# Patient Record
Sex: FEMALE | Race: BLACK OR AFRICAN AMERICAN | NOT HISPANIC OR LATINO | URBAN - METROPOLITAN AREA
[De-identification: names, ages, dates, MRNs, and addresses within clinical notes are randomized per-mention and may not be internally consistent; named-entity substitution may affect disease eponyms.]

---

## 2017-04-19 ENCOUNTER — INPATIENT (INPATIENT)
Facility: HOSPITAL | Age: 72
LOS: 6 days | Discharge: NURSING, REHAB OR LONG-TERM CARE | End: 2017-04-26
Attending: INTERNAL MEDICINE | Admitting: INTERNAL MEDICINE
Payer: MEDICARE

## 2017-04-19 VITALS
DIASTOLIC BLOOD PRESSURE: 90 MMHG | RESPIRATION RATE: 18 BRPM | HEART RATE: 85 BPM | TEMPERATURE: 98 F | SYSTOLIC BLOOD PRESSURE: 170 MMHG | OXYGEN SATURATION: 98 %

## 2017-04-19 DIAGNOSIS — R53.1 WEAKNESS: ICD-10-CM

## 2017-04-19 PROBLEM — Z00.00 ENCOUNTER FOR PREVENTIVE HEALTH EXAMINATION: Status: ACTIVE | Noted: 2017-04-19

## 2017-04-19 LAB
ALBUMIN SERPL ELPH-MCNC: 4.2 G/DL — SIGNIFICANT CHANGE UP (ref 3.3–5)
ALP SERPL-CCNC: 108 U/L — SIGNIFICANT CHANGE UP (ref 40–120)
ALT FLD-CCNC: 28 U/L — SIGNIFICANT CHANGE UP (ref 4–33)
APPEARANCE UR: SIGNIFICANT CHANGE UP
AST SERPL-CCNC: 43 U/L — HIGH (ref 4–32)
BACTERIA # UR AUTO: SIGNIFICANT CHANGE UP
BASOPHILS # BLD AUTO: 0.03 K/UL — SIGNIFICANT CHANGE UP (ref 0–0.2)
BASOPHILS NFR BLD AUTO: 0.3 % — SIGNIFICANT CHANGE UP (ref 0–2)
BILIRUB SERPL-MCNC: 0.6 MG/DL — SIGNIFICANT CHANGE UP (ref 0.2–1.2)
BILIRUB UR-MCNC: NEGATIVE — SIGNIFICANT CHANGE UP
BLOOD UR QL VISUAL: NEGATIVE — SIGNIFICANT CHANGE UP
BUN SERPL-MCNC: 18 MG/DL — SIGNIFICANT CHANGE UP (ref 7–23)
CALCIUM SERPL-MCNC: 9.3 MG/DL — SIGNIFICANT CHANGE UP (ref 8.4–10.5)
CHLORIDE SERPL-SCNC: 102 MMOL/L — SIGNIFICANT CHANGE UP (ref 98–107)
CO2 SERPL-SCNC: 21 MMOL/L — LOW (ref 22–31)
COLOR SPEC: YELLOW — SIGNIFICANT CHANGE UP
CREAT SERPL-MCNC: 0.94 MG/DL — SIGNIFICANT CHANGE UP (ref 0.5–1.3)
EOSINOPHIL # BLD AUTO: 0.03 K/UL — SIGNIFICANT CHANGE UP (ref 0–0.5)
EOSINOPHIL NFR BLD AUTO: 0.3 % — SIGNIFICANT CHANGE UP (ref 0–6)
GLUCOSE SERPL-MCNC: 128 MG/DL — HIGH (ref 70–99)
GLUCOSE UR-MCNC: NEGATIVE — SIGNIFICANT CHANGE UP
HCT VFR BLD CALC: 38.7 % — SIGNIFICANT CHANGE UP (ref 34.5–45)
HGB BLD-MCNC: 12.3 G/DL — SIGNIFICANT CHANGE UP (ref 11.5–15.5)
IMM GRANULOCYTES NFR BLD AUTO: 0.3 % — SIGNIFICANT CHANGE UP (ref 0–1.5)
KETONES UR-MCNC: NEGATIVE — SIGNIFICANT CHANGE UP
LEUKOCYTE ESTERASE UR-ACNC: HIGH
LYMPHOCYTES # BLD AUTO: 1.44 K/UL — SIGNIFICANT CHANGE UP (ref 1–3.3)
LYMPHOCYTES # BLD AUTO: 13.3 % — SIGNIFICANT CHANGE UP (ref 13–44)
MCHC RBC-ENTMCNC: 27.2 PG — SIGNIFICANT CHANGE UP (ref 27–34)
MCHC RBC-ENTMCNC: 31.8 % — LOW (ref 32–36)
MCV RBC AUTO: 85.6 FL — SIGNIFICANT CHANGE UP (ref 80–100)
MONOCYTES # BLD AUTO: 0.66 K/UL — SIGNIFICANT CHANGE UP (ref 0–0.9)
MONOCYTES NFR BLD AUTO: 6.1 % — SIGNIFICANT CHANGE UP (ref 2–14)
MUCOUS THREADS # UR AUTO: SIGNIFICANT CHANGE UP
NEUTROPHILS # BLD AUTO: 8.65 K/UL — HIGH (ref 1.8–7.4)
NEUTROPHILS NFR BLD AUTO: 79.7 % — HIGH (ref 43–77)
NITRITE UR-MCNC: NEGATIVE — SIGNIFICANT CHANGE UP
NON-SQ EPI CELLS # UR AUTO: 1 — SIGNIFICANT CHANGE UP
PH UR: 7 — SIGNIFICANT CHANGE UP (ref 4.6–8)
PLATELET # BLD AUTO: 267 K/UL — SIGNIFICANT CHANGE UP (ref 150–400)
PMV BLD: 10.1 FL — SIGNIFICANT CHANGE UP (ref 7–13)
POTASSIUM SERPL-MCNC: 4.5 MMOL/L — SIGNIFICANT CHANGE UP (ref 3.5–5.3)
POTASSIUM SERPL-SCNC: 4.5 MMOL/L — SIGNIFICANT CHANGE UP (ref 3.5–5.3)
PROT SERPL-MCNC: 8.5 G/DL — HIGH (ref 6–8.3)
PROT UR-MCNC: 100 — HIGH
RBC # BLD: 4.52 M/UL — SIGNIFICANT CHANGE UP (ref 3.8–5.2)
RBC # FLD: 15.2 % — HIGH (ref 10.3–14.5)
RBC CASTS # UR COMP ASSIST: SIGNIFICANT CHANGE UP (ref 0–?)
SODIUM SERPL-SCNC: 141 MMOL/L — SIGNIFICANT CHANGE UP (ref 135–145)
SP GR SPEC: 1.02 — SIGNIFICANT CHANGE UP (ref 1–1.03)
SQUAMOUS # UR AUTO: SIGNIFICANT CHANGE UP
UROBILINOGEN FLD QL: NORMAL E.U. — SIGNIFICANT CHANGE UP (ref 0.1–0.2)
WBC # BLD: 10.84 K/UL — HIGH (ref 3.8–10.5)
WBC # FLD AUTO: 10.84 K/UL — HIGH (ref 3.8–10.5)
WBC UR QL: SIGNIFICANT CHANGE UP (ref 0–?)

## 2017-04-19 PROCEDURE — 70450 CT HEAD/BRAIN W/O DYE: CPT | Mod: 26

## 2017-04-19 PROCEDURE — 73522 X-RAY EXAM HIPS BI 3-4 VIEWS: CPT | Mod: 26

## 2017-04-19 PROCEDURE — 71020: CPT | Mod: 26

## 2017-04-19 RX ORDER — SODIUM CHLORIDE 9 MG/ML
500 INJECTION INTRAMUSCULAR; INTRAVENOUS; SUBCUTANEOUS ONCE
Qty: 0 | Refills: 0 | Status: COMPLETED | OUTPATIENT
Start: 2017-04-19 | End: 2017-04-19

## 2017-04-19 RX ADMIN — SODIUM CHLORIDE 500 MILLILITER(S): 9 INJECTION INTRAMUSCULAR; INTRAVENOUS; SUBCUTANEOUS at 20:15

## 2017-04-19 NOTE — ED PROVIDER NOTE - ATTENDING CONTRIBUTION TO CARE
[place]   pt with hs with decreased ability to ambulate and noted inability to move rt leg  Pt here with clear lungs  Card RRR S1S2 abd nontender  moves RLE but strength diminished  RUE defect at baseline    labs  CXR CT head nondiagnostic   admitted for new inability to ambulate

## 2017-04-19 NOTE — ED ADULT NURSE NOTE - OBJECTIVE STATEMENT
Alert and oriented x 4. Pt received to spot 23 complaining of right leg numbness earlier today. Pt states she walks to the door and suddenly couldn't move her right leg. Pt had hx of CVA in 2009 with right sided residual. Pt states she usual walks with a walker. Denies chest pain, shortness of breath, nausea, vomiting or dizziness. Labs drawn. VSS. Will continue to monitor. RN facilitator.

## 2017-04-19 NOTE — ED PROVIDER NOTE - OBJECTIVE STATEMENT
72 y/o F PMHx HTN, HLD, CVA w/residual R sided weakness brought in with family and aid at bedside for difficulty ambulating, for the past several days, worse today. Pt is usually able to ambulate with a cane, at baseline RUE contracted. Today, the aid observed the pt unable to move her right lower extremity. Pt denies extremity numbness/weakness/tingling, fevers, chills, N/V, headache, vision changes, dysuria or any other complaints.

## 2017-04-19 NOTE — ED ADULT TRIAGE NOTE - CHIEF COMPLAINT QUOTE
p/t with hx cva rt sided paralysis c/o of weakness and pain to rt le for 2 weeks on and off nad noted

## 2017-04-19 NOTE — ED PROVIDER NOTE - MEDICAL DECISION MAKING DETAILS
70 y/o F w/CVA p/w worsening RLE weakness over the past several days, worse today. Unable to ambulate. Infectious vs. neurological. Plan- CT head to eval for new CVA, labs/cxr/ua to eval for infectious etiology. Admission since pt is unable to ambulate.

## 2017-04-20 DIAGNOSIS — E78.5 HYPERLIPIDEMIA, UNSPECIFIED: ICD-10-CM

## 2017-04-20 DIAGNOSIS — Z79.899 OTHER LONG TERM (CURRENT) DRUG THERAPY: ICD-10-CM

## 2017-04-20 DIAGNOSIS — I63.8 OTHER CEREBRAL INFARCTION: ICD-10-CM

## 2017-04-20 DIAGNOSIS — Z41.8 ENCOUNTER FOR OTHER PROCEDURES FOR PURPOSES OTHER THAN REMEDYING HEALTH STATE: ICD-10-CM

## 2017-04-20 DIAGNOSIS — I10 ESSENTIAL (PRIMARY) HYPERTENSION: ICD-10-CM

## 2017-04-20 DIAGNOSIS — R53.1 WEAKNESS: ICD-10-CM

## 2017-04-20 LAB
BUN SERPL-MCNC: 12 MG/DL — SIGNIFICANT CHANGE UP (ref 7–23)
CALCIUM SERPL-MCNC: 9.2 MG/DL — SIGNIFICANT CHANGE UP (ref 8.4–10.5)
CHLORIDE SERPL-SCNC: 106 MMOL/L — SIGNIFICANT CHANGE UP (ref 98–107)
CHOLEST SERPL-MCNC: 107 MG/DL — LOW (ref 120–199)
CK SERPL-CCNC: 279 U/L — HIGH (ref 25–170)
CO2 SERPL-SCNC: 24 MMOL/L — SIGNIFICANT CHANGE UP (ref 22–31)
CREAT SERPL-MCNC: 0.88 MG/DL — SIGNIFICANT CHANGE UP (ref 0.5–1.3)
GLUCOSE SERPL-MCNC: 95 MG/DL — SIGNIFICANT CHANGE UP (ref 70–99)
HBA1C BLD-MCNC: 6 % — HIGH (ref 4–5.6)
HCT VFR BLD CALC: 34.6 % — SIGNIFICANT CHANGE UP (ref 34.5–45)
HDLC SERPL-MCNC: 54 MG/DL — SIGNIFICANT CHANGE UP (ref 45–65)
HGB BLD-MCNC: 11 G/DL — LOW (ref 11.5–15.5)
LIPID PNL WITH DIRECT LDL SERPL: 43 MG/DL — SIGNIFICANT CHANGE UP
MCHC RBC-ENTMCNC: 27.2 PG — SIGNIFICANT CHANGE UP (ref 27–34)
MCHC RBC-ENTMCNC: 31.8 % — LOW (ref 32–36)
MCV RBC AUTO: 85.6 FL — SIGNIFICANT CHANGE UP (ref 80–100)
PLATELET # BLD AUTO: 241 K/UL — SIGNIFICANT CHANGE UP (ref 150–400)
PMV BLD: 9.4 FL — SIGNIFICANT CHANGE UP (ref 7–13)
POTASSIUM SERPL-MCNC: 3.9 MMOL/L — SIGNIFICANT CHANGE UP (ref 3.5–5.3)
POTASSIUM SERPL-SCNC: 3.9 MMOL/L — SIGNIFICANT CHANGE UP (ref 3.5–5.3)
RBC # BLD: 4.04 M/UL — SIGNIFICANT CHANGE UP (ref 3.8–5.2)
RBC # FLD: 15.2 % — HIGH (ref 10.3–14.5)
SODIUM SERPL-SCNC: 141 MMOL/L — SIGNIFICANT CHANGE UP (ref 135–145)
TRIGL SERPL-MCNC: 53 MG/DL — SIGNIFICANT CHANGE UP (ref 10–149)
TROPONIN T SERPL-MCNC: < 0.06 NG/ML — SIGNIFICANT CHANGE UP (ref 0–0.06)
VIT B12 SERPL-MCNC: 1327 PG/ML — HIGH (ref 200–900)
WBC # BLD: 8.7 K/UL — SIGNIFICANT CHANGE UP (ref 3.8–10.5)
WBC # FLD AUTO: 8.7 K/UL — SIGNIFICANT CHANGE UP (ref 3.8–10.5)

## 2017-04-20 PROCEDURE — 99223 1ST HOSP IP/OBS HIGH 75: CPT

## 2017-04-20 PROCEDURE — 93010 ELECTROCARDIOGRAM REPORT: CPT

## 2017-04-20 RX ORDER — SIMVASTATIN 20 MG/1
10 TABLET, FILM COATED ORAL AT BEDTIME
Qty: 0 | Refills: 0 | Status: DISCONTINUED | OUTPATIENT
Start: 2017-04-20 | End: 2017-04-26

## 2017-04-20 RX ORDER — NICARDIPINE HYDROCHLORIDE 30 MG/1
40 CAPSULE, EXTENDED RELEASE ORAL
Qty: 0 | Refills: 0 | Status: DISCONTINUED | OUTPATIENT
Start: 2017-04-20 | End: 2017-04-26

## 2017-04-20 RX ORDER — LOSARTAN POTASSIUM 100 MG/1
1 TABLET, FILM COATED ORAL
Qty: 0 | Refills: 0 | COMMUNITY

## 2017-04-20 RX ORDER — LOSARTAN POTASSIUM 100 MG/1
100 TABLET, FILM COATED ORAL DAILY
Qty: 0 | Refills: 0 | Status: DISCONTINUED | OUTPATIENT
Start: 2017-04-20 | End: 2017-04-26

## 2017-04-20 RX ORDER — FUROSEMIDE 40 MG
10 TABLET ORAL DAILY
Qty: 0 | Refills: 0 | Status: DISCONTINUED | OUTPATIENT
Start: 2017-04-20 | End: 2017-04-26

## 2017-04-20 RX ORDER — NICARDIPINE HYDROCHLORIDE 30 MG/1
20 CAPSULE, EXTENDED RELEASE ORAL
Qty: 0 | Refills: 0 | Status: DISCONTINUED | OUTPATIENT
Start: 2017-04-20 | End: 2017-04-26

## 2017-04-20 RX ORDER — SIMVASTATIN 20 MG/1
1 TABLET, FILM COATED ORAL
Qty: 0 | Refills: 0 | COMMUNITY

## 2017-04-20 RX ADMIN — NICARDIPINE HYDROCHLORIDE 20 MILLIGRAM(S): 30 CAPSULE, EXTENDED RELEASE ORAL at 18:46

## 2017-04-20 RX ADMIN — SIMVASTATIN 10 MILLIGRAM(S): 20 TABLET, FILM COATED ORAL at 22:20

## 2017-04-20 RX ADMIN — Medication 1 TABLET(S): at 18:46

## 2017-04-20 NOTE — H&P ADULT. - ATTENDING COMMENTS
Called patient's emergency contact in EMR Marcela Davis who provided me with Axel San's (patient's sister) number who is patient's actual emergency contact [phone] or cell [phone].  I spoke with Axel who states that yesterday patient couldn't move her RLE and she was sweating, the paramedics were called and thought she should be brought to the hospital.  Per Axel, RLE weakness comes and goes, no other recent fevers/chills.  She reports that sometimes the patient cries because of pain, however her sister believes she tries to hide her pain from her aide so that she won't be brought to the hospital. She states patient is dysarthric with word finding difficulties at baseline.  Neuro consult was placed by me for further evaluation.

## 2017-04-20 NOTE — H&P ADULT. - PROBLEM SELECTOR PLAN 3
patient appears to be on nicardipine 20mg, losartan 100mg and lasix 20mg per outpatient EMR records however will need to clarify with PMD in AM

## 2017-04-20 NOTE — H&P ADULT. - LAB RESULTS AND INTERPRETATION
Labs personally reviewed  UA with large LE however (-)Nitrite, 2-5WBC, 100 protein  hyperglycemia  mild transaminitis  stable CKD stage 2  mild leukocytosis with neutrophilia

## 2017-04-20 NOTE — H&P ADULT. - PROBLEM SELECTOR PLAN 1
complaint of weakness, headache, pain in RLE  unclear etiology  CT head as above  will obtain neurology consult  EKG pending given complaint of diaphoresis associated with her weakness yesterday

## 2017-04-20 NOTE — H&P ADULT. - RADIOLOGY RESULTS AND INTERPRETATION
CT head reviewed - No CT evidence of acute intracranial hemorrhage, brain edema, mass effect or acute territorial infarct.  Encephalomalacia/gliosis involving the left frontal lobe and left basal ganglia which is sequela of an intraparenchymal hemorrhage that was seen on 12/25/2009.  Follow-up MRI can be obtained as clinically warranted.  CXR personally reviewed - no acute path, lungs clear  xray hips - The pelvic rings are intact. There is no acute fracture or dislocation of the right or left hip. Bilateral hip joint arthrosis with mild joint  space narrowing.

## 2017-04-20 NOTE — H&P ADULT. - EKG AND INTERPRETATION
EKG ordered and pending EKG personally reviewed - 91bpm NSR, isolated Q in III, TWI in V4, flat T in V5-V6, QTc 445ms. Unable to view prior EKG for comparison (error message "service unavailable" received). No complaints of chest pain at present.

## 2017-04-20 NOTE — H&P ADULT. - CRANIAL NERVE
EOMI, mild arthralgia, decreased sensation R side of face, facial symmetry preserved, unable to raise R shoulder

## 2017-04-20 NOTE — H&P ADULT. - ASSESSMENT
71 -year-old female with HTN, hemorrhagic stroke (2009) with residual R-sided hemiplegia presenting from home with worsening right-sided lower extremity weakness.

## 2017-04-20 NOTE — H&P ADULT. - RS GEN PE MLT RESP DETAILS PC
no rhonchi/breath sounds equal/no rales/normal/respirations non-labored/good air movement/no wheezes/no intercostal retractions/airway patent/clear to auscultation bilaterally

## 2017-04-20 NOTE — H&P ADULT. - HISTORY OF PRESENT ILLNESS
71 -year-old female with HTN, hemorrhagic stroke (2009) with residual R-sided hemiplegia presenting from home with worsening right-sided weakness    In the ED VS:  98  85  170/90  18  98%RA, received NS 500cc x1 71 -year-old female with HTN, hemorrhagic stroke (2009) with residual R-sided hemiplegia presenting from home with worsening right-sided lower extremity weakness.  Patient reports that she walked outside today and then was unable to walk back inside, so she called for her aide who had to help her walk back inside. The only associated symptom with this event, per patient report, was diaphoresis.  Denied chest pain, shortness of breath, palpitations or nausea. Patient with word finding difficulties and no family present at bedside making patient interview difficult.  Patient reports taking 4 meds however unable to name.  She states last night before going to sleep she had a headache and pain extending from bifrontal down the right side of her body to her toes.  She denies any changes in vision or hearing, any photophobia or phonophobia.  She states she didn't eat until approximately 1PM yesterday.  She denies falls or syncope.  She denies chest pain, nausea, vomiting, diarrhea, abdominal pain, dysuria, hematuria, sick contacts, URI symptoms.     In the ED VS:  98  85  170/90  18  98%RA, received NS 500cc x1

## 2017-04-21 LAB
BACTERIA UR CULT: SIGNIFICANT CHANGE UP
BASOPHILS # BLD AUTO: 0.05 K/UL — SIGNIFICANT CHANGE UP (ref 0–0.2)
BASOPHILS NFR BLD AUTO: 0.7 % — SIGNIFICANT CHANGE UP (ref 0–2)
BUN SERPL-MCNC: 11 MG/DL — SIGNIFICANT CHANGE UP (ref 7–23)
CALCIUM SERPL-MCNC: 9 MG/DL — SIGNIFICANT CHANGE UP (ref 8.4–10.5)
CHLORIDE SERPL-SCNC: 104 MMOL/L — SIGNIFICANT CHANGE UP (ref 98–107)
CK SERPL-CCNC: 218 U/L — HIGH (ref 25–170)
CO2 SERPL-SCNC: 23 MMOL/L — SIGNIFICANT CHANGE UP (ref 22–31)
CREAT SERPL-MCNC: 0.81 MG/DL — SIGNIFICANT CHANGE UP (ref 0.5–1.3)
EOSINOPHIL # BLD AUTO: 0.08 K/UL — SIGNIFICANT CHANGE UP (ref 0–0.5)
EOSINOPHIL NFR BLD AUTO: 1.2 % — SIGNIFICANT CHANGE UP (ref 0–6)
GLUCOSE SERPL-MCNC: 87 MG/DL — SIGNIFICANT CHANGE UP (ref 70–99)
HCT VFR BLD CALC: 33.8 % — LOW (ref 34.5–45)
HGB BLD-MCNC: 10.7 G/DL — LOW (ref 11.5–15.5)
IMM GRANULOCYTES NFR BLD AUTO: 0.3 % — SIGNIFICANT CHANGE UP (ref 0–1.5)
LYMPHOCYTES # BLD AUTO: 2.31 K/UL — SIGNIFICANT CHANGE UP (ref 1–3.3)
LYMPHOCYTES # BLD AUTO: 33.9 % — SIGNIFICANT CHANGE UP (ref 13–44)
MAGNESIUM SERPL-MCNC: 2.1 MG/DL — SIGNIFICANT CHANGE UP (ref 1.6–2.6)
MCHC RBC-ENTMCNC: 27 PG — SIGNIFICANT CHANGE UP (ref 27–34)
MCHC RBC-ENTMCNC: 31.7 % — LOW (ref 32–36)
MCV RBC AUTO: 85.4 FL — SIGNIFICANT CHANGE UP (ref 80–100)
MONOCYTES # BLD AUTO: 0.63 K/UL — SIGNIFICANT CHANGE UP (ref 0–0.9)
MONOCYTES NFR BLD AUTO: 9.3 % — SIGNIFICANT CHANGE UP (ref 2–14)
NEUTROPHILS # BLD AUTO: 3.72 K/UL — SIGNIFICANT CHANGE UP (ref 1.8–7.4)
NEUTROPHILS NFR BLD AUTO: 54.6 % — SIGNIFICANT CHANGE UP (ref 43–77)
PHOSPHATE SERPL-MCNC: 3.3 MG/DL — SIGNIFICANT CHANGE UP (ref 2.5–4.5)
PLATELET # BLD AUTO: 239 K/UL — SIGNIFICANT CHANGE UP (ref 150–400)
PMV BLD: 10 FL — SIGNIFICANT CHANGE UP (ref 7–13)
POTASSIUM SERPL-MCNC: 4.2 MMOL/L — SIGNIFICANT CHANGE UP (ref 3.5–5.3)
POTASSIUM SERPL-SCNC: 4.2 MMOL/L — SIGNIFICANT CHANGE UP (ref 3.5–5.3)
RBC # BLD: 3.96 M/UL — SIGNIFICANT CHANGE UP (ref 3.8–5.2)
RBC # FLD: 15.2 % — HIGH (ref 10.3–14.5)
SODIUM SERPL-SCNC: 141 MMOL/L — SIGNIFICANT CHANGE UP (ref 135–145)
SPECIMEN SOURCE: SIGNIFICANT CHANGE UP
WBC # BLD: 6.81 K/UL — SIGNIFICANT CHANGE UP (ref 3.8–10.5)
WBC # FLD AUTO: 6.81 K/UL — SIGNIFICANT CHANGE UP (ref 3.8–10.5)

## 2017-04-21 PROCEDURE — 99233 SBSQ HOSP IP/OBS HIGH 50: CPT

## 2017-04-21 PROCEDURE — 70551 MRI BRAIN STEM W/O DYE: CPT | Mod: 26

## 2017-04-21 PROCEDURE — 70548 MR ANGIOGRAPHY NECK W/DYE: CPT | Mod: 26

## 2017-04-21 RX ADMIN — SIMVASTATIN 10 MILLIGRAM(S): 20 TABLET, FILM COATED ORAL at 21:29

## 2017-04-21 RX ADMIN — NICARDIPINE HYDROCHLORIDE 20 MILLIGRAM(S): 30 CAPSULE, EXTENDED RELEASE ORAL at 19:11

## 2017-04-21 RX ADMIN — Medication 10 MILLIGRAM(S): at 05:43

## 2017-04-21 RX ADMIN — NICARDIPINE HYDROCHLORIDE 40 MILLIGRAM(S): 30 CAPSULE, EXTENDED RELEASE ORAL at 09:25

## 2017-04-21 RX ADMIN — Medication 1 TABLET(S): at 13:58

## 2017-04-21 RX ADMIN — LOSARTAN POTASSIUM 100 MILLIGRAM(S): 100 TABLET, FILM COATED ORAL at 05:44

## 2017-04-21 RX ADMIN — NICARDIPINE HYDROCHLORIDE 20 MILLIGRAM(S): 30 CAPSULE, EXTENDED RELEASE ORAL at 13:58

## 2017-04-22 RX ADMIN — SIMVASTATIN 10 MILLIGRAM(S): 20 TABLET, FILM COATED ORAL at 22:15

## 2017-04-22 RX ADMIN — LOSARTAN POTASSIUM 100 MILLIGRAM(S): 100 TABLET, FILM COATED ORAL at 06:07

## 2017-04-22 RX ADMIN — Medication 1 TABLET(S): at 11:51

## 2017-04-22 RX ADMIN — NICARDIPINE HYDROCHLORIDE 20 MILLIGRAM(S): 30 CAPSULE, EXTENDED RELEASE ORAL at 11:51

## 2017-04-22 RX ADMIN — NICARDIPINE HYDROCHLORIDE 20 MILLIGRAM(S): 30 CAPSULE, EXTENDED RELEASE ORAL at 17:25

## 2017-04-22 RX ADMIN — NICARDIPINE HYDROCHLORIDE 40 MILLIGRAM(S): 30 CAPSULE, EXTENDED RELEASE ORAL at 08:36

## 2017-04-22 RX ADMIN — Medication 10 MILLIGRAM(S): at 06:07

## 2017-04-23 LAB
BASOPHILS # BLD AUTO: 0.05 K/UL — SIGNIFICANT CHANGE UP (ref 0–0.2)
BASOPHILS NFR BLD AUTO: 0.7 % — SIGNIFICANT CHANGE UP (ref 0–2)
EOSINOPHIL # BLD AUTO: 0.11 K/UL — SIGNIFICANT CHANGE UP (ref 0–0.5)
EOSINOPHIL NFR BLD AUTO: 1.5 % — SIGNIFICANT CHANGE UP (ref 0–6)
HCT VFR BLD CALC: 37.9 % — SIGNIFICANT CHANGE UP (ref 34.5–45)
HGB BLD-MCNC: 12 G/DL — SIGNIFICANT CHANGE UP (ref 11.5–15.5)
IMM GRANULOCYTES NFR BLD AUTO: 0.3 % — SIGNIFICANT CHANGE UP (ref 0–1.5)
LYMPHOCYTES # BLD AUTO: 2.06 K/UL — SIGNIFICANT CHANGE UP (ref 1–3.3)
LYMPHOCYTES # BLD AUTO: 28 % — SIGNIFICANT CHANGE UP (ref 13–44)
MCHC RBC-ENTMCNC: 27.5 PG — SIGNIFICANT CHANGE UP (ref 27–34)
MCHC RBC-ENTMCNC: 31.7 % — LOW (ref 32–36)
MCV RBC AUTO: 86.7 FL — SIGNIFICANT CHANGE UP (ref 80–100)
MONOCYTES # BLD AUTO: 0.62 K/UL — SIGNIFICANT CHANGE UP (ref 0–0.9)
MONOCYTES NFR BLD AUTO: 8.4 % — SIGNIFICANT CHANGE UP (ref 2–14)
NEUTROPHILS # BLD AUTO: 4.51 K/UL — SIGNIFICANT CHANGE UP (ref 1.8–7.4)
NEUTROPHILS NFR BLD AUTO: 61.1 % — SIGNIFICANT CHANGE UP (ref 43–77)
PLATELET # BLD AUTO: 255 K/UL — SIGNIFICANT CHANGE UP (ref 150–400)
PMV BLD: 9.9 FL — SIGNIFICANT CHANGE UP (ref 7–13)
RBC # BLD: 4.37 M/UL — SIGNIFICANT CHANGE UP (ref 3.8–5.2)
RBC # FLD: 15.4 % — HIGH (ref 10.3–14.5)
WBC # BLD: 7.44 K/UL — SIGNIFICANT CHANGE UP (ref 3.8–10.5)
WBC # FLD AUTO: 7.44 K/UL — SIGNIFICANT CHANGE UP (ref 3.8–10.5)

## 2017-04-23 RX ADMIN — NICARDIPINE HYDROCHLORIDE 20 MILLIGRAM(S): 30 CAPSULE, EXTENDED RELEASE ORAL at 20:40

## 2017-04-23 RX ADMIN — Medication 10 MILLIGRAM(S): at 06:32

## 2017-04-23 RX ADMIN — LOSARTAN POTASSIUM 100 MILLIGRAM(S): 100 TABLET, FILM COATED ORAL at 06:32

## 2017-04-23 RX ADMIN — SIMVASTATIN 10 MILLIGRAM(S): 20 TABLET, FILM COATED ORAL at 22:04

## 2017-04-23 RX ADMIN — Medication 1 TABLET(S): at 15:14

## 2017-04-23 RX ADMIN — NICARDIPINE HYDROCHLORIDE 20 MILLIGRAM(S): 30 CAPSULE, EXTENDED RELEASE ORAL at 15:14

## 2017-04-23 RX ADMIN — NICARDIPINE HYDROCHLORIDE 40 MILLIGRAM(S): 30 CAPSULE, EXTENDED RELEASE ORAL at 08:46

## 2017-04-24 LAB
BASOPHILS # BLD AUTO: 0.05 K/UL — SIGNIFICANT CHANGE UP (ref 0–0.2)
BASOPHILS NFR BLD AUTO: 0.6 % — SIGNIFICANT CHANGE UP (ref 0–2)
BUN SERPL-MCNC: 18 MG/DL — SIGNIFICANT CHANGE UP (ref 7–23)
CALCIUM SERPL-MCNC: 9.3 MG/DL — SIGNIFICANT CHANGE UP (ref 8.4–10.5)
CHLORIDE SERPL-SCNC: 105 MMOL/L — SIGNIFICANT CHANGE UP (ref 98–107)
CO2 SERPL-SCNC: 22 MMOL/L — SIGNIFICANT CHANGE UP (ref 22–31)
CREAT SERPL-MCNC: 0.97 MG/DL — SIGNIFICANT CHANGE UP (ref 0.5–1.3)
EOSINOPHIL # BLD AUTO: 0.16 K/UL — SIGNIFICANT CHANGE UP (ref 0–0.5)
EOSINOPHIL NFR BLD AUTO: 2 % — SIGNIFICANT CHANGE UP (ref 0–6)
GLUCOSE SERPL-MCNC: 93 MG/DL — SIGNIFICANT CHANGE UP (ref 70–99)
HCT VFR BLD CALC: 38.4 % — SIGNIFICANT CHANGE UP (ref 34.5–45)
HGB BLD-MCNC: 12.1 G/DL — SIGNIFICANT CHANGE UP (ref 11.5–15.5)
IMM GRANULOCYTES NFR BLD AUTO: 0.5 % — SIGNIFICANT CHANGE UP (ref 0–1.5)
LYMPHOCYTES # BLD AUTO: 2.88 K/UL — SIGNIFICANT CHANGE UP (ref 1–3.3)
LYMPHOCYTES # BLD AUTO: 35.4 % — SIGNIFICANT CHANGE UP (ref 13–44)
MCHC RBC-ENTMCNC: 27.1 PG — SIGNIFICANT CHANGE UP (ref 27–34)
MCHC RBC-ENTMCNC: 31.5 % — LOW (ref 32–36)
MCV RBC AUTO: 85.9 FL — SIGNIFICANT CHANGE UP (ref 80–100)
MONOCYTES # BLD AUTO: 0.59 K/UL — SIGNIFICANT CHANGE UP (ref 0–0.9)
MONOCYTES NFR BLD AUTO: 7.3 % — SIGNIFICANT CHANGE UP (ref 2–14)
NEUTROPHILS # BLD AUTO: 4.41 K/UL — SIGNIFICANT CHANGE UP (ref 1.8–7.4)
NEUTROPHILS NFR BLD AUTO: 54.2 % — SIGNIFICANT CHANGE UP (ref 43–77)
PLATELET # BLD AUTO: 265 K/UL — SIGNIFICANT CHANGE UP (ref 150–400)
PMV BLD: 9.6 FL — SIGNIFICANT CHANGE UP (ref 7–13)
POTASSIUM SERPL-MCNC: 4.2 MMOL/L — SIGNIFICANT CHANGE UP (ref 3.5–5.3)
POTASSIUM SERPL-SCNC: 4.2 MMOL/L — SIGNIFICANT CHANGE UP (ref 3.5–5.3)
RBC # BLD: 4.47 M/UL — SIGNIFICANT CHANGE UP (ref 3.8–5.2)
RBC # FLD: 15.2 % — HIGH (ref 10.3–14.5)
SODIUM SERPL-SCNC: 141 MMOL/L — SIGNIFICANT CHANGE UP (ref 135–145)
WBC # BLD: 8.13 K/UL — SIGNIFICANT CHANGE UP (ref 3.8–10.5)
WBC # FLD AUTO: 8.13 K/UL — SIGNIFICANT CHANGE UP (ref 3.8–10.5)

## 2017-04-24 PROCEDURE — 99233 SBSQ HOSP IP/OBS HIGH 50: CPT

## 2017-04-24 PROCEDURE — 95819 EEG AWAKE AND ASLEEP: CPT | Mod: 26

## 2017-04-24 PROCEDURE — 95957 EEG DIGITAL ANALYSIS: CPT | Mod: 26

## 2017-04-24 RX ORDER — DIVALPROEX SODIUM 500 MG/1
500 TABLET, DELAYED RELEASE ORAL
Qty: 0 | Refills: 0 | Status: DISCONTINUED | OUTPATIENT
Start: 2017-04-24 | End: 2017-04-26

## 2017-04-24 RX ADMIN — NICARDIPINE HYDROCHLORIDE 20 MILLIGRAM(S): 30 CAPSULE, EXTENDED RELEASE ORAL at 18:24

## 2017-04-24 RX ADMIN — SIMVASTATIN 10 MILLIGRAM(S): 20 TABLET, FILM COATED ORAL at 22:01

## 2017-04-24 RX ADMIN — NICARDIPINE HYDROCHLORIDE 20 MILLIGRAM(S): 30 CAPSULE, EXTENDED RELEASE ORAL at 13:45

## 2017-04-24 RX ADMIN — Medication 10 MILLIGRAM(S): at 05:49

## 2017-04-24 RX ADMIN — Medication 1 TABLET(S): at 13:45

## 2017-04-24 RX ADMIN — LOSARTAN POTASSIUM 100 MILLIGRAM(S): 100 TABLET, FILM COATED ORAL at 05:49

## 2017-04-24 RX ADMIN — NICARDIPINE HYDROCHLORIDE 40 MILLIGRAM(S): 30 CAPSULE, EXTENDED RELEASE ORAL at 08:10

## 2017-04-24 RX ADMIN — DIVALPROEX SODIUM 500 MILLIGRAM(S): 500 TABLET, DELAYED RELEASE ORAL at 18:23

## 2017-04-24 NOTE — EEG REPORT - NS EEG TEXT BOX
[facility]  Report of Routine EEG with Video  And Report of DigitalCompressed Spectral Array Analysis    [facility]: [address], [address] [place] [address], Phone: [phone]  [facility]: [address], [place] [address], Phone: [phone]  Office: [address] [facility], [place] [address], [place] [address], Phone: [phone]    Patient Name: CARYN LEIVA    Age: 71 y  : 1945  Patient ID: -, MRN #: -, Location: 56  Referring Physician: -    EEG #: [id number]  Study Date: 2017		    Technical Information:					  On Instrument: -  Placement and Labeling of Electrodes:  The EEG was performed utilizing 20 channels referential EEG connections (coronal over temporal over parasagittal montage) using all standard 10-20 electrode placements with EKG.  Recording was at a sampling rate of 256 samples per second per channel.  Time synchronized digital video recording was done simultaneously with EEG recording.  A low light infrared camera was used for low light recording.  Antoine and seizure detection algorithms were utilized.  CSA Technical Component:  Quantitative EEG analysis using a separate Compressed Spectral Array (CSA) software package was conducted in real-time and run at bedside after set up by the technician, digitally displaying the power of electrographic frequencies included in the 1-30Hz band using a graded color map.  This data was reviewed and interpreted independently, and is reported in a separate section below.    History:  70YO PRESENTS WITH R FACIAL DROOP AND R SIDED WEAKNESS    Medication	  No Data.	    Study Interpretation:    FINDINGS:  The background was continuous, spontaneously variable and reactive.  During wakefulness, the posteriorly dominant rhythm consisted of symmetric, well modulated 10 Hz activity, with an amplitude to 30 uV, that attenuated to eye opening.  Low amplitude central beta was noted in wakefulness.    Background Slowing:  Generalized slowing: none was present.  Focal slowing: Continuous polymorphic delta>theta activity present over right hemisphere .    Sleep Background:  Drowsiness was characterized by fragmentation, attenuation, and slowing of the background activity.    Sleep was characterized by the presence of vertex waves, symmetric spindles, and K-complexes.    Epileptiform Activity:   Frequent left temporal (T7 maximal) sharp wave discharges noted, sometimes the discharges are seen as well over the anterior temporal (F3, T3) leads..     Events:  No clinical events were recorded.  No seizures were recorded.    Activation Procedures:   -Hyperventilation was not performed.    -Photic stimulation was not performed.    Artifacts:  Intermittent myogenic and movement artifacts were noted.    ECG:  The heart rate on single channel ECG was predominantly between 60-70 BPM.    Compressed Spectral Array Digital Analysis    FINDINGS:  Compressed Spectral Array (CSA) data was reviewed separately and correlated with the electroencephalographic findings detailed above.  CSA showed a variable spectral pattern.  Areas of increased power in particular were reviewed in detail, and compared with the raw EEG data.  Areas of abrupt increases in spectral power were reviewed to exclude seizures, and were determined to be artifactual in nature.    The relative ratio of the power of delta range frequencies and faster frequencies remained stable over the course of the study.  There was increase in the broad band power in the delta frequency spectrum apparent in the left hemisphere versus the right hemisphere.      Compressed Spectral Array (Digital Analysis) Summary/ Impression:  More delta power in left hemisphere.  Intermittent areas of increased power reviewed, without definite epileptiform activity associated on CSA.      EEG Classification / Summary:  Abnormal awake, drowsy and asleep Routine EEG Study   -	Frequenct left temporal epileptiform discharges  -	Continuous polymorphic focal slowing, left hemisphere    Clinical Impression:  Risk of focal seizures from left temporal region. There was evidence of structural injury to the left hemisphere region.          ________________________________________  BETITO Washington  Fellow in Neurophysiology/Epilepsy, [facility] [place]      King Gonzales M.D.  Director, [facility] [place]

## 2017-04-25 RX ADMIN — SIMVASTATIN 10 MILLIGRAM(S): 20 TABLET, FILM COATED ORAL at 21:59

## 2017-04-25 RX ADMIN — NICARDIPINE HYDROCHLORIDE 40 MILLIGRAM(S): 30 CAPSULE, EXTENDED RELEASE ORAL at 08:57

## 2017-04-25 RX ADMIN — Medication 1 TABLET(S): at 13:23

## 2017-04-25 RX ADMIN — LOSARTAN POTASSIUM 100 MILLIGRAM(S): 100 TABLET, FILM COATED ORAL at 05:18

## 2017-04-25 RX ADMIN — DIVALPROEX SODIUM 500 MILLIGRAM(S): 500 TABLET, DELAYED RELEASE ORAL at 05:18

## 2017-04-25 RX ADMIN — DIVALPROEX SODIUM 500 MILLIGRAM(S): 500 TABLET, DELAYED RELEASE ORAL at 18:01

## 2017-04-25 RX ADMIN — NICARDIPINE HYDROCHLORIDE 20 MILLIGRAM(S): 30 CAPSULE, EXTENDED RELEASE ORAL at 18:01

## 2017-04-25 RX ADMIN — Medication 10 MILLIGRAM(S): at 05:18

## 2017-04-25 RX ADMIN — NICARDIPINE HYDROCHLORIDE 20 MILLIGRAM(S): 30 CAPSULE, EXTENDED RELEASE ORAL at 13:22

## 2017-04-26 ENCOUNTER — TRANSCRIPTION ENCOUNTER (OUTPATIENT)
Age: 72
End: 2017-04-26

## 2017-04-26 VITALS — HEART RATE: 76 BPM | DIASTOLIC BLOOD PRESSURE: 58 MMHG | SYSTOLIC BLOOD PRESSURE: 124 MMHG

## 2017-04-26 RX ORDER — NICARDIPINE HYDROCHLORIDE 30 MG/1
1 CAPSULE, EXTENDED RELEASE ORAL
Qty: 0 | Refills: 0 | COMMUNITY

## 2017-04-26 RX ORDER — FUROSEMIDE 40 MG
0 TABLET ORAL
Qty: 0 | Refills: 0 | COMMUNITY

## 2017-04-26 RX ORDER — NICARDIPINE HYDROCHLORIDE 30 MG/1
2 CAPSULE, EXTENDED RELEASE ORAL
Qty: 0 | Refills: 0 | COMMUNITY
Start: 2017-04-26

## 2017-04-26 RX ORDER — NICARDIPINE HYDROCHLORIDE 30 MG/1
1 CAPSULE, EXTENDED RELEASE ORAL
Qty: 0 | Refills: 0 | COMMUNITY
Start: 2017-04-26

## 2017-04-26 RX ORDER — DIVALPROEX SODIUM 500 MG/1
1 TABLET, DELAYED RELEASE ORAL
Qty: 0 | Refills: 0 | COMMUNITY
Start: 2017-04-26

## 2017-04-26 RX ORDER — FUROSEMIDE 40 MG
10 TABLET ORAL
Qty: 0 | Refills: 0 | COMMUNITY
Start: 2017-04-26

## 2017-04-26 RX ADMIN — LOSARTAN POTASSIUM 100 MILLIGRAM(S): 100 TABLET, FILM COATED ORAL at 05:46

## 2017-04-26 RX ADMIN — DIVALPROEX SODIUM 500 MILLIGRAM(S): 500 TABLET, DELAYED RELEASE ORAL at 05:46

## 2017-04-26 RX ADMIN — Medication 10 MILLIGRAM(S): at 05:46

## 2017-04-26 RX ADMIN — NICARDIPINE HYDROCHLORIDE 40 MILLIGRAM(S): 30 CAPSULE, EXTENDED RELEASE ORAL at 08:46

## 2017-04-26 RX ADMIN — NICARDIPINE HYDROCHLORIDE 20 MILLIGRAM(S): 30 CAPSULE, EXTENDED RELEASE ORAL at 12:01

## 2017-04-26 RX ADMIN — DIVALPROEX SODIUM 500 MILLIGRAM(S): 500 TABLET, DELAYED RELEASE ORAL at 17:09

## 2017-04-26 RX ADMIN — Medication 1 TABLET(S): at 12:01

## 2017-04-26 RX ADMIN — NICARDIPINE HYDROCHLORIDE 20 MILLIGRAM(S): 30 CAPSULE, EXTENDED RELEASE ORAL at 17:09

## 2017-04-26 NOTE — DISCHARGE NOTE ADULT - PLAN OF CARE
Stable You were admitted for weakness. An MRI/MRA Head and Neck performed which shows no evidence of significant stenosis vascular occlusion involving the intracranial or extracranial circulation.  No new ischemic changes or bleeding. An EEG was performed while inpatient and resulted as the left temporal region of brain at risk for seizure activity.  You have been prescribed an anti seizure medication Depakote 500 mg 2x daily.  Recommended for rehab for physical therapy and occupational therapy.    Follow up with neurology as outpatient when discharged from rehab with Dr. Linder at [address] [place] [address] [phone] further medical management. Controlled. Target blood pressure less then 140/90 Continue antihypertensives as prescribed.  Losartan and Nicardipine. Continue Aspirin as prescribed.  Monitor for falls and aspiration. Controlled Continue medication as prescribed.  Zocor.

## 2017-04-26 NOTE — DISCHARGE NOTE ADULT - SECONDARY DIAGNOSIS.
Benign Essential Hypertension Cerebrovascular accident (CVA) due to other mechanism Hyperlipidemia, unspecified hyperlipidemia type

## 2017-04-26 NOTE — DISCHARGE NOTE ADULT - CARE PROVIDER_API CALL
Cindy Linder), Neurology  [address] [place] [address]  Phone: [phone]  Fax: [phone]    La Zepeda), Internal Medicine  [address] [place] [address]  Phone: [phone]  Fax: [phone]

## 2017-04-26 NOTE — DISCHARGE NOTE ADULT - PATIENT PORTAL LINK FT
“You can access the FollowHealth Patient Portal, offered by [facility], by registering with the following website: [email or web address]”

## 2017-04-26 NOTE — DISCHARGE NOTE ADULT - CARE PLAN
Principal Discharge DX:	Weakness  Goal:	Stable  Instructions for follow-up, activity and diet:	You were admitted for weakness. An MRI/MRA Head and Neck performed which shows no evidence of significant stenosis vascular occlusion involving the intracranial or extracranial circulation.  No new ischemic changes or bleeding. An EEG was performed while inpatient and resulted as the left temporal region of brain at risk for seizure activity.  You have been prescribed an anti seizure medication Depakote 500 mg 2x daily.  Recommended for rehab for physical therapy and occupational therapy.    Follow up with neurology as outpatient when discharged from rehab with Dr. Linder at [address] [place] [address] [phone] further medical management.  Secondary Diagnosis:	Benign Essential Hypertension  Goal:	Controlled. Target blood pressure less then 140/90  Instructions for follow-up, activity and diet:	Continue antihypertensives as prescribed.  Losartan and Nicardipine.  Secondary Diagnosis:	Cerebrovascular accident (CVA) due to other mechanism  Goal:	Stable  Instructions for follow-up, activity and diet:	Continue Aspirin as prescribed.  Monitor for falls and aspiration.  Secondary Diagnosis:	Hyperlipidemia, unspecified hyperlipidemia type  Goal:	Controlled  Instructions for follow-up, activity and diet:	Continue medication as prescribed.  Zocor. Principal Discharge DX:	Weakness  Goal:	Stable  Instructions for follow-up, activity and diet:	You were admitted for weakness. An MRI/MRA Head and Neck performed which shows no evidence of significant stenosis vascular occlusion involving the intracranial or extracranial circulation.  No new ischemic changes or bleeding. An EEG was performed while inpatient and resulted as the left temporal region of brain at risk for seizure activity.  You have been prescribed an anti seizure medication Depakote 500 mg 2x daily.  Recommended for rehab for physical therapy and occupational therapy.    Follow up with neurology as outpatient when discharged from rehab with Dr. Linder at [address] [place] [address] [phone] further medical management.  Secondary Diagnosis:	Benign Essential Hypertension  Goal:	Controlled. Target blood pressure less then 140/90  Instructions for follow-up, activity and diet:	Continue antihypertensives as prescribed.  Losartan and Nicardipine.  Secondary Diagnosis:	Cerebrovascular accident (CVA) due to other mechanism  Goal:	Stable  Instructions for follow-up, activity and diet:	Continue Aspirin as prescribed.  Monitor for falls and aspiration.  Secondary Diagnosis:	Hyperlipidemia, unspecified hyperlipidemia type  Goal:	Controlled  Instructions for follow-up, activity and diet:	Continue medication as prescribed.  Zocor. Principal Discharge DX:	Weakness  Goal:	Stable  Instructions for follow-up, activity and diet:	You were admitted for weakness. An MRI/MRA Head and Neck performed which shows no evidence of significant stenosis vascular occlusion involving the intracranial or extracranial circulation.  No new ischemic changes or bleeding. An EEG was performed while inpatient and resulted as the left temporal region of brain at risk for seizure activity.  You have been prescribed an anti seizure medication Depakote 500 mg 2x daily.  Recommended for rehab for physical therapy and occupational therapy.    Follow up with neurology as outpatient when discharged from rehab with Dr. Linder at [address] [place] [address] [phone] further medical management.  Secondary Diagnosis:	Benign Essential Hypertension  Goal:	Controlled. Target blood pressure less then 140/90  Instructions for follow-up, activity and diet:	Continue antihypertensives as prescribed.  Losartan and Nicardipine.  Secondary Diagnosis:	Cerebrovascular accident (CVA) due to other mechanism  Goal:	Stable  Instructions for follow-up, activity and diet:	Continue Aspirin as prescribed.  Monitor for falls and aspiration.  Secondary Diagnosis:	Hyperlipidemia, unspecified hyperlipidemia type  Goal:	Controlled  Instructions for follow-up, activity and diet:	Continue medication as prescribed.  Zocor.

## 2017-04-26 NOTE — DISCHARGE NOTE ADULT - MEDICATION SUMMARY - MEDICATIONS TO TAKE
I will START or STAY ON the medications listed below when I get home from the hospital:    losartan 100 mg oral tablet  -- tab(s) by mouth once a day  -- Indication: For Benign Essential Hypertension    divalproex sodium 500 mg oral delayed release tablet  -- 1 tab(s) by mouth 2 times a day  -- Indication: For antiseizure    simvastatin 10 mg oral tablet  -- 1 tab(s) by mouth once a day (at bedtime)  -- Indication: For Hyperlipidemia, unspecified hyperlipidemia type    niCARdipine 20 mg oral capsule  -- 2 cap(s) by mouth once a day (before a meal)  -- Indication: For Benign Essential Hypertension    niCARdipine 20 mg oral capsule  -- 1 cap(s) by mouth 2 times a day (after meals)  -- Indication: For Benign Essential Hypertension    furosemide  -- 10 milligram(s) by mouth once a day  -- Indication: For Benign Essential Hypertension    Multiple Vitamins oral tablet  -- 1 tab(s) by mouth once a day  -- Indication: For Need for prophylactic measure

## 2017-04-26 NOTE — DISCHARGE NOTE ADULT - MEDICATION SUMMARY - MEDICATIONS TO CHANGE
I will SWITCH the dose or number of times a day I take the medications listed below when I get home from the hospital:    simvastatin 20 mg oral tablet  -- 1 tab(s) by mouth once a day (at bedtime)    Lasix 20 mg oral tablet  --  by mouth

## 2017-04-26 NOTE — DISCHARGE NOTE ADULT - HOSPITAL COURSE
71 -year-old female with HTN, hemorrhagic stroke (2009) with residual R-sided hemiplegia presenting from home with worsening right-sided lower extremity weakness.    Hospital course:    RLE Weakness.  complaint of weakness, headache, pain in RLE  unclear etiology  CT head No CT evidence of acute intracranial hemorrhage, brain edema, mass effect or acute territorial infarct.   will obtain neurology consult  EKG pending given complaint of diaphoresis associated with her weakness yesterday.   MRI negative  EEG-left parietal lobe at risk for seizure activity.  Started Depakote 500 mg bid.       Hyperlipidemia,  c/w statin,   HTN  Continue home meds     History of hemorrhagic CVA in 2009  patient was unable to name her neurologist  PT recs rehab  c/w dysphagia 1 diet with thin liquids  aspiration and fall precautions.     Dispo-Stable for rehab.  Follow up with Neurologist as outpatient for further medical management. 71 -year-old female with HTN, hemorrhagic stroke (2009) with residual R-sided hemiplegia presenting from home with worsening right-sided lower extremity weakness.    Hospital course:    RLE Weakness.  complaint of weakness, headache, pain in RLE  unclear etiology  CT head No CT evidence of acute intracranial hemorrhage, brain edema, mass effect or acute territorial infarct.   House Neuro consuled.   MRI negative  ** EEG - left parietal lobe at risk for seizure activity.  Started on Depakote 500 mg bid.       Hyperlipidemia,  c/w statin,     HTN  Continue home meds     History of hemorrhagic CVA in 2009  patient was unable to name her neurologist  PT recs rehab  c/w dysphagia 1 diet with thin liquids  aspiration and fall precautions.     Dispo  -Stable for rehab.    Follow up with Neurologist as outpatient for further medical management.     More than 30 mins were spent evaluating patient and coordinating care for discharge.

## 2017-04-26 NOTE — DISCHARGE NOTE ADULT - CARE PROVIDERS DIRECT ADDRESSES
[email or web address]

## 2017-10-03 NOTE — PHYSICAL THERAPY INITIAL EVALUATION ADULT - STANDING BALANCE: STATIC
Visit Information Date & Time Provider Department Dept. Phone Encounter #  
 10/3/2017 11:00 AM Antonia Quintero MD [address] [place] [address] [id number] Follow-up Instructions Return in about 3 weeks (around 10/24/2017). Follow-up and Disposition History Upcoming Health Maintenance Date Due  
 MEDICARE YEARLY EXAM 6/30/2018 GLAUCOMA SCREENING Q2Y 6/2/2019 DTaP/Tdap/Td series (2 - Td) 10/7/2025 Allergies as of 10/3/2017  Review Complete On: 10/3/2017 By: Antonia Quintero MD  
 No Known Allergies Current Immunizations  Reviewed on 9/7/2017 Name Date Influenza High Dose Vaccine PF 9/1/2017  1:37 PM  
 Influenza Vaccine 11/4/2016, 10/7/2015 12:17 PM  
 Pneumococcal Conjugate (PCV-13) 6/29/2017  4:05 PM  
 Pneumococcal Polysaccharide (PPSV-23) 11/23/2010, 10/28/2005 Tdap 10/7/2015 12:19 PM  
 Zoster Vaccine, Live 4/4/2011 Not reviewed this visit You Were Diagnosed With   
  
 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   
 BEJARANO (dyspnea on exertion)     ICD-10-CM: R06.09 
ICD-9-CM: 786.09 History of pulmonary embolus (PE)     ICD-10-CM: J52.261 ICD-9-CM: V12.55 Pleuritic chest pain     ICD-10-CM: R07.81 ICD-9-CM: 786.52 Vitals BP Pulse Temp Resp Weight(growth percentile) SpO2  
 130/80 (BP 1 Location: Right arm, BP Patient Position: Sitting) 88 96.4 °F (35.8 °C) (Oral) 16 122 lb (55.3 kg) 97% BMI Smoking Status 20.94 kg/m2 Former Smoker Vitals History BMI and BSA Data Body Mass Index Body Surface Area  
 20.94 kg/m 2 1.58 m 2 Preferred Pharmacy Pharmacy Name Phone [address] [facility], [address] Angely Quintero [phone] Your Updated Medication List  
  
   
This list is accurate as of: 10/3/17 11:54 AM.  Always use your most recent med list.  
  
  
  
  
 apixaban 5 mg tablet Commonly known as:  James Saucer Take 1 Tab by mouth two (2) times a day. Indications: possible lung blood clot I-CAPS PO Take  by mouth. PAZOPanib 200 mg tablet Commonly known as:  David Dimmer Take 4 Tabs by mouth daily. Indications: renal cell carcinoma Follow-up Instructions Return in about 3 weeks (around 10/24/2017). Introducing [address]! Dear Arianna Hartmann: Thank you for requesting a [employer] account. Our records indicate that you already have an active [employer] account. You can access your account anytime at [email or web address] Did you know that you can access your hospital and ER discharge instructions at any time in [employer]? You can also review all of your test results from your hospital stay or ER visit. Additional Information If you have questions, please visit the Frequently Asked Questions section of the [employer] website at [email or web address] Remember, [employer] is NOT to be used for urgent needs. For medical emergencies, dial 911. Now available from your iPhone and Android! Please provide this summary of care documentation to your next provider. Your primary care clinician is listed as Norman Santana. If you have any questions after today's visit, please call [phone]. poor minus

## 2018-08-01 ENCOUNTER — EMERGENCY (EMERGENCY)
Facility: HOSPITAL | Age: 73
LOS: 1 days | Discharge: HOME | End: 2018-08-01
Attending: EMERGENCY MEDICINE | Admitting: EMERGENCY MEDICINE
Payer: MEDICARE

## 2018-08-01 VITALS
SYSTOLIC BLOOD PRESSURE: 140 MMHG | RESPIRATION RATE: 16 BRPM | TEMPERATURE: 98 F | OXYGEN SATURATION: 99 % | HEART RATE: 86 BPM | DIASTOLIC BLOOD PRESSURE: 74 MMHG

## 2018-08-01 VITALS
HEART RATE: 67 BPM | RESPIRATION RATE: 18 BRPM | TEMPERATURE: 98 F | OXYGEN SATURATION: 100 % | DIASTOLIC BLOOD PRESSURE: 69 MMHG | SYSTOLIC BLOOD PRESSURE: 137 MMHG

## 2018-08-01 PROBLEM — E78.5 HYPERLIPIDEMIA, UNSPECIFIED: Chronic | Status: ACTIVE | Noted: 2017-04-19

## 2018-08-01 PROBLEM — I63.9 CEREBRAL INFARCTION, UNSPECIFIED: Chronic | Status: ACTIVE | Noted: 2017-04-19

## 2018-08-01 PROCEDURE — 99283 EMERGENCY DEPT VISIT LOW MDM: CPT

## 2018-08-01 NOTE — ED PROVIDER NOTE - PLAN OF CARE
Drink plenty of fluids  Stay on soft diet, chew on other side  Take Tylenol OTC as directed as needed for pain  Take Clindamycin 300mg four times a day for seven days  Follow up with your private dentist or our clinic (call for an appointment: [phone])  Return to the ER for worsening

## 2018-08-01 NOTE — PROGRESS NOTE ADULT - SUBJECTIVE AND OBJECTIVE BOX
Patient is a 72y old  Female who presents with a chief complaint of pain in lower left     HPI: Patient has been in pain for approximately a month. Saw general dentist recently who had diagnosed an issue with the lower left but was unwilling to see the patient due to compromised medical state. Pain increased recently and general dentist told her to visit the ED for emergency treatment.      PAST MEDICAL & SURGICAL HISTORY:  HLD (hyperlipidemia)  CVA (cerebral vascular accident)  Benign Essential Hypertension  No significant past surgical history    MEDICATIONS  (STANDING):    MEDICATIONS  (PRN):      Allergies    apple (Hives)  carrots, beets (Unknown)  latex (Unknown)  penicillin (Urticaria)    Intolerances        FAMILY HISTORY:  No pertinent family history in first degree relatives      *SOCIAL HISTORY: (guardian or who pt came with), (smoking hx)    *Last Dental Visit:    Vital Signs Last 24 Hrs  T(C): 36.8 (01 Aug 2018 15:09), Max: 36.8 (01 Aug 2018 15:09)  T(F): 98.2 (01 Aug 2018 15:09), Max: 98.2 (01 Aug 2018 15:09)  HR: 67 (01 Aug 2018 15:09) (67 - 86)  BP: 137/69 (01 Aug 2018 15:09) (137/69 - 140/74)  BP(mean): --  RR: 18 (01 Aug 2018 15:09) (16 - 18)  SpO2: 100% (01 Aug 2018 15:09) (99% - 100%)    EOE:  TMJ (  - ) clicks                    (  -  ) pops                    (  -  ) crepitus             Mandible             Facial bones and MOM <<grossly intact>>             ( -  ) trismus             ( -  ) LAD             ( -  ) swelling             ( -  ) asymmetry             ( +  ) palpation             ( -  ) SOB             ( -  ) dysphagia    Extraoral examination reveals no swelling. Borders of mandible completely traceable. Palpation of soft tissue in lower left was sensitive       IOE:  <<permanent/primary/mixed>> dentition: <<grossly intact>> OR <<multiple carious teeth>> OR <<multiple missing teeth>>           hard/soft palate:  (   ) palatal torus           tongue/FOM <<WNL>>           labial/buccal mucosa <<WNL>>           (   ) percussion           (   ) palpation           (   ) swelling     Dentition present: <<   >>  Mobility: <<  >>  Caries: <<   >>     Radiographs:    LABS:                        RADIOLOGY & ADDITIONAL STUDIES:    ASSESSMENT:    PROCEDURE:  Verbal and written consent given.     RECOMMENDATIONS:   1) <<   >>  2) Dental F/U with outpatient dentist for comprehensive dental care.   3) If any difficulty swallowing/breathing, fever occur, page dental.     Resident Name, pager #  Oral surgeon consulted: Patient is a 72y old  Female who presents with a chief complaint of pain in lower left     HPI: Patient has been in pain for approximately a month. Saw general dentist recently who had diagnosed an issue with the lower left but was unwilling to see the patient due to compromised medical state. Pain increased recently and general dentist told her to visit the ED for emergency treatment.      PAST MEDICAL & SURGICAL HISTORY:  HLD (hyperlipidemia)  CVA (cerebral vascular accident)  Benign Essential Hypertension  No significant past surgical history    MEDICATIONS  (STANDING):    MEDICATIONS  (PRN):      Allergies    apple (Hives)  carrots, beets (Unknown)  latex (Unknown)  penicillin (Urticaria)    Intolerances        FAMILY HISTORY:  No pertinent family history in first degree relatives      *SOCIAL HISTORY: (guardian or who pt came with), (smoking hx)    *Last Dental Visit:    Vital Signs Last 24 Hrs  T(C): 36.8 (01 Aug 2018 15:09), Max: 36.8 (01 Aug 2018 15:09)  T(F): 98.2 (01 Aug 2018 15:09), Max: 98.2 (01 Aug 2018 15:09)  HR: 67 (01 Aug 2018 15:09) (67 - 86)  BP: 137/69 (01 Aug 2018 15:09) (137/69 - 140/74)  BP(mean): --  RR: 18 (01 Aug 2018 15:09) (16 - 18)  SpO2: 100% (01 Aug 2018 15:09) (99% - 100%)    EOE:  TMJ (  - ) clicks                    (  -  ) pops                    (  -  ) crepitus             Mandible             Facial bones and MOM <<grossly intact>>             ( -  ) trismus             ( -  ) LAD             ( -  ) swelling             ( -  ) asymmetry             ( +  ) palpation             ( -  ) SOB             ( -  ) dysphagia    Extraoral examination reveals no swelling. Borders of mandible completely traceable.    IOE:  Permanent, heavily restored dentition                labial/buccal mucosa            ( +  ) percussion           ( +  ) palpation           ( -  ) swelling     No swelling observed buccally or lingually surrounding lower left quadrant.  Percussion and palpation of #18 sensitive. No mobility observed.    Radiographs: Panoramic radiograph taken. Periapical radiolucency observed surrounding #18. Patient moved during x-ray causing distortion of the image.    ASSESSMENT: Radiographic examination reveals a periapical radiolucency involving tooth #18.    Procedure: Recommended pain control and antibiotics as per medical team (patient has an allergy to amoxicillin). Advised patient to see general dentist for referral to specialist depending on restorative plan associated with the tooth.     RECOMMENDATIONS:   1) Follow up with outpatient dentist for comprehensive care.  2) If oral swelling or bleeding occurs, return to the ED.     Bill Gonzalez, pager #[phone]

## 2018-08-01 NOTE — ED PROVIDER NOTE - MEDICAL DECISION MAKING DETAILS
Pt presents with R upper dental pain. Will consult dentistry. 73 yo female with dental pain sent in for OMFS evaluation.

## 2018-08-01 NOTE — ED PROVIDER NOTE - OBJECTIVE STATEMENT
Pt is a 73 y/o F w/ PMHX of HLD, HTN, siezures and CVA presents to the ED via EMS w/ worsening R upper tooth pain. Pt saw dentist 2 weeks ago where she was given abx for infection of R upper wisdom tooth and was instructed to go to the hospital for further evaluation.  That tooth is an implant, and her dentist did not want to perform the extraction due to HX of CVA and seizure. Denies any fevers. Pt is a 71 y/o F w/ PMHX of HLD, HTN, seizures and CVA presents to the ED via EMS w/ worsening left sided dental pain. Pt saw dentist 2 weeks ago and was instructed to f/u with OMFS. Pt has been unable to follow up and had worsening pain and was then instructed to go to the hospital for further evaluation.  Pt has dental implants and was told that the extraction could not be performed in the office due to HX of CVA and seizure. Denies any fevers. Pt not currently on any abx.

## 2018-08-01 NOTE — ED ADULT TRIAGE NOTE - CHIEF COMPLAINT QUOTE
Arrives via EMS with c/o toothache.  Saw dentist 2 weeks for infection of left upper tooth.  No ABX prescribed. Pt was instructed to schedule appt with oral surgeon for tooth extraction

## 2018-08-01 NOTE — ED PROVIDER NOTE - CARE PLAN
Principal Discharge DX:	Tooth infection  Assessment and plan of treatment:	Drink plenty of fluids  Stay on soft diet, chew on other side  Take Tylenol OTC as directed as needed for pain  Take Clindamycin 300mg four times a day for seven days  Follow up with your private dentist or our clinic (call for an appointment: [phone])  Return to the ER for worsening

## 2018-08-31 ENCOUNTER — EMERGENCY (EMERGENCY)
Facility: HOSPITAL | Age: 73
LOS: 1 days | Discharge: HOME | End: 2018-08-31
Attending: EMERGENCY MEDICINE | Admitting: EMERGENCY MEDICINE
Payer: MEDICARE

## 2018-08-31 VITALS
HEART RATE: 80 BPM | TEMPERATURE: 98 F | DIASTOLIC BLOOD PRESSURE: 94 MMHG | OXYGEN SATURATION: 100 % | SYSTOLIC BLOOD PRESSURE: 176 MMHG | RESPIRATION RATE: 20 BRPM

## 2018-08-31 VITALS
OXYGEN SATURATION: 99 % | SYSTOLIC BLOOD PRESSURE: 147 MMHG | HEART RATE: 77 BPM | RESPIRATION RATE: 16 BRPM | DIASTOLIC BLOOD PRESSURE: 85 MMHG | TEMPERATURE: 98 F

## 2018-08-31 LAB
ALBUMIN SERPL ELPH-MCNC: 4.2 G/DL — SIGNIFICANT CHANGE UP (ref 3.3–5)
ALP SERPL-CCNC: 79 U/L — SIGNIFICANT CHANGE UP (ref 40–120)
ALT FLD-CCNC: 19 U/L — SIGNIFICANT CHANGE UP (ref 4–33)
APPEARANCE UR: CLEAR — SIGNIFICANT CHANGE UP
APTT BLD: 29.7 SEC — SIGNIFICANT CHANGE UP (ref 27.5–37.4)
AST SERPL-CCNC: 31 U/L — SIGNIFICANT CHANGE UP (ref 4–32)
BACTERIA # UR AUTO: NEGATIVE — SIGNIFICANT CHANGE UP
BASOPHILS # BLD AUTO: 0.05 K/UL — SIGNIFICANT CHANGE UP (ref 0–0.2)
BASOPHILS NFR BLD AUTO: 0.8 % — SIGNIFICANT CHANGE UP (ref 0–2)
BILIRUB SERPL-MCNC: 1 MG/DL — SIGNIFICANT CHANGE UP (ref 0.2–1.2)
BILIRUB UR-MCNC: NEGATIVE — SIGNIFICANT CHANGE UP
BLOOD UR QL VISUAL: NEGATIVE — SIGNIFICANT CHANGE UP
BUN SERPL-MCNC: 16 MG/DL — SIGNIFICANT CHANGE UP (ref 7–23)
CALCIUM SERPL-MCNC: 9.2 MG/DL — SIGNIFICANT CHANGE UP (ref 8.4–10.5)
CHLORIDE SERPL-SCNC: 105 MMOL/L — SIGNIFICANT CHANGE UP (ref 98–107)
CO2 SERPL-SCNC: 23 MMOL/L — SIGNIFICANT CHANGE UP (ref 22–31)
COLOR SPEC: YELLOW — SIGNIFICANT CHANGE UP
CREAT SERPL-MCNC: 0.9 MG/DL — SIGNIFICANT CHANGE UP (ref 0.5–1.3)
EOSINOPHIL # BLD AUTO: 0.06 K/UL — SIGNIFICANT CHANGE UP (ref 0–0.5)
EOSINOPHIL NFR BLD AUTO: 0.9 % — SIGNIFICANT CHANGE UP (ref 0–6)
GLUCOSE SERPL-MCNC: 97 MG/DL — SIGNIFICANT CHANGE UP (ref 70–99)
GLUCOSE UR-MCNC: NEGATIVE — SIGNIFICANT CHANGE UP
HCT VFR BLD CALC: 38.2 % — SIGNIFICANT CHANGE UP (ref 34.5–45)
HGB BLD-MCNC: 12.6 G/DL — SIGNIFICANT CHANGE UP (ref 11.5–15.5)
HYALINE CASTS # UR AUTO: NEGATIVE — SIGNIFICANT CHANGE UP
IMM GRANULOCYTES # BLD AUTO: 0.02 # — SIGNIFICANT CHANGE UP
IMM GRANULOCYTES NFR BLD AUTO: 0.3 % — SIGNIFICANT CHANGE UP (ref 0–1.5)
INR BLD: 1.06 — SIGNIFICANT CHANGE UP (ref 0.88–1.17)
KETONES UR-MCNC: NEGATIVE — SIGNIFICANT CHANGE UP
LEUKOCYTE ESTERASE UR-ACNC: SIGNIFICANT CHANGE UP
LYMPHOCYTES # BLD AUTO: 1.95 K/UL — SIGNIFICANT CHANGE UP (ref 1–3.3)
LYMPHOCYTES # BLD AUTO: 29.8 % — SIGNIFICANT CHANGE UP (ref 13–44)
MCHC RBC-ENTMCNC: 29 PG — SIGNIFICANT CHANGE UP (ref 27–34)
MCHC RBC-ENTMCNC: 33 % — SIGNIFICANT CHANGE UP (ref 32–36)
MCV RBC AUTO: 88 FL — SIGNIFICANT CHANGE UP (ref 80–100)
MONOCYTES # BLD AUTO: 0.65 K/UL — SIGNIFICANT CHANGE UP (ref 0–0.9)
MONOCYTES NFR BLD AUTO: 9.9 % — SIGNIFICANT CHANGE UP (ref 2–14)
NEUTROPHILS # BLD AUTO: 3.81 K/UL — SIGNIFICANT CHANGE UP (ref 1.8–7.4)
NEUTROPHILS NFR BLD AUTO: 58.3 % — SIGNIFICANT CHANGE UP (ref 43–77)
NITRITE UR-MCNC: NEGATIVE — SIGNIFICANT CHANGE UP
NRBC # FLD: 0 — SIGNIFICANT CHANGE UP
PH UR: 8 — SIGNIFICANT CHANGE UP (ref 5–8)
PLATELET # BLD AUTO: 202 K/UL — SIGNIFICANT CHANGE UP (ref 150–400)
PMV BLD: 10.6 FL — SIGNIFICANT CHANGE UP (ref 7–13)
POTASSIUM SERPL-MCNC: 4.3 MMOL/L — SIGNIFICANT CHANGE UP (ref 3.5–5.3)
POTASSIUM SERPL-SCNC: 4.3 MMOL/L — SIGNIFICANT CHANGE UP (ref 3.5–5.3)
PROT SERPL-MCNC: 8.3 G/DL — SIGNIFICANT CHANGE UP (ref 6–8.3)
PROT UR-MCNC: NEGATIVE — SIGNIFICANT CHANGE UP
PROTHROM AB SERPL-ACNC: 12.2 SEC — SIGNIFICANT CHANGE UP (ref 9.8–13.1)
RBC # BLD: 4.34 M/UL — SIGNIFICANT CHANGE UP (ref 3.8–5.2)
RBC # FLD: 14.7 % — HIGH (ref 10.3–14.5)
RBC CASTS # UR COMP ASSIST: SIGNIFICANT CHANGE UP (ref 0–?)
SODIUM SERPL-SCNC: 142 MMOL/L — SIGNIFICANT CHANGE UP (ref 135–145)
SP GR SPEC: 1.01 — SIGNIFICANT CHANGE UP (ref 1–1.04)
SQUAMOUS # UR AUTO: SIGNIFICANT CHANGE UP
UROBILINOGEN FLD QL: NORMAL — SIGNIFICANT CHANGE UP
VALPROATE SERPL-MCNC: 32.3 UG/ML — LOW (ref 50–100)
WBC # BLD: 6.54 K/UL — SIGNIFICANT CHANGE UP (ref 3.8–10.5)
WBC # FLD AUTO: 6.54 K/UL — SIGNIFICANT CHANGE UP (ref 3.8–10.5)
WBC UR QL: SIGNIFICANT CHANGE UP (ref 0–?)

## 2018-08-31 PROCEDURE — 70450 CT HEAD/BRAIN W/O DYE: CPT | Mod: 26

## 2018-08-31 PROCEDURE — 99284 EMERGENCY DEPT VISIT MOD MDM: CPT | Mod: GC

## 2018-08-31 PROCEDURE — 71045 X-RAY EXAM CHEST 1 VIEW: CPT | Mod: 26

## 2018-08-31 PROCEDURE — 93971 EXTREMITY STUDY: CPT | Mod: 26,LT

## 2018-08-31 NOTE — ED PROVIDER NOTE - CARE PLAN
Principal Discharge DX:	Leg pain  Assessment and plan of treatment:	My diagnosis was explained to me by Dr. Arzola. Rest, drink plenty of fluids.  Advance activity as tolerated.  Continue all previously prescribed medications as directed.  Follow up with your primary care physician in 24-48 hours- bring copies of your results if you were given. If you do not have a primary care physician, call our clinic at [phone], or call [phone].  Return to the Emergency Department for worsening or persistent symptoms OR ANY NEW OR CONCERNING SYMPTOMS including but not limited to fever, inability to eat or drink, worsening of condition, worsening pain, cp, sob or any other concern to you.

## 2018-08-31 NOTE — ED PROVIDER NOTE - OBJECTIVE STATEMENT
71 yo F pmx CVA 2009 w/ residual R sided arm hemiplegia and leg weakness 71 yo F pmx bedbound CVA 2009 w/ residual minimally verbal, R sided arm hemiplegia and leg weakness, here with lethargy, weaker R leg than usual, R leg pain associated with R sided new headache and R arm pain all since wednesday. Family says pt a little more lethargic than usual. Otherwise no cp, sob, abdominal pain, n/v/d, worsening leg swelling. 73 yo F pmx CVA 2009 w/ residual minimally verbal, R sided arm hemiplegia and leg weakness, here with lethargy, weaker R leg than usual, R leg pain associated with R sided new headache and R arm pain all since wednesday. Family says pt a little more lethargic than usual. Otherwise no cp, sob, abdominal pain, n/v/d, worsening leg swelling. Usually able to ambulate with walker, cannot any more.

## 2018-08-31 NOTE — ED PROVIDER NOTE - PHYSICAL EXAMINATION
Gen: Well appearing, NAD  Head: NCAT  HEENT: PERRL, MMM, normal conjunctiva, anicteric, neck supple  Lung: CTAB, no adventitious sounds  CV: RRR, no murmurs  Abd: soft, NTND, no rebound or guarding  MSK: 1+ edema, calf nontender to palpation  Neuro: CN II-XII intact. No sensation to R arm (new). Occasional no sensation vs pain upon palpation of R leg, R able to move but not lift against gravity. R leg occasional clonus upon stroking. R arm and leg stiff.  Skin: Warm and dry, no evidence of rash  Psych: normal mood and affect

## 2018-08-31 NOTE — ED PROVIDER NOTE - PLAN OF CARE
My diagnosis was explained to me by Dr. Arzola. Rest, drink plenty of fluids.  Advance activity as tolerated.  Continue all previously prescribed medications as directed.  Follow up with your primary care physician in 24-48 hours- bring copies of your results if you were given. If you do not have a primary care physician, call our clinic at [phone], or call [phone].  Return to the Emergency Department for worsening or persistent symptoms OR ANY NEW OR CONCERNING SYMPTOMS including but not limited to fever, inability to eat or drink, worsening of condition, worsening pain, cp, sob or any other concern to you.

## 2018-08-31 NOTE — ED PROVIDER NOTE - ATTENDING CONTRIBUTION TO CARE
73 yo F pmx bedbound CVA 2009 w/ residual minimally verbal, R sided arm hemiplegia and leg weakness, here with lethargy, weaker R leg than usual, R leg pain associated with R sided new headache and R arm pain and worse speech all since wednesday. Family says pt a little more lethargic than usual. Otherwise no cp, sob, abdominal pain, n/v/d, worsening leg swelling. On exam: minimally verbal, single word answers. VS noted R arm spastic paresis, R leg spastic paresis with clonus on touching leg, lungs clear, heart sounds normal, abdo soft non tender. IMP: worsening weakness and speech, and pain in 72F with old stroke with baseline R weakness. Plan: labs UA CT head CXR analgesia

## 2018-08-31 NOTE — ED ADULT TRIAGE NOTE - CHIEF COMPLAINT QUOTE
pt bibems from home, here for right sided head pain and right leg pain x 2 days. denies any falls or trauma, has not tried otc pain medication, uses mobile assistive device to walk but now unable to ambulate due to pain. pmhx: hld, htn, cva with moderate aphasia and right sided weakness

## 2018-08-31 NOTE — ED PROVIDER NOTE - MEDICAL DECISION MAKING DETAILS
Pt h/o CVA bedbound with R hemiplegia. New lethargy, worsening R side weakness and pain including R leg. CVA vs infx vs DVT. Will send appropriate w/u and dispo accordingly Pt h/o CVA with R hemiplegia. New lethargy, worsening R side weakness and pain including R leg. CVA vs infx vs DVT. Will send appropriate w/u and dispo accordingly

## 2018-08-31 NOTE — ED PROVIDER NOTE - NS ED ROS FT
AS PER HPI, otherwise:  Constitutional: no fever, no lightheadedness, no dizziness  Eyes: no conjunctivitis, no vision changes  Ears: no ear pain   Nose: no nasal congestion, Mouth/Throat: no throat pain, Neck: no stiffness  Cardiovascular: no chest pain, no palpitations  Chest: no cough, no shortness of breath  Gastrointestinal: no abdominal pain, no vomiting and diarrhea  MSK: no swelling of extremities  : no dysuria  Skin: no rash  Neuro: no LOC

## 2018-08-31 NOTE — ED ADULT NURSE NOTE - OBJECTIVE STATEMENT
Pt with PMH CVA with right hemiparesis c/o increasing right sided pain from arms to legs since Wednesday.  Pt refusing OTC pain meds at home.  PMH seizures, HTN, HLD.  Essentially non verbal but follows commands.  Was ambulating with assistive devices and assistance but currently unable to ambulate due to pain.  Utilizing RLE brace.  Accompanied by 2 HHA's, a friend, and sister.  Labs obtained and sent as ordered.  #20g SL right hand placed.  CM placed.  Elevated BP, MD aware and at bedside.

## 2018-08-31 NOTE — ED ADULT NURSE NOTE - NSIMPLEMENTINTERV_GEN_ALL_ED
Implemented All Fall with Harm Risk Interventions:  Lane to call system. Call bell, personal items and telephone within reach. Instruct patient to call for assistance. Room bathroom lighting operational. Non-slip footwear when patient is off stretcher. Physically safe environment: no spills, clutter or unnecessary equipment. Stretcher in lowest position, wheels locked, appropriate side rails in place. Provide visual cue, wrist band, yellow gown, etc. Monitor gait and stability. Monitor for mental status changes and reorient to person, place, and time. Review medications for side effects contributing to fall risk. Reinforce activity limits and safety measures with patient and family. Provide visual clues: red socks.

## 2018-08-31 NOTE — ED PROVIDER NOTE - PROGRESS NOTE DETAILS
Amando Arzola DO PGY1 - W/u in ED negative. Discussed with family if comfortable taking home with strict return precautions and close f/u. Family agrees is best course of action.

## 2018-09-02 LAB
BACTERIA UR CULT: SIGNIFICANT CHANGE UP
SPECIMEN SOURCE: SIGNIFICANT CHANGE UP

## 2018-12-04 NOTE — ED PROVIDER NOTE - NS ED MD DISPO DIVISION
Telephone Encounter by Naya Rodney at 06/05/18 10:32 AM     Author:  Naya Rodney Service:  (none) Author Type:  Patient      Filed:  06/05/18 10:34 AM Encounter Date:  6/5/2018 Status:  Signed     :  Naya Rodney (Patient )            Pt was seen @ [facility] on[MR1.1T] 6/4/18[MR1.1M].    Pt to F/U in[MR1.1T] 4-6WKS[MR1.1M] with NP.[MR1.1T]       Revision History        User Key Date/Time User Provider Type Action    > MR1.1 06/05/18 10:34 AM Naya Rodney Patient  Sign    M - Manual, T - Template             BUBBA

## 2018-12-26 ENCOUNTER — INPATIENT (INPATIENT)
Facility: HOSPITAL | Age: 73
LOS: 4 days | Discharge: NURSING, REHAB OR LONG-TERM CARE | End: 2018-12-31
Attending: INTERNAL MEDICINE | Admitting: INTERNAL MEDICINE
Payer: MEDICARE

## 2018-12-26 VITALS
TEMPERATURE: 100 F | OXYGEN SATURATION: 98 % | DIASTOLIC BLOOD PRESSURE: 73 MMHG | HEART RATE: 92 BPM | SYSTOLIC BLOOD PRESSURE: 156 MMHG | RESPIRATION RATE: 20 BRPM

## 2018-12-26 DIAGNOSIS — J11.1 INFLUENZA DUE TO UNIDENTIFIED INFLUENZA VIRUS WITH OTHER RESPIRATORY MANIFESTATIONS: ICD-10-CM

## 2018-12-26 LAB
ALBUMIN SERPL ELPH-MCNC: 4.1 G/DL — SIGNIFICANT CHANGE UP (ref 3.3–5)
ALP SERPL-CCNC: 88 U/L — SIGNIFICANT CHANGE UP (ref 40–120)
ALT FLD-CCNC: 29 U/L — SIGNIFICANT CHANGE UP (ref 4–33)
APPEARANCE UR: CLEAR — SIGNIFICANT CHANGE UP
AST SERPL-CCNC: 46 U/L — HIGH (ref 4–32)
B PERT DNA SPEC QL NAA+PROBE: NOT DETECTED — SIGNIFICANT CHANGE UP
BACTERIA # UR AUTO: SIGNIFICANT CHANGE UP
BASE EXCESS BLDV CALC-SCNC: 1.1 MMOL/L — SIGNIFICANT CHANGE UP
BASOPHILS # BLD AUTO: 0.05 K/UL — SIGNIFICANT CHANGE UP (ref 0–0.2)
BASOPHILS NFR BLD AUTO: 0.6 % — SIGNIFICANT CHANGE UP (ref 0–2)
BILIRUB SERPL-MCNC: 0.5 MG/DL — SIGNIFICANT CHANGE UP (ref 0.2–1.2)
BILIRUB UR-MCNC: NEGATIVE — SIGNIFICANT CHANGE UP
BLOOD GAS VENOUS - CREATININE: 0.62 MG/DL — SIGNIFICANT CHANGE UP (ref 0.5–1.3)
BLOOD UR QL VISUAL: NEGATIVE — SIGNIFICANT CHANGE UP
BUN SERPL-MCNC: 10 MG/DL — SIGNIFICANT CHANGE UP (ref 7–23)
C PNEUM DNA SPEC QL NAA+PROBE: NOT DETECTED — SIGNIFICANT CHANGE UP
CALCIUM SERPL-MCNC: 9.2 MG/DL — SIGNIFICANT CHANGE UP (ref 8.4–10.5)
CHLORIDE BLDV-SCNC: 106 MMOL/L — SIGNIFICANT CHANGE UP (ref 96–108)
CHLORIDE SERPL-SCNC: 102 MMOL/L — SIGNIFICANT CHANGE UP (ref 98–107)
CO2 SERPL-SCNC: 22 MMOL/L — SIGNIFICANT CHANGE UP (ref 22–31)
COLOR SPEC: SIGNIFICANT CHANGE UP
CREAT SERPL-MCNC: 0.79 MG/DL — SIGNIFICANT CHANGE UP (ref 0.5–1.3)
EOSINOPHIL # BLD AUTO: 0 K/UL — SIGNIFICANT CHANGE UP (ref 0–0.5)
EOSINOPHIL NFR BLD AUTO: 0 % — SIGNIFICANT CHANGE UP (ref 0–6)
FLUAV H1 2009 PAND RNA SPEC QL NAA+PROBE: DETECTED — HIGH
FLUAV H1 RNA SPEC QL NAA+PROBE: NOT DETECTED — SIGNIFICANT CHANGE UP
FLUAV H3 RNA SPEC QL NAA+PROBE: NOT DETECTED — SIGNIFICANT CHANGE UP
FLUAV SUBTYP SPEC NAA+PROBE: DETECTED — HIGH
FLUBV RNA SPEC QL NAA+PROBE: NOT DETECTED — SIGNIFICANT CHANGE UP
GAS PNL BLDV: 136 MMOL/L — SIGNIFICANT CHANGE UP (ref 136–146)
GLUCOSE BLDV-MCNC: 117 — HIGH (ref 70–99)
GLUCOSE SERPL-MCNC: 108 MG/DL — HIGH (ref 70–99)
GLUCOSE UR-MCNC: NEGATIVE — SIGNIFICANT CHANGE UP
HADV DNA SPEC QL NAA+PROBE: NOT DETECTED — SIGNIFICANT CHANGE UP
HCO3 BLDV-SCNC: 25 MMOL/L — SIGNIFICANT CHANGE UP (ref 20–27)
HCOV PNL SPEC NAA+PROBE: SIGNIFICANT CHANGE UP
HCT VFR BLD CALC: 34.6 % — SIGNIFICANT CHANGE UP (ref 34.5–45)
HCT VFR BLDV CALC: 35.7 % — SIGNIFICANT CHANGE UP (ref 34.5–45)
HGB BLD-MCNC: 11.3 G/DL — LOW (ref 11.5–15.5)
HGB BLDV-MCNC: 11.6 G/DL — SIGNIFICANT CHANGE UP (ref 11.5–15.5)
HMPV RNA SPEC QL NAA+PROBE: NOT DETECTED — SIGNIFICANT CHANGE UP
HPIV1 RNA SPEC QL NAA+PROBE: NOT DETECTED — SIGNIFICANT CHANGE UP
HPIV2 RNA SPEC QL NAA+PROBE: NOT DETECTED — SIGNIFICANT CHANGE UP
HPIV3 RNA SPEC QL NAA+PROBE: NOT DETECTED — SIGNIFICANT CHANGE UP
HPIV4 RNA SPEC QL NAA+PROBE: NOT DETECTED — SIGNIFICANT CHANGE UP
HYALINE CASTS # UR AUTO: NEGATIVE — SIGNIFICANT CHANGE UP
IMM GRANULOCYTES # BLD AUTO: 0.04 # — SIGNIFICANT CHANGE UP
IMM GRANULOCYTES NFR BLD AUTO: 0.5 % — SIGNIFICANT CHANGE UP (ref 0–1.5)
KETONES UR-MCNC: NEGATIVE — SIGNIFICANT CHANGE UP
LACTATE BLDV-MCNC: 1.3 MMOL/L — SIGNIFICANT CHANGE UP (ref 0.5–2)
LEUKOCYTE ESTERASE UR-ACNC: SIGNIFICANT CHANGE UP
LYMPHOCYTES # BLD AUTO: 0.86 K/UL — LOW (ref 1–3.3)
LYMPHOCYTES # BLD AUTO: 10.2 % — LOW (ref 13–44)
MCHC RBC-ENTMCNC: 27.6 PG — SIGNIFICANT CHANGE UP (ref 27–34)
MCHC RBC-ENTMCNC: 32.7 % — SIGNIFICANT CHANGE UP (ref 32–36)
MCV RBC AUTO: 84.4 FL — SIGNIFICANT CHANGE UP (ref 80–100)
MONOCYTES # BLD AUTO: 1.21 K/UL — HIGH (ref 0–0.9)
MONOCYTES NFR BLD AUTO: 14.3 % — HIGH (ref 2–14)
NEUTROPHILS # BLD AUTO: 6.29 K/UL — SIGNIFICANT CHANGE UP (ref 1.8–7.4)
NEUTROPHILS NFR BLD AUTO: 74.4 % — SIGNIFICANT CHANGE UP (ref 43–77)
NITRITE UR-MCNC: NEGATIVE — SIGNIFICANT CHANGE UP
NRBC # FLD: 0 — SIGNIFICANT CHANGE UP
PCO2 BLDV: 37 MMHG — LOW (ref 41–51)
PH BLDV: 7.45 PH — HIGH (ref 7.32–7.43)
PH UR: 7 — SIGNIFICANT CHANGE UP (ref 5–8)
PLATELET # BLD AUTO: 190 K/UL — SIGNIFICANT CHANGE UP (ref 150–400)
PMV BLD: 10.7 FL — SIGNIFICANT CHANGE UP (ref 7–13)
PO2 BLDV: 39 MMHG — SIGNIFICANT CHANGE UP (ref 35–40)
POTASSIUM BLDV-SCNC: 3.6 MMOL/L — SIGNIFICANT CHANGE UP (ref 3.4–4.5)
POTASSIUM SERPL-MCNC: 3.9 MMOL/L — SIGNIFICANT CHANGE UP (ref 3.5–5.3)
POTASSIUM SERPL-SCNC: 3.9 MMOL/L — SIGNIFICANT CHANGE UP (ref 3.5–5.3)
PROT SERPL-MCNC: 7.8 G/DL — SIGNIFICANT CHANGE UP (ref 6–8.3)
PROT UR-MCNC: NEGATIVE — SIGNIFICANT CHANGE UP
RBC # BLD: 4.1 M/UL — SIGNIFICANT CHANGE UP (ref 3.8–5.2)
RBC # FLD: 15.4 % — HIGH (ref 10.3–14.5)
RBC CASTS # UR COMP ASSIST: SIGNIFICANT CHANGE UP (ref 0–?)
RSV RNA SPEC QL NAA+PROBE: NOT DETECTED — SIGNIFICANT CHANGE UP
RV+EV RNA SPEC QL NAA+PROBE: NOT DETECTED — SIGNIFICANT CHANGE UP
SAO2 % BLDV: 73.4 % — SIGNIFICANT CHANGE UP (ref 60–85)
SODIUM SERPL-SCNC: 139 MMOL/L — SIGNIFICANT CHANGE UP (ref 135–145)
SP GR SPEC: 1.01 — SIGNIFICANT CHANGE UP (ref 1–1.04)
SQUAMOUS # UR AUTO: SIGNIFICANT CHANGE UP
UROBILINOGEN FLD QL: NORMAL — SIGNIFICANT CHANGE UP
WBC # BLD: 8.45 K/UL — SIGNIFICANT CHANGE UP (ref 3.8–10.5)
WBC # FLD AUTO: 8.45 K/UL — SIGNIFICANT CHANGE UP (ref 3.8–10.5)
WBC UR QL: SIGNIFICANT CHANGE UP (ref 0–?)

## 2018-12-26 PROCEDURE — 71045 X-RAY EXAM CHEST 1 VIEW: CPT | Mod: 26

## 2018-12-26 RX ORDER — AZITHROMYCIN 500 MG/1
500 TABLET, FILM COATED ORAL ONCE
Qty: 0 | Refills: 0 | Status: COMPLETED | OUTPATIENT
Start: 2018-12-26 | End: 2018-12-26

## 2018-12-26 RX ORDER — ACETAMINOPHEN 500 MG
650 TABLET ORAL EVERY 6 HOURS
Qty: 0 | Refills: 0 | Status: DISCONTINUED | OUTPATIENT
Start: 2018-12-26 | End: 2018-12-27

## 2018-12-26 RX ORDER — VANCOMYCIN HCL 1 G
1000 VIAL (EA) INTRAVENOUS ONCE
Qty: 0 | Refills: 0 | Status: COMPLETED | OUTPATIENT
Start: 2018-12-26 | End: 2018-12-26

## 2018-12-26 RX ORDER — SODIUM CHLORIDE 9 MG/ML
2000 INJECTION INTRAMUSCULAR; INTRAVENOUS; SUBCUTANEOUS ONCE
Qty: 0 | Refills: 0 | Status: COMPLETED | OUTPATIENT
Start: 2018-12-26 | End: 2018-12-26

## 2018-12-26 RX ORDER — ACETAMINOPHEN 500 MG
650 TABLET ORAL ONCE
Qty: 0 | Refills: 0 | Status: COMPLETED | OUTPATIENT
Start: 2018-12-26 | End: 2018-12-26

## 2018-12-26 RX ORDER — AZTREONAM 2 G
1000 VIAL (EA) INJECTION ONCE
Qty: 0 | Refills: 0 | Status: COMPLETED | OUTPATIENT
Start: 2018-12-26 | End: 2018-12-26

## 2018-12-26 RX ORDER — AZTREONAM 2 G
250 VIAL (EA) INJECTION ONCE
Qty: 0 | Refills: 0 | Status: DISCONTINUED | OUTPATIENT
Start: 2018-12-26 | End: 2018-12-26

## 2018-12-26 RX ADMIN — Medication 650 MILLIGRAM(S): at 23:40

## 2018-12-26 RX ADMIN — AZITHROMYCIN 250 MILLIGRAM(S): 500 TABLET, FILM COATED ORAL at 18:47

## 2018-12-26 RX ADMIN — Medication 650 MILLIGRAM(S): at 17:50

## 2018-12-26 RX ADMIN — Medication 75 MILLIGRAM(S): at 22:20

## 2018-12-26 RX ADMIN — SODIUM CHLORIDE 2000 MILLILITER(S): 9 INJECTION INTRAMUSCULAR; INTRAVENOUS; SUBCUTANEOUS at 17:49

## 2018-12-26 RX ADMIN — Medication 250 MILLIGRAM(S): at 17:50

## 2018-12-26 RX ADMIN — Medication 650 MILLIGRAM(S): at 22:16

## 2018-12-26 RX ADMIN — Medication 50 MILLIGRAM(S): at 19:55

## 2018-12-26 NOTE — ED PROVIDER NOTE - ATTENDING CONTRIBUTION TO CARE
Patient presents from home for cough, weakness, subjective fevers. Symptoms started this morning, patient had previously been at baseline soh. (r. hemiparesis and aphasia s/p cva years ago, with hha). Per family, patient, and aid, woke up with persistent nonproductive cough, generalized weakness, and subjective fevers. No associated cp, abd pain, vomiting, diarrhea, dysuria. Tolerated po this am. No recent travel or sick contacts.  exam  GEN - NAD;  A+O x3   HEAD - NC/AT   EYES- PERRL, EOMI  ENT: Airway patent, dry mm, Oral cavity and pharynx normal. No inflammation, swelling, exudate, or lesions.    NECK: Neck supple, non-tender without lymphadenopathy, no masses.  PULMONARY - CTA b/l, symmetric breath sounds.   CARDIAC -s1s2, RRR, no M,G,R  ABDOMEN - +BS, ND, NT, soft, no guarding, no rebound, no masses   BACK - no CVA tenderness, Normal  spine   EXTREMITIES - FROM, symmetric pulses, capillary refill < 2 seconds, no edema   SKIN - no rash or bruising   NEUROLOGIC - alert, speech clear, rue and rle weak as per baseline since cva  PSYCH -nl mood/affect, nl insight.  a/p-patient with cough, weakness, found to be febrile here, with cough, will check labs, ua, cultures, cxr, rvp, fluids, tylenol, abx, monitor, reass. Patient presents from home for cough, weakness, subjective fevers. Symptoms started this morning, patient had previously been at baseline soh. (r. hemiparesis and aphasia s/p cva years ago, with hha). Per family, patient, and aid, woke up with persistent nonproductive cough, generalized weakness, and subjective fevers. No associated cp, abd pain, vomiting, diarrhea, dysuria. Tolerated po this am. No recent travel or sick contacts.  exam  GEN - NAD;  A+O x3   HEAD - NC/AT   EYES- PERRL, EOMI  ENT: Airway patent, dry mm, Oral cavity and pharynx normal. No inflammation, swelling, exudate, or lesions.    NECK: Neck supple, non-tender without lymphadenopathy, no masses.  PULMONARY - CTA b/l, symmetric breath sounds.   CARDIAC -s1s2, RRR, no M,G,R  ABDOMEN - +BS, ND, NT, soft, no guarding, no rebound, no masses   BACK - no CVA tenderness, Normal  spine   EXTREMITIES -np deformity, symmetric pulses, capillary refill < 2 seconds, no edema   SKIN - no rash or bruising   NEUROLOGIC - alert, speech clear, rue and rle weak as per baseline since cva  PSYCH -nl mood/affect, nl insight.  a/p-patient with cough, weakness, found to be febrile here, with cough, will check labs, ua, cultures, cxr, rvp, fluids, tylenol, abx, monitor, reass.

## 2018-12-26 NOTE — ED PROVIDER NOTE - MEDICAL DECISION MAKING DETAILS
72yF w/pmhx CVA w/right sided weakness and expressive aphasia p/w weakness and fever in setting of cough. Pt febrile 102.7, will check cbc/cmp/vbg, blood cultures, ua and culture, CXR. Likely respiratory source for fever.

## 2018-12-26 NOTE — ED PROVIDER NOTE - CARE PLAN
Assessment and plan of treatment:	72yF w/pmhx CVA w/right sided weakness and expressive aphasia p/w weakness and fever in setting of cough. Pt febrile 102.7, will check cbc/cmp/vbg, blood cultures, ua and culture, CXR. Likely respiratory source for fever. Principal Discharge DX:	Influenza

## 2018-12-26 NOTE — ED ADULT NURSE REASSESSMENT NOTE - NS ED NURSE REASSESS COMMENT FT1
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 Pt unable to specify location, stating today is November. Per family this is her normal and has had difficulty expressing herself since stroke. Pt hypertensive, has been hypertensive during hospital stay. Pt states did not take her BP meds today. Denies headache or vision changes. MD Price made aware. No further orders. Urine was collected, sample sent to lab. Repositioned for comfort. Patient pending admission.

## 2018-12-26 NOTE — ED ADULT NURSE NOTE - OBJECTIVE STATEMENT
Pt received in spot 17. alert and oriented x3, non-ambulatory. hx of cva with residual weakness. Pt received in spot 17. alert and oriented x3, non-ambulatory. hx of cva with residual right sided weakness. Co fevers and generalized body weakness since this morning. pt states yesterday she "felt fine". Denies cp, sob, dizziness, n/v/d. Rectal temp 102.7. labs sent. IV placed. VS as stated. Skin intact. Meds given as per MD orders. Comfort measures provided. will continue to monitor.

## 2018-12-26 NOTE — ED PROVIDER NOTE - OBJECTIVE STATEMENT
72yF w/pmhx CVA (2009) w/ right sided residual weakness, expressive aphasia presenting with weakness and fever. Pt family at bedside is also providing history. Pt states she woke this morning and was unable to move in bed, she called her mother at this time. Pt developed subjective fever today associated with dry cough and shortness of breath. Pt has not been able to ambulate today, normally she ambulates with a cane at home. Pt denies chest pain, palpitations, abdominal pain, nausea, vomiting, diarrhea, dysuria, urinary frequency, recent travel or illness, headache, rhinorrhea or any other concerns

## 2018-12-27 ENCOUNTER — TRANSCRIPTION ENCOUNTER (OUTPATIENT)
Age: 73
End: 2018-12-27

## 2018-12-27 DIAGNOSIS — J10.1 INFLUENZA DUE TO OTHER IDENTIFIED INFLUENZA VIRUS WITH OTHER RESPIRATORY MANIFESTATIONS: ICD-10-CM

## 2018-12-27 DIAGNOSIS — I69.320 APHASIA FOLLOWING CEREBRAL INFARCTION: ICD-10-CM

## 2018-12-27 DIAGNOSIS — Z29.9 ENCOUNTER FOR PROPHYLACTIC MEASURES, UNSPECIFIED: ICD-10-CM

## 2018-12-27 DIAGNOSIS — I10 ESSENTIAL (PRIMARY) HYPERTENSION: ICD-10-CM

## 2018-12-27 DIAGNOSIS — R56.9 UNSPECIFIED CONVULSIONS: ICD-10-CM

## 2018-12-27 DIAGNOSIS — Z79.899 OTHER LONG TERM (CURRENT) DRUG THERAPY: ICD-10-CM

## 2018-12-27 DIAGNOSIS — A41.89 OTHER SPECIFIED SEPSIS: ICD-10-CM

## 2018-12-27 LAB
BASOPHILS # BLD AUTO: 0.04 K/UL — SIGNIFICANT CHANGE UP (ref 0–0.2)
BASOPHILS NFR BLD AUTO: 0.7 % — SIGNIFICANT CHANGE UP (ref 0–2)
BASOPHILS NFR SPEC: 0.9 % — SIGNIFICANT CHANGE UP (ref 0–2)
BLASTS # FLD: 0 % — SIGNIFICANT CHANGE UP (ref 0–0)
BUN SERPL-MCNC: 10 MG/DL — SIGNIFICANT CHANGE UP (ref 7–23)
CALCIUM SERPL-MCNC: 8.9 MG/DL — SIGNIFICANT CHANGE UP (ref 8.4–10.5)
CHLORIDE SERPL-SCNC: 105 MMOL/L — SIGNIFICANT CHANGE UP (ref 98–107)
CO2 SERPL-SCNC: 23 MMOL/L — SIGNIFICANT CHANGE UP (ref 22–31)
CREAT SERPL-MCNC: 0.92 MG/DL — SIGNIFICANT CHANGE UP (ref 0.5–1.3)
EOSINOPHIL # BLD AUTO: 0 K/UL — SIGNIFICANT CHANGE UP (ref 0–0.5)
EOSINOPHIL NFR BLD AUTO: 0 % — SIGNIFICANT CHANGE UP (ref 0–6)
EOSINOPHIL NFR FLD: 0 % — SIGNIFICANT CHANGE UP (ref 0–6)
GIANT PLATELETS BLD QL SMEAR: PRESENT — SIGNIFICANT CHANGE UP
GLUCOSE SERPL-MCNC: 98 MG/DL — SIGNIFICANT CHANGE UP (ref 70–99)
HCT VFR BLD CALC: 32.6 % — LOW (ref 34.5–45)
HGB BLD-MCNC: 10.4 G/DL — LOW (ref 11.5–15.5)
IMM GRANULOCYTES # BLD AUTO: 0.02 # — SIGNIFICANT CHANGE UP
IMM GRANULOCYTES NFR BLD AUTO: 0.3 % — SIGNIFICANT CHANGE UP (ref 0–1.5)
LYMPHOCYTES # BLD AUTO: 1.13 K/UL — SIGNIFICANT CHANGE UP (ref 1–3.3)
LYMPHOCYTES # BLD AUTO: 18.4 % — SIGNIFICANT CHANGE UP (ref 13–44)
LYMPHOCYTES NFR SPEC AUTO: 10.1 % — LOW (ref 13–44)
MACROCYTES BLD QL: SLIGHT — SIGNIFICANT CHANGE UP
MAGNESIUM SERPL-MCNC: 2.3 MG/DL — SIGNIFICANT CHANGE UP (ref 1.6–2.6)
MCHC RBC-ENTMCNC: 28.3 PG — SIGNIFICANT CHANGE UP (ref 27–34)
MCHC RBC-ENTMCNC: 31.9 % — LOW (ref 32–36)
MCV RBC AUTO: 88.8 FL — SIGNIFICANT CHANGE UP (ref 80–100)
METAMYELOCYTES # FLD: 0.9 % — SIGNIFICANT CHANGE UP (ref 0–1)
MICROCYTES BLD QL: SLIGHT — SIGNIFICANT CHANGE UP
MONOCYTES # BLD AUTO: 1.24 K/UL — HIGH (ref 0–0.9)
MONOCYTES NFR BLD AUTO: 20.2 % — HIGH (ref 2–14)
MONOCYTES NFR BLD: 14.7 % — HIGH (ref 2–9)
MYELOCYTES NFR BLD: 0 % — SIGNIFICANT CHANGE UP (ref 0–0)
NEUTROPHIL AB SER-ACNC: 67.9 % — SIGNIFICANT CHANGE UP (ref 43–77)
NEUTROPHILS # BLD AUTO: 3.71 K/UL — SIGNIFICANT CHANGE UP (ref 1.8–7.4)
NEUTROPHILS NFR BLD AUTO: 60.4 % — SIGNIFICANT CHANGE UP (ref 43–77)
NEUTS BAND # BLD: 2.7 % — SIGNIFICANT CHANGE UP (ref 0–6)
NRBC # FLD: 0 — SIGNIFICANT CHANGE UP
OTHER - HEMATOLOGY %: 0 — SIGNIFICANT CHANGE UP
PHOSPHATE SERPL-MCNC: 4.1 MG/DL — SIGNIFICANT CHANGE UP (ref 2.5–4.5)
PLATELET # BLD AUTO: 163 K/UL — SIGNIFICANT CHANGE UP (ref 150–400)
PLATELET COUNT - ESTIMATE: NORMAL — SIGNIFICANT CHANGE UP
PMV BLD: 10.9 FL — SIGNIFICANT CHANGE UP (ref 7–13)
POTASSIUM SERPL-MCNC: 3.8 MMOL/L — SIGNIFICANT CHANGE UP (ref 3.5–5.3)
POTASSIUM SERPL-SCNC: 3.8 MMOL/L — SIGNIFICANT CHANGE UP (ref 3.5–5.3)
PROMYELOCYTES # FLD: 0 % — SIGNIFICANT CHANGE UP (ref 0–0)
RBC # BLD: 3.67 M/UL — LOW (ref 3.8–5.2)
RBC # FLD: 15.7 % — HIGH (ref 10.3–14.5)
SMUDGE CELLS # BLD: PRESENT — SIGNIFICANT CHANGE UP
SODIUM SERPL-SCNC: 140 MMOL/L — SIGNIFICANT CHANGE UP (ref 135–145)
SPECIMEN SOURCE: SIGNIFICANT CHANGE UP
SPECIMEN SOURCE: SIGNIFICANT CHANGE UP
VALPROATE SERPL-MCNC: 11.6 UG/ML — LOW (ref 50–100)
VARIANT LYMPHS # BLD: 2.8 % — SIGNIFICANT CHANGE UP
WBC # BLD: 6.14 K/UL — SIGNIFICANT CHANGE UP (ref 3.8–10.5)
WBC # FLD AUTO: 6.14 K/UL — SIGNIFICANT CHANGE UP (ref 3.8–10.5)

## 2018-12-27 PROCEDURE — 12345: CPT | Mod: NC

## 2018-12-27 PROCEDURE — 99223 1ST HOSP IP/OBS HIGH 75: CPT | Mod: GC

## 2018-12-27 RX ORDER — IBUPROFEN 200 MG
400 TABLET ORAL ONCE
Qty: 0 | Refills: 0 | Status: COMPLETED | OUTPATIENT
Start: 2018-12-27 | End: 2018-12-27

## 2018-12-27 RX ORDER — SIMVASTATIN 20 MG/1
10 TABLET, FILM COATED ORAL AT BEDTIME
Qty: 0 | Refills: 0 | Status: DISCONTINUED | OUTPATIENT
Start: 2018-12-27 | End: 2018-12-31

## 2018-12-27 RX ORDER — ACETAMINOPHEN 500 MG
650 TABLET ORAL EVERY 6 HOURS
Qty: 0 | Refills: 0 | Status: DISCONTINUED | OUTPATIENT
Start: 2018-12-27 | End: 2018-12-31

## 2018-12-27 RX ORDER — DIVALPROEX SODIUM 500 MG/1
500 TABLET, DELAYED RELEASE ORAL
Qty: 0 | Refills: 0 | Status: DISCONTINUED | OUTPATIENT
Start: 2018-12-27 | End: 2018-12-31

## 2018-12-27 RX ORDER — IPRATROPIUM/ALBUTEROL SULFATE 18-103MCG
3 AEROSOL WITH ADAPTER (GRAM) INHALATION EVERY 12 HOURS
Qty: 0 | Refills: 0 | Status: DISCONTINUED | OUTPATIENT
Start: 2018-12-27 | End: 2018-12-31

## 2018-12-27 RX ORDER — ENOXAPARIN SODIUM 100 MG/ML
40 INJECTION SUBCUTANEOUS EVERY 24 HOURS
Qty: 0 | Refills: 0 | Status: DISCONTINUED | OUTPATIENT
Start: 2018-12-27 | End: 2018-12-31

## 2018-12-27 RX ORDER — NICARDIPINE HYDROCHLORIDE 30 MG/1
20 CAPSULE, EXTENDED RELEASE ORAL EVERY 12 HOURS
Qty: 0 | Refills: 0 | Status: DISCONTINUED | OUTPATIENT
Start: 2018-12-27 | End: 2018-12-31

## 2018-12-27 RX ORDER — LOSARTAN POTASSIUM 100 MG/1
100 TABLET, FILM COATED ORAL DAILY
Qty: 0 | Refills: 0 | Status: DISCONTINUED | OUTPATIENT
Start: 2018-12-27 | End: 2018-12-31

## 2018-12-27 RX ORDER — LOSARTAN POTASSIUM 100 MG/1
100 TABLET, FILM COATED ORAL DAILY
Qty: 0 | Refills: 0 | Status: DISCONTINUED | OUTPATIENT
Start: 2018-12-27 | End: 2018-12-27

## 2018-12-27 RX ORDER — IPRATROPIUM/ALBUTEROL SULFATE 18-103MCG
3 AEROSOL WITH ADAPTER (GRAM) INHALATION ONCE
Qty: 0 | Refills: 0 | Status: COMPLETED | OUTPATIENT
Start: 2018-12-27 | End: 2018-12-27

## 2018-12-27 RX ADMIN — Medication 650 MILLIGRAM(S): at 23:03

## 2018-12-27 RX ADMIN — ENOXAPARIN SODIUM 40 MILLIGRAM(S): 100 INJECTION SUBCUTANEOUS at 06:39

## 2018-12-27 RX ADMIN — LOSARTAN POTASSIUM 100 MILLIGRAM(S): 100 TABLET, FILM COATED ORAL at 06:38

## 2018-12-27 RX ADMIN — NICARDIPINE HYDROCHLORIDE 20 MILLIGRAM(S): 30 CAPSULE, EXTENDED RELEASE ORAL at 17:44

## 2018-12-27 RX ADMIN — Medication 3 MILLILITER(S): at 05:18

## 2018-12-27 RX ADMIN — Medication 100 MILLIGRAM(S): at 06:38

## 2018-12-27 RX ADMIN — SIMVASTATIN 10 MILLIGRAM(S): 20 TABLET, FILM COATED ORAL at 23:03

## 2018-12-27 RX ADMIN — Medication 75 MILLIGRAM(S): at 06:38

## 2018-12-27 RX ADMIN — DIVALPROEX SODIUM 500 MILLIGRAM(S): 500 TABLET, DELAYED RELEASE ORAL at 17:44

## 2018-12-27 RX ADMIN — Medication 75 MILLIGRAM(S): at 17:44

## 2018-12-27 RX ADMIN — NICARDIPINE HYDROCHLORIDE 20 MILLIGRAM(S): 30 CAPSULE, EXTENDED RELEASE ORAL at 06:38

## 2018-12-27 RX ADMIN — Medication 400 MILLIGRAM(S): at 04:45

## 2018-12-27 RX ADMIN — Medication 3 MILLILITER(S): at 21:32

## 2018-12-27 RX ADMIN — DIVALPROEX SODIUM 500 MILLIGRAM(S): 500 TABLET, DELAYED RELEASE ORAL at 06:38

## 2018-12-27 NOTE — PHYSICAL THERAPY INITIAL EVALUATION ADULT - PERTINENT HX OF CURRENT PROBLEM, REHAB EVAL
72 year old female with hx of HTN, hemorrhagic stroke with residual Right sided hemiplegia, expressive aphasia presents with viral sepsis due to Influenza AH3 infection, bronchitis with mild broncho-constriction, and generalized weakness with mild septic encephalopathy;

## 2018-12-27 NOTE — PROGRESS NOTE ADULT - PROBLEM SELECTOR PLAN 3
h/o CVA with expressive aphasia and right sided weakness  Fall, aspiration precautions  pure diet h/o CVA with expressive aphasia and right sided weakness  Fall, aspiration precautions  pure diet; pt ambulates with walker at home  PT sarah

## 2018-12-27 NOTE — H&P ADULT - LYMPHATIC
supraclavicular R/posterior cervical R/anterior cervical L/supraclavicular L/anterior cervical R/posterior cervical L

## 2018-12-27 NOTE — H&P ADULT - HISTORY OF PRESENT ILLNESS
72 year-old female with h/o HTN, hemorrhagic stroke (2009) with residual R-sided hemiplegia, expressive aphasia presenting with weakness and fever. Pt family at bedside supplementing history. Pt states she woke this morning and was unable to move in bed, she called her mother at this time. Pt developed subjective fever today associated with dry cough and shortness of breath. Pt has not been able to ambulate today, normally she ambulates with a cane at home. Pt reports no chest pain, palpitations, abdominal pain, nausea, vomiting, diarrhea, dysuria, urinary frequency, recent travel, headache, rhinorrhea. 72 year-old female with h/o HTN, hemorrhagic stroke (2009) with residual R-sided hemiplegia, expressive aphasia presenting with weakness and fever. Pt family at bedside supplementing history. Pt states she woke this morning and was unable to move in bed, she called her mother at this time. Pt developed subjective fever today associated with dry cough and shortness of breath. Pt has not been able to ambulate today, normally she ambulates with a cane at home. Pt reports no chest pain, palpitations, abdominal pain, nausea, vomiting, diarrhea, dysuria, urinary frequency, recent travel, headache, rhinorrhea.  No MFHx pertinent to this admission;

## 2018-12-27 NOTE — H&P ADULT - ASSESSMENT
72 year-old female with h/o HTN, hemorrhagic stroke (2009) with residual R-sided hemiplegia, expressive a/w viral sepsis due to Influenza AH3 infection c/b bronchitis with mild broncho-constriction, and generalized weakness with mild septic encephalopathy;

## 2018-12-27 NOTE — PHYSICAL THERAPY INITIAL EVALUATION ADULT - ADDITIONAL COMMENTS
Patient lives alone in a home with 24/7 home health aide;  reports patient ambulated short distances with assistance and rolling walker prior to friday

## 2018-12-27 NOTE — H&P ADULT - PROBLEM SELECTOR PLAN 1
Due to Influenza AH3  -CXR: no consolidations, f/u official reading   -f/u BCX, UCX  -Hold Abx   -s/p 2L NS bolus in ED, will hold off on further IV hydration for now

## 2018-12-27 NOTE — H&P ADULT - PROBLEM SELECTOR PLAN 7
Pt unable to recall all home medications due to gen. weakness, aphasia and mild AMS;  -will request assistance from [facility] pharmacist to obtain home medications

## 2018-12-27 NOTE — H&P ADULT - PROBLEM SELECTOR PLAN 2
c/b bronchitis with mild broncho-constriction, and generalized weakness with mild septic encephalopathy;   -Tamiflu bid  -Duoneb ATC q12h  -Robitussin  -VS q6h

## 2018-12-28 LAB
BASOPHILS # BLD AUTO: 0.05 K/UL — SIGNIFICANT CHANGE UP (ref 0–0.2)
BASOPHILS NFR BLD AUTO: 1 % — SIGNIFICANT CHANGE UP (ref 0–2)
BUN SERPL-MCNC: 13 MG/DL — SIGNIFICANT CHANGE UP (ref 7–23)
CALCIUM SERPL-MCNC: 8.4 MG/DL — SIGNIFICANT CHANGE UP (ref 8.4–10.5)
CHLORIDE SERPL-SCNC: 104 MMOL/L — SIGNIFICANT CHANGE UP (ref 98–107)
CO2 SERPL-SCNC: 23 MMOL/L — SIGNIFICANT CHANGE UP (ref 22–31)
CREAT SERPL-MCNC: 0.93 MG/DL — SIGNIFICANT CHANGE UP (ref 0.5–1.3)
EOSINOPHIL # BLD AUTO: 0.01 K/UL — SIGNIFICANT CHANGE UP (ref 0–0.5)
EOSINOPHIL NFR BLD AUTO: 0.2 % — SIGNIFICANT CHANGE UP (ref 0–6)
GLUCOSE SERPL-MCNC: 83 MG/DL — SIGNIFICANT CHANGE UP (ref 70–99)
HCT VFR BLD CALC: 31.6 % — LOW (ref 34.5–45)
HGB BLD-MCNC: 10.1 G/DL — LOW (ref 11.5–15.5)
IMM GRANULOCYTES # BLD AUTO: 0.01 # — SIGNIFICANT CHANGE UP
IMM GRANULOCYTES NFR BLD AUTO: 0.2 % — SIGNIFICANT CHANGE UP (ref 0–1.5)
LYMPHOCYTES # BLD AUTO: 2.08 K/UL — SIGNIFICANT CHANGE UP (ref 1–3.3)
LYMPHOCYTES # BLD AUTO: 40.8 % — SIGNIFICANT CHANGE UP (ref 13–44)
MAGNESIUM SERPL-MCNC: 2.2 MG/DL — SIGNIFICANT CHANGE UP (ref 1.6–2.6)
MCHC RBC-ENTMCNC: 27.4 PG — SIGNIFICANT CHANGE UP (ref 27–34)
MCHC RBC-ENTMCNC: 32 % — SIGNIFICANT CHANGE UP (ref 32–36)
MCV RBC AUTO: 85.9 FL — SIGNIFICANT CHANGE UP (ref 80–100)
MONOCYTES # BLD AUTO: 0.8 K/UL — SIGNIFICANT CHANGE UP (ref 0–0.9)
MONOCYTES NFR BLD AUTO: 15.7 % — HIGH (ref 2–14)
NEUTROPHILS # BLD AUTO: 2.15 K/UL — SIGNIFICANT CHANGE UP (ref 1.8–7.4)
NEUTROPHILS NFR BLD AUTO: 42.1 % — LOW (ref 43–77)
NRBC # FLD: 0 — SIGNIFICANT CHANGE UP
PHOSPHATE SERPL-MCNC: 4 MG/DL — SIGNIFICANT CHANGE UP (ref 2.5–4.5)
PLATELET # BLD AUTO: 166 K/UL — SIGNIFICANT CHANGE UP (ref 150–400)
PMV BLD: 11 FL — SIGNIFICANT CHANGE UP (ref 7–13)
POTASSIUM SERPL-MCNC: 4.5 MMOL/L — SIGNIFICANT CHANGE UP (ref 3.5–5.3)
POTASSIUM SERPL-SCNC: 4.5 MMOL/L — SIGNIFICANT CHANGE UP (ref 3.5–5.3)
RBC # BLD: 3.68 M/UL — LOW (ref 3.8–5.2)
RBC # FLD: 15.7 % — HIGH (ref 10.3–14.5)
SODIUM SERPL-SCNC: 140 MMOL/L — SIGNIFICANT CHANGE UP (ref 135–145)
SPECIMEN SOURCE: SIGNIFICANT CHANGE UP
WBC # BLD: 5.1 K/UL — SIGNIFICANT CHANGE UP (ref 3.8–10.5)
WBC # FLD AUTO: 5.1 K/UL — SIGNIFICANT CHANGE UP (ref 3.8–10.5)

## 2018-12-28 PROCEDURE — 99233 SBSQ HOSP IP/OBS HIGH 50: CPT

## 2018-12-28 RX ORDER — SODIUM CHLORIDE 9 MG/ML
1000 INJECTION, SOLUTION INTRAVENOUS
Qty: 0 | Refills: 0 | Status: DISCONTINUED | OUTPATIENT
Start: 2018-12-28 | End: 2018-12-31

## 2018-12-28 RX ORDER — IBUPROFEN 200 MG
400 TABLET ORAL ONCE
Qty: 0 | Refills: 0 | Status: COMPLETED | OUTPATIENT
Start: 2018-12-28 | End: 2018-12-28

## 2018-12-28 RX ADMIN — SIMVASTATIN 10 MILLIGRAM(S): 20 TABLET, FILM COATED ORAL at 22:23

## 2018-12-28 RX ADMIN — Medication 75 MILLIGRAM(S): at 17:33

## 2018-12-28 RX ADMIN — Medication 75 MILLIGRAM(S): at 05:08

## 2018-12-28 RX ADMIN — DIVALPROEX SODIUM 500 MILLIGRAM(S): 500 TABLET, DELAYED RELEASE ORAL at 05:08

## 2018-12-28 RX ADMIN — DIVALPROEX SODIUM 500 MILLIGRAM(S): 500 TABLET, DELAYED RELEASE ORAL at 17:33

## 2018-12-28 RX ADMIN — SODIUM CHLORIDE 100 MILLILITER(S): 9 INJECTION, SOLUTION INTRAVENOUS at 12:11

## 2018-12-28 RX ADMIN — NICARDIPINE HYDROCHLORIDE 20 MILLIGRAM(S): 30 CAPSULE, EXTENDED RELEASE ORAL at 05:07

## 2018-12-28 RX ADMIN — ENOXAPARIN SODIUM 40 MILLIGRAM(S): 100 INJECTION SUBCUTANEOUS at 06:18

## 2018-12-28 RX ADMIN — Medication 3 MILLILITER(S): at 09:52

## 2018-12-28 RX ADMIN — LOSARTAN POTASSIUM 100 MILLIGRAM(S): 100 TABLET, FILM COATED ORAL at 05:08

## 2018-12-28 RX ADMIN — Medication 400 MILLIGRAM(S): at 03:11

## 2018-12-28 RX ADMIN — NICARDIPINE HYDROCHLORIDE 20 MILLIGRAM(S): 30 CAPSULE, EXTENDED RELEASE ORAL at 17:33

## 2018-12-28 RX ADMIN — Medication 3 MILLILITER(S): at 21:07

## 2018-12-28 NOTE — DISCHARGE NOTE ADULT - CARE PLAN
Principal Discharge DX:	Influenza A  Goal:	Resolution of virus  Assessment and plan of treatment:	Continue course of tamiflu x 5 days. Follow up with PCP upon discharge. Principal Discharge DX:	Influenza A  Goal:	Resolution of virus  Assessment and plan of treatment:	Influenza AH3, Continue course of Tamiflu x 5 days. Follow up with PCP upon discharge.  Secondary Diagnosis:	CVA, old, aphasia  Goal:	fall precautions, aspiration precautions  Secondary Diagnosis:	HLD (hyperlipidemia)  Goal:	lipid control  Secondary Diagnosis:	Seizure  Goal:	Seizure precautions  Assessment and plan of treatment:	Continue Depakote, Valproic acid level 11.6.  Secondary Diagnosis:	Benign essential hypertension  Goal:	BP control  Assessment and plan of treatment:	c/w Losartan, Nicardipine Principal Discharge DX:	Influenza A  Goal:	Resolution of virus  Assessment and plan of treatment:	Influenza AH3, Continue course of Tamiflu x 5 days. Follow up with PCP upon discharge.  Secondary Diagnosis:	CVA, old, aphasia  Goal:	fall precautions, aspiration precautions  Assessment and plan of treatment:	Follow up with your PCP outpatient for further follow up   Follow up with neurology outpatient  Secondary Diagnosis:	HLD (hyperlipidemia)  Goal:	lipid control  Assessment and plan of treatment:	Continue cholesterol control medications. Continue DASH diet. Follow up with your PCP within 1 week of discharge for further management and monitoring of lipid and cholesterol panels.  Secondary Diagnosis:	Seizure  Goal:	Seizure precautions  Assessment and plan of treatment:	Continue Depakote as ordered  Follow up with neurology outpatient  Secondary Diagnosis:	Benign essential hypertension  Goal:	BP control  Assessment and plan of treatment:	c/w Losartan, Nicardipine  Continue blood pressure medication regimen as directed. Monitor for any visual changes, headaches or dizziness.  Monitor blood pressure regularly.  Follow up with your PCP for further management for high blood pressure.

## 2018-12-28 NOTE — SWALLOW BEDSIDE ASSESSMENT ADULT - SWALLOW EVAL: RECOMMENDED FEEDING/EATING TECHNIQUES
alternate food with liquid/maintain upright posture during/after eating for 30 mins/oral hygiene/allow for swallow between intakes/no straws/position upright (90 degrees)/small sips/bites

## 2018-12-28 NOTE — DISCHARGE NOTE ADULT - MEDICATION SUMMARY - MEDICATIONS TO CHANGE
I will SWITCH the dose or number of times a day I take the medications listed below when I get home from the hospital:    niCARdipine 20 mg oral capsule  -- 2 cap(s) by mouth once a day (before a meal)    niCARdipine 20 mg oral capsule  -- 1 cap(s) by mouth 2 times a day (after meals)

## 2018-12-28 NOTE — SWALLOW BEDSIDE ASSESSMENT ADULT - SWALLOW EVAL: DIAGNOSIS
1- functional oral management given puree, honey thick liquid, nectar thick liquid and thin liquid textures marked by adequate bolus collection, transfer and transport. 2- mild oral dysphagia given solids marked by prolonged mastication resulting in delayed oral preparation/AP transit. 3- mild pharyngeal dysphagia given solid, puree, honey thick liquid, nectar thick liquid and thin liquid textures marked by mildly delayed swallow trigger and mildly reduced hyolaryngeal excursion without any overt s/s of penetration/aspiration noted.

## 2018-12-28 NOTE — DISCHARGE NOTE ADULT - PLAN OF CARE
Resolution of virus Continue course of tamiflu x 5 days. Follow up with PCP upon discharge. Influenza AH3, Continue course of Tamiflu x 5 days. Follow up with PCP upon discharge. fall precautions, aspiration precautions lipid control Seizure precautions Continue Depakote, Valproic acid level 11.6. BP control c/w Losartan, Nicardipine Continue Depakote as ordered  Follow up with neurology outpatient c/w Losartan, Nicardipine  Continue blood pressure medication regimen as directed. Monitor for any visual changes, headaches or dizziness.  Monitor blood pressure regularly.  Follow up with your PCP for further management for high blood pressure. Follow up with your PCP outpatient for further follow up   Follow up with neurology outpatient Continue cholesterol control medications. Continue DASH diet. Follow up with your PCP within 1 week of discharge for further management and monitoring of lipid and cholesterol panels.

## 2018-12-28 NOTE — SWALLOW BEDSIDE ASSESSMENT ADULT - ASR SWALLOW ASPIRATION MONITOR
change of breathing pattern/oral hygiene/fever/pneumonia/throat clearing/position upright (90Y)/cough/gurgly voice/upper respiratory infection

## 2018-12-28 NOTE — DISCHARGE NOTE ADULT - HOSPITAL COURSE
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  Plan: Due to Influenza AH3, RVP +influenza  -no lobar consolidation on CXR  -culture negative   -s/p 2L NS bolus in ED, will hold off on further IV hydration for now  -c/w tamiflu 75 mg bid x5 day course.      Problem/Plan - 2:  ·  Problem: Influenza A.  Plan: c/b bronchitis with mild broncho-constriction, and generalized weakness with mild septic encephalopathy;   -c/w tamiflu as above  -Duoneb ATC q12h  -Robitussin  -VS q6h.      Problem/Plan - 3:  ·  Problem: CVA, old, aphasia.  Plan: h/o CVA with expressive aphasia and right sided weakness  Fall, aspiration precautions  pure diet; pt ambulates with walker at home  PT eval.     Problem/Plan - 4:  ·  Problem: Seizure.  Plan: c/w Depakote   Valproic acid level 11.6.      Problem/Plan - 5:  ·  Problem: Essential hypertension.  Plan: controlled, c/w Losartan, Nicardipine  monitor bp.      Problem/Plan - 6:  Problem: Need for prophylactic measure. Plan: Lovenox sc dvt ppx.      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   h/o CVA with expressive aphasia and right sided weakness  Seizure c/w Depakote, Valproic acid level 11.6.   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   h/o CVA with expressive aphasia and right sided weakness  Seizure c/w Depakote, Valproic acid level 11.6.   Essential hypertension, c/w Losartan, Nicardipine    Dispo: rehab

## 2018-12-28 NOTE — DISCHARGE NOTE ADULT - CARE PROVIDER_API CALL
[facility] Internal Medicine  [address] [place] [address]  Phone: [phone]  Fax: [phone]

## 2018-12-28 NOTE — SWALLOW BEDSIDE ASSESSMENT ADULT - COMMENTS
Pt was awake and cooperative for a clinical assessment of swallow function his am with supplemental oxygen in place. 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 CXR revealed "no focal consolidations".

## 2018-12-28 NOTE — PROGRESS NOTE ADULT - PROBLEM SELECTOR PLAN 3
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

## 2018-12-28 NOTE — DISCHARGE NOTE ADULT - PATIENT PORTAL LINK FT
You can access the [facility] Patient Portal, offered by [facility], by registering with the following website: [email or web address]

## 2018-12-28 NOTE — DISCHARGE NOTE ADULT - MEDICATION SUMMARY - MEDICATIONS TO STOP TAKING
I will STOP taking the medications listed below when I get home from the hospital:    furosemide  -- 10 milligram(s) by mouth once a day    clindamycin 300 mg oral capsule  -- 1 cap(s) by mouth every 6 hours  -- Finish all this medication unless otherwise directed by prescriber.  Medication should be taken with plenty of water.

## 2018-12-28 NOTE — DISCHARGE NOTE ADULT - COMMUNITY RESOURCES
[facility] for rehab address: [address] [place], [place] [id number] room #  [address] tel # [phone], 5:30pm  via [facility] ambulance tel # [phone]

## 2018-12-28 NOTE — DISCHARGE NOTE ADULT - MEDICATION SUMMARY - MEDICATIONS TO TAKE
I will START or STAY ON the medications listed below when I get home from the hospital:    losartan 100 mg oral tablet  -- tab(s) by mouth once a day  -- Indication: For Essential hypertension    divalproex sodium 500 mg oral delayed release tablet  -- 1 tab(s) by mouth 2 times a day  -- Indication: For Seizure    Zoloft 50 mg oral tablet  -- 1 tab(s) by mouth once a day  -- Indication: For depression    simvastatin 10 mg oral tablet  -- 1 tab(s) by mouth once a day (at bedtime)  -- Indication: For Hyperlipidemia    niCARdipine 20 mg oral capsule  -- 1 cap(s) by mouth every 12 hours  -- Indication: For Essential hypertension

## 2018-12-28 NOTE — DISCHARGE NOTE ADULT - INSTRUCTIONS
mechanical soft diet with thin liquids  allow for swallow between intakes; alternate food with liquid; maintain upright posture during/after eating for 30 mins; no straws; oral hygiene; position upright (90 degrees); small sips/bites minimal assistance required

## 2018-12-29 DIAGNOSIS — N39.0 URINARY TRACT INFECTION, SITE NOT SPECIFIED: ICD-10-CM

## 2018-12-29 LAB
-  AMPICILLIN: SIGNIFICANT CHANGE UP
-  CIPROFLOXACIN: SIGNIFICANT CHANGE UP
-  NITROFURANTOIN: SIGNIFICANT CHANGE UP
-  TETRACYCLINE: SIGNIFICANT CHANGE UP
-  VANCOMYCIN: SIGNIFICANT CHANGE UP
BACTERIA UR CULT: SIGNIFICANT CHANGE UP
METHOD TYPE: SIGNIFICANT CHANGE UP
ORGANISM # SPEC MICROSCOPIC CNT: SIGNIFICANT CHANGE UP
ORGANISM # SPEC MICROSCOPIC CNT: SIGNIFICANT CHANGE UP

## 2018-12-29 PROCEDURE — 99233 SBSQ HOSP IP/OBS HIGH 50: CPT

## 2018-12-29 RX ADMIN — DIVALPROEX SODIUM 500 MILLIGRAM(S): 500 TABLET, DELAYED RELEASE ORAL at 06:26

## 2018-12-29 RX ADMIN — Medication 3 MILLILITER(S): at 20:01

## 2018-12-29 RX ADMIN — DIVALPROEX SODIUM 500 MILLIGRAM(S): 500 TABLET, DELAYED RELEASE ORAL at 17:58

## 2018-12-29 RX ADMIN — NICARDIPINE HYDROCHLORIDE 20 MILLIGRAM(S): 30 CAPSULE, EXTENDED RELEASE ORAL at 17:58

## 2018-12-29 RX ADMIN — Medication 75 MILLIGRAM(S): at 17:58

## 2018-12-29 RX ADMIN — SIMVASTATIN 10 MILLIGRAM(S): 20 TABLET, FILM COATED ORAL at 22:00

## 2018-12-29 RX ADMIN — ENOXAPARIN SODIUM 40 MILLIGRAM(S): 100 INJECTION SUBCUTANEOUS at 06:26

## 2018-12-29 RX ADMIN — NICARDIPINE HYDROCHLORIDE 20 MILLIGRAM(S): 30 CAPSULE, EXTENDED RELEASE ORAL at 06:26

## 2018-12-29 RX ADMIN — Medication 3 MILLILITER(S): at 09:58

## 2018-12-29 RX ADMIN — LOSARTAN POTASSIUM 100 MILLIGRAM(S): 100 TABLET, FILM COATED ORAL at 06:26

## 2018-12-29 RX ADMIN — Medication 75 MILLIGRAM(S): at 06:26

## 2018-12-30 DIAGNOSIS — M25.561 PAIN IN RIGHT KNEE: ICD-10-CM

## 2018-12-30 LAB
HCT VFR BLD CALC: 33.6 % — LOW (ref 34.5–45)
HGB BLD-MCNC: 11 G/DL — LOW (ref 11.5–15.5)
MCHC RBC-ENTMCNC: 27.5 PG — SIGNIFICANT CHANGE UP (ref 27–34)
MCHC RBC-ENTMCNC: 32.7 % — SIGNIFICANT CHANGE UP (ref 32–36)
MCV RBC AUTO: 84 FL — SIGNIFICANT CHANGE UP (ref 80–100)
NRBC # FLD: 0 — SIGNIFICANT CHANGE UP
PLATELET # BLD AUTO: 185 K/UL — SIGNIFICANT CHANGE UP (ref 150–400)
PMV BLD: 10.6 FL — SIGNIFICANT CHANGE UP (ref 7–13)
RBC # BLD: 4 M/UL — SIGNIFICANT CHANGE UP (ref 3.8–5.2)
RBC # FLD: 15.3 % — HIGH (ref 10.3–14.5)
WBC # BLD: 6.99 K/UL — SIGNIFICANT CHANGE UP (ref 3.8–10.5)
WBC # FLD AUTO: 6.99 K/UL — SIGNIFICANT CHANGE UP (ref 3.8–10.5)

## 2018-12-30 PROCEDURE — 99233 SBSQ HOSP IP/OBS HIGH 50: CPT

## 2018-12-30 RX ORDER — IBUPROFEN 200 MG
400 TABLET ORAL ONCE
Qty: 0 | Refills: 0 | Status: COMPLETED | OUTPATIENT
Start: 2018-12-30 | End: 2018-12-30

## 2018-12-30 RX ADMIN — ENOXAPARIN SODIUM 40 MILLIGRAM(S): 100 INJECTION SUBCUTANEOUS at 05:53

## 2018-12-30 RX ADMIN — Medication 75 MILLIGRAM(S): at 05:53

## 2018-12-30 RX ADMIN — Medication 400 MILLIGRAM(S): at 09:31

## 2018-12-30 RX ADMIN — DIVALPROEX SODIUM 500 MILLIGRAM(S): 500 TABLET, DELAYED RELEASE ORAL at 05:53

## 2018-12-30 RX ADMIN — NICARDIPINE HYDROCHLORIDE 20 MILLIGRAM(S): 30 CAPSULE, EXTENDED RELEASE ORAL at 05:53

## 2018-12-30 RX ADMIN — Medication 400 MILLIGRAM(S): at 10:18

## 2018-12-30 RX ADMIN — Medication 75 MILLIGRAM(S): at 17:29

## 2018-12-30 RX ADMIN — Medication 3 MILLILITER(S): at 10:29

## 2018-12-30 RX ADMIN — Medication 3 MILLILITER(S): at 19:43

## 2018-12-30 RX ADMIN — SIMVASTATIN 10 MILLIGRAM(S): 20 TABLET, FILM COATED ORAL at 21:19

## 2018-12-30 RX ADMIN — LOSARTAN POTASSIUM 100 MILLIGRAM(S): 100 TABLET, FILM COATED ORAL at 05:53

## 2018-12-30 RX ADMIN — NICARDIPINE HYDROCHLORIDE 20 MILLIGRAM(S): 30 CAPSULE, EXTENDED RELEASE ORAL at 17:29

## 2018-12-30 RX ADMIN — DIVALPROEX SODIUM 500 MILLIGRAM(S): 500 TABLET, DELAYED RELEASE ORAL at 17:30

## 2018-12-31 VITALS
OXYGEN SATURATION: 100 % | TEMPERATURE: 98 F | SYSTOLIC BLOOD PRESSURE: 149 MMHG | RESPIRATION RATE: 17 BRPM | HEART RATE: 64 BPM | DIASTOLIC BLOOD PRESSURE: 72 MMHG

## 2018-12-31 LAB
BACTERIA BLD CULT: SIGNIFICANT CHANGE UP
BACTERIA BLD CULT: SIGNIFICANT CHANGE UP

## 2018-12-31 PROCEDURE — 73562 X-RAY EXAM OF KNEE 3: CPT | Mod: 26,RT

## 2018-12-31 PROCEDURE — 99239 HOSP IP/OBS DSCHRG MGMT >30: CPT

## 2018-12-31 RX ORDER — NICARDIPINE HYDROCHLORIDE 30 MG/1
1 CAPSULE, EXTENDED RELEASE ORAL
Qty: 0 | Refills: 0 | COMMUNITY

## 2018-12-31 RX ORDER — NICARDIPINE HYDROCHLORIDE 30 MG/1
1 CAPSULE, EXTENDED RELEASE ORAL
Qty: 0 | Refills: 0 | COMMUNITY
Start: 2018-12-31

## 2018-12-31 RX ADMIN — NICARDIPINE HYDROCHLORIDE 20 MILLIGRAM(S): 30 CAPSULE, EXTENDED RELEASE ORAL at 05:37

## 2018-12-31 RX ADMIN — DIVALPROEX SODIUM 500 MILLIGRAM(S): 500 TABLET, DELAYED RELEASE ORAL at 05:37

## 2018-12-31 RX ADMIN — LOSARTAN POTASSIUM 100 MILLIGRAM(S): 100 TABLET, FILM COATED ORAL at 05:37

## 2018-12-31 RX ADMIN — NICARDIPINE HYDROCHLORIDE 20 MILLIGRAM(S): 30 CAPSULE, EXTENDED RELEASE ORAL at 17:27

## 2018-12-31 RX ADMIN — DIVALPROEX SODIUM 500 MILLIGRAM(S): 500 TABLET, DELAYED RELEASE ORAL at 17:24

## 2018-12-31 RX ADMIN — ENOXAPARIN SODIUM 40 MILLIGRAM(S): 100 INJECTION SUBCUTANEOUS at 05:38

## 2018-12-31 RX ADMIN — Medication 75 MILLIGRAM(S): at 17:24

## 2018-12-31 RX ADMIN — Medication 75 MILLIGRAM(S): at 05:37

## 2018-12-31 NOTE — PROGRESS NOTE ADULT - PROBLEM SELECTOR PROBLEM 6
Essential hypertension
Essential hypertension
Need for prophylactic measure
Need for prophylactic measure
UTI (urinary tract infection)

## 2018-12-31 NOTE — PROGRESS NOTE ADULT - PROBLEM SELECTOR PLAN 7
-asymptomatic, normal WBC, denies urinary symptoms  -urine cx 10k-49k enterococcus faecalis  -monitor off abx
-asymptomatic, normal WBC, denies urinary symptoms  -urine cx 10k-49k enterococcus faecalis  -monitor off abx
Lovenox sc dvt ppx

## 2018-12-31 NOTE — PROGRESS NOTE ADULT - SUBJECTIVE AND OBJECTIVE BOX
Carla Brambila MD  Pager [phone]    CHIEF COMPLAINT: Patient is a 72y old  female who presents with a chief complaint of Weakness, fever; (27 Dec 2018 00:19)      SUBJECTIVE / OVERNIGHT EVENTS:  pt feels better, admitted for sepsis due to influenza, got 2 L NS in ED, hemodynamically stable    MEDICATIONS  (STANDING):  ALBUTerol/ipratropium for Nebulization 3 milliLiter(s) Nebulizer every 12 hours  diVALproex  milliGRAM(s) Oral two times a day  enoxaparin Injectable 40 milliGRAM(s) SubCutaneous every 24 hours  losartan 100 milliGRAM(s) Oral daily  niCARdipine 20 milliGRAM(s) Oral every 12 hours  oseltamivir 75 milliGRAM(s) Oral two times a day  simvastatin 10 milliGRAM(s) Oral at bedtime    MEDICATIONS  (PRN):  acetaminophen   Tablet .. 650 milliGRAM(s) Oral every 6 hours PRN Temp greater or equal to 38C (100.4F), Mild Pain (1 - 3)  guaiFENesin    Syrup 100 milliGRAM(s) Oral every 6 hours PRN Cough      VITALS:  T(F): 98.4 (18 @ 12:12), Max: 102.7 (18 @ 17:24)  HR: 60 (18 @ 12:12) (60 - 92)  BP: 140/72 (18 @ 12:12) (124/58 - 188/86)  RR: 18 (18 @ 12:12) (18 - 26)  SpO2: 98% (18 @ 12:12)  Height (cm): 167.64 (23:00)  Weight (kg): 93.7 (23:00)  BMI (kg/m2): 33.3 (23:00)    CAPILLARY BLOOD GLUCOSE    Output   Height (cm): 167.64 (23:00)  Weight (kg): 93.7 (23:00)  BMI (kg/m2): 33.3 (23:00)  I&O's Summary  T(F): 98.4 (18 @ 12:12), Max: 102.7 (18 @ 17:24)  HR: 60 (18 @ 12:12) (60 - 92)  BP: 140/72 (18 @ 12:12) (124/58 - 188/86)  RR: 18 (18 @ 12:12) (18 - )  SpO2: 98% (18 @ 12:12)    PHYSICAL EXAM:  GENERAL: NAD, well-developed  HEAD:  Atraumatic, Normocephalic  EYES: EOMI, PERRLA, conjunctiva and sclera clear  NECK: Supple, No JVD  CHEST/LUNG: Clear to auscultation bilaterally; No wheeze  HEART: Regular rate and rhythm; No murmurs, rubs, or gallops  ABDOMEN: Soft, Nontender, Nondistended; Bowel sounds present  EXTREMITIES:  2+ Peripheral Pulses, No clubbing, cyanosis, or edema  PSYCH: AAOx3, expressive aphasia, right sided weakness   NEUROLOGY: non-focal  SKIN: No rashes or lesions    LABS:              10.4                 140  | 23   | 10           6.14  >-----------< 163     ------------------------< 98                    32.6                 3.8  | 105  | 0.92                                         Ca 8.9   Mg 2.3   Ph 4.1         TPro  7.8  /  Alb  4.1      TBili  0.5  /  DBili  x         AST  46  /  ALT  29            AlkPhos  88            Urinalysis Basic - ( 26 Dec 2018 20:49 )    Color: LIGHT YELLOW / Appearance: CLEAR / S.013 / pH: 7.0  Gluc: NEGATIVE / Ketone: NEGATIVE  / Bili: NEGATIVE / Urobili: NORMAL   Blood: NEGATIVE / Protein: NEGATIVE / Nitrite: NEGATIVE   Leuk Esterase: SMALL / RBC: 3-5 / WBC 6-11   Sq Epi: OCC / Non Sq Epi: x / Bacteria: FEW        VB-26 @ 17:15  7.45/37/39/25/73.4/1.1      MICROBIOLOGY:    RADIOLOGY & ADDITIONAL TESTS:    Imaging Personally Reviewed:  < from: Xray Chest 1 View- PORTABLE-Urgent (18 @ 19:19) >  FINDINGS:  Low lung volumes. No focal consolidations. No pleural effusions or   pneumothorax. The heart is enlarged. Degenerative changes of the spine   and bilateral shoulders.    IMPRESSION:   No focal consolidations.      [ ] Consultant(s) Notes Reviewed:  [x ] Care Discussed with Consultants/Other Providers: ADS NP Romero, c/w tamiflu for influenza, d/c plan tomorrow if continues to improve
Carla Brambila MD  Pager [phone]    CHIEF COMPLAINT: Patient is a 73y old  female who presents with a chief complaint of Weakness, fever; influenza (28 Dec 2018 11:42)      SUBJECTIVE / OVERNIGHT EVENTS:  pt feels better , denies dysuria/urinary urgency    MEDICATIONS  (STANDING):  ALBUTerol/ipratropium for Nebulization 3 milliLiter(s) Nebulizer every 12 hours  diVALproex  milliGRAM(s) Oral two times a day  enoxaparin Injectable 40 milliGRAM(s) SubCutaneous every 24 hours  lactated ringers. 1000 milliLiter(s) (100 mL/Hr) IV Continuous <Continuous>  losartan 100 milliGRAM(s) Oral daily  niCARdipine 20 milliGRAM(s) Oral every 12 hours  oseltamivir 75 milliGRAM(s) Oral two times a day  simvastatin 10 milliGRAM(s) Oral at bedtime    MEDICATIONS  (PRN):  acetaminophen   Tablet .. 650 milliGRAM(s) Oral every 6 hours PRN Temp greater or equal to 38C (100.4F), Mild Pain (1 - 3)  guaiFENesin    Syrup 100 milliGRAM(s) Oral every 6 hours PRN Cough      VITALS:  T(F): 98 (12-29-18 @ 06:18), Max: 98.7 (12-28-18 @ 11:00)  HR: 76 (12-29-18 @ 06:18) (50 - 79)  BP: 144/72 (12-29-18 @ 06:18) (133/60 - 152/72)  RR: 18 (12-29-18 @ 06:18) (18 - 18)  SpO2: 100% (12-29-18 @ 06:18)      CAPILLARY BLOOD GLUCOSE    Output     I&O's Summary  T(F): 98 (12-29-18 @ 06:18), Max: 98.7 (12-28-18 @ 11:00)  HR: 76 (12-29-18 @ 06:18) (50 - 79)  BP: 144/72 (12-29-18 @ 06:18) (133/60 - 152/72)  RR: 18 (12-29-18 @ 06:18) (18 - 18)  SpO2: 100% (12-29-18 @ 06:18)    PHYSICAL EXAM:  GENERAL: NAD, well-developed  HEAD:  Atraumatic, Normocephalic  EYES: EOMI, PERRLA, conjunctiva and sclera clear  NECK: Supple, No JVD  CHEST/LUNG: Clear to auscultation bilaterally; No wheeze  HEART: Regular rate and rhythm; No murmurs, rubs, or gallops  ABDOMEN: Soft, Nontender, Nondistended; Bowel sounds present  EXTREMITIES:  2+ Peripheral Pulses, No clubbing, cyanosis, or edema  PSYCH: AAOx3, expressive aphasia, right sided weakness   NEUROLOGY: non-focal  SKIN: No rashes or lesions    LABS:              10.1                 140  | 23   | 13           5.10  >-----------< 166     ------------------------< 83                    31.6                 4.5  | 104  | 0.93                                         Ca 8.4   Mg 2.2   Ph 4.0                        MICROBIOLOGY:    Culture - Urine (collected 26 Dec 2018 21:18)  Source: URINE MIDSTREAM  Preliminary Report (28 Dec 2018 12:34):    ENTF^Enterococcus faecalis    COLONY COUNT: 10,000-49,000 CFU/mL    Culture - Blood (collected 26 Dec 2018 18:00)  Source: BLOOD VENOUS  Preliminary Report (28 Dec 2018 17:58):    NO ORGANISMS ISOLATED    NO ORGANISMS ISOLATED AT 48 HRS.    Culture - Blood (collected 26 Dec 2018 18:00)  Source: BLOOD PERIPHERAL  Preliminary Report (28 Dec 2018 17:58):    NO ORGANISMS ISOLATED    NO ORGANISMS ISOLATED AT 48 HRS.          RADIOLOGY & ADDITIONAL TESTS:    Imaging Personally Reviewed:    [ ] Consultant(s) Notes Reviewed:  [ ] Care Discussed with Consultants/Other Providers:
Carla Brambila MD  Pager [phone]    CHIEF COMPLAINT: Patient is a 73y old  female who presents with a chief complaint of influenza (28 Dec 2018 04:01)      SUBJECTIVE / OVERNIGHT EVENTS:  pt had fever last night 100.8F, feels weak, seen by PT and needs 2 person assistant, recommend rehab, denies chest pain/sob    MEDICATIONS  (STANDING):  ALBUTerol/ipratropium for Nebulization 3 milliLiter(s) Nebulizer every 12 hours  diVALproex  milliGRAM(s) Oral two times a day  enoxaparin Injectable 40 milliGRAM(s) SubCutaneous every 24 hours  lactated ringers. 1000 milliLiter(s) (100 mL/Hr) IV Continuous <Continuous>  losartan 100 milliGRAM(s) Oral daily  niCARdipine 20 milliGRAM(s) Oral every 12 hours  oseltamivir 75 milliGRAM(s) Oral two times a day  simvastatin 10 milliGRAM(s) Oral at bedtime    MEDICATIONS  (PRN):  acetaminophen   Tablet .. 650 milliGRAM(s) Oral every 6 hours PRN Temp greater or equal to 38C (100.4F), Mild Pain (1 - 3)  guaiFENesin    Syrup 100 milliGRAM(s) Oral every 6 hours PRN Cough      VITALS:  T(F): 97.8 (18 @ 05:05), Max: 100.8 (18 @ 22:23)  HR: 50 (18 @ 09:57) (50 - 72)  BP: 125/63 (18 @ 05:05) (125/63 - 140/72)  RR: 18 (18 @ 05:05) (16 - 19)  SpO2: 96% (18 @ 05:05)      CAPILLARY BLOOD GLUCOSE    Output     I&O's Summary  T(F): 97.8 (18 @ 05:05), Max: 100.8 (18 @ 22:23)  HR: 50 (18 @ 09:57) (50 - 72)  BP: 125/63 (18 @ 05:05) (125/63 - 140/72)  RR: 18 (18 @ 05:05) (16 - 19)  SpO2: 96% (18 @ 05:05)    PHYSICAL EXAM:  GENERAL: NAD, well-developed  HEAD:  Atraumatic, Normocephalic  EYES: EOMI, PERRLA, conjunctiva and sclera clear  NECK: Supple, No JVD  CHEST/LUNG: Clear to auscultation bilaterally; No wheeze  HEART: Regular rate and rhythm; No murmurs, rubs, or gallops  ABDOMEN: Soft, Nontender, Nondistended; Bowel sounds present  EXTREMITIES:  2+ Peripheral Pulses, No clubbing, cyanosis, or edema  PSYCH: AAOx3, expressive aphasia, right sided weakness   NEUROLOGY: non-focal  SKIN: No rashes or lesions      LABS:              10.1                 140  | 23   | 13           5.10  >-----------< 166     ------------------------< 83                    31.6                 4.5  | 104  | 0.93                                         Ca 8.4   Mg 2.2   Ph 4.0         TPro  7.8  /  Alb  4.1      TBili  0.5  /  DBili  x         AST  46  /  ALT  29            AlkPhos  88            Urinalysis Basic - ( 26 Dec 2018 20:49 )    Color: LIGHT YELLOW / Appearance: CLEAR / S.013 / pH: 7.0  Gluc: NEGATIVE / Ketone: NEGATIVE  / Bili: NEGATIVE / Urobili: NORMAL   Blood: NEGATIVE / Protein: NEGATIVE / Nitrite: NEGATIVE   Leuk Esterase: SMALL / RBC: 3-5 / WBC 6-11   Sq Epi: OCC / Non Sq Epi: x / Bacteria: FEW        VB-26 @ 17:15  7.45/37/39/25/73.4/1.1        MICROBIOLOGY:    Culture - Blood (collected 26 Dec 2018 18:00)  Source: BLOOD VENOUS  Preliminary Report (27 Dec 2018 17:59):    NO ORGANISMS ISOLATED    NO ORGANISMS ISOLATED AT 24 HOURS    Culture - Blood (collected 26 Dec 2018 18:00)  Source: BLOOD PERIPHERAL  Preliminary Report (27 Dec 2018 17:59):    NO ORGANISMS ISOLATED    NO ORGANISMS ISOLATED AT 24 HOURS    RADIOLOGY & ADDITIONAL TESTS:    Imaging Personally Reviewed:  < from: Xray Chest 1 View- PORTABLE-Urgent (18 @ 19:19) >  IMPRESSION:   No focal consolidations.    [ ] Consultant(s) Notes Reviewed:  [x ] Care Discussed with Consultants/Other Providers: ADS BALDEMAR Harley and MEGAN, d/c plan rehab after completion of tamiflu treatment on Monday
Patient is a 73y old  Female who presents with a chief complaint of Weakness, fever; influenza (30 Dec 2018 11:21)      SUBJECTIVE / OVERNIGHT EVENTS: No complaints today.    MEDICATIONS  (STANDING):  ALBUTerol/ipratropium for Nebulization 3 milliLiter(s) Nebulizer every 12 hours  diVALproex  milliGRAM(s) Oral two times a day  enoxaparin Injectable 40 milliGRAM(s) SubCutaneous every 24 hours  lactated ringers. 1000 milliLiter(s) (100 mL/Hr) IV Continuous <Continuous>  losartan 100 milliGRAM(s) Oral daily  niCARdipine 20 milliGRAM(s) Oral every 12 hours  oseltamivir 75 milliGRAM(s) Oral two times a day  simvastatin 10 milliGRAM(s) Oral at bedtime    MEDICATIONS  (PRN):  acetaminophen   Tablet .. 650 milliGRAM(s) Oral every 6 hours PRN Temp greater or equal to 38C (100.4F), Mild Pain (1 - 3)  guaiFENesin    Syrup 100 milliGRAM(s) Oral every 6 hours PRN Cough      Vital Signs Last 24 Hrs  T(C): 36.6 (31 Dec 2018 12:22), Max: 36.9 (30 Dec 2018 16:00)  T(F): 97.8 (31 Dec 2018 12:22), Max: 98.5 (30 Dec 2018 16:00)  HR: 66 (31 Dec 2018 12:22) (60 - 88)  BP: 141/81 (31 Dec 2018 12:22) (141/81 - 149/70)  BP(mean): --  RR: 17 (31 Dec 2018 12:22) (17 - 17)  SpO2: 100% (31 Dec 2018 12:22) (97% - 100%)  CAPILLARY BLOOD GLUCOSE        I&O's Summary      PHYSICAL EXAM:  GENERAL: NAD, well-developed  HEAD:  Atraumatic, Normocephalic  EYES: EOMI, PERRLA, conjunctiva and sclera clear  NECK: Supple, No JVD  CHEST/LUNG: Clear to auscultation bilaterally; No wheeze  HEART: Regular rate and rhythm; No murmurs, rubs, or gallops  ABDOMEN: Soft, Nontender, Nondistended; Bowel sounds present  EXTREMITIES:  2+ Peripheral Pulses, No clubbing, cyanosis, or edema  PSYCH: AAOx3  NEUROLOGY: (+) right hemiplegia  SKIN: No rashes or lesions    LABS:                        11.0   6.99  )-----------( 185      ( 30 Dec 2018 06:35 )             33.6                     RADIOLOGY & ADDITIONAL TESTS:    Imaging Personally Reviewed:    Consultant(s) Notes Reviewed:      Care Discussed with Consultants/Other Providers:
Carla Brambila MD  Pager [phone]    CHIEF COMPLAINT: Patient is a 73y old  female who presents with a chief complaint of Weakness, fever; (29 Dec 2018 08:57)      SUBJECTIVE / OVERNIGHT EVENTS:  pt c/o right knee pain, no calf pain or sob/chest pain    MEDICATIONS  (STANDING):  ALBUTerol/ipratropium for Nebulization 3 milliLiter(s) Nebulizer every 12 hours  diVALproex  milliGRAM(s) Oral two times a day  enoxaparin Injectable 40 milliGRAM(s) SubCutaneous every 24 hours  lactated ringers. 1000 milliLiter(s) (100 mL/Hr) IV Continuous <Continuous>  losartan 100 milliGRAM(s) Oral daily  niCARdipine 20 milliGRAM(s) Oral every 12 hours  oseltamivir 75 milliGRAM(s) Oral two times a day  simvastatin 10 milliGRAM(s) Oral at bedtime    MEDICATIONS  (PRN):  acetaminophen   Tablet .. 650 milliGRAM(s) Oral every 6 hours PRN Temp greater or equal to 38C (100.4F), Mild Pain (1 - 3)  guaiFENesin    Syrup 100 milliGRAM(s) Oral every 6 hours PRN Cough      VITALS:  T(F): 97.9 (12-30-18 @ 04:13), Max: 98.3 (12-29-18 @ 12:40)  HR: 67 (12-30-18 @ 04:13) (60 - 78)  BP: 150/73 (12-30-18 @ 04:13) (137/61 - 150/73)  RR: 17 (12-30-18 @ 04:13) (17 - 17)  SpO2: 99% (12-30-18 @ 04:13)      CAPILLARY BLOOD GLUCOSE    Output     I&O's Summary  T(F): 97.9 (12-30-18 @ 04:13), Max: 98.3 (12-29-18 @ 12:40)  HR: 67 (12-30-18 @ 04:13) (60 - 78)  BP: 150/73 (12-30-18 @ 04:13) (137/61 - 150/73)  RR: 17 (12-30-18 @ 04:13) (17 - 17)  SpO2: 99% (12-30-18 @ 04:13)    PHYSICAL EXAM:  GENERAL: NAD, well-developed  HEAD:  Atraumatic, Normocephalic  EYES: EOMI, PERRLA, conjunctiva and sclera clear  NECK: Supple, No JVD  CHEST/LUNG: Clear to auscultation bilaterally; No wheeze  HEART: Regular rate and rhythm; No murmurs, rubs, or gallops  ABDOMEN: Soft, Nontender, Nondistended; Bowel sounds present  EXTREMITIES:  2+ Peripheral Pulses, No clubbing, cyanosis, or edema, right knee full rom but slightly swollen  PSYCH: AAOx3  NEUROLOGY: right sided weakness, expressive aphasia  SKIN: No rashes or lesions    LABS:              11.0                 x    | x    | x            6.99  >-----------< 185     ------------------------< x                     33.6                 x    | x    | x                                            Ca x     Mg x     Ph x          MICROBIOLOGY:    RADIOLOGY & ADDITIONAL TESTS:    Imaging Personally Reviewed:    [ ] Consultant(s) Notes Reviewed:  [ x] Care Discussed with Consultants/Other Providers: ADS BALDEMAR Chapa, xray right knee, PT f/u

## 2018-12-31 NOTE — PROGRESS NOTE ADULT - PROBLEM SELECTOR PLAN 4
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
c/w Depakote   Valproic acid level 11.6
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

## 2018-12-31 NOTE — PROGRESS NOTE ADULT - PROBLEM SELECTOR PLAN 2
c/b bronchitis with mild broncho-constriction, and generalized weakness with mild acute encephalopathy  -mental status back to baseline  -c/w tamiflu as above  -Duoneb ATC q12h
c/b bronchitis with mild broncho-constriction, and generalized weakness with mild acute encephalopathy  -mental status back to baseline  -c/w tamiflu as above  -Duoneb ATC q12h
c/b bronchitis with mild broncho-constriction, and generalized weakness with mild septic encephalopathy;   -c/w tamiflu as above  -Duoneb ATC q12h  -Robitussin  -VS q6h
c/b bronchitis with mild broncho-constriction, and generalized weakness with mild septic encephalopathy;   -mental status back to baseline  -c/w tamiflu as above  -Duoneb ATC q12h
c/b bronchitis with mild broncho-constriction, and generalized weakness with mild acute encephalopathy  -mental status back to baseline  -c/w tamiflu as above  -Duoneb ATC q12h

## 2018-12-31 NOTE — PROGRESS NOTE ADULT - PROBLEM SELECTOR PLAN 1
Due to Influenza AH3, RVP +influenza  -clinically improved on tamiflu  -no lobar consolidation on CXR  -blood cx negative to date  -s/p IVF   -c/w tamiflu 75 mg bid x5 day course (complete 12/31)  -pt is weak and debilitated, PT recommended rehab, d/w SW, d/c plan to rehab on Monday after completion of tamiflu course for influenza;   -PT reeval, d/c plan to rehab on Monday
Due to Influenza AH3, RVP +influenza  -no lobar consolidation on CXR  -blood cx negative to date  -1 L LR x1, s/p 2 L NS in ED   -c/w tamiflu 75 mg bid x5 day course (complete 12/31)  -pt is weak and debilitated, PT recommended rehab, d/w MEGAN, d/c plan to rehab on Monday after completion of tamiflu course for influenza
Due to Influenza AH3, RVP +influenza  -no lobar consolidation on CXR  -blood cx negative to date  -1 L LR x1, s/p 2 L NS in ED   -c/w tamiflu 75 mg bid x5 day course (complete 12/31)  -pt is weak and debilitated, PT recommended rehab, d/w MEGAN, d/c plan to rehab on Monday after completion of tamiflu course for influenza;   -PT reeval, pt reluctant to go to rehab, she can go home if her ambulation improves, otherwise rehab
Due to Influenza AH3, RVP +influenza  -no lobar consolidation on CXR  -culture negative   -s/p 2L NS bolus in ED, will hold off on further IV hydration for now  -c/w tamiflu 75 mg bid x5 day course
Due to Influenza AH3, RVP +influenza  -clinically improved on tamiflu  -no lobar consolidation on CXR  -blood cx negative to date  -s/p IVF   -c/w tamiflu 75 mg bid x5 day course (complete today)  -pt is weak and debilitated, PT recommended rehab, d/w SW, d/c plan to rehab on Monday after completion of tamiflu course for influenza;   -PT reeval, d/c plan to rehab on Monday

## 2018-12-31 NOTE — PROGRESS NOTE ADULT - PROBLEM SELECTOR PLAN 6
controlled, c/w Losartan, Nicardipine  monitor bp
controlled, c/w Losartan, Nicardipine  monitor bp
-asymptomatic, normal WBC, denies urinary symptoms  -urine cx 10k-49k enterococcus faecalis  -monitor off abx
Lovenox sc dvt ppx
Lovenox sc dvt ppx

## 2018-12-31 NOTE — PROGRESS NOTE ADULT - PROBLEM SELECTOR PLAN 5
c/w Depakote   Valproic acid level 11.6
c/w Depakote   Valproic acid level 11.6
controlled, c/w Losartan, Nicardipine  monitor bp

## 2019-01-01 NOTE — ED ADULT TRIAGE NOTE - NS ED NURSE AMBULANCES
[facility] 41.1 wk female born to a 33 y/o  mother via . No significant maternal or prenatal hx. Maternal blood type A+. Prenatal labs negative, non-reactive and immune. GBS negative on . AROM at 1500 (<18 hours) with clear fluids. APGARS 9/9. EOS 0.19.    Mom is planning on breast/formula feeding, yes hep B vaccination    Since admission to [facility], baby has been feeding well, stooling, and making adequate wet diapers. Vitals have remained stable. Baby received routine NBN care and passed CCHD, auditory screening, and received HBV. Bilirubin was ____ at ____ hours of life, which is ______ zone. Discharge weight was down _______ from birth weight.  Stable for discharge to home after receiving routine  care education and instructions to schedule follow up pediatrician appointment. 41.1 wk female born to a 31 y/o  mother via . No significant maternal or prenatal hx. Maternal blood type A+. Prenatal labs negative, non-reactive and immune. GBS negative on . AROM at 1500 (<18 hours) with clear fluids. APGARS 9/9. EOS 0.19.    Mom is planning on breast/formula feeding, yes hep B vaccination    Since admission to [facility], baby has been feeding well, stooling, and making adequate wet diapers. Vitals have remained stable. Baby received routine NBN care and passed CCHD, auditory screening, and received HBV. Bilirubin was 6.4 at 31 hours of life, which is low intermediate risk zone. Discharge weight was down -2.7% from birth weight.  Stable for discharge to home after receiving routine  care education and instructions to schedule follow up pediatrician appointment.    Parents request early discharge. Discussed with parents importance of follow up with PMD in 24-48 hours for repeat weight check and bilirubin surveillance.    Attending Discharge Exam:  General: no apparent distress, pink   HEENT: AFOF, Eyes: RR+ b/l, Ears: normal set bilaterally, no pits or tags, Nose: patent, Mouth: clear, no cleft lip or palate, tongue normal, Neck: clavicles intact bilaterally  Lungs: Clear to auscultation bilaterally, no wheezes, no crackles  CVS: S1,S2 normal, no murmur, femoral pulses palpable bilaterally, cap refill <2 seconds  Abdomen: soft, no masses, no organomegaly, not distended, umbilical stump intact, dry, without erythema  : etienne 1 female, anus patent  Extremities: FROM x 4, no hip clicks bilaterally, Back: spine straight, no dimples or pits  Skin: intact, no rashes  Neuro: awake, alert, reactive, symmetric tami, good tone, + suck reflex, + grasp reflex      I saw and examined this baby for discharge. Tolerating feeds well.  Please see above for discharge weight and bilirubin.  I reviewed baby's vitals prior to discharge.  Baby's Hearing test results, Hepatitis B vaccine status, Congenital Heart Screen Results, and Hospital course reviewed.  Anticipatory guidance discussed with mother: cord care, car safety, crib safety (Back to sleep), Tummy time, Rectal temp  >100.4 = fever = if baby is less than 2 months of age: Call Pediatrician immediately or bring baby to closest ER     Baby is stable for discharge and will follow up with PMD in 1-2 days after discharge  I spent 30 or more minutes with the patient and the patient's family on direct patient care and discharge planning; more than 50% of the time was spent on counseling and/or discharge planning.    Kristina Armstrong MD 41.1 wk female born to a 31 y/o  mother via . No significant maternal or prenatal hx. Maternal blood type A+. Prenatal labs negative, non-reactive and immune. GBS negative on . AROM at 1500 (<18 hours) with clear fluids. APGARS 9/9. EOS 0.19.    Mom is planning on breast/formula feeding, yes hep B vaccination    Since admission to [facility], baby has been feeding well, stooling, and making adequate wet diapers. Vitals have remained stable. Baby received routine NBN care and passed CCHD, auditory screening, and received HBV. Bilirubin was 6.4 at 31 hours of life, which is low intermediate risk zone. Discharge weight was down -2.7% from birth weight.  Stable for discharge to home after receiving routine  care education and instructions to schedule follow up pediatrician appointment.    Parents request early discharge. Discussed with parents importance of follow up with PMD in 24 hours for repeat weight check and bilirubin surveillance. Parents expressed understanding and agreed with plan.    Attending Discharge Exam:  General: no apparent distress, pink   HEENT: AFOF, Eyes: RR+ b/l, Ears: normal set bilaterally, no pits or tags, Nose: patent, Mouth: clear, no cleft lip or palate, tongue normal, Neck: clavicles intact bilaterally  Lungs: Clear to auscultation bilaterally, no wheezes, no crackles  CVS: S1,S2 normal, no murmur, femoral pulses palpable bilaterally, cap refill <2 seconds  Abdomen: soft, no masses, no organomegaly, not distended, umbilical stump intact, dry, without erythema  : etienne 1 female, anus patent  Extremities: FROM x 4, no hip clicks bilaterally, Back: spine straight, no dimples or pits  Skin: intact, nevus simplex occipital scalp  Neuro: awake, alert, reactive, symmetric tami, good tone, + suck reflex, + grasp reflex      I saw and examined this baby for discharge. Tolerating feeds well.  Please see above for discharge weight and bilirubin.  I reviewed baby's vitals prior to discharge.  Baby's Hearing test results, Hepatitis B vaccine status, Congenital Heart Screen Results, and Hospital course reviewed.  Anticipatory guidance discussed with mother: cord care, car safety, crib safety (Back to sleep), Tummy time, Rectal temp  >100.4 = fever = if baby is less than 2 months of age: Call Pediatrician immediately or bring baby to closest ER     Baby is stable for discharge and will follow up with PMD in 1-2 days after discharge  I spent 30 or more minutes with the patient and the patient's family on direct patient care and discharge planning; more than 50% of the time was spent on counseling and/or discharge planning.    Kristina Armstrong MD

## 2019-01-29 ENCOUNTER — INPATIENT (INPATIENT)
Facility: HOSPITAL | Age: 74
LOS: 5 days | Discharge: NURSING, REHAB OR LONG-TERM CARE | End: 2019-02-04
Attending: HOSPITALIST | Admitting: HOSPITALIST
Payer: MEDICARE

## 2019-01-29 VITALS
SYSTOLIC BLOOD PRESSURE: 140 MMHG | DIASTOLIC BLOOD PRESSURE: 60 MMHG | OXYGEN SATURATION: 94 % | HEART RATE: 120 BPM | RESPIRATION RATE: 20 BRPM | TEMPERATURE: 99 F

## 2019-01-29 LAB
ALBUMIN SERPL ELPH-MCNC: 3.1 G/DL — LOW (ref 3.3–5)
ALP SERPL-CCNC: 74 U/L — SIGNIFICANT CHANGE UP (ref 40–120)
ALT FLD-CCNC: 13 U/L — SIGNIFICANT CHANGE UP (ref 4–33)
ANION GAP SERPL CALC-SCNC: 11 MMO/L — SIGNIFICANT CHANGE UP (ref 7–14)
APPEARANCE UR: SIGNIFICANT CHANGE UP
APTT BLD: 23.2 SEC — LOW (ref 27.5–36.3)
AST SERPL-CCNC: 18 U/L — SIGNIFICANT CHANGE UP (ref 4–32)
B PERT DNA SPEC QL NAA+PROBE: NOT DETECTED — SIGNIFICANT CHANGE UP
BACTERIA # UR AUTO: NEGATIVE — SIGNIFICANT CHANGE UP
BASE EXCESS BLDV CALC-SCNC: 0 MMOL/L — SIGNIFICANT CHANGE UP
BILIRUB SERPL-MCNC: 0.4 MG/DL — SIGNIFICANT CHANGE UP (ref 0.2–1.2)
BILIRUB UR-MCNC: NEGATIVE — SIGNIFICANT CHANGE UP
BLOOD GAS VENOUS - CREATININE: 0.68 MG/DL — SIGNIFICANT CHANGE UP (ref 0.5–1.3)
BLOOD UR QL VISUAL: SIGNIFICANT CHANGE UP
BUN SERPL-MCNC: 11 MG/DL — SIGNIFICANT CHANGE UP (ref 7–23)
C PNEUM DNA SPEC QL NAA+PROBE: NOT DETECTED — SIGNIFICANT CHANGE UP
CALCIUM SERPL-MCNC: 8.4 MG/DL — SIGNIFICANT CHANGE UP (ref 8.4–10.5)
CHLORIDE BLDV-SCNC: 111 MMOL/L — HIGH (ref 96–108)
CHLORIDE SERPL-SCNC: 102 MMOL/L — SIGNIFICANT CHANGE UP (ref 98–107)
CO2 SERPL-SCNC: 23 MMOL/L — SIGNIFICANT CHANGE UP (ref 22–31)
COLOR SPEC: YELLOW — SIGNIFICANT CHANGE UP
CREAT SERPL-MCNC: 0.86 MG/DL — SIGNIFICANT CHANGE UP (ref 0.5–1.3)
FLUAV H1 2009 PAND RNA SPEC QL NAA+PROBE: NOT DETECTED — SIGNIFICANT CHANGE UP
FLUAV H1 RNA SPEC QL NAA+PROBE: NOT DETECTED — SIGNIFICANT CHANGE UP
FLUAV H3 RNA SPEC QL NAA+PROBE: NOT DETECTED — SIGNIFICANT CHANGE UP
FLUAV SUBTYP SPEC NAA+PROBE: NOT DETECTED — SIGNIFICANT CHANGE UP
FLUBV RNA SPEC QL NAA+PROBE: NOT DETECTED — SIGNIFICANT CHANGE UP
GAS PNL BLDV: 132 MMOL/L — LOW (ref 136–146)
GLUCOSE BLDV-MCNC: 115 — HIGH (ref 70–99)
GLUCOSE SERPL-MCNC: 106 MG/DL — HIGH (ref 70–99)
GLUCOSE UR-MCNC: NEGATIVE — SIGNIFICANT CHANGE UP
HADV DNA SPEC QL NAA+PROBE: NOT DETECTED — SIGNIFICANT CHANGE UP
HCO3 BLDV-SCNC: 25 MMOL/L — SIGNIFICANT CHANGE UP (ref 20–27)
HCOV PNL SPEC NAA+PROBE: SIGNIFICANT CHANGE UP
HCT VFR BLD CALC: 34.1 % — LOW (ref 34.5–45)
HCT VFR BLDV CALC: 36.1 % — SIGNIFICANT CHANGE UP (ref 34.5–45)
HGB BLD-MCNC: 11 G/DL — LOW (ref 11.5–15.5)
HGB BLDV-MCNC: 11.7 G/DL — SIGNIFICANT CHANGE UP (ref 11.5–15.5)
HMPV RNA SPEC QL NAA+PROBE: NOT DETECTED — SIGNIFICANT CHANGE UP
HPIV1 RNA SPEC QL NAA+PROBE: NOT DETECTED — SIGNIFICANT CHANGE UP
HPIV2 RNA SPEC QL NAA+PROBE: NOT DETECTED — SIGNIFICANT CHANGE UP
HPIV3 RNA SPEC QL NAA+PROBE: NOT DETECTED — SIGNIFICANT CHANGE UP
HPIV4 RNA SPEC QL NAA+PROBE: NOT DETECTED — SIGNIFICANT CHANGE UP
HYALINE CASTS # UR AUTO: SIGNIFICANT CHANGE UP
INR BLD: 1.27 — HIGH (ref 0.88–1.17)
KETONES UR-MCNC: NEGATIVE — SIGNIFICANT CHANGE UP
LACTATE BLDV-MCNC: 1.6 MMOL/L — SIGNIFICANT CHANGE UP (ref 0.5–2)
LEUKOCYTE ESTERASE UR-ACNC: SIGNIFICANT CHANGE UP
LIDOCAIN IGE QN: 42 U/L — SIGNIFICANT CHANGE UP (ref 7–60)
MCHC RBC-ENTMCNC: 27.4 PG — SIGNIFICANT CHANGE UP (ref 27–34)
MCHC RBC-ENTMCNC: 32.3 % — SIGNIFICANT CHANGE UP (ref 32–36)
MCV RBC AUTO: 85 FL — SIGNIFICANT CHANGE UP (ref 80–100)
NITRITE UR-MCNC: NEGATIVE — SIGNIFICANT CHANGE UP
NRBC # FLD: 0 K/UL — LOW (ref 25–125)
NT-PROBNP SERPL-SCNC: 99.97 PG/ML — SIGNIFICANT CHANGE UP
PCO2 BLDV: 36 MMHG — LOW (ref 41–51)
PH BLDV: 7.44 PH — HIGH (ref 7.32–7.43)
PH UR: 6.5 — SIGNIFICANT CHANGE UP (ref 5–8)
PLATELET # BLD AUTO: 180 K/UL — SIGNIFICANT CHANGE UP (ref 150–400)
PMV BLD: 11 FL — SIGNIFICANT CHANGE UP (ref 7–13)
PO2 BLDV: 124 MMHG — HIGH (ref 35–40)
POTASSIUM BLDV-SCNC: 3.4 MMOL/L — SIGNIFICANT CHANGE UP (ref 3.4–4.5)
POTASSIUM SERPL-MCNC: 3.7 MMOL/L — SIGNIFICANT CHANGE UP (ref 3.5–5.3)
POTASSIUM SERPL-SCNC: 3.7 MMOL/L — SIGNIFICANT CHANGE UP (ref 3.5–5.3)
PROT SERPL-MCNC: 6.7 G/DL — SIGNIFICANT CHANGE UP (ref 6–8.3)
PROT UR-MCNC: 50 — SIGNIFICANT CHANGE UP
PROTHROM AB SERPL-ACNC: 14.2 SEC — HIGH (ref 9.8–13.1)
RBC # BLD: 4.01 M/UL — SIGNIFICANT CHANGE UP (ref 3.8–5.2)
RBC # FLD: 16 % — HIGH (ref 10.3–14.5)
RBC CASTS # UR COMP ASSIST: HIGH (ref 0–?)
RSV RNA SPEC QL NAA+PROBE: NOT DETECTED — SIGNIFICANT CHANGE UP
RV+EV RNA SPEC QL NAA+PROBE: NOT DETECTED — SIGNIFICANT CHANGE UP
SAO2 % BLDV: 98.8 % — HIGH (ref 60–85)
SODIUM SERPL-SCNC: 136 MMOL/L — SIGNIFICANT CHANGE UP (ref 135–145)
SP GR SPEC: 1.02 — SIGNIFICANT CHANGE UP (ref 1–1.04)
SQUAMOUS # UR AUTO: SIGNIFICANT CHANGE UP
TROPONIN T, HIGH SENSITIVITY: 14 NG/L — SIGNIFICANT CHANGE UP (ref ?–14)
UROBILINOGEN FLD QL: NORMAL — SIGNIFICANT CHANGE UP
WBC # BLD: 19.17 K/UL — HIGH (ref 3.8–10.5)
WBC # FLD AUTO: 19.17 K/UL — HIGH (ref 3.8–10.5)
WBC UR QL: >50 — HIGH (ref 0–?)

## 2019-01-29 PROCEDURE — 74177 CT ABD & PELVIS W/CONTRAST: CPT | Mod: 26

## 2019-01-29 PROCEDURE — 71045 X-RAY EXAM CHEST 1 VIEW: CPT | Mod: 26

## 2019-01-29 RX ORDER — CEFTRIAXONE 500 MG/1
1 INJECTION, POWDER, FOR SOLUTION INTRAMUSCULAR; INTRAVENOUS ONCE
Qty: 0 | Refills: 0 | Status: COMPLETED | OUTPATIENT
Start: 2019-01-29 | End: 2019-01-29

## 2019-01-29 RX ORDER — METRONIDAZOLE 500 MG
500 TABLET ORAL ONCE
Qty: 0 | Refills: 0 | Status: COMPLETED | OUTPATIENT
Start: 2019-01-29 | End: 2019-01-29

## 2019-01-29 RX ORDER — VANCOMYCIN HCL 1 G
125 VIAL (EA) INTRAVENOUS ONCE
Qty: 0 | Refills: 0 | Status: COMPLETED | OUTPATIENT
Start: 2019-01-29 | End: 2019-01-29

## 2019-01-29 RX ORDER — CEFTRIAXONE 500 MG/1
1 INJECTION, POWDER, FOR SOLUTION INTRAMUSCULAR; INTRAVENOUS ONCE
Qty: 0 | Refills: 0 | Status: DISCONTINUED | OUTPATIENT
Start: 2019-01-30 | End: 2019-01-30

## 2019-01-29 RX ORDER — ACETAMINOPHEN 500 MG
975 TABLET ORAL ONCE
Qty: 0 | Refills: 0 | Status: COMPLETED | OUTPATIENT
Start: 2019-01-29 | End: 2019-01-29

## 2019-01-29 RX ORDER — SODIUM CHLORIDE 9 MG/ML
2000 INJECTION, SOLUTION INTRAVENOUS ONCE
Qty: 0 | Refills: 0 | Status: COMPLETED | OUTPATIENT
Start: 2019-01-29 | End: 2019-01-29

## 2019-01-29 RX ADMIN — CEFTRIAXONE 100 GRAM(S): 500 INJECTION, POWDER, FOR SOLUTION INTRAMUSCULAR; INTRAVENOUS at 19:27

## 2019-01-29 RX ADMIN — Medication 125 MILLIGRAM(S): at 17:46

## 2019-01-29 RX ADMIN — CEFTRIAXONE 1 GRAM(S): 500 INJECTION, POWDER, FOR SOLUTION INTRAMUSCULAR; INTRAVENOUS at 20:05

## 2019-01-29 RX ADMIN — SODIUM CHLORIDE 1000 MILLILITER(S): 9 INJECTION, SOLUTION INTRAVENOUS at 17:46

## 2019-01-29 RX ADMIN — Medication 975 MILLIGRAM(S): at 17:49

## 2019-01-29 RX ADMIN — Medication 975 MILLIGRAM(S): at 19:21

## 2019-01-29 NOTE — ED ADULT NURSE NOTE - OBJECTIVE STATEMENT
Pt AxOx4 presenting to room 20 with c/o pelvic pain, aches, chills, and increased diarrhea since being diagnosed with CDIFF last week. PMH of stroke in 2009, with residual of right sided weakness and difficulty speaking. Pt is unable to form complete sentences, answers with one word answers. Pt rates pain as 9/10 and also complains of burning with urination. IV present on arrival, established in rehab with 20g, flushing well with good blood return. Labs drawn and sent, MD at bedside, vitals stable, will continue to monitor.

## 2019-01-29 NOTE — ED ADULT TRIAGE NOTE - CHIEF COMPLAINT QUOTE
pt brought from [place] for r/o sepsis, as per EMS 99.8 @ [place], given 1L NS for hydration, pt remained tachycardic, activated EMS for ED further eval, as per EMS on contact precautions for c-diff x 2 weeks however diarrhea restarted 2 days ago pt brought from [place] for r/o sepsis, as per EMS 99.8 @ [place], given 1L NS for hydration, pt remained tachycardic, activated EMS for ED further eval, as per EMS on contact precautions for c-diff x 2 weeks however diarrhea restarted 2 days ago, presents w/ #20g R hand by [facility] staff

## 2019-01-29 NOTE — ED CLERICAL - NS ED CLERK NOTE PRE-ARRIVAL INFORMATION; ADDITIONAL PRE-ARRIVAL INFORMATION
T101, diarrhea. Recently treated for cdiff- Vanco finished 1/29.  Recently @ [facility] for fluA.  Hx CVA, HTN, seizures.

## 2019-01-29 NOTE — ED ADULT NURSE NOTE - CHIEF COMPLAINT QUOTE
pt brought from [place] for r/o sepsis, as per EMS 99.8 @ [place], given 1L NS for hydration, pt remained tachycardic, activated EMS for ED further eval, as per EMS on contact precautions for c-diff x 2 weeks however diarrhea restarted 2 days ago, presents w/ #20g R hand by [facility] staff

## 2019-01-29 NOTE — ED ADULT NURSE REASSESSMENT NOTE - NS ED NURSE REASSESS COMMENT FT1
Pt AxOx4, verbalizes improvement in symptoms and a decrease in pain. Pt denies SOB, CP, dizziness, nausea. Vitals noted and stable, will continue to monitor.

## 2019-01-29 NOTE — ED PROVIDER NOTE - OBJECTIVE STATEMENT
73 year-old female with h/o HTN, hemorrhagic stroke(2009) with residual R-sided hemiplegia, expressive aphasia presenting from [place] with fever, diarrhea, abdominal pain. Was recently in hospital for Influenza. Had course of C-Diff at [place], just finished [facility] course. Had no diarrhea until 2 days ago. Last 2 days felt more poorly, worsening diffuse abdominal pain, diarrhea, mild SOB. 73 year-old female with h/o HTN, hemorrhagic stroke() with residual R-sided hemiplegia, expressive aphasia presenting from [place] with fever, diarrhea, abdominal pain. Was recently in hospital for Influenza. Had course of C-Diff at [place], just finished [facility] course. Had no diarrhea until 2 days ago. Last 2 days felt more poorly, worsening diffuse abdominal pain, diarrhea, mild SOB.    Attendinyo female presents with fever and lower abdominal pain since sun.  also with diarrhea.  no vomiting.  decreased appetite.

## 2019-01-29 NOTE — ED PROVIDER NOTE - PHYSICAL EXAMINATION
Gen: Uncomfortable in room,   HENT: Normocephalic, atraumatic. no rhinorrhea, dry mucous membranes, normal conjunctiva  Eyes: PERRLA, EOMI    Resp: CTAB, non-labored, speaking without difficulty on room air, no wheeze  CV: rrr, no murmur  Abd: soft, diffuse mild abdominal tenderness, ND, no rebound or guarding  MSK: No CVAT bilaterally, no midline ttp, moving left sided extremities  Skin: hot and dry  Neuro: AOx3, answering questions, has trouble getting words out, seems to understand most questions. Decreased strength RUE and RLE, baseline per patient and family.

## 2019-01-30 DIAGNOSIS — K62.89 OTHER SPECIFIED DISEASES OF ANUS AND RECTUM: ICD-10-CM

## 2019-01-30 DIAGNOSIS — A41.9 SEPSIS, UNSPECIFIED ORGANISM: ICD-10-CM

## 2019-01-30 DIAGNOSIS — I10 ESSENTIAL (PRIMARY) HYPERTENSION: ICD-10-CM

## 2019-01-30 DIAGNOSIS — F32.9 MAJOR DEPRESSIVE DISORDER, SINGLE EPISODE, UNSPECIFIED: ICD-10-CM

## 2019-01-30 DIAGNOSIS — N39.0 URINARY TRACT INFECTION, SITE NOT SPECIFIED: ICD-10-CM

## 2019-01-30 DIAGNOSIS — D64.9 ANEMIA, UNSPECIFIED: ICD-10-CM

## 2019-01-30 DIAGNOSIS — Z29.9 ENCOUNTER FOR PROPHYLACTIC MEASURES, UNSPECIFIED: ICD-10-CM

## 2019-01-30 DIAGNOSIS — G40.909 EPILEPSY, UNSPECIFIED, NOT INTRACTABLE, WITHOUT STATUS EPILEPTICUS: ICD-10-CM

## 2019-01-30 DIAGNOSIS — E78.5 HYPERLIPIDEMIA, UNSPECIFIED: ICD-10-CM

## 2019-01-30 LAB
ANION GAP SERPL CALC-SCNC: 11 MMO/L — SIGNIFICANT CHANGE UP (ref 7–14)
BASOPHILS # BLD AUTO: 0.05 K/UL — SIGNIFICANT CHANGE UP (ref 0–0.2)
BASOPHILS NFR BLD AUTO: 0.3 % — SIGNIFICANT CHANGE UP (ref 0–2)
BUN SERPL-MCNC: 8 MG/DL — SIGNIFICANT CHANGE UP (ref 7–23)
C DIFF TOX GENS STL QL NAA+PROBE: SIGNIFICANT CHANGE UP
CALCIUM SERPL-MCNC: 8.9 MG/DL — SIGNIFICANT CHANGE UP (ref 8.4–10.5)
CHLORIDE SERPL-SCNC: 106 MMOL/L — SIGNIFICANT CHANGE UP (ref 98–107)
CO2 SERPL-SCNC: 25 MMOL/L — SIGNIFICANT CHANGE UP (ref 22–31)
CREAT SERPL-MCNC: 0.68 MG/DL — SIGNIFICANT CHANGE UP (ref 0.5–1.3)
EOSINOPHIL # BLD AUTO: 0.25 K/UL — SIGNIFICANT CHANGE UP (ref 0–0.5)
EOSINOPHIL NFR BLD AUTO: 1.6 % — SIGNIFICANT CHANGE UP (ref 0–6)
FERRITIN SERPL-MCNC: 128.3 NG/ML — SIGNIFICANT CHANGE UP (ref 15–150)
FOLATE SERPL-MCNC: 12.7 NG/ML — SIGNIFICANT CHANGE UP (ref 4.7–20)
GLUCOSE SERPL-MCNC: 78 MG/DL — SIGNIFICANT CHANGE UP (ref 70–99)
HCT VFR BLD CALC: 36.4 % — SIGNIFICANT CHANGE UP (ref 34.5–45)
HCV AB S/CO SERPL IA: 0.16 S/CO — SIGNIFICANT CHANGE UP
HCV AB SERPL-IMP: SIGNIFICANT CHANGE UP
HGB BLD-MCNC: 11.8 G/DL — SIGNIFICANT CHANGE UP (ref 11.5–15.5)
IMM GRANULOCYTES NFR BLD AUTO: 0.5 % — SIGNIFICANT CHANGE UP (ref 0–1.5)
IRON SATN MFR SERPL: 212 UG/DL — SIGNIFICANT CHANGE UP (ref 140–530)
IRON SATN MFR SERPL: 33 UG/DL — SIGNIFICANT CHANGE UP (ref 30–160)
LYMPHOCYTES # BLD AUTO: 14.4 % — SIGNIFICANT CHANGE UP (ref 13–44)
LYMPHOCYTES # BLD AUTO: 2.32 K/UL — SIGNIFICANT CHANGE UP (ref 1–3.3)
MAGNESIUM SERPL-MCNC: 2 MG/DL — SIGNIFICANT CHANGE UP (ref 1.6–2.6)
MCHC RBC-ENTMCNC: 27.4 PG — SIGNIFICANT CHANGE UP (ref 27–34)
MCHC RBC-ENTMCNC: 32.4 % — SIGNIFICANT CHANGE UP (ref 32–36)
MCV RBC AUTO: 84.7 FL — SIGNIFICANT CHANGE UP (ref 80–100)
MONOCYTES # BLD AUTO: 0.88 K/UL — SIGNIFICANT CHANGE UP (ref 0–0.9)
MONOCYTES NFR BLD AUTO: 5.5 % — SIGNIFICANT CHANGE UP (ref 2–14)
NEUTROPHILS # BLD AUTO: 12.51 K/UL — HIGH (ref 1.8–7.4)
NEUTROPHILS NFR BLD AUTO: 77.7 % — HIGH (ref 43–77)
NRBC # FLD: 0 K/UL — LOW (ref 25–125)
PHOSPHATE SERPL-MCNC: 3.4 MG/DL — SIGNIFICANT CHANGE UP (ref 2.5–4.5)
PLATELET # BLD AUTO: 189 K/UL — SIGNIFICANT CHANGE UP (ref 150–400)
PMV BLD: 10.2 FL — SIGNIFICANT CHANGE UP (ref 7–13)
POTASSIUM SERPL-MCNC: 4.1 MMOL/L — SIGNIFICANT CHANGE UP (ref 3.5–5.3)
POTASSIUM SERPL-SCNC: 4.1 MMOL/L — SIGNIFICANT CHANGE UP (ref 3.5–5.3)
RBC # BLD: 4.3 M/UL — SIGNIFICANT CHANGE UP (ref 3.8–5.2)
RBC # FLD: 16.2 % — HIGH (ref 10.3–14.5)
SODIUM SERPL-SCNC: 142 MMOL/L — SIGNIFICANT CHANGE UP (ref 135–145)
SPECIMEN SOURCE: SIGNIFICANT CHANGE UP
SPECIMEN SOURCE: SIGNIFICANT CHANGE UP
UIBC SERPL-MCNC: 178.7 UG/DL — SIGNIFICANT CHANGE UP (ref 110–370)
VALPROATE SERPL-MCNC: 60.6 UG/ML — SIGNIFICANT CHANGE UP (ref 50–100)
VIT B12 SERPL-MCNC: 1761 PG/ML — HIGH (ref 200–900)
WBC # BLD: 16.09 K/UL — HIGH (ref 3.8–10.5)
WBC # FLD AUTO: 16.09 K/UL — HIGH (ref 3.8–10.5)

## 2019-01-30 PROCEDURE — 99223 1ST HOSP IP/OBS HIGH 75: CPT

## 2019-01-30 PROCEDURE — 99222 1ST HOSP IP/OBS MODERATE 55: CPT | Mod: GC

## 2019-01-30 RX ORDER — NICARDIPINE HYDROCHLORIDE 30 MG/1
20 CAPSULE, EXTENDED RELEASE ORAL EVERY 12 HOURS
Qty: 0 | Refills: 0 | Status: DISCONTINUED | OUTPATIENT
Start: 2019-01-30 | End: 2019-02-04

## 2019-01-30 RX ORDER — ATORVASTATIN CALCIUM 80 MG/1
10 TABLET, FILM COATED ORAL AT BEDTIME
Qty: 0 | Refills: 0 | Status: DISCONTINUED | OUTPATIENT
Start: 2019-01-30 | End: 2019-02-04

## 2019-01-30 RX ORDER — SODIUM CHLORIDE 9 MG/ML
1000 INJECTION, SOLUTION INTRAVENOUS
Qty: 0 | Refills: 0 | Status: DISCONTINUED | OUTPATIENT
Start: 2019-01-30 | End: 2019-01-31

## 2019-01-30 RX ORDER — CIPROFLOXACIN LACTATE 400MG/40ML
400 VIAL (ML) INTRAVENOUS EVERY 12 HOURS
Qty: 0 | Refills: 0 | Status: DISCONTINUED | OUTPATIENT
Start: 2019-01-30 | End: 2019-02-04

## 2019-01-30 RX ORDER — SIMVASTATIN 20 MG/1
10 TABLET, FILM COATED ORAL AT BEDTIME
Qty: 0 | Refills: 0 | Status: DISCONTINUED | OUTPATIENT
Start: 2019-01-30 | End: 2019-01-30

## 2019-01-30 RX ORDER — SERTRALINE 25 MG/1
50 TABLET, FILM COATED ORAL DAILY
Qty: 0 | Refills: 0 | Status: DISCONTINUED | OUTPATIENT
Start: 2019-01-30 | End: 2019-02-04

## 2019-01-30 RX ORDER — SIMVASTATIN 20 MG/1
1 TABLET, FILM COATED ORAL
Qty: 0 | Refills: 0 | COMMUNITY

## 2019-01-30 RX ORDER — METRONIDAZOLE 500 MG
500 TABLET ORAL EVERY 8 HOURS
Qty: 0 | Refills: 0 | Status: DISCONTINUED | OUTPATIENT
Start: 2019-01-30 | End: 2019-02-02

## 2019-01-30 RX ORDER — LOSARTAN POTASSIUM 100 MG/1
100 TABLET, FILM COATED ORAL DAILY
Qty: 0 | Refills: 0 | Status: DISCONTINUED | OUTPATIENT
Start: 2019-01-30 | End: 2019-02-04

## 2019-01-30 RX ORDER — DIVALPROEX SODIUM 500 MG/1
500 TABLET, DELAYED RELEASE ORAL
Qty: 0 | Refills: 0 | Status: DISCONTINUED | OUTPATIENT
Start: 2019-01-30 | End: 2019-02-04

## 2019-01-30 RX ADMIN — LOSARTAN POTASSIUM 100 MILLIGRAM(S): 100 TABLET, FILM COATED ORAL at 05:54

## 2019-01-30 RX ADMIN — DIVALPROEX SODIUM 500 MILLIGRAM(S): 500 TABLET, DELAYED RELEASE ORAL at 05:55

## 2019-01-30 RX ADMIN — Medication 100 MILLIGRAM(S): at 00:14

## 2019-01-30 RX ADMIN — DIVALPROEX SODIUM 500 MILLIGRAM(S): 500 TABLET, DELAYED RELEASE ORAL at 17:04

## 2019-01-30 RX ADMIN — NICARDIPINE HYDROCHLORIDE 20 MILLIGRAM(S): 30 CAPSULE, EXTENDED RELEASE ORAL at 07:58

## 2019-01-30 RX ADMIN — SODIUM CHLORIDE 75 MILLILITER(S): 9 INJECTION, SOLUTION INTRAVENOUS at 17:26

## 2019-01-30 RX ADMIN — Medication 100 MILLIGRAM(S): at 18:15

## 2019-01-30 RX ADMIN — NICARDIPINE HYDROCHLORIDE 20 MILLIGRAM(S): 30 CAPSULE, EXTENDED RELEASE ORAL at 17:25

## 2019-01-30 RX ADMIN — SODIUM CHLORIDE 75 MILLILITER(S): 9 INJECTION, SOLUTION INTRAVENOUS at 03:54

## 2019-01-30 RX ADMIN — Medication 200 MILLIGRAM(S): at 06:49

## 2019-01-30 RX ADMIN — Medication 500 MILLIGRAM(S): at 01:06

## 2019-01-30 RX ADMIN — Medication 200 MILLIGRAM(S): at 17:03

## 2019-01-30 RX ADMIN — ATORVASTATIN CALCIUM 10 MILLIGRAM(S): 80 TABLET, FILM COATED ORAL at 22:04

## 2019-01-30 RX ADMIN — Medication 100 MILLIGRAM(S): at 10:29

## 2019-01-30 RX ADMIN — SERTRALINE 50 MILLIGRAM(S): 25 TABLET, FILM COATED ORAL at 16:55

## 2019-01-30 RX ADMIN — SODIUM CHLORIDE 75 MILLILITER(S): 9 INJECTION, SOLUTION INTRAVENOUS at 22:06

## 2019-01-30 NOTE — H&P ADULT - NSHPSOCIALHISTORY_GEN_ALL_CORE
now sent from [facility]  otherwise lives alone, A x7days a week  no EtOH, illicit drugs, recreational drug use

## 2019-01-30 NOTE — H&P ADULT - ATTENDING COMMENTS
72F with hx of hemorrhagic stroke (2009) with residual right sided hemiplegia and expressive aphasia, seizure disorder, HTN, HLD, depression who presents from [place] with fever, diarrhea and abdominal pain. For the past three days pt. has had 5-6 daily episodes of watery diarrhea. Pt with sepsis in setting of + UA, and CT evidence of proctitis. Pt recently completed course of PO vanco fro Cdiff. Currently Cdiff negative. C/w cipro/flagyl, stool cultures ordered. Unclear if current presentation related to recent cdiff infection. pt with no apparent h/o IBD, or radiation. Would obtain GI eval for input

## 2019-01-30 NOTE — H&P ADULT - ASSESSMENT
72F with hx of hemorrhagic stroke (2009) with residual right sided hemiplegia and expressive aphasia, seizure disorder, HTN, HLD, depression who presents from [place] with fever, diarrhea and abdominal pain a/w sepsis likely 2/2 proctitis vs. UTI. 72F with hx of recent c.diff colitis s/p PO vancomycin (01/2019), hemorrhagic stroke (2009) with residual right sided hemiplegia and expressive aphasia, seizure disorder, HTN, HLD, depression who presents from [place] with fever, diarrhea and abdominal pain a/w sepsis likely 2/2 proctitis vs. UTI. 72F with hx of recent c.diff colitis s/p PO vancomycin (01/2019, [facility]), hemorrhagic stroke (2009) with residual right sided hemiplegia and expressive aphasia, seizure disorder, HTN, HLD, depression who presents from [place] with fever, diarrhea and abdominal pain a/w sepsis likely 2/2 proctitis vs. UTI.

## 2019-01-30 NOTE — H&P ADULT - PROBLEM SELECTOR PLAN 5
- will hold home losartan 100mg daily and nicardipine 20mg BID in setting of sepsis, resume as needed  - monitor vital signs [id number] - BPs elevated   - c/w home losartan 100mg daily and nicardipine 20mg BID  - monitor vital signs [id number]

## 2019-01-30 NOTE — CONSULT NOTE ADULT - SUBJECTIVE AND OBJECTIVE BOX
Chief Complaint:  Patient is a 73y old  Female who presents with a chief complaint of diarrhea, CTAP with proctitis (2019 03:04)      HPI:  The patient is a 72F with hx of hemorrhagic stroke () with residual right sided hemiplegia and expressive aphasia, seizure disorder, HTN who presented from [place] with fever, diarrhea and abdominal pain. Per documentation for the past three days the patient has had 5-6 daily episodes of watery diarrhea associated with abdominal pain.    Two days ago the patient completed a course of vancomycin for c.diff.     While in the ED the patients work up was significant for normal VS, WBC 19, negative C.Diff and CTAP with mild circumferential rectal and distal sigmoid wall thickening with adjacent free fluid and presacral edema suspicious for a proctitis which is why were consulted.    Allergies:  apple (Hives)  carrots, beets (Unknown)  latex (Unknown)  penicillin (Urticaria)      Home Medications:    Hospital Medications:  atorvastatin 10 milliGRAM(s) Oral at bedtime  ciprofloxacin   IVPB 400 milliGRAM(s) IV Intermittent every 12 hours  diVALproex  milliGRAM(s) Oral two times a day  lactated ringers. 1000 milliLiter(s) IV Continuous <Continuous>  losartan 100 milliGRAM(s) Oral daily  metroNIDAZOLE  IVPB 500 milliGRAM(s) IV Intermittent every 8 hours  niCARdipine 20 milliGRAM(s) Oral every 12 hours  sertraline 50 milliGRAM(s) Oral daily      PMHX/PSHX:  HLD (hyperlipidemia)  CVA (cerebral vascular accident)  Benign Essential Hypertension  No significant past surgical history      Family history:  No pertinent family history in first degree relatives      Social History:     ROS:     General:  No wt loss, fevers, chills, night sweats, fatigue,   Eyes:  Good vision, no reported pain  ENT:  No sore throat, pain, runny nose, dysphagia  CV:  No pain, palpitations, hypo/hypertension  Resp:  No dyspnea, cough, tachypnea, wheezing  GI:  See HPI  :  No pain, bleeding, incontinence, nocturia  Muscle:  No pain, weakness  Neuro:  No weakness, tingling, memory problems  Psych:  No fatigue, insomnia, mood problems, depression  Endocrine:  No polyuria, polydipsia, cold/heat intolerance  Heme:  No petechiae, ecchymosis, easy bruisability  Skin:  No rash, edema      PHYSICAL EXAM:     GENERAL:  Appears stated age, well-groomed, well-nourished, NAD  CHEST:  Full & symmetric excursion  HEART:  Regular rhythm, no abdominal bruit, no edema  ABDOMEN:  Soft, non-tender, non-distended, normoactive bowel sounds,  no masses , no hepatosplenomegaly  EXTREMITIES:  no cyanosis,clubbing or edema  SKIN:  No rash/erythema/ecchymoses/petechiae/wounds/abscess/warm/dry  NEURO:  Alert, oriented    Vital Signs:  Vital Signs Last 24 Hrs  T(C): 37.1 (2019 07:59), Max: 37.4 (2019 16:07)  T(F): 98.8 (2019 07:59), Max: 99.4 (2019 16:07)  HR: 67 (2019 07:59) (60 - 120)  BP: 182/66 (2019 07:59) (131/55 - 182/66)  BP(mean): --  RR: 18 (2019 07:59) (17 - 20)  SpO2: 100% (2019 07:59) (94% - 100%)  Daily     Daily     LABS:                        11.8   16.09 )-----------( 189      ( 2019 06:43 )             36.4     01-30    142  |  106  |  8   ----------------------------<  78  4.1   |  25  |  0.68    Ca    8.9      2019 06:43  Phos  3.4     30  Mg     2.0     -30    TPro  6.7  /  Alb  3.1<L>  /  TBili  0.4  /  DBili  x   /  AST  18  /  ALT  13  /  AlkPhos  74  01-29    LIVER FUNCTIONS - ( 2019 17:00 )  Alb: 3.1 g/dL / Pro: 6.7 g/dL / ALK PHOS: 74 u/L / ALT: 13 u/L / AST: 18 u/L / GGT: x           PT/INR - ( 2019 17:00 )   PT: 14.2 SEC;   INR: 1.27          PTT - ( 2019 17:00 )  PTT:23.2 SEC  Urinalysis Basic - ( 2019 18:00 )    Color: YELLOW / Appearance: Lt TURBID / S.020 / pH: 6.5  Gluc: NEGATIVE / Ketone: NEGATIVE  / Bili: NEGATIVE / Urobili: NORMAL   Blood: SMALL / Protein: 50 / Nitrite: NEGATIVE   Leuk Esterase: LARGE / RBC: 11-25 / WBC >50   Sq Epi: OCC / Non Sq Epi: x / Bacteria: NEGATIVE      Amylase Serum--      Lipase serum42.0       Ammonia--      Imaging: Chief Complaint:  Patient is a 73y old  Female who presents with a chief complaint of diarrhea, CTAP with proctitis (2019 03:04)      HPI:  The patient is a 72F with hx of hemorrhagic stroke () with residual right sided hemiplegia and expressive aphasia, seizure disorder, HTN who presented from [place] with fever of 101, diarrhea and abdominal pain and was sent here at families request. Of note all my history is from the chart as patient does not remember why she is ehre and only describes suprapubic tenderness.  Per documentation for the past three days the patient has had 5-6 daily episodes of watery diarrhea associated with abdominal pain.    Of note the patient completed a course of vancomycin for c.diff on .    While in the ED the patients work up was significant for normal VS, WBC 19, negative C.Diff and CTAP with mild circumferential rectal and distal sigmoid wall thickening with adjacent free fluid and presacral edema suspicious for a proctitis which is why were consulted.    Allergies:  apple (Hives)  carrots, beets (Unknown)  latex (Unknown)  penicillin (Urticaria)      Home Medications:    Hospital Medications:  atorvastatin 10 milliGRAM(s) Oral at bedtime  ciprofloxacin   IVPB 400 milliGRAM(s) IV Intermittent every 12 hours  diVALproex  milliGRAM(s) Oral two times a day  lactated ringers. 1000 milliLiter(s) IV Continuous <Continuous>  losartan 100 milliGRAM(s) Oral daily  metroNIDAZOLE  IVPB 500 milliGRAM(s) IV Intermittent every 8 hours  niCARdipine 20 milliGRAM(s) Oral every 12 hours  sertraline 50 milliGRAM(s) Oral daily      PMHX/PSHX:  HLD (hyperlipidemia)  CVA (cerebral vascular accident)  Benign Essential Hypertension  No significant past surgical history      Family history:  No pertinent family history in first degree relatives      Social History:     ROS:     General:  No wt loss, fevers, chills, night sweats, fatigue,   Eyes:  Good vision, no reported pain  ENT:  No sore throat, pain, runny nose, dysphagia  CV:  No pain, palpitations, hypo/hypertension  Resp:  No dyspnea, cough, tachypnea, wheezing  GI:  See HPI  :  No pain, bleeding, incontinence, nocturia  Muscle:  No pain, weakness  Neuro:  No weakness, tingling, memory problems  Psych:  No fatigue, insomnia, mood problems, depression  Endocrine:  No polyuria, polydipsia, cold/heat intolerance  Heme:  No petechiae, ecchymosis, easy bruisability  Skin:  No rash, edema      PHYSICAL EXAM:     GENERAL:  Alert but unable to express why she is here  CHEST:  Full & symmetric excursion  HEART:  Regular rhythm, no abdominal bruit, no edema  ABDOMEN:  Soft, non-tender, non-distended, normoactive bowel sounds,  no masses , no hepatosplenomegaly  EXTREMITIES:  no cyanosis,clubbing or edema  SKIN:  No rash/erythema/ecchymoses/petechiae/wounds/abscess/warm/dry    Vital Signs:  Vital Signs Last 24 Hrs  T(C): 37.1 (2019 07:59), Max: 37.4 (2019 16:07)  T(F): 98.8 (2019 07:59), Max: 99.4 (2019 16:07)  HR: 67 (2019 07:59) (60 - 120)  BP: 182/66 (2019 07:59) (131/55 - 182/66)  BP(mean): --  RR: 18 (2019 07:59) (17 - 20)  SpO2: 100% (2019 07:59) (94% - 100%)  Daily     Daily     LABS:                        11.8   16.09 )-----------( 189      ( 2019 06:43 )             36.4         142  |  106  |  8   ----------------------------<  78  4.1   |  25  |  0.68    Ca    8.9      2019 06:43  Phos  3.4     30  Mg     2.0         TPro  6.7  /  Alb  3.1<L>  /  TBili  0.4  /  DBili  x   /  AST  18  /  ALT  13  /  AlkPhos  74  29    LIVER FUNCTIONS - ( 2019 17:00 )  Alb: 3.1 g/dL / Pro: 6.7 g/dL / ALK PHOS: 74 u/L / ALT: 13 u/L / AST: 18 u/L / GGT: x           PT/INR - ( 2019 17:00 )   PT: 14.2 SEC;   INR: 1.27          PTT - ( 2019 17:00 )  PTT:23.2 SEC  Urinalysis Basic - ( 2019 18:00 )    Color: YELLOW / Appearance: Lt TURBID / S.020 / pH: 6.5  Gluc: NEGATIVE / Ketone: NEGATIVE  / Bili: NEGATIVE / Urobili: NORMAL   Blood: SMALL / Protein: 50 / Nitrite: NEGATIVE   Leuk Esterase: LARGE / RBC: 11-25 / WBC >50   Sq Epi: OCC / Non Sq Epi: x / Bacteria: NEGATIVE      Amylase Serum--      Lipase serum42.0       Ammonia--      Imaging: Chief Complaint:  Patient is a 73y old  Female who presents with a chief complaint of diarrhea, CTAP with proctitis (2019 03:04)      HPI:  The patient is a 72F with hx of hemorrhagic stroke () with residual right sided hemiplegia and expressive aphasia, seizure disorder, HTN who presented from [place] with fever of 101, diarrhea and abdominal pain and was sent here at families request. Of note all my history is from the chart as patient does not remember why she is ehre and only describes suprapubic tenderness.  Per documentation for the past three days the patient has had 5-6 daily episodes of watery diarrhea associated with abdominal pain.    Of note the patient completed a course of vancomycin for c.diff on . Patient was also admitted earlier this month and was treated for influenza.    While in the ED the patients work up was significant for normal VS, WBC 19, negative C.Diff and CTAP with mild circumferential rectal and distal sigmoid wall thickening with adjacent free fluid and presacral edema suspicious for a proctitis which is why were consulted.    Allergies:  apple (Hives)  carrots, beets (Unknown)  latex (Unknown)  penicillin (Urticaria)      Home Medications:    Hospital Medications:  atorvastatin 10 milliGRAM(s) Oral at bedtime  ciprofloxacin   IVPB 400 milliGRAM(s) IV Intermittent every 12 hours  diVALproex  milliGRAM(s) Oral two times a day  lactated ringers. 1000 milliLiter(s) IV Continuous <Continuous>  losartan 100 milliGRAM(s) Oral daily  metroNIDAZOLE  IVPB 500 milliGRAM(s) IV Intermittent every 8 hours  niCARdipine 20 milliGRAM(s) Oral every 12 hours  sertraline 50 milliGRAM(s) Oral daily      PMHX/PSHX:  HLD (hyperlipidemia)  CVA (cerebral vascular accident)  Benign Essential Hypertension  No significant past surgical history      Family history:  No pertinent family history in first degree relatives      Social History:     ROS:     General:  No wt loss, fevers, chills, night sweats, fatigue,   Eyes:  Good vision, no reported pain  ENT:  No sore throat, pain, runny nose, dysphagia  CV:  No pain, palpitations, hypo/hypertension  Resp:  No dyspnea, cough, tachypnea, wheezing  GI:  See HPI  :  No pain, bleeding, incontinence, nocturia  Muscle:  No pain, weakness  Neuro:  No weakness, tingling, memory problems  Psych:  No fatigue, insomnia, mood problems, depression  Endocrine:  No polyuria, polydipsia, cold/heat intolerance  Heme:  No petechiae, ecchymosis, easy bruisability  Skin:  No rash, edema      PHYSICAL EXAM:     GENERAL:  Alert but unable to express why she is here  CHEST:  Full & symmetric excursion  HEART:  Regular rhythm, no abdominal bruit, no edema  ABDOMEN:  Soft, non-tender, non-distended, normoactive bowel sounds,  no masses , no hepatosplenomegaly  EXTREMITIES:  no cyanosis,clubbing or edema  SKIN:  No rash/erythema/ecchymoses/petechiae/wounds/abscess/warm/dry    Vital Signs:  Vital Signs Last 24 Hrs  T(C): 37.1 (2019 07:59), Max: 37.4 (2019 16:07)  T(F): 98.8 (2019 07:59), Max: 99.4 (2019 16:07)  HR: 67 (2019 07:59) (60 - 120)  BP: 182/66 (2019 07:59) (131/55 - 182/66)  BP(mean): --  RR: 18 (2019 07:59) (17 - 20)  SpO2: 100% (2019 07:59) (94% - 100%)  Daily     Daily     LABS:                        11.8   16.09 )-----------( 189      ( 2019 06:43 )             36.4     -30    142  |  106  |  8   ----------------------------<  78  4.1   |  25  |  0.68    Ca    8.9      2019 06:43  Phos  3.4     -30  Mg     2.0     -30    TPro  6.7  /  Alb  3.1<L>  /  TBili  0.4  /  DBili  x   /  AST  18  /  ALT  13  /  AlkPhos  74  01-29    LIVER FUNCTIONS - ( 2019 17:00 )  Alb: 3.1 g/dL / Pro: 6.7 g/dL / ALK PHOS: 74 u/L / ALT: 13 u/L / AST: 18 u/L / GGT: x           PT/INR - ( 2019 17:00 )   PT: 14.2 SEC;   INR: 1.27          PTT - ( 2019 17:00 )  PTT:23.2 SEC  Urinalysis Basic - ( 2019 18:00 )    Color: YELLOW / Appearance: Lt TURBID / S.020 / pH: 6.5  Gluc: NEGATIVE / Ketone: NEGATIVE  / Bili: NEGATIVE / Urobili: NORMAL   Blood: SMALL / Protein: 50 / Nitrite: NEGATIVE   Leuk Esterase: LARGE / RBC: 11-25 / WBC >50   Sq Epi: OCC / Non Sq Epi: x / Bacteria: NEGATIVE      Amylase Serum--      Lipase serum42.0       Ammonia--      Imaging:

## 2019-01-30 NOTE — PATIENT PROFILE ADULT - NSPROCHRONICPAIN_GEN_A_NUR
Pt has expressive aphasia at baseline; patient unable to answer at this time Pt has expressive aphasia at baseline; patient unable to answer at this time/no

## 2019-01-30 NOTE — H&P ADULT - HISTORY OF PRESENT ILLNESS
72F with hx of hemorrhagic stroke (2009) with residual right sided hemiplegia and expressive aphasia, seizure disorder, HTN, HLD, depression who presents from [place] with fever, diarrhea and abdominal pain. For the past three days pt. has had 5-6 daily episodes of watery diarrhea associated with diffuse abdominal pain and subjective fevers. Of note, she recently completed a course of vancomycin two days ago for c.diff.  She also c/o dysuria and suprapubic tenderness. Of note, pt. was recently hospitalized 12/26 -12/31 for influenza treated with tamiflu. She denies cp, sob, n/v, hematochezia, melena, rashes, sick contacts, recent travel.     In the ED - T 99.4, , /60, RR 18, 100% on 3L  Labs notable for, WBC 19, Hb 11, UA with >50 WBC, large leukocyte esterase , negative nitrites  CTAP with mild circumferential rectal and distal sigmoid wall thickening with adjacent free fluid and presacral edema suspicious for a proctitis  s/p ceftriaxone, metronidazole, 2L LR bolus, tylenol

## 2019-01-30 NOTE — CONSULT NOTE ADULT - ASSESSMENT
Impression:  1) Proctitis     Impression: Impression:  1) Proctitis - likely due to recent C.Diff infection can frequent BM's causing some localized inflammation  DDx also includes but is less likely to be colitis from ischemia, IBD. Not likely to be stercocolitis which would be common in this area given noted frequent BM's prior to admission.    Impression:   - seen GI-PCR   - monitor BM's while admitted   - supportive care with IVF   - infectious work up per primary team    Carmen Hughes, PGY-4  Gastroenterology Fellow  Pager x [phone] or [phone]  (After 5 pm or on weekends please page GI on call) Impression:  1) Diarrhea with possible proctitis - recent C. difficile infection; however, PCR negative fo C. difficile toxin, other etiologies of acute diarrhea should be ruled out such as infection (viral, bacterial protozoal), post infection irritable bowel syndrome, collagenous colitis  2) Rectal thickening reported on CAT scan could be due to underdistention, recent C. difficile infection, new infection, or ideopathic proctitis    Impression:   - send GI-PCR   - monitor BM's while admitted   - supportive care with IVF   - infectious work up per primary team  - hold antibiotics until infection demonstrated    Carmen Hughes, PGY-4  Gastroenterology Fellow  Pager x [phone] or [phone]  (After 5 pm or on weekends please page GI on call)

## 2019-01-30 NOTE — H&P ADULT - PROBLEM SELECTOR PLAN 2
- p/w diarrhea and abdominal pain x 3 days  - CTAP with mild circumferential rectal and distal sigmoid wall thickening with adjacent free fluid and presacral edema suspicious for a proctitis  - recently completed 10 day course of PO vancomycin 2 days ago at [place], stool c.diff negative, GI stool PCR pending  - pt. on CTX for UTI, will cover gram negatives, c/w flagyl 500mg q8h - p/w diarrhea and abdominal pain x 3 days, no hx of radiation  - CTAP with mild circumferential rectal and distal sigmoid wall thickening with adjacent free fluid and presacral edema suspicious for a proctitis  - recently completed 10 day course of PO vancomycin 2 days ago at [place], stool c.diff negative, GI stool PCR pending  - pt. on CTX for UTI, will cover gram negatives, c/w flagyl 500mg q8h  - GI consult in AM - p/w diarrhea and abdominal pain x 3 days, no hx of radiation  - CTAP with mild circumferential rectal and distal sigmoid wall thickening with adjacent free fluid and presacral edema suspicious for a proctitis  - recently completed 10 day course of PO vancomycin 2 days ago at [place], stool c.diff negative, GI stool PCR pending  - s/p CTX in ED, will c/w cipro and flagyl for UTI/proctitis  - GI consult in AM

## 2019-01-30 NOTE — CONSULT NOTE ADULT - ATTENDING COMMENTS
I have seen and evaluated the patient with the GI Fellow and GI Team.  I agree with the findings, formulation and plan of care as documented in the GI fellow's note, except as noted.

## 2019-01-30 NOTE — H&P ADULT - NSHPREVIEWOFSYSTEMS_GEN_ALL_CORE
REVIEW OF SYSTEMS:  CONSTITUTIONAL: +fevers, or chills  EYES/ENT: No visual changes;  No vertigo or throat pain   NECK: No pain or stiffness  RESPIRATORY: No cough, wheezing, hemoptysis; No shortness of breath  CARDIOVASCULAR: No chest pain or palpitations  GASTROINTESTINAL: + abdominal pain + diarrhea, no nausea or vomiting   GENITOURINARY: +dysuria  NEUROLOGICAL: chronic right sided hemiplegia   SKIN: No itching, rashes

## 2019-01-30 NOTE — H&P ADULT - PROBLEM SELECTOR PLAN 1
- leukocytosis + tachycardia   - possibly 2/2 proctitis vs. UTI  - s/p 2L LR bolus, c/w LR @75/hr, RVP negative, UA with >50 WBC, +leukocyte esterase, negative nitrites, c.diff negative, f/u UCx, BCx, and stool cultures  - monitor fever curve, c/w CTX and flagyl for UTI and proctitis - leukocytosis + tachycardia   - possibly 2/2 proctitis vs. UTI  - s/p 2L LR bolus, c/w LR @75/hr, RVP negative, UA with >50 WBC, +leukocyte esterase, negative nitrites, c.diff negative, f/u UCx, BCx, and stool cultures  - monitor fever curve, s/p CTX in ED, will c/w cipro and flagyl for UTI/proctitis

## 2019-01-30 NOTE — H&P ADULT - NSHPLABSRESULTS_GEN_ALL_CORE
11.0   19.17 )-----------( 180      ( 29 Jan 2019 17:00 )             34.1     01-29    136  |  102  |  11  ----------------------------<  106<H>  3.7   |  23  |  0.86    Ca    8.4      29 Jan 2019 17:00    TPro  6.7  /  Alb  3.1<L>  /  TBili  0.4  /  DBili  x   /  AST  18  /  ALT  13  /  AlkPhos  74  01-29    < from: CT Abdomen and Pelvis w/ IV Cont (01.29.19 @ 20:47) >    IMPRESSION:    1. Mild circumferential rectal and distal sigmoid wall thickening with   adjacent free fluid and presacral edema suspicious for a proctitis.   2. Infrarenal IVC filter with discontinuity of the most anterior filter   strut which is located inferior to the remainder of the filter.    < end of copied text >

## 2019-01-30 NOTE — CHART NOTE - NSCHARTNOTEFT_GEN_A_CORE
Pt seen and examined at bedside. Please refer to the H&P done today for details.  No overnight event.  Feeling better. comfortable.  have right sided UE and LE hemiparesis from prior CVAs.   no cp, no sob, no n/v/d. mild tremors baseline.   Labs vitals and GI note reviewed.  c/w abx for proctitis vs. UTI.  GI f/u.     - [facility]  - [phone]

## 2019-01-30 NOTE — H&P ADULT - PROBLEM SELECTOR PLAN 3
- dysuria + suprapubic tenderness  - UA with >50 WBC, +leukocyte esterase, negative nitrites, UCx pending  - c/w ceftriaxone 1g daily pending urine cultures and sensitivities - dysuria + suprapubic tenderness  - UA with >50 WBC, +leukocyte esterase, negative nitrites, UCx pending  - s/p ceftriaxone, transition to ciprofloxacin for UTI/GI coverage

## 2019-01-31 LAB
ANION GAP SERPL CALC-SCNC: 11 MMO/L — SIGNIFICANT CHANGE UP (ref 7–14)
BACTERIA UR CULT: SIGNIFICANT CHANGE UP
BUN SERPL-MCNC: 11 MG/DL — SIGNIFICANT CHANGE UP (ref 7–23)
CALCIUM SERPL-MCNC: 8.4 MG/DL — SIGNIFICANT CHANGE UP (ref 8.4–10.5)
CHLORIDE SERPL-SCNC: 106 MMOL/L — SIGNIFICANT CHANGE UP (ref 98–107)
CO2 SERPL-SCNC: 24 MMOL/L — SIGNIFICANT CHANGE UP (ref 22–31)
CREAT SERPL-MCNC: 0.74 MG/DL — SIGNIFICANT CHANGE UP (ref 0.5–1.3)
GLUCOSE SERPL-MCNC: 91 MG/DL — SIGNIFICANT CHANGE UP (ref 70–99)
HCT VFR BLD CALC: 31.5 % — LOW (ref 34.5–45)
HGB BLD-MCNC: 10.3 G/DL — LOW (ref 11.5–15.5)
MAGNESIUM SERPL-MCNC: 1.8 MG/DL — SIGNIFICANT CHANGE UP (ref 1.6–2.6)
MCHC RBC-ENTMCNC: 27.8 PG — SIGNIFICANT CHANGE UP (ref 27–34)
MCHC RBC-ENTMCNC: 32.7 % — SIGNIFICANT CHANGE UP (ref 32–36)
MCV RBC AUTO: 84.9 FL — SIGNIFICANT CHANGE UP (ref 80–100)
METHOD TYPE: SIGNIFICANT CHANGE UP
NRBC # FLD: 0 K/UL — LOW (ref 25–125)
ORGANISM # SPEC MICROSCOPIC CNT: SIGNIFICANT CHANGE UP
ORGANISM # SPEC MICROSCOPIC CNT: SIGNIFICANT CHANGE UP
PHOSPHATE SERPL-MCNC: 3.5 MG/DL — SIGNIFICANT CHANGE UP (ref 2.5–4.5)
PLATELET # BLD AUTO: 185 K/UL — SIGNIFICANT CHANGE UP (ref 150–400)
PMV BLD: 10.6 FL — SIGNIFICANT CHANGE UP (ref 7–13)
POTASSIUM SERPL-MCNC: 3.6 MMOL/L — SIGNIFICANT CHANGE UP (ref 3.5–5.3)
POTASSIUM SERPL-SCNC: 3.6 MMOL/L — SIGNIFICANT CHANGE UP (ref 3.5–5.3)
RBC # BLD: 3.71 M/UL — LOW (ref 3.8–5.2)
RBC # FLD: 15.9 % — HIGH (ref 10.3–14.5)
SODIUM SERPL-SCNC: 141 MMOL/L — SIGNIFICANT CHANGE UP (ref 135–145)
SPECIMEN SOURCE: SIGNIFICANT CHANGE UP
WBC # BLD: 10.57 K/UL — HIGH (ref 3.8–10.5)
WBC # FLD AUTO: 10.57 K/UL — HIGH (ref 3.8–10.5)

## 2019-01-31 PROCEDURE — 99232 SBSQ HOSP IP/OBS MODERATE 35: CPT | Mod: GC

## 2019-01-31 RX ADMIN — Medication 200 MILLIGRAM(S): at 05:30

## 2019-01-31 RX ADMIN — DIVALPROEX SODIUM 500 MILLIGRAM(S): 500 TABLET, DELAYED RELEASE ORAL at 18:21

## 2019-01-31 RX ADMIN — Medication 200 MILLIGRAM(S): at 18:56

## 2019-01-31 RX ADMIN — DIVALPROEX SODIUM 500 MILLIGRAM(S): 500 TABLET, DELAYED RELEASE ORAL at 05:32

## 2019-01-31 RX ADMIN — Medication 100 MILLIGRAM(S): at 09:53

## 2019-01-31 RX ADMIN — NICARDIPINE HYDROCHLORIDE 20 MILLIGRAM(S): 30 CAPSULE, EXTENDED RELEASE ORAL at 18:21

## 2019-01-31 RX ADMIN — Medication 100 MILLIGRAM(S): at 17:20

## 2019-01-31 RX ADMIN — Medication 100 MILLIGRAM(S): at 02:27

## 2019-01-31 RX ADMIN — ATORVASTATIN CALCIUM 10 MILLIGRAM(S): 80 TABLET, FILM COATED ORAL at 21:28

## 2019-01-31 RX ADMIN — SERTRALINE 50 MILLIGRAM(S): 25 TABLET, FILM COATED ORAL at 12:05

## 2019-01-31 NOTE — PHYSICAL THERAPY INITIAL EVALUATION ADULT - DIAGNOSIS, PT EVAL
Deconditioning, decreased strength, decreased balance, decreased endurance, decreased ROM, decreased postural control all limiting pts. ability to perform functional mobility

## 2019-01-31 NOTE — PHYSICAL THERAPY INITIAL EVALUATION ADULT - IMPAIRMENTS FOUND, PT EVAL
gait, locomotion, and balance/muscle strength/aerobic capacity/endurance/arousal, attention, and cognition/ROM

## 2019-01-31 NOTE — PROGRESS NOTE ADULT - PROBLEM SELECTOR PLAN 2
- p/w diarrhea and abdominal pain x 3 days, no hx of radiation  - CTAP with mild circumferential rectal and distal sigmoid wall thickening with adjacent free fluid and presacral edema suspicious for a proctitis  - recently completed 10 day course of PO vancomycin 2 days ago at [place]  - stool c.diff negative, GI stool PCR pending  - will c/w cipro and flagyl for UTI/proctitis  - GI f/u appreciated

## 2019-01-31 NOTE — PHYSICAL THERAPY INITIAL EVALUATION ADULT - IMPAIRMENTS CONTRIBUTING IMPAIRED BED MOBILITY, REHAB EVAL
abnormal muscle tone/impaired postural control/decreased strength/decreased ROM/narrow base of support/impaired balance

## 2019-01-31 NOTE — PHYSICAL THERAPY INITIAL EVALUATION ADULT - ADDITIONAL COMMENTS
Pt. reports she currently at [facility] for rehabilitation cervices. Pt. lives in a private home with her family. Pt. states she was previously ambulating independently however, she is unable to ambulate at this time. Pt. returned supine in bed with all tubes/lines intact, call bell in reach and in NAD.

## 2019-01-31 NOTE — PROGRESS NOTE ADULT - PROBLEM SELECTOR PLAN 1
- leukocytosis + tachycardia   - possibly 2/2 proctitis vs. UTI  - s/p 2L LR bolus and LR @75/hr. tolerating diet. will hold further IVF.  - RVP negative, UA with >50 WBC, +leukocyte esterase, negative nitrites, c.diff negative  - UCx contaminant, neg BCx. stool cultures, stool PCR pending.  - blood cultures neg. (coags neg staph is contaminant)  - monitor fever curve, s/p CTX in ED, will c/w cipro and flagyl for UTI/proctitis

## 2019-01-31 NOTE — PROGRESS NOTE ADULT - PROBLEM SELECTOR PLAN 9
- DVT ppx: IVC filter, chem ppx contraindicated in setting of prior hemorrhagic CVA  - DIET: mechanical soft, thin liquids

## 2019-01-31 NOTE — PROGRESS NOTE ADULT - PROBLEM SELECTOR PLAN 4
- Hb 11 on admission, normocytic MCV 85  - hgb relatively stable  - normal iron studies, folate, b12   - trend CBC

## 2019-01-31 NOTE — PROGRESS NOTE ADULT - PROBLEM SELECTOR PLAN 3
- dysuria + suprapubic tenderness  - UA with >50 WBC, +leukocyte esterase, negative nitrites, UCx contaminant  - s/p ceftriaxone, transition to ciprofloxacin for UTI/GI coverage

## 2019-01-31 NOTE — PHYSICAL THERAPY INITIAL EVALUATION ADULT - PERTINENT HX OF CURRENT PROBLEM, REHAB EVAL
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

## 2019-01-31 NOTE — PHYSICAL THERAPY INITIAL EVALUATION ADULT - RANGE OF MOTION EXAMINATION, REHAB EVAL
right UE shoulder ROM 0-75 degrees, right elbow extension/flexion  degrees, pt. unable to achieve full elbow extension secondary to increased muscle tone./Left LE ROM was WFL (within functional limits)/bilateral lower extremity ROM was WFL (within functional limits)/Left UE ROM was WFL (within functional limits)

## 2019-02-01 LAB
-  COAGULASE NEGATIVE STAPHYLOCOCCUS: SIGNIFICANT CHANGE UP
ANION GAP SERPL CALC-SCNC: 12 MMO/L — SIGNIFICANT CHANGE UP (ref 7–14)
BACTERIA BLD CULT: SIGNIFICANT CHANGE UP
BASOPHILS # BLD AUTO: 0.07 K/UL — SIGNIFICANT CHANGE UP (ref 0–0.2)
BASOPHILS NFR BLD AUTO: 0.9 % — SIGNIFICANT CHANGE UP (ref 0–2)
BUN SERPL-MCNC: 10 MG/DL — SIGNIFICANT CHANGE UP (ref 7–23)
CALCIUM SERPL-MCNC: 8.8 MG/DL — SIGNIFICANT CHANGE UP (ref 8.4–10.5)
CHLORIDE SERPL-SCNC: 104 MMOL/L — SIGNIFICANT CHANGE UP (ref 98–107)
CO2 SERPL-SCNC: 24 MMOL/L — SIGNIFICANT CHANGE UP (ref 22–31)
CREAT SERPL-MCNC: 0.7 MG/DL — SIGNIFICANT CHANGE UP (ref 0.5–1.3)
EOSINOPHIL # BLD AUTO: 0.22 K/UL — SIGNIFICANT CHANGE UP (ref 0–0.5)
EOSINOPHIL NFR BLD AUTO: 2.8 % — SIGNIFICANT CHANGE UP (ref 0–6)
GLUCOSE SERPL-MCNC: 76 MG/DL — SIGNIFICANT CHANGE UP (ref 70–99)
HCT VFR BLD CALC: 32.1 % — LOW (ref 34.5–45)
HGB BLD-MCNC: 10.6 G/DL — LOW (ref 11.5–15.5)
IMM GRANULOCYTES NFR BLD AUTO: 1.4 % — SIGNIFICANT CHANGE UP (ref 0–1.5)
LYMPHOCYTES # BLD AUTO: 1.96 K/UL — SIGNIFICANT CHANGE UP (ref 1–3.3)
LYMPHOCYTES # BLD AUTO: 24.6 % — SIGNIFICANT CHANGE UP (ref 13–44)
MCHC RBC-ENTMCNC: 27.7 PG — SIGNIFICANT CHANGE UP (ref 27–34)
MCHC RBC-ENTMCNC: 33 % — SIGNIFICANT CHANGE UP (ref 32–36)
MCV RBC AUTO: 84 FL — SIGNIFICANT CHANGE UP (ref 80–100)
MONOCYTES # BLD AUTO: 0.72 K/UL — SIGNIFICANT CHANGE UP (ref 0–0.9)
MONOCYTES NFR BLD AUTO: 9 % — SIGNIFICANT CHANGE UP (ref 2–14)
NEUTROPHILS # BLD AUTO: 4.89 K/UL — SIGNIFICANT CHANGE UP (ref 1.8–7.4)
NEUTROPHILS NFR BLD AUTO: 61.3 % — SIGNIFICANT CHANGE UP (ref 43–77)
NRBC # FLD: 0 K/UL — LOW (ref 25–125)
ORGANISM # SPEC MICROSCOPIC CNT: SIGNIFICANT CHANGE UP
PLATELET # BLD AUTO: 212 K/UL — SIGNIFICANT CHANGE UP (ref 150–400)
PMV BLD: 10.5 FL — SIGNIFICANT CHANGE UP (ref 7–13)
POTASSIUM SERPL-MCNC: 4 MMOL/L — SIGNIFICANT CHANGE UP (ref 3.5–5.3)
POTASSIUM SERPL-SCNC: 4 MMOL/L — SIGNIFICANT CHANGE UP (ref 3.5–5.3)
RBC # BLD: 3.82 M/UL — SIGNIFICANT CHANGE UP (ref 3.8–5.2)
RBC # FLD: 16.1 % — HIGH (ref 10.3–14.5)
SODIUM SERPL-SCNC: 140 MMOL/L — SIGNIFICANT CHANGE UP (ref 135–145)
WBC # BLD: 7.97 K/UL — SIGNIFICANT CHANGE UP (ref 3.8–10.5)
WBC # FLD AUTO: 7.97 K/UL — SIGNIFICANT CHANGE UP (ref 3.8–10.5)

## 2019-02-01 RX ADMIN — Medication 100 MILLIGRAM(S): at 15:19

## 2019-02-01 RX ADMIN — Medication 200 MILLIGRAM(S): at 17:28

## 2019-02-01 RX ADMIN — SERTRALINE 50 MILLIGRAM(S): 25 TABLET, FILM COATED ORAL at 14:39

## 2019-02-01 RX ADMIN — Medication 100 MILLIGRAM(S): at 01:53

## 2019-02-01 RX ADMIN — Medication 100 MILLIGRAM(S): at 08:12

## 2019-02-01 RX ADMIN — NICARDIPINE HYDROCHLORIDE 20 MILLIGRAM(S): 30 CAPSULE, EXTENDED RELEASE ORAL at 18:59

## 2019-02-01 RX ADMIN — DIVALPROEX SODIUM 500 MILLIGRAM(S): 500 TABLET, DELAYED RELEASE ORAL at 06:34

## 2019-02-01 RX ADMIN — DIVALPROEX SODIUM 500 MILLIGRAM(S): 500 TABLET, DELAYED RELEASE ORAL at 17:28

## 2019-02-01 RX ADMIN — ATORVASTATIN CALCIUM 10 MILLIGRAM(S): 80 TABLET, FILM COATED ORAL at 21:38

## 2019-02-01 RX ADMIN — Medication 200 MILLIGRAM(S): at 06:34

## 2019-02-01 NOTE — PROGRESS NOTE ADULT - PROBLEM SELECTOR PLAN 2
- p/w diarrhea and abdominal pain x 3 days, no hx of radiation  - CTAP with mild circumferential rectal and distal sigmoid wall thickening with adjacent free fluid and presacral edema suspicious for a proctitis  - recently completed 10 day course of PO vancomycin 2 days ago at [place]  - stool c.diff negative, GI stool PCR pending  - will c/w cipro and flagyl for UTI/proctitis  - GI f/u appreciated

## 2019-02-02 LAB
ANION GAP SERPL CALC-SCNC: 12 MMO/L — SIGNIFICANT CHANGE UP (ref 7–14)
BUN SERPL-MCNC: 9 MG/DL — SIGNIFICANT CHANGE UP (ref 7–23)
CALCIUM SERPL-MCNC: 8.9 MG/DL — SIGNIFICANT CHANGE UP (ref 8.4–10.5)
CHLORIDE SERPL-SCNC: 103 MMOL/L — SIGNIFICANT CHANGE UP (ref 98–107)
CO2 SERPL-SCNC: 23 MMOL/L — SIGNIFICANT CHANGE UP (ref 22–31)
CREAT SERPL-MCNC: 0.71 MG/DL — SIGNIFICANT CHANGE UP (ref 0.5–1.3)
GI PCR PANEL, STOOL: SIGNIFICANT CHANGE UP
GLUCOSE SERPL-MCNC: 81 MG/DL — SIGNIFICANT CHANGE UP (ref 70–99)
HCT VFR BLD CALC: 36.5 % — SIGNIFICANT CHANGE UP (ref 34.5–45)
HGB BLD-MCNC: 12 G/DL — SIGNIFICANT CHANGE UP (ref 11.5–15.5)
MCHC RBC-ENTMCNC: 27.6 PG — SIGNIFICANT CHANGE UP (ref 27–34)
MCHC RBC-ENTMCNC: 32.9 % — SIGNIFICANT CHANGE UP (ref 32–36)
MCV RBC AUTO: 83.9 FL — SIGNIFICANT CHANGE UP (ref 80–100)
NRBC # FLD: 0 K/UL — LOW (ref 25–125)
PLATELET # BLD AUTO: 231 K/UL — SIGNIFICANT CHANGE UP (ref 150–400)
PMV BLD: 10 FL — SIGNIFICANT CHANGE UP (ref 7–13)
POTASSIUM SERPL-MCNC: 4.1 MMOL/L — SIGNIFICANT CHANGE UP (ref 3.5–5.3)
POTASSIUM SERPL-SCNC: 4.1 MMOL/L — SIGNIFICANT CHANGE UP (ref 3.5–5.3)
RBC # BLD: 4.35 M/UL — SIGNIFICANT CHANGE UP (ref 3.8–5.2)
RBC # FLD: 16 % — HIGH (ref 10.3–14.5)
SODIUM SERPL-SCNC: 138 MMOL/L — SIGNIFICANT CHANGE UP (ref 135–145)
SPECIMEN SOURCE: SIGNIFICANT CHANGE UP
WBC # BLD: 7.85 K/UL — SIGNIFICANT CHANGE UP (ref 3.8–10.5)
WBC # FLD AUTO: 7.85 K/UL — SIGNIFICANT CHANGE UP (ref 3.8–10.5)

## 2019-02-02 RX ORDER — METRONIDAZOLE 500 MG
500 TABLET ORAL EVERY 8 HOURS
Qty: 0 | Refills: 0 | Status: DISCONTINUED | OUTPATIENT
Start: 2019-02-02 | End: 2019-02-04

## 2019-02-02 RX ORDER — METRONIDAZOLE 500 MG
500 TABLET ORAL ONCE
Qty: 0 | Refills: 0 | Status: COMPLETED | OUTPATIENT
Start: 2019-02-02 | End: 2019-02-02

## 2019-02-02 RX ADMIN — NICARDIPINE HYDROCHLORIDE 20 MILLIGRAM(S): 30 CAPSULE, EXTENDED RELEASE ORAL at 18:24

## 2019-02-02 RX ADMIN — SERTRALINE 50 MILLIGRAM(S): 25 TABLET, FILM COATED ORAL at 12:01

## 2019-02-02 RX ADMIN — DIVALPROEX SODIUM 500 MILLIGRAM(S): 500 TABLET, DELAYED RELEASE ORAL at 06:54

## 2019-02-02 RX ADMIN — DIVALPROEX SODIUM 500 MILLIGRAM(S): 500 TABLET, DELAYED RELEASE ORAL at 18:24

## 2019-02-02 RX ADMIN — NICARDIPINE HYDROCHLORIDE 20 MILLIGRAM(S): 30 CAPSULE, EXTENDED RELEASE ORAL at 06:54

## 2019-02-02 RX ADMIN — Medication 100 MILLIGRAM(S): at 22:04

## 2019-02-02 RX ADMIN — Medication 200 MILLIGRAM(S): at 18:24

## 2019-02-02 RX ADMIN — Medication 500 MILLIGRAM(S): at 10:20

## 2019-02-02 RX ADMIN — Medication 100 MILLIGRAM(S): at 00:07

## 2019-02-02 RX ADMIN — Medication 200 MILLIGRAM(S): at 06:54

## 2019-02-02 RX ADMIN — ATORVASTATIN CALCIUM 10 MILLIGRAM(S): 80 TABLET, FILM COATED ORAL at 21:48

## 2019-02-02 NOTE — PROGRESS NOTE ADULT - PROBLEM SELECTOR PLAN 2
- p/w diarrhea and abdominal pain x 3 days, no hx of radiation  - CTAP with mild circumferential rectal and distal sigmoid wall thickening with adjacent free fluid and presacral edema suspicious for a proctitis  - recently completed 10 day course of PO vancomycin 2 days ago at [place]  - stool c.diff negative, GI stool PCR sent, pending results  - will c/w cipro and flagyl for UTI/proctitis  - GI f/u appreciated

## 2019-02-03 ENCOUNTER — TRANSCRIPTION ENCOUNTER (OUTPATIENT)
Age: 74
End: 2019-02-03

## 2019-02-03 LAB
ANION GAP SERPL CALC-SCNC: 13 MMO/L — SIGNIFICANT CHANGE UP (ref 7–14)
BACTERIA BLD CULT: SIGNIFICANT CHANGE UP
BUN SERPL-MCNC: 10 MG/DL — SIGNIFICANT CHANGE UP (ref 7–23)
CALCIUM SERPL-MCNC: 8.8 MG/DL — SIGNIFICANT CHANGE UP (ref 8.4–10.5)
CHLORIDE SERPL-SCNC: 102 MMOL/L — SIGNIFICANT CHANGE UP (ref 98–107)
CO2 SERPL-SCNC: 23 MMOL/L — SIGNIFICANT CHANGE UP (ref 22–31)
CREAT SERPL-MCNC: 0.72 MG/DL — SIGNIFICANT CHANGE UP (ref 0.5–1.3)
GLUCOSE SERPL-MCNC: 80 MG/DL — SIGNIFICANT CHANGE UP (ref 70–99)
HCT VFR BLD CALC: 35.5 % — SIGNIFICANT CHANGE UP (ref 34.5–45)
HCV AB S/CO SERPL IA: 0.21 S/CO — SIGNIFICANT CHANGE UP
HCV AB SERPL-IMP: SIGNIFICANT CHANGE UP
HGB BLD-MCNC: 11.8 G/DL — SIGNIFICANT CHANGE UP (ref 11.5–15.5)
MCHC RBC-ENTMCNC: 28.3 PG — SIGNIFICANT CHANGE UP (ref 27–34)
MCHC RBC-ENTMCNC: 33.2 % — SIGNIFICANT CHANGE UP (ref 32–36)
MCV RBC AUTO: 85.1 FL — SIGNIFICANT CHANGE UP (ref 80–100)
NRBC # FLD: 0 K/UL — LOW (ref 25–125)
PLATELET # BLD AUTO: 243 K/UL — SIGNIFICANT CHANGE UP (ref 150–400)
PMV BLD: 10 FL — SIGNIFICANT CHANGE UP (ref 7–13)
POTASSIUM SERPL-MCNC: 4 MMOL/L — SIGNIFICANT CHANGE UP (ref 3.5–5.3)
POTASSIUM SERPL-SCNC: 4 MMOL/L — SIGNIFICANT CHANGE UP (ref 3.5–5.3)
RBC # BLD: 4.17 M/UL — SIGNIFICANT CHANGE UP (ref 3.8–5.2)
RBC # FLD: 16.1 % — HIGH (ref 10.3–14.5)
SODIUM SERPL-SCNC: 138 MMOL/L — SIGNIFICANT CHANGE UP (ref 135–145)
WBC # BLD: 8.12 K/UL — SIGNIFICANT CHANGE UP (ref 3.8–10.5)
WBC # FLD AUTO: 8.12 K/UL — SIGNIFICANT CHANGE UP (ref 3.8–10.5)

## 2019-02-03 RX ORDER — METRONIDAZOLE 500 MG
500 TABLET ORAL ONCE
Qty: 0 | Refills: 0 | Status: COMPLETED | OUTPATIENT
Start: 2019-02-03 | End: 2019-02-03

## 2019-02-03 RX ADMIN — ATORVASTATIN CALCIUM 10 MILLIGRAM(S): 80 TABLET, FILM COATED ORAL at 21:39

## 2019-02-03 RX ADMIN — SERTRALINE 50 MILLIGRAM(S): 25 TABLET, FILM COATED ORAL at 12:47

## 2019-02-03 RX ADMIN — Medication 100 MILLIGRAM(S): at 06:54

## 2019-02-03 RX ADMIN — NICARDIPINE HYDROCHLORIDE 20 MILLIGRAM(S): 30 CAPSULE, EXTENDED RELEASE ORAL at 17:36

## 2019-02-03 RX ADMIN — Medication 500 MILLIGRAM(S): at 22:52

## 2019-02-03 RX ADMIN — Medication 200 MILLIGRAM(S): at 17:34

## 2019-02-03 RX ADMIN — Medication 100 MILLIGRAM(S): at 14:40

## 2019-02-03 RX ADMIN — Medication 200 MILLIGRAM(S): at 05:24

## 2019-02-03 RX ADMIN — DIVALPROEX SODIUM 500 MILLIGRAM(S): 500 TABLET, DELAYED RELEASE ORAL at 05:24

## 2019-02-03 RX ADMIN — DIVALPROEX SODIUM 500 MILLIGRAM(S): 500 TABLET, DELAYED RELEASE ORAL at 17:35

## 2019-02-03 NOTE — DISCHARGE NOTE ADULT - PLAN OF CARE
Resolution and return to baseline Complete antibiotic therapy as directed. Monitor for any further signs and symptoms of further infection, including but not limited to, fevers/chills, shortness of breath, increased heart rate, dizziness, or abrupt changes in mental status. Complete antibiotic therapy as directed. Continue your medications as directed and please follow-up as an outpatient with your primary care provider for further care and recommendations. Continue antibiotics as directed and monitor for signs/symptoms of infection, such as, fever/chills, burning/pain with urination, urinary frequency/hesitancy, cloudy urine, or blood in urine. Follow-up with your outpatient provider for further care/recommendations. Monitor for signs/symptoms indicating worsening of disease, such as, easy bleeding/bruising, pale skin, fatigue, dizziness, increased heart rate, or chest pain. Continue blood pressure medication regimen as directed. Monitor for any visual changes, headaches or dizziness.  Monitor blood pressure regularly.  Follow up with your PCP for further management for high blood pressure. Continue cholesterol control medications. Continue DASH diet. Follow up with your PCP within 1 week of discharge for further management and monitoring of lipid and cholesterol panels. Continue your medications as directed and please follow-up as an outpatient with your neurologist and primary care provider for further care and recommendations.

## 2019-02-03 NOTE — PROGRESS NOTE ADULT - SUBJECTIVE AND OBJECTIVE BOX
Patient is a 73y old  Female who presents with a chief complaint of diarrhea, CTAP with proctitis (01 Feb 2019 16:45)      SUBJECTIVE / OVERNIGHT EVENTS:  no complaints.   feeling better.  no cp, no sob, no n/v/d.  no abd pain. no further diarrhea.   wants to go to rehab. await placement.       Vital Signs Last 24 Hrs  T(C): 36.6 (02 Feb 2019 11:24), Max: 37.1 (01 Feb 2019 14:08)  T(F): 97.9 (02 Feb 2019 11:24), Max: 98.8 (01 Feb 2019 14:08)  HR: 58 (02 Feb 2019 11:24) (53 - 58)  BP: 142/75 (02 Feb 2019 11:24) (142/75 - 152/84)  BP(mean): --  RR: 18 (02 Feb 2019 11:24) (16 - 18)  SpO2: 98% (02 Feb 2019 11:24) (97% - 100%)  I&O's Summary        PHYSICAL EXAM:  GENERAL: NAD, Comfortable  HEAD:  Atraumatic, Normocephalic  EYES: EOMI, PERRLA, conjunctiva and sclera clear  NECK: Supple, No JVD  CHEST/LUNG: Clear to auscultation bilaterally; No wheeze  HEART: Regular rate and rhythm; No murmurs, rubs, or gallops  ABDOMEN: Soft, Nontender, Nondistended; Bowel sounds present  Neuro: AAO x 3, right sided chronic hemiparesis, left UE and LE are 4/5, mild tremors baseline  EXTREMITIES:  2+ Peripheral Pulses, No clubbing, cyanosis, or edema  SKIN: No rashes or lesions      LABS:                        12.0   7.85  )-----------( 231      ( 02 Feb 2019 06:30 )             36.5     02-02    138  |  103  |  9   ----------------------------<  81  4.1   |  23  |  0.71    Ca    8.9      02 Feb 2019 06:30        CAPILLARY BLOOD GLUCOSE                RADIOLOGY & ADDITIONAL TESTS:    Imaging Personally Reviewed:  [x] YES  [ ] NO    Consultant(s) Notes Reviewed:  [x] YES  [ ] NO      MEDICATIONS  (STANDING):  atorvastatin 10 milliGRAM(s) Oral at bedtime  ciprofloxacin   IVPB 400 milliGRAM(s) IV Intermittent every 12 hours  diVALproex  milliGRAM(s) Oral two times a day  losartan 100 milliGRAM(s) Oral daily  metroNIDAZOLE  IVPB 500 milliGRAM(s) IV Intermittent every 8 hours  niCARdipine 20 milliGRAM(s) Oral every 12 hours  sertraline 50 milliGRAM(s) Oral daily    MEDICATIONS  (PRN):      Care Discussed with Consultants/Other Providers [x] YES  [ ] NO    HEALTH ISSUES - PROBLEM Dx:  Need for prophylactic measure: Need for prophylactic measure  Depression: Depression  Seizure disorder: Seizure disorder  HLD (hyperlipidemia): HLD (hyperlipidemia)  Benign Essential Hypertension: Benign Essential Hypertension  Anemia: Anemia  UTI (urinary tract infection): UTI (urinary tract infection)  Proctitis: Proctitis  Sepsis: Sepsis
Patient is a 73y old  Female who presents with a chief complaint of diarrhea, CTAP with proctitis (02 Feb 2019 11:45)      SUBJECTIVE / OVERNIGHT EVENTS:  feel well.  pt's cousin at bedside.  no abd pain.  no n/v/d.  tolerating diet.       Vital Signs Last 24 Hrs  T(C): 36.5 (03 Feb 2019 09:32), Max: 36.7 (03 Feb 2019 01:25)  T(F): 97.7 (03 Feb 2019 09:32), Max: 98 (03 Feb 2019 01:25)  HR: 58 (03 Feb 2019 09:32) (58 - 60)  BP: 133/70 (03 Feb 2019 09:32) (133/67 - 149/80)  BP(mean): --  RR: 18 (03 Feb 2019 09:32) (16 - 18)  SpO2: 100% (03 Feb 2019 09:32) (98% - 100%)  I&O's Summary    02 Feb 2019 07:01  -  03 Feb 2019 07:00  --------------------------------------------------------  IN: 0 mL / OUT: 400 mL / NET: -400 mL        PHYSICAL EXAM:  GENERAL: NAD, Comfortable  HEAD:  Atraumatic, Normocephalic  EYES: EOMI, PERRLA, conjunctiva and sclera clear  NECK: Supple, No JVD  CHEST/LUNG: Clear to auscultation bilaterally; No wheeze  HEART: Regular rate and rhythm; No murmurs, rubs, or gallops  ABDOMEN: Soft, Nontender, Nondistended; Bowel sounds present  Neuro: AAO x 3, right sided chronic hemiparesis, left UE and LE are 4/5, mild tremors baseline  EXTREMITIES:  2+ Peripheral Pulses, No clubbing, cyanosis, or edema  SKIN: No rashes or lesions      LABS:                        11.8   8.12  )-----------( 243      ( 03 Feb 2019 05:35 )             35.5     02-03    138  |  102  |  10  ----------------------------<  80  4.0   |  23  |  0.72    Ca    8.8      03 Feb 2019 05:35        CAPILLARY BLOOD GLUCOSE                RADIOLOGY & ADDITIONAL TESTS:    Imaging Personally Reviewed:  [x] YES  [ ] NO    Consultant(s) Notes Reviewed:  [x] YES  [ ] NO      MEDICATIONS  (STANDING):  atorvastatin 10 milliGRAM(s) Oral at bedtime  ciprofloxacin   IVPB 400 milliGRAM(s) IV Intermittent every 12 hours  diVALproex  milliGRAM(s) Oral two times a day  losartan 100 milliGRAM(s) Oral daily  metroNIDAZOLE  IVPB 500 milliGRAM(s) IV Intermittent every 8 hours  niCARdipine 20 milliGRAM(s) Oral every 12 hours  sertraline 50 milliGRAM(s) Oral daily    MEDICATIONS  (PRN):      Care Discussed with Consultants/Other Providers [x] YES  [ ] NO    HEALTH ISSUES - PROBLEM Dx:  Need for prophylactic measure: Need for prophylactic measure  Depression: Depression  Seizure disorder: Seizure disorder  HLD (hyperlipidemia): HLD (hyperlipidemia)  Benign Essential Hypertension: Benign Essential Hypertension  Anemia: Anemia  UTI (urinary tract infection): UTI (urinary tract infection)  Proctitis: Proctitis  Sepsis: Sepsis
Patient is a 73y old  Female who presents with a chief complaint of diarrhea, CTAP with proctitis (31 Jan 2019 08:08)      SUBJECTIVE / OVERNIGHT EVENTS:  feeling better today.  tolerating diet.  abd pain is much improved.  no diarrhea.  +BM  the RN at bedside.       Vital Signs Last 24 Hrs  T(C): 36.9 (31 Jan 2019 18:20), Max: 37 (30 Jan 2019 20:04)  T(F): 98.5 (31 Jan 2019 18:20), Max: 98.6 (30 Jan 2019 20:04)  HR: 61 (31 Jan 2019 18:20) (56 - 72)  BP: 146/71 (31 Jan 2019 18:20) (142/69 - 152/68)  BP(mean): --  RR: 18 (31 Jan 2019 18:20) (16 - 18)  SpO2: 97% (31 Jan 2019 18:20) (97% - 100%)  I&O's Summary    31 Jan 2019 07:01  -  31 Jan 2019 18:59  --------------------------------------------------------  IN: 400 mL / OUT: 0 mL / NET: 400 mL        PHYSICAL EXAM:  GENERAL: NAD, Comfortable  HEAD:  Atraumatic, Normocephalic  EYES: EOMI, PERRLA, conjunctiva and sclera clear  NECK: Supple, No JVD  CHEST/LUNG: Clear to auscultation bilaterally; No wheeze  HEART: Regular rate and rhythm; No murmurs, rubs, or gallops  ABDOMEN: Soft, Nontender, Nondistended; Bowel sounds present  Neuro: AAO x 3, right sided chronic hemiparesis, left UE and LE are 4/5, mild tremors baseline  EXTREMITIES:  2+ Peripheral Pulses, No clubbing, cyanosis, or edema  SKIN: No rashes or lesions    LABS:                        10.3   10.57 )-----------( 185      ( 31 Jan 2019 05:14 )             31.5     01-31    141  |  106  |  11  ----------------------------<  91  3.6   |  24  |  0.74    Ca    8.4      31 Jan 2019 05:17  Phos  3.5     01-31  Mg     1.8     01-31        CAPILLARY BLOOD GLUCOSE                RADIOLOGY & ADDITIONAL TESTS:    Imaging Personally Reviewed:  [x] YES  [ ] NO    Consultant(s) Notes Reviewed:  [x] YES  [ ] NO      MEDICATIONS  (STANDING):  atorvastatin 10 milliGRAM(s) Oral at bedtime  ciprofloxacin   IVPB 400 milliGRAM(s) IV Intermittent every 12 hours  diVALproex  milliGRAM(s) Oral two times a day  lactated ringers. 1000 milliLiter(s) (75 mL/Hr) IV Continuous <Continuous>  losartan 100 milliGRAM(s) Oral daily  metroNIDAZOLE  IVPB 500 milliGRAM(s) IV Intermittent every 8 hours  niCARdipine 20 milliGRAM(s) Oral every 12 hours  sertraline 50 milliGRAM(s) Oral daily    MEDICATIONS  (PRN):      Care Discussed with Consultants/Other Providers [x] YES  [ ] NO    HEALTH ISSUES - PROBLEM Dx:  Need for prophylactic measure: Need for prophylactic measure  Depression: Depression  Seizure disorder: Seizure disorder  HLD (hyperlipidemia): HLD (hyperlipidemia)  Benign Essential Hypertension: Benign Essential Hypertension  Anemia: Anemia  UTI (urinary tract infection): UTI (urinary tract infection)  Proctitis: Proctitis  Sepsis: Sepsis
Patient is a 73y old  Female who presents with a chief complaint of diarrhea, CTAP with proctitis (31 Jan 2019 18:58)      SUBJECTIVE / OVERNIGHT EVENTS:  feels well.  no events.  being seen by PT this am.  recommends rehab.  no cp, no sob, no n/v/d. no abdominal pain.  no headache, no dizziness.       Vital Signs Last 24 Hrs  T(C): 37.1 (01 Feb 2019 14:08), Max: 37.1 (01 Feb 2019 14:08)  T(F): 98.8 (01 Feb 2019 14:08), Max: 98.8 (01 Feb 2019 14:08)  HR: 56 (01 Feb 2019 14:08) (55 - 61)  BP: 147/74 (01 Feb 2019 14:08) (145/78 - 151/78)  BP(mean): --  RR: 16 (01 Feb 2019 14:08) (16 - 18)  SpO2: 97% (01 Feb 2019 14:08) (95% - 97%)  I&O's Summary    31 Jan 2019 07:01  -  01 Feb 2019 07:00  --------------------------------------------------------  IN: 400 mL / OUT: 0 mL / NET: 400 mL        PHYSICAL EXAM:  GENERAL: NAD, Comfortable  HEAD:  Atraumatic, Normocephalic  EYES: EOMI, PERRLA, conjunctiva and sclera clear  NECK: Supple, No JVD  CHEST/LUNG: Clear to auscultation bilaterally; No wheeze  HEART: Regular rate and rhythm; No murmurs, rubs, or gallops  ABDOMEN: Soft, Nontender, Nondistended; Bowel sounds present  Neuro: AAO x 3, right sided chronic hemiparesis, left UE and LE are 4/5, mild tremors baseline  EXTREMITIES:  2+ Peripheral Pulses, No clubbing, cyanosis, or edema  SKIN: No rashes or lesions      LABS:                        10.6   7.97  )-----------( 212      ( 01 Feb 2019 06:00 )             32.1     02-01    140  |  104  |  10  ----------------------------<  76  4.0   |  24  |  0.70    Ca    8.8      01 Feb 2019 06:00  Phos  3.5     01-31  Mg     1.8     01-31        CAPILLARY BLOOD GLUCOSE                RADIOLOGY & ADDITIONAL TESTS:    Imaging Personally Reviewed:  [x] YES  [ ] NO    Consultant(s) Notes Reviewed:  [x] YES  [ ] NO      MEDICATIONS  (STANDING):  atorvastatin 10 milliGRAM(s) Oral at bedtime  ciprofloxacin   IVPB 400 milliGRAM(s) IV Intermittent every 12 hours  diVALproex  milliGRAM(s) Oral two times a day  losartan 100 milliGRAM(s) Oral daily  metroNIDAZOLE  IVPB 500 milliGRAM(s) IV Intermittent every 8 hours  niCARdipine 20 milliGRAM(s) Oral every 12 hours  sertraline 50 milliGRAM(s) Oral daily    MEDICATIONS  (PRN):      Care Discussed with Consultants/Other Providers [x] YES  [ ] NO    HEALTH ISSUES - PROBLEM Dx:  Need for prophylactic measure: Need for prophylactic measure  Depression: Depression  Seizure disorder: Seizure disorder  HLD (hyperlipidemia): HLD (hyperlipidemia)  Benign Essential Hypertension: Benign Essential Hypertension  Anemia: Anemia  UTI (urinary tract infection): UTI (urinary tract infection)  Proctitis: Proctitis  Sepsis: Sepsis
Chief Complaint:  Patient is a 73y old  Female who presents with a chief complaint of diarrhea, CTAP with proctitis (2019 09:39)      Interval Events:   Patient noted to have positive blood culture yesterday.  Consulted when in ED, requested stool GI-PCR still no results.      Allergies:  apple (Hives)  carrots, beets (Unknown)  latex (Unknown)  penicillin (Urticaria)      Hospital Medications:  atorvastatin 10 milliGRAM(s) Oral at bedtime  ciprofloxacin   IVPB 400 milliGRAM(s) IV Intermittent every 12 hours  diVALproex  milliGRAM(s) Oral two times a day  lactated ringers. 1000 milliLiter(s) IV Continuous <Continuous>  losartan 100 milliGRAM(s) Oral daily  metroNIDAZOLE  IVPB 500 milliGRAM(s) IV Intermittent every 8 hours  niCARdipine 20 milliGRAM(s) Oral every 12 hours  sertraline 50 milliGRAM(s) Oral daily      PMHX/PSHX:  HLD (hyperlipidemia)  CVA (cerebral vascular accident)  Benign Essential Hypertension  No significant past surgical history      Family history:  No pertinent family history in first degree relatives          PHYSICAL EXAM:   GENERAL:  Alert but unable to express why she is here  CHEST:  Full & symmetric excursion  HEART:  Regular rhythm, no abdominal bruit, no edema  ABDOMEN:  Soft, non-tender, non-distended, normoactive bowel sounds,  no masses , no hepatosplenomegaly  EXTREMITIES:  no cyanosis,clubbing or edema  SKIN:  No rash/erythema/ecchymoses/petechiae/wounds/abscess/warm/dry    Vital Signs:  Vital Signs Last 24 Hrs  T(C): 36.9 (2019 05:41), Max: 37 (2019 20:04)  T(F): 98.5 (2019 05:41), Max: 98.6 (2019 20:04)  HR: 56 (2019 05:41) (56 - 72)  BP: 152/68 (2019 05:41) (112/71 - 152/68)  BP(mean): --  RR: 17 (2019 05:41) (17 - 20)  SpO2: 97% (2019 05:41) (97% - 100%)  Daily     Daily     LABS:                        10.3   10.57 )-----------( 185      ( 2019 05:14 )             31.5         141  |  106  |  11  ----------------------------<  91  3.6   |  24  |  0.74    Ca    8.4      2019 05:17  Phos  3.5       Mg     1.8         TPro  6.7  /  Alb  3.1<L>  /  TBili  0.4  /  DBili  x   /  AST  18  /  ALT  13  /  AlkPhos  74      LIVER FUNCTIONS - ( 2019 17:00 )  Alb: 3.1 g/dL / Pro: 6.7 g/dL / ALK PHOS: 74 u/L / ALT: 13 u/L / AST: 18 u/L / GGT: x           PT/INR - ( 2019 17:00 )   PT: 14.2 SEC;   INR: 1.27          PTT - ( 2019 17:00 )  PTT:23.2 SEC  Urinalysis Basic - ( 2019 18:00 )    Color: YELLOW / Appearance: Lt TURBID / S.020 / pH: 6.5  Gluc: NEGATIVE / Ketone: NEGATIVE  / Bili: NEGATIVE / Urobili: NORMAL   Blood: SMALL / Protein: 50 / Nitrite: NEGATIVE   Leuk Esterase: LARGE / RBC: 11-25 / WBC >50   Sq Epi: OCC / Non Sq Epi: x / Bacteria: NEGATIVE          Imaging:

## 2019-02-03 NOTE — PROGRESS NOTE ADULT - ATTENDING COMMENTS
I have seen and evaluated the patient with the GI Fellow and GI Team.  I agree with the findings, formulation and plan of care as documented in the GI fellow's note, except as noted.
- Dr. BAO Hunter ([facility])  - [phone]
PT: rehab.  d/c planning to rehab.     - Dr. BAO Hunter ([facility])  - [phone]
PT: rehab.  d/c planning to rehab.     - Dr. BAO Hunter ([facility])  - [phone]
PT: rehab.  d/c planning.    - Dr. BAO Hunter ([facility])  - [phone]

## 2019-02-03 NOTE — DISCHARGE NOTE ADULT - CARE PLAN
Principal Discharge DX:	Sepsis  Goal:	Resolution and return to baseline  Assessment and plan of treatment:	Complete antibiotic therapy as directed. Monitor for any further signs and symptoms of further infection, including but not limited to, fevers/chills, shortness of breath, increased heart rate, dizziness, or abrupt changes in mental status.  Secondary Diagnosis:	Proctitis  Assessment and plan of treatment:	Complete antibiotic therapy as directed. Continue your medications as directed and please follow-up as an outpatient with your primary care provider for further care and recommendations.  Secondary Diagnosis:	UTI (urinary tract infection)  Assessment and plan of treatment:	Continue antibiotics as directed and monitor for signs/symptoms of infection, such as, fever/chills, burning/pain with urination, urinary frequency/hesitancy, cloudy urine, or blood in urine.  Secondary Diagnosis:	Anemia  Assessment and plan of treatment:	Follow-up with your outpatient provider for further care/recommendations. Monitor for signs/symptoms indicating worsening of disease, such as, easy bleeding/bruising, pale skin, fatigue, dizziness, increased heart rate, or chest pain.  Secondary Diagnosis:	Benign Essential Hypertension  Assessment and plan of treatment:	Continue blood pressure medication regimen as directed. Monitor for any visual changes, headaches or dizziness.  Monitor blood pressure regularly.  Follow up with your PCP for further management for high blood pressure.  Secondary Diagnosis:	HLD (hyperlipidemia)  Assessment and plan of treatment:	Continue cholesterol control medications. Continue DASH diet. Follow up with your PCP within 1 week of discharge for further management and monitoring of lipid and cholesterol panels.  Secondary Diagnosis:	Seizure disorder  Assessment and plan of treatment:	Continue your medications as directed and please follow-up as an outpatient with your neurologist and primary care provider for further care and recommendations.

## 2019-02-03 NOTE — DISCHARGE NOTE ADULT - CARE PROVIDER_API CALL
Sd Aguirre)  Gastroenterology; Internal Medicine  [address] [place] [address] [address]  [place] [address]  Phone: [phone]  Fax: [phone]

## 2019-02-03 NOTE — DISCHARGE NOTE ADULT - PATIENT PORTAL LINK FT
You can access the [facility] Patient Portal, offered by Health system, by registering with the following website: [email or web address]

## 2019-02-03 NOTE — PROGRESS NOTE ADULT - NSHPATTENDINGPLANDISCUSS_GEN_ALL_CORE
GI Fellow Dr. Hughes
pt and ADS resident
pt and ADS resident
pt and NP Courtney
pt and RN at bedside.

## 2019-02-03 NOTE — PROGRESS NOTE ADULT - ASSESSMENT
Impression:  1) Diarrhea with possible proctitis - recent C. difficile infection; however, PCR negative fo C. difficile toxin, other etiologies of acute diarrhea should be ruled out such as infection (viral, bacterial protozoal), post infection irritable bowel syndrome, collagenous colitis  2) Rectal thickening reported on CAT scan could be due to underdistention, recent C. difficile infection, new infection, or ideopathic proctitis    Impression:   - send GI-PCR   - send stool O and P   - monitor BM's while admitted   - supportive care with IVF   - infectious work up per primary team   - please call GI back with any further questions    Carmen Hughes, PGY-4  Gastroenterology Fellow  Pager x [phone] or [phone]  (After 5 pm or on weekends please page GI on call)
72 F with hx of recent c.diff colitis s/p PO vancomycin ([facility]/2019, [facility]), hemorrhagic stroke (2009) with residual right sided hemiplegia and expressive aphasia, seizure disorder, HTN, HLD, depression who presents from [place] with fever, diarrhea and abdominal pain a/w sepsis likely 2/2 proctitis vs. UTI.
72 F with hx of recent c.diff colitis s/p PO [facility]comycin ([facility], [facility] hemorrhagic stroke (2009) with residual right sided hemiplegia and expressive aphasia, seizure disorder, HTN, HLD, depression who presents from [place] with fever, diarrhea and abdominal pain a/w sepsis likely 2/2 proctitis vs. UTI.
72 F with hx of recent c.diff colitis s/p PO vancomycin (01/2019, [facility] hemorrhagic stroke (2009) with residual right sided hemiplegia and expressive aphasia, seizure disorder, HTN, HLD, depression who presents from [place] with fever, diarrhea and abdominal pain a/w sepsis likely 2/2 proctitis vs. UTI.
72 F with hx of recent c.diff colitis s/p PO vancomycin (01/2019, [facility] hemorrhagic stroke (2009) with residual right sided hemiplegia and expressive aphasia, seizure disorder, HTN, HLD, depression who presents from [place] with fever, diarrhea and abdominal pain a/w sepsis likely 2/2 proctitis vs. UTI.

## 2019-02-03 NOTE — PROGRESS NOTE ADULT - REASON FOR ADMISSION
diarrhea, CTAP with proctitis

## 2019-02-03 NOTE — PROGRESS NOTE ADULT - PROBLEM SELECTOR PLAN 2
- p/w diarrhea and abdominal pain x 3 days, no hx of radiation  - CTAP with mild circumferential rectal and distal sigmoid wall thickening with adjacent free fluid and presacral edema suspicious for a proctitis  - recently completed 10 day course of PO vancomycin 2 days ago at [place]  - stool c.diff negative, GI stool PCR sent, pending results  - will c/w cipro and flagyl for UTI/proctitis. (total 10 days course should be adequate)  - GI f/u appreciated

## 2019-02-03 NOTE — DISCHARGE NOTE ADULT - MEDICATION SUMMARY - MEDICATIONS TO TAKE
I will START or STAY ON the medications listed below when I get home from the hospital:    metroNIDAZOLE 500 mg oral tablet  -- 1 tab(s) by mouth every 8 hours STOP AFTER 4 DAYS  -- Do not drink alcoholic beverages when taking this medication.  Finish all this medication unless otherwise directed by prescriber.  May discolor urine or feces.    -- Indication: For UTI (urinary tract infection)/Proctitis     losartan 100 mg oral tablet  -- 1 tab(s) by mouth once a day  -- Indication: For Hypertension    divalproex sodium 500 mg oral delayed release tablet  -- 1 tab(s) by mouth 2 times a day  -- Indication: For Seizure disorder    sertraline 50 mg oral tablet  -- 1 tab(s) by mouth once a day  -- Indication: For Depression    atorvastatin 10 mg oral tablet  -- 1 tab(s) by mouth once a day (at bedtime)  -- Indication: For HLD (hyperlipidemia)    niCARdipine 20 mg oral capsule  -- 1 cap(s) by mouth every 12 hours  -- Indication: For Hypertension    ciprofloxacin 500 mg oral tablet  -- 1 tab(s) by mouth 2 times a day STOP AFTER 4 DAYS  -- Avoid prolonged or excessive exposure to direct and/or artificial sunlight while taking this medication.  Check with your doctor before becoming pregnant.  Do not take dairy products, antacids, or iron preparations within one hour of this medication.  Finish all this medication unless otherwise directed by prescriber.  Medication should be taken with plenty of water.    -- Indication: For UTI (urinary tract infection)/Proctitis

## 2019-02-03 NOTE — DISCHARGE NOTE ADULT - SECONDARY DIAGNOSIS.
Proctitis UTI (urinary tract infection) Anemia Benign Essential Hypertension HLD (hyperlipidemia) Seizure disorder

## 2019-02-03 NOTE — DISCHARGE NOTE ADULT - MEDICATION SUMMARY - MEDICATIONS TO STOP TAKING
I will STOP taking the medications listed below when I get home from the hospital:    simvastatin 10 mg oral tablet  -- 1 tab(s) by mouth once a day (at bedtime)    lovastatin 10 mg oral tablet  -- 1 tab(s) by mouth once a day

## 2019-02-03 NOTE — DISCHARGE NOTE ADULT - HOSPITAL COURSE
72 F with hx of recent c.diff colitis s/p PO vancomycin (01/2019, treated at [facility]), hemorrhagic stroke (2009) with residual right sided hemiplegia and expressive aphasia, seizure disorder, HTN, HLD, depression who presents from [place] with fever, diarrhea and abdominal pain a/w sepsis likely 2/2 proctitis vs. UTI.      Problem/Plan - 1:  ·  Problem: Sepsis.  Plan: - leukocytosis + tachycardia   - possibly 2/2 proctitis vs. UTI  - s/p 2L LR bolus and LR @75/hr. tolerating diet. will hold further IVF.  - RVP negative, UA with >50 WBC, +leukocyte esterase, negative nitrites, c.diff negative  - UCx contaminant, neg BCx. stool cultures, stool PCR pending.  - blood cultures neg. (coags neg staph is contaminant)  - monitor fever curve, s/p CTX in ED, will c/w cipro and flagyl for UTI/proctitis.      Problem/Plan - 2:  ·  Problem: Proctitis.  Plan: - p/w diarrhea and abdominal pain x 3 days, no hx of radiation  - CTAP with mild circumferential rectal and distal sigmoid wall thickening with adjacent free fluid and presacral edema suspicious for a proctitis  - recently completed 10 day course of PO vancomycin 2 days ago at [place]  - stool c.diff negative, GI stool PCR sent, pending results  - will c/w cipro and flagyl for UTI/proctitis. (total 10 days course should be adequate)  - GI f/u appreciated.      Problem/Plan - 3:  ·  Problem: UTI (urinary tract infection).  Plan: - dysuria + suprapubic tenderness  - UA with >50 WBC, +leukocyte esterase, negative nitrites, UCx contaminant  - s/p ceftriaxone, transition to ciprofloxacin for UTI/GI coverage.      Problem/Plan - 4:  ·  Problem: Anemia.  Plan: - Hb 11 on admission, normocytic MCV 85  - hgb relatively stable  - normal iron studies, folate, b12   - trend CBC.      Problem/Plan - 5:  ·  Problem: Benign Essential Hypertension.  Plan: - BPs elevated   - c/w home losartan 100mg daily and nicardipine 20mg BID  - monitor vital signs.      Problem/Plan - 6:  Problem: HLD (hyperlipidemia). Plan: - therapeutic interchange - lovastatin 10mg daily to atorvastatin 10mg daily.     Problem/Plan - 7:  ·  Problem: Seizure disorder.  Plan: - c/w with divalproex sodium 500mg BID  - valproic acid level therapeutic.      Problem/Plan - 8:  ·  Problem: Depression.  Plan: - c/w zoloft 50mg daily.      Problem/Plan - 9:  ·  Problem: Need for prophylactic measure.  Plan: - DVT ppx: IVC filter, chem ppx contraindicated in setting of prior hemorrhagic CVA  - DIET: mechanical soft, thin liquids.     Attending Attestation:   PT: rehab.  d/c planning to rehab.     - Dr. BAO Hunter ([facility]) 72 F with hx of recent c.diff colitis s/p PO vancomycin (01/2019, treated at [facility]), hemorrhagic stroke (2009) with residual right sided hemiplegia and expressive aphasia, seizure disorder, HTN, HLD, depression who presents from [place] with fever, diarrhea and abdominal pain a/w sepsis likely 2/2 proctitis vs. UTI.      Problem/Plan - 1:  ·  Problem: Sepsis.  Plan: - leukocytosis + tachycardia   - possibly 2/2 proctitis vs. UTI  - s/p 2L LR bolus and LR @75/hr. tolerating diet. will hold further IVF.  - RVP negative, UA with >50 WBC, +leukocyte esterase, negative nitrites, c.diff negative  - UCx contaminant, neg BCx. stool cultures, stool PCR pending.  - blood cultures neg. (coags neg staph is contaminant)  - monitor fever curve, s/p CTX in ED, will c/w cipro and flagyl for UTI/proctitis.      Problem/Plan - 2:  ·  Problem: Proctitis.  Plan: - p/w diarrhea and abdominal pain x 3 days, no hx of radiation  - CTAP with mild circumferential rectal and distal sigmoid wall thickening with adjacent free fluid and presacral edema suspicious for a proctitis  - recently completed 10 day course of PO vancomycin 2 days ago at [place]  - stool c.diff negative, GI stool PCR sent, pending results  - will c/w cipro and flagyl for UTI/proctitis. (total 10 days course should be adequate)  - GI f/u appreciated.      Problem/Plan - 3:  ·  Problem: UTI (urinary tract infection).  Plan: - dysuria + suprapubic tenderness  - UA with >50 WBC, +leukocyte esterase, negative nitrites, UCx contaminant  - s/p ceftriaxone, transition to ciprofloxacin for UTI/GI coverage.      Problem/Plan - 4:  ·  Problem: Anemia.  Plan: - Hb 11 on admission, normocytic MCV 85  - hgb relatively stable  - normal iron studies, folate, b12   - trend CBC.      Problem/Plan - 5:  ·  Problem: Benign Essential Hypertension.  Plan: - BPs elevated   - c/w home losartan 100mg daily and nicardipine 20mg BID  - monitor vital signs.      Problem/Plan - 6:  Problem: HLD (hyperlipidemia). Plan: - therapeutic interchange - lovastatin 10mg daily to atorvastatin 10mg daily.     Problem/Plan - 7:  ·  Problem: Seizure disorder.  Plan: - c/w with divalproex sodium 500mg BID  - valproic acid level therapeutic.      Problem/Plan - 8:  ·  Problem: Depression.  Plan: - c/w zoloft 50mg daily.      Problem/Plan - 9:  ·  Problem: Need for prophylactic measure.  Plan: - DVT ppx: IVC filter, chem ppx contraindicated in setting of prior hemorrhagic CVA  - DIET: mechanical soft, thin liquids.     Attending Attestation:   PT: rehab.  d/c planning to rehab.     DISPO: 72 F with hx of recent c.diff colitis s/p PO vancomycin (01/2019, treated at [facility]), hemorrhagic stroke (2009) with residual right sided hemiplegia and expressive aphasia, seizure disorder, HTN, HLD, depression who presents from [place] with fever, diarrhea and abdominal pain a/w sepsis likely 2/2 proctitis vs. UTI.      Problem/Plan - 1:  ·  Problem: Sepsis.  Plan: - leukocytosis + tachycardia   - possibly 2/2 proctitis vs. UTI  - s/p 2L LR bolus and LR @75/hr. tolerating diet. will hold further IVF.  - RVP negative, UA with >50 WBC, +leukocyte esterase, negative nitrites, c.diff negative  - UCx contaminant, neg BCx. stool cultures, stool PCR pending.  - blood cultures neg. (coags neg staph is contaminant)  - monitor fever curve, s/p CTX in ED, will c/w cipro and flagyl for UTI/proctitis.      Problem/Plan - 2:  ·  Problem: Proctitis.  Plan: - p/w diarrhea and abdominal pain x 3 days, no hx of radiation  - CTAP with mild circumferential rectal and distal sigmoid wall thickening with adjacent free fluid and presacral edema suspicious for a proctitis  - recently completed 10 day course of PO vancomycin 2 days ago at [place]  - stool c.diff negative, GI stool PCR sent, pending results  - will c/w cipro and flagyl for UTI/proctitis. (total 10 days course should be adequate)  - GI f/u appreciated.      Problem/Plan - 3:  ·  Problem: UTI (urinary tract infection).  Plan: - dysuria + suprapubic tenderness  - UA with >50 WBC, +leukocyte esterase, negative nitrites, UCx contaminant  - s/p ceftriaxone, transition to ciprofloxacin for UTI/GI coverage.      Problem/Plan - 4:  ·  Problem: Anemia.  Plan: - Hb 11 on admission, normocytic MCV 85  - hgb relatively stable  - normal iron studies, folate, b12   - trend CBC.      Problem/Plan - 5:  ·  Problem: Benign Essential Hypertension.  Plan: - BPs elevated   - c/w home losartan 100mg daily and nicardipine 20mg BID  - monitor vital signs.      Problem/Plan - 6:  Problem: HLD (hyperlipidemia). Plan: - therapeutic interchange - lovastatin 10mg daily to atorvastatin 10mg daily.     Problem/Plan - 7:  ·  Problem: Seizure disorder.  Plan: - c/w with divalproex sodium 500mg BID  - valproic acid level therapeutic.      Problem/Plan - 8:  ·  Problem: Depression.  Plan: - c/w zoloft 50mg daily.      Problem/Plan - 9:  ·  Problem: Need for prophylactic measure.  Plan: - DVT ppx: IVC filter, chem ppx contraindicated in setting of prior hemorrhagic CVA  - DIET: mechanical soft, thin liquids.       As per medical attending patient is medically stable for discharge to rehab   DISPO: rehab 72 F with hx of recent c.diff colitis s/p PO vancomycin (01/2019, treated at [facility]), hemorrhagic stroke (2009) with residual right sided hemiplegia and expressive aphasia, seizure disorder, HTN, HLD, depression who presented from [place] with fever, diarrhea and abdominal pain a/w sepsis likely 2/2 proctitis vs. UTI.     Sepsis  - leukocytosis + tachycardia   - possibly 2/2 proctitis vs. UTI  - s/p 2L LR bolus and LR @75/hr. tolerating diet. will hold further IVF.  - RVP negative, UA with >50 WBC, +leukocyte esterase, negative nitrites, c.diff negative  - UCx contaminant, neg BCx. stool cultures, stool PCR pending.  - blood cultures neg. (coags neg staph is contaminant)  - monitor fever curve, s/p CTX in ED, will c/w cipro and flagyl for UTI/proctitis.  - Change to oral antibiotics Cipro + flagyl to complete a total of 10 days    Proctitis  - p/w diarrhea and abdominal pain x 3 days, no hx of radiation  - CTAP with mild circumferential rectal and distal sigmoid wall thickening with adjacent free fluid and presacral edema suspicious for a proctitis  - recently completed 10 day course of PO vancomycin 2 days ago at [place]  - stool c.diff negative, GI stool PCR neg  - will c/w cipro and flagyl for UTI/proctitis. (total 10 days course should be adequate)  - GI f/u appreciated.   - Change to oral antibiotics Cipro + flagyl to complete a total of 10 days       UTI (urinary tract infection)  - dysuria + suprapubic tenderness during initial hospitalization, resolved  - UA with >50 WBC, +leukocyte esterase, negative nitrites, UCx contaminant  - s/p ceftriaxone, transition to ciprofloxacin for UTI/GI coverage.   - Change to oral antibiotics Cipro + flagyl to complete a total of 10 days        Anemia  - Hb 11 on admission, normocytic MCV 85  - hgb relatively stable  - normal iron studies, folate, b12     Benign Essential Hypertension  - BPs elevated   - c/w home losartan 100mg daily and nicardipine 20mg BID  - monitor vital signs.     HLD (hyperlipidemia)  - therapeutic interchange - lovastatin 10mg daily to atorvastatin 10mg daily.    Seizure disorder  - c/w with divalproex sodium 500mg BID  - valproic acid level therapeutic.     Depression  - c/w zoloft 50mg daily.       DVT ppx: IVC filter, chem ppx contraindicated in setting of prior hemorrhagic CVA  - DIET: mechanical soft, thin liquids.       As per medical attending patient is medically stable for discharge to rehab   DISPO: rehab 72 F with hx of recent c.diff colitis s/p PO vancomycin (01/2019, treated at [facility]), hemorrhagic stroke (2009) with residual right sided hemiplegia and expressive aphasia, seizure disorder, HTN, HLD, depression who presented from [place] with fever, diarrhea and abdominal pain a/w sepsis likely 2/2 proctitis vs. UTI.     Sepsis  - leukocytosis + tachycardia   - possibly 2/2 proctitis vs. UTI  - s/p 2L LR bolus and LR @75/hr. tolerating diet. will hold further IVF.  - RVP negative, UA with >50 WBC, +leukocyte esterase, negative nitrites, c.diff negative  - UCx contaminant, neg BCx. stool cultures, stool PCR pending.  - blood cultures neg. (coags neg staph is contaminant)  - monitor fever curve, s/p CTX in ED, will c/w cipro and flagyl for UTI/proctitis.  - Change to oral antibiotics Cipro + flagyl to complete a total of 10 days    Proctitis  - p/w diarrhea and abdominal pain x 3 days, no hx of radiation  - CTAP with mild circumferential rectal and distal sigmoid wall thickening with adjacent free fluid and presacral edema suspicious for a proctitis  - recently completed 10 day course of PO vancomycin 2 days ago at [place]  - stool c.diff negative, GI stool PCR neg  - will c/w cipro and flagyl for UTI/proctitis. (total 10 days course should be adequate)  - GI f/u appreciated.   - Change to oral antibiotics Cipro + flagyl to complete a total of 10 days       UTI (urinary tract infection)  - dysuria + suprapubic tenderness during initial hospitalization, resolved  - UA with >50 WBC, +leukocyte esterase, negative nitrites, UCx contaminant  - s/p ceftriaxone, transition to ciprofloxacin for UTI/GI coverage.   - Change to oral antibiotics Cipro + flagyl to complete a total of 10 days        Anemia  - Hb 11 on admission, normocytic MCV 85  - hgb relatively stable  - normal iron studies, folate, b12     Benign Essential Hypertension  - BPs elevated   - c/w home losartan 100mg daily and nicardipine 20mg BID  - monitor vital signs.     HLD (hyperlipidemia)  - therapeutic interchange - lovastatin 10mg daily to atorvastatin 10mg daily.    Seizure disorder  - c/w with divalproex sodium 500mg BID  - valproic acid level therapeutic.     Depression  - c/w zoloft 50mg daily.       DVT ppx: IVC filter, chem ppx contraindicated in setting of prior hemorrhagic CVA  - DIET: mechanical soft, thin liquids.       As per medical attending patient is medically stable for discharge to rehab   DISPO: rehab    Attending Addendum:   Patient seen and examined by me on the discharge day.  More than 30 mins were spent evaluating patient and coordinating care for discharge.   All questions answered in details. Follow up plan explained. discharged to rehab. to complete PO Cipro/flagyl.     PHYSICAL EXAM:  GENERAL: NAD, Comfortable  HEAD:  Atraumatic, Normocephalic  EYES: EOMI, PERRLA, conjunctiva and sclera clear  NECK: Supple, No JVD  CHEST/LUNG: Clear to auscultation bilaterally; No wheeze  HEART: Regular rate and rhythm; No murmurs, rubs, or gallops  ABDOMEN: Soft, Nontender, Nondistended; Bowel sounds present  Neuro: AAO x 3, right sided chronic hemiparesis, left UE and LE are 4/5, mild tremors baseline  EXTREMITIES:  2+ Peripheral Pulses, No clubbing, cyanosis, or edema  SKIN: No rashes or lesions

## 2019-02-04 VITALS
OXYGEN SATURATION: 99 % | RESPIRATION RATE: 18 BRPM | HEART RATE: 59 BPM | DIASTOLIC BLOOD PRESSURE: 66 MMHG | TEMPERATURE: 98 F | SYSTOLIC BLOOD PRESSURE: 131 MMHG

## 2019-02-04 RX ORDER — SERTRALINE 25 MG/1
1 TABLET, FILM COATED ORAL
Qty: 0 | Refills: 0 | COMMUNITY

## 2019-02-04 RX ORDER — DIVALPROEX SODIUM 500 MG/1
1 TABLET, DELAYED RELEASE ORAL
Qty: 0 | Refills: 0 | COMMUNITY
Start: 2019-02-04

## 2019-02-04 RX ORDER — LOSARTAN POTASSIUM 100 MG/1
1 TABLET, FILM COATED ORAL
Qty: 0 | Refills: 0 | COMMUNITY
Start: 2019-02-04

## 2019-02-04 RX ORDER — LOSARTAN POTASSIUM 100 MG/1
0 TABLET, FILM COATED ORAL
Qty: 0 | Refills: 0 | COMMUNITY

## 2019-02-04 RX ORDER — METRONIDAZOLE 500 MG
1 TABLET ORAL
Qty: 12 | Refills: 0
Start: 2019-02-04 | End: 2019-02-07

## 2019-02-04 RX ORDER — ATORVASTATIN CALCIUM 80 MG/1
1 TABLET, FILM COATED ORAL
Qty: 0 | Refills: 0 | DISCHARGE
Start: 2019-02-04

## 2019-02-04 RX ORDER — NICARDIPINE HYDROCHLORIDE 30 MG/1
1 CAPSULE, EXTENDED RELEASE ORAL
Qty: 0 | Refills: 0 | DISCHARGE
Start: 2019-02-04

## 2019-02-04 RX ORDER — LOVASTATIN 20 MG
1 TABLET ORAL
Qty: 0 | Refills: 0 | COMMUNITY

## 2019-02-04 RX ORDER — NICARDIPINE HYDROCHLORIDE 30 MG/1
2 CAPSULE, EXTENDED RELEASE ORAL
Qty: 0 | Refills: 0 | DISCHARGE
Start: 2019-02-04

## 2019-02-04 RX ORDER — SERTRALINE 25 MG/1
1 TABLET, FILM COATED ORAL
Qty: 0 | Refills: 0 | COMMUNITY
Start: 2019-02-04

## 2019-02-04 RX ORDER — CIPROFLOXACIN LACTATE 400MG/40ML
1 VIAL (ML) INTRAVENOUS
Qty: 8 | Refills: 0
Start: 2019-02-04 | End: 2019-02-07

## 2019-02-04 RX ADMIN — Medication 200 MILLIGRAM(S): at 06:00

## 2019-02-04 RX ADMIN — SERTRALINE 50 MILLIGRAM(S): 25 TABLET, FILM COATED ORAL at 13:14

## 2019-02-04 RX ADMIN — LOSARTAN POTASSIUM 100 MILLIGRAM(S): 100 TABLET, FILM COATED ORAL at 05:08

## 2019-02-04 RX ADMIN — Medication 100 MILLIGRAM(S): at 08:09

## 2019-02-04 RX ADMIN — DIVALPROEX SODIUM 500 MILLIGRAM(S): 500 TABLET, DELAYED RELEASE ORAL at 05:09

## 2019-02-06 LAB
O+P SPEC CONC: SIGNIFICANT CHANGE UP
SPECIMEN SOURCE: SIGNIFICANT CHANGE UP
TRI STN SPEC: SIGNIFICANT CHANGE UP

## 2019-12-12 NOTE — H&P ADULT - PROBLEM SELECTOR PLAN 9
Pt notified and aware.    - DVT ppx: IVC filter, chem ppx contraindicated in setting of prior hemorrhagic CVA  - DIET: mechanical soft, thin liquids

## 2020-10-04 ENCOUNTER — EMERGENCY (EMERGENCY)
Facility: HOSPITAL | Age: 75
LOS: 1 days | Discharge: HOME | End: 2020-10-04
Attending: EMERGENCY MEDICINE | Admitting: EMERGENCY MEDICINE
Payer: COMMERCIAL

## 2020-10-04 VITALS
RESPIRATION RATE: 18 BRPM | SYSTOLIC BLOOD PRESSURE: 172 MMHG | DIASTOLIC BLOOD PRESSURE: 94 MMHG | OXYGEN SATURATION: 100 % | HEART RATE: 79 BPM

## 2020-10-04 VITALS
OXYGEN SATURATION: 98 % | HEART RATE: 91 BPM | RESPIRATION RATE: 20 BRPM | SYSTOLIC BLOOD PRESSURE: 149 MMHG | TEMPERATURE: 99 F | HEIGHT: 65 IN | DIASTOLIC BLOOD PRESSURE: 87 MMHG

## 2020-10-04 LAB
ALBUMIN SERPL ELPH-MCNC: 4.3 G/DL — SIGNIFICANT CHANGE UP (ref 3.3–5)
ALP SERPL-CCNC: 111 U/L — SIGNIFICANT CHANGE UP (ref 40–120)
ALT FLD-CCNC: 15 U/L — SIGNIFICANT CHANGE UP (ref 4–33)
ANION GAP SERPL CALC-SCNC: 14 MMO/L — SIGNIFICANT CHANGE UP (ref 7–14)
APPEARANCE UR: CLEAR — SIGNIFICANT CHANGE UP
APTT BLD: 34.2 SEC — SIGNIFICANT CHANGE UP (ref 27–36.3)
AST SERPL-CCNC: 31 U/L — SIGNIFICANT CHANGE UP (ref 4–32)
BACTERIA # UR AUTO: NEGATIVE — SIGNIFICANT CHANGE UP
BASOPHILS # BLD AUTO: 0.04 K/UL — SIGNIFICANT CHANGE UP (ref 0–0.2)
BASOPHILS NFR BLD AUTO: 0.5 % — SIGNIFICANT CHANGE UP (ref 0–2)
BILIRUB SERPL-MCNC: 0.3 MG/DL — SIGNIFICANT CHANGE UP (ref 0.2–1.2)
BILIRUB UR-MCNC: NEGATIVE — SIGNIFICANT CHANGE UP
BLOOD UR QL VISUAL: NEGATIVE — SIGNIFICANT CHANGE UP
BUN SERPL-MCNC: 14 MG/DL — SIGNIFICANT CHANGE UP (ref 7–23)
CALCIUM SERPL-MCNC: 9.7 MG/DL — SIGNIFICANT CHANGE UP (ref 8.4–10.5)
CHLORIDE SERPL-SCNC: 100 MMOL/L — SIGNIFICANT CHANGE UP (ref 98–107)
CO2 SERPL-SCNC: 21 MMOL/L — LOW (ref 22–31)
COLOR SPEC: YELLOW — SIGNIFICANT CHANGE UP
CREAT SERPL-MCNC: 0.75 MG/DL — SIGNIFICANT CHANGE UP (ref 0.5–1.3)
EOSINOPHIL # BLD AUTO: 0.05 K/UL — SIGNIFICANT CHANGE UP (ref 0–0.5)
EOSINOPHIL NFR BLD AUTO: 0.6 % — SIGNIFICANT CHANGE UP (ref 0–6)
GLUCOSE SERPL-MCNC: 124 MG/DL — HIGH (ref 70–99)
GLUCOSE UR-MCNC: NEGATIVE — SIGNIFICANT CHANGE UP
HCT VFR BLD CALC: 37.4 % — SIGNIFICANT CHANGE UP (ref 34.5–45)
HGB BLD-MCNC: 11.7 G/DL — SIGNIFICANT CHANGE UP (ref 11.5–15.5)
HYALINE CASTS # UR AUTO: NEGATIVE — SIGNIFICANT CHANGE UP
IMM GRANULOCYTES NFR BLD AUTO: 0.6 % — SIGNIFICANT CHANGE UP (ref 0–1.5)
INR BLD: 1 — SIGNIFICANT CHANGE UP (ref 0.88–1.16)
KETONES UR-MCNC: SIGNIFICANT CHANGE UP
LEUKOCYTE ESTERASE UR-ACNC: NEGATIVE — SIGNIFICANT CHANGE UP
LYMPHOCYTES # BLD AUTO: 1.42 K/UL — SIGNIFICANT CHANGE UP (ref 1–3.3)
LYMPHOCYTES # BLD AUTO: 16.9 % — SIGNIFICANT CHANGE UP (ref 13–44)
MCHC RBC-ENTMCNC: 27 PG — SIGNIFICANT CHANGE UP (ref 27–34)
MCHC RBC-ENTMCNC: 31.3 % — LOW (ref 32–36)
MCV RBC AUTO: 86.4 FL — SIGNIFICANT CHANGE UP (ref 80–100)
MONOCYTES # BLD AUTO: 0.55 K/UL — SIGNIFICANT CHANGE UP (ref 0–0.9)
MONOCYTES NFR BLD AUTO: 6.5 % — SIGNIFICANT CHANGE UP (ref 2–14)
NEUTROPHILS # BLD AUTO: 6.3 K/UL — SIGNIFICANT CHANGE UP (ref 1.8–7.4)
NEUTROPHILS NFR BLD AUTO: 74.9 % — SIGNIFICANT CHANGE UP (ref 43–77)
NITRITE UR-MCNC: NEGATIVE — SIGNIFICANT CHANGE UP
NRBC # FLD: 0 K/UL — SIGNIFICANT CHANGE UP (ref 0–0)
PH UR: 7 — SIGNIFICANT CHANGE UP (ref 5–8)
PLATELET # BLD AUTO: 276 K/UL — SIGNIFICANT CHANGE UP (ref 150–400)
PMV BLD: 10.4 FL — SIGNIFICANT CHANGE UP (ref 7–13)
POTASSIUM SERPL-MCNC: 5 MMOL/L — SIGNIFICANT CHANGE UP (ref 3.5–5.3)
POTASSIUM SERPL-SCNC: 5 MMOL/L — SIGNIFICANT CHANGE UP (ref 3.5–5.3)
PROT SERPL-MCNC: 9 G/DL — HIGH (ref 6–8.3)
PROT UR-MCNC: 20 — SIGNIFICANT CHANGE UP
PROTHROM AB SERPL-ACNC: 11.5 SEC — SIGNIFICANT CHANGE UP (ref 10.6–13.6)
RBC # BLD: 4.33 M/UL — SIGNIFICANT CHANGE UP (ref 3.8–5.2)
RBC # FLD: 16.1 % — HIGH (ref 10.3–14.5)
RBC CASTS # UR COMP ASSIST: SIGNIFICANT CHANGE UP (ref 0–?)
SARS-COV-2 RNA SPEC QL NAA+PROBE: SIGNIFICANT CHANGE UP
SODIUM SERPL-SCNC: 135 MMOL/L — SIGNIFICANT CHANGE UP (ref 135–145)
SP GR SPEC: 1.04 — SIGNIFICANT CHANGE UP (ref 1–1.04)
SQUAMOUS # UR AUTO: SIGNIFICANT CHANGE UP
UROBILINOGEN FLD QL: NORMAL — SIGNIFICANT CHANGE UP
WBC # BLD: 8.41 K/UL — SIGNIFICANT CHANGE UP (ref 3.8–10.5)
WBC # FLD AUTO: 8.41 K/UL — SIGNIFICANT CHANGE UP (ref 3.8–10.5)
WBC UR QL: SIGNIFICANT CHANGE UP (ref 0–?)

## 2020-10-04 PROCEDURE — 93971 EXTREMITY STUDY: CPT | Mod: 26,LT

## 2020-10-04 PROCEDURE — 73562 X-RAY EXAM OF KNEE 3: CPT | Mod: 26,RT

## 2020-10-04 PROCEDURE — 73502 X-RAY EXAM HIP UNI 2-3 VIEWS: CPT | Mod: 26,RT

## 2020-10-04 PROCEDURE — 73060 X-RAY EXAM OF HUMERUS: CPT | Mod: 26,RT

## 2020-10-04 PROCEDURE — 70498 CT ANGIOGRAPHY NECK: CPT | Mod: 26

## 2020-10-04 PROCEDURE — 70496 CT ANGIOGRAPHY HEAD: CPT | Mod: 26

## 2020-10-04 PROCEDURE — 73090 X-RAY EXAM OF FOREARM: CPT | Mod: 26,RT

## 2020-10-04 PROCEDURE — 73590 X-RAY EXAM OF LOWER LEG: CPT | Mod: 26,RT

## 2020-10-04 PROCEDURE — 73070 X-RAY EXAM OF ELBOW: CPT | Mod: 26,RT

## 2020-10-04 PROCEDURE — 99285 EMERGENCY DEPT VISIT HI MDM: CPT

## 2020-10-04 PROCEDURE — 73030 X-RAY EXAM OF SHOULDER: CPT | Mod: 26,RT

## 2020-10-04 PROCEDURE — 73552 X-RAY EXAM OF FEMUR 2/>: CPT | Mod: 26,RT

## 2020-10-04 RX ORDER — MORPHINE SULFATE 50 MG/1
2 CAPSULE, EXTENDED RELEASE ORAL ONCE
Refills: 0 | Status: DISCONTINUED | OUTPATIENT
Start: 2020-10-04 | End: 2020-10-04

## 2020-10-04 RX ORDER — CYCLOBENZAPRINE HYDROCHLORIDE 10 MG/1
1 TABLET, FILM COATED ORAL
Qty: 20 | Refills: 0
Start: 2020-10-04

## 2020-10-04 RX ORDER — ACETAMINOPHEN 500 MG
1000 TABLET ORAL ONCE
Refills: 0 | Status: COMPLETED | OUTPATIENT
Start: 2020-10-04 | End: 2020-10-04

## 2020-10-04 RX ORDER — CYCLOBENZAPRINE HYDROCHLORIDE 10 MG/1
10 TABLET, FILM COATED ORAL ONCE
Refills: 0 | Status: COMPLETED | OUTPATIENT
Start: 2020-10-04 | End: 2020-10-04

## 2020-10-04 RX ADMIN — MORPHINE SULFATE 2 MILLIGRAM(S): 50 CAPSULE, EXTENDED RELEASE ORAL at 10:00

## 2020-10-04 RX ADMIN — MORPHINE SULFATE 2 MILLIGRAM(S): 50 CAPSULE, EXTENDED RELEASE ORAL at 09:42

## 2020-10-04 RX ADMIN — CYCLOBENZAPRINE HYDROCHLORIDE 10 MILLIGRAM(S): 10 TABLET, FILM COATED ORAL at 16:30

## 2020-10-04 RX ADMIN — Medication 1000 MILLIGRAM(S): at 08:15

## 2020-10-04 RX ADMIN — Medication 400 MILLIGRAM(S): at 07:44

## 2020-10-04 NOTE — ED ADULT TRIAGE NOTE - CHIEF COMPLAINT QUOTE
patient complaining of pain to right lower leg since 11PM tonight. patient has + b/l pedal pulses. denies falls or injuries. patient has hx of CVA with right sided deficits. patient complaining of pain to right lower leg since 11PM tonight. patient has + b/l pedal pulses. denies falls or injuries. patient has hx of CVA with right sided deficits.    sister- Jaswinder- [phone] Sister- on wed, she was spacing out a lot. and she was not acting like herself, was drooling. patient complaining of pain to entire right side of body, especially right lower leg since yesturday 7:30AM. patient has + b/l pedal pulses. denies falls or injuries. patient has hx of CVA with right sided deficits.    sister- Jaswinder- [phone] Sister- on wed, she was spacing out a lot. and she was not acting like herself, was drooling. patient complaining of pain to entire right side of body, especially right lower leg since yesturday 7:30AM. patient has + b/l pedal pulses. denies falls or injuries. patient has hx of CVA with right sided deficits with aphasia    sister- Jaswinder- [phone]

## 2020-10-04 NOTE — ED PROVIDER NOTE - PATIENT PORTAL LINK FT
You can access the FollowMyHealth Patient Portal offered by [facility] by registering at the following website: [email or web address]. By joining [facility]’s FollowMyHealth portal, you will also be able to view your health information using other applications (apps) compatible with our system.

## 2020-10-04 NOTE — ED PROVIDER NOTE - PHYSICAL EXAMINATION
Gen: obese, NAD, appears in pain when RT upper and lower extremities are touched, pt is able to say yes or no when pointed to different areas of her body  CV: RRR, +S1/S2  Resp: CTAB, symmetric breath sounds  GI:  abdomen soft non-distended, NTTP  Back: no spinal ttp   Extremities - +ttp of RUE and RLE, +swelling in LE b/l   Neuro: A&Ox1 (first name only) Gen: obese, NAD, appears in pain when RT upper and lower extremities are touched, pt is able to say yes or no when pointed to different areas of her body  CV: RRR, +S1/S2  Resp: CTAB, symmetric breath sounds  GI:  abdomen soft non-distended, NTTP  Back: no spinal ttp   Extremities - +ttp of RUE and RLE, +swelling in LE b/l   Neuro: A&Ox1 (first name only)    Attending/Eliud: obese, NAD, PERRL/EOMI, supple, RRR, CTAB, Abd-soft, NT/ND, +PT RUE/RLE, no joint eff, no overlying skin changes; +2 pulses equal bilateral; Awake, alert, moving all 4 ext

## 2020-10-04 NOTE — ED ADULT NURSE NOTE - CHIEF COMPLAINT QUOTE
Sister- on wed, she was spacing out a lot. and she was not acting like herself, was drooling. patient complaining of pain to entire right side of body, especially right lower leg since yesturday 7:30AM. patient has + b/l pedal pulses. denies falls or injuries. patient has hx of CVA with right sided deficits with aphasia    sister- Jaswinder- [phone]

## 2020-10-04 NOTE — CONSULT NOTE ADULT - ASSESSMENT
73yo F presents with right lower extremity pain   -pt seen and evaluated in ED   -Afebrile, no leukocytosis   -No open wound or lesions b/l feet, hypertrophic incurvated right hallux toenail cut to hygenic length, no pain to b/l hallucal borders w/ squeeze test, +Fabian test RLE, b/l posterior calves warm to touch   -Toenails do not appear to be the source of pain   -No podiatric intervention at this time   -If podiatric management is needed upon d/c please f/u with Dr. Aguilar, call [phone]  -Discussed with attending

## 2020-10-04 NOTE — PROVIDER CONTACT NOTE (OTHER) - ASSESSMENT
MEGAN contacted by clinical provider Red [id number], Bedbound Pt requires Ambulance transportation to return home [address] [place] [address], [place] [address]. MEGAN contacted Pt’s Sister [phone] who confirmed address, with ramp family is home to accept Pt.  MEGAN contacted [facility] staff Will who confirmed AMBULANCE by [id number] for 6:30pm.  Clinical Provider is in agreement with Ambulance home.  Non-Emergent Ambulance Form completed and in chart. No further SW intervention required at this time.

## 2020-10-04 NOTE — ED PROVIDER NOTE - NSFOLLOWUPINSTRUCTIONS_ED_ALL_ED_FT
We did not find a cause for your leg pain today. It is possible you are having muscle spasms on your right leg. Flexeril (cyclobenzaprine) was sent to your pharmacy, this is a muscle relaxant  If you're still having leg pain after one week, we recommend you have another ultrasound of your leg to rule out a blood clot. You had one today that was negative for blood clot.  Xrays of your right arm and right leg were negative for fractures    Advance activity as tolerated.  Continue all previously prescribed medications as directed unless otherwise instructed.  Follow up with your primary care physician in 48-72 hours- bring copies of your results.  Return to the ER for worsening or persistent symptoms, and/or ANY NEW OR CONCERNING SYMPTOMS. If you have issues obtaining follow up, please call: [phone] to obtain a doctor or specialist who takes your insurance in your area.  You may call [phone] to make an appointment with the internal medicine clinic.

## 2020-10-04 NOTE — ED ADULT NURSE REASSESSMENT NOTE - NS ED NURSE REASSESS COMMENT FT1
Pt appeared to be in a lot of pain/distress. Pt was pointing to her right lower leg and right foot while crying in pain. RR elevated to 37. This was escalated to MD and PA who examined her further and prescribed cyclobenzaprine 10 mg which was given to PT. Pillows and a blanket were rolled and placed under her leg to elevate her heels off the bed.  Will continue to monitor. Awaiting podiatry consult.

## 2020-10-04 NOTE — CONSULT NOTE ADULT - SUBJECTIVE AND OBJECTIVE BOX
Podiatry pager #: [phone] ([place])/ [phone] ([facility])    Patient is a 74y old  Female who presents with a chief complaint of right leg pain     HPI:    History obtained from the sister Jose San over the phone.  	 73 yo F with pmhx of CVA (RT sided deficit, bed bound, expressive aphasia), HTN, HLD presents with 1d of worsening RT leg pain. States aide was massaging her leg and pt started crying in pain. Gave Tylenol without relief, continued to cry. Sister noted that patient wasn't appearing herself 2ds ago, but pt said "she is ok" No fever, vomtiing.   	Baseline: unable to understand what she is saying, unable to articulate sentences secondary to stroke for past 10 yrs.   	anotehr stroke 5 yrs ago.    	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 No recent trauma or reports of CP or SOB.    PAST MEDICAL & SURGICAL HISTORY:  HLD (hyperlipidemia)    CVA (cerebral vascular accident)    Benign Essential Hypertension    No significant past surgical history        MEDICATIONS  (STANDING):    MEDICATIONS  (PRN):      Allergies    apple (Hives)  carrots, beets (Unknown)  latex (Unknown)  penicillin (Urticaria)    Intolerances        VITALS:    Vital Signs Last 24 Hrs  T(C): 36.7 (04 Oct 2020 15:26), Max: 37.4 (04 Oct 2020 03:04)  T(F): 98 (04 Oct 2020 15:26), Max: 99.3 (04 Oct 2020 03:04)  HR: 79 (04 Oct 2020 17:15) (78 - 91)  BP: 172/94 (04 Oct 2020 17:15) (149/87 - 176/88)  BP(mean): --  RR: 18 (04 Oct 2020 17:15) (18 - 28)  SpO2: 100% (04 Oct 2020 17:15) (98% - 100%)    LABS:                          11.7   8.41  )-----------( 276      ( 04 Oct 2020 05:30 )             37.4       10-04    135  |  100  |  14  ----------------------------<  124<H>  5.0   |  21<L>  |  0.75    Ca    9.7      04 Oct 2020 05:30    TPro  9.0<H>  /  Alb  4.3  /  TBili  0.3  /  DBili  x   /  AST  31  /  ALT  15  /  AlkPhos  111  10-04      CAPILLARY BLOOD GLUCOSE      POCT Blood Glucose.: 126 mg/dL (04 Oct 2020 03:17)      PT/INR - ( 04 Oct 2020 05:30 )   PT: 11.5 SEC;   INR: 1.00          PTT - ( 04 Oct 2020 05:30 )  PTT:34.2 SEC    LOWER EXTREMITY PHYSICAL EXAM:    Vascular: DP/PT 2/4 B/L, CFT <3 seconds B/L, Temperature gradient warm to cool B/L  Neuro: Epicritic sensation intact to the level of digits B/L  Musculoskeletal/Ortho: +Fabian test RLE, right posterior calf warm to touch   Skin: no open wounds or lesions, hypertrophic incurvated right hallucal toenail, no pain on squeezing borders of b/l hallux toenails    RADIOLOGY & ADDITIONAL STUDIES:

## 2020-10-04 NOTE — ED ADULT TRIAGE NOTE - CCCP TRG CHIEF CMPLNT
right lower leg pain altered mental status/right lower leg pain altered mental status/right body pain

## 2020-10-04 NOTE — ED PROVIDER NOTE - PROGRESS NOTE DETAILS
Resident Hersimran: Sister (Jose San) - complaining of pain and swelling  in her leg since yesterday. Started crying in the monring, after aide started the machine for massage.  Wednesday - change in facial features. However was able to verbalize that she is okay. Did not complain about anything.    Unable to do anything   Baseline: unable to understand what she is saying, unable to articulate secondary to stroke for past 10 yrs.   anotehr stroke 5 yrs ago. +sweating, no fever. Tylenol at 730p for pain continued to cry. Hx of swelling in legs - water pills, never has pain.

## 2020-10-04 NOTE — ED PROVIDER NOTE - CLINICAL SUMMARY MEDICAL DECISION MAKING FREE TEXT BOX
pain in RUE and RLE - will assess for stroke. Sxs have been going on for 1d - will not activate stroke code. Labs, EKG. likely admit for further workup.

## 2020-10-04 NOTE — ED PROVIDER NOTE - OBJECTIVE STATEMENT
History obtained from the sister Jose San over the phone.   75 yo F with pmhx of CVA (RT sided deficit, bed bound, expressive aphasia), HTN, HLD presents with 1d of worsening RT leg pain. States aide was massaging her leg and pt started crying in pain. Gave Tylenol without relief, continued to cry. Sister noted that patient wasn't appearing herself 2ds ago, but pt said "she is ok" No fever, vomtiing.   Baseline: unable to understand what she is saying, unable to articulate sentences secondary to stroke for past 10 yrs.   anotehr stroke 5 yrs ago. History obtained from the sister Jose San over the phone.   73 yo F with pmhx of CVA (RT sided deficit, bed bound, expressive aphasia), HTN, HLD presents with 1d of worsening RT leg pain. States aide was massaging her leg and pt started crying in pain. Gave Tylenol without relief, continued to cry. Sister noted that patient wasn't appearing herself 2ds ago, but pt said "she is ok" No fever, vomtiing.   Baseline: unable to understand what she is saying, unable to articulate sentences secondary to stroke for past 10 yrs.   anotehr stroke 5 yrs ago.    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 No recent trauma or reports of CP or SOB.

## 2020-10-04 NOTE — ED ADULT NURSE NOTE - INTERVENTIONS DEFINITIONS
Instruct patient to call for assistance/Reinforce activity limits and safety measures with patient and family/Provide visual cue, wrist band, yellow gown, etc./Monitor for mental status changes and reorient to person, place, and time/Stretcher in lowest position, wheels locked, appropriate side rails in place

## 2020-10-04 NOTE — ED ADULT NURSE NOTE - OBJECTIVE STATEMENT
Pt arrives to room 28.  Pt is able to answer yes and no to questions but cannot explain situation.  Family called by Medical team to discuss reason for visit.  Pt has right sided residual weakness related to a past CVA.  Pt right arm is held close to body by pt.  Pt points with left hand to right side and moans in pain.  When asked if her right side hurts pt says yes.  Patients indicates from foot to upper body on right side.  When asked about left side pt denies pain.  Rectal temp performed.  Pt skin is dry and intact.  Pt appears well taken care of by family with no signs of neglect observed during assessment with MD and RN.  iv placement attempted by float RN, labs obtained but iv unable to be placed.  MD to place U/S guided iv.  Pt awaiting CT radiology

## 2020-10-05 LAB
CULTURE RESULTS: NO GROWTH — SIGNIFICANT CHANGE UP
SPECIMEN SOURCE: SIGNIFICANT CHANGE UP

## 2020-10-22 ENCOUNTER — INPATIENT (INPATIENT)
Facility: HOSPITAL | Age: 75
LOS: 7 days | Discharge: NURSING, REHAB OR LONG-TERM CARE | End: 2020-10-30
Attending: HOSPITALIST | Admitting: HOSPITALIST
Payer: MEDICARE

## 2020-10-22 VITALS
RESPIRATION RATE: 16 BRPM | HEIGHT: 65 IN | TEMPERATURE: 98 F | SYSTOLIC BLOOD PRESSURE: 134 MMHG | HEART RATE: 85 BPM | DIASTOLIC BLOOD PRESSURE: 98 MMHG

## 2020-10-22 LAB
APPEARANCE UR: SIGNIFICANT CHANGE UP
B PERT DNA SPEC QL NAA+PROBE: SIGNIFICANT CHANGE UP
BACTERIA # UR AUTO: HIGH
BASE EXCESS BLDV CALC-SCNC: 4.2 MMOL/L — SIGNIFICANT CHANGE UP
BASOPHILS # BLD AUTO: 0.05 K/UL — SIGNIFICANT CHANGE UP (ref 0–0.2)
BASOPHILS NFR BLD AUTO: 0.5 % — SIGNIFICANT CHANGE UP (ref 0–2)
BILIRUB UR-MCNC: NEGATIVE — SIGNIFICANT CHANGE UP
BLOOD GAS VENOUS - CREATININE: 1.16 MG/DL — SIGNIFICANT CHANGE UP (ref 0.5–1.3)
BLOOD GAS VENOUS - FIO2: 21 — SIGNIFICANT CHANGE UP
BLOOD UR QL VISUAL: NEGATIVE — SIGNIFICANT CHANGE UP
C PNEUM DNA SPEC QL NAA+PROBE: SIGNIFICANT CHANGE UP
CHLORIDE BLDV-SCNC: 104 MMOL/L — SIGNIFICANT CHANGE UP (ref 96–108)
COLOR SPEC: SIGNIFICANT CHANGE UP
D DIMER BLD IA.RAPID-MCNC: 799 NG/ML — SIGNIFICANT CHANGE UP
EOSINOPHIL # BLD AUTO: 0.09 K/UL — SIGNIFICANT CHANGE UP (ref 0–0.5)
EOSINOPHIL NFR BLD AUTO: 1 % — SIGNIFICANT CHANGE UP (ref 0–6)
FLUAV H1 2009 PAND RNA SPEC QL NAA+PROBE: SIGNIFICANT CHANGE UP
FLUAV H1 RNA SPEC QL NAA+PROBE: SIGNIFICANT CHANGE UP
FLUAV H3 RNA SPEC QL NAA+PROBE: SIGNIFICANT CHANGE UP
FLUAV SUBTYP SPEC NAA+PROBE: SIGNIFICANT CHANGE UP
FLUBV RNA SPEC QL NAA+PROBE: SIGNIFICANT CHANGE UP
GAS PNL BLDV: 143 MMOL/L — SIGNIFICANT CHANGE UP (ref 136–146)
GLUCOSE BLDV-MCNC: 97 MG/DL — SIGNIFICANT CHANGE UP (ref 70–99)
GLUCOSE UR-MCNC: NEGATIVE — SIGNIFICANT CHANGE UP
HADV DNA SPEC QL NAA+PROBE: SIGNIFICANT CHANGE UP
HCO3 BLDV-SCNC: 26 MMOL/L — SIGNIFICANT CHANGE UP (ref 20–27)
HCOV PNL SPEC NAA+PROBE: SIGNIFICANT CHANGE UP
HCT VFR BLD CALC: 33.7 % — LOW (ref 34.5–45)
HCT VFR BLDV CALC: 35.4 % — SIGNIFICANT CHANGE UP (ref 34.5–45)
HGB BLD-MCNC: 10.8 G/DL — LOW (ref 11.5–15.5)
HGB BLDV-MCNC: 11.5 G/DL — SIGNIFICANT CHANGE UP (ref 11.5–15.5)
HMPV RNA SPEC QL NAA+PROBE: SIGNIFICANT CHANGE UP
HPIV1 RNA SPEC QL NAA+PROBE: SIGNIFICANT CHANGE UP
HPIV2 RNA SPEC QL NAA+PROBE: SIGNIFICANT CHANGE UP
HPIV3 RNA SPEC QL NAA+PROBE: SIGNIFICANT CHANGE UP
HPIV4 RNA SPEC QL NAA+PROBE: SIGNIFICANT CHANGE UP
HYALINE CASTS # UR AUTO: NEGATIVE — SIGNIFICANT CHANGE UP
IMM GRANULOCYTES NFR BLD AUTO: 0.5 % — SIGNIFICANT CHANGE UP (ref 0–1.5)
KETONES UR-MCNC: NEGATIVE — SIGNIFICANT CHANGE UP
LACTATE BLDV-MCNC: 2.4 MMOL/L — HIGH (ref 0.5–2)
LEUKOCYTE ESTERASE UR-ACNC: SIGNIFICANT CHANGE UP
LYMPHOCYTES # BLD AUTO: 2.13 K/UL — SIGNIFICANT CHANGE UP (ref 1–3.3)
LYMPHOCYTES # BLD AUTO: 22.9 % — SIGNIFICANT CHANGE UP (ref 13–44)
MCHC RBC-ENTMCNC: 27.4 PG — SIGNIFICANT CHANGE UP (ref 27–34)
MCHC RBC-ENTMCNC: 32 % — SIGNIFICANT CHANGE UP (ref 32–36)
MCV RBC AUTO: 85.5 FL — SIGNIFICANT CHANGE UP (ref 80–100)
MONOCYTES # BLD AUTO: 0.72 K/UL — SIGNIFICANT CHANGE UP (ref 0–0.9)
MONOCYTES NFR BLD AUTO: 7.7 % — SIGNIFICANT CHANGE UP (ref 2–14)
NEUTROPHILS # BLD AUTO: 6.26 K/UL — SIGNIFICANT CHANGE UP (ref 1.8–7.4)
NEUTROPHILS NFR BLD AUTO: 67.4 % — SIGNIFICANT CHANGE UP (ref 43–77)
NITRITE UR-MCNC: NEGATIVE — SIGNIFICANT CHANGE UP
NRBC # FLD: 0 K/UL — SIGNIFICANT CHANGE UP (ref 0–0)
PCO2 BLDV: 62 MMHG — HIGH (ref 41–51)
PH BLDV: 7.31 PH — LOW (ref 7.32–7.43)
PH UR: 7 — SIGNIFICANT CHANGE UP (ref 5–8)
PLATELET # BLD AUTO: 242 K/UL — SIGNIFICANT CHANGE UP (ref 150–400)
PMV BLD: 10.2 FL — SIGNIFICANT CHANGE UP (ref 7–13)
PO2 BLDV: 29 MMHG — LOW (ref 35–40)
POTASSIUM BLDV-SCNC: 4.6 MMOL/L — HIGH (ref 3.4–4.5)
PROT UR-MCNC: NEGATIVE — SIGNIFICANT CHANGE UP
RAPID RVP RESULT: SIGNIFICANT CHANGE UP
RBC # BLD: 3.94 M/UL — SIGNIFICANT CHANGE UP (ref 3.8–5.2)
RBC # FLD: 15.9 % — HIGH (ref 10.3–14.5)
RBC CASTS # UR COMP ASSIST: SIGNIFICANT CHANGE UP (ref 0–?)
RSV RNA SPEC QL NAA+PROBE: SIGNIFICANT CHANGE UP
RV+EV RNA SPEC QL NAA+PROBE: SIGNIFICANT CHANGE UP
SAO2 % BLDV: 41.7 % — LOW (ref 60–85)
SARS-COV-2 RNA SPEC QL NAA+PROBE: SIGNIFICANT CHANGE UP
SP GR SPEC: 1.01 — SIGNIFICANT CHANGE UP (ref 1–1.04)
SQUAMOUS # UR AUTO: SIGNIFICANT CHANGE UP
TROPONIN T, HIGH SENSITIVITY: 18 NG/L — SIGNIFICANT CHANGE UP (ref ?–14)
TROPONIN T, HIGH SENSITIVITY: 19 NG/L — SIGNIFICANT CHANGE UP (ref ?–14)
UROBILINOGEN FLD QL: NORMAL — SIGNIFICANT CHANGE UP
WBC # BLD: 9.3 K/UL — SIGNIFICANT CHANGE UP (ref 3.8–10.5)
WBC # FLD AUTO: 9.3 K/UL — SIGNIFICANT CHANGE UP (ref 3.8–10.5)
WBC UR QL: >50 — HIGH (ref 0–?)

## 2020-10-22 PROCEDURE — 74177 CT ABD & PELVIS W/CONTRAST: CPT | Mod: 26

## 2020-10-22 PROCEDURE — 71275 CT ANGIOGRAPHY CHEST: CPT | Mod: 26

## 2020-10-22 PROCEDURE — 99284 EMERGENCY DEPT VISIT MOD MDM: CPT

## 2020-10-22 PROCEDURE — 71045 X-RAY EXAM CHEST 1 VIEW: CPT | Mod: 26

## 2020-10-22 RX ORDER — SODIUM CHLORIDE 9 MG/ML
1000 INJECTION INTRAMUSCULAR; INTRAVENOUS; SUBCUTANEOUS ONCE
Refills: 0 | Status: COMPLETED | OUTPATIENT
Start: 2020-10-22 | End: 2020-10-22

## 2020-10-22 RX ORDER — ACETAMINOPHEN 500 MG
650 TABLET ORAL ONCE
Refills: 0 | Status: COMPLETED | OUTPATIENT
Start: 2020-10-22 | End: 2020-10-22

## 2020-10-22 RX ADMIN — SODIUM CHLORIDE 1000 MILLILITER(S): 9 INJECTION INTRAMUSCULAR; INTRAVENOUS; SUBCUTANEOUS at 19:21

## 2020-10-22 RX ADMIN — Medication 1 TABLET(S): at 19:21

## 2020-10-22 NOTE — ED ADULT TRIAGE NOTE - CHIEF COMPLAINT QUOTE
Pt BIBA for eval of foul urine, sent in by PMD  [please call Tish  she is in the tent]  pt Hx of Parkinsons and Left sided stroke with Right sided deficits; pt AOX1, pt has difficulty speaking to answer questions but denies pain

## 2020-10-22 NOTE — ED PROVIDER NOTE - CLINICAL SUMMARY MEDICAL DECISION MAKING FREE TEXT BOX
75 YO F with hx of stroke (residual weakness b/l and aphasia) presents to the ED secondary to urinary discomfort according to pts sister. Sister was not able to give me a good history of what the pt is experiencing at the moment, however when I ask the pt if she has CP, and SOB she says yes. VS show elevated pt and rectal temp was found to be 97.8. Will order labs, trop, ddimer, cxr, ekg, ua, ucx. Will wait for results and reassess pt. DDX: UTI vs pna vs ACS

## 2020-10-22 NOTE — ED PROVIDER NOTE - PHYSICAL EXAMINATION
Const:  Obese pt, understands commands, but not able express herself   HEENT: Normocephalic, atraumatic; no conjunctival pallor; slightly dry oral mucosa; Oropharynx clear; Neck supple  CV: Heart regular, normal S1/2, no murmurs; Extremities WWPx4; 2+ radial pulses b/l  Pulm: Lungs clear to auscultation bilaterally; No wheezing, rhonchi, or crackles   GI: protuberant abdomen and feels hardened,   Neuro: Able to say her first name, not able to tell me her location or the date. R arm unable to assess weakness due to immobility. L arm strength 5/5 sensation intact. Lower extremity tenderness b/l, equal in diameter.

## 2020-10-22 NOTE — ED ADULT NURSE NOTE - OBJECTIVE STATEMENT
Break covering RN: Pt received to room 10 c/o UTI. Pt a&ox1 oriented to self and bed bound. Pt appears anxious, only providing yes and no answers at this time. Pt shakes her head yes when asked if she is having pain while peeing. Pt denies abdominal pain, fever, chills, n/v/d, ha, cp, or palpations, Pt observed to be tachypneic sating 100% on RA. Pt abdomen distended and nontender to touch. Pt skin intact. pt noted to have R sided weakness due to prior CVA.  Pt placed on cardiac monitor reading NSR. Vital signs as noted, call bell in reach, comfort measures provided, pt awaiting US guided IV at this time. will give report to primary RN.

## 2020-10-22 NOTE — ED ADULT NURSE REASSESSMENT NOTE - NS ED NURSE REASSESS COMMENT FT1
received report from micky DARDEN. Pt is a/o x 3. no complaints of chest pain, headache, nausea, dizzniness, vomiting, SOB, fever, chills   verbalized. Pt has 20g iv placed to left AC with no redness or swelling noted. Pt on cardiac monitor in NSR. Awaiting further orders. Will continue to monitor.

## 2020-10-22 NOTE — ED PROVIDER NOTE - PROGRESS NOTE DETAILS
Reevaluated pt who's cousin is at bedside. Discussed finding up to this point and told her I would call her sister Abdulaziz. Pt still not feeling very well. Will order fluids and tylenol.

## 2020-10-22 NOTE — ED ADULT NURSE NOTE - NSIMPLEMENTINTERV_GEN_ALL_ED
Implemented All Fall with Harm Risk Interventions:  [place] to call system. Call bell, personal items and telephone within reach. Instruct patient to call for assistance. Room bathroom lighting operational. Non-slip footwear when patient is off stretcher. Physically safe environment: no spills, clutter or unnecessary equipment. Stretcher in lowest position, wheels locked, appropriate side rails in place. Provide visual cue, wrist band, yellow gown, etc. Monitor gait and stability. Monitor for mental status changes and reorient to person, place, and time. Review medications for side effects contributing to fall risk. Reinforce activity limits and safety measures with patient and family. Provide visual clues: red socks.

## 2020-10-22 NOTE — ED PROVIDER NOTE - OBJECTIVE STATEMENT
73 YO F with hx CVA (RT sided deficit, bed bound, expressive aphasia), HTN, HLD presents to the ED accompanied by sister Tish. Per sister pt has been experiencing pain with urination. When I ask the pt if she has SOB, and CP, the pt responds yes. Per the sister she does not know if there is anything else that could be bothering the pt.

## 2020-10-22 NOTE — ED PROVIDER NOTE - ATTENDING CONTRIBUTION TO CARE
I performed a history and physical exam of the patient and discussed their management with the resident. I reviewed the resident's note and agree with the documented findings and plan of care. I have edited as appropriate. My medical decision making and observations are found above.  YVONNE, milagros s/nt

## 2020-10-23 DIAGNOSIS — I10 ESSENTIAL (PRIMARY) HYPERTENSION: ICD-10-CM

## 2020-10-23 DIAGNOSIS — R06.82 TACHYPNEA, NOT ELSEWHERE CLASSIFIED: ICD-10-CM

## 2020-10-23 DIAGNOSIS — Z90.710 ACQUIRED ABSENCE OF BOTH CERVIX AND UTERUS: Chronic | ICD-10-CM

## 2020-10-23 DIAGNOSIS — I63.9 CEREBRAL INFARCTION, UNSPECIFIED: ICD-10-CM

## 2020-10-23 DIAGNOSIS — D64.9 ANEMIA, UNSPECIFIED: ICD-10-CM

## 2020-10-23 DIAGNOSIS — Z29.9 ENCOUNTER FOR PROPHYLACTIC MEASURES, UNSPECIFIED: ICD-10-CM

## 2020-10-23 DIAGNOSIS — E78.5 HYPERLIPIDEMIA, UNSPECIFIED: ICD-10-CM

## 2020-10-23 DIAGNOSIS — N39.0 URINARY TRACT INFECTION, SITE NOT SPECIFIED: ICD-10-CM

## 2020-10-23 LAB
ANION GAP SERPL CALC-SCNC: 12 MMO/L — SIGNIFICANT CHANGE UP (ref 7–14)
BASE EXCESS BLDV CALC-SCNC: 3.2 MMOL/L — SIGNIFICANT CHANGE UP
BLOOD GAS VENOUS - CREATININE: 1.13 MG/DL — SIGNIFICANT CHANGE UP (ref 0.5–1.3)
BLOOD GAS VENOUS - FIO2: 21 — SIGNIFICANT CHANGE UP
BUN SERPL-MCNC: 18 MG/DL — SIGNIFICANT CHANGE UP (ref 7–23)
CALCIUM SERPL-MCNC: 8.8 MG/DL — SIGNIFICANT CHANGE UP (ref 8.4–10.5)
CHLORIDE BLDV-SCNC: 111 MMOL/L — HIGH (ref 96–108)
CHLORIDE SERPL-SCNC: 103 MMOL/L — SIGNIFICANT CHANGE UP (ref 98–107)
CO2 SERPL-SCNC: 25 MMOL/L — SIGNIFICANT CHANGE UP (ref 22–31)
CREAT SERPL-MCNC: 1.03 MG/DL — SIGNIFICANT CHANGE UP (ref 0.5–1.3)
FERRITIN SERPL-MCNC: 67.67 NG/ML — SIGNIFICANT CHANGE UP (ref 15–150)
GAS PNL BLDV: 135 MMOL/L — LOW (ref 136–146)
GLUCOSE BLDV-MCNC: 83 MG/DL — SIGNIFICANT CHANGE UP (ref 70–99)
GLUCOSE SERPL-MCNC: 83 MG/DL — SIGNIFICANT CHANGE UP (ref 70–99)
HCO3 BLDV-SCNC: 27 MMOL/L — SIGNIFICANT CHANGE UP (ref 20–27)
HCT VFR BLD CALC: 32.6 % — LOW (ref 34.5–45)
HCT VFR BLDV CALC: 34.3 % — LOW (ref 34.5–45)
HGB BLD-MCNC: 10.2 G/DL — LOW (ref 11.5–15.5)
HGB BLDV-MCNC: 11.1 G/DL — LOW (ref 11.5–15.5)
INR BLD: 1.11 — SIGNIFICANT CHANGE UP (ref 0.88–1.16)
IRON SATN MFR SERPL: 282 UG/DL — SIGNIFICANT CHANGE UP (ref 140–530)
IRON SATN MFR SERPL: 55 UG/DL — SIGNIFICANT CHANGE UP (ref 30–160)
LACTATE BLDV-MCNC: 0.9 MMOL/L — SIGNIFICANT CHANGE UP (ref 0.5–2)
MAGNESIUM SERPL-MCNC: 2.5 MG/DL — SIGNIFICANT CHANGE UP (ref 1.6–2.6)
MCHC RBC-ENTMCNC: 27.3 PG — SIGNIFICANT CHANGE UP (ref 27–34)
MCHC RBC-ENTMCNC: 31.3 % — LOW (ref 32–36)
MCV RBC AUTO: 87.2 FL — SIGNIFICANT CHANGE UP (ref 80–100)
NRBC # FLD: 0 K/UL — SIGNIFICANT CHANGE UP (ref 0–0)
PCO2 BLDV: 47 MMHG — SIGNIFICANT CHANGE UP (ref 41–51)
PH BLDV: 7.39 PH — SIGNIFICANT CHANGE UP (ref 7.32–7.43)
PHOSPHATE SERPL-MCNC: 4.3 MG/DL — SIGNIFICANT CHANGE UP (ref 2.5–4.5)
PLATELET # BLD AUTO: 225 K/UL — SIGNIFICANT CHANGE UP (ref 150–400)
PMV BLD: 10.3 FL — SIGNIFICANT CHANGE UP (ref 7–13)
PO2 BLDV: 57 MMHG — HIGH (ref 35–40)
POTASSIUM BLDV-SCNC: 4.2 MMOL/L — SIGNIFICANT CHANGE UP (ref 3.4–4.5)
POTASSIUM SERPL-MCNC: 4.3 MMOL/L — SIGNIFICANT CHANGE UP (ref 3.5–5.3)
POTASSIUM SERPL-SCNC: 4.3 MMOL/L — SIGNIFICANT CHANGE UP (ref 3.5–5.3)
PROTHROM AB SERPL-ACNC: 12.7 SEC — SIGNIFICANT CHANGE UP (ref 10.6–13.6)
RBC # BLD: 3.74 M/UL — LOW (ref 3.8–5.2)
RBC # FLD: 16.1 % — HIGH (ref 10.3–14.5)
SAO2 % BLDV: 87.4 % — HIGH (ref 60–85)
SODIUM SERPL-SCNC: 140 MMOL/L — SIGNIFICANT CHANGE UP (ref 135–145)
T3 SERPL-MCNC: 121 NG/DL — SIGNIFICANT CHANGE UP (ref 80–200)
T4 AB SER-ACNC: 11.27 UG/DL — SIGNIFICANT CHANGE UP (ref 5.1–13)
T4 FREE SERPL-MCNC: 1.34 NG/DL — SIGNIFICANT CHANGE UP (ref 0.9–1.8)
TSH SERPL-MCNC: 10.25 UIU/ML — HIGH (ref 0.27–4.2)
UIBC SERPL-MCNC: 226.5 UG/DL — SIGNIFICANT CHANGE UP (ref 110–370)
WBC # BLD: 7.66 K/UL — SIGNIFICANT CHANGE UP (ref 3.8–10.5)
WBC # FLD AUTO: 7.66 K/UL — SIGNIFICANT CHANGE UP (ref 3.8–10.5)

## 2020-10-23 PROCEDURE — 99223 1ST HOSP IP/OBS HIGH 75: CPT

## 2020-10-23 RX ORDER — PANTOPRAZOLE SODIUM 20 MG/1
1 TABLET, DELAYED RELEASE ORAL
Qty: 0 | Refills: 0 | DISCHARGE

## 2020-10-23 RX ORDER — NICARDIPINE HYDROCHLORIDE 30 MG/1
10 CAPSULE, EXTENDED RELEASE ORAL
Refills: 0 | Status: DISCONTINUED | OUTPATIENT
Start: 2020-10-23 | End: 2020-10-23

## 2020-10-23 RX ORDER — FUROSEMIDE 40 MG
1 TABLET ORAL
Qty: 0 | Refills: 0 | DISCHARGE

## 2020-10-23 RX ORDER — SIMVASTATIN 20 MG/1
1 TABLET, FILM COATED ORAL
Qty: 0 | Refills: 0 | DISCHARGE

## 2020-10-23 RX ORDER — CIPROFLOXACIN LACTATE 400MG/40ML
400 VIAL (ML) INTRAVENOUS ONCE
Refills: 0 | Status: COMPLETED | OUTPATIENT
Start: 2020-10-23 | End: 2020-10-23

## 2020-10-23 RX ORDER — LOSARTAN POTASSIUM 100 MG/1
100 TABLET, FILM COATED ORAL DAILY
Refills: 0 | Status: DISCONTINUED | OUTPATIENT
Start: 2020-10-23 | End: 2020-10-30

## 2020-10-23 RX ORDER — NICARDIPINE HYDROCHLORIDE 30 MG/1
40 CAPSULE, EXTENDED RELEASE ORAL
Refills: 0 | Status: DISCONTINUED | OUTPATIENT
Start: 2020-10-23 | End: 2020-10-30

## 2020-10-23 RX ORDER — NICARDIPINE HYDROCHLORIDE 30 MG/1
20 CAPSULE, EXTENDED RELEASE ORAL
Refills: 0 | Status: DISCONTINUED | OUTPATIENT
Start: 2020-10-23 | End: 2020-10-30

## 2020-10-23 RX ORDER — ASPIRIN/CALCIUM CARB/MAGNESIUM 324 MG
1 TABLET ORAL
Qty: 0 | Refills: 0 | DISCHARGE

## 2020-10-23 RX ORDER — PANTOPRAZOLE SODIUM 20 MG/1
40 TABLET, DELAYED RELEASE ORAL
Refills: 0 | Status: DISCONTINUED | OUTPATIENT
Start: 2020-10-23 | End: 2020-10-30

## 2020-10-23 RX ORDER — FUROSEMIDE 40 MG
40 TABLET ORAL DAILY
Refills: 0 | Status: DISCONTINUED | OUTPATIENT
Start: 2020-10-23 | End: 2020-10-30

## 2020-10-23 RX ORDER — SIMVASTATIN 20 MG/1
10 TABLET, FILM COATED ORAL AT BEDTIME
Refills: 0 | Status: DISCONTINUED | OUTPATIENT
Start: 2020-10-23 | End: 2020-10-30

## 2020-10-23 RX ORDER — CIPROFLOXACIN LACTATE 400MG/40ML
400 VIAL (ML) INTRAVENOUS EVERY 12 HOURS
Refills: 0 | Status: DISCONTINUED | OUTPATIENT
Start: 2020-10-23 | End: 2020-10-26

## 2020-10-23 RX ORDER — SERTRALINE 25 MG/1
50 TABLET, FILM COATED ORAL DAILY
Refills: 0 | Status: DISCONTINUED | OUTPATIENT
Start: 2020-10-23 | End: 2020-10-30

## 2020-10-23 RX ORDER — DIVALPROEX SODIUM 500 MG/1
500 TABLET, DELAYED RELEASE ORAL
Refills: 0 | Status: DISCONTINUED | OUTPATIENT
Start: 2020-10-23 | End: 2020-10-30

## 2020-10-23 RX ORDER — NICARDIPINE HYDROCHLORIDE 30 MG/1
20 CAPSULE, EXTENDED RELEASE ORAL
Refills: 0 | Status: DISCONTINUED | OUTPATIENT
Start: 2020-10-23 | End: 2020-10-23

## 2020-10-23 RX ORDER — ASPIRIN/CALCIUM CARB/MAGNESIUM 324 MG
81 TABLET ORAL DAILY
Refills: 0 | Status: DISCONTINUED | OUTPATIENT
Start: 2020-10-23 | End: 2020-10-30

## 2020-10-23 RX ORDER — ENOXAPARIN SODIUM 100 MG/ML
40 INJECTION SUBCUTANEOUS EVERY 24 HOURS
Refills: 0 | Status: DISCONTINUED | OUTPATIENT
Start: 2020-10-23 | End: 2020-10-30

## 2020-10-23 RX ADMIN — Medication 200 MILLIGRAM(S): at 18:14

## 2020-10-23 RX ADMIN — SIMVASTATIN 10 MILLIGRAM(S): 20 TABLET, FILM COATED ORAL at 22:07

## 2020-10-23 RX ADMIN — DIVALPROEX SODIUM 500 MILLIGRAM(S): 500 TABLET, DELAYED RELEASE ORAL at 18:14

## 2020-10-23 RX ADMIN — SERTRALINE 50 MILLIGRAM(S): 25 TABLET, FILM COATED ORAL at 15:48

## 2020-10-23 RX ADMIN — NICARDIPINE HYDROCHLORIDE 20 MILLIGRAM(S): 30 CAPSULE, EXTENDED RELEASE ORAL at 15:48

## 2020-10-23 RX ADMIN — ENOXAPARIN SODIUM 40 MILLIGRAM(S): 100 INJECTION SUBCUTANEOUS at 18:14

## 2020-10-23 RX ADMIN — Medication 200 MILLIGRAM(S): at 02:23

## 2020-10-23 RX ADMIN — Medication 81 MILLIGRAM(S): at 15:48

## 2020-10-23 RX ADMIN — NICARDIPINE HYDROCHLORIDE 20 MILLIGRAM(S): 30 CAPSULE, EXTENDED RELEASE ORAL at 22:08

## 2020-10-23 NOTE — H&P ADULT - HISTORY OF PRESENT ILLNESS
74-year-old female with history of CVA w/residual right-sided deficit, bed bound, expressive aphasia, HTN, HLD presenting with dysuria.     In the ED VS: 98.4  74-85  134-151/62-98  16-30  %RA, received trimethoprim/sulfamethoxazole 160/800mg PO x1 and ciprofloxacin 400mg IVPB x1, 1L NS IVF, acetaminophen 650mg PO x1 74-year-old female with history of CVA w/residual right-sided deficit, bed bound, expressive aphasia, HTN, HLD presenting with dysuria. Patient able to answer yes/no questions, aside from lower abdominal discomfort is without complaint. History obtained from sister via phone, 2-3weeks ago patient complained of pain from head to foot, was seen in the ED, had a RLE duplex that was negative for DVT, has had a decline in health since that time and has been unable to stand with assistance since that time. Sister also notes recent "wheezing at night" per sister, no coughing.     In the ED VS: 98.4  74-85  134-151/62-98  16-30  %RA, received trimethoprim/sulfamethoxazole 160/800mg PO x1 and ciprofloxacin 400mg IVPB x1, 1L NS IVF, acetaminophen 650mg PO x1

## 2020-10-23 NOTE — H&P ADULT - ASSESSMENT
74-year-old female with history of CVA w/residual right-sided deficit, bed bound, expressive aphasia, HTN, HLD presenting with UTI

## 2020-10-23 NOTE — PHYSICAL THERAPY INITIAL EVALUATION ADULT - PATIENT PROFILE REVIEW, REHAB EVAL
ACTIVITY: Increase as Tolerated; spoke with RN Erika Abreu prior to PT evaluation--> Pt OK for PT consult./yes

## 2020-10-23 NOTE — H&P ADULT - NSHPLABSRESULTS_GEN_ALL_CORE
10.8   9.30  )-----------( 242      ( 22 Oct 2020 17:11 )             33.7     10-22    140  |  104  |  20  ----------------------------<  97  4.5   |  25  |  1.02    Ca    8.6      22 Oct 2020 19:00  Phos  3.8     10-22  Mg     2.5     10-22    TPro  7.2  /  Alb  3.7  /  TBili  0.3  /  DBili  x   /  AST  11  /  ALT  7   /  AlkPhos  90  10-22    17:01 - VBG - pH: 7.31  | pCO2: 62    | pO2: 29    | Lactate: 2.4      D-Dimer Assay, Quantitative: 799 ng/mL (10.22.20 @ 17:01)    Troponin T, High Sensitivity: 19 ng/L (10.22.20 @ 19:00)  Troponin T, High Sensitivity: 18 ng/L (10.22.20 @ 17:01)    Urinalysis Basic - ( 22 Oct 2020 17:58 )  Color: LIGHT YELLOW / Appearance: Lt TURBID / S.011 / pH: 7.0  Gluc: NEGATIVE / Ketone: NEGATIVE  / Bili: NEGATIVE / Urobili: NORMAL   Blood: NEGATIVE / Protein: NEGATIVE / Nitrite: NEGATIVE   Leuk Esterase: LARGE / RBC: 3-5 / WBC >50   Sq Epi: OCC / Non Sq Epi: x / Bacteria: MANY    RVP/COVID negative    < from: CT Angio Chest w/ IV Cont (10.22.20 @ 23:12) >/< from: CT Abdomen and Pelvis w/ IV Cont (10.22.20 @ 23:13) >  CHEST:  LUNGS AND LARGE AIRWAYS: Patent central airways. No pulmonary consolidation.  PLEURA: No pleural effusion.  VESSELS: Nondiagnostic for detection of pulmonary emboli due to suboptimal opacification and artifact from respiratory motion.  HEART: Heart size is normal. No pericardial effusion.  MEDIASTINUM AND MADELINE: No lymphadenopathy.  CHEST WALL AND LOWER NECK: Within normal limits.  ABDOMEN AND PELVIS:  LIVER: Within normal limits.  BILE DUCTS: Normal caliber.  GALLBLADDER: Within normal limits.  SPLEEN: Within normal limits.  PANCREAS: Within normal limits.  ADRENALS: Within normal limits.  KIDNEYS/URETERS: Within normal limits.  BLADDER: Within normal limits.  REPRODUCTIVE ORGANS: Hysterectomy.  BOWEL: No bowel obstruction. Appendix is normal.  PERITONEUM: No ascites.  VESSELS: IVC filter.  RETROPERITONEUM/LYMPH NODES: No lymphadenopathy.  ABDOMINAL WALL: Within normal limits.  BONES: Degenerative changes.  IMPRESSION: Nondiagnostic study for detection of pulmonary emboli due to respiratory motion artifact and suboptimal opacification. No pulmonary consolidation. No acute abdominal pathology.  < end of copied text >

## 2020-10-23 NOTE — ED ADULT NURSE REASSESSMENT NOTE - NS ED NURSE REASSESS COMMENT FT1
Report given to [facility] 2 RN. Pt a&ox4 and bed bound. Pt respirations even and unlabored. pt aware of plan of care and being transported at this time.

## 2020-10-23 NOTE — PHYSICAL THERAPY INITIAL EVALUATION ADULT - PASSIVE RANGE OF MOTION EXAMINATION, REHAB EVAL
Right shoulder flexion ~75 degrees, right elbow/wrist WFL, right hand contracted/Left LE Passive ROM was WFL (within functional limits)/Left UE Passive ROM was WFL (within functional limits)

## 2020-10-23 NOTE — H&P ADULT - PROBLEM SELECTOR PLAN 1
c/w ciprofloxacin  f/u UCx c/w ciprofloxacin  f/u UCx  #lactate blood increased - s/p IVF in ED, repeat with AM labs

## 2020-10-23 NOTE — PHYSICAL THERAPY INITIAL EVALUATION ADULT - ADDITIONAL COMMENTS
Pt is a poor historian; information regarding pt's prior level of function needs to be obtained from pt's family member. As per last Care Coordination Assessment note in February 2019, pt lives in a private house and has 2 friends that are private pay to stay with her around the clock. As per pt, she is bed bound.    Pt left comfortable in bed, NAD, all lines intact, all precautions maintained, with call bell in reach, bed alarm on, and RN aware of PT evaluation.

## 2020-10-23 NOTE — H&P ADULT - PROBLEM SELECTOR PLAN 2
CT chest as above  given elevated d-dimer, history of LE DVT (s/p a/c per sister, IVC filter noted on imaging), will repeat LE dopplers; would eval patient for sleep apnea given report of "wheezing at night" that sister reports CT chest as above, non-diagnostic for PE, not tachycardic, will hold off on pursuing further imaging at present  given elevated d-dimer, history of LE DVT (s/p a/c per sister, IVC filter noted on imaging), will repeat LE dopplers; would eval patient for sleep apnea given report of "wheezing at night" that sister reports

## 2020-10-23 NOTE — H&P ADULT - NSHPPHYSICALEXAM_GEN_ALL_CORE
PHYSICAL EXAM:  GENERAL: NAD, well-developed, well-nourished  HEAD:  Atraumatic, Normocephalic  EYES: conjunctiva and sclera clear  NECK: Supple, No LAD  CHEST/LUNG: Clear to auscultation bilaterally; No wheezes, rales or rhonchi  HEART: Regular rate and rhythm; No murmurs, rubs, or gallops, (+)S1, S2  ABDOMEN: Soft, Nontender, Nondistended; Normal Bowel sounds; bladder slightly distended - patient reports need to urinate  EXTREMITIES:  2+ Peripheral Pulses, No clubbing, cyanosis, or edema  PSYCH: normal mood and affect  NEUROLOGY: AAOxperson, place; unable to verbalize date (baseline per sister), 1/5 strength RLE; 0/5 strength RUE/contracted  SKIN: No rashes or lesions on limited exam

## 2020-10-23 NOTE — PHYSICAL THERAPY INITIAL EVALUATION ADULT - DISCHARGE DISPOSITION, PT EVAL
no skilled PT needs/home/Pt appears to be at baseline level; will keep pt on PT program while @ [facility] to prevent further weakness.

## 2020-10-23 NOTE — H&P ADULT - NSHPREVIEWOFSYSTEMS_GEN_ALL_CORE
REVIEW OF SYSTEMS:    CONSTITUTIONAL: (+) weakness since last ED visit, no longer able to stand on her own, No fevers or chills  EYES/ENT: No visual changes;  No dysphagia  NECK: No pain or stiffness  RESPIRATORY: No cough; No shortness of breath  CARDIOVASCULAR: No chest pain or palpitations; No lower extremity edema  GASTROINTESTINAL: lower abdominal pain. No nausea, vomiting, or hematemesis; No diarrhea or constipation. No melena or hematochezia.  GENITOURINARY: (+) dysuria; No frequency or hematuria  NEUROLOGICAL: No numbness; No HA  SKIN: No itching, burning, rashes, or lesions; no history of sacral decub  All other review of systems is negative unless indicated above.

## 2020-10-23 NOTE — H&P ADULT - NSICDXPASTSURGICALHX_GEN_ALL_CORE_FT
PAST SURGICAL HISTORY:  No significant past surgical history      PAST SURGICAL HISTORY:  H/O: hysterectomy

## 2020-10-23 NOTE — H&P ADULT - PROBLEM SELECTOR PLAN 5
turn & position  PT consult given recent decrease in functional status  aspiration, fall precautions turn & position  PT consult given recent decrease in functional status  aspiration, fall precautions  c/w divalproex for sz ppx hx of CVA   turn & position  PT consult given recent decrease in functional status  aspiration, fall precautions  c/w divalproex for sz ppx

## 2020-10-23 NOTE — H&P ADULT - NSICDXPASTMEDICALHX_GEN_ALL_CORE_FT
PAST MEDICAL HISTORY:  Benign Essential Hypertension     CVA (cerebral vascular accident)     HLD (hyperlipidemia)      PAST MEDICAL HISTORY:  Benign Essential Hypertension     CVA (cerebral vascular accident)     DVT, lower extremity     HLD (hyperlipidemia)

## 2020-10-24 LAB
ANION GAP SERPL CALC-SCNC: 16 MMO/L — HIGH (ref 7–14)
BUN SERPL-MCNC: 15 MG/DL — SIGNIFICANT CHANGE UP (ref 7–23)
CALCIUM SERPL-MCNC: 8.7 MG/DL — SIGNIFICANT CHANGE UP (ref 8.4–10.5)
CHLORIDE SERPL-SCNC: 105 MMOL/L — SIGNIFICANT CHANGE UP (ref 98–107)
CO2 SERPL-SCNC: 17 MMOL/L — LOW (ref 22–31)
CREAT SERPL-MCNC: 1.03 MG/DL — SIGNIFICANT CHANGE UP (ref 0.5–1.3)
GLUCOSE SERPL-MCNC: 80 MG/DL — SIGNIFICANT CHANGE UP (ref 70–99)
HCT VFR BLD CALC: 34.5 % — SIGNIFICANT CHANGE UP (ref 34.5–45)
HGB BLD-MCNC: 10.7 G/DL — LOW (ref 11.5–15.5)
MAGNESIUM SERPL-MCNC: 2.5 MG/DL — SIGNIFICANT CHANGE UP (ref 1.6–2.6)
MCHC RBC-ENTMCNC: 27.3 PG — SIGNIFICANT CHANGE UP (ref 27–34)
MCHC RBC-ENTMCNC: 31 % — LOW (ref 32–36)
MCV RBC AUTO: 88 FL — SIGNIFICANT CHANGE UP (ref 80–100)
NRBC # FLD: 0 K/UL — SIGNIFICANT CHANGE UP (ref 0–0)
PHOSPHATE SERPL-MCNC: 4.3 MG/DL — SIGNIFICANT CHANGE UP (ref 2.5–4.5)
PLATELET # BLD AUTO: 218 K/UL — SIGNIFICANT CHANGE UP (ref 150–400)
PMV BLD: 10 FL — SIGNIFICANT CHANGE UP (ref 7–13)
POTASSIUM SERPL-MCNC: 4.8 MMOL/L — SIGNIFICANT CHANGE UP (ref 3.5–5.3)
POTASSIUM SERPL-SCNC: 4.8 MMOL/L — SIGNIFICANT CHANGE UP (ref 3.5–5.3)
RBC # BLD: 3.92 M/UL — SIGNIFICANT CHANGE UP (ref 3.8–5.2)
RBC # FLD: 15.9 % — HIGH (ref 10.3–14.5)
SODIUM SERPL-SCNC: 138 MMOL/L — SIGNIFICANT CHANGE UP (ref 135–145)
WBC # BLD: 7.41 K/UL — SIGNIFICANT CHANGE UP (ref 3.8–10.5)
WBC # FLD AUTO: 7.41 K/UL — SIGNIFICANT CHANGE UP (ref 3.8–10.5)

## 2020-10-24 PROCEDURE — 93970 EXTREMITY STUDY: CPT | Mod: 26

## 2020-10-24 RX ADMIN — ENOXAPARIN SODIUM 40 MILLIGRAM(S): 100 INJECTION SUBCUTANEOUS at 17:47

## 2020-10-24 RX ADMIN — NICARDIPINE HYDROCHLORIDE 20 MILLIGRAM(S): 30 CAPSULE, EXTENDED RELEASE ORAL at 13:29

## 2020-10-24 RX ADMIN — LOSARTAN POTASSIUM 100 MILLIGRAM(S): 100 TABLET, FILM COATED ORAL at 05:53

## 2020-10-24 RX ADMIN — Medication 200 MILLIGRAM(S): at 17:09

## 2020-10-24 RX ADMIN — SIMVASTATIN 10 MILLIGRAM(S): 20 TABLET, FILM COATED ORAL at 21:51

## 2020-10-24 RX ADMIN — Medication 81 MILLIGRAM(S): at 13:01

## 2020-10-24 RX ADMIN — DIVALPROEX SODIUM 500 MILLIGRAM(S): 500 TABLET, DELAYED RELEASE ORAL at 05:53

## 2020-10-24 RX ADMIN — PANTOPRAZOLE SODIUM 40 MILLIGRAM(S): 20 TABLET, DELAYED RELEASE ORAL at 05:53

## 2020-10-24 RX ADMIN — NICARDIPINE HYDROCHLORIDE 40 MILLIGRAM(S): 30 CAPSULE, EXTENDED RELEASE ORAL at 05:53

## 2020-10-24 RX ADMIN — NICARDIPINE HYDROCHLORIDE 20 MILLIGRAM(S): 30 CAPSULE, EXTENDED RELEASE ORAL at 21:51

## 2020-10-24 RX ADMIN — SERTRALINE 50 MILLIGRAM(S): 25 TABLET, FILM COATED ORAL at 13:28

## 2020-10-24 RX ADMIN — DIVALPROEX SODIUM 500 MILLIGRAM(S): 500 TABLET, DELAYED RELEASE ORAL at 17:09

## 2020-10-24 RX ADMIN — Medication 200 MILLIGRAM(S): at 05:52

## 2020-10-24 RX ADMIN — Medication 40 MILLIGRAM(S): at 05:53

## 2020-10-24 NOTE — PROGRESS NOTE ADULT - PROBLEM SELECTOR PLAN 2
CT chest as above, non-diagnostic for PE, not tachycardic, will hold off on pursuing further imaging at present  given elevated d-dimer, history of LE DVT (s/p a/c per sister, IVC filter noted on imaging), will repeat LE dopplers; would eval patient for sleep apnea given report of "wheezing at night" that sister reports

## 2020-10-24 NOTE — PROGRESS NOTE ADULT - PROBLEM SELECTOR PLAN 5
hx of CVA   turn & position  PT consult given recent decrease in functional status  aspiration, fall precautions  c/w divalproex for sz ppx

## 2020-10-25 ENCOUNTER — TRANSCRIPTION ENCOUNTER (OUTPATIENT)
Age: 75
End: 2020-10-25

## 2020-10-25 LAB
-  AMIKACIN: SIGNIFICANT CHANGE UP
-  AMOXICILLIN/CLAVULANIC ACID: SIGNIFICANT CHANGE UP
-  AMPICILLIN/SULBACTAM: SIGNIFICANT CHANGE UP
-  AMPICILLIN: SIGNIFICANT CHANGE UP
-  AZTREONAM: SIGNIFICANT CHANGE UP
-  CEFAZOLIN: SIGNIFICANT CHANGE UP
-  CEFEPIME: SIGNIFICANT CHANGE UP
-  CEFOXITIN: SIGNIFICANT CHANGE UP
-  CEFTRIAXONE: SIGNIFICANT CHANGE UP
-  CIPROFLOXACIN: SIGNIFICANT CHANGE UP
-  ERTAPENEM: SIGNIFICANT CHANGE UP
-  GENTAMICIN: SIGNIFICANT CHANGE UP
-  IMIPENEM: SIGNIFICANT CHANGE UP
-  LEVOFLOXACIN: SIGNIFICANT CHANGE UP
-  MEROPENEM: SIGNIFICANT CHANGE UP
-  NITROFURANTOIN: SIGNIFICANT CHANGE UP
-  PIPERACILLIN/TAZOBACTAM: SIGNIFICANT CHANGE UP
-  TIGECYCLINE: SIGNIFICANT CHANGE UP
-  TOBRAMYCIN: SIGNIFICANT CHANGE UP
-  TRIMETHOPRIM/SULFAMETHOXAZOLE: SIGNIFICANT CHANGE UP
ANION GAP SERPL CALC-SCNC: 11 MMO/L — SIGNIFICANT CHANGE UP (ref 7–14)
BUN SERPL-MCNC: 13 MG/DL — SIGNIFICANT CHANGE UP (ref 7–23)
CALCIUM SERPL-MCNC: 9.1 MG/DL — SIGNIFICANT CHANGE UP (ref 8.4–10.5)
CHLORIDE SERPL-SCNC: 105 MMOL/L — SIGNIFICANT CHANGE UP (ref 98–107)
CO2 SERPL-SCNC: 25 MMOL/L — SIGNIFICANT CHANGE UP (ref 22–31)
CREAT SERPL-MCNC: 1.09 MG/DL — SIGNIFICANT CHANGE UP (ref 0.5–1.3)
CULTURE RESULTS: SIGNIFICANT CHANGE UP
GLUCOSE SERPL-MCNC: 94 MG/DL — SIGNIFICANT CHANGE UP (ref 70–99)
HCT VFR BLD CALC: 37.6 % — SIGNIFICANT CHANGE UP (ref 34.5–45)
HGB BLD-MCNC: 11.7 G/DL — SIGNIFICANT CHANGE UP (ref 11.5–15.5)
MAGNESIUM SERPL-MCNC: 2.3 MG/DL — SIGNIFICANT CHANGE UP (ref 1.6–2.6)
MCHC RBC-ENTMCNC: 27.3 PG — SIGNIFICANT CHANGE UP (ref 27–34)
MCHC RBC-ENTMCNC: 31.1 % — LOW (ref 32–36)
MCV RBC AUTO: 87.6 FL — SIGNIFICANT CHANGE UP (ref 80–100)
METHOD TYPE: SIGNIFICANT CHANGE UP
NRBC # FLD: 0 K/UL — SIGNIFICANT CHANGE UP (ref 0–0)
ORGANISM # SPEC MICROSCOPIC CNT: SIGNIFICANT CHANGE UP
ORGANISM # SPEC MICROSCOPIC CNT: SIGNIFICANT CHANGE UP
PHOSPHATE SERPL-MCNC: 4.3 MG/DL — SIGNIFICANT CHANGE UP (ref 2.5–4.5)
PLATELET # BLD AUTO: 241 K/UL — SIGNIFICANT CHANGE UP (ref 150–400)
PMV BLD: 10.6 FL — SIGNIFICANT CHANGE UP (ref 7–13)
POTASSIUM SERPL-MCNC: 4.2 MMOL/L — SIGNIFICANT CHANGE UP (ref 3.5–5.3)
POTASSIUM SERPL-SCNC: 4.2 MMOL/L — SIGNIFICANT CHANGE UP (ref 3.5–5.3)
RBC # BLD: 4.29 M/UL — SIGNIFICANT CHANGE UP (ref 3.8–5.2)
RBC # FLD: 15.7 % — HIGH (ref 10.3–14.5)
SODIUM SERPL-SCNC: 141 MMOL/L — SIGNIFICANT CHANGE UP (ref 135–145)
SPECIMEN SOURCE: SIGNIFICANT CHANGE UP
WBC # BLD: 7.18 K/UL — SIGNIFICANT CHANGE UP (ref 3.8–10.5)
WBC # FLD AUTO: 7.18 K/UL — SIGNIFICANT CHANGE UP (ref 3.8–10.5)

## 2020-10-25 RX ADMIN — Medication 40 MILLIGRAM(S): at 05:28

## 2020-10-25 RX ADMIN — NICARDIPINE HYDROCHLORIDE 20 MILLIGRAM(S): 30 CAPSULE, EXTENDED RELEASE ORAL at 20:39

## 2020-10-25 RX ADMIN — Medication 81 MILLIGRAM(S): at 12:04

## 2020-10-25 RX ADMIN — Medication 200 MILLIGRAM(S): at 16:00

## 2020-10-25 RX ADMIN — PANTOPRAZOLE SODIUM 40 MILLIGRAM(S): 20 TABLET, DELAYED RELEASE ORAL at 05:28

## 2020-10-25 RX ADMIN — LOSARTAN POTASSIUM 100 MILLIGRAM(S): 100 TABLET, FILM COATED ORAL at 05:29

## 2020-10-25 RX ADMIN — DIVALPROEX SODIUM 500 MILLIGRAM(S): 500 TABLET, DELAYED RELEASE ORAL at 20:19

## 2020-10-25 RX ADMIN — NICARDIPINE HYDROCHLORIDE 20 MILLIGRAM(S): 30 CAPSULE, EXTENDED RELEASE ORAL at 14:04

## 2020-10-25 RX ADMIN — DIVALPROEX SODIUM 500 MILLIGRAM(S): 500 TABLET, DELAYED RELEASE ORAL at 05:29

## 2020-10-25 RX ADMIN — NICARDIPINE HYDROCHLORIDE 40 MILLIGRAM(S): 30 CAPSULE, EXTENDED RELEASE ORAL at 05:28

## 2020-10-25 RX ADMIN — ENOXAPARIN SODIUM 40 MILLIGRAM(S): 100 INJECTION SUBCUTANEOUS at 20:20

## 2020-10-25 RX ADMIN — SERTRALINE 50 MILLIGRAM(S): 25 TABLET, FILM COATED ORAL at 12:04

## 2020-10-25 RX ADMIN — SIMVASTATIN 10 MILLIGRAM(S): 20 TABLET, FILM COATED ORAL at 20:39

## 2020-10-25 RX ADMIN — Medication 200 MILLIGRAM(S): at 05:29

## 2020-10-25 NOTE — DISCHARGE NOTE PROVIDER - NSDCCPCAREPLAN_GEN_ALL_CORE_FT
PRINCIPAL DISCHARGE DIAGNOSIS  Diagnosis: Urinary tract infection without hematuria, site unspecified  Assessment and Plan of Treatment: You were given IV antibiotics.. Monitor for any further signs and symptoms of further infection, including but not limited to, fevers/chills, shortness of breath, increased heart rate, dizziness, or abrupt changes in mental status.      SECONDARY DISCHARGE DIAGNOSES  Diagnosis: Normocytic anemia  Assessment and Plan of Treatment: Iron studies normal. Follow-up with your outpatient provider for further care/recommendations. Monitor for signs/symptoms indicating worsening of disease, such as, easy bleeding/bruising, pale skin, fatigue, dizziness, increased heart rate, or chest pain.    Diagnosis: CVA (cerebral vascular accident)  Assessment and Plan of Treatment: Please follow up with your primary care provider within 1 week of discharge from the hospital. If you do not have a primary care provider please follow up with the [facility], call [phone]

## 2020-10-25 NOTE — CONSULT NOTE ADULT - ASSESSMENT
74-year-old female with history of CVA w/residual right-sided deficit, bed bound, expressive aphasia, HTN, HLD presenting with dysuria. Patient able to answer yes/no questions, aside from lower abdominal discomfort is without complaint. History obtained from sister via phone, 2-3weeks ago patient complained of pain from head to foot, was seen in the ED, had a RLE duplex that was negative for DVT, has had a decline in health since that time and has been unable to stand with assistance since that time. Sister also notes recent "wheezing at night" per sister, no coughing.     In the ED VS: 98.4  74-85  134-151/62-98  16-30  %RA, received trimethoprim/sulfamethoxazole 160/800mg PO x1 and ciprofloxacin 400mg IVPB x1, 1L NS IVF, acetaminophen 650mg PO x1 (23 Oct 2020 03:54)    ID consulted for further abx managment.     Uncomplicated UTI:    - Pt c/o dysuria.  Ucx > 100K E.coli.  Sensis reviewed. (Resist to bactrim, Sens to cipro).     - Complete 3 day course (6 doses) of cipro through 10/26 AM.  Can change to 500mg po bid.    - pt clinically improving, with resolution of dysuria.    - No acute intra-abd pathology seen on CTap    No objection from ID standpoint to d/c pt home.      (Covering [facility] ID)    Judie Riaz  [phone]

## 2020-10-25 NOTE — DISCHARGE NOTE PROVIDER - NSDCFUADDAPPT_GEN_ALL_CORE_FT
Please follow up with your primary care provider within 1 week of discharge from the hospital. If you do not have a primary care provider please follow up with the [facility], call [phone]

## 2020-10-25 NOTE — DISCHARGE NOTE PROVIDER - NSDCMRMEDTOKEN_GEN_ALL_CORE_FT
Aspirin Enteric Coated 81 mg oral delayed release tablet: 1 tab(s) orally once a day  divalproex sodium 500 mg oral delayed release tablet: 1 tab(s) orally 2 times a day  furosemide 40 mg oral tablet: 1 tab(s) orally once a day  losartan 100 mg oral tablet: 1 tab(s) orally once a day  niCARdipine 20 mg oral capsule: 2 cap(s) orally every AM  1 cap orally every afternoon  1 cap orally with dinner  pantoprazole 40 mg oral delayed release tablet: 1 tab(s) orally once a day  sertraline 50 mg oral tablet: 1 tab(s) orally once a day  simvastatin 10 mg oral tablet: 1 tab(s) orally once a day (at bedtime)   Aspirin Enteric Coated 81 mg oral delayed release tablet: 1 tab(s) orally once a day  divalproex sodium 500 mg oral delayed release tablet: 1 tab(s) orally 2 times a day  furosemide 40 mg oral tablet: 1 tab(s) orally once a day  losartan 100 mg oral tablet: 1 tab(s) orally once a day  niCARdipine 20 mg oral capsule: 2 cap(s) orally once a day in the morning  niCARdipine 20 mg oral capsule: 1 cap(s) orally 2 times a day (at 2pm and 10pm)  pantoprazole 40 mg oral delayed release tablet: 1 tab(s) orally once a day  polyethylene glycol 3350 oral powder for reconstitution: 17 gram(s) orally once a day  senna oral tablet: 2 tab(s) orally once a day (at bedtime)  sertraline 50 mg oral tablet: 1 tab(s) orally once a day  simvastatin 10 mg oral tablet: 1 tab(s) orally once a day (at bedtime)

## 2020-10-25 NOTE — CONSULT NOTE ADULT - SUBJECTIVE AND OBJECTIVE BOX
Patient is a 74y old  Female who presents with a chief complaint of abdominal pain (24 Oct 2020 11:34)      HPI:  74-year-old female with history of CVA w/residual right-sided deficit, bed bound, expressive aphasia, HTN, HLD presenting with dysuria. Patient able to answer yes/no questions, aside from lower abdominal discomfort is without complaint. History obtained from sister via phone, 2-3weeks ago patient complained of pain from head to foot, was seen in the ED, had a RLE duplex that was negative for DVT, has had a decline in health since that time and has been unable to stand with assistance since that time. Sister also notes recent "wheezing at night" per sister, no coughing.     In the ED VS: 98.4  74-85  134-151/62-98  16-30  %RA, received trimethoprim/sulfamethoxazole 160/800mg PO x1 and ciprofloxacin 400mg IVPB x1, 1L NS IVF, acetaminophen 650mg PO x1 (23 Oct 2020 03:54)      REVIEW OF SYSTEMS:    CONSTITUTIONAL: No fever, weight loss, or fatigue  EYES: No eye pain, visual disturbances, or discharge  ENMT:  No sore throat  NECK: No pain or stiffness  RESPIRATORY: No cough, wheezing, chills or hemoptysis; No shortness of breath  CARDIOVASCULAR: No chest pain, palpitations, dizziness, or leg swelling  GASTROINTESTINAL: No abdominal or epigastric pain. No nausea, vomiting, or hematemesis; No diarrhea or constipation. No melena or hematochezia.  GENITOURINARY: No dysuria, frequency, hematuria, or incontinence  NEUROLOGICAL: No headaches, memory loss, loss of strength, numbness, or tremors  SKIN: No itching, burning, rashes, or lesions   LYMPH NODES: No enlarged glands  MUSCULOSKELETAL: No joint pain or swelling; No muscle, back, or extremity pain      PAST MEDICAL & SURGICAL HISTORY:  DVT, lower extremity    HLD (hyperlipidemia)    CVA (cerebral vascular accident)    Benign Essential Hypertension    H/O: hysterectomy        Allergies    apple (Hives)  carrots, beets (Unknown)  latex (Unknown)  penicillin (Urticaria)    Intolerances        FAMILY HISTORY:  No pertinent family history in first degree relatives        SOCIAL HISTORY:        MEDICATIONS  (STANDING):  aspirin enteric coated 81 milliGRAM(s) Oral daily  ciprofloxacin   IVPB 400 milliGRAM(s) IV Intermittent every 12 hours  diVALproex  milliGRAM(s) Oral two times a day  enoxaparin Injectable 40 milliGRAM(s) SubCutaneous every 24 hours  furosemide    Tablet 40 milliGRAM(s) Oral daily  losartan 100 milliGRAM(s) Oral daily  niCARdipine 40 milliGRAM(s) Oral <User Schedule>  niCARdipine 20 milliGRAM(s) Oral <User Schedule>  pantoprazole    Tablet 40 milliGRAM(s) Oral before breakfast  sertraline 50 milliGRAM(s) Oral daily  simvastatin 10 milliGRAM(s) Oral at bedtime    MEDICATIONS  (PRN):      Vital Signs Last 24 Hrs  T(C): 36.6 (25 Oct 2020 05:27), Max: 36.7 (24 Oct 2020 13:17)  T(F): 97.8 (25 Oct 2020 05:27), Max: 98.1 (24 Oct 2020 20:53)  HR: 71 (25 Oct 2020 05:27) (65 - 71)  BP: 152/78 (25 Oct 2020 05:27) (148/76 - 166/80)  BP(mean): --  RR: 18 (25 Oct 2020 05:27) (17 - 18)  SpO2: 99% (25 Oct 2020 05:27) (99% - 100%)    PHYSICAL EXAM:    GENERAL: NAD, well-groomed  HEAD:  Atraumatic, Normocephalic  EYES: EOMI, PERRLA, conjunctiva and sclera clear  ENMT: No tonsillar erythema, exudates, or enlargement; Moist mucous membranes  NECK: Supple, No JVD  CHEST/LUNG: Clear to percussion bilaterally; No rales, rhonchi, wheezing, or rubs  HEART: Regular rate and rhythm; No murmurs, rubs, or gallops  ABDOMEN: Soft, Nontender, Nondistended; Bowel sounds present  EXTREMITIES:  2+ Peripheral Pulses, No clubbing, cyanosis, or edema  LYMPH: No lymphadenopathy noted  SKIN: No rashes or lesions    LABS:  CBC Full  -  ( 25 Oct 2020 05:47 )  WBC Count : 7.18 K/uL  RBC Count : 4.29 M/uL  Hemoglobin : 11.7 g/dL  Hematocrit : 37.6 %  Platelet Count - Automated : 241 K/uL  Mean Cell Volume : 87.6 fL  Mean Cell Hemoglobin : 27.3 pg  Mean Cell Hemoglobin Concentration : 31.1 %  Auto Neutrophil # : x  Auto Lymphocyte # : x  Auto Monocyte # : x  Auto Eosinophil # : x  Auto Basophil # : x  Auto Neutrophil % : x  Auto Lymphocyte % : x  Auto Monocyte % : x  Auto Eosinophil % : x  Auto Basophil % : x      10-25    141  |  105  |  13  ----------------------------<  94  4.2   |  25  |  1.09    Ca    9.1      25 Oct 2020 05:47  Phos  4.3     10-25  Mg     2.3     10-25                              MICROBIOLOGY:                    RADIOLOGY:                 Patient is a 74y old  Female who presents with a chief complaint of abdominal pain (24 Oct 2020 11:34)      HPI:  74-year-old female with history of CVA w/residual right-sided deficit, bed bound, expressive aphasia, HTN, HLD presenting with dysuria. Patient able to answer yes/no questions, aside from lower abdominal discomfort is without complaint. History obtained from sister via phone, 2-3weeks ago patient complained of pain from head to foot, was seen in the ED, had a RLE duplex that was negative for DVT, has had a decline in health since that time and has been unable to stand with assistance since that time. Sister also notes recent "wheezing at night" per sister, no coughing.     In the ED VS: 98.4  74-85  134-151/62-98  16-30  %RA, received trimethoprim/sulfamethoxazole 160/800mg PO x1 and ciprofloxacin 400mg IVPB x1, 1L NS IVF, acetaminophen 650mg PO x1 (23 Oct 2020 03:54)    ID consulted for further abx managment.       REVIEW OF SYSTEMS:    CONSTITUTIONAL: No fever, weight loss, or fatigue  EYES: No eye pain, visual disturbances, or discharge  ENMT:  No sore throat  NECK: No pain or stiffness  RESPIRATORY: No cough, wheezing, chills or hemoptysis; No shortness of breath  CARDIOVASCULAR: No chest pain, palpitations, dizziness, or leg swelling  GASTROINTESTINAL: No abdominal or epigastric pain. No nausea, vomiting, or hematemesis; No diarrhea or constipation. No melena or hematochezia.  GENITOURINARY: No dysuria, frequency, hematuria, or incontinence  NEUROLOGICAL: No headaches, memory loss, loss of strength, numbness, or tremors  SKIN: No itching, burning, rashes, or lesions   LYMPH NODES: No enlarged glands  MUSCULOSKELETAL: No joint pain or swelling; No muscle, back, or extremity pain      PAST MEDICAL & SURGICAL HISTORY:  DVT, lower extremity    HLD (hyperlipidemia)    CVA (cerebral vascular accident)    Benign Essential Hypertension    H/O: hysterectomy        Allergies    apple (Hives)  carrots, beets (Unknown)  latex (Unknown)  penicillin (Urticaria)    Intolerances        FAMILY HISTORY:  No pertinent family history in first degree relatives        SOCIAL HISTORY:        MEDICATIONS  (STANDING):  aspirin enteric coated 81 milliGRAM(s) Oral daily  ciprofloxacin   IVPB 400 milliGRAM(s) IV Intermittent every 12 hours  diVALproex  milliGRAM(s) Oral two times a day  enoxaparin Injectable 40 milliGRAM(s) SubCutaneous every 24 hours  furosemide    Tablet 40 milliGRAM(s) Oral daily  losartan 100 milliGRAM(s) Oral daily  niCARdipine 40 milliGRAM(s) Oral <User Schedule>  niCARdipine 20 milliGRAM(s) Oral <User Schedule>  pantoprazole    Tablet 40 milliGRAM(s) Oral before breakfast  sertraline 50 milliGRAM(s) Oral daily  simvastatin 10 milliGRAM(s) Oral at bedtime    MEDICATIONS  (PRN):      Vital Signs Last 24 Hrs  T(C): 36.6 (25 Oct 2020 05:27), Max: 36.7 (24 Oct 2020 13:17)  T(F): 97.8 (25 Oct 2020 05:27), Max: 98.1 (24 Oct 2020 20:53)  HR: 71 (25 Oct 2020 05:27) (65 - 71)  BP: 152/78 (25 Oct 2020 05:27) (148/76 - 166/80)  BP(mean): --  RR: 18 (25 Oct 2020 05:27) (17 - 18)  SpO2: 99% (25 Oct 2020 05:27) (99% - 100%)    PHYSICAL EXAM:    GENERAL: NAD, well-groomed  HEAD:  Atraumatic, Normocephalic  EYES: EOMI, PERRLA, conjunctiva and sclera clear  ENMT: No tonsillar erythema, exudates, or enlargement; Moist mucous membranes  NECK: Supple, No JVD  CHEST/LUNG: Clear to percussion bilaterally; No rales, rhonchi, wheezing, or rubs  HEART: Regular rate and rhythm; No murmurs, rubs, or gallops  ABDOMEN: Soft, Nontender, Nondistended; Bowel sounds present  EXTREMITIES:  2+ Peripheral Pulses, No clubbing, cyanosis, or edema  LYMPH: No lymphadenopathy noted  SKIN: No rashes or lesions    LABS:  CBC Full  -  ( 25 Oct 2020 05:47 )  WBC Count : 7.18 K/uL  RBC Count : 4.29 M/uL  Hemoglobin : 11.7 g/dL  Hematocrit : 37.6 %  Platelet Count - Automated : 241 K/uL  Mean Cell Volume : 87.6 fL  Mean Cell Hemoglobin : 27.3 pg  Mean Cell Hemoglobin Concentration : 31.1 %  Auto Neutrophil # : x  Auto Lymphocyte # : x  Auto Monocyte # : x  Auto Eosinophil # : x  Auto Basophil # : x  Auto Neutrophil % : x  Auto Lymphocyte % : x  Auto Monocyte % : x  Auto Eosinophil % : x  Auto Basophil % : x      10-25    141  |  105  |  13  ----------------------------<  94  4.2   |  25  |  1.09    Ca    9.1      25 Oct 2020 05:47  Phos  4.3     10-25  Mg     2.3     10-25          MICROBIOLOGY:      Culture - Urine (10.22.20 @ 17:58)   - Ampicillin/Sulbactam: I 16/8 Enterobacter, Citrobacter, and Serratia may develop resistance during prolonged therapy (3-4 days)   - Aztreonam: S <=4   - Cefazolin: S <=2 (MIC_CL_COM_ENTERIC_CEFAZU) For uncomplicated UTI with K. pneumoniae, E. coli, or P. mirablis: VALENTINO <=16 is sensitive and VALENTINO >=32 is resistant. This also predicts results for oral agents cefaclor, cefdinir, cefpodoxime, cefprozil, cefuroxime axetil, cephalexin and locarbef for uncomplicated UTI. Note that some isolates may be susceptible to these agents while testing resistant to cefazolin.   - Cefepime: S <=2   - Cefoxitin: S <=8   - Ceftriaxone: S <=1 Enterobacter, Citrobacter, and Serratia may develop resistance during prolonged therapy   - Ciprofloxacin: S <=0.25   - Amikacin: S <=16   - Amoxicillin/Clavulanic Acid: I 16/8   - Ampicillin: R >16 These ampicillin results predict results for amoxicillin   - Ertapenem: S <=0.5   - Gentamicin: S <=2   - Imipenem: S <=1   - Levofloxacin: S <=0.5   - Meropenem: S <=1   - Nitrofurantoin: S <=32 Should not be used to treat pyelonephritis   - Piperacillin/Tazobactam: S <=8   - Tigecycline: S <=2   - Tobramycin: S <=2   - Trimethoprim/Sulfamethoxazole: R >2/38   Specimen Source: .Urine Catheterized   Culture Results:   >100,000 CFU/ml Escherichia coli   Organism Identification: Escherichia coli   Organism: Escherichia coli   Method Type: [facility]               RADIOLOGY:    < from: CT Abdomen and Pelvis w/ IV Cont (10.22.20 @ 23:13) >    EXAM:  CT ABDOMEN AND PELVIS IC      EXAM:  CT ANGIO CHEST (W)AW IC        PROCEDURE DATE:  Oct 22 2020         INTERPRETATION:  CLINICAL INFORMATION: Elevated d-dimer. Abdominal tenderness.    COMPARISON: CT abdomen pelvis 1/29/2019    PROCEDURE:  CT Angiography of the Chest was performed followed by portal venous phase imaging of the Abdomen and Pelvis.  IV Contrast: 90 ml of Omnipaque 350 was injected intravenously. 10 ml were discarded.  Oral contrast: None.  Sagittal and coronal reformats were performed as well as 3D (MIP) reconstructions.    FINDINGS:  CHEST:  LUNGS AND LARGE AIRWAYS: Patent central airways. No pulmonary consolidation.  PLEURA: No pleural effusion.  VESSELS: Nondiagnostic for detection of pulmonary emboli due to suboptimal opacification and artifact from respiratory motion.  HEART: Heart size is normal. No pericardial effusion.  MEDIASTINUM AND MADELINE: No lymphadenopathy.  CHEST WALL AND LOWER NECK: Within normal limits.    ABDOMEN AND PELVIS:  LIVER: Within normal limits.  BILE DUCTS: Normal caliber.  GALLBLADDER: Within normal limits.  SPLEEN: Within normal limits.  PANCREAS: Within normal limits.  ADRENALS: Within normal limits.  KIDNEYS/URETERS: Within normal limits.    BLADDER: Within normal limits.  REPRODUCTIVE ORGANS: Hysterectomy.    BOWEL: No bowel obstruction. Appendix is normal.  PERITONEUM: No ascites.  VESSELS: IVC filter.  RETROPERITONEUM/LYMPH NODES: No lymphadenopathy.  ABDOMINAL WALL: Within normal limits.  BONES: Degenerative changes.    IMPRESSION:  Nondiagnostic study for detection of pulmonary emboli due to respiratory motion artifact and suboptimal opacification. No pulmonary consolidation.    No acute abdominal pathology.    < end of copied text >                   Patient is a 74y old  Female who presents with a chief complaint of abdominal pain (24 Oct 2020 11:34)      HPI:    74-year-old female with history of CVA w/residual right-sided deficit, bed bound, expressive aphasia, HTN, HLD presenting with dysuria. Patient able to answer yes/no questions, aside from lower abdominal discomfort is without complaint. History obtained from sister via phone, 2-3weeks ago patient complained of pain from head to foot, was seen in the ED, had a RLE duplex that was negative for DVT, has had a decline in health since that time and has been unable to stand with assistance since that time. Sister also notes recent "wheezing at night" per sister, no coughing.     In the ED VS: 98.4  74-85  134-151/62-98  16-30  %RA, received trimethoprim/sulfamethoxazole 160/800mg PO x1 and ciprofloxacin 400mg IVPB x1, 1L NS IVF, acetaminophen 650mg PO x1 (23 Oct 2020 03:54)    ID consulted for further abx managment.       REVIEW OF SYSTEMS:    CONSTITUTIONAL: No fever, weight loss, or fatigue  EYES: No eye pain, visual disturbances, or discharge  ENMT:  No sore throat  NECK: No pain or stiffness  RESPIRATORY: No cough, wheezing, chills or hemoptysis; No shortness of breath  CARDIOVASCULAR: No chest pain, palpitations, dizziness, or leg swelling  GASTROINTESTINAL: No abdominal or epigastric pain. No nausea, vomiting, or hematemesis; No diarrhea or constipation. No melena or hematochezia.  GENITOURINARY: No dysuria, frequency, hematuria, or incontinence  NEUROLOGICAL: No headaches, memory loss, loss of strength, numbness, or tremors  SKIN: No itching, burning, rashes, or lesions   LYMPH NODES: No enlarged glands  MUSCULOSKELETAL: No joint pain or swelling; No muscle, back, or extremity pain      PAST MEDICAL & SURGICAL HISTORY:  DVT, lower extremity    HLD (hyperlipidemia)    CVA (cerebral vascular accident)    Benign Essential Hypertension    H/O: hysterectomy        Allergies    apple (Hives)  carrots, beets (Unknown)  latex (Unknown)  penicillin (Urticaria)    Intolerances        FAMILY HISTORY:  No pertinent family history in first degree relatives        SOCIAL HISTORY:  No smoking, ivdu, etoh      MEDICATIONS  (STANDING):  aspirin enteric coated 81 milliGRAM(s) Oral daily  ciprofloxacin   IVPB 400 milliGRAM(s) IV Intermittent every 12 hours  diVALproex  milliGRAM(s) Oral two times a day  enoxaparin Injectable 40 milliGRAM(s) SubCutaneous every 24 hours  furosemide    Tablet 40 milliGRAM(s) Oral daily  losartan 100 milliGRAM(s) Oral daily  niCARdipine 40 milliGRAM(s) Oral <User Schedule>  niCARdipine 20 milliGRAM(s) Oral <User Schedule>  pantoprazole    Tablet 40 milliGRAM(s) Oral before breakfast  sertraline 50 milliGRAM(s) Oral daily  simvastatin 10 milliGRAM(s) Oral at bedtime    MEDICATIONS  (PRN):      Vital Signs Last 24 Hrs  T(C): 36.6 (25 Oct 2020 05:27), Max: 36.7 (24 Oct 2020 13:17)  T(F): 97.8 (25 Oct 2020 05:27), Max: 98.1 (24 Oct 2020 20:53)  HR: 71 (25 Oct 2020 05:27) (65 - 71)  BP: 152/78 (25 Oct 2020 05:27) (148/76 - 166/80)  BP(mean): --  RR: 18 (25 Oct 2020 05:27) (17 - 18)  SpO2: 99% (25 Oct 2020 05:27) (99% - 100%)    PHYSICAL EXAM:    GENERAL: NAD, well-groomed  HEAD:  Atraumatic, Normocephalic  EYES: EOMI, PERRLA, conjunctiva and sclera clear  ENMT: No tonsillar erythema, exudates, or enlargement; Moist mucous membranes  NECK: Supple, No JVD  CHEST/LUNG: Clear to percussion bilaterally; No rales, rhonchi, wheezing, or rubs  HEART: Regular rate and rhythm; No murmurs, rubs, or gallops  ABDOMEN: Soft, Nontender, Nondistended; Bowel sounds present  EXTREMITIES:  rt sided weakness  LYMPH: No lymphadenopathy noted  SKIN: No rashes or lesions    LABS:  CBC Full  -  ( 25 Oct 2020 05:47 )  WBC Count : 7.18 K/uL  RBC Count : 4.29 M/uL  Hemoglobin : 11.7 g/dL  Hematocrit : 37.6 %  Platelet Count - Automated : 241 K/uL  Mean Cell Volume : 87.6 fL  Mean Cell Hemoglobin : 27.3 pg  Mean Cell Hemoglobin Concentration : 31.1 %  Auto Neutrophil # : x  Auto Lymphocyte # : x  Auto Monocyte # : x  Auto Eosinophil # : x  Auto Basophil # : x  Auto Neutrophil % : x  Auto Lymphocyte % : x  Auto Monocyte % : x  Auto Eosinophil % : x  Auto Basophil % : x      10-25    141  |  105  |  13  ----------------------------<  94  4.2   |  25  |  1.09    Ca    9.1      25 Oct 2020 05:47  Phos  4.3     10-25  Mg     2.3     10-25          MICROBIOLOGY:      Culture - Urine (10.22.20 @ 17:58)   - Ampicillin/Sulbactam: I 16/8 Enterobacter, Citrobacter, and Serratia may develop resistance during prolonged therapy (3-4 days)   - Aztreonam: S <=4   - Cefazolin: S <=2 (MIC_CL_COM_ENTERIC_CEFAZU) For uncomplicated UTI with K. pneumoniae, E. coli, or P. mirablis: VALENTINO <=16 is sensitive and VALENTINO >=32 is resistant. This also predicts results for oral agents cefaclor, cefdinir, cefpodoxime, cefprozil, cefuroxime axetil, cephalexin and locarbef for uncomplicated UTI. Note that some isolates may be susceptible to these agents while testing resistant to cefazolin.   - Cefepime: S <=2   - Cefoxitin: S <=8   - Ceftriaxone: S <=1 Enterobacter, Citrobacter, and Serratia may develop resistance during prolonged therapy   - Ciprofloxacin: S <=0.25   - Amikacin: S <=16   - Amoxicillin/Clavulanic Acid: I 16/8   - Ampicillin: R >16 These ampicillin results predict results for amoxicillin   - Ertapenem: S <=0.5   - Gentamicin: S <=2   - Imipenem: S <=1   - Levofloxacin: S <=0.5   - Meropenem: S <=1   - Nitrofurantoin: S <=32 Should not be used to treat pyelonephritis   - Piperacillin/Tazobactam: S <=8   - Tigecycline: S <=2   - Tobramycin: S <=2   - Trimethoprim/Sulfamethoxazole: R >2/38   Specimen Source: .Urine Catheterized   Culture Results:   >100,000 CFU/ml Escherichia coli   Organism Identification: Escherichia coli   Organism: Escherichia coli   Method Type: VALENTINO               RADIOLOGY:    < from: CT Abdomen and Pelvis w/ IV Cont (10.22.20 @ 23:13) >    EXAM:  CT ABDOMEN AND PELVIS IC      EXAM:  CT ANGIO CHEST (W)AW IC        PROCEDURE DATE:  Oct 22 2020         INTERPRETATION:  CLINICAL INFORMATION: Elevated d-dimer. Abdominal tenderness.    COMPARISON: CT abdomen pelvis 1/29/2019    PROCEDURE:  CT Angiography of the Chest was performed followed by portal venous phase imaging of the Abdomen and Pelvis.  IV Contrast: 90 ml of Omnipaque 350 was injected intravenously. 10 ml were discarded.  Oral contrast: None.  Sagittal and coronal reformats were performed as well as 3D (MIP) reconstructions.    FINDINGS:  CHEST:  LUNGS AND LARGE AIRWAYS: Patent central airways. No pulmonary consolidation.  PLEURA: No pleural effusion.  VESSELS: Nondiagnostic for detection of pulmonary emboli due to suboptimal opacification and artifact from respiratory motion.  HEART: Heart size is normal. No pericardial effusion.  MEDIASTINUM AND MADELINE: No lymphadenopathy.  CHEST WALL AND LOWER NECK: Within normal limits.    ABDOMEN AND PELVIS:  LIVER: Within normal limits.  BILE DUCTS: Normal caliber.  GALLBLADDER: Within normal limits.  SPLEEN: Within normal limits.  PANCREAS: Within normal limits.  ADRENALS: Within normal limits.  KIDNEYS/URETERS: Within normal limits.    BLADDER: Within normal limits.  REPRODUCTIVE ORGANS: Hysterectomy.    BOWEL: No bowel obstruction. Appendix is normal.  PERITONEUM: No ascites.  VESSELS: IVC filter.  RETROPERITONEUM/LYMPH NODES: No lymphadenopathy.  ABDOMINAL WALL: Within normal limits.  BONES: Degenerative changes.    IMPRESSION:  Nondiagnostic study for detection of pulmonary emboli due to respiratory motion artifact and suboptimal opacification. No pulmonary consolidation.    No acute abdominal pathology.    < end of copied text >

## 2020-10-25 NOTE — DISCHARGE NOTE PROVIDER - HOSPITAL COURSE
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     Urinary tract infection without hematuria  - UA positive  - ciprofloxacin  - Ucx > 100k GNR, e.coli sensitive to Cipro   - Per ID: can dc: Complete 3 day course (6 doses) of cipro through 10/26 AM.  Can change to 500mg po bid. No objection from ID standpoint to d/c pt home  - Per Dr. Harris, patient to stay admitted till 10/26/2020, can be discharged tomorrow after abx    Tachypnea.    - CT chest: Nondiagnostic study for detection of pulmonary emboli due to respiratory motion artifact and suboptimal opacification. No pulmonary consolidation.  - given elevated d-dimer, history of LE DVT (s/p a/c per sister, IVC filter noted on imaging), will repeat LE dopplers; would eval patient for sleep apnea given report of "wheezing at night" that sister reports.   - b/l [facility] duplex LE: No evidence of deep vein thrombosis in the right and  left lower extremities. No evidence of deep and superficial venous  insufficiency noted in the right and left lower extremities.     CVA  -turn & position  - aspiration, fall precautions.   - PT: home no needs, at baseline     Normocytic anemia.   - Iron studies wnl    Discussed case with  _____________________ on ________________________, patient medically stable for discharge to ___________________.   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     Urinary tract infection without hematuria  - UA positive  - ciprofloxacin  - Ucx > 100k GNR, e.coli sensitive to Cipro   - Per ID: can dc: Complete 3 day course (6 doses) of cipro through 10/26 AM.  Can change to 500mg po bid. No objection from ID standpoint to d/c pt home  - Per Dr. Harris, patient to stay admitted till 10/26/2020, can be discharged tomorrow after abx    Tachypnea.    - CT chest: Nondiagnostic study for detection of pulmonary emboli due to respiratory motion artifact and suboptimal opacification. No pulmonary consolidation.  - given elevated d-dimer, history of LE DVT (s/p a/c per sister, IVC filter noted on imaging), will repeat LE dopplers; would eval patient for sleep apnea given report of "wheezing at night" that sister reports.   - b/l [facility] duplex LE: No evidence of deep vein thrombosis in the right and  left lower extremities. No evidence of deep and superficial venous  insufficiency noted in the right and left lower extremities.     CVA  -turn & position  - aspiration, fall precautions.   - PT: home no needs, at baseline     Normocytic anemia.   - Iron studies wnl    Discussed case with Dr. Hunter on 10/30/2020. Patient medically stable for discharge to long-term care nursing home.

## 2020-10-26 LAB
ANION GAP SERPL CALC-SCNC: 15 MMO/L — HIGH (ref 7–14)
BUN SERPL-MCNC: 16 MG/DL — SIGNIFICANT CHANGE UP (ref 7–23)
CALCIUM SERPL-MCNC: 8.9 MG/DL — SIGNIFICANT CHANGE UP (ref 8.4–10.5)
CHLORIDE SERPL-SCNC: 104 MMOL/L — SIGNIFICANT CHANGE UP (ref 98–107)
CO2 SERPL-SCNC: 19 MMOL/L — LOW (ref 22–31)
CREAT SERPL-MCNC: 0.95 MG/DL — SIGNIFICANT CHANGE UP (ref 0.5–1.3)
GLUCOSE SERPL-MCNC: 91 MG/DL — SIGNIFICANT CHANGE UP (ref 70–99)
HCT VFR BLD CALC: 35.3 % — SIGNIFICANT CHANGE UP (ref 34.5–45)
HGB BLD-MCNC: 11 G/DL — LOW (ref 11.5–15.5)
MAGNESIUM SERPL-MCNC: 2.2 MG/DL — SIGNIFICANT CHANGE UP (ref 1.6–2.6)
MCHC RBC-ENTMCNC: 27 PG — SIGNIFICANT CHANGE UP (ref 27–34)
MCHC RBC-ENTMCNC: 31.2 % — LOW (ref 32–36)
MCV RBC AUTO: 86.7 FL — SIGNIFICANT CHANGE UP (ref 80–100)
NRBC # FLD: 0 K/UL — SIGNIFICANT CHANGE UP (ref 0–0)
PHOSPHATE SERPL-MCNC: 3.9 MG/DL — SIGNIFICANT CHANGE UP (ref 2.5–4.5)
PLATELET # BLD AUTO: 186 K/UL — SIGNIFICANT CHANGE UP (ref 150–400)
PMV BLD: 11.2 FL — SIGNIFICANT CHANGE UP (ref 7–13)
POTASSIUM SERPL-MCNC: 4.2 MMOL/L — SIGNIFICANT CHANGE UP (ref 3.5–5.3)
POTASSIUM SERPL-SCNC: 4.2 MMOL/L — SIGNIFICANT CHANGE UP (ref 3.5–5.3)
RBC # BLD: 4.07 M/UL — SIGNIFICANT CHANGE UP (ref 3.8–5.2)
RBC # FLD: 15.9 % — HIGH (ref 10.3–14.5)
SODIUM SERPL-SCNC: 138 MMOL/L — SIGNIFICANT CHANGE UP (ref 135–145)
WBC # BLD: 7.44 K/UL — SIGNIFICANT CHANGE UP (ref 3.8–10.5)
WBC # FLD AUTO: 7.44 K/UL — SIGNIFICANT CHANGE UP (ref 3.8–10.5)

## 2020-10-26 RX ADMIN — NICARDIPINE HYDROCHLORIDE 20 MILLIGRAM(S): 30 CAPSULE, EXTENDED RELEASE ORAL at 21:03

## 2020-10-26 RX ADMIN — SIMVASTATIN 10 MILLIGRAM(S): 20 TABLET, FILM COATED ORAL at 21:03

## 2020-10-26 RX ADMIN — SERTRALINE 50 MILLIGRAM(S): 25 TABLET, FILM COATED ORAL at 12:16

## 2020-10-26 RX ADMIN — ENOXAPARIN SODIUM 40 MILLIGRAM(S): 100 INJECTION SUBCUTANEOUS at 17:29

## 2020-10-26 RX ADMIN — NICARDIPINE HYDROCHLORIDE 20 MILLIGRAM(S): 30 CAPSULE, EXTENDED RELEASE ORAL at 13:31

## 2020-10-26 RX ADMIN — Medication 200 MILLIGRAM(S): at 06:14

## 2020-10-26 RX ADMIN — NICARDIPINE HYDROCHLORIDE 40 MILLIGRAM(S): 30 CAPSULE, EXTENDED RELEASE ORAL at 06:14

## 2020-10-26 RX ADMIN — Medication 40 MILLIGRAM(S): at 06:13

## 2020-10-26 RX ADMIN — DIVALPROEX SODIUM 500 MILLIGRAM(S): 500 TABLET, DELAYED RELEASE ORAL at 06:14

## 2020-10-26 RX ADMIN — PANTOPRAZOLE SODIUM 40 MILLIGRAM(S): 20 TABLET, DELAYED RELEASE ORAL at 06:13

## 2020-10-26 RX ADMIN — LOSARTAN POTASSIUM 100 MILLIGRAM(S): 100 TABLET, FILM COATED ORAL at 06:14

## 2020-10-26 RX ADMIN — Medication 81 MILLIGRAM(S): at 12:16

## 2020-10-26 RX ADMIN — DIVALPROEX SODIUM 500 MILLIGRAM(S): 500 TABLET, DELAYED RELEASE ORAL at 17:29

## 2020-10-26 NOTE — PROGRESS NOTE ADULT - PROBLEM SELECTOR PLAN 5
hx of CVA   turn & position  PT consult given recent decrease in functional status, ?potential for STR?  aspiration, fall precautions  c/w divalproex for sz ppx

## 2020-10-26 NOTE — PROGRESS NOTE ADULT - ASSESSMENT
74-year-old female with history of CVA w/residual right-sided deficit, s/p IVC filter, bed bound, expressive aphasia, HTN, HLD presenting with UTI

## 2020-10-26 NOTE — PROGRESS NOTE ADULT - ASSESSMENT
Patient is a 74-year-old female with history of CVA w/residual right-sided deficit, bed bound, expressive aphasia, HTN, HLD presenting with dysuria. Patient being treated for UTI.     Uncomplicated UTI:  - Urine culture grew >100K E.coli - sensitive to cipro  - Completed 3 day course (6 doses) of cipro today  - Clinically improved, no longer having dysuria, no fevers  - No acute intra-abd pathology seen on CTAP  - No objection from ID standpoint to d/c pt home

## 2020-10-26 NOTE — PROGRESS NOTE ADULT - PROBLEM SELECTOR PLAN 2
CT chest non-diagnostic for PE, not tachycardic, will hold off on pursuing further imaging at present

## 2020-10-27 LAB — SARS-COV-2 RNA SPEC QL NAA+PROBE: SIGNIFICANT CHANGE UP

## 2020-10-27 RX ADMIN — Medication 40 MILLIGRAM(S): at 05:55

## 2020-10-27 RX ADMIN — DIVALPROEX SODIUM 500 MILLIGRAM(S): 500 TABLET, DELAYED RELEASE ORAL at 17:34

## 2020-10-27 RX ADMIN — SIMVASTATIN 10 MILLIGRAM(S): 20 TABLET, FILM COATED ORAL at 22:03

## 2020-10-27 RX ADMIN — DIVALPROEX SODIUM 500 MILLIGRAM(S): 500 TABLET, DELAYED RELEASE ORAL at 05:55

## 2020-10-27 RX ADMIN — ENOXAPARIN SODIUM 40 MILLIGRAM(S): 100 INJECTION SUBCUTANEOUS at 17:33

## 2020-10-27 RX ADMIN — NICARDIPINE HYDROCHLORIDE 40 MILLIGRAM(S): 30 CAPSULE, EXTENDED RELEASE ORAL at 05:55

## 2020-10-27 RX ADMIN — LOSARTAN POTASSIUM 100 MILLIGRAM(S): 100 TABLET, FILM COATED ORAL at 05:55

## 2020-10-27 RX ADMIN — Medication 81 MILLIGRAM(S): at 12:13

## 2020-10-27 RX ADMIN — SERTRALINE 50 MILLIGRAM(S): 25 TABLET, FILM COATED ORAL at 12:13

## 2020-10-27 RX ADMIN — NICARDIPINE HYDROCHLORIDE 20 MILLIGRAM(S): 30 CAPSULE, EXTENDED RELEASE ORAL at 13:17

## 2020-10-27 RX ADMIN — PANTOPRAZOLE SODIUM 40 MILLIGRAM(S): 20 TABLET, DELAYED RELEASE ORAL at 05:55

## 2020-10-27 RX ADMIN — NICARDIPINE HYDROCHLORIDE 20 MILLIGRAM(S): 30 CAPSULE, EXTENDED RELEASE ORAL at 22:03

## 2020-10-27 NOTE — PROGRESS NOTE ADULT - PROBLEM SELECTOR PLAN 5
After Visit Summary   12/3/2018    Jose Manuel Gallo    MRN: [id number]           Patient Information     Date Of Birth          1935        Visit Information        Provider Department      12/3/2018 8:30 AM Aaron Gayle MD; [facility] CYSTO PROC ROOM [facility]        Today's Diagnoses     History of bladder cancer    -  1       Follow-ups after your visit        Your next 10 appointments already scheduled     Dec 02, 2019  8:30 AM CST   [facility]   [facility] ([address] [id number] [phone]              Who to contact     If you have questions or need follow up information about today's clinic visit or your schedule please contact [facility] directly at [phone].  Normal or non-critical lab and imaging results will be communicated to you by MyChart, letter or phone within 4 business days after the clinic has received the results. If you do not hear from us within 7 days, please contact the clinic through MyChart or phone. If you have a critical or abnormal lab result, we will notify you by phone as soon as possible.  Submit refill requests through [employer] or call your pharmacy and they will forward the refill request to us. Please allow 3 business days for your refill to be completed.          Additional Information About Your Visit        MyChart Information     [employer] gives you secure access to your electronic health record. If you see a primary care provider, you can also send messages to your care team and make appointments. If you have questions, please call your primary care clinic.  If you do not have a primary care provider, please call [phone] and they will assist you.        Care EveryWhere ID     This is your Care EveryWhere ID. This could be used by other organizations to access your [facility]  records  [id number]         Blood Pressure from Last 3 Encounters:   05/11/18 140/70   12/04/17 113/65   11/27/17 124/58    Weight from Last 3 Encounters:   05/11/18 75.8 kg (167 lb)   11/27/17 73 kg (161 lb)   07/13/17 73.5 kg (162 lb)              We Performed the Following     CYSTOURETHROSCOPY ([id number])        Primary Care Provider Office Phone # Fax #    Orlando Braxton [phone] [facility] [address]        Equal Access to Services     [facility]: Hadii aad ku hadasho Soomaali, waaxda luqadaha, qaybta kaalmada adeegyada, angelica guerra haychristine adetiny krause . So [facility] [phone].    ATENCIÓN: Si habla español, tiene a phelps disposición servicios gratuitos de asistencia lingüística. Llame al [phone].    We comply with applicable federal civil rights laws and [place] laws. We do not discriminate on the basis of race, color, national origin, age, disability, sex, sexual orientation, or gender identity.            Thank you!     Thank you for choosing [facility]  for your care. Our goal is always to provide you with excellent care. Hearing back from our patients is one way we can continue to improve our services. Please take a few minutes to complete the written survey that you may receive in the mail after your visit with us. Thank you!             Your Updated Medication List - Protect others around you: Learn how to safely use, store and throw away your medicines at www.disposemymeds.org.          This list is accurate as of 12/3/18  8:43 AM.  Always use your most recent med list.                   Brand Name Dispense Instructions for use Diagnosis    B-12 1000 MCG Tbcr      Take 1,000 mcg by mouth daily    Vitamin B12 deficiency       lisinopril 2.5 MG tablet    PRINIVIL/Zestril    90 tablet    Take 1 tablet (2.5 mg) by mouth daily    Hypertension goal BP (blood pressure) < 140/90, CKD (chronic kidney disease) stage 3, GFR 30-59 ml/min (H)        simvastatin 40 MG tablet    ZOCOR    90 tablet    TAKE 1 TABLET EVERY DAY    Hyperlipidemia LDL goal <130          hx of CVA   turn & position  PT consult given recent decrease in functional status, ?potential for STR?  aspiration, fall precautions  c/w divalproex for sz ppx

## 2020-10-28 RX ORDER — SENNA PLUS 8.6 MG/1
2 TABLET ORAL AT BEDTIME
Refills: 0 | Status: DISCONTINUED | OUTPATIENT
Start: 2020-10-28 | End: 2020-10-29

## 2020-10-28 RX ADMIN — SERTRALINE 50 MILLIGRAM(S): 25 TABLET, FILM COATED ORAL at 11:48

## 2020-10-28 RX ADMIN — SENNA PLUS 2 TABLET(S): 8.6 TABLET ORAL at 21:25

## 2020-10-28 RX ADMIN — NICARDIPINE HYDROCHLORIDE 20 MILLIGRAM(S): 30 CAPSULE, EXTENDED RELEASE ORAL at 13:44

## 2020-10-28 RX ADMIN — Medication 81 MILLIGRAM(S): at 11:48

## 2020-10-28 RX ADMIN — ENOXAPARIN SODIUM 40 MILLIGRAM(S): 100 INJECTION SUBCUTANEOUS at 17:33

## 2020-10-28 RX ADMIN — NICARDIPINE HYDROCHLORIDE 20 MILLIGRAM(S): 30 CAPSULE, EXTENDED RELEASE ORAL at 21:25

## 2020-10-28 RX ADMIN — Medication 40 MILLIGRAM(S): at 06:37

## 2020-10-28 RX ADMIN — SIMVASTATIN 10 MILLIGRAM(S): 20 TABLET, FILM COATED ORAL at 21:25

## 2020-10-28 RX ADMIN — NICARDIPINE HYDROCHLORIDE 40 MILLIGRAM(S): 30 CAPSULE, EXTENDED RELEASE ORAL at 06:36

## 2020-10-28 RX ADMIN — PANTOPRAZOLE SODIUM 40 MILLIGRAM(S): 20 TABLET, DELAYED RELEASE ORAL at 06:36

## 2020-10-28 RX ADMIN — DIVALPROEX SODIUM 500 MILLIGRAM(S): 500 TABLET, DELAYED RELEASE ORAL at 17:34

## 2020-10-28 RX ADMIN — LOSARTAN POTASSIUM 100 MILLIGRAM(S): 100 TABLET, FILM COATED ORAL at 06:36

## 2020-10-28 RX ADMIN — DIVALPROEX SODIUM 500 MILLIGRAM(S): 500 TABLET, DELAYED RELEASE ORAL at 06:37

## 2020-10-28 NOTE — PROGRESS NOTE ADULT - ASSESSMENT
74-year-old female with history of CVA w/residual right-sided deficit, s/p IVC filter, bed bound, expressive aphasia, HTN, HLD presenting with UTI.

## 2020-10-29 RX ORDER — POLYETHYLENE GLYCOL 3350 17 G/17G
17 POWDER, FOR SOLUTION ORAL DAILY
Refills: 0 | Status: DISCONTINUED | OUTPATIENT
Start: 2020-10-29 | End: 2020-10-30

## 2020-10-29 RX ORDER — SENNA PLUS 8.6 MG/1
2 TABLET ORAL AT BEDTIME
Refills: 0 | Status: DISCONTINUED | OUTPATIENT
Start: 2020-10-29 | End: 2020-10-30

## 2020-10-29 RX ADMIN — DIVALPROEX SODIUM 500 MILLIGRAM(S): 500 TABLET, DELAYED RELEASE ORAL at 06:56

## 2020-10-29 RX ADMIN — SERTRALINE 50 MILLIGRAM(S): 25 TABLET, FILM COATED ORAL at 13:15

## 2020-10-29 RX ADMIN — SIMVASTATIN 10 MILLIGRAM(S): 20 TABLET, FILM COATED ORAL at 21:42

## 2020-10-29 RX ADMIN — PANTOPRAZOLE SODIUM 40 MILLIGRAM(S): 20 TABLET, DELAYED RELEASE ORAL at 06:56

## 2020-10-29 RX ADMIN — POLYETHYLENE GLYCOL 3350 17 GRAM(S): 17 POWDER, FOR SOLUTION ORAL at 13:15

## 2020-10-29 RX ADMIN — SENNA PLUS 2 TABLET(S): 8.6 TABLET ORAL at 21:42

## 2020-10-29 RX ADMIN — NICARDIPINE HYDROCHLORIDE 20 MILLIGRAM(S): 30 CAPSULE, EXTENDED RELEASE ORAL at 13:56

## 2020-10-29 RX ADMIN — Medication 40 MILLIGRAM(S): at 06:56

## 2020-10-29 RX ADMIN — NICARDIPINE HYDROCHLORIDE 40 MILLIGRAM(S): 30 CAPSULE, EXTENDED RELEASE ORAL at 06:56

## 2020-10-29 RX ADMIN — NICARDIPINE HYDROCHLORIDE 20 MILLIGRAM(S): 30 CAPSULE, EXTENDED RELEASE ORAL at 21:42

## 2020-10-29 RX ADMIN — DIVALPROEX SODIUM 500 MILLIGRAM(S): 500 TABLET, DELAYED RELEASE ORAL at 18:24

## 2020-10-29 RX ADMIN — LOSARTAN POTASSIUM 100 MILLIGRAM(S): 100 TABLET, FILM COATED ORAL at 06:56

## 2020-10-29 RX ADMIN — ENOXAPARIN SODIUM 40 MILLIGRAM(S): 100 INJECTION SUBCUTANEOUS at 18:24

## 2020-10-29 RX ADMIN — Medication 81 MILLIGRAM(S): at 13:15

## 2020-10-29 NOTE — CHART NOTE - NSCHARTNOTEFT_GEN_A_CORE
Peer to Peer was done by attending Dr. Hunter. Pt continues to be denied for [facility] placement. Discussed with   . Pt will either have to go home with home care.

## 2020-10-30 ENCOUNTER — TRANSCRIPTION ENCOUNTER (OUTPATIENT)
Age: 75
End: 2020-10-30

## 2020-10-30 VITALS
HEART RATE: 70 BPM | DIASTOLIC BLOOD PRESSURE: 73 MMHG | TEMPERATURE: 98 F | OXYGEN SATURATION: 98 % | RESPIRATION RATE: 18 BRPM | SYSTOLIC BLOOD PRESSURE: 132 MMHG

## 2020-10-30 RX ORDER — SENNA PLUS 8.6 MG/1
2 TABLET ORAL
Qty: 0 | Refills: 0 | DISCHARGE
Start: 2020-10-30

## 2020-10-30 RX ORDER — POLYETHYLENE GLYCOL 3350 17 G/17G
17 POWDER, FOR SOLUTION ORAL
Qty: 0 | Refills: 0 | DISCHARGE
Start: 2020-10-30

## 2020-10-30 RX ORDER — NICARDIPINE HYDROCHLORIDE 30 MG/1
1 CAPSULE, EXTENDED RELEASE ORAL
Qty: 0 | Refills: 0 | DISCHARGE
Start: 2020-10-30

## 2020-10-30 RX ORDER — NICARDIPINE HYDROCHLORIDE 30 MG/1
2 CAPSULE, EXTENDED RELEASE ORAL
Qty: 0 | Refills: 0 | DISCHARGE
Start: 2020-10-30

## 2020-10-30 RX ADMIN — Medication 40 MILLIGRAM(S): at 06:03

## 2020-10-30 RX ADMIN — LOSARTAN POTASSIUM 100 MILLIGRAM(S): 100 TABLET, FILM COATED ORAL at 06:03

## 2020-10-30 RX ADMIN — PANTOPRAZOLE SODIUM 40 MILLIGRAM(S): 20 TABLET, DELAYED RELEASE ORAL at 06:03

## 2020-10-30 RX ADMIN — DIVALPROEX SODIUM 500 MILLIGRAM(S): 500 TABLET, DELAYED RELEASE ORAL at 06:03

## 2020-10-30 RX ADMIN — NICARDIPINE HYDROCHLORIDE 40 MILLIGRAM(S): 30 CAPSULE, EXTENDED RELEASE ORAL at 06:03

## 2020-10-30 RX ADMIN — NICARDIPINE HYDROCHLORIDE 20 MILLIGRAM(S): 30 CAPSULE, EXTENDED RELEASE ORAL at 13:01

## 2020-10-30 RX ADMIN — Medication 81 MILLIGRAM(S): at 13:01

## 2020-10-30 RX ADMIN — SERTRALINE 50 MILLIGRAM(S): 25 TABLET, FILM COATED ORAL at 13:01

## 2020-10-30 RX ADMIN — POLYETHYLENE GLYCOL 3350 17 GRAM(S): 17 POWDER, FOR SOLUTION ORAL at 13:01

## 2020-10-30 NOTE — DIETITIAN INITIAL EVALUATION ADULT. - PERTINENT MEDS FT
diVALproex DR  furosemide    Tablet  losartan  niCARdipine  niCARdipine  pantoprazole    Tablet  polyethylene glycol 3350  senna  sertraline  simvastatin

## 2020-10-30 NOTE — DISCHARGE NOTE NURSING/CASE MANAGEMENT/SOCIAL WORK - PATIENT PORTAL LINK FT
You can access the FollowMyHealth Patient Portal offered by [facility] by registering at the following website: [email or web address]. By joining [facility]’s FollowMyHealth portal, you will also be able to view your health information using other applications (apps) compatible with our system.

## 2020-10-30 NOTE — PROGRESS NOTE ADULT - ATTENDING COMMENTS
Jen Ramirez M.D.  [facility], Division of Infectious Diseases  [phone]  After 5pm on weekdays and all day on weekends - please call [phone]
dc planning.
dc planning.  discussed with peer to peer physcian for insurance auth.  Not approved since pt's baselne required 24/7 care and unlikely to benefit from rehab given cognitive status and physical limitations.  recommends either home with 24/7 aide or long term care such as NH.    - Dr. BAO Hunter ([facility])  - [phone]
dc planning.  discussed with peer to peer physcian for insurance auth.  Not approved since pt's baselne required 24/7 care and unlikely to benefit from rehab given cognitive status and physical limitations.  recommends either home with 24/7 aide or long term care such as NH.    - Dr. BAO Hunter ([facility])  - [phone]

## 2020-10-30 NOTE — DIETITIAN INITIAL EVALUATION ADULT. - OTHER INFO
Collateral information obtained from RN and chart review. Patient endorses excellent appetite and PO intake at this time. No nausea/vomiting/diarrhea/constipation or difficulty chewing and swallowing reported. Allergies to carrot and apple implemented. Patient unable to provide weight related history. Weight history obtained via HIE, 12/2018-93.7kg, 1/2019-93.1kg and now this admission 112.9kg (10/23) with +2 b/l foot edema.

## 2020-10-30 NOTE — DISCHARGE NOTE NURSING/CASE MANAGEMENT/SOCIAL WORK - NSDCPEPTSTRK_GEN_ALL_CORE
Risk factors for stroke/Signs and symptoms of stroke/Need for follow up after discharge/Prescribed medications/Stroke education booklet/Call 911 for stroke/Stroke support groups for patients, families, and friends/Stroke warning signs and symptoms

## 2020-10-30 NOTE — DIETITIAN INITIAL EVALUATION ADULT. - CHIEF COMPLAINT
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

## 2020-10-30 NOTE — PROGRESS NOTE ADULT - PROBLEM SELECTOR PLAN 1
c/w ciprofloxacin IV  UCx growing > 100K GNR  appreciate ID
c/w ciprofloxacin IV, last day is 10/26  UCx grew > 100K E coli  appreciate ID
c/w ciprofloxacin IV, last day is 10/26  UCx grew > 100K E coli  appreciate ID
c/w ciprofloxacin IV, last day was 10/26  UCx grew > 100K E coli  appreciate ID
completed ciprofloxacin IV, last day was 10/26  UCx grew > 100K E coli  appreciate ID
36.1

## 2020-10-30 NOTE — DISCHARGE NOTE NURSING/CASE MANAGEMENT/SOCIAL WORK - NSDCFUADDAPPT_GEN_ALL_CORE_FT
Please follow up with your primary care provider within 1 week of discharge from the hospital. If you do not have a primary care provider please follow up with the [facility], call [phone]

## 2020-11-21 NOTE — PROGRESS NOTE ADULT - PROBLEM SELECTOR PLAN 6
here for suture removal - therapeutic interchange - lovastatin 10mg daily to atorvastatin 10mg daily

## 2021-05-06 NOTE — PROGRESS NOTE ADULT - I WAS PHYSICALLY PRESENT FOR THE KEY PORTIONS OF THE EVALUATION AND MANAGEMENT (E/M) SERVICE PROVIDED.  I AGREE WITH THE ABOVE HISTORY, PHYSICAL, AND PLAN WHICH I HAVE REVIEWED AND EDITED WHERE APPROPRIATE
Monitor: The patient's diabetes is improving with treatment.  Evaluation: Reviewed recent labs/diagnostic tests with the patient.  Assessment/Treatment:  Continue current treatment/monitoring regimen.  Continue current treatment regimen.   Start CoQ10 for joint pain  Condition will be reassessed in 3 months  
Statement Selected

## 2021-06-15 NOTE — PROGRESS NOTE ADULT - SUBJECTIVE AND OBJECTIVE BOX
EXAMINATION TYPE: NM hepatobiliary w EF

 

DATE OF EXAM: 6/15/2021

 

COMPARISON: Ultrasound gallbladder 6/15/2021

 

HISTORY: K 81.1

 

TECHNIQUE: After the intravenous administration of 4.9 mCi Tc 99m Mebrofenin hepatobiliary scintigrap
hy is performed.  Immediate images post injection.

 

FINDINGS: 

There is satisfactory initial accumulation of tracer by the liver.  The gallbladder is visualized wit
hin 12 minutes.  The small bowel activity is noted within 20 minutes.  At one hour 8 ounces of oral e
nsure plus is given to mimic CCK and gallbladder ejection fraction is calculated at 77 %, in the norm
al range.  Therefore there is no scintigraphic evidence of cystic or common bile duct obstruction to 
suggest acute cholecystitis or gallbladder dyskinesia. 

 

IMPRESSION: Exam is within normal limits.
Feels better  Urine cx grew > 100K E coli resistant to amp and bactrim    Vital Signs Last 24 Hrs  T(C): 36.6 (25 Oct 2020 05:27), Max: 36.7 (24 Oct 2020 20:53)  T(F): 97.8 (25 Oct 2020 05:27), Max: 98.1 (24 Oct 2020 20:53)  HR: 71 (25 Oct 2020 05:27) (65 - 71)  BP: 152/78 (25 Oct 2020 05:27) (152/78 - 166/80)  BP(mean): --  RR: 18 (25 Oct 2020 05:27) (17 - 18)  SpO2: 99% (25 Oct 2020 05:27) (99% - 99%)    I&O's Summary      GENERAL: NAD, well-developed, well-nourished  HEAD:  Atraumatic, Normocephalic  EYES: conjunctiva and sclera clear  NECK: Supple, No LAD  CHEST/LUNG: Clear to auscultation bilaterally; No wheezes, rales or rhonchi  HEART: Regular rate and rhythm; No murmurs, rubs, or gallops, (+)S1, S2  ABDOMEN: Soft, Nontender, Nondistended; Normal Bowel sounds; bladder slightly distended - patient reports need to urinate  EXTREMITIES:  2+ Peripheral Pulses, No clubbing, cyanosis, or edema  PSYCH: normal mood and affect  NEUROLOGY: AAOxperson, place; unable to verbalize date (baseline per sister), 1/5 strength RLE; 0/5 strength RUE/contracted  SKIN: No rashes or lesions on limited exam    LABS:                        11.7   7.18  )-----------( 241      ( 25 Oct 2020 05:47 )             37.6     10-25    141  |  105  |  13  ----------------------------<  94  4.2   |  25  |  1.09    Ca    9.1      25 Oct 2020 05:47  Phos  4.3     10-25  Mg     2.3     10-25        CAPILLARY BLOOD GLUCOSE                RADIOLOGY & ADDITIONAL TESTS:    Imaging Personally Reviewed:  [x] YES  [ ] NO    Consultant(s) Notes Reviewed:  [x] YES  [ ] NO
[facility], Division of Infectious Diseases  MERLENE Sarah Y. Patel, S. Shah  [phone]  ([phone] - weekdays after 5pm and weekends)    Name: CARYN LEIVA  Age/Gender: 74y Female  MRN: [id number]    Interval History:  Patient feeling well, no new complaints.  Denies fever, chills, chest pain, dyspnea, cough, abdominal pain, n/v/d, dysuria.   ROS reviewed, pertinent positives and negatives as above.   Notes reviewed, afebrile.     Objective:  Vitals:   T(F): 97.7 (10-26-20 @ 13:31), Max: 98.6 (10-26-20 @ 06:00)  HR: 76 (10-26-20 @ 13:31) (66 - 76)  BP: 137/74 (10-26-20 @ 13:31) (137/74 - 155/89)  RR: 18 (10-26-20 @ 13:31) (17 - 19)  SpO2: 99% (10-26-20 @ 13:31) (98% - 99%)    Physical Examination:  General: no acute distress, nontoxic appearing  HEENT: NC/AT, EOMI, anicteric, no oral lesions, neck supple  Cardio: S1, S2 heard, RRR, no murmurs  Resp: clear to auscultation bilaterally, no rales/wheezes/rhonchi  Abd: soft, NT, ND, + bowel sounds  Neuro: AAOx3,speaks slow, right sided weakness  Ext: no edema, right hand contracted  Skin: warm, dry, no visible rash  Psych: appropriate affect and mood for situation, cooperative  Lines: PIV    Laboratory Studies:  CBC:                       11.0   7.44  )-----------( 186      ( 26 Oct 2020 06:00 )             35.3     CMP: 10-26    138  |  104  |  16  ----------------------------<  91  4.2   |  19<L>  |  0.95    Ca    8.9      26 Oct 2020 06:00  Phos  3.9     10-26  Mg     2.2     10-26    Microbiology:  Culture - Urine (collected 10-22-20 @ 17:58)  Source: .Urine Catheterized  Final Report (10-25-20 @ 10:33):    >100,000 CFU/ml Escherichia coli  Organism: Escherichia coli (10-25-20 @ 10:33)  Organism: Escherichia coli (10-25-20 @ 10:33)      -  Amikacin: S <=16      -  Amoxicillin/Clavulanic Acid: I 16/8      -  Ampicillin: R >16 These ampicillin results predict results for amoxicillin      -  Ampicillin/Sulbactam: I 16/8 Enterobacter, Citrobacter, and Serratia may develop resistance during prolonged therapy (3-4 days)      -  Aztreonam: S <=4      -  Cefazolin: S <=2 (MIC_CL_COM_ENTERIC_CEFAZU) For uncomplicated UTI with K. pneumoniae, E. coli, or P. mirablis: VALENTINO <=16 is sensitive and VALENTINO >=32 is resistant. This also predicts results for oral agents cefaclor, cefdinir, cefpodoxime, cefprozil, cefuroxime axetil, cephalexin and locarbef for uncomplicated UTI. Note that some isolates may be susceptible to these agents while testing resistant to cefazolin.      -  Cefepime: S <=2      -  Cefoxitin: S <=8      -  Ceftriaxone: S <=1 Enterobacter, Citrobacter, and Serratia may develop resistance during prolonged therapy      -  Ciprofloxacin: S <=0.25      -  Ertapenem: S <=0.5      -  Gentamicin: S <=2      -  Imipenem: S <=1      -  Levofloxacin: S <=0.5      -  Meropenem: S <=1      -  Nitrofurantoin: S <=32 Should not be used to treat pyelonephritis      -  Piperacillin/Tazobactam: S <=8      -  Tigecycline: S <=2      -  Tobramycin: S <=2      -  Trimethoprim/Sulfamethoxazole: R >2/38      Method Type: VALENTINO    Radiology:  CT Chest. Abdomen and Pelvis w/ IV Cont (10.22.20 @ 23:13) >IMPRESSION: Nondiagnostic study for detection of pulmonary emboli due to respiratory motion artifact and suboptimal opacification. No pulmonary consolidation. No acute abdominal pathology.    Medications:  aspirin enteric coated 81 milliGRAM(s) Oral daily  diVALproex  milliGRAM(s) Oral two times a day  enoxaparin Injectable 40 milliGRAM(s) SubCutaneous every 24 hours  furosemide    Tablet 40 milliGRAM(s) Oral daily  losartan 100 milliGRAM(s) Oral daily  niCARdipine 40 milliGRAM(s) Oral <User Schedule>  niCARdipine 20 milliGRAM(s) Oral <User Schedule>  pantoprazole    Tablet 40 milliGRAM(s) Oral before breakfast  sertraline 50 milliGRAM(s) Oral daily  simvastatin 10 milliGRAM(s) Oral at bedtime
No acute SOB or CP  No N/V/abd pain  No flank pain or dysuria    Vital Signs Last 24 Hrs  T(C): 36.9 (27 Oct 2020 06:00), Max: 37 (26 Oct 2020 21:25)  T(F): 98.5 (27 Oct 2020 06:00), Max: 98.6 (26 Oct 2020 21:25)  HR: 70 (27 Oct 2020 06:00) (70 - 76)  BP: 138/84 (27 Oct 2020 06:00) (137/74 - 148/85)  BP(mean): --  RR: 19 (27 Oct 2020 06:00) (18 - 19)  SpO2: 99% (27 Oct 2020 06:00) (99% - 99%)    I&O's Summary      GENERAL: NAD, well-developed, well-nourished  HEAD:  Atraumatic, Normocephalic  EYES: conjunctiva and sclera clear  NECK: Supple, No LAD  CHEST/LUNG: Clear to auscultation bilaterally; No wheezes, rales or rhonchi  HEART: Regular rate and rhythm; No murmurs, rubs, or gallops, (+)S1, S2  ABDOMEN: Soft, Nontender, Nondistended; Normal Bowel sounds; bladder slightly distended - patient reports need to urinate  EXTREMITIES:  2+ Peripheral Pulses, No clubbing, cyanosis, or edema  PSYCH: normal mood and affect  NEUROLOGY: AAOxperson, place; unable to verbalize date (baseline per sister), 1/5 strength RLE; 0/5 strength RUE/contracted  SKIN: No rashes or lesions on limited exam    LABS:                        11.0   7.44  )-----------( 186      ( 26 Oct 2020 06:00 )             35.3     10-26    138  |  104  |  16  ----------------------------<  91  4.2   |  19<L>  |  0.95    Ca    8.9      26 Oct 2020 06:00  Phos  3.9     10-26  Mg     2.2     10-26        CAPILLARY BLOOD GLUCOSE                RADIOLOGY & ADDITIONAL TESTS:    Imaging Personally Reviewed:  [x] YES  [ ] NO    Consultant(s) Notes Reviewed:  [x] YES  [ ] NO                            
No fevers overnight  Tolerating IV cipro    Vital Signs Last 24 Hrs  T(C): 36.5 (26 Oct 2020 13:31), Max: 37 (26 Oct 2020 06:00)  T(F): 97.7 (26 Oct 2020 13:31), Max: 98.6 (26 Oct 2020 06:00)  HR: 76 (26 Oct 2020 13:31) (66 - 76)  BP: 137/74 (26 Oct 2020 13:31) (137/74 - 155/89)  BP(mean): --  RR: 18 (26 Oct 2020 13:31) (17 - 19)  SpO2: 99% (26 Oct 2020 13:31) (98% - 99%)    I&O's Summary      GENERAL: NAD, well-developed, well-nourished  HEAD:  Atraumatic, Normocephalic  EYES: conjunctiva and sclera clear  NECK: Supple, No LAD  CHEST/LUNG: Clear to auscultation bilaterally; No wheezes, rales or rhonchi  HEART: Regular rate and rhythm; No murmurs, rubs, or gallops, (+)S1, S2  ABDOMEN: Soft, Nontender, Nondistended; Normal Bowel sounds; bladder slightly distended - patient reports need to urinate  EXTREMITIES:  2+ Peripheral Pulses, No clubbing, cyanosis, or edema  PSYCH: normal mood and affect  NEUROLOGY: AAOxperson, place; unable to verbalize date (baseline per sister), 1/5 strength RLE; 0/5 strength RUE/contracted  SKIN: No rashes or lesions on limited exam      LABS:                        11.0   7.44  )-----------( 186      ( 26 Oct 2020 06:00 )             35.3     10-26    138  |  104  |  16  ----------------------------<  91  4.2   |  19<L>  |  0.95    Ca    8.9      26 Oct 2020 06:00  Phos  3.9     10-26  Mg     2.2     10-26        CAPILLARY BLOOD GLUCOSE                RADIOLOGY & ADDITIONAL TESTS:    Imaging Personally Reviewed:  [x] YES  [ ] NO    Consultant(s) Notes Reviewed:  [x] YES  [ ] NO
No fevers recorded last night  > 100K GNR growing in urine culture    Vital Signs Last 24 Hrs  T(C): 36.8 (24 Oct 2020 05:50), Max: 36.8 (23 Oct 2020 21:07)  T(F): 98.2 (24 Oct 2020 05:50), Max: 98.2 (23 Oct 2020 21:07)  HR: 71 (24 Oct 2020 05:50) (68 - 71)  BP: 147/78 (24 Oct 2020 05:50) (143/63 - 147/78)  BP(mean): --  RR: 18 (24 Oct 2020 05:50) (17 - 18)  SpO2: 100% (24 Oct 2020 05:50) (97% - 100%)    I&O's Summary      GENERAL: NAD, well-developed, well-nourished  HEAD:  Atraumatic, Normocephalic  EYES: conjunctiva and sclera clear  NECK: Supple, No LAD  CHEST/LUNG: Clear to auscultation bilaterally; No wheezes, rales or rhonchi  HEART: Regular rate and rhythm; No murmurs, rubs, or gallops, (+)S1, S2  ABDOMEN: Soft, Nontender, Nondistended; Normal Bowel sounds; bladder slightly distended - patient reports need to urinate  EXTREMITIES:  2+ Peripheral Pulses, No clubbing, cyanosis, or edema  PSYCH: normal mood and affect  NEUROLOGY: AAOxperson, place; unable to verbalize date (baseline per sister), 1/5 strength RLE; 0/5 strength RUE/contracted  SKIN: No rashes or lesions on limited exam    LABS:                        10.7   7.41  )-----------( 218      ( 24 Oct 2020 07:00 )             34.5     10-24    138  |  105  |  15  ----------------------------<  80  4.8   |  17<L>  |  1.03    Ca    8.7      24 Oct 2020 06:00  Phos  4.3     10-24  Mg     2.5     10-24    TPro  7.2  /  Alb  3.7  /  TBili  0.3  /  DBili  x   /  AST  11  /  ALT  7   /  AlkPhos  90  10-    PT/INR - ( 23 Oct 2020 07:33 )   PT: 12.7 SEC;   INR: 1.11            CAPILLARY BLOOD GLUCOSE            Urinalysis Basic - ( 22 Oct 2020 17:58 )    Color: LIGHT YELLOW / Appearance: Lt TURBID / S.011 / pH: 7.0  Gluc: NEGATIVE / Ketone: NEGATIVE  / Bili: NEGATIVE / Urobili: NORMAL   Blood: NEGATIVE / Protein: NEGATIVE / Nitrite: NEGATIVE   Leuk Esterase: LARGE / RBC: 3-5 / WBC >50   Sq Epi: OCC / Non Sq Epi: x / Bacteria: MANY        RADIOLOGY & ADDITIONAL TESTS:    Imaging Personally Reviewed:  [x] YES  [ ] NO    Consultant(s) Notes Reviewed:  [x] YES  [ ] NO
Patient is a 74y old  Female who presents with a chief complaint of abdominal pain (27 Oct 2020 12:25)      SUBJECTIVE / OVERNIGHT EVENTS:  Pt seen and examined at bedside.   No overnight event.  Feeling better.  no cp, no sob, no n/v/d.   pleasantly demented. very forgetful      Vital Signs Last 24 Hrs  T(C): 36.3 (28 Oct 2020 11:54), Max: 36.7 (27 Oct 2020 20:30)  T(F): 97.4 (28 Oct 2020 11:54), Max: 98 (27 Oct 2020 20:30)  HR: 67 (28 Oct 2020 11:54) (60 - 67)  BP: 129/60 (28 Oct 2020 11:54) (129/60 - 145/70)  BP(mean): --  RR: 18 (28 Oct 2020 11:54) (18 - 19)  SpO2: 99% (28 Oct 2020 11:54) (97% - 99%)  I&O's Summary      PHYSICAL EXAM:  GENERAL: NAD, well-developed   HEAD:  Atraumatic, Normocephalic  EYES: conjunctiva and sclera clear  NECK: Supple, No LAD  CHEST/LUNG: Clear to auscultation bilaterally; No wheezes, rales or rhonchi  HEART: Regular rate and rhythm; No murmurs, rubs, or gallops, (+)S1, S2  ABDOMEN: Soft, Nontender, Nondistended; Normal Bowel sounds; bladder slightly distended - patient reports need to urinate  EXTREMITIES:  2+ Peripheral Pulses, No clubbing, cyanosis, or edema  PSYCH: normal mood and affect  NEUROLOGY: AAO x person, place; unable to verbalize date (baseline per sister)   SKIN: No rashes or lesions on limited exam        LABS:            CAPILLARY BLOOD GLUCOSE                RADIOLOGY & ADDITIONAL TESTS:    Imaging Personally Reviewed:  [x] YES  [ ] NO    Consultant(s) Notes Reviewed:  [x] YES  [ ] NO      MEDICATIONS  (STANDING):  aspirin enteric coated 81 milliGRAM(s) Oral daily  diVALproex  milliGRAM(s) Oral two times a day  enoxaparin Injectable 40 milliGRAM(s) SubCutaneous every 24 hours  furosemide    Tablet 40 milliGRAM(s) Oral daily  losartan 100 milliGRAM(s) Oral daily  niCARdipine 40 milliGRAM(s) Oral <User Schedule>  niCARdipine 20 milliGRAM(s) Oral <User Schedule>  pantoprazole    Tablet 40 milliGRAM(s) Oral before breakfast  sertraline 50 milliGRAM(s) Oral daily  simvastatin 10 milliGRAM(s) Oral at bedtime    MEDICATIONS  (PRN):      Care Discussed with Consultants/Other Providers [x] YES  [ ] NO    
Patient is a 74y old  Female who presents with a chief complaint of abdominal pain (28 Oct 2020 18:15)      SUBJECTIVE / OVERNIGHT EVENTS:  No overnight event.  No complaints.  Chest pain free. no SOB, no N/V/D.  Denied HA/dizziness, abdominal pain.   pleasantly demented. forgetful       Vital Signs Last 24 Hrs  T(C): 36.5 (29 Oct 2020 13:42), Max: 36.9 (28 Oct 2020 21:15)  T(F): 97.7 (29 Oct 2020 13:42), Max: 98.4 (28 Oct 2020 21:15)  HR: 69 (29 Oct 2020 13:42) (66 - 69)  BP: 140/81 (29 Oct 2020 13:42) (124/66 - 140/81)  BP(mean): --  RR: 18 (29 Oct 2020 06:54) (18 - 20)  SpO2: 98% (29 Oct 2020 13:42) (97% - 98%)  I&O's Summary      PHYSICAL EXAM:  GENERAL: NAD, well-developed   HEAD:  Atraumatic, Normocephalic  EYES: conjunctiva and sclera clear  NECK: Supple, No LAD  CHEST/LUNG: Clear to auscultation bilaterally; No wheezes, rales or rhonchi  HEART: Regular rate and rhythm; No murmurs, rubs, or gallops, (+)S1, S2  ABDOMEN: Soft, Nontender, Nondistended; Normal Bowel sounds   EXTREMITIES:  2+ Peripheral Pulses, No clubbing, cyanosis, or edema  PSYCH: normal mood and affect  NEUROLOGY: AAO x person, place; unable to verbalize date (baseline per sister)   SKIN: No rashes or lesions on limited exam      LABS:            CAPILLARY BLOOD GLUCOSE                RADIOLOGY & ADDITIONAL TESTS:    Imaging Personally Reviewed:  [x] YES  [ ] NO    Consultant(s) Notes Reviewed:  [x] YES  [ ] NO      MEDICATIONS  (STANDING):  aspirin enteric coated 81 milliGRAM(s) Oral daily  diVALproex  milliGRAM(s) Oral two times a day  enoxaparin Injectable 40 milliGRAM(s) SubCutaneous every 24 hours  furosemide    Tablet 40 milliGRAM(s) Oral daily  losartan 100 milliGRAM(s) Oral daily  niCARdipine 40 milliGRAM(s) Oral <User Schedule>  niCARdipine 20 milliGRAM(s) Oral <User Schedule>  pantoprazole    Tablet 40 milliGRAM(s) Oral before breakfast  polyethylene glycol 3350 17 Gram(s) Oral daily  senna 2 Tablet(s) Oral at bedtime  sertraline 50 milliGRAM(s) Oral daily  simvastatin 10 milliGRAM(s) Oral at bedtime    MEDICATIONS  (PRN):      Care Discussed with Consultants/Other Providers [x] YES  [ ] NO    
Patient is a 74y old  Female who presents with a chief complaint of abdominal pain (29 Oct 2020 16:57)      SUBJECTIVE / OVERNIGHT EVENTS:  No overnight events.  Denied cp, sob, n/v/d.  no abdominal pain. No HA/dizziness.       Vital Signs Last 24 Hrs  T(C): 36.6 (30 Oct 2020 11:00), Max: 36.9 (29 Oct 2020 21:22)  T(F): 97.8 (30 Oct 2020 11:00), Max: 98.4 (29 Oct 2020 21:22)  HR: 73 (30 Oct 2020 11:00) (68 - 73)  BP: 121/64 (30 Oct 2020 11:00) (121/64 - 146/79)  BP(mean): --  RR: 18 (30 Oct 2020 11:00) (17 - 18)  SpO2: 96% (30 Oct 2020 11:00) (96% - 98%)  I&O's Summary        PHYSICAL EXAM:  GENERAL: NAD, well-developed   HEAD:  Atraumatic, Normocephalic  EYES: conjunctiva and sclera clear  NECK: Supple, No LAD  CHEST/LUNG: Clear to auscultation bilaterally; No wheezes, rales or rhonchi  HEART: Regular rate and rhythm; No murmurs, rubs, or gallops, (+)S1, S2  ABDOMEN: Soft, Nontender, Nondistended; Normal Bowel sounds   EXTREMITIES:  2+ Peripheral Pulses, No clubbing, cyanosis, or edema  PSYCH: normal mood and affect  NEUROLOGY: AAO x person, place; unable to verbalize date (baseline per sister)   SKIN: No rashes or lesions on limited exam      LABS:            CAPILLARY BLOOD GLUCOSE                RADIOLOGY & ADDITIONAL TESTS:    Imaging Personally Reviewed:  [x] YES  [ ] NO    Consultant(s) Notes Reviewed:  [x] YES  [ ] NO      MEDICATIONS  (STANDING):  aspirin enteric coated 81 milliGRAM(s) Oral daily  diVALproex  milliGRAM(s) Oral two times a day  enoxaparin Injectable 40 milliGRAM(s) SubCutaneous every 24 hours  furosemide    Tablet 40 milliGRAM(s) Oral daily  losartan 100 milliGRAM(s) Oral daily  niCARdipine 40 milliGRAM(s) Oral <User Schedule>  niCARdipine 20 milliGRAM(s) Oral <User Schedule>  pantoprazole    Tablet 40 milliGRAM(s) Oral before breakfast  polyethylene glycol 3350 17 Gram(s) Oral daily  senna 2 Tablet(s) Oral at bedtime  sertraline 50 milliGRAM(s) Oral daily  simvastatin 10 milliGRAM(s) Oral at bedtime    MEDICATIONS  (PRN):  bisacodyl Suppository 10 milliGRAM(s) Rectal daily PRN Constipation      Care Discussed with Consultants/Other Providers [x] YES  [ ] NO

## 2021-06-22 NOTE — DISCHARGE NOTE ADULT - IF YOU ARE A SMOKER, IT IS IMPORTANT FOR YOUR HEALTH TO STOP SMOKING. PLEASE BE AWARE THAT SECOND HAND SMOKE IS ALSO HARMFUL.
Statement Selected Clindamycin Pregnancy And Lactation Text: This medication can be used in pregnancy if certain situations. Clindamycin is also present in breast milk.

## 2021-12-28 NOTE — DIETITIAN INITIAL EVALUATION ADULT. - IDEAL BODY WEIGHT (KG)
56.6 No pressure injury and edema as per documentation. Nutritional focused  Physical Exam was not done due to pt was tired. No pressure injury as per documentation. Nutritional focused Physical Exam was not done due to pt was tired.

## 2021-12-30 NOTE — ED PROVIDER NOTE - NSTIMEPROVIDERCAREINITIATE_GEN_ER
Dr. Wood notified for SBP >160, patient medicated for pain per MAR, too early to administer PRN Labetalol, orders received for additional PRN blood pressure medications, and to restart home medication.   31-Aug-2018 14:48

## 2022-09-30 NOTE — PHYSICAL THERAPY INITIAL EVALUATION ADULT - ORIENTATION, REHAB EVAL
person [place]ac Pregnancy And Lactation Text: This medication is Pregnancy Category X and contraindicated in pregnancy and in women who may become pregnant. It is unknown if this medication is excreted in breast milk.

## 2022-10-09 ENCOUNTER — INPATIENT (INPATIENT)
Facility: HOSPITAL | Age: 77
LOS: 7 days | Discharge: NURSING, REHAB OR LONG-TERM CARE | DRG: 178 | End: 2022-10-17
Attending: INTERNAL MEDICINE | Admitting: INTERNAL MEDICINE
Payer: MEDICARE

## 2022-10-09 VITALS
TEMPERATURE: 100 F | OXYGEN SATURATION: 96 % | WEIGHT: 220.46 LBS | RESPIRATION RATE: 18 BRPM | HEART RATE: 89 BPM | SYSTOLIC BLOOD PRESSURE: 156 MMHG | DIASTOLIC BLOOD PRESSURE: 78 MMHG

## 2022-10-09 DIAGNOSIS — Z90.710 ACQUIRED ABSENCE OF BOTH CERVIX AND UTERUS: Chronic | ICD-10-CM

## 2022-10-09 DIAGNOSIS — R50.9 FEVER, UNSPECIFIED: ICD-10-CM

## 2022-10-09 LAB
ALBUMIN SERPL ELPH-MCNC: 2.9 G/DL — LOW (ref 3.5–5)
ALP SERPL-CCNC: 90 U/L — SIGNIFICANT CHANGE UP (ref 40–120)
ALT FLD-CCNC: 17 U/L DA — SIGNIFICANT CHANGE UP (ref 10–60)
ANION GAP SERPL CALC-SCNC: 11 MMOL/L — SIGNIFICANT CHANGE UP (ref 5–17)
APPEARANCE UR: SIGNIFICANT CHANGE UP
APTT BLD: 28.9 SEC — SIGNIFICANT CHANGE UP (ref 27.5–35.5)
AST SERPL-CCNC: 14 U/L — SIGNIFICANT CHANGE UP (ref 10–40)
BACTERIA # UR AUTO: ABNORMAL /HPF
BASOPHILS # BLD AUTO: 0.04 K/UL — SIGNIFICANT CHANGE UP (ref 0–0.2)
BASOPHILS NFR BLD AUTO: 0.3 % — SIGNIFICANT CHANGE UP (ref 0–2)
BILIRUB SERPL-MCNC: 0.4 MG/DL — SIGNIFICANT CHANGE UP (ref 0.2–1.2)
BILIRUB UR-MCNC: NEGATIVE — SIGNIFICANT CHANGE UP
BUN SERPL-MCNC: 18 MG/DL — SIGNIFICANT CHANGE UP (ref 7–18)
CALCIUM SERPL-MCNC: 8.8 MG/DL — SIGNIFICANT CHANGE UP (ref 8.4–10.5)
CHLORIDE SERPL-SCNC: 107 MMOL/L — SIGNIFICANT CHANGE UP (ref 96–108)
CO2 SERPL-SCNC: 24 MMOL/L — SIGNIFICANT CHANGE UP (ref 22–31)
COLOR SPEC: YELLOW — SIGNIFICANT CHANGE UP
CREAT SERPL-MCNC: 1.28 MG/DL — SIGNIFICANT CHANGE UP (ref 0.5–1.3)
DIFF PNL FLD: ABNORMAL
EGFR: 43 ML/MIN/1.73M2 — LOW
EOSINOPHIL # BLD AUTO: 0.01 K/UL — SIGNIFICANT CHANGE UP (ref 0–0.5)
EOSINOPHIL NFR BLD AUTO: 0.1 % — SIGNIFICANT CHANGE UP (ref 0–6)
EPI CELLS # UR: ABNORMAL /HPF
GLUCOSE SERPL-MCNC: 134 MG/DL — HIGH (ref 70–99)
GLUCOSE UR QL: NEGATIVE — SIGNIFICANT CHANGE UP
HCT VFR BLD CALC: 34.2 % — LOW (ref 34.5–45)
HGB BLD-MCNC: 10.9 G/DL — LOW (ref 11.5–15.5)
IMM GRANULOCYTES NFR BLD AUTO: 0.6 % — SIGNIFICANT CHANGE UP (ref 0–0.9)
INR BLD: 1.05 RATIO — SIGNIFICANT CHANGE UP (ref 0.88–1.16)
KETONES UR-MCNC: NEGATIVE — SIGNIFICANT CHANGE UP
LACTATE SERPL-SCNC: 1.9 MMOL/L — SIGNIFICANT CHANGE UP (ref 0.7–2)
LEUKOCYTE ESTERASE UR-ACNC: ABNORMAL
LYMPHOCYTES # BLD AUTO: 1.43 K/UL — SIGNIFICANT CHANGE UP (ref 1–3.3)
LYMPHOCYTES # BLD AUTO: 11.5 % — LOW (ref 13–44)
MCHC RBC-ENTMCNC: 28.1 PG — SIGNIFICANT CHANGE UP (ref 27–34)
MCHC RBC-ENTMCNC: 31.9 GM/DL — LOW (ref 32–36)
MCV RBC AUTO: 88.1 FL — SIGNIFICANT CHANGE UP (ref 80–100)
MONOCYTES # BLD AUTO: 1.16 K/UL — HIGH (ref 0–0.9)
MONOCYTES NFR BLD AUTO: 9.4 % — SIGNIFICANT CHANGE UP (ref 2–14)
NEUTROPHILS # BLD AUTO: 9.67 K/UL — HIGH (ref 1.8–7.4)
NEUTROPHILS NFR BLD AUTO: 78.1 % — HIGH (ref 43–77)
NITRITE UR-MCNC: NEGATIVE — SIGNIFICANT CHANGE UP
NRBC # BLD: 0 /100 WBCS — SIGNIFICANT CHANGE UP (ref 0–0)
PH UR: 6 — SIGNIFICANT CHANGE UP (ref 5–8)
PLATELET # BLD AUTO: 231 K/UL — SIGNIFICANT CHANGE UP (ref 150–400)
POTASSIUM SERPL-MCNC: 3.9 MMOL/L — SIGNIFICANT CHANGE UP (ref 3.5–5.3)
POTASSIUM SERPL-SCNC: 3.9 MMOL/L — SIGNIFICANT CHANGE UP (ref 3.5–5.3)
PROT SERPL-MCNC: 8 G/DL — SIGNIFICANT CHANGE UP (ref 6–8.3)
PROT UR-MCNC: 100
PROTHROM AB SERPL-ACNC: 12.5 SEC — SIGNIFICANT CHANGE UP (ref 10.5–13.4)
RBC # BLD: 3.88 M/UL — SIGNIFICANT CHANGE UP (ref 3.8–5.2)
RBC # FLD: 15.6 % — HIGH (ref 10.3–14.5)
RBC CASTS # UR COMP ASSIST: >50 /HPF (ref 0–2)
SODIUM SERPL-SCNC: 142 MMOL/L — SIGNIFICANT CHANGE UP (ref 135–145)
SP GR SPEC: 1.01 — SIGNIFICANT CHANGE UP (ref 1.01–1.02)
UROBILINOGEN FLD QL: 1
WBC # BLD: 12.39 K/UL — HIGH (ref 3.8–10.5)
WBC # FLD AUTO: 12.39 K/UL — HIGH (ref 3.8–10.5)
WBC UR QL: SIGNIFICANT CHANGE UP /HPF (ref 0–5)

## 2022-10-09 PROCEDURE — 99285 EMERGENCY DEPT VISIT HI MDM: CPT | Mod: CS

## 2022-10-09 PROCEDURE — 71045 X-RAY EXAM CHEST 1 VIEW: CPT | Mod: 26

## 2022-10-09 RX ORDER — VANCOMYCIN HCL 1 G
1000 VIAL (EA) INTRAVENOUS ONCE
Refills: 0 | Status: COMPLETED | OUTPATIENT
Start: 2022-10-09 | End: 2022-10-09

## 2022-10-09 RX ORDER — AZTREONAM 2 G
1000 VIAL (EA) INJECTION ONCE
Refills: 0 | Status: COMPLETED | OUTPATIENT
Start: 2022-10-09 | End: 2022-10-09

## 2022-10-09 RX ORDER — KETOROLAC TROMETHAMINE 30 MG/ML
15 SYRINGE (ML) INJECTION ONCE
Refills: 0 | Status: DISCONTINUED | OUTPATIENT
Start: 2022-10-09 | End: 2022-10-09

## 2022-10-09 RX ADMIN — Medication 250 MILLIGRAM(S): at 23:08

## 2022-10-09 RX ADMIN — Medication 50 MILLIGRAM(S): at 23:08

## 2022-10-09 RX ADMIN — Medication 15 MILLIGRAM(S): at 21:18

## 2022-10-09 RX ADMIN — Medication 15 MILLIGRAM(S): at 20:02

## 2022-10-09 NOTE — ED ADULT NURSE NOTE - NSIMPLEMENTINTERV_GEN_ALL_ED
Implemented All Fall with Harm Risk Interventions:  [place] to call system. Call bell, personal items and telephone within reach. Instruct patient to call for assistance. Room bathroom lighting operational. Non-slip footwear when patient is off stretcher. Physically safe environment: no spills, clutter or unnecessary equipment. Stretcher in lowest position, wheels locked, appropriate side rails in place. Provide visual cue, wrist band, yellow gown, etc. Monitor gait and stability. Monitor for mental status changes and reorient to person, place, and time. Review medications for side effects contributing to fall risk. Reinforce activity limits and safety measures with patient and family. Provide visual clues: red socks. Implemented All Fall with Harm Risk Interventions:  [place] to call system. Call bell, personal items and telephone within reach. Instruct patient to call for assistance. Room bathroom lighting operational. Non-slip footwear when patient is off stretcher. Physically safe environment: no spills, clutter or unnecessary equipment. Stretcher in lowest position, wheels locked, appropriate side rails in place. Provide visual cue, wrist band, yellow gown, etc. Monitor gait and stability. Monitor for mental status changes and reorient to person, place, and time. Review medications for side effects contributing to fall risk. Reinforce activity limits and safety measures with patient and family. Provide visual clues: red socks. Implemented All Fall with Harm Risk Interventions:  [place] to call system. Call bell, personal items and telephone within reach. Instruct patient to call for assistance. Room bathroom lighting operational. Non-slip footwear when patient is off stretcher. Physically safe environment: no spills, clutter or unnecessary equipment. Stretcher in lowest position, wheels locked, appropriate side rails in place. Provide visual cue, wrist band, yellow gown, etc. Monitor gait and stability. Monitor for mental status changes and reorient to person, place, and time. Review medications for side effects contributing to fall risk. Reinforce activity limits and safety measures with patient and family. Provide visual clues: red socks.

## 2022-10-09 NOTE — ED PROVIDER NOTE - CARE PLAN
Oxygen Qualifier       Room air: SpO2 with O2 and liter flow   Resting SpO2  87%  91% on 2LPM NC   Ambulating SpO2  82% {91 on 4LPM NC         Completed by:    Min Dean Principal Discharge DX:	Fever   1

## 2022-10-09 NOTE — ED PROVIDER NOTE - NS ED MD DISPO DIVISION
[facility], [place] [facility]

## 2022-10-09 NOTE — ED PROVIDER NOTE - CLINICAL SUMMARY MEDICAL DECISION MAKING FREE TEXT BOX
Patient presenting for fever, neuro status appears baseline, will obtain lab, xray, ua, assess for infection. ed obs and reassess

## 2022-10-09 NOTE — ED PROVIDER NOTE - PROGRESS NOTE DETAILS
discussed with dr callahan, will admit under dr st for septic w.u. patient clinically stable on admission

## 2022-10-09 NOTE — ED PROVIDER NOTE - OBJECTIVE STATEMENT
76 y.o presenting with fever, per NH  paper, patient baseline s/p stroke with aphasia and right sided weakness. noted fever 100.2 today. patient awake, alert, but unable to contribute to ROS due to aphasia. patient sent in from [facility] for r.o sepsis

## 2022-10-10 DIAGNOSIS — R31.9 HEMATURIA, UNSPECIFIED: ICD-10-CM

## 2022-10-10 DIAGNOSIS — R56.9 UNSPECIFIED CONVULSIONS: ICD-10-CM

## 2022-10-10 DIAGNOSIS — E78.5 HYPERLIPIDEMIA, UNSPECIFIED: ICD-10-CM

## 2022-10-10 DIAGNOSIS — I10 ESSENTIAL (PRIMARY) HYPERTENSION: ICD-10-CM

## 2022-10-10 DIAGNOSIS — K59.09 OTHER CONSTIPATION: ICD-10-CM

## 2022-10-10 DIAGNOSIS — U07.1 COVID-19: ICD-10-CM

## 2022-10-10 DIAGNOSIS — F32.9 MAJOR DEPRESSIVE DISORDER, SINGLE EPISODE, UNSPECIFIED: ICD-10-CM

## 2022-10-10 DIAGNOSIS — R82.90 UNSPECIFIED ABNORMAL FINDINGS IN URINE: ICD-10-CM

## 2022-10-10 DIAGNOSIS — Z29.9 ENCOUNTER FOR PROPHYLACTIC MEASURES, UNSPECIFIED: ICD-10-CM

## 2022-10-10 LAB
ANION GAP SERPL CALC-SCNC: 7 MMOL/L — SIGNIFICANT CHANGE UP (ref 5–17)
BASOPHILS # BLD AUTO: 0.05 K/UL — SIGNIFICANT CHANGE UP (ref 0–0.2)
BASOPHILS NFR BLD AUTO: 0.5 % — SIGNIFICANT CHANGE UP (ref 0–2)
BUN SERPL-MCNC: 24 MG/DL — HIGH (ref 7–18)
CALCIUM SERPL-MCNC: 8.9 MG/DL — SIGNIFICANT CHANGE UP (ref 8.4–10.5)
CHLORIDE SERPL-SCNC: 109 MMOL/L — HIGH (ref 96–108)
CK SERPL-CCNC: 64 U/L — SIGNIFICANT CHANGE UP (ref 21–215)
CO2 SERPL-SCNC: 26 MMOL/L — SIGNIFICANT CHANGE UP (ref 22–31)
CREAT SERPL-MCNC: 1.2 MG/DL — SIGNIFICANT CHANGE UP (ref 0.5–1.3)
CULTURE RESULTS: SIGNIFICANT CHANGE UP
EGFR: 47 ML/MIN/1.73M2 — LOW
EOSINOPHIL # BLD AUTO: 0.02 K/UL — SIGNIFICANT CHANGE UP (ref 0–0.5)
EOSINOPHIL NFR BLD AUTO: 0.2 % — SIGNIFICANT CHANGE UP (ref 0–6)
GLUCOSE BLDC GLUCOMTR-MCNC: 148 MG/DL — HIGH (ref 70–99)
GLUCOSE SERPL-MCNC: 97 MG/DL — SIGNIFICANT CHANGE UP (ref 70–99)
HCT VFR BLD CALC: 30.6 % — LOW (ref 34.5–45)
HGB BLD-MCNC: 9.7 G/DL — LOW (ref 11.5–15.5)
IMM GRANULOCYTES NFR BLD AUTO: 0.6 % — SIGNIFICANT CHANGE UP (ref 0–0.9)
LYMPHOCYTES # BLD AUTO: 1.26 K/UL — SIGNIFICANT CHANGE UP (ref 1–3.3)
LYMPHOCYTES # BLD AUTO: 12.3 % — LOW (ref 13–44)
MAGNESIUM SERPL-MCNC: 2.4 MG/DL — SIGNIFICANT CHANGE UP (ref 1.6–2.6)
MCHC RBC-ENTMCNC: 28 PG — SIGNIFICANT CHANGE UP (ref 27–34)
MCHC RBC-ENTMCNC: 31.7 GM/DL — LOW (ref 32–36)
MCV RBC AUTO: 88.4 FL — SIGNIFICANT CHANGE UP (ref 80–100)
MONOCYTES # BLD AUTO: 1.21 K/UL — HIGH (ref 0–0.9)
MONOCYTES NFR BLD AUTO: 11.8 % — SIGNIFICANT CHANGE UP (ref 2–14)
NEUTROPHILS # BLD AUTO: 7.67 K/UL — HIGH (ref 1.8–7.4)
NEUTROPHILS NFR BLD AUTO: 74.6 % — SIGNIFICANT CHANGE UP (ref 43–77)
NRBC # BLD: 0 /100 WBCS — SIGNIFICANT CHANGE UP (ref 0–0)
PHOSPHATE SERPL-MCNC: 3.9 MG/DL — SIGNIFICANT CHANGE UP (ref 2.5–4.5)
PLATELET # BLD AUTO: 205 K/UL — SIGNIFICANT CHANGE UP (ref 150–400)
POTASSIUM SERPL-MCNC: 3.9 MMOL/L — SIGNIFICANT CHANGE UP (ref 3.5–5.3)
POTASSIUM SERPL-SCNC: 3.9 MMOL/L — SIGNIFICANT CHANGE UP (ref 3.5–5.3)
RAPID RVP RESULT: DETECTED
RBC # BLD: 3.46 M/UL — LOW (ref 3.8–5.2)
RBC # FLD: 15.9 % — HIGH (ref 10.3–14.5)
SARS-COV-2 RNA SPEC QL NAA+PROBE: DETECTED
SODIUM SERPL-SCNC: 142 MMOL/L — SIGNIFICANT CHANGE UP (ref 135–145)
SPECIMEN SOURCE: SIGNIFICANT CHANGE UP
WBC # BLD: 10.27 K/UL — SIGNIFICANT CHANGE UP (ref 3.8–10.5)
WBC # FLD AUTO: 10.27 K/UL — SIGNIFICANT CHANGE UP (ref 3.8–10.5)

## 2022-10-10 RX ORDER — VALPROIC ACID (AS SODIUM SALT) 250 MG/5ML
500 SOLUTION, ORAL ORAL EVERY 12 HOURS
Refills: 0 | Status: DISCONTINUED | OUTPATIENT
Start: 2022-10-10 | End: 2022-10-17

## 2022-10-10 RX ORDER — SENNA PLUS 8.6 MG/1
2 TABLET ORAL AT BEDTIME
Refills: 0 | Status: DISCONTINUED | OUTPATIENT
Start: 2022-10-10 | End: 2022-10-17

## 2022-10-10 RX ORDER — LOSARTAN POTASSIUM 100 MG/1
100 TABLET, FILM COATED ORAL DAILY
Refills: 0 | Status: DISCONTINUED | OUTPATIENT
Start: 2022-10-10 | End: 2022-10-17

## 2022-10-10 RX ORDER — SIMVASTATIN 20 MG/1
10 TABLET, FILM COATED ORAL AT BEDTIME
Refills: 0 | Status: DISCONTINUED | OUTPATIENT
Start: 2022-10-10 | End: 2022-10-17

## 2022-10-10 RX ORDER — NICARDIPINE HYDROCHLORIDE 30 MG/1
20 CAPSULE, EXTENDED RELEASE ORAL
Refills: 0 | Status: DISCONTINUED | OUTPATIENT
Start: 2022-10-10 | End: 2022-10-17

## 2022-10-10 RX ORDER — ENOXAPARIN SODIUM 100 MG/ML
40 INJECTION SUBCUTANEOUS EVERY 24 HOURS
Refills: 0 | Status: DISCONTINUED | OUTPATIENT
Start: 2022-10-10 | End: 2022-10-17

## 2022-10-10 RX ORDER — FAMOTIDINE 10 MG/ML
40 INJECTION INTRAVENOUS AT BEDTIME
Refills: 0 | Status: DISCONTINUED | OUTPATIENT
Start: 2022-10-10 | End: 2022-10-17

## 2022-10-10 RX ORDER — DIVALPROEX SODIUM 500 MG/1
500 TABLET, DELAYED RELEASE ORAL
Refills: 0 | Status: DISCONTINUED | OUTPATIENT
Start: 2022-10-10 | End: 2022-10-10

## 2022-10-10 RX ORDER — NICARDIPINE HYDROCHLORIDE 30 MG/1
40 CAPSULE, EXTENDED RELEASE ORAL
Refills: 0 | Status: DISCONTINUED | OUTPATIENT
Start: 2022-10-10 | End: 2022-10-17

## 2022-10-10 RX ORDER — FUROSEMIDE 40 MG
40 TABLET ORAL DAILY
Refills: 0 | Status: DISCONTINUED | OUTPATIENT
Start: 2022-10-10 | End: 2022-10-17

## 2022-10-10 RX ORDER — ACETAMINOPHEN 500 MG
650 TABLET ORAL EVERY 6 HOURS
Refills: 0 | Status: DISCONTINUED | OUTPATIENT
Start: 2022-10-10 | End: 2022-10-17

## 2022-10-10 RX ORDER — POLYETHYLENE GLYCOL 3350 17 G/17G
17 POWDER, FOR SOLUTION ORAL DAILY
Refills: 0 | Status: DISCONTINUED | OUTPATIENT
Start: 2022-10-10 | End: 2022-10-17

## 2022-10-10 RX ORDER — GUAIFENESIN/DEXTROMETHORPHAN 600MG-30MG
10 TABLET, EXTENDED RELEASE 12 HR ORAL EVERY 4 HOURS
Refills: 0 | Status: DISCONTINUED | OUTPATIENT
Start: 2022-10-10 | End: 2022-10-17

## 2022-10-10 RX ORDER — ASPIRIN/CALCIUM CARB/MAGNESIUM 324 MG
81 TABLET ORAL DAILY
Refills: 0 | Status: DISCONTINUED | OUTPATIENT
Start: 2022-10-10 | End: 2022-10-17

## 2022-10-10 RX ORDER — SERTRALINE 25 MG/1
75 TABLET, FILM COATED ORAL DAILY
Refills: 0 | Status: DISCONTINUED | OUTPATIENT
Start: 2022-10-10 | End: 2022-10-12

## 2022-10-10 RX ORDER — VALPROIC ACID (AS SODIUM SALT) 250 MG/5ML
500 SOLUTION, ORAL ORAL EVERY 12 HOURS
Refills: 0 | Status: DISCONTINUED | OUTPATIENT
Start: 2022-10-10 | End: 2022-10-10

## 2022-10-10 RX ORDER — DIVALPROEX SODIUM 500 MG/1
500 TABLET, DELAYED RELEASE ORAL EVERY 12 HOURS
Refills: 0 | Status: DISCONTINUED | OUTPATIENT
Start: 2022-10-10 | End: 2022-10-10

## 2022-10-10 RX ORDER — ASCORBIC ACID 60 MG
500 TABLET,CHEWABLE ORAL DAILY
Refills: 0 | Status: DISCONTINUED | OUTPATIENT
Start: 2022-10-10 | End: 2022-10-17

## 2022-10-10 RX ADMIN — Medication 81 MILLIGRAM(S): at 12:24

## 2022-10-10 RX ADMIN — SENNA PLUS 2 TABLET(S): 8.6 TABLET ORAL at 21:51

## 2022-10-10 RX ADMIN — POLYETHYLENE GLYCOL 3350 17 GRAM(S): 17 POWDER, FOR SOLUTION ORAL at 12:27

## 2022-10-10 RX ADMIN — ENOXAPARIN SODIUM 40 MILLIGRAM(S): 100 INJECTION SUBCUTANEOUS at 05:36

## 2022-10-10 RX ADMIN — Medication 500 MILLIGRAM(S): at 12:23

## 2022-10-10 RX ADMIN — Medication 1 TABLET(S): at 12:26

## 2022-10-10 RX ADMIN — NICARDIPINE HYDROCHLORIDE 20 MILLIGRAM(S): 30 CAPSULE, EXTENDED RELEASE ORAL at 15:54

## 2022-10-10 RX ADMIN — SERTRALINE 75 MILLIGRAM(S): 25 TABLET, FILM COATED ORAL at 12:25

## 2022-10-10 RX ADMIN — FAMOTIDINE 40 MILLIGRAM(S): 10 INJECTION INTRAVENOUS at 21:51

## 2022-10-10 RX ADMIN — Medication 500 MILLIGRAM(S): at 21:50

## 2022-10-10 RX ADMIN — SIMVASTATIN 10 MILLIGRAM(S): 20 TABLET, FILM COATED ORAL at 21:51

## 2022-10-10 NOTE — H&P ADULT - HISTORY OF PRESENT ILLNESS
76 year old female, from NH, with PMHx HTN, HLD, CVA (w/ residual aphasia and R sided hemoparesis), seizures was sent from nursing home for sepsis r/o after having a fever of 100.2 per NH documentation. Patient awake and alert, is able to answer some yes no questions but unable to contribute to ROS due to aphasia.   GOC: DNR per NH documentation     In ED:   Vitals 99.5F, 89 HR, 156/78 BP, 18 RR, 96% on RA   WBC 12k, lactate 1.9   UA + for RBC, neg for UTI  COVID+      76 year old female, from NH, with PMHx HTN, HLD, CVA (w/ residual aphasia and R sided hemoparesis), seizures was sent from nursing home for sepsis r/o after having a fever of 100.2 per NH documentation. Patient awake and alert, is able to answer some yes no questions but unable to contribute to ROS due to aphasia.   GOC: DNR per NH documentation     In ED:   Vitals 99.5F, 89 HR, 156/78 BP, 18 RR, 96% on RA   WBC 12k, lactate 1.9   UA + for RBC, neg for UTI  CXR wnl (wet-read)   COVID+      76 year old female, from NH, with PMHx HTN, HLD, CVA (w/ residual aphasia and R sided hemiparesis/plegia), seizures was sent from nursing home for sepsis r/o after having a fever of 100.2 per NH documentation. Patient awake and alert, is able to answer some yes no questions but unable to contribute to ROS due to aphasia.   GOC: DNR per NH documentation     In ED:   Vitals 99.5F, 89 HR, 156/78 BP, 18 RR, 96% on RA   WBC 12k, lactate 1.9   UA + for RBC, neg for UTI  CXR wnl (wet-read)   COVID+

## 2022-10-10 NOTE — H&P ADULT - PROBLEM SELECTOR PLAN 2
c/w home BP regimen (losartan, nicardipine) UA RBC >50 tmtc  no gross hematuria observed  f/u CK  consider Urology consult if shows clinical signs   monitor HGB

## 2022-10-10 NOTE — H&P ADULT - PROBLEM SELECTOR PLAN 1
Pt sent from NH for fever of 100.2   WBC 12k  CXR wnl, UA neg for infection   COVID+   No indication for medical COVID tx at this time as patient is not hypoxic   c/w supportive care (anti-pyretics and anti-tussives) Pt sent from NH for fever of 100.2   WBC 12k  CXR wnl, UA neg for infection   COVID+   No indication for medical COVID tx at this time as patient is not hypoxic   f/u blood cx to r/o bacterial cause  c/w supportive care (anti-pyretics and anti-tussives)

## 2022-10-10 NOTE — H&P ADULT - PROBLEM SELECTOR PLAN 5
c/w senna and Miralax (home meds) c/w divalproex ([place] [facility]) c/w divalproex ([place] [facility]) c/w divalproex ([place] [facility])

## 2022-10-10 NOTE — H&P ADULT - NSICDXPASTMEDICALHX_GEN_ALL_CORE_FT
PAST MEDICAL HISTORY:  Cerebrovascular accident (CVA)     Chronic constipation     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     HLD (hyperlipidemia)     HTN (hypertension)     Seizures

## 2022-10-10 NOTE — ED ADULT NURSE REASSESSMENT NOTE - NS ED NURSE REASSESS COMMENT FT1
346 am received the pt  from holding RN , because pt is COVID positive
4 am received pt from holding , since pt is covid positive . escalated to charge ADELSO DIAZ . pt AAO X 1-2 , responding to calling her name . all extremities contracted , not moving at all . dysphagia  test done , pt failed , saliva drooling from mouth noted , oral care and oral suctioning done for clear secretions . po meds could not be given   because pt cannot swallow , [facility]  doctor B made aware .Isolation precautions maintained . iv in left metacarpal#20  angiocath  intact . saline flush done . pt resting now , no distress
6 am pt cannot swallow water trials , failed dysphagia screening ,. so po meds could not be given . [facility]  doctor , SIENA made aware . cleaned and mouth care done , and suctioned the mouth for  clear secretions drooling
715 m pt resting now , no distress , report given to day shift RN
NP ray made aware patient cannot swallow depakote will change to IV

## 2022-10-10 NOTE — CHART NOTE - NSCHARTNOTEFT_GEN_A_CORE
Patient is a 76y old  Female who presents with a chief complaint of Fever (10 Oct 2022 02:35)      OVERNIGHT EVENTS:    REVIEW OF SYSTEMS:  CONSTITUTIONAL: No fever, chills  ENMT:  No difficulty hearing, no change in vision  NECK: No pain or stiffness  RESPIRATORY: No cough, SOB  CARDIOVASCULAR: No chest pain, palpitations  GASTROINTESTINAL: No abdominal pain. No nausea, vomiting, or diarrhea  GENITOURINARY: No dysuria  NEUROLOGICAL: No HA  SKIN: No itching, burning, rashes, or lesions   LYMPH NODES: No enlarged glands  ENDOCRINE: No heat or cold intolerance; No hair loss  MUSCULOSKELETAL: No joint pain or swelling; No muscle, back, or extremity pain  PSYCHIATRIC: No depression, anxiety  HEME/LYMPH: No easy bruising, or bleeding gums    T(C): 37.6 (10-10-22 @ 07:36), Max: 37.6 (10-10-22 @ 07:36)  HR: 79 (10-10-22 @ 07:36) (77 - 89)  BP: 139/76 (10-10-22 @ 07:36) (139/76 - 165/89)  RR: 20 (10-10-22 @ 07:36) (18 - 20)  SpO2: 97% (10-10-22 @ 07:36) (94% - 97%)  Wt(kg): --Vital Signs Last 24 Hrs  T(C): 37.6 (10 Oct 2022 07:36), Max: 37.6 (10 Oct 2022 07:36)  T(F): 99.6 (10 Oct 2022 07:36), Max: 99.6 (10 Oct 2022 07:36)  HR: 79 (10 Oct 2022 07:36) (77 - 89)  BP: 139/76 (10 Oct 2022 07:36) (139/76 - 165/89)  BP(mean): --  RR: 20 (10 Oct 2022 07:36) (18 - 20)  SpO2: 97% (10 Oct 2022 07:36) (94% - 97%)    Parameters below as of 10 Oct 2022 07:36  Patient On (Oxygen Delivery Method): room air        MEDICATIONS  (STANDING):  ascorbic acid 500 milliGRAM(s) Oral daily  aspirin enteric coated 81 milliGRAM(s) Oral daily  diVALproex  milliGRAM(s) Oral two times a day  enoxaparin Injectable 40 milliGRAM(s) SubCutaneous every 24 hours  famotidine    Tablet 40 milliGRAM(s) Oral at bedtime  furosemide    Tablet 40 milliGRAM(s) Oral daily  losartan 100 milliGRAM(s) Oral daily  multivitamin 1 Tablet(s) Oral daily  niCARdipine 40 milliGRAM(s) Oral <User Schedule>  niCARdipine 20 milliGRAM(s) Oral <User Schedule>  polyethylene glycol 3350 17 Gram(s) Oral daily  senna 2 Tablet(s) Oral at bedtime  sertraline 75 milliGRAM(s) Oral daily  simvastatin 10 milliGRAM(s) Oral at bedtime    MEDICATIONS  (PRN):  acetaminophen     Tablet .. 650 milliGRAM(s) Oral every 6 hours PRN Temp greater or equal to 38C (100.4F), Mild Pain (1 - 3)  guaifenesin/dextromethorphan Oral Liquid 10 milliLiter(s) Oral every 4 hours PRN Cough      PHYSICAL EXAM:  GENERAL: NAD  EYES: clear conjunctiva; EOMI  ENMT: Moist mucous membranes  NECK: Supple, No JVD, Normal thyroid  CHEST/LUNG: Clear to auscultation bilaterally; No rales, rhonchi, wheezing, or rubs  HEART: S1, S2, Regular rate and rhythm  ABDOMEN: Soft, Nontender, Nondistended; Bowel sounds present  NEURO: Alert & Oriented X3  EXTREMITIES: No LE edema, no calf tenderness  LYMPH: No lymphadenopathy noted  SKIN: No rashes or lesions    Consultant(s) Notes Reviewed:  [x ] YES  [ ] NO  Care Discussed with Consultants/Other Providers [ x] YES  [ ] NO    LABS:                        9.7    10.27 )-----------( 205      ( 10 Oct 2022 06:35 )             30.6     10-10    142  |  109<H>  |  24<H>  ----------------------------<  97  3.9   |  26  |  1.20    Ca    8.9      10 Oct 2022 06:35  Phos  3.9     10-10  Mg     2.4     10-10    TPro  8.0  /  Alb  2.9<L>  /  TBili  0.4  /  DBili  x   /  AST  14  /  ALT  17  /  AlkPhos  90  10-09    PT/INR - ( 09 Oct 2022 19:35 )   PT: 12.5 sec;   INR: 1.05 ratio         PTT - ( 09 Oct 2022 19:35 )  PTT:28.9 sec  CAPILLARY BLOOD GLUCOSE            Urinalysis Basic - ( 09 Oct 2022 22:02 )    Color: Yellow / Appearance: Hazy / S.010 / pH: x  Gluc: x / Ketone: Negative  / Bili: Negative / Urobili: 1   Blood: x / Protein: 100 / Nitrite: Negative   Leuk Esterase: Trace / RBC: >50 /HPF / WBC 3-5 /HPF   Sq Epi: x / Non Sq Epi: Occasional /HPF / Bacteria: Many /HPF        RADIOLOGY & ADDITIONAL TESTS:    Imaging Personally Reviewed:  [ ] YES  [ ] NO Patient is a 76y old  Female who presents with a chief complaint of Fever (10 Oct 2022 02:35)    OVERNIGHT EVENTS: pt refusing to take oral depakote due to difficulty swallowing    REVIEW OF SYSTEMS:  unable to assess due to mental status    T(C): 37.6 (10-10-22 @ 07:36), Max: 37.6 (10-10-22 @ 07:36)  HR: 79 (10-10-22 @ 07:36) (77 - 89)  BP: 139/76 (10-10-22 @ 07:36) (139/76 - 165/89)  RR: 20 (10-10-22 @ 07:36) (18 - 20)  SpO2: 97% (10-10-22 @ 07:36) (94% - 97%)  Wt(kg): --Vital Signs Last 24 Hrs  T(C): 37.6 (10 Oct 2022 07:36), Max: 37.6 (10 Oct 2022 07:36)  T(F): 99.6 (10 Oct 2022 07:36), Max: 99.6 (10 Oct 2022 07:36)  HR: 79 (10 Oct 2022 07:36) (77 - 89)  BP: 139/76 (10 Oct 2022 07:36) (139/76 - 165/89)  BP(mean): --  RR: 20 (10 Oct 2022 07:36) (18 - 20)  SpO2: 97% (10 Oct 2022 07:36) (94% - 97%)    Parameters below as of 10 Oct 2022 07:36  Patient On (Oxygen Delivery Method): room air    MEDICATIONS  (STANDING):  ascorbic acid 500 milliGRAM(s) Oral daily  aspirin enteric coated 81 milliGRAM(s) Oral daily  diVALproex  milliGRAM(s) Oral two times a day  enoxaparin Injectable 40 milliGRAM(s) SubCutaneous every 24 hours  famotidine    Tablet 40 milliGRAM(s) Oral at bedtime  furosemide    Tablet 40 milliGRAM(s) Oral daily  losartan 100 milliGRAM(s) Oral daily  multivitamin 1 Tablet(s) Oral daily  niCARdipine 40 milliGRAM(s) Oral <User Schedule>  niCARdipine 20 milliGRAM(s) Oral <User Schedule>  polyethylene glycol 3350 17 Gram(s) Oral daily  senna 2 Tablet(s) Oral at bedtime  sertraline 75 milliGRAM(s) Oral daily  simvastatin 10 milliGRAM(s) Oral at bedtime    MEDICATIONS  (PRN):  acetaminophen     Tablet .. 650 milliGRAM(s) Oral every 6 hours PRN Temp greater or equal to 38C (100.4F), Mild Pain (1 - 3)  guaifenesin/dextromethorphan Oral Liquid 10 milliLiter(s) Oral every 4 hours PRN Cough      PHYSICAL EXAM:  GENERAL: awake, confused, NAD  EYES: clear conjunctiva  ENMT: Moist mucous membranes  NECK: Supple, No JVD, Normal thyroid  CHEST/LUNG: Clear to auscultation bilaterally; No rales, rhonchi, wheezing, or rubs  HEART: S1, S2, Regular rate and rhythm  ABDOMEN: Soft, Nontender, Nondistended; Bowel sounds present  NEURO: Alert & Oriented X 0   EXTREMITIES: +right hand contracture. +left hand tremor. No LE edema, no calf tenderness  LYMPH: No lymphadenopathy noted  SKIN: No rashes or lesions    Consultant(s) Notes Reviewed:  [x ] YES  [ ] NO  Care Discussed with Consultants/Other Providers [ x] YES  [ ] NO    LABS:                        9.7    10.27 )-----------( 205      ( 10 Oct 2022 06:35 )             30.6     10-10    142  |  109<H>  |  24<H>  ----------------------------<  97  3.9   |  26  |  1.20    Ca    8.9      10 Oct 2022 06:35  Phos  3.9     10-10  Mg     2.4     10-10    TPro  8.0  /  Alb  2.9<L>  /  TBili  0.4  /  DBili  x   /  AST  14  /  ALT  17  /  AlkPhos  90  10-09    PT/INR - ( 09 Oct 2022 19:35 )   PT: 12.5 sec;   INR: 1.05 ratio         PTT - ( 09 Oct 2022 19:35 )  PTT:28.9 sec  CAPILLARY BLOOD GLUCOSE            Urinalysis Basic - ( 09 Oct 2022 22:02 )    Color: Yellow / Appearance: Hazy / S.010 / pH: x  Gluc: x / Ketone: Negative  / Bili: Negative / Urobili: 1   Blood: x / Protein: 100 / Nitrite: Negative   Leuk Esterase: Trace / RBC: >50 /HPF / WBC 3-5 /HPF   Sq Epi: x / Non Sq Epi: Occasional /HPF / Bacteria: Many /HPF        RADIOLOGY & ADDITIONAL TESTS:  < from: Xray Chest 1 View- PORTABLE-Urgent (10.09.22 @ 19:32) >      ACC: [id number] EXAM:  XR CHEST PORTABLE URGENT 1V                          PROCEDURE DATE:  10/09/2022          INTERPRETATION:  AP semierect chest on 2022 at 7:19 PM.   Patient has fever.    COMPARISON: None available.    Shallow inspiration crowds the chest. Heart magnified by technique.    Lungs are clear.    IMPRESSION: Clear lungs.    --- End of Report ---            DELMIS MUNOZ MD; Attending Radiologist  This document has been electronically signed. 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 Pt was admitted to medicine for sepsis 2/2 covid. GOC: DNR per NH documentation.   CXR no PNA. Pending urine and blood culture results.     Plan:  Problem/Plan - 1:  ·  Problem: 2019 novel coronavirus disease (COVID-19).   ·  Plan: Pt sent from NH for fever of 100.2   WBC 12k  CXR wnl, UA neg for infection   COVID+   No indication for medical COVID tx at this time as patient is not hypoxic   f/u blood cx to r/o bacterial cause  c/w supportive care (anti-pyretics and anti-tussives).    Problem/Plan - 2:  ·  Problem: Hematuria, unspecified.   ·  Plan: UA RBC >50 tmtc  no gross hematuria observed  monitor HGB.    Problem/Plan - 3:  ·  Problem: HTN (hypertension).   ·  Plan: c/w home BP regimen (losartan, nicardipine, lasix).    Problem/Plan - 4:  ·  Problem: HLD (hyperlipidemia).   ·  Plan: c/w statin (home med).    Problem/Plan - 5:  ·  Problem: Seizures.   ·  Plan: c/w divalproex (home med).  switch to IV depakote due to pt refusing to swallow medication for now    Problem/Plan - 6:  ·  Problem: Chronic constipation.   ·  Plan: c/w senna and Miralax (home meds).    Problem/Plan - 7:  ·  Problem: Major depressive disorder.   ·  Plan: c/w sertraline (home med).    Problem/Plan - 8:  ·  Problem: Prophylactic measure.   ·  Plan: Lovenox for DVT ppx. Patient is a 76y old  Female who presents with a chief complaint of Fever (10 Oct 2022 02:35)    OVERNIGHT EVENTS: pt refusing to take oral depakote due to difficulty swallowing    REVIEW OF SYSTEMS:  unable to assess due to mental status    T(C): 37.6 (10-10-22 @ 07:36), Max: 37.6 (10-10-22 @ 07:36)  HR: 79 (10-10-22 @ 07:36) (77 - 89)  BP: 139/76 (10-10-22 @ 07:36) (139/76 - 165/89)  RR: 20 (10-10-22 @ 07:36) (18 - 20)  SpO2: 97% (10-10-22 @ 07:36) (94% - 97%)  Wt(kg): --Vital Signs Last 24 Hrs  T(C): 37.6 (10 Oct 2022 07:36), Max: 37.6 (10 Oct 2022 07:36)  T(F): 99.6 (10 Oct 2022 07:36), Max: 99.6 (10 Oct 2022 07:36)  HR: 79 (10 Oct 2022 07:36) (77 - 89)  BP: 139/76 (10 Oct 2022 07:36) (139/76 - 165/89)  BP(mean): --  RR: 20 (10 Oct 2022 07:36) (18 - 20)  SpO2: 97% (10 Oct 2022 07:36) (94% - 97%)    Parameters below as of 10 Oct 2022 07:36  Patient On (Oxygen Delivery Method): room air    MEDICATIONS  (STANDING):  ascorbic acid 500 milliGRAM(s) Oral daily  aspirin enteric coated 81 milliGRAM(s) Oral daily  diVALproex  milliGRAM(s) Oral two times a day  enoxaparin Injectable 40 milliGRAM(s) SubCutaneous every 24 hours  famotidine    Tablet 40 milliGRAM(s) Oral at bedtime  furosemide    Tablet 40 milliGRAM(s) Oral daily  losartan 100 milliGRAM(s) Oral daily  multivitamin 1 Tablet(s) Oral daily  niCARdipine 40 milliGRAM(s) Oral <User Schedule>  niCARdipine 20 milliGRAM(s) Oral <User Schedule>  polyethylene glycol 3350 17 Gram(s) Oral daily  senna 2 Tablet(s) Oral at bedtime  sertraline 75 milliGRAM(s) Oral daily  simvastatin 10 milliGRAM(s) Oral at bedtime    MEDICATIONS  (PRN):  acetaminophen     Tablet .. 650 milliGRAM(s) Oral every 6 hours PRN Temp greater or equal to 38C (100.4F), Mild Pain (1 - 3)  guaifenesin/dextromethorphan Oral Liquid 10 milliLiter(s) Oral every 4 hours PRN Cough      PHYSICAL EXAM:  GENERAL: awake, confused, NAD  EYES: clear conjunctiva  ENMT: Moist mucous membranes  NECK: Supple, No JVD, Normal thyroid  CHEST/LUNG: Clear to auscultation bilaterally; No rales, rhonchi, wheezing, or rubs  HEART: S1, S2, Regular rate and rhythm  ABDOMEN: Soft, Nontender, Nondistended; Bowel sounds present  NEURO: Alert & Oriented X 0   EXTREMITIES: +right hand contracture. +left hand tremor. No LE edema, no calf tenderness  LYMPH: No lymphadenopathy noted  SKIN: No rashes or lesions    Consultant(s) Notes Reviewed:  [x ] YES  [ ] NO  Care Discussed with Consultants/Other Providers [ x] YES  [ ] NO    LABS:                        9.7    10.27 )-----------( 205      ( 10 Oct 2022 06:35 )             30.6     10-10    142  |  109<H>  |  24<H>  ----------------------------<  97  3.9   |  26  |  1.20    Ca    8.9      10 Oct 2022 06:35  Phos  3.9     10-10  Mg     2.4     10-10    TPro  8.0  /  Alb  2.9<L>  /  TBili  0.4  /  DBili  x   /  AST  14  /  ALT  17  /  AlkPhos  90  10-09    PT/INR - ( 09 Oct 2022 19:35 )   PT: 12.5 sec;   INR: 1.05 ratio         PTT - ( 09 Oct 2022 19:35 )  PTT:28.9 sec  CAPILLARY BLOOD GLUCOSE            Urinalysis Basic - ( 09 Oct 2022 22:02 )    Color: Yellow / Appearance: Hazy / S.010 / pH: x  Gluc: x / Ketone: Negative  / Bili: Negative / Urobili: 1   Blood: x / Protein: 100 / Nitrite: Negative   Leuk Esterase: Trace / RBC: >50 /HPF / WBC 3-5 /HPF   Sq Epi: x / Non Sq Epi: Occasional /HPF / Bacteria: Many /HPF        RADIOLOGY & ADDITIONAL TESTS:  < from: Xray Chest 1 View- PORTABLE-Urgent (10.09.22 @ 19:32) >      ACC: [id number] EXAM:  XR CHEST PORTABLE URGENT 1V                          PROCEDURE DATE:  10/09/2022          INTERPRETATION:  AP semierect chest on 2022 at 7:19 PM.   Patient has fever.    COMPARISON: None available.    Shallow inspiration crowds the chest. Heart magnified by technique.    Lungs are clear.    IMPRESSION: Clear lungs.    --- End of Report ---            DELMIS MUNOZ MD; Attending Radiologist  This document has been electronically signed. 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 Pt was admitted to medicine for sepsis 2/2 covid. GOC: DNR per NH documentation.   CXR no PNA. Pending urine and blood culture results.     Plan:  Problem/Plan - 1:  ·  Problem: 2019 novel coronavirus disease (COVID-19).   ·  Plan: Pt sent from NH for fever of 100.2   WBC 12k  CXR wnl, UA neg for infection   COVID+   No indication for medical COVID tx at this time as patient is not hypoxic   f/u blood cx to r/o bacterial cause  c/w supportive care (anti-pyretics and anti-tussives).    Problem/Plan - 2:  ·  Problem: Hematuria, unspecified.   ·  Plan: UA RBC >50 tmtc  no gross hematuria observed  monitor HGB.    Problem/Plan - 3:  ·  Problem: HTN (hypertension).   ·  Plan: c/w home BP regimen (losartan, nicardipine, lasix).    Problem/Plan - 4:  ·  Problem: HLD (hyperlipidemia).   ·  Plan: c/w statin (home med).    Problem/Plan - 5:  ·  Problem: Seizures.   ·  Plan: c/w divalproex (home med).  switch to IV depakote due to pt refusing to swallow medication for now    Problem/Plan - 6:  ·  Problem: Chronic constipation.   ·  Plan: c/w senna and Miralax (home meds).    Problem/Plan - 7:  ·  Problem: Major depressive disorder.   ·  Plan: c/w sertraline (home med).    Problem/Plan - 8:  ·  Problem: Prophylactic measure.   ·  Plan: Lovenox for DVT ppx. Patient is a 76y old  Female who presents with a chief complaint of Fever (10 Oct 2022 02:35)    OVERNIGHT EVENTS: pt refusing to take oral depakote due to difficulty swallowing    REVIEW OF SYSTEMS:  unable to assess due to mental status    T(C): 37.6 (10-10-22 @ 07:36), Max: 37.6 (10-10-22 @ 07:36)  HR: 79 (10-10-22 @ 07:36) (77 - 89)  BP: 139/76 (10-10-22 @ 07:36) (139/76 - 165/89)  RR: 20 (10-10-22 @ 07:36) (18 - 20)  SpO2: 97% (10-10-22 @ 07:36) (94% - 97%)  Wt(kg): --Vital Signs Last 24 Hrs  T(C): 37.6 (10 Oct 2022 07:36), Max: 37.6 (10 Oct 2022 07:36)  T(F): 99.6 (10 Oct 2022 07:36), Max: 99.6 (10 Oct 2022 07:36)  HR: 79 (10 Oct 2022 07:36) (77 - 89)  BP: 139/76 (10 Oct 2022 07:36) (139/76 - 165/89)  BP(mean): --  RR: 20 (10 Oct 2022 07:36) (18 - 20)  SpO2: 97% (10 Oct 2022 07:36) (94% - 97%)    Parameters below as of 10 Oct 2022 07:36  Patient On (Oxygen Delivery Method): room air    MEDICATIONS  (STANDING):  ascorbic acid 500 milliGRAM(s) Oral daily  aspirin enteric coated 81 milliGRAM(s) Oral daily  diVALproex  milliGRAM(s) Oral two times a day  enoxaparin Injectable 40 milliGRAM(s) SubCutaneous every 24 hours  famotidine    Tablet 40 milliGRAM(s) Oral at bedtime  furosemide    Tablet 40 milliGRAM(s) Oral daily  losartan 100 milliGRAM(s) Oral daily  multivitamin 1 Tablet(s) Oral daily  niCARdipine 40 milliGRAM(s) Oral <User Schedule>  niCARdipine 20 milliGRAM(s) Oral <User Schedule>  polyethylene glycol 3350 17 Gram(s) Oral daily  senna 2 Tablet(s) Oral at bedtime  sertraline 75 milliGRAM(s) Oral daily  simvastatin 10 milliGRAM(s) Oral at bedtime    MEDICATIONS  (PRN):  acetaminophen     Tablet .. 650 milliGRAM(s) Oral every 6 hours PRN Temp greater or equal to 38C (100.4F), Mild Pain (1 - 3)  guaifenesin/dextromethorphan Oral Liquid 10 milliLiter(s) Oral every 4 hours PRN Cough      PHYSICAL EXAM:  GENERAL: awake, confused, NAD  EYES: clear conjunctiva  ENMT: Moist mucous membranes  NECK: Supple, No JVD, Normal thyroid  CHEST/LUNG: Clear to auscultation bilaterally; No rales, rhonchi, wheezing, or rubs  HEART: S1, S2, Regular rate and rhythm  ABDOMEN: Soft, Nontender, Nondistended; Bowel sounds present  NEURO: Alert & Oriented X 0   EXTREMITIES: +right hand contracture. +left hand tremor. No LE edema, no calf tenderness  LYMPH: No lymphadenopathy noted  SKIN: No rashes or lesions    Consultant(s) Notes Reviewed:  [x ] YES  [ ] NO  Care Discussed with Consultants/Other Providers [ x] YES  [ ] NO    LABS:                        9.7    10.27 )-----------( 205      ( 10 Oct 2022 06:35 )             30.6     10-10    142  |  109<H>  |  24<H>  ----------------------------<  97  3.9   |  26  |  1.20    Ca    8.9      10 Oct 2022 06:35  Phos  3.9     10-10  Mg     2.4     10-10    TPro  8.0  /  Alb  2.9<L>  /  TBili  0.4  /  DBili  x   /  AST  14  /  ALT  17  /  AlkPhos  90  10-09    PT/INR - ( 09 Oct 2022 19:35 )   PT: 12.5 sec;   INR: 1.05 ratio         PTT - ( 09 Oct 2022 19:35 )  PTT:28.9 sec  CAPILLARY BLOOD GLUCOSE            Urinalysis Basic - ( 09 Oct 2022 22:02 )    Color: Yellow / Appearance: Hazy / S.010 / pH: x  Gluc: x / Ketone: Negative  / Bili: Negative / Urobili: 1   Blood: x / Protein: 100 / Nitrite: Negative   Leuk Esterase: Trace / RBC: >50 /HPF / WBC 3-5 /HPF   Sq Epi: x / Non Sq Epi: Occasional /HPF / Bacteria: Many /HPF        RADIOLOGY & ADDITIONAL TESTS:  < from: Xray Chest 1 View- PORTABLE-Urgent (10.09.22 @ 19:32) >      ACC: [id number] EXAM:  XR CHEST PORTABLE URGENT 1V                          PROCEDURE DATE:  10/09/2022          INTERPRETATION:  AP semierect chest on 2022 at 7:19 PM.   Patient has fever.    COMPARISON: None available.    Shallow inspiration crowds the chest. Heart magnified by technique.    Lungs are clear.    IMPRESSION: Clear lungs.    --- End of Report ---            DELMIS MUNOZ MD; Attending Radiologist  This document has been electronically signed. 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 Pt was admitted to medicine for sepsis 2/2 covid. GOC: DNR per NH documentation.   CXR no PNA. Pending urine and blood culture results.     Plan:  Problem/Plan - 1:  ·  Problem: 2019 novel coronavirus disease (COVID-19).   ·  Plan: Pt sent from NH for fever of 100.2   WBC 12k  CXR wnl, UA neg for infection   COVID+   No indication for medical COVID tx at this time as patient is not hypoxic   f/u blood cx to r/o bacterial cause  c/w supportive care (anti-pyretics and anti-tussives).    Problem/Plan - 2:  ·  Problem: Hematuria, unspecified.   ·  Plan: UA RBC >50 tmtc  no gross hematuria observed  monitor HGB.    Problem/Plan - 3:  ·  Problem: HTN (hypertension).   ·  Plan: c/w home BP regimen (losartan, nicardipine, lasix).    Problem/Plan - 4:  ·  Problem: HLD (hyperlipidemia).   ·  Plan: c/w statin (home med).    Problem/Plan - 5:  ·  Problem: Seizures.   ·  Plan: c/w divalproex (home med).  switch to IV depakote due to pt refusing to swallow medication for now    Problem/Plan - 6:  ·  Problem: Chronic constipation.   ·  Plan: c/w senna and Miralax (home meds).    Problem/Plan - 7:  ·  Problem: Major depressive disorder.   ·  Plan: c/w sertraline (home med).    Problem/Plan - 8:  ·  Problem: Prophylactic measure.   ·  Plan: Lovenox for DVT ppx. Patient is a 76y old  Female who presents with a chief complaint of Fever (10 Oct 2022 02:35)    OVERNIGHT EVENTS: pt refusing to take oral depakote due to difficulty swallowing    REVIEW OF SYSTEMS:  unable to assess due to mental status    T(C): 37.6 (10-10-22 @ 07:36), Max: 37.6 (10-10-22 @ 07:36)  HR: 79 (10-10-22 @ 07:36) (77 - 89)  BP: 139/76 (10-10-22 @ 07:36) (139/76 - 165/89)  RR: 20 (10-10-22 @ 07:36) (18 - 20)  SpO2: 97% (10-10-22 @ 07:36) (94% - 97%)  Wt(kg): --Vital Signs Last 24 Hrs  T(C): 37.6 (10 Oct 2022 07:36), Max: 37.6 (10 Oct 2022 07:36)  T(F): 99.6 (10 Oct 2022 07:36), Max: 99.6 (10 Oct 2022 07:36)  HR: 79 (10 Oct 2022 07:36) (77 - 89)  BP: 139/76 (10 Oct 2022 07:36) (139/76 - 165/89)  BP(mean): --  RR: 20 (10 Oct 2022 07:36) (18 - 20)  SpO2: 97% (10 Oct 2022 07:36) (94% - 97%)    Parameters below as of 10 Oct 2022 07:36  Patient On (Oxygen Delivery Method): room air    MEDICATIONS  (STANDING):  ascorbic acid 500 milliGRAM(s) Oral daily  aspirin enteric coated 81 milliGRAM(s) Oral daily  diVALproex  milliGRAM(s) Oral two times a day  enoxaparin Injectable 40 milliGRAM(s) SubCutaneous every 24 hours  famotidine    Tablet 40 milliGRAM(s) Oral at bedtime  furosemide    Tablet 40 milliGRAM(s) Oral daily  losartan 100 milliGRAM(s) Oral daily  multivitamin 1 Tablet(s) Oral daily  niCARdipine 40 milliGRAM(s) Oral <User Schedule>  niCARdipine 20 milliGRAM(s) Oral <User Schedule>  polyethylene glycol 3350 17 Gram(s) Oral daily  senna 2 Tablet(s) Oral at bedtime  sertraline 75 milliGRAM(s) Oral daily  simvastatin 10 milliGRAM(s) Oral at bedtime    MEDICATIONS  (PRN):  acetaminophen     Tablet .. 650 milliGRAM(s) Oral every 6 hours PRN Temp greater or equal to 38C (100.4F), Mild Pain (1 - 3)  guaifenesin/dextromethorphan Oral Liquid 10 milliLiter(s) Oral every 4 hours PRN Cough      PHYSICAL EXAM:  GENERAL: awake, confused, NAD  EYES: clear conjunctiva  ENMT: Moist mucous membranes  NECK: Supple, No JVD, Normal thyroid  CHEST/LUNG: Clear to auscultation bilaterally; No rales, rhonchi, wheezing, or rubs  HEART: S1, S2, Regular rate and rhythm  ABDOMEN: Soft, Nontender, Nondistended; Bowel sounds present  NEURO: Alert & Oriented X 0   EXTREMITIES: +right hand contracture. +left hand tremor. No LE edema, no calf tenderness  LYMPH: No lymphadenopathy noted  SKIN: No rashes or lesions    Consultant(s) Notes Reviewed:  [x ] YES  [ ] NO  Care Discussed with Consultants/Other Providers [ x] YES  [ ] NO    LABS:                        9.7    10.27 )-----------( 205      ( 10 Oct 2022 06:35 )             30.6     10-10    142  |  109<H>  |  24<H>  ----------------------------<  97  3.9   |  26  |  1.20    Ca    8.9      10 Oct 2022 06:35  Phos  3.9     10-10  Mg     2.4     10-10    TPro  8.0  /  Alb  2.9<L>  /  TBili  0.4  /  DBili  x   /  AST  14  /  ALT  17  /  AlkPhos  90  10-09    PT/INR - ( 09 Oct 2022 19:35 )   PT: 12.5 sec;   INR: 1.05 ratio         PTT - ( 09 Oct 2022 19:35 )  PTT:28.9 sec  CAPILLARY BLOOD GLUCOSE            Urinalysis Basic - ( 09 Oct 2022 22:02 )    Color: Yellow / Appearance: Hazy / S.010 / pH: x  Gluc: x / Ketone: Negative  / Bili: Negative / Urobili: 1   Blood: x / Protein: 100 / Nitrite: Negative   Leuk Esterase: Trace / RBC: >50 /HPF / WBC 3-5 /HPF   Sq Epi: x / Non Sq Epi: Occasional /HPF / Bacteria: Many /HPF        RADIOLOGY & ADDITIONAL TESTS:  < from: Xray Chest 1 View- PORTABLE-Urgent (10.09.22 @ 19:32) >      ACC: [id number] EXAM:  XR CHEST PORTABLE URGENT 1V                          PROCEDURE DATE:  10/09/2022          INTERPRETATION:  AP semierect chest on 2022 at 7:19 PM.   Patient has fever.    COMPARISON: None available.    Shallow inspiration crowds the chest. Heart magnified by technique.    Lungs are clear.    IMPRESSION: Clear lungs.    --- End of Report ---            DELMIS MUNOZ MD; Attending Radiologist  This document has been electronically signed. 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 Pt was admitted to medicine for sepsis 2/2 covid. GOC: DNR per NH documentation.   CXR no PNA. Pending urine and blood culture results.     Plan:  Problem/Plan - 1:  ·  Problem: Sepsis 2/2 (COVID-19).   ·  Plan: Pt sent from NH for fever of 100.2   WBC 12k  CXR wnl, UA neg for infection   COVID+   hold remdesivir and decadron for now  c/w supportive care (anti-pyretics and anti-tussives).  f/u urine and blood cx to r/o bacterial cause    Problem/Plan - 2:  ·  Problem: Hematuria, unspecified.   ·  Plan: UA RBC >50 tmtc  no gross hematuria observed  trend CBC    Problem/Plan - 3:  ·  Problem: HTN (hypertension).   ·  Plan: c/w home BP regimen (losartan, nicardipine, lasix).    Problem/Plan - 4:  ·  Problem: HLD (hyperlipidemia).   ·  Plan: c/w asa, statin (home med).    Problem/Plan - 5:  ·  Problem: Seizures.   continue with home med depakote    Problem/Plan - 6:  ·  Problem: Chronic constipation.   ·  Plan: c/w senna and Miralax (home meds).    Problem/Plan - 7:  ·  Problem: Major depressive disorder.   ·  Plan: c/w sertraline (home med).    Problem/Plan - 8:  ·  Problem: Prophylactic measure.   ·  Plan: Lovenox for DVT ppx.  GI - pepcid Patient is a 76y old  Female who presents with a chief complaint of Fever (10 Oct 2022 02:35)    OVERNIGHT EVENTS: pt refusing to take oral depakote due to difficulty swallowing    REVIEW OF SYSTEMS:  unable to assess due to mental status    T(C): 37.6 (10-10-22 @ 07:36), Max: 37.6 (10-10-22 @ 07:36)  HR: 79 (10-10-22 @ 07:36) (77 - 89)  BP: 139/76 (10-10-22 @ 07:36) (139/76 - 165/89)  RR: 20 (10-10-22 @ 07:36) (18 - 20)  SpO2: 97% (10-10-22 @ 07:36) (94% - 97%)  Wt(kg): --Vital Signs Last 24 Hrs  T(C): 37.6 (10 Oct 2022 07:36), Max: 37.6 (10 Oct 2022 07:36)  T(F): 99.6 (10 Oct 2022 07:36), Max: 99.6 (10 Oct 2022 07:36)  HR: 79 (10 Oct 2022 07:36) (77 - 89)  BP: 139/76 (10 Oct 2022 07:36) (139/76 - 165/89)  BP(mean): --  RR: 20 (10 Oct 2022 07:36) (18 - 20)  SpO2: 97% (10 Oct 2022 07:36) (94% - 97%)    Parameters below as of 10 Oct 2022 07:36  Patient On (Oxygen Delivery Method): room air    MEDICATIONS  (STANDING):  ascorbic acid 500 milliGRAM(s) Oral daily  aspirin enteric coated 81 milliGRAM(s) Oral daily  diVALproex  milliGRAM(s) Oral two times a day  enoxaparin Injectable 40 milliGRAM(s) SubCutaneous every 24 hours  famotidine    Tablet 40 milliGRAM(s) Oral at bedtime  furosemide    Tablet 40 milliGRAM(s) Oral daily  losartan 100 milliGRAM(s) Oral daily  multivitamin 1 Tablet(s) Oral daily  niCARdipine 40 milliGRAM(s) Oral <User Schedule>  niCARdipine 20 milliGRAM(s) Oral <User Schedule>  polyethylene glycol 3350 17 Gram(s) Oral daily  senna 2 Tablet(s) Oral at bedtime  sertraline 75 milliGRAM(s) Oral daily  simvastatin 10 milliGRAM(s) Oral at bedtime    MEDICATIONS  (PRN):  acetaminophen     Tablet .. 650 milliGRAM(s) Oral every 6 hours PRN Temp greater or equal to 38C (100.4F), Mild Pain (1 - 3)  guaifenesin/dextromethorphan Oral Liquid 10 milliLiter(s) Oral every 4 hours PRN Cough      PHYSICAL EXAM:  GENERAL: awake, confused, NAD  EYES: clear conjunctiva  ENMT: Moist mucous membranes  NECK: Supple, No JVD, Normal thyroid  CHEST/LUNG: Clear to auscultation bilaterally; No rales, rhonchi, wheezing, or rubs  HEART: S1, S2, Regular rate and rhythm  ABDOMEN: Soft, Nontender, Nondistended; Bowel sounds present  NEURO: Alert & Oriented X 0   EXTREMITIES: +right hand contracture. +left hand tremor. No LE edema, no calf tenderness  LYMPH: No lymphadenopathy noted  SKIN: No rashes or lesions    Consultant(s) Notes Reviewed:  [x ] YES  [ ] NO  Care Discussed with Consultants/Other Providers [ x] YES  [ ] NO    LABS:                        9.7    10.27 )-----------( 205      ( 10 Oct 2022 06:35 )             30.6     10-10    142  |  109<H>  |  24<H>  ----------------------------<  97  3.9   |  26  |  1.20    Ca    8.9      10 Oct 2022 06:35  Phos  3.9     10-10  Mg     2.4     10-10    TPro  8.0  /  Alb  2.9<L>  /  TBili  0.4  /  DBili  x   /  AST  14  /  ALT  17  /  AlkPhos  90  10-09    PT/INR - ( 09 Oct 2022 19:35 )   PT: 12.5 sec;   INR: 1.05 ratio         PTT - ( 09 Oct 2022 19:35 )  PTT:28.9 sec  CAPILLARY BLOOD GLUCOSE            Urinalysis Basic - ( 09 Oct 2022 22:02 )    Color: Yellow / Appearance: Hazy / S.010 / pH: x  Gluc: x / Ketone: Negative  / Bili: Negative / Urobili: 1   Blood: x / Protein: 100 / Nitrite: Negative   Leuk Esterase: Trace / RBC: >50 /HPF / WBC 3-5 /HPF   Sq Epi: x / Non Sq Epi: Occasional /HPF / Bacteria: Many /HPF        RADIOLOGY & ADDITIONAL TESTS:  < from: Xray Chest 1 View- PORTABLE-Urgent (10.09.22 @ 19:32) >      ACC: [id number] EXAM:  XR CHEST PORTABLE URGENT 1V                          PROCEDURE DATE:  10/09/2022          INTERPRETATION:  AP semierect chest on 2022 at 7:19 PM.   Patient has fever.    COMPARISON: None available.    Shallow inspiration crowds the chest. Heart magnified by technique.    Lungs are clear.    IMPRESSION: Clear lungs.    --- End of Report ---            DELMIS MUNOZ MD; Attending Radiologist  This document has been electronically signed. 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 Pt was admitted to medicine for sepsis 2/2 covid. GOC: DNR per NH documentation.   CXR no PNA. Pending urine and blood culture results.     Plan:  Problem/Plan - 1:  ·  Problem: Sepsis 2/2 (COVID-19).   ·  Plan: Pt sent from NH for fever of 100.2   WBC 12k  CXR wnl, UA neg for infection   COVID+   hold remdesivir and decadron for now  c/w supportive care (anti-pyretics and anti-tussives).  f/u urine and blood cx to r/o bacterial cause    Problem/Plan - 2:  ·  Problem: Hematuria, unspecified.   ·  Plan: UA RBC >50 tmtc  no gross hematuria observed  trend CBC    Problem/Plan - 3:  ·  Problem: HTN (hypertension).   ·  Plan: c/w home BP regimen (losartan, nicardipine, lasix).    Problem/Plan - 4:  ·  Problem: HLD (hyperlipidemia).   ·  Plan: c/w asa, statin (home med).    Problem/Plan - 5:  ·  Problem: Seizures.   continue with home med depakote    Problem/Plan - 6:  ·  Problem: Chronic constipation.   ·  Plan: c/w senna and Miralax (home meds).    Problem/Plan - 7:  ·  Problem: Major depressive disorder.   ·  Plan: c/w sertraline (home med).    Problem/Plan - 8:  ·  Problem: Prophylactic measure.   ·  Plan: Lovenox for DVT ppx.  GI - pepcid Patient is a 76y old  Female who presents with a chief complaint of Fever (10 Oct 2022 02:35)    OVERNIGHT EVENTS: pt refusing to take oral depakote due to difficulty swallowing    REVIEW OF SYSTEMS:  unable to assess due to mental status    T(C): 37.6 (10-10-22 @ 07:36), Max: 37.6 (10-10-22 @ 07:36)  HR: 79 (10-10-22 @ 07:36) (77 - 89)  BP: 139/76 (10-10-22 @ 07:36) (139/76 - 165/89)  RR: 20 (10-10-22 @ 07:36) (18 - 20)  SpO2: 97% (10-10-22 @ 07:36) (94% - 97%)  Wt(kg): --Vital Signs Last 24 Hrs  T(C): 37.6 (10 Oct 2022 07:36), Max: 37.6 (10 Oct 2022 07:36)  T(F): 99.6 (10 Oct 2022 07:36), Max: 99.6 (10 Oct 2022 07:36)  HR: 79 (10 Oct 2022 07:36) (77 - 89)  BP: 139/76 (10 Oct 2022 07:36) (139/76 - 165/89)  BP(mean): --  RR: 20 (10 Oct 2022 07:36) (18 - 20)  SpO2: 97% (10 Oct 2022 07:36) (94% - 97%)    Parameters below as of 10 Oct 2022 07:36  Patient On (Oxygen Delivery Method): room air    MEDICATIONS  (STANDING):  ascorbic acid 500 milliGRAM(s) Oral daily  aspirin enteric coated 81 milliGRAM(s) Oral daily  diVALproex  milliGRAM(s) Oral two times a day  enoxaparin Injectable 40 milliGRAM(s) SubCutaneous every 24 hours  famotidine    Tablet 40 milliGRAM(s) Oral at bedtime  furosemide    Tablet 40 milliGRAM(s) Oral daily  losartan 100 milliGRAM(s) Oral daily  multivitamin 1 Tablet(s) Oral daily  niCARdipine 40 milliGRAM(s) Oral <User Schedule>  niCARdipine 20 milliGRAM(s) Oral <User Schedule>  polyethylene glycol 3350 17 Gram(s) Oral daily  senna 2 Tablet(s) Oral at bedtime  sertraline 75 milliGRAM(s) Oral daily  simvastatin 10 milliGRAM(s) Oral at bedtime    MEDICATIONS  (PRN):  acetaminophen     Tablet .. 650 milliGRAM(s) Oral every 6 hours PRN Temp greater or equal to 38C (100.4F), Mild Pain (1 - 3)  guaifenesin/dextromethorphan Oral Liquid 10 milliLiter(s) Oral every 4 hours PRN Cough      PHYSICAL EXAM:  GENERAL: awake, confused, NAD  EYES: clear conjunctiva  ENMT: Moist mucous membranes  NECK: Supple, No JVD, Normal thyroid  CHEST/LUNG: Clear to auscultation bilaterally; No rales, rhonchi, wheezing, or rubs  HEART: S1, S2, Regular rate and rhythm  ABDOMEN: Soft, Nontender, Nondistended; Bowel sounds present  NEURO: Alert & Oriented X 0   EXTREMITIES: +right hand contracture. +left hand tremor. No LE edema, no calf tenderness  LYMPH: No lymphadenopathy noted  SKIN: No rashes or lesions    Consultant(s) Notes Reviewed:  [x ] YES  [ ] NO  Care Discussed with Consultants/Other Providers [ x] YES  [ ] NO    LABS:                        9.7    10.27 )-----------( 205      ( 10 Oct 2022 06:35 )             30.6     10-10    142  |  109<H>  |  24<H>  ----------------------------<  97  3.9   |  26  |  1.20    Ca    8.9      10 Oct 2022 06:35  Phos  3.9     10-10  Mg     2.4     10-10    TPro  8.0  /  Alb  2.9<L>  /  TBili  0.4  /  DBili  x   /  AST  14  /  ALT  17  /  AlkPhos  90  10-09    PT/INR - ( 09 Oct 2022 19:35 )   PT: 12.5 sec;   INR: 1.05 ratio         PTT - ( 09 Oct 2022 19:35 )  PTT:28.9 sec  CAPILLARY BLOOD GLUCOSE            Urinalysis Basic - ( 09 Oct 2022 22:02 )    Color: Yellow / Appearance: Hazy / S.010 / pH: x  Gluc: x / Ketone: Negative  / Bili: Negative / Urobili: 1   Blood: x / Protein: 100 / Nitrite: Negative   Leuk Esterase: Trace / RBC: >50 /HPF / WBC 3-5 /HPF   Sq Epi: x / Non Sq Epi: Occasional /HPF / Bacteria: Many /HPF        RADIOLOGY & ADDITIONAL TESTS:  < from: Xray Chest 1 View- PORTABLE-Urgent (10.09.22 @ 19:32) >      ACC: [id number] EXAM:  XR CHEST PORTABLE URGENT 1V                          PROCEDURE DATE:  10/09/2022          INTERPRETATION:  AP semierect chest on 2022 at 7:19 PM.   Patient has fever.    COMPARISON: None available.    Shallow inspiration crowds the chest. Heart magnified by technique.    Lungs are clear.    IMPRESSION: Clear lungs.    --- End of Report ---            DELMIS MUNOZ MD; Attending Radiologist  This document has been electronically signed. 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 Pt was admitted to medicine for sepsis 2/2 covid. GOC: DNR per NH documentation.   CXR no PNA. Pending urine and blood culture results.     Plan:  Problem/Plan - 1:  ·  Problem: Sepsis 2/2 (COVID-19).   ·  Plan: Pt sent from NH for fever of 100.2   WBC 12k  CXR wnl, UA neg for infection   COVID+   hold remdesivir and decadron for now  c/w supportive care (anti-pyretics and anti-tussives).  f/u urine and blood cx to r/o bacterial cause    Problem/Plan - 2:  ·  Problem: Hematuria, unspecified.   ·  Plan: UA RBC >50 tmtc  no gross hematuria observed  trend CBC    Problem/Plan - 3:  ·  Problem: HTN (hypertension).   ·  Plan: c/w home BP regimen (losartan, nicardipine, lasix).    Problem/Plan - 4:  ·  Problem: HLD (hyperlipidemia).   ·  Plan: c/w asa, statin (home med).    Problem/Plan - 5:  ·  Problem: Seizures.   continue with home med depakote    Problem/Plan - 6:  ·  Problem: Chronic constipation.   ·  Plan: c/w senna and Miralax (home meds).    Problem/Plan - 7:  ·  Problem: Major depressive disorder.   ·  Plan: c/w sertraline (home med).    Problem/Plan - 8:  ·  Problem: Prophylactic measure.   ·  Plan: Lovenox for DVT ppx.  GI - pepcid Patient is a 76y old  Female who presents with a chief complaint of Fever (10 Oct 2022 02:35)    OVERNIGHT EVENTS: pt refusing to take oral depakote due to difficulty swallowing    REVIEW OF SYSTEMS:  unable to assess due to mental status    T(C): 37.6 (10-10-22 @ 07:36), Max: 37.6 (10-10-22 @ 07:36)  HR: 79 (10-10-22 @ 07:36) (77 - 89)  BP: 139/76 (10-10-22 @ 07:36) (139/76 - 165/89)  RR: 20 (10-10-22 @ 07:36) (18 - 20)  SpO2: 97% (10-10-22 @ 07:36) (94% - 97%)  Wt(kg): --Vital Signs Last 24 Hrs  T(C): 37.6 (10 Oct 2022 07:36), Max: 37.6 (10 Oct 2022 07:36)  T(F): 99.6 (10 Oct 2022 07:36), Max: 99.6 (10 Oct 2022 07:36)  HR: 79 (10 Oct 2022 07:36) (77 - 89)  BP: 139/76 (10 Oct 2022 07:36) (139/76 - 165/89)  BP(mean): --  RR: 20 (10 Oct 2022 07:36) (18 - 20)  SpO2: 97% (10 Oct 2022 07:36) (94% - 97%)    Parameters below as of 10 Oct 2022 07:36  Patient On (Oxygen Delivery Method): room air    MEDICATIONS  (STANDING):  ascorbic acid 500 milliGRAM(s) Oral daily  aspirin enteric coated 81 milliGRAM(s) Oral daily  diVALproex  milliGRAM(s) Oral two times a day  enoxaparin Injectable 40 milliGRAM(s) SubCutaneous every 24 hours  famotidine    Tablet 40 milliGRAM(s) Oral at bedtime  furosemide    Tablet 40 milliGRAM(s) Oral daily  losartan 100 milliGRAM(s) Oral daily  multivitamin 1 Tablet(s) Oral daily  niCARdipine 40 milliGRAM(s) Oral <User Schedule>  niCARdipine 20 milliGRAM(s) Oral <User Schedule>  polyethylene glycol 3350 17 Gram(s) Oral daily  senna 2 Tablet(s) Oral at bedtime  sertraline 75 milliGRAM(s) Oral daily  simvastatin 10 milliGRAM(s) Oral at bedtime    MEDICATIONS  (PRN):  acetaminophen     Tablet .. 650 milliGRAM(s) Oral every 6 hours PRN Temp greater or equal to 38C (100.4F), Mild Pain (1 - 3)  guaifenesin/dextromethorphan Oral Liquid 10 milliLiter(s) Oral every 4 hours PRN Cough      PHYSICAL EXAM:  GENERAL: awake, confused, NAD  EYES: clear conjunctiva  ENMT: Moist mucous membranes  NECK: Supple, No JVD, Normal thyroid  CHEST/LUNG: Clear to auscultation bilaterally; No rales, rhonchi, wheezing, or rubs  HEART: S1, S2, Regular rate and rhythm  ABDOMEN: Soft, Nontender, Nondistended; Bowel sounds present  NEURO: Alert & Oriented X 0   EXTREMITIES: +right hand contracture. +left hand tremor. No LE edema, no calf tenderness  LYMPH: No lymphadenopathy noted  SKIN: No rashes or lesions    Consultant(s) Notes Reviewed:  [x ] YES  [ ] NO  Care Discussed with Consultants/Other Providers [ x] YES  [ ] NO    LABS:                        9.7    10.27 )-----------( 205      ( 10 Oct 2022 06:35 )             30.6     10-10    142  |  109<H>  |  24<H>  ----------------------------<  97  3.9   |  26  |  1.20    Ca    8.9      10 Oct 2022 06:35  Phos  3.9     10-10  Mg     2.4     10-10    TPro  8.0  /  Alb  2.9<L>  /  TBili  0.4  /  DBili  x   /  AST  14  /  ALT  17  /  AlkPhos  90  10-09    PT/INR - ( 09 Oct 2022 19:35 )   PT: 12.5 sec;   INR: 1.05 ratio         PTT - ( 09 Oct 2022 19:35 )  PTT:28.9 sec  CAPILLARY BLOOD GLUCOSE            Urinalysis Basic - ( 09 Oct 2022 22:02 )    Color: Yellow / Appearance: Hazy / S.010 / pH: x  Gluc: x / Ketone: Negative  / Bili: Negative / Urobili: 1   Blood: x / Protein: 100 / Nitrite: Negative   Leuk Esterase: Trace / RBC: >50 /HPF / WBC 3-5 /HPF   Sq Epi: x / Non Sq Epi: Occasional /HPF / Bacteria: Many /HPF        RADIOLOGY & ADDITIONAL TESTS:  < from: Xray Chest 1 View- PORTABLE-Urgent (10.09.22 @ 19:32) >      ACC: [id number] EXAM:  XR CHEST PORTABLE URGENT 1V                          PROCEDURE DATE:  10/09/2022          INTERPRETATION:  AP semierect chest on 2022 at 7:19 PM.   Patient has fever.    COMPARISON: None available.    Shallow inspiration crowds the chest. Heart magnified by technique.    Lungs are clear.    IMPRESSION: Clear lungs.    --- End of Report ---            DELMIS MUNOZ MD; Attending Radiologist  This document has been electronically signed. 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 Pt was admitted to medicine for sepsis 2/2 covid. GOC: DNR per NH documentation.   CXR no PNA. Pending urine and blood culture results.     Plan:  Problem/Plan - 1:  ·  Problem: Sepsis 2/2 (COVID-19).   ·  Plan: Pt sent from NH for fever of 100.2   WBC 12k  CXR wnl, UA neg for infection   COVID+   hold remdesivir and decadron for now  c/w supportive care (anti-pyretics and anti-tussives).  f/u urine and blood cx to r/o bacterial cause    Problem/Plan - 2:  ·  Problem: Hematuria, unspecified.   ·  Plan: UA RBC >50 tmtc  no gross hematuria observed  trend CBC    Problem/Plan - 3:  ·  Problem: HTN (hypertension).   ·  Plan: c/w home BP regimen (losartan, nicardipine, lasix).  not well controlled likely due to pt refusing po meds  consider hydralazine IV PRN    Problem/Plan - 4:  ·  Problem: HLD (hyperlipidemia).   ·  Plan: c/w asa, statin (home med).    Problem/Plan - 5:  ·  Problem: Seizures.   continue with home med depakote    Problem/Plan - 6:  ·  Problem: Chronic constipation.   ·  Plan: c/w senna and Miralax (home meds).    Problem/Plan - 7:  ·  Problem: Major depressive disorder.   ·  Plan: c/w sertraline (home med).    Problem/Plan - 8:  ·  Problem: Prophylactic measure.   ·  Plan: Lovenox for DVT ppx.  GI - pepcid Patient is a 76y old  Female who presents with a chief complaint of Fever (10 Oct 2022 02:35)    OVERNIGHT EVENTS: pt refusing to take oral depakote due to difficulty swallowing    REVIEW OF SYSTEMS:  unable to assess due to mental status    T(C): 37.6 (10-10-22 @ 07:36), Max: 37.6 (10-10-22 @ 07:36)  HR: 79 (10-10-22 @ 07:36) (77 - 89)  BP: 139/76 (10-10-22 @ 07:36) (139/76 - 165/89)  RR: 20 (10-10-22 @ 07:36) (18 - 20)  SpO2: 97% (10-10-22 @ 07:36) (94% - 97%)  Wt(kg): --Vital Signs Last 24 Hrs  T(C): 37.6 (10 Oct 2022 07:36), Max: 37.6 (10 Oct 2022 07:36)  T(F): 99.6 (10 Oct 2022 07:36), Max: 99.6 (10 Oct 2022 07:36)  HR: 79 (10 Oct 2022 07:36) (77 - 89)  BP: 139/76 (10 Oct 2022 07:36) (139/76 - 165/89)  BP(mean): --  RR: 20 (10 Oct 2022 07:36) (18 - 20)  SpO2: 97% (10 Oct 2022 07:36) (94% - 97%)    Parameters below as of 10 Oct 2022 07:36  Patient On (Oxygen Delivery Method): room air    MEDICATIONS  (STANDING):  ascorbic acid 500 milliGRAM(s) Oral daily  aspirin enteric coated 81 milliGRAM(s) Oral daily  diVALproex  milliGRAM(s) Oral two times a day  enoxaparin Injectable 40 milliGRAM(s) SubCutaneous every 24 hours  famotidine    Tablet 40 milliGRAM(s) Oral at bedtime  furosemide    Tablet 40 milliGRAM(s) Oral daily  losartan 100 milliGRAM(s) Oral daily  multivitamin 1 Tablet(s) Oral daily  niCARdipine 40 milliGRAM(s) Oral <User Schedule>  niCARdipine 20 milliGRAM(s) Oral <User Schedule>  polyethylene glycol 3350 17 Gram(s) Oral daily  senna 2 Tablet(s) Oral at bedtime  sertraline 75 milliGRAM(s) Oral daily  simvastatin 10 milliGRAM(s) Oral at bedtime    MEDICATIONS  (PRN):  acetaminophen     Tablet .. 650 milliGRAM(s) Oral every 6 hours PRN Temp greater or equal to 38C (100.4F), Mild Pain (1 - 3)  guaifenesin/dextromethorphan Oral Liquid 10 milliLiter(s) Oral every 4 hours PRN Cough      PHYSICAL EXAM:  GENERAL: awake, confused, NAD  EYES: clear conjunctiva  ENMT: Moist mucous membranes  NECK: Supple, No JVD, Normal thyroid  CHEST/LUNG: Clear to auscultation bilaterally; No rales, rhonchi, wheezing, or rubs  HEART: S1, S2, Regular rate and rhythm  ABDOMEN: Soft, Nontender, Nondistended; Bowel sounds present  NEURO: Alert & Oriented X 0   EXTREMITIES: +right hand contracture. +left hand tremor. No LE edema, no calf tenderness  LYMPH: No lymphadenopathy noted  SKIN: No rashes or lesions    Consultant(s) Notes Reviewed:  [x ] YES  [ ] NO  Care Discussed with Consultants/Other Providers [ x] YES  [ ] NO    LABS:                        9.7    10.27 )-----------( 205      ( 10 Oct 2022 06:35 )             30.6     10-10    142  |  109<H>  |  24<H>  ----------------------------<  97  3.9   |  26  |  1.20    Ca    8.9      10 Oct 2022 06:35  Phos  3.9     10-10  Mg     2.4     10-10    TPro  8.0  /  Alb  2.9<L>  /  TBili  0.4  /  DBili  x   /  AST  14  /  ALT  17  /  AlkPhos  90  10-09    PT/INR - ( 09 Oct 2022 19:35 )   PT: 12.5 sec;   INR: 1.05 ratio         PTT - ( 09 Oct 2022 19:35 )  PTT:28.9 sec  CAPILLARY BLOOD GLUCOSE            Urinalysis Basic - ( 09 Oct 2022 22:02 )    Color: Yellow / Appearance: Hazy / S.010 / pH: x  Gluc: x / Ketone: Negative  / Bili: Negative / Urobili: 1   Blood: x / Protein: 100 / Nitrite: Negative   Leuk Esterase: Trace / RBC: >50 /HPF / WBC 3-5 /HPF   Sq Epi: x / Non Sq Epi: Occasional /HPF / Bacteria: Many /HPF        RADIOLOGY & ADDITIONAL TESTS:  < from: Xray Chest 1 View- PORTABLE-Urgent (10.09.22 @ 19:32) >      ACC: [id number] EXAM:  XR CHEST PORTABLE URGENT 1V                          PROCEDURE DATE:  10/09/2022          INTERPRETATION:  AP semierect chest on 2022 at 7:19 PM.   Patient has fever.    COMPARISON: None available.    Shallow inspiration crowds the chest. Heart magnified by technique.    Lungs are clear.    IMPRESSION: Clear lungs.    --- End of Report ---            DELMIS MUNOZ MD; Attending Radiologist  This document has been electronically signed. 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 Pt was admitted to medicine for sepsis 2/2 covid. GOC: DNR per NH documentation.   CXR no PNA. Pending urine and blood culture results.     Plan:  Problem/Plan - 1:  ·  Problem: Sepsis 2/2 (COVID-19).   ·  Plan: Pt sent from NH for fever of 100.2   WBC 12k  CXR wnl, UA neg for infection   COVID+   hold remdesivir and decadron for now  c/w supportive care (anti-pyretics and anti-tussives).  f/u urine and blood cx to r/o bacterial cause    Problem/Plan - 2:  ·  Problem: Hematuria, unspecified.   ·  Plan: UA RBC >50 tmtc  no gross hematuria observed  trend CBC    Problem/Plan - 3:  ·  Problem: HTN (hypertension).   ·  Plan: c/w home BP regimen (losartan, nicardipine, lasix).  not well controlled likely due to pt refusing po meds  consider hydralazine IV PRN    Problem/Plan - 4:  ·  Problem: HLD (hyperlipidemia).   ·  Plan: c/w asa, statin (home med).    Problem/Plan - 5:  ·  Problem: Seizures.   continue with home med depakote    Problem/Plan - 6:  ·  Problem: Chronic constipation.   ·  Plan: c/w senna and Miralax (home meds).    Problem/Plan - 7:  ·  Problem: Major depressive disorder.   ·  Plan: c/w sertraline (home med).    Problem/Plan - 8:  ·  Problem: Prophylactic measure.   ·  Plan: Lovenox for DVT ppx.  GI - pepcid Patient is a 76y old  Female who presents with a chief complaint of Fever (10 Oct 2022 02:35)    OVERNIGHT EVENTS: pt refusing to take oral depakote due to difficulty swallowing    REVIEW OF SYSTEMS:  unable to assess due to mental status    T(C): 37.6 (10-10-22 @ 07:36), Max: 37.6 (10-10-22 @ 07:36)  HR: 79 (10-10-22 @ 07:36) (77 - 89)  BP: 139/76 (10-10-22 @ 07:36) (139/76 - 165/89)  RR: 20 (10-10-22 @ 07:36) (18 - 20)  SpO2: 97% (10-10-22 @ 07:36) (94% - 97%)  Wt(kg): --Vital Signs Last 24 Hrs  T(C): 37.6 (10 Oct 2022 07:36), Max: 37.6 (10 Oct 2022 07:36)  T(F): 99.6 (10 Oct 2022 07:36), Max: 99.6 (10 Oct 2022 07:36)  HR: 79 (10 Oct 2022 07:36) (77 - 89)  BP: 139/76 (10 Oct 2022 07:36) (139/76 - 165/89)  BP(mean): --  RR: 20 (10 Oct 2022 07:36) (18 - 20)  SpO2: 97% (10 Oct 2022 07:36) (94% - 97%)    Parameters below as of 10 Oct 2022 07:36  Patient On (Oxygen Delivery Method): room air    MEDICATIONS  (STANDING):  ascorbic acid 500 milliGRAM(s) Oral daily  aspirin enteric coated 81 milliGRAM(s) Oral daily  diVALproex  milliGRAM(s) Oral two times a day  enoxaparin Injectable 40 milliGRAM(s) SubCutaneous every 24 hours  famotidine    Tablet 40 milliGRAM(s) Oral at bedtime  furosemide    Tablet 40 milliGRAM(s) Oral daily  losartan 100 milliGRAM(s) Oral daily  multivitamin 1 Tablet(s) Oral daily  niCARdipine 40 milliGRAM(s) Oral <User Schedule>  niCARdipine 20 milliGRAM(s) Oral <User Schedule>  polyethylene glycol 3350 17 Gram(s) Oral daily  senna 2 Tablet(s) Oral at bedtime  sertraline 75 milliGRAM(s) Oral daily  simvastatin 10 milliGRAM(s) Oral at bedtime    MEDICATIONS  (PRN):  acetaminophen     Tablet .. 650 milliGRAM(s) Oral every 6 hours PRN Temp greater or equal to 38C (100.4F), Mild Pain (1 - 3)  guaifenesin/dextromethorphan Oral Liquid 10 milliLiter(s) Oral every 4 hours PRN Cough      PHYSICAL EXAM:  GENERAL: awake, confused, NAD  EYES: clear conjunctiva  ENMT: Moist mucous membranes  NECK: Supple, No JVD, Normal thyroid  CHEST/LUNG: Clear to auscultation bilaterally; No rales, rhonchi, wheezing, or rubs  HEART: S1, S2, Regular rate and rhythm  ABDOMEN: Soft, Nontender, Nondistended; Bowel sounds present  NEURO: Alert & Oriented X 0   EXTREMITIES: +right hand contracture. +left hand tremor. No LE edema, no calf tenderness  LYMPH: No lymphadenopathy noted  SKIN: No rashes or lesions    Consultant(s) Notes Reviewed:  [x ] YES  [ ] NO  Care Discussed with Consultants/Other Providers [ x] YES  [ ] NO    LABS:                        9.7    10.27 )-----------( 205      ( 10 Oct 2022 06:35 )             30.6     10-10    142  |  109<H>  |  24<H>  ----------------------------<  97  3.9   |  26  |  1.20    Ca    8.9      10 Oct 2022 06:35  Phos  3.9     10-10  Mg     2.4     10-10    TPro  8.0  /  Alb  2.9<L>  /  TBili  0.4  /  DBili  x   /  AST  14  /  ALT  17  /  AlkPhos  90  10-09    PT/INR - ( 09 Oct 2022 19:35 )   PT: 12.5 sec;   INR: 1.05 ratio         PTT - ( 09 Oct 2022 19:35 )  PTT:28.9 sec  CAPILLARY BLOOD GLUCOSE            Urinalysis Basic - ( 09 Oct 2022 22:02 )    Color: Yellow / Appearance: Hazy / S.010 / pH: x  Gluc: x / Ketone: Negative  / Bili: Negative / Urobili: 1   Blood: x / Protein: 100 / Nitrite: Negative   Leuk Esterase: Trace / RBC: >50 /HPF / WBC 3-5 /HPF   Sq Epi: x / Non Sq Epi: Occasional /HPF / Bacteria: Many /HPF        RADIOLOGY & ADDITIONAL TESTS:  < from: Xray Chest 1 View- PORTABLE-Urgent (10.09.22 @ 19:32) >      ACC: [id number] EXAM:  XR CHEST PORTABLE URGENT 1V                          PROCEDURE DATE:  10/09/2022          INTERPRETATION:  AP semierect chest on 2022 at 7:19 PM.   Patient has fever.    COMPARISON: None available.    Shallow inspiration crowds the chest. Heart magnified by technique.    Lungs are clear.    IMPRESSION: Clear lungs.    --- End of Report ---            DELMIS MUNOZ MD; Attending Radiologist  This document has been electronically signed. 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 Pt was admitted to medicine for sepsis 2/2 covid. GOC: DNR per NH documentation.   CXR no PNA. Pending urine and blood culture results.     Plan:  Problem/Plan - 1:  ·  Problem: Sepsis 2/2 (COVID-19).   ·  Plan: Pt sent from NH for fever of 100.2   WBC 12k  CXR wnl, UA neg for infection   COVID+   hold remdesivir and decadron for now  c/w supportive care (anti-pyretics and anti-tussives).  f/u urine and blood cx to r/o bacterial cause    Problem/Plan - 2:  ·  Problem: Hematuria, unspecified.   ·  Plan: UA RBC >50 tmtc  no gross hematuria observed  trend CBC    Problem/Plan - 3:  ·  Problem: HTN (hypertension).   ·  Plan: c/w home BP regimen (losartan, nicardipine, lasix).  not well controlled likely due to pt refusing po meds  consider hydralazine IV PRN    Problem/Plan - 4:  ·  Problem: HLD (hyperlipidemia).   ·  Plan: c/w asa, statin (home med).    Problem/Plan - 5:  ·  Problem: Seizures.   continue with home med depakote    Problem/Plan - 6:  ·  Problem: Chronic constipation.   ·  Plan: c/w senna and Miralax (home meds).    Problem/Plan - 7:  ·  Problem: Major depressive disorder.   ·  Plan: c/w sertraline (home med).    Problem/Plan - 8:  ·  Problem: Prophylactic measure.   ·  Plan: Lovenox for DVT ppx.  GI - pepcid

## 2022-10-10 NOTE — H&P ADULT - NSHPPHYSICALEXAM_GEN_ALL_CORE
T(C): 36.8 (10-09-22 @ 23:17), Max: 37.5 (10-09-22 @ 18:56)  HR: 77 (10-09-22 @ 23:17) (77 - 89)  BP: 165/89 (10-09-22 @ 23:17) (156/78 - 165/89)  RR: 18 (10-09-22 @ 23:17) (18 - 18)  SpO2: 94% (10-09-22 @ 23:17) (94% - 96%)    GENERAL: patient appears well, no acute distress, appropriate, pleasant  EYES: sclera clear, no exudates  ENMT: oropharynx clear without erythema, no exudates, moist mucous membranes  NECK: supple, soft, no thyromegaly noted  LUNGS: good air entry bilaterally, clear to auscultation, symmetric breath sounds, no wheezing, rhonchi or rales appreciated  HEART: S1/S2, regular rate and rhythm, no murmurs noted, no lower extremity edema  GASTROINTESTINAL: abdomen is soft, nontender, nondistended, normoactive bowel sounds, no palpable masses  INTEGUMENT: good skin turgor, no lesions noted  MUSCULOSKELETAL: no clubbing or cyanosis, no obvious deformity  NEUROLOGIC: AAO x3, good muscle tone in 4 extremities, no obvious sensory deficits  PSYCHIATRIC: mood is good, affect is congruent, linear and logical thought process  HEME/LYMPH: no palpable supraclavicular nodules, no obvious ecchymosis or petechiae T(C): 36.8 (10-09-22 @ 23:17), Max: 37.5 (10-09-22 @ 18:56)  HR: 77 (10-09-22 @ 23:17) (77 - 89)  BP: 165/89 (10-09-22 @ 23:17) (156/78 - 165/89)  RR: 18 (10-09-22 @ 23:17) (18 - 18)  SpO2: 94% (10-09-22 @ 23:17) (94% - 96%)    GENERAL: Elderly lady, NAD, minimally cooperative with exam  EYES: sclera clear, no exudates  ENMT: oropharynx clear without erythema, no exudates, moist mucous membranes   LUNGS (Limited): good air entry bilaterally, clear to auscultation (anteriorly), symmetric breath sounds, no wheezing, +frequent dry cough   HEART: S1/S2, regular rate and rhythm, no murmurs noted, no lower extremity edema  GASTROINTESTINAL: abdomen is soft, nontender, nondistended, normoactive bowel sounds, no palpable masses  INTEGUMENT: good skin turgor, no lesions noted  MUSCULOSKELETAL: no clubbing or cyanosis, no obvious deformity  NEUROLOGIC: +aphasia, +R sided hemiplegia, +L sided RUE tremor

## 2022-10-10 NOTE — H&P ADULT - PROBLEM SELECTOR PLAN 4
c/w divalproex ([facility] [place] [facility]) c/w divalproex ([facility]) c/w divalproex ([place] [facility]) c/w statin (home med)

## 2022-10-11 ENCOUNTER — TRANSCRIPTION ENCOUNTER (OUTPATIENT)
Age: 77
End: 2022-10-11

## 2022-10-11 DIAGNOSIS — Z02.9 ENCOUNTER FOR ADMINISTRATIVE EXAMINATIONS, UNSPECIFIED: ICD-10-CM

## 2022-10-11 LAB
A1C WITH ESTIMATED AVERAGE GLUCOSE RESULT: 5.8 % — HIGH (ref 4–5.6)
ANION GAP SERPL CALC-SCNC: 10 MMOL/L — SIGNIFICANT CHANGE UP (ref 5–17)
BUN SERPL-MCNC: 23 MG/DL — HIGH (ref 7–18)
CALCIUM SERPL-MCNC: 8.9 MG/DL — SIGNIFICANT CHANGE UP (ref 8.4–10.5)
CHLORIDE SERPL-SCNC: 108 MMOL/L — SIGNIFICANT CHANGE UP (ref 96–108)
CO2 SERPL-SCNC: 26 MMOL/L — SIGNIFICANT CHANGE UP (ref 22–31)
CREAT SERPL-MCNC: 1.23 MG/DL — SIGNIFICANT CHANGE UP (ref 0.5–1.3)
EGFR: 46 ML/MIN/1.73M2 — LOW
ESTIMATED AVERAGE GLUCOSE: 120 MG/DL — HIGH (ref 68–114)
GLUCOSE SERPL-MCNC: 88 MG/DL — SIGNIFICANT CHANGE UP (ref 70–99)
HCT VFR BLD CALC: 32.4 % — LOW (ref 34.5–45)
HCV AB S/CO SERPL IA: 0.21 S/CO — SIGNIFICANT CHANGE UP (ref 0–0.99)
HCV AB SERPL-IMP: SIGNIFICANT CHANGE UP
HGB BLD-MCNC: 10.1 G/DL — LOW (ref 11.5–15.5)
MCHC RBC-ENTMCNC: 27.9 PG — SIGNIFICANT CHANGE UP (ref 27–34)
MCHC RBC-ENTMCNC: 31.2 GM/DL — LOW (ref 32–36)
MCV RBC AUTO: 89.5 FL — SIGNIFICANT CHANGE UP (ref 80–100)
NRBC # BLD: 0 /100 WBCS — SIGNIFICANT CHANGE UP (ref 0–0)
PLATELET # BLD AUTO: 213 K/UL — SIGNIFICANT CHANGE UP (ref 150–400)
POTASSIUM SERPL-MCNC: 3.6 MMOL/L — SIGNIFICANT CHANGE UP (ref 3.5–5.3)
POTASSIUM SERPL-SCNC: 3.6 MMOL/L — SIGNIFICANT CHANGE UP (ref 3.5–5.3)
RBC # BLD: 3.62 M/UL — LOW (ref 3.8–5.2)
RBC # FLD: 15.8 % — HIGH (ref 10.3–14.5)
SODIUM SERPL-SCNC: 144 MMOL/L — SIGNIFICANT CHANGE UP (ref 135–145)
WBC # BLD: 10.33 K/UL — SIGNIFICANT CHANGE UP (ref 3.8–10.5)
WBC # FLD AUTO: 10.33 K/UL — SIGNIFICANT CHANGE UP (ref 3.8–10.5)

## 2022-10-11 RX ORDER — ACETAMINOPHEN 500 MG
2 TABLET ORAL
Qty: 0 | Refills: 0 | DISCHARGE
Start: 2022-10-11

## 2022-10-11 RX ADMIN — Medication 81 MILLIGRAM(S): at 16:53

## 2022-10-11 RX ADMIN — Medication 500 MILLIGRAM(S): at 15:51

## 2022-10-11 RX ADMIN — Medication 500 MILLIGRAM(S): at 06:02

## 2022-10-11 RX ADMIN — SERTRALINE 75 MILLIGRAM(S): 25 TABLET, FILM COATED ORAL at 15:55

## 2022-10-11 RX ADMIN — Medication 1 TABLET(S): at 15:54

## 2022-10-11 RX ADMIN — POLYETHYLENE GLYCOL 3350 17 GRAM(S): 17 POWDER, FOR SOLUTION ORAL at 15:54

## 2022-10-11 RX ADMIN — Medication 500 MILLIGRAM(S): at 16:53

## 2022-10-11 RX ADMIN — NICARDIPINE HYDROCHLORIDE 20 MILLIGRAM(S): 30 CAPSULE, EXTENDED RELEASE ORAL at 15:55

## 2022-10-11 RX ADMIN — NICARDIPINE HYDROCHLORIDE 40 MILLIGRAM(S): 30 CAPSULE, EXTENDED RELEASE ORAL at 06:25

## 2022-10-11 RX ADMIN — Medication 40 MILLIGRAM(S): at 06:02

## 2022-10-11 RX ADMIN — ENOXAPARIN SODIUM 40 MILLIGRAM(S): 100 INJECTION SUBCUTANEOUS at 06:02

## 2022-10-11 RX ADMIN — NICARDIPINE HYDROCHLORIDE 20 MILLIGRAM(S): 30 CAPSULE, EXTENDED RELEASE ORAL at 21:30

## 2022-10-11 RX ADMIN — SENNA PLUS 2 TABLET(S): 8.6 TABLET ORAL at 21:29

## 2022-10-11 RX ADMIN — LOSARTAN POTASSIUM 100 MILLIGRAM(S): 100 TABLET, FILM COATED ORAL at 06:02

## 2022-10-11 RX ADMIN — NICARDIPINE HYDROCHLORIDE 20 MILLIGRAM(S): 30 CAPSULE, EXTENDED RELEASE ORAL at 01:19

## 2022-10-11 RX ADMIN — SIMVASTATIN 10 MILLIGRAM(S): 20 TABLET, FILM COATED ORAL at 21:29

## 2022-10-11 RX ADMIN — FAMOTIDINE 40 MILLIGRAM(S): 10 INJECTION INTRAVENOUS at 21:29

## 2022-10-11 NOTE — PATIENT PROFILE ADULT - FALL HARM RISK - HARM RISK INTERVENTIONS
Assistance OOB with selected safe patient handling equipment/Communicate Risk of Fall with Harm to all staff/Discuss with provider need for PT consult/Monitor gait and stability/Provide patient with walking aids - walker, cane, crutches/Reinforce activity limits and safety measures with patient and family/Tailored Fall Risk Interventions/Visual Cue: Yellow wristband and red socks/Bed in lowest position, wheels locked, appropriate side rails in place/Call bell, personal items and telephone in reach/Instruct patient to call for assistance before getting out of bed or chair/Non-slip footwear when patient is out of bed/[place] to call system/Physically safe environment - no spills, clutter or unnecessary equipment/Purposeful Proactive Rounding/Room/bathroom lighting operational, light cord in reach Assistance OOB with selected safe patient handling equipment/Communicate Risk of Fall with Harm to all staff/Discuss with provider need for PT consult/Monitor gait and stability/Provide patient with walking aids - walker, cane, crutches/Reinforce activity limits and safety measures with patient and family/Tailored Fall Risk Interventions/Visual Cue: Yellow wristband and red socks/Bed in lowest position, wheels locked, appropriate side rails in place/Call bell, personal items and telephone in reach/Instruct patient to call for assistance before getting out of bed or chair/Non-slip footwear when patient is out of bed/[place] to call system/Physically safe environment - no spills, clutter or unnecessary equipment/Purposeful Proactive Rounding/Room/bathroom lighting operational, light cord in reach Assistance OOB with selected safe patient handling equipment/Communicate Risk of Fall with Harm to all staff/Discuss with provider need for PT consult/Monitor gait and stability/Provide patient with walking aids - walker, cane, crutches/Reinforce activity limits and safety measures with patient and family/Tailored Fall Risk Interventions/Visual Cue: Yellow wristband and red socks/Bed in lowest position, wheels locked, appropriate side rails in place/Call bell, personal items and telephone in reach/Instruct patient to call for assistance before getting out of bed or chair/Non-slip footwear when patient is out of bed/[place] to call system/Physically safe environment - no spills, clutter or unnecessary equipment/Purposeful Proactive Rounding/Room/bathroom lighting operational, light cord in reach

## 2022-10-11 NOTE — DISCHARGE NOTE PROVIDER - CARE PROVIDER_API CALL
Patel Hickey)  Medicine  [address], [address] [place] [address]  Phone: [phone]  Fax: [phone]  Follow Up Time: 1 week   Patel Hickey)  Medicine  [address], [address] [place] [address]  Phone: [phone]  Fax: [phone]  Follow Up Time: 1 week   Patel Hickey)  Medicine  [address], [address] [place] [address]  Phone: [phone]  Fax: [phone]  Follow Up Time: 1 week

## 2022-10-11 NOTE — PROGRESS NOTE ADULT - PROBLEM SELECTOR PLAN 9
patient from [facility] - [facility]TC   pending speech and swallow eval   can return back to facility once optimized   COVID + 10/9 patient from [facility] - LTC   pending speech and swallow eval   can return back to facility once optimized   COVID + 10/9 patient from [facility] - LTC   pending speech and swallow eval   can return back to facility once optimized   COVID + 10/9

## 2022-10-11 NOTE — SWALLOW BEDSIDE ASSESSMENT ADULT - SWALLOW EVAL: DIAGNOSIS
Pt p/w oral dysphagia c/b impaired bolus formation, prolonged mastication, and slow A-P transport, delayed swallow trigger, no overt s/s of laryngeal penetration or aspiration at this exam.

## 2022-10-11 NOTE — DISCHARGE NOTE PROVIDER - HOSPITAL COURSE
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 Found to be COVID+, did not require further treatment as she remains asymptomatic. Plan discussed with sister Marika, all questions answered, support provided. 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 Found to be COVID+, did not require further treatment as she remains asymptomatic. Plan discussed with sister Marika, all questions answered, support provided. 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 Found to be COVID+, did not require further treatment as she remains asymptomatic. Plan discussed with sister Marika, all questions answered, support provided. 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 Found to be COVID+, did not require further treatment as she remains asymptomatic. Plan discussed with sister Marika, all questions answered, support provided. 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 Found to be COVID+, did not require further treatment as she remains asymptomatic. Plan discussed with sister Marika, all questions answered, support provided. 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 Found to be COVID+, did not require further treatment as she remains asymptomatic. Plan discussed with sister Marika, all questions answered, support provided. Given clinical course decision made to discharge patient back to facility   This is only a brief summary of patient's hospital stay, for full course please see EMR

## 2022-10-11 NOTE — SWALLOW BEDSIDE ASSESSMENT ADULT - SLP PERTINENT HISTORY OF CURRENT PROBLEM
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

## 2022-10-11 NOTE — DISCHARGE NOTE PROVIDER - NSDCMRMEDTOKEN_GEN_ALL_CORE_FT
aspirin 81 mg oral delayed release tablet: 1 tab(s) orally once a day  divalproex sodium 500 mg oral delayed release tablet: 1 tab(s) orally 2 times a day  famotidine 40 mg oral tablet: 1 tab(s) orally once a day (at bedtime)  furosemide 40 mg oral tablet: 1 tab(s) orally once a day  losartan 100 mg oral tablet: 1 tab(s) orally once a day  MiraLax oral powder for reconstitution: 1 application orally once a day  Multiple Vitamins oral tablet: 1 tab(s) orally once a day  niCARdipine 20 mg oral capsule: 1 cap(s) orally 2 times a day  niCARdipine 20 mg oral capsule: 2 cap(s) orally once a day in the morning  Senna 8.6 mg oral tablet: 2 tab(s) orally once a day (at bedtime)  sertraline 25 mg oral tablet: 1 tab(s) orally once a day  sertraline 50 mg oral tablet: 1 tab(s) orally once a day  simvastatin 10 mg oral tablet: 1 tab(s) orally once a day (at bedtime)  Vitamin C  mg oral capsule: 1 cap(s) orally once a day   acetaminophen 325 mg oral tablet: 2 tab(s) orally every 6 hours, As needed, Temp greater or equal to 38C (100.4F), Mild Pain (1 - 3)  aspirin 81 mg oral delayed release tablet: 1 tab(s) orally once a day  divalproex sodium 500 mg oral delayed release tablet: 1 tab(s) orally 2 times a day  famotidine 40 mg oral tablet: 1 tab(s) orally once a day (at bedtime)  furosemide 40 mg oral tablet: 1 tab(s) orally once a day  losartan 100 mg oral tablet: 1 tab(s) orally once a day  MiraLax oral powder for reconstitution: 1 application orally once a day  Multiple Vitamins oral tablet: 1 tab(s) orally once a day  niCARdipine 20 mg oral capsule: 1 cap(s) orally 2 times a day  niCARdipine 20 mg oral capsule: 2 cap(s) orally once a day in the morning  Senna 8.6 mg oral tablet: 2 tab(s) orally once a day (at bedtime)  sertraline 25 mg oral tablet: 1 tab(s) orally once a day  sertraline 50 mg oral tablet: 1 tab(s) orally once a day  simvastatin 10 mg oral tablet: 1 tab(s) orally once a day (at bedtime)  Vitamin C  mg oral capsule: 1 cap(s) orally once a day

## 2022-10-11 NOTE — DISCHARGE NOTE PROVIDER - NSDCCPCAREPLAN_GEN_ALL_CORE_FT
PRINCIPAL DISCHARGE DIAGNOSIS  Diagnosis: 2019 novel coronavirus disease (COVID-19)  Assessment and Plan of Treatment:       SECONDARY DISCHARGE DIAGNOSES  Diagnosis: Hematuria, unspecified  Assessment and Plan of Treatment:      PRINCIPAL DISCHARGE DIAGNOSIS  Diagnosis: 2019 novel coronavirus disease (COVID-19)  Assessment and Plan of Treatment: Based on your current clinical status and stability, it has been determined that you no longer need hospitalization and can recover while remaining in self-quarantine at home. You should follow the prevention steps below until a healthcare provider or local or state health department says you can return to your normal activities.   1. You should restrict activities outside your home, except for getting medical care.   2. Do not go to work, school, or public areas.   3. Avoid using public transportation, ride-sharing, or taxis.   4. Separate yourself from other people and animals in your home as much as possible.  When you are around other people (e.g., sharing a room or vehicle) you should wear a facemask.  5. Wash your hands often with soap and water for at least 20 seconds, especially after blowing your nose, coughing, or sneezing; going to the bathroom; and before eating or preparing food.  6. Cover your mouth and nose with a tissue when you cough or sneeze. Throw used tissues in a lined trash can. Immediately wash your hands with soap and water for at least 20 seconds  7. High touch surfaces include counters, tabletops, doorknobs, bathroom fixtures, toilets, phones, keyboards, tablets, and bedside tables.  8. Avoid sharing dishes, drinking glasses, cups, eating utensils, towels, or bedding with other people or pets in your home. After using these items, they should be washed thoroughly with soap and water.  You are strongly advised to seek prompt medical attention if your illness worsens or you develop new symptoms like fever or difficulty breathing.      SECONDARY DISCHARGE DIAGNOSES  Diagnosis: Cerebrovascular accident (CVA)  Assessment and Plan of Treatment: You have a history of a stroke with residual weakness. It is important to continue PT/OT at your long term care facility to improve your strength and mobility.   American Heart Association and American Stroke Association  [facility] [place] [address]  Phone: [phone]  Web Address: [email or web address].heart.org    Diagnosis: Hematuria, unspecified  Assessment and Plan of Treatment: You were found to have blood in your urine, this was not from a urinary tract infection. All of your blood tests were normal, it is important to follow up with your primary care doctor and or urologist for further treatment and management    Diagnosis: Seizures  Assessment and Plan of Treatment: You have a history of seizures, continue your medications as prescribed    Diagnosis: HLD (hyperlipidemia)  Assessment and Plan of Treatment: Continue your home -statin, follow up with primary care doctor to have the levels rechecked.     PRINCIPAL DISCHARGE DIAGNOSIS  Diagnosis: 2019 novel coronavirus disease (COVID-19)  Assessment and Plan of Treatment: Based on your current clinical status and stability, it has been determined that you no longer need hospitalization and can recover while remaining in self-quarantine at home. You should follow the prevention steps below until a healthcare provider or local or state health department says you can return to your normal activities.   1. You should restrict activities outside your home, except for getting medical care.   2. Do not go to work, school, or public areas.   3. Avoid using public transportation, ride-sharing, or taxis.   4. Separate yourself from other people and animals in your home as much as possible.  When you are around other people (e.g., sharing a room or vehicle) you should wear a facemask.  5. Wash your hands often with soap and water for at least 20 seconds, especially after blowing your nose, coughing, or sneezing; going to the bathroom; and before eating or preparing food.  6. Cover your mouth and nose with a tissue when you cough or sneeze. Throw used tissues in a lined trash can. Immediately wash your hands with soap and water for at least 20 seconds  7. High touch surfaces include counters, tabletops, doorknobs, bathroom fixtures, toilets, phones, keyboards, tablets, and bedside tables.  8. Avoid sharing dishes, drinking glasses, cups, eating utensils, towels, or bedding with other people or pets in your home. After using these items, they should be washed thoroughly with soap and water.  You are strongly advised to seek prompt medical attention if your illness worsens or you develop new symptoms like fever or difficulty breathing.      SECONDARY DISCHARGE DIAGNOSES  Diagnosis: Cerebrovascular accident (CVA)  Assessment and Plan of Treatment: You have a history of a stroke with residual weakness. It is important to continue PT/OT at your long term care facility to improve your strength and mobility.   American Heart Association and American Stroke Association  [facility] [address]  Phone: [phone]  Web Address: [email or web address].heart.org    Diagnosis: Hematuria, unspecified  Assessment and Plan of Treatment: You were found to have blood in your urine, this was not from a urinary tract infection. All of your blood tests were normal, it is important to follow up with your primary care doctor and or urologist for further treatment and management    Diagnosis: Seizures  Assessment and Plan of Treatment: You have a history of seizures, continue your medications as prescribed    Diagnosis: HLD (hyperlipidemia)  Assessment and Plan of Treatment: Continue your home -statin, follow up with primary care doctor to have the levels rechecked.     PRINCIPAL DISCHARGE DIAGNOSIS  Diagnosis: 2019 novel coronavirus disease (COVID-19)  Assessment and Plan of Treatment: Based on your current clinical status and stability, it has been determined that you no longer need hospitalization and can recover while remaining in self-quarantine at home. You should follow the prevention steps below until a healthcare provider or local or state health department says you can return to your normal activities.   1. You should restrict activities outside your home, except for getting medical care.   2. Do not go to work, school, or public areas.   3. Avoid using public transportation, ride-sharing, or taxis.   4. Separate yourself from other people and animals in your home as much as possible.  When you are around other people (e.g., sharing a room or vehicle) you should wear a facemask.  5. Wash your hands often with soap and water for at least 20 seconds, especially after blowing your nose, coughing, or sneezing; going to the bathroom; and before eating or preparing food.  6. Cover your mouth and nose with a tissue when you cough or sneeze. Throw used tissues in a lined trash can. Immediately wash your hands with soap and water for at least 20 seconds  7. High touch surfaces include counters, tabletops, doorknobs, bathroom fixtures, toilets, phones, keyboards, tablets, and bedside tables.  8. Avoid sharing dishes, drinking glasses, cups, eating utensils, towels, or bedding with other people or pets in your home. After using these items, they should be washed thoroughly with soap and water.  You are strongly advised to seek prompt medical attention if your illness worsens or you develop new symptoms like fever or difficulty breathing.      SECONDARY DISCHARGE DIAGNOSES  Diagnosis: Cerebrovascular accident (CVA)  Assessment and Plan of Treatment: You have a history of a stroke with residual weakness. It is important to continue PT/OT at your long term care facility to improve your strength and mobility.   American Heart Association and American Stroke Association  [facility] [address]  Phone: [phone]  Web Address: [email or web address].heart.org    Diagnosis: Hematuria, unspecified  Assessment and Plan of Treatment: You were found to have blood in your urine, this was not from a urinary tract infection. All of your blood tests were normal, it is important to follow up with your primary care doctor and or urologist for further treatment and management    Diagnosis: Seizures  Assessment and Plan of Treatment: You have a history of seizures, continue your medications as prescribed    Diagnosis: HLD (hyperlipidemia)  Assessment and Plan of Treatment: Continue your home -statin, follow up with primary care doctor to have the levels rechecked.

## 2022-10-11 NOTE — PROGRESS NOTE ADULT - SUBJECTIVE AND OBJECTIVE BOX
INTERVAL HPI/OVERNIGHT EVENTS:  Patient seen,events noticed,noacute issues  VITAL SIGNS:  T(F): 97.4 (10-11-22 @ 05:17)  HR: 77 (10-11-22 @ 05:17)  BP: 174/87 (10-11-22 @ 05:17)  RR: 20 (10-11-22 @ 05:17)  SpO2: 99% (10-11-22 @ 05:17)  Wt(kg): --    PHYSICAL EXAM:  awake  Constitutional:  Eyes:  ENMT:perrla  Neck:  Respiratory:few rales  Cardiovascular:s12,m-none  Gastrointestinal:soft,bs pos  Extremities:  Vascular:  Neurological:no focal deficit  Musculoskeletal:    MEDICATIONS  (STANDING):  ascorbic acid 500 milliGRAM(s) Oral daily  aspirin enteric coated 81 milliGRAM(s) Oral daily  enoxaparin Injectable 40 milliGRAM(s) SubCutaneous every 24 hours  famotidine    Tablet 40 milliGRAM(s) Oral at bedtime  furosemide    Tablet 40 milliGRAM(s) Oral daily  losartan 100 milliGRAM(s) Oral daily  multivitamin 1 Tablet(s) Oral daily  niCARdipine 40 milliGRAM(s) Oral <User Schedule>  niCARdipine 20 milliGRAM(s) Oral <User Schedule>  polyethylene glycol 3350 17 Gram(s) Oral daily  senna 2 Tablet(s) Oral at bedtime  sertraline 75 milliGRAM(s) Oral daily  simvastatin 10 milliGRAM(s) Oral at bedtime  valproic  acid Syrup 500 milliGRAM(s) Oral every 12 hours    MEDICATIONS  (PRN):  acetaminophen     Tablet .. 650 milliGRAM(s) Oral every 6 hours PRN Temp greater or equal to 38C (100.4F), Mild Pain (1 - 3)  guaifenesin/dextromethorphan Oral Liquid 10 milliLiter(s) Oral every 4 hours PRN Cough      Allergies    penicillins (Unknown)    Intolerances        LABS:                        9.7    10.27 )-----------( 205      ( 10 Oct 2022 06:35 )             30.6     10-10    142  |  109<H>  |  24<H>  ----------------------------<  97  3.9   |  26  |  1.20    Ca    8.9      10 Oct 2022 06:35  Phos  3.9     10-10  Mg     2.4     10-10    TPro  8.0  /  Alb  2.9<L>  /  TBili  0.4  /  DBili  x   /  AST  14  /  ALT  17  /  AlkPhos  90  10-09    PT/INR - ( 09 Oct 2022 19:35 )   PT: 12.5 sec;   INR: 1.05 ratio         PTT - ( 09 Oct 2022 19:35 )  PTT:28.9 sec  Urinalysis Basic - ( 09 Oct 2022 22:02 )    Color: Yellow / Appearance: Hazy / S.010 / pH: x  Gluc: x / Ketone: Negative  / Bili: Negative / Urobili: 1   Blood: x / Protein: 100 / Nitrite: Negative   Leuk Esterase: Trace / RBC: >50 /HPF / WBC 3-5 /HPF   Sq Epi: x / Non Sq Epi: Occasional /HPF / Bacteria: Many /HPF        RADIOLOGY & ADDITIONAL TESTS:       Assessment:  · Assessment	  76F sent from NH for fever and sepsis workup. Found to be COVID+. Admitted for further workup.     Problem/Plan - 1:  ·  Problem: 2019 novel coronavirus disease (COVID-19).   ·  Plan: Pt sent from NH for fever of 100.2   WBC 12k  CXR wnl, UA neg for infection   COVID+   No indication for medical COVID tx at this time as patient is not hypoxic   f/u blood cx to r/o bacterial cause  c/w supportive care (anti-pyretics and anti-tussives).     Problem/Plan - 2:  ·  Problem: Hematuria, unspecified.   ·  Plan: UA RBC >50 tmtc  no gross hematuria observed  f/u CK  consider Urology consult if shows clinical signs   monitor HGB.     Problem/Plan - 3:  ·  Problem: HTN (hypertension).   ·  Plan: c/w home BP regimen (losartan, nicardipine).     Problem/Plan - 4:  ·  Problem: HLD (hyperlipidemia).   ·  Plan: c/w statin (home med).     Problem/Plan - 5:  ·  Problem: Seizures.   ·  Plan: c/w divalproex (home med).     Problem/Plan - 6:  ·  Problem: Chronic constipation.   ·  Plan: c/w senna and Miralax (home meds).     Problem/Plan - 7:  ·  Problem: Major depressive disorder.   ·  Plan: c/w sertraline (home med).     Problem/Plan - 8:  ·  Problem: Prophylactic measure.   ·  Plan: Lovenox for DVT ppx.

## 2022-10-11 NOTE — PROGRESS NOTE ADULT - ASSESSMENT
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 Found to be COVID+, did not require further treatment as she remains asymptomatic. Plan discussed with sister Marika, all questions answered, support provided. She requests a formal speech and swallow evaluation prior to D/C.    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 Found to be COVID+, did not require further treatment as she remains asymptomatic. Plan discussed with sister Marika, all questions answered, support provided. She requests a formal speech and swallow evaluation prior to D/C.    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 Found to be COVID+, did not require further treatment as she remains asymptomatic. Plan discussed with sister Marika, all questions answered, support provided. She requests a formal speech and swallow evaluation prior to D/C.

## 2022-10-11 NOTE — SWALLOW BEDSIDE ASSESSMENT ADULT - SWALLOW EVAL: RECOMMENDED FEEDING/EATING TECHNIQUES
allow for swallow between intakes/alternate food with liquid/crush medication (when feasible)/oral hygiene/position upright (90 degrees)/small sips/bites

## 2022-10-11 NOTE — PROGRESS NOTE ADULT - SUBJECTIVE AND OBJECTIVE BOX
NP Note discussed with  primary attending    Patient is a 76y old  Female who presents with a chief complaint of Fever (11 Oct 2022 10:32)      INTERVAL HPI/OVERNIGHT EVENTS: no acute medical events overnight     MEDICATIONS  (STANDING):  ascorbic acid 500 milliGRAM(s) Oral daily  aspirin enteric coated 81 milliGRAM(s) Oral daily  enoxaparin Injectable 40 milliGRAM(s) SubCutaneous every 24 hours  famotidine    Tablet 40 milliGRAM(s) Oral at bedtime  furosemide    Tablet 40 milliGRAM(s) Oral daily  losartan 100 milliGRAM(s) Oral daily  multivitamin 1 Tablet(s) Oral daily  niCARdipine 40 milliGRAM(s) Oral <User Schedule>  niCARdipine 20 milliGRAM(s) Oral <User Schedule>  polyethylene glycol 3350 17 Gram(s) Oral daily  senna 2 Tablet(s) Oral at bedtime  sertraline 75 milliGRAM(s) Oral daily  simvastatin 10 milliGRAM(s) Oral at bedtime  valproic  acid Syrup 500 milliGRAM(s) Oral every 12 hours    MEDICATIONS  (PRN):  acetaminophen     Tablet .. 650 milliGRAM(s) Oral every 6 hours PRN Temp greater or equal to 38C (100.4F), Mild Pain (1 - 3)  guaifenesin/dextromethorphan Oral Liquid 10 milliLiter(s) Oral every 4 hours PRN Cough      __________________________________________________  REVIEW OF SYSTEMS:  limited due to mental status     Vital Signs Last 24 Hrs  T(C): 36.6 (11 Oct 2022 11:30), Max: 37.8 (10 Oct 2022 15:52)  T(F): 97.9 (11 Oct 2022 11:30), Max: 100 (10 Oct 2022 15:52)  HR: 67 (11 Oct 2022 11:30) (67 - 90)  BP: 156/79 (11 Oct 2022 11:30) (156/79 - 178/85)  BP(mean): --  RR: 18 (11 Oct 2022 11:30) (17 - 20)  SpO2: 97% (11 Oct 2022 11:30) (96% - 99%)    Parameters below as of 11 Oct 2022 11:30  Patient On (Oxygen Delivery Method): room air        ________________________________________________  PHYSICAL EXAM:  GENERAL: chronically ill appearing   HEENT: B/L temporal waisting   NECK : supple  CHEST/LUNG: poor effort   HEART: S1 S2  regular; no murmurs, gallops or rubs  ABDOMEN: Soft, Nontender, Nondistended; Bowel sounds present  EXTREMITIES: right sided hemiparesis +1 pitting edema B/L LE   SKIN: warm and dry; no rash  NERVOUS SYSTEM: awake and alert aphasic     _________________________________________________  LABS:                        10.1   10.33 )-----------( 213      ( 11 Oct 2022 07:15 )             32.4     10-11    144  |  108  |  23<H>  ----------------------------<  88  3.6   |  26  |  1.23    Ca    8.9      11 Oct 2022 07:15  Phos  3.9     10-10  Mg     2.4     10-10    TPro  8.0  /  Alb  2.9<L>  /  TBili  0.4  /  DBili  x   /  AST  14  /  ALT  17  /  AlkPhos  90  10-09    PT/INR - ( 09 Oct 2022 19:35 )   PT: 12.5 sec;   INR: 1.05 ratio         PTT - ( 09 Oct 2022 19:35 )  PTT:28.9 sec  Urinalysis Basic - ( 09 Oct 2022 22:02 )    Color: Yellow / Appearance: Hazy / S.010 / pH: x  Gluc: x / Ketone: Negative  / Bili: Negative / Urobili: 1   Blood: x / Protein: 100 / Nitrite: Negative   Leuk Esterase: Trace / RBC: >50 /HPF / WBC 3-5 /HPF   Sq Epi: x / Non Sq Epi: Occasional /HPF / Bacteria: Many /HPF      CAPILLARY BLOOD GLUCOSE      POCT Blood Glucose.: 148 mg/dL (10 Oct 2022 16:22)        RADIOLOGY & ADDITIONAL TESTS: < from: Xray Chest 1 View- PORTABLE-Urgent (10.09.22 @ 19:32) >    ACC: [id number] EXAM:  XR CHEST PORTABLE URGENT 1V                          PROCEDURE DATE:  10/09/2022          INTERPRETATION:  AP semierect chest on 2022 at 7:19 PM.   Patient has fever.    COMPARISON: None available.    Shallow inspiration crowds the chest. Heart magnified by technique.    Lungs are clear.    IMPRESSION: Clear lungs.    --- End of Report ---      < end of copied text >      Imaging Personally Reviewed:  YES/    Consultant(s) Notes Reviewed:   YES/    Plan of care was discussed with patient and /or primary care giver; all questions and concerns were addressed and care was aligned with patient's wishes.     NP Note discussed with  primary attending    Patient is a 76y old  Female who presents with a chief complaint of Fever (11 Oct 2022 10:32)      INTERVAL HPI/OVERNIGHT EVENTS: no acute medical events overnight     MEDICATIONS  (STANDING):  ascorbic acid 500 milliGRAM(s) Oral daily  aspirin enteric coated 81 milliGRAM(s) Oral daily  enoxaparin Injectable 40 milliGRAM(s) SubCutaneous every 24 hours  famotidine    Tablet 40 milliGRAM(s) Oral at bedtime  furosemide    Tablet 40 milliGRAM(s) Oral daily  losartan 100 milliGRAM(s) Oral daily  multivitamin 1 Tablet(s) Oral daily  niCARdipine 40 milliGRAM(s) Oral <User Schedule>  niCARdipine 20 milliGRAM(s) Oral <User Schedule>  polyethylene glycol 3350 17 Gram(s) Oral daily  senna 2 Tablet(s) Oral at bedtime  sertraline 75 milliGRAM(s) Oral daily  simvastatin 10 milliGRAM(s) Oral at bedtime  valproic  acid Syrup 500 milliGRAM(s) Oral every 12 hours    MEDICATIONS  (PRN):  acetaminophen     Tablet .. 650 milliGRAM(s) Oral every 6 hours PRN Temp greater or equal to 38C (100.4F), Mild Pain (1 - 3)  guaifenesin/dextromethorphan Oral Liquid 10 milliLiter(s) Oral every 4 hours PRN Cough      __________________________________________________  REVIEW OF SYSTEMS:  limited due to mental status     Vital Signs Last 24 Hrs  T(C): 36.6 (11 Oct 2022 11:30), Max: 37.8 (10 Oct 2022 15:52)  T(F): 97.9 (11 Oct 2022 11:30), Max: 100 (10 Oct 2022 15:52)  HR: 67 (11 Oct 2022 11:30) (67 - 90)  BP: 156/79 (11 Oct 2022 11:30) (156/79 - 178/85)  BP(mean): --  RR: 18 (11 Oct 2022 11:30) (17 - 20)  SpO2: 97% (11 Oct 2022 11:30) (96% - 99%)    Parameters below as of 11 Oct 2022 11:30  Patient On (Oxygen Delivery Method): room air        ________________________________________________  PHYSICAL EXAM:  GENERAL: chronically ill appearing   HEENT: B/L temporal waisting   NECK : supple  CHEST/LUNG: poor effort   HEART: S1 S2  regular; no murmurs, gallops or rubs  ABDOMEN: Soft, Nontender, Nondistended; Bowel sounds present  EXTREMITIES: right sided hemiparesis +1 pitting edema B/L LE   SKIN: warm and dry; no rash  NERVOUS SYSTEM: awake and alert aphasic     _________________________________________________  LABS:                        10.1   10.33 )-----------( 213      ( 11 Oct 2022 07:15 )             32.4     10-11    144  |  108  |  23<H>  ----------------------------<  88  3.6   |  26  |  1.23    Ca    8.9      11 Oct 2022 07:15  Phos  3.9     10-10  Mg     2.4     10-10    TPro  8.0  /  Alb  2.9<L>  /  TBili  0.4  /  DBili  x   /  AST  14  /  ALT  17  /  AlkPhos  90  10-09    PT/INR - ( 09 Oct 2022 19:35 )   PT: 12.5 sec;   INR: 1.05 ratio         PTT - ( 09 Oct 2022 19:35 )  PTT:28.9 sec  Urinalysis Basic - ( 09 Oct 2022 22:02 )    Color: Yellow / Appearance: Hazy / S.010 / pH: x  Gluc: x / Ketone: Negative  / Bili: Negative / Urobili: 1   Blood: x / Protein: 100 / Nitrite: Negative   Leuk Esterase: Trace / RBC: >50 /HPF / WBC 3-5 /HPF   Sq Epi: x / Non Sq Epi: Occasional /HPF / Bacteria: Many /HPF      CAPILLARY BLOOD GLUCOSE      POCT Blood Glucose.: 148 mg/dL (10 Oct 2022 16:22)        RADIOLOGY & ADDITIONAL TESTS: < from: Xray Chest 1 View- PORTABLE-Urgent (10.09.22 @ 19:32) >    ACC: [id number] EXAM:  XR CHEST PORTABLE URGENT 1V                          PROCEDURE DATE:  10/09/2022          INTERPRETATION:  AP semierect chest on 2022 at 7:19 PM.   Patient has fever.    COMPARISON: None available.    Shallow inspiration crowds the chest. Heart magnified by technique.    Lungs are clear.    IMPRESSION: Clear lungs.    --- End of Report ---      < end of copied text >      Imaging Personally Reviewed:  YES/    Consultant(s) Notes Reviewed:   YES/    Plan of care was discussed with patient and /or primary care giver; all questions and concerns were addressed and care was aligned with patient's wishes.     NP Note discussed with  primary attending    Patient is a 76y old  Female who presents with a chief complaint of Fever (11 Oct 2022 10:32)      INTERVAL HPI/OVERNIGHT EVENTS: no acute medical events overnight     MEDICATIONS  (STANDING):  ascorbic acid 500 milliGRAM(s) Oral daily  aspirin enteric coated 81 milliGRAM(s) Oral daily  enoxaparin Injectable 40 milliGRAM(s) SubCutaneous every 24 hours  famotidine    Tablet 40 milliGRAM(s) Oral at bedtime  furosemide    Tablet 40 milliGRAM(s) Oral daily  losartan 100 milliGRAM(s) Oral daily  multivitamin 1 Tablet(s) Oral daily  niCARdipine 40 milliGRAM(s) Oral <User Schedule>  niCARdipine 20 milliGRAM(s) Oral <User Schedule>  polyethylene glycol 3350 17 Gram(s) Oral daily  senna 2 Tablet(s) Oral at bedtime  sertraline 75 milliGRAM(s) Oral daily  simvastatin 10 milliGRAM(s) Oral at bedtime  valproic  acid Syrup 500 milliGRAM(s) Oral every 12 hours    MEDICATIONS  (PRN):  acetaminophen     Tablet .. 650 milliGRAM(s) Oral every 6 hours PRN Temp greater or equal to 38C (100.4F), Mild Pain (1 - 3)  guaifenesin/dextromethorphan Oral Liquid 10 milliLiter(s) Oral every 4 hours PRN Cough      __________________________________________________  REVIEW OF SYSTEMS:  limited due to mental status     Vital Signs Last 24 Hrs  T(C): 36.6 (11 Oct 2022 11:30), Max: 37.8 (10 Oct 2022 15:52)  T(F): 97.9 (11 Oct 2022 11:30), Max: 100 (10 Oct 2022 15:52)  HR: 67 (11 Oct 2022 11:30) (67 - 90)  BP: 156/79 (11 Oct 2022 11:30) (156/79 - 178/85)  BP(mean): --  RR: 18 (11 Oct 2022 11:30) (17 - 20)  SpO2: 97% (11 Oct 2022 11:30) (96% - 99%)    Parameters below as of 11 Oct 2022 11:30  Patient On (Oxygen Delivery Method): room air        ________________________________________________  PHYSICAL EXAM:  GENERAL: chronically ill appearing   HEENT: B/L temporal waisting   NECK : supple  CHEST/LUNG: poor effort   HEART: S1 S2  regular; no murmurs, gallops or rubs  ABDOMEN: Soft, Nontender, Nondistended; Bowel sounds present  EXTREMITIES: right sided hemiparesis +1 pitting edema B/L LE   SKIN: warm and dry; no rash  NERVOUS SYSTEM: awake and alert aphasic     _________________________________________________  LABS:                        10.1   10.33 )-----------( 213      ( 11 Oct 2022 07:15 )             32.4     10-11    144  |  108  |  23<H>  ----------------------------<  88  3.6   |  26  |  1.23    Ca    8.9      11 Oct 2022 07:15  Phos  3.9     10-10  Mg     2.4     10-10    TPro  8.0  /  Alb  2.9<L>  /  TBili  0.4  /  DBili  x   /  AST  14  /  ALT  17  /  AlkPhos  90  10-09    PT/INR - ( 09 Oct 2022 19:35 )   PT: 12.5 sec;   INR: 1.05 ratio         PTT - ( 09 Oct 2022 19:35 )  PTT:28.9 sec  Urinalysis Basic - ( 09 Oct 2022 22:02 )    Color: Yellow / Appearance: Hazy / S.010 / pH: x  Gluc: x / Ketone: Negative  / Bili: Negative / Urobili: 1   Blood: x / Protein: 100 / Nitrite: Negative   Leuk Esterase: Trace / RBC: >50 /HPF / WBC 3-5 /HPF   Sq Epi: x / Non Sq Epi: Occasional /HPF / Bacteria: Many /HPF      CAPILLARY BLOOD GLUCOSE      POCT Blood Glucose.: 148 mg/dL (10 Oct 2022 16:22)        RADIOLOGY & ADDITIONAL TESTS: < from: Xray Chest 1 View- PORTABLE-Urgent (10.09.22 @ 19:32) >    ACC: [id number] EXAM:  XR CHEST PORTABLE URGENT 1V                          PROCEDURE DATE:  10/09/2022          INTERPRETATION:  AP semierect chest on 2022 at 7:19 PM.   Patient has fever.    COMPARISON: None available.    Shallow inspiration crowds the chest. Heart magnified by technique.    Lungs are clear.    IMPRESSION: Clear lungs.    --- End of Report ---      < end of copied text >      Imaging Personally Reviewed:  YES/    Consultant(s) Notes Reviewed:   YES/    Plan of care was discussed with patient and /or primary care giver; all questions and concerns were addressed and care was aligned with patient's wishes.

## 2022-10-11 NOTE — SWALLOW BEDSIDE ASSESSMENT ADULT - COMMENTS
Pt AA+Ox1 (response impacted by existing aphasia); HOB elevated to 90°. LUE action tremors apparent.

## 2022-10-12 LAB
MRSA PCR RESULT.: SIGNIFICANT CHANGE UP
S AUREUS DNA NOSE QL NAA+PROBE: SIGNIFICANT CHANGE UP

## 2022-10-12 RX ORDER — SERTRALINE 25 MG/1
75 TABLET, FILM COATED ORAL DAILY
Refills: 0 | Status: DISCONTINUED | OUTPATIENT
Start: 2022-10-12 | End: 2022-10-17

## 2022-10-12 RX ADMIN — Medication 500 MILLIGRAM(S): at 05:22

## 2022-10-12 RX ADMIN — Medication 500 MILLIGRAM(S): at 11:40

## 2022-10-12 RX ADMIN — NICARDIPINE HYDROCHLORIDE 20 MILLIGRAM(S): 30 CAPSULE, EXTENDED RELEASE ORAL at 14:30

## 2022-10-12 RX ADMIN — ENOXAPARIN SODIUM 40 MILLIGRAM(S): 100 INJECTION SUBCUTANEOUS at 05:24

## 2022-10-12 RX ADMIN — NICARDIPINE HYDROCHLORIDE 20 MILLIGRAM(S): 30 CAPSULE, EXTENDED RELEASE ORAL at 21:33

## 2022-10-12 RX ADMIN — Medication 81 MILLIGRAM(S): at 14:29

## 2022-10-12 RX ADMIN — SIMVASTATIN 10 MILLIGRAM(S): 20 TABLET, FILM COATED ORAL at 21:32

## 2022-10-12 RX ADMIN — Medication 500 MILLIGRAM(S): at 17:12

## 2022-10-12 RX ADMIN — Medication 40 MILLIGRAM(S): at 05:23

## 2022-10-12 RX ADMIN — FAMOTIDINE 40 MILLIGRAM(S): 10 INJECTION INTRAVENOUS at 21:32

## 2022-10-12 RX ADMIN — NICARDIPINE HYDROCHLORIDE 40 MILLIGRAM(S): 30 CAPSULE, EXTENDED RELEASE ORAL at 05:24

## 2022-10-12 RX ADMIN — POLYETHYLENE GLYCOL 3350 17 GRAM(S): 17 POWDER, FOR SOLUTION ORAL at 11:40

## 2022-10-12 RX ADMIN — SERTRALINE 75 MILLIGRAM(S): 25 TABLET, FILM COATED ORAL at 14:29

## 2022-10-12 RX ADMIN — LOSARTAN POTASSIUM 100 MILLIGRAM(S): 100 TABLET, FILM COATED ORAL at 05:23

## 2022-10-12 RX ADMIN — SENNA PLUS 2 TABLET(S): 8.6 TABLET ORAL at 21:34

## 2022-10-12 RX ADMIN — Medication 1 TABLET(S): at 11:40

## 2022-10-12 NOTE — PROGRESS NOTE ADULT - ASSESSMENT
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 Found to be COVID+, did not require further treatment as she remains asymptomatic. Plan discussed with sister Marika, all questions answered, support provided. Family requested formal speech and swallow evaluation, diet advanced as tolerated Now optimized for discharge back to LTC facility, pending COVID bed availability    76 female from [facility] PMHx HTN, HLD, 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 Found to be COVID+, did not require further treatment as she remains asymptomatic. Plan discussed with sister Marika, all questions answered, support provided. Family requested formal speech and swallow evaluation, diet advanced as tolerated Now optimized for discharge back to LTC facility, pending COVID bed availability    76 female from [facility] PMHx HTN, HLD, 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 Found to be COVID+, did not require further treatment as she remains asymptomatic. Plan discussed with sister Marika, all questions answered, support provided. Family requested formal speech and swallow evaluation, diet advanced as tolerated Now optimized for discharge back to LTC facility, pending COVID bed availability

## 2022-10-12 NOTE — PROGRESS NOTE ADULT - SUBJECTIVE AND OBJECTIVE BOX
NP Note discussed with  primary attending    Patient is a 76y old  Female who presents with a chief complaint of Fever (11 Oct 2022 15:34)      INTERVAL HPI/OVERNIGHT EVENTS: no acute medical events     MEDICATIONS  (STANDING):  ascorbic acid 500 milliGRAM(s) Oral daily  aspirin enteric coated 81 milliGRAM(s) Oral daily  enoxaparin Injectable 40 milliGRAM(s) SubCutaneous every 24 hours  famotidine    Tablet 40 milliGRAM(s) Oral at bedtime  furosemide    Tablet 40 milliGRAM(s) Oral daily  losartan 100 milliGRAM(s) Oral daily  multivitamin 1 Tablet(s) Oral daily  niCARdipine 40 milliGRAM(s) Oral <User Schedule>  niCARdipine 20 milliGRAM(s) Oral <User Schedule>  polyethylene glycol 3350 17 Gram(s) Oral daily  senna 2 Tablet(s) Oral at bedtime  sertraline 75 milliGRAM(s) Oral daily  simvastatin 10 milliGRAM(s) Oral at bedtime  valproic  acid Syrup 500 milliGRAM(s) Oral every 12 hours    MEDICATIONS  (PRN):  acetaminophen     Tablet .. 650 milliGRAM(s) Oral every 6 hours PRN Temp greater or equal to 38C (100.4F), Mild Pain (1 - 3)  guaifenesin/dextromethorphan Oral Liquid 10 milliLiter(s) Oral every 4 hours PRN Cough      __________________________________________________  REVIEW OF SYSTEMS:  limited due to mental status       Vital Signs Last 24 Hrs  T(C): 37 (12 Oct 2022 13:53), Max: 37.1 (11 Oct 2022 20:20)  T(F): 98.6 (12 Oct 2022 13:53), Max: 98.7 (11 Oct 2022 20:20)  HR: 76 (12 Oct 2022 14:30) (63 - 76)  BP: 164/81 (12 Oct 2022 14:30) (148/71 - 164/81)  BP(mean): --  RR: 18 (12 Oct 2022 13:53) (18 - 18)  SpO2: 96% (12 Oct 2022 13:53) (96% - 98%)    Parameters below as of 12 Oct 2022 13:53  Patient On (Oxygen Delivery Method): room air        ________________________________________________  PHYSICAL EXAM:  GENERAL: chronically ill appearing   HEENT: B/L temporal waisting   NECK : supple  CHEST/LUNG: poor effort   HEART: S1 S2  regular; no murmurs, gallops or rubs  ABDOMEN: Soft, Nontender, Nondistended; Bowel sounds present  EXTREMITIES: right sided hemiparesis +1 pitting edema B/L LE   SKIN: warm and dry; no rash  NERVOUS SYSTEM: awake and alert aphasic   _________________________________________________  LABS:                        10.1   10.33 )-----------( 213      ( 11 Oct 2022 07:15 )             32.4     10-11    144  |  108  |  23<H>  ----------------------------<  88  3.6   |  26  |  1.23    Ca    8.9      11 Oct 2022 07:15          CAPILLARY BLOOD GLUCOSE            RADIOLOGY & ADDITIONAL TESTS:   < from: Xray Chest 1 View- PORTABLE-Urgent (10.09.22 @ 19:32) >  INTERPRETATION:  AP semierect chest on October 9, 2022 at 7:19 PM.   Patient has fever.    COMPARISON: None available.    Shallow inspiration crowds the chest. Heart magnified by technique.    Lungs are clear.    IMPRESSION: Clear lungs.    --- End of Report ---    < end of copied text >    Imaging Personally Reviewed:  YES    Consultant(s) Notes Reviewed:   YES    Plan of care was discussed with patient and /or primary care giver; all questions and concerns were addressed and care was aligned with patient's wishes.

## 2022-10-12 NOTE — PROGRESS NOTE ADULT - PROBLEM SELECTOR PLAN 9
patient from [facility] - LTC    can return back to facility once bed available   NCM to follow   COVID + 10/9  plan of care discussed with sister Marika patient from [facility] - LTC    can return back to facility once bed available   NCM to follow   COVID + 10/9  plan of care discussed with sister Marika patient from [facility] - LTC    can return back to facility once bed available   NCM to follow   COVID + 10/9  plan of care discussed with sister Marika

## 2022-10-13 RX ADMIN — NICARDIPINE HYDROCHLORIDE 20 MILLIGRAM(S): 30 CAPSULE, EXTENDED RELEASE ORAL at 12:53

## 2022-10-13 RX ADMIN — FAMOTIDINE 40 MILLIGRAM(S): 10 INJECTION INTRAVENOUS at 21:48

## 2022-10-13 RX ADMIN — ENOXAPARIN SODIUM 40 MILLIGRAM(S): 100 INJECTION SUBCUTANEOUS at 05:36

## 2022-10-13 RX ADMIN — NICARDIPINE HYDROCHLORIDE 40 MILLIGRAM(S): 30 CAPSULE, EXTENDED RELEASE ORAL at 05:35

## 2022-10-13 RX ADMIN — POLYETHYLENE GLYCOL 3350 17 GRAM(S): 17 POWDER, FOR SOLUTION ORAL at 12:52

## 2022-10-13 RX ADMIN — Medication 40 MILLIGRAM(S): at 05:35

## 2022-10-13 RX ADMIN — Medication 1 TABLET(S): at 12:52

## 2022-10-13 RX ADMIN — LOSARTAN POTASSIUM 100 MILLIGRAM(S): 100 TABLET, FILM COATED ORAL at 05:35

## 2022-10-13 RX ADMIN — Medication 500 MILLIGRAM(S): at 12:53

## 2022-10-13 RX ADMIN — SIMVASTATIN 10 MILLIGRAM(S): 20 TABLET, FILM COATED ORAL at 21:47

## 2022-10-13 RX ADMIN — Medication 81 MILLIGRAM(S): at 12:52

## 2022-10-13 RX ADMIN — Medication 500 MILLIGRAM(S): at 05:36

## 2022-10-13 RX ADMIN — Medication 500 MILLIGRAM(S): at 18:45

## 2022-10-13 RX ADMIN — NICARDIPINE HYDROCHLORIDE 20 MILLIGRAM(S): 30 CAPSULE, EXTENDED RELEASE ORAL at 21:49

## 2022-10-13 RX ADMIN — SERTRALINE 75 MILLIGRAM(S): 25 TABLET, FILM COATED ORAL at 12:53

## 2022-10-13 NOTE — PROGRESS NOTE ADULT - ASSESSMENT
76 female from [facility] PMHx HTN, HLD, 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 Found to be COVID+, did not require further treatment as she remains asymptomatic. Plan discussed with sister Marika, all questions answered, support provided. 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 Found to be COVID+, did not require further treatment as she remains asymptomatic. Plan discussed with sister Marika, all questions answered, support provided. 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 Found to be COVID+, did not require further treatment as she remains asymptomatic. Plan discussed with sister Marika, all questions answered, support provided. Family requested formal speech and swallow evaluation, diet advanced as tolerated Now optimized for discharge back to LTC facility, pending COVID bed availability

## 2022-10-13 NOTE — PROGRESS NOTE ADULT - SUBJECTIVE AND OBJECTIVE BOX
NP Note discussed with  Primary Attending    Patient is a 76y old  Female who presents with a chief complaint of Fever (12 Oct 2022 15:56)      INTERVAL HPI/OVERNIGHT EVENTS: no new complaints    MEDICATIONS  (STANDING):  ascorbic acid 500 milliGRAM(s) Oral daily  aspirin enteric coated 81 milliGRAM(s) Oral daily  enoxaparin Injectable 40 milliGRAM(s) SubCutaneous every 24 hours  famotidine    Tablet 40 milliGRAM(s) Oral at bedtime  furosemide    Tablet 40 milliGRAM(s) Oral daily  losartan 100 milliGRAM(s) Oral daily  multivitamin 1 Tablet(s) Oral daily  niCARdipine 40 milliGRAM(s) Oral <User Schedule>  niCARdipine 20 milliGRAM(s) Oral <User Schedule>  polyethylene glycol 3350 17 Gram(s) Oral daily  senna 2 Tablet(s) Oral at bedtime  sertraline 75 milliGRAM(s) Oral daily  simvastatin 10 milliGRAM(s) Oral at bedtime  valproic  acid Syrup 500 milliGRAM(s) Oral every 12 hours    MEDICATIONS  (PRN):  acetaminophen     Tablet .. 650 milliGRAM(s) Oral every 6 hours PRN Temp greater or equal to 38C (100.4F), Mild Pain (1 - 3)  guaifenesin/dextromethorphan Oral Liquid 10 milliLiter(s) Oral every 4 hours PRN Cough      __________________________________________________  REVIEW OF SYSTEMS:    unable to assess poor historian       Vital Signs Last 24 Hrs  T(C): 36.4 (13 Oct 2022 13:53), Max: 36.9 (12 Oct 2022 20:12)  T(F): 97.6 (13 Oct 2022 13:53), Max: 98.5 (12 Oct 2022 20:12)  HR: 72 (13 Oct 2022 13:53) (65 - 81)  BP: 148/71 (13 Oct 2022 13:53) (147/78 - 159/77)  BP(mean): --  RR: 16 (13 Oct 2022 13:53) (16 - 18)  SpO2: 100% (13 Oct 2022 13:53) (98% - 100%)    Parameters below as of 13 Oct 2022 13:53  Patient On (Oxygen Delivery Method): room air        ________________________________________________  PHYSICAL EXAM:  GENERAL: chronically ill appearing   HEENT: B/L temporal waisting   NECK : supple  CHEST/LUNG: poor effort   HEART: S1 S2  regular; no murmurs, gallops or rubs  ABDOMEN: Soft, Nontender, Nondistended; Bowel sounds present  EXTREMITIES: right sided hemiparesis +1 pitting edema B/L LE   SKIN: warm and dry; no rash  NERVOUS SYSTEM: awake and alert aphasic   _________________________________________________  LABS:              CAPILLARY BLOOD GLUCOSE            RADIOLOGY & ADDITIONAL TESTS:  < from: Xray Chest 1 View- PORTABLE-Urgent (10.09.22 @ 19:32) >    INTERPRETATION:  AP semierect chest on October 9, 2022 at 7:19 PM.   Patient has fever.    COMPARISON: None available.    Shallow inspiration crowds the chest. Heart magnified by technique.    Lungs are clear.    IMPRESSION: Clear lungs.    --- End of Report ---    < end of copied text >    Imaging  Reviewed:  YES    Consultant(s) Notes Reviewed:   YES      Plan of care was discussed with patient and /or primary care giver; all questions and concerns were addressed

## 2022-10-13 NOTE — PROGRESS NOTE ADULT - PROBLEM SELECTOR PLAN 9
patient from [facility] - LTC    can return back to facility once bed available   NCM to follow   COVID + 10/9 patient from [facility] - LTC    can return back to facility once bed available   NCM to follow   COVID + 10/9 patient from [facility] - LTC    can return back to facility once bed available   NCM to follow   COVID + 10/9

## 2022-10-14 LAB
SARS-COV-2 RNA SPEC QL NAA+PROBE: DETECTED

## 2022-10-14 RX ADMIN — NICARDIPINE HYDROCHLORIDE 40 MILLIGRAM(S): 30 CAPSULE, EXTENDED RELEASE ORAL at 06:37

## 2022-10-14 RX ADMIN — Medication 500 MILLIGRAM(S): at 17:04

## 2022-10-14 RX ADMIN — FAMOTIDINE 40 MILLIGRAM(S): 10 INJECTION INTRAVENOUS at 22:13

## 2022-10-14 RX ADMIN — Medication 81 MILLIGRAM(S): at 11:03

## 2022-10-14 RX ADMIN — SERTRALINE 75 MILLIGRAM(S): 25 TABLET, FILM COATED ORAL at 11:02

## 2022-10-14 RX ADMIN — ENOXAPARIN SODIUM 40 MILLIGRAM(S): 100 INJECTION SUBCUTANEOUS at 05:53

## 2022-10-14 RX ADMIN — NICARDIPINE HYDROCHLORIDE 20 MILLIGRAM(S): 30 CAPSULE, EXTENDED RELEASE ORAL at 14:26

## 2022-10-14 RX ADMIN — SIMVASTATIN 10 MILLIGRAM(S): 20 TABLET, FILM COATED ORAL at 22:12

## 2022-10-14 RX ADMIN — Medication 500 MILLIGRAM(S): at 11:03

## 2022-10-14 RX ADMIN — SENNA PLUS 2 TABLET(S): 8.6 TABLET ORAL at 22:12

## 2022-10-14 RX ADMIN — NICARDIPINE HYDROCHLORIDE 20 MILLIGRAM(S): 30 CAPSULE, EXTENDED RELEASE ORAL at 22:12

## 2022-10-14 RX ADMIN — LOSARTAN POTASSIUM 100 MILLIGRAM(S): 100 TABLET, FILM COATED ORAL at 06:37

## 2022-10-14 RX ADMIN — Medication 40 MILLIGRAM(S): at 06:36

## 2022-10-14 RX ADMIN — POLYETHYLENE GLYCOL 3350 17 GRAM(S): 17 POWDER, FOR SOLUTION ORAL at 11:02

## 2022-10-14 RX ADMIN — Medication 1 TABLET(S): at 11:03

## 2022-10-14 RX ADMIN — Medication 500 MILLIGRAM(S): at 06:38

## 2022-10-14 NOTE — PROGRESS NOTE ADULT - PROBLEM SELECTOR PLAN 6
c/w senna and Miralax (home meds)

## 2022-10-14 NOTE — PROGRESS NOTE ADULT - PROBLEM SELECTOR PLAN 1
sent in from facility for fevers   + COVID PCR   CXR wnl, UA neg for infection   No indication for medical COVID tx at this time as patient is not hypoxic   blood and urine cultures negative   isolation precautions   c/w supportive care

## 2022-10-14 NOTE — PROGRESS NOTE ADULT - PROBLEM SELECTOR PLAN 3
controlled  SBP 140s  c/w home BP regimen (losartan, nicardipine) with parameters   monitor BP
controlled  SBP 140s  c/w home BP regimen (losartan, nicardipine) with parameters   monitor BP
controlled  SBP 140s  c/w home BP regimen (losartan, nicardipine)
controlled  SBP 140s  c/w home BP regimen (losartan, nicardipine) with parameters   monitor BP

## 2022-10-14 NOTE — DIETITIAN INITIAL EVALUATION ADULT - OTHER INFO
76 years old female visited early today Covid-19+. Pt only could answer yes or no questions. Seen and evaluated by SLP with recommendation for soft and bite sized diet thin liquids. Tolerates diet well with fair PO intake reported. Nursing home chart reviewed diet: Ground consistency Nectar thick liquids. Pt is pending discharge to LTC facility.

## 2022-10-14 NOTE — DIETITIAN INITIAL EVALUATION ADULT - ADD RECOMMEND
Least restrictive diet. Mighty shake with meals provided. Maintain aspiration precaution, RD remains available.

## 2022-10-14 NOTE — PROGRESS NOTE ADULT - ASSESSMENT
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 Found to be COVID+, did not require further treatment as she remains asymptomatic. Plan discussed with sister Marika, all questions answered, support provided. 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 Found to be COVID+, did not require further treatment as she remains asymptomatic. Plan discussed with sister Marika, all questions answered, support provided. 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 Found to be COVID+, did not require further treatment as she remains asymptomatic. Plan discussed with sister Marika, all questions answered, support provided. Family requested formal speech and swallow evaluation, diet advanced as tolerated Now optimized for discharge back to LTC facility, pending COVID bed availability

## 2022-10-14 NOTE — PROGRESS NOTE ADULT - PROBLEM SELECTOR PLAN 2
UA RBC >50 tmtc  HBG stable   no gross hematuria observed  consider Urology consult if shows clinical signs   monitor HGB
UA RBC >50 tmtc  no gross hematuria observed  consider Urology consult if shows clinical signs   monitor HGB

## 2022-10-14 NOTE — PROGRESS NOTE ADULT - SUBJECTIVE AND OBJECTIVE BOX
NP Note discussed with  Primary Attending    Patient is a 76y old  Female who presents with a chief complaint of Fever (13 Oct 2022 17:50)      INTERVAL HPI/OVERNIGHT EVENTS: no new complaints    MEDICATIONS  (STANDING):  ascorbic acid 500 milliGRAM(s) Oral daily  aspirin enteric coated 81 milliGRAM(s) Oral daily  enoxaparin Injectable 40 milliGRAM(s) SubCutaneous every 24 hours  famotidine    Tablet 40 milliGRAM(s) Oral at bedtime  furosemide    Tablet 40 milliGRAM(s) Oral daily  losartan 100 milliGRAM(s) Oral daily  multivitamin 1 Tablet(s) Oral daily  niCARdipine 40 milliGRAM(s) Oral <User Schedule>  niCARdipine 20 milliGRAM(s) Oral <User Schedule>  polyethylene glycol 3350 17 Gram(s) Oral daily  senna 2 Tablet(s) Oral at bedtime  sertraline 75 milliGRAM(s) Oral daily  simvastatin 10 milliGRAM(s) Oral at bedtime  valproic  acid Syrup 500 milliGRAM(s) Oral every 12 hours    MEDICATIONS  (PRN):  acetaminophen     Tablet .. 650 milliGRAM(s) Oral every 6 hours PRN Temp greater or equal to 38C (100.4F), Mild Pain (1 - 3)  guaifenesin/dextromethorphan Oral Liquid 10 milliLiter(s) Oral every 4 hours PRN Cough      __________________________________________________  REVIEW OF SYSTEMS:    unable to assess poor historian       Vital Signs Last 24 Hrs  T(C): 36 (14 Oct 2022 06:25), Max: 36.8 (13 Oct 2022 20:09)  T(F): 96.8 (14 Oct 2022 06:25), Max: 98.3 (13 Oct 2022 20:09)  HR: 64 (14 Oct 2022 06:25) (64 - 90)  BP: 163/80 (14 Oct 2022 06:25) (140/71 - 163/80)  BP(mean): --  RR: 17 (13 Oct 2022 20:09) (16 - 17)  SpO2: 100% (14 Oct 2022 06:25) (100% - 100%)    Parameters below as of 14 Oct 2022 06:25  Patient On (Oxygen Delivery Method): room air        ________________________________________________  PHYSICAL EXAM:  GENERAL: chronically ill appearing   HEENT: B/L temporal waisting   NECK : supple  CHEST/LUNG: poor effort   HEART: S1 S2  regular; no murmurs, gallops or rubs  ABDOMEN: Soft, Nontender, Nondistended; Bowel sounds present  EXTREMITIES: right sided hemiparesis +1 pitting edema B/L LE   SKIN: warm and dry; no rash  NERVOUS SYSTEM: awake and alert aphasic     _________________________________________________  LABS:      CAPILLARY BLOOD GLUCOSE      RADIOLOGY & ADDITIONAL TESTS:  < from: Xray Chest 1 View- PORTABLE-Urgent (10.09.22 @ 19:32) >    INTERPRETATION:  AP semierect chest on October 9, 2022 at 7:19 PM.   Patient has fever.    COMPARISON: None available.    Shallow inspiration crowds the chest. Heart magnified by technique.    Lungs are clear.    IMPRESSION: Clear lungs.    --- End of Report ---    < end of copied text >    Imaging  Reviewed:  YES    Consultant(s) Notes Reviewed:   YES      Plan of care was discussed with patient and /or primary care giver; all questions and concerns were addressed

## 2022-10-14 NOTE — PROGRESS NOTE ADULT - PROBLEM SELECTOR PLAN 7
c/w sertraline (home med)

## 2022-10-14 NOTE — DIETITIAN INITIAL EVALUATION ADULT - FACTORS AFF FOOD INTAKE
acute to chronic illness Fever, CVA hemiplegia Covid-19+, MDD, chronic constipation./other (specify)

## 2022-10-14 NOTE — PROGRESS NOTE ADULT - PROBLEM SELECTOR PLAN 9
patient from [facility] - LTC    possible 10/17 discharge to LTC  COVID + 10/14- f/u 10/17  will hold AM blood work patient from [facility] - LTC    possible 10/17 discharge to LTC  COVID + 10/14- f/u 10/17  will hold AM blood work patient from [facility] - LTC    possible 10/17 discharge to LTC  COVID + 10/14- f/u 10/17  will hold AM blood work

## 2022-10-14 NOTE — DIETITIAN INITIAL EVALUATION ADULT - PERTINENT MEDS FT
MEDICATIONS  (STANDING):  ascorbic acid 500 milliGRAM(s) Oral daily  aspirin enteric coated 81 milliGRAM(s) Oral daily  enoxaparin Injectable 40 milliGRAM(s) SubCutaneous every 24 hours  famotidine    Tablet 40 milliGRAM(s) Oral at bedtime  furosemide    Tablet 40 milliGRAM(s) Oral daily  losartan 100 milliGRAM(s) Oral daily  multivitamin 1 Tablet(s) Oral daily  niCARdipine 40 milliGRAM(s) Oral <User Schedule>  niCARdipine 20 milliGRAM(s) Oral <User Schedule>  polyethylene glycol 3350 17 Gram(s) Oral daily  senna 2 Tablet(s) Oral at bedtime  sertraline 75 milliGRAM(s) Oral daily  simvastatin 10 milliGRAM(s) Oral at bedtime  valproic  acid Syrup 500 milliGRAM(s) Oral every 12 hours    MEDICATIONS  (PRN):  acetaminophen     Tablet .. 650 milliGRAM(s) Oral every 6 hours PRN Temp greater or equal to 38C (100.4F), Mild Pain (1 - 3)  guaifenesin/dextromethorphan Oral Liquid 10 milliLiter(s) Oral every 4 hours PRN Cough

## 2022-10-15 LAB
ANION GAP SERPL CALC-SCNC: 7 MMOL/L — SIGNIFICANT CHANGE UP (ref 5–17)
BUN SERPL-MCNC: 26 MG/DL — HIGH (ref 7–18)
CALCIUM SERPL-MCNC: 8 MG/DL — LOW (ref 8.4–10.5)
CHLORIDE SERPL-SCNC: 109 MMOL/L — HIGH (ref 96–108)
CO2 SERPL-SCNC: 26 MMOL/L — SIGNIFICANT CHANGE UP (ref 22–31)
CREAT SERPL-MCNC: 1.62 MG/DL — HIGH (ref 0.5–1.3)
CULTURE RESULTS: SIGNIFICANT CHANGE UP
EGFR: 33 ML/MIN/1.73M2 — LOW
GLUCOSE SERPL-MCNC: 89 MG/DL — SIGNIFICANT CHANGE UP (ref 70–99)
HCT VFR BLD CALC: 28.8 % — LOW (ref 34.5–45)
HGB BLD-MCNC: 9.3 G/DL — LOW (ref 11.5–15.5)
MCHC RBC-ENTMCNC: 28.6 PG — SIGNIFICANT CHANGE UP (ref 27–34)
MCHC RBC-ENTMCNC: 32.3 GM/DL — SIGNIFICANT CHANGE UP (ref 32–36)
MCV RBC AUTO: 88.6 FL — SIGNIFICANT CHANGE UP (ref 80–100)
NRBC # BLD: 0 /100 WBCS — SIGNIFICANT CHANGE UP (ref 0–0)
PLATELET # BLD AUTO: 261 K/UL — SIGNIFICANT CHANGE UP (ref 150–400)
POTASSIUM SERPL-MCNC: 3.5 MMOL/L — SIGNIFICANT CHANGE UP (ref 3.5–5.3)
POTASSIUM SERPL-SCNC: 3.5 MMOL/L — SIGNIFICANT CHANGE UP (ref 3.5–5.3)
RBC # BLD: 3.25 M/UL — LOW (ref 3.8–5.2)
RBC # FLD: 15 % — HIGH (ref 10.3–14.5)
SODIUM SERPL-SCNC: 142 MMOL/L — SIGNIFICANT CHANGE UP (ref 135–145)
SPECIMEN SOURCE: SIGNIFICANT CHANGE UP
WBC # BLD: 9.24 K/UL — SIGNIFICANT CHANGE UP (ref 3.8–10.5)
WBC # FLD AUTO: 9.24 K/UL — SIGNIFICANT CHANGE UP (ref 3.8–10.5)

## 2022-10-15 RX ADMIN — Medication 500 MILLIGRAM(S): at 05:53

## 2022-10-15 RX ADMIN — Medication 500 MILLIGRAM(S): at 11:11

## 2022-10-15 RX ADMIN — NICARDIPINE HYDROCHLORIDE 40 MILLIGRAM(S): 30 CAPSULE, EXTENDED RELEASE ORAL at 05:52

## 2022-10-15 RX ADMIN — POLYETHYLENE GLYCOL 3350 17 GRAM(S): 17 POWDER, FOR SOLUTION ORAL at 11:10

## 2022-10-15 RX ADMIN — NICARDIPINE HYDROCHLORIDE 20 MILLIGRAM(S): 30 CAPSULE, EXTENDED RELEASE ORAL at 22:18

## 2022-10-15 RX ADMIN — SERTRALINE 75 MILLIGRAM(S): 25 TABLET, FILM COATED ORAL at 12:06

## 2022-10-15 RX ADMIN — Medication 1 TABLET(S): at 11:11

## 2022-10-15 RX ADMIN — Medication 81 MILLIGRAM(S): at 11:11

## 2022-10-15 RX ADMIN — FAMOTIDINE 40 MILLIGRAM(S): 10 INJECTION INTRAVENOUS at 22:17

## 2022-10-15 RX ADMIN — Medication 500 MILLIGRAM(S): at 17:25

## 2022-10-15 RX ADMIN — NICARDIPINE HYDROCHLORIDE 20 MILLIGRAM(S): 30 CAPSULE, EXTENDED RELEASE ORAL at 14:32

## 2022-10-15 RX ADMIN — ENOXAPARIN SODIUM 40 MILLIGRAM(S): 100 INJECTION SUBCUTANEOUS at 05:51

## 2022-10-15 RX ADMIN — SIMVASTATIN 10 MILLIGRAM(S): 20 TABLET, FILM COATED ORAL at 22:17

## 2022-10-15 RX ADMIN — SENNA PLUS 2 TABLET(S): 8.6 TABLET ORAL at 22:17

## 2022-10-15 RX ADMIN — Medication 40 MILLIGRAM(S): at 05:52

## 2022-10-15 RX ADMIN — LOSARTAN POTASSIUM 100 MILLIGRAM(S): 100 TABLET, FILM COATED ORAL at 05:52

## 2022-10-15 NOTE — PROGRESS NOTE ADULT - SUBJECTIVE AND OBJECTIVE BOX
INTERVAL HPI/OVERNIGHT EVENTS:  Patient seen,stablel clinically  VITAL SIGNS:  T(F): 96.8 (10-15-22 @ 12:00)  HR: 66 (10-15-22 @ 12:00)  BP: 144/78 (10-15-22 @ 12:00)  RR: 18 (10-15-22 @ 12:00)  SpO2: 98% (10-15-22 @ 12:00)  Wt(kg): --    PHYSICAL EXAM:  awke  Constitutional:  Eyes:  ENMT:perrla  Neck:  Respiratory:few rales  Cardiovascular:s1s2,m-none  Gastrointestinal:soft,bs pos  Extremities:  Vascular:  Neurological:no focal deficit  Musculoskeletal:    MEDICATIONS  (STANDING):  ascorbic acid 500 milliGRAM(s) Oral daily  aspirin enteric coated 81 milliGRAM(s) Oral daily  enoxaparin Injectable 40 milliGRAM(s) SubCutaneous every 24 hours  famotidine    Tablet 40 milliGRAM(s) Oral at bedtime  furosemide    Tablet 40 milliGRAM(s) Oral daily  losartan 100 milliGRAM(s) Oral daily  multivitamin 1 Tablet(s) Oral daily  niCARdipine 40 milliGRAM(s) Oral <User Schedule>  niCARdipine 20 milliGRAM(s) Oral <User Schedule>  polyethylene glycol 3350 17 Gram(s) Oral daily  senna 2 Tablet(s) Oral at bedtime  sertraline 75 milliGRAM(s) Oral daily  simvastatin 10 milliGRAM(s) Oral at bedtime  valproic  acid Syrup 500 milliGRAM(s) Oral every 12 hours    MEDICATIONS  (PRN):  acetaminophen     Tablet .. 650 milliGRAM(s) Oral every 6 hours PRN Temp greater or equal to 38C (100.4F), Mild Pain (1 - 3)  guaifenesin/dextromethorphan Oral Liquid 10 milliLiter(s) Oral every 4 hours PRN Cough      Allergies    penicillins (Unknown)    Intolerances        LABS:                        9.3    9.24  )-----------( 261      ( 15 Oct 2022 07:16 )             28.8     10-15    142  |  109<H>  |  26<H>  ----------------------------<  89  3.5   |  26  |  1.62<H>    Ca    8.0<L>      15 Oct 2022 07:16            RADIOLOGY & ADDITIONAL TESTS:      Assessment and Plan:   · Assessment	  76 female from [facility] PMHx HTN, HLD, 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 Found to be COVID+, did not require further treatment as she remains asymptomatic. Plan discussed with sister Marika, all questions answered, support provided. Family requested formal speech and swallow evaluation, diet advanced as tolerated Now optimized for discharge back to LTC facility, pending COVID bed availability        Problem/Plan - 1:  ·  Problem: 2019 novel coronavirus disease (COVID-19).   ·  Plan: sent in from facility for fevers   + COVID PCR   CXR wnl, UA neg for infection   No indication for medical COVID tx at this time as patient is not hypoxic   blood and urine cultures negative   isolation precautions   c/w supportive care.  d/c planning     Problem/Plan - 2:  ·  Problem: Hematuria, unspecified.   ·  Plan: UA RBC >50 tmtc  HBG stable   no gross hematuria observed  consider Urology consult if shows clinical signs   monitor HGB.     Problem/Plan - 3:  ·  Problem: HTN (hypertension).   ·  Plan: controlled  SBP 140s  c/w home BP regimen (losartan, nicardipine) with parameters   monitor BP.     Problem/Plan - 4:  ·  Problem: HLD (hyperlipidemia).   ·  Plan: c/w statin (home med).     Problem/Plan - 5:  ·  Problem: Seizures.   ·  Plan: c/w divalproex (home med).     Problem/Plan - 6:  ·  Problem: Chronic constipation.   ·  Plan: c/w senna and Miralax (home meds).     Problem/Plan - 7:  ·  Problem: Major depressive disorder.   ·  Plan: c/w sertraline (home med).     Problem/Plan - 8:  ·  Problem: Prophylactic measure.   ·  Plan: Lovenox for DVT ppx.     Problem/Plan - 9:  ·  Problem: Discharge planning issues.   ·  Plan: patient from [facility] - LT    possible 10/17 discharge to LTC  COVID + 10/14- f/u 10/17  will hold AM blood work.   INTERVAL HPI/OVERNIGHT EVENTS:  Patient seen,stablel clinically  VITAL SIGNS:  T(F): 96.8 (10-15-22 @ 12:00)  HR: 66 (10-15-22 @ 12:00)  BP: 144/78 (10-15-22 @ 12:00)  RR: 18 (10-15-22 @ 12:00)  SpO2: 98% (10-15-22 @ 12:00)  Wt(kg): --    PHYSICAL EXAM:  awke  Constitutional:  Eyes:  ENMT:perrla  Neck:  Respiratory:few rales  Cardiovascular:s1s2,m-none  Gastrointestinal:soft,bs pos  Extremities:  Vascular:  Neurological:no focal deficit  Musculoskeletal:    MEDICATIONS  (STANDING):  ascorbic acid 500 milliGRAM(s) Oral daily  aspirin enteric coated 81 milliGRAM(s) Oral daily  enoxaparin Injectable 40 milliGRAM(s) SubCutaneous every 24 hours  famotidine    Tablet 40 milliGRAM(s) Oral at bedtime  furosemide    Tablet 40 milliGRAM(s) Oral daily  losartan 100 milliGRAM(s) Oral daily  multivitamin 1 Tablet(s) Oral daily  niCARdipine 40 milliGRAM(s) Oral <User Schedule>  niCARdipine 20 milliGRAM(s) Oral <User Schedule>  polyethylene glycol 3350 17 Gram(s) Oral daily  senna 2 Tablet(s) Oral at bedtime  sertraline 75 milliGRAM(s) Oral daily  simvastatin 10 milliGRAM(s) Oral at bedtime  valproic  acid Syrup 500 milliGRAM(s) Oral every 12 hours    MEDICATIONS  (PRN):  acetaminophen     Tablet .. 650 milliGRAM(s) Oral every 6 hours PRN Temp greater or equal to 38C (100.4F), Mild Pain (1 - 3)  guaifenesin/dextromethorphan Oral Liquid 10 milliLiter(s) Oral every 4 hours PRN Cough      Allergies    penicillins (Unknown)    Intolerances        LABS:                        9.3    9.24  )-----------( 261      ( 15 Oct 2022 07:16 )             28.8     10-15    142  |  109<H>  |  26<H>  ----------------------------<  89  3.5   |  26  |  1.62<H>    Ca    8.0<L>      15 Oct 2022 07:16            RADIOLOGY & ADDITIONAL TESTS:      Assessment and Plan:   · Assessment	  76 female from [facility] PMHx HTN, HLD, 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 Found to be COVID+, did not require further treatment as she remains asymptomatic. Plan discussed with sister Marika, all questions answered, support provided. Family requested formal speech and swallow evaluation, diet advanced as tolerated Now optimized for discharge back to LTC facility, pending COVID bed availability        Problem/Plan - 1:  ·  Problem: 2019 novel coronavirus disease (COVID-19).   ·  Plan: sent in from facility for fevers   + COVID PCR   CXR wnl, UA neg for infection   No indication for medical COVID tx at this time as patient is not hypoxic   blood and urine cultures negative   isolation precautions   c/w supportive care.  d/c planning     Problem/Plan - 2:  ·  Problem: Hematuria, unspecified.   ·  Plan: UA RBC >50 tmtc  HBG stable   no gross hematuria observed  consider Urology consult if shows clinical signs   monitor HGB.     Problem/Plan - 3:  ·  Problem: HTN (hypertension).   ·  Plan: controlled  SBP 140s  c/w home BP regimen (losartan, nicardipine) with parameters   monitor BP.     Problem/Plan - 4:  ·  Problem: HLD (hyperlipidemia).   ·  Plan: c/w statin (home med).     Problem/Plan - 5:  ·  Problem: Seizures.   ·  Plan: c/w divalproex (home med).     Problem/Plan - 6:  ·  Problem: Chronic constipation.   ·  Plan: c/w senna and Miralax (home meds).     Problem/Plan - 7:  ·  Problem: Major depressive disorder.   ·  Plan: c/w sertraline (home med).     Problem/Plan - 8:  ·  Problem: Prophylactic measure.   ·  Plan: Lovenox for DVT ppx.     Problem/Plan - 9:  ·  Problem: Discharge planning issues.   ·  Plan: patient from [facility] - LT    possible 10/17 discharge to LTC  COVID + 10/14- f/u 10/17  will hold AM blood work.   INTERVAL HPI/OVERNIGHT EVENTS:  Patient seen,stablel clinically  VITAL SIGNS:  T(F): 96.8 (10-15-22 @ 12:00)  HR: 66 (10-15-22 @ 12:00)  BP: 144/78 (10-15-22 @ 12:00)  RR: 18 (10-15-22 @ 12:00)  SpO2: 98% (10-15-22 @ 12:00)  Wt(kg): --    PHYSICAL EXAM:  awke  Constitutional:  Eyes:  ENMT:perrla  Neck:  Respiratory:few rales  Cardiovascular:s1s2,m-none  Gastrointestinal:soft,bs pos  Extremities:  Vascular:  Neurological:no focal deficit  Musculoskeletal:    MEDICATIONS  (STANDING):  ascorbic acid 500 milliGRAM(s) Oral daily  aspirin enteric coated 81 milliGRAM(s) Oral daily  enoxaparin Injectable 40 milliGRAM(s) SubCutaneous every 24 hours  famotidine    Tablet 40 milliGRAM(s) Oral at bedtime  furosemide    Tablet 40 milliGRAM(s) Oral daily  losartan 100 milliGRAM(s) Oral daily  multivitamin 1 Tablet(s) Oral daily  niCARdipine 40 milliGRAM(s) Oral <User Schedule>  niCARdipine 20 milliGRAM(s) Oral <User Schedule>  polyethylene glycol 3350 17 Gram(s) Oral daily  senna 2 Tablet(s) Oral at bedtime  sertraline 75 milliGRAM(s) Oral daily  simvastatin 10 milliGRAM(s) Oral at bedtime  valproic  acid Syrup 500 milliGRAM(s) Oral every 12 hours    MEDICATIONS  (PRN):  acetaminophen     Tablet .. 650 milliGRAM(s) Oral every 6 hours PRN Temp greater or equal to 38C (100.4F), Mild Pain (1 - 3)  guaifenesin/dextromethorphan Oral Liquid 10 milliLiter(s) Oral every 4 hours PRN Cough      Allergies    penicillins (Unknown)    Intolerances        LABS:                        9.3    9.24  )-----------( 261      ( 15 Oct 2022 07:16 )             28.8     10-15    142  |  109<H>  |  26<H>  ----------------------------<  89  3.5   |  26  |  1.62<H>    Ca    8.0<L>      15 Oct 2022 07:16            RADIOLOGY & ADDITIONAL TESTS:      Assessment and Plan:   · Assessment	  76 female from [facility] PMHx HTN, HLD, 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 Found to be COVID+, did not require further treatment as she remains asymptomatic. Plan discussed with sister Marika, all questions answered, support provided. Family requested formal speech and swallow evaluation, diet advanced as tolerated Now optimized for discharge back to LTC facility, pending COVID bed availability        Problem/Plan - 1:  ·  Problem: 2019 novel coronavirus disease (COVID-19).   ·  Plan: sent in from facility for fevers   + COVID PCR   CXR wnl, UA neg for infection   No indication for medical COVID tx at this time as patient is not hypoxic   blood and urine cultures negative   isolation precautions   c/w supportive care.  d/c planning     Problem/Plan - 2:  ·  Problem: Hematuria, unspecified.   ·  Plan: UA RBC >50 tmtc  HBG stable   no gross hematuria observed  consider Urology consult if shows clinical signs   monitor HGB.     Problem/Plan - 3:  ·  Problem: HTN (hypertension).   ·  Plan: controlled  SBP 140s  c/w home BP regimen (losartan, nicardipine) with parameters   monitor BP.     Problem/Plan - 4:  ·  Problem: HLD (hyperlipidemia).   ·  Plan: c/w statin (home med).     Problem/Plan - 5:  ·  Problem: Seizures.   ·  Plan: c/w divalproex (home med).     Problem/Plan - 6:  ·  Problem: Chronic constipation.   ·  Plan: c/w senna and Miralax (home meds).     Problem/Plan - 7:  ·  Problem: Major depressive disorder.   ·  Plan: c/w sertraline (home med).     Problem/Plan - 8:  ·  Problem: Prophylactic measure.   ·  Plan: Lovenox for DVT ppx.     Problem/Plan - 9:  ·  Problem: Discharge planning issues.   ·  Plan: patient from [facility] - LT    possible 10/17 discharge to LTC  COVID + 10/14- f/u 10/17  will hold AM blood work.

## 2022-10-16 LAB
ANION GAP SERPL CALC-SCNC: 8 MMOL/L — SIGNIFICANT CHANGE UP (ref 5–17)
BUN SERPL-MCNC: 25 MG/DL — HIGH (ref 7–18)
CALCIUM SERPL-MCNC: 8.5 MG/DL — SIGNIFICANT CHANGE UP (ref 8.4–10.5)
CHLORIDE SERPL-SCNC: 108 MMOL/L — SIGNIFICANT CHANGE UP (ref 96–108)
CO2 SERPL-SCNC: 26 MMOL/L — SIGNIFICANT CHANGE UP (ref 22–31)
CREAT SERPL-MCNC: 1.58 MG/DL — HIGH (ref 0.5–1.3)
EGFR: 34 ML/MIN/1.73M2 — LOW
GLUCOSE SERPL-MCNC: 87 MG/DL — SIGNIFICANT CHANGE UP (ref 70–99)
HCT VFR BLD CALC: 28.8 % — LOW (ref 34.5–45)
HGB BLD-MCNC: 9.1 G/DL — LOW (ref 11.5–15.5)
MCHC RBC-ENTMCNC: 27.7 PG — SIGNIFICANT CHANGE UP (ref 27–34)
MCHC RBC-ENTMCNC: 31.6 GM/DL — LOW (ref 32–36)
MCV RBC AUTO: 87.5 FL — SIGNIFICANT CHANGE UP (ref 80–100)
NRBC # BLD: 0 /100 WBCS — SIGNIFICANT CHANGE UP (ref 0–0)
PLATELET # BLD AUTO: 285 K/UL — SIGNIFICANT CHANGE UP (ref 150–400)
POTASSIUM SERPL-MCNC: 3.5 MMOL/L — SIGNIFICANT CHANGE UP (ref 3.5–5.3)
POTASSIUM SERPL-SCNC: 3.5 MMOL/L — SIGNIFICANT CHANGE UP (ref 3.5–5.3)
RBC # BLD: 3.29 M/UL — LOW (ref 3.8–5.2)
RBC # FLD: 14.8 % — HIGH (ref 10.3–14.5)
SODIUM SERPL-SCNC: 142 MMOL/L — SIGNIFICANT CHANGE UP (ref 135–145)
WBC # BLD: 9.81 K/UL — SIGNIFICANT CHANGE UP (ref 3.8–10.5)
WBC # FLD AUTO: 9.81 K/UL — SIGNIFICANT CHANGE UP (ref 3.8–10.5)

## 2022-10-16 RX ADMIN — Medication 500 MILLIGRAM(S): at 11:09

## 2022-10-16 RX ADMIN — Medication 40 MILLIGRAM(S): at 05:07

## 2022-10-16 RX ADMIN — ENOXAPARIN SODIUM 40 MILLIGRAM(S): 100 INJECTION SUBCUTANEOUS at 05:08

## 2022-10-16 RX ADMIN — NICARDIPINE HYDROCHLORIDE 20 MILLIGRAM(S): 30 CAPSULE, EXTENDED RELEASE ORAL at 22:35

## 2022-10-16 RX ADMIN — Medication 1 TABLET(S): at 11:09

## 2022-10-16 RX ADMIN — Medication 500 MILLIGRAM(S): at 05:08

## 2022-10-16 RX ADMIN — NICARDIPINE HYDROCHLORIDE 20 MILLIGRAM(S): 30 CAPSULE, EXTENDED RELEASE ORAL at 13:00

## 2022-10-16 RX ADMIN — FAMOTIDINE 40 MILLIGRAM(S): 10 INJECTION INTRAVENOUS at 22:34

## 2022-10-16 RX ADMIN — SENNA PLUS 2 TABLET(S): 8.6 TABLET ORAL at 22:34

## 2022-10-16 RX ADMIN — Medication 81 MILLIGRAM(S): at 11:09

## 2022-10-16 RX ADMIN — Medication 500 MILLIGRAM(S): at 17:11

## 2022-10-16 RX ADMIN — NICARDIPINE HYDROCHLORIDE 40 MILLIGRAM(S): 30 CAPSULE, EXTENDED RELEASE ORAL at 05:07

## 2022-10-16 RX ADMIN — SIMVASTATIN 10 MILLIGRAM(S): 20 TABLET, FILM COATED ORAL at 22:34

## 2022-10-16 RX ADMIN — SERTRALINE 75 MILLIGRAM(S): 25 TABLET, FILM COATED ORAL at 11:09

## 2022-10-16 RX ADMIN — POLYETHYLENE GLYCOL 3350 17 GRAM(S): 17 POWDER, FOR SOLUTION ORAL at 11:09

## 2022-10-16 RX ADMIN — LOSARTAN POTASSIUM 100 MILLIGRAM(S): 100 TABLET, FILM COATED ORAL at 05:08

## 2022-10-16 NOTE — PROGRESS NOTE ADULT - SUBJECTIVE AND OBJECTIVE BOX
INTERVAL HPI/OVERNIGHT EVENTS:  Patient seen,stable,no acute issues  VITAL SIGNS:  T(F): 97.8 (10-16-22 @ 06:04)  HR: 58 (10-16-22 @ 06:04)  BP: 152/70 (10-16-22 @ 06:04)  RR: 18 (10-16-22 @ 06:04)  SpO2: 100% (10-16-22 @ 06:04)  Wt(kg): --    PHYSICAL EXAM:  awake  Constitutional:  Eyes:  ENMT:perrla  Neck:  Respiratory:few rales  Cardiovascular:s1s2,m-none  Gastrointestinal:soft,,bs pos  Extremities:  Vascular:  Neurological:no focal deficit  Musculoskeletal:    MEDICATIONS  (STANDING):  ascorbic acid 500 milliGRAM(s) Oral daily  aspirin enteric coated 81 milliGRAM(s) Oral daily  enoxaparin Injectable 40 milliGRAM(s) SubCutaneous every 24 hours  famotidine    Tablet 40 milliGRAM(s) Oral at bedtime  furosemide    Tablet 40 milliGRAM(s) Oral daily  losartan 100 milliGRAM(s) Oral daily  multivitamin 1 Tablet(s) Oral daily  niCARdipine 40 milliGRAM(s) Oral <User Schedule>  niCARdipine 20 milliGRAM(s) Oral <User Schedule>  polyethylene glycol 3350 17 Gram(s) Oral daily  senna 2 Tablet(s) Oral at bedtime  sertraline 75 milliGRAM(s) Oral daily  simvastatin 10 milliGRAM(s) Oral at bedtime  valproic  acid Syrup 500 milliGRAM(s) Oral every 12 hours    MEDICATIONS  (PRN):  acetaminophen     Tablet .. 650 milliGRAM(s) Oral every 6 hours PRN Temp greater or equal to 38C (100.4F), Mild Pain (1 - 3)  guaifenesin/dextromethorphan Oral Liquid 10 milliLiter(s) Oral every 4 hours PRN Cough      Allergies    penicillins (Unknown)    Intolerances        LABS:                        9.1    9.81  )-----------( 285      ( 16 Oct 2022 06:57 )             28.8     10-16    142  |  108  |  25<H>  ----------------------------<  87  3.5   |  26  |  1.58<H>    Ca    8.5      16 Oct 2022 06:57            RADIOLOGY & ADDITIONAL TESTS:      Assessment and Plan:   · Assessment	  76 female from [facility] PMHx HTN, HLD, 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 Found to be COVID+, did not require further treatment as she remains asymptomatic. Plan discussed with sister Marika, all questions answered, support provided. Family requested formal speech and swallow evaluation, diet advanced as tolerated Now optimized for discharge back to LTC facility, pending COVID bed availability        Problem/Plan - 1:  ·  Problem: 2019 novel coronavirus disease (COVID-19).   ·  Plan: sent in from facility for fevers   + COVID PCR   isolation precautions   c/w supportive care.  d/c planning- on monday     Problem/Plan - 2:  ·  Problem: Hematuria, unspecified.   ·  Plan: UA RBC >50 tmtc  HBG stable   no gross hematuria observed  consider Urology consult if shows clinical signs   monitor HGB.     Problem/Plan - 3:  ·  Problem: HTN (hypertension).   ·  Plan: controlled  SBP 140s  c/w home BP regimen (losartan, nicardipine) with parameters   monitor BP.     Problem/Plan - 4:  ·  Problem: HLD (hyperlipidemia).   ·  Plan: c/w statin (home med).     Problem/Plan - 5:  ·  Problem: Seizures.   ·  Plan: c/w divalproex (home med).     Problem/Plan - 6:  ·  Problem: Chronic constipation.   ·  Plan: c/w senna and Miralax (home meds).     Problem/Plan - 7:  ·  Problem: Major depressive disorder.   ·  Plan: c/w sertraline (home med).     Problem/Plan - 8:  ·  Problem: Prophylactic measure.   ·  Plan: Lovenox for DVT ppx.     Problem/Plan - 9:  ·  Problem: Discharge planning issues.   ·  Plan: patient from [facility]    possible 10/17 discharge to LTC  COVID + 10/14- f/u 10/17  will hold AM blood work.       INTERVAL HPI/OVERNIGHT EVENTS:  Patient seen,stable,no acute issues  VITAL SIGNS:  T(F): 97.8 (10-16-22 @ 06:04)  HR: 58 (10-16-22 @ 06:04)  BP: 152/70 (10-16-22 @ 06:04)  RR: 18 (10-16-22 @ 06:04)  SpO2: 100% (10-16-22 @ 06:04)  Wt(kg): --    PHYSICAL EXAM:  awake  Constitutional:  Eyes:  ENMT:perrla  Neck:  Respiratory:few rales  Cardiovascular:s1s2,m-none  Gastrointestinal:soft,,bs pos  Extremities:  Vascular:  Neurological:no focal deficit  Musculoskeletal:    MEDICATIONS  (STANDING):  ascorbic acid 500 milliGRAM(s) Oral daily  aspirin enteric coated 81 milliGRAM(s) Oral daily  enoxaparin Injectable 40 milliGRAM(s) SubCutaneous every 24 hours  famotidine    Tablet 40 milliGRAM(s) Oral at bedtime  furosemide    Tablet 40 milliGRAM(s) Oral daily  losartan 100 milliGRAM(s) Oral daily  multivitamin 1 Tablet(s) Oral daily  niCARdipine 40 milliGRAM(s) Oral <User Schedule>  niCARdipine 20 milliGRAM(s) Oral <User Schedule>  polyethylene glycol 3350 17 Gram(s) Oral daily  senna 2 Tablet(s) Oral at bedtime  sertraline 75 milliGRAM(s) Oral daily  simvastatin 10 milliGRAM(s) Oral at bedtime  valproic  acid Syrup 500 milliGRAM(s) Oral every 12 hours    MEDICATIONS  (PRN):  acetaminophen     Tablet .. 650 milliGRAM(s) Oral every 6 hours PRN Temp greater or equal to 38C (100.4F), Mild Pain (1 - 3)  guaifenesin/dextromethorphan Oral Liquid 10 milliLiter(s) Oral every 4 hours PRN Cough      Allergies    penicillins (Unknown)    Intolerances        LABS:                        9.1    9.81  )-----------( 285      ( 16 Oct 2022 06:57 )             28.8     10-16    142  |  108  |  25<H>  ----------------------------<  87  3.5   |  26  |  1.58<H>    Ca    8.5      16 Oct 2022 06:57            RADIOLOGY & ADDITIONAL TESTS:      Assessment and Plan:   · Assessment	  76 female from LTAC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 Found to be COVID+, did not require further treatment as she remains asymptomatic. Plan discussed with sister Marika, all questions answered, support provided. Family requested formal speech and swallow evaluation, diet advanced as tolerated Now optimized for discharge back to LTC facility, pending COVID bed availability        Problem/Plan - 1:  ·  Problem: 2019 novel coronavirus disease (COVID-19).   ·  Plan: sent in from facility for fevers   + COVID PCR   isolation precautions   c/w supportive care.  d/c planning- on monday     Problem/Plan - 2:  ·  Problem: Hematuria, unspecified.   ·  Plan: UA RBC >50 tmtc  HBG stable   no gross hematuria observed  consider Urology consult if shows clinical signs   monitor HGB.     Problem/Plan - 3:  ·  Problem: HTN (hypertension).   ·  Plan: controlled  SBP 140s  c/w home BP regimen (losartan, nicardipine) with parameters   monitor BP.     Problem/Plan - 4:  ·  Problem: HLD (hyperlipidemia).   ·  Plan: c/w statin (home med).     Problem/Plan - 5:  ·  Problem: Seizures.   ·  Plan: c/w divalproex (home med).     Problem/Plan - 6:  ·  Problem: Chronic constipation.   ·  Plan: c/w senna and Miralax (home meds).     Problem/Plan - 7:  ·  Problem: Major depressive disorder.   ·  Plan: c/w sertraline (home med).     Problem/Plan - 8:  ·  Problem: Prophylactic measure.   ·  Plan: Lovenox for DVT ppx.     Problem/Plan - 9:  ·  Problem: Discharge planning issues.   ·  Plan: patient from LTAC[facility]    possible 10/17 discharge to LTC  COVID + 10/14- f/u 10/17  will hold AM blood work.       INTERVAL HPI/OVERNIGHT EVENTS:  Patient seen,stable,no acute issues  VITAL SIGNS:  T(F): 97.8 (10-16-22 @ 06:04)  HR: 58 (10-16-22 @ 06:04)  BP: 152/70 (10-16-22 @ 06:04)  RR: 18 (10-16-22 @ 06:04)  SpO2: 100% (10-16-22 @ 06:04)  Wt(kg): --    PHYSICAL EXAM:  awake  Constitutional:  Eyes:  ENMT:perrla  Neck:  Respiratory:few rales  Cardiovascular:s1s2,m-none  Gastrointestinal:soft,,bs pos  Extremities:  Vascular:  Neurological:no focal deficit  Musculoskeletal:    MEDICATIONS  (STANDING):  ascorbic acid 500 milliGRAM(s) Oral daily  aspirin enteric coated 81 milliGRAM(s) Oral daily  enoxaparin Injectable 40 milliGRAM(s) SubCutaneous every 24 hours  famotidine    Tablet 40 milliGRAM(s) Oral at bedtime  furosemide    Tablet 40 milliGRAM(s) Oral daily  losartan 100 milliGRAM(s) Oral daily  multivitamin 1 Tablet(s) Oral daily  niCARdipine 40 milliGRAM(s) Oral <User Schedule>  niCARdipine 20 milliGRAM(s) Oral <User Schedule>  polyethylene glycol 3350 17 Gram(s) Oral daily  senna 2 Tablet(s) Oral at bedtime  sertraline 75 milliGRAM(s) Oral daily  simvastatin 10 milliGRAM(s) Oral at bedtime  valproic  acid Syrup 500 milliGRAM(s) Oral every 12 hours    MEDICATIONS  (PRN):  acetaminophen     Tablet .. 650 milliGRAM(s) Oral every 6 hours PRN Temp greater or equal to 38C (100.4F), Mild Pain (1 - 3)  guaifenesin/dextromethorphan Oral Liquid 10 milliLiter(s) Oral every 4 hours PRN Cough      Allergies    penicillins (Unknown)    Intolerances        LABS:                        9.1    9.81  )-----------( 285      ( 16 Oct 2022 06:57 )             28.8     10-16    142  |  108  |  25<H>  ----------------------------<  87  3.5   |  26  |  1.58<H>    Ca    8.5      16 Oct 2022 06:57            RADIOLOGY & ADDITIONAL TESTS:      Assessment and Plan:   · Assessment	  76 female from LTAC, 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 Found to be COVID+, did not require further treatment as she remains asymptomatic. Plan discussed with sister Marika, all questions answered, support provided. Family requested formal speech and swallow evaluation, diet advanced as tolerated Now optimized for discharge back to LTC facility, pending COVID bed availability        Problem/Plan - 1:  ·  Problem: 2019 novel coronavirus disease (COVID-19).   ·  Plan: sent in from facility for fevers   + COVID PCR   isolation precautions   c/w supportive care.  d/c planning- on monday     Problem/Plan - 2:  ·  Problem: Hematuria, unspecified.   ·  Plan: UA RBC >50 tmtc  HBG stable   no gross hematuria observed  consider Urology consult if shows clinical signs   monitor HGB.     Problem/Plan - 3:  ·  Problem: HTN (hypertension).   ·  Plan: controlled  SBP 140s  c/w home BP regimen (losartan, nicardipine) with parameters   monitor BP.     Problem/Plan - 4:  ·  Problem: HLD (hyperlipidemia).   ·  Plan: c/w statin (home med).     Problem/Plan - 5:  ·  Problem: Seizures.   ·  Plan: c/w divalproex (home med).     Problem/Plan - 6:  ·  Problem: Chronic constipation.   ·  Plan: c/w senna and Miralax (home meds).     Problem/Plan - 7:  ·  Problem: Major depressive disorder.   ·  Plan: c/w sertraline (home med).     Problem/Plan - 8:  ·  Problem: Prophylactic measure.   ·  Plan: Lovenox for DVT ppx.     Problem/Plan - 9:  ·  Problem: Discharge planning issues.   ·  Plan: patient from LT[facility]    possible 10/17 discharge to LTC  COVID + 10/14- f/u 10/17  will hold AM blood work.

## 2022-10-17 ENCOUNTER — TRANSCRIPTION ENCOUNTER (OUTPATIENT)
Age: 77
End: 2022-10-17

## 2022-10-17 VITALS — RESPIRATION RATE: 16 BRPM | OXYGEN SATURATION: 96 % | HEART RATE: 72 BPM

## 2022-10-17 LAB
ANION GAP SERPL CALC-SCNC: 10 MMOL/L — SIGNIFICANT CHANGE UP (ref 5–17)
BUN SERPL-MCNC: 23 MG/DL — HIGH (ref 7–18)
CALCIUM SERPL-MCNC: 8.7 MG/DL — SIGNIFICANT CHANGE UP (ref 8.4–10.5)
CHLORIDE SERPL-SCNC: 105 MMOL/L — SIGNIFICANT CHANGE UP (ref 96–108)
CO2 SERPL-SCNC: 26 MMOL/L — SIGNIFICANT CHANGE UP (ref 22–31)
CREAT SERPL-MCNC: 1.62 MG/DL — HIGH (ref 0.5–1.3)
EGFR: 33 ML/MIN/1.73M2 — LOW
GLUCOSE SERPL-MCNC: 93 MG/DL — SIGNIFICANT CHANGE UP (ref 70–99)
HCT VFR BLD CALC: 28.2 % — LOW (ref 34.5–45)
HGB BLD-MCNC: 9.2 G/DL — LOW (ref 11.5–15.5)
MCHC RBC-ENTMCNC: 28.6 PG — SIGNIFICANT CHANGE UP (ref 27–34)
MCHC RBC-ENTMCNC: 32.6 GM/DL — SIGNIFICANT CHANGE UP (ref 32–36)
MCV RBC AUTO: 87.6 FL — SIGNIFICANT CHANGE UP (ref 80–100)
NRBC # BLD: 0 /100 WBCS — SIGNIFICANT CHANGE UP (ref 0–0)
PLATELET # BLD AUTO: 292 K/UL — SIGNIFICANT CHANGE UP (ref 150–400)
POTASSIUM SERPL-MCNC: 3.8 MMOL/L — SIGNIFICANT CHANGE UP (ref 3.5–5.3)
POTASSIUM SERPL-SCNC: 3.8 MMOL/L — SIGNIFICANT CHANGE UP (ref 3.5–5.3)
RBC # BLD: 3.22 M/UL — LOW (ref 3.8–5.2)
RBC # FLD: 15 % — HIGH (ref 10.3–14.5)
SARS-COV-2 RNA SPEC QL NAA+PROBE: DETECTED
SODIUM SERPL-SCNC: 141 MMOL/L — SIGNIFICANT CHANGE UP (ref 135–145)
WBC # BLD: 11.34 K/UL — HIGH (ref 3.8–10.5)
WBC # FLD AUTO: 11.34 K/UL — HIGH (ref 3.8–10.5)

## 2022-10-17 PROCEDURE — 85730 THROMBOPLASTIN TIME PARTIAL: CPT

## 2022-10-17 PROCEDURE — 71045 X-RAY EXAM CHEST 1 VIEW: CPT

## 2022-10-17 PROCEDURE — 99285 EMERGENCY DEPT VISIT HI MDM: CPT

## 2022-10-17 PROCEDURE — 86803 HEPATITIS C AB TEST: CPT

## 2022-10-17 PROCEDURE — 82550 ASSAY OF CK (CPK): CPT

## 2022-10-17 PROCEDURE — 80048 BASIC METABOLIC PNL TOTAL CA: CPT

## 2022-10-17 PROCEDURE — 82962 GLUCOSE BLOOD TEST: CPT

## 2022-10-17 PROCEDURE — 36415 COLL VENOUS BLD VENIPUNCTURE: CPT

## 2022-10-17 PROCEDURE — 87641 MR-STAPH DNA AMP PROBE: CPT

## 2022-10-17 PROCEDURE — 96374 THER/PROPH/DIAG INJ IV PUSH: CPT

## 2022-10-17 PROCEDURE — 83735 ASSAY OF MAGNESIUM: CPT

## 2022-10-17 PROCEDURE — 87640 STAPH A DNA AMP PROBE: CPT

## 2022-10-17 PROCEDURE — 83036 HEMOGLOBIN GLYCOSYLATED A1C: CPT

## 2022-10-17 PROCEDURE — 85027 COMPLETE CBC AUTOMATED: CPT

## 2022-10-17 PROCEDURE — 93005 ELECTROCARDIOGRAM TRACING: CPT

## 2022-10-17 PROCEDURE — 84100 ASSAY OF PHOSPHORUS: CPT

## 2022-10-17 PROCEDURE — 87040 BLOOD CULTURE FOR BACTERIA: CPT

## 2022-10-17 PROCEDURE — 0225U NFCT DS DNA&RNA 21 SARSCOV2: CPT

## 2022-10-17 PROCEDURE — 87635 SARS-COV-2 COVID-19 AMP PRB: CPT

## 2022-10-17 PROCEDURE — 85025 COMPLETE CBC W/AUTO DIFF WBC: CPT

## 2022-10-17 PROCEDURE — 80053 COMPREHEN METABOLIC PANEL: CPT

## 2022-10-17 PROCEDURE — 87086 URINE CULTURE/COLONY COUNT: CPT

## 2022-10-17 PROCEDURE — 81001 URINALYSIS AUTO W/SCOPE: CPT

## 2022-10-17 PROCEDURE — 85610 PROTHROMBIN TIME: CPT

## 2022-10-17 PROCEDURE — 83605 ASSAY OF LACTIC ACID: CPT

## 2022-10-17 PROCEDURE — 92610 EVALUATE SWALLOWING FUNCTION: CPT

## 2022-10-17 RX ADMIN — Medication 40 MILLIGRAM(S): at 05:06

## 2022-10-17 RX ADMIN — POLYETHYLENE GLYCOL 3350 17 GRAM(S): 17 POWDER, FOR SOLUTION ORAL at 12:31

## 2022-10-17 RX ADMIN — NICARDIPINE HYDROCHLORIDE 20 MILLIGRAM(S): 30 CAPSULE, EXTENDED RELEASE ORAL at 12:31

## 2022-10-17 RX ADMIN — ENOXAPARIN SODIUM 40 MILLIGRAM(S): 100 INJECTION SUBCUTANEOUS at 05:03

## 2022-10-17 RX ADMIN — Medication 500 MILLIGRAM(S): at 05:07

## 2022-10-17 RX ADMIN — Medication 500 MILLIGRAM(S): at 12:31

## 2022-10-17 RX ADMIN — Medication 1 TABLET(S): at 12:30

## 2022-10-17 RX ADMIN — LOSARTAN POTASSIUM 100 MILLIGRAM(S): 100 TABLET, FILM COATED ORAL at 05:06

## 2022-10-17 RX ADMIN — NICARDIPINE HYDROCHLORIDE 40 MILLIGRAM(S): 30 CAPSULE, EXTENDED RELEASE ORAL at 05:06

## 2022-10-17 RX ADMIN — SERTRALINE 75 MILLIGRAM(S): 25 TABLET, FILM COATED ORAL at 12:30

## 2022-10-17 NOTE — DISCHARGE NOTE NURSING/CASE MANAGEMENT/SOCIAL WORK - PATIENT PORTAL LINK FT
You can access the FollowMyHealth Patient Portal offered by [facility] by registering at the following website: [email or web address]. By joining [email or web address]’s FollowMyHealth portal, you will also be able to view your health information using other applications (apps) compatible with our system. You can access the FollowMyHealth Patient Portal offered by [facility] by registering at the following website: [email or web address]. By joining [employer]’s FollowMyHealth portal, you will also be able to view your health information using other applications (apps) compatible with our system. You can access the FollowMyHealth Patient Portal offered by [facility] by registering at the following website: [email or web address]. By joining [employer]’s FollowMyHealth portal, you will also be able to view your health information using other applications (apps) compatible with our system.

## 2022-10-17 NOTE — DISCHARGE NOTE NURSING/CASE MANAGEMENT/SOCIAL WORK - NSDCPEFALRISK_GEN_ALL_CORE
For information on Fall & Injury Prevention, visit: [email or web address] OR  [email or web address] OR  https://www.cdc.gov/steadi/patient.html For information on Fall & Injury Prevention, visit: [email or web address]-mobility OR  [email or web address]-[email or web address]-injury OR  https://www.cdc.gov/steadi/patient.html For information on Fall & Injury Prevention, visit: [email or web address]-mobility OR  [email or web address]-avoid-injury OR  https://www.cdc.gov/steadi/patient.html

## 2022-11-20 NOTE — ED ADULT NURSE NOTE - NSSUHOSCREENINGYN_ED_ALL_ED
Patient is an 80 M with CKD baseline sCr 1.25, afib and PE on xarelto, R TKA c/b knee cellulitis on home infusions complicated by unintentional overdose of vancomycin causing REMIGIO    REMIGIO on CKD - baseline sCr 1.25, follows with Dr. Guerra. Likely combination ischemic, toxic, and hypovolemia ATN  Please give 1L albumin or NS today, encourage po intake, please supplement with ensure or nepro with each meal    HTN - currently at goal with nebivolol once a day, can continue for now    Hyponatremia - mild, hypovolemic, fluids as above    Hypokalemia - from poor PO intake, can replete with 40meq of PO potassium chloride today    Proteinuria - mild, consistent with tubular damage, as above Yes - the patient is able to be screened

## 2022-11-25 NOTE — H&P ADULT - NSHPPHYSICALEXAM_GEN_ALL_CORE
25-Nov-2022 17:21
Vital Signs Last 24 Hrs  T(C): 37 (29 Jan 2019 17:34), Max: 37.4 (29 Jan 2019 16:07)  T(F): 98.6 (29 Jan 2019 17:34), Max: 99.4 (29 Jan 2019 16:07)  HR: 63 (29 Jan 2019 19:32) (63 - 120)  BP: 142/59 (29 Jan 2019 19:32) (131/55 - 142/59)  RR: 18 (29 Jan 2019 19:32) (18 - 20)  SpO2: 100% (29 Jan 2019 19:32) (94% - 100%)    General: NAD  Neurology: A&Ox3, chronic right sided hemiplegia  Eyes: PERRLA/ EOMI, Gross vision intact  ENT/Neck: Neck supple, trachea midline, No JVD, Gross hearing intact  Respiratory: CTA B/L, No wheezing, rales, rhonchi  CV: RRR, S1S2, no murmurs, rubs or gallops, non-pitting LE edema extending up to knees  Abdominal: Soft, +BS +suprapubic tenderness  Extremities: + peripheral pulses  Skin: No Rashes, Hematoma, Ecchymosis
No

## 2023-01-16 NOTE — ED PROVIDER NOTE - NS ED MD DISPO DISCHARGE
Detail Level: Simple Render Risk Assessment In Note?: no Comment: Irritant contact dermatitis , diaper area with differential diagnosis includes LP and much less likely psoriasis. Discussed nature and etiology. Dad reports condition began 2-3 months ago. Care currently margined by PCP, prescribed prednicarbate 0.1% topical ointment BID-TID. Dad reports using [employer] dipper wipes. Discussed possible allergy to dipper wipe ingredients and asked dad to discontinue application of wipes/just use warm soap and water. No bubble baths, no scented soaps.. Will start triamcinolone acetonide 0.025 % topical ointment BID x 10 days and tacrolimus 0.03 % topical ointment. BID until clear (to apply to both the diaper area and the axilla). FU 4 weeks. Home

## 2023-02-21 NOTE — ED ADULT NURSE NOTE - NSFALLRSKOUTCOME_ED_ALL_ED
Received a call from patient's spouse Pantera Nolan stating patient is having severe pain  Attempted to return call however they did not answer  Left VM encouraging a call back at their convenience, provided office number  Fall with Harm Risk

## 2023-05-01 NOTE — H&P ADULT. - PULMONARY EMBOLUS
no Medical Necessity Information: It is in your best interest to select a reason for this procedure from the list below. All of these items fulfill various CMS LCD requirements except the new and changing color options. Consent: The patient's consent was obtained including but not limited to risks of crusting, scabbing, blistering, scarring, darker or lighter pigmentary change, recurrence, incomplete removal and infection. Spray Paint Technique: No Spray Paint Text: The liquid nitrogen was applied to the skin utilizing a spray paint frosting technique. Show Spray Paint Technique Variable?: Yes Duration Of Freeze Thaw-Cycle (Seconds): 5 Post-Care Instructions: I reviewed with the patient in detail post-care instructions. Patient is to wear sunprotection, and avoid picking at any of the treated lesions. Pt may apply Vaseline to crusted or scabbing areas. Medical Necessity Clause: This procedure was medically necessary because the lesions that were treated were: not healing Detail Level: Zone Number Of Freeze-Thaw Cycles: 3 freeze-thaw cycles

## 2023-06-16 NOTE — PATIENT PROFILE ADULT - NSASFUNCLEVELADLAMBULATE_GEN_A_NUR
06/16/23 0956   Final Note   Assessment Type Final Discharge Note   Anticipated Discharge Disposition Home-Health   Hospital Resources/Appts/Education Provided Appointments scheduled and added to AVS;Post-Acute resouces added to AVS   Post-Acute Status   Post-Acute Authorization Home Health   Home Health Status Set-up Complete/Auth obtained     Pts nurse Young notified that the pt is clear for d/c from CM standpoint     2 = assistive person

## 2023-07-27 ENCOUNTER — INPATIENT (INPATIENT)
Facility: HOSPITAL | Age: 78
LOS: 32 days | Discharge: NURSING, REHAB OR LONG-TERM CARE | DRG: 640 | End: 2023-08-29
Attending: INTERNAL MEDICINE | Admitting: INTERNAL MEDICINE
Payer: MEDICARE

## 2023-07-27 VITALS
HEART RATE: 73 BPM | DIASTOLIC BLOOD PRESSURE: 68 MMHG | TEMPERATURE: 98 F | HEIGHT: 67 IN | OXYGEN SATURATION: 100 % | RESPIRATION RATE: 20 BRPM | SYSTOLIC BLOOD PRESSURE: 172 MMHG | WEIGHT: 187.39 LBS

## 2023-07-27 DIAGNOSIS — I63.9 CEREBRAL INFARCTION, UNSPECIFIED: ICD-10-CM

## 2023-07-27 DIAGNOSIS — Z90.710 ACQUIRED ABSENCE OF BOTH CERVIX AND UTERUS: Chronic | ICD-10-CM

## 2023-07-27 DIAGNOSIS — R41.82 ALTERED MENTAL STATUS, UNSPECIFIED: ICD-10-CM

## 2023-07-27 DIAGNOSIS — Z29.9 ENCOUNTER FOR PROPHYLACTIC MEASURES, UNSPECIFIED: ICD-10-CM

## 2023-07-27 DIAGNOSIS — R94.31 ABNORMAL ELECTROCARDIOGRAM [ECG] [EKG]: ICD-10-CM

## 2023-07-27 DIAGNOSIS — D72.829 ELEVATED WHITE BLOOD CELL COUNT, UNSPECIFIED: ICD-10-CM

## 2023-07-27 DIAGNOSIS — R56.9 UNSPECIFIED CONVULSIONS: ICD-10-CM

## 2023-07-27 DIAGNOSIS — E78.5 HYPERLIPIDEMIA, UNSPECIFIED: ICD-10-CM

## 2023-07-27 DIAGNOSIS — G93.40 ENCEPHALOPATHY, UNSPECIFIED: ICD-10-CM

## 2023-07-27 DIAGNOSIS — I10 ESSENTIAL (PRIMARY) HYPERTENSION: ICD-10-CM

## 2023-07-27 LAB
ALBUMIN SERPL ELPH-MCNC: 2.9 G/DL — LOW (ref 3.5–5)
ALP SERPL-CCNC: 97 U/L — SIGNIFICANT CHANGE UP (ref 40–120)
ALT FLD-CCNC: 10 U/L DA — SIGNIFICANT CHANGE UP (ref 10–60)
AMMONIA BLD-MCNC: 32 UMOL/L — SIGNIFICANT CHANGE UP (ref 11–32)
ANION GAP SERPL CALC-SCNC: 11 MMOL/L — SIGNIFICANT CHANGE UP (ref 5–17)
APPEARANCE UR: CLEAR — SIGNIFICANT CHANGE UP
APTT BLD: 29.4 SEC — SIGNIFICANT CHANGE UP (ref 24.5–35.6)
AST SERPL-CCNC: 15 U/L — SIGNIFICANT CHANGE UP (ref 10–40)
BACTERIA # UR AUTO: ABNORMAL /HPF
BASOPHILS # BLD AUTO: 0.05 K/UL — SIGNIFICANT CHANGE UP (ref 0–0.2)
BASOPHILS NFR BLD AUTO: 0.3 % — SIGNIFICANT CHANGE UP (ref 0–2)
BILIRUB SERPL-MCNC: 0.3 MG/DL — SIGNIFICANT CHANGE UP (ref 0.2–1.2)
BILIRUB UR-MCNC: NEGATIVE — SIGNIFICANT CHANGE UP
BUN SERPL-MCNC: 32 MG/DL — HIGH (ref 7–18)
CALCIUM SERPL-MCNC: 9 MG/DL — SIGNIFICANT CHANGE UP (ref 8.4–10.5)
CHLORIDE SERPL-SCNC: 109 MMOL/L — HIGH (ref 96–108)
CO2 SERPL-SCNC: 22 MMOL/L — SIGNIFICANT CHANGE UP (ref 22–31)
COLOR SPEC: YELLOW — SIGNIFICANT CHANGE UP
CREAT SERPL-MCNC: 1.88 MG/DL — HIGH (ref 0.5–1.3)
DIFF PNL FLD: ABNORMAL
EGFR: 27 ML/MIN/1.73M2 — LOW
EOSINOPHIL # BLD AUTO: 0.06 K/UL — SIGNIFICANT CHANGE UP (ref 0–0.5)
EOSINOPHIL NFR BLD AUTO: 0.3 % — SIGNIFICANT CHANGE UP (ref 0–6)
EPI CELLS # UR: SIGNIFICANT CHANGE UP /HPF
GLUCOSE SERPL-MCNC: 111 MG/DL — HIGH (ref 70–99)
GLUCOSE UR QL: NEGATIVE — SIGNIFICANT CHANGE UP
HCT VFR BLD CALC: 33.3 % — LOW (ref 34.5–45)
HGB BLD-MCNC: 10.4 G/DL — LOW (ref 11.5–15.5)
HYALINE CASTS # UR AUTO: ABNORMAL /LPF
IMM GRANULOCYTES NFR BLD AUTO: 0.9 % — SIGNIFICANT CHANGE UP (ref 0–0.9)
INR BLD: 0.96 RATIO — SIGNIFICANT CHANGE UP (ref 0.85–1.18)
KETONES UR-MCNC: NEGATIVE — SIGNIFICANT CHANGE UP
LACTATE SERPL-SCNC: 1.2 MMOL/L — SIGNIFICANT CHANGE UP (ref 0.7–2)
LEUKOCYTE ESTERASE UR-ACNC: ABNORMAL
LYMPHOCYTES # BLD AUTO: 18.4 % — SIGNIFICANT CHANGE UP (ref 13–44)
LYMPHOCYTES # BLD AUTO: 3.52 K/UL — HIGH (ref 1–3.3)
MCHC RBC-ENTMCNC: 28.2 PG — SIGNIFICANT CHANGE UP (ref 27–34)
MCHC RBC-ENTMCNC: 31.2 GM/DL — LOW (ref 32–36)
MCV RBC AUTO: 90.2 FL — SIGNIFICANT CHANGE UP (ref 80–100)
MONOCYTES # BLD AUTO: 1.1 K/UL — HIGH (ref 0–0.9)
MONOCYTES NFR BLD AUTO: 5.8 % — SIGNIFICANT CHANGE UP (ref 2–14)
NEUTROPHILS # BLD AUTO: 14.22 K/UL — HIGH (ref 1.8–7.4)
NEUTROPHILS NFR BLD AUTO: 74.3 % — SIGNIFICANT CHANGE UP (ref 43–77)
NITRITE UR-MCNC: NEGATIVE — SIGNIFICANT CHANGE UP
NRBC # BLD: 0 /100 WBCS — SIGNIFICANT CHANGE UP (ref 0–0)
PH UR: 8 — SIGNIFICANT CHANGE UP (ref 5–8)
PLATELET # BLD AUTO: 337 K/UL — SIGNIFICANT CHANGE UP (ref 150–400)
POTASSIUM SERPL-MCNC: 4.3 MMOL/L — SIGNIFICANT CHANGE UP (ref 3.5–5.3)
POTASSIUM SERPL-SCNC: 4.3 MMOL/L — SIGNIFICANT CHANGE UP (ref 3.5–5.3)
PROT SERPL-MCNC: 8.5 G/DL — HIGH (ref 6–8.3)
PROT UR-MCNC: 100 MG/DL
PROTHROM AB SERPL-ACNC: 11 SEC — SIGNIFICANT CHANGE UP (ref 9.5–13)
RAPID RVP RESULT: SIGNIFICANT CHANGE UP
RBC # BLD: 3.69 M/UL — LOW (ref 3.8–5.2)
RBC # FLD: 13.6 % — SIGNIFICANT CHANGE UP (ref 10.3–14.5)
RBC CASTS # UR COMP ASSIST: >50 /HPF (ref 0–2)
SARS-COV-2 RNA SPEC QL NAA+PROBE: SIGNIFICANT CHANGE UP
SODIUM SERPL-SCNC: 142 MMOL/L — SIGNIFICANT CHANGE UP (ref 135–145)
SP GR SPEC: 1.01 — SIGNIFICANT CHANGE UP (ref 1.01–1.02)
TROPONIN I, HIGH SENSITIVITY RESULT: 14.4 NG/L — SIGNIFICANT CHANGE UP
UROBILINOGEN FLD QL: NEGATIVE — SIGNIFICANT CHANGE UP
WBC # BLD: 19.13 K/UL — HIGH (ref 3.8–10.5)
WBC # FLD AUTO: 19.13 K/UL — HIGH (ref 3.8–10.5)
WBC UR QL: SIGNIFICANT CHANGE UP /HPF (ref 0–5)

## 2023-07-27 PROCEDURE — 99285 EMERGENCY DEPT VISIT HI MDM: CPT

## 2023-07-27 PROCEDURE — 70450 CT HEAD/BRAIN W/O DYE: CPT | Mod: 26,MA,XU

## 2023-07-27 PROCEDURE — 0042T: CPT | Mod: MA

## 2023-07-27 PROCEDURE — 71045 X-RAY EXAM CHEST 1 VIEW: CPT | Mod: 26

## 2023-07-27 PROCEDURE — 93010 ELECTROCARDIOGRAM REPORT: CPT

## 2023-07-27 PROCEDURE — 70496 CT ANGIOGRAPHY HEAD: CPT | Mod: 26,MA

## 2023-07-27 PROCEDURE — 70498 CT ANGIOGRAPHY NECK: CPT | Mod: 26,MA

## 2023-07-27 RX ORDER — HYDRALAZINE HCL 50 MG
5 TABLET ORAL ONCE
Refills: 0 | Status: COMPLETED | OUTPATIENT
Start: 2023-07-27 | End: 2023-07-27

## 2023-07-27 RX ORDER — VALPROIC ACID (AS SODIUM SALT) 250 MG/5ML
500 SOLUTION, ORAL ORAL EVERY 8 HOURS
Refills: 0 | Status: DISCONTINUED | OUTPATIENT
Start: 2023-07-27 | End: 2023-08-14

## 2023-07-27 RX ORDER — ENOXAPARIN SODIUM 100 MG/ML
30 INJECTION SUBCUTANEOUS EVERY 24 HOURS
Refills: 0 | Status: DISCONTINUED | OUTPATIENT
Start: 2023-07-27 | End: 2023-08-06

## 2023-07-27 RX ORDER — FUROSEMIDE 40 MG
20 TABLET ORAL DAILY
Refills: 0 | Status: DISCONTINUED | OUTPATIENT
Start: 2023-07-27 | End: 2023-07-28

## 2023-07-27 RX ORDER — ASPIRIN/CALCIUM CARB/MAGNESIUM 324 MG
300 TABLET ORAL DAILY
Refills: 0 | Status: DISCONTINUED | OUTPATIENT
Start: 2023-07-27 | End: 2023-08-06

## 2023-07-27 RX ORDER — ACETAMINOPHEN 500 MG
1000 TABLET ORAL ONCE
Refills: 0 | Status: COMPLETED | OUTPATIENT
Start: 2023-07-27 | End: 2023-07-29

## 2023-07-27 RX ADMIN — Medication 5 MILLIGRAM(S): at 21:57

## 2023-07-27 RX ADMIN — Medication 55 MILLIGRAM(S): at 21:57

## 2023-07-27 NOTE — ED ADULT NURSE REASSESSMENT NOTE - NS ED NURSE REASSESS COMMENT FT1
Attempts made to collect blood for blood cultures with no success. Dr. Griggs made aware. [FreeTextEntry1] : follow up CHF/anemia/COPD [de-identified] : STONE DIAZ is a 64 year old male presenting to the clinic today for a follow up regarding CHF/anemia/COPD. Accompanied by family member. Mood is good, appears well. Switched cardiologist, now seeing Dr. Dominick Middleton. Increased Lasix dosage to 60 MG BID (1.5 tablets BID). Patient has gained over 10 lbs since November 2020. States that cardiologist is going to do a chest X-Ray to ensure there is not fluid build up. Would like patient to follow up with hematologist to start patient on Aspirin but wants to ensure platelet count is normal before doing so. \par \par Followed up with pulmonologist because patient is experiencing ALANIZ. Describes feeling short of breath after walking as few as 10 steps. Pulmonologist states lungs were clear and denied oxygen, states ALANIZ is due to obesity. Additionally, was given script for at home sleep study but refused to use machine because he had one done in the past. \par \par Denies eating lots of food. Claims to live on soup and states "I'm not a big eater at all". States during last visit patient was instructed by physician to decrease water intake. States that he is taking one 8 ounce glass of wine for dinner. Patient is no longer smoking, as of 16 months. Patient has stationary bike in room. Does not eat desert often. Likes to snack on pretzels.\par \par Vaccinated against Covid-19.

## 2023-07-27 NOTE — ED ADULT NURSE NOTE - NSFALLRISKINTERV_ED_ALL_ED
Assistance OOB with selected safe patient handling equipment if applicable/Communicate fall risk and risk factors to all staff, patient, and family/Provide patient with walking aids/Provide visual cue: yellow wristband, yellow gown, etc/Reinforce activity limits and safety measures with patient and family/Call bell, personal items and telephone in reach/Instruct patient to call for assistance before getting out of bed/chair/stretcher/Non-slip footwear applied when patient is off stretcher/[place] to call system/Physically safe environment - no spills, clutter or unnecessary equipment/Purposeful Proactive Rounding/Room/bathroom lighting operational, light cord in reach Assistance OOB with selected safe patient handling equipment if applicable/Communicate fall risk and risk factors to all staff, patient, and family/Provide patient with walking aids/Provide visual cue: yellow wristband, yellow gown, etc/Reinforce activity limits and safety measures with patient and family/Call bell, personal items and telephone in reach/Instruct patient to call for assistance before getting out of bed/chair/stretcher/Non-slip footwear applied when patient is off stretcher/[place] to call system/Physically safe environment - no spills, clutter or unnecessary equipment/Purposeful Proactive Rounding/Room/bathroom lighting operational, light cord in reach Assistance OOB with selected safe patient handling equipment if applicable/Communicate fall risk and risk factors to all staff, patient, and family/Provide patient with walking aids/Provide visual cue: yellow wristband, yellow gown, etc/Reinforce activity limits and safety measures with patient and family/Call bell, personal items and telephone in reach/Instruct patient to call for assistance before getting out of bed/chair/stretcher/Non-slip footwear applied when patient is off stretcher/[place] to call system/Physically safe environment - no spills, clutter or unnecessary equipment/Purposeful Proactive Rounding/Room/bathroom lighting operational, light cord in reach

## 2023-07-27 NOTE — ED ADULT NURSE NOTE - OBJECTIVE STATEMENT
Pt presents to ED for slurred speech and AMS. Received pt nonverbal, unable to state version of occurrence. Stroke code called.

## 2023-07-27 NOTE — H&P ADULT - PROBLEM SELECTOR PLAN 3
P/w altered mental status (baseline mental status as noted in HPI)  Etiology can be metabolic vs infectious vs drug induced (patient was started on medication for muscle spasm recently as per sister - assuming it is baclofen).   Elevated WBCs.  UA -ve.  CXR - ve.   F/U Bcx.  F/U TSH  Patient on cinamet, sertraline, baclofen. Hold as patient is NPO.

## 2023-07-27 NOTE — PHARMACOTHERAPY INTERVENTION NOTE - COMMENTS
Patient is from [facility] and their medical record was used to confirm allergies and update the outpatient medication review. Patient is from [facility] and their medical record was used to confirm allergies and update the outpatient medication review. Patient is from [facility] and their medical record was used to confirm allergies and update the outpatient medication review.

## 2023-07-27 NOTE — H&P ADULT - PROBLEM SELECTOR PLAN 1
P/w altered mental status (baseline mental status as noted in HPI)  HEAD CT: No acute intracranial hemorrhage or acute territorial infarction. Chronic left MCA territory infarction.  CT PERFUSION demonstrated: Perfusion abnormality corresponding to the chronically infarcted left MCA territory. INFARCT CORE: 57 mL. TISSUE AT RISK: 236 mL.  CTA COW:  Patent intracranial circulation without flow limiting stenosis or large vessel occlusion.  CTA NECK: Patent, ECAs, ICAs, no  hemodynamically significant stenosis at ICA origins by NASCET criteria. Bilateral vertebral arteries are patent without flow limiting stenosis.  Failed dysphagia screen.   NPO diet.   F/U SnS.  Aspirin suppository.  Start statin when passes SnS.   F/U echo with bubble study.   F/U A1C  F/U lipid profile.   Neurology consulted - Dr. Whitney  Cardiology consulted - Dr. Dunn.

## 2023-07-27 NOTE — H&P ADULT - PROBLEM SELECTOR PLAN 2
EKG - NSR with TWI in V3-V5. (new compared to last EKG)  Trop: 14.4.   F/U repeat ekg and trop.  IF TWI reversed than concern for NSTEMI. If persistent TWI then likely NOT ischemic.

## 2023-07-27 NOTE — H&P ADULT - NSHPPHYSICALEXAM_GEN_ALL_CORE
PHYSICAL EXAM:  GENERAL: NAD, awake and alert, but not responsive.   HEAD:  Atraumatic, Normocephalic  EYES: unable to assess EOM, PERRLA, conjunctiva and sclera clear  NECK: Supple,  CHEST/LUNG: Clear to auscultation bilaterally; No wheeze; No crackles; No accessory muscles used  HEART: Regular rate and rhythm; No murmurs;   ABDOMEN: Soft, Nontender, Nondistended; Bowel sounds present; No guarding  EXTREMITIES:  2+ Peripheral Pulses, No edema  PSYCH: AAOx0  NEUROLOGY: unable to assess patient is not able to follow instructions, awake, alert but not responsive to verbal or touch stimuli, localizes to painful stimuli, eyes open but blinks spontaneously.   SKIN: No rashes or lesions

## 2023-07-27 NOTE — H&P ADULT - HISTORY OF PRESENT ILLNESS
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 Patient is not able to present any history. Patient is lying down in bed, awake and alert. However, patient does not speak, is not able to follow commands and does not turn face or move eyes when her name is called. As per nursing home papers  patient was noted to have change in mental status with eye fixed in one direction and unable to swallow food. Patient's sister Marika San ([phone]) was called to inquire about baseline mental status. Patient can respond with one word answers, swallows food, however does not move arms/legs on baseline. Ed note also reports patient is exhibiting b/l upper arm weakness but resident is unable to confirm that during evaluation. Unable to obtain any ROS from the patient at this time.     Of note: Patient's sister noted that patient sister was recently started on medication for muscle spasms at nursing home.  Patient is a 77 female from [facility] PMHx HTN, HLD, CVA (w/ residual aphasia and R sided hemiparesis/plegia) who was sent to the hospital due to altered mental status. Patient is not able to present any history. Patient is lying down in bed, awake and alert. However, patient does not speak, is not able to follow commands and does not turn face or move eyes when her name is called. As per nursing home papers  patient was noted to have change in mental status with eye fixed in one direction and unable to swallow food. Patient's sister Marika San ([phone]) was called to inquire about baseline mental status. Patient can respond with one word answers, swallows food, however does not move arms/legs on baseline. Ed note also reports patient is exhibiting b/l upper arm weakness but resident is unable to confirm that during evaluation. Unable to obtain any ROS from the patient at this time.     Of note: Patient's sister noted that patient sister was recently started on medication for muscle spasms at nursing home.  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 Patient is not able to present any history. Patient is lying down in bed, awake and alert. However, patient does not speak, is not able to follow commands and does not turn face or move eyes when her name is called. As per nursing home papers  patient was noted to have change in mental status with eye fixed in one direction and unable to swallow food. Patient's sister Marika San ([phone]) was called to inquire about baseline mental status. Patient can respond with one word answers, swallows food, however does not move arms/legs on baseline. Ed note also reports patient is exhibiting b/l upper arm weakness but resident is unable to confirm that during evaluation. Unable to obtain any ROS from the patient at this time.     Of note: Patient's sister noted that patient sister was recently started on medication for muscle spasms at nursing home.

## 2023-07-27 NOTE — ED ADULT TRIAGE NOTE - CHIEF COMPLAINT QUOTE
BIBA from NH per EMS " After eating lunch at 1pm pt was slurring her words and both arms were dropping to her sides"

## 2023-07-27 NOTE — ED PROVIDER NOTE - NEUROLOGICAL LEVEL OF CONSCIOUSNESS
Over the last 2 weeks, how often have you been bothered by the following problems?          PHQ2 Score: 0  PHQ2 Score Interpretation: No further screening needed  1. Little interest or pleasure in activity?: 0  2. Feeling down, depressed, or hopeless?: 0       Health Maintenance Due   Topic Date Due   • Shingles Vaccine (3 of 3) 11/09/2018   • Medicare Wellness Visit  11/26/2020       Patient is due for the topics as listed above discuss with PCP         CONFUSED

## 2023-07-27 NOTE — ED PROVIDER NOTE - OBJECTIVE STATEMENT
77-year-old female history of hypertension, hyperlipidemia, CVA with residual right-sided weakness, seizures brought in by ambulance for altered mental status that was noticed after lunch at nursing home.  Patient noted to be mumbling words and exhibiting weakness to both arms.

## 2023-07-27 NOTE — ED PROVIDER NOTE - CLINICAL SUMMARY MEDICAL DECISION MAKING FREE TEXT BOX
77-year-old female history of stroke and seizures presenting with altered mental status and generalized weakness.  Plan to perform stat CT imaging of head, labs and reassess. Patient is not a candidate for thrombolytics as the last normal is unknown.  Patient also has other comorbidities including metabolic derangement and seizures which could presented in a similar fashion.  Also concern for infection, dehydration among other possibilities as well. 77-year-old female history of stroke and seizures presenting with altered mental status and generalized weakness.  Pt is awake and alert but does not follow commands and respond. Does make eye contact and turn when addressing her. Plan to perform stat CT imaging of head, labs and reassess. Patient is not a candidate for thrombolytics as the last normal is unknown.  Patient also has other comorbidities including metabolic derangement and seizures which could present in a similar fashion.  Also concern for infection, dehydration among other possibilities as well.

## 2023-07-27 NOTE — ED ADULT NURSE NOTE - CCCP TRG CHIEF CMPLNT
[facility] Follow Up    ASSESSMENT/PLAN                                                      1. Opioid use disorder, severe, dependence (H)  Reporting taking 12mg/day.  Endorsing some cravings.  Interested in transfer to Subl[facility]ade.   Not tolerating taste of 12mg films.   Increase buprenorphine to 16mg/day   Requesting insurance authorization of Sublocade  Start Sublocade when approved, discontinue SL buprenorphine after Sublocade #1.   - buprenorphine HCl-naloxone HCl (SUBOXONE) 8-2 MG per film; Place 1 Film under the tongue 2 times daily  Dispense: 60 Film; Refill: 0  - HCG qualitative urine    2. Methamphetamine use disorder, severe (H)  Endorsing cravings, reports no use since end of 10/2022  Increase bupropion to 450mg/day.  If anxiety worsening, decrease back to 300mg/day.   Continue programming through [place]  - buPROPion (WELLBUTRIN XL) 150 MG 24 hr tablet; Take 3 tablets (450 mg) by mouth every morning  Dispense: 90 tablet; Refill: 3    3. Anxiety and depression  Recommend she take buspirone scheduled  Continue gabapentin  Bupropion to address depression  - gabapentin (NEURONTIN) 300 MG capsule; Take 1-2 capsules (300-600 mg) by mouth 3 times daily  Dispense: 180 capsule; Refill: 0  - busPIRone (BUSPAR) 10 MG tablet; Take 1 tablet (10 mg) by mouth 2 times daily  Dispense: 60 tablet; Refill: 1    4. Healthcare maintenance  Recommend she schedule with PCP to establish care and for evaluation and management of anemia.  Encouraged her to continue OCP which has helped improve menorrhagia.  Given contact information for [facility].    - multivitamin w/minerals (THERA-VIT-M) tablet; Take 1 tablet by mouth daily  Dispense: 90 tablet; Refill: 3    5. Tobacco use disorder  Expressing interest in quitting.  Bupropion can assist with this. Discuss NRT next visit    Return in about 4 weeks (around 1/6/2023) for Follow up, in person.    SUBJECTIVE                                                         CC/HPI:  Latoya Guevara is a 33 year old female with PMH tobacco use disorder, methamphetamine use disorder, and opioid use disorder on buprenorphine who presents to the [facility] for return visit.        Brief History:  Pt was first evaluated in Recovery Clinic 9/8/21. Pt presented at that time requesting to continue treatment with buprenorphine which was started while in residential treatment at [facility] in 8/2021.  She continued buprenorphine through  after her initial visit.  She has had intermittent follow up and ongoing methamphetamine use.  12/9/22 in [facility], stated last meth use 10/22, interested in transfer to [facility].      Substance Use History :  Opioids: First use: at age 14    Most recent use:               Substance: oxycodone tablets and fentanyl patches               Route: oral               Timing of last use: 8/13/2021                IV drug use: No   History of overdose: Yes: x2, most recent 2011  Previous treatments : Yes: reports she graduated 9/3/21 from [facility]; h/o buprenorphine ages [age] and methadone age [age] and [age]  Longest period of sobriety: one year in 2019  Medical complications related to substance use: overdose  Hepatitis C Status 11/16/21 HCV ab nonreactive  HIV Status  11/16/21 HIV ag/ab nonreactive     Other recent substance use:  Stimulants: methamphetamine first age 14, 10/4/21 describing using 1-2x/week  Sedatives/hypnotics/anxiolytics:denies  Alcohol:denies  Tobacco: currently 1/2 ppd  Cannabis:denies  Hallucinogens:denies  Behavioral addictions: denies    Most recent [facility] visit 10/21/22 A/P from that visit:   1. Opioid use disorder, severe, dependence (H)  Reporting no use of full opioid agonists since starting treatment in 8/2021.  Difficulty maintaining stable dosing due to difficulty making it to appointments.  Reports she is still interested in transfer to [facility].   Resume buprenorphine 24mg/day  Pt was given # to  schedule Sublocade at [facility], preferred this vs assistance in scheduling  Continue IOP (virtual) with [facility]  - SUBOXONE 12-3 MG FILM per film; Place 1 Film under the tongue 2 times daily  Dispense: 30 Film; Refill: 0     2. Methamphetamine use disorder, severe (H)  Continue bupropion unchanged  Continue IOP (virtual) with [facility]  - buPROPion (WELLBUTRIN XL) 300 MG 24 hr tablet; Take 1 tablet (300 mg) by mouth every morning  Dispense: 30 tablet; Refill: 1     3. General counseling for prescription of oral contraceptives  Resume OCP's  - levonorgestrel-ethinyl estradiol (NORDETTE) 0.15-30 MG-MCG tablet; Take 1 tablet by mouth daily  Dispense: 84 tablet; Refill: 3     4. Anxiety and depression  Increase buspirone to 15mg bid  Continue bupropion to address depression  F/u on pt's plans to establish with psychiatry through [facility] next visit  - busPIRone (BUSPAR) 15 MG tablet; Take 1 tablet (15 mg) by mouth 2 times daily  Dispense: 60 tablet; Refill: 0     5. Tobacco use disorder  - buPROPion (WELLBUTRIN XL) 300 MG 24 hr tablet; Take 1 tablet (300 mg) by mouth every morning  Dispense: 30 tablet; Refill: 1                Return in about 2 weeks (around 11/4/2022) for Follow up, in person.       12/9/22 visit:   Pt reports she has been taking buprenorphine 12mg/day which is why she has not followed up since 10/2022.  She endorses mild cravings for opioids.  No side effects noted with buprenorphine.  Still interested in transfer to [facility].  Has new insurance.   Reports no use of methamphetamine since end of 10/2022.  Currently in IOP/virtual.  Having difficulty attending morning groups due to .  Experiencing a delay in response to her application for .    Taking bupropion 300mg/day.  Endorses some anxiety.  Taking gabapentin 300mg tid.  Only taking buspirone on an as needed basis.    Asks about vitamins/supplements, specifically asks about potassium, magnesium, vitamin d, vitamin  c, and iron supplements.  Is aware her hgb is low.  C/o menorrhagia which is lessening since she started taking OCP's 11/2022.  Does not have PCP.      [place] Prescription Drug Monitoring Program Reviewed:  Yes      Medications:  buPROPion (WELLBUTRIN XL) 300 MG 24 hr tablet, Take 1 tablet (300 mg) by mouth every morning  busPIRone (BUSPAR) 15 MG tablet, Take 1 tablet (15 mg) by mouth 2 times daily  gabapentin (NEURONTIN) 300 MG capsule, Take 1-2 capsules (300-600 mg) by mouth 3 times daily  levonorgestrel-ethinyl estradiol (NORDETTE) 0.15-30 MG-MCG tablet, Take 1 tablet by mouth daily  naloxone (NARCAN) 4 MG/0.1ML nasal spray, Spray 1 spray (4 mg) into one nostril alternating nostrils once as needed for opioid reversal every 2-3 minutes until assistance arrives (Patient not taking: Reported on 10/4/2022)  polyethylene glycol (MIRALAX) 17 GM/Dose powder, Take 17 g (1 capful) by mouth daily (Patient not taking: Reported on 10/4/2022)  SUBOXONE 12-3 MG FILM per film, Place 1 Film under the tongue 2 times daily    No current facility-administered medications on file prior to visit.      Allergies   Allergen Reactions     Bee Venom Angioedema       PMH, PSH, FamHx reviewed  Social History  Housing status: in an apartment  Employment status: Unemployed, not seeking work  Relationship status: Partnered; 4/4/22 states she has a restraining order   Children: 1 child, son born 2019    OBJECTIVE                                                      /76   Pulse 99     Physical Exam  Constitutional:       General: She is not in acute distress.     Appearance: Normal appearance. She is not diaphoretic.   Eyes:      General: No scleral icterus.     Extraocular Movements: Extraocular movements intact.   Pulmonary:      Effort: Pulmonary effort is normal.   Neurological:      General: No focal deficit present.      Mental Status: She is alert and oriented to person, place, and time.   Psychiatric:         Attention and  Perception: Attention and perception normal.         Mood and Affect: Mood is anxious. Mood is not depressed. Affect is not flat.         Speech: Speech normal. Speech is not rapid and pressured or slurred.         Behavior: Behavior is cooperative.         Thought Content: Thought content normal.         Cognition and Memory: Cognition and memory normal.      Comments: Insight and judgment fair        Labs:    UDS 12/9/22:positive for  buprenorphine  *POC urine drug screen does not screen for Fentanyl    No results found for this or any previous visit (from the past 240 hour(s)).  Patient counseling completed today:  Discussed mechanism of action, potential risks/benefits/side effects of medications and other recommendations above.  Recommend pt keep naloxone in their possession and reviewed other aspects of harm reduction to reduce risk of overdose with return to use. .  Discussed importance of avoiding isolation, building a network of supportive relationships, avoiding people/places/things associated with past use to reduce risk of relapse; including motivational interviewing regarding psychosocial treatment for addiction.     At least 30min spent in review of medical record,  review, obtaining histories, coordinating care    Yasmin Laguna MD  Addiction Medicine  [facility]  [address], [address] [address]  [phone]     slurred speech

## 2023-07-27 NOTE — H&P ADULT - PROBLEM SELECTOR PLAN 6
Patient takes nicardipine and losartan.   Hold as patient is NPO.   Hold for permissive HTN for first 24 hours.

## 2023-07-27 NOTE — H&P ADULT - ASSESSMENT
Patient is a 77 female from [facility] PMHx HTN, HLD, CVA (w/ residual aphasia and R sided hemiparesis/plegia) who was sent to the hospital due to altered mental status. Patient is being admitted to medicine for further work up and rule out stroke vs encephalopathy (metabolic vs infectious).  Patient is a 77 female from [facility] PMHx HTN, HLD, CVA (w/ residual aphasia and R sided hemiparesis/plegia) who was sent to the hospital due to altered mental status. Patient is being admitted to medicine for further work up and rule out stroke vs encephalopathy (metabolic vs infectious).  Patient is a 77 female from [facility] PMHx HTN, HLD, CVA (w/ residual aphasia and R sided hemiparesis/plegia) who was sent to the hospital due to altered mental status. Patient is being admitted to medicine for further work up and rule out stroke vs encephalopathy (metabolic vs infectious).

## 2023-07-27 NOTE — H&P ADULT - PROBLEM SELECTOR PLAN 7
Caution with insulin stacking  Estimated Creatinine Clearance: 60.9 mL/min (based on SCr of 1.1 mg/dL).       no F/U Lipid profile.   Start atorvastatin when passes SnS.

## 2023-07-28 LAB
A1C WITH ESTIMATED AVERAGE GLUCOSE RESULT: 5.6 % — SIGNIFICANT CHANGE UP (ref 4–5.6)
ANION GAP SERPL CALC-SCNC: 12 MMOL/L — SIGNIFICANT CHANGE UP (ref 5–17)
BUN SERPL-MCNC: 31 MG/DL — HIGH (ref 7–18)
CALCIUM SERPL-MCNC: 9.1 MG/DL — SIGNIFICANT CHANGE UP (ref 8.4–10.5)
CHLORIDE SERPL-SCNC: 111 MMOL/L — HIGH (ref 96–108)
CHOLEST SERPL-MCNC: 152 MG/DL — SIGNIFICANT CHANGE UP
CO2 SERPL-SCNC: 20 MMOL/L — LOW (ref 22–31)
CREAT SERPL-MCNC: 1.81 MG/DL — HIGH (ref 0.5–1.3)
EGFR: 28 ML/MIN/1.73M2 — LOW
ESTIMATED AVERAGE GLUCOSE: 114 MG/DL — SIGNIFICANT CHANGE UP (ref 68–114)
GLUCOSE BLDC GLUCOMTR-MCNC: 111 MG/DL — HIGH (ref 70–99)
GLUCOSE SERPL-MCNC: 124 MG/DL — HIGH (ref 70–99)
HCT VFR BLD CALC: 32.5 % — LOW (ref 34.5–45)
HDLC SERPL-MCNC: 51 MG/DL — SIGNIFICANT CHANGE UP
HGB BLD-MCNC: 10.4 G/DL — LOW (ref 11.5–15.5)
LIPID PNL WITH DIRECT LDL SERPL: 82 MG/DL — SIGNIFICANT CHANGE UP
MAGNESIUM SERPL-MCNC: 2.7 MG/DL — HIGH (ref 1.6–2.6)
MCHC RBC-ENTMCNC: 28 PG — SIGNIFICANT CHANGE UP (ref 27–34)
MCHC RBC-ENTMCNC: 32 GM/DL — SIGNIFICANT CHANGE UP (ref 32–36)
MCV RBC AUTO: 87.6 FL — SIGNIFICANT CHANGE UP (ref 80–100)
NON HDL CHOLESTEROL: 101 MG/DL — SIGNIFICANT CHANGE UP
NRBC # BLD: 0 /100 WBCS — SIGNIFICANT CHANGE UP (ref 0–0)
PHOSPHATE SERPL-MCNC: 4.1 MG/DL — SIGNIFICANT CHANGE UP (ref 2.5–4.5)
PLATELET # BLD AUTO: 356 K/UL — SIGNIFICANT CHANGE UP (ref 150–400)
POTASSIUM SERPL-MCNC: 3.9 MMOL/L — SIGNIFICANT CHANGE UP (ref 3.5–5.3)
POTASSIUM SERPL-SCNC: 3.9 MMOL/L — SIGNIFICANT CHANGE UP (ref 3.5–5.3)
RBC # BLD: 3.71 M/UL — LOW (ref 3.8–5.2)
RBC # FLD: 14.1 % — SIGNIFICANT CHANGE UP (ref 10.3–14.5)
SODIUM SERPL-SCNC: 143 MMOL/L — SIGNIFICANT CHANGE UP (ref 135–145)
TRIGL SERPL-MCNC: 93 MG/DL — SIGNIFICANT CHANGE UP
TROPONIN I, HIGH SENSITIVITY RESULT: 44.5 NG/L — SIGNIFICANT CHANGE UP
TSH SERPL-MCNC: 3.59 UU/ML — SIGNIFICANT CHANGE UP (ref 0.34–4.82)
WBC # BLD: 18.31 K/UL — HIGH (ref 3.8–10.5)
WBC # FLD AUTO: 18.31 K/UL — HIGH (ref 3.8–10.5)

## 2023-07-28 PROCEDURE — 70551 MRI BRAIN STEM W/O DYE: CPT | Mod: 26

## 2023-07-28 PROCEDURE — 99223 1ST HOSP IP/OBS HIGH 75: CPT | Mod: FS

## 2023-07-28 RX ORDER — HYDRALAZINE HCL 50 MG
10 TABLET ORAL ONCE
Refills: 0 | Status: COMPLETED | OUTPATIENT
Start: 2023-07-28 | End: 2023-07-28

## 2023-07-28 RX ORDER — CEFTRIAXONE 500 MG/1
1000 INJECTION, POWDER, FOR SOLUTION INTRAMUSCULAR; INTRAVENOUS ONCE
Refills: 0 | Status: COMPLETED | OUTPATIENT
Start: 2023-07-28 | End: 2023-07-28

## 2023-07-28 RX ORDER — CEFTRIAXONE 500 MG/1
1000 INJECTION, POWDER, FOR SOLUTION INTRAMUSCULAR; INTRAVENOUS EVERY 24 HOURS
Refills: 0 | Status: COMPLETED | OUTPATIENT
Start: 2023-07-29 | End: 2023-07-31

## 2023-07-28 RX ORDER — CEFTRIAXONE 500 MG/1
INJECTION, POWDER, FOR SOLUTION INTRAMUSCULAR; INTRAVENOUS
Refills: 0 | Status: COMPLETED | OUTPATIENT
Start: 2023-07-28 | End: 2023-08-01

## 2023-07-28 RX ORDER — MAGNESIUM SULFATE 500 MG/ML
1 VIAL (ML) INJECTION ONCE
Refills: 0 | Status: COMPLETED | OUTPATIENT
Start: 2023-07-28 | End: 2023-07-28

## 2023-07-28 RX ORDER — HYDRALAZINE HCL 50 MG
5 TABLET ORAL ONCE
Refills: 0 | Status: COMPLETED | OUTPATIENT
Start: 2023-07-28 | End: 2023-07-28

## 2023-07-28 RX ADMIN — Medication 100 GRAM(S): at 15:02

## 2023-07-28 RX ADMIN — Medication 20 MILLIGRAM(S): at 05:59

## 2023-07-28 RX ADMIN — Medication 55 MILLIGRAM(S): at 15:02

## 2023-07-28 RX ADMIN — Medication 300 MILLIGRAM(S): at 15:01

## 2023-07-28 RX ADMIN — CEFTRIAXONE 100 MILLIGRAM(S): 500 INJECTION, POWDER, FOR SOLUTION INTRAMUSCULAR; INTRAVENOUS at 16:46

## 2023-07-28 RX ADMIN — Medication 55 MILLIGRAM(S): at 22:53

## 2023-07-28 RX ADMIN — Medication 10 MILLIGRAM(S): at 02:28

## 2023-07-28 RX ADMIN — Medication 55 MILLIGRAM(S): at 05:59

## 2023-07-28 RX ADMIN — Medication 5 MILLIGRAM(S): at 08:28

## 2023-07-28 RX ADMIN — ENOXAPARIN SODIUM 30 MILLIGRAM(S): 100 INJECTION SUBCUTANEOUS at 15:01

## 2023-07-28 NOTE — PATIENT PROFILE ADULT - FALL HARM RISK - HARM RISK INTERVENTIONS
Assistance with ambulation/Assistance OOB with selected safe patient handling equipment/Communicate Risk of Fall with Harm to all staff/Discuss with provider need for PT consult/Monitor gait and stability/Reinforce activity limits and safety measures with patient and family/Review medications for side effects contributing to fall risk/Sit up slowly, dangle for a short time, stand at bedside before walking/Tailored Fall Risk Interventions/Toileting schedule using arm’s reach rule for commode and bathroom/Visual Cue: Yellow wristband and red socks/Bed in lowest position, wheels locked, appropriate side rails in place/Call bell, personal items and telephone in reach/Instruct patient to call for assistance before getting out of bed or chair/Non-slip footwear when patient is out of bed/[place] to call system/Physically safe environment - no spills, clutter or unnecessary equipment/Purposeful Proactive Rounding/Room/bathroom lighting operational, light cord in reach Assistance with ambulation/Assistance OOB with selected safe patient handling equipment/Communicate Risk of Fall with Harm to all staff/Discuss with provider need for PT consult/Monitor gait and stability/Reinforce activity limits and safety measures with patient and family/Review medications for side effects contributing to fall risk/Sit up slowly, dangle for a short time, stand at bedside before walking/Tailored Fall Risk Interventions/Toileting schedule using arm’s reach rule for commode and bathroom/Visual Cue: Yellow wristband and red socks/Bed in lowest position, wheels locked, appropriate side rails in place/Call bell, personal items and telephone in reach/Instruct patient to call for assistance before getting out of bed or chair/Non-slip footwear when patient is out of bed/[place] to call system/Physically safe environment - no spills, clutter or unnecessary equipment/Purposeful Proactive Rounding/Room/bathroom lighting operational, light cord in reach Assistance with ambulation/Assistance OOB with selected safe patient handling equipment/Communicate Risk of Fall with Harm to all staff/Discuss with provider need for PT consult/Monitor gait and stability/Reinforce activity limits and safety measures with patient and family/Review medications for side effects contributing to fall risk/Sit up slowly, dangle for a short time, stand at bedside before walking/Tailored Fall Risk Interventions/Toileting schedule using arm’s reach rule for commode and bathroom/Visual Cue: Yellow wristband and red socks/Bed in lowest position, wheels locked, appropriate side rails in place/Call bell, personal items and telephone in reach/Instruct patient to call for assistance before getting out of bed or chair/Non-slip footwear when patient is out of bed/[place] to call system/Physically safe environment - no spills, clutter or unnecessary equipment/Purposeful Proactive Rounding/Room/bathroom lighting operational, light cord in reach

## 2023-07-28 NOTE — PROGRESS NOTE ADULT - SUBJECTIVE AND OBJECTIVE BOX
NEUROLOGY CONSULT NOTE    NAME:  CARYN LEIVA      ASSESSMENT:  77 RHF with acute encephalopathy in setting of chronic ischemic stroke, also with left hand twitching, concerning for toxic/metabolic encephalopathy vs. breakthrough post-stroke seizure with post-ictal state vs. recrudescence of chronic stroke symptoms in the setting of urinary tract infection and acute renal failure, without neuroimaging evidence for acute ischemic stroke       RECOMMENDATIONS:      1. Seizure workup and management  - Continue Valproate 500mg IV Q8H (or Depakene liquid 500mg PO Q8H as per home regimen when patient can tolerate PO meds)  - Routine EEG approved to evaluate for subclinical seizures    2. Stroke/Encephalopathy workup  - Telemetry monitoring while inpatient  - Continue infectious and metabolic workup and continue to treat abnormalities identified    3. Secondary stroke prevention  - Q4H Neurochecks & Vital signs  - Swallow evaluation before administering any PO meds  - Aspirin 81mg PO Daily (or Aspirin 300mg [place] daily if NPO)  - Atorvastatin 40mg PO QHS or Simvastatin 20mg (up from home 10mg dose) PO QHS when patient can tolerate PO meds (goal LDL < 70 mg/dL)  - Treat BP if over 140/90 (goal /80) - No role for permissive hypertension at this time  - PT/OT  - DVT ppx: SCDs, Enoxaparin          NOTE TO BE COMPLETED - PLEASE REFER TO ABOVE ONLY AND IGNORE INFORMATION BELOW    *******************************      CHIEF COMPLAINT:  Patient is a 77y old  Female who presents with a chief complaint of Altered mental status. (28 Jul 2023 14:03)      HPI:  Patient is a 77 female from [facility] PMHx HTN, HLD, CVA (w/ residual aphasia and R sided hemiparesis/plegia) who was sent to the hospital due to altered mental status. Patient is not able to present any history. Patient is lying down in bed, awake and alert. However, patient does not speak, is not able to follow commands and does not turn face or move eyes when her name is called. As per nursing home papers  patient was noted to have change in mental status with eye fixed in one direction and unable to swallow food. Patient's sister Marika San ([phone]) was called to inquire about baseline mental status. Patient can respond with one word answers, swallows food, however does not move arms/legs on baseline. Ed note also reports patient is exhibiting b/l upper arm weakness but resident is unable to confirm that during evaluation. Unable to obtain any ROS from the patient at this time.   Of note: Patient's sister noted that patient sister was recently started on medication for muscle spasms at nursing home.  (27 Jul 2023 19:19)      NEURO HPI:      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  Seizures  Chronic constipation  No significant past surgical history      MEDICATIONS:  acetaminophen   IVPB .. 1000 milliGRAM(s) IV Intermittent once  aspirin Suppository 300 milliGRAM(s) Rectal daily  cefTRIAXone   IVPB      enoxaparin Injectable 30 milliGRAM(s) SubCutaneous every 24 hours  valproate sodium  IVPB 500 milliGRAM(s) IV Intermittent every 8 hours      ALLERGIES:  &quot; NATURAL RUBBER&quot; (Other)  latex (Other)  penicillins (Unknown)      FAMILY HISTORY:        SOCIAL HISTORY:  Denies alcohol, tobacco, or illicit drug use      REVIEW OF SYSTEMS:  GENERAL: No fever, weight changes, fatigue  EYES: No eye pain or discharge  EAR/NOSE/MOUTH/THROAT: No sinus or throat pain; No difficulty hearing  NECK: No pain or stiffness  RESPIRATORY: No cough, wheezing, chills, or hemoptysis  CARDIOVASCULAR: No chest pain, palpitations, shortness of breath, or dyspnea on exertion  GASTROINTESTINAL: No abdominal pain, nausea, vomiting, hematemesis, diarrhea, or constipation  GENITOURINARY: No dysuria, frequency, hematuria, or incontinence  SKIN: No rashes or lesions  ENDOCRINE: No heat or cold intolerance  HEMATOLOGIC: No easy bruising or bleeding  PSYCHIATRIC: No depression, anxiety, or mood swings  MUSCULOSKELETAL: No joint pain or swelling  NEUROLOGICAL: As per HPI          OBJECTIVE:    Vital Signs Last 24 Hrs  T(C): 37.1 (28 Jul 2023 14:04), Max: 37.1 (28 Jul 2023 14:04)  T(F): 98.7 (28 Jul 2023 14:04), Max: 98.7 (28 Jul 2023 14:04)  HR: 85 (28 Jul 2023 15:44) (77 - 94)  BP: 159/87 (28 Jul 2023 15:44) (159/87 - 207/87)  BP(mean): --  RR: 20 (28 Jul 2023 15:44) (18 - 24)  SpO2: 97% (28 Jul 2023 15:44) (97% - 100%)    Parameters below as of 28 Jul 2023 15:44  Patient On (Oxygen Delivery Method): room air        General Examination:  General: No acute distress  HEENT: Atraumatic, Normocephalic  Respiratory: CTA B/l.  No crackles, rhonchi, or wheezes.  Cardiovascular: RRR.  Normal S1 & S2.  Normal b/l radial and pedal pulses.    Neurological Examination:  General / Mental Status: AAO x 3.  No aphasia or dysarthria.  Naming and repetition intact.  Cranial Nerves: VFF x 4.  PERRL.  EOMI x 2, No nystagmus or diplopia.  B/l V1-V3 equal and intact to light touch and pinprick.  Symmetric facial movement and palate elevation.  B/l hearing equal to finger rub.  5/5 strength with b/l sternocleidomastoid and trapezius.  Midline tongue protrusion, with no atrophy or fasciculations.  Motor: Normal bulk & tone in all four extremities.  5/5 strength throughout all four extremities.  No downward drift, rigidity, spasticity, or tremors in any of the four extremities.  Sensory: Intact to light touch and pinprick in all four extremities.  Negative Romberg.  Reflex: 2+ and symmetric at b/l biceps, triceps, brachioradialis, patellae, and ankles.  Downgoing toes b/l.  Coordination: No dysmetria with b/l finger-to-nose and heel raise tests.  Symmetric rapid alternating movements b/l.  Gait: Normal, narrow-based gait.  No difficulty with tiptoe, heel, and tandem gaits.        LABORATORY VALUES:                        10.4   18.31 )-----------( 356      ( 28 Jul 2023 05:03 )             32.5       07-28    143  |  111<H>  |  31<H>  ----------------------------<  124<H>  3.9   |  20<L>  |  1.81<H>    Ca    9.1      28 Jul 2023 05:03  Phos  4.1     07-28  Mg     2.7     07-28    TPro  8.5<H>  /  Alb  2.9<L>  /  TBili  0.3  /  DBili  x   /  AST  15  /  ALT  10  /  AlkPhos  97  07-27 07-28 Chol 152 LDL 82 HDL 51 Trig 93    A1C with Estimated Average Glucose (07.28.23 @ 05:03)   A1C with Estimated Average Glucose Result: 5.6%  Estimated Average Glucose: 114 mg/dL                  NEUROIMAGING:          Please contact the Neurology consult service with any neurological questions.    Eligio Whitney MD   of Neurology  [facility] at [facility] NEUROLOGY CONSULT NOTE    NAME:  CARYN LEIVA      ASSESSMENT:  77 RHF with acute encephalopathy in setting of chronic ischemic stroke, also with left hand twitching, concerning for toxic/metabolic encephalopathy vs. breakthrough post-stroke seizure with post-ictal state vs. recrudescence of chronic stroke symptoms in the setting of urinary tract infection and acute renal failure, without neuroimaging evidence for acute ischemic stroke       RECOMMENDATIONS:      1. Seizure workup and management  - Continue Valproate 500mg IV Q8H (or Depakene liquid 500mg PO Q8H as per home regimen when patient can tolerate PO meds)  - Routine EEG approved to evaluate for subclinical seizures    2. Stroke/Encephalopathy workup  - Telemetry monitoring while inpatient  - Continue infectious and metabolic workup and continue to treat abnormalities identified    3. Secondary stroke prevention  - Q4H Neurochecks & Vital signs  - Swallow evaluation before administering any PO meds  - Aspirin 81mg PO Daily (or Aspirin 300mg [place] daily if NPO)  - Atorvastatin 40mg PO QHS or Simvastatin 20mg (up from home 10mg dose) PO QHS when patient can tolerate PO meds (goal LDL < 70 mg/dL)  - Treat BP if over 140/90 (goal /80) - No role for permissive hypertension at this time  - PT/OT  - DVT ppx: SCDs, Enoxaparin          NOTE TO BE COMPLETED - PLEASE REFER TO ABOVE ONLY AND IGNORE INFORMATION BELOW    *******************************      CHIEF COMPLAINT:  Patient is a 77y old  Female who presents with a chief complaint of Altered mental status. (28 Jul 2023 14:03)      HPI:  Patient is a 77 female from [facility] PMHx HTN, HLD, CVA (w/ residual aphasia and R sided hemiparesis/plegia) who was sent to the hospital due to altered mental status. Patient is not able to present any history. Patient is lying down in bed, awake and alert. However, patient does not speak, is not able to follow commands and does not turn face or move eyes when her name is called. As per nursing home papers  patient was noted to have change in mental status with eye fixed in one direction and unable to swallow food. Patient's sister Marika San ([phone]) was called to inquire about baseline mental status. Patient can respond with one word answers, swallows food, however does not move arms/legs on baseline. Ed note also reports patient is exhibiting b/l upper arm weakness but resident is unable to confirm that during evaluation. Unable to obtain any ROS from the patient at this time.   Of note: Patient's sister noted that patient sister was recently started on medication for muscle spasms at nursing home.  (27 Jul 2023 19:19)      NEURO HPI:      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  Seizures  Chronic constipation  No significant past surgical history      MEDICATIONS:  acetaminophen   IVPB .. 1000 milliGRAM(s) IV Intermittent once  aspirin Suppository 300 milliGRAM(s) Rectal daily  cefTRIAXone   IVPB      enoxaparin Injectable 30 milliGRAM(s) SubCutaneous every 24 hours  valproate sodium  IVPB 500 milliGRAM(s) IV Intermittent every 8 hours      ALLERGIES:  &quot; NATURAL RUBBER&quot; (Other)  latex (Other)  penicillins (Unknown)      FAMILY HISTORY:        SOCIAL HISTORY:  Denies alcohol, tobacco, or illicit drug use      REVIEW OF SYSTEMS:  GENERAL: No fever, weight changes, fatigue  EYES: No eye pain or discharge  EAR/NOSE/MOUTH/THROAT: No sinus or throat pain; No difficulty hearing  NECK: No pain or stiffness  RESPIRATORY: No cough, wheezing, chills, or hemoptysis  CARDIOVASCULAR: No chest pain, palpitations, shortness of breath, or dyspnea on exertion  GASTROINTESTINAL: No abdominal pain, nausea, vomiting, hematemesis, diarrhea, or constipation  GENITOURINARY: No dysuria, frequency, hematuria, or incontinence  SKIN: No rashes or lesions  ENDOCRINE: No heat or cold intolerance  HEMATOLOGIC: No easy bruising or bleeding  PSYCHIATRIC: No depression, anxiety, or mood swings  MUSCULOSKELETAL: No joint pain or swelling  NEUROLOGICAL: As per HPI          OBJECTIVE:    Vital Signs Last 24 Hrs  T(C): 37.1 (28 Jul 2023 14:04), Max: 37.1 (28 Jul 2023 14:04)  T(F): 98.7 (28 Jul 2023 14:04), Max: 98.7 (28 Jul 2023 14:04)  HR: 85 (28 Jul 2023 15:44) (77 - 94)  BP: 159/87 (28 Jul 2023 15:44) (159/87 - 207/87)  BP(mean): --  RR: 20 (28 Jul 2023 15:44) (18 - 24)  SpO2: 97% (28 Jul 2023 15:44) (97% - 100%)    Parameters below as of 28 Jul 2023 15:44  Patient On (Oxygen Delivery Method): room air        General Examination:  General: No acute distress  HEENT: Atraumatic, Normocephalic  Respiratory: CTA B/l.  No crackles, rhonchi, or wheezes.  Cardiovascular: RRR.  Normal S1 & S2.  Normal b/l radial and pedal pulses.    Neurological Examination:  General / Mental Status: AAO x 3.  No aphasia or dysarthria.  Naming and repetition intact.  Cranial Nerves: VFF x 4.  PERRL.  EOMI x 2, No nystagmus or diplopia.  B/l V1-V3 equal and intact to light touch and pinprick.  Symmetric facial movement and palate elevation.  B/l hearing equal to finger rub.  5/5 strength with b/l sternocleidomastoid and trapezius.  Midline tongue protrusion, with no atrophy or fasciculations.  Motor: Normal bulk & tone in all four extremities.  5/5 strength throughout all four extremities.  No downward drift, rigidity, spasticity, or tremors in any of the four extremities.  Sensory: Intact to light touch and pinprick in all four extremities.  Negative Romberg.  Reflex: 2+ and symmetric at b/l biceps, triceps, brachioradialis, patellae, and ankles.  Downgoing toes b/l.  Coordination: No dysmetria with b/l finger-to-nose and heel raise tests.  Symmetric rapid alternating movements b/l.  Gait: Normal, narrow-based gait.  No difficulty with tiptoe, heel, and tandem gaits.        LABORATORY VALUES:                        10.4   18.31 )-----------( 356      ( 28 Jul 2023 05:03 )             32.5       07-28    143  |  111<H>  |  31<H>  ----------------------------<  124<H>  3.9   |  20<L>  |  1.81<H>    Ca    9.1      28 Jul 2023 05:03  Phos  4.1     07-28  Mg     2.7     07-28    TPro  8.5<H>  /  Alb  2.9<L>  /  TBili  0.3  /  DBili  x   /  AST  15  /  ALT  10  /  AlkPhos  97  07-27 07-28 Chol 152 LDL 82 HDL 51 Trig 93    A1C with Estimated Average Glucose (07.28.23 @ 05:03)   A1C with Estimated Average Glucose Result: 5.6%  Estimated Average Glucose: 114 mg/dL                  NEUROIMAGING:          Please contact the Neurology consult service with any neurological questions.    Eligio Whitney MD   of Neurology  [facility] at [facility] NEUROLOGY CONSULT NOTE    NAME:  CARYN LEIVA      ASSESSMENT:  77 RHF with acute encephalopathy in setting of chronic ischemic stroke, also with left hand twitching, concerning for toxic/metabolic encephalopathy vs. breakthrough post-stroke seizure with post-ictal state vs. recrudescence of chronic stroke symptoms in the setting of urinary tract infection and acute renal failure, without neuroimaging evidence for acute ischemic stroke       RECOMMENDATIONS:      1. Seizure workup and management  - Continue Valproate 500mg IV Q8H (or Depakene liquid 500mg PO Q8H as per home regimen when patient can tolerate PO meds)  - Routine EEG approved to evaluate for subclinical seizures    2. Stroke/Encephalopathy workup  - Telemetry monitoring while inpatient  - Continue infectious and metabolic workup and continue to treat abnormalities identified    3. Secondary stroke prevention  - Q4H Neurochecks & Vital signs  - Swallow evaluation before administering any PO meds  - Aspirin 81mg PO Daily (or Aspirin 300mg [place] daily if NPO)  - Atorvastatin 40mg PO QHS or Simvastatin 20mg (up from home 10mg dose) PO QHS when patient can tolerate PO meds (goal LDL < 70 mg/dL)  - Treat BP if over 140/90 (goal /80) - No role for permissive hypertension at this time  - PT/OT  - DVT ppx: SCDs, Enoxaparin          NOTE TO BE COMPLETED - PLEASE REFER TO ABOVE ONLY AND IGNORE INFORMATION BELOW    *******************************      CHIEF COMPLAINT:  Patient is a 77y old  Female who presents with a chief complaint of Altered mental status. (28 Jul 2023 14:03)      HPI:  Patient is a 77 female from [facility] PMHx HTN, HLD, CVA (w/ residual aphasia and R sided hemiparesis/plegia) who was sent to the hospital due to altered mental status. Patient is not able to present any history. Patient is lying down in bed, awake and alert. However, patient does not speak, is not able to follow commands and does not turn face or move eyes when her name is called. As per nursing home papers  patient was noted to have change in mental status with eye fixed in one direction and unable to swallow food. Patient's sister Marika San ([phone]) was called to inquire about baseline mental status. Patient can respond with one word answers, swallows food, however does not move arms/legs on baseline. Ed note also reports patient is exhibiting b/l upper arm weakness but resident is unable to confirm that during evaluation. Unable to obtain any ROS from the patient at this time.   Of note: Patient's sister noted that patient sister was recently started on medication for muscle spasms at nursing home.  (27 Jul 2023 19:19)      NEURO HPI:      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  Seizures  Chronic constipation  No significant past surgical history      MEDICATIONS:  acetaminophen   IVPB .. 1000 milliGRAM(s) IV Intermittent once  aspirin Suppository 300 milliGRAM(s) Rectal daily  cefTRIAXone   IVPB      enoxaparin Injectable 30 milliGRAM(s) SubCutaneous every 24 hours  valproate sodium  IVPB 500 milliGRAM(s) IV Intermittent every 8 hours      ALLERGIES:  &quot; NATURAL RUBBER&quot; (Other)  latex (Other)  penicillins (Unknown)      FAMILY HISTORY:        SOCIAL HISTORY:  Denies alcohol, tobacco, or illicit drug use      REVIEW OF SYSTEMS:  GENERAL: No fever, weight changes, fatigue  EYES: No eye pain or discharge  EAR/NOSE/MOUTH/THROAT: No sinus or throat pain; No difficulty hearing  NECK: No pain or stiffness  RESPIRATORY: No cough, wheezing, chills, or hemoptysis  CARDIOVASCULAR: No chest pain, palpitations, shortness of breath, or dyspnea on exertion  GASTROINTESTINAL: No abdominal pain, nausea, vomiting, hematemesis, diarrhea, or constipation  GENITOURINARY: No dysuria, frequency, hematuria, or incontinence  SKIN: No rashes or lesions  ENDOCRINE: No heat or cold intolerance  HEMATOLOGIC: No easy bruising or bleeding  PSYCHIATRIC: No depression, anxiety, or mood swings  MUSCULOSKELETAL: No joint pain or swelling  NEUROLOGICAL: As per HPI          OBJECTIVE:    Vital Signs Last 24 Hrs  T(C): 37.1 (28 Jul 2023 14:04), Max: 37.1 (28 Jul 2023 14:04)  T(F): 98.7 (28 Jul 2023 14:04), Max: 98.7 (28 Jul 2023 14:04)  HR: 85 (28 Jul 2023 15:44) (77 - 94)  BP: 159/87 (28 Jul 2023 15:44) (159/87 - 207/87)  BP(mean): --  RR: 20 (28 Jul 2023 15:44) (18 - 24)  SpO2: 97% (28 Jul 2023 15:44) (97% - 100%)    Parameters below as of 28 Jul 2023 15:44  Patient On (Oxygen Delivery Method): room air        General Examination:  General: No acute distress  HEENT: Atraumatic, Normocephalic  Respiratory: CTA B/l.  No crackles, rhonchi, or wheezes.  Cardiovascular: RRR.  Normal S1 & S2.  Normal b/l radial and pedal pulses.    Neurological Examination:  General / Mental Status: AAO x 3.  No aphasia or dysarthria.  Naming and repetition intact.  Cranial Nerves: VFF x 4.  PERRL.  EOMI x 2, No nystagmus or diplopia.  B/l V1-V3 equal and intact to light touch and pinprick.  Symmetric facial movement and palate elevation.  B/l hearing equal to finger rub.  5/5 strength with b/l sternocleidomastoid and trapezius.  Midline tongue protrusion, with no atrophy or fasciculations.  Motor: Normal bulk & tone in all four extremities.  5/5 strength throughout all four extremities.  No downward drift, rigidity, spasticity, or tremors in any of the four extremities.  Sensory: Intact to light touch and pinprick in all four extremities.  Negative Romberg.  Reflex: 2+ and symmetric at b/l biceps, triceps, brachioradialis, patellae, and ankles.  Downgoing toes b/l.  Coordination: No dysmetria with b/l finger-to-nose and heel raise tests.  Symmetric rapid alternating movements b/l.  Gait: Normal, narrow-based gait.  No difficulty with tiptoe, heel, and tandem gaits.        LABORATORY VALUES:                        10.4   18.31 )-----------( 356      ( 28 Jul 2023 05:03 )             32.5       07-28    143  |  111<H>  |  31<H>  ----------------------------<  124<H>  3.9   |  20<L>  |  1.81<H>    Ca    9.1      28 Jul 2023 05:03  Phos  4.1     07-28  Mg     2.7     07-28    TPro  8.5<H>  /  Alb  2.9<L>  /  TBili  0.3  /  DBili  x   /  AST  15  /  ALT  10  /  AlkPhos  97  07-27 07-28 Chol 152 LDL 82 HDL 51 Trig 93    A1C with Estimated Average Glucose (07.28.23 @ 05:03)   A1C with Estimated Average Glucose Result: 5.6%  Estimated Average Glucose: 114 mg/dL                  NEUROIMAGING:          Please contact the Neurology consult service with any neurological questions.    Eligio Whitney MD   of Neurology  [facility] at [facility]

## 2023-07-28 NOTE — CONSULT NOTE ADULT - SUBJECTIVE AND OBJECTIVE BOX
CHIEF COMPLAINT:Patient is a 77y old  Female who presents with a chief complaint of Altered mental status.       HPI:  Patient is a 77 female from [facility] PMHx HTN, HLD, CVA (w/ residual aphasia and R sided hemiparesis/plegia) who was sent to the hospital due to altered mental status. Patient is not able to present any history. Patient is lying down in bed, awake and alert. However, patient does not speak, is not able to follow commands and does not turn face or move eyes when her name is called. As per nursing home papers  patient was noted to have change in mental status with eye fixed in one direction and unable to swallow food. Patient's sister Marika San ([phone]) was called to inquire about baseline mental status. Patient can respond with one word answers, swallows food, however does not move arms/legs on baseline. Ed note also reports patient is exhibiting b/l upper arm weakness but resident is unable to confirm that during evaluation. Unable to obtain any ROS from the patient at this time.     Of note: Patient's sister noted that patient sister was recently started on medication for muscle spasms at nursing home.  (27 Jul 2023 19:19)      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      Seizures      Chronic constipation            MEDICATIONS  (STANDING):  acetaminophen   IVPB .. 1000 milliGRAM(s) IV Intermittent once  aspirin Suppository 300 milliGRAM(s) Rectal daily  enoxaparin Injectable 30 milliGRAM(s) SubCutaneous every 24 hours  furosemide   Injectable 20 milliGRAM(s) IV Push daily  magnesium sulfate  IVPB 1 Gram(s) IV Intermittent once  valproate sodium  IVPB 500 milliGRAM(s) IV Intermittent every 8 hours    MEDICATIONS  (PRN):      FAMILY HISTORY:unable to obtain      SOCIAL HISTORY:    unable to obtain    Allergies    &quot; NATURAL RUBBER&quot; (Other)  latex (Other)  penicillins (Unknown)    Intolerances    	    REVIEW OF SYSTEMS:  	  [x ] Unable to obtain    PHYSICAL EXAM:  T(C): 36.4 (07-28-23 @ 09:08), Max: 36.6 (07-27-23 @ 15:30)  HR: 84 (07-28-23 @ 09:08) (71 - 84)  BP: 175/85 (07-28-23 @ 09:08) (169/72 - 207/87)  RR: 22 (07-28-23 @ 09:08) (20 - 24)  SpO2: 99% (07-28-23 @ 09:08) (99% - 100%)  Wt(kg): --  I&O's Summary      Appearance: Normal	  HEENT:   Normal oral mucosa, PERRL, EOMI	  Lymphatic: No lymphadenopathy  Cardiovascular: Normal S1 S2, No JVD, No murmurs, No edema  Respiratory: Lungs clear to auscultation	  Gastrointestinal:  Soft, Non-tender, + BS	  Skin: No rashes, No ecchymoses, No cyanosis	  Extremities: Normal range of motion, No clubbing, cyanosis or edema  Vascular: Peripheral pulses palpable 2+ bilaterally    	    ECG:  	nsr,lvh,non-specific t wave abnormality  	  	  LABS:	 	    Troponin I, High Sensitivity Result: 44.5 ng/L (07-28-23 @ 01:39)  Troponin I, High Sensitivity Result: 14.4 ng/L (07-27-23 @ 15:00)                          10.4   18.31 )-----------( 356      ( 28 Jul 2023 05:03 )             32.5     07-28    143  |  111<H>  |  31<H>  ----------------------------<  124<H>  3.9   |  20<L>  |  1.81<H>    Ca    9.1      28 Jul 2023 05:03  Phos  4.1     07-28  Mg     2.7     07-28    TPro  8.5<H>  /  Alb  2.9<L>  /  TBili  0.3  /  DBili  x   /  AST  15  /  ALT  10  /  AlkPhos  97  07-27    proBNP:   Lipid Profile: Cholesterol 152  LDL --  HDL 51  TG 93  ldl calc 82  Ratio --    HgA1c:   TSH: Thyroid Stimulating Hormone, Serum: 3.59 uU/mL (07-28 @ 05:03)        < from: MR Head No Cont (07.28.23 @ 13:25) >  ACC: [id number] EXAM:  MR BRAIN   ORDERED BY: RIKY MCFARLANE     PROCEDURE DATE:  07/28/2023          INTERPRETATION:  MR of the brain without gadolinium contrast    CLINICAL INFORMATION:   CVA  HMR  Admitting Dxs: R41.82 ALTERED MENTAL   STATUS, UNSPECIFIED    TECHNIQUE:   Sagittal and axial T1-weighted images, axial FLAIR images,   axial susceptibility-weighted/gradient echo images, axial T2-weighted   images and axial diffusion weighted images of the brain were obtained.  CONTRAST:    None    COMPARISON:   CT head stroke code preceding day    FINDINGS:    BRAIN:   The brain demonstrates extensive encephalomalacia within the   left cerebral hemisphere. This includes much of the deep hemispheric   white matter, extending from the subcortical white matter at the cerebral   hemispheric vertex to the basal ganglia and extending in continuity with   wallerian degeneration to atrophic left cerebral peduncle and left   ventral kennedy. This extensive infarction largely spares the overlying   cerebral cortex. Includes marked low signal intensity on   susceptibility-weighted images in dictating remote hemorrhage at the time   of infarction prominently involving the extreme capsule and external   capsule white matter and extending into the lateral basal ganglia and   internal capsule. This infarction also includes a large portions of basal   ganglia and left thalamus.    The right cerebral hemisphere and posterior fossa appear otherwise   largely intact. No diffusion restriction is found in thebrain.  No acute   cerebral cortical infarct is found.   No recent intracranial hemorrhage   is recognized.  No mass effect is found in the brain.   The brain also   demonstrates patchy and confluent lesions within the cyst deep cerebral   hemispheric white matter more typical for ischemic white matter disease.   These lesions are hyperintense on the long TR images, otherwise   inconspicuous. Involvement is greatest in the centrum semiovale and   periatrial white matter. Moderate ventral pontine involvement is noted.    CSF SPACES:   The ventricles, sulci and basal cisterns  appear moderate   to severely dilated reflecting diffuse brain volume loss.   There is   asymmetric ex vacuo dilatation of the left lateral ventricle.    VESSELS:   The vertebral and internal carotid arteries demonstrate   expected flow voids indicating their patency.  The left vertebral artery   is dominant caliber.    HEAD AND NECK STRUCTURES:   The orbits are unremarkable.  Paranasal   sinuses are clear.  The nasalcavity appears intact.  The central skull   base appears intact.  The nasopharynx is symmetric.  The temporal bones   appear clear of disease.  The calvarium appears unremarkable.      IMPRESSION:    1. Extensive remote infarction of the left cerebral hemispheric white   matter basal ganglia and thalamus    2. Ischemic white matter disease typical for age    3. Diffuse brain volume loss greater than typical for age    --- End of Report ---            TATIANA DOMINGUEZ MD; Attending Radiologist  This document has been electronically signed. Jul 28 2023  1:41PM    < end of copied text >  < from: CT Brain Perfusion Maps Stroke (07.27.23 @ 14:57) >  ACC: [id number] EXAM:  CT BRAIN PERFUSION MAPS STROKE   ORDERED BY: EMILY CHAVEZ     ACC: [id number] EXAM:  CT ANGIO BRAIN STROKE PROTC IC   ORDERED BY: EMILY CHAVEZ     ACC: [id number] EXAM:  CT BRAIN STROKE PROTOCOL   ORDERED BY: EMILY CHAVEZ     ACC: [id number] EXAM:  CT ANGIO NECK STROKE PROTCL IC   ORDERED BY: EMILY CHAVEZ     PROCEDURE DATE:  07/27/2023          INTERPRETATION:  HEAD CT, CT PERFUSION, CTA OF THE [place] OF TY AND   NECK:    INDICATIONS: Stroke Code.    TECHNIQUE:    HEAD CT:    Serial axial images were obtained from the skull base to the vertex   without the use of intravenous contrast. RAPID artificial intelligence   was used for perfusion analysis and for preliminary evaluation of   intracranial hemorrhage.    CTA [place] OF TY:    After the intravenous power injection of non-ionic contrast material,   serial thin sections were obtained through the intracranial circulation   on a multislice CT scanner.  Images were reformatted using a [id number]   software package and viewed on a dedicated workstation in multiple   planes. MIP image analysis was performed on a separate workstation.    CTA NECK:    After the intravenous power injection of non-ionic contrast material,   serial thin sectionswere obtained through the cervical circulation on a   multislice CT scanner.  Images were reformatted using a dedicated 3D   software package and viewed on a dedicated workstation in multiple   planes. MIP image analysis was performed on a separate workstation.    CONTRAST: 130 cc Omnipaque 350 was administered. 70 cc was discarded.      COMPARISON EXAMINATION: None.    FINDINGS:  Left basal ganglia/subinsular prominent region of encephalomalacia and   extensive left periventricular gliotic change consistent with a chronic   infarction in the left MCA territory. There is associated ex vacuo   dilatation of the left lateral ventricle.  VENTRICLES AND SULCI: No hydrocephalus.  INTRA-AXIAL: No intracranial mass, acute hemorrhage, or midline shift is   present.  EXTRA-AXIAL: No extra-axial fluid collection is present.  INTRACRANIAL HEMORRHAGE: None.    VISUALIZED SINUSES: No air-fluid levels are identified.  VISUALIZED MASTOIDS:  Clear  CALVARIUM:  Intact      CT RAPID PERFUSION:  Left cerebral large region of perfusion abnormality corresponding to the   area of chronic infarction    INFARCT CORE: 57 mL.    TISSUE AT RISK: 236 mL.        CTA [place] OF TY:    ANTERIOR CIRCULATION    ICA  CAVERNOUS, SUPRACLINOID, BIFURCATION SEGMENTS: Patent without flow   limiting stenosis. Both posterior communicating arteries are present.    ANTERIOR CEREBRAL ARTERIES: Bilateral A1, anterior communicating and A2   anterior cerebral arteries are unremarkable in course and caliber without   flow limiting stenosis.    MIDDLE CEREBRAL ARTERIES: Patent bilateral M1, M2, and distal MCA   branches without flow limiting stenosis.    POSTERIOR CIRCULATION:    VERTEBRAL ARTERIES: Patent without flow limiting stenosis    BASILAR ARTERY: Patent no flow limiting stenosis.    POSTERIOR CEREBRAL ARTERIES: Patent without flow limiting stenosis.    CTA NECK:    GREAT VESSELS: Visualized segments are patent, no flow limiting stenosis.    COMMON CAROTID ARTERIES:  RIGHT: Patent without flow limiting stenosis  LEFT: Patent without flow limiting stenosis    INTERNAL CAROTID ARTERIES:  RIGHT: Patent no evidence for any hemodynamically significant stenosis at   the ICA origin by NASCET criteria.  LEFT: Patent no evidence for any hemodynamically significant stenosis at   the ICA origin by NASCET criteria.    VERTEBRAL ARTERIES:    RIGHT: Patent no evidence for any flow limiting stenosis.  LEFT: Patent no evidence for any flow limiting stenosis.      SOFT TISSUES: Unremarkable    BONES: Unremarkable    IMPRESSION:    HEAD CT: No acute intracranial hemorrhage or acute territorial   infarction. Chronic left MCA territory infarction.    CT PERFUSION demonstrated: Perfusion abnormality corresponding to the   chronically infarcted left MCA territory.    INFARCT CORE: 57 mL.  TISSUE AT RISK: 236 mL.      If symptoms persist consider follow-up imaging with MRI of the brain if   no contraindications.     CTA COW:  Patent intracranial circulation without flow limiting stenosis   or large vessel occlusion.    CTA NECK: Patent, ECAs, ICAs, no  hemodynamically significant stenosis at    ICA origins by NASCET criteria.  Bilateral vertebral arteries are patent without flow limiting stenosis.      Notification to clinician of alert:  Dr. EMILY CHAVEZ was notified about the noncontrast head CT finding at   2:39 PM on 7/27/2023 with readback confirmation. The opportunity for   questions was provided and all questions asked were answered.    --- End of Report ---            CHALINO CARRASCO MD; AttendingRadiologist  This document has been electronically signed. Jul 27 2023  3:06PM           CHIEF COMPLAINT:Patient is a 77y old  Female who presents with a chief complaint of Altered mental status.       HPI:  Patient is a 77 female from [facility] PMHx HTN, HLD, CVA (w/ residual aphasia and R sided hemiparesis/plegia) who was sent to the hospital due to altered mental status. Patient is not able to present any history. Patient is lying down in bed, awake and alert. However, patient does not speak, is not able to follow commands and does not turn face or move eyes when her name is called. As per nursing home papers  patient was noted to have change in mental status with eye fixed in one direction and unable to swallow food. Patient's sister Marika San ([phone]) was called to inquire about baseline mental status. Patient can respond with one word answers, swallows food, however does not move arms/legs on baseline. Ed note also reports patient is exhibiting b/l upper arm weakness but resident is unable to confirm that during evaluation. Unable to obtain any ROS from the patient at this time.     Of note: Patient's sister noted that patient sister was recently started on medication for muscle spasms at nursing home.  (27 Jul 2023 19:19)      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      Seizures      Chronic constipation            MEDICATIONS  (STANDING):  acetaminophen   IVPB .. 1000 milliGRAM(s) IV Intermittent once  aspirin Suppository 300 milliGRAM(s) Rectal daily  enoxaparin Injectable 30 milliGRAM(s) SubCutaneous every 24 hours  furosemide   Injectable 20 milliGRAM(s) IV Push daily  magnesium sulfate  IVPB 1 Gram(s) IV Intermittent once  valproate sodium  IVPB 500 milliGRAM(s) IV Intermittent every 8 hours    MEDICATIONS  (PRN):      FAMILY HISTORY:unable to obtain      SOCIAL HISTORY:    unable to obtain    Allergies    &quot; NATURAL RUBBER&quot; (Other)  latex (Other)  penicillins (Unknown)    Intolerances    	    REVIEW OF SYSTEMS:  	  [x ] Unable to obtain    PHYSICAL EXAM:  T(C): 36.4 (07-28-23 @ 09:08), Max: 36.6 (07-27-23 @ 15:30)  HR: 84 (07-28-23 @ 09:08) (71 - 84)  BP: 175/85 (07-28-23 @ 09:08) (169/72 - 207/87)  RR: 22 (07-28-23 @ 09:08) (20 - 24)  SpO2: 99% (07-28-23 @ 09:08) (99% - 100%)  Wt(kg): --  I&O's Summary      Appearance: Normal	  HEENT:   Normal oral mucosa, PERRL, EOMI	  Lymphatic: No lymphadenopathy  Cardiovascular: Normal S1 S2, No JVD, No murmurs, No edema  Respiratory: Lungs clear to auscultation	  Gastrointestinal:  Soft, Non-tender, + BS	  Skin: No rashes, No ecchymoses, No cyanosis	  Extremities: Normal range of motion, No clubbing, cyanosis or edema  Vascular: Peripheral pulses palpable 2+ bilaterally    	    ECG:  	nsr,lvh,non-specific t wave abnormality  	  	  LABS:	 	    Troponin I, High Sensitivity Result: 44.5 ng/L (07-28-23 @ 01:39)  Troponin I, High Sensitivity Result: 14.4 ng/L (07-27-23 @ 15:00)                          10.4   18.31 )-----------( 356      ( 28 Jul 2023 05:03 )             32.5     07-28    143  |  111<H>  |  31<H>  ----------------------------<  124<H>  3.9   |  20<L>  |  1.81<H>    Ca    9.1      28 Jul 2023 05:03  Phos  4.1     07-28  Mg     2.7     07-28    TPro  8.5<H>  /  Alb  2.9<L>  /  TBili  0.3  /  DBili  x   /  AST  15  /  ALT  10  /  AlkPhos  97  07-27    proBNP:   Lipid Profile: Cholesterol 152  LDL --  HDL 51  TG 93  ldl calc 82  Ratio --    HgA1c:   TSH: Thyroid Stimulating Hormone, Serum: 3.59 uU/mL (07-28 @ 05:03)        < from: MR Head No Cont (07.28.23 @ 13:25) >  ACC: [id number] EXAM:  MR BRAIN   ORDERED BY: RIKY MCFARLANE     PROCEDURE DATE:  07/28/2023          INTERPRETATION:  MR of the brain without gadolinium contrast    CLINICAL INFORMATION:   CVA  HMR  Admitting Dxs: R41.82 ALTERED MENTAL   STATUS, UNSPECIFIED    TECHNIQUE:   Sagittal and axial T1-weighted images, axial FLAIR images,   axial susceptibility-weighted/gradient echo images, axial T2-weighted   images and axial diffusion weighted images of the brain were obtained.  CONTRAST:    None    COMPARISON:   CT head stroke code preceding day    FINDINGS:    BRAIN:   The brain demonstrates extensive encephalomalacia within the   left cerebral hemisphere. This includes much of the deep hemispheric   white matter, extending from the subcortical white matter at the cerebral   hemispheric vertex to the basal ganglia and extending in continuity with   wallerian degeneration to atrophic left cerebral peduncle and left   ventral kennedy. This extensive infarction largely spares the overlying   cerebral cortex. Includes marked low signal intensity on   susceptibility-weighted images in dictating remote hemorrhage at the time   of infarction prominently involving the extreme capsule and external   capsule white matter and extending into the lateral basal ganglia and   internal capsule. This infarction also includes a large portions of basal   ganglia and left thalamus.    The right cerebral hemisphere and posterior fossa appear otherwise   largely intact. No diffusion restriction is found in thebrain.  No acute   cerebral cortical infarct is found.   No recent intracranial hemorrhage   is recognized.  No mass effect is found in the brain.   The brain also   demonstrates patchy and confluent lesions within the cyst deep cerebral   hemispheric white matter more typical for ischemic white matter disease.   These lesions are hyperintense on the long TR images, otherwise   inconspicuous. Involvement is greatest in the centrum semiovale and   periatrial white matter. Moderate ventral pontine involvement is noted.    CSF SPACES:   The ventricles, sulci and basal cisterns  appear moderate   to severely dilated reflecting diffuse brain volume loss.   There is   asymmetric ex vacuo dilatation of the left lateral ventricle.    VESSELS:   The vertebral and internal carotid arteries demonstrate   expected flow voids indicating their patency.  The left vertebral artery   is dominant caliber.    HEAD AND NECK STRUCTURES:   The orbits are unremarkable.  Paranasal   sinuses are clear.  The nasalcavity appears intact.  The central skull   base appears intact.  The nasopharynx is symmetric.  The temporal bones   appear clear of disease.  The calvarium appears unremarkable.      IMPRESSION:    1. Extensive remote infarction of the left cerebral hemispheric white   matter basal ganglia and thalamus    2. Ischemic white matter disease typical for age    3. Diffuse brain volume loss greater than typical for age    --- End of Report ---            TATIANA DOMINGUEZ MD; Attending Radiologist  This document has been electronically signed. Jul 28 2023  1:41PM    < end of copied text >  < from: CT Brain Perfusion Maps Stroke (07.27.23 @ 14:57) >  ACC: [id number] EXAM:  CT BRAIN PERFUSION MAPS STROKE   ORDERED BY: EMILY CHAVEZ     ACC: [id number] EXAM:  CT ANGIO BRAIN STROKE PROTC IC   ORDERED BY: EMILY CHAVEZ     ACC: [id number] EXAM:  CT BRAIN STROKE PROTOCOL   ORDERED BY: EMILY CHAVEZ     ACC: [id number] EXAM:  CT ANGIO NECK STROKE PROTCL IC   ORDERED BY: EMILY CHAVEZ     PROCEDURE DATE:  07/27/2023          INTERPRETATION:  HEAD CT, CT PERFUSION, CTA OF THE [place] OF TY AND   NECK:    INDICATIONS: Stroke Code.    TECHNIQUE:    HEAD CT:    Serial axial images were obtained from the skull base to the vertex   without the use of intravenous contrast. RAPID artificial intelligence   was used for perfusion analysis and for preliminary evaluation of   intracranial hemorrhage.    CTA [place] OF TY:    After the intravenous power injection of non-ionic contrast material,   serial thin sections were obtained through the intracranial circulation   on a multislice CT scanner.  Images were reformatted using a [id number]   software package and viewed on a dedicated workstation in multiple   planes. MIP image analysis was performed on a separate workstation.    CTA NECK:    After the intravenous power injection of non-ionic contrast material,   serial thin sectionswere obtained through the cervical circulation on a   multislice CT scanner.  Images were reformatted using a dedicated 3D   software package and viewed on a dedicated workstation in multiple   planes. MIP image analysis was performed on a separate workstation.    CONTRAST: 130 cc Omnipaque 350 was administered. 70 cc was discarded.      COMPARISON EXAMINATION: None.    FINDINGS:  Left basal ganglia/subinsular prominent region of encephalomalacia and   extensive left periventricular gliotic change consistent with a chronic   infarction in the left MCA territory. There is associated ex vacuo   dilatation of the left lateral ventricle.  VENTRICLES AND SULCI: No hydrocephalus.  INTRA-AXIAL: No intracranial mass, acute hemorrhage, or midline shift is   present.  EXTRA-AXIAL: No extra-axial fluid collection is present.  INTRACRANIAL HEMORRHAGE: None.    VISUALIZED SINUSES: No air-fluid levels are identified.  VISUALIZED MASTOIDS:  Clear  CALVARIUM:  Intact      CT RAPID PERFUSION:  Left cerebral large region of perfusion abnormality corresponding to the   area of chronic infarction    INFARCT CORE: 57 mL.    TISSUE AT RISK: 236 mL.        CTA [place] OF TY:    ANTERIOR CIRCULATION    ICA  CAVERNOUS, SUPRACLINOID, BIFURCATION SEGMENTS: Patent without flow   limiting stenosis. Both posterior communicating arteries are present.    ANTERIOR CEREBRAL ARTERIES: Bilateral A1, anterior communicating and A2   anterior cerebral arteries are unremarkable in course and caliber without   flow limiting stenosis.    MIDDLE CEREBRAL ARTERIES: Patent bilateral M1, M2, and distal MCA   branches without flow limiting stenosis.    POSTERIOR CIRCULATION:    VERTEBRAL ARTERIES: Patent without flow limiting stenosis    BASILAR ARTERY: Patent no flow limiting stenosis.    POSTERIOR CEREBRAL ARTERIES: Patent without flow limiting stenosis.    CTA NECK:    GREAT VESSELS: Visualized segments are patent, no flow limiting stenosis.    COMMON CAROTID ARTERIES:  RIGHT: Patent without flow limiting stenosis  LEFT: Patent without flow limiting stenosis    INTERNAL CAROTID ARTERIES:  RIGHT: Patent no evidence for any hemodynamically significant stenosis at   the ICA origin by NASCET criteria.  LEFT: Patent no evidence for any hemodynamically significant stenosis at   the ICA origin by NASCET criteria.    VERTEBRAL ARTERIES:    RIGHT: Patent no evidence for any flow limiting stenosis.  LEFT: Patent no evidence for any flow limiting stenosis.      SOFT TISSUES: Unremarkable    BONES: Unremarkable    IMPRESSION:    HEAD CT: No acute intracranial hemorrhage or acute territorial   infarction. Chronic left MCA territory infarction.    CT PERFUSION demonstrated: Perfusion abnormality corresponding to the   chronically infarcted left MCA territory.    INFARCT CORE: 57 mL.  TISSUE AT RISK: 236 mL.      If symptoms persist consider follow-up imaging with MRI of the brain if   no contraindications.     CTA COW:  Patent intracranial circulation without flow limiting stenosis   or large vessel occlusion.    CTA NECK: Patent, ECAs, ICAs, no  hemodynamically significant stenosis at    ICA origins by NASCET criteria.  Bilateral vertebral arteries are patent without flow limiting stenosis.      Notification to clinician of alert:  Dr. EMILY CHAVEZ was notified about the noncontrast head CT finding at   2:39 PM on 7/27/2023 with readback confirmation. The opportunity for   questions was provided and all questions asked were answered.    --- End of Report ---            CHALINO CARRASCO MD; AttendingRadiologist  This document has been electronically signed. Jul 27 2023  3:06PM           CHIEF COMPLAINT:Patient is a 77y old  Female who presents with a chief complaint of Altered mental status.       HPI:  Patient is a 77 female from [facility] PMHx HTN, HLD, CVA (w/ residual aphasia and R sided hemiparesis/plegia) who was sent to the hospital due to altered mental status. Patient is not able to present any history. Patient is lying down in bed, awake and alert. However, patient does not speak, is not able to follow commands and does not turn face or move eyes when her name is called. As per nursing home papers  patient was noted to have change in mental status with eye fixed in one direction and unable to swallow food. Patient's sister Marika San ([phone]) was called to inquire about baseline mental status. Patient can respond with one word answers, swallows food, however does not move arms/legs on baseline. Ed note also reports patient is exhibiting b/l upper arm weakness but resident is unable to confirm that during evaluation. Unable to obtain any ROS from the patient at this time.     Of note: Patient's sister noted that patient sister was recently started on medication for muscle spasms at nursing home.  (27 Jul 2023 19:19)      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      Seizures      Chronic constipation            MEDICATIONS  (STANDING):  acetaminophen   IVPB .. 1000 milliGRAM(s) IV Intermittent once  aspirin Suppository 300 milliGRAM(s) Rectal daily  enoxaparin Injectable 30 milliGRAM(s) SubCutaneous every 24 hours  furosemide   Injectable 20 milliGRAM(s) IV Push daily  magnesium sulfate  IVPB 1 Gram(s) IV Intermittent once  valproate sodium  IVPB 500 milliGRAM(s) IV Intermittent every 8 hours    MEDICATIONS  (PRN):      FAMILY HISTORY:unable to obtain      SOCIAL HISTORY:    unable to obtain    Allergies    &quot; NATURAL RUBBER&quot; (Other)  latex (Other)  penicillins (Unknown)    Intolerances    	    REVIEW OF SYSTEMS:  	  [x ] Unable to obtain    PHYSICAL EXAM:  T(C): 36.4 (07-28-23 @ 09:08), Max: 36.6 (07-27-23 @ 15:30)  HR: 84 (07-28-23 @ 09:08) (71 - 84)  BP: 175/85 (07-28-23 @ 09:08) (169/72 - 207/87)  RR: 22 (07-28-23 @ 09:08) (20 - 24)  SpO2: 99% (07-28-23 @ 09:08) (99% - 100%)  Wt(kg): --  I&O's Summary      Appearance: Normal	  HEENT:   Normal oral mucosa, PERRL, EOMI	  Lymphatic: No lymphadenopathy  Cardiovascular: Normal S1 S2, No JVD, No murmurs, No edema  Respiratory: Lungs clear to auscultation	  Gastrointestinal:  Soft, Non-tender, + BS	  Skin: No rashes, No ecchymoses, No cyanosis	  Extremities: Normal range of motion, No clubbing, cyanosis or edema  Vascular: Peripheral pulses palpable 2+ bilaterally    	    ECG:  	nsr,lvh,non-specific t wave abnormality  	  	  LABS:	 	    Troponin I, High Sensitivity Result: 44.5 ng/L (07-28-23 @ 01:39)  Troponin I, High Sensitivity Result: 14.4 ng/L (07-27-23 @ 15:00)                          10.4   18.31 )-----------( 356      ( 28 Jul 2023 05:03 )             32.5     07-28    143  |  111<H>  |  31<H>  ----------------------------<  124<H>  3.9   |  20<L>  |  1.81<H>    Ca    9.1      28 Jul 2023 05:03  Phos  4.1     07-28  Mg     2.7     07-28    TPro  8.5<H>  /  Alb  2.9<L>  /  TBili  0.3  /  DBili  x   /  AST  15  /  ALT  10  /  AlkPhos  97  07-27    proBNP:   Lipid Profile: Cholesterol 152  LDL --  HDL 51  TG 93  ldl calc 82  Ratio --    HgA1c:   TSH: Thyroid Stimulating Hormone, Serum: 3.59 uU/mL (07-28 @ 05:03)        < from: MR Head No Cont (07.28.23 @ 13:25) >  ACC: [id number] EXAM:  MR BRAIN   ORDERED BY: RIKY MCFARLANE     PROCEDURE DATE:  07/28/2023          INTERPRETATION:  MR of the brain without gadolinium contrast    CLINICAL INFORMATION:   CVA  HMR  Admitting Dxs: R41.82 ALTERED MENTAL   STATUS, UNSPECIFIED    TECHNIQUE:   Sagittal and axial T1-weighted images, axial FLAIR images,   axial susceptibility-weighted/gradient echo images, axial T2-weighted   images and axial diffusion weighted images of the brain were obtained.  CONTRAST:    None    COMPARISON:   CT head stroke code preceding day    FINDINGS:    BRAIN:   The brain demonstrates extensive encephalomalacia within the   left cerebral hemisphere. This includes much of the deep hemispheric   white matter, extending from the subcortical white matter at the cerebral   hemispheric vertex to the basal ganglia and extending in continuity with   wallerian degeneration to atrophic left cerebral peduncle and left   ventral kennedy. This extensive infarction largely spares the overlying   cerebral cortex. Includes marked low signal intensity on   susceptibility-weighted images in dictating remote hemorrhage at the time   of infarction prominently involving the extreme capsule and external   capsule white matter and extending into the lateral basal ganglia and   internal capsule. This infarction also includes a large portions of basal   ganglia and left thalamus.    The right cerebral hemisphere and posterior fossa appear otherwise   largely intact. No diffusion restriction is found in thebrain.  No acute   cerebral cortical infarct is found.   No recent intracranial hemorrhage   is recognized.  No mass effect is found in the brain.   The brain also   demonstrates patchy and confluent lesions within the cyst deep cerebral   hemispheric white matter more typical for ischemic white matter disease.   These lesions are hyperintense on the long TR images, otherwise   inconspicuous. Involvement is greatest in the centrum semiovale and   periatrial white matter. Moderate ventral pontine involvement is noted.    CSF SPACES:   The ventricles, sulci and basal cisterns  appear moderate   to severely dilated reflecting diffuse brain volume loss.   There is   asymmetric ex vacuo dilatation of the left lateral ventricle.    VESSELS:   The vertebral and internal carotid arteries demonstrate   expected flow voids indicating their patency.  The left vertebral artery   is dominant caliber.    HEAD AND NECK STRUCTURES:   The orbits are unremarkable.  Paranasal   sinuses are clear.  The nasalcavity appears intact.  The central skull   base appears intact.  The nasopharynx is symmetric.  The temporal bones   appear clear of disease.  The calvarium appears unremarkable.      IMPRESSION:    1. Extensive remote infarction of the left cerebral hemispheric white   matter basal ganglia and thalamus    2. Ischemic white matter disease typical for age    3. Diffuse brain volume loss greater than typical for age    --- End of Report ---            TATIANA DOMINGUEZ MD; Attending Radiologist  This document has been electronically signed. Jul 28 2023  1:41PM    < end of copied text >  < from: CT Brain Perfusion Maps Stroke (07.27.23 @ 14:57) >  ACC: [id number] EXAM:  CT BRAIN PERFUSION MAPS STROKE   ORDERED BY: EMILY CHAVEZ     ACC: [id number] EXAM:  CT ANGIO BRAIN STROKE PROTC IC   ORDERED BY: EMILY CHAVEZ     ACC: [id number] EXAM:  CT BRAIN STROKE PROTOCOL   ORDERED BY: EMILY CHAVEZ     ACC: [id number] EXAM:  CT ANGIO NECK STROKE PROTCL IC   ORDERED BY: EMILY CHAVEZ     PROCEDURE DATE:  07/27/2023          INTERPRETATION:  HEAD CT, CT PERFUSION, CTA OF THE [place] AND   NECK:    INDICATIONS: Stroke Code.    TECHNIQUE:    HEAD CT:    Serial axial images were obtained from the skull base to the vertex   without the use of intravenous contrast. RAPID artificial intelligence   was used for perfusion analysis and for preliminary evaluation of   intracranial hemorrhage.    CTA [place]:    After the intravenous power injection of non-ionic contrast material,   serial thin sections were obtained through the intracranial circulation   on a multislice CT scanner.  Images were reformatted using a [id number]   software package and viewed on a dedicated workstation in multiple   planes. MIP image analysis was performed on a separate workstation.    CTA NECK:    After the intravenous power injection of non-ionic contrast material,   serial thin sectionswere obtained through the cervical circulation on a   multislice CT scanner.  Images were reformatted using a dedicated 3D   software package and viewed on a dedicated workstation in multiple   planes. MIP image analysis was performed on a separate workstation.    CONTRAST: 130 cc Omnipaque 350 was administered. 70 cc was discarded.      COMPARISON EXAMINATION: None.    FINDINGS:  Left basal ganglia/subinsular prominent region of encephalomalacia and   extensive left periventricular gliotic change consistent with a chronic   infarction in the left MCA territory. There is associated ex vacuo   dilatation of the left lateral ventricle.  VENTRICLES AND SULCI: No hydrocephalus.  INTRA-AXIAL: No intracranial mass, acute hemorrhage, or midline shift is   present.  EXTRA-AXIAL: No extra-axial fluid collection is present.  INTRACRANIAL HEMORRHAGE: None.    VISUALIZED SINUSES: No air-fluid levels are identified.  VISUALIZED MASTOIDS:  Clear  CALVARIUM:  Intact      CT RAPID PERFUSION:  Left cerebral large region of perfusion abnormality corresponding to the   area of chronic infarction    INFARCT CORE: 57 mL.    TISSUE AT RISK: 236 mL.        CTA [place]:    ANTERIOR CIRCULATION    ICA  CAVERNOUS, SUPRACLINOID, BIFURCATION SEGMENTS: Patent without flow   limiting stenosis. Both posterior communicating arteries are present.    ANTERIOR CEREBRAL ARTERIES: Bilateral A1, anterior communicating and A2   anterior cerebral arteries are unremarkable in course and caliber without   flow limiting stenosis.    MIDDLE CEREBRAL ARTERIES: Patent bilateral M1, M2, and distal MCA   branches without flow limiting stenosis.    POSTERIOR CIRCULATION:    VERTEBRAL ARTERIES: Patent without flow limiting stenosis    BASILAR ARTERY: Patent no flow limiting stenosis.    POSTERIOR CEREBRAL ARTERIES: Patent without flow limiting stenosis.    CTA NECK:    GREAT VESSELS: Visualized segments are patent, no flow limiting stenosis.    COMMON CAROTID ARTERIES:  RIGHT: Patent without flow limiting stenosis  LEFT: Patent without flow limiting stenosis    INTERNAL CAROTID ARTERIES:  RIGHT: Patent no evidence for any hemodynamically significant stenosis at   the ICA origin by NASCET criteria.  LEFT: Patent no evidence for any hemodynamically significant stenosis at   the ICA origin by NASCET criteria.    VERTEBRAL ARTERIES:    RIGHT: Patent no evidence for any flow limiting stenosis.  LEFT: Patent no evidence for any flow limiting stenosis.      SOFT TISSUES: Unremarkable    BONES: Unremarkable    IMPRESSION:    HEAD CT: No acute intracranial hemorrhage or acute territorial   infarction. Chronic left MCA territory infarction.    CT PERFUSION demonstrated: Perfusion abnormality corresponding to the   chronically infarcted left MCA territory.    INFARCT CORE: 57 mL.  TISSUE AT RISK: 236 mL.      If symptoms persist consider follow-up imaging with MRI of the brain if   no contraindications.     CTA COW:  Patent intracranial circulation without flow limiting stenosis   or large vessel occlusion.    CTA NECK: Patent, ECAs, ICAs, no  hemodynamically significant stenosis at    ICA origins by NASCET criteria.  Bilateral vertebral arteries are patent without flow limiting stenosis.      Notification to clinician of alert:  Dr. EMILY CHAVEZ was notified about the noncontrast head CT finding at   2:39 PM on 7/27/2023 with readback confirmation. The opportunity for   questions was provided and all questions asked were answered.    --- End of Report ---            CHALINO CARRASCO MD; AttendingRadiologist  This document has been electronically signed. Jul 27 2023  3:06PM

## 2023-07-28 NOTE — CONSULT NOTE ADULT - ASSESSMENT
77 female from [facility] PMHx HTN, HLD, CVA (w/ residual aphasia and R sided hemiparesis/plegia) who was sent to the hospital due to altered mental status.  1.No new CVA on MRI.  2.Elevated wbc-pan cx, start Rocephin for UTI.  3.Neurology eval.  4.D/C IV lasix.  5.HTN-hold bp medication.  6.Lipid d/o-hold statin.  7.Await speech and swallow eval.  8.GI and DVT prophylaxis. 77 female from [facility] PMHx HTN, HLD, CVA (w/ residual aphasia and R sided hemiparesis/plegia) who was sent to the hospital due to altered mental status.  1.No new CVA on MRI.  2.Elevated wbc-pan cx, start Rocephin for UTI.  3.Neurology eval.  4.D/C IV lasix.  5.HTN-hold bp medication.  6.Lipid d/o-hold statin.  7.Await speech and swallow eval.  8.GI and DVT prophylaxis. 77 female from [facility] PMHx HTN, HLD, CVA (w/ residual aphasia and R sided hemiparesis/plegia) who was sent to the hospital due to altered mental status.  1.No new CVA on MRI.  2.Elevated wbc-pan cx, start Rocephin for UTI.  3.Neurology eval.  4.D/C IV lasix.  5.HTN-hold bp medication.  6.Lipid d/o-hold statin.  7.Await speech and swallow eval.  8.GI and DVT prophylaxis.

## 2023-07-28 NOTE — CONSULT NOTE ADULT - SUBJECTIVE AND OBJECTIVE BOX
NEUROLOGY CONSULT NOTE    NAME:  CARYN LEIVA      ASSESSMENT:        RECOMMENDATIONS:        NOTE TO BE COMPLETED - PLEASE REFER TO ABOVE ONLY AND IGNORE INFORMATION BELOW    *******************************      CHIEF COMPLAINT:  Patient is a 77y old  Female who presents with a chief complaint of Altered mental status. (19 Aug 2023 14:49)      HPI:  Patient is a 77 female from [facility] PMHx HTN, HLD, CVA (w/ residual aphasia and R sided hemiparesis/plegia) who was sent to the hospital due to altered mental status. Patient is not able to present any history. Patient is lying down in bed, awake and alert. However, patient does not speak, is not able to follow commands and does not turn face or move eyes when her name is called. As per nursing home papers  patient was noted to have change in mental status with eye fixed in one direction and unable to swallow food. Patient's sister Marika San ([phone]) was called to inquire about baseline mental status. Patient can respond with one word answers, swallows food, however does not move arms/legs on baseline. Ed note also reports patient is exhibiting b/l upper arm weakness but resident is unable to confirm that during evaluation. Unable to obtain any ROS from the patient at this time.     Of note: Patient's sister noted that patient sister was recently started on medication for muscle spasms at nursing home.  (27 Jul 2023 19:19)      NEURO HPI:      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      Seizures      Chronic constipation      No significant past surgical history          MEDICATIONS:  acetaminophen   Oral Liquid .. 650 milliGRAM(s) Enteral Tube every 6 hours PRN  aspirin  chewable 324 milliGRAM(s) Oral daily  atorvastatin 20 milliGRAM(s) Oral at bedtime  carbidopa/levodopa  25/100 1 Tablet(s) Oral <User Schedule>  cefepime   IVPB 1000 milliGRAM(s) IV Intermittent every 24 hours  cefepime   IVPB      chlorhexidine 2% Cloths 1 Application(s) Topical daily  cloNIDine Patch 0.1 mG/24Hr(s) 1 patch Transdermal every 7 days  metoprolol tartrate 25 milliGRAM(s) Oral two times a day  morphine  - Injectable 2 milliGRAM(s) IV Push every 6 hours PRN  pantoprazole   Suspension 40 milliGRAM(s) Oral daily  valproate sodium  IVPB 500 milliGRAM(s) IV Intermittent every 8 hours      ALLERGIES:  &quot; NATURAL RUBBER&quot; (Other)  latex (Other)  penicillins (Unknown)      FAMILY HISTORY:        SOCIAL HISTORY:  Denies alcohol, tobacco, or illicit drug use      REVIEW OF SYSTEMS:  GENERAL: No fever, weight changes, fatigue  EYES: No eye pain or discharge  EAR/NOSE/MOUTH/THROAT: No sinus or throat pain; No difficulty hearing  NECK: No pain or stiffness  RESPIRATORY: No cough, wheezing, chills, or hemoptysis  CARDIOVASCULAR: No chest pain, palpitations, shortness of breath, or dyspnea on exertion  GASTROINTESTINAL: No abdominal pain, nausea, vomiting, hematemesis, diarrhea, or constipation  GENITOURINARY: No dysuria, frequency, hematuria, or incontinence  SKIN: No rashes or lesions  ENDOCRINE: No heat or cold intolerance  HEMATOLOGIC: No easy bruising or bleeding  PSYCHIATRIC: No depression, anxiety, or mood swings  MUSCULOSKELETAL: No joint pain or swelling  NEUROLOGICAL: As per HPI          OBJECTIVE:    Vital Signs Last 24 Hrs  T(C): 36.7 (19 Aug 2023 13:37), Max: 37.1 (19 Aug 2023 05:26)  T(F): 98.1 (19 Aug 2023 13:37), Max: 98.8 (19 Aug 2023 05:26)  HR: 90 (19 Aug 2023 18:50) (79 - 90)  BP: 134/64 (19 Aug 2023 18:50) (115/66 - 134/64)  BP(mean): --  RR: 18 (19 Aug 2023 13:37) (18 - 18)  SpO2: 95% (19 Aug 2023 13:37) (95% - 99%)    Parameters below as of 19 Aug 2023 13:37  Patient On (Oxygen Delivery Method): room air        General Examination:  General: No acute distress  HEENT: Atraumatic, Normocephalic  Respiratory: CTA B/l.  No crackles, rhonchi, or wheezes.  Cardiovascular: RRR.  Normal S1 & S2.  Normal b/l radial and pedal pulses.    Neurological Examination:  General / Mental Status: AAO x 3.  No aphasia or dysarthria.  Naming and repetition intact.  Cranial Nerves: VFF x 4.  PERRL.  EOMI x 2, No nystagmus or diplopia.  B/l V1-V3 equal and intact to light touch and pinprick.  Symmetric facial movement and palate elevation.  B/l hearing equal to finger rub.  5/5 strength with b/l sternocleidomastoid and trapezius.  Midline tongue protrusion, with no atrophy or fasciculations.  Motor: Normal bulk & tone in all four extremities.  5/5 strength throughout all four extremities.  No downward drift, rigidity, spasticity, or tremors in any of the four extremities.  Sensory: Intact to light touch and pinprick in all four extremities.  Negative Romberg.  Reflex: 2+ and symmetric at b/l biceps, triceps, brachioradialis, patellae, and ankles.  Downgoing toes b/l.  Coordination: No dysmetria with b/l finger-to-nose and heel raise tests.  Symmetric rapid alternating movements b/l.  Gait: Normal, narrow-based gait.  No difficulty with tiptoe, heel, and tandem gaits.        LABORATORY VALUES:                        7.6    17.11 )-----------( 266      ( 19 Aug 2023 07:42 )             24.4       08-19    136  |  108  |  44<H>  ----------------------------<  136<H>  5.4<H>   |  18<L>  |  2.15<H>    Ca    7.5<L>      19 Aug 2023 07:42  Phos  4.2     08-19  Mg     2.4     08-19    TPro  6.0  /  Alb  1.5<L>  /  TBili  0.3  /  DBili  x   /  AST  186<H>  /  ALT  20  /  AlkPhos  243<H>  08-19 07-28 Chol 152 LDL 82 HDL 51 Trig 93    A1C with Estimated Average Glucose (07.28.23 @ 05:03)   A1C with Estimated Average Glucose Result: 5.6%  Estimated Average Glucose: 114 mg/dL                NEUROIMAGING:          Please contact the Neurology consult service with any neurological questions.    Eligio Whitney MD   of Neurology  [facility] at [facility] NEUROLOGY CONSULT NOTE    NAME:  CARYN LEIVA      ASSESSMENT:        RECOMMENDATIONS:        NOTE TO BE COMPLETED - PLEASE REFER TO ABOVE ONLY AND IGNORE INFORMATION BELOW    *******************************      CHIEF COMPLAINT:  Patient is a 77y old  Female who presents with a chief complaint of Altered mental status. (19 Aug 2023 14:49)      HPI:  Patient is a 77 female from [facility] PMHx HTN, HLD, CVA (w/ residual aphasia and R sided hemiparesis/plegia) who was sent to the hospital due to altered mental status. Patient is not able to present any history. Patient is lying down in bed, awake and alert. However, patient does not speak, is not able to follow commands and does not turn face or move eyes when her name is called. As per nursing home papers  patient was noted to have change in mental status with eye fixed in one direction and unable to swallow food. Patient's sister Marika San ([phone]) was called to inquire about baseline mental status. Patient can respond with one word answers, swallows food, however does not move arms/legs on baseline. Ed note also reports patient is exhibiting b/l upper arm weakness but resident is unable to confirm that during evaluation. Unable to obtain any ROS from the patient at this time.     Of note: Patient's sister noted that patient sister was recently started on medication for muscle spasms at nursing home.  (27 Jul 2023 19:19)      NEURO HPI:      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      Seizures      Chronic constipation      No significant past surgical history          MEDICATIONS:  acetaminophen   Oral Liquid .. 650 milliGRAM(s) Enteral Tube every 6 hours PRN  aspirin  chewable 324 milliGRAM(s) Oral daily  atorvastatin 20 milliGRAM(s) Oral at bedtime  carbidopa/levodopa  25/100 1 Tablet(s) Oral <User Schedule>  cefepime   IVPB 1000 milliGRAM(s) IV Intermittent every 24 hours  cefepime   IVPB      chlorhexidine 2% Cloths 1 Application(s) Topical daily  cloNIDine Patch 0.1 mG/24Hr(s) 1 patch Transdermal every 7 days  metoprolol tartrate 25 milliGRAM(s) Oral two times a day  morphine  - Injectable 2 milliGRAM(s) IV Push every 6 hours PRN  pantoprazole   Suspension 40 milliGRAM(s) Oral daily  valproate sodium  IVPB 500 milliGRAM(s) IV Intermittent every 8 hours      ALLERGIES:  &quot; NATURAL RUBBER&quot; (Other)  latex (Other)  penicillins (Unknown)      FAMILY HISTORY:        SOCIAL HISTORY:  Denies alcohol, tobacco, or illicit drug use      REVIEW OF SYSTEMS:  GENERAL: No fever, weight changes, fatigue  EYES: No eye pain or discharge  EAR/NOSE/MOUTH/THROAT: No sinus or throat pain; No difficulty hearing  NECK: No pain or stiffness  RESPIRATORY: No cough, wheezing, chills, or hemoptysis  CARDIOVASCULAR: No chest pain, palpitations, shortness of breath, or dyspnea on exertion  GASTROINTESTINAL: No abdominal pain, nausea, vomiting, hematemesis, diarrhea, or constipation  GENITOURINARY: No dysuria, frequency, hematuria, or incontinence  SKIN: No rashes or lesions  ENDOCRINE: No heat or cold intolerance  HEMATOLOGIC: No easy bruising or bleeding  PSYCHIATRIC: No depression, anxiety, or mood swings  MUSCULOSKELETAL: No joint pain or swelling  NEUROLOGICAL: As per HPI          OBJECTIVE:    Vital Signs Last 24 Hrs  T(C): 36.7 (19 Aug 2023 13:37), Max: 37.1 (19 Aug 2023 05:26)  T(F): 98.1 (19 Aug 2023 13:37), Max: 98.8 (19 Aug 2023 05:26)  HR: 90 (19 Aug 2023 18:50) (79 - 90)  BP: 134/64 (19 Aug 2023 18:50) (115/66 - 134/64)  BP(mean): --  RR: 18 (19 Aug 2023 13:37) (18 - 18)  SpO2: 95% (19 Aug 2023 13:37) (95% - 99%)    Parameters below as of 19 Aug 2023 13:37  Patient On (Oxygen Delivery Method): room air        General Examination:  General: No acute distress  HEENT: Atraumatic, Normocephalic  Respiratory: CTA B/l.  No crackles, rhonchi, or wheezes.  Cardiovascular: RRR.  Normal S1 & S2.  Normal b/l radial and pedal pulses.    Neurological Examination:  General / Mental Status: AAO x 3.  No aphasia or dysarthria.  Naming and repetition intact.  Cranial Nerves: VFF x 4.  PERRL.  EOMI x 2, No nystagmus or diplopia.  B/l V1-V3 equal and intact to light touch and pinprick.  Symmetric facial movement and palate elevation.  B/l hearing equal to finger rub.  5/5 strength with b/l sternocleidomastoid and trapezius.  Midline tongue protrusion, with no atrophy or fasciculations.  Motor: Normal bulk & tone in all four extremities.  5/5 strength throughout all four extremities.  No downward drift, rigidity, spasticity, or tremors in any of the four extremities.  Sensory: Intact to light touch and pinprick in all four extremities.  Negative Romberg.  Reflex: 2+ and symmetric at b/l biceps, triceps, brachioradialis, patellae, and ankles.  Downgoing toes b/l.  Coordination: No dysmetria with b/l finger-to-nose and heel raise tests.  Symmetric rapid alternating movements b/l.  Gait: Normal, narrow-based gait.  No difficulty with tiptoe, heel, and tandem gaits.        LABORATORY VALUES:                        7.6    17.11 )-----------( 266      ( 19 Aug 2023 07:42 )             24.4       08-19    136  |  108  |  44<H>  ----------------------------<  136<H>  5.4<H>   |  18<L>  |  2.15<H>    Ca    7.5<L>      19 Aug 2023 07:42  Phos  4.2     08-19  Mg     2.4     08-19    TPro  6.0  /  Alb  1.5<L>  /  TBili  0.3  /  DBili  x   /  AST  186<H>  /  ALT  20  /  AlkPhos  243<H>  08-19 07-28 Chol 152 LDL 82 HDL 51 Trig 93    A1C with Estimated Average Glucose (07.28.23 @ 05:03)   A1C with Estimated Average Glucose Result: 5.6%  Estimated Average Glucose: 114 mg/dL                NEUROIMAGING:          Please contact the Neurology consult service with any neurological questions.    Eligio Whitney MD   of Neurology  [facility] at [facility] NEUROLOGY CONSULT NOTE    NAME:  CARYN LEIVA      ASSESSMENT:        RECOMMENDATIONS:        NOTE TO BE COMPLETED - PLEASE REFER TO ABOVE ONLY AND IGNORE INFORMATION BELOW    *******************************      CHIEF COMPLAINT:  Patient is a 77y old  Female who presents with a chief complaint of Altered mental status. (19 Aug 2023 14:49)      HPI:  Patient is a 77 female from [facility] PMHx HTN, HLD, CVA (w/ residual aphasia and R sided hemiparesis/plegia) who was sent to the hospital due to altered mental status. Patient is not able to present any history. Patient is lying down in bed, awake and alert. However, patient does not speak, is not able to follow commands and does not turn face or move eyes when her name is called. As per nursing home papers  patient was noted to have change in mental status with eye fixed in one direction and unable to swallow food. Patient's sister Marika San ([phone]) was called to inquire about baseline mental status. Patient can respond with one word answers, swallows food, however does not move arms/legs on baseline. Ed note also reports patient is exhibiting b/l upper arm weakness but resident is unable to confirm that during evaluation. Unable to obtain any ROS from the patient at this time.     Of note: Patient's sister noted that patient sister was recently started on medication for muscle spasms at nursing home.  (27 Jul 2023 19:19)      NEURO HPI:      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      Seizures      Chronic constipation      No significant past surgical history          MEDICATIONS:  acetaminophen   Oral Liquid .. 650 milliGRAM(s) Enteral Tube every 6 hours PRN  aspirin  chewable 324 milliGRAM(s) Oral daily  atorvastatin 20 milliGRAM(s) Oral at bedtime  carbidopa/levodopa  25/100 1 Tablet(s) Oral <User Schedule>  cefepime   IVPB 1000 milliGRAM(s) IV Intermittent every 24 hours  cefepime   IVPB      chlorhexidine 2% Cloths 1 Application(s) Topical daily  cloNIDine Patch 0.1 mG/24Hr(s) 1 patch Transdermal every 7 days  metoprolol tartrate 25 milliGRAM(s) Oral two times a day  morphine  - Injectable 2 milliGRAM(s) IV Push every 6 hours PRN  pantoprazole   Suspension 40 milliGRAM(s) Oral daily  valproate sodium  IVPB 500 milliGRAM(s) IV Intermittent every 8 hours      ALLERGIES:  &quot; NATURAL RUBBER&quot; (Other)  latex (Other)  penicillins (Unknown)      FAMILY HISTORY:        SOCIAL HISTORY:  Denies alcohol, tobacco, or illicit drug use      REVIEW OF SYSTEMS:  GENERAL: No fever, weight changes, fatigue  EYES: No eye pain or discharge  EAR/NOSE/MOUTH/THROAT: No sinus or throat pain; No difficulty hearing  NECK: No pain or stiffness  RESPIRATORY: No cough, wheezing, chills, or hemoptysis  CARDIOVASCULAR: No chest pain, palpitations, shortness of breath, or dyspnea on exertion  GASTROINTESTINAL: No abdominal pain, nausea, vomiting, hematemesis, diarrhea, or constipation  GENITOURINARY: No dysuria, frequency, hematuria, or incontinence  SKIN: No rashes or lesions  ENDOCRINE: No heat or cold intolerance  HEMATOLOGIC: No easy bruising or bleeding  PSYCHIATRIC: No depression, anxiety, or mood swings  MUSCULOSKELETAL: No joint pain or swelling  NEUROLOGICAL: As per HPI          OBJECTIVE:    Vital Signs Last 24 Hrs  T(C): 36.7 (19 Aug 2023 13:37), Max: 37.1 (19 Aug 2023 05:26)  T(F): 98.1 (19 Aug 2023 13:37), Max: 98.8 (19 Aug 2023 05:26)  HR: 90 (19 Aug 2023 18:50) (79 - 90)  BP: 134/64 (19 Aug 2023 18:50) (115/66 - 134/64)  BP(mean): --  RR: 18 (19 Aug 2023 13:37) (18 - 18)  SpO2: 95% (19 Aug 2023 13:37) (95% - 99%)    Parameters below as of 19 Aug 2023 13:37  Patient On (Oxygen Delivery Method): room air        General Examination:  General: No acute distress  HEENT: Atraumatic, Normocephalic  Respiratory: CTA B/l.  No crackles, rhonchi, or wheezes.  Cardiovascular: RRR.  Normal S1 & S2.  Normal b/l radial and pedal pulses.    Neurological Examination:  General / Mental Status: AAO x 3.  No aphasia or dysarthria.  Naming and repetition intact.  Cranial Nerves: VFF x 4.  PERRL.  EOMI x 2, No nystagmus or diplopia.  B/l V1-V3 equal and intact to light touch and pinprick.  Symmetric facial movement and palate elevation.  B/l hearing equal to finger rub.  5/5 strength with b/l sternocleidomastoid and trapezius.  Midline tongue protrusion, with no atrophy or fasciculations.  Motor: Normal bulk & tone in all four extremities.  5/5 strength throughout all four extremities.  No downward drift, rigidity, spasticity, or tremors in any of the four extremities.  Sensory: Intact to light touch and pinprick in all four extremities.  Negative Romberg.  Reflex: 2+ and symmetric at b/l biceps, triceps, brachioradialis, patellae, and ankles.  Downgoing toes b/l.  Coordination: No dysmetria with b/l finger-to-nose and heel raise tests.  Symmetric rapid alternating movements b/l.  Gait: Normal, narrow-based gait.  No difficulty with tiptoe, heel, and tandem gaits.        LABORATORY VALUES:                        7.6    17.11 )-----------( 266      ( 19 Aug 2023 07:42 )             24.4       08-19    136  |  108  |  44<H>  ----------------------------<  136<H>  5.4<H>   |  18<L>  |  2.15<H>    Ca    7.5<L>      19 Aug 2023 07:42  Phos  4.2     08-19  Mg     2.4     08-19    TPro  6.0  /  Alb  1.5<L>  /  TBili  0.3  /  DBili  x   /  AST  186<H>  /  ALT  20  /  AlkPhos  243<H>  08-19 07-28 Chol 152 LDL 82 HDL 51 Trig 93    A1C with Estimated Average Glucose (07.28.23 @ 05:03)   A1C with Estimated Average Glucose Result: 5.6%  Estimated Average Glucose: 114 mg/dL                NEUROIMAGING:          Please contact the Neurology consult service with any neurological questions.    Eligio Whitney MD   of Neurology  [facility] at [facility] NEUROLOGY CONSULT NOTE    NAME:  CARYN LEIVA      ASSESSMENT:  77 RHF with acute encephalopathy in setting of chronic ischemic stroke, also with left hand twitching, concerning for toxic/metabolic encephalopathy vs. breakthrough post-stroke seizure with post-ictal state vs. recrudescence of chronic stroke symptoms in the setting of urinary tract infection and acute renal failure, without neuroimaging evidence for acute ischemic stroke       RECOMMENDATIONS:      1. Seizure workup and management  - Continue Valproate 500mg IV Q8H (or Depakene liquid 500mg PO Q8H as per home regimen when patient can tolerate PO meds)  - Routine EEG approved to evaluate for subclinical seizures    2. Stroke/Encephalopathy workup  - Telemetry monitoring while inpatient  - Continue infectious and metabolic workup and continue to treat abnormalities identified    3. Secondary stroke prevention  - Q4H Neurochecks & Vital signs  - Swallow evaluation before administering any PO meds  - Aspirin 81mg PO Daily (or Aspirin 300mg CO daily if NPO)  - Atorvastatin 40mg PO QHS or Simvastatin 20mg (up from home 10mg dose) PO QHS when patient can tolerate PO meds (goal LDL < 70 mg/dL)  - Treat BP if over 140/90 (goal /80) - No role for permissive hypertension at this time  - PT/OT  - DVT ppx: SCDs, Enoxaparin          *******************************      CHIEF COMPLAINT:  Patient is a 77y old  Female who presents with a chief complaint of Altered mental status. (28 Jul 2023 14:03)      HPI:  Patient is a 77 female from [facility] PMHx HTN, HLD, CVA (w/ residual aphasia and R sided hemiparesis/plegia) who was sent to the hospital due to altered mental status. Patient is not able to present any history. Patient is lying down in bed, awake and alert. However, patient does not speak, is not able to follow commands and does not turn face or move eyes when her name is called. As per nursing home papers patient was noted to have change in mental status with eye fixed in one direction and unable to swallow food. Patient's sister Marika San ([phone]) was called to inquire about baseline mental status. Patient can respond with one word answers, swallows food, however does not move arms/legs on baseline. ED note also reports patient is exhibiting b/l upper arm weakness but resident is unable to confirm that during evaluation. Unable to obtain any ROS from the patient at this time.   Of note: Patient's sister noted that patient sister was recently started on medication for muscle spasms at nursing home.  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  Seizures  Chronic constipation  No significant past surgical history      MEDICATIONS:  acetaminophen   IVPB .. 1000 milliGRAM(s) IV Intermittent once  aspirin Suppository 300 milliGRAM(s) Rectal daily  cefTRIAXone   IVPB      enoxaparin Injectable 30 milliGRAM(s) SubCutaneous every 24 hours  valproate sodium  IVPB 500 milliGRAM(s) IV Intermittent every 8 hours      ALLERGIES:  Natural rubber (Other)  Latex (Other)  Penicillins (Unknown)      FAMILY HISTORY:  No reported family history of stroke      SOCIAL HISTORY:  Formerly [facility] resident  No reported alcohol, tobacco, or illicit drug use      REVIEW OF SYSTEMS: Limited due to encephalopathy  GENERAL: No fever  EYES: No eye discharge  EAR/NOSE/MOUTH/THROAT: No sinus discharge  NECK: No stiffness  RESPIRATORY: No cough  CARDIOVASCULAR: No shortness of breath  GASTROINTESTINAL: No nausea, vomiting, hematemesis  GENITOURINARY: No frequency, hematuria  SKIN: No rashes or lesions  ENDOCRINE: No reported heat or cold intolerance  HEMATOLOGIC: No reported easy bruising or bleeding  PSYCHIATRIC: No known depression or anxiety  MUSCULOSKELETAL: Recent muscle spasms; No joint swelling  NEUROLOGICAL: As per HPI          OBJECTIVE:    Vital Signs Last 24 Hrs  T(C): 37.1 (28 Jul 2023 14:04), Max: 37.1 (28 Jul 2023 14:04)  T(F): 98.7 (28 Jul 2023 14:04), Max: 98.7 (28 Jul 2023 14:04)  HR: 85 (28 Jul 2023 15:44) (77 - 94)  BP: 159/87 (28 Jul 2023 15:44) (159/87 - 207/87)  RR: 20 (28 Jul 2023 15:44) (18 - 24)  SpO2: 97% (28 Jul 2023 15:44) (97% - 100%)  Parameters below as of 28 Jul 2023 15:44  Patient On (Oxygen Delivery Method): room air      General Exam:  General: No apparent acute distress  Respiratory: Rapid rate, Diminished breath sounds b/l  Cardiovascular: RRR, No murmurs, Full b/l radial and pedal pulses    Neurological Exam:  General / Mental Status: Nonverbal, Does not follow most commands.  Neurological exam is limited.  Cranial Nerves: PERRL, Blink to threat sluggish b/l, Does not track finger movements with eyes b/l.  Grimaces to pinprick at b/l V1-V3.  No response to snap at b/l ears.  Right-sided facial droop present.  Unable to assess palate elevation or tongue protrusion due to patient not following these commands.  No resistance to passive motion of neck b/l; no nuchal rigidity.  Motor: Diminished bulk and tone in all four extremities.  All four extremities drop to bed after passive elevation.  No spontaneous movement, rigidity, or spasticity in any of the four extremities.  Sensation: Grimaces to nailbed pressure in left arm and left leg; No response to nailbed pressure in right arm and right leg.  Reflexes: 1+ and symmetric at b/l biceps, triceps, brachioradialis, patellae, and ankles.  Toes mute b/l.  Unable to assess coordination, Romberg sign, or gait in minimally responsive patient.          LABORATORY VALUES:                        10.4   18.31 )-----------( 356      ( 28 Jul 2023 05:03 )             32.5       07-28    143  |  111<H>  |  31<H>  ----------------------------<  124<H>  3.9   |  20<L>  |  1.81<H>    Ca    9.1      28 Jul 2023 05:03  Phos  4.1     07-28  Mg     2.7     07-28    TPro  8.5<H>  /  Alb  2.9<L>  /  TBili  0.3  /  DBili  x   /  AST  15  /  ALT  10  /  AlkPhos  97  07-27 07-28 Chol 152 LDL 82 HDL 51 Trig 93    A1C with Estimated Average Glucose (07.28.23 @ 05:03)   A1C with Estimated Average Glucose Result: 5.6%  Estimated Average Glucose: 114 mg/dL          NEUROIMAGING:      CT Head & CTA Head/Neck & CT Perfusion Head (7/27/23):  - No acute intracranial abnormality  - Chronic left MCA territory infarct with corresponding chronic hypoperfusion  - No new areas of hypoperfusion  - No intracranial or neck vessel abnormality          Please contact the Neurology consult service with any neurological questions.    Eligio Whitney MD   of Neurology  [employer] [facility] [employer] [facility]   NEUROLOGY CONSULT NOTE    NAME:  CARYN LEIVA      ASSESSMENT:  77 RHF with acute encephalopathy in setting of chronic ischemic stroke, also with left hand twitching, concerning for toxic/metabolic encephalopathy vs. breakthrough post-stroke seizure with post-ictal state vs. recrudescence of chronic stroke symptoms in the setting of urinary tract infection and acute renal failure, without neuroimaging evidence for acute ischemic stroke       RECOMMENDATIONS:      1. Seizure workup and management  - Continue Valproate 500mg IV Q8H (or Depakene liquid 500mg PO Q8H as per home regimen when patient can tolerate PO meds)  - Routine EEG approved to evaluate for subclinical seizures    2. Stroke/Encephalopathy workup  - Telemetry monitoring while inpatient  - Continue infectious and metabolic workup and continue to treat abnormalities identified    3. Secondary stroke prevention  - Q4H Neurochecks & Vital signs  - Swallow evaluation before administering any PO meds  - Aspirin 81mg PO Daily (or Aspirin 300mg LA daily if NPO)  - Atorvastatin 40mg PO QHS or Simvastatin 20mg (up from home 10mg dose) PO QHS when patient can tolerate PO meds (goal LDL < 70 mg/dL)  - Treat BP if over 140/90 (goal /80) - No role for permissive hypertension at this time  - PT/OT  - DVT ppx: SCDs, Enoxaparin          *******************************      CHIEF COMPLAINT:  Patient is a 77y old  Female who presents with a chief complaint of Altered mental status. (28 Jul 2023 14:03)      HPI:  Patient is a 77 female from [facility] PMHx HTN, HLD, CVA (w/ residual aphasia and R sided hemiparesis/plegia) who was sent to the hospital due to altered mental status. Patient is not able to present any history. Patient is lying down in bed, awake and alert. However, patient does not speak, is not able to follow commands and does not turn face or move eyes when her name is called. As per nursing home papers patient was noted to have change in mental status with eye fixed in one direction and unable to swallow food. Patient's sister Marika San ([phone]) was called to inquire about baseline mental status. Patient can respond with one word answers, swallows food, however does not move arms/legs on baseline. ED note also reports patient is exhibiting b/l upper arm weakness but resident is unable to confirm that during evaluation. Unable to obtain any ROS from the patient at this time.   Of note: Patient's sister noted that patient sister was recently started on medication for muscle spasms at nursing home.  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  Seizures  Chronic constipation  No significant past surgical history      MEDICATIONS:  acetaminophen   IVPB .. 1000 milliGRAM(s) IV Intermittent once  aspirin Suppository 300 milliGRAM(s) Rectal daily  cefTRIAXone   IVPB      enoxaparin Injectable 30 milliGRAM(s) SubCutaneous every 24 hours  valproate sodium  IVPB 500 milliGRAM(s) IV Intermittent every 8 hours      ALLERGIES:  Natural rubber (Other)  Latex (Other)  Penicillins (Unknown)      FAMILY HISTORY:  No reported family history of stroke      SOCIAL HISTORY:  [facility] resident  No reported alcohol, tobacco, or illicit drug use      REVIEW OF SYSTEMS: Limited due to encephalopathy  GENERAL: No fever  EYES: No eye discharge  EAR/NOSE/MOUTH/THROAT: No sinus discharge  NECK: No stiffness  RESPIRATORY: No cough  CARDIOVASCULAR: No shortness of breath  GASTROINTESTINAL: No nausea, vomiting, hematemesis  GENITOURINARY: No frequency, hematuria  SKIN: No rashes or lesions  ENDOCRINE: No reported heat or cold intolerance  HEMATOLOGIC: No reported easy bruising or bleeding  PSYCHIATRIC: No known depression or anxiety  MUSCULOSKELETAL: Recent muscle spasms; No joint swelling  NEUROLOGICAL: As per HPI          OBJECTIVE:    Vital Signs Last 24 Hrs  T(C): 37.1 (28 Jul 2023 14:04), Max: 37.1 (28 Jul 2023 14:04)  T(F): 98.7 (28 Jul 2023 14:04), Max: 98.7 (28 Jul 2023 14:04)  HR: 85 (28 Jul 2023 15:44) (77 - 94)  BP: 159/87 (28 Jul 2023 15:44) (159/87 - 207/87)  RR: 20 (28 Jul 2023 15:44) (18 - 24)  SpO2: 97% (28 Jul 2023 15:44) (97% - 100%)  Parameters below as of 28 Jul 2023 15:44  Patient On (Oxygen Delivery Method): room air      General Exam:  General: No apparent acute distress  Respiratory: Rapid rate, Diminished breath sounds b/l  Cardiovascular: RRR, No murmurs, Full b/l radial and pedal pulses    Neurological Exam:  General / Mental Status: Nonverbal, Does not follow most commands.  Neurological exam is limited.  Cranial Nerves: PERRL, Blink to threat sluggish b/l, Does not track finger movements with eyes b/l.  Grimaces to pinprick at b/l V1-V3.  No response to snap at b/l ears.  Right-sided facial droop present.  Unable to assess palate elevation or tongue protrusion due to patient not following these commands.  No resistance to passive motion of neck b/l; no nuchal rigidity.  Motor: Diminished bulk and tone in all four extremities.  All four extremities drop to bed after passive elevation.  No spontaneous movement, rigidity, or spasticity in any of the four extremities.  Sensation: Grimaces to nailbed pressure in left arm and left leg; No response to nailbed pressure in right arm and right leg.  Reflexes: 1+ and symmetric at b/l biceps, triceps, brachioradialis, patellae, and ankles.  Toes mute b/l.  Unable to assess coordination, Romberg sign, or gait in minimally responsive patient.          LABORATORY VALUES:                        10.4   18.31 )-----------( 356      ( 28 Jul 2023 05:03 )             32.5       07-28    143  |  111<H>  |  31<H>  ----------------------------<  124<H>  3.9   |  20<L>  |  1.81<H>    Ca    9.1      28 Jul 2023 05:03  Phos  4.1     07-28  Mg     2.7     07-28    TPro  8.5<H>  /  Alb  2.9<L>  /  TBili  0.3  /  DBili  x   /  AST  15  /  ALT  10  /  AlkPhos  97  07-27 07-28 Chol 152 LDL 82 HDL 51 Trig 93    A1C with Estimated Average Glucose (07.28.23 @ 05:03)   A1C with Estimated Average Glucose Result: 5.6%  Estimated Average Glucose: 114 mg/dL          NEUROIMAGING:      CT Head & CTA Head/Neck & CT Perfusion Head (7/27/23):  - No acute intracranial abnormality  - Chronic left MCA territory infarct with corresponding chronic hypoperfusion  - No new areas of hypoperfusion  - No intracranial or neck vessel abnormality          Please contact the Neurology consult service with any neurological questions.    Eligio Whitney MD   of Neurology  [facility] at [facility]   NEUROLOGY CONSULT NOTE    NAME:  CARYN LEIVA      ASSESSMENT:  77 RHF with acute encephalopathy in setting of chronic ischemic stroke, also with left hand twitching, concerning for toxic/metabolic encephalopathy vs. breakthrough post-stroke seizure with post-ictal state vs. recrudescence of chronic stroke symptoms in the setting of urinary tract infection and acute renal failure, without neuroimaging evidence for acute ischemic stroke       RECOMMENDATIONS:      1. Seizure workup and management  - Continue Valproate 500mg IV Q8H (or Depakene liquid 500mg PO Q8H as per home regimen when patient can tolerate PO meds)  - Routine EEG approved to evaluate for subclinical seizures    2. Stroke/Encephalopathy workup  - Telemetry monitoring while inpatient  - Continue infectious and metabolic workup and continue to treat abnormalities identified    3. Secondary stroke prevention  - Q4H Neurochecks & Vital signs  - Swallow evaluation before administering any PO meds  - Aspirin 81mg PO Daily (or Aspirin 300mg [place] daily if NPO)  - Atorvastatin 40mg PO QHS or Simvastatin 20mg (up from home 10mg dose) PO QHS when patient can tolerate PO meds (goal LDL < 70 mg/dL)  - Treat BP if over 140/90 (goal /80) - No role for permissive hypertension at this time  - PT/OT  - DVT ppx: SCDs, Enoxaparin          *******************************      CHIEF COMPLAINT:  Patient is a 77y old  Female who presents with a chief complaint of Altered mental status. (28 Jul 2023 14:03)      HPI:  Patient is a 77 female from [facility] PMHx HTN, HLD, CVA (w/ residual aphasia and R sided hemiparesis/plegia) who was sent to the hospital due to altered mental status. Patient is not able to present any history. Patient is lying down in bed, awake and alert. However, patient does not speak, is not able to follow commands and does not turn face or move eyes when her name is called. As per nursing home papers patient was noted to have change in mental status with eye fixed in one direction and unable to swallow food. Patient's sister Marika San ([phone]) was called to inquire about baseline mental status. Patient can respond with one word answers, swallows food, however does not move arms/legs on baseline. ED note also reports patient is exhibiting b/l upper arm weakness but resident is unable to confirm that during evaluation. Unable to obtain any ROS from the patient at this time.   Of note: Patient's sister noted that patient sister was recently started on medication for muscle spasms at nursing home.  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  Seizures  Chronic constipation  No significant past surgical history      MEDICATIONS:  acetaminophen   IVPB .. 1000 milliGRAM(s) IV Intermittent once  aspirin Suppository 300 milliGRAM(s) Rectal daily  cefTRIAXone   IVPB      enoxaparin Injectable 30 milliGRAM(s) SubCutaneous every 24 hours  valproate sodium  IVPB 500 milliGRAM(s) IV Intermittent every 8 hours      ALLERGIES:  Natural rubber (Other)  Latex (Other)  Penicillins (Unknown)      FAMILY HISTORY:  No reported family history of stroke      SOCIAL HISTORY:  [facility] resident  No reported alcohol, tobacco, or illicit drug use      REVIEW OF SYSTEMS: Limited due to encephalopathy  GENERAL: No fever  EYES: No eye discharge  EAR/NOSE/MOUTH/THROAT: No sinus discharge  NECK: No stiffness  RESPIRATORY: No cough  CARDIOVASCULAR: No shortness of breath  GASTROINTESTINAL: No nausea, vomiting, hematemesis  GENITOURINARY: No frequency, hematuria  SKIN: No rashes or lesions  ENDOCRINE: No reported heat or cold intolerance  HEMATOLOGIC: No reported easy bruising or bleeding  PSYCHIATRIC: No known depression or anxiety  MUSCULOSKELETAL: Recent muscle spasms; No joint swelling  NEUROLOGICAL: As per HPI          OBJECTIVE:    Vital Signs Last 24 Hrs  T(C): 37.1 (28 Jul 2023 14:04), Max: 37.1 (28 Jul 2023 14:04)  T(F): 98.7 (28 Jul 2023 14:04), Max: 98.7 (28 Jul 2023 14:04)  HR: 85 (28 Jul 2023 15:44) (77 - 94)  BP: 159/87 (28 Jul 2023 15:44) (159/87 - 207/87)  RR: 20 (28 Jul 2023 15:44) (18 - 24)  SpO2: 97% (28 Jul 2023 15:44) (97% - 100%)  Parameters below as of 28 Jul 2023 15:44  Patient On (Oxygen Delivery Method): room air      General Exam:  General: No apparent acute distress  Respiratory: Rapid rate, Diminished breath sounds b/l  Cardiovascular: RRR, No murmurs, Full b/l radial and pedal pulses    Neurological Exam:  General / Mental Status: Nonverbal, Does not follow most commands.  Neurological exam is limited.  Cranial Nerves: PERRL, Blink to threat sluggish b/l, Does not track finger movements with eyes b/l.  Grimaces to pinprick at b/l V1-V3.  No response to snap at b/l ears.  Right-sided facial droop present.  Unable to assess palate elevation or tongue protrusion due to patient not following these commands.  No resistance to passive motion of neck b/l; no nuchal rigidity.  Motor: Diminished bulk and tone in all four extremities.  All four extremities drop to bed after passive elevation.  No spontaneous movement, rigidity, or spasticity in any of the four extremities.  Sensation: Grimaces to nailbed pressure in left arm and left leg; No response to nailbed pressure in right arm and right leg.  Reflexes: 1+ and symmetric at b/l biceps, triceps, brachioradialis, patellae, and ankles.  Toes mute b/l.  Unable to assess coordination, Romberg sign, or gait in minimally responsive patient.          LABORATORY VALUES:                        10.4   18.31 )-----------( 356      ( 28 Jul 2023 05:03 )             32.5       07-28    143  |  111<H>  |  31<H>  ----------------------------<  124<H>  3.9   |  20<L>  |  1.81<H>    Ca    9.1      28 Jul 2023 05:03  Phos  4.1     07-28  Mg     2.7     07-28    TPro  8.5<H>  /  Alb  2.9<L>  /  TBili  0.3  /  DBili  x   /  AST  15  /  ALT  10  /  AlkPhos  97  07-27 07-28 Chol 152 LDL 82 HDL 51 Trig 93    A1C with Estimated Average Glucose (07.28.23 @ 05:03)   A1C with Estimated Average Glucose Result: 5.6%  Estimated Average Glucose: 114 mg/dL          NEUROIMAGING:      CT Head & CTA Head/Neck & CT Perfusion Head (7/27/23):  - No acute intracranial abnormality  - Chronic left MCA territory infarct with corresponding chronic hypoperfusion  - No new areas of hypoperfusion  - No intracranial or neck vessel abnormality          Please contact the Neurology consult service with any neurological questions.    Eligio Whitney MD   of Neurology  [facility] at [facility]   NEUROLOGY CONSULT NOTE    NAME:  CARYN LEIVA      ASSESSMENT:  77 RHF with acute encephalopathy in setting of chronic ischemic stroke, also with left hand twitching, concerning for toxic/metabolic encephalopathy vs. breakthrough post-stroke seizure with post-ictal state vs. recrudescence of chronic stroke symptoms in the setting of urinary tract infection and acute renal failure, without neuroimaging evidence for acute ischemic stroke       RECOMMENDATIONS:      1. Seizure workup and management  - Continue Valproate 500mg IV Q8H (or Depakene liquid 500mg PO Q8H as per home regimen when patient can tolerate PO meds)  - Routine EEG approved to evaluate for subclinical seizures    2. Stroke/Encephalopathy workup  - Telemetry monitoring while inpatient  - Continue infectious and metabolic workup and continue to treat abnormalities identified    3. Secondary stroke prevention  - Q4H Neurochecks & Vital signs  - Swallow evaluation before administering any PO meds  - Aspirin 81mg PO Daily (or Aspirin 300mg [place] daily if NPO)  - Atorvastatin 40mg PO QHS or Simvastatin 20mg (up from home 10mg dose) PO QHS when patient can tolerate PO meds (goal LDL < 70 mg/dL)  - Treat BP if over 140/90 (goal /80) - No role for permissive hypertension at this time  - PT/OT  - DVT ppx: SCDs, Enoxaparin          *******************************      CHIEF COMPLAINT:  Patient is a 77y old  Female who presents with a chief complaint of Altered mental status. (28 Jul 2023 14:03)      HPI:  Patient is a 77 female from [facility] PMHx HTN, HLD, CVA (w/ residual aphasia and R sided hemiparesis/plegia) who was sent to the hospital due to altered mental status. Patient is not able to present any history. Patient is lying down in bed, awake and alert. However, patient does not speak, is not able to follow commands and does not turn face or move eyes when her name is called. As per nursing home papers patient was noted to have change in mental status with eye fixed in one direction and unable to swallow food. Patient's sister Marika San ([phone]) was called to inquire about baseline mental status. Patient can respond with one word answers, swallows food, however does not move arms/legs on baseline. ED note also reports patient is exhibiting b/l upper arm weakness but resident is unable to confirm that during evaluation. Unable to obtain any ROS from the patient at this time.   Of note: Patient's sister noted that patient sister was recently started on medication for muscle spasms at nursing home.  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  Seizures  Chronic constipation  No significant past surgical history      MEDICATIONS:  acetaminophen   IVPB .. 1000 milliGRAM(s) IV Intermittent once  aspirin Suppository 300 milliGRAM(s) Rectal daily  cefTRIAXone   IVPB      enoxaparin Injectable 30 milliGRAM(s) SubCutaneous every 24 hours  valproate sodium  IVPB 500 milliGRAM(s) IV Intermittent every 8 hours      ALLERGIES:  Natural rubber (Other)  Latex (Other)  Penicillins (Unknown)      FAMILY HISTORY:  No reported family history of stroke      SOCIAL HISTORY:  [facility] resident  No reported alcohol, tobacco, or illicit drug use      REVIEW OF SYSTEMS: Limited due to encephalopathy  GENERAL: No fever  EYES: No eye discharge  EAR/NOSE/MOUTH/THROAT: No sinus discharge  NECK: No stiffness  RESPIRATORY: No cough  CARDIOVASCULAR: No shortness of breath  GASTROINTESTINAL: No nausea, vomiting, hematemesis  GENITOURINARY: No frequency, hematuria  SKIN: No rashes or lesions  ENDOCRINE: No reported heat or cold intolerance  HEMATOLOGIC: No reported easy bruising or bleeding  PSYCHIATRIC: No known depression or anxiety  MUSCULOSKELETAL: Recent muscle spasms; No joint swelling  NEUROLOGICAL: As per HPI          OBJECTIVE:    Vital Signs Last 24 Hrs  T(C): 37.1 (28 Jul 2023 14:04), Max: 37.1 (28 Jul 2023 14:04)  T(F): 98.7 (28 Jul 2023 14:04), Max: 98.7 (28 Jul 2023 14:04)  HR: 85 (28 Jul 2023 15:44) (77 - 94)  BP: 159/87 (28 Jul 2023 15:44) (159/87 - 207/87)  RR: 20 (28 Jul 2023 15:44) (18 - 24)  SpO2: 97% (28 Jul 2023 15:44) (97% - 100%)  Parameters below as of 28 Jul 2023 15:44  Patient On (Oxygen Delivery Method): room air      General Exam:  General: No apparent acute distress  Respiratory: Rapid rate, Diminished breath sounds b/l  Cardiovascular: RRR, No murmurs, Full b/l radial and pedal pulses    Neurological Exam:  General / Mental Status: Nonverbal, Does not follow most commands.  Neurological exam is limited.  Cranial Nerves: PERRL, Blink to threat sluggish b/l, Does not track finger movements with eyes b/l.  Grimaces to pinprick at b/l V1-V3.  No response to snap at b/l ears.  Right-sided facial droop present.  Unable to assess palate elevation or tongue protrusion due to patient not following these commands.  No resistance to passive motion of neck b/l; no nuchal rigidity.  Motor: Diminished bulk and tone in all four extremities.  All four extremities drop to bed after passive elevation.  No spontaneous movement, rigidity, or spasticity in any of the four extremities.  Sensation: Grimaces to nailbed pressure in left arm and left leg; No response to nailbed pressure in right arm and right leg.  Reflexes: 1+ and symmetric at b/l biceps, triceps, brachioradialis, patellae, and ankles.  Toes mute b/l.  Unable to assess coordination, Romberg sign, or gait in minimally responsive patient.      NIHSS Score:    LOC - 2  LOC Questions - 2  LOC Commands - 1  Gaze Preference - 0  Visual Fields - 0  Facial Palsy - 2  Motor Arm Left - 3  Motor Arm Right - 4  Motor Leg Left - 3  Motor Leg Right - 4  Limb Ataxia - UN  Sensory - 2  Language - 3  Speech - UN  Extinction - 2    NIHSS Score Total: 28 - No IV TNK because of uncertain last seen normal time    Modified Phyllis Scale: 2          LABORATORY VALUES:                        10.4   18.31 )-----------( 356      ( 28 Jul 2023 05:03 )             32.5       07-28    143  |  111<H>  |  31<H>  ----------------------------<  124<H>  3.9   |  20<L>  |  1.81<H>    Ca    9.1      28 Jul 2023 05:03  Phos  4.1     07-28  Mg     2.7     07-28    TPro  8.5<H>  /  Alb  2.9<L>  /  TBili  0.3  /  DBili  x   /  AST  15  /  ALT  10  /  AlkPhos  97  07-27 07-28 Chol 152 LDL 82 HDL 51 Trig 93    A1C with Estimated Average Glucose (07.28.23 @ 05:03)   A1C with Estimated Average Glucose Result: 5.6%  Estimated Average Glucose: 114 mg/dL          NEUROIMAGING:      CT Head & CTA Head/Neck & CT Perfusion Head (7/27/23):  - No acute intracranial abnormality  - Chronic left MCA territory infarct with corresponding chronic hypoperfusion  - No new areas of hypoperfusion  - No intracranial or neck vessel abnormality          Please contact the Neurology consult service with any neurological questions.    Eligio Whitney MD   of Neurology  [facility]   NEUROLOGY CONSULT NOTE    NAME:  CARYN LEIVA      ASSESSMENT:  77 RHF with acute encephalopathy in setting of chronic ischemic stroke, also with left hand twitching, concerning for toxic/metabolic encephalopathy vs. breakthrough post-stroke seizure with post-ictal state vs. recrudescence of chronic stroke symptoms in the setting of urinary tract infection and acute renal failure, without neuroimaging evidence for acute ischemic stroke       RECOMMENDATIONS:      1. Seizure workup and management  - Continue Valproate 500mg IV Q8H (or Depakene liquid 500mg PO Q8H as per home regimen when patient can tolerate PO meds)  - Routine EEG approved to evaluate for subclinical seizures    2. Stroke/Encephalopathy workup  - Telemetry monitoring while inpatient  - Continue infectious and metabolic workup and continue to treat abnormalities identified    3. Secondary stroke prevention  - Q4H Neurochecks & Vital signs  - Swallow evaluation before administering any PO meds  - Aspirin 81mg PO Daily (or Aspirin 300mg [place] daily if NPO)  - Atorvastatin 40mg PO QHS or Simvastatin 20mg (up from home 10mg dose) PO QHS when patient can tolerate PO meds (goal LDL < 70 mg/dL)  - Treat BP if over 140/90 (goal /80) - No role for permissive hypertension at this time  - PT/OT  - DVT ppx: SCDs, Enoxaparin          *******************************      CHIEF COMPLAINT:  Patient is a 77y old  Female who presents with a chief complaint of Altered mental status. (28 Jul 2023 14:03)      HPI:  Patient is a 77 female from [facility] PMHx HTN, HLD, CVA (w/ residual aphasia and R sided hemiparesis/plegia) who was sent to the hospital due to altered mental status. Patient is not able to present any history. Patient is lying down in bed, awake and alert. However, patient does not speak, is not able to follow commands and does not turn face or move eyes when her name is called. As per nursing home papers patient was noted to have change in mental status with eye fixed in one direction and unable to swallow food. Patient's sister Marika San ([phone]) was called to inquire about baseline mental status. Patient can respond with one word answers, swallows food, however does not move arms/legs on baseline. ED note also reports patient is exhibiting b/l upper arm weakness but resident is unable to confirm that during evaluation. Unable to obtain any ROS from the patient at this time.   Of note: Patient's sister noted that patient sister was recently started on medication for muscle spasms at nursing home.  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  Seizures  Chronic constipation  No significant past surgical history      MEDICATIONS:  acetaminophen   IVPB .. 1000 milliGRAM(s) IV Intermittent once  aspirin Suppository 300 milliGRAM(s) Rectal daily  cefTRIAXone   IVPB      enoxaparin Injectable 30 milliGRAM(s) SubCutaneous every 24 hours  valproate sodium  IVPB 500 milliGRAM(s) IV Intermittent every 8 hours      ALLERGIES:  Natural rubber (Other)  Latex (Other)  Penicillins (Unknown)      FAMILY HISTORY:  No reported family history of stroke      SOCIAL HISTORY:  [facility] resident  No reported alcohol, tobacco, or illicit drug use      REVIEW OF SYSTEMS: Limited due to encephalopathy  GENERAL: No fever  EYES: No eye discharge  EAR/NOSE/MOUTH/THROAT: No sinus discharge  NECK: No stiffness  RESPIRATORY: No cough  CARDIOVASCULAR: No shortness of breath  GASTROINTESTINAL: No nausea, vomiting, hematemesis  GENITOURINARY: No frequency, hematuria  SKIN: No rashes or lesions  ENDOCRINE: No reported heat or cold intolerance  HEMATOLOGIC: No reported easy bruising or bleeding  PSYCHIATRIC: No known depression or anxiety  MUSCULOSKELETAL: Recent muscle spasms; No joint swelling  NEUROLOGICAL: As per HPI          OBJECTIVE:    Vital Signs Last 24 Hrs  T(C): 37.1 (28 Jul 2023 14:04), Max: 37.1 (28 Jul 2023 14:04)  T(F): 98.7 (28 Jul 2023 14:04), Max: 98.7 (28 Jul 2023 14:04)  HR: 85 (28 Jul 2023 15:44) (77 - 94)  BP: 159/87 (28 Jul 2023 15:44) (159/87 - 207/87)  RR: 20 (28 Jul 2023 15:44) (18 - 24)  SpO2: 97% (28 Jul 2023 15:44) (97% - 100%)  Parameters below as of 28 Jul 2023 15:44  Patient On (Oxygen Delivery Method): room air      General Exam:  General: No apparent acute distress  Respiratory: Rapid rate, Diminished breath sounds b/l  Cardiovascular: RRR, No murmurs, Full b/l radial and pedal pulses    Neurological Exam:  General / Mental Status: Nonverbal, Does not follow most commands.  Neurological exam is limited.  Cranial Nerves: PERRL, Blink to threat sluggish b/l, Does not track finger movements with eyes b/l.  Grimaces to pinprick at b/l V1-V3.  No response to snap at b/l ears.  Right-sided facial droop present.  Unable to assess palate elevation or tongue protrusion due to patient not following these commands.  No resistance to passive motion of neck b/l; no nuchal rigidity.  Motor: Diminished bulk and tone in all four extremities.  All four extremities drop to bed after passive elevation.  No spontaneous movement, rigidity, or spasticity in any of the four extremities.  Sensation: Grimaces to nailbed pressure in left arm and left leg; No response to nailbed pressure in right arm and right leg.  Reflexes: 1+ and symmetric at b/l biceps, triceps, brachioradialis, patellae, and ankles.  Toes mute b/l.  Unable to assess coordination, Romberg sign, or gait in minimally responsive patient.      NIHSS Score:    LOC - 2  LOC Questions - 2  LOC Commands - 1  Gaze Preference - 0  Visual Fields - 0  Facial Palsy - 2  Motor Arm Left - 3  Motor Arm Right - 4  Motor Leg Left - 3  Motor Leg Right - 4  Limb Ataxia - UN  Sensory - 2  Language - 3  Speech - UN  Extinction - 2    NIHSS Score Total: 28 - No IV TNK because of uncertain last seen normal time    Modified Phyllis Scale: 2          LABORATORY VALUES:                        10.4   18.31 )-----------( 356      ( 28 Jul 2023 05:03 )             32.5       07-28    143  |  111<H>  |  31<H>  ----------------------------<  124<H>  3.9   |  20<L>  |  1.81<H>    Ca    9.1      28 Jul 2023 05:03  Phos  4.1     07-28  Mg     2.7     07-28    TPro  8.5<H>  /  Alb  2.9<L>  /  TBili  0.3  /  DBili  x   /  AST  15  /  ALT  10  /  AlkPhos  97  07-27 07-28 Chol 152 LDL 82 HDL 51 Trig 93    A1C with Estimated Average Glucose (07.28.23 @ 05:03)   A1C with Estimated Average Glucose Result: 5.6%  Estimated Average Glucose: 114 mg/dL          NEUROIMAGING:      CT Head & CTA Head/Neck & CT Perfusion Head (7/27/23):  - No acute intracranial abnormality  - Chronic left MCA territory infarct with corresponding chronic hypoperfusion  - No new areas of hypoperfusion  - No intracranial or neck vessel abnormality          Please contact the Neurology consult service with any neurological questions.    Eligio Whitney MD   of Neurology  [facility]   NEUROLOGY CONSULT NOTE    NAME:  CARYN LEIVA      ASSESSMENT:  77 RHF with acute encephalopathy in setting of chronic ischemic stroke, also with left hand twitching, concerning for toxic/metabolic encephalopathy vs. breakthrough post-stroke seizure with post-ictal state vs. recrudescence of chronic stroke symptoms in the setting of urinary tract infection and acute renal failure, without neuroimaging evidence for acute ischemic stroke       RECOMMENDATIONS:      1. Seizure workup and management  - Continue Valproate 500mg IV Q8H (or Depakene liquid 500mg PO Q8H as per home regimen when patient can tolerate PO meds)  - Routine EEG approved to evaluate for subclinical seizures    2. Stroke/Encephalopathy workup  - Telemetry monitoring while inpatient  - Continue infectious and metabolic workup and continue to treat abnormalities identified    3. Secondary stroke prevention  - Q4H Neurochecks & Vital signs  - Swallow evaluation before administering any PO meds  - Aspirin 81mg PO Daily (or Aspirin 300mg AK daily if NPO)  - Atorvastatin 40mg PO QHS or Simvastatin 20mg (up from home 10mg dose) PO QHS when patient can tolerate PO meds (goal LDL < 70 mg/dL)  - Treat BP if over 140/90 (goal /80) - No role for permissive hypertension at this time  - PT/OT  - DVT ppx: SCDs, Enoxaparin          *******************************      CHIEF COMPLAINT:  Patient is a 77y old  Female who presents with a chief complaint of Altered mental status. (28 Jul 2023 14:03)      HPI:  Patient is a 77 female from [facility] HTN, HLD, CVA (w/ residual aphasia and R sided hemiparesis/plegia) who was sent to the hospital due to altered mental status. Patient is not able to present any history. Patient is lying down in bed, awake and alert. However, patient does not speak, is not able to follow commands and does not turn face or move eyes when her name is called. As per nursing home papers patient was noted to have change in mental status with eye fixed in one direction and unable to swallow food. Patient's sister Marika San ([phone]) was called to inquire about baseline mental status. Patient can respond with one word answers, swallows food, however does not move arms/legs on baseline. ED note also reports patient is exhibiting b/l upper arm weakness but resident is unable to confirm that during evaluation. Unable to obtain any ROS from the patient at this time.   Of note: Patient's sister noted that patient sister was recently started on medication for muscle spasms at nursing home.  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  Seizures  Chronic constipation  No significant past surgical history      MEDICATIONS:  acetaminophen   IVPB .. 1000 milliGRAM(s) IV Intermittent once  aspirin Suppository 300 milliGRAM(s) Rectal daily  cefTRIAXone   IVPB      enoxaparin Injectable 30 milliGRAM(s) SubCutaneous every 24 hours  valproate sodium  IVPB 500 milliGRAM(s) IV Intermittent every 8 hours      ALLERGIES:  Natural rubber (Other)  Latex (Other)  Penicillins (Unknown)      FAMILY HISTORY:  No reported family history of stroke      SOCIAL HISTORY:  [facility] resident  No reported alcohol, tobacco, or illicit drug use      REVIEW OF SYSTEMS: Limited due to encephalopathy  GENERAL: No fever  EYES: No eye discharge  EAR/NOSE/MOUTH/THROAT: No sinus discharge  NECK: No stiffness  RESPIRATORY: No cough  CARDIOVASCULAR: No shortness of breath  GASTROINTESTINAL: No nausea, vomiting, hematemesis  GENITOURINARY: No frequency, hematuria  SKIN: No rashes or lesions  ENDOCRINE: No reported heat or cold intolerance  HEMATOLOGIC: No reported easy bruising or bleeding  PSYCHIATRIC: No known depression or anxiety  MUSCULOSKELETAL: Recent muscle spasms; No joint swelling  NEUROLOGICAL: As per HPI          OBJECTIVE:    Vital Signs Last 24 Hrs  T(C): 37.1 (28 Jul 2023 14:04), Max: 37.1 (28 Jul 2023 14:04)  T(F): 98.7 (28 Jul 2023 14:04), Max: 98.7 (28 Jul 2023 14:04)  HR: 85 (28 Jul 2023 15:44) (77 - 94)  BP: 159/87 (28 Jul 2023 15:44) (159/87 - 207/87)  RR: 20 (28 Jul 2023 15:44) (18 - 24)  SpO2: 97% (28 Jul 2023 15:44) (97% - 100%)  Parameters below as of 28 Jul 2023 15:44  Patient On (Oxygen Delivery Method): room air      General Exam:  General: No apparent acute distress  Respiratory: Rapid rate, Diminished breath sounds b/l  Cardiovascular: RRR, No murmurs, Full b/l radial and pedal pulses    Neurological Exam:  General / Mental Status: Nonverbal, Does not follow most commands.  Neurological exam is limited.  Cranial Nerves: PERRL, Blink to threat sluggish b/l, Does not track finger movements with eyes b/l.  Grimaces to pinprick at b/l V1-V3.  No response to snap at b/l ears.  Right-sided facial droop present.  Unable to assess palate elevation or tongue protrusion due to patient not following these commands.  No resistance to passive motion of neck b/l; no nuchal rigidity.  Motor: Diminished bulk and tone in all four extremities.  All four extremities drop to bed after passive elevation.  No spontaneous movement, rigidity, or spasticity in any of the four extremities.  Sensation: Grimaces to nailbed pressure in left arm and left leg; No response to nailbed pressure in right arm and right leg.  Reflexes: 1+ and symmetric at b/l biceps, triceps, brachioradialis, patellae, and ankles.  Toes mute b/l.  Unable to assess coordination, Romberg sign, or gait in minimally responsive patient.      NIHSS Score:    LOC - 2  LOC Questions - 2  LOC Commands - 1  Gaze Preference - 0  Visual Fields - 0  Facial Palsy - 2  Motor Arm Left - 3  Motor Arm Right - 4  Motor Leg Left - 3  Motor Leg Right - 4  Limb Ataxia - UN  Sensory - 2  Language - 3  Speech - UN  Extinction - 2    NIHSS Score Total: 28 - No IV TNK because of uncertain last seen normal time    Modified Phyllis Scale: 2          LABORATORY VALUES:                        10.4   18.31 )-----------( 356      ( 28 Jul 2023 05:03 )             32.5       07-28    143  |  111<H>  |  31<H>  ----------------------------<  124<H>  3.9   |  20<L>  |  1.81<H>    Ca    9.1      28 Jul 2023 05:03  Phos  4.1     07-28  Mg     2.7     07-28    TPro  8.5<H>  /  Alb  2.9<L>  /  TBili  0.3  /  DBili  x   /  AST  15  /  ALT  10  /  AlkPhos  97  07-27 07-28 Chol 152 LDL 82 HDL 51 Trig 93    A1C with Estimated Average Glucose (07.28.23 @ 05:03)   A1C with Estimated Average Glucose Result: 5.6%  Estimated Average Glucose: 114 mg/dL          NEUROIMAGING:      CT Head & CTA Head/Neck & CT Perfusion Head (7/27/23):  - No acute intracranial abnormality  - Chronic left MCA territory infarct with corresponding chronic hypoperfusion  - No new areas of hypoperfusion  - No intracranial or neck vessel abnormality          Please contact the Neurology consult service with any neurological questions.    Eligio Whitney MD   of Neurology  [facility] at [facility]

## 2023-07-28 NOTE — PATIENT PROFILE ADULT - DO YOU LACK THE NECESSARY SUPPORT TO HELP YOU COPE WITH LIFE CHALLENGES?
"  Assessment & Plan   Krish was seen today for eye pain, eye problem and imm/inj.    Diagnoses and all orders for this visit:    Irritation of right eye  Reports right eye foreign body sensation, blurry vision, and yellow mucopurulent discharge for 3 days. No conjunctival injection. Right lower eyelid was slightly red and irritated. Denies eye trauma. No fevers, cough, or N/V. He has chronic nasal congestion that is controlled with Flonase. He woke up today morning and all symptoms resolved. Denies using any medications. His vitals and exam is normal.     High priority for 2019-nCoV vaccine  -     COVID-19,PF,PFIZER PEDS (5-11 Yrs ORANGE LABEL)      Follow Up  Return in about 1 month (around 7/15/2022) for Routine preventive.      Braxton Graham MD          Subjective   Krish is a 10 year old accompanied by his mother and sibling., presenting for the following health issues:  Eye Pain (Having pain in the R eye.) and Eye Problem (Blurry vision/)      Eye Problem    History of Present Illness       Reason for visit:  Eye pain  Symptom onset:  3-7 days ago  Symptoms include:  Eye pain blurry  Symptom intensity:  Moderate  Symptom progression:  Improving  Had these symptoms before:  No  What makes it worse:  Touching it  What makes it better:  Rest        Review of Systems   Eyes: Positive for pain.      Constitutional, eye, ENT, skin, respiratory, cardiac, and GI are normal except as otherwise noted.      Objective    BP 94/56   Pulse 86   Temp 98.5  F (36.9  C) (Oral)   Ht 4' 2.43\" (1.281 m)   Wt 59 lb 3.2 oz (26.9 kg)   BMI 16.36 kg/m    10 %ile (Z= -1.28) based on CDC (Boys, 2-20 Years) weight-for-age data using vitals from 6/15/2022.  Blood pressure percentiles are 41 % systolic and 43 % diastolic based on the 2017 AAP Clinical Practice Guideline. This reading is in the normal blood pressure range.    Physical Exam   GENERAL: Active, alert, in no acute distress.  SKIN: Clear. No significant rash, abnormal " pigmentation or lesions  HEAD: Normocephalic.  EYES:  No discharge or erythema. Normal pupils and EOM.  EARS: Normal canals. Tympanic membranes are normal; gray and translucent.  NOSE: Normal without discharge.  MOUTH/THROAT: Clear. No oral lesions. Teeth intact without obvious abnormalities.  NECK: Supple, no masses.  LYMPH NODES: No adenopathy  LUNGS: Clear. No rales, rhonchi, wheezing or retractions  HEART: Regular rhythm. Normal S1/S2. No murmurs.  ABDOMEN: Soft, non-tender, not distended, no masses or hepatosplenomegaly. Bowel sounds normal.     Diagnostics: None             no

## 2023-07-28 NOTE — CONSULT NOTE ADULT - TIME BILLING
I counseled the primary team about the testing and treatment indicated for evaluation and management of encephalopathy, as well as secondary stroke prevention and seizure prophylaxis.

## 2023-07-29 LAB
GLUCOSE BLDC GLUCOMTR-MCNC: 87 MG/DL — SIGNIFICANT CHANGE UP (ref 70–99)
GLUCOSE BLDC GLUCOMTR-MCNC: 88 MG/DL — SIGNIFICANT CHANGE UP (ref 70–99)
GLUCOSE BLDC GLUCOMTR-MCNC: 90 MG/DL — SIGNIFICANT CHANGE UP (ref 70–99)

## 2023-07-29 RX ORDER — LABETALOL HCL 100 MG
5 TABLET ORAL ONCE
Refills: 0 | Status: COMPLETED | OUTPATIENT
Start: 2023-07-29 | End: 2023-07-29

## 2023-07-29 RX ADMIN — Medication 400 MILLIGRAM(S): at 10:18

## 2023-07-29 RX ADMIN — Medication 5 MILLIGRAM(S): at 14:27

## 2023-07-29 RX ADMIN — Medication 300 MILLIGRAM(S): at 12:12

## 2023-07-29 RX ADMIN — CEFTRIAXONE 100 MILLIGRAM(S): 500 INJECTION, POWDER, FOR SOLUTION INTRAMUSCULAR; INTRAVENOUS at 13:55

## 2023-07-29 RX ADMIN — Medication 55 MILLIGRAM(S): at 05:26

## 2023-07-29 RX ADMIN — ENOXAPARIN SODIUM 30 MILLIGRAM(S): 100 INJECTION SUBCUTANEOUS at 15:22

## 2023-07-29 RX ADMIN — Medication 1000 MILLIGRAM(S): at 10:48

## 2023-07-29 RX ADMIN — Medication 55 MILLIGRAM(S): at 15:22

## 2023-07-29 NOTE — PROGRESS NOTE ADULT - SUBJECTIVE AND OBJECTIVE BOX
INTERVAL HPI/OVERNIGHT EVENTS:  Patient seen,events noticed  VITAL SIGNS:  T(F): 98.2 (07-29-23 @ 05:14)  HR: 77 (07-29-23 @ 05:14)  BP: 156/76 (07-29-23 @ 05:14)  RR: 18 (07-29-23 @ 05:14)  SpO2: 99% (07-29-23 @ 05:14)  Wt(kg): --    PHYSICAL EXAM:  awake  Constitutional:  Eyes:  ENMT:perrla  Neck:  Respiratory:clear  Cardiovascular:s1s2,m-none  Gastrointestinal:soft,bs pos  Extremities:  Vascular:  Neurological:no focal deficit  Musculoskeletal:    MEDICATIONS  (STANDING):  acetaminophen   IVPB .. 1000 milliGRAM(s) IV Intermittent once  aspirin Suppository 300 milliGRAM(s) Rectal daily  cefTRIAXone   IVPB 1000 milliGRAM(s) IV Intermittent every 24 hours  cefTRIAXone   IVPB      enoxaparin Injectable 30 milliGRAM(s) SubCutaneous every 24 hours  valproate sodium  IVPB 500 milliGRAM(s) IV Intermittent every 8 hours    MEDICATIONS  (PRN):      Allergies    &quot; NATURAL RUBBER&quot; (Other)  latex (Other)  penicillins (Unknown)    Intolerances        LABS:                        10.4   18.31 )-----------( 356      ( 28 Jul 2023 05:03 )             32.5     07-28    143  |  111<H>  |  31<H>  ----------------------------<  124<H>  3.9   |  20<L>  |  1.81<H>    Ca    9.1      28 Jul 2023 05:03  Phos  4.1     07-28  Mg     2.7     07-28    TPro  8.5<H>  /  Alb  2.9<L>  /  TBili  0.3  /  DBili  x   /  AST  15  /  ALT  10  /  AlkPhos  97  07-27    PT/INR - ( 27 Jul 2023 15:00 )   PT: 11.0 sec;   INR: 0.96 ratio         PTT - ( 27 Jul 2023 15:00 )  PTT:29.4 sec  Urinalysis Basic - ( 28 Jul 2023 05:03 )    Color: x / Appearance: x / SG: x / pH: x  Gluc: 124 mg/dL / Ketone: x  / Bili: x / Urobili: x   Blood: x / Protein: x / Nitrite: x   Leuk Esterase: x / RBC: x / WBC x   Sq Epi: x / Non Sq Epi: x / Bacteria: x        RADIOLOGY & ADDITIONAL TESTS:       Assessment:  · Assessment	  Patient is a 77 female from [facility] PMHx HTN, HLD, CVA (w/ residual aphasia and R sided hemiparesis/plegia) who was sent to the hospital due to altered mental status. Patient is being admitted to medicine for further work up and rule out stroke vs encephalopathy (metabolic vs infectious).          Problem/Plan - 1:  ·  Problem: CVA (cerebrovascular accident).   ·  Plan: P/w altered mental status (baseline mental status as noted in HPI)  F/U SnS.  Aspirin suppository.  Start statin when passes SnS.   F/U echo with bubble study.   Neurology consulted - Dr. Whitney  Cardiology consulted - Dr. Dunn.     Problem/Plan - 2:  ·  Problem: Abnormal EKG.   ·F/U repeat ekg and trop.  IF TWI reversed than concern for NSTEMI. If persistent TWI then likely NOT ischemic.     Problem/Plan - 3:  ·  Problem: Acute encephalopathy.   ·  Plan: P/w altered mental status (baseline mental status as noted in HPI)  Patient on cinamet, sertraline, baclofen. Hold as patient is NPO.     Problem/Plan - 4:  ·  Problem: Seizure.   ·  Plan: H/O seizures.   C/w Depacon.     Problem/Plan - 5:  ·  Problem: Leukocytosis.   ·  Plan: Elevated WBCs.  No localizing signs of infection.   UA -ve.  CXR - ve.   F/U Bcx.     Problem/Plan - 6:  ·  Problem: Hypertension.   ·  Plan: Patient takes nicardipine and losartan.   Hold as patient is NPO.   Hold for permissive HTN for first 24 hours.     Problem/Plan - 7:  ·  Problem: Hyperlipidemia.   ·  Plan: F/U Lipid profile.   Start atorvastatin when passes SnS.     Problem/Plan - 8:  ·  Problem: Prophylactic measure.   ·  Plan: Lovenox. (renally dosed).     INTERVAL HPI/OVERNIGHT EVENTS:  Patient seen,events noticed  VITAL SIGNS:  T(F): 98.2 (07-29-23 @ 05:14)  HR: 77 (07-29-23 @ 05:14)  BP: 156/76 (07-29-23 @ 05:14)  RR: 18 (07-29-23 @ 05:14)  SpO2: 99% (07-29-23 @ 05:14)  Wt(kg): --    PHYSICAL EXAM:  awake  Constitutional:  Eyes:  ENMT:perrla  Neck:  Respiratory:clear  Cardiovascular:s1s2,m-none  Gastrointestinal:soft,bs pos  Extremities:  Vascular:  Neurological:no focal deficit  Musculoskeletal:    MEDICATIONS  (STANDING):  acetaminophen   IVPB .. 1000 milliGRAM(s) IV Intermittent once  aspirin Suppository 300 milliGRAM(s) Rectal daily  cefTRIAXone   IVPB 1000 milliGRAM(s) IV Intermittent every 24 hours  cefTRIAXone   IVPB      enoxaparin Injectable 30 milliGRAM(s) SubCutaneous every 24 hours  valproate sodium  IVPB 500 milliGRAM(s) IV Intermittent every 8 hours    MEDICATIONS  (PRN):      Allergies    &quot; NATURAL RUBBER&quot; (Other)  latex (Other)  penicillins (Unknown)    Intolerances        LABS:                        10.4   18.31 )-----------( 356      ( 28 Jul 2023 05:03 )             32.5     07-28    143  |  111<H>  |  31<H>  ----------------------------<  124<H>  3.9   |  20<L>  |  1.81<H>    Ca    9.1      28 Jul 2023 05:03  Phos  4.1     07-28  Mg     2.7     07-28    TPro  8.5<H>  /  Alb  2.9<L>  /  TBili  0.3  /  DBili  x   /  AST  15  /  ALT  10  /  AlkPhos  97  07-27    PT/INR - ( 27 Jul 2023 15:00 )   PT: 11.0 sec;   INR: 0.96 ratio         PTT - ( 27 Jul 2023 15:00 )  PTT:29.4 sec  Urinalysis Basic - ( 28 Jul 2023 05:03 )    Color: x / Appearance: x / SG: x / pH: x  Gluc: 124 mg/dL / Ketone: x  / Bili: x / Urobili: x   Blood: x / Protein: x / Nitrite: x   Leuk Esterase: x / RBC: x / WBC x   Sq Epi: x / Non Sq Epi: x / Bacteria: x        RADIOLOGY & ADDITIONAL TESTS:       Assessment:  · Assessment	  Patient is a 77 female from [facility] PMHx HTN, HLD, CVA (w/ residual aphasia and R sided hemiparesis/plegia) who was sent to the hospital due to altered mental status. Patient is being admitted to medicine for further work up and rule out stroke vs encephalopathy (metabolic vs infectious).          Problem/Plan - 1:  ·  Problem: CVA (cerebrovascular accident).   ·  Plan: P/w altered mental status (baseline mental status as noted in HPI)  F/U SnS.  Aspirin suppository.  Start statin when passes SnS.   F/U echo with bubble study.   Neurology consulted - Dr. Whitney  Cardiology consulted - Dr. Dunn.     Problem/Plan - 2:  ·  Problem: Abnormal EKG.   ·F/U repeat ekg and trop.  IF TWI reversed than concern for NSTEMI. If persistent TWI then likely NOT ischemic.     Problem/Plan - 3:  ·  Problem: Acute encephalopathy.   ·  Plan: P/w altered mental status (baseline mental status as noted in HPI)  Patient on cinamet, sertraline, baclofen. Hold as patient is NPO.     Problem/Plan - 4:  ·  Problem: Seizure.   ·  Plan: H/O seizures.   C/w Depacon.     Problem/Plan - 5:  ·  Problem: Leukocytosis.   ·  Plan: Elevated WBCs.  No localizing signs of infection.   UA -ve.  CXR - ve.   F/U Bcx.     Problem/Plan - 6:  ·  Problem: Hypertension.   ·  Plan: Patient takes nicardipine and losartan.   Hold as patient is NPO.   Hold for permissive HTN for first 24 hours.     Problem/Plan - 7:  ·  Problem: Hyperlipidemia.   ·  Plan: F/U Lipid profile.   Start atorvastatin when passes SnS.     Problem/Plan - 8:  ·  Problem: Prophylactic measure.   ·  Plan: Lovenox. (renally dosed).     INTERVAL HPI/OVERNIGHT EVENTS:  Patient seen,events noticed  VITAL SIGNS:  T(F): 98.2 (07-29-23 @ 05:14)  HR: 77 (07-29-23 @ 05:14)  BP: 156/76 (07-29-23 @ 05:14)  RR: 18 (07-29-23 @ 05:14)  SpO2: 99% (07-29-23 @ 05:14)  Wt(kg): --    PHYSICAL EXAM:  awake  Constitutional:  Eyes:  ENMT:perrla  Neck:  Respiratory:clear  Cardiovascular:s1s2,m-none  Gastrointestinal:soft,bs pos  Extremities:  Vascular:  Neurological:no focal deficit  Musculoskeletal:    MEDICATIONS  (STANDING):  acetaminophen   IVPB .. 1000 milliGRAM(s) IV Intermittent once  aspirin Suppository 300 milliGRAM(s) Rectal daily  cefTRIAXone   IVPB 1000 milliGRAM(s) IV Intermittent every 24 hours  cefTRIAXone   IVPB      enoxaparin Injectable 30 milliGRAM(s) SubCutaneous every 24 hours  valproate sodium  IVPB 500 milliGRAM(s) IV Intermittent every 8 hours    MEDICATIONS  (PRN):      Allergies    &quot; NATURAL RUBBER&quot; (Other)  latex (Other)  penicillins (Unknown)    Intolerances        LABS:                        10.4   18.31 )-----------( 356      ( 28 Jul 2023 05:03 )             32.5     07-28    143  |  111<H>  |  31<H>  ----------------------------<  124<H>  3.9   |  20<L>  |  1.81<H>    Ca    9.1      28 Jul 2023 05:03  Phos  4.1     07-28  Mg     2.7     07-28    TPro  8.5<H>  /  Alb  2.9<L>  /  TBili  0.3  /  DBili  x   /  AST  15  /  ALT  10  /  AlkPhos  97  07-27    PT/INR - ( 27 Jul 2023 15:00 )   PT: 11.0 sec;   INR: 0.96 ratio         PTT - ( 27 Jul 2023 15:00 )  PTT:29.4 sec  Urinalysis Basic - ( 28 Jul 2023 05:03 )    Color: x / Appearance: x / SG: x / pH: x  Gluc: 124 mg/dL / Ketone: x  / Bili: x / Urobili: x   Blood: x / Protein: x / Nitrite: x   Leuk Esterase: x / RBC: x / WBC x   Sq Epi: x / Non Sq Epi: x / Bacteria: x        RADIOLOGY & ADDITIONAL TESTS:       Assessment:  · Assessment	  Patient is a 77 female from [facility] PMHx HTN, HLD, CVA (w/ residual aphasia and R sided hemiparesis/plegia) who was sent to the hospital due to altered mental status. Patient is being admitted to medicine for further work up and rule out stroke vs encephalopathy (metabolic vs infectious).          Problem/Plan - 1:  ·  Problem: CVA (cerebrovascular accident).   ·  Plan: P/w altered mental status (baseline mental status as noted in HPI)  F/U SnS.  Aspirin suppository.  Start statin when passes SnS.   F/U echo with bubble study.   Neurology consulted - Dr. Whitney  Cardiology consulted - Dr. Dunn.     Problem/Plan - 2:  ·  Problem: Abnormal EKG.   ·F/U repeat ekg and trop.  IF TWI reversed than concern for NSTEMI. If persistent TWI then likely NOT ischemic.     Problem/Plan - 3:  ·  Problem: Acute encephalopathy.   ·  Plan: P/w altered mental status (baseline mental status as noted in HPI)  Patient on cinamet, sertraline, baclofen. Hold as patient is NPO.     Problem/Plan - 4:  ·  Problem: Seizure.   ·  Plan: H/O seizures.   C/w Depacon.     Problem/Plan - 5:  ·  Problem: Leukocytosis.   ·  Plan: Elevated WBCs.  No localizing signs of infection.   UA -ve.  CXR - ve.   F/U Bcx.     Problem/Plan - 6:  ·  Problem: Hypertension.   ·  Plan: Patient takes nicardipine and losartan.   Hold as patient is NPO.   Hold for permissive HTN for first 24 hours.     Problem/Plan - 7:  ·  Problem: Hyperlipidemia.   ·  Plan: F/U Lipid profile.   Start atorvastatin when passes SnS.     Problem/Plan - 8:  ·  Problem: Prophylactic measure.   ·  Plan: Lovenox. (renally dosed).

## 2023-07-29 NOTE — CHART NOTE - NSCHARTNOTEFT_GEN_A_CORE
Called by nursing staff- /85, HR 75, regular    Patient examined bedside-AO x 0  Lungs clear to auscultation, S1, S1 +    BP recheck - still elevated- 182/79, HR 73    LABETOLOL 5mg !V push given    To check BP in 30 min.

## 2023-07-29 NOTE — PROGRESS NOTE ADULT - SUBJECTIVE AND OBJECTIVE BOX
CHIEF COMPLAINT:Patient is a 77y old  Female who presents with a chief complaint of Altered mental status. Pt appears comfortable.    	  REVIEW OF SYSTEMS:    [x ] Unable to obtain    PHYSICAL EXAM:  T(C): 36.9 (07-29-23 @ 11:06), Max: 37.1 (07-28-23 @ 14:04)  HR: 75 (07-29-23 @ 11:06) (75 - 94)  BP: 188/85 (07-29-23 @ 11:06) (156/76 - 188/85)  RR: 17 (07-29-23 @ 11:06) (17 - 20)  SpO2: 98% (07-29-23 @ 11:06) (97% - 99%)  Wt(kg): --  I&O's Summary      Appearance: Normal	  HEENT:   Normal oral mucosa, PERRL, EOMI	  Lymphatic: No lymphadenopathy  Cardiovascular: Normal S1 S2, No JVD, No murmurs, No edema  Respiratory: Lungs clear to auscultation	  Gastrointestinal:  Soft, Non-tender, + BS	  Skin: No rashes, No ecchymoses, No cyanosis	  Extremities: Normal range of motion, No clubbing, cyanosis or edema  Vascular: Peripheral pulses palpable 2+ bilaterally    MEDICATIONS  (STANDING):  aspirin Suppository 300 milliGRAM(s) Rectal daily  cefTRIAXone   IVPB 1000 milliGRAM(s) IV Intermittent every 24 hours  cefTRIAXone   IVPB      enoxaparin Injectable 30 milliGRAM(s) SubCutaneous every 24 hours  valproate sodium  IVPB 500 milliGRAM(s) IV Intermittent every 8 hours      TELEMETRY: 	nsr,psvt      LABS:	 	      Troponin I, High Sensitivity Result: 44.5 ng/L (07-28 @ 01:39)  Troponin I, High Sensitivity Result: 14.4 ng/L (07-27 @ 15:00)                            10.4   18.31 )-----------( 356      ( 28 Jul 2023 05:03 )             32.5     07-28    143  |  111<H>  |  31<H>  ----------------------------<  124<H>  3.9   |  20<L>  |  1.81<H>    Ca    9.1      28 Jul 2023 05:03  Phos  4.1     07-28  Mg     2.7     07-28    TPro  8.5<H>  /  Alb  2.9<L>  /  TBili  0.3  /  DBili  x   /  AST  15  /  ALT  10  /  AlkPhos  97  07-27    proBNP:   Lipid Profile: Cholesterol 152  LDL --  HDL 51  TG 93  Ldl calc 82  Ratio --    HgA1c:   TSH: Thyroid Stimulating Hormone, Serum: 3.59 uU/mL (07-28 @ 05:03)      	    < from: Transthoracic Echocardiogram (07.28.23 @ 07:13) >  OBSERVATIONS:  Mitral Valve: Normal mitral valve. Trace mitral  regurgitation.  Aortic Root: Normal aortic root.  Aortic Valve: Normal trileaflet aortic valve.  Left Atrium: Normal left atrium.  LA volume index = 21  cc/m2.  Left Ventricle: Endocardium not well visualized; grossly  normal left ventricular systolic function.   Segmental wall  motion could not be assessed. Increased relative wall  thickness with normal left ventricular (LV) mass index,  consistent with concentric LV remodeling. Grade I diastolic  dysfunction (Impaired relaxation, mild).  Right Heart: Normal right atrium. Normal right ventricular  size and function. There is trace tricuspid regurgitation.  There is trace pulmonic regurgitation.  Pericardium/PleuraNormal pericardium with no pericardial  effusion.  Hemodynamic: RA Pressure is 8 mm Hg. RV systolic pressure  is 46 mm Hg. Mild pulmonary hypertension. Agitated saline  injection was negative for intracardiac shunt.  ------------------------------------------------------------------------  CONCLUSIONS:  1. Trace mitral regurgitation.  2. Increased relative wall thickness with normal left  ventricular (LV) mass index, consistent with concentric LV  remodeling.  3. Endocardium not well visualized; grossly normal left  ventricular systolic function.   Segmental wall motion  could not be assessed.  4. Grade I diastolic dysfunction (Impaired relaxation,  mild).  5. Normal right ventricular size and function.  6. RV systolic pressure is 46 mm Hg. Mild pulmonary  hypertension.  7. Agitated saline injection was negative for intracardiac  shunt.    ------------------------------------------------------------------------  Confirmed on  7/28/2023 - 20:17:34 by Riley Rey MD  ------------------------------------------------------------------------    < end of copied text >

## 2023-07-29 NOTE — PROGRESS NOTE ADULT - ASSESSMENT
77 female from [facility] PMHx HTN, HLD, CVA (w/ residual aphasia and R sided hemiparesis/plegia) who was sent to the hospital due to altered mental status.  1.No new CVA on MRI.  2.Elevated wbc-pan cx, on Rocephin for UTI.  3.Neurology eval.  4.HTN-hold bp medication.  5.Lipid d/o-hold statin.  6.Await speech and swallow eval.  7.GI and DVT prophylaxis. 77 female from [facility] PMHx HTN, HLD, CVA (w/ residual aphasia and R sided hemiparesis/plegia) who was sent to the hospital due to altered mental status.  1.No new CVA on MRI.  2.Elevated wbc-pan cx, on Rocephin for UTI.  3.Neurology eval.  4.HTN-hold bp medication.  5.Lipid d/o-hold statin.  6.Await speech and swallow eval.  7.GI and DVT prophylaxis. 77 female from [facility] PMHx HTN, HLD, CVA (w/ residual aphasia and R sided hemiparesis/plegia) who was sent to the hospital due to altered mental status.  1.No new CVA on MRI.  2.Elevated wbc-pan cx, on Rocephin for UTI.  3.Neurology eval.  4.HTN-hold bp medication.  5.Lipid d/o-hold statin.  6.Await speech and swallow eval.  7.GI and DVT prophylaxis.

## 2023-07-30 DIAGNOSIS — E87.8 OTHER DISORDERS OF ELECTROLYTE AND FLUID BALANCE, NOT ELSEWHERE CLASSIFIED: ICD-10-CM

## 2023-07-30 LAB
ALBUMIN SERPL ELPH-MCNC: 2.7 G/DL — LOW (ref 3.5–5)
ALP SERPL-CCNC: 88 U/L — SIGNIFICANT CHANGE UP (ref 40–120)
ALP SERPL-CCNC: 94 U/L — SIGNIFICANT CHANGE UP (ref 40–120)
ALT FLD-CCNC: 12 U/L DA — SIGNIFICANT CHANGE UP (ref 10–60)
ANION GAP SERPL CALC-SCNC: 12 MMOL/L — SIGNIFICANT CHANGE UP (ref 5–17)
ANION GAP SERPL CALC-SCNC: 13 MMOL/L — SIGNIFICANT CHANGE UP (ref 5–17)
ANION GAP SERPL CALC-SCNC: 9 MMOL/L — SIGNIFICANT CHANGE UP (ref 5–17)
APPEARANCE UR: ABNORMAL
AST SERPL-CCNC: 12 U/L — SIGNIFICANT CHANGE UP (ref 10–40)
AST SERPL-CCNC: 17 U/L — SIGNIFICANT CHANGE UP (ref 10–40)
BACTERIA # UR AUTO: ABNORMAL /HPF
BILIRUB SERPL-MCNC: 0.3 MG/DL — SIGNIFICANT CHANGE UP (ref 0.2–1.2)
BILIRUB UR-MCNC: NEGATIVE — SIGNIFICANT CHANGE UP
BUN SERPL-MCNC: 46 MG/DL — HIGH (ref 7–18)
BUN SERPL-MCNC: 47 MG/DL — HIGH (ref 7–18)
CALCIUM SERPL-MCNC: 7.8 MG/DL — LOW (ref 8.4–10.5)
CALCIUM SERPL-MCNC: 8.1 MG/DL — LOW (ref 8.4–10.5)
CALCIUM SERPL-MCNC: 8.2 MG/DL — LOW (ref 8.4–10.5)
CHLORIDE SERPL-SCNC: 115 MMOL/L — HIGH (ref 96–108)
CHLORIDE SERPL-SCNC: 116 MMOL/L — HIGH (ref 96–108)
CHLORIDE SERPL-SCNC: 117 MMOL/L — HIGH (ref 96–108)
CO2 SERPL-SCNC: 16 MMOL/L — LOW (ref 22–31)
CO2 SERPL-SCNC: 19 MMOL/L — LOW (ref 22–31)
CO2 SERPL-SCNC: 21 MMOL/L — LOW (ref 22–31)
COLOR SPEC: YELLOW — SIGNIFICANT CHANGE UP
CREAT ?TM UR-MCNC: 156 MG/DL — SIGNIFICANT CHANGE UP
CREAT SERPL-MCNC: 3.09 MG/DL — HIGH (ref 0.5–1.3)
CREAT SERPL-MCNC: 3.12 MG/DL — HIGH (ref 0.5–1.3)
CREAT SERPL-MCNC: 3.18 MG/DL — HIGH (ref 0.5–1.3)
DIFF PNL FLD: ABNORMAL
EGFR: 14 ML/MIN/1.73M2 — LOW
EGFR: 15 ML/MIN/1.73M2 — LOW
EPI CELLS # UR: SIGNIFICANT CHANGE UP /HPF
GLUCOSE BLDC GLUCOMTR-MCNC: 72 MG/DL — SIGNIFICANT CHANGE UP (ref 70–99)
GLUCOSE BLDC GLUCOMTR-MCNC: 75 MG/DL — SIGNIFICANT CHANGE UP (ref 70–99)
GLUCOSE BLDC GLUCOMTR-MCNC: 90 MG/DL — SIGNIFICANT CHANGE UP (ref 70–99)
GLUCOSE SERPL-MCNC: 73 MG/DL — SIGNIFICANT CHANGE UP (ref 70–99)
GLUCOSE SERPL-MCNC: 79 MG/DL — SIGNIFICANT CHANGE UP (ref 70–99)
GLUCOSE SERPL-MCNC: 80 MG/DL — SIGNIFICANT CHANGE UP (ref 70–99)
GLUCOSE UR QL: NEGATIVE — SIGNIFICANT CHANGE UP
HCT VFR BLD CALC: 31 % — LOW (ref 34.5–45)
HGB BLD-MCNC: 9.7 G/DL — LOW (ref 11.5–15.5)
HYALINE CASTS # UR AUTO: ABNORMAL /LPF
KETONES UR-MCNC: ABNORMAL
LEUKOCYTE ESTERASE UR-ACNC: ABNORMAL
MAGNESIUM SERPL-MCNC: 2.9 MG/DL — HIGH (ref 1.6–2.6)
MCHC RBC-ENTMCNC: 28.4 PG — SIGNIFICANT CHANGE UP (ref 27–34)
MCHC RBC-ENTMCNC: 31.3 GM/DL — LOW (ref 32–36)
MCV RBC AUTO: 90.6 FL — SIGNIFICANT CHANGE UP (ref 80–100)
NITRITE UR-MCNC: POSITIVE
NRBC # BLD: 0 /100 WBCS — SIGNIFICANT CHANGE UP (ref 0–0)
OSMOLALITY UR: 439 MOS/KG — SIGNIFICANT CHANGE UP (ref 50–1200)
PH UR: 6 — SIGNIFICANT CHANGE UP (ref 5–8)
PHOSPHATE SERPL-MCNC: 7.6 MG/DL — HIGH (ref 2.5–4.5)
PLATELET # BLD AUTO: 273 K/UL — SIGNIFICANT CHANGE UP (ref 150–400)
POTASSIUM SERPL-MCNC: 4.1 MMOL/L — SIGNIFICANT CHANGE UP (ref 3.5–5.3)
POTASSIUM SERPL-MCNC: 4.3 MMOL/L — SIGNIFICANT CHANGE UP (ref 3.5–5.3)
POTASSIUM SERPL-MCNC: 5.4 MMOL/L — HIGH (ref 3.5–5.3)
POTASSIUM SERPL-SCNC: 4.1 MMOL/L — SIGNIFICANT CHANGE UP (ref 3.5–5.3)
POTASSIUM SERPL-SCNC: 4.3 MMOL/L — SIGNIFICANT CHANGE UP (ref 3.5–5.3)
POTASSIUM SERPL-SCNC: 5.4 MMOL/L — HIGH (ref 3.5–5.3)
POTASSIUM UR-SCNC: 67 MMOL/L — SIGNIFICANT CHANGE UP
PROT ?TM UR-MCNC: 239 MG/DL — HIGH (ref 0–12)
PROT SERPL-MCNC: 7.4 G/DL — SIGNIFICANT CHANGE UP (ref 6–8.3)
PROT SERPL-MCNC: 7.9 G/DL — SIGNIFICANT CHANGE UP (ref 6–8.3)
PROT UR-MCNC: 100 MG/DL
RBC # BLD: 3.42 M/UL — LOW (ref 3.8–5.2)
RBC # FLD: 14.6 % — HIGH (ref 10.3–14.5)
RBC CASTS # UR COMP ASSIST: >50 /HPF (ref 0–2)
SODIUM SERPL-SCNC: 143 MMOL/L — SIGNIFICANT CHANGE UP (ref 135–145)
SODIUM SERPL-SCNC: 145 MMOL/L — SIGNIFICANT CHANGE UP (ref 135–145)
SODIUM SERPL-SCNC: 150 MMOL/L — HIGH (ref 135–145)
SODIUM UR-SCNC: 48 MMOL/L — SIGNIFICANT CHANGE UP
SP GR SPEC: 1.01 — SIGNIFICANT CHANGE UP (ref 1.01–1.02)
TROPONIN I, HIGH SENSITIVITY RESULT: 24 NG/L — SIGNIFICANT CHANGE UP
UROBILINOGEN FLD QL: 1 MG/DL
WBC # BLD: 12.93 K/UL — HIGH (ref 3.8–10.5)
WBC # FLD AUTO: 12.93 K/UL — HIGH (ref 3.8–10.5)
WBC UR QL: ABNORMAL /HPF (ref 0–5)

## 2023-07-30 PROCEDURE — 76770 US EXAM ABDO BACK WALL COMP: CPT | Mod: 26

## 2023-07-30 RX ORDER — LABETALOL HCL 100 MG
5 TABLET ORAL ONCE
Refills: 0 | Status: COMPLETED | OUTPATIENT
Start: 2023-07-30 | End: 2023-07-30

## 2023-07-30 RX ORDER — SODIUM CHLORIDE 9 MG/ML
1000 INJECTION, SOLUTION INTRAVENOUS
Refills: 0 | Status: DISCONTINUED | OUTPATIENT
Start: 2023-07-30 | End: 2023-07-30

## 2023-07-30 RX ORDER — SODIUM CHLORIDE 9 MG/ML
1000 INJECTION INTRAMUSCULAR; INTRAVENOUS; SUBCUTANEOUS
Refills: 0 | Status: DISCONTINUED | OUTPATIENT
Start: 2023-07-30 | End: 2023-07-30

## 2023-07-30 RX ORDER — HYDRALAZINE HCL 50 MG
10 TABLET ORAL EVERY 4 HOURS
Refills: 0 | Status: DISCONTINUED | OUTPATIENT
Start: 2023-07-30 | End: 2023-07-31

## 2023-07-30 RX ORDER — HYDRALAZINE HCL 50 MG
10 TABLET ORAL ONCE
Refills: 0 | Status: COMPLETED | OUTPATIENT
Start: 2023-07-30 | End: 2023-07-30

## 2023-07-30 RX ADMIN — Medication 10 MILLIGRAM(S): at 20:04

## 2023-07-30 RX ADMIN — SODIUM CHLORIDE 85 MILLILITER(S): 9 INJECTION INTRAMUSCULAR; INTRAVENOUS; SUBCUTANEOUS at 05:44

## 2023-07-30 RX ADMIN — Medication 10 MILLIGRAM(S): at 18:59

## 2023-07-30 RX ADMIN — SODIUM CHLORIDE 50 MILLILITER(S): 9 INJECTION, SOLUTION INTRAVENOUS at 14:36

## 2023-07-30 RX ADMIN — Medication 5 MILLIGRAM(S): at 05:45

## 2023-07-30 RX ADMIN — Medication 5 MILLIGRAM(S): at 21:05

## 2023-07-30 RX ADMIN — Medication 55 MILLIGRAM(S): at 09:03

## 2023-07-30 RX ADMIN — Medication 300 MILLIGRAM(S): at 14:32

## 2023-07-30 RX ADMIN — ENOXAPARIN SODIUM 30 MILLIGRAM(S): 100 INJECTION SUBCUTANEOUS at 18:27

## 2023-07-30 RX ADMIN — Medication 1 PATCH: at 14:35

## 2023-07-30 RX ADMIN — Medication 1 PATCH: at 19:30

## 2023-07-30 RX ADMIN — Medication 55 MILLIGRAM(S): at 01:13

## 2023-07-30 RX ADMIN — Medication 55 MILLIGRAM(S): at 18:27

## 2023-07-30 RX ADMIN — CEFTRIAXONE 100 MILLIGRAM(S): 500 INJECTION, POWDER, FOR SOLUTION INTRAMUSCULAR; INTRAVENOUS at 14:43

## 2023-07-30 RX ADMIN — Medication 5 MILLIGRAM(S): at 03:13

## 2023-07-30 NOTE — PROGRESS NOTE ADULT - PROBLEM SELECTOR PLAN 5
Patient takes nicardipine and losartan.   Hold as patient is NPO.   Hold for permissive HTN for first 24 hours. Elevated WBCs.  No localizing signs of infection.   UA -ve.->on recephin  CXR - ve.   F/U Bcx.

## 2023-07-30 NOTE — PROGRESS NOTE ADULT - ASSESSMENT
77 female from [facility] PMHx HTN, HLD, CVA (w/ residual aphasia and R sided hemiparesis/plegia) who was sent to the hospital due to altered mental status.  1.No new CVA on MRI.  2.Elevated wbc- on Rocephin for UTI.  3.Neurology eval.  4.HTN-place clonidine patch .1mg q wk.  5.Lipid d/o-hold statin.  6.Await speech and swallow eval.  7.GI and DVT prophylaxis. 77 female from [facility] PMHx HTN, HLD, CVA (w/ residual aphasia and R sided hemiparesis/plegia) who was sent to the hospital due to altered mental status.  1.No new CVA on MRI.  2.Elevated wbc- on Rocephin for UTI.  3.Neurology eval.  4.HTN-place clonidine patch .1mg q wk.  5.Lipid d/o-hold statin.  6.Await speech and swallow eval.  7.GI and DVT prophylaxis. 77 female from [facility] PMHx HTN, HLD, CVA (w/ residual aphasia and R sided hemiparesis/plegia) who was sent to the hospital due to altered mental status.  1.No new CVA on MRI.  2.Elevated wbc- on Rocephin for UTI.  3.Neurology eval.  4.HTN-place clonidine patch .1mg q wk.  5.Lipid d/o-hold statin.  6.Await speech and swallow eval.  7.GI and DVT prophylaxis.

## 2023-07-30 NOTE — PROGRESS NOTE ADULT - PROBLEM SELECTOR PLAN 8
Lovenox. (renally dosed) H/O seizures.   C/w Depacon.  valproic acid levels ordered for AM  EEG ordered.

## 2023-07-30 NOTE — CONSULT NOTE ADULT - SUBJECTIVE AND OBJECTIVE BOX
NEPHROLOGY MEDICAL CARE, [facility] - Dr. Ajay Green/ Dr. Mert Madison/ Dr. Chris Calderon/ Dr. Carson Cook    Date of Service: 07-30-23 @ 14:20    Patient was seen and examined at bedside.     Consultation requested by:  Davis Mason    Reason for Consult: SUSAN    HPI:  Patient is a 77 female from [facility] PMHx HTN, HLD, CVA (w/ residual aphasia and R sided hemiparesis/plegia) who was sent to the hospital due to altered mental status. Patient is not able to present any history. Family at bedside.   Renal consulted for SUSAN. Family not aware of any kidney disease in the past. Pt was admitted with Scr around 1.8mg/dL and no labs in between and her scr increased to 3.14mg/dl and elevated Na around 150s. Previous scr shows that patient had 1. 2 to 1.6mg/dl in oct 2022.   On admission, patient received stroke w/u which required CTA. 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    Seizures    Chronic constipation        PSH:   No significant past surgical history        FAMILY HISTORY:      Social History:  non-smoker/ non-alcoholic     Home Meds:  Home Medications:  acetaminophen 325 mg oral tablet: 2 tab(s) orally every 6 hours, As needed, Temp greater or equal to 38C (100.4F), Mild Pain (1 - 3) (27 Jul 2023 14:40)  aspirin 81 mg oral tablet, chewable: 1 tab(s) orally once a day (27 Jul 2023 16:19)  baclofen 10 mg oral tablet: 1 tab(s) orally 2 times a day (27 Jul 2023 16:19)  calcium (as carbonate)-vitamin D 600 mg-5 mcg (200 intl units) oral tablet: 1 tab(s) orally once a day (27 Jul 2023 16:19)  famotidine 40 mg oral tablet: 1 tab(s) orally once a day (at bedtime) (27 Jul 2023 14:40)  ferrous sulfate 325 mg (65 mg elemental iron) oral tablet: 1 tab(s) orally once a day (27 Jul 2023 16:19)  furosemide 40 mg oral tablet: 1 tab(s) orally once a day (27 Jul 2023 14:40)  losartan 100 mg oral tablet: 1 tab(s) orally once a day (27 Jul 2023 14:40)  Multiple Vitamins oral tablet: 1 tab(s) orally once a day (27 Jul 2023 14:40)  niCARdipine 20 mg oral capsule: 2 cap(s) orally once a day in the morning (27 Jul 2023 14:40)  niCARdipine 20 mg oral capsule: 1 cap(s) orally 2 times a day at 1 pm and 9 pm (27 Jul 2023 16:19)  PeriGuard topical ointment: Apply topically to affected area 2 times a day to right and left buttocks (27 Jul 2023 16:19)  Senna 8.6 mg oral tablet: 2 tab(s) orally once a day (at bedtime) (27 Jul 2023 14:40)  sertraline 100 mg oral tablet: 1 tab(s) orally once a day (27 Jul 2023 16:19)  simvastatin 10 mg oral tablet: 1 tab(s) orally once a day (at bedtime) (27 Jul 2023 14:40)  Sinemet 25 mg-100 mg oral tablet: 1 tab(s) orally 3 times a day at 7 am, 1 pm, and 5 pm (27 Jul 2023 16:19)  valproic acid 250 mg/5 mL oral liquid: 10 milliliter(s) orally every 8 hours (27 Jul 2023 16:19)  Vitamin C 500 mg oral tablet: 1 tab(s) orally once a day (27 Jul 2023 16:19)      Allergies:  Allergies    &quot; NATURAL RUBBER&quot; (Other)  latex (Other)  penicillins (Unknown)    Intolerances        REVIEW OF SYSTEMS:  unable to obtain due to altered mental status    Vital Signs Last 24 Hrs  T(C): 36.6 (30 Jul 2023 13:14), Max: 36.6 (30 Jul 2023 13:14)  T(F): 97.8 (30 Jul 2023 13:14), Max: 97.8 (30 Jul 2023 13:14)  HR: 67 (30 Jul 2023 13:14) (67 - 71)  BP: 180/89 (30 Jul 2023 13:14) (148/67 - 188/76)  BP(mean): --  RR: 18 (30 Jul 2023 13:14) (18 - 19)  SpO2: 99% (30 Jul 2023 13:14) (98% - 100%)    Parameters below as of 30 Jul 2023 13:14  Patient On (Oxygen Delivery Method): room air            PHYSICAL EXAM:  General: No acute respiratory distress.  Eyes: conjunctiva and sclera clear  ENMT: Atraumatic, Normocephalic, supple, No JVD present. Moist mucous membranes  Respiratory: Bilateral clear lungs; No rales, rhonchi, wheezing  Cardiovascular: S1S2+; no m/r/g  Gastrointestinal: Soft, Non-tender, Nondistended; Bowel sounds present, no hepatosplenomegaly.   Neuro:  confused; hemiparesis.  Ext:  1+pedal edema, No Cyanosis  Skin: No visible rashes        LABS:                        9.7    12.93 )-----------( 273      ( 30 Jul 2023 12:04 )             31.0     07-30    150<H>  |  116<H>  |  46<H>  ----------------------------<  79  4.3   |  21<L>  |  3.18<H>    Ca    8.1<L>      30 Jul 2023 09:57  Phos  7.6     07-30  Mg     2.9     07-30    TPro  7.4  /  Alb  2.7<L>  /  TBili  0.3  /  DBili  x   /  AST  12  /  ALT  12  /  AlkPhos  94  07-30      Urinalysis Basic - ( 30 Jul 2023 09:57 )    Color: x / Appearance: x / SG: x / pH: x  Gluc: 79 mg/dL / Ketone: x  / Bili: x / Urobili: x   Blood: x / Protein: x / Nitrite: x   Leuk Esterase: x / RBC: x / WBC x   Sq Epi: x / Non Sq Epi: x / Bacteria: x      Magnesium: 2.9 mg/dL *H* (07-30 @ 09:57)  Phosphorus: 7.6 mg/dL *H* (07-30 @ 09:57)    Urine studies      Medications:  MEDICATIONS  (STANDING):  aspirin Suppository 300 milliGRAM(s) Rectal daily  cefTRIAXone   IVPB 1000 milliGRAM(s) IV Intermittent every 24 hours  cefTRIAXone   IVPB      cloNIDine Patch 0.1 mG/24Hr(s) 1 patch Transdermal <User Schedule>  dextrose 5%. 1000 milliLiter(s) (50 mL/Hr) IV Continuous <Continuous>  enoxaparin Injectable 30 milliGRAM(s) SubCutaneous every 24 hours  valproate sodium  IVPB 500 milliGRAM(s) IV Intermittent every 8 hours    MEDICATIONS  (PRN):           NEPHROLOGY MEDICAL CARE, [facility] - Dr. Ajay Green/ Dr. Mert Madison/ Dr. Chris Calderon/ Dr. Carson Cook    Date of Service: 07-30-23 @ 14:20    Patient was seen and examined at bedside.     Consultation requested by:  Davis Mason    Reason for Consult: SUSAN    HPI:  Patient is a 77 female from [facility] PMHx HTN, HLD, CVA (w/ residual aphasia and R sided hemiparesis/plegia) who was sent to the hospital due to altered mental status. Patient is not able to present any history. Family at bedside.   Renal consulted for SUSAN. Family not aware of any kidney disease in the past. Pt was admitted with Scr around 1.8mg/dL and no labs in between and her scr increased to 3.14mg/dl and elevated Na around 150s. Previous scr shows that patient had 1. 2 to 1.6mg/dl in oct 2022.   On admission, patient received stroke w/u which required CTA. 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    Seizures    Chronic constipation        PSH:   No significant past surgical history        FAMILY HISTORY:      Social History:  non-smoker/ non-alcoholic     Home Meds:  Home Medications:  acetaminophen 325 mg oral tablet: 2 tab(s) orally every 6 hours, As needed, Temp greater or equal to 38C (100.4F), Mild Pain (1 - 3) (27 Jul 2023 14:40)  aspirin 81 mg oral tablet, chewable: 1 tab(s) orally once a day (27 Jul 2023 16:19)  baclofen 10 mg oral tablet: 1 tab(s) orally 2 times a day (27 Jul 2023 16:19)  calcium (as carbonate)-vitamin D 600 mg-5 mcg (200 intl units) oral tablet: 1 tab(s) orally once a day (27 Jul 2023 16:19)  famotidine 40 mg oral tablet: 1 tab(s) orally once a day (at bedtime) (27 Jul 2023 14:40)  ferrous sulfate 325 mg (65 mg elemental iron) oral tablet: 1 tab(s) orally once a day (27 Jul 2023 16:19)  furosemide 40 mg oral tablet: 1 tab(s) orally once a day (27 Jul 2023 14:40)  losartan 100 mg oral tablet: 1 tab(s) orally once a day (27 Jul 2023 14:40)  Multiple Vitamins oral tablet: 1 tab(s) orally once a day (27 Jul 2023 14:40)  niCARdipine 20 mg oral capsule: 2 cap(s) orally once a day in the morning (27 Jul 2023 14:40)  niCARdipine 20 mg oral capsule: 1 cap(s) orally 2 times a day at 1 pm and 9 pm (27 Jul 2023 16:19)  PeriGuard topical ointment: Apply topically to affected area 2 times a day to right and left buttocks (27 Jul 2023 16:19)  Senna 8.6 mg oral tablet: 2 tab(s) orally once a day (at bedtime) (27 Jul 2023 14:40)  sertraline 100 mg oral tablet: 1 tab(s) orally once a day (27 Jul 2023 16:19)  simvastatin 10 mg oral tablet: 1 tab(s) orally once a day (at bedtime) (27 Jul 2023 14:40)  Sinemet 25 mg-100 mg oral tablet: 1 tab(s) orally 3 times a day at 7 am, 1 pm, and 5 pm (27 Jul 2023 16:19)  valproic acid 250 mg/5 mL oral liquid: 10 milliliter(s) orally every 8 hours (27 Jul 2023 16:19)  Vitamin C 500 mg oral tablet: 1 tab(s) orally once a day (27 Jul 2023 16:19)      Allergies:  Allergies    &quot; NATURAL RUBBER&quot; (Other)  latex (Other)  penicillins (Unknown)    Intolerances        REVIEW OF SYSTEMS:  unable to obtain due to altered mental status    Vital Signs Last 24 Hrs  T(C): 36.6 (30 Jul 2023 13:14), Max: 36.6 (30 Jul 2023 13:14)  T(F): 97.8 (30 Jul 2023 13:14), Max: 97.8 (30 Jul 2023 13:14)  HR: 67 (30 Jul 2023 13:14) (67 - 71)  BP: 180/89 (30 Jul 2023 13:14) (148/67 - 188/76)  BP(mean): --  RR: 18 (30 Jul 2023 13:14) (18 - 19)  SpO2: 99% (30 Jul 2023 13:14) (98% - 100%)    Parameters below as of 30 Jul 2023 13:14  Patient On (Oxygen Delivery Method): room air            PHYSICAL EXAM:  General: No acute respiratory distress.  Eyes: conjunctiva and sclera clear  ENMT: Atraumatic, Normocephalic, supple, No JVD present. Moist mucous membranes  Respiratory: Bilateral clear lungs; No rales, rhonchi, wheezing  Cardiovascular: S1S2+; no m/r/g  Gastrointestinal: Soft, Non-tender, Nondistended; Bowel sounds present, no hepatosplenomegaly.   Neuro:  confused; hemiparesis.  Ext:  1+pedal edema, No Cyanosis  Skin: No visible rashes        LABS:                        9.7    12.93 )-----------( 273      ( 30 Jul 2023 12:04 )             31.0     07-30    150<H>  |  116<H>  |  46<H>  ----------------------------<  79  4.3   |  21<L>  |  3.18<H>    Ca    8.1<L>      30 Jul 2023 09:57  Phos  7.6     07-30  Mg     2.9     07-30    TPro  7.4  /  Alb  2.7<L>  /  TBili  0.3  /  DBili  x   /  AST  12  /  ALT  12  /  AlkPhos  94  07-30      Urinalysis Basic - ( 30 Jul 2023 09:57 )    Color: x / Appearance: x / SG: x / pH: x  Gluc: 79 mg/dL / Ketone: x  / Bili: x / Urobili: x   Blood: x / Protein: x / Nitrite: x   Leuk Esterase: x / RBC: x / WBC x   Sq Epi: x / Non Sq Epi: x / Bacteria: x      Magnesium: 2.9 mg/dL *H* (07-30 @ 09:57)  Phosphorus: 7.6 mg/dL *H* (07-30 @ 09:57)    Urine studies      Medications:  MEDICATIONS  (STANDING):  aspirin Suppository 300 milliGRAM(s) Rectal daily  cefTRIAXone   IVPB 1000 milliGRAM(s) IV Intermittent every 24 hours  cefTRIAXone   IVPB      cloNIDine Patch 0.1 mG/24Hr(s) 1 patch Transdermal <User Schedule>  dextrose 5%. 1000 milliLiter(s) (50 mL/Hr) IV Continuous <Continuous>  enoxaparin Injectable 30 milliGRAM(s) SubCutaneous every 24 hours  valproate sodium  IVPB 500 milliGRAM(s) IV Intermittent every 8 hours    MEDICATIONS  (PRN):           NEPHROLOGY MEDICAL CARE, [facility] - Dr. Ajay Green/ Dr. Mert Madison/ Dr. Chris Calderon/ Dr. Carson Cook    Date of Service: 07-30-23 @ 14:20    Patient was seen and examined at bedside.     Consultation requested by:  Davis Mason    Reason for Consult: SUSAN    HPI:  Patient is a 77 female from [facility] PMHx HTN, HLD, CVA (w/ residual aphasia and R sided hemiparesis/plegia) who was sent to the hospital due to altered mental status. Patient is not able to present any history. Family at bedside.   Renal consulted for SUSAN. Family not aware of any kidney disease in the past. Pt was admitted with Scr around 1.8mg/dL and no labs in between and her scr increased to 3.14mg/dl and elevated Na around 150s. Previous scr shows that patient had 1. 2 to 1.6mg/dl in oct 2022.   On admission, patient received stroke w/u which required CTA. 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    Seizures    Chronic constipation        PSH:   No significant past surgical history        FAMILY HISTORY:      Social History:  non-smoker/ non-alcoholic     Home Meds:  Home Medications:  acetaminophen 325 mg oral tablet: 2 tab(s) orally every 6 hours, As needed, Temp greater or equal to 38C (100.4F), Mild Pain (1 - 3) (27 Jul 2023 14:40)  aspirin 81 mg oral tablet, chewable: 1 tab(s) orally once a day (27 Jul 2023 16:19)  baclofen 10 mg oral tablet: 1 tab(s) orally 2 times a day (27 Jul 2023 16:19)  calcium (as carbonate)-vitamin D 600 mg-5 mcg (200 intl units) oral tablet: 1 tab(s) orally once a day (27 Jul 2023 16:19)  famotidine 40 mg oral tablet: 1 tab(s) orally once a day (at bedtime) (27 Jul 2023 14:40)  ferrous sulfate 325 mg (65 mg elemental iron) oral tablet: 1 tab(s) orally once a day (27 Jul 2023 16:19)  furosemide 40 mg oral tablet: 1 tab(s) orally once a day (27 Jul 2023 14:40)  losartan 100 mg oral tablet: 1 tab(s) orally once a day (27 Jul 2023 14:40)  Multiple Vitamins oral tablet: 1 tab(s) orally once a day (27 Jul 2023 14:40)  niCARdipine 20 mg oral capsule: 2 cap(s) orally once a day in the morning (27 Jul 2023 14:40)  niCARdipine 20 mg oral capsule: 1 cap(s) orally 2 times a day at 1 pm and 9 pm (27 Jul 2023 16:19)  PeriGuard topical ointment: Apply topically to affected area 2 times a day to right and left buttocks (27 Jul 2023 16:19)  Senna 8.6 mg oral tablet: 2 tab(s) orally once a day (at bedtime) (27 Jul 2023 14:40)  sertraline 100 mg oral tablet: 1 tab(s) orally once a day (27 Jul 2023 16:19)  simvastatin 10 mg oral tablet: 1 tab(s) orally once a day (at bedtime) (27 Jul 2023 14:40)  Sinemet 25 mg-100 mg oral tablet: 1 tab(s) orally 3 times a day at 7 am, 1 pm, and 5 pm (27 Jul 2023 16:19)  valproic acid 250 mg/5 mL oral liquid: 10 milliliter(s) orally every 8 hours (27 Jul 2023 16:19)  Vitamin C 500 mg oral tablet: 1 tab(s) orally once a day (27 Jul 2023 16:19)      Allergies:  Allergies    &quot; NATURAL RUBBER&quot; (Other)  latex (Other)  penicillins (Unknown)    Intolerances        REVIEW OF SYSTEMS:  unable to obtain due to altered mental status    Vital Signs Last 24 Hrs  T(C): 36.6 (30 Jul 2023 13:14), Max: 36.6 (30 Jul 2023 13:14)  T(F): 97.8 (30 Jul 2023 13:14), Max: 97.8 (30 Jul 2023 13:14)  HR: 67 (30 Jul 2023 13:14) (67 - 71)  BP: 180/89 (30 Jul 2023 13:14) (148/67 - 188/76)  BP(mean): --  RR: 18 (30 Jul 2023 13:14) (18 - 19)  SpO2: 99% (30 Jul 2023 13:14) (98% - 100%)    Parameters below as of 30 Jul 2023 13:14  Patient On (Oxygen Delivery Method): room air            PHYSICAL EXAM:  General: No acute respiratory distress.  Eyes: conjunctiva and sclera clear  ENMT: Atraumatic, Normocephalic, supple, No JVD present. Moist mucous membranes  Respiratory: Bilateral clear lungs; No rales, rhonchi, wheezing  Cardiovascular: S1S2+; no m/r/g  Gastrointestinal: Soft, Non-tender, Nondistended; Bowel sounds present, no hepatosplenomegaly.   Neuro:  confused; hemiparesis.  Ext:  1+pedal edema, No Cyanosis  Skin: No visible rashes        LABS:                        9.7    12.93 )-----------( 273      ( 30 Jul 2023 12:04 )             31.0     07-30    150<H>  |  116<H>  |  46<H>  ----------------------------<  79  4.3   |  21<L>  |  3.18<H>    Ca    8.1<L>      30 Jul 2023 09:57  Phos  7.6     07-30  Mg     2.9     07-30    TPro  7.4  /  Alb  2.7<L>  /  TBili  0.3  /  DBili  x   /  AST  12  /  ALT  12  /  AlkPhos  94  07-30      Urinalysis Basic - ( 30 Jul 2023 09:57 )    Color: x / Appearance: x / SG: x / pH: x  Gluc: 79 mg/dL / Ketone: x  / Bili: x / Urobili: x   Blood: x / Protein: x / Nitrite: x   Leuk Esterase: x / RBC: x / WBC x   Sq Epi: x / Non Sq Epi: x / Bacteria: x      Magnesium: 2.9 mg/dL *H* (07-30 @ 09:57)  Phosphorus: 7.6 mg/dL *H* (07-30 @ 09:57)    Urine studies      Medications:  MEDICATIONS  (STANDING):  aspirin Suppository 300 milliGRAM(s) Rectal daily  cefTRIAXone   IVPB 1000 milliGRAM(s) IV Intermittent every 24 hours  cefTRIAXone   IVPB      cloNIDine Patch 0.1 mG/24Hr(s) 1 patch Transdermal <User Schedule>  dextrose 5%. 1000 milliLiter(s) (50 mL/Hr) IV Continuous <Continuous>  enoxaparin Injectable 30 milliGRAM(s) SubCutaneous every 24 hours  valproate sodium  IVPB 500 milliGRAM(s) IV Intermittent every 8 hours    MEDICATIONS  (PRN):

## 2023-07-30 NOTE — DIETITIAN INITIAL EVALUATION ADULT - PERTINENT LABORATORY DATA
07-30    150<H>  |  116<H>  |  46<H>  ----------------------------<  79  4.3   |  21<L>  |  3.18<H>    Ca    8.1<L>      30 Jul 2023 09:57  Phos  7.6     07-30  Mg     2.9     07-30    TPro  7.4  /  Alb  2.7<L>  /  TBili  0.3  /  DBili  x   /  AST  12  /  ALT  12  /  AlkPhos  94  07-30  POCT Blood Glucose.: 75 mg/dL (07-30-23 @ 11:26)  A1C with Estimated Average Glucose Result: 5.6 % (07-28-23 @ 05:03)  A1C with Estimated Average Glucose Result: 5.8 % (10-11-22 @ 07:15)

## 2023-07-30 NOTE — DIETITIAN INITIAL EVALUATION ADULT - OTHER INFO
Pt from skilled nursing facility, alert, non-verbal/confused; Limited intake/wt change history data available at present; NPO x 3 to 4d in-house, attempt Swallow evaluation today but unable to assess at present due to altered mental status and reduced responsiveness to thermal tactile stimulation per SLP; Unknown food allergies per Chart; no GI distress at present per RN

## 2023-07-30 NOTE — PROGRESS NOTE ADULT - PROBLEM SELECTOR PLAN 2
P/w altered mental status   Etiology can be metabolic vs infectious vs drug induced (patient was started on medication for muscle spasm recently as per sister - assuming it is baclofen).   Elevated WBCs.  UA -ve.  CXR - ve.   F/U Bcx.  Patient on cinamet, sertraline, baclofen. Hold as patient is NPO.

## 2023-07-30 NOTE — PROGRESS NOTE ADULT - ASSESSMENT
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 Patient is being admitted to medicine for further work up and rule out stroke vs encephalopathy (metabolic vs infectious).  Patient is a 77 female from [facility] PMHx HTN, HLD, CVA (w/ residual aphasia and R sided hemiparesis/plegia) who was sent to the hospital due to altered mental status. Patient is being admitted to medicine for further work up and rule out stroke vs encephalopathy (metabolic vs infectious).  Patient is a 77 female from [facility] PMHx HTN, HLD, CVA (w/ residual aphasia and R sided hemiparesis/plegia) who was sent to the hospital due to altered mental status. Patient is being admitted to medicine for further work up and rule out stroke vs encephalopathy (metabolic vs infectious).

## 2023-07-30 NOTE — CONSULT NOTE ADULT - ASSESSMENT
1. SUSAN possible to r/o urinary retention vs MARISA vs r/o ATN  -will rpt bmp now r/o lab error; d/w nurse to perform bladder scan.   -recommend: urinalysis, urine lytes (uosm, urine sodium, urine creatinine, chloride, potassium), spot protein to creatinine ratio  -order renal sono to assess kidney size and r/o hydronephrosis.   -Adjust meds to eGFR and avoid IV Gadolinium contrast,NSAIDs, and phosphate enema.  -Monitor I/O's daily.   -Monitor SMA daily.  2. Hypernatremia due to water deficit and insensible losses. Pt is clinically euvolemic.   -Na elevated at 150s; continue D5W at 50cc/hr   -Monitor I/O's. Check Serum Na Daily. Avoid high solute intake diet and sodium bicarbonate infuse. Avoid overcorrection of NA (8-10meq/day)  3. r/o CKD  -patient could have underlying CKD due to uncontrolled HTN in the past  -last known Scr around 1.2 to 1.6mg/dL in Oct 2022.  4. HTN:   -bp is acceptable. continue bp meds  -titrate bp meds to keep sbp >110 and < 130  5. Mineral Bone Disease:  -Elevated phos,   -check PTH intact in am  6. Encephalopathy:  -workup in progress  -on Rocephin  -Plan as per Neuro and primary team    Discussed with family at bedside in detail regarding the renal plan and care  Discussed the assessment and plan with Primary Team/Nurse       1. SUSAN possible to r/o urinary retention vs MARISA vs r/o ATN  -will rpt bmp now r/o lab error; d/w nurse to perform bladder scan.   -recommend: urinalysis, urine lytes (uosm, urine sodium, urine creatinine, chloride, potassium), spot protein to creatinine ratio  -order renal sono to assess kidney size and r/o hydronephrosis.   -Adjust meds to eGFR and avoid IV Gadolinium contrast,NSAIDs, and phosphate enema.  -Monitor I/O's daily.   -Monitor SMA daily.  2. Hypernatremia due to water deficit and insensible losses. Pt is clinically euvolemic.   -Na elevated at 150s; continue D5W at 50cc/hr   -Monitor I/O's. Check Serum Na Daily. Avoid high solute intake diet and sodium bicarbonate infuse. Avoid overcorrection of NA (8-10meq/day)  3. r/o CKD  -patient could have underlying CKD due to uncontrolled HTN in the past  -last known Scr around 1.2 to 1.6mg/dL in Oct 2022.  4. HTN:   -bp is uncontrolled. continue bp meds  -titrate bp meds to keep sbp >110 and < 130  5. Mineral Bone Disease:  -Elevated phos,   -check PTH intact in am  6. Encephalopathy:  -workup in progress  -on Rocephin  -Plan as per Neuro and primary team    Discussed with family at bedside in detail regarding the renal plan and care  Discussed the assessment and plan with Primary Team/Nurse

## 2023-07-30 NOTE — PROGRESS NOTE ADULT - PROBLEM SELECTOR PLAN 4
Elevated WBCs.  No localizing signs of infection.   UA -ve.  CXR - ve.   F/U Bcx.  on recephin Elevated WBCs.  No localizing signs of infection.   UA -ve.->on recephin  CXR - ve.   F/U Bcx. EKG - NSR with TWI in V3-V5. (new compared to last EKG). Repeat EKg no change   Trop: 14.4-> 44.5->24  IF TWI reversed than concern for NSTEMI. If persistent TWI then likely NOT ischemic.

## 2023-07-30 NOTE — DIETITIAN INITIAL EVALUATION ADULT - FACTORS AFF FOOD INTAKE
acute on chronic comorbidities including acute encephalopathy, h/o CVA/change in mental status/difficulty chewing/difficulty feeding self/difficulty swallowing

## 2023-07-30 NOTE — PROGRESS NOTE ADULT - SUBJECTIVE AND OBJECTIVE BOX
PGY-1 Progress Note discussed with attending    PAGER #: [[phone]] TILL 5:00 PM  PLEASE CONTACT ON CALL TEAM:  - On Call Team (Please refer to Tyler) FROM 5:00 PM - 8:30PM  - Nightfloat Team FROM 8:30 -7:30 AM    CHIEF COMPLAINT & BRIEF HOSPITAL COURSE:    INTERVAL HPI/OVERNIGHT EVENTS:   MEDICATIONS  (STANDING):  aspirin Suppository 300 milliGRAM(s) Rectal daily  cefTRIAXone   IVPB 1000 milliGRAM(s) IV Intermittent every 24 hours  cefTRIAXone   IVPB      enoxaparin Injectable 30 milliGRAM(s) SubCutaneous every 24 hours  sodium chloride 0.9%. 1000 milliLiter(s) (85 mL/Hr) IV Continuous <Continuous>  valproate sodium  IVPB 500 milliGRAM(s) IV Intermittent every 8 hours    MEDICATIONS  (PRN):      REVIEW OF SYSTEMS:  CONSTITUTIONAL: No fever, weight loss, or fatigue  RESPIRATORY: No shortness of breath  CARDIOVASCULAR: No chest pain  GASTROINTESTINAL: No abdominal pain.  GENITOURINARY: No dysuria  NEUROLOGICAL: No headaches  SKIN: No itching, burning, rashes    Vital Signs Last 24 Hrs  T(C): 36.4 (30 Jul 2023 04:57), Max: 36.9 (29 Jul 2023 11:06)  T(F): 97.6 (30 Jul 2023 04:57), Max: 98.4 (29 Jul 2023 11:06)  HR: 70 (30 Jul 2023 04:57) (67 - 75)  BP: 175/70 (30 Jul 2023 04:57) (148/67 - 188/85)  BP(mean): --  RR: 18 (30 Jul 2023 04:57) (16 - 19)  SpO2: 99% (30 Jul 2023 04:57) (98% - 100%)    Parameters below as of 30 Jul 2023 04:57  Patient On (Oxygen Delivery Method): room air        PHYSICAL EXAMINATION:  GENERAL: NAD, well built  HEAD:  Atraumatic, Normocephalic  EYES:  conjunctiva and sclera clear  CHEST/LUNG: Clear to auscultation. No rales, rhonchi, wheezing, or rubs  HEART: Regular rate and rhythm; No murmurs, rubs, or gallops  ABDOMEN: Soft, Nontender, Nondistended; Bowel sounds present  NERVOUS SYSTEM:  Alert & Oriented X3,    EXTREMITIES:  2+ Peripheral Pulses, No clubbing, cyanosis, or edema  SKIN: warm dry                      CAPILLARY BLOOD GLUCOSE      RADIOLOGY & ADDITIONAL TESTS:                   PGY-1 Progress Note discussed with attending    PAGER #: [[phone]] TILL 5:00 PM  PLEASE CONTACT ON CALL TEAM:  - On Call Team (Please refer to Tyler) FROM 5:00 PM - 8:30PM  - Nightfloat Team FROM 8:30 -7:30 AM    CHIEF COMPLAINT & BRIEF HOSPITAL COURSE:    INTERVAL HPI/OVERNIGHT EVENTS:   MEDICATIONS  (STANDING):  aspirin Suppository 300 milliGRAM(s) Rectal daily  cefTRIAXone   IVPB 1000 milliGRAM(s) IV Intermittent every 24 hours  cefTRIAXone   IVPB      enoxaparin Injectable 30 milliGRAM(s) SubCutaneous every 24 hours  sodium chloride 0.9%. 1000 milliLiter(s) (85 mL/Hr) IV Continuous <Continuous>  valproate sodium  IVPB 500 milliGRAM(s) IV Intermittent every 8 hours    MEDICATIONS  (PRN):      REVIEW OF SYSTEMS:  CONSTITUTIONAL: No fever, weight loss, or fatigue  RESPIRATORY: No shortness of breath  CARDIOVASCULAR: No chest pain  GASTROINTESTINAL: No abdominal pain.  GENITOURINARY: No dysuria  NEUROLOGICAL: No headaches  SKIN: No itching, burning, rashes    Vital Signs Last 24 Hrs  T(C): 36.4 (30 Jul 2023 04:57), Max: 36.9 (29 Jul 2023 11:06)  T(F): 97.6 (30 Jul 2023 04:57), Max: 98.4 (29 Jul 2023 11:06)  HR: 70 (30 Jul 2023 04:57) (67 - 75)  BP: 175/70 (30 Jul 2023 04:57) (148/67 - 188/85)  BP(mean): --  RR: 18 (30 Jul 2023 04:57) (16 - 19)  SpO2: 99% (30 Jul 2023 04:57) (98% - 100%)    Parameters below as of 30 Jul 2023 04:57  Patient On (Oxygen Delivery Method): room air        PHYSICAL EXAMINATION:  GENERAL: NAD, well built  HEAD:  Atraumatic, Normocephalic  EYES:  conjunctiva and sclera clear  CHEST/LUNG: Clear to auscultation. No rales, rhonchi, wheezing, or rubs  HEART: Regular rate and rhythm; No murmurs, rubs, or gallops  ABDOMEN: Soft, Nontender, Nondistended; Bowel sounds present  NERVOUS SYSTEM:  Alert & Oriented X3,    EXTREMITIES:  2+ Peripheral Pulses, No clubbing, cyanosis, or edema  SKIN: warm dry                      CAPILLARY BLOOD GLUCOSE      RADIOLOGY & ADDITIONAL TESTS:                   PGY-1 Progress Note discussed with attending    PAGER #: [[phone]] TILL 5:00 PM  PLEASE CONTACT ON CALL TEAM:  - On Call Team (Please refer to Tyler) FROM 5:00 PM - 8:30PM  - Nightfloat Team FROM 8:30 -7:30 AM    CHIEF COMPLAINT & BRIEF HOSPITAL COURSE:    INTERVAL HPI/OVERNIGHT EVENTS:   MEDICATIONS  (STANDING):  aspirin Suppository 300 milliGRAM(s) Rectal daily  cefTRIAXone   IVPB 1000 milliGRAM(s) IV Intermittent every 24 hours  cefTRIAXone   IVPB      enoxaparin Injectable 30 milliGRAM(s) SubCutaneous every 24 hours  sodium chloride 0.9%. 1000 milliLiter(s) (85 mL/Hr) IV Continuous <Continuous>  valproate sodium  IVPB 500 milliGRAM(s) IV Intermittent every 8 hours    MEDICATIONS  (PRN):      REVIEW OF SYSTEMS:  CONSTITUTIONAL: No fever, weight loss, or fatigue  RESPIRATORY: No shortness of breath  CARDIOVASCULAR: No chest pain  GASTROINTESTINAL: No abdominal pain.  GENITOURINARY: No dysuria  NEUROLOGICAL: No headaches  SKIN: No itching, burning, rashes    Vital Signs Last 24 Hrs  T(C): 36.4 (30 Jul 2023 04:57), Max: 36.9 (29 Jul 2023 11:06)  T(F): 97.6 (30 Jul 2023 04:57), Max: 98.4 (29 Jul 2023 11:06)  HR: 70 (30 Jul 2023 04:57) (67 - 75)  BP: 175/70 (30 Jul 2023 04:57) (148/67 - 188/85)  BP(mean): --  RR: 18 (30 Jul 2023 04:57) (16 - 19)  SpO2: 99% (30 Jul 2023 04:57) (98% - 100%)    Parameters below as of 30 Jul 2023 04:57  Patient On (Oxygen Delivery Method): room air        PHYSICAL EXAMINATION:  GENERAL: NAD, well built  HEAD:  Atraumatic, Normocephalic  EYES:  conjunctiva and sclera clear  CHEST/LUNG: Clear to auscultation. No rales, rhonchi, wheezing, or rubs  HEART: Regular rate and rhythm; No murmurs, rubs, or gallops  ABDOMEN: Soft, Nontender, Nondistended; Bowel sounds present  NERVOUS SYSTEM:  Alert & Oriented X3,    EXTREMITIES:  2+ Peripheral Pulses, No clubbing, cyanosis, or edema  SKIN: warm dry                      CAPILLARY BLOOD GLUCOSE      RADIOLOGY & ADDITIONAL TESTS:                   PGY-1 Progress Note discussed with attending    PAGER #: [[phone]] TILL 5:00 PM  PLEASE CONTACT ON CALL TEAM:  - On Call Team (Please refer to Tyler) FROM 5:00 PM - 8:30PM  - Nightfloat Team FROM 8:30 -7:30 AM    CHIEF COMPLAINT & BRIEF HOSPITAL COURSE: no acute overnight events.  Her eyes are wide open but she does not respond to questions. It feels like she understands . Waiting on neuro consult on how to proceed.     INTERVAL HPI/OVERNIGHT EVENTS:   MEDICATIONS  (STANDING):  aspirin Suppository 300 milliGRAM(s) Rectal daily  cefTRIAXone   IVPB 1000 milliGRAM(s) IV Intermittent every 24 hours  cefTRIAXone   IVPB      enoxaparin Injectable 30 milliGRAM(s) SubCutaneous every 24 hours  sodium chloride 0.9%. 1000 milliLiter(s) (85 mL/Hr) IV Continuous <Continuous>  valproate sodium  IVPB 500 milliGRAM(s) IV Intermittent every 8 hours    MEDICATIONS  (PRN):      REVIEW OF SYSTEMS:  CONSTITUTIONAL: No fever, weight loss, or fatigue  RESPIRATORY: No shortness of breath  CARDIOVASCULAR: No chest pain  GASTROINTESTINAL: No abdominal pain.  GENITOURINARY: No dysuria  NEUROLOGICAL: No headaches  SKIN: No itching, burning, rashes    Vital Signs Last 24 Hrs  T(C): 36.4 (30 Jul 2023 04:57), Max: 36.9 (29 Jul 2023 11:06)  T(F): 97.6 (30 Jul 2023 04:57), Max: 98.4 (29 Jul 2023 11:06)  HR: 70 (30 Jul 2023 04:57) (67 - 75)  BP: 175/70 (30 Jul 2023 04:57) (148/67 - 188/85)  BP(mean): --  RR: 18 (30 Jul 2023 04:57) (16 - 19)  SpO2: 99% (30 Jul 2023 04:57) (98% - 100%)    Parameters below as of 30 Jul 2023 04:57  Patient On (Oxygen Delivery Method): room air        PHYSICAL EXAMINATION:  GENERAL: NAD, well built  HEAD:  Atraumatic, Normocephalic  EYES:  conjunctiva and sclera clear  CHEST/LUNG: Clear to auscultation. No rales, rhonchi, wheezing, or rubs  HEART: Regular rate and rhythm; No murmurs, rubs, or gallops  ABDOMEN: Soft, Nontender, Nondistended; Bowel sounds present  NERVOUS SYSTEM:  Alert & Oriented X3,    EXTREMITIES:  2+ Peripheral Pulses, No clubbing, cyanosis, or edema  SKIN: warm dry                      CAPILLARY BLOOD GLUCOSE      RADIOLOGY & ADDITIONAL TESTS:                   PGY-1 Progress Note discussed with attending    PAGER #: [[phone]] TILL 5:00 PM  PLEASE CONTACT ON CALL TEAM:  - On Call Team (Please refer to Tyler) FROM 5:00 PM - 8:30PM  - Nightfloat Team FROM 8:30 -7:30 AM    CHIEF COMPLAINT & BRIEF HOSPITAL COURSE: no acute overnight events.  Her eyes are wide open but she does not respond to questions. It feels like she understands . Waiting on neuro consult on how to proceed.     INTERVAL HPI/OVERNIGHT EVENTS:   MEDICATIONS  (STANDING):  aspirin Suppository 300 milliGRAM(s) Rectal daily  cefTRIAXone   IVPB 1000 milliGRAM(s) IV Intermittent every 24 hours  cefTRIAXone   IVPB      enoxaparin Injectable 30 milliGRAM(s) SubCutaneous every 24 hours  sodium chloride 0.9%. 1000 milliLiter(s) (85 mL/Hr) IV Continuous <Continuous>  valproate sodium  IVPB 500 milliGRAM(s) IV Intermittent every 8 hours    MEDICATIONS  (PRN):      REVIEW OF SYSTEMS:  CONSTITUTIONAL: No fever, weight loss, or fatigue  RESPIRATORY: No shortness of breath  CARDIOVASCULAR: No chest pain  GASTROINTESTINAL: No abdominal pain.  GENITOURINARY: No dysuria  NEUROLOGICAL: No headaches  SKIN: No itching, burning, rashes    Vital Signs Last 24 Hrs  T(C): 36.4 (30 Jul 2023 04:57), Max: 36.9 (29 Jul 2023 11:06)  T(F): 97.6 (30 Jul 2023 04:57), Max: 98.4 (29 Jul 2023 11:06)  HR: 70 (30 Jul 2023 04:57) (67 - 75)  BP: 175/70 (30 Jul 2023 04:57) (148/67 - 188/85)  BP(mean): --  RR: 18 (30 Jul 2023 04:57) (16 - 19)  SpO2: 99% (30 Jul 2023 04:57) (98% - 100%)    Parameters below as of 30 Jul 2023 04:57  Patient On (Oxygen Delivery Method): room air        PHYSICAL EXAMINATION:  GENERAL: NAD, well built  HEAD:  Atraumatic, Normocephalic  EYES:  conjunctiva and sclera clear  CHEST/LUNG: Clear to auscultation. No rales, rhonchi, wheezing, or rubs  HEART: Regular rate and rhythm; No murmurs, rubs, or gallops  ABDOMEN: Soft, Nontender, Nondistended; Bowel sounds present  NERVOUS SYSTEM:  Alert & Oriented X3,    EXTREMITIES:  2+ Peripheral Pulses, No clubbing, cyanosis, or edema  SKIN: warm dry                      CAPILLARY BLOOD GLUCOSE      RADIOLOGY & ADDITIONAL TESTS:                   PGY-1 Progress Note discussed with attending    PAGER #: [[phone]] TILL 5:00 PM  PLEASE CONTACT ON CALL TEAM:  - On Call Team (Please refer to Tyler) FROM 5:00 PM - 8:30PM  - Nightfloat Team FROM 8:30 -7:30 AM    CHIEF COMPLAINT & BRIEF HOSPITAL COURSE: no acute overnight events.  Her eyes are wide open but she does not respond to questions. It feels like she understands . Waiting on neuro consult on how to proceed.     INTERVAL HPI/OVERNIGHT EVENTS:   MEDICATIONS  (STANDING):  aspirin Suppository 300 milliGRAM(s) Rectal daily  cefTRIAXone   IVPB 1000 milliGRAM(s) IV Intermittent every 24 hours  cefTRIAXone   IVPB      enoxaparin Injectable 30 milliGRAM(s) SubCutaneous every 24 hours  sodium chloride 0.9%. 1000 milliLiter(s) (85 mL/Hr) IV Continuous <Continuous>  valproate sodium  IVPB 500 milliGRAM(s) IV Intermittent every 8 hours    MEDICATIONS  (PRN):      REVIEW OF SYSTEMS:  CONSTITUTIONAL: No fever, weight loss, or fatigue  RESPIRATORY: No shortness of breath  CARDIOVASCULAR: No chest pain  GASTROINTESTINAL: No abdominal pain.  GENITOURINARY: No dysuria  NEUROLOGICAL: No headaches  SKIN: No itching, burning, rashes    Vital Signs Last 24 Hrs  T(C): 36.4 (30 Jul 2023 04:57), Max: 36.9 (29 Jul 2023 11:06)  T(F): 97.6 (30 Jul 2023 04:57), Max: 98.4 (29 Jul 2023 11:06)  HR: 70 (30 Jul 2023 04:57) (67 - 75)  BP: 175/70 (30 Jul 2023 04:57) (148/67 - 188/85)  BP(mean): --  RR: 18 (30 Jul 2023 04:57) (16 - 19)  SpO2: 99% (30 Jul 2023 04:57) (98% - 100%)    Parameters below as of 30 Jul 2023 04:57  Patient On (Oxygen Delivery Method): room air        PHYSICAL EXAMINATION:  GENERAL: NAD, well built  HEAD:  Atraumatic, Normocephalic  EYES:  conjunctiva and sclera clear  CHEST/LUNG: Clear to auscultation. No rales, rhonchi, wheezing, or rubs  HEART: Regular rate and rhythm; No murmurs, rubs, or gallops  ABDOMEN: Soft, Nontender, Nondistended; Bowel sounds present  NERVOUS SYSTEM:  Alert & Oriented X3,    EXTREMITIES:  2+ Peripheral Pulses, No clubbing, cyanosis, or edema  SKIN: warm dry                      CAPILLARY BLOOD GLUCOSE      RADIOLOGY & ADDITIONAL TESTS:                   PGY-1 Progress Note discussed with attending    PAGER #: [[phone]] TILL 5:00 PM  PLEASE CONTACT ON CALL TEAM:  - On Call Team (Please refer to Tyler) FROM 5:00 PM - 8:30PM  - Nightfloat Team FROM 8:30 -7:30 AM    CHIEF COMPLAINT & BRIEF HOSPITAL COURSE: no acute overnight events.  Her eyes are wide open but she does not respond to questions. It feels like she understands. R/o for any acute strokes.     INTERVAL HPI/OVERNIGHT EVENTS:   MEDICATIONS  (STANDING):  aspirin Suppository 300 milliGRAM(s) Rectal daily  cefTRIAXone   IVPB 1000 milliGRAM(s) IV Intermittent every 24 hours  cefTRIAXone   IVPB      enoxaparin Injectable 30 milliGRAM(s) SubCutaneous every 24 hours  sodium chloride 0.9%. 1000 milliLiter(s) (85 mL/Hr) IV Continuous <Continuous>  valproate sodium  IVPB 500 milliGRAM(s) IV Intermittent every 8 hours    MEDICATIONS  (PRN):      Vital Signs Last 24 Hrs  T(C): 36.4 (30 Jul 2023 04:57), Max: 36.9 (29 Jul 2023 11:06)  T(F): 97.6 (30 Jul 2023 04:57), Max: 98.4 (29 Jul 2023 11:06)  HR: 70 (30 Jul 2023 04:57) (67 - 75)  BP: 175/70 (30 Jul 2023 04:57) (148/67 - 188/85)  BP(mean): --  RR: 18 (30 Jul 2023 04:57) (16 - 19)  SpO2: 99% (30 Jul 2023 04:57) (98% - 100%)    Parameters below as of 30 Jul 2023 04:57  Patient On (Oxygen Delivery Method): room air        PHYSICAL EXAMINATION:  GENERAL: NAD, well built  HEAD:  Atraumatic, Normocephalic  EYES:  conjunctiva and sclera clear  CHEST/LUNG: Clear to auscultation. No rales, rhonchi, wheezing, or rubs  HEART: Regular rate and rhythm; No murmurs, rubs, or gallops  ABDOMEN: Soft, Nontender, Nondistended; Bowel sounds present  NERVOUS SYSTEM:  Alert & Oriented x0,    EXTREMITIES:  2+ Peripheral Pulses, No clubbing, cyanosis, or edema  SKIN: warm dry                      CAPILLARY BLOOD GLUCOSE      RADIOLOGY & ADDITIONAL TESTS:                   PGY-1 Progress Note discussed with attending    PAGER #: [[phone]] TILL 5:00 PM  PLEASE CONTACT ON CALL TEAM:  - On Call Team (Please refer to Tyler) FROM 5:00 PM - 8:30PM  - Nightfloat Team FROM 8:30 -7:30 AM    CHIEF COMPLAINT & BRIEF HOSPITAL COURSE: no acute overnight events.  Her eyes are wide open but she does not respond to questions. It feels like she understands. R/o for any acute strokes.     INTERVAL HPI/OVERNIGHT EVENTS:   MEDICATIONS  (STANDING):  aspirin Suppository 300 milliGRAM(s) Rectal daily  cefTRIAXone   IVPB 1000 milliGRAM(s) IV Intermittent every 24 hours  cefTRIAXone   IVPB      enoxaparin Injectable 30 milliGRAM(s) SubCutaneous every 24 hours  sodium chloride 0.9%. 1000 milliLiter(s) (85 mL/Hr) IV Continuous <Continuous>  valproate sodium  IVPB 500 milliGRAM(s) IV Intermittent every 8 hours    MEDICATIONS  (PRN):      Vital Signs Last 24 Hrs  T(C): 36.4 (30 Jul 2023 04:57), Max: 36.9 (29 Jul 2023 11:06)  T(F): 97.6 (30 Jul 2023 04:57), Max: 98.4 (29 Jul 2023 11:06)  HR: 70 (30 Jul 2023 04:57) (67 - 75)  BP: 175/70 (30 Jul 2023 04:57) (148/67 - 188/85)  BP(mean): --  RR: 18 (30 Jul 2023 04:57) (16 - 19)  SpO2: 99% (30 Jul 2023 04:57) (98% - 100%)    Parameters below as of 30 Jul 2023 04:57  Patient On (Oxygen Delivery Method): room air        PHYSICAL EXAMINATION:  GENERAL: NAD, well built  HEAD:  Atraumatic, Normocephalic  EYES:  conjunctiva and sclera clear  CHEST/LUNG: Clear to auscultation. No rales, rhonchi, wheezing, or rubs  HEART: Regular rate and rhythm; No murmurs, rubs, or gallops  ABDOMEN: Soft, Nontender, Nondistended; Bowel sounds present  NERVOUS SYSTEM:  Alert & Oriented x0,    EXTREMITIES:  2+ Peripheral Pulses, No clubbing, cyanosis, or edema  SKIN: warm dry                      CAPILLARY BLOOD GLUCOSE      RADIOLOGY & ADDITIONAL TESTS:                   PGY-1 Progress Note discussed with attending    PAGER #: [[phone]] TILL 5:00 PM  PLEASE CONTACT ON CALL TEAM:  - On Call Team (Please refer to Tyler) FROM 5:00 PM - 8:30PM  - Nightfloat Team FROM 8:30 -7:30 AM    CHIEF COMPLAINT & BRIEF HOSPITAL COURSE: no acute overnight events.  Her eyes are wide open but she does not respond to questions. It feels like she understands. R/o for any acute strokes.     INTERVAL HPI/OVERNIGHT EVENTS:   MEDICATIONS  (STANDING):  aspirin Suppository 300 milliGRAM(s) Rectal daily  cefTRIAXone   IVPB 1000 milliGRAM(s) IV Intermittent every 24 hours  cefTRIAXone   IVPB      enoxaparin Injectable 30 milliGRAM(s) SubCutaneous every 24 hours  sodium chloride 0.9%. 1000 milliLiter(s) (85 mL/Hr) IV Continuous <Continuous>  valproate sodium  IVPB 500 milliGRAM(s) IV Intermittent every 8 hours    MEDICATIONS  (PRN):      Vital Signs Last 24 Hrs  T(C): 36.4 (30 Jul 2023 04:57), Max: 36.9 (29 Jul 2023 11:06)  T(F): 97.6 (30 Jul 2023 04:57), Max: 98.4 (29 Jul 2023 11:06)  HR: 70 (30 Jul 2023 04:57) (67 - 75)  BP: 175/70 (30 Jul 2023 04:57) (148/67 - 188/85)  BP(mean): --  RR: 18 (30 Jul 2023 04:57) (16 - 19)  SpO2: 99% (30 Jul 2023 04:57) (98% - 100%)    Parameters below as of 30 Jul 2023 04:57  Patient On (Oxygen Delivery Method): room air        PHYSICAL EXAMINATION:  GENERAL: NAD, well built  HEAD:  Atraumatic, Normocephalic  EYES:  conjunctiva and sclera clear  CHEST/LUNG: Clear to auscultation. No rales, rhonchi, wheezing, or rubs  HEART: Regular rate and rhythm; No murmurs, rubs, or gallops  ABDOMEN: Soft, Nontender, Nondistended; Bowel sounds present  NERVOUS SYSTEM:  Alert & Oriented x0,    EXTREMITIES:  2+ Peripheral Pulses, No clubbing, cyanosis, or edema  SKIN: warm dry                      CAPILLARY BLOOD GLUCOSE      RADIOLOGY & ADDITIONAL TESTS:

## 2023-07-30 NOTE — PROGRESS NOTE ADULT - PROBLEM SELECTOR PLAN 6
F/U Lipid profile.   Start atorvastatin when passes SnS. Patient takes nicardipine and losartan.   Hold as patient is NPO.   Hold for permissive HTN for first 24 hours.

## 2023-07-30 NOTE — CHART NOTE - NSCHARTNOTEFT_GEN_A_CORE
Nurse notified team of patient's BP of 204/98 at 6:30pm. Patient's BP throughout the day was elevated. Patient meet criteria for HTN emergency diagnosis. elevated SBP over 180 and end organ damage sign, sr. cr today 3.18 and 3.12.  Pt given IV hydralazine 10mg. Nurse notified team of patient's BP of 204/98 at 6:30pm. Patient's BP throughout the day was elevated. Patient meet criteria for HTN emergency diagnosis. elevated SBP over 180 and end organ damage sign, sr. cr today 3.18 and 3.12.  Pt given IV hydralazine 10mg.  7:40pm repeat BP- 188/88  HR-85.   Pt given another IV hydralazine 10mg. check BP 30mins after adm.

## 2023-07-30 NOTE — DIETITIAN INITIAL EVALUATION ADULT - DIET TYPE
Advance diet or consider alternate nutrition support if NPO prolonged further as medically feasible/if consistent with Goal of Care

## 2023-07-30 NOTE — CHART NOTE - NSCHARTNOTEFT_GEN_A_CORE
Pt's nurse reports BP is 184/83, HR 70s, despite taking it multiple times. 5mg labetalol given at 3:13am. 45 minutes later BP still high 178/93, HR 67. Given 5mg labetalol again. Consider IV enalapril for BP control while pt is NPO

## 2023-07-30 NOTE — DIETITIAN INITIAL EVALUATION ADULT - PERTINENT MEDS FT
MEDICATIONS  (STANDING):  aspirin Suppository 300 milliGRAM(s) Rectal daily  cefTRIAXone   IVPB 1000 milliGRAM(s) IV Intermittent every 24 hours  cefTRIAXone   IVPB      cloNIDine Patch 0.1 mG/24Hr(s) 1 patch Transdermal <User Schedule>  enoxaparin Injectable 30 milliGRAM(s) SubCutaneous every 24 hours  sodium chloride 0.9%. 1000 milliLiter(s) (85 mL/Hr) IV Continuous <Continuous>  valproate sodium  IVPB 500 milliGRAM(s) IV Intermittent every 8 hours    MEDICATIONS  (PRN):

## 2023-07-30 NOTE — DIETITIAN INITIAL EVALUATION ADULT - RD TO REMAIN AVAILABLE
Received voicemail from [place], patient's friend on 4-25-19 after writer left for the day. Spoke with patient who stated \"She wanted to talk to you\". Explained a call would be placed to her. Placed call to Bobby Eddy, patient's friend. She was updated that the [facility] declined admission at [facility] and [facility] stated they couldn't take him under his Medicare. Explained that the Wheelchair and Samantaoscar Zaidi will be delivered to his hospital room at the earliest Monday and the [employer] will be sent to his house. SW also explained the process for the Ramp through the [facility]. Bobby Eddy is inquiring about a home evaluation with therapy and stated they could transport him home/back to the ARU. She is also requesting home care through [facility]. Spoke with Jenniffer Jordan PT who at first stated patient/family declined the home evaluation. Explained that since patient has to go home, they are requesting one. She stated they could do a home evaluation Monday and leave by 10:00am; family arriving at 9:30am.    Will speak with Dr. Helena Macias and update patient/family. Nichole with Therapy aware.      Levi Jeff MSW, LSW yes

## 2023-07-30 NOTE — PROGRESS NOTE ADULT - PROBLEM SELECTOR PLAN 1
P/w altered mental status  HEAD CT: Chronic left MCA territory infarction.  CT PERFUSION demonstrated: Perfusion abnormality corresponding to the chronically infarcted left MCA territory. INFARCT CORE: 57 mL. TISSUE AT RISK: 236 mL.  CTA COW and  CTA NECK: neg   Failed dysphagia screen.   NPO diet->Aspirin suppository.  F/U SnS and Start statin when passes SnS.   echo-> mild MR, and grade 1 diastolic dysfunction.   A1C-> 5.6  normal lipid profile.   Neurology consulted - Dr. Whitney  Cardiology consulted - Dr. Dunn. P/w altered mental status  HEAD CT: Chronic left MCA territory infarction.  CT PERFUSION demonstrated: Perfusion abnormality corresponding to the chronically infarcted left MCA territory. INFARCT CORE: 57 mL. TISSUE AT RISK: 236 mL.  CTA COW and  CTA NECK: neg   Failed dysphagia screen.   NPO diet->Aspirin suppository.  SnS-> failed dysphagia and Start statin when passes SnS.   echo-> mild MR, and grade 1 diastolic dysfunction.   A1C-> 5.6  normal lipid profile.   Neurology consulted - Dr. Whitney-> ordered EEG, valporic acid levels for AM, no signs of new stroke.   Cardiology consulted - Dr. Dunn. P/w altered mental status  HEAD CT: Chronic left MCA territory infarction.  CT PERFUSION demonstrated: Perfusion abnormality corresponding to the chronically infarcted left MCA territory. INFARCT CORE: 57 mL. TISSUE AT RISK: 236 mL.  CTA COW and  CTA NECK: neg   Failed dysphagia screen.   NPO diet->Aspirin suppository.  SnS-> failed dysphagia and Start statin when passes SnS.   echo-> mild MR, and grade 1 diastolic dysfunction.   A1C-> 5.6  normal lipid profile.   Neurology consulted - Dr. Whitney-> ordered EEG, valporic acid levels for AM, no signs of new stroke.   Cardiology consulted - Dr. Dunn-> put on clonidine patch .1mg weekly

## 2023-07-30 NOTE — PROGRESS NOTE ADULT - SUBJECTIVE AND OBJECTIVE BOX
CHIEF COMPLAINT:Patient is a 77y old  Female who presents with a chief complaint of Altered mental status. Pt appears comfortable.    	  REVIEW OF SYSTEMS:    	  [x] Unable to obtain    PHYSICAL EXAM:  T(C): 36.4 (07-30-23 @ 04:57), Max: 36.5 (07-30-23 @ 01:35)  HR: 70 (07-30-23 @ 04:57) (67 - 73)  BP: 175/70 (07-30-23 @ 04:57) (148/67 - 188/76)  RR: 18 (07-30-23 @ 04:57) (16 - 19)  SpO2: 99% (07-30-23 @ 04:57) (98% - 100%)  Wt(kg): --  I&O's Summary      Appearance: Normal	  HEENT:   Normal oral mucosa, PERRL, EOMI	  Lymphatic: No lymphadenopathy  Cardiovascular: Normal S1 S2, No JVD, No murmurs, No edema  Respiratory: Lungs clear to auscultation	  Gastrointestinal:  Soft, Non-tender, + BS	  Skin: No rashes, No ecchymoses, No cyanosis	  Extremities: Normal range of motion, No clubbing, cyanosis or edema  Vascular: Peripheral pulses palpable 2+ bilaterally    MEDICATIONS  (STANDING):  aspirin Suppository 300 milliGRAM(s) Rectal daily  cefTRIAXone   IVPB 1000 milliGRAM(s) IV Intermittent every 24 hours  cefTRIAXone   IVPB      enoxaparin Injectable 30 milliGRAM(s) SubCutaneous every 24 hours  sodium chloride 0.9%. 1000 milliLiter(s) (85 mL/Hr) IV Continuous <Continuous>  valproate sodium  IVPB 500 milliGRAM(s) IV Intermittent every 8 hours      LABS:	 	    Troponin I, High Sensitivity Result: 24.0 ng/L (07-30 @ 09:57)  Troponin I, High Sensitivity Result: 44.5 ng/L (07-28 @ 01:39)  Troponin I, High Sensitivity Result: 14.4 ng/L (07-27 @ 15:00)              proBNP:   Lipid Profile: Cholesterol 152  LDL --  HDL 51  TG 93  Ldl calc 82  Ratio --    HgA1c:   TSH: Thyroid Stimulating Hormone, Serum: 3.59 uU/mL (07-28 @ 05:03)      	    Blood cx-.x2.

## 2023-07-30 NOTE — PROGRESS NOTE ADULT - PROBLEM SELECTOR PLAN 3
EKG - NSR with TWI in V3-V5. (new compared to last EKG)  Trop: 14.4-> 44.5 repeat   F/U repeat ekg no change.   IF TWI reversed than concern for NSTEMI. If persistent TWI then likely NOT ischemic. EKG - NSR with TWI in V3-V5. (new compared to last EKG). Repeat EKg no change   Trop: 14.4-> 44.5->24  IF TWI reversed than concern for NSTEMI. If persistent TWI then likely NOT ischemic. abnormal labs:  Sodium 150  Phosphorus 7.6  Magnesium 2.9  GFR 14  neurology consulted. abnormal labs:  Sodium 150  Phosphorus 7.6  Magnesium 2.9  GFR 14  ordered dextrose 5 percent 50 ml infused over 12 hours   neurology consulted.

## 2023-07-31 DIAGNOSIS — E87.0 HYPEROSMOLALITY AND HYPERNATREMIA: ICD-10-CM

## 2023-07-31 DIAGNOSIS — N17.9 ACUTE KIDNEY FAILURE, UNSPECIFIED: ICD-10-CM

## 2023-07-31 LAB
ALBUMIN SERPL ELPH-MCNC: 2.6 G/DL — LOW (ref 3.5–5)
ALP SERPL-CCNC: 90 U/L — SIGNIFICANT CHANGE UP (ref 40–120)
ALT FLD-CCNC: 10 U/L DA — SIGNIFICANT CHANGE UP (ref 10–60)
ANION GAP SERPL CALC-SCNC: 15 MMOL/L — SIGNIFICANT CHANGE UP (ref 5–17)
AST SERPL-CCNC: 15 U/L — SIGNIFICANT CHANGE UP (ref 10–40)
BASOPHILS # BLD AUTO: 0.08 K/UL — SIGNIFICANT CHANGE UP (ref 0–0.2)
BASOPHILS NFR BLD AUTO: 0.7 % — SIGNIFICANT CHANGE UP (ref 0–2)
BILIRUB SERPL-MCNC: 0.4 MG/DL — SIGNIFICANT CHANGE UP (ref 0.2–1.2)
BUN SERPL-MCNC: 52 MG/DL — HIGH (ref 7–18)
CALCIUM SERPL-MCNC: 8.5 MG/DL — SIGNIFICANT CHANGE UP (ref 8.4–10.5)
CALCIUM SERPL-MCNC: 9.2 MG/DL — SIGNIFICANT CHANGE UP (ref 8.4–10.5)
CHLORIDE SERPL-SCNC: 117 MMOL/L — HIGH (ref 96–108)
CO2 SERPL-SCNC: 18 MMOL/L — LOW (ref 22–31)
CREAT SERPL-MCNC: 2.96 MG/DL — HIGH (ref 0.5–1.3)
EGFR: 16 ML/MIN/1.73M2 — LOW
EOSINOPHIL # BLD AUTO: 0.11 K/UL — SIGNIFICANT CHANGE UP (ref 0–0.5)
EOSINOPHIL NFR BLD AUTO: 0.9 % — SIGNIFICANT CHANGE UP (ref 0–6)
GLUCOSE BLDC GLUCOMTR-MCNC: 76 MG/DL — SIGNIFICANT CHANGE UP (ref 70–99)
GLUCOSE BLDC GLUCOMTR-MCNC: 79 MG/DL — SIGNIFICANT CHANGE UP (ref 70–99)
GLUCOSE BLDC GLUCOMTR-MCNC: 85 MG/DL — SIGNIFICANT CHANGE UP (ref 70–99)
GLUCOSE SERPL-MCNC: 73 MG/DL — SIGNIFICANT CHANGE UP (ref 70–99)
HCT VFR BLD CALC: 30.5 % — LOW (ref 34.5–45)
HGB BLD-MCNC: 9.3 G/DL — LOW (ref 11.5–15.5)
IMM GRANULOCYTES NFR BLD AUTO: 0.9 % — SIGNIFICANT CHANGE UP (ref 0–0.9)
LYMPHOCYTES # BLD AUTO: 1.92 K/UL — SIGNIFICANT CHANGE UP (ref 1–3.3)
LYMPHOCYTES # BLD AUTO: 15.9 % — SIGNIFICANT CHANGE UP (ref 13–44)
MAGNESIUM SERPL-MCNC: 3.1 MG/DL — HIGH (ref 1.6–2.6)
MCHC RBC-ENTMCNC: 28.2 PG — SIGNIFICANT CHANGE UP (ref 27–34)
MCHC RBC-ENTMCNC: 30.5 GM/DL — LOW (ref 32–36)
MCV RBC AUTO: 92.4 FL — SIGNIFICANT CHANGE UP (ref 80–100)
MONOCYTES # BLD AUTO: 0.75 K/UL — SIGNIFICANT CHANGE UP (ref 0–0.9)
MONOCYTES NFR BLD AUTO: 6.2 % — SIGNIFICANT CHANGE UP (ref 2–14)
NEUTROPHILS # BLD AUTO: 9.14 K/UL — HIGH (ref 1.8–7.4)
NEUTROPHILS NFR BLD AUTO: 75.4 % — SIGNIFICANT CHANGE UP (ref 43–77)
NRBC # BLD: 0 /100 WBCS — SIGNIFICANT CHANGE UP (ref 0–0)
PHOSPHATE SERPL-MCNC: 7.8 MG/DL — HIGH (ref 2.5–4.5)
PLATELET # BLD AUTO: 337 K/UL — SIGNIFICANT CHANGE UP (ref 150–400)
POTASSIUM SERPL-MCNC: 3.8 MMOL/L — SIGNIFICANT CHANGE UP (ref 3.5–5.3)
POTASSIUM SERPL-SCNC: 3.8 MMOL/L — SIGNIFICANT CHANGE UP (ref 3.5–5.3)
PROT SERPL-MCNC: 7.4 G/DL — SIGNIFICANT CHANGE UP (ref 6–8.3)
PTH-INTACT FLD-MCNC: 235 PG/ML — HIGH (ref 15–65)
RBC # BLD: 3.3 M/UL — LOW (ref 3.8–5.2)
RBC # FLD: 14.4 % — SIGNIFICANT CHANGE UP (ref 10.3–14.5)
SODIUM SERPL-SCNC: 150 MMOL/L — HIGH (ref 135–145)
UUN UR-MCNC: 374 MG/DL — SIGNIFICANT CHANGE UP
VALPROATE SERPL-MCNC: 71 UG/ML — SIGNIFICANT CHANGE UP (ref 50–100)
WBC # BLD: 12.11 K/UL — HIGH (ref 3.8–10.5)
WBC # FLD AUTO: 12.11 K/UL — HIGH (ref 3.8–10.5)

## 2023-07-31 PROCEDURE — 95816 EEG AWAKE AND DROWSY: CPT | Mod: 26

## 2023-07-31 PROCEDURE — 99233 SBSQ HOSP IP/OBS HIGH 50: CPT | Mod: FS

## 2023-07-31 RX ORDER — HYDRALAZINE HCL 50 MG
10 TABLET ORAL EVERY 6 HOURS
Refills: 0 | Status: DISCONTINUED | OUTPATIENT
Start: 2023-07-31 | End: 2023-07-31

## 2023-07-31 RX ORDER — SODIUM CHLORIDE 9 MG/ML
1000 INJECTION, SOLUTION INTRAVENOUS
Refills: 0 | Status: DISCONTINUED | OUTPATIENT
Start: 2023-07-31 | End: 2023-08-06

## 2023-07-31 RX ORDER — HYDRALAZINE HCL 50 MG
10 TABLET ORAL EVERY 6 HOURS
Refills: 0 | Status: DISCONTINUED | OUTPATIENT
Start: 2023-07-31 | End: 2023-08-14

## 2023-07-31 RX ORDER — SODIUM CHLORIDE 9 MG/ML
1000 INJECTION, SOLUTION INTRAVENOUS
Refills: 0 | Status: DISCONTINUED | OUTPATIENT
Start: 2023-07-31 | End: 2023-07-31

## 2023-07-31 RX ADMIN — Medication 300 MILLIGRAM(S): at 13:35

## 2023-07-31 RX ADMIN — Medication 10 MILLIGRAM(S): at 13:36

## 2023-07-31 RX ADMIN — SODIUM CHLORIDE 70 MILLILITER(S): 9 INJECTION, SOLUTION INTRAVENOUS at 13:43

## 2023-07-31 RX ADMIN — CEFTRIAXONE 100 MILLIGRAM(S): 500 INJECTION, POWDER, FOR SOLUTION INTRAMUSCULAR; INTRAVENOUS at 13:36

## 2023-07-31 RX ADMIN — Medication 10 MILLIGRAM(S): at 09:15

## 2023-07-31 RX ADMIN — Medication 55 MILLIGRAM(S): at 00:50

## 2023-07-31 RX ADMIN — ENOXAPARIN SODIUM 30 MILLIGRAM(S): 100 INJECTION SUBCUTANEOUS at 14:09

## 2023-07-31 RX ADMIN — Medication 55 MILLIGRAM(S): at 18:19

## 2023-07-31 RX ADMIN — Medication 55 MILLIGRAM(S): at 09:05

## 2023-07-31 RX ADMIN — Medication 1 PATCH: at 07:30

## 2023-07-31 RX ADMIN — Medication 1 PATCH: at 19:57

## 2023-07-31 RX ADMIN — Medication 10 MILLIGRAM(S): at 18:19

## 2023-07-31 NOTE — PROGRESS NOTE ADULT - PROBLEM SELECTOR PLAN 8
Patient takes nicardipine and losartan.   Hold as patient is NPO.   Hold for permissive HTN for first 24 hours. pt BP has been really high ranging from 190s-180s/85 . Hydralazine has been given PRN. now she is on hydralazine every 6 hours 10 mg IV. BP being monitored.

## 2023-07-31 NOTE — PROGRESS NOTE ADULT - PROBLEM SELECTOR PLAN 5
nephro following:  abnormal labs:  Sodium 143  potassium 5.4  GFR 15    7/30  Phosphorus 7.6  Magnesium 2.9 nephro following:  abnormal labs:  Sodium 150  Phosphorus 7.8  Magnesium 3.1  chloride 117   intact PTH is 235

## 2023-07-31 NOTE — PROGRESS NOTE ADULT - ASSESSMENT
1. SUSAN possible to MARISA and ATN  Renal sono shows no hdyro with b/l kidneys around 9.2cm  -rpt bmp is accurate; bladder scan is zero  -urinalysis shows blood with proteinuria; FeNa >1%, spot protein to creatinine ratio is 1.5gm  -Adjust meds to eGFR and avoid IV Gadolinium contrast,NSAIDs, and phosphate enema.  -Monitor I/O's daily.   -Monitor SMA daily.  2. Hypernatremia due to water deficit and insensible losses. Pt is clinically euvolemic.   -Na unchanged 150s; increase D5W at 70cc/hr   -Monitor I/O's. Check Serum Na Daily. Avoid high solute intake diet and sodium bicarbonate infuse. Avoid overcorrection of NA (8-10meq/day)  3. 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  -previous Scr around 1.2 to 1.6mg/dL in Oct 2022.  -Keep patient euvolemic and renal diet  -Avoid Nephrotoxic Meds/ Agents such as (NSAIDs, IV contrast, Aminoglycosides such as gentamicin, -Gadolinium contrast, Phosphate containing enemas, etc..)  -Adjust Medications according to eGFR  4. HTN:   -bp is uncontrolled. continue bp meds  -titrate bp meds to keep sbp >110 and < 130  5. Mineral Bone Disease:  -Elevated phos, will phos binder once able to have oral intake.  -PTH intact 289, will start calcitriol once phos improves.  6. Encephalopathy:  -workup in progress  -on Rocephin  -Plan as per Neuro and primary team    Discussed with family at bedside in detail regarding the renal plan and care  Discussed the assessment and plan with Primary Team/Nurse

## 2023-07-31 NOTE — PROGRESS NOTE ADULT - ASSESSMENT
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 Patient is being admitted to medicine for further work up and rule out stroke vs encephalopathy (metabolic vs infectious).  Patient is a 77 female from [facility] PMHx HTN, HLD, CVA (w/ residual aphasia and R sided hemiparesis/plegia) who was sent to the hospital due to altered mental status. Patient is being admitted to medicine for further work up and rule out stroke vs encephalopathy (metabolic vs infectious).  Patient is a 77 female from [facility] PMHx HTN, HLD, CVA (w/ residual aphasia and R sided hemiparesis/plegia) who was sent to the hospital due to altered mental status. Patient is being admitted to medicine for further work up and rule out stroke vs encephalopathy (metabolic vs infectious).

## 2023-07-31 NOTE — SWALLOW BEDSIDE ASSESSMENT ADULT - COMMENTS
HOB elevated to 90°, AA+OX0; Pt waxing/waning and nonverbal; unresponsive to queries and directions. Oral care provided prior to PO trials.

## 2023-07-31 NOTE — PROGRESS NOTE ADULT - SUBJECTIVE AND OBJECTIVE BOX
CHIEF COMPLAINT:Patient is a 77y old  Female who presents with a chief complaint of Altered mental status. Patient appears comfortable.    	  REVIEW OF SYSTEMS:    [ x] Unable to obtain    PHYSICAL EXAM:  T(C): 36.5 (07-31-23 @ 05:27), Max: 36.6 (07-30-23 @ 13:14)  HR: 73 (07-31-23 @ 09:05) (67 - 85)  BP: 197/73 (07-31-23 @ 09:05) (150/81 - 204/98)  RR: 18 (07-31-23 @ 09:05) (18 - 19)  SpO2: 99% (07-31-23 @ 09:05) (97% - 99%)  Wt(kg): --  I&O's Summary      Appearance: Normal	  HEENT:   Normal oral mucosa, PERRL, EOMI	  Lymphatic: No lymphadenopathy  Cardiovascular: Normal S1 S2, No JVD, No murmurs, No edema  Respiratory: Lungs clear to auscultation	  Gastrointestinal:  Soft, Non-tender, + BS	  Skin: No rashes, No ecchymoses, No cyanosis	  Extremities: Normal range of motion, No clubbing, cyanosis or edema  Vascular: Peripheral pulses palpable 2+ bilaterally    MEDICATIONS  (STANDING):  aspirin Suppository 300 milliGRAM(s) Rectal daily  cefTRIAXone   IVPB 1000 milliGRAM(s) IV Intermittent every 24 hours  cefTRIAXone   IVPB      cloNIDine Patch 0.1 mG/24Hr(s) 1 patch Transdermal <User Schedule>  dextrose 5%. 1000 milliLiter(s) (50 mL/Hr) IV Continuous <Continuous>  enoxaparin Injectable 30 milliGRAM(s) SubCutaneous every 24 hours  valproate sodium  IVPB 500 milliGRAM(s) IV Intermittent every 8 hours      	  	  LABS:	 	          Troponin I, High Sensitivity Result: 24.0 ng/L (07-30 @ 09:57)                            9.3    12.11 )-----------( 337      ( 31 Jul 2023 07:01 )             30.5     07-31    150<H>  |  117<H>  |  52<H>  ----------------------------<  73  3.8   |  18<L>  |  2.96<H>    Ca    9.2      31 Jul 2023 07:01  Phos  7.8     07-31  Mg     3.1     07-31    TPro  7.4  /  Alb  2.6<L>  /  TBili  0.4  /  DBili  x   /  AST  15  /  ALT  10  /  AlkPhos  90  07-31    proBNP:   Lipid Profile: Cholesterol 152  LDL --  HDL 51  TG 93  Ldl calc 82  Ratio --    HgA1c:   TSH: Thyroid Stimulating Hormone, Serum: 3.59 uU/mL (07-28 @ 05:03)

## 2023-07-31 NOTE — PROGRESS NOTE ADULT - PROBLEM SELECTOR PLAN 3
As per Nephrology   possible r/o urinary rentention vs MARISA vs r/o ATN.   order urinalysis, urine lytes ( uosm, uriine sodium, urine creatine, chloride, potassium, protein to creating ration  -renal ultrasound to assess kidney size and r/o hydronephrosis   adjust meds for GFR As per Nephrology   possible r/o urinary rentention vs MARISA vs r/o ATN.   order urinalysis, urine lytes ( uosm, uriine sodium, urine creatine, chloride, potassium, protein to creating ration-> WNL  -renal ultrasound  and bladder scan  was negative  adjust meds for GFR

## 2023-07-31 NOTE — PROGRESS NOTE ADULT - PROBLEM SELECTOR PLAN 2
P/w altered mental status   Etiology can be metabolic vs infectious vs drug induced (patient was started on medication for muscle spasm recently as per sister - assuming it is baclofen).   Elevated WBCs.  UA -ve.  CXR - ve.   c/w cinamet, sertraline, baclofen. Hold as patient is NPO.

## 2023-07-31 NOTE — SWALLOW BEDSIDE ASSESSMENT ADULT - SWALLOW EVAL: DIAGNOSIS
Pt p/w s&s of oropharyngeal dyaphagia c/b poor oral apperture, poor oral grading, prolonged oral bolus holding, absent bolus manipulation, reduced task awareness and initiation, no attempts at bolus stripping from tsp, absent labial closure with open mouthed posture prior to and during PO trials, absent mastication, absent swallow trigger, absent hyolaryngeal elevation. At this time due to altered mentation, waxing and waning ZHANG, and current clinical presentation, pt is at high risk for aspiration and is not safe for initiation of oral diet.

## 2023-07-31 NOTE — PROGRESS NOTE ADULT - SUBJECTIVE AND OBJECTIVE BOX
NEPHROLOGY MEDICAL CARE, [facility] - Dr. Ajay Green/ Dr. Mert Madison/ Dr. Chris Calderon/ Dr. Carson Cook    Date of Service: 07-31-23 @ 13:14    Patient was seen and examined at bedside.    CC: patient is okay and NAD.  family at bedside    Vital Signs Last 24 Hrs  T(C): 36.5 (31 Jul 2023 05:27), Max: 36.6 (30 Jul 2023 22:03)  T(F): 97.7 (31 Jul 2023 05:27), Max: 97.9 (30 Jul 2023 22:03)  HR: 74 (31 Jul 2023 09:50) (73 - 85)  BP: 173/78 (31 Jul 2023 09:50) (150/81 - 204/98)  BP(mean): --  RR: 18 (31 Jul 2023 09:50) (18 - 19)  SpO2: 100% (31 Jul 2023 09:50) (97% - 100%)    Parameters below as of 31 Jul 2023 09:50  Patient On (Oxygen Delivery Method): room air          PHYSICAL EXAM:  General: No acute respiratory distress.  Eyes: conjunctiva and sclera clear  ENMT: Atraumatic, Normocephalic, supple, No JVD present. Moist mucous membranes  Respiratory: Bilateral clear lungs; No rales, rhonchi, wheezing  Cardiovascular: S1S2+; no m/r/g  Gastrointestinal: Soft, Non-tender, Nondistended; Bowel sounds present  Neuro:  open eyes; hemiparesis.  Ext:  1+pedal edema, No Cyanosis  Skin: No visible rashes      MEDICATIONS:  MEDICATIONS  (STANDING):  aspirin Suppository 300 milliGRAM(s) Rectal daily  cefTRIAXone   IVPB 1000 milliGRAM(s) IV Intermittent every 24 hours  cefTRIAXone   IVPB      cloNIDine Patch 0.1 mG/24Hr(s) 1 patch Transdermal <User Schedule>  dextrose 5%. 1000 milliLiter(s) (50 mL/Hr) IV Continuous <Continuous>  enoxaparin Injectable 30 milliGRAM(s) SubCutaneous every 24 hours  valproate sodium  IVPB 500 milliGRAM(s) IV Intermittent every 8 hours    MEDICATIONS  (PRN):  hydrALAZINE 10 milliGRAM(s) Oral every 6 hours PRN Systolic blood pressure > 150  hydrALAZINE Injectable 10 milliGRAM(s) IV Push every 4 hours PRN SBP greater than 160          LABS:                        9.3    12.11 )-----------( 337      ( 31 Jul 2023 07:01 )             30.5     07-31    150<H>  |  117<H>  |  52<H>  ----------------------------<  73  3.8   |  18<L>  |  2.96<H>    Ca    9.2      31 Jul 2023 07:01  Phos  7.8     07-31  Mg     3.1     07-31    TPro  7.4  /  Alb  2.6<L>  /  TBili  0.4  /  DBili  x   /  AST  15  /  ALT  10  /  AlkPhos  90  07-31      Urinalysis Basic - ( 31 Jul 2023 07:01 )    Color: x / Appearance: x / SG: x / pH: x  Gluc: 73 mg/dL / Ketone: x  / Bili: x / Urobili: x   Blood: x / Protein: x / Nitrite: x   Leuk Esterase: x / RBC: x / WBC x   Sq Epi: x / Non Sq Epi: x / Bacteria: x      Magnesium: 3.1 mg/dL (07-31 @ 07:01)  Phosphorus: 7.8 mg/dL (07-31 @ 07:01)  Intact PTH: 235 pg/mL (07-31 @ 07:01)  Phosphorus: 7.6 mg/dL (07-30 @ 14:40)    Urine studies  Urea Nitrogen,  Random Urine: 374 mg/dL (07-30 @ 16:09)  Potassium, Random Urine: 67 mmol/L (07-30 @ 16:09)  Osmolality, Random Urine: 439 mos/kg (07-30 @ 16:09)  Sodium, Random Urine: 48 mmol/L (07-30 @ 16:09)  Creatinine, Random Urine: 156 mg/dL (07-30 @ 16:09)    PTH and Vit D:  Intact PTH: 235 pg/mL (07-31 @ 07:01)         NEPHROLOGY MEDICAL CARE, [facility] - Dr. Ajay Green/ Dr. Mert Madison/ Dr. Chris Calderon/ Dr. Carson Cook    Date of Service: 07-31-23 @ 13:14    Patient was seen and examined at bedside.    CC: patient is okay and NAD.  family at bedside    Vital Signs Last 24 Hrs  T(C): 36.5 (31 Jul 2023 05:27), Max: 36.6 (30 Jul 2023 22:03)  T(F): 97.7 (31 Jul 2023 05:27), Max: 97.9 (30 Jul 2023 22:03)  HR: 74 (31 Jul 2023 09:50) (73 - 85)  BP: 173/78 (31 Jul 2023 09:50) (150/81 - 204/98)  BP(mean): --  RR: 18 (31 Jul 2023 09:50) (18 - 19)  SpO2: 100% (31 Jul 2023 09:50) (97% - 100%)    Parameters below as of 31 Jul 2023 09:50  Patient On (Oxygen Delivery Method): room air          PHYSICAL EXAM:  General: No acute respiratory distress.  Eyes: conjunctiva and sclera clear  ENMT: Atraumatic, Normocephalic, supple, No JVD present. Moist mucous membranes  Respiratory: Bilateral clear lungs; No rales, rhonchi, wheezing  Cardiovascular: S1S2+; no m/r/g  Gastrointestinal: Soft, Non-tender, Nondistended; Bowel sounds present  Neuro:  open eyes; hemiparesis.  Ext:  1+pedal edema, No Cyanosis  Skin: No visible rashes      MEDICATIONS:  MEDICATIONS  (STANDING):  aspirin Suppository 300 milliGRAM(s) Rectal daily  cefTRIAXone   IVPB 1000 milliGRAM(s) IV Intermittent every 24 hours  cefTRIAXone   IVPB      cloNIDine Patch 0.1 mG/24Hr(s) 1 patch Transdermal <User Schedule>  dextrose 5%. 1000 milliLiter(s) (50 mL/Hr) IV Continuous <Continuous>  enoxaparin Injectable 30 milliGRAM(s) SubCutaneous every 24 hours  valproate sodium  IVPB 500 milliGRAM(s) IV Intermittent every 8 hours    MEDICATIONS  (PRN):  hydrALAZINE 10 milliGRAM(s) Oral every 6 hours PRN Systolic blood pressure > 150  hydrALAZINE Injectable 10 milliGRAM(s) IV Push every 4 hours PRN SBP greater than 160          LABS:                        9.3    12.11 )-----------( 337      ( 31 Jul 2023 07:01 )             30.5     07-31    150<H>  |  117<H>  |  52<H>  ----------------------------<  73  3.8   |  18<L>  |  2.96<H>    Ca    9.2      31 Jul 2023 07:01  Phos  7.8     07-31  Mg     3.1     07-31    TPro  7.4  /  Alb  2.6<L>  /  TBili  0.4  /  DBili  x   /  AST  15  /  ALT  10  /  AlkPhos  90  07-31      Urinalysis Basic - ( 31 Jul 2023 07:01 )    Color: x / Appearance: x / SG: x / pH: x  Gluc: 73 mg/dL / Ketone: x  / Bili: x / Urobili: x   Blood: x / Protein: x / Nitrite: x   Leuk Esterase: x / RBC: x / WBC x   Sq Epi: x / Non Sq Epi: x / Bacteria: x      Magnesium: 3.1 mg/dL (07-31 @ 07:01)  Phosphorus: 7.8 mg/dL (07-31 @ 07:01)  Intact PTH: 235 pg/mL (07-31 @ 07:01)  Phosphorus: 7.6 mg/dL (07-30 @ 14:40)    Urine studies  Urea Nitrogen,  Random Urine: 374 mg/dL (07-30 @ 16:09)  Potassium, Random Urine: 67 mmol/L (07-30 @ 16:09)  Osmolality, Random Urine: 439 mos/kg (07-30 @ 16:09)  Sodium, Random Urine: 48 mmol/L (07-30 @ 16:09)  Creatinine, Random Urine: 156 mg/dL (07-30 @ 16:09)    PTH and Vit D:  Intact PTH: 235 pg/mL (07-31 @ 07:01)         NEPHROLOGY MEDICAL CARE, [facility] - Dr. Ajay Green/ Dr. Mert Madison/ Dr. Chris Calderon/ Dr. Carson Cook    Date of Service: 07-31-23 @ 13:14    Patient was seen and examined at bedside.    CC: patient is okay and NAD.  family at bedside    Vital Signs Last 24 Hrs  T(C): 36.5 (31 Jul 2023 05:27), Max: 36.6 (30 Jul 2023 22:03)  T(F): 97.7 (31 Jul 2023 05:27), Max: 97.9 (30 Jul 2023 22:03)  HR: 74 (31 Jul 2023 09:50) (73 - 85)  BP: 173/78 (31 Jul 2023 09:50) (150/81 - 204/98)  BP(mean): --  RR: 18 (31 Jul 2023 09:50) (18 - 19)  SpO2: 100% (31 Jul 2023 09:50) (97% - 100%)    Parameters below as of 31 Jul 2023 09:50  Patient On (Oxygen Delivery Method): room air          PHYSICAL EXAM:  General: No acute respiratory distress.  Eyes: conjunctiva and sclera clear  ENMT: Atraumatic, Normocephalic, supple, No JVD present. Moist mucous membranes  Respiratory: Bilateral clear lungs; No rales, rhonchi, wheezing  Cardiovascular: S1S2+; no m/r/g  Gastrointestinal: Soft, Non-tender, Nondistended; Bowel sounds present  Neuro:  open eyes; hemiparesis.  Ext:  1+pedal edema, No Cyanosis  Skin: No visible rashes      MEDICATIONS:  MEDICATIONS  (STANDING):  aspirin Suppository 300 milliGRAM(s) Rectal daily  cefTRIAXone   IVPB 1000 milliGRAM(s) IV Intermittent every 24 hours  cefTRIAXone   IVPB      cloNIDine Patch 0.1 mG/24Hr(s) 1 patch Transdermal <User Schedule>  dextrose 5%. 1000 milliLiter(s) (50 mL/Hr) IV Continuous <Continuous>  enoxaparin Injectable 30 milliGRAM(s) SubCutaneous every 24 hours  valproate sodium  IVPB 500 milliGRAM(s) IV Intermittent every 8 hours    MEDICATIONS  (PRN):  hydrALAZINE 10 milliGRAM(s) Oral every 6 hours PRN Systolic blood pressure > 150  hydrALAZINE Injectable 10 milliGRAM(s) IV Push every 4 hours PRN SBP greater than 160          LABS:                        9.3    12.11 )-----------( 337      ( 31 Jul 2023 07:01 )             30.5     07-31    150<H>  |  117<H>  |  52<H>  ----------------------------<  73  3.8   |  18<L>  |  2.96<H>    Ca    9.2      31 Jul 2023 07:01  Phos  7.8     07-31  Mg     3.1     07-31    TPro  7.4  /  Alb  2.6<L>  /  TBili  0.4  /  DBili  x   /  AST  15  /  ALT  10  /  AlkPhos  90  07-31      Urinalysis Basic - ( 31 Jul 2023 07:01 )    Color: x / Appearance: x / SG: x / pH: x  Gluc: 73 mg/dL / Ketone: x  / Bili: x / Urobili: x   Blood: x / Protein: x / Nitrite: x   Leuk Esterase: x / RBC: x / WBC x   Sq Epi: x / Non Sq Epi: x / Bacteria: x      Magnesium: 3.1 mg/dL (07-31 @ 07:01)  Phosphorus: 7.8 mg/dL (07-31 @ 07:01)  Intact PTH: 235 pg/mL (07-31 @ 07:01)  Phosphorus: 7.6 mg/dL (07-30 @ 14:40)    Urine studies  Urea Nitrogen,  Random Urine: 374 mg/dL (07-30 @ 16:09)  Potassium, Random Urine: 67 mmol/L (07-30 @ 16:09)  Osmolality, Random Urine: 439 mos/kg (07-30 @ 16:09)  Sodium, Random Urine: 48 mmol/L (07-30 @ 16:09)  Creatinine, Random Urine: 156 mg/dL (07-30 @ 16:09)    PTH and Vit D:  Intact PTH: 235 pg/mL (07-31 @ 07:01)

## 2023-07-31 NOTE — SWALLOW BEDSIDE ASSESSMENT ADULT - SWALLOW EVAL: RECOMMENDED DIET
NPO, with non-oral nutrition, hydration, medications is recommended based upon the results of this examination; however, would suggest deferring the decision to the Palliative Care team who will determine the GOC with re: to provision of nutrition/hydration.

## 2023-07-31 NOTE — SWALLOW BEDSIDE ASSESSMENT ADULT - NS SPL SWALLOW CLINIC TRIAL FT
At this time due to altered mentation, waxing and waning ZHANG, and current clinical presentation, pt is at high risk for aspiration and is not safe for initiation of oral diet; exam terminated.

## 2023-07-31 NOTE — SWALLOW BEDSIDE ASSESSMENT ADULT - ADDITIONAL RECOMMENDATIONS
Strict aspiration precautions: no ice cream/jello; feed slowly, alternate small bites & sips, upright during meals & for 30 minutes after, & no straws.

## 2023-07-31 NOTE — SWALLOW BEDSIDE ASSESSMENT ADULT - ORAL PHASE
prolonged oral bolus holding; absent bolus manipulation/Decreased anterior-posterior movement of the bolus/Delayed oral transit time/Stasis in anterior sulcus

## 2023-07-31 NOTE — PROGRESS NOTE ADULT - PROBLEM SELECTOR PLAN 4
due to water deficit and insensible losses. Pt. is clinically euvolemic. Na in the 150's 7/30 and 7/31 143 .  avoid high solute intake diet and sodium bicarb infuse. due to water deficit and insensible losses. Pt. is clinically euvolemic. Na in the 150's .  avoid high solute intake diet and sodium bicarb infuse.  continue to give  dextrose % 50 cc

## 2023-07-31 NOTE — PROGRESS NOTE ADULT - SUBJECTIVE AND OBJECTIVE BOX
PGY-1 Progress Note discussed with attending    PAGER #: [[phone]] TILL 5:00 PM  PLEASE CONTACT ON CALL TEAM:  - On Call Team (Please refer to Tyler) FROM 5:00 PM - 8:30PM  - Nightfloat Team FROM 8:30 -7:30 AM    CHIEF COMPLAINT & BRIEF HOSPITAL COURSE:    INTERVAL HPI/OVERNIGHT EVENTS:   MEDICATIONS  (STANDING):  aspirin Suppository 300 milliGRAM(s) Rectal daily  cefTRIAXone   IVPB 1000 milliGRAM(s) IV Intermittent every 24 hours  cefTRIAXone   IVPB      cloNIDine Patch 0.1 mG/24Hr(s) 1 patch Transdermal <User Schedule>  enoxaparin Injectable 30 milliGRAM(s) SubCutaneous every 24 hours  valproate sodium  IVPB 500 milliGRAM(s) IV Intermittent every 8 hours    MEDICATIONS  (PRN):  hydrALAZINE Injectable 10 milliGRAM(s) IV Push every 4 hours PRN SBP greater than 160      REVIEW OF SYSTEMS:  CONSTITUTIONAL: No fever, weight loss, or fatigue  RESPIRATORY: No shortness of breath  CARDIOVASCULAR: No chest pain  GASTROINTESTINAL: No abdominal pain.  GENITOURINARY: No dysuria  NEUROLOGICAL: No headaches  SKIN: No itching, burning, rashes    Vital Signs Last 24 Hrs  T(C): 36.5 (31 Jul 2023 05:27), Max: 36.6 (30 Jul 2023 13:14)  T(F): 97.7 (31 Jul 2023 05:27), Max: 97.9 (30 Jul 2023 22:03)  HR: 80 (31 Jul 2023 05:27) (67 - 85)  BP: 160/76 (31 Jul 2023 05:27) (150/81 - 204/98)  BP(mean): --  RR: 19 (31 Jul 2023 05:27) (18 - 19)  SpO2: 98% (31 Jul 2023 05:27) (97% - 99%)    Parameters below as of 31 Jul 2023 05:27  Patient On (Oxygen Delivery Method): room air        PHYSICAL EXAMINATION:  GENERAL: NAD, well built  HEAD:  Atraumatic, Normocephalic  EYES:  conjunctiva and sclera clear  CHEST/LUNG: Clear to auscultation. No rales, rhonchi, wheezing, or rubs  HEART: Regular rate and rhythm; No murmurs, rubs, or gallops  ABDOMEN: Soft, Nontender, Nondistended; Bowel sounds present  NERVOUS SYSTEM:  Alert & Oriented X3,    EXTREMITIES:  2+ Peripheral Pulses, No clubbing, cyanosis, or edema  SKIN: warm dry                          9.7    12.93 )-----------( 273      ( 30 Jul 2023 12:04 )             31.0     07-30    143  |  115<H>  |  47<H>  ----------------------------<  73  5.4<H>   |  16<L>  |  3.09<H>    Ca    7.8<L>      30 Jul 2023 20:57  Phos  7.6     07-30  Mg     2.9     07-30    TPro  7.9  /  Alb  2.7<L>  /  TBili  0.3  /  DBili  x   /  AST  17  /  ALT  12  /  AlkPhos  88  07-30    LIVER FUNCTIONS - ( 30 Jul 2023 14:40 )  Alb: 2.7 g/dL / Pro: 7.9 g/dL / ALK PHOS: 88 U/L / ALT: 12 U/L DA / AST: 17 U/L / GGT: x                   CAPILLARY BLOOD GLUCOSE      RADIOLOGY & ADDITIONAL TESTS:                   PGY-1 Progress Note discussed with attending    PAGER #: [[phone]] TILL 5:00 PM  PLEASE CONTACT ON CALL TEAM:  - On Call Team (Please refer to Tyler) FROM 5:00 PM - 8:30PM  - Nightfloat Team FROM 8:30 -7:30 AM    CHIEF COMPLAINT & BRIEF HOSPITAL COURSE:    INTERVAL HPI/OVERNIGHT EVENTS:   MEDICATIONS  (STANDING):  aspirin Suppository 300 milliGRAM(s) Rectal daily  cefTRIAXone   IVPB 1000 milliGRAM(s) IV Intermittent every 24 hours  cefTRIAXone   IVPB      cloNIDine Patch 0.1 mG/24Hr(s) 1 patch Transdermal <User Schedule>  enoxaparin Injectable 30 milliGRAM(s) SubCutaneous every 24 hours  valproate sodium  IVPB 500 milliGRAM(s) IV Intermittent every 8 hours    MEDICATIONS  (PRN):  hydrALAZINE Injectable 10 milliGRAM(s) IV Push every 4 hours PRN SBP greater than 160      REVIEW OF SYSTEMS:  CONSTITUTIONAL: No fever, weight loss, or fatigue  RESPIRATORY: No shortness of breath  CARDIOVASCULAR: No chest pain  GASTROINTESTINAL: No abdominal pain.  GENITOURINARY: No dysuria  NEUROLOGICAL: No headaches  SKIN: No itching, burning, rashes    Vital Signs Last 24 Hrs  T(C): 36.5 (31 Jul 2023 05:27), Max: 36.6 (30 Jul 2023 13:14)  T(F): 97.7 (31 Jul 2023 05:27), Max: 97.9 (30 Jul 2023 22:03)  HR: 80 (31 Jul 2023 05:27) (67 - 85)  BP: 160/76 (31 Jul 2023 05:27) (150/81 - 204/98)  BP(mean): --  RR: 19 (31 Jul 2023 05:27) (18 - 19)  SpO2: 98% (31 Jul 2023 05:27) (97% - 99%)    Parameters below as of 31 Jul 2023 05:27  Patient On (Oxygen Delivery Method): room air        PHYSICAL EXAMINATION:  GENERAL: NAD, well built  HEAD:  Atraumatic, Normocephalic  EYES:  conjunctiva and sclera clear  CHEST/LUNG: Clear to auscultation. No rales, rhonchi, wheezing, or rubs  HEART: Regular rate and rhythm; No murmurs, rubs, or gallops  ABDOMEN: Soft, Nontender, Nondistended; Bowel sounds present  NERVOUS SYSTEM:  Alert & Oriented X3,    EXTREMITIES:  2+ Peripheral Pulses, No clubbing, cyanosis, or edema  SKIN: warm dry                          9.7    12.93 )-----------( 273      ( 30 Jul 2023 12:04 )             31.0     07-30    143  |  115<H>  |  47<H>  ----------------------------<  73  5.4<H>   |  16<L>  |  3.09<H>    Ca    7.8<L>      30 Jul 2023 20:57  Phos  7.6     07-30  Mg     2.9     07-30    TPro  7.9  /  Alb  2.7<L>  /  TBili  0.3  /  DBili  x   /  AST  17  /  ALT  12  /  AlkPhos  88  07-30    LIVER FUNCTIONS - ( 30 Jul 2023 14:40 )  Alb: 2.7 g/dL / Pro: 7.9 g/dL / ALK PHOS: 88 U/L / ALT: 12 U/L DA / AST: 17 U/L / GGT: x                   CAPILLARY BLOOD GLUCOSE      RADIOLOGY & ADDITIONAL TESTS:                   PGY-1 Progress Note discussed with attending    PAGER #: [[phone]] TILL 5:00 PM  PLEASE CONTACT ON CALL TEAM:  - On Call Team (Please refer to Tyler) FROM 5:00 PM - 8:30PM  - Nightfloat Team FROM 8:30 -7:30 AM    CHIEF COMPLAINT & BRIEF HOSPITAL COURSE:    INTERVAL HPI/OVERNIGHT EVENTS:   MEDICATIONS  (STANDING):  aspirin Suppository 300 milliGRAM(s) Rectal daily  cefTRIAXone   IVPB 1000 milliGRAM(s) IV Intermittent every 24 hours  cefTRIAXone   IVPB      cloNIDine Patch 0.1 mG/24Hr(s) 1 patch Transdermal <User Schedule>  enoxaparin Injectable 30 milliGRAM(s) SubCutaneous every 24 hours  valproate sodium  IVPB 500 milliGRAM(s) IV Intermittent every 8 hours    MEDICATIONS  (PRN):  hydrALAZINE Injectable 10 milliGRAM(s) IV Push every 4 hours PRN SBP greater than 160      REVIEW OF SYSTEMS:  CONSTITUTIONAL: No fever, weight loss, or fatigue  RESPIRATORY: No shortness of breath  CARDIOVASCULAR: No chest pain  GASTROINTESTINAL: No abdominal pain.  GENITOURINARY: No dysuria  NEUROLOGICAL: No headaches  SKIN: No itching, burning, rashes    Vital Signs Last 24 Hrs  T(C): 36.5 (31 Jul 2023 05:27), Max: 36.6 (30 Jul 2023 13:14)  T(F): 97.7 (31 Jul 2023 05:27), Max: 97.9 (30 Jul 2023 22:03)  HR: 80 (31 Jul 2023 05:27) (67 - 85)  BP: 160/76 (31 Jul 2023 05:27) (150/81 - 204/98)  BP(mean): --  RR: 19 (31 Jul 2023 05:27) (18 - 19)  SpO2: 98% (31 Jul 2023 05:27) (97% - 99%)    Parameters below as of 31 Jul 2023 05:27  Patient On (Oxygen Delivery Method): room air        PHYSICAL EXAMINATION:  GENERAL: NAD, well built  HEAD:  Atraumatic, Normocephalic  EYES:  conjunctiva and sclera clear  CHEST/LUNG: Clear to auscultation. No rales, rhonchi, wheezing, or rubs  HEART: Regular rate and rhythm; No murmurs, rubs, or gallops  ABDOMEN: Soft, Nontender, Nondistended; Bowel sounds present  NERVOUS SYSTEM:  Alert & Oriented X3,    EXTREMITIES:  2+ Peripheral Pulses, No clubbing, cyanosis, or edema  SKIN: warm dry                          9.7    12.93 )-----------( 273      ( 30 Jul 2023 12:04 )             31.0     07-30    143  |  115<H>  |  47<H>  ----------------------------<  73  5.4<H>   |  16<L>  |  3.09<H>    Ca    7.8<L>      30 Jul 2023 20:57  Phos  7.6     07-30  Mg     2.9     07-30    TPro  7.9  /  Alb  2.7<L>  /  TBili  0.3  /  DBili  x   /  AST  17  /  ALT  12  /  AlkPhos  88  07-30    LIVER FUNCTIONS - ( 30 Jul 2023 14:40 )  Alb: 2.7 g/dL / Pro: 7.9 g/dL / ALK PHOS: 88 U/L / ALT: 12 U/L DA / AST: 17 U/L / GGT: x                   CAPILLARY BLOOD GLUCOSE      RADIOLOGY & ADDITIONAL TESTS:                   PGY-1 Progress Note discussed with attending    PAGER #: [[phone]] TILL 5:00 PM  PLEASE CONTACT ON CALL TEAM:  - On Call Team (Please refer to Tyler) FROM 5:00 PM - 8:30PM  - Nightfloat Team FROM 8:30 -7:30 AM    CHIEF COMPLAINT & BRIEF HOSPITAL COURSE: pt. BP was elevated in 190s systolic last night and in 180s today.Seen pt. at bedside. No change from the day before.     INTERVAL HPI/OVERNIGHT EVENTS:   MEDICATIONS  (STANDING):  aspirin Suppository 300 milliGRAM(s) Rectal daily  cefTRIAXone   IVPB 1000 milliGRAM(s) IV Intermittent every 24 hours  cefTRIAXone   IVPB      cloNIDine Patch 0.1 mG/24Hr(s) 1 patch Transdermal <User Schedule>  enoxaparin Injectable 30 milliGRAM(s) SubCutaneous every 24 hours  valproate sodium  IVPB 500 milliGRAM(s) IV Intermittent every 8 hours    MEDICATIONS  (PRN):  hydrALAZINE Injectable 10 milliGRAM(s) IV Push every 4 hours PRN SBP greater than 160      REVIEW OF SYSTEMS:  CONSTITUTIONAL: No fever, weight loss, or fatigue  RESPIRATORY: No shortness of breath  CARDIOVASCULAR: No chest pain  GASTROINTESTINAL: No abdominal pain.  GENITOURINARY: No dysuria  NEUROLOGICAL: No headaches  SKIN: No itching, burning, rashes    Vital Signs Last 24 Hrs  T(C): 36.5 (31 Jul 2023 05:27), Max: 36.6 (30 Jul 2023 13:14)  T(F): 97.7 (31 Jul 2023 05:27), Max: 97.9 (30 Jul 2023 22:03)  HR: 80 (31 Jul 2023 05:27) (67 - 85)  BP: 160/76 (31 Jul 2023 05:27) (150/81 - 204/98)  BP(mean): --  RR: 19 (31 Jul 2023 05:27) (18 - 19)  SpO2: 98% (31 Jul 2023 05:27) (97% - 99%)    Parameters below as of 31 Jul 2023 05:27  Patient On (Oxygen Delivery Method): room air        PHYSICAL EXAMINATION:  GENERAL: NAD, well built  HEAD:  Atraumatic, Normocephalic  EYES:  conjunctiva and sclera clear  CHEST/LUNG: Clear to auscultation. No rales, rhonchi, wheezing, or rubs  HEART: Regular rate and rhythm; No murmurs, rubs, or gallops  ABDOMEN: Soft, Nontender, Nondistended; Bowel sounds present  NERVOUS SYSTEM:  Alert & Oriented X3,    EXTREMITIES:  2+ Peripheral Pulses, No clubbing, cyanosis, or edema  SKIN: warm dry                          9.7    12.93 )-----------( 273      ( 30 Jul 2023 12:04 )             31.0     07-30    143  |  115<H>  |  47<H>  ----------------------------<  73  5.4<H>   |  16<L>  |  3.09<H>    Ca    7.8<L>      30 Jul 2023 20:57  Phos  7.6     07-30  Mg     2.9     07-30    TPro  7.9  /  Alb  2.7<L>  /  TBili  0.3  /  DBili  x   /  AST  17  /  ALT  12  /  AlkPhos  88  07-30    LIVER FUNCTIONS - ( 30 Jul 2023 14:40 )  Alb: 2.7 g/dL / Pro: 7.9 g/dL / ALK PHOS: 88 U/L / ALT: 12 U/L DA / AST: 17 U/L / GGT: x                   CAPILLARY BLOOD GLUCOSE      RADIOLOGY & ADDITIONAL TESTS:                   PGY-1 Progress Note discussed with attending    PAGER #: [[phone]] TILL 5:00 PM  PLEASE CONTACT ON CALL TEAM:  - On Call Team (Please refer to Tyler) FROM 5:00 PM - 8:30PM  - Nightfloat Team FROM 8:30 -7:30 AM    CHIEF COMPLAINT & BRIEF HOSPITAL COURSE: pt. BP was elevated in 190s systolic last night and in 180s today.Seen pt. at bedside. No change from the day before.     INTERVAL HPI/OVERNIGHT EVENTS:   MEDICATIONS  (STANDING):  aspirin Suppository 300 milliGRAM(s) Rectal daily  cefTRIAXone   IVPB 1000 milliGRAM(s) IV Intermittent every 24 hours  cefTRIAXone   IVPB      cloNIDine Patch 0.1 mG/24Hr(s) 1 patch Transdermal <User Schedule>  enoxaparin Injectable 30 milliGRAM(s) SubCutaneous every 24 hours  valproate sodium  IVPB 500 milliGRAM(s) IV Intermittent every 8 hours    MEDICATIONS  (PRN):  hydrALAZINE Injectable 10 milliGRAM(s) IV Push every 4 hours PRN SBP greater than 160      REVIEW OF SYSTEMS:  CONSTITUTIONAL: No fever, weight loss, or fatigue  RESPIRATORY: No shortness of breath  CARDIOVASCULAR: No chest pain  GASTROINTESTINAL: No abdominal pain.  GENITOURINARY: No dysuria  NEUROLOGICAL: No headaches  SKIN: No itching, burning, rashes    Vital Signs Last 24 Hrs  T(C): 36.5 (31 Jul 2023 05:27), Max: 36.6 (30 Jul 2023 13:14)  T(F): 97.7 (31 Jul 2023 05:27), Max: 97.9 (30 Jul 2023 22:03)  HR: 80 (31 Jul 2023 05:27) (67 - 85)  BP: 160/76 (31 Jul 2023 05:27) (150/81 - 204/98)  BP(mean): --  RR: 19 (31 Jul 2023 05:27) (18 - 19)  SpO2: 98% (31 Jul 2023 05:27) (97% - 99%)    Parameters below as of 31 Jul 2023 05:27  Patient On (Oxygen Delivery Method): room air        PHYSICAL EXAMINATION:  GENERAL: NAD, well built  HEAD:  Atraumatic, Normocephalic  EYES:  conjunctiva and sclera clear  CHEST/LUNG: Clear to auscultation. No rales, rhonchi, wheezing, or rubs  HEART: Regular rate and rhythm; No murmurs, rubs, or gallops  ABDOMEN: Soft, Nontender, Nondistended; Bowel sounds present  NERVOUS SYSTEM:  Alert & Oriented X3,    EXTREMITIES:  2+ Peripheral Pulses, No clubbing, cyanosis, or edema  SKIN: warm dry                          9.7    12.93 )-----------( 273      ( 30 Jul 2023 12:04 )             31.0     07-30    143  |  115<H>  |  47<H>  ----------------------------<  73  5.4<H>   |  16<L>  |  3.09<H>    Ca    7.8<L>      30 Jul 2023 20:57  Phos  7.6     07-30  Mg     2.9     07-30    TPro  7.9  /  Alb  2.7<L>  /  TBili  0.3  /  DBili  x   /  AST  17  /  ALT  12  /  AlkPhos  88  07-30    LIVER FUNCTIONS - ( 30 Jul 2023 14:40 )  Alb: 2.7 g/dL / Pro: 7.9 g/dL / ALK PHOS: 88 U/L / ALT: 12 U/L DA / AST: 17 U/L / GGT: x                   CAPILLARY BLOOD GLUCOSE      RADIOLOGY & ADDITIONAL TESTS:                   PGY-1 Progress Note discussed with attending    PAGER #: [[phone]] TILL 5:00 PM  PLEASE CONTACT ON CALL TEAM:  - On Call Team (Please refer to Tyler) FROM 5:00 PM - 8:30PM  - Nightfloat Team FROM 8:30 -7:30 AM    CHIEF COMPLAINT & BRIEF HOSPITAL COURSE: pt. BP was elevated in 190s systolic last night and in 180s today.Seen pt. at bedside. No change from the day before.     INTERVAL HPI/OVERNIGHT EVENTS:   MEDICATIONS  (STANDING):  aspirin Suppository 300 milliGRAM(s) Rectal daily  cefTRIAXone   IVPB 1000 milliGRAM(s) IV Intermittent every 24 hours  cefTRIAXone   IVPB      cloNIDine Patch 0.1 mG/24Hr(s) 1 patch Transdermal <User Schedule>  enoxaparin Injectable 30 milliGRAM(s) SubCutaneous every 24 hours  valproate sodium  IVPB 500 milliGRAM(s) IV Intermittent every 8 hours    MEDICATIONS  (PRN):  hydrALAZINE Injectable 10 milliGRAM(s) IV Push every 4 hours PRN SBP greater than 160      REVIEW OF SYSTEMS:  CONSTITUTIONAL: No fever, weight loss, or fatigue  RESPIRATORY: No shortness of breath  CARDIOVASCULAR: No chest pain  GASTROINTESTINAL: No abdominal pain.  GENITOURINARY: No dysuria  NEUROLOGICAL: No headaches  SKIN: No itching, burning, rashes    Vital Signs Last 24 Hrs  T(C): 36.5 (31 Jul 2023 05:27), Max: 36.6 (30 Jul 2023 13:14)  T(F): 97.7 (31 Jul 2023 05:27), Max: 97.9 (30 Jul 2023 22:03)  HR: 80 (31 Jul 2023 05:27) (67 - 85)  BP: 160/76 (31 Jul 2023 05:27) (150/81 - 204/98)  BP(mean): --  RR: 19 (31 Jul 2023 05:27) (18 - 19)  SpO2: 98% (31 Jul 2023 05:27) (97% - 99%)    Parameters below as of 31 Jul 2023 05:27  Patient On (Oxygen Delivery Method): room air        PHYSICAL EXAMINATION:  GENERAL: NAD, well built  HEAD:  Atraumatic, Normocephalic  EYES:  conjunctiva and sclera clear  CHEST/LUNG: Clear to auscultation. No rales, rhonchi, wheezing, or rubs  HEART: Regular rate and rhythm; No murmurs, rubs, or gallops  ABDOMEN: Soft, Nontender, Nondistended; Bowel sounds present  NERVOUS SYSTEM:  Alert & Oriented X3,    EXTREMITIES:  2+ Peripheral Pulses, No clubbing, cyanosis, or edema  SKIN: warm dry                          9.7    12.93 )-----------( 273      ( 30 Jul 2023 12:04 )             31.0     07-30    143  |  115<H>  |  47<H>  ----------------------------<  73  5.4<H>   |  16<L>  |  3.09<H>    Ca    7.8<L>      30 Jul 2023 20:57  Phos  7.6     07-30  Mg     2.9     07-30    TPro  7.9  /  Alb  2.7<L>  /  TBili  0.3  /  DBili  x   /  AST  17  /  ALT  12  /  AlkPhos  88  07-30    LIVER FUNCTIONS - ( 30 Jul 2023 14:40 )  Alb: 2.7 g/dL / Pro: 7.9 g/dL / ALK PHOS: 88 U/L / ALT: 12 U/L DA / AST: 17 U/L / GGT: x                   CAPILLARY BLOOD GLUCOSE      RADIOLOGY & ADDITIONAL TESTS:

## 2023-07-31 NOTE — PROGRESS NOTE ADULT - SUBJECTIVE AND OBJECTIVE BOX
NEUROLOGY FOLLOW-UP NOTE    NAME:  CARYN LEIVA      ASSESSMENT:  77 RHF with acute encephalopathy in setting of chronic ischemic stroke, concerning for toxic/metabolic encephalopathy vs. recrudescence of chronic stroke symptoms in the setting of urinary tract infection and acute renal failure, also with bilateral upper extremity rigidity and left hand twitching concerning for parkinsonism, without neurodiagnostic evidence of subclinical post-stroke seizures or acute ischemic stroke       RECOMMENDATIONS:    1. Seizure workup and management  - Continue Valproate 500mg IV Q8H (or Depakene liquid 500mg PO Q8H as per home regimen when patient can tolerate PO meds)  - Routine EEG shows evidence of seizure tendency without subclinical seizures - No role to adjust Valproate/Depakene dosage based on this finding    2. Stroke/Encephalopathy workup  - Telemetry monitoring while inpatient  - Continue infectious and metabolic workup and continue to treat abnormalities identified    3. Secondary stroke prevention  - Q4H Neurochecks & Vital signs  - Swallow evaluation before administering any PO meds  - Aspirin 81mg PO Daily (or Aspirin 300mg AK daily if NPO)  - Atorvastatin 40mg PO QHS or Simvastatin 20mg (up from home 10mg dose) PO QHS when patient can tolerate PO meds (goal LDL < 70 mg/dL)  - Treat BP if over 140/90 (goal /80) - No role for permissive hypertension at this time  - PT/OT  - DVT ppx: SCDs, Enoxaparin          NOTE TO BE COMPLETED - PLEASE REFER TO ABOVE ONLY AND IGNORE INFORMATION BELOW    ******************************    HPI:  Patient is a 77 female from [facility] PMHx HTN, HLD, CVA (w/ residual aphasia and R sided hemiparesis/plegia) who was sent to the hospital due to altered mental status. Patient is not able to present any history. Patient is lying down in bed, awake and alert. However, patient does not speak, is not able to follow commands and does not turn face or move eyes when her name is called. As per nursing home papers  patient was noted to have change in mental status with eye fixed in one direction and unable to swallow food. Patient's sister Marika San ([phone]) was called to inquire about baseline mental status. Patient can respond with one word answers, swallows food, however does not move arms/legs on baseline. Ed note also reports patient is exhibiting b/l upper arm weakness but resident is unable to confirm that during evaluation. Unable to obtain any ROS from the patient at this time.     Of note: Patient's sister noted that patient sister was recently started on medication for muscle spasms at nursing home.  (27 Jul 2023 19:19)      NEURO HPI:      INTERVAL HISTORY:      MEDICATIONS:  aspirin Suppository 300 milliGRAM(s) Rectal daily  cloNIDine Patch 0.1 mG/24Hr(s) 1 patch Transdermal <User Schedule>  dextrose 5%. 1000 milliLiter(s) IV Continuous <Continuous>  enoxaparin Injectable 30 milliGRAM(s) SubCutaneous every 24 hours  hydrALAZINE Injectable 10 milliGRAM(s) IV Push every 6 hours  valproate sodium  IVPB 500 milliGRAM(s) IV Intermittent every 8 hours      ALLERGIES:  Natural rubber (Other)  latex (Other)  penicillins (Unknown)      REVIEW OF SYSTEMS:  Fourteen systems reviewed and negative except as in HPI / Interval History.          OBJECTIVE:  Vital Signs Last 24 Hrs  T(C): 36.5 (31 Jul 2023 21:16), Max: 36.7 (31 Jul 2023 14:30)  T(F): 97.7 (31 Jul 2023 21:16), Max: 98.1 (31 Jul 2023 14:30)  HR: 84 (31 Jul 2023 21:16) (73 - 90)  BP: 167/79 (31 Jul 2023 21:16) (160/76 - 197/73)  BP(mean): --  RR: 18 (31 Jul 2023 21:16) (18 - 19)  SpO2: 99% (31 Jul 2023 21:16) (98% - 100%)    Parameters below as of 31 Jul 2023 21:16  Patient On (Oxygen Delivery Method): room air        General Examination:  General: No acute distress  HEENT: Atraumatic, Normocephalic  Respiratory: CTA B/l.  No crackles, rhonchi, or wheezes.  Cardiovascular: RRR.  Normal S1 & S2.  Normal b/l radial and pedal pulses.    Neurological Examination:  General / Mental Status: AAO x 3.  No aphasia or dysarthria.  Naming and repetition intact.  Cranial Nerves: VFF x 4.  PERRL.  EOMI x 2, No nystagmus or diplopia.  B/l V1-V3 equal and intact to light touch and pinprick.  Symmetric facial movement and palate elevation.  B/l hearing equal to finger rub.  5/5 strength with b/l sternocleidomastoid and trapezius.  Midline tongue protrusion, with no atrophy or fasciculations.  Motor: Normal bulk & tone in all four extremities.  5/5 strength throughout all four extremities.  No downward drift, rigidity, spasticity, or tremors in any of the four extremities.  Sensory: Intact to light touch and pinprick in all four extremities.  Negative Romberg.  Reflex: 2+ and symmetric at b/l biceps, triceps, brachioradialis, patellae, and ankles.  Downgoing toes b/l.  Coordination: No dysmetria with b/l finger-to-nose and heel raise tests.  Symmetric rapid alternating movements b/l.  Gait: Normal, narrow-based gait.  No difficulty with tiptoe, heel, and tandem gaits.        LABORATORY VALUES:                          9.3    12.11 )-----------( 337      ( 31 Jul 2023 07:01 )             30.5       07-31    150<H>  |  117<H>  |  52<H>  ----------------------------<  73  3.8   |  18<L>  |  2.96<H>    Ca    9.2      31 Jul 2023 07:01  Phos  7.8     07-31  Mg     3.1     07-31    TPro  7.4  /  Alb  2.6<L>  /  TBili  0.4  /  DBili  x   /  AST  15  /  ALT  10  /  AlkPhos  90  07-31 07-28 Chol 152 LDL -- HDL 51 Trig 93    Glucose Trend  07-31-23 @ 11:58   -  -- -- 76  07-31-23 @ 07:01   -  73 -- --  07-31-23 @ 06:14   -  -- -- 79  07-31-23 @ 00:31   -  -- -- 85  07-30-23 @ 20:57   -  73 -- --  07-30-23 @ 16:52   -  -- -- 90 07-30-23 @ 14:40   -  80 -- --  07-30-23 @ 11:26   -  -- -- 75  07-30-23 @ 09:57   -  79 -- --  07-30-23 @ 01:55   -  -- -- 72                    NEUROIMAGING:          Please contact the Neurology consult service with any neurological questions.      Eligio Whitney MD   of Neurology  [facility] at [facility]         NEUROLOGY FOLLOW-UP NOTE    NAME:  CARYN LEIVA      ASSESSMENT:  77 RHF with acute encephalopathy in setting of chronic ischemic stroke, concerning for toxic/metabolic encephalopathy vs. recrudescence of chronic stroke symptoms in the setting of urinary tract infection and acute renal failure, also with bilateral upper extremity rigidity and left hand twitching concerning for parkinsonism, without neurodiagnostic evidence of subclinical post-stroke seizures or acute ischemic stroke       RECOMMENDATIONS:    1. Seizure workup and management  - Continue Valproate 500mg IV Q8H (or Depakene liquid 500mg PO Q8H as per home regimen when patient can tolerate PO meds)  - Routine EEG shows evidence of seizure tendency without subclinical seizures - No role to adjust Valproate/Depakene dosage based on this finding    2. Stroke/Encephalopathy workup  - Telemetry monitoring while inpatient  - Continue infectious and metabolic workup and continue to treat abnormalities identified    3. Secondary stroke prevention  - Q4H Neurochecks & Vital signs  - Swallow evaluation before administering any PO meds  - Aspirin 81mg PO Daily (or Aspirin 300mg [place] daily if NPO)  - Atorvastatin 40mg PO QHS or Simvastatin 20mg (up from home 10mg dose) PO QHS when patient can tolerate PO meds (goal LDL < 70 mg/dL)  - Treat BP if over 140/90 (goal /80) - No role for permissive hypertension at this time  - PT/OT  - DVT ppx: SCDs, Enoxaparin          NOTE TO BE COMPLETED - PLEASE REFER TO ABOVE ONLY AND IGNORE INFORMATION BELOW    ******************************    HPI:  Patient is a 77 female from [facility] PMHx HTN, HLD, CVA (w/ residual aphasia and R sided hemiparesis/plegia) who was sent to the hospital due to altered mental status. Patient is not able to present any history. Patient is lying down in bed, awake and alert. However, patient does not speak, is not able to follow commands and does not turn face or move eyes when her name is called. As per nursing home papers  patient was noted to have change in mental status with eye fixed in one direction and unable to swallow food. Patient's sister Marika San ([phone]) was called to inquire about baseline mental status. Patient can respond with one word answers, swallows food, however does not move arms/legs on baseline. Ed note also reports patient is exhibiting b/l upper arm weakness but resident is unable to confirm that during evaluation. Unable to obtain any ROS from the patient at this time.     Of note: Patient's sister noted that patient sister was recently started on medication for muscle spasms at nursing home.  (27 Jul 2023 19:19)      NEURO HPI:      INTERVAL HISTORY:      MEDICATIONS:  aspirin Suppository 300 milliGRAM(s) Rectal daily  cloNIDine Patch 0.1 mG/24Hr(s) 1 patch Transdermal <User Schedule>  dextrose 5%. 1000 milliLiter(s) IV Continuous <Continuous>  enoxaparin Injectable 30 milliGRAM(s) SubCutaneous every 24 hours  hydrALAZINE Injectable 10 milliGRAM(s) IV Push every 6 hours  valproate sodium  IVPB 500 milliGRAM(s) IV Intermittent every 8 hours      ALLERGIES:  Natural rubber (Other)  latex (Other)  penicillins (Unknown)      REVIEW OF SYSTEMS:  Fourteen systems reviewed and negative except as in HPI / Interval History.          OBJECTIVE:  Vital Signs Last 24 Hrs  T(C): 36.5 (31 Jul 2023 21:16), Max: 36.7 (31 Jul 2023 14:30)  T(F): 97.7 (31 Jul 2023 21:16), Max: 98.1 (31 Jul 2023 14:30)  HR: 84 (31 Jul 2023 21:16) (73 - 90)  BP: 167/79 (31 Jul 2023 21:16) (160/76 - 197/73)  BP(mean): --  RR: 18 (31 Jul 2023 21:16) (18 - 19)  SpO2: 99% (31 Jul 2023 21:16) (98% - 100%)    Parameters below as of 31 Jul 2023 21:16  Patient On (Oxygen Delivery Method): room air        General Examination:  General: No acute distress  HEENT: Atraumatic, Normocephalic  Respiratory: CTA B/l.  No crackles, rhonchi, or wheezes.  Cardiovascular: RRR.  Normal S1 & S2.  Normal b/l radial and pedal pulses.    Neurological Examination:  General / Mental Status: AAO x 3.  No aphasia or dysarthria.  Naming and repetition intact.  Cranial Nerves: VFF x 4.  PERRL.  EOMI x 2, No nystagmus or diplopia.  B/l V1-V3 equal and intact to light touch and pinprick.  Symmetric facial movement and palate elevation.  B/l hearing equal to finger rub.  5/5 strength with b/l sternocleidomastoid and trapezius.  Midline tongue protrusion, with no atrophy or fasciculations.  Motor: Normal bulk & tone in all four extremities.  5/5 strength throughout all four extremities.  No downward drift, rigidity, spasticity, or tremors in any of the four extremities.  Sensory: Intact to light touch and pinprick in all four extremities.  Negative Romberg.  Reflex: 2+ and symmetric at b/l biceps, triceps, brachioradialis, patellae, and ankles.  Downgoing toes b/l.  Coordination: No dysmetria with b/l finger-to-nose and heel raise tests.  Symmetric rapid alternating movements b/l.  Gait: Normal, narrow-based gait.  No difficulty with tiptoe, heel, and tandem gaits.        LABORATORY VALUES:                          9.3    12.11 )-----------( 337      ( 31 Jul 2023 07:01 )             30.5       07-31    150<H>  |  117<H>  |  52<H>  ----------------------------<  73  3.8   |  18<L>  |  2.96<H>    Ca    9.2      31 Jul 2023 07:01  Phos  7.8     07-31  Mg     3.1     07-31    TPro  7.4  /  Alb  2.6<L>  /  TBili  0.4  /  DBili  x   /  AST  15  /  ALT  10  /  AlkPhos  90  07-31 07-28 Chol 152 LDL -- HDL 51 Trig 93    Glucose Trend  07-31-23 @ 11:58   -  -- -- 76  07-31-23 @ 07:01   -  73 -- --  07-31-23 @ 06:14   -  -- -- 79  07-31-23 @ 00:31   -  -- -- 85  07-30-23 @ 20:57   -  73 -- --  07-30-23 @ 16:52   -  -- -- 90 07-30-23 @ 14:40   -  80 -- --  07-30-23 @ 11:26   -  -- -- 75  07-30-23 @ 09:57   -  79 -- --  07-30-23 @ 01:55   -  -- -- 72                    NEUROIMAGING:          Please contact the Neurology consult service with any neurological questions.      Eligio Whitney MD   of Neurology  [facility] at [facility]         NEUROLOGY FOLLOW-UP NOTE    NAME:  CARYN LEIVA      ASSESSMENT:  77 RHF with acute encephalopathy in setting of chronic ischemic stroke, concerning for toxic/metabolic encephalopathy vs. recrudescence of chronic stroke symptoms in the setting of urinary tract infection and acute renal failure, also with bilateral upper extremity rigidity and left hand twitching concerning for parkinsonism, without neurodiagnostic evidence of subclinical post-stroke seizures or acute ischemic stroke       RECOMMENDATIONS:    1. Seizure workup and management  - Continue Valproate 500mg IV Q8H (or Depakene liquid 500mg PO Q8H as per home regimen when patient can tolerate PO meds)  - Routine EEG shows evidence of seizure tendency without subclinical seizures - No role to adjust Valproate/Depakene dosage based on this finding    2. Stroke/Encephalopathy workup  - Telemetry monitoring while inpatient  - Continue infectious and metabolic workup and continue to treat abnormalities identified    3. Secondary stroke prevention  - Q4H Neurochecks & Vital signs  - Swallow evaluation before administering any PO meds  - Aspirin 81mg PO Daily (or Aspirin 300mg [place] daily if NPO)  - Atorvastatin 40mg PO QHS or Simvastatin 20mg (up from home 10mg dose) PO QHS when patient can tolerate PO meds (goal LDL < 70 mg/dL)  - Treat BP if over 140/90 (goal /80) - No role for permissive hypertension at this time  - PT/OT  - DVT ppx: SCDs, Enoxaparin          NOTE TO BE COMPLETED - PLEASE REFER TO ABOVE ONLY AND IGNORE INFORMATION BELOW    ******************************    HPI:  Patient is a 77 female from [facility] PMHx HTN, HLD, CVA (w/ residual aphasia and R sided hemiparesis/plegia) who was sent to the hospital due to altered mental status. Patient is not able to present any history. Patient is lying down in bed, awake and alert. However, patient does not speak, is not able to follow commands and does not turn face or move eyes when her name is called. As per nursing home papers  patient was noted to have change in mental status with eye fixed in one direction and unable to swallow food. Patient's sister Marika San ([phone]) was called to inquire about baseline mental status. Patient can respond with one word answers, swallows food, however does not move arms/legs on baseline. Ed note also reports patient is exhibiting b/l upper arm weakness but resident is unable to confirm that during evaluation. Unable to obtain any ROS from the patient at this time.     Of note: Patient's sister noted that patient sister was recently started on medication for muscle spasms at nursing home.  (27 Jul 2023 19:19)      NEURO HPI:      INTERVAL HISTORY:      MEDICATIONS:  aspirin Suppository 300 milliGRAM(s) Rectal daily  cloNIDine Patch 0.1 mG/24Hr(s) 1 patch Transdermal <User Schedule>  dextrose 5%. 1000 milliLiter(s) IV Continuous <Continuous>  enoxaparin Injectable 30 milliGRAM(s) SubCutaneous every 24 hours  hydrALAZINE Injectable 10 milliGRAM(s) IV Push every 6 hours  valproate sodium  IVPB 500 milliGRAM(s) IV Intermittent every 8 hours      ALLERGIES:  Natural rubber (Other)  latex (Other)  penicillins (Unknown)      REVIEW OF SYSTEMS:  Fourteen systems reviewed and negative except as in HPI / Interval History.          OBJECTIVE:  Vital Signs Last 24 Hrs  T(C): 36.5 (31 Jul 2023 21:16), Max: 36.7 (31 Jul 2023 14:30)  T(F): 97.7 (31 Jul 2023 21:16), Max: 98.1 (31 Jul 2023 14:30)  HR: 84 (31 Jul 2023 21:16) (73 - 90)  BP: 167/79 (31 Jul 2023 21:16) (160/76 - 197/73)  BP(mean): --  RR: 18 (31 Jul 2023 21:16) (18 - 19)  SpO2: 99% (31 Jul 2023 21:16) (98% - 100%)    Parameters below as of 31 Jul 2023 21:16  Patient On (Oxygen Delivery Method): room air        General Examination:  General: No acute distress  HEENT: Atraumatic, Normocephalic  Respiratory: CTA B/l.  No crackles, rhonchi, or wheezes.  Cardiovascular: RRR.  Normal S1 & S2.  Normal b/l radial and pedal pulses.    Neurological Examination:  General / Mental Status: AAO x 3.  No aphasia or dysarthria.  Naming and repetition intact.  Cranial Nerves: VFF x 4.  PERRL.  EOMI x 2, No nystagmus or diplopia.  B/l V1-V3 equal and intact to light touch and pinprick.  Symmetric facial movement and palate elevation.  B/l hearing equal to finger rub.  5/5 strength with b/l sternocleidomastoid and trapezius.  Midline tongue protrusion, with no atrophy or fasciculations.  Motor: Normal bulk & tone in all four extremities.  5/5 strength throughout all four extremities.  No downward drift, rigidity, spasticity, or tremors in any of the four extremities.  Sensory: Intact to light touch and pinprick in all four extremities.  Negative Romberg.  Reflex: 2+ and symmetric at b/l biceps, triceps, brachioradialis, patellae, and ankles.  Downgoing toes b/l.  Coordination: No dysmetria with b/l finger-to-nose and heel raise tests.  Symmetric rapid alternating movements b/l.  Gait: Normal, narrow-based gait.  No difficulty with tiptoe, heel, and tandem gaits.        LABORATORY VALUES:                          9.3    12.11 )-----------( 337      ( 31 Jul 2023 07:01 )             30.5       07-31    150<H>  |  117<H>  |  52<H>  ----------------------------<  73  3.8   |  18<L>  |  2.96<H>    Ca    9.2      31 Jul 2023 07:01  Phos  7.8     07-31  Mg     3.1     07-31    TPro  7.4  /  Alb  2.6<L>  /  TBili  0.4  /  DBili  x   /  AST  15  /  ALT  10  /  AlkPhos  90  07-31 07-28 Chol 152 LDL -- HDL 51 Trig 93    Glucose Trend  07-31-23 @ 11:58   -  -- -- 76  07-31-23 @ 07:01   -  73 -- --  07-31-23 @ 06:14   -  -- -- 79  07-31-23 @ 00:31   -  -- -- 85  07-30-23 @ 20:57   -  73 -- --  07-30-23 @ 16:52   -  -- -- 90 07-30-23 @ 14:40   -  80 -- --  07-30-23 @ 11:26   -  -- -- 75  07-30-23 @ 09:57   -  79 -- --  07-30-23 @ 01:55   -  -- -- 72                    NEUROIMAGING:          Please contact the Neurology consult service with any neurological questions.      Eligio Whitney MD   of Neurology  [facility] at [facility]         NEUROLOGY FOLLOW-UP NOTE    NAME:  CARYN LEIVA      ASSESSMENT:  77 RHF with acute encephalopathy in setting of chronic ischemic stroke, concerning for toxic/metabolic encephalopathy vs. recrudescence of chronic stroke symptoms in the setting of urinary tract infection and acute renal failure, also with bilateral upper extremity rigidity and left hand twitching concerning for parkinsonism, without neurodiagnostic evidence of subclinical post-stroke seizures or acute ischemic stroke       RECOMMENDATIONS:    1. Seizure workup and management  - Continue Valproate 500mg IV Q8H (or Depakene liquid 500mg PO Q8H as per home regimen when patient can tolerate PO meds)  - Routine EEG shows evidence of seizure tendency without subclinical seizures - No role to adjust Valproate/Depakene dosage based on this finding    2. Stroke/Encephalopathy workup  - Telemetry monitoring while inpatient  - Continue infectious and metabolic workup and continue to treat abnormalities identified    3. Secondary stroke prevention  - Q4H Neurochecks & Vital signs  - Swallow evaluation before administering any PO meds  - Aspirin 81mg PO Daily (or Aspirin 300mg [place] daily if NPO)  - Atorvastatin 40mg PO QHS or Simvastatin 20mg (up from home 10mg dose) PO QHS when patient can tolerate PO meds (goal LDL < 70 mg/dL)  - Treat BP if over 140/90 (goal /80) - No role for permissive hypertension at this time  - PT/OT  - DVT ppx: SCDs, Enoxaparin          ******************************    HPI:  Patient is a 77 female from [facility] PMHx HTN, HLD, CVA (w/ residual aphasia and R sided hemiparesis/plegia) who was sent to the hospital due to altered mental status. Patient is not able to present any history. Patient is lying down in bed, awake and alert. However, patient does not speak, is not able to follow commands and does not turn face or move eyes when her name is called. As per nursing home papers patient was noted to have change in mental status with eye fixed in one direction and unable to swallow food. Patient's sister Marika San ([phone]) was called to inquire about baseline mental status. Patient can respond with one word answers, swallows food, however does not move arms/legs on baseline. ED note also reports patient is exhibiting b/l upper arm weakness but resident is unable to confirm that during evaluation. Unable to obtain any ROS from the patient at this time.   Of note: Patient's sister noted that patient sister was recently started on medication for muscle spasms at nursing home.  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      INTERVAL HISTORY:  The patient is more responsive to verbal commands since admission, and has not had any witnessed episodes concerning for seizure overnight.      MEDICATIONS:  aspirin Suppository 300 milliGRAM(s) Rectal daily  cloNIDine Patch 0.1 mG/24Hr(s) 1 patch Transdermal <User Schedule>  dextrose 5%. 1000 milliLiter(s) IV Continuous <Continuous>  enoxaparin Injectable 30 milliGRAM(s) SubCutaneous every 24 hours  hydrALAZINE Injectable 10 milliGRAM(s) IV Push every 6 hours  valproate sodium  IVPB 500 milliGRAM(s) IV Intermittent every 8 hours      ALLERGIES:  Natural rubber (Other)  latex (Other)  penicillins (Unknown)      REVIEW OF SYSTEMS:  Fourteen systems reviewed and negative or unobtainable except as in HPI / Interval History.          OBJECTIVE:  Vital Signs Last 24 Hrs  T(C): 36.5 (31 Jul 2023 21:16), Max: 36.7 (31 Jul 2023 14:30)  T(F): 97.7 (31 Jul 2023 21:16), Max: 98.1 (31 Jul 2023 14:30)  HR: 84 (31 Jul 2023 21:16) (73 - 90)  BP: 167/79 (31 Jul 2023 21:16) (160/76 - 197/73)  RR: 18 (31 Jul 2023 21:16) (18 - 19)  SpO2: 99% (31 Jul 2023 21:16) (98% - 100%)  Parameters below as of 31 Jul 2023 21:16  Patient On (Oxygen Delivery Method): room air      General Exam:  General: No apparent acute distress  Respiratory: Rapid rate, Diminished breath sounds b/l  Cardiovascular: RRR, No murmurs, Full b/l radial and pedal pulses    Neurological Exam:  General / Mental Status: Nonverbal, Does not follow most commands.  Neurological exam is limited.  Cranial Nerves: PERRL, Blink to threat sluggish b/l, Does not track finger movements with eyes b/l.  Grimaces to pinprick at b/l V1-V3.  No response to snap at b/l ears.  Right-sided facial droop present.  Unable to assess palate elevation or tongue protrusion due to patient not following these commands.  No resistance to passive motion of neck b/l; no nuchal rigidity.  Motor: Diminished bulk and tone in all four extremities.  All four extremities drop to bed after passive elevation.  No spontaneous movement, rigidity, or spasticity in any of the four extremities.  Sensation: Grimaces to nailbed pressure in left arm and left leg; No response to nailbed pressure in right arm and right leg.  Reflexes: 1+ and symmetric at b/l biceps, triceps, brachioradialis, patellae, and ankles.  Toes mute b/l.  Unable to assess coordination, Romberg sign, or gait in minimally responsive patient.      NIHSS Score:    LOC - 1  LOC Questions - 2  LOC Commands - 1  Gaze Preference - 0  Visual Fields - 0  Facial Palsy - 2  Motor Arm Left - 3  Motor Arm Right - 4  Motor Leg Left - 3  Motor Leg Right - 4  Limb Ataxia - UN  Sensory - 2  Language - 3  Speech - UN  Extinction - 2    NIHSS Score Total: 27 - No IV TNK because of uncertain last seen normal time    Modified Kiron Scale: 2          LABORATORY VALUES:                          9.3    12.11 )-----------( 337      ( 31 Jul 2023 07:01 )             30.5       07-31    150<H>  |  117<H>  |  52<H>  ----------------------------<  73  3.8   |  18<L>  |  2.96<H>    Ca    9.2      31 Jul 2023 07:01  Phos  7.8     07-31  Mg     3.1     07-31    TPro  7.4  /  Alb  2.6<L>  /  TBili  0.4  /  DBili  x   /  AST  15  /  ALT  10  /  AlkPhos  90  07-31 07-28 Chol 152 LDL 82 HDL 51 Trig 93    Glucose Trend  07-31-23 @ 11:58   -  -- -- 76  07-31-23 @ 07:01 - 73 -- --  07-31-23 @ 06:14   -  -- -- 79  07-31-23 @ 00:31   -  -- -- 85  07-30-23 @ 20:57   -  73 -- --  07-30-23 @ 16:52   -  -- -- 90  07-30-23 @ 14:40   -  80 -- --  07-30-23 @ 11:26   -  -- -- 75  07-30-23 @ 09:57   -  79 -- --  07-30-23 @ 01:55   -  -- -- 72    A1C with Estimated Average Glucose (07.28.23 @ 05:03)   A1C with Estimated Average Glucose Result: 5.6%  Estimated Average Glucose: 114 mg/dL          NEUROIMAGING:      CT Head & CTA Head/Neck & CT Perfusion Head (7/27/23):  - No acute intracranial abnormality  - Chronic left MCA territory infarct with corresponding chronic hypoperfusion  - No new areas of hypoperfusion  - No intracranial or neck vessel abnormality      MRI Brain (7/28/23):  - No acute intracranial abnormality  - Chronic left hemispheric infarct, involving left basal ganglia and left thalamus  - Chronic microvascular changes  - Diffuse atrophy      Echo (7/28/23):  - LVEF > 55%  - Grade I (mild) diastolic dysfunction  - Trace mitral, tricuspid, pulmonic regurgitation  - Mild pulmonary hypertension  - No cardiac thrombus or intracardiac shunt      Routine EEG (7/31/23):  - Left posterior temporal sharp waves without electrographic seizures  - Left hemispheric focal slowing          Please contact the Neurology consult service with any neurological questions.      Eligio Whitney MD   of Neurology  [facility] at [facility]         NEUROLOGY FOLLOW-UP NOTE    NAME:  CARYN LEIVA      ASSESSMENT:  77 RHF with acute encephalopathy in setting of chronic ischemic stroke, concerning for toxic/metabolic encephalopathy vs. recrudescence of chronic stroke symptoms in the setting of urinary tract infection and acute renal failure, also with bilateral upper extremity rigidity and left hand twitching concerning for parkinsonism, without neurodiagnostic evidence of subclinical post-stroke seizures or acute ischemic stroke       RECOMMENDATIONS:    1. Seizure workup and management  - Continue Valproate 500mg IV Q8H (or Depakene liquid 500mg PO Q8H as per home regimen when patient can tolerate PO meds)  - Routine EEG shows evidence of seizure tendency without subclinical seizures - No role to adjust Valproate/Depakene dosage based on this finding    2. Stroke/Encephalopathy workup  - Telemetry monitoring while inpatient  - Continue infectious and metabolic workup and continue to treat abnormalities identified    3. Secondary stroke prevention  - Q4H Neurochecks & Vital signs  - Swallow evaluation before administering any PO meds  - Aspirin 81mg PO Daily (or Aspirin 300mg NV daily if NPO)  - Atorvastatin 40mg PO QHS or Simvastatin 20mg (up from home 10mg dose) PO QHS when patient can tolerate PO meds (goal LDL < 70 mg/dL)  - Treat BP if over 140/90 (goal /80) - No role for permissive hypertension at this time  - PT/OT  - DVT ppx: SCDs, Enoxaparin          ******************************    HPI:  Patient is a 77 female from [facility] PMHx HTN, HLD, CVA (w/ residual aphasia and R sided hemiparesis/plegia) who was sent to the hospital due to altered mental status. Patient is not able to present any history. Patient is lying down in bed, awake and alert. However, patient does not speak, is not able to follow commands and does not turn face or move eyes when her name is called. As per nursing home papers patient was noted to have change in mental status with eye fixed in one direction and unable to swallow food. Patient's sister Marika San ([phone]) was called to inquire about baseline mental status. Patient can respond with one word answers, swallows food, however does not move arms/legs on baseline. ED note also reports patient is exhibiting b/l upper arm weakness but resident is unable to confirm that during evaluation. Unable to obtain any ROS from the patient at this time.   Of note: Patient's sister noted that patient sister was recently started on medication for muscle spasms at nursing home.  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      INTERVAL HISTORY:  The patient is more responsive to verbal commands since admission, and has not had any witnessed episodes concerning for seizure overnight.      MEDICATIONS:  aspirin Suppository 300 milliGRAM(s) Rectal daily  cloNIDine Patch 0.1 mG/24Hr(s) 1 patch Transdermal <User Schedule>  dextrose 5%. 1000 milliLiter(s) IV Continuous <Continuous>  enoxaparin Injectable 30 milliGRAM(s) SubCutaneous every 24 hours  hydrALAZINE Injectable 10 milliGRAM(s) IV Push every 6 hours  valproate sodium  IVPB 500 milliGRAM(s) IV Intermittent every 8 hours      ALLERGIES:  Natural rubber (Other)  latex (Other)  penicillins (Unknown)      REVIEW OF SYSTEMS:  Fourteen systems reviewed and negative or unobtainable except as in HPI / Interval History.          OBJECTIVE:  Vital Signs Last 24 Hrs  T(C): 36.5 (31 Jul 2023 21:16), Max: 36.7 (31 Jul 2023 14:30)  T(F): 97.7 (31 Jul 2023 21:16), Max: 98.1 (31 Jul 2023 14:30)  HR: 84 (31 Jul 2023 21:16) (73 - 90)  BP: 167/79 (31 Jul 2023 21:16) (160/76 - 197/73)  RR: 18 (31 Jul 2023 21:16) (18 - 19)  SpO2: 99% (31 Jul 2023 21:16) (98% - 100%)  Parameters below as of 31 Jul 2023 21:16  Patient On (Oxygen Delivery Method): room air      General Exam:  General: No apparent acute distress  Respiratory: Rapid rate, Diminished breath sounds b/l  Cardiovascular: RRR, No murmurs, Full b/l radial and pedal pulses    Neurological Exam:  General / Mental Status: Nonverbal, Does not follow most commands.  Neurological exam is limited.  Cranial Nerves: PERRL, Blink to threat sluggish b/l, Does not track finger movements with eyes b/l.  Grimaces to pinprick at b/l V1-V3.  No response to snap at b/l ears.  Right-sided facial droop present.  Unable to assess palate elevation or tongue protrusion due to patient not following these commands.  No resistance to passive motion of neck b/l; no nuchal rigidity.  Motor: Diminished bulk and tone in all four extremities.  All four extremities drop to bed after passive elevation.  No spontaneous movement, rigidity, or spasticity in any of the four extremities.  Sensation: Grimaces to nailbed pressure in left arm and left leg; No response to nailbed pressure in right arm and right leg.  Reflexes: 1+ and symmetric at b/l biceps, triceps, brachioradialis, patellae, and ankles.  Toes mute b/l.  Unable to assess coordination, Romberg sign, or gait in minimally responsive patient.      NIHSS Score:    LOC - 1  LOC Questions - 2  LOC Commands - 1  Gaze Preference - 0  Visual Fields - 0  Facial Palsy - 2  Motor Arm Left - 3  Motor Arm Right - 4  Motor Leg Left - 3  Motor Leg Right - 4  Limb Ataxia - UN  Sensory - 2  Language - 3  Speech - UN  Extinction - 2    NIHSS Score Total: 27 - No IV TNK because of uncertain last seen normal time    Modified [place] Scale: 2          LABORATORY VALUES:                          9.3    12.11 )-----------( 337      ( 31 Jul 2023 07:01 )             30.5       07-31    150<H>  |  117<H>  |  52<H>  ----------------------------<  73  3.8   |  18<L>  |  2.96<H>    Ca    9.2      31 Jul 2023 07:01  Phos  7.8     07-31  Mg     3.1     07-31    TPro  7.4  /  Alb  2.6<L>  /  TBili  0.4  /  DBili  x   /  AST  15  /  ALT  10  /  AlkPhos  90  07-31 07-28 Chol 152 LDL 82 HDL 51 Trig 93    Glucose Trend  07-31-23 @ 11:58   -  -- -- 76  07-31-23 @ 07:01 - 73 -- --  07-31-23 @ 06:14   -  -- -- 79  07-31-23 @ 00:31   -  -- -- 85  07-30-23 @ 20:57   -  73 -- --  07-30-23 @ 16:52   -  -- -- 90  07-30-23 @ 14:40   -  80 -- --  07-30-23 @ 11:26   -  -- -- 75  07-30-23 @ 09:57   -  79 -- --  07-30-23 @ 01:55   -  -- -- 72    A1C with Estimated Average Glucose (07.28.23 @ 05:03)   A1C with Estimated Average Glucose Result: 5.6%  Estimated Average Glucose: 114 mg/dL          NEUROIMAGING:      CT Head & CTA Head/Neck & CT Perfusion Head (7/27/23):  - No acute intracranial abnormality  - Chronic left MCA territory infarct with corresponding chronic hypoperfusion  - No new areas of hypoperfusion  - No intracranial or neck vessel abnormality      MRI Brain (7/28/23):  - No acute intracranial abnormality  - Chronic left hemispheric infarct, involving left basal ganglia and left thalamus  - Chronic microvascular changes  - Diffuse atrophy      Echo (7/28/23):  - LVEF > 55%  - Grade I (mild) diastolic dysfunction  - Trace mitral, tricuspid, pulmonic regurgitation  - Mild pulmonary hypertension  - No cardiac thrombus or intracardiac shunt      Routine EEG (7/31/23):  - Left posterior temporal sharp waves without electrographic seizures  - Left hemispheric focal slowing          Please contact the Neurology consult service with any neurological questions.      Eligio Whitney MD   of Neurology  [facility] at [facility]         NEUROLOGY FOLLOW-UP NOTE    NAME:  CARYN LEIVA      ASSESSMENT:  77 RHF with acute encephalopathy in setting of chronic ischemic stroke, concerning for toxic/metabolic encephalopathy vs. recrudescence of chronic stroke symptoms in the setting of urinary tract infection and acute renal failure, also with bilateral upper extremity rigidity and left hand twitching concerning for parkinsonism, without neurodiagnostic evidence of subclinical post-stroke seizures or acute ischemic stroke       RECOMMENDATIONS:    1. Seizure workup and management  - Continue Valproate 500mg IV Q8H (or Depakene liquid 500mg PO Q8H as per home regimen when patient can tolerate PO meds)  - Routine EEG shows evidence of seizure tendency without subclinical seizures - No role to adjust Valproate/Depakene dosage based on this finding    2. Stroke/Encephalopathy workup  - Telemetry monitoring while inpatient  - Continue infectious and metabolic workup and continue to treat abnormalities identified    3. Secondary stroke prevention  - Q4H Neurochecks & Vital signs  - Swallow evaluation before administering any PO meds  - Aspirin 81mg PO Daily (or Aspirin 300mg ME daily if NPO)  - Atorvastatin 40mg PO QHS or Simvastatin 20mg (up from home 10mg dose) PO QHS when patient can tolerate PO meds (goal LDL < 70 mg/dL)  - Treat BP if over 140/90 (goal /80) - No role for permissive hypertension at this time  - PT/OT  - DVT ppx: SCDs, Enoxaparin          ******************************    HPI:  Patient is a 77 female from [facility] PMHx HTN, HLD, CVA (w/ residual aphasia and R sided hemiparesis/plegia) who was sent to the hospital due to altered mental status. Patient is not able to present any history. Patient is lying down in bed, awake and alert. However, patient does not speak, is not able to follow commands and does not turn face or move eyes when her name is called. As per nursing home papers patient was noted to have change in mental status with eye fixed in one direction and unable to swallow food. Patient's sister Marika San ([phone]) was called to inquire about baseline mental status. Patient can respond with one word answers, swallows food, however does not move arms/legs on baseline. ED note also reports patient is exhibiting b/l upper arm weakness but resident is unable to confirm that during evaluation. Unable to obtain any ROS from the patient at this time.   Of note: Patient's sister noted that patient sister was recently started on medication for muscle spasms at nursing home.  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      INTERVAL HISTORY:  The patient is more responsive to verbal commands since admission, and has not had any witnessed episodes concerning for seizure overnight.      MEDICATIONS:  aspirin Suppository 300 milliGRAM(s) Rectal daily  cloNIDine Patch 0.1 mG/24Hr(s) 1 patch Transdermal <User Schedule>  dextrose 5%. 1000 milliLiter(s) IV Continuous <Continuous>  enoxaparin Injectable 30 milliGRAM(s) SubCutaneous every 24 hours  hydrALAZINE Injectable 10 milliGRAM(s) IV Push every 6 hours  valproate sodium  IVPB 500 milliGRAM(s) IV Intermittent every 8 hours      ALLERGIES:  Natural rubber (Other)  latex (Other)  penicillins (Unknown)      REVIEW OF SYSTEMS:  Fourteen systems reviewed and negative or unobtainable except as in HPI / Interval History.          OBJECTIVE:  Vital Signs Last 24 Hrs  T(C): 36.5 (31 Jul 2023 21:16), Max: 36.7 (31 Jul 2023 14:30)  T(F): 97.7 (31 Jul 2023 21:16), Max: 98.1 (31 Jul 2023 14:30)  HR: 84 (31 Jul 2023 21:16) (73 - 90)  BP: 167/79 (31 Jul 2023 21:16) (160/76 - 197/73)  RR: 18 (31 Jul 2023 21:16) (18 - 19)  SpO2: 99% (31 Jul 2023 21:16) (98% - 100%)  Parameters below as of 31 Jul 2023 21:16  Patient On (Oxygen Delivery Method): room air      General Exam:  General: No apparent acute distress  Respiratory: Rapid rate, Diminished breath sounds b/l  Cardiovascular: RRR, No murmurs, Full b/l radial and pedal pulses    Neurological Exam:  General / Mental Status: Nonverbal, Does not follow most commands.  Neurological exam is limited.  Cranial Nerves: PERRL, Blink to threat sluggish b/l, Does not track finger movements with eyes b/l.  Grimaces to pinprick at b/l V1-V3.  No response to snap at b/l ears.  Right-sided facial droop present.  Unable to assess palate elevation or tongue protrusion due to patient not following these commands.  No resistance to passive motion of neck b/l; no nuchal rigidity.  Motor: Diminished bulk and tone in all four extremities.  All four extremities drop to bed after passive elevation.  No spontaneous movement, rigidity, or spasticity in any of the four extremities.  Sensation: Grimaces to nailbed pressure in left arm and left leg; No response to nailbed pressure in right arm and right leg.  Reflexes: 1+ and symmetric at b/l biceps, triceps, brachioradialis, patellae, and ankles.  Toes mute b/l.  Unable to assess coordination, Romberg sign, or gait in minimally responsive patient.      NIHSS Score:    LOC - 1  LOC Questions - 2  LOC Commands - 1  Gaze Preference - 0  Visual Fields - 0  Facial Palsy - 2  Motor Arm Left - 3  Motor Arm Right - 4  Motor Leg Left - 3  Motor Leg Right - 4  Limb Ataxia - UN  Sensory - 2  Language - 3  Speech - UN  Extinction - 2    NIHSS Score Total: 27 - No IV TNK because of uncertain last seen normal time    Modified [place] Scale: 2          LABORATORY VALUES:                          9.3    12.11 )-----------( 337      ( 31 Jul 2023 07:01 )             30.5       07-31    150<H>  |  117<H>  |  52<H>  ----------------------------<  73  3.8   |  18<L>  |  2.96<H>    Ca    9.2      31 Jul 2023 07:01  Phos  7.8     07-31  Mg     3.1     07-31    TPro  7.4  /  Alb  2.6<L>  /  TBili  0.4  /  DBili  x   /  AST  15  /  ALT  10  /  AlkPhos  90  07-31 07-28 Chol 152 LDL 82 HDL 51 Trig 93    Glucose Trend  07-31-23 @ 11:58   -  -- -- 76  07-31-23 @ 07:01 - 73 -- --  07-31-23 @ 06:14   -  -- -- 79  07-31-23 @ 00:31   -  -- -- 85  07-30-23 @ 20:57   -  73 -- --  07-30-23 @ 16:52   -  -- -- 90  07-30-23 @ 14:40   -  80 -- --  07-30-23 @ 11:26   -  -- -- 75  07-30-23 @ 09:57   -  79 -- --  07-30-23 @ 01:55   -  -- -- 72    A1C with Estimated Average Glucose (07.28.23 @ 05:03)   A1C with Estimated Average Glucose Result: 5.6%  Estimated Average Glucose: 114 mg/dL          NEUROIMAGING:      CT Head & CTA Head/Neck & CT Perfusion Head (7/27/23):  - No acute intracranial abnormality  - Chronic left MCA territory infarct with corresponding chronic hypoperfusion  - No new areas of hypoperfusion  - No intracranial or neck vessel abnormality      MRI Brain (7/28/23):  - No acute intracranial abnormality  - Chronic left hemispheric infarct, involving left basal ganglia and left thalamus  - Chronic microvascular changes  - Diffuse atrophy      Echo (7/28/23):  - LVEF > 55%  - Grade I (mild) diastolic dysfunction  - Trace mitral, tricuspid, pulmonic regurgitation  - Mild pulmonary hypertension  - No cardiac thrombus or intracardiac shunt      Routine EEG (7/31/23):  - Left posterior temporal sharp waves without electrographic seizures  - Left hemispheric focal slowing          Please contact the Neurology consult service with any neurological questions.      Eligio Whitney MD   of Neurology  [facility] at [facility]

## 2023-07-31 NOTE — PROGRESS NOTE ADULT - PROBLEM SELECTOR PLAN 6
EKG - NSR with TWI in V3-V5. (new compared to last EKG). Repeat EKg no change   Trop: 14.4-> 44.5->24  IF TWI reversed than concern for NSTEMI. If persistent TWI then likely NOT ischemic.

## 2023-07-31 NOTE — PROGRESS NOTE ADULT - PROBLEM SELECTOR PLAN 1
P/w altered mental status  HEAD CT: Chronic left MCA territory infarction.  CT PERFUSION demonstrated: Perfusion abnormality corresponding to the chronically infarcted left MCA territory. INFARCT CORE: 57 mL. TISSUE AT RISK: 236 mL.  CTA COW and  CTA NECK: neg   Failed dysphagia screen.   NPO diet->Aspirin suppository.  SnS-> failed dysphagia and Start statin when passes SnS.   echo-> mild MR, and grade 1 diastolic dysfunction.   A1C-> 5.6  normal lipid profile.   Neurology consulted - Dr. Whitney-> ordered EEG, valporic acid levels for AM, no signs of new stroke.   Cardiology consulted - Dr. Dunn-> put on clonidine patch .1mg weekly

## 2023-07-31 NOTE — SWALLOW BEDSIDE ASSESSMENT ADULT - SLP PERTINENT HISTORY OF CURRENT PROBLEM
77F from [facility] PMHx HTN, HLD, CVA (w/ residual aphasia and R sided hemiparesis/ plegia) sent from facility for altered mental status; nonverbal. As per nursing home papers  patient was noted to have change in mental status with eye fixed in one direction and unable to swallow food. Unable to obtain any ROS from the patient at this time. As per chart review, Pt's sister noted that patient was recently started on medication for muscle spasms at nursing home. 77F from [facility] PMHx HTN, HLD, CVA (w/ residual aphasia and R sided hemiparesis/ plegia) sent from facility for altered mental status; nonverbal. As per nursing home papers  patient was noted to have change in mental status with eye fixed in one direction and unable to swallow food. Unable to obtain any ROS from the patient at this time. As per chart review, Pt's sister noted that patient was recently started on medication for muscle spasms at nursing home. 77F from [facility] PMHx HTN, HLD, CVA (w/ residual aphasia and R sided hemiparesis/ plegia) sent from facility for altered mental status; nonverbal. As per nursing home papers  patient was noted to have change in mental status with eye fixed in one direction and unable to swallow food. Unable to obtain any ROS from the patient at this time. As per chart review, Pt's sister noted that patient was recently started on medication for muscle spasms at nursing home.

## 2023-07-31 NOTE — EEG REPORT - NS EEG TEXT BOX
CARYN LEIVA [id number]     Study Date:  07-31-23  Duration x Hours: 21 minutes  --------------------------------------------------------------------------------------------------  History:  CC/ HPI Patient is a 77y old  Female who presents with a chief complaint of Altered mental status. (31 Jul 2023 13:14)    MEDICATIONS  (STANDING):  aspirin Suppository 300 milliGRAM(s) Rectal daily  cloNIDine Patch 0.1 mG/24Hr(s) 1 patch Transdermal <User Schedule>  dextrose 5%. 1000 milliLiter(s) (70 mL/Hr) IV Continuous <Continuous>  enoxaparin Injectable 30 milliGRAM(s) SubCutaneous every 24 hours  hydrALAZINE Injectable 10 milliGRAM(s) IV Push every 6 hours  valproate sodium  IVPB 500 milliGRAM(s) IV Intermittent every 8 hours    --------------------------------------------------------------------------------------------------  Study Interpretation:    [[[Abbreviation Key:  PDR=alpha rhythm/posterior dominant rhythm. A-P=anterior posterior.  Amplitude: ‘very low’:<20; ‘low’:20-49; ‘medium’:; ‘high’:>150uV.  Persistence for periodic/rhythmic patterns (% of epoch) ‘rare’:<1%; ‘occasional’:1-10%; ‘frequent’:10-50%; ‘abundant’:50-90%; ‘continuous’:>90%.  Persistence for sporadic discharges: ‘rare’:<1/hr; ‘occasional’:1/min-1/hr; ‘frequent’:>1/min; ‘abundant’:>1/10 sec.  RPP=rhythmic and periodic patterns; GRDA=generalized rhythmic delta activity; FIRDA=frontal intermittent GRDA; LRDA=lateralized rhythmic delta activity; TIRDA=temporal intermittent rhythmic delta activity;  LPD=PLED=lateralized periodic discharges; GPD=generalized periodic discharges; BIPDs =bilateral independent periodic discharges; Mf=multifocal; SIRPDs=stimulus induced rhythmic, periodic, or ictal appearing discharges; BIRDs=brief potentially ictal rhythmic discharges >4 Hz, lasting .5-10s; PFA (paroxysmal bursts >13 Hz or =8 Hz <10s).  Modifiers: +F=with fast component; +S=with spike component; +R=with rhythmic component.  S-B=burst suppression pattern.  Max=maximal. N1-drowsy; N2-stage II sleep; N3-slow wave sleep. SSS/BETS=small sharp spikes/benign epileptiform transients of sleep. HV=hyperventilation; PS=photic stimulation]]]    Daily EEG Visual Analysis    FINDINGS:      Background:  Continuity: continuous  Symmetry: symmetric  PDR: 8 Hz activity, with amplitude to 40 uV, that attenuated to eye opening.  Low amplitude frontal beta noted in wakefulness.  Reactivity: present  Voltage: normal, mostly 20-150uV  Anterior Posterior Gradient: present  Other background findings: none  Breach: absent    Background Slowing:  Generalized slowing: none was present.  Focal slowing: Intermittent polymorphic left hemisphere predominantly in the temporal region.     State Changes:   -Drowsiness noted with increased slowing, attenuation of fast activity, vertex transients.  -N2 sleep transients were not recorded.    Sporadic Epileptiform Discharges:    Rare left sharp wave discharge in the left temporal region with maximum negativity at T7/P7    Rhythmic and Periodic Patterns (RPPs):  None     Electrographic and Electroclinical seizures:  None    Other Clinical Events:  None    Activation Procedures:   -Hyperventilation was not performed.    -Photic stimulation was performed and did not elicit any abnormalities.      Artifacts:  Intermittent myogenic and movement artifacts were noted.    ECG:  The heart rate on single channel ECG was predominantly between 60 to 80 BPM.    EEG Classification / Summary:  Abnormal  EEG in the awake / drowsy / asleep state(s).  - Focal slowing in the left temporal region  - Sharp wave discharge in the left temporal region (T7/P7)      Clinical Impression:  Findings indicative of increased risk of seizures from the left temporal region.   Focal cerebral dysfunction in the left temporal region suggestive of structural/functional abnormality in the region.   There were no seizures recorded.      This a fellow preliminary read, pending review/attestation by attending.     -------------------------------------------------------------------------------------------------------  [facility] EEG Reading Room Ph#: [phone]  Epilepsy Answering Service after 5PM and before 8:30AM: Ph#: [phone]    BETITO Matta  Epilepsy Fellow CARYN LEIVA [id number]     Study Date:  07-31-23  Duration x Hours: 21 minutes  --------------------------------------------------------------------------------------------------  History:  CC/ HPI Patient is a 77y old  Female who presents with a chief complaint of Altered mental status. (31 Jul 2023 13:14)    MEDICATIONS  (STANDING):  aspirin Suppository 300 milliGRAM(s) Rectal daily  cloNIDine Patch 0.1 mG/24Hr(s) 1 patch Transdermal <User Schedule>  dextrose 5%. 1000 milliLiter(s) (70 mL/Hr) IV Continuous <Continuous>  enoxaparin Injectable 30 milliGRAM(s) SubCutaneous every 24 hours  hydrALAZINE Injectable 10 milliGRAM(s) IV Push every 6 hours  valproate sodium  IVPB 500 milliGRAM(s) IV Intermittent every 8 hours    --------------------------------------------------------------------------------------------------  Study Interpretation:    [[[Abbreviation Key:  PDR=alpha rhythm/posterior dominant rhythm. A-P=anterior posterior.  Amplitude: ‘very low’:<20; ‘low’:20-49; ‘medium’:; ‘high’:>150uV.  Persistence for periodic/rhythmic patterns (% of epoch) ‘rare’:<1%; ‘occasional’:1-10%; ‘frequent’:10-50%; ‘abundant’:50-90%; ‘continuous’:>90%.  Persistence for sporadic discharges: ‘rare’:<1/hr; ‘occasional’:1/min-1/hr; ‘frequent’:>1/min; ‘abundant’:>1/10 sec.  RPP=rhythmic and periodic patterns; GRDA=generalized rhythmic delta activity; FIRDA=frontal intermittent GRDA; LRDA=lateralized rhythmic delta activity; TIRDA=temporal intermittent rhythmic delta activity;  LPD=PLED=lateralized periodic discharges; GPD=generalized periodic discharges; BIPDs =bilateral independent periodic discharges; Mf=multifocal; SIRPDs=stimulus induced rhythmic, periodic, or ictal appearing discharges; BIRDs=brief potentially ictal rhythmic discharges >4 Hz, lasting .5-10s; PFA (paroxysmal bursts >13 Hz or =8 Hz <10s).  Modifiers: +F=with fast component; +S=with spike component; +R=with rhythmic component.  S-B=burst suppression pattern.  Max=maximal. N1-drowsy; N2-stage II sleep; N3-slow wave sleep. SSS/BETS=small sharp spikes/benign epileptiform transients of sleep. HV=hyperventilation; PS=photic stimulation]]]    Daily EEG Visual Analysis    FINDINGS:      Background:  Continuity: continuous  Symmetry: symmetric  PDR: 8 Hz activity, with amplitude to 40 uV, that attenuated to eye opening.  Low amplitude frontal beta noted in wakefulness.  Reactivity: present  Voltage: normal, mostly 20-150uV  Anterior Posterior Gradient: present  Other background findings: none  Breach: absent    Background Slowing:  Generalized slowing: none was present.  Focal slowing: Intermittent polymorphic left hemisphere predominantly in the temporal region.     State Changes:   -Drowsiness noted with increased slowing, attenuation of fast activity, vertex transients.  -N2 sleep transients were not recorded.    Sporadic Epileptiform Discharges:    Rare left sharp wave discharge in the left temporal region with maximum negativity at T7/P7    Rhythmic and Periodic Patterns (RPPs):  None     Electrographic and Electroclinical seizures:  None    Other Clinical Events:  None    Activation Procedures:   -Hyperventilation was not performed.    -Photic stimulation was performed and did not elicit any abnormalities.      Artifacts:  Intermittent myogenic and movement artifacts were noted.    ECG:  The heart rate on single channel ECG was predominantly between 60 to 80 BPM.    EEG Classification / Summary:  Abnormal  EEG in the awake / drowsy / asleep state(s).  - Focal slowing in the left temporal region  - Sharp wave discharge in the left temporal region (T7/P7)      Clinical Impression:  Findings indicative of increased risk of seizures from the left temporal region.   Focal cerebral dysfunction in the left temporal region suggestive of structural/functional abnormality in the region.   There were no seizures recorded.      This a fellow preliminary read, pending review/attestation by attending.     -------------------------------------------------------------------------------------------------------  [facility] EEG Reading Room Ph#: [phone]  Epilepsy Answering Service after 5PM and before 8:30AM: Ph#: [phone]    BETITO Matta  Epilepsy Fellow CARYN LEIVA [id number]     Study Date:  07-31-23  Duration x Hours: 21 minutes  --------------------------------------------------------------------------------------------------  History:  CC/ HPI Patient is a 77y old  Female who presents with a chief complaint of Altered mental status. (31 Jul 2023 13:14)    MEDICATIONS  (STANDING):  aspirin Suppository 300 milliGRAM(s) Rectal daily  cloNIDine Patch 0.1 mG/24Hr(s) 1 patch Transdermal <User Schedule>  dextrose 5%. 1000 milliLiter(s) (70 mL/Hr) IV Continuous <Continuous>  enoxaparin Injectable 30 milliGRAM(s) SubCutaneous every 24 hours  hydrALAZINE Injectable 10 milliGRAM(s) IV Push every 6 hours  valproate sodium  IVPB 500 milliGRAM(s) IV Intermittent every 8 hours    --------------------------------------------------------------------------------------------------  Study Interpretation:    [[[Abbreviation Key:  PDR=alpha rhythm/posterior dominant rhythm. A-P=anterior posterior.  Amplitude: ‘very low’:<20; ‘low’:20-49; ‘medium’:; ‘high’:>150uV.  Persistence for periodic/rhythmic patterns (% of epoch) ‘rare’:<1%; ‘occasional’:1-10%; ‘frequent’:10-50%; ‘abundant’:50-90%; ‘continuous’:>90%.  Persistence for sporadic discharges: ‘rare’:<1/hr; ‘occasional’:1/min-1/hr; ‘frequent’:>1/min; ‘abundant’:>1/10 sec.  RPP=rhythmic and periodic patterns; GRDA=generalized rhythmic delta activity; FIRDA=frontal intermittent GRDA; LRDA=lateralized rhythmic delta activity; TIRDA=temporal intermittent rhythmic delta activity;  LPD=PLED=lateralized periodic discharges; GPD=generalized periodic discharges; BIPDs =bilateral independent periodic discharges; Mf=multifocal; SIRPDs=stimulus induced rhythmic, periodic, or ictal appearing discharges; BIRDs=brief potentially ictal rhythmic discharges >4 Hz, lasting .5-10s; PFA (paroxysmal bursts >13 Hz or =8 Hz <10s).  Modifiers: +F=with fast component; +S=with spike component; +R=with rhythmic component.  S-B=burst suppression pattern.  Max=maximal. N1-drowsy; N2-stage II sleep; N3-slow wave sleep. SSS/BETS=small sharp spikes/benign epileptiform transients of sleep. HV=hyperventilation; PS=photic stimulation]]]    Daily EEG Visual Analysis    FINDINGS:      Background:  Continuity: continuous  Symmetry: symmetric  PDR: 8 Hz activity, with amplitude to 40 uV, that attenuated to eye opening.  Low amplitude frontal beta noted in wakefulness.  Reactivity: present  Voltage: normal, mostly 20-150uV  Anterior Posterior Gradient: present  Other background findings: none  Breach: absent    Background Slowing:  Generalized slowing: none was present.  Focal slowing: Intermittent polymorphic left hemisphere predominantly in the temporal region.     State Changes:   -Drowsiness noted with increased slowing, attenuation of fast activity, vertex transients.  -N2 sleep transients were not recorded.    Sporadic Epileptiform Discharges:    Rare left sharp wave discharge in the left temporal region with maximum negativity at T7/P7    Rhythmic and Periodic Patterns (RPPs):  None     Electrographic and Electroclinical seizures:  None    Other Clinical Events:  None    Activation Procedures:   -Hyperventilation was not performed.    -Photic stimulation was performed and did not elicit any abnormalities.      Artifacts:  Intermittent myogenic and movement artifacts were noted.    ECG:  The heart rate on single channel ECG was predominantly between 60 to 80 BPM.    EEG Classification / Summary:  Abnormal  EEG in the awake / drowsy / asleep state(s).  - Focal slowing in the left temporal region  - Sharp wave discharge in the left temporal region (T7/P7)      Clinical Impression:  Findings indicative of increased risk of seizures from the left temporal region.   Focal cerebral dysfunction in the left temporal region suggestive of structural/functional abnormality in the region.   There were no seizures recorded.      This a fellow preliminary read, pending review/attestation by attending.     -------------------------------------------------------------------------------------------------------  [facility] EEG Reading Room Ph#: [phone]  Epilepsy Answering Service after 5PM and before 8:30AM: Ph#: [phone]    BETITO Matta  Epilepsy Fellow CARYN LEIVA [id number]     Study Date: 07-31-23  Duration: 25 minutes  --------------------------------------------------------------------------------------------------  History:  CC/ HPI Patient is a 77y old  Female who presents with a chief complaint of Altered mental status. (31 Jul 2023 13:14)    MEDICATIONS  (STANDING):  aspirin Suppository 300 milliGRAM(s) Rectal daily  cloNIDine Patch 0.1 mG/24Hr(s) 1 patch Transdermal <User Schedule>  dextrose 5%. 1000 milliLiter(s) (70 mL/Hr) IV Continuous <Continuous>  enoxaparin Injectable 30 milliGRAM(s) SubCutaneous every 24 hours  hydrALAZINE Injectable 10 milliGRAM(s) IV Push every 6 hours  valproate sodium  IVPB 500 milliGRAM(s) IV Intermittent every 8 hours    --------------------------------------------------------------------------------------------------  Study Interpretation:    [[[Abbreviation Key:  PDR=alpha rhythm/posterior dominant rhythm. A-P=anterior posterior.  Amplitude: ‘very low’:<20; ‘low’:20-49; ‘medium’:; ‘high’:>150uV.  Persistence for periodic/rhythmic patterns (% of epoch) ‘rare’:<1%; ‘occasional’:1-10%; ‘frequent’:10-50%; ‘abundant’:50-90%; ‘continuous’:>90%.  Persistence for sporadic discharges: ‘rare’:<1/hr; ‘occasional’:1/min-1/hr; ‘frequent’:>1/min; ‘abundant’:>1/10 sec.  RPP=rhythmic and periodic patterns; GRDA=generalized rhythmic delta activity; FIRDA=frontal intermittent GRDA; LRDA=lateralized rhythmic delta activity; TIRDA=temporal intermittent rhythmic delta activity;  LPD=PLED=lateralized periodic discharges; GPD=generalized periodic discharges; BIPDs =bilateral independent periodic discharges; Mf=multifocal; SIRPDs=stimulus induced rhythmic, periodic, or ictal appearing discharges; BIRDs=brief potentially ictal rhythmic discharges >4 Hz, lasting .5-10s; PFA (paroxysmal bursts >13 Hz or =8 Hz <10s).  Modifiers: +F=with fast component; +S=with spike component; +R=with rhythmic component.  S-B=burst suppression pattern.  Max=maximal. N1-drowsy; N2-stage II sleep; N3-slow wave sleep. SSS/BETS=small sharp spikes/benign epileptiform transients of sleep. HV=hyperventilation; PS=photic stimulation]]]    Daily EEG Visual Analysis    FINDINGS:      Background:  Continuity: continuous  Symmetry: asymmetric  PDR: 8 Hz, less well formed on the left, reactive to eye closure. Symmetric low-amplitude frontal beta in wakefulness.  State change: present  Voltage: normal  Anterior Posterior Gradient: present  Other background findings: none  Breach: absent    Background Slowing:  Generalized slowing: none  Focal slowing: Frequent left hemispheric focal polymorphic delta and theta slowing    State Changes:   -Drowsiness characterized by slowing of the background activity  -Stage 2 sleep is not captured    Sporadic Epileptiform Discharges:    Occasional left posterior temporal (T7/P7) sharp waves    Rhythmic and Periodic Patterns (RPPs):  None     Electrographic and Electroclinical seizures:  None    Other Clinical Events:  None    Activation Procedures:   -Hyperventilation was not performed.    -Photic stimulation was performed and did not elicit any abnormalities.      Artifacts:  Intermittent myogenic and movement artifacts were present.    ECG:  Single-lead ECG showed regular rhythm with occasional PACs.    EEG Classification / Summary:  Abnormal routine EEG in the awake / drowsy state(s).  -Focal slowing in the left hemisphere  -Sharp waves in the left posterior temporal region  -No electrographic seizures    Clinical Impression:  -Potentially epileptogenic focus in the left posterior temporal region  -Focal cerebral dysfunction in the left hemisphere suggestive of structural/functional abnormality in this region.           -------------------------------------------------------------------------------------------------------  [facility] EEG Reading Room Ph#: [phone]  Epilepsy Answering Service after 5PM and before 8:30AM: Ph#: [phone]    BETITO Matta  Epilepsy Fellow    Chacha Gorman MD  Attending Physician, [facility] Epilepsy [place]  CARYN LEIVA [id number]     Study Date: 07-31-23  Duration: 25 minutes  --------------------------------------------------------------------------------------------------  History:  CC/ HPI Patient is a 77y old  Female who presents with a chief complaint of Altered mental status. (31 Jul 2023 13:14)    MEDICATIONS  (STANDING):  aspirin Suppository 300 milliGRAM(s) Rectal daily  cloNIDine Patch 0.1 mG/24Hr(s) 1 patch Transdermal <User Schedule>  dextrose 5%. 1000 milliLiter(s) (70 mL/Hr) IV Continuous <Continuous>  enoxaparin Injectable 30 milliGRAM(s) SubCutaneous every 24 hours  hydrALAZINE Injectable 10 milliGRAM(s) IV Push every 6 hours  valproate sodium  IVPB 500 milliGRAM(s) IV Intermittent every 8 hours    --------------------------------------------------------------------------------------------------  Study Interpretation:    [[[Abbreviation Key:  PDR=alpha rhythm/posterior dominant rhythm. A-P=anterior posterior.  Amplitude: ‘very low’:<20; ‘low’:20-49; ‘medium’:; ‘high’:>150uV.  Persistence for periodic/rhythmic patterns (% of epoch) ‘rare’:<1%; ‘occasional’:1-10%; ‘frequent’:10-50%; ‘abundant’:50-90%; ‘continuous’:>90%.  Persistence for sporadic discharges: ‘rare’:<1/hr; ‘occasional’:1/min-1/hr; ‘frequent’:>1/min; ‘abundant’:>1/10 sec.  RPP=rhythmic and periodic patterns; GRDA=generalized rhythmic delta activity; FIRDA=frontal intermittent GRDA; LRDA=lateralized rhythmic delta activity; TIRDA=temporal intermittent rhythmic delta activity;  LPD=PLED=lateralized periodic discharges; GPD=generalized periodic discharges; BIPDs =bilateral independent periodic discharges; Mf=multifocal; SIRPDs=stimulus induced rhythmic, periodic, or ictal appearing discharges; BIRDs=brief potentially ictal rhythmic discharges >4 Hz, lasting .5-10s; PFA (paroxysmal bursts >13 Hz or =8 Hz <10s).  Modifiers: +F=with fast component; +S=with spike component; +R=with rhythmic component.  S-B=burst suppression pattern.  Max=maximal. N1-drowsy; N2-stage II sleep; N3-slow wave sleep. SSS/BETS=small sharp spikes/benign epileptiform transients of sleep. HV=hyperventilation; PS=photic stimulation]]]    Daily EEG Visual Analysis    FINDINGS:      Background:  Continuity: continuous  Symmetry: asymmetric  PDR: 8 Hz, less well formed on the left, reactive to eye closure. Symmetric low-amplitude frontal beta in wakefulness.  State change: present  Voltage: normal  Anterior Posterior Gradient: present  Other background findings: none  Breach: absent    Background Slowing:  Generalized slowing: none  Focal slowing: Frequent left hemispheric focal polymorphic delta and theta slowing    State Changes:   -Drowsiness characterized by slowing of the background activity  -Stage 2 sleep is not captured    Sporadic Epileptiform Discharges:    Occasional left posterior temporal (T7/P7) sharp waves    Rhythmic and Periodic Patterns (RPPs):  None     Electrographic and Electroclinical seizures:  None    Other Clinical Events:  None    Activation Procedures:   -Hyperventilation was not performed.    -Photic stimulation was performed and did not elicit any abnormalities.      Artifacts:  Intermittent myogenic and movement artifacts were present.    ECG:  Single-lead ECG showed regular rhythm with occasional PACs.    EEG Classification / Summary:  Abnormal routine EEG in the awake / drowsy state(s).  -Focal slowing in the left hemisphere  -Sharp waves in the left posterior temporal region  -No electrographic seizures    Clinical Impression:  -Potentially epileptogenic focus in the left posterior temporal region  -Focal cerebral dysfunction in the left hemisphere suggestive of structural/functional abnormality in this region.           -------------------------------------------------------------------------------------------------------  [facility] EEG Reading Room Ph#: [phone]  Epilepsy Answering Service after 5PM and before 8:30AM: Ph#: [phone]    BETITO Matta  Epilepsy Fellow    Chacha Gorman MD  Attending Physician, [facility] Epilepsy [place]  CARYN LEIVA [id number]     Study Date: 07-31-23  Duration: 25 minutes  --------------------------------------------------------------------------------------------------  History:  CC/ HPI Patient is a 77y old  Female who presents with a chief complaint of Altered mental status. (31 Jul 2023 13:14)    MEDICATIONS  (STANDING):  aspirin Suppository 300 milliGRAM(s) Rectal daily  cloNIDine Patch 0.1 mG/24Hr(s) 1 patch Transdermal <User Schedule>  dextrose 5%. 1000 milliLiter(s) (70 mL/Hr) IV Continuous <Continuous>  enoxaparin Injectable 30 milliGRAM(s) SubCutaneous every 24 hours  hydrALAZINE Injectable 10 milliGRAM(s) IV Push every 6 hours  valproate sodium  IVPB 500 milliGRAM(s) IV Intermittent every 8 hours    --------------------------------------------------------------------------------------------------  Study Interpretation:    [[[Abbreviation Key:  PDR=alpha rhythm/posterior dominant rhythm. A-P=anterior posterior.  Amplitude: ‘very low’:<20; ‘low’:20-49; ‘medium’:; ‘high’:>150uV.  Persistence for periodic/rhythmic patterns (% of epoch) ‘rare’:<1%; ‘occasional’:1-10%; ‘frequent’:10-50%; ‘abundant’:50-90%; ‘continuous’:>90%.  Persistence for sporadic discharges: ‘rare’:<1/hr; ‘occasional’:1/min-1/hr; ‘frequent’:>1/min; ‘abundant’:>1/10 sec.  RPP=rhythmic and periodic patterns; GRDA=generalized rhythmic delta activity; FIRDA=frontal intermittent GRDA; LRDA=lateralized rhythmic delta activity; TIRDA=temporal intermittent rhythmic delta activity;  LPD=PLED=lateralized periodic discharges; GPD=generalized periodic discharges; BIPDs =bilateral independent periodic discharges; Mf=multifocal; SIRPDs=stimulus induced rhythmic, periodic, or ictal appearing discharges; BIRDs=brief potentially ictal rhythmic discharges >4 Hz, lasting .5-10s; PFA (paroxysmal bursts >13 Hz or =8 Hz <10s).  Modifiers: +F=with fast component; +S=with spike component; +R=with rhythmic component.  S-B=burst suppression pattern.  Max=maximal. N1-drowsy; N2-stage II sleep; N3-slow wave sleep. SSS/BETS=small sharp spikes/benign epileptiform transients of sleep. HV=hyperventilation; PS=photic stimulation]]]    Daily EEG Visual Analysis    FINDINGS:      Background:  Continuity: continuous  Symmetry: asymmetric  PDR: 8 Hz, less well formed on the left, reactive to eye closure. Symmetric low-amplitude frontal beta in wakefulness.  State change: present  Voltage: normal  Anterior Posterior Gradient: present  Other background findings: none  Breach: absent    Background Slowing:  Generalized slowing: none  Focal slowing: Frequent left hemispheric focal polymorphic delta and theta slowing    State Changes:   -Drowsiness characterized by slowing of the background activity  -Stage 2 sleep is not captured    Sporadic Epileptiform Discharges:    Occasional left posterior temporal (T7/P7) sharp waves    Rhythmic and Periodic Patterns (RPPs):  None     Electrographic and Electroclinical seizures:  None    Other Clinical Events:  None    Activation Procedures:   -Hyperventilation was not performed.    -Photic stimulation was performed and did not elicit any abnormalities.      Artifacts:  Intermittent myogenic and movement artifacts were present.    ECG:  Single-lead ECG showed regular rhythm with occasional PACs.    EEG Classification / Summary:  Abnormal routine EEG in the awake / drowsy state(s).  -Focal slowing in the left hemisphere  -Sharp waves in the left posterior temporal region  -No electrographic seizures    Clinical Impression:  -Potentially epileptogenic focus in the left posterior temporal region  -Focal cerebral dysfunction in the left hemisphere suggestive of structural/functional abnormality in this region.           -------------------------------------------------------------------------------------------------------  [facility] EEG Reading Room Ph#: [phone]  Epilepsy Answering Service after 5PM and before 8:30AM: Ph#: [phone]    BETITO Matta  Epilepsy Fellow    Chacha Gorman MD  Attending Physician, [facility] Epilepsy [facility]

## 2023-07-31 NOTE — SWALLOW BEDSIDE ASSESSMENT ADULT - ORAL PREPARATORY PHASE
no attempts at bolus stripping from tsp, absent labial closure with open mouthed posture prior to and during PO trials/Reduced oral grading/Decreased mastication ability/Bolus falls into anterior sulcus

## 2023-07-31 NOTE — PROGRESS NOTE ADULT - ASSESSMENT
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  1.No new CVA on MRI.  2.Elevated wbc- on Rocephin for UTI.  3.Neurology eval.  4.HTN-clonidine patch .1mg q wk.  5.Lipid d/o-hold statin.  6.Pt unable to do speech and swallow eval. ?PEG.  7.Hypernatremia-IVF.  8.GI and DVT prophylaxis. 77 female from [facility] PMHx HTN, HLD, CVA (w/ residual aphasia and R sided hemiparesis/plegia) who was sent to the hospital due to altered mental status.  1.No new CVA on MRI.  2.Elevated wbc- on Rocephin for UTI.  3.Neurology eval.  4.HTN-clonidine patch .1mg q wk.  5.Lipid d/o-hold statin.  6.Pt unable to do speech and swallow eval. ?PEG.  7.Hypernatremia-IVF.  8.GI and DVT prophylaxis. 77 female from [facility] PMHx HTN, HLD, CVA (w/ residual aphasia and R sided hemiparesis/plegia) who was sent to the hospital due to altered mental status.  1.No new CVA on MRI.  2.Elevated wbc- on Rocephin for UTI.  3.Neurology eval.  4.HTN-clonidine patch .1mg q wk.  5.Lipid d/o-hold statin.  6.Pt unable to do speech and swallow eval. ?PEG.  7.Hypernatremia-IVF.  8.GI and DVT prophylaxis.

## 2023-07-31 NOTE — PROGRESS NOTE ADULT - TIME BILLING
I counseled the primary team about the patient's neurodiagnostic test results, as well as the medications that the patient should maintain for secondary stroke prevention and seizure prophylaxis.

## 2023-07-31 NOTE — EEG REPORT - PLACEMENT AND LABELING OF ELECTRODES:
Left message for patient to return our call. We need to complete the below covid screening questions.    Does the patient OR patient household members have any of the following:  -Confirmed positive COVID-19 via lab test in the last 14 days?       -Temperature: Fever ?100.4°F or ? 38.0°C?      -Respiratory symptoms: new or worsening cough, shortness of breath, or sore throat?     -GI symptoms: new onset nausea, vomiting or diarrhea?     -New onset of loss of taste or smell, chills, repeated shaking with chills, muscle pain or headache?         
Patient answered NO to all the screening questions and confirmed her appointment tomorrow. Close encounter.  
Statement Selected

## 2023-08-01 LAB
GLUCOSE BLDC GLUCOMTR-MCNC: 111 MG/DL — HIGH (ref 70–99)
GLUCOSE BLDC GLUCOMTR-MCNC: 113 MG/DL — HIGH (ref 70–99)
GLUCOSE BLDC GLUCOMTR-MCNC: 96 MG/DL — SIGNIFICANT CHANGE UP (ref 70–99)

## 2023-08-01 RX ADMIN — Medication 1 PATCH: at 19:58

## 2023-08-01 RX ADMIN — SODIUM CHLORIDE 70 MILLILITER(S): 9 INJECTION, SOLUTION INTRAVENOUS at 08:23

## 2023-08-01 RX ADMIN — Medication 1 PATCH: at 07:32

## 2023-08-01 RX ADMIN — Medication 10 MILLIGRAM(S): at 12:13

## 2023-08-01 RX ADMIN — Medication 10 MILLIGRAM(S): at 18:10

## 2023-08-01 RX ADMIN — Medication 300 MILLIGRAM(S): at 12:13

## 2023-08-01 RX ADMIN — Medication 10 MILLIGRAM(S): at 05:47

## 2023-08-01 RX ADMIN — Medication 1 PATCH: at 12:12

## 2023-08-01 RX ADMIN — Medication 55 MILLIGRAM(S): at 00:30

## 2023-08-01 RX ADMIN — SODIUM CHLORIDE 70 MILLILITER(S): 9 INJECTION, SOLUTION INTRAVENOUS at 05:49

## 2023-08-01 RX ADMIN — Medication 10 MILLIGRAM(S): at 00:30

## 2023-08-01 RX ADMIN — Medication 55 MILLIGRAM(S): at 18:10

## 2023-08-01 RX ADMIN — ENOXAPARIN SODIUM 30 MILLIGRAM(S): 100 INJECTION SUBCUTANEOUS at 18:11

## 2023-08-01 RX ADMIN — Medication 55 MILLIGRAM(S): at 08:23

## 2023-08-01 NOTE — PROGRESS NOTE ADULT - PROBLEM SELECTOR PLAN 5
due to water deficit and insensible losses. Pt. is clinically euvolemic. Na in the 150's .  avoid high solute intake diet and sodium bicarb infuse.  continue to give  dextrose % 50 cc

## 2023-08-01 NOTE — PROGRESS NOTE ADULT - PROBLEM SELECTOR PLAN 6
nephro following:  abnormal labs:  Sodium 150  Phosphorus 7.8  Magnesium 3.1  chloride 117   intact PTH is 235

## 2023-08-01 NOTE — PROGRESS NOTE ADULT - PROBLEM SELECTOR PLAN 4
As per Nephrology   possible r/o urinary rentention vs MARISA vs r/o ATN.   order urinalysis, urine lytes ( uosm, uriine sodium, urine creatine, chloride, potassium, protein to creating ration-> WNL  -renal ultrasound  and bladder scan  was negative  adjust meds for GFR

## 2023-08-01 NOTE — PROGRESS NOTE ADULT - PROBLEM SELECTOR PLAN 1
P/w altered mental status  HEAD CT: Chronic left MCA territory infarction.  CT PERFUSION demonstrated: Perfusion abnormality corresponding to the chronically infarcted left MCA territory. INFARCT CORE: 57 mL. TISSUE AT RISK: 236 mL.  CTA COW and  CTA NECK: neg   Failed dysphagia screen.   NPO diet->Aspirin suppository.  SnS-> failed dysphagia and Start statin when passes SnS.   echo-> mild MR, and grade 1 diastolic dysfunction.   A1C-> 5.6  normal lipid profile.   Neurology consulted - Dr. Whitney-  > ordered EEG-potential epileptogenic factors in the left posterior temporal lobe and focal cerebral dysfunction in L  hemisphere  valporic acid levels for AM, no signs of new stroke.  -trying to discuss with family about goals of care.

## 2023-08-01 NOTE — PROGRESS NOTE ADULT - SUBJECTIVE AND OBJECTIVE BOX
PGY-1 Progress Note discussed with attending    PAGER #: [[phone]] TILL 5:00 PM  PLEASE CONTACT ON CALL TEAM:  - On Call Team (Please refer to Tyler) FROM 5:00 PM - 8:30PM  - Nightfloat Team FROM 8:30 -7:30 AM    CHIEF COMPLAINT & BRIEF HOSPITAL COURSE: no acute overnight events. pt is the same as the day before.   INTERVAL HPI/OVERNIGHT EVENTS:   MEDICATIONS  (STANDING):  aspirin Suppository 300 milliGRAM(s) Rectal daily  cloNIDine Patch 0.2 mG/24Hr(s) 1 patch Transdermal <User Schedule>  dextrose 5%. 1000 milliLiter(s) (70 mL/Hr) IV Continuous <Continuous>  enoxaparin Injectable 30 milliGRAM(s) SubCutaneous every 24 hours  hydrALAZINE Injectable 10 milliGRAM(s) IV Push every 6 hours  valproate sodium  IVPB 500 milliGRAM(s) IV Intermittent every 8 hours    MEDICATIONS  (PRN):      Vital Signs Last 24 Hrs  T(C): 36.5 (01 Aug 2023 09:23), Max: 36.8 (01 Aug 2023 01:02)  T(F): 97.7 (01 Aug 2023 09:23), Max: 98.3 (01 Aug 2023 01:02)  HR: 77 (01 Aug 2023 09:23) (77 - 90)  BP: 172/80 (01 Aug 2023 09:23) (167/79 - 193/79)  BP(mean): --  RR: 18 (01 Aug 2023 09:23) (18 - 19)  SpO2: 99% (01 Aug 2023 09:23) (98% - 99%)    Parameters below as of 01 Aug 2023 09:23  Patient On (Oxygen Delivery Method): room air        PHYSICAL EXAMINATION:  GENERAL: NAD, well built  HEAD:  Atraumatic, Normocephalic  EYES:  conjunctiva and sclera clear  CHEST/LUNG: Clear to auscultation. No rales, rhonchi, wheezing, or rubs  HEART: Regular rate and rhythm; No murmurs, rubs, or gallops  ABDOMEN: Soft, Nontender, Nondistended; Bowel sounds present  NERVOUS SYSTEM:  Alert & Oriented X3,    EXTREMITIES:  2+ Peripheral Pulses, No clubbing, cyanosis, or edema  SKIN: warm dry                          9.3    12.11 )-----------( 337      ( 31 Jul 2023 07:01 )             30.5     07-31    150<H>  |  117<H>  |  52<H>  ----------------------------<  73  3.8   |  18<L>  |  2.96<H>    Ca    9.2      31 Jul 2023 07:01  Phos  7.8     07-31  Mg     3.1     07-31    TPro  7.4  /  Alb  2.6<L>  /  TBili  0.4  /  DBili  x   /  AST  15  /  ALT  10  /  AlkPhos  90  07-31    LIVER FUNCTIONS - ( 31 Jul 2023 07:01 )  Alb: 2.6 g/dL / Pro: 7.4 g/dL / ALK PHOS: 90 U/L / ALT: 10 U/L DA / AST: 15 U/L / GGT: x                   CAPILLARY BLOOD GLUCOSE      RADIOLOGY & ADDITIONAL TESTS:                   PGY-1 Progress Note discussed with attending    PAGER #: [[phone]] TILL 5:00 PM  PLEASE CONTACT ON CALL TEAM:  - On Call Team (Please refer to Tyler) FROM 5:00 PM - 8:30PM  - Nightfloat Team FROM 8:30 -7:30 AM    CHIEF COMPLAINT & BRIEF HOSPITAL COURSE: no acute overnight events. pt is the same as the day before.   INTERVAL HPI/OVERNIGHT EVENTS:   MEDICATIONS  (STANDING):  aspirin Suppository 300 milliGRAM(s) Rectal daily  cloNIDine Patch 0.2 mG/24Hr(s) 1 patch Transdermal <User Schedule>  dextrose 5%. 1000 milliLiter(s) (70 mL/Hr) IV Continuous <Continuous>  enoxaparin Injectable 30 milliGRAM(s) SubCutaneous every 24 hours  hydrALAZINE Injectable 10 milliGRAM(s) IV Push every 6 hours  valproate sodium  IVPB 500 milliGRAM(s) IV Intermittent every 8 hours    MEDICATIONS  (PRN):      Vital Signs Last 24 Hrs  T(C): 36.5 (01 Aug 2023 09:23), Max: 36.8 (01 Aug 2023 01:02)  T(F): 97.7 (01 Aug 2023 09:23), Max: 98.3 (01 Aug 2023 01:02)  HR: 77 (01 Aug 2023 09:23) (77 - 90)  BP: 172/80 (01 Aug 2023 09:23) (167/79 - 193/79)  BP(mean): --  RR: 18 (01 Aug 2023 09:23) (18 - 19)  SpO2: 99% (01 Aug 2023 09:23) (98% - 99%)    Parameters below as of 01 Aug 2023 09:23  Patient On (Oxygen Delivery Method): room air        PHYSICAL EXAMINATION:  GENERAL: NAD, well built  HEAD:  Atraumatic, Normocephalic  EYES:  conjunctiva and sclera clear  CHEST/LUNG: Clear to auscultation. No rales, rhonchi, wheezing, or rubs  HEART: Regular rate and rhythm; No murmurs, rubs, or gallops  ABDOMEN: Soft, Nontender, Nondistended; Bowel sounds present  NERVOUS SYSTEM:  Alert & Oriented X3,    EXTREMITIES:  2+ Peripheral Pulses, No clubbing, cyanosis, or edema  SKIN: warm dry                          9.3    12.11 )-----------( 337      ( 31 Jul 2023 07:01 )             30.5     07-31    150<H>  |  117<H>  |  52<H>  ----------------------------<  73  3.8   |  18<L>  |  2.96<H>    Ca    9.2      31 Jul 2023 07:01  Phos  7.8     07-31  Mg     3.1     07-31    TPro  7.4  /  Alb  2.6<L>  /  TBili  0.4  /  DBili  x   /  AST  15  /  ALT  10  /  AlkPhos  90  07-31    LIVER FUNCTIONS - ( 31 Jul 2023 07:01 )  Alb: 2.6 g/dL / Pro: 7.4 g/dL / ALK PHOS: 90 U/L / ALT: 10 U/L DA / AST: 15 U/L / GGT: x                   CAPILLARY BLOOD GLUCOSE      RADIOLOGY & ADDITIONAL TESTS:                   PGY-1 Progress Note discussed with attending    PAGER #: [[phone]] TILL 5:00 PM  PLEASE CONTACT ON CALL TEAM:  - On Call Team (Please refer to Tyler) FROM 5:00 PM - 8:30PM  - Nightfloat Team FROM 8:30 -7:30 AM    CHIEF COMPLAINT & BRIEF HOSPITAL COURSE: no acute overnight events. pt is the same as the day before.   INTERVAL HPI/OVERNIGHT EVENTS:   MEDICATIONS  (STANDING):  aspirin Suppository 300 milliGRAM(s) Rectal daily  cloNIDine Patch 0.2 mG/24Hr(s) 1 patch Transdermal <User Schedule>  dextrose 5%. 1000 milliLiter(s) (70 mL/Hr) IV Continuous <Continuous>  enoxaparin Injectable 30 milliGRAM(s) SubCutaneous every 24 hours  hydrALAZINE Injectable 10 milliGRAM(s) IV Push every 6 hours  valproate sodium  IVPB 500 milliGRAM(s) IV Intermittent every 8 hours    MEDICATIONS  (PRN):      Vital Signs Last 24 Hrs  T(C): 36.5 (01 Aug 2023 09:23), Max: 36.8 (01 Aug 2023 01:02)  T(F): 97.7 (01 Aug 2023 09:23), Max: 98.3 (01 Aug 2023 01:02)  HR: 77 (01 Aug 2023 09:23) (77 - 90)  BP: 172/80 (01 Aug 2023 09:23) (167/79 - 193/79)  BP(mean): --  RR: 18 (01 Aug 2023 09:23) (18 - 19)  SpO2: 99% (01 Aug 2023 09:23) (98% - 99%)    Parameters below as of 01 Aug 2023 09:23  Patient On (Oxygen Delivery Method): room air        PHYSICAL EXAMINATION:  GENERAL: NAD, well built  HEAD:  Atraumatic, Normocephalic  EYES:  conjunctiva and sclera clear  CHEST/LUNG: Clear to auscultation. No rales, rhonchi, wheezing, or rubs  HEART: Regular rate and rhythm; No murmurs, rubs, or gallops  ABDOMEN: Soft, Nontender, Nondistended; Bowel sounds present  NERVOUS SYSTEM:  Alert & Oriented X3,    EXTREMITIES:  2+ Peripheral Pulses, No clubbing, cyanosis, or edema  SKIN: warm dry                          9.3    12.11 )-----------( 337      ( 31 Jul 2023 07:01 )             30.5     07-31    150<H>  |  117<H>  |  52<H>  ----------------------------<  73  3.8   |  18<L>  |  2.96<H>    Ca    9.2      31 Jul 2023 07:01  Phos  7.8     07-31  Mg     3.1     07-31    TPro  7.4  /  Alb  2.6<L>  /  TBili  0.4  /  DBili  x   /  AST  15  /  ALT  10  /  AlkPhos  90  07-31    LIVER FUNCTIONS - ( 31 Jul 2023 07:01 )  Alb: 2.6 g/dL / Pro: 7.4 g/dL / ALK PHOS: 90 U/L / ALT: 10 U/L DA / AST: 15 U/L / GGT: x                   CAPILLARY BLOOD GLUCOSE      RADIOLOGY & ADDITIONAL TESTS:

## 2023-08-01 NOTE — PROGRESS NOTE ADULT - ASSESSMENT
1. SUSAN possible to MARISA and ATN  Renal sono shows no hdyro with b/l kidneys around 9.2cm  -No new labs. Scr remains stable at 2.9mg/dL yesterday. bladder scan is zero  -urinalysis shows blood with proteinuria; FeNa >1%, spot protein to creatinine ratio is 1.5gm  -Adjust meds to eGFR and avoid IV Gadolinium contrast,NSAIDs, and phosphate enema.  -Monitor I/O's daily.   -Monitor SMA daily.  2. Hypernatremia due to water deficit and insensible losses. Pt is clinically euvolemic.   -continue D5W at 70cc/hr   -Monitor I/O's. Check Serum Na Daily. Avoid high solute intake diet and sodium bicarbonate infuse. Avoid overcorrection of NA (8-10meq/day)  3. 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  -previous Scr around 1.2 to 1.6mg/dL in Oct 2022.  -Keep patient euvolemic and renal diet  -Avoid Nephrotoxic Meds/ Agents such as (NSAIDs, IV contrast, Aminoglycosides such as gentamicin, -Gadolinium contrast, Phosphate containing enemas, etc..)  -Adjust Medications according to eGFR  4. HTN:   -bp is uncontrolled. continue bp meds  -titrate bp meds to keep sbp >110 and < 130  5. Mineral Bone Disease:  -Elevated phos, will phos binder once able to have oral intake.  -PTH intact 289, will start calcitriol once phos improves.  6. Encephalopathy:  -workup in progress  -on Rocephin  -Plan as per Neuro and primary team      Discussed the assessment and plan with Primary Team/Nurse

## 2023-08-01 NOTE — PROGRESS NOTE ADULT - ASSESSMENT
77 female from [facility] PMHx HTN, HLD, CVA (w/ residual aphasia and R sided hemiparesis/plegia) who was sent to the hospital due to altered mental status,UTI.  1.No new CVA on MRI.  2.UTI-s/p ABX.  3.Neurology eval.  4.HTN-inc clonidine patch .2mg q wk.  5.Lipid d/o-hold statin.  6.Pt unable to do speech and swallow eval. ?PEG.Palliative eval.  7.Hypernatremia-IVF.  8.GI and DVT prophylaxis. 77 female from [facility] PMHx HTN, HLD, CVA (w/ residual aphasia and R sided hemiparesis/plegia) who was sent to the hospital due to altered mental status,UTI.  1.No new CVA on MRI.  2.UTI-s/p ABX.  3.Neurology eval.  4.HTN-inc clonidine patch .2mg q wk.  5.Lipid d/o-hold statin.  6.Pt unable to do speech and swallow eval. ?PEG.Palliative eval.  7.Hypernatremia-IVF.  8.GI and DVT prophylaxis. 77 female from [facility] PMHx HTN, HLD, CVA (w/ residual aphasia and R sided hemiparesis/plegia) who was sent to the hospital due to altered mental status,UTI.  1.No new CVA on MRI.  2.UTI-s/p ABX.  3.Neurology eval.  4.HTN-inc clonidine patch .2mg q wk.  5.Lipid d/o-hold statin.  6.Pt unable to do speech and swallow eval. ?PEG.Palliative eval.  7.Hypernatremia-IVF.  8.GI and DVT prophylaxis.

## 2023-08-01 NOTE — PROGRESS NOTE ADULT - SUBJECTIVE AND OBJECTIVE BOX
NEPHROLOGY MEDICAL CARE, [facility] - Dr. Ajay Green/ Dr. Mert Madison/ Dr. Chris Calderon/ Dr. Carson Cook    Date of Service: 08-01-23 @ 13:19    Patient was seen and examined at bedside.    CC: patient is okay and NAD    Vital Signs Last 24 Hrs  T(C): 36.5 (01 Aug 2023 09:23), Max: 36.8 (01 Aug 2023 01:02)  T(F): 97.7 (01 Aug 2023 09:23), Max: 98.3 (01 Aug 2023 01:02)  HR: 77 (01 Aug 2023 09:23) (77 - 90)  BP: 172/80 (01 Aug 2023 09:23) (167/79 - 193/79)  BP(mean): --  RR: 18 (01 Aug 2023 09:23) (18 - 19)  SpO2: 99% (01 Aug 2023 09:23) (98% - 99%)    Parameters below as of 01 Aug 2023 09:23  Patient On (Oxygen Delivery Method): room air        07-31 @ 07:01  -  08-01 @ 07:00  --------------------------------------------------------  IN: 0 mL / OUT: 450 mL / NET: -450 mL        PHYSICAL EXAM:  General: No acute respiratory distress.  Eyes: conjunctiva and sclera clear  ENMT: Atraumatic, Normocephalic, supple, No JVD present. Moist mucous membranes  Respiratory: Bilateral clear lungs; No rales, rhonchi, wheezing  Cardiovascular: S1S2+; no m/r/g  Gastrointestinal: Soft, Non-tender, Nondistended; Bowel sounds present  Neuro: mumbles words; hemiparesis.  Ext:  1+pedal edema, No Cyanosis  Skin: No visible rashes      MEDICATIONS:  MEDICATIONS  (STANDING):  aspirin Suppository 300 milliGRAM(s) Rectal daily  cloNIDine Patch 0.2 mG/24Hr(s) 1 patch Transdermal <User Schedule>  dextrose 5%. 1000 milliLiter(s) (70 mL/Hr) IV Continuous <Continuous>  enoxaparin Injectable 30 milliGRAM(s) SubCutaneous every 24 hours  hydrALAZINE Injectable 10 milliGRAM(s) IV Push every 6 hours  valproate sodium  IVPB 500 milliGRAM(s) IV Intermittent every 8 hours    MEDICATIONS  (PRN):          LABS:                        9.3    12.11 )-----------( 337      ( 31 Jul 2023 07:01 )             30.5     07-31    150<H>  |  117<H>  |  52<H>  ----------------------------<  73  3.8   |  18<L>  |  2.96<H>    Ca    9.2      31 Jul 2023 07:01  Phos  7.8     07-31  Mg     3.1     07-31    TPro  7.4  /  Alb  2.6<L>  /  TBili  0.4  /  DBili  x   /  AST  15  /  ALT  10  /  AlkPhos  90  07-31      Urinalysis Basic - ( 31 Jul 2023 07:01 )    Color: x / Appearance: x / SG: x / pH: x  Gluc: 73 mg/dL / Ketone: x  / Bili: x / Urobili: x   Blood: x / Protein: x / Nitrite: x   Leuk Esterase: x / RBC: x / WBC x   Sq Epi: x / Non Sq Epi: x / Bacteria: x        Urine studies  Urea Nitrogen,  Random Urine: 374 mg/dL (07-30 @ 16:09)  Potassium, Random Urine: 67 mmol/L (07-30 @ 16:09)  Osmolality, Random Urine: 439 mos/kg (07-30 @ 16:09)  Sodium, Random Urine: 48 mmol/L (07-30 @ 16:09)  Creatinine, Random Urine: 156 mg/dL (07-30 @ 16:09)    PTH and Vit D:         NEPHROLOGY MEDICAL CARE, [facility] - Dr. Ajay Green/ Dr. Mert Madison/ Dr. Chris Calderon/ Dr. Carson Cook    Date of Service: 08-01-23 @ 13:19    Patient was seen and examined at bedside.    CC: patient is okay and NAD    Vital Signs Last 24 Hrs  T(C): 36.5 (01 Aug 2023 09:23), Max: 36.8 (01 Aug 2023 01:02)  T(F): 97.7 (01 Aug 2023 09:23), Max: 98.3 (01 Aug 2023 01:02)  HR: 77 (01 Aug 2023 09:23) (77 - 90)  BP: 172/80 (01 Aug 2023 09:23) (167/79 - 193/79)  BP(mean): --  RR: 18 (01 Aug 2023 09:23) (18 - 19)  SpO2: 99% (01 Aug 2023 09:23) (98% - 99%)    Parameters below as of 01 Aug 2023 09:23  Patient On (Oxygen Delivery Method): room air        07-31 @ 07:01  -  08-01 @ 07:00  --------------------------------------------------------  IN: 0 mL / OUT: 450 mL / NET: -450 mL        PHYSICAL EXAM:  General: No acute respiratory distress.  Eyes: conjunctiva and sclera clear  ENMT: Atraumatic, Normocephalic, supple, No JVD present. Moist mucous membranes  Respiratory: Bilateral clear lungs; No rales, rhonchi, wheezing  Cardiovascular: S1S2+; no m/r/g  Gastrointestinal: Soft, Non-tender, Nondistended; Bowel sounds present  Neuro: mumbles words; hemiparesis.  Ext:  1+pedal edema, No Cyanosis  Skin: No visible rashes      MEDICATIONS:  MEDICATIONS  (STANDING):  aspirin Suppository 300 milliGRAM(s) Rectal daily  cloNIDine Patch 0.2 mG/24Hr(s) 1 patch Transdermal <User Schedule>  dextrose 5%. 1000 milliLiter(s) (70 mL/Hr) IV Continuous <Continuous>  enoxaparin Injectable 30 milliGRAM(s) SubCutaneous every 24 hours  hydrALAZINE Injectable 10 milliGRAM(s) IV Push every 6 hours  valproate sodium  IVPB 500 milliGRAM(s) IV Intermittent every 8 hours    MEDICATIONS  (PRN):          LABS:                        9.3    12.11 )-----------( 337      ( 31 Jul 2023 07:01 )             30.5     07-31    150<H>  |  117<H>  |  52<H>  ----------------------------<  73  3.8   |  18<L>  |  2.96<H>    Ca    9.2      31 Jul 2023 07:01  Phos  7.8     07-31  Mg     3.1     07-31    TPro  7.4  /  Alb  2.6<L>  /  TBili  0.4  /  DBili  x   /  AST  15  /  ALT  10  /  AlkPhos  90  07-31      Urinalysis Basic - ( 31 Jul 2023 07:01 )    Color: x / Appearance: x / SG: x / pH: x  Gluc: 73 mg/dL / Ketone: x  / Bili: x / Urobili: x   Blood: x / Protein: x / Nitrite: x   Leuk Esterase: x / RBC: x / WBC x   Sq Epi: x / Non Sq Epi: x / Bacteria: x        Urine studies  Urea Nitrogen,  Random Urine: 374 mg/dL (07-30 @ 16:09)  Potassium, Random Urine: 67 mmol/L (07-30 @ 16:09)  Osmolality, Random Urine: 439 mos/kg (07-30 @ 16:09)  Sodium, Random Urine: 48 mmol/L (07-30 @ 16:09)  Creatinine, Random Urine: 156 mg/dL (07-30 @ 16:09)    PTH and Vit D:         NEPHROLOGY MEDICAL CARE, [facility] - Dr. Ajay Green/ Dr. Mert Madison/ Dr. Chris Calderon/ Dr. Carson Cook    Date of Service: 08-01-23 @ 13:19    Patient was seen and examined at bedside.    CC: patient is okay and NAD    Vital Signs Last 24 Hrs  T(C): 36.5 (01 Aug 2023 09:23), Max: 36.8 (01 Aug 2023 01:02)  T(F): 97.7 (01 Aug 2023 09:23), Max: 98.3 (01 Aug 2023 01:02)  HR: 77 (01 Aug 2023 09:23) (77 - 90)  BP: 172/80 (01 Aug 2023 09:23) (167/79 - 193/79)  BP(mean): --  RR: 18 (01 Aug 2023 09:23) (18 - 19)  SpO2: 99% (01 Aug 2023 09:23) (98% - 99%)    Parameters below as of 01 Aug 2023 09:23  Patient On (Oxygen Delivery Method): room air        07-31 @ 07:01  -  08-01 @ 07:00  --------------------------------------------------------  IN: 0 mL / OUT: 450 mL / NET: -450 mL        PHYSICAL EXAM:  General: No acute respiratory distress.  Eyes: conjunctiva and sclera clear  ENMT: Atraumatic, Normocephalic, supple, No JVD present. Moist mucous membranes  Respiratory: Bilateral clear lungs; No rales, rhonchi, wheezing  Cardiovascular: S1S2+; no m/r/g  Gastrointestinal: Soft, Non-tender, Nondistended; Bowel sounds present  Neuro: mumbles words; hemiparesis.  Ext:  1+pedal edema, No Cyanosis  Skin: No visible rashes      MEDICATIONS:  MEDICATIONS  (STANDING):  aspirin Suppository 300 milliGRAM(s) Rectal daily  cloNIDine Patch 0.2 mG/24Hr(s) 1 patch Transdermal <User Schedule>  dextrose 5%. 1000 milliLiter(s) (70 mL/Hr) IV Continuous <Continuous>  enoxaparin Injectable 30 milliGRAM(s) SubCutaneous every 24 hours  hydrALAZINE Injectable 10 milliGRAM(s) IV Push every 6 hours  valproate sodium  IVPB 500 milliGRAM(s) IV Intermittent every 8 hours    MEDICATIONS  (PRN):          LABS:                        9.3    12.11 )-----------( 337      ( 31 Jul 2023 07:01 )             30.5     07-31    150<H>  |  117<H>  |  52<H>  ----------------------------<  73  3.8   |  18<L>  |  2.96<H>    Ca    9.2      31 Jul 2023 07:01  Phos  7.8     07-31  Mg     3.1     07-31    TPro  7.4  /  Alb  2.6<L>  /  TBili  0.4  /  DBili  x   /  AST  15  /  ALT  10  /  AlkPhos  90  07-31      Urinalysis Basic - ( 31 Jul 2023 07:01 )    Color: x / Appearance: x / SG: x / pH: x  Gluc: 73 mg/dL / Ketone: x  / Bili: x / Urobili: x   Blood: x / Protein: x / Nitrite: x   Leuk Esterase: x / RBC: x / WBC x   Sq Epi: x / Non Sq Epi: x / Bacteria: x        Urine studies  Urea Nitrogen,  Random Urine: 374 mg/dL (07-30 @ 16:09)  Potassium, Random Urine: 67 mmol/L (07-30 @ 16:09)  Osmolality, Random Urine: 439 mos/kg (07-30 @ 16:09)  Sodium, Random Urine: 48 mmol/L (07-30 @ 16:09)  Creatinine, Random Urine: 156 mg/dL (07-30 @ 16:09)    PTH and Vit D:

## 2023-08-01 NOTE — PROGRESS NOTE ADULT - SUBJECTIVE AND OBJECTIVE BOX
INTERVAL HPI/OVERNIGHT EVENTS:  Patient seen,awake,poor historian  VITAL SIGNS:  T(F): 97.7 (08-01-23 @ 09:23)  HR: 77 (08-01-23 @ 09:23)  BP: 172/80 (08-01-23 @ 09:23)  RR: 18 (08-01-23 @ 09:23)  SpO2: 99% (08-01-23 @ 09:23)  Wt(kg): --    PHYSICAL EXAM:  awake  Constitutional:  Eyes:  ENMT:perrla  Neck:  Respiratory:clear  Cardiovascular:s1s2,m-none  Gastrointestinal:soft,bs pos  Extremities:  Vascular:  Neurological:no focal deficit  Musculoskeletal:    MEDICATIONS  (STANDING):  aspirin Suppository 300 milliGRAM(s) Rectal daily  cloNIDine Patch 0.2 mG/24Hr(s) 1 patch Transdermal <User Schedule>  dextrose 5%. 1000 milliLiter(s) (70 mL/Hr) IV Continuous <Continuous>  enoxaparin Injectable 30 milliGRAM(s) SubCutaneous every 24 hours  hydrALAZINE Injectable 10 milliGRAM(s) IV Push every 6 hours  valproate sodium  IVPB 500 milliGRAM(s) IV Intermittent every 8 hours    MEDICATIONS  (PRN):      Allergies    &quot; NATURAL RUBBER&quot; (Other)  latex (Other)  penicillins (Unknown)    Intolerances        LABS:                        9.3    12.11 )-----------( 337      ( 31 Jul 2023 07:01 )             30.5     07-31    150<H>  |  117<H>  |  52<H>  ----------------------------<  73  3.8   |  18<L>  |  2.96<H>    Ca    9.2      31 Jul 2023 07:01  Phos  7.8     07-31  Mg     3.1     07-31    TPro  7.4  /  Alb  2.6<L>  /  TBili  0.4  /  DBili  x   /  AST  15  /  ALT  10  /  AlkPhos  90  07-31      Urinalysis Basic - ( 31 Jul 2023 07:01 )    Color: x / Appearance: x / SG: x / pH: x  Gluc: 73 mg/dL / Ketone: x  / Bili: x / Urobili: x   Blood: x / Protein: x / Nitrite: x   Leuk Esterase: x / RBC: x / WBC x   Sq Epi: x / Non Sq Epi: x / Bacteria: x        RADIOLOGY & ADDITIONAL TESTS:      Assessment and Plan:   · Assessment	  Patient is a 77 female from [facility] PMHx HTN, HLD, CVA (w/ residual aphasia and R sided hemiparesis/plegia) who was sent to the hospital due to altered mental status. Patient is being admitted to medicine for further work up and rule out stroke vs encephalopathy (metabolic vs infectious).        Problem/Plan - 1:  ·  Problem: CVA (cerebrovascular accident).   SnS-> failed dysphagia and Start statin when passes SnS.   echo-> mild MR, and grade 1 diastolic dysfunction.   A1C-> 5.6  normal lipid profile.   Neurology consulted - Dr. Whitney-> ordered EEG, valporic acid levels for AM, no signs of new stroke.   Cardiology consulted - Dr. Dunn-> put on clonidine patch .1mg weekly.     Problem/Plan - 2:  ·  Problem: Acute encephalopathy.   ·  Plan: P/w altered mental status   Etiology can be metabolic vs infectious vs drug induced (patient was started on medication for muscle spasm recently as per sister - assuming it is baclofen).   c/w cinamet, sertraline, baclofen. Hold as patient is NPO.     Problem/Plan - 3:  ·  Problem: SUSAN (acute kidney injury).   ·  Plan: As per Nephrology   possible r/o urinary rentention vs MARISA vs r/o ATN.   order urinalysis, urine lytes ( uosm, uriine sodium, urine creatine, chloride, potassium, protein to creating ration-> WNL  -renal ultrasound  and bladder scan  was negative  adjust meds for GFR.     Problem/Plan - 4:  ·  Problem: Hypernatremia.   ·  Plan: due to water deficit and insensible losses. Pt. is clinically euvolemic. Na in the 150's .  avoid high solute intake diet and sodium bicarb infuse.  continue to give  dextrose % 50 cc.     Problem/Plan - 5:  ·  Problem: Electrolyte imbalance.   ·  Plan: nephro following:  abnormal labs:  Sodium 150  Phosphorus 7.8  Magnesium 3.1  chloride 117   intact PTH is 235.     Problem/Plan - 6:  ·  Problem: Abnormal EKG.   ·  Plan: EKG - NSR with TWI in V3-V5. (new compared to last EKG). Repeat EKg no change   Trop: 14.4-> 44.5->24  IF TWI reversed than concern for NSTEMI. If persistent TWI then likely NOT ischemic.     Problem/Plan - 7:  ·  Problem: Leukocytosis.   ·  Plan: Elevated WBCs.  No localizing signs of infection.   UA -ve.->on recephin  CXR - ve.   F/U Bcx.     Problem/Plan - 8:  ·  Problem: Hypertension.   ·  Plan: pt BP has been really high ranging from 190s-180s/85 . Hydralazine has been given PRN. now she is on hydralazine every 6 hours 10 mg IV. BP being monitored.     Problem/Plan - 9:  ·  Problem: Hyperlipidemia.   ·  Plan: F/U Lipid profile.   Start atorvastatin when passes SnS.     Problem/Plan - 10:  ·  Problem: Seizure.   ·  Plan; H/O seizures.   C/w Depacon.  valproic acid levels ordered for AM  EEG ordered.     Problem/Plan - 11:  ·  Problem: Prophylactic measure.   ·  Plan: Lovenox. (renally dosed).   INTERVAL HPI/OVERNIGHT EVENTS:  Patient seen,awake,poor historian  VITAL SIGNS:  T(F): 97.7 (08-01-23 @ 09:23)  HR: 77 (08-01-23 @ 09:23)  BP: 172/80 (08-01-23 @ 09:23)  RR: 18 (08-01-23 @ 09:23)  SpO2: 99% (08-01-23 @ 09:23)  Wt(kg): --    PHYSICAL EXAM:  awake  Constitutional:  Eyes:  ENMT:perrla  Neck:  Respiratory:clear  Cardiovascular:s1s2,m-none  Gastrointestinal:soft,bs pos  Extremities:  Vascular:  Neurological:no focal deficit  Musculoskeletal:    MEDICATIONS  (STANDING):  aspirin Suppository 300 milliGRAM(s) Rectal daily  cloNIDine Patch 0.2 mG/24Hr(s) 1 patch Transdermal <User Schedule>  dextrose 5%. 1000 milliLiter(s) (70 mL/Hr) IV Continuous <Continuous>  enoxaparin Injectable 30 milliGRAM(s) SubCutaneous every 24 hours  hydrALAZINE Injectable 10 milliGRAM(s) IV Push every 6 hours  valproate sodium  IVPB 500 milliGRAM(s) IV Intermittent every 8 hours    MEDICATIONS  (PRN):      Allergies    &quot; NATURAL RUBBER&quot; (Other)  latex (Other)  penicillins (Unknown)    Intolerances        LABS:                        9.3    12.11 )-----------( 337      ( 31 Jul 2023 07:01 )             30.5     07-31    150<H>  |  117<H>  |  52<H>  ----------------------------<  73  3.8   |  18<L>  |  2.96<H>    Ca    9.2      31 Jul 2023 07:01  Phos  7.8     07-31  Mg     3.1     07-31    TPro  7.4  /  Alb  2.6<L>  /  TBili  0.4  /  DBili  x   /  AST  15  /  ALT  10  /  AlkPhos  90  07-31      Urinalysis Basic - ( 31 Jul 2023 07:01 )    Color: x / Appearance: x / SG: x / pH: x  Gluc: 73 mg/dL / Ketone: x  / Bili: x / Urobili: x   Blood: x / Protein: x / Nitrite: x   Leuk Esterase: x / RBC: x / WBC x   Sq Epi: x / Non Sq Epi: x / Bacteria: x        RADIOLOGY & ADDITIONAL TESTS:      Assessment and Plan:   · Assessment	  Patient is a 77 female from [facility] PMHx HTN, HLD, CVA (w/ residual aphasia and R sided hemiparesis/plegia) who was sent to the hospital due to altered mental status. Patient is being admitted to medicine for further work up and rule out stroke vs encephalopathy (metabolic vs infectious).        Problem/Plan - 1:  ·  Problem: CVA (cerebrovascular accident).   SnS-> failed dysphagia and Start statin when passes SnS.   echo-> mild MR, and grade 1 diastolic dysfunction.   A1C-> 5.6  normal lipid profile.   Neurology consulted - Dr. Whitney-> ordered EEG, valporic acid levels for AM, no signs of new stroke.   Cardiology consulted - Dr. Dunn-> put on clonidine patch .1mg weekly.     Problem/Plan - 2:  ·  Problem: Acute encephalopathy.   ·  Plan: P/w altered mental status   Etiology can be metabolic vs infectious vs drug induced (patient was started on medication for muscle spasm recently as per sister - assuming it is baclofen).   c/w cinamet, sertraline, baclofen. Hold as patient is NPO.     Problem/Plan - 3:  ·  Problem: SUSAN (acute kidney injury).   ·  Plan: As per Nephrology   possible r/o urinary rentention vs MARISA vs r/o ATN.   order urinalysis, urine lytes ( uosm, uriine sodium, urine creatine, chloride, potassium, protein to creating ration-> WNL  -renal ultrasound  and bladder scan  was negative  adjust meds for GFR.     Problem/Plan - 4:  ·  Problem: Hypernatremia.   ·  Plan: due to water deficit and insensible losses. Pt. is clinically euvolemic. Na in the 150's .  avoid high solute intake diet and sodium bicarb infuse.  continue to give  dextrose % 50 cc.     Problem/Plan - 5:  ·  Problem: Electrolyte imbalance.   ·  Plan: nephro following:  abnormal labs:  Sodium 150  Phosphorus 7.8  Magnesium 3.1  chloride 117   intact PTH is 235.     Problem/Plan - 6:  ·  Problem: Abnormal EKG.   ·  Plan: EKG - NSR with TWI in V3-V5. (new compared to last EKG). Repeat EKg no change   Trop: 14.4-> 44.5->24  IF TWI reversed than concern for NSTEMI. If persistent TWI then likely NOT ischemic.     Problem/Plan - 7:  ·  Problem: Leukocytosis.   ·  Plan: Elevated WBCs.  No localizing signs of infection.   UA -ve.->on recephin  CXR - ve.   F/U Bcx.     Problem/Plan - 8:  ·  Problem: Hypertension.   ·  Plan: pt BP has been really high ranging from 190s-180s/85 . Hydralazine has been given PRN. now she is on hydralazine every 6 hours 10 mg IV. BP being monitored.     Problem/Plan - 9:  ·  Problem: Hyperlipidemia.   ·  Plan: F/U Lipid profile.   Start atorvastatin when passes SnS.     Problem/Plan - 10:  ·  Problem: Seizure.   ·  Plan; H/O seizures.   C/w Depacon.  valproic acid levels ordered for AM  EEG ordered.     Problem/Plan - 11:  ·  Problem: Prophylactic measure.   ·  Plan: Lovenox. (renally dosed).   INTERVAL HPI/OVERNIGHT EVENTS:  Patient seen,awake,poor historian  VITAL SIGNS:  T(F): 97.7 (08-01-23 @ 09:23)  HR: 77 (08-01-23 @ 09:23)  BP: 172/80 (08-01-23 @ 09:23)  RR: 18 (08-01-23 @ 09:23)  SpO2: 99% (08-01-23 @ 09:23)  Wt(kg): --    PHYSICAL EXAM:  awake  Constitutional:  Eyes:  ENMT:perrla  Neck:  Respiratory:clear  Cardiovascular:s1s2,m-none  Gastrointestinal:soft,bs pos  Extremities:  Vascular:  Neurological:no focal deficit  Musculoskeletal:    MEDICATIONS  (STANDING):  aspirin Suppository 300 milliGRAM(s) Rectal daily  cloNIDine Patch 0.2 mG/24Hr(s) 1 patch Transdermal <User Schedule>  dextrose 5%. 1000 milliLiter(s) (70 mL/Hr) IV Continuous <Continuous>  enoxaparin Injectable 30 milliGRAM(s) SubCutaneous every 24 hours  hydrALAZINE Injectable 10 milliGRAM(s) IV Push every 6 hours  valproate sodium  IVPB 500 milliGRAM(s) IV Intermittent every 8 hours    MEDICATIONS  (PRN):      Allergies    &quot; NATURAL RUBBER&quot; (Other)  latex (Other)  penicillins (Unknown)    Intolerances        LABS:                        9.3    12.11 )-----------( 337      ( 31 Jul 2023 07:01 )             30.5     07-31    150<H>  |  117<H>  |  52<H>  ----------------------------<  73  3.8   |  18<L>  |  2.96<H>    Ca    9.2      31 Jul 2023 07:01  Phos  7.8     07-31  Mg     3.1     07-31    TPro  7.4  /  Alb  2.6<L>  /  TBili  0.4  /  DBili  x   /  AST  15  /  ALT  10  /  AlkPhos  90  07-31      Urinalysis Basic - ( 31 Jul 2023 07:01 )    Color: x / Appearance: x / SG: x / pH: x  Gluc: 73 mg/dL / Ketone: x  / Bili: x / Urobili: x   Blood: x / Protein: x / Nitrite: x   Leuk Esterase: x / RBC: x / WBC x   Sq Epi: x / Non Sq Epi: x / Bacteria: x        RADIOLOGY & ADDITIONAL TESTS:      Assessment and Plan:   · Assessment	  Patient is a 77 female from [facility] PMHx HTN, HLD, CVA (w/ residual aphasia and R sided hemiparesis/plegia) who was sent to the hospital due to altered mental status. Patient is being admitted to medicine for further work up and rule out stroke vs encephalopathy (metabolic vs infectious).        Problem/Plan - 1:  ·  Problem: CVA (cerebrovascular accident).   SnS-> failed dysphagia and Start statin when passes SnS.   echo-> mild MR, and grade 1 diastolic dysfunction.   A1C-> 5.6  normal lipid profile.   Neurology consulted - Dr. Whitney-> ordered EEG, valporic acid levels for AM, no signs of new stroke.   Cardiology consulted - Dr. Dunn-> put on clonidine patch .1mg weekly.     Problem/Plan - 2:  ·  Problem: Acute encephalopathy.   ·  Plan: P/w altered mental status   Etiology can be metabolic vs infectious vs drug induced (patient was started on medication for muscle spasm recently as per sister - assuming it is baclofen).   c/w cinamet, sertraline, baclofen. Hold as patient is NPO.     Problem/Plan - 3:  ·  Problem: SUSAN (acute kidney injury).   ·  Plan: As per Nephrology   possible r/o urinary rentention vs MARISA vs r/o ATN.   order urinalysis, urine lytes ( uosm, uriine sodium, urine creatine, chloride, potassium, protein to creating ration-> WNL  -renal ultrasound  and bladder scan  was negative  adjust meds for GFR.     Problem/Plan - 4:  ·  Problem: Hypernatremia.   ·  Plan: due to water deficit and insensible losses. Pt. is clinically euvolemic. Na in the 150's .  avoid high solute intake diet and sodium bicarb infuse.  continue to give  dextrose % 50 cc.     Problem/Plan - 5:  ·  Problem: Electrolyte imbalance.   ·  Plan: nephro following:  abnormal labs:  Sodium 150  Phosphorus 7.8  Magnesium 3.1  chloride 117   intact PTH is 235.     Problem/Plan - 6:  ·  Problem: Abnormal EKG.   ·  Plan: EKG - NSR with TWI in V3-V5. (new compared to last EKG). Repeat EKg no change   Trop: 14.4-> 44.5->24  IF TWI reversed than concern for NSTEMI. If persistent TWI then likely NOT ischemic.     Problem/Plan - 7:  ·  Problem: Leukocytosis.   ·  Plan: Elevated WBCs.  No localizing signs of infection.   UA -ve.->on recephin  CXR - ve.   F/U Bcx.     Problem/Plan - 8:  ·  Problem: Hypertension.   ·  Plan: pt BP has been really high ranging from 190s-180s/85 . Hydralazine has been given PRN. now she is on hydralazine every 6 hours 10 mg IV. BP being monitored.     Problem/Plan - 9:  ·  Problem: Hyperlipidemia.   ·  Plan: F/U Lipid profile.   Start atorvastatin when passes SnS.     Problem/Plan - 10:  ·  Problem: Seizure.   ·  Plan; H/O seizures.   C/w Depacon.  valproic acid levels ordered for AM  EEG ordered.     Problem/Plan - 11:  ·  Problem: Prophylactic measure.   ·  Plan: Lovenox. (renally dosed).

## 2023-08-01 NOTE — PROGRESS NOTE ADULT - SUBJECTIVE AND OBJECTIVE BOX
CHIEF COMPLAINT:Patient is a 77y old  Female who presents with a chief complaint of Altered mental status. Pt appears comfortable.    	  REVIEW OF SYSTEMS:  unable to obtain    PHYSICAL EXAM:  T(C): 36.5 (08-01-23 @ 09:23), Max: 36.8 (08-01-23 @ 01:02)  HR: 77 (08-01-23 @ 09:23) (77 - 90)  BP: 172/80 (08-01-23 @ 09:23) (167/79 - 193/79)  RR: 18 (08-01-23 @ 09:23) (18 - 19)  SpO2: 99% (08-01-23 @ 09:23) (98% - 99%)  Wt(kg): --  I&O's Summary    31 Jul 2023 07:01  -  01 Aug 2023 07:00  --------------------------------------------------------  IN: 0 mL / OUT: 450 mL / NET: -450 mL        Appearance: Normal	  HEENT:   Normal oral mucosa, PERRL, EOMI	  Lymphatic: No lymphadenopathy  Cardiovascular: Normal S1 S2, No JVD, No murmurs, No edema  Respiratory: Lungs clear to auscultation	  Gastrointestinal:  Soft, Non-tender, + BS	  Skin: No rashes, No ecchymoses, No cyanosis	  Extremities: Normal range of motion, No clubbing, cyanosis or edema  Vascular: Peripheral pulses palpable 2+ bilaterally    MEDICATIONS  (STANDING):  aspirin Suppository 300 milliGRAM(s) Rectal daily  cloNIDine Patch 0.2 mG/24Hr(s) 1 patch Transdermal <User Schedule>  dextrose 5%. 1000 milliLiter(s) (70 mL/Hr) IV Continuous <Continuous>  enoxaparin Injectable 30 milliGRAM(s) SubCutaneous every 24 hours  hydrALAZINE Injectable 10 milliGRAM(s) IV Push every 6 hours  valproate sodium  IVPB 500 milliGRAM(s) IV Intermittent every 8 hours        LABS:	 	    Troponin I, High Sensitivity Result: 24.0 ng/L (07-30 @ 09:57)                            9.3    12.11 )-----------( 337      ( 31 Jul 2023 07:01 )             30.5     07-31    150<H>  |  117<H>  |  52<H>  ----------------------------<  73  3.8   |  18<L>  |  2.96<H>    Ca    9.2      31 Jul 2023 07:01  Phos  7.8     07-31  Mg     3.1     07-31    TPro  7.4  /  Alb  2.6<L>  /  TBili  0.4  /  DBili  x   /  AST  15  /  ALT  10  /  AlkPhos  90  07-31    Lipid Profile: Cholesterol 152  HDL 51  TG 93  Ldl calc 82    TSH: Thyroid Stimulating Hormone, Serum: 3.59 uU/mL (07-28 @ 05:03)      	EEG Classification / Summary:  Abnormal routine EEG in the awake / drowsy state(s).  -Focal slowing in the left hemisphere  -Sharp waves in the left posterior temporal region  -No electrographic seizures    Clinical Impression:  -Potentially epileptogenic focus in the left posterior temporal region  -Focal cerebral dysfunction in the left hemisphere suggestive of structural/functional abnormality in this region.           -------------------------------------------------------------------------------------------------------  [facility] EEG Reading Room Ph#: [phone]  Epilepsy Answering Service after 5PM and before 8:30AM: Ph#: [phone]    BETITO Matta  Epilepsy Fellow    Chacha Gorman MD  Attending Physician, [facility]         Culture - Urine (07.28.23 @ 15:00)   Specimen Source: Catheterized Catheterized  Culture Results:   >100,000 CFU/ml Providencia stuartii   CHIEF COMPLAINT:Patient is a 77y old  Female who presents with a chief complaint of Altered mental status. Pt appears comfortable.    	  REVIEW OF SYSTEMS:  unable to obtain    PHYSICAL EXAM:  T(C): 36.5 (08-01-23 @ 09:23), Max: 36.8 (08-01-23 @ 01:02)  HR: 77 (08-01-23 @ 09:23) (77 - 90)  BP: 172/80 (08-01-23 @ 09:23) (167/79 - 193/79)  RR: 18 (08-01-23 @ 09:23) (18 - 19)  SpO2: 99% (08-01-23 @ 09:23) (98% - 99%)  Wt(kg): --  I&O's Summary    31 Jul 2023 07:01  -  01 Aug 2023 07:00  --------------------------------------------------------  IN: 0 mL / OUT: 450 mL / NET: -450 mL        Appearance: Normal	  HEENT:   Normal oral mucosa, PERRL, EOMI	  Lymphatic: No lymphadenopathy  Cardiovascular: Normal S1 S2, No JVD, No murmurs, No edema  Respiratory: Lungs clear to auscultation	  Gastrointestinal:  Soft, Non-tender, + BS	  Skin: No rashes, No ecchymoses, No cyanosis	  Extremities: Normal range of motion, No clubbing, cyanosis or edema  Vascular: Peripheral pulses palpable 2+ bilaterally    MEDICATIONS  (STANDING):  aspirin Suppository 300 milliGRAM(s) Rectal daily  cloNIDine Patch 0.2 mG/24Hr(s) 1 patch Transdermal <User Schedule>  dextrose 5%. 1000 milliLiter(s) (70 mL/Hr) IV Continuous <Continuous>  enoxaparin Injectable 30 milliGRAM(s) SubCutaneous every 24 hours  hydrALAZINE Injectable 10 milliGRAM(s) IV Push every 6 hours  valproate sodium  IVPB 500 milliGRAM(s) IV Intermittent every 8 hours        LABS:	 	    Troponin I, High Sensitivity Result: 24.0 ng/L (07-30 @ 09:57)                            9.3    12.11 )-----------( 337      ( 31 Jul 2023 07:01 )             30.5     07-31    150<H>  |  117<H>  |  52<H>  ----------------------------<  73  3.8   |  18<L>  |  2.96<H>    Ca    9.2      31 Jul 2023 07:01  Phos  7.8     07-31  Mg     3.1     07-31    TPro  7.4  /  Alb  2.6<L>  /  TBili  0.4  /  DBili  x   /  AST  15  /  ALT  10  /  AlkPhos  90  07-31    Lipid Profile: Cholesterol 152  HDL 51  TG 93  Ldl calc 82    TSH: Thyroid Stimulating Hormone, Serum: 3.59 uU/mL (07-28 @ 05:03)      	EEG Classification / Summary:  Abnormal routine EEG in the awake / drowsy state(s).  -Focal slowing in the left hemisphere  -Sharp waves in the left posterior temporal region  -No electrographic seizures    Clinical Impression:  -Potentially epileptogenic focus in the left posterior temporal region  -Focal cerebral dysfunction in the left hemisphere suggestive of structural/functional abnormality in this region.           -------------------------------------------------------------------------------------------------------  [facility] EEG Reading Room Ph#: [phone]  Epilepsy Answering Service after 5PM and before 8:30AM: Ph#: [phone]    BETITO Matta  Epilepsy Fellow    Chacha Gorman MD  Attending Physician, [facility]         Culture - Urine (07.28.23 @ 15:00)   Specimen Source: Catheterized Catheterized  Culture Results:   >100,000 CFU/ml Providencia stuartii   CHIEF COMPLAINT:Patient is a 77y old  Female who presents with a chief complaint of Altered mental status. Pt appears comfortable.    	  REVIEW OF SYSTEMS:  unable to obtain    PHYSICAL EXAM:  T(C): 36.5 (08-01-23 @ 09:23), Max: 36.8 (08-01-23 @ 01:02)  HR: 77 (08-01-23 @ 09:23) (77 - 90)  BP: 172/80 (08-01-23 @ 09:23) (167/79 - 193/79)  RR: 18 (08-01-23 @ 09:23) (18 - 19)  SpO2: 99% (08-01-23 @ 09:23) (98% - 99%)  Wt(kg): --  I&O's Summary    31 Jul 2023 07:01  -  01 Aug 2023 07:00  --------------------------------------------------------  IN: 0 mL / OUT: 450 mL / NET: -450 mL        Appearance: Normal	  HEENT:   Normal oral mucosa, PERRL, EOMI	  Lymphatic: No lymphadenopathy  Cardiovascular: Normal S1 S2, No JVD, No murmurs, No edema  Respiratory: Lungs clear to auscultation	  Gastrointestinal:  Soft, Non-tender, + BS	  Skin: No rashes, No ecchymoses, No cyanosis	  Extremities: Normal range of motion, No clubbing, cyanosis or edema  Vascular: Peripheral pulses palpable 2+ bilaterally    MEDICATIONS  (STANDING):  aspirin Suppository 300 milliGRAM(s) Rectal daily  cloNIDine Patch 0.2 mG/24Hr(s) 1 patch Transdermal <User Schedule>  dextrose 5%. 1000 milliLiter(s) (70 mL/Hr) IV Continuous <Continuous>  enoxaparin Injectable 30 milliGRAM(s) SubCutaneous every 24 hours  hydrALAZINE Injectable 10 milliGRAM(s) IV Push every 6 hours  valproate sodium  IVPB 500 milliGRAM(s) IV Intermittent every 8 hours        LABS:	 	    Troponin I, High Sensitivity Result: 24.0 ng/L (07-30 @ 09:57)                            9.3    12.11 )-----------( 337      ( 31 Jul 2023 07:01 )             30.5     07-31    150<H>  |  117<H>  |  52<H>  ----------------------------<  73  3.8   |  18<L>  |  2.96<H>    Ca    9.2      31 Jul 2023 07:01  Phos  7.8     07-31  Mg     3.1     07-31    TPro  7.4  /  Alb  2.6<L>  /  TBili  0.4  /  DBili  x   /  AST  15  /  ALT  10  /  AlkPhos  90  07-31    Lipid Profile: Cholesterol 152  HDL 51  TG 93  Ldl calc 82    TSH: Thyroid Stimulating Hormone, Serum: 3.59 uU/mL (07-28 @ 05:03)      	EEG Classification / Summary:  Abnormal routine EEG in the awake / drowsy state(s).  -Focal slowing in the left hemisphere  -Sharp waves in the left posterior temporal region  -No electrographic seizures    Clinical Impression:  -Potentially epileptogenic focus in the left posterior temporal region  -Focal cerebral dysfunction in the left hemisphere suggestive of structural/functional abnormality in this region.           -------------------------------------------------------------------------------------------------------  [facility] EEG Reading Room Ph#: [phone]  Epilepsy Answering Service after 5PM and before 8:30AM: Ph#: [phone]    BETITO Matta  Epilepsy Fellow    Chacha Gorman MD  Attending Physician, [facility]         Culture - Urine (07.28.23 @ 15:00)   Specimen Source: Catheterized Catheterized  Culture Results:   >100,000 CFU/ml Providencia stuartii

## 2023-08-01 NOTE — PROGRESS NOTE ADULT - ASSESSMENT
Patient is a 77 female from [facility] PMHx HTN, HLD, CVA (w/ residual aphasia and R sided hemiparesis/plegia) who was sent to the hospital due to altered mental status. Patient is being admitted to medicine for further work up and rule out stroke vs encephalopathy (metabolic vs infectious).  Patient is a 77 female from [facility] PMHx HTN, HLD, CVA (w/ residual aphasia and R sided hemiparesis/plegia) who was sent to the hospital due to altered mental status. Patient is being admitted to medicine for further work up and rule out stroke vs encephalopathy (metabolic vs infectious).  Patient is a 77 female from [facility] PMHx HTN, HLD, CVA (w/ residual aphasia and R sided hemiparesis/plegia) who was sent to the hospital due to altered mental status. Patient is being admitted to medicine for further work up and rule out stroke vs encephalopathy (metabolic vs infectious).

## 2023-08-01 NOTE — PROGRESS NOTE ADULT - PROBLEM SELECTOR PLAN 3
pt BP has been really high ranging from 190s-180s/85 . Hydralazine has been given PRN. now she is on hydralazine every 6 hours 10 mg IV. BP being monitored.  Cardiology consulted Dr. Dunn-> raised her clonidine patch to put on clonidine patch .2 mg weekly

## 2023-08-02 DIAGNOSIS — R53.81 OTHER MALAISE: ICD-10-CM

## 2023-08-02 DIAGNOSIS — E43 UNSPECIFIED SEVERE PROTEIN-CALORIE MALNUTRITION: ICD-10-CM

## 2023-08-02 DIAGNOSIS — Z51.5 ENCOUNTER FOR PALLIATIVE CARE: ICD-10-CM

## 2023-08-02 DIAGNOSIS — N17.9 ACUTE KIDNEY FAILURE, UNSPECIFIED: ICD-10-CM

## 2023-08-02 LAB
-  AMIKACIN: SIGNIFICANT CHANGE UP
-  AMOXICILLIN/CLAVULANIC ACID: SIGNIFICANT CHANGE UP
-  AMPICILLIN/SULBACTAM: SIGNIFICANT CHANGE UP
-  AMPICILLIN: SIGNIFICANT CHANGE UP
-  AZTREONAM: SIGNIFICANT CHANGE UP
-  CEFAZOLIN: SIGNIFICANT CHANGE UP
-  CEFEPIME: SIGNIFICANT CHANGE UP
-  CEFOXITIN: SIGNIFICANT CHANGE UP
-  CEFTRIAXONE: SIGNIFICANT CHANGE UP
-  CEFUROXIME: SIGNIFICANT CHANGE UP
-  CIPROFLOXACIN: SIGNIFICANT CHANGE UP
-  ERTAPENEM: SIGNIFICANT CHANGE UP
-  GENTAMICIN: SIGNIFICANT CHANGE UP
-  LEVOFLOXACIN: SIGNIFICANT CHANGE UP
-  MEROPENEM: SIGNIFICANT CHANGE UP
-  NITROFURANTOIN: SIGNIFICANT CHANGE UP
-  PIPERACILLIN/TAZOBACTAM: SIGNIFICANT CHANGE UP
-  TOBRAMYCIN: SIGNIFICANT CHANGE UP
-  TRIMETHOPRIM/SULFAMETHOXAZOLE: SIGNIFICANT CHANGE UP
ALBUMIN SERPL ELPH-MCNC: 2.5 G/DL — LOW (ref 3.5–5)
ALP SERPL-CCNC: 89 U/L — SIGNIFICANT CHANGE UP (ref 40–120)
ALT FLD-CCNC: 13 U/L DA — SIGNIFICANT CHANGE UP (ref 10–60)
ANION GAP SERPL CALC-SCNC: 12 MMOL/L — SIGNIFICANT CHANGE UP (ref 5–17)
AST SERPL-CCNC: 18 U/L — SIGNIFICANT CHANGE UP (ref 10–40)
BILIRUB SERPL-MCNC: 0.3 MG/DL — SIGNIFICANT CHANGE UP (ref 0.2–1.2)
BUN SERPL-MCNC: 47 MG/DL — HIGH (ref 7–18)
CALCIUM SERPL-MCNC: 8.2 MG/DL — LOW (ref 8.4–10.5)
CHLORIDE SERPL-SCNC: 116 MMOL/L — HIGH (ref 96–108)
CO2 SERPL-SCNC: 20 MMOL/L — LOW (ref 22–31)
CREAT SERPL-MCNC: 2.35 MG/DL — HIGH (ref 0.5–1.3)
CULTURE RESULTS: SIGNIFICANT CHANGE UP
EGFR: 21 ML/MIN/1.73M2 — LOW
GLUCOSE BLDC GLUCOMTR-MCNC: 100 MG/DL — HIGH (ref 70–99)
GLUCOSE BLDC GLUCOMTR-MCNC: 102 MG/DL — HIGH (ref 70–99)
GLUCOSE BLDC GLUCOMTR-MCNC: 105 MG/DL — HIGH (ref 70–99)
GLUCOSE SERPL-MCNC: 109 MG/DL — HIGH (ref 70–99)
HCT VFR BLD CALC: 29.3 % — LOW (ref 34.5–45)
HGB BLD-MCNC: 9.2 G/DL — LOW (ref 11.5–15.5)
MAGNESIUM SERPL-MCNC: 2.7 MG/DL — HIGH (ref 1.6–2.6)
MCHC RBC-ENTMCNC: 28.6 PG — SIGNIFICANT CHANGE UP (ref 27–34)
MCHC RBC-ENTMCNC: 31.4 GM/DL — LOW (ref 32–36)
MCV RBC AUTO: 91 FL — SIGNIFICANT CHANGE UP (ref 80–100)
METHOD TYPE: SIGNIFICANT CHANGE UP
NRBC # BLD: 0 /100 WBCS — SIGNIFICANT CHANGE UP (ref 0–0)
ORGANISM # SPEC MICROSCOPIC CNT: SIGNIFICANT CHANGE UP
PHOSPHATE SERPL-MCNC: 5.6 MG/DL — HIGH (ref 2.5–4.5)
PLATELET # BLD AUTO: 305 K/UL — SIGNIFICANT CHANGE UP (ref 150–400)
POTASSIUM SERPL-MCNC: 3.7 MMOL/L — SIGNIFICANT CHANGE UP (ref 3.5–5.3)
POTASSIUM SERPL-SCNC: 3.7 MMOL/L — SIGNIFICANT CHANGE UP (ref 3.5–5.3)
PROT SERPL-MCNC: 7.4 G/DL — SIGNIFICANT CHANGE UP (ref 6–8.3)
RBC # BLD: 3.22 M/UL — LOW (ref 3.8–5.2)
RBC # FLD: 14.7 % — HIGH (ref 10.3–14.5)
SODIUM SERPL-SCNC: 148 MMOL/L — HIGH (ref 135–145)
SPECIMEN SOURCE: SIGNIFICANT CHANGE UP
WBC # BLD: 9.2 K/UL — SIGNIFICANT CHANGE UP (ref 3.8–10.5)
WBC # FLD AUTO: 9.2 K/UL — SIGNIFICANT CHANGE UP (ref 3.8–10.5)

## 2023-08-02 PROCEDURE — 99223 1ST HOSP IP/OBS HIGH 75: CPT

## 2023-08-02 PROCEDURE — 99497 ADVNCD CARE PLAN 30 MIN: CPT | Mod: 25

## 2023-08-02 RX ADMIN — Medication 55 MILLIGRAM(S): at 10:04

## 2023-08-02 RX ADMIN — Medication 1 PATCH: at 20:09

## 2023-08-02 RX ADMIN — Medication 10 MILLIGRAM(S): at 06:15

## 2023-08-02 RX ADMIN — Medication 10 MILLIGRAM(S): at 11:44

## 2023-08-02 RX ADMIN — Medication 10 MILLIGRAM(S): at 17:59

## 2023-08-02 RX ADMIN — Medication 10 MILLIGRAM(S): at 00:14

## 2023-08-02 RX ADMIN — SODIUM CHLORIDE 70 MILLILITER(S): 9 INJECTION, SOLUTION INTRAVENOUS at 10:04

## 2023-08-02 RX ADMIN — Medication 55 MILLIGRAM(S): at 17:59

## 2023-08-02 RX ADMIN — ENOXAPARIN SODIUM 30 MILLIGRAM(S): 100 INJECTION SUBCUTANEOUS at 17:59

## 2023-08-02 RX ADMIN — Medication 1 PATCH: at 07:49

## 2023-08-02 RX ADMIN — Medication 300 MILLIGRAM(S): at 11:43

## 2023-08-02 RX ADMIN — Medication 55 MILLIGRAM(S): at 00:14

## 2023-08-02 NOTE — PROGRESS NOTE ADULT - PROBLEM SELECTOR PLAN 5
due to water deficit and insensible losses. Pt. is clinically euvolemic. Na in the 150's .  avoid high solute intake diet and sodium bicarb infuse.  continue to give  dextrose % 70 cc

## 2023-08-02 NOTE — PROGRESS NOTE ADULT - SUBJECTIVE AND OBJECTIVE BOX
PGY-1 Progress Note discussed with attending    PAGER #: [[phone]] TILL 5:00 PM  PLEASE CONTACT ON CALL TEAM:  - On Call Team (Please refer to Tyler) FROM 5:00 PM - 8:30PM  - Nightfloat Team FROM 8:30 -7:30 AM    CHIEF COMPLAINT & BRIEF HOSPITAL COURSE: No acute overnight events. Pt. has the same baseline as yesterday. Palliative saw pt. and will discuss NG tube with family.     INTERVAL HPI/OVERNIGHT EVENTS:   MEDICATIONS  (STANDING):  aspirin Suppository 300 milliGRAM(s) Rectal daily  cloNIDine Patch 0.2 mG/24Hr(s) 1 patch Transdermal <User Schedule>  dextrose 5%. 1000 milliLiter(s) (70 mL/Hr) IV Continuous <Continuous>  enoxaparin Injectable 30 milliGRAM(s) SubCutaneous every 24 hours  hydrALAZINE Injectable 10 milliGRAM(s) IV Push every 6 hours  valproate sodium  IVPB 500 milliGRAM(s) IV Intermittent every 8 hours    MEDICATIONS  (PRN):      REVIEW OF SYSTEMS:  CONSTITUTIONAL: No fever, weight loss, or fatigue  RESPIRATORY: No shortness of breath  CARDIOVASCULAR: No chest pain  GASTROINTESTINAL: No abdominal pain.  GENITOURINARY: No dysuria  NEUROLOGICAL: No headaches  SKIN: No itching, burning, rashes    Vital Signs Last 24 Hrs  T(C): 36.8 (02 Aug 2023 05:22), Max: 36.8 (01 Aug 2023 21:41)  T(F): 98.2 (02 Aug 2023 05:22), Max: 98.2 (01 Aug 2023 21:41)  HR: 90 (02 Aug 2023 05:22) (77 - 93)  BP: 168/88 (02 Aug 2023 05:22) (149/75 - 170/78)  BP(mean): --  RR: 18 (02 Aug 2023 05:22) (17 - 18)  SpO2: 98% (02 Aug 2023 05:22) (98% - 99%)    Parameters below as of 02 Aug 2023 05:22  Patient On (Oxygen Delivery Method): room air        PHYSICAL EXAMINATION:  GENERAL: NAD, well built  HEAD:  Atraumatic, Normocephalic  EYES:  conjunctiva and sclera clear  CHEST/LUNG: Clear to auscultation. No rales, rhonchi, wheezing, or rubs  HEART: Regular rate and rhythm; No murmurs, rubs, or gallops  ABDOMEN: Soft, Nontender, Nondistended; Bowel sounds present  NERVOUS SYSTEM:  Alert & Oriented X3,    EXTREMITIES:  2+ Peripheral Pulses, No clubbing, cyanosis, or edema  SKIN: warm dry                          9.2    9.20  )-----------( 305      ( 02 Aug 2023 05:55 )             29.3     08-02    148<H>  |  116<H>  |  47<H>  ----------------------------<  109<H>  3.7   |  20<L>  |  2.35<H>    Ca    8.2<L>      02 Aug 2023 05:55  Phos  5.6     08-02  Mg     2.7     08-02    TPro  7.4  /  Alb  2.5<L>  /  TBili  0.3  /  DBili  x   /  AST  18  /  ALT  13  /  AlkPhos  89  08-02    LIVER FUNCTIONS - ( 02 Aug 2023 05:55 )  Alb: 2.5 g/dL / Pro: 7.4 g/dL / ALK PHOS: 89 U/L / ALT: 13 U/L DA / AST: 18 U/L / GGT: x                   CAPILLARY BLOOD GLUCOSE      RADIOLOGY & ADDITIONAL TESTS:                   PGY-1 Progress Note discussed with attending    PAGER #: [[phone]] TILL 5:00 PM  PLEASE CONTACT ON CALL TEAM:  - On Call Team (Please refer to Tyler) FROM 5:00 PM - 8:30PM  - Nightfloat Team FROM 8:30 -7:30 AM    CHIEF COMPLAINT & BRIEF HOSPITAL COURSE: No acute overnight events. Pt. has the same baseline as yesterday. Palliative saw pt. and will discuss NG tube with family.     INTERVAL HPI/OVERNIGHT EVENTS:   MEDICATIONS  (STANDING):  aspirin Suppository 300 milliGRAM(s) Rectal daily  cloNIDine Patch 0.2 mG/24Hr(s) 1 patch Transdermal <User Schedule>  dextrose 5%. 1000 milliLiter(s) (70 mL/Hr) IV Continuous <Continuous>  enoxaparin Injectable 30 milliGRAM(s) SubCutaneous every 24 hours  hydrALAZINE Injectable 10 milliGRAM(s) IV Push every 6 hours  valproate sodium  IVPB 500 milliGRAM(s) IV Intermittent every 8 hours    MEDICATIONS  (PRN):      REVIEW OF SYSTEMS:  CONSTITUTIONAL: No fever, weight loss, or fatigue  RESPIRATORY: No shortness of breath  CARDIOVASCULAR: No chest pain  GASTROINTESTINAL: No abdominal pain.  GENITOURINARY: No dysuria  NEUROLOGICAL: No headaches  SKIN: No itching, burning, rashes    Vital Signs Last 24 Hrs  T(C): 36.8 (02 Aug 2023 05:22), Max: 36.8 (01 Aug 2023 21:41)  T(F): 98.2 (02 Aug 2023 05:22), Max: 98.2 (01 Aug 2023 21:41)  HR: 90 (02 Aug 2023 05:22) (77 - 93)  BP: 168/88 (02 Aug 2023 05:22) (149/75 - 170/78)  BP(mean): --  RR: 18 (02 Aug 2023 05:22) (17 - 18)  SpO2: 98% (02 Aug 2023 05:22) (98% - 99%)    Parameters below as of 02 Aug 2023 05:22  Patient On (Oxygen Delivery Method): room air        PHYSICAL EXAMINATION:  GENERAL: NAD, well built  HEAD:  Atraumatic, Normocephalic  EYES:  conjunctiva and sclera clear  CHEST/LUNG: Clear to auscultation. No rales, rhonchi, wheezing, or rubs  HEART: Regular rate and rhythm; No murmurs, rubs, or gallops  ABDOMEN: Soft, Nontender, Nondistended; Bowel sounds present  NERVOUS SYSTEM:  Alert & Oriented X3,    EXTREMITIES:  2+ Peripheral Pulses, No clubbing, cyanosis, or edema  SKIN: warm dry                          9.2    9.20  )-----------( 305      ( 02 Aug 2023 05:55 )             29.3     08-02    148<H>  |  116<H>  |  47<H>  ----------------------------<  109<H>  3.7   |  20<L>  |  2.35<H>    Ca    8.2<L>      02 Aug 2023 05:55  Phos  5.6     08-02  Mg     2.7     08-02    TPro  7.4  /  Alb  2.5<L>  /  TBili  0.3  /  DBili  x   /  AST  18  /  ALT  13  /  AlkPhos  89  08-02    LIVER FUNCTIONS - ( 02 Aug 2023 05:55 )  Alb: 2.5 g/dL / Pro: 7.4 g/dL / ALK PHOS: 89 U/L / ALT: 13 U/L DA / AST: 18 U/L / GGT: x                   CAPILLARY BLOOD GLUCOSE      RADIOLOGY & ADDITIONAL TESTS:                   PGY-1 Progress Note discussed with attending    PAGER #: [[phone]] TILL 5:00 PM  PLEASE CONTACT ON CALL TEAM:  - On Call Team (Please refer to Tyler) FROM 5:00 PM - 8:30PM  - Nightfloat Team FROM 8:30 -7:30 AM    CHIEF COMPLAINT & BRIEF HOSPITAL COURSE: No acute overnight events. Pt. has the same baseline as yesterday. Palliative saw pt. and will discuss NG tube with family.     INTERVAL HPI/OVERNIGHT EVENTS:   MEDICATIONS  (STANDING):  aspirin Suppository 300 milliGRAM(s) Rectal daily  cloNIDine Patch 0.2 mG/24Hr(s) 1 patch Transdermal <User Schedule>  dextrose 5%. 1000 milliLiter(s) (70 mL/Hr) IV Continuous <Continuous>  enoxaparin Injectable 30 milliGRAM(s) SubCutaneous every 24 hours  hydrALAZINE Injectable 10 milliGRAM(s) IV Push every 6 hours  valproate sodium  IVPB 500 milliGRAM(s) IV Intermittent every 8 hours    MEDICATIONS  (PRN):      REVIEW OF SYSTEMS:  CONSTITUTIONAL: No fever, weight loss, or fatigue  RESPIRATORY: No shortness of breath  CARDIOVASCULAR: No chest pain  GASTROINTESTINAL: No abdominal pain.  GENITOURINARY: No dysuria  NEUROLOGICAL: No headaches  SKIN: No itching, burning, rashes    Vital Signs Last 24 Hrs  T(C): 36.8 (02 Aug 2023 05:22), Max: 36.8 (01 Aug 2023 21:41)  T(F): 98.2 (02 Aug 2023 05:22), Max: 98.2 (01 Aug 2023 21:41)  HR: 90 (02 Aug 2023 05:22) (77 - 93)  BP: 168/88 (02 Aug 2023 05:22) (149/75 - 170/78)  BP(mean): --  RR: 18 (02 Aug 2023 05:22) (17 - 18)  SpO2: 98% (02 Aug 2023 05:22) (98% - 99%)    Parameters below as of 02 Aug 2023 05:22  Patient On (Oxygen Delivery Method): room air        PHYSICAL EXAMINATION:  GENERAL: NAD, well built  HEAD:  Atraumatic, Normocephalic  EYES:  conjunctiva and sclera clear  CHEST/LUNG: Clear to auscultation. No rales, rhonchi, wheezing, or rubs  HEART: Regular rate and rhythm; No murmurs, rubs, or gallops  ABDOMEN: Soft, Nontender, Nondistended; Bowel sounds present  NERVOUS SYSTEM:  Alert & Oriented X3,    EXTREMITIES:  2+ Peripheral Pulses, No clubbing, cyanosis, or edema  SKIN: warm dry                          9.2    9.20  )-----------( 305      ( 02 Aug 2023 05:55 )             29.3     08-02    148<H>  |  116<H>  |  47<H>  ----------------------------<  109<H>  3.7   |  20<L>  |  2.35<H>    Ca    8.2<L>      02 Aug 2023 05:55  Phos  5.6     08-02  Mg     2.7     08-02    TPro  7.4  /  Alb  2.5<L>  /  TBili  0.3  /  DBili  x   /  AST  18  /  ALT  13  /  AlkPhos  89  08-02    LIVER FUNCTIONS - ( 02 Aug 2023 05:55 )  Alb: 2.5 g/dL / Pro: 7.4 g/dL / ALK PHOS: 89 U/L / ALT: 13 U/L DA / AST: 18 U/L / GGT: x                   CAPILLARY BLOOD GLUCOSE      RADIOLOGY & ADDITIONAL TESTS:

## 2023-08-02 NOTE — CONSULT NOTE ADULT - PROBLEM SELECTOR RECOMMENDATION 5
Patient is a 77 female from [facility] PMHx HTN, HLD, CVA (w/ residual aphasia and R sided hemiparesis/plegia) who was sent to the hospital due to altered mental status. Patient is being admitted to medicine for further work up and rule out stroke vs encephalopathy (metabolic vs infectious).  Pt is hospice appropriate.  Pt's family is requesting PEG.    Please see discussion noted above in GOC conversation. Patient is a 77 female from [facility] PMHx HTN, HLD, CVA (w/ residual aphasia and R sided hemiparesis/plegia) who was sent to the hospital due to altered mental status. Patient is being admitted to medicine for further work up and rule out stroke vs encephalopathy (metabolic vs infectious).  Pt is hospice appropriate.  Pt's family is requesting PEG.    Please see discussion noted above in GOC conversation. Patient is a 77 female from [facility] PMHx HTN, HLD, CVA (w/ residual aphasia and R sided hemiparesis/plegia) who was sent to the hospital due to altered mental status. Patient is being admitted to medicine for further work up and rule out stroke vs encephalopathy (metabolic vs infectious).  Pt is hospice appropriate.  Pt's family is requesting PEG.    Please see discussion noted above in GOC conversation.

## 2023-08-02 NOTE — PROGRESS NOTE ADULT - PROBLEM SELECTOR PLAN 8
H/O seizures.   C/w Depacon.  Neurology consulted - Dr. Whitney-  > ordered EEG-potential epileptogenic factors in the left posterior temporal lobe and focal cerebral dysfunction in L  hemisphere  valporic acid levels for AM, no signs of new stroke.

## 2023-08-02 NOTE — PROGRESS NOTE ADULT - ASSESSMENT
77 female from [facility] PMHx HTN, HLD, CVA (w/ residual aphasia and R sided hemiparesis/plegia) who was sent to the hospital due to altered mental status,UTI.  1.No new CVA on MRI.  2.UTI-s/p ABX.  3.HTN- clonidine patch .2mg q wk,IV hydralazine.  5.Lipid d/o-hold statin.  6.Pt unable to do speech and swallow eval. ?PEG.Palliative eval.  7.Hypernatremia-IVF.  8.GI and DVT prophylaxis. 77 female from [facility] PMHx HTN, HLD, CVA (w/ residual aphasia and R sided hemiparesis/plegia) who was sent to the hospital due to altered mental status,UTI.  1.No new CVA on MRI.  2.UTI-s/p ABX.  3.HTN- clonidine patch .2mg q wk,IV hydralazine.  5.Lipid d/o-hold statin.  6.Pt unable to do speech and swallow eval. ?PEG.Palliative eval.  7.Hypernatremia-IVF.  8.GI and DVT prophylaxis. 77 female from [facility] PMHx HTN, HLD, CVA (w/ residual aphasia and R sided hemiparesis/plegia) who was sent to the hospital due to altered mental status,UTI.  1.No new CVA on MRI.  2.UTI-s/p ABX.  3.HTN- clonidine patch .2mg q wk,IV hydralazine.  5.Lipid d/o-hold statin.  6.Pt unable to do speech and swallow eval. ?PEG.Palliative eval.  7.Hypernatremia-IVF.  8.GI and DVT prophylaxis.

## 2023-08-02 NOTE — CONSULT NOTE ADULT - PROBLEM SELECTOR RECOMMENDATION 3
Clinical evidence indicates that the patient has Severe protein calorie malnutrition/ 3rd degree. Albumin 2.5.  Poor po  intake prior to admission.    In context of   Chronic Illness (>1 month)    Energy/Food intake <50% of estimated energy requirement >5 days  Weight loss: Moderate - severe (lbs lost recently)  Body Fat loss: Severe   (Cachexia, temporal wasting, contracted, muscle atrophy)  Muscle mass loss: Severe  (Skin failure/pressure ulcers)   Strength: weakened severe (bedbound)      SLP sarah noted  Family wants long term  PEG tube feeds

## 2023-08-02 NOTE — CHART NOTE - NSCHARTNOTEFT_GEN_A_CORE
Assessment:     Factors impacting intake: [ ] none [ ] nausea  [ ] vomiting [ ] diarrhea [ ] constipation  [ ]chewing problems [ ] swallowing issues  [ ] other:     Diet Presciption: Diet, NPO (23 @ 19:29)    Intake:     Daily Weight in k.5 (01 Aug 2023 05:00)    % Weight Change    Pertinent Medications: MEDICATIONS  (STANDING):  aspirin Suppository 300 milliGRAM(s) Rectal daily  cloNIDine Patch 0.2 mG/24Hr(s) 1 patch Transdermal <User Schedule>  dextrose 5%. 1000 milliLiter(s) (70 mL/Hr) IV Continuous <Continuous>  enoxaparin Injectable 30 milliGRAM(s) SubCutaneous every 24 hours  hydrALAZINE Injectable 10 milliGRAM(s) IV Push every 6 hours  valproate sodium  IVPB 500 milliGRAM(s) IV Intermittent every 8 hours    MEDICATIONS  (PRN):    Pertinent Labs:  Na148 mmol/L<H> Glu 109 mg/dL<H> K+ 3.7 mmol/L Cr  2.35 mg/dL<H> BUN 47 mg/dL<H>  Phos 5.6 mg/dL<H>  Alb 2.5 g/dL<L>  Chol 152 mg/dL LDL --    HDL 51 mg/dL Trig 93 mg/dL     CAPILLARY BLOOD GLUCOSE      POCT Blood Glucose.: 113 mg/dL (01 Aug 2023 18:19)  POCT Blood Glucose.: 111 mg/dL (01 Aug 2023 11:54)      Skin:     Estimated Needs:   [ ] no change since previous assessment  [ ] recalculated:     Previous Nutrition Diagnosis:   [ ] Inadequate Energy Intake [ ]Inadequate Oral Intake [ ] Excessive Energy Intake   [ ] Underweight [ ] Increased Nutrient Needs [ ] Overweight/Obesity  [ ] Swallowing Difficult   [ ] Altered GI Function [ ] Unintended Weight Loss [ ] Food & Nutrition Related Knowledge Deficit [ ] Malnutrition   [ ] Not Ready for Diet/Life Style Changes     Nutrition Diagnosis is [ ] ongoing  [ ] Improving   [ ] resolved [ ] not applicable     New Nutrition Diagnosis: [ ] not applicable       Interventions:   Recommend  [ ] Change Diet To:  [ ] Nutrition Supplement  [ ] Nutrition Support  [ ] Other:     Monitoring and Evaluation:   [ ] PO intake [ x ] Tolerance to diet prescription [ x ] weights [ x ] labs[ x ] follow up per protocol  [ ] other: Assessment:      Nutrition reassessment for follow-up NPO status. Chart reviewed, pt visited, non-verbal, lethargy, s/p swallow re-evaluation 23 with NPO recommended, NPO x 6-7d; Pending Palliative consult, will discuss ? tube feeding with family per MD; eGFR=21, PO4=7.8-->5.6, K=5.4-->3.7, SUSAN on CKD, Nephrology on the case    Factors impacting intake: [ x ] other: change in mental status; difficulty chewing; difficulty feeding self; difficulty swallowing; acute on chronic comorbidities including acute encephalopathy, SUSAN on CKD, h/o CVA    Diet Prescription: Diet, NPO (23 @ 19:29)    Intake: NPO at present     Daily Weight in k.5 (01 Aug 2023 05:00)    % Weight Change: 187 lb 23; 192.9 lb 23, part of wt changes may due to fluid/scale variance; 1+ lower leg edema-->1+/2+ extremity edema     Pertinent Medications: MEDICATIONS  (STANDING):  aspirin Suppository 300 milliGRAM(s) Rectal daily  cloNIDine Patch 0.2 mG/24Hr(s) 1 patch Transdermal <User Schedule>  dextrose 5%. 1000 milliLiter(s) (70 mL/Hr) IV Continuous <Continuous>  enoxaparin Injectable 30 milliGRAM(s) SubCutaneous every 24 hours  hydrALAZINE Injectable 10 milliGRAM(s) IV Push every 6 hours  valproate sodium  IVPB 500 milliGRAM(s) IV Intermittent every 8 hours    MEDICATIONS  (PRN):    Pertinent Labs:  Na148 mmol/L<H> Glu 109 mg/dL<H> K+ 3.7 mmol/L Cr  2.35 mg/dL<H> BUN 47 mg/dL<H>  Phos 5.6 mg/dL<H>  Alb 2.5 g/dL<L>  Chol 152 mg/dL LDL --    HDL 51 mg/dL Trig 93 mg/dL     CAPILLARY BLOOD GLUCOSE    POCT Blood Glucose.: 113 mg/dL (01 Aug 2023 18:19)  POCT Blood Glucose.: 111 mg/dL (01 Aug 2023 11:54)    Skin: skin intact     Estimated Needs:   [ x ] no change since previous assessment  [ ] recalculated:     Previous Nutrition Diagnosis:   [ x ]Inadequate Oral Intake     Nutrition Diagnosis is [ x ] ongoing  [ ] Improving   [ ] resolved [ ] not applicable     New Nutrition Diagnosis: [ x ] not applicable       Interventions:   Recommend  [ x ] Advance diet or consider alternate nutrition support if NPO prolonged further as medically feasible/if consistent with Goal of Care   [ ] Nutrition Supplement  [ x ] Nutrition Support:  If TF started as medically feasible, Nepro @ 55ml/h x 16h (880ml, 1584kcal, 71g protein, 642ml water daily at goal rate)  MD to adjust free water as needed   [ x ] Other: Discussed with MD/RN     Monitoring and Evaluation:   [ ] PO intake [ x ] Tolerance to diet prescription [ x ] weights [ x ] labs[ x ] follow up per protocol  [ ] other:

## 2023-08-02 NOTE — CONSULT NOTE ADULT - PROBLEM SELECTOR RECOMMENDATION 9
Hx of CVA and seizures. P/w altered mental status.  c/w cinamet, sertraline, baclofen and Depacon. Neuro following   HEAD CT: Chronic left MCA territory infarction.  CT PERFUSION demonstrated: Perfusion abnormality corresponding to the chronically infarcted left MCA territory.      DNR/DNI

## 2023-08-02 NOTE — PROGRESS NOTE ADULT - PROBLEM SELECTOR PLAN 2
P/w altered mental status   UA -ve.  CXR - ve.   c/w cinamet, sertraline, baclofen. Hold as patient is NPO.

## 2023-08-02 NOTE — PROGRESS NOTE ADULT - PROBLEM SELECTOR PLAN 1
P/w altered mental status  HEAD CT: Chronic left MCA territory infarction.  CT PERFUSION demonstrated: Perfusion abnormality corresponding to the chronically infarcted left MCA territory. INFARCT CORE: 57 mL. TISSUE AT RISK: 236 mL.  CTA COW and  CTA NECK: neg   Failed dysphagia and speech and swallow. -> palliative will talk to family about peg tube placement. Family does not want DNR/DNI  echo-> mild MR, and grade 1 diastolic dysfunction.   A1C-> 5.6  normal lipid profile.

## 2023-08-02 NOTE — PROGRESS NOTE ADULT - SUBJECTIVE AND OBJECTIVE BOX
CHIEF COMPLAINT:Patient is a 77y old  Female who presents with a chief complaint of Altered mental status. Pt appears comfortable.    	  REVIEW OF SYSTEMS:  unable to obtain      PHYSICAL EXAM:  T(C): 36.4 (08-02-23 @ 10:15), Max: 36.8 (08-01-23 @ 21:41)  HR: 91 (08-02-23 @ 10:15) (77 - 93)  BP: 166/85 (08-02-23 @ 10:15) (149/75 - 170/78)  RR: 18 (08-02-23 @ 10:15) (17 - 18)  SpO2: 98% (08-02-23 @ 10:15) (98% - 99%)  Wt(kg): --  I&O's Summary    02 Aug 2023 07:01  -  02 Aug 2023 11:45  --------------------------------------------------------  IN: 0 mL / OUT: 700 mL / NET: -700 mL        Appearance: Normal	  HEENT:   Normal oral mucosa, PERRL, EOMI	  Lymphatic: No lymphadenopathy  Cardiovascular: Normal S1 S2, No JVD, No murmurs, No edema  Respiratory: Lungs clear to auscultation	  Gastrointestinal:  Soft, Non-tender, + BS	  Skin: No rashes, No ecchymoses, No cyanosis	  Extremities: Normal range of motion, No clubbing, cyanosis or edema  Vascular: Peripheral pulses palpable 2+ bilaterally    MEDICATIONS  (STANDING):  aspirin Suppository 300 milliGRAM(s) Rectal daily  cloNIDine Patch 0.2 mG/24Hr(s) 1 patch Transdermal <User Schedule>  dextrose 5%. 1000 milliLiter(s) (70 mL/Hr) IV Continuous <Continuous>  enoxaparin Injectable 30 milliGRAM(s) SubCutaneous every 24 hours  hydrALAZINE Injectable 10 milliGRAM(s) IV Push every 6 hours  valproate sodium  IVPB 500 milliGRAM(s) IV Intermittent every 8 hours    	  	  LABS:	 	                      9.2    9.20  )-----------( 305      ( 02 Aug 2023 05:55 )             29.3     08-02    148<H>  |  116<H>  |  47<H>  ----------------------------<  109<H>  3.7   |  20<L>  |  2.35<H>    Ca    8.2<L>      02 Aug 2023 05:55  Phos  5.6     08-02  Mg     2.7     08-02    TPro  7.4  /  Alb  2.5<L>  /  TBili  0.3  /  DBili  x   /  AST  18  /  ALT  13  /  AlkPhos  89  08-02    proBNP:   Lipid Profile: Cholesterol 152  LDL --  HDL 51  TG 93  Ldl calc 82  Ratio --    HgA1c:   TSH: Thyroid Stimulating Hormone, Serum: 3.59 uU/mL (07-28 @ 05:03)

## 2023-08-02 NOTE — CONSULT NOTE ADULT - CONVERSATION DETAILS
Spoke with the pt's sister Marika via phone, discussed patient's current condition of worsening neurological deficits with now severe dysphagia (likely unable to take anything by mouth safely).  Marika endorsed the pt had her first stroke 14 years ago, and she has slowly watched her sister change cognitively and functionally.  Discussed the risk/ benefits of artificial nutrition.  PEG does not optimize pt's life although it may be more convenient to ensure some nutrition and medication, it will not prevent complications such as aspiration.  Marika wants to have PEG placed.  Discussed risks/benefits of LST such as CPR/ intubation, artificial nutrition and PEG in the context of advanced illness and debility. Family aware these invasive measures will not optimize the pt's life but may cause more stress and pain.   LISHA drafted: DNR/DNI/long term feeding tube/PEG.    All questions answered.  Support provided.   d/w primary team

## 2023-08-02 NOTE — PROGRESS NOTE ADULT - SUBJECTIVE AND OBJECTIVE BOX
NEPHROLOGY MEDICAL CARE, [facility] - Dr. Ajay Green/ Dr. Mert Madison/ Dr. Chris Calderon/ Dr. Carson Cook    Date of Service: 08-02-23 @ 17:41    Patient was seen and examined at bedside.    CC: patient is NAD    Vital Signs Last 24 Hrs  T(C): 36.4 (02 Aug 2023 14:06), Max: 36.8 (01 Aug 2023 21:41)  T(F): 97.6 (02 Aug 2023 14:06), Max: 98.2 (01 Aug 2023 21:41)  HR: 88 (02 Aug 2023 14:06) (88 - 93)  BP: 158/79 (02 Aug 2023 14:06) (158/76 - 168/88)  BP(mean): --  RR: 18 (02 Aug 2023 14:06) (17 - 18)  SpO2: 97% (02 Aug 2023 14:06) (97% - 99%)    Parameters below as of 02 Aug 2023 14:06  Patient On (Oxygen Delivery Method): room air        08-02 @ 07:01  -  08-02 @ 17:41  --------------------------------------------------------  IN: 0 mL / OUT: 700 mL / NET: -700 mL        PHYSICAL EXAM:  General: No acute respiratory distress.  Eyes: conjunctiva and sclera clear  ENMT: Atraumatic, Normocephalic, supple, No JVD present. Moist mucous membranes  Respiratory: Bilateral clear lungs; No rales, rhonchi, wheezing  Cardiovascular: S1S2+; no m/r/g  Gastrointestinal: Soft, Non-tender, Nondistended; Bowel sounds present  Neuro: eyes are open; hemiparesis.  Ext:  1+pedal edema, No Cyanosis  Skin: No visible rashes      MEDICATIONS:  MEDICATIONS  (STANDING):  aspirin Suppository 300 milliGRAM(s) Rectal daily  cloNIDine Patch 0.2 mG/24Hr(s) 1 patch Transdermal <User Schedule>  dextrose 5%. 1000 milliLiter(s) (70 mL/Hr) IV Continuous <Continuous>  enoxaparin Injectable 30 milliGRAM(s) SubCutaneous every 24 hours  hydrALAZINE Injectable 10 milliGRAM(s) IV Push every 6 hours  valproate sodium  IVPB 500 milliGRAM(s) IV Intermittent every 8 hours    MEDICATIONS  (PRN):          LABS:                        9.2    9.20  )-----------( 305      ( 02 Aug 2023 05:55 )             29.3     08-02    148<H>  |  116<H>  |  47<H>  ----------------------------<  109<H>  3.7   |  20<L>  |  2.35<H>    Ca    8.2<L>      02 Aug 2023 05:55  Phos  5.6     08-02  Mg     2.7     08-02    TPro  7.4  /  Alb  2.5<L>  /  TBili  0.3  /  DBili  x   /  AST  18  /  ALT  13  /  AlkPhos  89  08-02      Urinalysis Basic - ( 02 Aug 2023 05:55 )    Color: x / Appearance: x / SG: x / pH: x  Gluc: 109 mg/dL / Ketone: x  / Bili: x / Urobili: x   Blood: x / Protein: x / Nitrite: x   Leuk Esterase: x / RBC: x / WBC x   Sq Epi: x / Non Sq Epi: x / Bacteria: x      Magnesium: 2.7 mg/dL (08-02 @ 05:55)  Phosphorus: 5.6 mg/dL (08-02 @ 05:55)    Urine studies    PTH and Vit D:         NEPHROLOGY MEDICAL CARE, [facility] - Dr. Ajay Green/ Dr. Mert Madison/ Dr. Chris Calderon/ Dr. Carson Cook    Date of Service: 08-02-23 @ 17:41    Patient was seen and examined at bedside.    CC: patient is NAD    Vital Signs Last 24 Hrs  T(C): 36.4 (02 Aug 2023 14:06), Max: 36.8 (01 Aug 2023 21:41)  T(F): 97.6 (02 Aug 2023 14:06), Max: 98.2 (01 Aug 2023 21:41)  HR: 88 (02 Aug 2023 14:06) (88 - 93)  BP: 158/79 (02 Aug 2023 14:06) (158/76 - 168/88)  BP(mean): --  RR: 18 (02 Aug 2023 14:06) (17 - 18)  SpO2: 97% (02 Aug 2023 14:06) (97% - 99%)    Parameters below as of 02 Aug 2023 14:06  Patient On (Oxygen Delivery Method): room air        08-02 @ 07:01  -  08-02 @ 17:41  --------------------------------------------------------  IN: 0 mL / OUT: 700 mL / NET: -700 mL        PHYSICAL EXAM:  General: No acute respiratory distress.  Eyes: conjunctiva and sclera clear  ENMT: Atraumatic, Normocephalic, supple, No JVD present. Moist mucous membranes  Respiratory: Bilateral clear lungs; No rales, rhonchi, wheezing  Cardiovascular: S1S2+; no m/r/g  Gastrointestinal: Soft, Non-tender, Nondistended; Bowel sounds present  Neuro: eyes are open; hemiparesis.  Ext:  1+pedal edema, No Cyanosis  Skin: No visible rashes      MEDICATIONS:  MEDICATIONS  (STANDING):  aspirin Suppository 300 milliGRAM(s) Rectal daily  cloNIDine Patch 0.2 mG/24Hr(s) 1 patch Transdermal <User Schedule>  dextrose 5%. 1000 milliLiter(s) (70 mL/Hr) IV Continuous <Continuous>  enoxaparin Injectable 30 milliGRAM(s) SubCutaneous every 24 hours  hydrALAZINE Injectable 10 milliGRAM(s) IV Push every 6 hours  valproate sodium  IVPB 500 milliGRAM(s) IV Intermittent every 8 hours    MEDICATIONS  (PRN):          LABS:                        9.2    9.20  )-----------( 305      ( 02 Aug 2023 05:55 )             29.3     08-02    148<H>  |  116<H>  |  47<H>  ----------------------------<  109<H>  3.7   |  20<L>  |  2.35<H>    Ca    8.2<L>      02 Aug 2023 05:55  Phos  5.6     08-02  Mg     2.7     08-02    TPro  7.4  /  Alb  2.5<L>  /  TBili  0.3  /  DBili  x   /  AST  18  /  ALT  13  /  AlkPhos  89  08-02      Urinalysis Basic - ( 02 Aug 2023 05:55 )    Color: x / Appearance: x / SG: x / pH: x  Gluc: 109 mg/dL / Ketone: x  / Bili: x / Urobili: x   Blood: x / Protein: x / Nitrite: x   Leuk Esterase: x / RBC: x / WBC x   Sq Epi: x / Non Sq Epi: x / Bacteria: x      Magnesium: 2.7 mg/dL (08-02 @ 05:55)  Phosphorus: 5.6 mg/dL (08-02 @ 05:55)    Urine studies    PTH and Vit D:         NEPHROLOGY MEDICAL CARE, [facility] - Dr. Ajay Green/ Dr. Mert Madison/ Dr. Chris Calderon/ Dr. Carson Cook    Date of Service: 08-02-23 @ 17:41    Patient was seen and examined at bedside.    CC: patient is NAD    Vital Signs Last 24 Hrs  T(C): 36.4 (02 Aug 2023 14:06), Max: 36.8 (01 Aug 2023 21:41)  T(F): 97.6 (02 Aug 2023 14:06), Max: 98.2 (01 Aug 2023 21:41)  HR: 88 (02 Aug 2023 14:06) (88 - 93)  BP: 158/79 (02 Aug 2023 14:06) (158/76 - 168/88)  BP(mean): --  RR: 18 (02 Aug 2023 14:06) (17 - 18)  SpO2: 97% (02 Aug 2023 14:06) (97% - 99%)    Parameters below as of 02 Aug 2023 14:06  Patient On (Oxygen Delivery Method): room air        08-02 @ 07:01  -  08-02 @ 17:41  --------------------------------------------------------  IN: 0 mL / OUT: 700 mL / NET: -700 mL        PHYSICAL EXAM:  General: No acute respiratory distress.  Eyes: conjunctiva and sclera clear  ENMT: Atraumatic, Normocephalic, supple, No JVD present. Moist mucous membranes  Respiratory: Bilateral clear lungs; No rales, rhonchi, wheezing  Cardiovascular: S1S2+; no m/r/g  Gastrointestinal: Soft, Non-tender, Nondistended; Bowel sounds present  Neuro: eyes are open; hemiparesis.  Ext:  1+pedal edema, No Cyanosis  Skin: No visible rashes      MEDICATIONS:  MEDICATIONS  (STANDING):  aspirin Suppository 300 milliGRAM(s) Rectal daily  cloNIDine Patch 0.2 mG/24Hr(s) 1 patch Transdermal <User Schedule>  dextrose 5%. 1000 milliLiter(s) (70 mL/Hr) IV Continuous <Continuous>  enoxaparin Injectable 30 milliGRAM(s) SubCutaneous every 24 hours  hydrALAZINE Injectable 10 milliGRAM(s) IV Push every 6 hours  valproate sodium  IVPB 500 milliGRAM(s) IV Intermittent every 8 hours    MEDICATIONS  (PRN):          LABS:                        9.2    9.20  )-----------( 305      ( 02 Aug 2023 05:55 )             29.3     08-02    148<H>  |  116<H>  |  47<H>  ----------------------------<  109<H>  3.7   |  20<L>  |  2.35<H>    Ca    8.2<L>      02 Aug 2023 05:55  Phos  5.6     08-02  Mg     2.7     08-02    TPro  7.4  /  Alb  2.5<L>  /  TBili  0.3  /  DBili  x   /  AST  18  /  ALT  13  /  AlkPhos  89  08-02      Urinalysis Basic - ( 02 Aug 2023 05:55 )    Color: x / Appearance: x / SG: x / pH: x  Gluc: 109 mg/dL / Ketone: x  / Bili: x / Urobili: x   Blood: x / Protein: x / Nitrite: x   Leuk Esterase: x / RBC: x / WBC x   Sq Epi: x / Non Sq Epi: x / Bacteria: x      Magnesium: 2.7 mg/dL (08-02 @ 05:55)  Phosphorus: 5.6 mg/dL (08-02 @ 05:55)    Urine studies    PTH and Vit D:

## 2023-08-02 NOTE — PROGRESS NOTE ADULT - ASSESSMENT
1. SUSAN possible to MARISA and ATN  Renal sono shows no hdyro with b/l kidneys around 9.2cm  -Scr improving to 2.3mg/dL and continue IVFs  -urinalysis shows blood with proteinuria; FeNa >1%, spot protein to creatinine ratio is 1.5gm  -Adjust meds to eGFR and avoid IV Gadolinium contrast,NSAIDs, and phosphate enema.  -Monitor I/O's daily.   -Monitor SMA daily.  2. Hypernatremia due to water deficit and insensible losses. Pt is clinically euvolemic.   -Na improving. continue D5W at 70cc/hr   -Monitor I/O's. Check Serum Na Daily. Avoid high solute intake diet and sodium bicarbonate infuse. Avoid overcorrection of NA (8-10meq/day)  3. 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  -previous Scr around 1.2 to 1.6mg/dL in Oct 2022.  -Keep patient euvolemic and renal diet  -Avoid Nephrotoxic Meds/ Agents such as (NSAIDs, IV contrast, Aminoglycosides such as gentamicin, -Gadolinium contrast, Phosphate containing enemas, etc..)  -Adjust Medications according to eGFR  4. HTN:   -bp is uncontrolled. continue bp meds  -titrate bp meds to keep sbp >110 and < 130  5. Mineral Bone Disease:  -Elevated phos, will phos binder once able to have oral intake.  -PTH intact 289, will start calcitriol once phos improves.  6. Encephalopathy:  -on Rocephin  -Plan as per Neuro and primary team  -family requesting PEG.    Discussed the assessment and plan with Primary Team/Nurse 1. SSUAN possible to MARISA and ATN  Renal sono shows no hdyro with b/l kidneys around 9.2cm  -Scr improving to 2.3mg/dL and continue IVFs  -urinalysis shows blood with proteinuria; FeNa >1%, spot protein to creatinine ratio is 1.5gm  -Adjust meds to eGFR and avoid IV Gadolinium contrast,NSAIDs, and phosphate enema.  -Monitor I/O's daily.   -Monitor SMA daily.  2. Hypernatremia due to water deficit and insensible losses. Pt is clinically euvolemic.   -Na improving. continue D5W at 70cc/hr   -Monitor I/O's. Check Serum Na Daily. Avoid high solute intake diet and sodium bicarbonate infuse. Avoid overcorrection of NA (8-10meq/day)  3. 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  -previous Scr around 1.2 to 1.6mg/dL in Oct 2022.  -Keep patient euvolemic and renal diet  -Avoid Nephrotoxic Meds/ Agents such as (NSAIDs, IV contrast, Aminoglycosides such as gentamicin, -Gadolinium contrast, Phosphate containing enemas, etc..)  -Adjust Medications according to eGFR  4. HTN:   -bp is uncontrolled. continue bp meds  -titrate bp meds to keep sbp >110 and < 130  5. Mineral Bone Disease:  -Elevated phos, will phos binder once able to have oral intake.  -PTH intact 289, will start calcitriol once phos improves.  6. Encephalopathy:  -on Rocephin  -Plan as per Neuro and primary team  -family requesting PEG.    Discussed the assessment and plan with Primary Team/Nurse

## 2023-08-02 NOTE — CONSULT NOTE ADULT - PROBLEM SELECTOR RECOMMENDATION 2
SUSAN possible to MARISA and ATN.  Scr improving 2.35.  Nephrology following    Avoid nephrotoxic medications/NSAIDs

## 2023-08-02 NOTE — CONSULT NOTE ADULT - SUBJECTIVE AND OBJECTIVE BOX
[facility] Geriatric and Palliative Consult Service:  Naye Naomi DO: cell ([phone])  Mark Crowe MD: cell ([phone])  Nadir Masters NP: cell ([phone])   Alexx Hi SW: cell ([phone])   Beena Chandler NP: via [employer] Teams    Can contact any Palliative Team member via [employer] Teams for consults and questions      HPI:  Patient is a 77 female from [facility] PMHx HTN, HLD, CVA (w/ residual aphasia and R sided hemiparesis/plegia) who was sent to the hospital due to altered mental status. Patient is not able to present any history. Patient is lying down in bed, awake and alert. However, patient does not speak, is not able to follow commands and does not turn face or move eyes when her name is called. As per nursing home papers  patient was noted to have change in mental status with eye fixed in one direction and unable to swallow food. Patient's sister Marika San ([phone]) was called to inquire about baseline mental status. Patient can respond with one word answers, swallows food, however does not move arms/legs on baseline. Ed note also reports patient is exhibiting b/l upper arm weakness but resident is unable to confirm that during evaluation. Unable to obtain any ROS from the patient at this time.     Of note: Patient's sister noted that patient sister was recently started on medication for muscle spasms at nursing home.  (27 Jul 2023 19:19)    Interval hx:     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      Seizures      Chronic constipation      No significant past surgical history          SOCIAL HISTORY:    Admitted from:  home  (with HHA)           assisted living          [facility]       LTC   [ none ] Substance abuse, [ none ] Tobacco hx, [ none ] Alcohol hx, [ none ] Home Opioid Hx    FAMILY HISTORY:   unable to obtain from patient due to poor mentation, family unable to give information, see H&P for history  Baseline ADLs (prior to admission):    Allergies    &quot; NATURAL RUBBER&quot; (Other)  latex (Other)  penicillins (Unknown)    Intolerances      Present Symptoms: Mild, Moderate, Severe  Pain:             Location -                               Aggravating factors -             Quality -             Radiation -             Timing-             Severity (0-10 scale):             Minimal acceptable level (0-10 scale):  Fatigue:  Nausea:  Lack of Appetite:   SOB:  Depression:  Anxiety:  Review of Systems: [All others negative or Unable to obtain due to poor mentation]    CPOT:    https://www.[facility].org/getattachment/[email or web address]/Critical-Care-Pain-Observation-Tool-(CPOT)  PAIN AD Score:   http://[email or web address].[email or web address]/cog/painad.pdf (press ctrl +  left click to view)      MEDICATIONS  (STANDING):  aspirin Suppository 300 milliGRAM(s) Rectal daily  cloNIDine Patch 0.2 mG/24Hr(s) 1 patch Transdermal <User Schedule>  dextrose 5%. 1000 milliLiter(s) (70 mL/Hr) IV Continuous <Continuous>  enoxaparin Injectable 30 milliGRAM(s) SubCutaneous every 24 hours  hydrALAZINE Injectable 10 milliGRAM(s) IV Push every 6 hours  valproate sodium  IVPB 500 milliGRAM(s) IV Intermittent every 8 hours    MEDICATIONS  (PRN):      PHYSICAL EXAM:  Vital Signs Last 24 Hrs  T(C): 36.4 (02 Aug 2023 14:06), Max: 36.8 (01 Aug 2023 21:41)  T(F): 97.6 (02 Aug 2023 14:06), Max: 98.2 (01 Aug 2023 21:41)  HR: 88 (02 Aug 2023 14:06) (77 - 93)  BP: 158/79 (02 Aug 2023 14:06) (149/75 - 168/88)  BP(mean): --  RR: 18 (02 Aug 2023 14:06) (17 - 18)  SpO2: 97% (02 Aug 2023 14:06) (97% - 99%)    Parameters below as of 02 Aug 2023 14:06  Patient On (Oxygen Delivery Method): room air        General: alert  oriented x ____    lethargic distressed cachexia  nonverbal  unarousable verbal    Palliative Performance Scale/Karnofsky Score:  http://npcrc.org/files/news/palliative_performance_scale_ppsv2.pdf    HEENT: no abnormal lesion, dry mouth  ET tube/trach oral lesions:  Lungs: tachypnea/labored breathing, audible excessive secretions  CV: RRR, S1S2, tachycardia  GI: soft non distended non tender  incontinent               PEG/NG/OG tube  constipation  last BM:   : incontinent  oliguria/anuria  mcclellan  Musculoskeletal: weakness x4 edema x4    ambulatory with assistance   mostly/fully bedbound/wheelchair bound  Skin: no abnormal skin lesions, poor skin turgor, pressure ulcer stage:   Neuro: no deficits, mild cognitive impairment dsyphagia/dysarthria paresis  Oral intake ability: unable/only mouth care, minimal moderate full capability    LABS:                        9.2    9.20  )-----------( 305      ( 02 Aug 2023 05:55 )             29.3     08-02    148<H>  |  116<H>  |  47<H>  ----------------------------<  109<H>  3.7   |  20<L>  |  2.35<H>    Ca    8.2<L>      02 Aug 2023 05:55  Phos  5.6     08-02  Mg     2.7     08-02    TPro  7.4  /  Alb  2.5<L>  /  TBili  0.3  /  DBili  x   /  AST  18  /  ALT  13  /  AlkPhos  89  08-02    Urinalysis Basic - ( 02 Aug 2023 05:55 )    Color: x / Appearance: x / SG: x / pH: x  Gluc: 109 mg/dL / Ketone: x  / Bili: x / Urobili: x   Blood: x / Protein: x / Nitrite: x   Leuk Esterase: x / RBC: x / WBC x   Sq Epi: x / Non Sq Epi: x / Bacteria: x    < from: CT Angio Neck Stroke Protocol w/ IV Cont (07.27.23 @ 14:57) >      < end of copied text >      RADIOLOGY & ADDITIONAL STUDIES: reviewed  ADVANCED DIRECTIVES: DNR/DNI/Long term Feeding tube         [facility] Geriatric and Palliative Consult Service:  Naye Naomi DO: cell ([phone])  Mark Crowe MD: cell ([phone])  Nadir Masters NP: cell ([phone])   Alexx Hi SW: cell ([phone])   Beena Chandler NP: via [employer]    Can contact any Palliative Team member via [employer] for consults and questions      HPI:  Patient is a 77 female from [facility] PMHx HTN, HLD, CVA (w/ residual aphasia and R sided hemiparesis/plegia) who was sent to the hospital due to altered mental status. Patient is not able to present any history. Patient is lying down in bed, awake and alert. However, patient does not speak, is not able to follow commands and does not turn face or move eyes when her name is called. As per nursing home papers  patient was noted to have change in mental status with eye fixed in one direction and unable to swallow food. Patient's sister Marika San ([phone]) was called to inquire about baseline mental status. Patient can respond with one word answers, swallows food, however does not move arms/legs on baseline. Ed note also reports patient is exhibiting b/l upper arm weakness but resident is unable to confirm that during evaluation. Unable to obtain any ROS from the patient at this time.     Of note: Patient's sister noted that patient sister was recently started on medication for muscle spasms at nursing home.  (27 Jul 2023 19:19)    Interval hx:     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      Seizures      Chronic constipation      No significant past surgical history          SOCIAL HISTORY:    Admitted from:  home  (with HHA)           assisted living          [facility]       LTC   [ none ] Substance abuse, [ none ] Tobacco hx, [ none ] Alcohol hx, [ none ] Home Opioid Hx    FAMILY HISTORY:   unable to obtain from patient due to poor mentation, family unable to give information, see H&P for history  Baseline ADLs (prior to admission):    Allergies    &quot; NATURAL RUBBER&quot; (Other)  latex (Other)  penicillins (Unknown)    Intolerances      Present Symptoms: Mild, Moderate, Severe  Pain:             Location -                               Aggravating factors -             Quality -             Radiation -             Timing-             Severity (0-10 scale):             Minimal acceptable level (0-10 scale):  Fatigue:  Nausea:  Lack of Appetite:   SOB:  Depression:  Anxiety:  Review of Systems: [All others negative or Unable to obtain due to poor mentation]    CPOT:    https://www.[facility].org/getattachment/[email or web address]/Critical-Care-Pain-Observation-Tool-(CPOT)  PAIN AD Score:   http://geriatrictoolkit.[facility]/cog/painad.pdf (press ctrl +  left click to view)      MEDICATIONS  (STANDING):  aspirin Suppository 300 milliGRAM(s) Rectal daily  cloNIDine Patch 0.2 mG/24Hr(s) 1 patch Transdermal <User Schedule>  dextrose 5%. 1000 milliLiter(s) (70 mL/Hr) IV Continuous <Continuous>  enoxaparin Injectable 30 milliGRAM(s) SubCutaneous every 24 hours  hydrALAZINE Injectable 10 milliGRAM(s) IV Push every 6 hours  valproate sodium  IVPB 500 milliGRAM(s) IV Intermittent every 8 hours    MEDICATIONS  (PRN):      PHYSICAL EXAM:  Vital Signs Last 24 Hrs  T(C): 36.4 (02 Aug 2023 14:06), Max: 36.8 (01 Aug 2023 21:41)  T(F): 97.6 (02 Aug 2023 14:06), Max: 98.2 (01 Aug 2023 21:41)  HR: 88 (02 Aug 2023 14:06) (77 - 93)  BP: 158/79 (02 Aug 2023 14:06) (149/75 - 168/88)  BP(mean): --  RR: 18 (02 Aug 2023 14:06) (17 - 18)  SpO2: 97% (02 Aug 2023 14:06) (97% - 99%)    Parameters below as of 02 Aug 2023 14:06  Patient On (Oxygen Delivery Method): room air        General: alert  oriented x ____    lethargic distressed cachexia  nonverbal  unarousable verbal    Palliative Performance Scale/Karnofsky Score:  http://npcrc.org/files/news/palliative_performance_scale_ppsv2.pdf    HEENT: no abnormal lesion, dry mouth  ET tube/trach oral lesions:  Lungs: tachypnea/labored breathing, audible excessive secretions  CV: RRR, S1S2, tachycardia  GI: soft non distended non tender  incontinent               PEG/NG/OG tube  constipation  last BM:   : incontinent  oliguria/anuria  mcclellan  Musculoskeletal: weakness x4 edema x4    ambulatory with assistance   mostly/fully bedbound/wheelchair bound  Skin: no abnormal skin lesions, poor skin turgor, pressure ulcer stage:   Neuro: no deficits, mild cognitive impairment dsyphagia/dysarthria paresis  Oral intake ability: unable/only mouth care, minimal moderate full capability    LABS:                        9.2    9.20  )-----------( 305      ( 02 Aug 2023 05:55 )             29.3     08-02    148<H>  |  116<H>  |  47<H>  ----------------------------<  109<H>  3.7   |  20<L>  |  2.35<H>    Ca    8.2<L>      02 Aug 2023 05:55  Phos  5.6     08-02  Mg     2.7     08-02    TPro  7.4  /  Alb  2.5<L>  /  TBili  0.3  /  DBili  x   /  AST  18  /  ALT  13  /  AlkPhos  89  08-02    Urinalysis Basic - ( 02 Aug 2023 05:55 )    Color: x / Appearance: x / SG: x / pH: x  Gluc: 109 mg/dL / Ketone: x  / Bili: x / Urobili: x   Blood: x / Protein: x / Nitrite: x   Leuk Esterase: x / RBC: x / WBC x   Sq Epi: x / Non Sq Epi: x / Bacteria: x    < from: CT Angio Neck Stroke Protocol w/ IV Cont (07.27.23 @ 14:57) >      < end of copied text >      RADIOLOGY & ADDITIONAL STUDIES: reviewed  ADVANCED DIRECTIVES: DNR/DNI/Long term Feeding tube         [facility] Geriatric and Palliative Consult Service:  Naye Naomi DO: cell ([phone])  Mark Crowe MD: cell ([phone])  Nadir Masters NP: cell ([phone])   Alexx Hi SW: cell ([phone])   Beena Chandler NP: via [employer] Teams    Can contact any Palliative Team member via [employer] Teams for consults and questions      HPI:  Patient is a 77 female from [facility] PMHx HTN, HLD, CVA (w/ residual aphasia and R sided hemiparesis/plegia) who was sent to the hospital due to altered mental status. Patient is not able to present any history. Patient is lying down in bed, awake and alert. However, patient does not speak, is not able to follow commands and does not turn face or move eyes when her name is called. As per nursing home papers  patient was noted to have change in mental status with eye fixed in one direction and unable to swallow food. Patient's sister Marika San ([phone]) was called to inquire about baseline mental status. Patient can respond with one word answers, swallows food, however does not move arms/legs on baseline. Ed note also reports patient is exhibiting b/l upper arm weakness but resident is unable to confirm that during evaluation. Unable to obtain any ROS from the patient at this time.     Of note: Patient's sister noted that patient sister was recently started on medication for muscle spasms at nursing home.  (27 Jul 2023 19:19)    Interval hx:     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      Seizures      Chronic constipation      No significant past surgical history          SOCIAL HISTORY:    Admitted from:  home  (with HHA)           assisted living          [facility]       LTC   [ none ] Substance abuse, [ none ] Tobacco hx, [ none ] Alcohol hx, [ none ] Home Opioid Hx    FAMILY HISTORY:   unable to obtain from patient due to poor mentation, family unable to give information, see H&P for history  Baseline ADLs (prior to admission):    Allergies    &quot; NATURAL RUBBER&quot; (Other)  latex (Other)  penicillins (Unknown)    Intolerances      Present Symptoms: Mild, Moderate, Severe  Pain:             Location -                               Aggravating factors -             Quality -             Radiation -             Timing-             Severity (0-10 scale):             Minimal acceptable level (0-10 scale):  Fatigue:  Nausea:  Lack of Appetite:   SOB:  Depression:  Anxiety:  Review of Systems: [All others negative or Unable to obtain due to poor mentation]    CPOT:    https://www.[facility].org/getattachment/[email or web address]/Critical-Care-Pain-Observation-Tool-(CPOT)  PAIN AD Score:   http://[email or web address].[email or web address]/[email or web address].pdf (press ctrl +  left click to view)      MEDICATIONS  (STANDING):  aspirin Suppository 300 milliGRAM(s) Rectal daily  cloNIDine Patch 0.2 mG/24Hr(s) 1 patch Transdermal <User Schedule>  dextrose 5%. 1000 milliLiter(s) (70 mL/Hr) IV Continuous <Continuous>  enoxaparin Injectable 30 milliGRAM(s) SubCutaneous every 24 hours  hydrALAZINE Injectable 10 milliGRAM(s) IV Push every 6 hours  valproate sodium  IVPB 500 milliGRAM(s) IV Intermittent every 8 hours    MEDICATIONS  (PRN):      PHYSICAL EXAM:  Vital Signs Last 24 Hrs  T(C): 36.4 (02 Aug 2023 14:06), Max: 36.8 (01 Aug 2023 21:41)  T(F): 97.6 (02 Aug 2023 14:06), Max: 98.2 (01 Aug 2023 21:41)  HR: 88 (02 Aug 2023 14:06) (77 - 93)  BP: 158/79 (02 Aug 2023 14:06) (149/75 - 168/88)  BP(mean): --  RR: 18 (02 Aug 2023 14:06) (17 - 18)  SpO2: 97% (02 Aug 2023 14:06) (97% - 99%)    Parameters below as of 02 Aug 2023 14:06  Patient On (Oxygen Delivery Method): room air        General: alert  oriented x ____    lethargic distressed cachexia  nonverbal  unarousable verbal    Palliative Performance Scale/Karnofsky Score:  http://npcrc.org/files/news/palliative_performance_scale_ppsv2.pdf    HEENT: no abnormal lesion, dry mouth  ET tube/trach oral lesions:  Lungs: tachypnea/labored breathing, audible excessive secretions  CV: RRR, S1S2, tachycardia  GI: soft non distended non tender  incontinent               PEG/NG/OG tube  constipation  last BM:   : incontinent  oliguria/anuria  mcclellan  Musculoskeletal: weakness x4 edema x4    ambulatory with assistance   mostly/fully bedbound/wheelchair bound  Skin: no abnormal skin lesions, poor skin turgor, pressure ulcer stage:   Neuro: no deficits, mild cognitive impairment dsyphagia/dysarthria paresis  Oral intake ability: unable/only mouth care, minimal moderate full capability    LABS:                        9.2    9.20  )-----------( 305      ( 02 Aug 2023 05:55 )             29.3     08-02    148<H>  |  116<H>  |  47<H>  ----------------------------<  109<H>  3.7   |  20<L>  |  2.35<H>    Ca    8.2<L>      02 Aug 2023 05:55  Phos  5.6     08-02  Mg     2.7     08-02    TPro  7.4  /  Alb  2.5<L>  /  TBili  0.3  /  DBili  x   /  AST  18  /  ALT  13  /  AlkPhos  89  08-02    Urinalysis Basic - ( 02 Aug 2023 05:55 )    Color: x / Appearance: x / SG: x / pH: x  Gluc: 109 mg/dL / Ketone: x  / Bili: x / Urobili: x   Blood: x / Protein: x / Nitrite: x   Leuk Esterase: x / RBC: x / WBC x   Sq Epi: x / Non Sq Epi: x / Bacteria: x    < from: CT Angio Neck Stroke Protocol w/ IV Cont (07.27.23 @ 14:57) >      < end of copied text >      RADIOLOGY & ADDITIONAL STUDIES: reviewed  ADVANCED DIRECTIVES: DNR/DNI/Long term Feeding tube         [facility] Geriatric and Palliative Consult Service:  Naye Naomi DO: cell ([phone])  Mark Crowe MD: cell ([phone])  Nadir Masters NP: cell ([phone])   Alexx Hi SW: cell ([phone])   Beena Chandler NP: via [employer] Teams    Can contact any Palliative Team member via [employer] Teams for consults and questions      HPI:  Patient is a 77 female from [facility] PMHx HTN, HLD, CVA (w/ residual aphasia and R sided hemiparesis/plegia) who was sent to the hospital due to altered mental status. Patient is not able to present any history. Patient is lying down in bed, awake and alert. However, patient does not speak, is not able to follow commands and does not turn face or move eyes when her name is called. As per nursing home papers  patient was noted to have change in mental status with eye fixed in one direction and unable to swallow food. Patient's sister Marika San ([phone]) was called to inquire about baseline mental status. Patient can respond with one word answers, swallows food, however does not move arms/legs on baseline. Ed note also reports patient is exhibiting b/l upper arm weakness but resident is unable to confirm that during evaluation. Unable to obtain any ROS from the patient at this time.     Of note: Patient's sister noted that patient sister was recently started on medication for muscle spasms at nursing home.  (27 Jul 2023 19:19)    Interval hx: Pt w eyes open is able to track, non verbal.  NAD    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      Seizures      Chronic constipation      No significant past surgical history          SOCIAL HISTORY:    Admitted from:  [facility]  Pt's sister Marika makes medical decisions  [ none ] Substance abuse, [ none ] Tobacco hx, [ none ] Alcohol hx, [ none ] Home Opioid Hx    Jehovah's witness:  Jehovah's witness  Marika San   (HCP)    Phone# [phone]    FAMILY HISTORY:   unable to obtain from patient due to poor mentation, family unable to give information, see H&P for history  Baseline ADLs (prior to admission): bedbound, total care    Allergies    &quot; NATURAL RUBBER&quot; (Other)  latex (Other)  penicillins (Unknown)    Intolerances      Present Symptoms: Mild, Moderate, Severe  Unable to obtain due to poor mentation]    CPOT:    https://www.[facility].org/getattachment/[email or web address]/Critical-Care-Pain-Observation-Tool-(CPOT)  PAIN AD Score:   http://[email or web address].[email or web address]/cog/[email or web address].pdf (press ctrl +  left click to view)      MEDICATIONS  (STANDING):  aspirin Suppository 300 milliGRAM(s) Rectal daily  cloNIDine Patch 0.2 mG/24Hr(s) 1 patch Transdermal <User Schedule>  dextrose 5%. 1000 milliLiter(s) (70 mL/Hr) IV Continuous <Continuous>  enoxaparin Injectable 30 milliGRAM(s) SubCutaneous every 24 hours  hydrALAZINE Injectable 10 milliGRAM(s) IV Push every 6 hours  valproate sodium  IVPB 500 milliGRAM(s) IV Intermittent every 8 hours    MEDICATIONS  (PRN):      PHYSICAL EXAM:  Vital Signs Last 24 Hrs  T(C): 36.4 (02 Aug 2023 14:06), Max: 36.8 (01 Aug 2023 21:41)  T(F): 97.6 (02 Aug 2023 14:06), Max: 98.2 (01 Aug 2023 21:41)  HR: 88 (02 Aug 2023 14:06) (77 - 93)  BP: 158/79 (02 Aug 2023 14:06) (149/75 - 168/88)  BP(mean): --  RR: 18 (02 Aug 2023 14:06) (17 - 18)  SpO2: 97% (02 Aug 2023 14:06) (97% - 99%)    Parameters below as of 02 Aug 2023 14:06  Patient On (Oxygen Delivery Method): room air        General: alert  oriented x ____    lethargic distressed cachexia  nonverbal  unarousable verbal    Palliative Performance Scale/Karnofsky Score: 30%  http://npcrc.org/files/news/palliative_performance_scale_ppsv2.pdf    HEENT: no abnormal lesion, dry mouth, neck supple  Lungs: unlabored on RA  CV: RRR, S1S2  GI: soft non distended non tender  incontinent  : incontinent    Musculoskeletal: weakness x4, bedbound, b/l LE edema  Skin: no abnormal skin lesions, poor skin turgor  Neuro: unable to follow commands  Oral intake ability: unable/only mouth care    LABS:                        9.2    9.20  )-----------( 305      ( 02 Aug 2023 05:55 )             29.3     08-02    148<H>  |  116<H>  |  47<H>  ----------------------------<  109<H>  3.7   |  20<L>  |  2.35<H>    Ca    8.2<L>      02 Aug 2023 05:55  Phos  5.6     08-02  Mg     2.7     08-02    TPro  7.4  /  Alb  2.5<L>  /  TBili  0.3  /  DBili  x   /  AST  18  /  ALT  13  /  AlkPhos  89  08-02    Urinalysis Basic - ( 02 Aug 2023 05:55 )    Color: x / Appearance: x / SG: x / pH: x  Gluc: 109 mg/dL / Ketone: x  / Bili: x / Urobili: x   Blood: x / Protein: x / Nitrite: x   Leuk Esterase: x / RBC: x / WBC x   Sq Epi: x / Non Sq Epi: x / Bacteria: x    < from: CT Angio Neck Stroke Protocol w/ IV Cont (07.27.23 @ 14:57) >      < end of copied text >      RADIOLOGY & ADDITIONAL STUDIES: reviewed  ADVANCED DIRECTIVES: DNR/DNI/Long term Feeding tube         [facility] Geriatric and Palliative Consult Service:  Naye Naomi DO: cell ([phone])  Mark Crowe MD: cell ([phone])  Nadir Masters NP: cell ([phone])   Alexx Hi SW: cell ([phone])   Beena Chandler NP: via [employer]    Can contact any Palliative Team member via [employer] Teams for consults and questions      HPI:  Patient is a 77 female from [facility] PMHx HTN, HLD, CVA (w/ residual aphasia and R sided hemiparesis/plegia) who was sent to the hospital due to altered mental status. Patient is not able to present any history. Patient is lying down in bed, awake and alert. However, patient does not speak, is not able to follow commands and does not turn face or move eyes when her name is called. As per nursing home papers  patient was noted to have change in mental status with eye fixed in one direction and unable to swallow food. Patient's sister Marika San ([phone]) was called to inquire about baseline mental status. Patient can respond with one word answers, swallows food, however does not move arms/legs on baseline. Ed note also reports patient is exhibiting b/l upper arm weakness but resident is unable to confirm that during evaluation. Unable to obtain any ROS from the patient at this time.     Of note: Patient's sister noted that patient sister was recently started on medication for muscle spasms at nursing home.  (27 Jul 2023 19:19)    Interval hx: Pt w eyes open is able to track, non verbal.  NAD    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      Seizures      Chronic constipation      No significant past surgical history          SOCIAL HISTORY:    Admitted from:  [facility]  Pt's sister Marika makes medical decisions  [ none ] Substance abuse, [ none ] Tobacco hx, [ none ] Alcohol hx, [ none ] Home Opioid Hx    [employer]:  [employer]  Marika San   (HCP)    Phone# [phone]    FAMILY HISTORY:   unable to obtain from patient due to poor mentation, family unable to give information, see H&P for history  Baseline ADLs (prior to admission): bedbound, total care    Allergies    &quot; NATURAL RUBBER&quot; (Other)  latex (Other)  penicillins (Unknown)    Intolerances      Present Symptoms: Mild, Moderate, Severe  Unable to obtain due to poor mentation]    CPOT:    [email or web address]-Care-Pain-Observation-Tool-(CPOT)  PAIN AD Score:   [email or web address] (press ctrl +  left click to view)      MEDICATIONS  (STANDING):  aspirin Suppository 300 milliGRAM(s) Rectal daily  cloNIDine Patch 0.2 mG/24Hr(s) 1 patch Transdermal <User Schedule>  dextrose 5%. 1000 milliLiter(s) (70 mL/Hr) IV Continuous <Continuous>  enoxaparin Injectable 30 milliGRAM(s) SubCutaneous every 24 hours  hydrALAZINE Injectable 10 milliGRAM(s) IV Push every 6 hours  valproate sodium  IVPB 500 milliGRAM(s) IV Intermittent every 8 hours    MEDICATIONS  (PRN):      PHYSICAL EXAM:  Vital Signs Last 24 Hrs  T(C): 36.4 (02 Aug 2023 14:06), Max: 36.8 (01 Aug 2023 21:41)  T(F): 97.6 (02 Aug 2023 14:06), Max: 98.2 (01 Aug 2023 21:41)  HR: 88 (02 Aug 2023 14:06) (77 - 93)  BP: 158/79 (02 Aug 2023 14:06) (149/75 - 168/88)  BP(mean): --  RR: 18 (02 Aug 2023 14:06) (17 - 18)  SpO2: 97% (02 Aug 2023 14:06) (97% - 99%)    Parameters below as of 02 Aug 2023 14:06  Patient On (Oxygen Delivery Method): room air        General: alert  oriented x ____    lethargic distressed cachexia  nonverbal  unarousable verbal    Palliative Performance Scale/Karnofsky Score: 30%  http://npcrc.org/files/news/palliative_performance_scale_ppsv2.pdf    HEENT: no abnormal lesion, dry mouth, neck supple  Lungs: unlabored on RA  CV: RRR, S1S2  GI: soft non distended non tender  incontinent  : incontinent    Musculoskeletal: weakness x4, bedbound, b/l LE edema  Skin: no abnormal skin lesions, poor skin turgor  Neuro: unable to follow commands  Oral intake ability: unable/only mouth care    LABS:                        9.2    9.20  )-----------( 305      ( 02 Aug 2023 05:55 )             29.3     08-02    148<H>  |  116<H>  |  47<H>  ----------------------------<  109<H>  3.7   |  20<L>  |  2.35<H>    Ca    8.2<L>      02 Aug 2023 05:55  Phos  5.6     08-02  Mg     2.7     08-02    TPro  7.4  /  Alb  2.5<L>  /  TBili  0.3  /  DBili  x   /  AST  18  /  ALT  13  /  AlkPhos  89  08-02    Urinalysis Basic - ( 02 Aug 2023 05:55 )    Color: x / Appearance: x / SG: x / pH: x  Gluc: 109 mg/dL / Ketone: x  / Bili: x / Urobili: x   Blood: x / Protein: x / Nitrite: x   Leuk Esterase: x / RBC: x / WBC x   Sq Epi: x / Non Sq Epi: x / Bacteria: x    < from: CT Angio Neck Stroke Protocol w/ IV Cont (07.27.23 @ 14:57) >      < end of copied text >      RADIOLOGY & ADDITIONAL STUDIES: reviewed  ADVANCED DIRECTIVES: DNR/DNI/Long term Feeding tube         [facility] Geriatric and Palliative Consult Service:  Naye Naomi DO: cell ([phone])  Mark Crowe MD: cell ([phone])  Nadir Masters NP: cell ([phone])   Alexx Hi SW: cell ([phone])   Beena Chandler NP: via [employer] Teams    Can contact any Palliative Team member via [employer] Teams for consults and questions      HPI:  Patient is a 77 female from [facility] PMHx HTN, HLD, CVA (w/ residual aphasia and R sided hemiparesis/plegia) who was sent to the hospital due to altered mental status. Patient is not able to present any history. Patient is lying down in bed, awake and alert. However, patient does not speak, is not able to follow commands and does not turn face or move eyes when her name is called. As per nursing home papers  patient was noted to have change in mental status with eye fixed in one direction and unable to swallow food. Patient's sister Marika San ([phone]) was called to inquire about baseline mental status. Patient can respond with one word answers, swallows food, however does not move arms/legs on baseline. Ed note also reports patient is exhibiting b/l upper arm weakness but resident is unable to confirm that during evaluation. Unable to obtain any ROS from the patient at this time.     Of note: Patient's sister noted that patient sister was recently started on medication for muscle spasms at nursing home.  (27 Jul 2023 19:19)    Interval hx: Pt w eyes open is able to track, non verbal.  NAD    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      Seizures      Chronic constipation      No significant past surgical history          SOCIAL HISTORY:    Admitted from:  [facility]  Pt's sister Marika makes medical decisions  [ none ] Substance abuse, [ none ] Tobacco hx, [ none ] Alcohol hx, [ none ] Home Opioid Hx    Shinto:  Yarsanism  Marika San   (HCP)    Phone# [phone]    FAMILY HISTORY:   unable to obtain from patient due to poor mentation, family unable to give information, see H&P for history  Baseline ADLs (prior to admission): bedbound, total care    Allergies    &quot; NATURAL RUBBER&quot; (Other)  latex (Other)  penicillins (Unknown)    Intolerances      Present Symptoms: Mild, Moderate, Severe  Unable to obtain due to poor mentation]    CPOT:    [email or web address]-Care-[email or web address]-Observation-Tool-(CPOT)  PAIN AD Score:   http://[email or web address] (press ctrl +  left click to view)      MEDICATIONS  (STANDING):  aspirin Suppository 300 milliGRAM(s) Rectal daily  cloNIDine Patch 0.2 mG/24Hr(s) 1 patch Transdermal <User Schedule>  dextrose 5%. 1000 milliLiter(s) (70 mL/Hr) IV Continuous <Continuous>  enoxaparin Injectable 30 milliGRAM(s) SubCutaneous every 24 hours  hydrALAZINE Injectable 10 milliGRAM(s) IV Push every 6 hours  valproate sodium  IVPB 500 milliGRAM(s) IV Intermittent every 8 hours    MEDICATIONS  (PRN):      PHYSICAL EXAM:  Vital Signs Last 24 Hrs  T(C): 36.4 (02 Aug 2023 14:06), Max: 36.8 (01 Aug 2023 21:41)  T(F): 97.6 (02 Aug 2023 14:06), Max: 98.2 (01 Aug 2023 21:41)  HR: 88 (02 Aug 2023 14:06) (77 - 93)  BP: 158/79 (02 Aug 2023 14:06) (149/75 - 168/88)  BP(mean): --  RR: 18 (02 Aug 2023 14:06) (17 - 18)  SpO2: 97% (02 Aug 2023 14:06) (97% - 99%)    Parameters below as of 02 Aug 2023 14:06  Patient On (Oxygen Delivery Method): room air        General: alert  oriented x ____    lethargic distressed cachexia  nonverbal  unarousable verbal    Palliative Performance Scale/Karnofsky Score: 30%  http://npcrc.org/files/news/palliative_performance_scale_ppsv2.pdf    HEENT: no abnormal lesion, dry mouth, neck supple  Lungs: unlabored on RA  CV: RRR, S1S2  GI: soft non distended non tender  incontinent  : incontinent    Musculoskeletal: weakness x4, bedbound, b/l LE edema  Skin: no abnormal skin lesions, poor skin turgor  Neuro: unable to follow commands  Oral intake ability: unable/only mouth care    LABS:                        9.2    9.20  )-----------( 305      ( 02 Aug 2023 05:55 )             29.3     08-02    148<H>  |  116<H>  |  47<H>  ----------------------------<  109<H>  3.7   |  20<L>  |  2.35<H>    Ca    8.2<L>      02 Aug 2023 05:55  Phos  5.6     08-02  Mg     2.7     08-02    TPro  7.4  /  Alb  2.5<L>  /  TBili  0.3  /  DBili  x   /  AST  18  /  ALT  13  /  AlkPhos  89  08-02    Urinalysis Basic - ( 02 Aug 2023 05:55 )    Color: x / Appearance: x / SG: x / pH: x  Gluc: 109 mg/dL / Ketone: x  / Bili: x / Urobili: x   Blood: x / Protein: x / Nitrite: x   Leuk Esterase: x / RBC: x / WBC x   Sq Epi: x / Non Sq Epi: x / Bacteria: x    < from: CT Angio Neck Stroke Protocol w/ IV Cont (07.27.23 @ 14:57) >      < end of copied text >      RADIOLOGY & ADDITIONAL STUDIES: reviewed  ADVANCED DIRECTIVES: DNR/DNI/Long term Feeding tube

## 2023-08-02 NOTE — PROGRESS NOTE ADULT - ASSESSMENT
Patient is a 77 female from [facility] PMHx HTN, HLD, CVA (w/ residual aphasia and R sided hemiparesis/plegia) who was sent to the hospital due to altered mental status. Patient is being admitted to medicine for further work up and rule out stroke vs encephalopathy (metabolic vs infectious).  Patient is a 77 female from [facility] PMHx HTN, HLD, CVA (w/ residual aphasia and R sided hemiparesis/plegia) who was sent to the hospital due to altered mental status. Patient is being admitted to medicine for further work up and rule out stroke vs encephalopathy (metabolic vs infectious).  Patient is a 77 female from [facility] PMHx HTN, HLD, CVA (w/ residual aphasia and R sided hemiparesis/plegia) who was sent to the hospital due to altered mental status. Patient is being admitted to medicine for further work up and rule out stroke vs encephalopathy (metabolic vs infectious).

## 2023-08-02 NOTE — PROGRESS NOTE ADULT - PROBLEM SELECTOR PLAN 3
pt BP has been really high ranging from 190s-180s/85 . Hydralazine has been given PRN. now she is on hydralazine every 6 hours 10 mg IV. BP being monitored.  Cardiology consulted Dr. Dunn-> raised her clonidine patch to put on clonidine patch .2 mg weekly.   BP today is 166/85-> improving with new dose of clonidine patch

## 2023-08-03 LAB
ALBUMIN SERPL ELPH-MCNC: 2.5 G/DL — LOW (ref 3.5–5)
ALP SERPL-CCNC: 79 U/L — SIGNIFICANT CHANGE UP (ref 40–120)
ALT FLD-CCNC: 16 U/L DA — SIGNIFICANT CHANGE UP (ref 10–60)
ANION GAP SERPL CALC-SCNC: 14 MMOL/L — SIGNIFICANT CHANGE UP (ref 5–17)
APTT BLD: 31.8 SEC — SIGNIFICANT CHANGE UP (ref 24.5–35.6)
AST SERPL-CCNC: 18 U/L — SIGNIFICANT CHANGE UP (ref 10–40)
BILIRUB SERPL-MCNC: 0.3 MG/DL — SIGNIFICANT CHANGE UP (ref 0.2–1.2)
BLD GP AB SCN SERPL QL: SIGNIFICANT CHANGE UP
BUN SERPL-MCNC: 44 MG/DL — HIGH (ref 7–18)
CALCIUM SERPL-MCNC: 8.5 MG/DL — SIGNIFICANT CHANGE UP (ref 8.4–10.5)
CHLORIDE SERPL-SCNC: 114 MMOL/L — HIGH (ref 96–108)
CO2 SERPL-SCNC: 18 MMOL/L — LOW (ref 22–31)
CREAT SERPL-MCNC: 2.14 MG/DL — HIGH (ref 0.5–1.3)
EGFR: 23 ML/MIN/1.73M2 — LOW
GLUCOSE BLDC GLUCOMTR-MCNC: 86 MG/DL — SIGNIFICANT CHANGE UP (ref 70–99)
GLUCOSE BLDC GLUCOMTR-MCNC: 87 MG/DL — SIGNIFICANT CHANGE UP (ref 70–99)
GLUCOSE BLDC GLUCOMTR-MCNC: 96 MG/DL — SIGNIFICANT CHANGE UP (ref 70–99)
GLUCOSE SERPL-MCNC: 82 MG/DL — SIGNIFICANT CHANGE UP (ref 70–99)
HCT VFR BLD CALC: 30.6 % — LOW (ref 34.5–45)
HGB BLD-MCNC: 9.4 G/DL — LOW (ref 11.5–15.5)
INR BLD: 1.03 RATIO — SIGNIFICANT CHANGE UP (ref 0.85–1.18)
MAGNESIUM SERPL-MCNC: 2.6 MG/DL — SIGNIFICANT CHANGE UP (ref 1.6–2.6)
MCHC RBC-ENTMCNC: 28 PG — SIGNIFICANT CHANGE UP (ref 27–34)
MCHC RBC-ENTMCNC: 30.7 GM/DL — LOW (ref 32–36)
MCV RBC AUTO: 91.1 FL — SIGNIFICANT CHANGE UP (ref 80–100)
NRBC # BLD: 0 /100 WBCS — SIGNIFICANT CHANGE UP (ref 0–0)
PHOSPHATE SERPL-MCNC: 5.2 MG/DL — HIGH (ref 2.5–4.5)
PLATELET # BLD AUTO: 310 K/UL — SIGNIFICANT CHANGE UP (ref 150–400)
POTASSIUM SERPL-MCNC: 2.9 MMOL/L — CRITICAL LOW (ref 3.5–5.3)
POTASSIUM SERPL-SCNC: 2.9 MMOL/L — CRITICAL LOW (ref 3.5–5.3)
PROT SERPL-MCNC: 7.4 G/DL — SIGNIFICANT CHANGE UP (ref 6–8.3)
PROTHROM AB SERPL-ACNC: 11.7 SEC — SIGNIFICANT CHANGE UP (ref 9.5–13)
RBC # BLD: 3.36 M/UL — LOW (ref 3.8–5.2)
RBC # FLD: 14.7 % — HIGH (ref 10.3–14.5)
SODIUM SERPL-SCNC: 146 MMOL/L — HIGH (ref 135–145)
WBC # BLD: 8.87 K/UL — SIGNIFICANT CHANGE UP (ref 3.8–10.5)
WBC # FLD AUTO: 8.87 K/UL — SIGNIFICANT CHANGE UP (ref 3.8–10.5)

## 2023-08-03 RX ORDER — POTASSIUM CHLORIDE 20 MEQ
10 PACKET (EA) ORAL
Refills: 0 | Status: COMPLETED | OUTPATIENT
Start: 2023-08-03 | End: 2023-08-03

## 2023-08-03 RX ADMIN — Medication 300 MILLIGRAM(S): at 11:52

## 2023-08-03 RX ADMIN — Medication 10 MILLIGRAM(S): at 17:07

## 2023-08-03 RX ADMIN — Medication 100 MILLIEQUIVALENT(S): at 11:56

## 2023-08-03 RX ADMIN — Medication 100 MILLIEQUIVALENT(S): at 08:59

## 2023-08-03 RX ADMIN — Medication 55 MILLIGRAM(S): at 17:10

## 2023-08-03 RX ADMIN — Medication 1 PATCH: at 19:12

## 2023-08-03 RX ADMIN — Medication 10 MILLIGRAM(S): at 11:52

## 2023-08-03 RX ADMIN — Medication 10 MILLIGRAM(S): at 23:56

## 2023-08-03 RX ADMIN — Medication 1 PATCH: at 07:46

## 2023-08-03 RX ADMIN — Medication 10 MILLIGRAM(S): at 06:17

## 2023-08-03 RX ADMIN — ENOXAPARIN SODIUM 30 MILLIGRAM(S): 100 INJECTION SUBCUTANEOUS at 17:06

## 2023-08-03 RX ADMIN — SODIUM CHLORIDE 70 MILLILITER(S): 9 INJECTION, SOLUTION INTRAVENOUS at 06:19

## 2023-08-03 RX ADMIN — Medication 100 MILLIEQUIVALENT(S): at 13:26

## 2023-08-03 RX ADMIN — Medication 55 MILLIGRAM(S): at 00:19

## 2023-08-03 RX ADMIN — Medication 55 MILLIGRAM(S): at 10:21

## 2023-08-03 RX ADMIN — Medication 10 MILLIGRAM(S): at 00:18

## 2023-08-03 NOTE — PROGRESS NOTE ADULT - SUBJECTIVE AND OBJECTIVE BOX
CHIEF COMPLAINT:Patient is a 77y old  Female who presents with a chief complaint of Altered mental status. Pt appears comfortable.    	  REVIEW OF SYSTEMS:  unable to obtain      PHYSICAL EXAM:  T(C): 36.9 (08-03-23 @ 05:25), Max: 36.9 (08-03-23 @ 05:25)  HR: 81 (08-03-23 @ 05:25) (70 - 88)  BP: 160/72 (08-03-23 @ 05:25) (143/68 - 178/85)  RR: 18 (08-03-23 @ 05:25) (17 - 18)  SpO2: 98% (08-03-23 @ 05:25) (97% - 99%)  Wt(kg): --  I&O's Summary    02 Aug 2023 07:01  -  03 Aug 2023 07:00  --------------------------------------------------------  IN: 0 mL / OUT: 700 mL / NET: -700 mL        Appearance: Normal	  HEENT:   Normal oral mucosa, PERRL, EOMI	  Lymphatic: No lymphadenopathy  Cardiovascular: Normal S1 S2, No JVD, No murmurs, No edema  Respiratory: Lungs clear to auscultation	  Gastrointestinal:  Soft, Non-tender, + BS	  Skin: No rashes, No ecchymoses, No cyanosis	  Extremities: Normal range of motion, No clubbing, cyanosis or edema  Vascular: Peripheral pulses palpable 2+ bilaterally    MEDICATIONS  (STANDING):  aspirin Suppository 300 milliGRAM(s) Rectal daily  cloNIDine Patch 0.2 mG/24Hr(s) 1 patch Transdermal <User Schedule>  dextrose 5%. 1000 milliLiter(s) (70 mL/Hr) IV Continuous <Continuous>  enoxaparin Injectable 30 milliGRAM(s) SubCutaneous every 24 hours  hydrALAZINE Injectable 10 milliGRAM(s) IV Push every 6 hours  potassium chloride  10 mEq/100 mL IVPB 10 milliEquivalent(s) IV Intermittent every 1 hour  valproate sodium  IVPB 500 milliGRAM(s) IV Intermittent every 8 hours     	  	  LABS:	 	                       9.4    8.87  )-----------( 310      ( 03 Aug 2023 06:17 )             30.6     08-03    146<H>  |  114<H>  |  44<H>  ----------------------------<  82  2.9<LL>   |  18<L>  |  2.14<H>    Ca    8.5      03 Aug 2023 06:17  Phos  5.2     08-03  Mg     2.6     08-03    TPro  7.4  /  Alb  2.5<L>  /  TBili  0.3  /  DBili  x   /  AST  18  /  ALT  16  /  AlkPhos  79  08-03      Lipid Profile: Cholesterol 152  LDL --  HDL 51  TG 93  Ldl calc 82    TSH: Thyroid Stimulating Hormone, Serum: 3.59 uU/mL (07-28 @ 05:03)

## 2023-08-03 NOTE — PROGRESS NOTE ADULT - PROBLEM SELECTOR PLAN 1
P/w altered mental status  HEAD CT: Chronic left MCA territory infarction.  CT PERFUSION demonstrated: Perfusion abnormality corresponding to the chronically infarcted left MCA territory. INFARCT CORE: 57 mL. TISSUE AT RISK: 236 mL.  CTA COW and  CTA NECK: neg   Failed dysphagia and speech and swallow. -> palliative will talk to family about peg tube placement. Family does not want DNR/DNI  echo-> mild MR, and grade 1 diastolic dysfunction.   Peg tube being placed by GI.

## 2023-08-03 NOTE — CONSULT NOTE ADULT - GASTROINTESTINAL
soft/nontender/nondistended/normal active bowel sounds/no guarding/no rigidity/no organomegaly/no palpable jose

## 2023-08-03 NOTE — PROGRESS NOTE ADULT - PROBLEM SELECTOR PLAN 6
nephro following:  abnormal labs:  Sodium 146  Phosphorus 5.2  chloride 114    potassium 2.9-> repleated

## 2023-08-03 NOTE — PROGRESS NOTE ADULT - ASSESSMENT
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  1.No new CVA on MRI.  2.UTI-s/p ABX.  3.HTN- clonidine patch .2mg q wk,IV hydralazine.  5.Lipid d/o-hold statin.  6.Plan for PEG placement.  7.Hypernatremia and SUSAN-IVF.  8.GI and DVT prophylaxis. 77 female from [facility] PMHx HTN, HLD, CVA (w/ residual aphasia and R sided hemiparesis/plegia) who was sent to the hospital due to altered mental status,UTI.  1.No new CVA on MRI.  2.UTI-s/p ABX.  3.HTN- clonidine patch .2mg q wk,IV hydralazine.  5.Lipid d/o-hold statin.  6.Plan for PEG placement.  7.Hypernatremia and SUSAN-IVF.  8.GI and DVT prophylaxis. 77 female from [facility] PMHx HTN, HLD, CVA (w/ residual aphasia and R sided hemiparesis/plegia) who was sent to the hospital due to altered mental status,UTI.  1.No new CVA on MRI.  2.UTI-s/p ABX.  3.HTN- clonidine patch .2mg q wk,IV hydralazine.  5.Lipid d/o-hold statin.  6.Plan for PEG placement.  7.Hypernatremia and SUSAN-IVF.  8.GI and DVT prophylaxis.

## 2023-08-03 NOTE — PROGRESS NOTE ADULT - ASSESSMENT
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 Patient is being admitted to medicine for further work up and rule out stroke vs encephalopathy (metabolic vs infectious).  Patient is a 77 female from [facility] PMHx HTN, HLD, CVA (w/ residual aphasia and R sided hemiparesis/plegia) who was sent to the hospital due to altered mental status. Patient is being admitted to medicine for further work up and rule out stroke vs encephalopathy (metabolic vs infectious).  Patient is a 77 female from [facility] PMHx HTN, HLD, CVA (w/ residual aphasia and R sided hemiparesis/plegia) who was sent to the hospital due to altered mental status. Patient is being admitted to medicine for further work up and rule out stroke vs encephalopathy (metabolic vs infectious).

## 2023-08-03 NOTE — PROGRESS NOTE ADULT - ASSESSMENT
1. SUSAN possible to MARISA and ATN  Renal sono shows no hdyro with b/l kidneys around 9.2cm  -Scr improving to 2.19mg/dL and continue IVFs. Possible renal recovery.  -urinalysis shows blood with proteinuria; FeNa >1%, spot protein to creatinine ratio is 1.5gm  -Adjust meds to eGFR and avoid IV Gadolinium contrast,NSAIDs, and phosphate enema.  -Monitor I/O's daily.   -Monitor SMA daily.  2. Hypernatremia due to water deficit and insensible losses. Pt is clinically euvolemic.   -Na improving. continue D5W at 70cc/hr   -Monitor I/O's. Check Serum Na Daily. Avoid high solute intake diet and sodium bicarbonate infuse. Avoid overcorrection of NA (8-10meq/day)  3. 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  -previous Scr around 1.2 to 1.6mg/dL in Oct 2022.  -Keep patient euvolemic and renal diet  -Avoid Nephrotoxic Meds/ Agents such as (NSAIDs, IV contrast, Aminoglycosides such as gentamicin, -Gadolinium contrast, Phosphate containing enemas, etc..)  -Adjust Medications according to eGFR  4. HTN:   -bp is acceptable. continue bp meds  -titrate bp meds to keep sbp >110 and < 130  5. Mineral Bone Disease:  -Elevated phos, will phos binder once able to have oral intake.  -PTH intact 289, will start calcitriol once phos improves.  6. Encephalopathy:  -on Rocephin  -Plan as per Neuro and primary team  -family requesting PEG and GI consulted.   7. Hypokalemia:  -give kcl repletion to keep K >3.5meq/L  -monitor K.     Discussed the assessment and plan with Primary Team/Nurse

## 2023-08-03 NOTE — PROGRESS NOTE ADULT - PROBLEM SELECTOR PLAN 4
As per Nephrology   possible r/o urinary rentention vs MARISA vs r/o ATN.   order urinalysis, urine lytes->WNL  -renal ultrasound  and bladder scan  was negative  adjust meds for GFR

## 2023-08-03 NOTE — CONSULT NOTE ADULT - CARDIOVASCULAR
regular rate and rhythm/S1 S2 present/no gallops/no rub/no murmur/no JVD/normal PMI/no pedal edema/irregular rate and rhythm

## 2023-08-03 NOTE — PROGRESS NOTE ADULT - SUBJECTIVE AND OBJECTIVE BOX
PGY-1 Progress Note discussed with attending    PAGER #: [[phone]] TILL 5:00 PM  PLEASE CONTACT ON CALL TEAM:  - On Call Team (Please refer to Tyler) FROM 5:00 PM - 8:30PM  - Nightfloat Team FROM 8:30 -7:30 AM    CHIEF COMPLAINT & BRIEF HOSPITAL COURSE:    INTERVAL HPI/OVERNIGHT EVENTS:  No acute overnight events. Blood pressure is regulating. Mouth is very dry. Asked nurses to do the sponges on her mouth.   MEDICATIONS  (STANDING):  aspirin Suppository 300 milliGRAM(s) Rectal daily  cloNIDine Patch 0.2 mG/24Hr(s) 1 patch Transdermal <User Schedule>  dextrose 5%. 1000 milliLiter(s) (70 mL/Hr) IV Continuous <Continuous>  enoxaparin Injectable 30 milliGRAM(s) SubCutaneous every 24 hours  hydrALAZINE Injectable 10 milliGRAM(s) IV Push every 6 hours  valproate sodium  IVPB 500 milliGRAM(s) IV Intermittent every 8 hours    MEDICATIONS  (PRN):    Vital Signs Last 24 Hrs  T(C): 36.2 (03 Aug 2023 12:20), Max: 36.9 (03 Aug 2023 05:25)  T(F): 97.2 (03 Aug 2023 12:20), Max: 98.4 (03 Aug 2023 05:25)  HR: 85 (03 Aug 2023 12:20) (70 - 88)  BP: 164/84 (03 Aug 2023 12:20) (143/68 - 178/85)  BP(mean): --  RR: 16 (03 Aug 2023 12:20) (16 - 18)  SpO2: 99% (03 Aug 2023 12:20) (97% - 99%)    Parameters below as of 03 Aug 2023 12:20  Patient On (Oxygen Delivery Method): room air        PHYSICAL EXAMINATION:  GENERAL: NAD, well built  HEAD:  Atraumatic, Normocephalic  EYES:  conjunctiva and sclera clear  CHEST/LUNG: Clear to auscultation. No rales, rhonchi, wheezing, or rubs  HEART: Regular rate and rhythm; No murmurs, rubs, or gallops  ABDOMEN: Soft, Nontender, Nondistended; Bowel sounds present  EXTREMITIES:  2+ Peripheral Pulses, No clubbing, cyanosis, or edema  SKIN: warm dry                          9.4    8.87  )-----------( 310      ( 03 Aug 2023 06:17 )             30.6     08-03    146<H>  |  114<H>  |  44<H>  ----------------------------<  82  2.9<LL>   |  18<L>  |  2.14<H>    Ca    8.5      03 Aug 2023 06:17  Phos  5.2     08-03  Mg     2.6     08-03    TPro  7.4  /  Alb  2.5<L>  /  TBili  0.3  /  DBili  x   /  AST  18  /  ALT  16  /  AlkPhos  79  08-03    LIVER FUNCTIONS - ( 03 Aug 2023 06:17 )  Alb: 2.5 g/dL / Pro: 7.4 g/dL / ALK PHOS: 79 U/L / ALT: 16 U/L DA / AST: 18 U/L / GGT: x               PT/INR - ( 03 Aug 2023 06:17 )   PT: 11.7 sec;   INR: 1.03 ratio         PTT - ( 03 Aug 2023 06:17 )  PTT:31.8 sec    CAPILLARY BLOOD GLUCOSE      RADIOLOGY & ADDITIONAL TESTS:                   PGY-1 Progress Note discussed with attending    PAGER #: [[phone]] TILL 5:00 PM  PLEASE CONTACT ON CALL TEAM:  - On Call Team (Please refer to Tyler) FROM 5:00 PM - 8:30PM  - Nightfloat Team FROM 8:30 -7:30 AM    CHIEF COMPLAINT & BRIEF HOSPITAL COURSE:    INTERVAL HPI/OVERNIGHT EVENTS:  No acute overnight events. Blood pressure is regulating. Mouth is very dry. Asked nurses to do the sponges on her mouth.   MEDICATIONS  (STANDING):  aspirin Suppository 300 milliGRAM(s) Rectal daily  cloNIDine Patch 0.2 mG/24Hr(s) 1 patch Transdermal <User Schedule>  dextrose 5%. 1000 milliLiter(s) (70 mL/Hr) IV Continuous <Continuous>  enoxaparin Injectable 30 milliGRAM(s) SubCutaneous every 24 hours  hydrALAZINE Injectable 10 milliGRAM(s) IV Push every 6 hours  valproate sodium  IVPB 500 milliGRAM(s) IV Intermittent every 8 hours    MEDICATIONS  (PRN):    Vital Signs Last 24 Hrs  T(C): 36.2 (03 Aug 2023 12:20), Max: 36.9 (03 Aug 2023 05:25)  T(F): 97.2 (03 Aug 2023 12:20), Max: 98.4 (03 Aug 2023 05:25)  HR: 85 (03 Aug 2023 12:20) (70 - 88)  BP: 164/84 (03 Aug 2023 12:20) (143/68 - 178/85)  BP(mean): --  RR: 16 (03 Aug 2023 12:20) (16 - 18)  SpO2: 99% (03 Aug 2023 12:20) (97% - 99%)    Parameters below as of 03 Aug 2023 12:20  Patient On (Oxygen Delivery Method): room air        PHYSICAL EXAMINATION:  GENERAL: NAD, well built  HEAD:  Atraumatic, Normocephalic  EYES:  conjunctiva and sclera clear  CHEST/LUNG: Clear to auscultation. No rales, rhonchi, wheezing, or rubs  HEART: Regular rate and rhythm; No murmurs, rubs, or gallops  ABDOMEN: Soft, Nontender, Nondistended; Bowel sounds present  EXTREMITIES:  2+ Peripheral Pulses, No clubbing, cyanosis, or edema  SKIN: warm dry                          9.4    8.87  )-----------( 310      ( 03 Aug 2023 06:17 )             30.6     08-03    146<H>  |  114<H>  |  44<H>  ----------------------------<  82  2.9<LL>   |  18<L>  |  2.14<H>    Ca    8.5      03 Aug 2023 06:17  Phos  5.2     08-03  Mg     2.6     08-03    TPro  7.4  /  Alb  2.5<L>  /  TBili  0.3  /  DBili  x   /  AST  18  /  ALT  16  /  AlkPhos  79  08-03    LIVER FUNCTIONS - ( 03 Aug 2023 06:17 )  Alb: 2.5 g/dL / Pro: 7.4 g/dL / ALK PHOS: 79 U/L / ALT: 16 U/L DA / AST: 18 U/L / GGT: x               PT/INR - ( 03 Aug 2023 06:17 )   PT: 11.7 sec;   INR: 1.03 ratio         PTT - ( 03 Aug 2023 06:17 )  PTT:31.8 sec    CAPILLARY BLOOD GLUCOSE      RADIOLOGY & ADDITIONAL TESTS:                   PGY-1 Progress Note discussed with attending    PAGER #: [[phone]] TILL 5:00 PM  PLEASE CONTACT ON CALL TEAM:  - On Call Team (Please refer to Tyler) FROM 5:00 PM - 8:30PM  - Nightfloat Team FROM 8:30 -7:30 AM    CHIEF COMPLAINT & BRIEF HOSPITAL COURSE:    INTERVAL HPI/OVERNIGHT EVENTS:  No acute overnight events. Blood pressure is regulating. Mouth is very dry. Asked nurses to do the sponges on her mouth.   MEDICATIONS  (STANDING):  aspirin Suppository 300 milliGRAM(s) Rectal daily  cloNIDine Patch 0.2 mG/24Hr(s) 1 patch Transdermal <User Schedule>  dextrose 5%. 1000 milliLiter(s) (70 mL/Hr) IV Continuous <Continuous>  enoxaparin Injectable 30 milliGRAM(s) SubCutaneous every 24 hours  hydrALAZINE Injectable 10 milliGRAM(s) IV Push every 6 hours  valproate sodium  IVPB 500 milliGRAM(s) IV Intermittent every 8 hours    MEDICATIONS  (PRN):    Vital Signs Last 24 Hrs  T(C): 36.2 (03 Aug 2023 12:20), Max: 36.9 (03 Aug 2023 05:25)  T(F): 97.2 (03 Aug 2023 12:20), Max: 98.4 (03 Aug 2023 05:25)  HR: 85 (03 Aug 2023 12:20) (70 - 88)  BP: 164/84 (03 Aug 2023 12:20) (143/68 - 178/85)  BP(mean): --  RR: 16 (03 Aug 2023 12:20) (16 - 18)  SpO2: 99% (03 Aug 2023 12:20) (97% - 99%)    Parameters below as of 03 Aug 2023 12:20  Patient On (Oxygen Delivery Method): room air        PHYSICAL EXAMINATION:  GENERAL: NAD, well built  HEAD:  Atraumatic, Normocephalic  EYES:  conjunctiva and sclera clear  CHEST/LUNG: Clear to auscultation. No rales, rhonchi, wheezing, or rubs  HEART: Regular rate and rhythm; No murmurs, rubs, or gallops  ABDOMEN: Soft, Nontender, Nondistended; Bowel sounds present  EXTREMITIES:  2+ Peripheral Pulses, No clubbing, cyanosis, or edema  SKIN: warm dry                          9.4    8.87  )-----------( 310      ( 03 Aug 2023 06:17 )             30.6     08-03    146<H>  |  114<H>  |  44<H>  ----------------------------<  82  2.9<LL>   |  18<L>  |  2.14<H>    Ca    8.5      03 Aug 2023 06:17  Phos  5.2     08-03  Mg     2.6     08-03    TPro  7.4  /  Alb  2.5<L>  /  TBili  0.3  /  DBili  x   /  AST  18  /  ALT  16  /  AlkPhos  79  08-03    LIVER FUNCTIONS - ( 03 Aug 2023 06:17 )  Alb: 2.5 g/dL / Pro: 7.4 g/dL / ALK PHOS: 79 U/L / ALT: 16 U/L DA / AST: 18 U/L / GGT: x               PT/INR - ( 03 Aug 2023 06:17 )   PT: 11.7 sec;   INR: 1.03 ratio         PTT - ( 03 Aug 2023 06:17 )  PTT:31.8 sec    CAPILLARY BLOOD GLUCOSE      RADIOLOGY & ADDITIONAL TESTS:

## 2023-08-03 NOTE — PROGRESS NOTE ADULT - SUBJECTIVE AND OBJECTIVE BOX
NEPHROLOGY MEDICAL CARE, [facility] - Dr. Ajay Green/ Dr. Mert Madison/ Dr. Chris Calderon/ Dr. Carson Cook    Date of Service: 08-03-23 @ 13:18    Patient was seen and examined at bedside.    CC: patient is okay and NAD    Vital Signs Last 24 Hrs  T(C): 36.9 (03 Aug 2023 05:25), Max: 36.9 (03 Aug 2023 05:25)  T(F): 98.4 (03 Aug 2023 05:25), Max: 98.4 (03 Aug 2023 05:25)  HR: 81 (03 Aug 2023 05:25) (70 - 88)  BP: 160/72 (03 Aug 2023 05:25) (143/68 - 178/85)  BP(mean): --  RR: 18 (03 Aug 2023 05:25) (17 - 18)  SpO2: 98% (03 Aug 2023 05:25) (97% - 99%)    Parameters below as of 03 Aug 2023 05:25  Patient On (Oxygen Delivery Method): room air        08-02 @ 07:01  -  08-03 @ 07:00  --------------------------------------------------------  IN: 0 mL / OUT: 700 mL / NET: -700 mL        PHYSICAL EXAM:  General: No acute respiratory distress.  Eyes: conjunctiva and sclera clear  ENMT: Atraumatic, Normocephalic, supple, No JVD present. Moist mucous membranes  Respiratory: Bilateral clear lungs; No rales, rhonchi, wheezing  Cardiovascular: S1S2+; no m/r/g  Gastrointestinal: Soft, Non-tender, Nondistended; Bowel sounds present  Neuro: eyes are open; hemiparesis.  Ext:  1+pedal edema, No Cyanosis  Skin: No visible rashes    MEDICATIONS:  MEDICATIONS  (STANDING):  aspirin Suppository 300 milliGRAM(s) Rectal daily  cloNIDine Patch 0.2 mG/24Hr(s) 1 patch Transdermal <User Schedule>  dextrose 5%. 1000 milliLiter(s) (70 mL/Hr) IV Continuous <Continuous>  enoxaparin Injectable 30 milliGRAM(s) SubCutaneous every 24 hours  hydrALAZINE Injectable 10 milliGRAM(s) IV Push every 6 hours  potassium chloride  10 mEq/100 mL IVPB 10 milliEquivalent(s) IV Intermittent every 1 hour  valproate sodium  IVPB 500 milliGRAM(s) IV Intermittent every 8 hours    MEDICATIONS  (PRN):          LABS:                        9.4    8.87  )-----------( 310      ( 03 Aug 2023 06:17 )             30.6     08-03    146<H>  |  114<H>  |  44<H>  ----------------------------<  82  2.9<LL>   |  18<L>  |  2.14<H>    Ca    8.5      03 Aug 2023 06:17  Phos  5.2     08-03  Mg     2.6     08-03    TPro  7.4  /  Alb  2.5<L>  /  TBili  0.3  /  DBili  x   /  AST  18  /  ALT  16  /  AlkPhos  79  08-03    PT/INR - ( 03 Aug 2023 06:17 )   PT: 11.7 sec;   INR: 1.03 ratio         PTT - ( 03 Aug 2023 06:17 )  PTT:31.8 sec  Urinalysis Basic - ( 03 Aug 2023 06:17 )    Color: x / Appearance: x / SG: x / pH: x  Gluc: 82 mg/dL / Ketone: x  / Bili: x / Urobili: x   Blood: x / Protein: x / Nitrite: x   Leuk Esterase: x / RBC: x / WBC x   Sq Epi: x / Non Sq Epi: x / Bacteria: x      Magnesium: 2.6 mg/dL (08-03 @ 06:17)  Phosphorus: 5.2 mg/dL (08-03 @ 06:17)    Urine studies    PTH and Vit D:         NEPHROLOGY MEDICAL CARE, [facility] - Dr. Ajay Green/ Dr. Mert Madison/ Dr. Chris Calderon/ Dr. Carson Cook    Date of Service: 08-03-23 @ 13:18    Patient was seen and examined at bedside.    CC: patient is okay and NAD    Vital Signs Last 24 Hrs  T(C): 36.9 (03 Aug 2023 05:25), Max: 36.9 (03 Aug 2023 05:25)  T(F): 98.4 (03 Aug 2023 05:25), Max: 98.4 (03 Aug 2023 05:25)  HR: 81 (03 Aug 2023 05:25) (70 - 88)  BP: 160/72 (03 Aug 2023 05:25) (143/68 - 178/85)  BP(mean): --  RR: 18 (03 Aug 2023 05:25) (17 - 18)  SpO2: 98% (03 Aug 2023 05:25) (97% - 99%)    Parameters below as of 03 Aug 2023 05:25  Patient On (Oxygen Delivery Method): room air        08-02 @ 07:01  -  08-03 @ 07:00  --------------------------------------------------------  IN: 0 mL / OUT: 700 mL / NET: -700 mL        PHYSICAL EXAM:  General: No acute respiratory distress.  Eyes: conjunctiva and sclera clear  ENMT: Atraumatic, Normocephalic, supple, No JVD present. Moist mucous membranes  Respiratory: Bilateral clear lungs; No rales, rhonchi, wheezing  Cardiovascular: S1S2+; no m/r/g  Gastrointestinal: Soft, Non-tender, Nondistended; Bowel sounds present  Neuro: eyes are open; hemiparesis.  Ext:  1+pedal edema, No Cyanosis  Skin: No visible rashes    MEDICATIONS:  MEDICATIONS  (STANDING):  aspirin Suppository 300 milliGRAM(s) Rectal daily  cloNIDine Patch 0.2 mG/24Hr(s) 1 patch Transdermal <User Schedule>  dextrose 5%. 1000 milliLiter(s) (70 mL/Hr) IV Continuous <Continuous>  enoxaparin Injectable 30 milliGRAM(s) SubCutaneous every 24 hours  hydrALAZINE Injectable 10 milliGRAM(s) IV Push every 6 hours  potassium chloride  10 mEq/100 mL IVPB 10 milliEquivalent(s) IV Intermittent every 1 hour  valproate sodium  IVPB 500 milliGRAM(s) IV Intermittent every 8 hours    MEDICATIONS  (PRN):          LABS:                        9.4    8.87  )-----------( 310      ( 03 Aug 2023 06:17 )             30.6     08-03    146<H>  |  114<H>  |  44<H>  ----------------------------<  82  2.9<LL>   |  18<L>  |  2.14<H>    Ca    8.5      03 Aug 2023 06:17  Phos  5.2     08-03  Mg     2.6     08-03    TPro  7.4  /  Alb  2.5<L>  /  TBili  0.3  /  DBili  x   /  AST  18  /  ALT  16  /  AlkPhos  79  08-03    PT/INR - ( 03 Aug 2023 06:17 )   PT: 11.7 sec;   INR: 1.03 ratio         PTT - ( 03 Aug 2023 06:17 )  PTT:31.8 sec  Urinalysis Basic - ( 03 Aug 2023 06:17 )    Color: x / Appearance: x / SG: x / pH: x  Gluc: 82 mg/dL / Ketone: x  / Bili: x / Urobili: x   Blood: x / Protein: x / Nitrite: x   Leuk Esterase: x / RBC: x / WBC x   Sq Epi: x / Non Sq Epi: x / Bacteria: x      Magnesium: 2.6 mg/dL (08-03 @ 06:17)  Phosphorus: 5.2 mg/dL (08-03 @ 06:17)    Urine studies    PTH and Vit D:         NEPHROLOGY MEDICAL CARE, [facility] - Dr. Ajay Green/ Dr. Mert Madison/ Dr. Chris Calderon/ Dr. Carson Cook    Date of Service: 08-03-23 @ 13:18    Patient was seen and examined at bedside.    CC: patient is okay and NAD    Vital Signs Last 24 Hrs  T(C): 36.9 (03 Aug 2023 05:25), Max: 36.9 (03 Aug 2023 05:25)  T(F): 98.4 (03 Aug 2023 05:25), Max: 98.4 (03 Aug 2023 05:25)  HR: 81 (03 Aug 2023 05:25) (70 - 88)  BP: 160/72 (03 Aug 2023 05:25) (143/68 - 178/85)  BP(mean): --  RR: 18 (03 Aug 2023 05:25) (17 - 18)  SpO2: 98% (03 Aug 2023 05:25) (97% - 99%)    Parameters below as of 03 Aug 2023 05:25  Patient On (Oxygen Delivery Method): room air        08-02 @ 07:01  -  08-03 @ 07:00  --------------------------------------------------------  IN: 0 mL / OUT: 700 mL / NET: -700 mL        PHYSICAL EXAM:  General: No acute respiratory distress.  Eyes: conjunctiva and sclera clear  ENMT: Atraumatic, Normocephalic, supple, No JVD present. Moist mucous membranes  Respiratory: Bilateral clear lungs; No rales, rhonchi, wheezing  Cardiovascular: S1S2+; no m/r/g  Gastrointestinal: Soft, Non-tender, Nondistended; Bowel sounds present  Neuro: eyes are open; hemiparesis.  Ext:  1+pedal edema, No Cyanosis  Skin: No visible rashes    MEDICATIONS:  MEDICATIONS  (STANDING):  aspirin Suppository 300 milliGRAM(s) Rectal daily  cloNIDine Patch 0.2 mG/24Hr(s) 1 patch Transdermal <User Schedule>  dextrose 5%. 1000 milliLiter(s) (70 mL/Hr) IV Continuous <Continuous>  enoxaparin Injectable 30 milliGRAM(s) SubCutaneous every 24 hours  hydrALAZINE Injectable 10 milliGRAM(s) IV Push every 6 hours  potassium chloride  10 mEq/100 mL IVPB 10 milliEquivalent(s) IV Intermittent every 1 hour  valproate sodium  IVPB 500 milliGRAM(s) IV Intermittent every 8 hours    MEDICATIONS  (PRN):          LABS:                        9.4    8.87  )-----------( 310      ( 03 Aug 2023 06:17 )             30.6     08-03    146<H>  |  114<H>  |  44<H>  ----------------------------<  82  2.9<LL>   |  18<L>  |  2.14<H>    Ca    8.5      03 Aug 2023 06:17  Phos  5.2     08-03  Mg     2.6     08-03    TPro  7.4  /  Alb  2.5<L>  /  TBili  0.3  /  DBili  x   /  AST  18  /  ALT  16  /  AlkPhos  79  08-03    PT/INR - ( 03 Aug 2023 06:17 )   PT: 11.7 sec;   INR: 1.03 ratio         PTT - ( 03 Aug 2023 06:17 )  PTT:31.8 sec  Urinalysis Basic - ( 03 Aug 2023 06:17 )    Color: x / Appearance: x / SG: x / pH: x  Gluc: 82 mg/dL / Ketone: x  / Bili: x / Urobili: x   Blood: x / Protein: x / Nitrite: x   Leuk Esterase: x / RBC: x / WBC x   Sq Epi: x / Non Sq Epi: x / Bacteria: x      Magnesium: 2.6 mg/dL (08-03 @ 06:17)  Phosphorus: 5.2 mg/dL (08-03 @ 06:17)    Urine studies    PTH and Vit D:

## 2023-08-03 NOTE — CONSULT NOTE ADULT - SUBJECTIVE AND OBJECTIVE BOX
[  ] STAT REQUEST              [ X ] ROUTINE REQUEST    Patient is a 77 year old female with dysphagia. GI consulted for Peg tube placement.    HPI:  Patient is a 77 female from [facility] with past medical history significant for HTN, HLD, CVA (w/ residual aphasia and R sided hemiparesis/plegia) who was sent to the emergency room with for altered mental status. Patient is not able to provide any history. Patient is lying down in bed, awake and alert. However, patient does not speak, is not able to follow commands and does not turn face or move eyes when her name is called. Patient is admitted with CVA. Now patient with dysphagia, poor po intake, failure to thrive and weight loss. No abdominal pain, nausea, vomiting, hematemesis, hematochezia, melena, fever, chills, chest pain, SOB, cough, hematuria, dysuria  or diarrhea reported.        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 1000 milliLiter(s) (70 mL/Hr) IV Continuous <Continuous>  enoxaparin Injectable 30 milliGRAM(s) SubCutaneous every 24 hours  hydrALAZINE Injectable 10 milliGRAM(s) IV Push every 6 hours  potassium chloride  10 mEq/100 mL IVPB 10 milliEquivalent(s) IV Intermittent every 1 hour  valproate sodium  IVPB 500 milliGRAM(s) IV Intermittent every 8 hours         SOCIAL HISTORY  Advanced Directives:       [ X ] Full Code       [  ] DNR  Marital Status:         [  ] M      [ X ] S      [  ] D       [  ] W  Children:       [ X ] Yes      [  ] No  Occupation:        [  ] Employed       [ X ] Unemployed       [  ] Retired  Diet:       [ X ] Regular       [  ] PEG feeding          [  ] NG tube feeding  Drug Use:           [ X ] Patient denied          [  ] Yes  Alcohol:           [ X ] No             [  ] Yes (socially)         [  ] Yes (chronic)  Tobacco:           [  ] Yes           [X  ] No      FAMILY HISTORY  [ X ] Heart Disease            [ X ] Diabetes             [ X ] HTN             [  ] Colon Cancer             [  ] Stomach Cancer              [  ] Pancreatic Cancer      VITAL SIGNS   Vital Signs Last 24 Hrs  T(C): 36.9 (03 Aug 2023 05:25), Max: 36.9 (03 Aug 2023 05:25)  T(F): 98.4 (03 Aug 2023 05:25), Max: 98.4 (03 Aug 2023 05:25)  HR: 81 (03 Aug 2023 05:25) (70 - 91)  BP: 160/72 (03 Aug 2023 05:25) (143/68 - 178/85)   RR: 18 (03 Aug 2023 05:25) (17 - 18)  SpO2: 98% (03 Aug 2023 05:25) (97% - 99%)  Parameters below as of 03 Aug 2023 05:25  Patient On (Oxygen Delivery Method): room air         CBC Full  -  ( 03 Aug 2023 06:17 )  WBC Count : 8.87 K/uL  RBC Count : 3.36 M/uL  Hemoglobin : 9.4 g/dL  Hematocrit : 30.6 %  Platelet Count - Automated : 310 K/uL  Mean Cell Volume : 91.1 fl  Mean Cell Hemoglobin : 28.0 pg  Mean Cell Hemoglobin Concentration : 30.7 gm/dL  Auto Neutrophil # : x  Auto Lymphocyte # : x  Auto Monocyte # : x  Auto Eosinophil # : x  Auto Basophil # : x  Auto Neutrophil % : x  Auto Lymphocyte % : x  Auto Monocyte % : x  Auto Eosinophil % : x  Auto Basophil % : x      08-03    146<H>  |  114<H>  |  44<H>  ----------------------------<  82  2.9<LL>   |  18<L>  |  2.14<H>    Ca    8.5      03 Aug 2023 06:17  Phos  5.2     08-03  Mg     2.6     08-03    TPro  7.4  /  Alb  2.5<L>  /  TBili  0.3  /  DBili  x   /  AST  18  /  ALT  16  /  AlkPhos  79  08-03     PT/INR - ( 03 Aug 2023 06:17 )   PT: 11.7 sec;   INR: 1.03 ratio       PTT - ( 03 Aug 2023 06:17 )  PTT:31.8 sec    Urinalysis (07.30.23 @ 16:09)   Glucose Qualitative, Urine: Negative  Blood, Urine: Large  pH Urine: 6.0  Color: Yellow  Urine Appearance: Slightly Turbid  Bilirubin: Negative  Ketone - Urine: Trace  Specific Gravity: 1.010  Protein, Urine: 100 mg/dL  Urobilinogen: 1 mg/dL  Nitrite: Positive  Leukocyte Esterase Concentration: Moderate< from: 12 Lead ECG (07.27.23 @ 22:06) >    Ventricular Rate 83 BPM    Atrial Rate 83 BPM    P-R Interval 126 ms    QRS Duration 66 ms    Q-T Interval 376 ms    QTC Calculation(Bazett) 441 ms    P Axis 27 degrees    R Axis 1 degrees    T Axis 26 degrees    Diagnosis Line *** Poor data quality, interpretation may be adversely affected  Normal sinus rhythm  Moderate voltage criteria for LVH, may be normal variant  Borderline ECG    < end of copied text >      RADIOLOGY/IMAGING                  ACC: [id number] EXAM:  MR BRAIN   ORDERED BY: RIKY MCFARLANE     PROCEDURE DATE:  07/28/2023          INTERPRETATION:  MR of the brain without gadolinium contrast    CLINICAL INFORMATION:   CVA  HMR  Admitting Dxs: R41.82 ALTERED MENTAL   STATUS, UNSPECIFIED    TECHNIQUE:   Sagittal and axial T1-weighted images, axial FLAIR images,   axial susceptibility-weighted/gradient echo images, axial T2-weighted   images and axial diffusion weighted images of the brain were obtained.  CONTRAST:    None    COMPARISON:   CT head stroke code preceding day    FINDINGS:    BRAIN:   The brain demonstrates extensive encephalomalacia within the   left cerebral hemisphere. This includes much of the deep hemispheric   white matter, extending from the subcortical white matter at the cerebral   hemispheric vertex to the basal ganglia and extending in continuity with   wallerian degeneration to atrophic left cerebral peduncle and left   ventral kennedy. This extensive infarction largely spares the overlying   cerebral cortex. Includes marked low signal intensity on   susceptibility-weighted images in dictating remote hemorrhage at the time   of infarction prominently involving the extreme capsule and external   capsule white matter and extending into the lateral basal ganglia and   internal capsule. This infarction also includes a large portions of basal   ganglia and left thalamus.    The right cerebral hemisphere and posterior fossa appear otherwise   largely intact. No diffusion restriction is found in thebrain.  No acute   cerebral cortical infarct is found.   No recent intracranial hemorrhage   is recognized.  No mass effect is found in the brain.   The brain also   demonstrates patchy and confluent lesions within the cyst deep cerebral   hemispheric white matter more typical for ischemic white matter disease.   These lesions are hyperintense on the long TR images, otherwise   inconspicuous. Involvement is greatest in the centrum semiovale and   periatrial white matter. Moderate ventral pontine involvement is noted.    CSF SPACES:   The ventricles, sulci and basal cisterns  appear moderate   to severely dilated reflecting diffuse brain volume loss.   There is   asymmetric ex vacuo dilatation of the left lateral ventricle.    VESSELS:   The vertebral and internal carotid arteries demonstrate   expected flow voids indicating their patency.  The left vertebral artery   is dominant caliber.    HEAD AND NECK STRUCTURES:   The orbits are unremarkable.  Paranasal   sinuses are clear.  The nasalcavity appears intact.  The central skull   base appears intact.  The nasopharynx is symmetric.  The temporal bones   appear clear of disease.  The calvarium appears unremarkable.      IMPRESSION:    1. Extensive remote infarction of the left cerebral hemispheric white   matter basal ganglia and thalamus    2. Ischemic white matter disease typical for age    3. Diffuse brain volume loss greater than typical for age        ACC: [id number] EXAM:  CT BRAIN PERFUSION MAPS STROKE   ORDERED BY: EMILY CHAVEZ     ACC: [id number] EXAM:  CT ANGIO BRAIN STROKE PROTC IC   ORDERED BY: EMILY CHAVEZ     ACC: [id number] EXAM:  CT BRAIN STROKE PROTOCOL   ORDERED BY: EMILY CHAVEZ     ACC: [id number] EXAM:  CT ANGIO NECK STROKE PROTCL IC   ORDERED BY: EMILY CHAVEZ     PROCEDURE DATE:  07/27/2023          INTERPRETATION:  HEAD CT, CT PERFUSION, CTA OF THE [place] OF TY AND   NECK:    INDICATIONS: Stroke Code.    TECHNIQUE:    HEAD CT:    Serial axial images were obtained from the skull base to the vertex   without the use of intravenous contrast. RAPID artificial intelligence   was used for perfusion analysis and for preliminary evaluation of   intracranial hemorrhage.    CTA [place] OF TY:    After the intravenous power injection of non-ionic contrast material,   serial thin sections were obtained through the intracranial circulation   on a multislice CT scanner.  Images were reformatted using a [id number]   software package and viewed on a dedicated workstation in multiple   planes. MIP image analysis was performed on a separate workstation.    CTA NECK:    After the intravenous power injection of non-ionic contrast material,   serial thin sectionswere obtained through the cervical circulation on a   multislice CT scanner.  Images were reformatted using a dedicated 3D   software package and viewed on a dedicated workstation in multiple   planes. MIP image analysis was performed on a separate workstation.    CONTRAST: 130 cc Omnipaque 350 was administered. 70 cc was discarded.      COMPARISON EXAMINATION: None.    FINDINGS:  Left basal ganglia/subinsular prominent region of encephalomalacia and   extensive left periventricular gliotic change consistent with a chronic   infarction in the left MCA territory. There is associated ex vacuo   dilatation of the left lateral ventricle.  VENTRICLES AND SULCI: No hydrocephalus.  INTRA-AXIAL: No intracranial mass, acute hemorrhage, or midline shift is   present.  EXTRA-AXIAL: No extra-axial fluid collection is present.  INTRACRANIAL HEMORRHAGE: None.    VISUALIZED SINUSES: No air-fluid levels are identified.  VISUALIZED MASTOIDS:  Clear  CALVARIUM:  Intact      CT RAPID PERFUSION:  Left cerebral large region of perfusion abnormality corresponding to the   area of chronic infarction    INFARCT CORE: 57 mL.    TISSUE AT RISK: 236 mL.        CTA [place] OF TY:    ANTERIOR CIRCULATION    ICA  CAVERNOUS, SUPRACLINOID, BIFURCATION SEGMENTS: Patent without flow   limiting stenosis. Both posterior communicating arteries are present.    ANTERIOR CEREBRAL ARTERIES: Bilateral A1, anterior communicating and A2   anterior cerebral arteries are unremarkable in course and caliber without   flow limiting stenosis.    MIDDLE CEREBRAL ARTERIES: Patent bilateral M1, M2, and distal MCA   branches without flow limiting stenosis.    POSTERIOR CIRCULATION:    VERTEBRAL ARTERIES: Patent without flow limiting stenosis    BASILAR ARTERY: Patent no flow limiting stenosis.    POSTERIOR CEREBRAL ARTERIES: Patent without flow limiting stenosis.    CTA NECK:    GREAT VESSELS: Visualized segments are patent, no flow limiting stenosis.    COMMON CAROTID ARTERIES:  RIGHT: Patent without flow limiting stenosis  LEFT: Patent without flow limiting stenosis    INTERNAL CAROTID ARTERIES:  RIGHT: Patent no evidence for any hemodynamically significant stenosis at   the ICA origin by NASCET criteria.  LEFT: Patent no evidence for any hemodynamically significant stenosis at   the ICA origin by NASCET criteria.    VERTEBRAL ARTERIES:    RIGHT: Patent no evidence for any flow limiting stenosis.  LEFT: Patent no evidence for any flow limiting stenosis.      SOFT TISSUES: Unremarkable    BONES: Unremarkable    IMPRESSION:    HEAD CT: No acute intracranial hemorrhage or acute territorial   infarction. Chronic left MCA territory infarction.    CT PERFUSION demonstrated: Perfusion abnormality corresponding to the   chronically infarcted left MCA territory.    INFARCT CORE: 57 mL.  TISSUE AT RISK: 236 mL.      If symptoms persist consider follow-up imaging with MRI of the brain if   no contraindications.     CTA COW:  Patent intracranial circulation without flow limiting stenosis   or large vessel occlusion.    CTA NECK: Patent, ECAs, ICAs, no  hemodynamically significant stenosis at    ICA origins by NASCET criteria.  Bilateral vertebral arteries are patent without flow limiting stenosis.      Notification to clinician of alert:  Dr. EMILY CHAVEZ was notified about the noncontrast head CT finding at   2:39 PM on 7/27/2023 with readback confirmation. The opportunity for   questions was provided and all questions asked were answered.     [  ] STAT REQUEST              [ X ] ROUTINE REQUEST    Patient is a 77 year old female with dysphagia. GI consulted for Peg tube placement.    HPI:  Patient is a 77 female from [facility] with past medical history significant for HTN, HLD, CVA (w/ residual aphasia and R sided hemiparesis/plegia) who was sent to the emergency room with for altered mental status. Patient is not able to provide any history. Patient is lying down in bed, awake and alert. However, patient does not speak, is not able to follow commands and does not turn face or move eyes when her name is called. Patient is admitted with CVA. Now patient with dysphagia, poor po intake, failure to thrive and weight loss. No abdominal pain, nausea, vomiting, hematemesis, hematochezia, melena, fever, chills, chest pain, SOB, cough, hematuria, dysuria  or diarrhea reported.        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 1000 milliLiter(s) (70 mL/Hr) IV Continuous <Continuous>  enoxaparin Injectable 30 milliGRAM(s) SubCutaneous every 24 hours  hydrALAZINE Injectable 10 milliGRAM(s) IV Push every 6 hours  potassium chloride  10 mEq/100 mL IVPB 10 milliEquivalent(s) IV Intermittent every 1 hour  valproate sodium  IVPB 500 milliGRAM(s) IV Intermittent every 8 hours         SOCIAL HISTORY  Advanced Directives:       [ X ] Full Code       [  ] DNR  Marital Status:         [  ] M      [ X ] S      [  ] D       [  ] W  Children:       [ X ] Yes      [  ] No  Occupation:        [  ] Employed       [ X ] Unemployed       [  ] Retired  Diet:       [ X ] Regular       [  ] PEG feeding          [  ] NG tube feeding  Drug Use:           [ X ] Patient denied          [  ] Yes  Alcohol:           [ X ] No             [  ] Yes (socially)         [  ] Yes (chronic)  Tobacco:           [  ] Yes           [X  ] No      FAMILY HISTORY  [ X ] Heart Disease            [ X ] Diabetes             [ X ] HTN             [  ] Colon Cancer             [  ] Stomach Cancer              [  ] Pancreatic Cancer      VITAL SIGNS   Vital Signs Last 24 Hrs  T(C): 36.9 (03 Aug 2023 05:25), Max: 36.9 (03 Aug 2023 05:25)  T(F): 98.4 (03 Aug 2023 05:25), Max: 98.4 (03 Aug 2023 05:25)  HR: 81 (03 Aug 2023 05:25) (70 - 91)  BP: 160/72 (03 Aug 2023 05:25) (143/68 - 178/85)   RR: 18 (03 Aug 2023 05:25) (17 - 18)  SpO2: 98% (03 Aug 2023 05:25) (97% - 99%)  Parameters below as of 03 Aug 2023 05:25  Patient On (Oxygen Delivery Method): room air         CBC Full  -  ( 03 Aug 2023 06:17 )  WBC Count : 8.87 K/uL  RBC Count : 3.36 M/uL  Hemoglobin : 9.4 g/dL  Hematocrit : 30.6 %  Platelet Count - Automated : 310 K/uL  Mean Cell Volume : 91.1 fl  Mean Cell Hemoglobin : 28.0 pg  Mean Cell Hemoglobin Concentration : 30.7 gm/dL  Auto Neutrophil # : x  Auto Lymphocyte # : x  Auto Monocyte # : x  Auto Eosinophil # : x  Auto Basophil # : x  Auto Neutrophil % : x  Auto Lymphocyte % : x  Auto Monocyte % : x  Auto Eosinophil % : x  Auto Basophil % : x      08-03    146<H>  |  114<H>  |  44<H>  ----------------------------<  82  2.9<LL>   |  18<L>  |  2.14<H>    Ca    8.5      03 Aug 2023 06:17  Phos  5.2     08-03  Mg     2.6     08-03    TPro  7.4  /  Alb  2.5<L>  /  TBili  0.3  /  DBili  x   /  AST  18  /  ALT  16  /  AlkPhos  79  08-03     PT/INR - ( 03 Aug 2023 06:17 )   PT: 11.7 sec;   INR: 1.03 ratio       PTT - ( 03 Aug 2023 06:17 )  PTT:31.8 sec    Urinalysis (07.30.23 @ 16:09)   Glucose Qualitative, Urine: Negative  Blood, Urine: Large  pH Urine: 6.0  Color: Yellow  Urine Appearance: Slightly Turbid  Bilirubin: Negative  Ketone - Urine: Trace  Specific Gravity: 1.010  Protein, Urine: 100 mg/dL  Urobilinogen: 1 mg/dL  Nitrite: Positive  Leukocyte Esterase Concentration: Moderate< from: 12 Lead ECG (07.27.23 @ 22:06) >    Ventricular Rate 83 BPM    Atrial Rate 83 BPM    P-R Interval 126 ms    QRS Duration 66 ms    Q-T Interval 376 ms    QTC Calculation(Bazett) 441 ms    P Axis 27 degrees    R Axis 1 degrees    T Axis 26 degrees    Diagnosis Line *** Poor data quality, interpretation may be adversely affected  Normal sinus rhythm  Moderate voltage criteria for LVH, may be normal variant  Borderline ECG    < end of copied text >      RADIOLOGY/IMAGING                  ACC: [id number] EXAM:  MR BRAIN   ORDERED BY: RIKY MCFARLANE     PROCEDURE DATE:  07/28/2023          INTERPRETATION:  MR of the brain without gadolinium contrast    CLINICAL INFORMATION:   CVA  HMR  Admitting Dxs: R41.82 ALTERED MENTAL   STATUS, UNSPECIFIED    TECHNIQUE:   Sagittal and axial T1-weighted images, axial FLAIR images,   axial susceptibility-weighted/gradient echo images, axial T2-weighted   images and axial diffusion weighted images of the brain were obtained.  CONTRAST:    None    COMPARISON:   CT head stroke code preceding day    FINDINGS:    BRAIN:   The brain demonstrates extensive encephalomalacia within the   left cerebral hemisphere. This includes much of the deep hemispheric   white matter, extending from the subcortical white matter at the cerebral   hemispheric vertex to the basal ganglia and extending in continuity with   wallerian degeneration to atrophic left cerebral peduncle and left   ventral kennedy. This extensive infarction largely spares the overlying   cerebral cortex. Includes marked low signal intensity on   susceptibility-weighted images in dictating remote hemorrhage at the time   of infarction prominently involving the extreme capsule and external   capsule white matter and extending into the lateral basal ganglia and   internal capsule. This infarction also includes a large portions of basal   ganglia and left thalamus.    The right cerebral hemisphere and posterior fossa appear otherwise   largely intact. No diffusion restriction is found in thebrain.  No acute   cerebral cortical infarct is found.   No recent intracranial hemorrhage   is recognized.  No mass effect is found in the brain.   The brain also   demonstrates patchy and confluent lesions within the cyst deep cerebral   hemispheric white matter more typical for ischemic white matter disease.   These lesions are hyperintense on the long TR images, otherwise   inconspicuous. Involvement is greatest in the centrum semiovale and   periatrial white matter. Moderate ventral pontine involvement is noted.    CSF SPACES:   The ventricles, sulci and basal cisterns  appear moderate   to severely dilated reflecting diffuse brain volume loss.   There is   asymmetric ex vacuo dilatation of the left lateral ventricle.    VESSELS:   The vertebral and internal carotid arteries demonstrate   expected flow voids indicating their patency.  The left vertebral artery   is dominant caliber.    HEAD AND NECK STRUCTURES:   The orbits are unremarkable.  Paranasal   sinuses are clear.  The nasalcavity appears intact.  The central skull   base appears intact.  The nasopharynx is symmetric.  The temporal bones   appear clear of disease.  The calvarium appears unremarkable.      IMPRESSION:    1. Extensive remote infarction of the left cerebral hemispheric white   matter basal ganglia and thalamus    2. Ischemic white matter disease typical for age    3. Diffuse brain volume loss greater than typical for age        ACC: [id number] EXAM:  CT BRAIN PERFUSION MAPS STROKE   ORDERED BY: EMILY CHAVEZ     ACC: [id number] EXAM:  CT ANGIO BRAIN STROKE PROTC IC   ORDERED BY: EMILY CHAVEZ     ACC: [id number] EXAM:  CT BRAIN STROKE PROTOCOL   ORDERED BY: EMILY CHAVEZ     ACC: [id number] EXAM:  CT ANGIO NECK STROKE PROTCL IC   ORDERED BY: EMILY CHAVEZ     PROCEDURE DATE:  07/27/2023          INTERPRETATION:  HEAD CT, CT PERFUSION, CTA OF THE [place] OF TY AND   NECK:    INDICATIONS: Stroke Code.    TECHNIQUE:    HEAD CT:    Serial axial images were obtained from the skull base to the vertex   without the use of intravenous contrast. RAPID artificial intelligence   was used for perfusion analysis and for preliminary evaluation of   intracranial hemorrhage.    CTA [place] OF TY:    After the intravenous power injection of non-ionic contrast material,   serial thin sections were obtained through the intracranial circulation   on a multislice CT scanner.  Images were reformatted using a [id number]   software package and viewed on a dedicated workstation in multiple   planes. MIP image analysis was performed on a separate workstation.    CTA NECK:    After the intravenous power injection of non-ionic contrast material,   serial thin sectionswere obtained through the cervical circulation on a   multislice CT scanner.  Images were reformatted using a dedicated 3D   software package and viewed on a dedicated workstation in multiple   planes. MIP image analysis was performed on a separate workstation.    CONTRAST: 130 cc Omnipaque 350 was administered. 70 cc was discarded.      COMPARISON EXAMINATION: None.    FINDINGS:  Left basal ganglia/subinsular prominent region of encephalomalacia and   extensive left periventricular gliotic change consistent with a chronic   infarction in the left MCA territory. There is associated ex vacuo   dilatation of the left lateral ventricle.  VENTRICLES AND SULCI: No hydrocephalus.  INTRA-AXIAL: No intracranial mass, acute hemorrhage, or midline shift is   present.  EXTRA-AXIAL: No extra-axial fluid collection is present.  INTRACRANIAL HEMORRHAGE: None.    VISUALIZED SINUSES: No air-fluid levels are identified.  VISUALIZED MASTOIDS:  Clear  CALVARIUM:  Intact      CT RAPID PERFUSION:  Left cerebral large region of perfusion abnormality corresponding to the   area of chronic infarction    INFARCT CORE: 57 mL.    TISSUE AT RISK: 236 mL.        CTA [place] OF TY:    ANTERIOR CIRCULATION    ICA  CAVERNOUS, SUPRACLINOID, BIFURCATION SEGMENTS: Patent without flow   limiting stenosis. Both posterior communicating arteries are present.    ANTERIOR CEREBRAL ARTERIES: Bilateral A1, anterior communicating and A2   anterior cerebral arteries are unremarkable in course and caliber without   flow limiting stenosis.    MIDDLE CEREBRAL ARTERIES: Patent bilateral M1, M2, and distal MCA   branches without flow limiting stenosis.    POSTERIOR CIRCULATION:    VERTEBRAL ARTERIES: Patent without flow limiting stenosis    BASILAR ARTERY: Patent no flow limiting stenosis.    POSTERIOR CEREBRAL ARTERIES: Patent without flow limiting stenosis.    CTA NECK:    GREAT VESSELS: Visualized segments are patent, no flow limiting stenosis.    COMMON CAROTID ARTERIES:  RIGHT: Patent without flow limiting stenosis  LEFT: Patent without flow limiting stenosis    INTERNAL CAROTID ARTERIES:  RIGHT: Patent no evidence for any hemodynamically significant stenosis at   the ICA origin by NASCET criteria.  LEFT: Patent no evidence for any hemodynamically significant stenosis at   the ICA origin by NASCET criteria.    VERTEBRAL ARTERIES:    RIGHT: Patent no evidence for any flow limiting stenosis.  LEFT: Patent no evidence for any flow limiting stenosis.      SOFT TISSUES: Unremarkable    BONES: Unremarkable    IMPRESSION:    HEAD CT: No acute intracranial hemorrhage or acute territorial   infarction. Chronic left MCA territory infarction.    CT PERFUSION demonstrated: Perfusion abnormality corresponding to the   chronically infarcted left MCA territory.    INFARCT CORE: 57 mL.  TISSUE AT RISK: 236 mL.      If symptoms persist consider follow-up imaging with MRI of the brain if   no contraindications.     CTA COW:  Patent intracranial circulation without flow limiting stenosis   or large vessel occlusion.    CTA NECK: Patent, ECAs, ICAs, no  hemodynamically significant stenosis at    ICA origins by NASCET criteria.  Bilateral vertebral arteries are patent without flow limiting stenosis.      Notification to clinician of alert:  Dr. EMILY CHAVEZ was notified about the noncontrast head CT finding at   2:39 PM on 7/27/2023 with readback confirmation. The opportunity for   questions was provided and all questions asked were answered.     [  ] STAT REQUEST              [ X ] ROUTINE REQUEST    Patient is a 77 year old female with dysphagia. GI consulted for Peg tube placement.    HPI:  Patient is a 77 female from [facility] with past medical history significant for HTN, HLD, CVA (w/ residual aphasia and R sided hemiparesis/plegia) who was sent to the emergency room with for altered mental status. Patient is not able to provide any history. Patient is lying down in bed, awake and alert. However, patient does not speak, is not able to follow commands and does not turn face or move eyes when her name is called. Patient is admitted with CVA. Now patient with dysphagia, poor po intake, failure to thrive and weight loss. No abdominal pain, nausea, vomiting, hematemesis, hematochezia, melena, fever, chills, chest pain, SOB, cough, hematuria, dysuria  or diarrhea reported.        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 1000 milliLiter(s) (70 mL/Hr) IV Continuous <Continuous>  enoxaparin Injectable 30 milliGRAM(s) SubCutaneous every 24 hours  hydrALAZINE Injectable 10 milliGRAM(s) IV Push every 6 hours  potassium chloride  10 mEq/100 mL IVPB 10 milliEquivalent(s) IV Intermittent every 1 hour  valproate sodium  IVPB 500 milliGRAM(s) IV Intermittent every 8 hours         SOCIAL HISTORY  Advanced Directives:       [ X ] Full Code       [  ] DNR  Marital Status:         [  ] M      [ X ] S      [  ] D       [  ] W  Children:       [ X ] Yes      [  ] No  Occupation:        [  ] Employed       [ X ] Unemployed       [  ] Retired  Diet:       [ X ] Regular       [  ] PEG feeding          [  ] NG tube feeding  Drug Use:           [ X ] Patient denied          [  ] Yes  Alcohol:           [ X ] No             [  ] Yes (socially)         [  ] Yes (chronic)  Tobacco:           [  ] Yes           [X  ] No      FAMILY HISTORY  [ X ] Heart Disease            [ X ] Diabetes             [ X ] HTN             [  ] Colon Cancer             [  ] Stomach Cancer              [  ] Pancreatic Cancer      VITAL SIGNS   Vital Signs Last 24 Hrs  T(C): 36.9 (03 Aug 2023 05:25), Max: 36.9 (03 Aug 2023 05:25)  T(F): 98.4 (03 Aug 2023 05:25), Max: 98.4 (03 Aug 2023 05:25)  HR: 81 (03 Aug 2023 05:25) (70 - 91)  BP: 160/72 (03 Aug 2023 05:25) (143/68 - 178/85)   RR: 18 (03 Aug 2023 05:25) (17 - 18)  SpO2: 98% (03 Aug 2023 05:25) (97% - 99%)  Parameters below as of 03 Aug 2023 05:25  Patient On (Oxygen Delivery Method): room air         CBC Full  -  ( 03 Aug 2023 06:17 )  WBC Count : 8.87 K/uL  RBC Count : 3.36 M/uL  Hemoglobin : 9.4 g/dL  Hematocrit : 30.6 %  Platelet Count - Automated : 310 K/uL  Mean Cell Volume : 91.1 fl  Mean Cell Hemoglobin : 28.0 pg  Mean Cell Hemoglobin Concentration : 30.7 gm/dL  Auto Neutrophil # : x  Auto Lymphocyte # : x  Auto Monocyte # : x  Auto Eosinophil # : x  Auto Basophil # : x  Auto Neutrophil % : x  Auto Lymphocyte % : x  Auto Monocyte % : x  Auto Eosinophil % : x  Auto Basophil % : x      08-03    146<H>  |  114<H>  |  44<H>  ----------------------------<  82  2.9<LL>   |  18<L>  |  2.14<H>    Ca    8.5      03 Aug 2023 06:17  Phos  5.2     08-03  Mg     2.6     08-03    TPro  7.4  /  Alb  2.5<L>  /  TBili  0.3  /  DBili  x   /  AST  18  /  ALT  16  /  AlkPhos  79  08-03     PT/INR - ( 03 Aug 2023 06:17 )   PT: 11.7 sec;   INR: 1.03 ratio       PTT - ( 03 Aug 2023 06:17 )  PTT:31.8 sec    Urinalysis (07.30.23 @ 16:09)   Glucose Qualitative, Urine: Negative  Blood, Urine: Large  pH Urine: 6.0  Color: Yellow  Urine Appearance: Slightly Turbid  Bilirubin: Negative  Ketone - Urine: Trace  Specific Gravity: 1.010  Protein, Urine: 100 mg/dL  Urobilinogen: 1 mg/dL  Nitrite: Positive  Leukocyte Esterase Concentration: Moderate< from: 12 Lead ECG (07.27.23 @ 22:06) >    Ventricular Rate 83 BPM    Atrial Rate 83 BPM    P-R Interval 126 ms    QRS Duration 66 ms    Q-T Interval 376 ms    QTC Calculation(Bazett) 441 ms    P Axis 27 degrees    R Axis 1 degrees    T Axis 26 degrees    Diagnosis Line *** Poor data quality, interpretation may be adversely affected  Normal sinus rhythm  Moderate voltage criteria for LVH, may be normal variant  Borderline ECG    < end of copied text >      RADIOLOGY/IMAGING                  ACC: [id number] EXAM:  MR BRAIN   ORDERED BY: RIKY MCFARLANE     PROCEDURE DATE:  07/28/2023          INTERPRETATION:  MR of the brain without gadolinium contrast    CLINICAL INFORMATION:   CVA  HMR  Admitting Dxs: R41.82 ALTERED MENTAL   STATUS, UNSPECIFIED    TECHNIQUE:   Sagittal and axial T1-weighted images, axial FLAIR images,   axial susceptibility-weighted/gradient echo images, axial T2-weighted   images and axial diffusion weighted images of the brain were obtained.  CONTRAST:    None    COMPARISON:   CT head stroke code preceding day    FINDINGS:    BRAIN:   The brain demonstrates extensive encephalomalacia within the   left cerebral hemisphere. This includes much of the deep hemispheric   white matter, extending from the subcortical white matter at the cerebral   hemispheric vertex to the basal ganglia and extending in continuity with   wallerian degeneration to atrophic left cerebral peduncle and left   ventral kennedy. This extensive infarction largely spares the overlying   cerebral cortex. Includes marked low signal intensity on   susceptibility-weighted images in dictating remote hemorrhage at the time   of infarction prominently involving the extreme capsule and external   capsule white matter and extending into the lateral basal ganglia and   internal capsule. This infarction also includes a large portions of basal   ganglia and left thalamus.    The right cerebral hemisphere and posterior fossa appear otherwise   largely intact. No diffusion restriction is found in thebrain.  No acute   cerebral cortical infarct is found.   No recent intracranial hemorrhage   is recognized.  No mass effect is found in the brain.   The brain also   demonstrates patchy and confluent lesions within the cyst deep cerebral   hemispheric white matter more typical for ischemic white matter disease.   These lesions are hyperintense on the long TR images, otherwise   inconspicuous. Involvement is greatest in the centrum semiovale and   periatrial white matter. Moderate ventral pontine involvement is noted.    CSF SPACES:   The ventricles, sulci and basal cisterns  appear moderate   to severely dilated reflecting diffuse brain volume loss.   There is   asymmetric ex vacuo dilatation of the left lateral ventricle.    VESSELS:   The vertebral and internal carotid arteries demonstrate   expected flow voids indicating their patency.  The left vertebral artery   is dominant caliber.    HEAD AND NECK STRUCTURES:   The orbits are unremarkable.  Paranasal   sinuses are clear.  The nasalcavity appears intact.  The central skull   base appears intact.  The nasopharynx is symmetric.  The temporal bones   appear clear of disease.  The calvarium appears unremarkable.      IMPRESSION:    1. Extensive remote infarction of the left cerebral hemispheric white   matter basal ganglia and thalamus    2. Ischemic white matter disease typical for age    3. Diffuse brain volume loss greater than typical for age        ACC: [id number] EXAM:  CT BRAIN PERFUSION MAPS STROKE   ORDERED BY: EMILY CHAVEZ     ACC: [id number] EXAM:  CT ANGIO BRAIN STROKE PROTC IC   ORDERED BY: EMILY CHAVEZ     ACC: [id number] EXAM:  CT BRAIN STROKE PROTOCOL   ORDERED BY: EMILY CHAVEZ     ACC: [id number] EXAM:  CT ANGIO NECK STROKE PROTCL IC   ORDERED BY: EMILY CHAVEZ     PROCEDURE DATE:  07/27/2023          INTERPRETATION:  HEAD CT, CT PERFUSION, CTA OF THE [place] OF TY AND   NECK:    INDICATIONS: Stroke Code.    TECHNIQUE:    HEAD CT:    Serial axial images were obtained from the skull base to the vertex   without the use of intravenous contrast. RAPID artificial intelligence   was used for perfusion analysis and for preliminary evaluation of   intracranial hemorrhage.    CTA [place] OF TY:    After the intravenous power injection of non-ionic contrast material,   serial thin sections were obtained through the intracranial circulation   on a multislice CT scanner.  Images were reformatted using a [id number]   software package and viewed on a dedicated workstation in multiple   planes. MIP image analysis was performed on a separate workstation.    CTA NECK:    After the intravenous power injection of non-ionic contrast material,   serial thin sectionswere obtained through the cervical circulation on a   multislice CT scanner.  Images were reformatted using a dedicated 3D   software package and viewed on a dedicated workstation in multiple   planes. MIP image analysis was performed on a separate workstation.    CONTRAST: 130 cc Omnipaque 350 was administered. 70 cc was discarded.      COMPARISON EXAMINATION: None.    FINDINGS:  Left basal ganglia/subinsular prominent region of encephalomalacia and   extensive left periventricular gliotic change consistent with a chronic   infarction in the left MCA territory. There is associated ex vacuo   dilatation of the left lateral ventricle.  VENTRICLES AND SULCI: No hydrocephalus.  INTRA-AXIAL: No intracranial mass, acute hemorrhage, or midline shift is   present.  EXTRA-AXIAL: No extra-axial fluid collection is present.  INTRACRANIAL HEMORRHAGE: None.    VISUALIZED SINUSES: No air-fluid levels are identified.  VISUALIZED MASTOIDS:  Clear  CALVARIUM:  Intact      CT RAPID PERFUSION:  Left cerebral large region of perfusion abnormality corresponding to the   area of chronic infarction    INFARCT CORE: 57 mL.    TISSUE AT RISK: 236 mL.        CTA [place] OF TY:    ANTERIOR CIRCULATION    ICA  CAVERNOUS, SUPRACLINOID, BIFURCATION SEGMENTS: Patent without flow   limiting stenosis. Both posterior communicating arteries are present.    ANTERIOR CEREBRAL ARTERIES: Bilateral A1, anterior communicating and A2   anterior cerebral arteries are unremarkable in course and caliber without   flow limiting stenosis.    MIDDLE CEREBRAL ARTERIES: Patent bilateral M1, M2, and distal MCA   branches without flow limiting stenosis.    POSTERIOR CIRCULATION:    VERTEBRAL ARTERIES: Patent without flow limiting stenosis    BASILAR ARTERY: Patent no flow limiting stenosis.    POSTERIOR CEREBRAL ARTERIES: Patent without flow limiting stenosis.    CTA NECK:    GREAT VESSELS: Visualized segments are patent, no flow limiting stenosis.    COMMON CAROTID ARTERIES:  RIGHT: Patent without flow limiting stenosis  LEFT: Patent without flow limiting stenosis    INTERNAL CAROTID ARTERIES:  RIGHT: Patent no evidence for any hemodynamically significant stenosis at   the ICA origin by NASCET criteria.  LEFT: Patent no evidence for any hemodynamically significant stenosis at   the ICA origin by NASCET criteria.    VERTEBRAL ARTERIES:    RIGHT: Patent no evidence for any flow limiting stenosis.  LEFT: Patent no evidence for any flow limiting stenosis.      SOFT TISSUES: Unremarkable    BONES: Unremarkable    IMPRESSION:    HEAD CT: No acute intracranial hemorrhage or acute territorial   infarction. Chronic left MCA territory infarction.    CT PERFUSION demonstrated: Perfusion abnormality corresponding to the   chronically infarcted left MCA territory.    INFARCT CORE: 57 mL.  TISSUE AT RISK: 236 mL.      If symptoms persist consider follow-up imaging with MRI of the brain if   no contraindications.     CTA COW:  Patent intracranial circulation without flow limiting stenosis   or large vessel occlusion.    CTA NECK: Patent, ECAs, ICAs, no  hemodynamically significant stenosis at    ICA origins by NASCET criteria.  Bilateral vertebral arteries are patent without flow limiting stenosis.      Notification to clinician of alert:  Dr. EMILY CHAVEZ was notified about the noncontrast head CT finding at   2:39 PM on 7/27/2023 with readback confirmation. The opportunity for   questions was provided and all questions asked were answered.

## 2023-08-03 NOTE — CONSULT NOTE ADULT - ASSESSMENT
1. Dysphagia  2. Failure to thrive  3. Constipation  4. Anemia  5. No evidence of acute GI bleeding  6. Hypokalemia    Suggestions:    1. NPO  2. IVF hydration  3. Monitor electrolytes  4. Replace K+  5. Palliative evaluation  6. Consider NGT feeding meanwhile  7. Aspiration precaution  8. Monitor H/H  9. Transfuse PRBC as needed  10. Protonix daily  11. Avoid NSAID  12. Daily stool softener as needed  13. DVT prophylaxis

## 2023-08-04 DIAGNOSIS — R62.7 ADULT FAILURE TO THRIVE: ICD-10-CM

## 2023-08-04 LAB
ALBUMIN SERPL ELPH-MCNC: 2.2 G/DL — LOW (ref 3.5–5)
ALP SERPL-CCNC: 75 U/L — SIGNIFICANT CHANGE UP (ref 40–120)
ALT FLD-CCNC: 13 U/L DA — SIGNIFICANT CHANGE UP (ref 10–60)
ANION GAP SERPL CALC-SCNC: 8 MMOL/L — SIGNIFICANT CHANGE UP (ref 5–17)
AST SERPL-CCNC: 18 U/L — SIGNIFICANT CHANGE UP (ref 10–40)
BILIRUB SERPL-MCNC: 0.3 MG/DL — SIGNIFICANT CHANGE UP (ref 0.2–1.2)
BUN SERPL-MCNC: 39 MG/DL — HIGH (ref 7–18)
CALCIUM SERPL-MCNC: 8.1 MG/DL — LOW (ref 8.4–10.5)
CHLORIDE SERPL-SCNC: 115 MMOL/L — HIGH (ref 96–108)
CO2 SERPL-SCNC: 20 MMOL/L — LOW (ref 22–31)
CREAT SERPL-MCNC: 1.97 MG/DL — HIGH (ref 0.5–1.3)
EGFR: 26 ML/MIN/1.73M2 — LOW
GLUCOSE BLDC GLUCOMTR-MCNC: 88 MG/DL — SIGNIFICANT CHANGE UP (ref 70–99)
GLUCOSE BLDC GLUCOMTR-MCNC: 89 MG/DL — SIGNIFICANT CHANGE UP (ref 70–99)
GLUCOSE BLDC GLUCOMTR-MCNC: 91 MG/DL — SIGNIFICANT CHANGE UP (ref 70–99)
GLUCOSE SERPL-MCNC: 80 MG/DL — SIGNIFICANT CHANGE UP (ref 70–99)
HCT VFR BLD CALC: 28.9 % — LOW (ref 34.5–45)
HGB BLD-MCNC: 8.9 G/DL — LOW (ref 11.5–15.5)
MAGNESIUM SERPL-MCNC: 2.5 MG/DL — SIGNIFICANT CHANGE UP (ref 1.6–2.6)
MCHC RBC-ENTMCNC: 28.1 PG — SIGNIFICANT CHANGE UP (ref 27–34)
MCHC RBC-ENTMCNC: 30.8 GM/DL — LOW (ref 32–36)
MCV RBC AUTO: 91.2 FL — SIGNIFICANT CHANGE UP (ref 80–100)
NRBC # BLD: 0 /100 WBCS — SIGNIFICANT CHANGE UP (ref 0–0)
PHOSPHATE SERPL-MCNC: 4.6 MG/DL — HIGH (ref 2.5–4.5)
PLATELET # BLD AUTO: 260 K/UL — SIGNIFICANT CHANGE UP (ref 150–400)
POTASSIUM SERPL-MCNC: 3.3 MMOL/L — LOW (ref 3.5–5.3)
POTASSIUM SERPL-SCNC: 3.3 MMOL/L — LOW (ref 3.5–5.3)
PROT SERPL-MCNC: 6.9 G/DL — SIGNIFICANT CHANGE UP (ref 6–8.3)
RBC # BLD: 3.17 M/UL — LOW (ref 3.8–5.2)
RBC # FLD: 14.7 % — HIGH (ref 10.3–14.5)
SODIUM SERPL-SCNC: 143 MMOL/L — SIGNIFICANT CHANGE UP (ref 135–145)
WBC # BLD: 10.12 K/UL — SIGNIFICANT CHANGE UP (ref 3.8–10.5)
WBC # FLD AUTO: 10.12 K/UL — SIGNIFICANT CHANGE UP (ref 3.8–10.5)

## 2023-08-04 PROCEDURE — 74176 CT ABD & PELVIS W/O CONTRAST: CPT | Mod: 26

## 2023-08-04 RX ADMIN — Medication 10 MILLIGRAM(S): at 18:13

## 2023-08-04 RX ADMIN — Medication 1 PATCH: at 07:45

## 2023-08-04 RX ADMIN — Medication 300 MILLIGRAM(S): at 12:36

## 2023-08-04 RX ADMIN — Medication 55 MILLIGRAM(S): at 00:01

## 2023-08-04 RX ADMIN — Medication 1 PATCH: at 19:26

## 2023-08-04 RX ADMIN — Medication 10 MILLIGRAM(S): at 12:36

## 2023-08-04 RX ADMIN — SODIUM CHLORIDE 70 MILLILITER(S): 9 INJECTION, SOLUTION INTRAVENOUS at 23:24

## 2023-08-04 RX ADMIN — Medication 55 MILLIGRAM(S): at 17:40

## 2023-08-04 RX ADMIN — ENOXAPARIN SODIUM 30 MILLIGRAM(S): 100 INJECTION SUBCUTANEOUS at 17:40

## 2023-08-04 RX ADMIN — SODIUM CHLORIDE 70 MILLILITER(S): 9 INJECTION, SOLUTION INTRAVENOUS at 06:11

## 2023-08-04 RX ADMIN — Medication 10 MILLIGRAM(S): at 05:25

## 2023-08-04 RX ADMIN — Medication 55 MILLIGRAM(S): at 09:14

## 2023-08-04 NOTE — PROGRESS NOTE ADULT - SUBJECTIVE AND OBJECTIVE BOX
CHIEF COMPLAINT:Patient is a 77y old  Female who presents with a chief complaint of Altered mental status. Pt appears comfortable.    	  REVIEW OF SYSTEMS:  unable to obtain        PHYSICAL EXAM:  T(C): 36.8 (08-04-23 @ 05:14), Max: 37 (08-04-23 @ 00:45)  HR: 81 (08-04-23 @ 05:14) (81 - 98)  BP: 164/73 (08-04-23 @ 05:14) (160/82 - 177/92)  RR: 18 (08-04-23 @ 05:14) (16 - 18)  SpO2: 97% (08-04-23 @ 05:14) (95% - 97%)  Wt(kg): --  I&O's Summary      Appearance: Normal	  HEENT:   Normal oral mucosa, PERRL, EOMI	  Lymphatic: No lymphadenopathy  Cardiovascular: Normal S1 S2, No JVD, No murmurs, No edema  Respiratory: Lungs clear to auscultation	  Gastrointestinal:  Soft, Non-tender, + BS	  Skin: No rashes, No ecchymoses, No cyanosis	  Extremities: Normal range of motion, No clubbing, cyanosis or edema  Vascular: Peripheral pulses palpable 2+ bilaterally    MEDICATIONS  (STANDING):  aspirin Suppository 300 milliGRAM(s) Rectal daily  cloNIDine Patch 0.2 mG/24Hr(s) 1 patch Transdermal <User Schedule>  dextrose 5%. 1000 milliLiter(s) (70 mL/Hr) IV Continuous <Continuous>  enoxaparin Injectable 30 milliGRAM(s) SubCutaneous every 24 hours  hydrALAZINE Injectable 10 milliGRAM(s) IV Push every 6 hours  valproate sodium  IVPB 500 milliGRAM(s) IV Intermittent every 8 hours      	  	  LABS:	 	                      8.9    10.12 )-----------( 260      ( 04 Aug 2023 10:25 )             28.9     08-04    143  |  115<H>  |  39<H>  ----------------------------<  80  3.3<L>   |  20<L>  |  1.97<H>    Ca    8.1<L>      04 Aug 2023 10:25  Phos  4.6     08-04  Mg     2.5     08-04    TPro  6.9  /  Alb  2.2<L>  /  TBili  0.3  /  DBili  x   /  AST  18  /  ALT  13  /  AlkPhos  75  08-04      Lipid Profile: Cholesterol 152  HDL 51  TG 93  Ldl calc 82    TSH: Thyroid Stimulating Hormone, Serum: 3.59 uU/mL (07-28 @ 05:03)

## 2023-08-04 NOTE — PROGRESS NOTE ADULT - SUBJECTIVE AND OBJECTIVE BOX
[   ] ICU                                          [   ] CCU                                      [ X  ] Medical Floor      Patient is a 77 year old female with dysphagia. GI consulted for Peg tube placement.     Patient is a 77 female from [facility] with past medical history significant for HTN, HLD, CVA (w/ residual aphasia and R sided hemiparesis/plegia) who was sent to the emergency room with for altered mental status. Patient is not able to provide any history. Patient is lying down in bed, awake and alert. However, patient does not speak, is not able to follow commands and does not turn face or move eyes when her name is called. Patient is admitted with CVA. Now patient with dysphagia, poor po intake, failure to thrive and weight loss. No abdominal pain, nausea, vomiting, hematemesis, hematochezia, melena, fever, chills, chest pain, SOB, cough, hematuria, dysuria  or diarrhea reported.      Patient is comfortable. No new complaints reported, No abdominal pain, N/V, hematemesis, hematochezia, melena, fever, chills, chest pain, SOB, cough or diarrhea reported.      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 1000 milliLiter(s) (70 mL/Hr) IV Continuous <Continuous>  enoxaparin Injectable 30 milliGRAM(s) SubCutaneous every 24 hours  hydrALAZINE Injectable 10 milliGRAM(s) IV Push every 6 hours  potassium chloride  10 mEq/100 mL IVPB 10 milliEquivalent(s) IV Intermittent every 1 hour  valproate sodium  IVPB 500 milliGRAM(s) IV Intermittent every 8 hours         SOCIAL HISTORY  Advanced Directives:       [ X ] Full Code       [  ] DNR  Marital Status:         [  ] M      [ X ] S      [  ] D       [  ] W  Children:       [ X ] Yes      [  ] No  Occupation:        [  ] Employed       [ X ] Unemployed       [  ] Retired  Diet:       [ X ] Regular       [  ] PEG feeding          [  ] NG tube feeding  Drug Use:           [ X ] Patient denied          [  ] Yes  Alcohol:           [ X ] No             [  ] Yes (socially)         [  ] Yes (chronic)  Tobacco:           [  ] Yes           [X  ] No      FAMILY HISTORY  [ X ] Heart Disease            [ X ] Diabetes             [ X ] HTN             [  ] Colon Cancer             [  ] Stomach Cancer              [  ] Pancreatic Cancer      VITALS  Vital Signs Last 24 Hrs  T(C): 36.8 (04 Aug 2023 05:14), Max: 37 (04 Aug 2023 00:45)  T(F): 98.2 (04 Aug 2023 05:14), Max: 98.6 (04 Aug 2023 00:45)  HR: 81 (04 Aug 2023 05:14) (81 - 98)  BP: 164/73 (04 Aug 2023 05:14) (160/82 - 177/92)     RR: 18 (04 Aug 2023 05:14) (16 - 18)  SpO2: 97% (04 Aug 2023 05:14) (95% - 99%)    Parameters below as of 04 Aug 2023 05:14  Patient On (Oxygen Delivery Method): room air       MEDICATIONS  (STANDING):  aspirin Suppository 300 milliGRAM(s) Rectal daily  cloNIDine Patch 0.2 mG/24Hr(s) 1 patch Transdermal <User Schedule>  dextrose 5%. 1000 milliLiter(s) (70 mL/Hr) IV Continuous <Continuous>  enoxaparin Injectable 30 milliGRAM(s) SubCutaneous every 24 hours  hydrALAZINE Injectable 10 milliGRAM(s) IV Push every 6 hours  valproate sodium  IVPB 500 milliGRAM(s) IV Intermittent every 8 hours    MEDICATIONS  (PRN):                            9.4    8.87  )-----------( 310      ( 03 Aug 2023 06:17 )             30.6       08-03    146<H>  |  114<H>  |  44<H>  ----------------------------<  82  2.9<LL>   |  18<L>  |  2.14<H>    Ca    8.5      03 Aug 2023 06:17  Phos  5.2     08-03  Mg     2.6     08-03    TPro  7.4  /  Alb  2.5<L>  /  TBili  0.3  /  DBili  x   /  AST  18  /  ALT  16  /  AlkPhos  79  08-03      PT/INR - ( 03 Aug 2023 06:17 )   PT: 11.7 sec;   INR: 1.03 ratio         PTT - ( 03 Aug 2023 06:17 )  PTT:31.8 sec [   ] ICU                                          [   ] CCU                                      [ X  ] Medical Floor      Patient is a 77 year old female with dysphagia. GI consulted for Peg tube placement.     Patient is a 77 female from [facility] with past medical history significant for HTN, HLD, CVA (w/ residual aphasia and R sided hemiparesis/plegia) who was sent to the emergency room with for altered mental status. Patient is not able to provide any history. Patient is lying down in bed, awake and alert. However, patient does not speak, is not able to follow commands and does not turn face or move eyes when her name is called. Patient is admitted with CVA. Now patient with dysphagia, poor po intake, failure to thrive and weight loss. No abdominal pain, nausea, vomiting, hematemesis, hematochezia, melena, fever, chills, chest pain, SOB, cough, hematuria, dysuria  or diarrhea reported.      Patient is comfortable. No new complaints reported, No abdominal pain, N/V, hematemesis, hematochezia, melena, fever, chills, chest pain, SOB, cough or diarrhea reported.      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 1000 milliLiter(s) (70 mL/Hr) IV Continuous <Continuous>  enoxaparin Injectable 30 milliGRAM(s) SubCutaneous every 24 hours  hydrALAZINE Injectable 10 milliGRAM(s) IV Push every 6 hours  potassium chloride  10 mEq/100 mL IVPB 10 milliEquivalent(s) IV Intermittent every 1 hour  valproate sodium  IVPB 500 milliGRAM(s) IV Intermittent every 8 hours         SOCIAL HISTORY  Advanced Directives:       [ X ] Full Code       [  ] DNR  Marital Status:         [  ] M      [ X ] S      [  ] D       [  ] W  Children:       [ X ] Yes      [  ] No  Occupation:        [  ] Employed       [ X ] Unemployed       [  ] Retired  Diet:       [ X ] Regular       [  ] PEG feeding          [  ] NG tube feeding  Drug Use:           [ X ] Patient denied          [  ] Yes  Alcohol:           [ X ] No             [  ] Yes (socially)         [  ] Yes (chronic)  Tobacco:           [  ] Yes           [X  ] No      FAMILY HISTORY  [ X ] Heart Disease            [ X ] Diabetes             [ X ] HTN             [  ] Colon Cancer             [  ] Stomach Cancer              [  ] Pancreatic Cancer      VITALS  Vital Signs Last 24 Hrs  T(C): 36.8 (04 Aug 2023 05:14), Max: 37 (04 Aug 2023 00:45)  T(F): 98.2 (04 Aug 2023 05:14), Max: 98.6 (04 Aug 2023 00:45)  HR: 81 (04 Aug 2023 05:14) (81 - 98)  BP: 164/73 (04 Aug 2023 05:14) (160/82 - 177/92)     RR: 18 (04 Aug 2023 05:14) (16 - 18)  SpO2: 97% (04 Aug 2023 05:14) (95% - 99%)    Parameters below as of 04 Aug 2023 05:14  Patient On (Oxygen Delivery Method): room air       MEDICATIONS  (STANDING):  aspirin Suppository 300 milliGRAM(s) Rectal daily  cloNIDine Patch 0.2 mG/24Hr(s) 1 patch Transdermal <User Schedule>  dextrose 5%. 1000 milliLiter(s) (70 mL/Hr) IV Continuous <Continuous>  enoxaparin Injectable 30 milliGRAM(s) SubCutaneous every 24 hours  hydrALAZINE Injectable 10 milliGRAM(s) IV Push every 6 hours  valproate sodium  IVPB 500 milliGRAM(s) IV Intermittent every 8 hours    MEDICATIONS  (PRN):                            9.4    8.87  )-----------( 310      ( 03 Aug 2023 06:17 )             30.6       08-03    146<H>  |  114<H>  |  44<H>  ----------------------------<  82  2.9<LL>   |  18<L>  |  2.14<H>    Ca    8.5      03 Aug 2023 06:17  Phos  5.2     08-03  Mg     2.6     08-03    TPro  7.4  /  Alb  2.5<L>  /  TBili  0.3  /  DBili  x   /  AST  18  /  ALT  16  /  AlkPhos  79  08-03      PT/INR - ( 03 Aug 2023 06:17 )   PT: 11.7 sec;   INR: 1.03 ratio         PTT - ( 03 Aug 2023 06:17 )  PTT:31.8 sec [   ] ICU                                          [   ] CCU                                      [ X  ] Medical Floor      Patient is a 77 year old female with dysphagia. GI consulted for Peg tube placement.     Patient is a 77 female from [facility] with past medical history significant for HTN, HLD, CVA (w/ residual aphasia and R sided hemiparesis/plegia) who was sent to the emergency room with for altered mental status. Patient is not able to provide any history. Patient is lying down in bed, awake and alert. However, patient does not speak, is not able to follow commands and does not turn face or move eyes when her name is called. Patient is admitted with CVA. Now patient with dysphagia, poor po intake, failure to thrive and weight loss. No abdominal pain, nausea, vomiting, hematemesis, hematochezia, melena, fever, chills, chest pain, SOB, cough, hematuria, dysuria  or diarrhea reported.      Patient is comfortable. No new complaints reported, No abdominal pain, N/V, hematemesis, hematochezia, melena, fever, chills, chest pain, SOB, cough or diarrhea reported.      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 1000 milliLiter(s) (70 mL/Hr) IV Continuous <Continuous>  enoxaparin Injectable 30 milliGRAM(s) SubCutaneous every 24 hours  hydrALAZINE Injectable 10 milliGRAM(s) IV Push every 6 hours  potassium chloride  10 mEq/100 mL IVPB 10 milliEquivalent(s) IV Intermittent every 1 hour  valproate sodium  IVPB 500 milliGRAM(s) IV Intermittent every 8 hours         SOCIAL HISTORY  Advanced Directives:       [ X ] Full Code       [  ] DNR  Marital Status:         [  ] M      [ X ] S      [  ] D       [  ] W  Children:       [ X ] Yes      [  ] No  Occupation:        [  ] Employed       [ X ] Unemployed       [  ] Retired  Diet:       [ X ] Regular       [  ] PEG feeding          [  ] NG tube feeding  Drug Use:           [ X ] Patient denied          [  ] Yes  Alcohol:           [ X ] No             [  ] Yes (socially)         [  ] Yes (chronic)  Tobacco:           [  ] Yes           [X  ] No      FAMILY HISTORY  [ X ] Heart Disease            [ X ] Diabetes             [ X ] HTN             [  ] Colon Cancer             [  ] Stomach Cancer              [  ] Pancreatic Cancer      VITALS  Vital Signs Last 24 Hrs  T(C): 36.8 (04 Aug 2023 05:14), Max: 37 (04 Aug 2023 00:45)  T(F): 98.2 (04 Aug 2023 05:14), Max: 98.6 (04 Aug 2023 00:45)  HR: 81 (04 Aug 2023 05:14) (81 - 98)  BP: 164/73 (04 Aug 2023 05:14) (160/82 - 177/92)     RR: 18 (04 Aug 2023 05:14) (16 - 18)  SpO2: 97% (04 Aug 2023 05:14) (95% - 99%)    Parameters below as of 04 Aug 2023 05:14  Patient On (Oxygen Delivery Method): room air       MEDICATIONS  (STANDING):  aspirin Suppository 300 milliGRAM(s) Rectal daily  cloNIDine Patch 0.2 mG/24Hr(s) 1 patch Transdermal <User Schedule>  dextrose 5%. 1000 milliLiter(s) (70 mL/Hr) IV Continuous <Continuous>  enoxaparin Injectable 30 milliGRAM(s) SubCutaneous every 24 hours  hydrALAZINE Injectable 10 milliGRAM(s) IV Push every 6 hours  valproate sodium  IVPB 500 milliGRAM(s) IV Intermittent every 8 hours    MEDICATIONS  (PRN):                            9.4    8.87  )-----------( 310      ( 03 Aug 2023 06:17 )             30.6       08-03    146<H>  |  114<H>  |  44<H>  ----------------------------<  82  2.9<LL>   |  18<L>  |  2.14<H>    Ca    8.5      03 Aug 2023 06:17  Phos  5.2     08-03  Mg     2.6     08-03    TPro  7.4  /  Alb  2.5<L>  /  TBili  0.3  /  DBili  x   /  AST  18  /  ALT  16  /  AlkPhos  79  08-03      PT/INR - ( 03 Aug 2023 06:17 )   PT: 11.7 sec;   INR: 1.03 ratio         PTT - ( 03 Aug 2023 06:17 )  PTT:31.8 sec

## 2023-08-04 NOTE — PROGRESS NOTE ADULT - ASSESSMENT
Patient is a 77 female from [facility] PMHx HTN, HLD, CVA (w/ residual aphasia and R sided hemiparesis/plegia) who was sent to the hospital due to altered mental status. Patient is being admitted to medicine for further work up and rule out stroke vs encephalopathy (metabolic vs infectious).  Patient is a 77 female from [facility] PMHx HTN, HLD, CVA (w/ residual aphasia and R sided hemiparesis/plegia) who was sent to the hospital due to altered mental status. Patient is being admitted to medicine for further work up and rule out stroke vs encephalopathy (metabolic vs infectious).  Patient is a 77 female from [facility] PMHx HTN, HLD, CVA (w/ residual aphasia and R sided hemiparesis/plegia) who was sent to the hospital due to altered mental status. Patient is being admitted to medicine for further work up and rule out stroke vs encephalopathy (metabolic vs infectious).

## 2023-08-04 NOTE — PROGRESS NOTE ADULT - SUBJECTIVE AND OBJECTIVE BOX
INTERVAL HPI/OVERNIGHT EVENTS:  Patient seen,no new issues  VITAL SIGNS:  T(F): 98.2 (08-04-23 @ 05:14)  HR: 81 (08-04-23 @ 05:14)  BP: 164/73 (08-04-23 @ 05:14)  RR: 18 (08-04-23 @ 05:14)  SpO2: 97% (08-04-23 @ 05:14)  Wt(kg): --    PHYSICAL EXAM:  awake,poor historian  Constitutional:  Eyes:  ENMT:perrla  Neck:  Respiratory:clear  Cardiovascular:s1s2,m-none  Gastrointestinal:soft,bs pos  Extremities:  Vascular:  Neurological:no focal deficit  Musculoskeletal:    MEDICATIONS  (STANDING):  aspirin Suppository 300 milliGRAM(s) Rectal daily  cloNIDine Patch 0.2 mG/24Hr(s) 1 patch Transdermal <User Schedule>  dextrose 5%. 1000 milliLiter(s) (70 mL/Hr) IV Continuous <Continuous>  enoxaparin Injectable 30 milliGRAM(s) SubCutaneous every 24 hours  hydrALAZINE Injectable 10 milliGRAM(s) IV Push every 6 hours  valproate sodium  IVPB 500 milliGRAM(s) IV Intermittent every 8 hours    MEDICATIONS  (PRN):      Allergies    &quot; NATURAL RUBBER&quot; (Other)  latex (Other)  penicillins (Unknown)    Intolerances        LABS:                        8.9    10.12 )-----------( 260      ( 04 Aug 2023 10:25 )             28.9     08-03    146<H>  |  114<H>  |  44<H>  ----------------------------<  82  2.9<LL>   |  18<L>  |  2.14<H>    Ca    8.5      03 Aug 2023 06:17  Phos  5.2     08-03  Mg     2.6     08-03    TPro  7.4  /  Alb  2.5<L>  /  TBili  0.3  /  DBili  x   /  AST  18  /  ALT  16  /  AlkPhos  79  08-03    PT/INR - ( 03 Aug 2023 06:17 )   PT: 11.7 sec;   INR: 1.03 ratio         PTT - ( 03 Aug 2023 06:17 )  PTT:31.8 sec  Urinalysis Basic - ( 03 Aug 2023 06:17 )    Color: x / Appearance: x / SG: x / pH: x  Gluc: 82 mg/dL / Ketone: x  / Bili: x / Urobili: x   Blood: x / Protein: x / Nitrite: x   Leuk Esterase: x / RBC: x / WBC x   Sq Epi: x / Non Sq Epi: x / Bacteria: x        RADIOLOGY & ADDITIONAL TESTS:        Assessment and Plan:   · Assessment	  Patient is a 77 female from [facility] PMHx HTN, HLD, CVA (w/ residual aphasia and R sided hemiparesis/plegia) who was sent to the hospital due to altered mental status. Patient is being admitted to medicine for further work up and rule out stroke vs encephalopathy (metabolic vs infectious).        Problem/Plan - 1:  ·  Problem: CVA (cerebrovascular accident).   ·  Plan: P/w altered mental status  HEAD CT: Chronic left MCA territory infarction.  CT PERFUSION demonstrated: Perfusion abnormality corresponding to the chronically infarcted left MCA territory. INFARCT CORE: 57 mL. TISSUE AT RISK: 236 mL.  CTA COW and  CTA NECK: neg   Failed dysphagia and speech and swallow. -> palliative will talk to family about peg tube placement. Family does not want DNR/DNI  echo-> mild MR, and grade 1 diastolic dysfunction.   Peg placement on monday     Problem/Plan - 2:  ·  Problem: Acute encephalopathy.   ·  Plan: P/w altered mental status   UA -ve.  CXR - ve.   c/w cinamet, sertraline, baclofen. Hold as patient is NPO.     Problem/Plan - 3:  ·  Problem: Hypertension.   ·  Plan: pt BP has been really high ranging from 190s-180s/85 . Hydralazine has been given PRN. now she is on hydralazine every 6 hours 10 mg IV. BP being monitored.  Cardiology consulted Dr. Dunn-> raised her clonidine patch to put on clonidine patch .2 mg weekly.   BP today is 166/85-> improving with new dose of clonidine patch.     Problem/Plan - 4:  ·  Problem: SUSAN (acute kidney injury).   ·  Plan: As per Nephrology   possible r/o urinary rentention vs MARISA vs r/o ATN.   order urinalysis, urine lytes->WNL  -renal ultrasound  and bladder scan  was negative  adjust meds for GFR.     Problem/Plan - 5:  ·  Problem: Hypernatremia.   ·  Plan: due to water deficit and insensible losses. Pt. is clinically euvolemic. Na in the 150's .  avoid high solute intake diet and sodium bicarb infuse.  continue to give  dextrose % 70 cc.     Problem/Plan - 6:  ·  Problem: Electrolyte imbalance.   ·  Plan: nephro following:  abnormal labs:  Sodium 146  Phosphorus 5.2  chloride 114    potassium 2.9-> repleated.     Problem/Plan - 7:  ·  Problem: Hyperlipidemia.   ·  Plan: F/U Lipid profile.   Start atorvastatin when passes SnS.     Problem/Plan - 8:  ·  Problem: Seizure.   ·  Plan: H/O seizures.   C/w Depacon.  Neurology consulted - Dr. Whitney-  > ordered EEG-potential epileptogenic factors in the left posterior temporal lobe and focal cerebral dysfunction in L  hemisphere  valporic acid levels for AM, no signs of new stroke.     Problem/Plan - 9:  ·  Problem: Prophylactic measure.   ·  Plan: Lovenox. (renally dosed).       INTERVAL HPI/OVERNIGHT EVENTS:  Patient seen,no new issues  VITAL SIGNS:  T(F): 98.2 (08-04-23 @ 05:14)  HR: 81 (08-04-23 @ 05:14)  BP: 164/73 (08-04-23 @ 05:14)  RR: 18 (08-04-23 @ 05:14)  SpO2: 97% (08-04-23 @ 05:14)  Wt(kg): --    PHYSICAL EXAM:  awake,poor historian  Constitutional:  Eyes:  ENMT:perrla  Neck:  Respiratory:clear  Cardiovascular:s1s2,m-none  Gastrointestinal:soft,bs pos  Extremities:  Vascular:  Neurological:no focal deficit  Musculoskeletal:    MEDICATIONS  (STANDING):  aspirin Suppository 300 milliGRAM(s) Rectal daily  cloNIDine Patch 0.2 mG/24Hr(s) 1 patch Transdermal <User Schedule>  dextrose 5%. 1000 milliLiter(s) (70 mL/Hr) IV Continuous <Continuous>  enoxaparin Injectable 30 milliGRAM(s) SubCutaneous every 24 hours  hydrALAZINE Injectable 10 milliGRAM(s) IV Push every 6 hours  valproate sodium  IVPB 500 milliGRAM(s) IV Intermittent every 8 hours    MEDICATIONS  (PRN):      Allergies    &quot; NATURAL RUBBER&quot; (Other)  latex (Other)  penicillins (Unknown)    Intolerances        LABS:                        8.9    10.12 )-----------( 260      ( 04 Aug 2023 10:25 )             28.9     08-03    146<H>  |  114<H>  |  44<H>  ----------------------------<  82  2.9<LL>   |  18<L>  |  2.14<H>    Ca    8.5      03 Aug 2023 06:17  Phos  5.2     08-03  Mg     2.6     08-03    TPro  7.4  /  Alb  2.5<L>  /  TBili  0.3  /  DBili  x   /  AST  18  /  ALT  16  /  AlkPhos  79  08-03    PT/INR - ( 03 Aug 2023 06:17 )   PT: 11.7 sec;   INR: 1.03 ratio         PTT - ( 03 Aug 2023 06:17 )  PTT:31.8 sec  Urinalysis Basic - ( 03 Aug 2023 06:17 )    Color: x / Appearance: x / SG: x / pH: x  Gluc: 82 mg/dL / Ketone: x  / Bili: x / Urobili: x   Blood: x / Protein: x / Nitrite: x   Leuk Esterase: x / RBC: x / WBC x   Sq Epi: x / Non Sq Epi: x / Bacteria: x        RADIOLOGY & ADDITIONAL TESTS:        Assessment and Plan:   · Assessment	  Patient is a 77 female from [facility] PMHx HTN, HLD, CVA (w/ residual aphasia and R sided hemiparesis/plegia) who was sent to the hospital due to altered mental status. Patient is being admitted to medicine for further work up and rule out stroke vs encephalopathy (metabolic vs infectious).        Problem/Plan - 1:  ·  Problem: CVA (cerebrovascular accident).   ·  Plan: P/w altered mental status  HEAD CT: Chronic left MCA territory infarction.  CT PERFUSION demonstrated: Perfusion abnormality corresponding to the chronically infarcted left MCA territory. INFARCT CORE: 57 mL. TISSUE AT RISK: 236 mL.  CTA COW and  CTA NECK: neg   Failed dysphagia and speech and swallow. -> palliative will talk to family about peg tube placement. Family does not want DNR/DNI  echo-> mild MR, and grade 1 diastolic dysfunction.   Peg placement on monday     Problem/Plan - 2:  ·  Problem: Acute encephalopathy.   ·  Plan: P/w altered mental status   UA -ve.  CXR - ve.   c/w cinamet, sertraline, baclofen. Hold as patient is NPO.     Problem/Plan - 3:  ·  Problem: Hypertension.   ·  Plan: pt BP has been really high ranging from 190s-180s/85 . Hydralazine has been given PRN. now she is on hydralazine every 6 hours 10 mg IV. BP being monitored.  Cardiology consulted Dr. Dunn-> raised her clonidine patch to put on clonidine patch .2 mg weekly.   BP today is 166/85-> improving with new dose of clonidine patch.     Problem/Plan - 4:  ·  Problem: SUSAN (acute kidney injury).   ·  Plan: As per Nephrology   possible r/o urinary rentention vs MARISA vs r/o ATN.   order urinalysis, urine lytes->WNL  -renal ultrasound  and bladder scan  was negative  adjust meds for GFR.     Problem/Plan - 5:  ·  Problem: Hypernatremia.   ·  Plan: due to water deficit and insensible losses. Pt. is clinically euvolemic. Na in the 150's .  avoid high solute intake diet and sodium bicarb infuse.  continue to give  dextrose % 70 cc.     Problem/Plan - 6:  ·  Problem: Electrolyte imbalance.   ·  Plan: nephro following:  abnormal labs:  Sodium 146  Phosphorus 5.2  chloride 114    potassium 2.9-> repleated.     Problem/Plan - 7:  ·  Problem: Hyperlipidemia.   ·  Plan: F/U Lipid profile.   Start atorvastatin when passes SnS.     Problem/Plan - 8:  ·  Problem: Seizure.   ·  Plan: H/O seizures.   C/w Depacon.  Neurology consulted - Dr. Whitney-  > ordered EEG-potential epileptogenic factors in the left posterior temporal lobe and focal cerebral dysfunction in L  hemisphere  valporic acid levels for AM, no signs of new stroke.     Problem/Plan - 9:  ·  Problem: Prophylactic measure.   ·  Plan: Lovenox. (renally dosed).       INTERVAL HPI/OVERNIGHT EVENTS:  Patient seen,no new issues  VITAL SIGNS:  T(F): 98.2 (08-04-23 @ 05:14)  HR: 81 (08-04-23 @ 05:14)  BP: 164/73 (08-04-23 @ 05:14)  RR: 18 (08-04-23 @ 05:14)  SpO2: 97% (08-04-23 @ 05:14)  Wt(kg): --    PHYSICAL EXAM:  awake,poor historian  Constitutional:  Eyes:  ENMT:perrla  Neck:  Respiratory:clear  Cardiovascular:s1s2,m-none  Gastrointestinal:soft,bs pos  Extremities:  Vascular:  Neurological:no focal deficit  Musculoskeletal:    MEDICATIONS  (STANDING):  aspirin Suppository 300 milliGRAM(s) Rectal daily  cloNIDine Patch 0.2 mG/24Hr(s) 1 patch Transdermal <User Schedule>  dextrose 5%. 1000 milliLiter(s) (70 mL/Hr) IV Continuous <Continuous>  enoxaparin Injectable 30 milliGRAM(s) SubCutaneous every 24 hours  hydrALAZINE Injectable 10 milliGRAM(s) IV Push every 6 hours  valproate sodium  IVPB 500 milliGRAM(s) IV Intermittent every 8 hours    MEDICATIONS  (PRN):      Allergies    &quot; NATURAL RUBBER&quot; (Other)  latex (Other)  penicillins (Unknown)    Intolerances        LABS:                        8.9    10.12 )-----------( 260      ( 04 Aug 2023 10:25 )             28.9     08-03    146<H>  |  114<H>  |  44<H>  ----------------------------<  82  2.9<LL>   |  18<L>  |  2.14<H>    Ca    8.5      03 Aug 2023 06:17  Phos  5.2     08-03  Mg     2.6     08-03    TPro  7.4  /  Alb  2.5<L>  /  TBili  0.3  /  DBili  x   /  AST  18  /  ALT  16  /  AlkPhos  79  08-03    PT/INR - ( 03 Aug 2023 06:17 )   PT: 11.7 sec;   INR: 1.03 ratio         PTT - ( 03 Aug 2023 06:17 )  PTT:31.8 sec  Urinalysis Basic - ( 03 Aug 2023 06:17 )    Color: x / Appearance: x / SG: x / pH: x  Gluc: 82 mg/dL / Ketone: x  / Bili: x / Urobili: x   Blood: x / Protein: x / Nitrite: x   Leuk Esterase: x / RBC: x / WBC x   Sq Epi: x / Non Sq Epi: x / Bacteria: x        RADIOLOGY & ADDITIONAL TESTS:        Assessment and Plan:   · Assessment	  Patient is a 77 female from [facility] PMHx HTN, HLD, CVA (w/ residual aphasia and R sided hemiparesis/plegia) who was sent to the hospital due to altered mental status. Patient is being admitted to medicine for further work up and rule out stroke vs encephalopathy (metabolic vs infectious).        Problem/Plan - 1:  ·  Problem: CVA (cerebrovascular accident).   ·  Plan: P/w altered mental status  HEAD CT: Chronic left MCA territory infarction.  CT PERFUSION demonstrated: Perfusion abnormality corresponding to the chronically infarcted left MCA territory. INFARCT CORE: 57 mL. TISSUE AT RISK: 236 mL.  CTA COW and  CTA NECK: neg   Failed dysphagia and speech and swallow. -> palliative will talk to family about peg tube placement. Family does not want DNR/DNI  echo-> mild MR, and grade 1 diastolic dysfunction.   Peg placement on monday     Problem/Plan - 2:  ·  Problem: Acute encephalopathy.   ·  Plan: P/w altered mental status   UA -ve.  CXR - ve.   c/w cinamet, sertraline, baclofen. Hold as patient is NPO.     Problem/Plan - 3:  ·  Problem: Hypertension.   ·  Plan: pt BP has been really high ranging from 190s-180s/85 . Hydralazine has been given PRN. now she is on hydralazine every 6 hours 10 mg IV. BP being monitored.  Cardiology consulted Dr. Dunn-> raised her clonidine patch to put on clonidine patch .2 mg weekly.   BP today is 166/85-> improving with new dose of clonidine patch.     Problem/Plan - 4:  ·  Problem: SUSAN (acute kidney injury).   ·  Plan: As per Nephrology   possible r/o urinary rentention vs MARISA vs r/o ATN.   order urinalysis, urine lytes->WNL  -renal ultrasound  and bladder scan  was negative  adjust meds for GFR.     Problem/Plan - 5:  ·  Problem: Hypernatremia.   ·  Plan: due to water deficit and insensible losses. Pt. is clinically euvolemic. Na in the 150's .  avoid high solute intake diet and sodium bicarb infuse.  continue to give  dextrose % 70 cc.     Problem/Plan - 6:  ·  Problem: Electrolyte imbalance.   ·  Plan: nephro following:  abnormal labs:  Sodium 146  Phosphorus 5.2  chloride 114    potassium 2.9-> repleated.     Problem/Plan - 7:  ·  Problem: Hyperlipidemia.   ·  Plan: F/U Lipid profile.   Start atorvastatin when passes SnS.     Problem/Plan - 8:  ·  Problem: Seizure.   ·  Plan: H/O seizures.   C/w Depacon.  Neurology consulted - Dr. Whitney-  > ordered EEG-potential epileptogenic factors in the left posterior temporal lobe and focal cerebral dysfunction in L  hemisphere  valporic acid levels for AM, no signs of new stroke.     Problem/Plan - 9:  ·  Problem: Prophylactic measure.   ·  Plan: Lovenox. (renally dosed).

## 2023-08-04 NOTE — PROGRESS NOTE ADULT - ASSESSMENT
1. SUSAN possible to MARISA and ATN  Renal sono shows no hdyro with b/l kidneys around 9.2cm  -Scr improving to 1.97mg/dL and continue IVFs. Possible renal recovery.  -urinalysis shows blood with proteinuria; FeNa >1%, spot protein to creatinine ratio is 1.5gm  -Adjust meds to eGFR and avoid IV Gadolinium contrast,NSAIDs, and phosphate enema.  -Monitor I/O's daily.   -Monitor SMA daily.  2. Hypernatremia due to water deficit and insensible losses. Pt is clinically euvolemic.   -Na improving 143; continue D5W at 70cc/hr   -Monitor I/O's. Check Serum Na Daily. Avoid high solute intake diet and sodium bicarbonate infuse. Avoid overcorrection of NA (8-10meq/day)  3. 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  -previous Scr around 1.2 to 1.6mg/dL in Oct 2022.  -Keep patient euvolemic and renal diet  -Avoid Nephrotoxic Meds/ Agents such as (NSAIDs, IV contrast, Aminoglycosides such as gentamicin, -Gadolinium contrast, Phosphate containing enemas, etc..)  -Adjust Medications according to eGFR  4. HTN:   -bp is acceptable. continue bp meds  -titrate bp meds to keep sbp >110 and < 130  5. Mineral Bone Disease:  -Elevated phos and improving so no need binders.  -PTH intact 289, will start calcitriol once phos improves.  6. Encephalopathy:  -on Rocephin  -Plan as per Neuro and primary team  -family requesting PEG and GI consulted.   7. Hypokalemia:  -give kcl repletion to keep K >3.5meq/L  -monitor K.     Discussed the assessment and plan with Primary Team/Nurse

## 2023-08-04 NOTE — PROGRESS NOTE ADULT - PROBLEM SELECTOR PLAN 2
altered mental status  HEAD CT: Chronic left MCA territory infarction.  CT PERFUSION demonstrated: Perfusion abnormality corresponding to the chronically infarcted left MCA territory. INFARCT CORE: 57 mL. TISSUE AT RISK: 236 mL.  CTA COW and  CTA NECK: neg   Failed dysphagia and speech and swallow. -> palliative will talk to family about peg tube placement. Family does not want DNR/DNI

## 2023-08-04 NOTE — PROGRESS NOTE ADULT - PROBLEM SELECTOR PLAN 6
Pt. not receiving any nutrition for multiple days probably resulting in abnormal electrolytes. Could not place NG tube. Peg tubed being placed on 8/7

## 2023-08-04 NOTE — PROGRESS NOTE ADULT - ASSESSMENT
77 female from [facility] PMHx HTN, HLD, CVA (w/ residual aphasia and R sided hemiparesis/plegia) who was sent to the hospital due to altered mental status,UTI.  1.No new CVA on MRI.  2.UTI-s/p ABX.  3.HTN- clonidine patch .2mg q wk,IV hydralazine.  5.Lipid d/o-hold statin.  6.Plan for PEG placement.  7.Hypernatremia and SUSAN-IVF.  8.Replace k+.  9.GI and DVT prophylaxis. 77 female from [facility] PMHx HTN, HLD, CVA (w/ residual aphasia and R sided hemiparesis/plegia) who was sent to the hospital due to altered mental status,UTI.  1.No new CVA on MRI.  2.UTI-s/p ABX.  3.HTN- clonidine patch .2mg q wk,IV hydralazine.  5.Lipid d/o-hold statin.  6.Plan for PEG placement.  7.Hypernatremia and SUSAN-IVF.  8.Replace k+.  9.GI and DVT prophylaxis. 77 female from [facility] PMHx HTN, HLD, CVA (w/ residual aphasia and R sided hemiparesis/plegia) who was sent to the hospital due to altered mental status,UTI.  1.No new CVA on MRI.  2.UTI-s/p ABX.  3.HTN- clonidine patch .2mg q wk,IV hydralazine.  5.Lipid d/o-hold statin.  6.Plan for PEG placement.  7.Hypernatremia and SUSAN-IVF.  8.Replace k+.  9.GI and DVT prophylaxis.

## 2023-08-04 NOTE — PROGRESS NOTE ADULT - PROBLEM SELECTOR PLAN 1
Pt. has been NPO for a few days on fluids.   Consulted with GI  will place Peg tube  on 8/7   Tried placing NG tube with fail and surgery tried and failed as well.  Ordered a ct abdomen and pelvis  to r/o hiatal hernia.

## 2023-08-04 NOTE — CHART NOTE - NSCHARTNOTEFT_GEN_A_CORE
could not get in NG tube, tried multiple times. Surgery tried as well. Ordered CT abdomen and pelvis to see if possible blockage such a hiatal hernia.

## 2023-08-04 NOTE — PROGRESS NOTE ADULT - ASSESSMENT
1. Dysphagia  2. Failure to thrive  3. Constipation  4. Anemia  5. No evidence of acute GI bleeding  6. Hypokalemia    Suggestions:    1. NPO  2. IVF hydration  3. Monitor electrolytes  4. Peg placement on Monday  5. Palliative evaluation  6. Consider NGT feeding meanwhile  7. Aspiration precaution  8. Monitor H/H  9. Transfuse PRBC as needed  10. Protonix daily  11. Avoid NSAID  12. Daily stool softener as needed  13. DVT prophylaxis

## 2023-08-04 NOTE — PROGRESS NOTE ADULT - SUBJECTIVE AND OBJECTIVE BOX
NEPHROLOGY MEDICAL CARE, [facility] - Dr. Ajay Green/ Dr. Mert Madison/ Dr. Chris Calderon/ Dr. Carson Cook    Date of Service: 08-04-23 @ 13:13    Patient was seen and examined at bedside.    CC: patient is okay and NAD;  home attendant a bedside    Vital Signs Last 24 Hrs  T(C): 36.8 (04 Aug 2023 05:14), Max: 37 (04 Aug 2023 00:45)  T(F): 98.2 (04 Aug 2023 05:14), Max: 98.6 (04 Aug 2023 00:45)  HR: 81 (04 Aug 2023 05:14) (81 - 98)  BP: 164/73 (04 Aug 2023 05:14) (160/82 - 177/92)  BP(mean): --  RR: 18 (04 Aug 2023 05:14) (16 - 18)  SpO2: 97% (04 Aug 2023 05:14) (95% - 97%)    Parameters below as of 04 Aug 2023 05:14  Patient On (Oxygen Delivery Method): room air          PHYSICAL EXAM:  General: No acute respiratory distress.  Eyes: conjunctiva and sclera clear  ENMT: Atraumatic, Normocephalic, supple, No JVD present. Moist mucous membranes  Respiratory: Bilateral clear lungs; No rales, rhonchi, wheezing  Cardiovascular: S1S2+; no m/r/g  Gastrointestinal: Soft, Non-tender, Nondistended; Bowel sounds present  Neuro: eyes are open; hemiparesis.  Ext:  1+pedal edema, No Cyanosis  Skin: No visible rashes    MEDICATIONS:  MEDICATIONS  (STANDING):  aspirin Suppository 300 milliGRAM(s) Rectal daily  cloNIDine Patch 0.2 mG/24Hr(s) 1 patch Transdermal <User Schedule>  dextrose 5%. 1000 milliLiter(s) (70 mL/Hr) IV Continuous <Continuous>  enoxaparin Injectable 30 milliGRAM(s) SubCutaneous every 24 hours  hydrALAZINE Injectable 10 milliGRAM(s) IV Push every 6 hours  valproate sodium  IVPB 500 milliGRAM(s) IV Intermittent every 8 hours    MEDICATIONS  (PRN):          LABS:                        8.9    10.12 )-----------( 260      ( 04 Aug 2023 10:25 )             28.9     08-04    143  |  115<H>  |  39<H>  ----------------------------<  80  3.3<L>   |  20<L>  |  1.97<H>    Ca    8.1<L>      04 Aug 2023 10:25  Phos  4.6     08-04  Mg     2.5     08-04    TPro  6.9  /  Alb  2.2<L>  /  TBili  0.3  /  DBili  x   /  AST  18  /  ALT  13  /  AlkPhos  75  08-04    PT/INR - ( 03 Aug 2023 06:17 )   PT: 11.7 sec;   INR: 1.03 ratio         PTT - ( 03 Aug 2023 06:17 )  PTT:31.8 sec  Urinalysis Basic - ( 04 Aug 2023 10:25 )    Color: x / Appearance: x / SG: x / pH: x  Gluc: 80 mg/dL / Ketone: x  / Bili: x / Urobili: x   Blood: x / Protein: x / Nitrite: x   Leuk Esterase: x / RBC: x / WBC x   Sq Epi: x / Non Sq Epi: x / Bacteria: x      Phosphorus: 4.6 mg/dL (08-04 @ 10:25)  Magnesium: 2.5 mg/dL (08-04 @ 10:25)    Urine studies    PTH and Vit D:         NEPHROLOGY MEDICAL CARE, [facility] - Dr. Ajay Green/ Dr. Mert Madison/ Dr. Chris Calderon/ Dr. Carson Cook    Date of Service: 08-04-23 @ 13:13    Patient was seen and examined at bedside.    CC: patient is okay and NAD;  home attendant a bedside    Vital Signs Last 24 Hrs  T(C): 36.8 (04 Aug 2023 05:14), Max: 37 (04 Aug 2023 00:45)  T(F): 98.2 (04 Aug 2023 05:14), Max: 98.6 (04 Aug 2023 00:45)  HR: 81 (04 Aug 2023 05:14) (81 - 98)  BP: 164/73 (04 Aug 2023 05:14) (160/82 - 177/92)  BP(mean): --  RR: 18 (04 Aug 2023 05:14) (16 - 18)  SpO2: 97% (04 Aug 2023 05:14) (95% - 97%)    Parameters below as of 04 Aug 2023 05:14  Patient On (Oxygen Delivery Method): room air          PHYSICAL EXAM:  General: No acute respiratory distress.  Eyes: conjunctiva and sclera clear  ENMT: Atraumatic, Normocephalic, supple, No JVD present. Moist mucous membranes  Respiratory: Bilateral clear lungs; No rales, rhonchi, wheezing  Cardiovascular: S1S2+; no m/r/g  Gastrointestinal: Soft, Non-tender, Nondistended; Bowel sounds present  Neuro: eyes are open; hemiparesis.  Ext:  1+pedal edema, No Cyanosis  Skin: No visible rashes    MEDICATIONS:  MEDICATIONS  (STANDING):  aspirin Suppository 300 milliGRAM(s) Rectal daily  cloNIDine Patch 0.2 mG/24Hr(s) 1 patch Transdermal <User Schedule>  dextrose 5%. 1000 milliLiter(s) (70 mL/Hr) IV Continuous <Continuous>  enoxaparin Injectable 30 milliGRAM(s) SubCutaneous every 24 hours  hydrALAZINE Injectable 10 milliGRAM(s) IV Push every 6 hours  valproate sodium  IVPB 500 milliGRAM(s) IV Intermittent every 8 hours    MEDICATIONS  (PRN):          LABS:                        8.9    10.12 )-----------( 260      ( 04 Aug 2023 10:25 )             28.9     08-04    143  |  115<H>  |  39<H>  ----------------------------<  80  3.3<L>   |  20<L>  |  1.97<H>    Ca    8.1<L>      04 Aug 2023 10:25  Phos  4.6     08-04  Mg     2.5     08-04    TPro  6.9  /  Alb  2.2<L>  /  TBili  0.3  /  DBili  x   /  AST  18  /  ALT  13  /  AlkPhos  75  08-04    PT/INR - ( 03 Aug 2023 06:17 )   PT: 11.7 sec;   INR: 1.03 ratio         PTT - ( 03 Aug 2023 06:17 )  PTT:31.8 sec  Urinalysis Basic - ( 04 Aug 2023 10:25 )    Color: x / Appearance: x / SG: x / pH: x  Gluc: 80 mg/dL / Ketone: x  / Bili: x / Urobili: x   Blood: x / Protein: x / Nitrite: x   Leuk Esterase: x / RBC: x / WBC x   Sq Epi: x / Non Sq Epi: x / Bacteria: x      Phosphorus: 4.6 mg/dL (08-04 @ 10:25)  Magnesium: 2.5 mg/dL (08-04 @ 10:25)    Urine studies    PTH and Vit D:         NEPHROLOGY MEDICAL CARE, [facility] - Dr. Ajay Green/ Dr. Mert Madison/ Dr. Chris Calderon/ Dr. Carson Cook    Date of Service: 08-04-23 @ 13:13    Patient was seen and examined at bedside.    CC: patient is okay and NAD;  home attendant a bedside    Vital Signs Last 24 Hrs  T(C): 36.8 (04 Aug 2023 05:14), Max: 37 (04 Aug 2023 00:45)  T(F): 98.2 (04 Aug 2023 05:14), Max: 98.6 (04 Aug 2023 00:45)  HR: 81 (04 Aug 2023 05:14) (81 - 98)  BP: 164/73 (04 Aug 2023 05:14) (160/82 - 177/92)  BP(mean): --  RR: 18 (04 Aug 2023 05:14) (16 - 18)  SpO2: 97% (04 Aug 2023 05:14) (95% - 97%)    Parameters below as of 04 Aug 2023 05:14  Patient On (Oxygen Delivery Method): room air          PHYSICAL EXAM:  General: No acute respiratory distress.  Eyes: conjunctiva and sclera clear  ENMT: Atraumatic, Normocephalic, supple, No JVD present. Moist mucous membranes  Respiratory: Bilateral clear lungs; No rales, rhonchi, wheezing  Cardiovascular: S1S2+; no m/r/g  Gastrointestinal: Soft, Non-tender, Nondistended; Bowel sounds present  Neuro: eyes are open; hemiparesis.  Ext:  1+pedal edema, No Cyanosis  Skin: No visible rashes    MEDICATIONS:  MEDICATIONS  (STANDING):  aspirin Suppository 300 milliGRAM(s) Rectal daily  cloNIDine Patch 0.2 mG/24Hr(s) 1 patch Transdermal <User Schedule>  dextrose 5%. 1000 milliLiter(s) (70 mL/Hr) IV Continuous <Continuous>  enoxaparin Injectable 30 milliGRAM(s) SubCutaneous every 24 hours  hydrALAZINE Injectable 10 milliGRAM(s) IV Push every 6 hours  valproate sodium  IVPB 500 milliGRAM(s) IV Intermittent every 8 hours    MEDICATIONS  (PRN):          LABS:                        8.9    10.12 )-----------( 260      ( 04 Aug 2023 10:25 )             28.9     08-04    143  |  115<H>  |  39<H>  ----------------------------<  80  3.3<L>   |  20<L>  |  1.97<H>    Ca    8.1<L>      04 Aug 2023 10:25  Phos  4.6     08-04  Mg     2.5     08-04    TPro  6.9  /  Alb  2.2<L>  /  TBili  0.3  /  DBili  x   /  AST  18  /  ALT  13  /  AlkPhos  75  08-04    PT/INR - ( 03 Aug 2023 06:17 )   PT: 11.7 sec;   INR: 1.03 ratio         PTT - ( 03 Aug 2023 06:17 )  PTT:31.8 sec  Urinalysis Basic - ( 04 Aug 2023 10:25 )    Color: x / Appearance: x / SG: x / pH: x  Gluc: 80 mg/dL / Ketone: x  / Bili: x / Urobili: x   Blood: x / Protein: x / Nitrite: x   Leuk Esterase: x / RBC: x / WBC x   Sq Epi: x / Non Sq Epi: x / Bacteria: x      Phosphorus: 4.6 mg/dL (08-04 @ 10:25)  Magnesium: 2.5 mg/dL (08-04 @ 10:25)    Urine studies    PTH and Vit D:

## 2023-08-04 NOTE — PROGRESS NOTE ADULT - SUBJECTIVE AND OBJECTIVE BOX
PGY-1 Progress Note discussed with attending    PAGER #: [[phone]] TILL 5:00 PM  PLEASE CONTACT ON CALL TEAM:  - On Call Team (Please refer to Tyler) FROM 5:00 PM - 8:30PM  - Nightfloat Team FROM 8:30 -7:30 AM    CHIEF COMPLAINT & BRIEF HOSPITAL COURSE: No acute overnight events. Seen pt. at bedside Could not tolerate well the NG tube.     INTERVAL HPI/OVERNIGHT EVENTS:   MEDICATIONS  (STANDING):  aspirin Suppository 300 milliGRAM(s) Rectal daily  cloNIDine Patch 0.2 mG/24Hr(s) 1 patch Transdermal <User Schedule>  dextrose 5%. 1000 milliLiter(s) (70 mL/Hr) IV Continuous <Continuous>  enoxaparin Injectable 30 milliGRAM(s) SubCutaneous every 24 hours  hydrALAZINE Injectable 10 milliGRAM(s) IV Push every 6 hours  valproate sodium  IVPB 500 milliGRAM(s) IV Intermittent every 8 hours    MEDICATIONS  (PRN):      REVIEW OF SYSTEMS:  CONSTITUTIONAL: No fever, weight loss, or fatigue  RESPIRATORY: No shortness of breath  CARDIOVASCULAR: No chest pain  GASTROINTESTINAL: No abdominal pain.  GENITOURINARY: No dysuria  NEUROLOGICAL: No headaches  SKIN: No itching, burning, rashes    Vital Signs Last 24 Hrs  T(C): 36.8 (04 Aug 2023 14:02), Max: 37 (04 Aug 2023 00:45)  T(F): 98.2 (04 Aug 2023 14:02), Max: 98.6 (04 Aug 2023 00:45)  HR: 88 (04 Aug 2023 14:02) (81 - 98)  BP: 155/73 (04 Aug 2023 14:02) (155/73 - 177/92)  BP(mean): --  RR: 18 (04 Aug 2023 14:02) (16 - 18)  SpO2: 99% (04 Aug 2023 14:02) (95% - 99%)    Parameters below as of 04 Aug 2023 14:02  Patient On (Oxygen Delivery Method): room air        PHYSICAL EXAMINATION:  GENERAL: NAD, well built  HEAD:  Atraumatic, Normocephalic  EYES:  conjunctiva and sclera clear  CHEST/LUNG: Clear to auscultation. No rales, rhonchi, wheezing, or rubs  HEART: Regular rate and rhythm; No murmurs, rubs, or gallops  ABDOMEN: Soft, Nontender, Nondistended; Bowel sounds present  NERVOUS SYSTEM:  Alert & Oriented X3,    EXTREMITIES:  2+ Peripheral Pulses, No clubbing, cyanosis, or edema  SKIN: warm dry                          8.9    10.12 )-----------( 260      ( 04 Aug 2023 10:25 )             28.9     08-04    143  |  115<H>  |  39<H>  ----------------------------<  80  3.3<L>   |  20<L>  |  1.97<H>    Ca    8.1<L>      04 Aug 2023 10:25  Phos  4.6     08-04  Mg     2.5     08-04    TPro  6.9  /  Alb  2.2<L>  /  TBili  0.3  /  DBili  x   /  AST  18  /  ALT  13  /  AlkPhos  75  08-04    LIVER FUNCTIONS - ( 04 Aug 2023 10:25 )  Alb: 2.2 g/dL / Pro: 6.9 g/dL / ALK PHOS: 75 U/L / ALT: 13 U/L DA / AST: 18 U/L / GGT: x               PT/INR - ( 03 Aug 2023 06:17 )   PT: 11.7 sec;   INR: 1.03 ratio         PTT - ( 03 Aug 2023 06:17 )  PTT:31.8 sec    CAPILLARY BLOOD GLUCOSE      RADIOLOGY & ADDITIONAL TESTS:                   PGY-1 Progress Note discussed with attending    PAGER #: [[phone]] TILL 5:00 PM  PLEASE CONTACT ON CALL TEAM:  - On Call Team (Please refer to Tyler) FROM 5:00 PM - 8:30PM  - Nightfloat Team FROM 8:30 -7:30 AM    CHIEF COMPLAINT & BRIEF HOSPITAL COURSE: No acute overnight events. Seen pt. at bedside Could not tolerate well the NG tube.     INTERVAL HPI/OVERNIGHT EVENTS:   MEDICATIONS  (STANDING):  aspirin Suppository 300 milliGRAM(s) Rectal daily  cloNIDine Patch 0.2 mG/24Hr(s) 1 patch Transdermal <User Schedule>  dextrose 5%. 1000 milliLiter(s) (70 mL/Hr) IV Continuous <Continuous>  enoxaparin Injectable 30 milliGRAM(s) SubCutaneous every 24 hours  hydrALAZINE Injectable 10 milliGRAM(s) IV Push every 6 hours  valproate sodium  IVPB 500 milliGRAM(s) IV Intermittent every 8 hours    MEDICATIONS  (PRN):      REVIEW OF SYSTEMS:  CONSTITUTIONAL: No fever, weight loss, or fatigue  RESPIRATORY: No shortness of breath  CARDIOVASCULAR: No chest pain  GASTROINTESTINAL: No abdominal pain.  GENITOURINARY: No dysuria  NEUROLOGICAL: No headaches  SKIN: No itching, burning, rashes    Vital Signs Last 24 Hrs  T(C): 36.8 (04 Aug 2023 14:02), Max: 37 (04 Aug 2023 00:45)  T(F): 98.2 (04 Aug 2023 14:02), Max: 98.6 (04 Aug 2023 00:45)  HR: 88 (04 Aug 2023 14:02) (81 - 98)  BP: 155/73 (04 Aug 2023 14:02) (155/73 - 177/92)  BP(mean): --  RR: 18 (04 Aug 2023 14:02) (16 - 18)  SpO2: 99% (04 Aug 2023 14:02) (95% - 99%)    Parameters below as of 04 Aug 2023 14:02  Patient On (Oxygen Delivery Method): room air        PHYSICAL EXAMINATION:  GENERAL: NAD, well built  HEAD:  Atraumatic, Normocephalic  EYES:  conjunctiva and sclera clear  CHEST/LUNG: Clear to auscultation. No rales, rhonchi, wheezing, or rubs  HEART: Regular rate and rhythm; No murmurs, rubs, or gallops  ABDOMEN: Soft, Nontender, Nondistended; Bowel sounds present  NERVOUS SYSTEM:  Alert & Oriented X3,    EXTREMITIES:  2+ Peripheral Pulses, No clubbing, cyanosis, or edema  SKIN: warm dry                          8.9    10.12 )-----------( 260      ( 04 Aug 2023 10:25 )             28.9     08-04    143  |  115<H>  |  39<H>  ----------------------------<  80  3.3<L>   |  20<L>  |  1.97<H>    Ca    8.1<L>      04 Aug 2023 10:25  Phos  4.6     08-04  Mg     2.5     08-04    TPro  6.9  /  Alb  2.2<L>  /  TBili  0.3  /  DBili  x   /  AST  18  /  ALT  13  /  AlkPhos  75  08-04    LIVER FUNCTIONS - ( 04 Aug 2023 10:25 )  Alb: 2.2 g/dL / Pro: 6.9 g/dL / ALK PHOS: 75 U/L / ALT: 13 U/L DA / AST: 18 U/L / GGT: x               PT/INR - ( 03 Aug 2023 06:17 )   PT: 11.7 sec;   INR: 1.03 ratio         PTT - ( 03 Aug 2023 06:17 )  PTT:31.8 sec    CAPILLARY BLOOD GLUCOSE      RADIOLOGY & ADDITIONAL TESTS:                   PGY-1 Progress Note discussed with attending    PAGER #: [[phone]] TILL 5:00 PM  PLEASE CONTACT ON CALL TEAM:  - On Call Team (Please refer to Tyler) FROM 5:00 PM - 8:30PM  - Nightfloat Team FROM 8:30 -7:30 AM    CHIEF COMPLAINT & BRIEF HOSPITAL COURSE: No acute overnight events. Seen pt. at bedside Could not tolerate well the NG tube.     INTERVAL HPI/OVERNIGHT EVENTS:   MEDICATIONS  (STANDING):  aspirin Suppository 300 milliGRAM(s) Rectal daily  cloNIDine Patch 0.2 mG/24Hr(s) 1 patch Transdermal <User Schedule>  dextrose 5%. 1000 milliLiter(s) (70 mL/Hr) IV Continuous <Continuous>  enoxaparin Injectable 30 milliGRAM(s) SubCutaneous every 24 hours  hydrALAZINE Injectable 10 milliGRAM(s) IV Push every 6 hours  valproate sodium  IVPB 500 milliGRAM(s) IV Intermittent every 8 hours    MEDICATIONS  (PRN):      REVIEW OF SYSTEMS:  CONSTITUTIONAL: No fever, weight loss, or fatigue  RESPIRATORY: No shortness of breath  CARDIOVASCULAR: No chest pain  GASTROINTESTINAL: No abdominal pain.  GENITOURINARY: No dysuria  NEUROLOGICAL: No headaches  SKIN: No itching, burning, rashes    Vital Signs Last 24 Hrs  T(C): 36.8 (04 Aug 2023 14:02), Max: 37 (04 Aug 2023 00:45)  T(F): 98.2 (04 Aug 2023 14:02), Max: 98.6 (04 Aug 2023 00:45)  HR: 88 (04 Aug 2023 14:02) (81 - 98)  BP: 155/73 (04 Aug 2023 14:02) (155/73 - 177/92)  BP(mean): --  RR: 18 (04 Aug 2023 14:02) (16 - 18)  SpO2: 99% (04 Aug 2023 14:02) (95% - 99%)    Parameters below as of 04 Aug 2023 14:02  Patient On (Oxygen Delivery Method): room air        PHYSICAL EXAMINATION:  GENERAL: NAD, well built  HEAD:  Atraumatic, Normocephalic  EYES:  conjunctiva and sclera clear  CHEST/LUNG: Clear to auscultation. No rales, rhonchi, wheezing, or rubs  HEART: Regular rate and rhythm; No murmurs, rubs, or gallops  ABDOMEN: Soft, Nontender, Nondistended; Bowel sounds present  NERVOUS SYSTEM:  Alert & Oriented X3,    EXTREMITIES:  2+ Peripheral Pulses, No clubbing, cyanosis, or edema  SKIN: warm dry                          8.9    10.12 )-----------( 260      ( 04 Aug 2023 10:25 )             28.9     08-04    143  |  115<H>  |  39<H>  ----------------------------<  80  3.3<L>   |  20<L>  |  1.97<H>    Ca    8.1<L>      04 Aug 2023 10:25  Phos  4.6     08-04  Mg     2.5     08-04    TPro  6.9  /  Alb  2.2<L>  /  TBili  0.3  /  DBili  x   /  AST  18  /  ALT  13  /  AlkPhos  75  08-04    LIVER FUNCTIONS - ( 04 Aug 2023 10:25 )  Alb: 2.2 g/dL / Pro: 6.9 g/dL / ALK PHOS: 75 U/L / ALT: 13 U/L DA / AST: 18 U/L / GGT: x               PT/INR - ( 03 Aug 2023 06:17 )   PT: 11.7 sec;   INR: 1.03 ratio         PTT - ( 03 Aug 2023 06:17 )  PTT:31.8 sec    CAPILLARY BLOOD GLUCOSE      RADIOLOGY & ADDITIONAL TESTS:

## 2023-08-05 LAB
ALBUMIN SERPL ELPH-MCNC: 2.1 G/DL — LOW (ref 3.5–5)
ALP SERPL-CCNC: 74 U/L — SIGNIFICANT CHANGE UP (ref 40–120)
ALT FLD-CCNC: 13 U/L DA — SIGNIFICANT CHANGE UP (ref 10–60)
ANION GAP SERPL CALC-SCNC: 11 MMOL/L — SIGNIFICANT CHANGE UP (ref 5–17)
AST SERPL-CCNC: 14 U/L — SIGNIFICANT CHANGE UP (ref 10–40)
BILIRUB SERPL-MCNC: 0.3 MG/DL — SIGNIFICANT CHANGE UP (ref 0.2–1.2)
BUN SERPL-MCNC: 32 MG/DL — HIGH (ref 7–18)
CALCIUM SERPL-MCNC: 8.1 MG/DL — LOW (ref 8.4–10.5)
CHLORIDE SERPL-SCNC: 114 MMOL/L — HIGH (ref 96–108)
CO2 SERPL-SCNC: 18 MMOL/L — LOW (ref 22–31)
CREAT SERPL-MCNC: 1.72 MG/DL — HIGH (ref 0.5–1.3)
EGFR: 30 ML/MIN/1.73M2 — LOW
GLUCOSE BLDC GLUCOMTR-MCNC: 127 MG/DL — HIGH (ref 70–99)
GLUCOSE BLDC GLUCOMTR-MCNC: 82 MG/DL — SIGNIFICANT CHANGE UP (ref 70–99)
GLUCOSE BLDC GLUCOMTR-MCNC: 89 MG/DL — SIGNIFICANT CHANGE UP (ref 70–99)
GLUCOSE SERPL-MCNC: 84 MG/DL — SIGNIFICANT CHANGE UP (ref 70–99)
HCT VFR BLD CALC: 26.6 % — LOW (ref 34.5–45)
HGB BLD-MCNC: 8.4 G/DL — LOW (ref 11.5–15.5)
MAGNESIUM SERPL-MCNC: 2.3 MG/DL — SIGNIFICANT CHANGE UP (ref 1.6–2.6)
MCHC RBC-ENTMCNC: 28.6 PG — SIGNIFICANT CHANGE UP (ref 27–34)
MCHC RBC-ENTMCNC: 31.6 GM/DL — LOW (ref 32–36)
MCV RBC AUTO: 90.5 FL — SIGNIFICANT CHANGE UP (ref 80–100)
NRBC # BLD: 0 /100 WBCS — SIGNIFICANT CHANGE UP (ref 0–0)
PHOSPHATE SERPL-MCNC: 4.1 MG/DL — SIGNIFICANT CHANGE UP (ref 2.5–4.5)
PLATELET # BLD AUTO: 254 K/UL — SIGNIFICANT CHANGE UP (ref 150–400)
POTASSIUM SERPL-MCNC: 3 MMOL/L — LOW (ref 3.5–5.3)
POTASSIUM SERPL-SCNC: 3 MMOL/L — LOW (ref 3.5–5.3)
PROT SERPL-MCNC: 6.4 G/DL — SIGNIFICANT CHANGE UP (ref 6–8.3)
RBC # BLD: 2.94 M/UL — LOW (ref 3.8–5.2)
RBC # FLD: 14.6 % — HIGH (ref 10.3–14.5)
SODIUM SERPL-SCNC: 143 MMOL/L — SIGNIFICANT CHANGE UP (ref 135–145)
WBC # BLD: 9.33 K/UL — SIGNIFICANT CHANGE UP (ref 3.8–10.5)
WBC # FLD AUTO: 9.33 K/UL — SIGNIFICANT CHANGE UP (ref 3.8–10.5)

## 2023-08-05 RX ADMIN — Medication 10 MILLIGRAM(S): at 17:46

## 2023-08-05 RX ADMIN — Medication 10 MILLIGRAM(S): at 12:23

## 2023-08-05 RX ADMIN — Medication 1 PATCH: at 07:47

## 2023-08-05 RX ADMIN — Medication 1 PATCH: at 21:08

## 2023-08-05 RX ADMIN — Medication 10 MILLIGRAM(S): at 05:39

## 2023-08-05 RX ADMIN — Medication 10 MILLIGRAM(S): at 00:25

## 2023-08-05 RX ADMIN — Medication 55 MILLIGRAM(S): at 08:44

## 2023-08-05 RX ADMIN — SODIUM CHLORIDE 70 MILLILITER(S): 9 INJECTION, SOLUTION INTRAVENOUS at 08:44

## 2023-08-05 RX ADMIN — Medication 300 MILLIGRAM(S): at 12:23

## 2023-08-05 RX ADMIN — Medication 10 MILLIGRAM(S): at 23:56

## 2023-08-05 RX ADMIN — Medication 55 MILLIGRAM(S): at 17:46

## 2023-08-05 RX ADMIN — ENOXAPARIN SODIUM 30 MILLIGRAM(S): 100 INJECTION SUBCUTANEOUS at 17:47

## 2023-08-05 RX ADMIN — Medication 55 MILLIGRAM(S): at 00:26

## 2023-08-05 NOTE — PROGRESS NOTE ADULT - SUBJECTIVE AND OBJECTIVE BOX
INTERVAL HPI/OVERNIGHT EVENTS:  Patient,stable clinically,no new issues  VITAL SIGNS:  T(F): 97.5 (08-05-23 @ 04:35)  HR: 85 (08-05-23 @ 04:35)  BP: 161/68 (08-05-23 @ 04:35)  RR: 18 (08-05-23 @ 04:35)  SpO2: 97% (08-05-23 @ 04:35)  Wt(kg): --    PHYSICAL EXAM:  awake  Constitutional:  Eyes:  ENMT:perrla  Neck:  Respiratory:clear  Cardiovascular:s1s2,m-none  Gastrointestinal:soft,bs pos  Extremities:  Vascular:  Neurological:no focal deficit  Musculoskeletal:    MEDICATIONS  (STANDING):  aspirin Suppository 300 milliGRAM(s) Rectal daily  cloNIDine Patch 0.2 mG/24Hr(s) 1 patch Transdermal <User Schedule>  dextrose 5%. 1000 milliLiter(s) (70 mL/Hr) IV Continuous <Continuous>  enoxaparin Injectable 30 milliGRAM(s) SubCutaneous every 24 hours  hydrALAZINE Injectable 10 milliGRAM(s) IV Push every 6 hours  valproate sodium  IVPB 500 milliGRAM(s) IV Intermittent every 8 hours    MEDICATIONS  (PRN):      Allergies    &quot; NATURAL RUBBER&quot; (Other)  latex (Other)  penicillins (Unknown)    Intolerances        LABS:                        8.4    9.33  )-----------( 254      ( 05 Aug 2023 05:46 )             26.6     08-05    143  |  114<H>  |  32<H>  ----------------------------<  84  3.0<L>   |  18<L>  |  1.72<H>    Ca    8.1<L>      05 Aug 2023 05:46  Phos  4.1     08-05  Mg     2.3     08-05    TPro  6.4  /  Alb  2.1<L>  /  TBili  0.3  /  DBili  x   /  AST  14  /  ALT  13  /  AlkPhos  74  08-05      Urinalysis Basic - ( 05 Aug 2023 05:46 )    Color: x / Appearance: x / SG: x / pH: x  Gluc: 84 mg/dL / Ketone: x  / Bili: x / Urobili: x   Blood: x / Protein: x / Nitrite: x   Leuk Esterase: x / RBC: x / WBC x   Sq Epi: x / Non Sq Epi: x / Bacteria: x        RADIOLOGY & ADDITIONAL TESTS:      Assessment and Plan:   · Assessment	  Patient is a 77 female from [facility] PMHx HTN, HLD, CVA (w/ residual aphasia and R sided hemiparesis/plegia) who was sent to the hospital due to altered mental status. Patient is being admitted to medicine for further work up and rule out stroke vs encephalopathy (metabolic vs infectious).        Problem/Plan - 1:  ·  Problem: Adult failure to thrive.   ·  Plan: Pt. has been NPO for a few days on fluids.   Consulted with GI  will place Peg tube  on 8/7   Tried placing NG tube with fail and surgery tried and failed as well.  Ordered a ct abdomen and pelvis  to r/o hiatal hernia.     Problem/Plan - 2:  ·  Problem: CVA (cerebrovascular accident).   ·  Plan: altered mental status  HEAD CT: Chronic left MCA territory infarction.  CT PERFUSION demonstrated: Perfusion abnormality corresponding to the chronically infarcted left MCA territory. INFARCT CORE: 57 mL. TISSUE AT RISK: 236 mL.  CTA COW and  CTA NECK: neg   Failed dysphagia and speech and swallow. -> palliative will talk to family about peg tube placement. Family does not want DNR/DNI.     Problem/Plan - 3:  ·  Problem: Acute encephalopathy.   ·  Plan: P/w altered mental status   c/w cinamet, sertraline, baclofen. Hold as patient is NPO.     Problem/Plan - 4:  ·  Problem: Hypertension.   ·  Plan: Cardiology consulted Dr. Dunn->  clonidine patch .2 mg weekly.     Problem/Plan - 5:  ·  Problem: SUSAN (acute kidney injury).   ·  Plan: As per Nephrology   possible r/o urinary rentention vs MARISA vs r/o ATN.   order urinalysis, urine lytes->WNL  -renal ultrasound  and bladder scan  was negative  adjust meds for GFR.     Problem/Plan - 6:  ·  Problem: Electrolyte imbalance.   ·  Plan: Pt. not receiving any nutrition for multiple days probably resulting in abnormal electrolytes. Could not place NG tube. Peg tubed being placed on 8/7.     Problem/Plan - 7:  ·  Problem: Hyperlipidemia.   ·  Plan: F/U Lipid profile.   Start atorvastatin when passes SnS.     Problem/Plan - 8:  ·  Problem: Seizure.   ·  Plan: H/O seizures.   C/w Depacon.  Neurology consulted - Dr. Whitney-  > ordered EEG-potential epileptogenic factors in the left posterior temporal lobe and focal cerebral dysfunction in L  hemisphere  valporic acid levels for AM, no signs of new stroke.     Problem/Plan - 9:  ·  Problem: Prophylactic measure.   ·  Plan: Lovenox. (renally dosed).     INTERVAL HPI/OVERNIGHT EVENTS:  Patient,stable clinically,no new issues  VITAL SIGNS:  T(F): 97.5 (08-05-23 @ 04:35)  HR: 85 (08-05-23 @ 04:35)  BP: 161/68 (08-05-23 @ 04:35)  RR: 18 (08-05-23 @ 04:35)  SpO2: 97% (08-05-23 @ 04:35)  Wt(kg): --    PHYSICAL EXAM:  awake  Constitutional:  Eyes:  ENMT:perrla  Neck:  Respiratory:clear  Cardiovascular:s1s2,m-none  Gastrointestinal:soft,bs pos  Extremities:  Vascular:  Neurological:no focal deficit  Musculoskeletal:    MEDICATIONS  (STANDING):  aspirin Suppository 300 milliGRAM(s) Rectal daily  cloNIDine Patch 0.2 mG/24Hr(s) 1 patch Transdermal <User Schedule>  dextrose 5%. 1000 milliLiter(s) (70 mL/Hr) IV Continuous <Continuous>  enoxaparin Injectable 30 milliGRAM(s) SubCutaneous every 24 hours  hydrALAZINE Injectable 10 milliGRAM(s) IV Push every 6 hours  valproate sodium  IVPB 500 milliGRAM(s) IV Intermittent every 8 hours    MEDICATIONS  (PRN):      Allergies    &quot; NATURAL RUBBER&quot; (Other)  latex (Other)  penicillins (Unknown)    Intolerances        LABS:                        8.4    9.33  )-----------( 254      ( 05 Aug 2023 05:46 )             26.6     08-05    143  |  114<H>  |  32<H>  ----------------------------<  84  3.0<L>   |  18<L>  |  1.72<H>    Ca    8.1<L>      05 Aug 2023 05:46  Phos  4.1     08-05  Mg     2.3     08-05    TPro  6.4  /  Alb  2.1<L>  /  TBili  0.3  /  DBili  x   /  AST  14  /  ALT  13  /  AlkPhos  74  08-05      Urinalysis Basic - ( 05 Aug 2023 05:46 )    Color: x / Appearance: x / SG: x / pH: x  Gluc: 84 mg/dL / Ketone: x  / Bili: x / Urobili: x   Blood: x / Protein: x / Nitrite: x   Leuk Esterase: x / RBC: x / WBC x   Sq Epi: x / Non Sq Epi: x / Bacteria: x        RADIOLOGY & ADDITIONAL TESTS:      Assessment and Plan:   · Assessment	  Patient is a 77 female from [facility] PMHx HTN, HLD, CVA (w/ residual aphasia and R sided hemiparesis/plegia) who was sent to the hospital due to altered mental status. Patient is being admitted to medicine for further work up and rule out stroke vs encephalopathy (metabolic vs infectious).        Problem/Plan - 1:  ·  Problem: Adult failure to thrive.   ·  Plan: Pt. has been NPO for a few days on fluids.   Consulted with GI  will place Peg tube  on 8/7   Tried placing NG tube with fail and surgery tried and failed as well.  Ordered a ct abdomen and pelvis  to r/o hiatal hernia.     Problem/Plan - 2:  ·  Problem: CVA (cerebrovascular accident).   ·  Plan: altered mental status  HEAD CT: Chronic left MCA territory infarction.  CT PERFUSION demonstrated: Perfusion abnormality corresponding to the chronically infarcted left MCA territory. INFARCT CORE: 57 mL. TISSUE AT RISK: 236 mL.  CTA COW and  CTA NECK: neg   Failed dysphagia and speech and swallow. -> palliative will talk to family about peg tube placement. Family does not want DNR/DNI.     Problem/Plan - 3:  ·  Problem: Acute encephalopathy.   ·  Plan: P/w altered mental status   c/w cinamet, sertraline, baclofen. Hold as patient is NPO.     Problem/Plan - 4:  ·  Problem: Hypertension.   ·  Plan: Cardiology consulted Dr. Dunn->  clonidine patch .2 mg weekly.     Problem/Plan - 5:  ·  Problem: SUSAN (acute kidney injury).   ·  Plan: As per Nephrology   possible r/o urinary rentention vs MARISA vs r/o ATN.   order urinalysis, urine lytes->WNL  -renal ultrasound  and bladder scan  was negative  adjust meds for GFR.     Problem/Plan - 6:  ·  Problem: Electrolyte imbalance.   ·  Plan: Pt. not receiving any nutrition for multiple days probably resulting in abnormal electrolytes. Could not place NG tube. Peg tubed being placed on 8/7.     Problem/Plan - 7:  ·  Problem: Hyperlipidemia.   ·  Plan: F/U Lipid profile.   Start atorvastatin when passes SnS.     Problem/Plan - 8:  ·  Problem: Seizure.   ·  Plan: H/O seizures.   C/w Depacon.  Neurology consulted - Dr. Whitney-  > ordered EEG-potential epileptogenic factors in the left posterior temporal lobe and focal cerebral dysfunction in L  hemisphere  valporic acid levels for AM, no signs of new stroke.     Problem/Plan - 9:  ·  Problem: Prophylactic measure.   ·  Plan: Lovenox. (renally dosed).     INTERVAL HPI/OVERNIGHT EVENTS:  Patient,stable clinically,no new issues  VITAL SIGNS:  T(F): 97.5 (08-05-23 @ 04:35)  HR: 85 (08-05-23 @ 04:35)  BP: 161/68 (08-05-23 @ 04:35)  RR: 18 (08-05-23 @ 04:35)  SpO2: 97% (08-05-23 @ 04:35)  Wt(kg): --    PHYSICAL EXAM:  awake  Constitutional:  Eyes:  ENMT:perrla  Neck:  Respiratory:clear  Cardiovascular:s1s2,m-none  Gastrointestinal:soft,bs pos  Extremities:  Vascular:  Neurological:no focal deficit  Musculoskeletal:    MEDICATIONS  (STANDING):  aspirin Suppository 300 milliGRAM(s) Rectal daily  cloNIDine Patch 0.2 mG/24Hr(s) 1 patch Transdermal <User Schedule>  dextrose 5%. 1000 milliLiter(s) (70 mL/Hr) IV Continuous <Continuous>  enoxaparin Injectable 30 milliGRAM(s) SubCutaneous every 24 hours  hydrALAZINE Injectable 10 milliGRAM(s) IV Push every 6 hours  valproate sodium  IVPB 500 milliGRAM(s) IV Intermittent every 8 hours    MEDICATIONS  (PRN):      Allergies    &quot; NATURAL RUBBER&quot; (Other)  latex (Other)  penicillins (Unknown)    Intolerances        LABS:                        8.4    9.33  )-----------( 254      ( 05 Aug 2023 05:46 )             26.6     08-05    143  |  114<H>  |  32<H>  ----------------------------<  84  3.0<L>   |  18<L>  |  1.72<H>    Ca    8.1<L>      05 Aug 2023 05:46  Phos  4.1     08-05  Mg     2.3     08-05    TPro  6.4  /  Alb  2.1<L>  /  TBili  0.3  /  DBili  x   /  AST  14  /  ALT  13  /  AlkPhos  74  08-05      Urinalysis Basic - ( 05 Aug 2023 05:46 )    Color: x / Appearance: x / SG: x / pH: x  Gluc: 84 mg/dL / Ketone: x  / Bili: x / Urobili: x   Blood: x / Protein: x / Nitrite: x   Leuk Esterase: x / RBC: x / WBC x   Sq Epi: x / Non Sq Epi: x / Bacteria: x        RADIOLOGY & ADDITIONAL TESTS:      Assessment and Plan:   · Assessment	  Patient is a 77 female from [facility] PMHx HTN, HLD, CVA (w/ residual aphasia and R sided hemiparesis/plegia) who was sent to the hospital due to altered mental status. Patient is being admitted to medicine for further work up and rule out stroke vs encephalopathy (metabolic vs infectious).        Problem/Plan - 1:  ·  Problem: Adult failure to thrive.   ·  Plan: Pt. has been NPO for a few days on fluids.   Consulted with GI  will place Peg tube  on 8/7   Tried placing NG tube with fail and surgery tried and failed as well.  Ordered a ct abdomen and pelvis  to r/o hiatal hernia.     Problem/Plan - 2:  ·  Problem: CVA (cerebrovascular accident).   ·  Plan: altered mental status  HEAD CT: Chronic left MCA territory infarction.  CT PERFUSION demonstrated: Perfusion abnormality corresponding to the chronically infarcted left MCA territory. INFARCT CORE: 57 mL. TISSUE AT RISK: 236 mL.  CTA COW and  CTA NECK: neg   Failed dysphagia and speech and swallow. -> palliative will talk to family about peg tube placement. Family does not want DNR/DNI.     Problem/Plan - 3:  ·  Problem: Acute encephalopathy.   ·  Plan: P/w altered mental status   c/w cinamet, sertraline, baclofen. Hold as patient is NPO.     Problem/Plan - 4:  ·  Problem: Hypertension.   ·  Plan: Cardiology consulted Dr. Dunn->  clonidine patch .2 mg weekly.     Problem/Plan - 5:  ·  Problem: SUSAN (acute kidney injury).   ·  Plan: As per Nephrology   possible r/o urinary rentention vs MARISA vs r/o ATN.   order urinalysis, urine lytes->WNL  -renal ultrasound  and bladder scan  was negative  adjust meds for GFR.     Problem/Plan - 6:  ·  Problem: Electrolyte imbalance.   ·  Plan: Pt. not receiving any nutrition for multiple days probably resulting in abnormal electrolytes. Could not place NG tube. Peg tubed being placed on 8/7.     Problem/Plan - 7:  ·  Problem: Hyperlipidemia.   ·  Plan: F/U Lipid profile.   Start atorvastatin when passes SnS.     Problem/Plan - 8:  ·  Problem: Seizure.   ·  Plan: H/O seizures.   C/w Depacon.  Neurology consulted - Dr. Whitney-  > ordered EEG-potential epileptogenic factors in the left posterior temporal lobe and focal cerebral dysfunction in L  hemisphere  valporic acid levels for AM, no signs of new stroke.     Problem/Plan - 9:  ·  Problem: Prophylactic measure.   ·  Plan: Lovenox. (renally dosed).

## 2023-08-05 NOTE — PROGRESS NOTE ADULT - ASSESSMENT
77 female from [facility] PMHx HTN, HLD, CVA (w/ residual aphasia and R sided hemiparesis/plegia) who was sent to the hospital due to altered mental status,UTI.  1.No new CVA on MRI.  2.UTI-s/p ABX.  3.HTN- clonidine patch .2mg q wk,IV hydralazine.  5.Lipid d/o-hold statin.  6.Plan for PEG placement.  7.Hypernatremia and SUSAN-IVF.  8.Replace k+.  9.GI and DVT prophylaxis. 77 female from [facility] PMHx HTN, HLD, CVA (w/ residual aphasia and R sided hemiparesis/plegia) who was sent to the hospital due to altered mental status,UTI.  1.No new CVA on MRI.  2.UTI-s/p ABX.  3.HTN- clonidine patch .2mg q wk,IV hydralazine.  5.Lipid d/o-hold statin.  6.Plan for PEG placement.  7.Hypernatremia and SUSAN-IVF.  8.Replace k+.  9.GI and DVT prophylaxis. 77 female from [facility] PMHx HTN, HLD, CVA (w/ residual aphasia and R sided hemiparesis/plegia) who was sent to the hospital due to altered mental status,UTI.  1.No new CVA on MRI.  2.UTI-s/p ABX.  3.HTN- clonidine patch .2mg q wk,IV hydralazine.  5.Lipid d/o-hold statin.  6.Plan for PEG placement.  7.Hypernatremia and SUSAN-IVF.  8.Replace k+.  9.GI and DVT prophylaxis.

## 2023-08-05 NOTE — PROGRESS NOTE ADULT - ASSESSMENT
1. SUSAN possible to MARISA and ATN  Renal sono shows no hdyro with b/l kidneys around 9.2cm  -Scr improving to 1.7mg/dL and continue IVFs. Possible renal recovery.  -urinalysis shows blood with proteinuria; FeNa >1%, spot protein to creatinine ratio is 1.5gm  -Adjust meds to eGFR and avoid IV Gadolinium contrast,NSAIDs, and phosphate enema.  -Monitor I/O's daily.   -Monitor SMA daily.  2. Hypernatremia due to water deficit and insensible losses. Pt is clinically euvolemic.   -Na stable 143; continue D5W at 70cc/hr since blood glucose is around 80s  -Monitor I/O's. Check Serum Na Daily. Avoid high solute intake diet and sodium bicarbonate infuse. Avoid overcorrection of NA (8-10meq/day)  3. 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  -previous Scr around 1.2 to 1.6mg/dL in Oct 2022.  -Keep patient euvolemic and renal diet  -Avoid Nephrotoxic Meds/ Agents such as (NSAIDs, IV contrast, Aminoglycosides such as gentamicin, -Gadolinium contrast, Phosphate containing enemas, etc..)  -Adjust Medications according to eGFR  4. HTN:   -bp is acceptable. continue bp meds  -titrate bp meds to keep sbp >110 and < 130  5. Mineral Bone Disease:  -Elevated phos and improving so no need binders.  -PTH intact 289, will start calcitriol once phos improves.  6. Encephalopathy:  -on Rocephin  -Plan as per Neuro and primary team  -awaiting PEG and Plan as GI.  -NGT attempted but unsuccessful; CT AP ordered and shows no obstruction.  7. Hypokalemia:  -give kcl repletion to keep K >3.5meq/L  -monitor K.     Discussed the assessment and plan with Primary Team/Nurse

## 2023-08-05 NOTE — PROGRESS NOTE ADULT - SUBJECTIVE AND OBJECTIVE BOX
NEPHROLOGY MEDICAL CARE, [facility] - Dr. Ajay Green/ Dr. Mert Madison/ Dr. Chris Calderon/ Dr. Carson Cook    Date of Service: 08-05-23 @ 11:25    Patient was seen and examined at bedside.    CC: patient is okay and NAD    Vital Signs Last 24 Hrs  T(C): 36.4 (05 Aug 2023 04:35), Max: 36.8 (04 Aug 2023 14:02)  T(F): 97.5 (05 Aug 2023 04:35), Max: 98.2 (04 Aug 2023 14:02)  HR: 85 (05 Aug 2023 04:35) (76 - 88)  BP: 161/68 (05 Aug 2023 04:35) (155/73 - 182/82)  BP(mean): --  RR: 18 (05 Aug 2023 04:35) (17 - 18)  SpO2: 97% (05 Aug 2023 04:35) (97% - 99%)    Parameters below as of 05 Aug 2023 04:35  Patient On (Oxygen Delivery Method): room air        08-04 @ 07:01  -  08-05 @ 07:00  --------------------------------------------------------  IN: 0 mL / OUT: 700 mL / NET: -700 mL        PHYSICAL EXAM:  General: No acute respiratory distress.  Eyes: conjunctiva and sclera clear  ENMT: Atraumatic, Normocephalic, supple, No JVD present. Moist mucous membranes  Respiratory: Bilateral clear lungs; No rales, rhonchi, wheezing  Cardiovascular: S1S2+; no m/r/g  Gastrointestinal: Soft, Non-tender, Nondistended; Bowel sounds present  Neuro: eyes are open; hemiparesis.  Ext:  1+pedal edema, No Cyanosis  Skin: No visible rashes      MEDICATIONS:  MEDICATIONS  (STANDING):  aspirin Suppository 300 milliGRAM(s) Rectal daily  cloNIDine Patch 0.2 mG/24Hr(s) 1 patch Transdermal <User Schedule>  dextrose 5%. 1000 milliLiter(s) (70 mL/Hr) IV Continuous <Continuous>  enoxaparin Injectable 30 milliGRAM(s) SubCutaneous every 24 hours  hydrALAZINE Injectable 10 milliGRAM(s) IV Push every 6 hours  valproate sodium  IVPB 500 milliGRAM(s) IV Intermittent every 8 hours    MEDICATIONS  (PRN):          LABS:                        8.4    9.33  )-----------( 254      ( 05 Aug 2023 05:46 )             26.6     08-05    143  |  114<H>  |  32<H>  ----------------------------<  84  3.0<L>   |  18<L>  |  1.72<H>    Ca    8.1<L>      05 Aug 2023 05:46  Phos  4.1     08-05  Mg     2.3     08-05    TPro  6.4  /  Alb  2.1<L>  /  TBili  0.3  /  DBili  x   /  AST  14  /  ALT  13  /  AlkPhos  74  08-05      Urinalysis Basic - ( 05 Aug 2023 05:46 )    Color: x / Appearance: x / SG: x / pH: x  Gluc: 84 mg/dL / Ketone: x  / Bili: x / Urobili: x   Blood: x / Protein: x / Nitrite: x   Leuk Esterase: x / RBC: x / WBC x   Sq Epi: x / Non Sq Epi: x / Bacteria: x      Magnesium: 2.3 mg/dL (08-05 @ 05:46)  Phosphorus: 4.1 mg/dL (08-05 @ 05:46)    Urine studies    PTH and Vit D:         NEPHROLOGY MEDICAL CARE, [facility] - Dr. Ajay Green/ Dr. Mert Madison/ Dr. Chris Calderon/ Dr. Carson Cook    Date of Service: 08-05-23 @ 11:25    Patient was seen and examined at bedside.    CC: patient is okay and NAD    Vital Signs Last 24 Hrs  T(C): 36.4 (05 Aug 2023 04:35), Max: 36.8 (04 Aug 2023 14:02)  T(F): 97.5 (05 Aug 2023 04:35), Max: 98.2 (04 Aug 2023 14:02)  HR: 85 (05 Aug 2023 04:35) (76 - 88)  BP: 161/68 (05 Aug 2023 04:35) (155/73 - 182/82)  BP(mean): --  RR: 18 (05 Aug 2023 04:35) (17 - 18)  SpO2: 97% (05 Aug 2023 04:35) (97% - 99%)    Parameters below as of 05 Aug 2023 04:35  Patient On (Oxygen Delivery Method): room air        08-04 @ 07:01  -  08-05 @ 07:00  --------------------------------------------------------  IN: 0 mL / OUT: 700 mL / NET: -700 mL        PHYSICAL EXAM:  General: No acute respiratory distress.  Eyes: conjunctiva and sclera clear  ENMT: Atraumatic, Normocephalic, supple, No JVD present. Moist mucous membranes  Respiratory: Bilateral clear lungs; No rales, rhonchi, wheezing  Cardiovascular: S1S2+; no m/r/g  Gastrointestinal: Soft, Non-tender, Nondistended; Bowel sounds present  Neuro: eyes are open; hemiparesis.  Ext:  1+pedal edema, No Cyanosis  Skin: No visible rashes      MEDICATIONS:  MEDICATIONS  (STANDING):  aspirin Suppository 300 milliGRAM(s) Rectal daily  cloNIDine Patch 0.2 mG/24Hr(s) 1 patch Transdermal <User Schedule>  dextrose 5%. 1000 milliLiter(s) (70 mL/Hr) IV Continuous <Continuous>  enoxaparin Injectable 30 milliGRAM(s) SubCutaneous every 24 hours  hydrALAZINE Injectable 10 milliGRAM(s) IV Push every 6 hours  valproate sodium  IVPB 500 milliGRAM(s) IV Intermittent every 8 hours    MEDICATIONS  (PRN):          LABS:                        8.4    9.33  )-----------( 254      ( 05 Aug 2023 05:46 )             26.6     08-05    143  |  114<H>  |  32<H>  ----------------------------<  84  3.0<L>   |  18<L>  |  1.72<H>    Ca    8.1<L>      05 Aug 2023 05:46  Phos  4.1     08-05  Mg     2.3     08-05    TPro  6.4  /  Alb  2.1<L>  /  TBili  0.3  /  DBili  x   /  AST  14  /  ALT  13  /  AlkPhos  74  08-05      Urinalysis Basic - ( 05 Aug 2023 05:46 )    Color: x / Appearance: x / SG: x / pH: x  Gluc: 84 mg/dL / Ketone: x  / Bili: x / Urobili: x   Blood: x / Protein: x / Nitrite: x   Leuk Esterase: x / RBC: x / WBC x   Sq Epi: x / Non Sq Epi: x / Bacteria: x      Magnesium: 2.3 mg/dL (08-05 @ 05:46)  Phosphorus: 4.1 mg/dL (08-05 @ 05:46)    Urine studies    PTH and Vit D:         NEPHROLOGY MEDICAL CARE, [facility] - Dr. Ajay Green/ Dr. Mert Madison/ Dr. Chris Calderon/ Dr. Carson Cook    Date of Service: 08-05-23 @ 11:25    Patient was seen and examined at bedside.    CC: patient is okay and NAD    Vital Signs Last 24 Hrs  T(C): 36.4 (05 Aug 2023 04:35), Max: 36.8 (04 Aug 2023 14:02)  T(F): 97.5 (05 Aug 2023 04:35), Max: 98.2 (04 Aug 2023 14:02)  HR: 85 (05 Aug 2023 04:35) (76 - 88)  BP: 161/68 (05 Aug 2023 04:35) (155/73 - 182/82)  BP(mean): --  RR: 18 (05 Aug 2023 04:35) (17 - 18)  SpO2: 97% (05 Aug 2023 04:35) (97% - 99%)    Parameters below as of 05 Aug 2023 04:35  Patient On (Oxygen Delivery Method): room air        08-04 @ 07:01  -  08-05 @ 07:00  --------------------------------------------------------  IN: 0 mL / OUT: 700 mL / NET: -700 mL        PHYSICAL EXAM:  General: No acute respiratory distress.  Eyes: conjunctiva and sclera clear  ENMT: Atraumatic, Normocephalic, supple, No JVD present. Moist mucous membranes  Respiratory: Bilateral clear lungs; No rales, rhonchi, wheezing  Cardiovascular: S1S2+; no m/r/g  Gastrointestinal: Soft, Non-tender, Nondistended; Bowel sounds present  Neuro: eyes are open; hemiparesis.  Ext:  1+pedal edema, No Cyanosis  Skin: No visible rashes      MEDICATIONS:  MEDICATIONS  (STANDING):  aspirin Suppository 300 milliGRAM(s) Rectal daily  cloNIDine Patch 0.2 mG/24Hr(s) 1 patch Transdermal <User Schedule>  dextrose 5%. 1000 milliLiter(s) (70 mL/Hr) IV Continuous <Continuous>  enoxaparin Injectable 30 milliGRAM(s) SubCutaneous every 24 hours  hydrALAZINE Injectable 10 milliGRAM(s) IV Push every 6 hours  valproate sodium  IVPB 500 milliGRAM(s) IV Intermittent every 8 hours    MEDICATIONS  (PRN):          LABS:                        8.4    9.33  )-----------( 254      ( 05 Aug 2023 05:46 )             26.6     08-05    143  |  114<H>  |  32<H>  ----------------------------<  84  3.0<L>   |  18<L>  |  1.72<H>    Ca    8.1<L>      05 Aug 2023 05:46  Phos  4.1     08-05  Mg     2.3     08-05    TPro  6.4  /  Alb  2.1<L>  /  TBili  0.3  /  DBili  x   /  AST  14  /  ALT  13  /  AlkPhos  74  08-05      Urinalysis Basic - ( 05 Aug 2023 05:46 )    Color: x / Appearance: x / SG: x / pH: x  Gluc: 84 mg/dL / Ketone: x  / Bili: x / Urobili: x   Blood: x / Protein: x / Nitrite: x   Leuk Esterase: x / RBC: x / WBC x   Sq Epi: x / Non Sq Epi: x / Bacteria: x      Magnesium: 2.3 mg/dL (08-05 @ 05:46)  Phosphorus: 4.1 mg/dL (08-05 @ 05:46)    Urine studies    PTH and Vit D:

## 2023-08-05 NOTE — DIETITIAN NUTRITION RISK NOTIFICATION - TREATMENT: THE FOLLOWING DIET HAS BEEN RECOMMENDED
Advance diet or consider alternate nutrition support if NPO prolonged further as medically feasible; NPO, Pending PEG placement per team   Diet, NPO (07-27-23 @ 19:29) [Active]

## 2023-08-05 NOTE — DIETITIAN NUTRITION RISK NOTIFICATION - ADDITIONAL COMMENTS/DIETITIAN RECOMMENDATIONS
Malnutrition Diagnosis Parameters: Severe Malnutrition per MD, NPO x 9-10d ( intake <50% needs x >7d), 2+ upper extremity edema, debility

## 2023-08-05 NOTE — PROGRESS NOTE ADULT - SUBJECTIVE AND OBJECTIVE BOX
CHIEF COMPLAINT:Patient is a 77y old  Female who presents with a chief complaint of Altered mental status. Pt appears comfortable. Unable to place NGT.    	  REVIEW OF SYSTEMS:  [x ] Unable to obtain    PHYSICAL EXAM:  T(C): 36.4 (08-05-23 @ 04:35), Max: 36.8 (08-04-23 @ 14:02)  HR: 85 (08-05-23 @ 04:35) (76 - 88)  BP: 161/68 (08-05-23 @ 04:35) (155/73 - 182/82)  RR: 18 (08-05-23 @ 04:35) (17 - 18)  SpO2: 97% (08-05-23 @ 04:35) (97% - 99%)  Wt(kg): --  I&O's Summary    04 Aug 2023 07:01  -  05 Aug 2023 07:00  --------------------------------------------------------  IN: 0 mL / OUT: 700 mL / NET: -700 mL        Appearance: Normal	  HEENT:   Normal oral mucosa, PERRL, EOMI	  Lymphatic: No lymphadenopathy  Cardiovascular: Normal S1 S2, No JVD, No murmurs, No edema  Respiratory: Lungs clear to auscultation	  Gastrointestinal:  Soft, Non-tender, + BS	  Skin: No rashes, No ecchymoses, No cyanosis	  Extremities: Normal range of motion, No clubbing, cyanosis or edema  Vascular: Peripheral pulses palpable 2+ bilaterally    MEDICATIONS  (STANDING):  aspirin Suppository 300 milliGRAM(s) Rectal daily  cloNIDine Patch 0.2 mG/24Hr(s) 1 patch Transdermal <User Schedule>  dextrose 5%. 1000 milliLiter(s) (70 mL/Hr) IV Continuous <Continuous>  enoxaparin Injectable 30 milliGRAM(s) SubCutaneous every 24 hours  hydrALAZINE Injectable 10 milliGRAM(s) IV Push every 6 hours  valproate sodium  IVPB 500 milliGRAM(s) IV Intermittent every 8 hours      LABS:	 	                       8.4    9.33  )-----------( 254      ( 05 Aug 2023 05:46 )             26.6     08-05    143  |  114<H>  |  32<H>  ----------------------------<  84  3.0<L>   |  18<L>  |  1.72<H>    Ca    8.1<L>      05 Aug 2023 05:46  Phos  4.1     08-05  Mg     2.3     08-05    TPro  6.4  /  Alb  2.1<L>  /  TBili  0.3  /  DBili  x   /  AST  14  /  ALT  13  /  AlkPhos  74  08-05    proBNP:   Lipid Profile: Cholesterol 152  LDL --  HDL 51  TG 93  Ldl calc 82  Ratio --    HgA1c:   TSH: Thyroid Stimulating Hormone, Serum: 3.59 uU/mL (07-28 @ 05:03)

## 2023-08-05 NOTE — CHART NOTE - NSCHARTNOTEFT_GEN_A_CORE
Assessment:     Factors impacting intake: [ ] none [ ] nausea  [ ] vomiting [ ] diarrhea [ ] constipation  [ ]chewing problems [ ] swallowing issues  [ ] other:     Diet Presciption: Diet, NPO (23 @ 19:29)    Intake:     Daily Weight in k.5 (01 Aug 2023 05:00)    % Weight Change    Pertinent Medications: MEDICATIONS  (STANDING):  aspirin Suppository 300 milliGRAM(s) Rectal daily  cloNIDine Patch 0.2 mG/24Hr(s) 1 patch Transdermal <User Schedule>  dextrose 5%. 1000 milliLiter(s) (70 mL/Hr) IV Continuous <Continuous>  enoxaparin Injectable 30 milliGRAM(s) SubCutaneous every 24 hours  hydrALAZINE Injectable 10 milliGRAM(s) IV Push every 6 hours  valproate sodium  IVPB 500 milliGRAM(s) IV Intermittent every 8 hours    MEDICATIONS  (PRN):    Pertinent Labs:  Na143 mmol/L Glu 84 mg/dL K+ 3.0 mmol/L<L> Cr  1.72 mg/dL<H> BUN 32 mg/dL<H>  Phos 4.1 mg/dL  Alb 2.1 g/dL<L>  Chol 152 mg/dL LDL --    HDL 51 mg/dL Trig 93 mg/dL     CAPILLARY BLOOD GLUCOSE      POCT Blood Glucose.: 82 mg/dL (05 Aug 2023 05:59)  POCT Blood Glucose.: 89 mg/dL (05 Aug 2023 00:21)  POCT Blood Glucose.: 89 mg/dL (04 Aug 2023 17:34)  POCT Blood Glucose.: 91 mg/dL (04 Aug 2023 11:39)      Skin:     Estimated Needs:   [ ] no change since previous assessment  [ ] recalculated:     Previous Nutrition Diagnosis:   [ ] Inadequate Energy Intake [ ]Inadequate Oral Intake [ ] Excessive Energy Intake   [ ] Underweight [ ] Increased Nutrient Needs [ ] Overweight/Obesity  [ ] Swallowing Difficult   [ ] Altered GI Function [ ] Unintended Weight Loss [ ] Food & Nutrition Related Knowledge Deficit [ ] Malnutrition   [ ] Not Ready for Diet/Life Style Changes     Nutrition Diagnosis is [ ] ongoing  [ ] Improving   [ ] resolved [ ] not applicable     New Nutrition Diagnosis: [ ] not applicable       Interventions:   Recommend  [ ] Change Diet To:  [ ] Nutrition Supplement  [ ] Nutrition Support  [ ] Other:     Monitoring and Evaluation:   [ ] PO intake [ x ] Tolerance to diet prescription [ x ] weights [ x ] labs[ x ] follow up per protocol  [ ] other: Assessment:       Nutrition consult requested verbally by MD for re-assessment. Chart reviewed, pt visited, asleep, no-verbal/lethargy/confused per chart; s/p swallow re-evaluation 23 with NPO recommended, NPO x 8-9d; s/p Palliative consult, Severe Malnutrition and Family requesting PEG feeding per MD, GI consulted, possible PEG 23; s/p Attempt NGT insertions, but failed;  eGFR=30, PO4=7.8-->5.-->4.16, K=5.4-->3.7-->3, SUSAN on CKD, Nephrology on the case    Factors impacting intake: [ x ] other: change in mental status; difficulty chewing; difficulty feeding self; difficulty swallowing; acute on chronic comorbidities including acute encephalopathy, SUSAN on CKD, h/o CVA    Diet Prescription: Diet, NPO (23 @ 19:29)    Intake: NPO at present     Daily Weight in k.5 (01 Aug 2023 05:00)    % Weight Change: no new data     Pertinent Medications: MEDICATIONS  (STANDING):  aspirin Suppository 300 milliGRAM(s) Rectal daily  cloNIDine Patch 0.2 mG/24Hr(s) 1 patch Transdermal <User Schedule>  dextrose 5%. 1000 milliLiter(s) (70 mL/Hr) IV Continuous <Continuous>  enoxaparin Injectable 30 milliGRAM(s) SubCutaneous every 24 hours  hydrALAZINE Injectable 10 milliGRAM(s) IV Push every 6 hours  valproate sodium  IVPB 500 milliGRAM(s) IV Intermittent every 8 hours    MEDICATIONS  (PRN):    Pertinent Labs:  Na143 mmol/L Glu 84 mg/dL K+ 3.0 mmol/L<L> Cr  1.72 mg/dL<H> BUN 32 mg/dL<H>  Phos 4.1 mg/dL  Alb 2.1 g/dL<L>  Chol 152 mg/dL LDL --    HDL 51 mg/dL Trig 93 mg/dL     CAPILLARY BLOOD GLUCOSE    POCT Blood Glucose.: 82 mg/dL (05 Aug 2023 05:59)  POCT Blood Glucose.: 89 mg/dL (05 Aug 2023 00:21)  POCT Blood Glucose.: 89 mg/dL (04 Aug 2023 17:34)  POCT Blood Glucose.: 91 mg/dL (04 Aug 2023 11:39)  Skin: skin intact     Estimated Needs:   [ x ] no change since previous assessment  [ ] recalculated:     Previous Nutrition Diagnosis:   [ x ]Inadequate Oral Intake     Nutrition Diagnosis is [ x ] ongoing  [ ] Improving   [ ] resolved [ ] not applicable     New Nutrition Diagnosis: [  ] not applicable   [ x ] Severe Malnutrition in context of acute on chronic illness related to inadequate intake with dysphagia       Interventions:   Recommend  [ x ] Advance diet or consider alternate nutrition support if NPO prolonged further as medically feasible/if consistent with Goal of Care   [ ] Nutrition Supplement  [ x ] Nutrition Support:  If TF started as medically feasible, Nepro @ 55ml/h x 16h (880ml, 1584kcal, 71g protein, 642ml water daily at goal rate)  MD to adjust free water as needed   [ x ] Other: Discussed with MD/RN     Monitoring and Evaluation:   [ ] PO intake [ x ] Tolerance to diet prescription [ x ] weights [ x ] labs[ x ] follow up per protocol  [ ] other: Assessment:       Nutrition consult requested verbally by MD for re-assessment. Chart reviewed, pt visited, asleep, no-verbal/lethargy/confused per chart; s/p swallow re-evaluation 23 with NPO recommended, NPO x 8-9d; s/p Palliative consult, Severe Malnutrition and Family requesting PEG feeding per MD, GI consulted, possible PEG 23; s/p Attempt NGT insertions, but failed;  eGFR=30, PO4=7.8-->5.-->4.16, K=5.4-->3.7-->3, SUSAN on CKD, Nephrology on the case        Factors impacting intake: [ x ] other: change in mental status; difficulty chewing; difficulty feeding self; difficulty swallowing; acute on chronic comorbidities including acute encephalopathy, SUSAN on CKD, h/o CVA    Diet Prescription: Diet, NPO (23 @ 19:29)    Intake: NPO at present     Daily Weight in k.5 (01 Aug 2023 05:00)    % Weight Change: no new data     Pertinent Medications: MEDICATIONS  (STANDING):  aspirin Suppository 300 milliGRAM(s) Rectal daily  cloNIDine Patch 0.2 mG/24Hr(s) 1 patch Transdermal <User Schedule>  dextrose 5%. 1000 milliLiter(s) (70 mL/Hr) IV Continuous <Continuous>  enoxaparin Injectable 30 milliGRAM(s) SubCutaneous every 24 hours  hydrALAZINE Injectable 10 milliGRAM(s) IV Push every 6 hours  valproate sodium  IVPB 500 milliGRAM(s) IV Intermittent every 8 hours    MEDICATIONS  (PRN):    Pertinent Labs:  Na143 mmol/L Glu 84 mg/dL K+ 3.0 mmol/L<L> Cr  1.72 mg/dL<H> BUN 32 mg/dL<H>  Phos 4.1 mg/dL  Alb 2.1 g/dL<L>  Chol 152 mg/dL LDL --    HDL 51 mg/dL Trig 93 mg/dL     CAPILLARY BLOOD GLUCOSE    POCT Blood Glucose.: 82 mg/dL (05 Aug 2023 05:59)  POCT Blood Glucose.: 89 mg/dL (05 Aug 2023 00:21)  POCT Blood Glucose.: 89 mg/dL (04 Aug 2023 17:34)  POCT Blood Glucose.: 91 mg/dL (04 Aug 2023 11:39)  Skin: skin intact     Estimated Needs:   [ x ] no change since previous assessment  [ ] recalculated:     Previous Nutrition Diagnosis:   [ x ]Inadequate Oral Intake     Nutrition Diagnosis is [ x ] ongoing  [ ] Improving   [ ] resolved [ ] not applicable     New Nutrition Diagnosis: [  ] not applicable   [ x ] Severe Malnutrition in context of acute on chronic illness related to inadequate intake with dysphagia from CVA as evidenced by Severe Malnutrition per MD, NPO x 9-10d ( intake <50% needs x >7d), 2+ upper extremity edema, debility    Interventions:   Recommend  [ x ] Advance diet or consider alternate nutrition support if NPO prolonged further as medically feasible/if consistent with Goal of Care   [ ] Nutrition Supplement  [ x ] Nutrition Support:  If TF started as medically feasible, Goal: Jevity 1.5 @ 60ml/h x 16h (960ml, 1440kcal, 61g protein, 729ml water daily at goal)               If worsening renal function with K/PO4 elevated, may consider Nepro @ 55ml/h x 16h (880ml, 1584kcal, 71g protein, 642ml water daily at goal rate)                       MD to adjust free water as needed   [ x ] Other: Discussed with RN    Monitoring and Evaluation:   [ ] PO intake [ x ] Tolerance to diet prescription [ x ] weights [ x ] labs[ x ] follow up per protocol  [ ] other: Assessment:       Nutrition consult requested verbally by MD for re-assessment. Chart reviewed, pt visited, asleep, no-verbal/lethargy/confused per chart; s/p swallow re-evaluation 23 with NPO recommended, NPO x 8-9d; s/p Palliative consult, Severe Malnutrition and Family requesting PEG feeding per MD, GI consulted, possible PEG 23; s/p Attempt NGT insertions, but failed;  eGFR=30, PO4=7.8-->5.-->4.16, K=5.4-->3.7-->3, SUSAN on CKD, Nephrology on the case        Factors impacting intake: [ x ] other: change in mental status; difficulty chewing; difficulty feeding self; difficulty swallowing; acute on chronic comorbidities including acute encephalopathy, SUSAN on CKD, h/o CVA    Diet Prescription: Diet, NPO (23 @ 19:29)    Intake: NPO at present     Daily Weight in k.5 (01 Aug 2023 05:00)    % Weight Change: no new data     Pertinent Medications: MEDICATIONS  (STANDING):  aspirin Suppository 300 milliGRAM(s) Rectal daily  cloNIDine Patch 0.2 mG/24Hr(s) 1 patch Transdermal <User Schedule>  dextrose 5%. 1000 milliLiter(s) (70 mL/Hr) IV Continuous <Continuous>  enoxaparin Injectable 30 milliGRAM(s) SubCutaneous every 24 hours  hydrALAZINE Injectable 10 milliGRAM(s) IV Push every 6 hours  valproate sodium  IVPB 500 milliGRAM(s) IV Intermittent every 8 hours    MEDICATIONS  (PRN):    Pertinent Labs:  Na143 mmol/L Glu 84 mg/dL K+ 3.0 mmol/L<L> Cr  1.72 mg/dL<H> BUN 32 mg/dL<H>  Phos 4.1 mg/dL  Alb 2.1 g/dL<L>  Chol 152 mg/dL LDL --    HDL 51 mg/dL Trig 93 mg/dL     CAPILLARY BLOOD GLUCOSE    POCT Blood Glucose.: 82 mg/dL (05 Aug 2023 05:59)  POCT Blood Glucose.: 89 mg/dL (05 Aug 2023 00:21)  POCT Blood Glucose.: 89 mg/dL (04 Aug 2023 17:34)  POCT Blood Glucose.: 91 mg/dL (04 Aug 2023 11:39)  Skin: skin intact     Estimated Needs:   [ x ] no change since previous assessment  [ ] recalculated:     Previous Nutrition Diagnosis:   [ x ]Inadequate Oral Intake     Nutrition Diagnosis is [ x ] ongoing  [ ] Improving   [ ] resolved [ ] not applicable     New Nutrition Diagnosis: [  ] not applicable   [ x ] Severe Malnutrition in context of acute on chronic illness related to inadequate intake with dysphagia from CVA as evidenced by Severe Malnutrition per MD, NPO x 9-10d ( intake <50% needs x >7d), 2+ upper extremity edema, debility    Interventions:   Recommend  [ x ] Advance diet or consider alternate nutrition support if NPO prolonged further as medically feasible/if consistent with Goal of Care   [ ] Nutrition Supplement  [ x ] Nutrition Support:  If TF started as medically feasible, Goal: Jevity 1.5 @ 60ml/h x 16h (960ml, 1440kcal, 61g protein, 729ml water daily at goal)               If worsening renal function with K/PO4 elevated, may consider Nepro @ 55ml/h x 16h (880ml, 1584kcal, 71g protein, 642ml water daily at goal rate)                       MD to adjust free water as needed   [ x ] Other: Discussed with MD/RN    Monitoring and Evaluation:   [ ] PO intake [ x ] Tolerance to diet prescription [ x ] weights [ x ] labs[ x ] follow up per protocol  [ ] other:

## 2023-08-06 LAB
ALBUMIN SERPL ELPH-MCNC: 2.2 G/DL — LOW (ref 3.5–5)
ALP SERPL-CCNC: 81 U/L — SIGNIFICANT CHANGE UP (ref 40–120)
ALT FLD-CCNC: 13 U/L DA — SIGNIFICANT CHANGE UP (ref 10–60)
ANION GAP SERPL CALC-SCNC: 12 MMOL/L — SIGNIFICANT CHANGE UP (ref 5–17)
AST SERPL-CCNC: 12 U/L — SIGNIFICANT CHANGE UP (ref 10–40)
BILIRUB SERPL-MCNC: 0.3 MG/DL — SIGNIFICANT CHANGE UP (ref 0.2–1.2)
BUN SERPL-MCNC: 26 MG/DL — HIGH (ref 7–18)
CALCIUM SERPL-MCNC: 8.5 MG/DL — SIGNIFICANT CHANGE UP (ref 8.4–10.5)
CHLORIDE SERPL-SCNC: 111 MMOL/L — HIGH (ref 96–108)
CO2 SERPL-SCNC: 18 MMOL/L — LOW (ref 22–31)
CREAT SERPL-MCNC: 1.59 MG/DL — HIGH (ref 0.5–1.3)
EGFR: 33 ML/MIN/1.73M2 — LOW
GLUCOSE BLDC GLUCOMTR-MCNC: 106 MG/DL — HIGH (ref 70–99)
GLUCOSE BLDC GLUCOMTR-MCNC: 79 MG/DL — SIGNIFICANT CHANGE UP (ref 70–99)
GLUCOSE BLDC GLUCOMTR-MCNC: 81 MG/DL — SIGNIFICANT CHANGE UP (ref 70–99)
GLUCOSE BLDC GLUCOMTR-MCNC: 93 MG/DL — SIGNIFICANT CHANGE UP (ref 70–99)
GLUCOSE SERPL-MCNC: 77 MG/DL — SIGNIFICANT CHANGE UP (ref 70–99)
HCT VFR BLD CALC: 30 % — LOW (ref 34.5–45)
HGB BLD-MCNC: 9.4 G/DL — LOW (ref 11.5–15.5)
MAGNESIUM SERPL-MCNC: 2.2 MG/DL — SIGNIFICANT CHANGE UP (ref 1.6–2.6)
MCHC RBC-ENTMCNC: 28 PG — SIGNIFICANT CHANGE UP (ref 27–34)
MCHC RBC-ENTMCNC: 31.3 GM/DL — LOW (ref 32–36)
MCV RBC AUTO: 89.3 FL — SIGNIFICANT CHANGE UP (ref 80–100)
PHOSPHATE SERPL-MCNC: 3.9 MG/DL — SIGNIFICANT CHANGE UP (ref 2.5–4.5)
PLATELET # BLD AUTO: 275 K/UL — SIGNIFICANT CHANGE UP (ref 150–400)
POTASSIUM SERPL-MCNC: 3.1 MMOL/L — LOW (ref 3.5–5.3)
POTASSIUM SERPL-SCNC: 3.1 MMOL/L — LOW (ref 3.5–5.3)
PROT SERPL-MCNC: 6.9 G/DL — SIGNIFICANT CHANGE UP (ref 6–8.3)
RBC # BLD: 3.36 M/UL — LOW (ref 3.8–5.2)
RBC # FLD: 14.7 % — HIGH (ref 10.3–14.5)
SODIUM SERPL-SCNC: 141 MMOL/L — SIGNIFICANT CHANGE UP (ref 135–145)
WBC # BLD: 11.33 K/UL — HIGH (ref 3.8–10.5)
WBC # FLD AUTO: 11.33 K/UL — HIGH (ref 3.8–10.5)

## 2023-08-06 PROCEDURE — 70450 CT HEAD/BRAIN W/O DYE: CPT | Mod: 26

## 2023-08-06 PROCEDURE — 70486 CT MAXILLOFACIAL W/O DYE: CPT | Mod: 26

## 2023-08-06 RX ORDER — DEXTROSE MONOHYDRATE, SODIUM CHLORIDE, AND POTASSIUM CHLORIDE 50; .745; 4.5 G/1000ML; G/1000ML; G/1000ML
1000 INJECTION, SOLUTION INTRAVENOUS
Refills: 0 | Status: DISCONTINUED | OUTPATIENT
Start: 2023-08-06 | End: 2023-08-11

## 2023-08-06 RX ORDER — POTASSIUM CHLORIDE 20 MEQ
20 PACKET (EA) ORAL ONCE
Refills: 0 | Status: DISCONTINUED | OUTPATIENT
Start: 2023-08-06 | End: 2023-08-06

## 2023-08-06 RX ORDER — POTASSIUM CHLORIDE 20 MEQ
10 PACKET (EA) ORAL
Refills: 0 | Status: COMPLETED | OUTPATIENT
Start: 2023-08-06 | End: 2023-08-06

## 2023-08-06 RX ADMIN — Medication 100 MILLIEQUIVALENT(S): at 13:24

## 2023-08-06 RX ADMIN — Medication 1 PATCH: at 19:01

## 2023-08-06 RX ADMIN — Medication 10 MILLIGRAM(S): at 11:56

## 2023-08-06 RX ADMIN — Medication 1 PATCH: at 07:31

## 2023-08-06 RX ADMIN — Medication 100 MILLIEQUIVALENT(S): at 11:56

## 2023-08-06 RX ADMIN — Medication 55 MILLIGRAM(S): at 18:49

## 2023-08-06 RX ADMIN — Medication 55 MILLIGRAM(S): at 01:39

## 2023-08-06 RX ADMIN — Medication 10 MILLIGRAM(S): at 18:54

## 2023-08-06 RX ADMIN — Medication 100 MILLIEQUIVALENT(S): at 08:38

## 2023-08-06 RX ADMIN — Medication 55 MILLIGRAM(S): at 10:16

## 2023-08-06 RX ADMIN — DEXTROSE MONOHYDRATE, SODIUM CHLORIDE, AND POTASSIUM CHLORIDE 50 MILLILITER(S): 50; .745; 4.5 INJECTION, SOLUTION INTRAVENOUS at 14:30

## 2023-08-06 RX ADMIN — ENOXAPARIN SODIUM 30 MILLIGRAM(S): 100 INJECTION SUBCUTANEOUS at 16:13

## 2023-08-06 RX ADMIN — Medication 10 MILLIGRAM(S): at 05:26

## 2023-08-06 RX ADMIN — Medication 300 MILLIGRAM(S): at 11:59

## 2023-08-06 NOTE — PROGRESS NOTE ADULT - SUBJECTIVE AND OBJECTIVE BOX
NEPHROLOGY MEDICAL CARE, [facility] - Dr. Ajay Green/ Dr. Mert Madison/ Dr. Chris Calderon/ Dr. Carson Cook    Date of Service: 08-06-23 @ 07:26    Patient was seen and examined at bedside.    CC: patient is okay and NAD    Vital Signs Last 24 Hrs  T(C): 36.4 (06 Aug 2023 04:50), Max: 36.4 (05 Aug 2023 14:58)  T(F): 97.5 (06 Aug 2023 04:50), Max: 97.5 (05 Aug 2023 14:58)  HR: 81 (06 Aug 2023 04:50) (81 - 83)  BP: 156/69 (06 Aug 2023 04:50) (151/74 - 161/85)  BP(mean): --  RR: 18 (06 Aug 2023 04:50) (17 - 18)  SpO2: 97% (06 Aug 2023 04:50) (97% - 99%)    Parameters below as of 06 Aug 2023 04:50  Patient On (Oxygen Delivery Method): room air          PHYSICAL EXAM:  General: No acute respiratory distress.  Eyes: conjunctiva and sclera clear  ENMT: Atraumatic, Normocephalic, supple, No JVD present. Moist mucous membranes  Respiratory: Bilateral clear lungs; No rales, rhonchi, wheezing  Cardiovascular: S1S2+; no m/r/g  Gastrointestinal: Soft, Non-tender, Nondistended; Bowel sounds present  Neuro: eyes are open; hemiparesis.  Ext:  1+pedal edema, No Cyanosis  Skin: No visible rashes      MEDICATIONS:  MEDICATIONS  (STANDING):  aspirin Suppository 300 milliGRAM(s) Rectal daily  cloNIDine Patch 0.2 mG/24Hr(s) 1 patch Transdermal <User Schedule>  dextrose 5%. 1000 milliLiter(s) (70 mL/Hr) IV Continuous <Continuous>  enoxaparin Injectable 30 milliGRAM(s) SubCutaneous every 24 hours  hydrALAZINE Injectable 10 milliGRAM(s) IV Push every 6 hours  potassium chloride    Tablet ER 20 milliEquivalent(s) Oral once  valproate sodium  IVPB 500 milliGRAM(s) IV Intermittent every 8 hours    MEDICATIONS  (PRN):          LABS:                        9.4    11.33 )-----------( 275      ( 06 Aug 2023 06:44 )             30.0     08-06    141  |  111<H>  |  26<H>  ----------------------------<  77  3.1<L>   |  18<L>  |  1.59<H>    Ca    8.5      06 Aug 2023 06:44  Phos  3.9     08-06  Mg     2.2     08-06    TPro  6.9  /  Alb  2.2<L>  /  TBili  0.3  /  DBili  x   /  AST  12  /  ALT  13  /  AlkPhos  81  08-06      Urinalysis Basic - ( 06 Aug 2023 06:44 )    Color: x / Appearance: x / SG: x / pH: x  Gluc: 77 mg/dL / Ketone: x  / Bili: x / Urobili: x   Blood: x / Protein: x / Nitrite: x   Leuk Esterase: x / RBC: x / WBC x   Sq Epi: x / Non Sq Epi: x / Bacteria: x      Magnesium: 2.2 mg/dL (08-06 @ 06:44)  Phosphorus: 3.9 mg/dL (08-06 @ 06:44)    Urine studies    PTH and Vit D:         NEPHROLOGY MEDICAL CARE, [facility] - Dr. Ajay Green/ Dr. Mert Madison/ Dr. Chris Calderon/ Dr. Carson Cook    Date of Service: 08-06-23 @ 07:26    Patient was seen and examined at bedside.    CC: patient is okay and NAD    Vital Signs Last 24 Hrs  T(C): 36.4 (06 Aug 2023 04:50), Max: 36.4 (05 Aug 2023 14:58)  T(F): 97.5 (06 Aug 2023 04:50), Max: 97.5 (05 Aug 2023 14:58)  HR: 81 (06 Aug 2023 04:50) (81 - 83)  BP: 156/69 (06 Aug 2023 04:50) (151/74 - 161/85)  BP(mean): --  RR: 18 (06 Aug 2023 04:50) (17 - 18)  SpO2: 97% (06 Aug 2023 04:50) (97% - 99%)    Parameters below as of 06 Aug 2023 04:50  Patient On (Oxygen Delivery Method): room air          PHYSICAL EXAM:  General: No acute respiratory distress.  Eyes: conjunctiva and sclera clear  ENMT: Atraumatic, Normocephalic, supple, No JVD present. Moist mucous membranes  Respiratory: Bilateral clear lungs; No rales, rhonchi, wheezing  Cardiovascular: S1S2+; no m/r/g  Gastrointestinal: Soft, Non-tender, Nondistended; Bowel sounds present  Neuro: eyes are open; hemiparesis.  Ext:  1+pedal edema, No Cyanosis  Skin: No visible rashes      MEDICATIONS:  MEDICATIONS  (STANDING):  aspirin Suppository 300 milliGRAM(s) Rectal daily  cloNIDine Patch 0.2 mG/24Hr(s) 1 patch Transdermal <User Schedule>  dextrose 5%. 1000 milliLiter(s) (70 mL/Hr) IV Continuous <Continuous>  enoxaparin Injectable 30 milliGRAM(s) SubCutaneous every 24 hours  hydrALAZINE Injectable 10 milliGRAM(s) IV Push every 6 hours  potassium chloride    Tablet ER 20 milliEquivalent(s) Oral once  valproate sodium  IVPB 500 milliGRAM(s) IV Intermittent every 8 hours    MEDICATIONS  (PRN):          LABS:                        9.4    11.33 )-----------( 275      ( 06 Aug 2023 06:44 )             30.0     08-06    141  |  111<H>  |  26<H>  ----------------------------<  77  3.1<L>   |  18<L>  |  1.59<H>    Ca    8.5      06 Aug 2023 06:44  Phos  3.9     08-06  Mg     2.2     08-06    TPro  6.9  /  Alb  2.2<L>  /  TBili  0.3  /  DBili  x   /  AST  12  /  ALT  13  /  AlkPhos  81  08-06      Urinalysis Basic - ( 06 Aug 2023 06:44 )    Color: x / Appearance: x / SG: x / pH: x  Gluc: 77 mg/dL / Ketone: x  / Bili: x / Urobili: x   Blood: x / Protein: x / Nitrite: x   Leuk Esterase: x / RBC: x / WBC x   Sq Epi: x / Non Sq Epi: x / Bacteria: x      Magnesium: 2.2 mg/dL (08-06 @ 06:44)  Phosphorus: 3.9 mg/dL (08-06 @ 06:44)    Urine studies    PTH and Vit D:         NEPHROLOGY MEDICAL CARE, [facility] - Dr. Ajay Green/ Dr. Mert Madison/ Dr. Chris Calderon/ Dr. Carson Cook    Date of Service: 08-06-23 @ 07:26    Patient was seen and examined at bedside.    CC: patient is okay and NAD    Vital Signs Last 24 Hrs  T(C): 36.4 (06 Aug 2023 04:50), Max: 36.4 (05 Aug 2023 14:58)  T(F): 97.5 (06 Aug 2023 04:50), Max: 97.5 (05 Aug 2023 14:58)  HR: 81 (06 Aug 2023 04:50) (81 - 83)  BP: 156/69 (06 Aug 2023 04:50) (151/74 - 161/85)  BP(mean): --  RR: 18 (06 Aug 2023 04:50) (17 - 18)  SpO2: 97% (06 Aug 2023 04:50) (97% - 99%)    Parameters below as of 06 Aug 2023 04:50  Patient On (Oxygen Delivery Method): room air          PHYSICAL EXAM:  General: No acute respiratory distress.  Eyes: conjunctiva and sclera clear  ENMT: Atraumatic, Normocephalic, supple, No JVD present. Moist mucous membranes  Respiratory: Bilateral clear lungs; No rales, rhonchi, wheezing  Cardiovascular: S1S2+; no m/r/g  Gastrointestinal: Soft, Non-tender, Nondistended; Bowel sounds present  Neuro: eyes are open; hemiparesis.  Ext:  1+pedal edema, No Cyanosis  Skin: No visible rashes      MEDICATIONS:  MEDICATIONS  (STANDING):  aspirin Suppository 300 milliGRAM(s) Rectal daily  cloNIDine Patch 0.2 mG/24Hr(s) 1 patch Transdermal <User Schedule>  dextrose 5%. 1000 milliLiter(s) (70 mL/Hr) IV Continuous <Continuous>  enoxaparin Injectable 30 milliGRAM(s) SubCutaneous every 24 hours  hydrALAZINE Injectable 10 milliGRAM(s) IV Push every 6 hours  potassium chloride    Tablet ER 20 milliEquivalent(s) Oral once  valproate sodium  IVPB 500 milliGRAM(s) IV Intermittent every 8 hours    MEDICATIONS  (PRN):          LABS:                        9.4    11.33 )-----------( 275      ( 06 Aug 2023 06:44 )             30.0     08-06    141  |  111<H>  |  26<H>  ----------------------------<  77  3.1<L>   |  18<L>  |  1.59<H>    Ca    8.5      06 Aug 2023 06:44  Phos  3.9     08-06  Mg     2.2     08-06    TPro  6.9  /  Alb  2.2<L>  /  TBili  0.3  /  DBili  x   /  AST  12  /  ALT  13  /  AlkPhos  81  08-06      Urinalysis Basic - ( 06 Aug 2023 06:44 )    Color: x / Appearance: x / SG: x / pH: x  Gluc: 77 mg/dL / Ketone: x  / Bili: x / Urobili: x   Blood: x / Protein: x / Nitrite: x   Leuk Esterase: x / RBC: x / WBC x   Sq Epi: x / Non Sq Epi: x / Bacteria: x      Magnesium: 2.2 mg/dL (08-06 @ 06:44)  Phosphorus: 3.9 mg/dL (08-06 @ 06:44)    Urine studies    PTH and Vit D:

## 2023-08-06 NOTE — PROGRESS NOTE ADULT - SUBJECTIVE AND OBJECTIVE BOX
[   ] ICU                                          [   ] CCU                                      [ X  ] Medical Floor    Patient is a 77 year old female with dysphagia. GI consulted for Peg tube placement.     Patient is a 77 female from [facility] with past medical history significant for HTN, HLD, CVA (w/ residual aphasia and R sided hemiparesis/plegia) who was sent to the emergency room with for altered mental status. Patient is not able to provide any history. Patient is lying down in bed, awake and alert. However, patient does not speak, is not able to follow commands and does not turn face or move eyes when her name is called. Patient is admitted with CVA. Now patient with dysphagia, poor po intake, failure to thrive and weight loss. No abdominal pain, nausea, vomiting, hematemesis, hematochezia, melena, fever, chills, chest pain, SOB, cough, hematuria, dysuria  or diarrhea reported.      Patient is comfortable. No new complaints reported, No abdominal pain, N/V, hematemesis, hematochezia, melena, fever, chills, chest pain, SOB, cough or diarrhea reported.      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 1000 milliLiter(s) (70 mL/Hr) IV Continuous <Continuous>  enoxaparin Injectable 30 milliGRAM(s) SubCutaneous every 24 hours  hydrALAZINE Injectable 10 milliGRAM(s) IV Push every 6 hours  potassium chloride  10 mEq/100 mL IVPB 10 milliEquivalent(s) IV Intermittent every 1 hour  valproate sodium  IVPB 500 milliGRAM(s) IV Intermittent every 8 hours         SOCIAL HISTORY  Advanced Directives:       [ X ] Full Code       [  ] DNR  Marital Status:         [  ] M      [ X ] S      [  ] D       [  ] W  Children:       [ X ] Yes      [  ] No  Occupation:        [  ] Employed       [ X ] Unemployed       [  ] Retired  Diet:       [ X ] Regular       [  ] PEG feeding          [  ] NG tube feeding  Drug Use:           [ X ] Patient denied          [  ] Yes  Alcohol:           [ X ] No             [  ] Yes (socially)         [  ] Yes (chronic)  Tobacco:           [  ] Yes           [X  ] No      FAMILY HISTORY  [ X ] Heart Disease            [ X ] Diabetes             [ X ] HTN             [  ] Colon Cancer             [  ] Stomach Cancer              [  ] Pancreatic Cancer      VITALS  Vital Signs Last 24 Hrs  T(C): 36.6 (06 Aug 2023 14:42), Max: 36.6 (06 Aug 2023 14:42)  T(F): 97.9 (06 Aug 2023 14:42), Max: 97.9 (06 Aug 2023 14:42)  HR: 82 (06 Aug 2023 18:45) (81 - 86)  BP: 171/80 (06 Aug 2023 18:45) (156/69 - 171/80)   RR: 18 (06 Aug 2023 18:45) (18 - 18)  SpO2: 99% (06 Aug 2023 18:45) (97% - 100%)  Parameters below as of 06 Aug 2023 18:45  Patient On (Oxygen Delivery Method): room air         MEDICATIONS  (STANDING):  aspirin Suppository 300 milliGRAM(s) Rectal daily  cloNIDine Patch 0.2 mG/24Hr(s) 1 patch Transdermal <User Schedule>  dextrose 5% with potassium chloride 20 mEq/L 1000 milliLiter(s) (50 mL/Hr) IV Continuous <Continuous>  enoxaparin Injectable 30 milliGRAM(s) SubCutaneous every 24 hours  hydrALAZINE Injectable 10 milliGRAM(s) IV Push every 6 hours  valproate sodium  IVPB 500 milliGRAM(s) IV Intermittent every 8 hours                             9.4    11.33 )-----------( 275      ( 06 Aug 2023 06:44 )             30.0       08-06    141  |  111<H>  |  26<H>  ----------------------------<  77  3.1<L>   |  18<L>  |  1.59<H>    Ca    8.5      06 Aug 2023 06:44  Phos  3.9     08-06  Mg     2.2     08-06    TPro  6.9  /  Alb  2.2<L>  /  TBili  0.3  /  DBili  x   /  AST  12  /  ALT  13  /  AlkPhos  81  08-06       [   ] ICU                                          [   ] CCU                                      [ X  ] Medical Floor    Patient is a 77 year old female with dysphagia. GI consulted for Peg tube placement.     Patient is a 77 female from [facility] with past medical history significant for HTN, HLD, CVA (w/ residual aphasia and R sided hemiparesis/plegia) who was sent to the emergency room with for altered mental status. Patient is not able to provide any history. Patient is lying down in bed, awake and alert. However, patient does not speak, is not able to follow commands and does not turn face or move eyes when her name is called. Patient is admitted with CVA. Now patient with dysphagia, poor po intake, failure to thrive and weight loss. No abdominal pain, nausea, vomiting, hematemesis, hematochezia, melena, fever, chills, chest pain, SOB, cough, hematuria, dysuria  or diarrhea reported.      Patient is comfortable. No new complaints reported, No abdominal pain, N/V, hematemesis, hematochezia, melena, fever, chills, chest pain, SOB, cough or diarrhea reported.      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 1000 milliLiter(s) (70 mL/Hr) IV Continuous <Continuous>  enoxaparin Injectable 30 milliGRAM(s) SubCutaneous every 24 hours  hydrALAZINE Injectable 10 milliGRAM(s) IV Push every 6 hours  potassium chloride  10 mEq/100 mL IVPB 10 milliEquivalent(s) IV Intermittent every 1 hour  valproate sodium  IVPB 500 milliGRAM(s) IV Intermittent every 8 hours         SOCIAL HISTORY  Advanced Directives:       [ X ] Full Code       [  ] DNR  Marital Status:         [  ] M      [ X ] S      [  ] D       [  ] W  Children:       [ X ] Yes      [  ] No  Occupation:        [  ] Employed       [ X ] Unemployed       [  ] Retired  Diet:       [ X ] Regular       [  ] PEG feeding          [  ] NG tube feeding  Drug Use:           [ X ] Patient denied          [  ] Yes  Alcohol:           [ X ] No             [  ] Yes (socially)         [  ] Yes (chronic)  Tobacco:           [  ] Yes           [X  ] No      FAMILY HISTORY  [ X ] Heart Disease            [ X ] Diabetes             [ X ] HTN             [  ] Colon Cancer             [  ] Stomach Cancer              [  ] Pancreatic Cancer      VITALS  Vital Signs Last 24 Hrs  T(C): 36.6 (06 Aug 2023 14:42), Max: 36.6 (06 Aug 2023 14:42)  T(F): 97.9 (06 Aug 2023 14:42), Max: 97.9 (06 Aug 2023 14:42)  HR: 82 (06 Aug 2023 18:45) (81 - 86)  BP: 171/80 (06 Aug 2023 18:45) (156/69 - 171/80)   RR: 18 (06 Aug 2023 18:45) (18 - 18)  SpO2: 99% (06 Aug 2023 18:45) (97% - 100%)  Parameters below as of 06 Aug 2023 18:45  Patient On (Oxygen Delivery Method): room air         MEDICATIONS  (STANDING):  aspirin Suppository 300 milliGRAM(s) Rectal daily  cloNIDine Patch 0.2 mG/24Hr(s) 1 patch Transdermal <User Schedule>  dextrose 5% with potassium chloride 20 mEq/L 1000 milliLiter(s) (50 mL/Hr) IV Continuous <Continuous>  enoxaparin Injectable 30 milliGRAM(s) SubCutaneous every 24 hours  hydrALAZINE Injectable 10 milliGRAM(s) IV Push every 6 hours  valproate sodium  IVPB 500 milliGRAM(s) IV Intermittent every 8 hours                             9.4    11.33 )-----------( 275      ( 06 Aug 2023 06:44 )             30.0       08-06    141  |  111<H>  |  26<H>  ----------------------------<  77  3.1<L>   |  18<L>  |  1.59<H>    Ca    8.5      06 Aug 2023 06:44  Phos  3.9     08-06  Mg     2.2     08-06    TPro  6.9  /  Alb  2.2<L>  /  TBili  0.3  /  DBili  x   /  AST  12  /  ALT  13  /  AlkPhos  81  08-06       [   ] ICU                                          [   ] CCU                                      [ X  ] Medical Floor    Patient is a 77 year old female with dysphagia. GI consulted for Peg tube placement.     Patient is a 77 female from [facility] with past medical history significant for HTN, HLD, CVA (w/ residual aphasia and R sided hemiparesis/plegia) who was sent to the emergency room with for altered mental status. Patient is not able to provide any history. Patient is lying down in bed, awake and alert. However, patient does not speak, is not able to follow commands and does not turn face or move eyes when her name is called. Patient is admitted with CVA. Now patient with dysphagia, poor po intake, failure to thrive and weight loss. No abdominal pain, nausea, vomiting, hematemesis, hematochezia, melena, fever, chills, chest pain, SOB, cough, hematuria, dysuria  or diarrhea reported.      Patient is comfortable. No new complaints reported, No abdominal pain, N/V, hematemesis, hematochezia, melena, fever, chills, chest pain, SOB, cough or diarrhea reported.      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 1000 milliLiter(s) (70 mL/Hr) IV Continuous <Continuous>  enoxaparin Injectable 30 milliGRAM(s) SubCutaneous every 24 hours  hydrALAZINE Injectable 10 milliGRAM(s) IV Push every 6 hours  potassium chloride  10 mEq/100 mL IVPB 10 milliEquivalent(s) IV Intermittent every 1 hour  valproate sodium  IVPB 500 milliGRAM(s) IV Intermittent every 8 hours         SOCIAL HISTORY  Advanced Directives:       [ X ] Full Code       [  ] DNR  Marital Status:         [  ] M      [ X ] S      [  ] D       [  ] W  Children:       [ X ] Yes      [  ] No  Occupation:        [  ] Employed       [ X ] Unemployed       [  ] Retired  Diet:       [ X ] Regular       [  ] PEG feeding          [  ] NG tube feeding  Drug Use:           [ X ] Patient denied          [  ] Yes  Alcohol:           [ X ] No             [  ] Yes (socially)         [  ] Yes (chronic)  Tobacco:           [  ] Yes           [X  ] No      FAMILY HISTORY  [ X ] Heart Disease            [ X ] Diabetes             [ X ] HTN             [  ] Colon Cancer             [  ] Stomach Cancer              [  ] Pancreatic Cancer      VITALS  Vital Signs Last 24 Hrs  T(C): 36.6 (06 Aug 2023 14:42), Max: 36.6 (06 Aug 2023 14:42)  T(F): 97.9 (06 Aug 2023 14:42), Max: 97.9 (06 Aug 2023 14:42)  HR: 82 (06 Aug 2023 18:45) (81 - 86)  BP: 171/80 (06 Aug 2023 18:45) (156/69 - 171/80)   RR: 18 (06 Aug 2023 18:45) (18 - 18)  SpO2: 99% (06 Aug 2023 18:45) (97% - 100%)  Parameters below as of 06 Aug 2023 18:45  Patient On (Oxygen Delivery Method): room air         MEDICATIONS  (STANDING):  aspirin Suppository 300 milliGRAM(s) Rectal daily  cloNIDine Patch 0.2 mG/24Hr(s) 1 patch Transdermal <User Schedule>  dextrose 5% with potassium chloride 20 mEq/L 1000 milliLiter(s) (50 mL/Hr) IV Continuous <Continuous>  enoxaparin Injectable 30 milliGRAM(s) SubCutaneous every 24 hours  hydrALAZINE Injectable 10 milliGRAM(s) IV Push every 6 hours  valproate sodium  IVPB 500 milliGRAM(s) IV Intermittent every 8 hours                             9.4    11.33 )-----------( 275      ( 06 Aug 2023 06:44 )             30.0       08-06    141  |  111<H>  |  26<H>  ----------------------------<  77  3.1<L>   |  18<L>  |  1.59<H>    Ca    8.5      06 Aug 2023 06:44  Phos  3.9     08-06  Mg     2.2     08-06    TPro  6.9  /  Alb  2.2<L>  /  TBili  0.3  /  DBili  x   /  AST  12  /  ALT  13  /  AlkPhos  81  08-06

## 2023-08-06 NOTE — PROGRESS NOTE ADULT - NUTRITIONAL ASSESSMENT
This patient has been assessed with a concern for Malnutrition and has been determined to have a diagnosis/diagnoses of Severe protein-calorie malnutrition.    This patient is being managed with:   Diet NPO-  Entered: Jul 27 2023  7:28PM

## 2023-08-06 NOTE — PROGRESS NOTE ADULT - ASSESSMENT
1. Dysphagia  2. Failure to thrive  3. Constipation  4. Anemia  5. No evidence of acute GI bleeding  6. Hypokalemia    Suggestions:    1. NPO  2. IVF hydration  3. Monitor electrolytes  4. Peg placement tentatively on Monday by Dr Nunes  5. Palliative evaluation  6. Consider NGT feeding meanwhile  7. Aspiration precaution  8. Monitor H/H  9. Transfuse PRBC as needed  10. Protonix daily  11. Avoid NSAID  12. Daily stool softener as needed  13. DVT prophylaxis

## 2023-08-06 NOTE — PROGRESS NOTE ADULT - ASSESSMENT
1. SUSAN possible to MARISA and ATN  Renal sono shows no hdyro with b/l kidneys around 9.2cm  -Scr improving to 1.59mg/dL and continue IVFs. with renal recovery; at baseline scr.  -urinalysis shows blood with proteinuria; FeNa >1%, spot protein to creatinine ratio is 1.5gm  -Adjust meds to eGFR and avoid IV Gadolinium contrast,NSAIDs, and phosphate enema.  -Monitor I/O's daily.   -Monitor SMA daily.  2. Hypernatremia due to water deficit and insensible losses. Pt is clinically euvolemic.   -Na stable 143; continue D5W at 70cc/hr since blood glucose is around 80s  -Monitor I/O's. Check Serum Na Daily. Avoid high solute intake diet and sodium bicarbonate infuse. Avoid overcorrection of NA (8-10meq/day)  3. 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  -previous Scr around 1.2 to 1.6mg/dL in Oct 2022.  -Keep patient euvolemic and renal diet  -Avoid Nephrotoxic Meds/ Agents such as (NSAIDs, IV contrast, Aminoglycosides such as gentamicin, -Gadolinium contrast, Phosphate containing enemas, etc..)  -Adjust Medications according to eGFR  4. HTN:   -bp is acceptable. continue bp meds  -titrate bp meds to keep sbp >110 and < 130  5. Mineral Bone Disease:  -Elevated phos and improving so no need binders.  -PTH intact 289, will start calcitriol once phos improves.  6. Encephalopathy:  -on Rocephin  -Plan as per Neuro and primary team  -awaiting PEG and Plan as GI.  -NGT attempted but unsuccessful; CT AP ordered and shows no obstruction.  7. Hypokalemia:  -give kcl 20meq iv once.  -give kcl repletion to keep K >3.5meq/L  -monitor K.     Discussed the assessment and plan with Primary Team/Nurse   1. SUSAN possible to MARISA and ATN  Renal sono shows no hdyro with b/l kidneys around 9.2cm  -Scr improving to 1.59mg/dL and continue IVFs. with renal recovery; at baseline scr.  -urinalysis shows blood with proteinuria; FeNa >1%, spot protein to creatinine ratio is 1.5gm  -Adjust meds to eGFR and avoid IV Gadolinium contrast,NSAIDs, and phosphate enema.  -Monitor I/O's daily.   -Monitor SMA daily.  2. Hypernatremia due to water deficit and insensible losses. Pt is clinically euvolemic.   -Na stable 141; continue D5W at 70cc/hr since blood glucose is around 80s  -Monitor I/O's. Check Serum Na Daily. Avoid high solute intake diet and sodium bicarbonate infuse. Avoid overcorrection of NA (8-10meq/day)  3. 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  -previous Scr around 1.2 to 1.6mg/dL in Oct 2022.  -Keep patient euvolemic and renal diet  -Avoid Nephrotoxic Meds/ Agents such as (NSAIDs, IV contrast, Aminoglycosides such as gentamicin, -Gadolinium contrast, Phosphate containing enemas, etc..)  -Adjust Medications according to eGFR  4. HTN:   -bp is acceptable. continue bp meds  -titrate bp meds to keep sbp >110 and < 130  5. Mineral Bone Disease:  -improving phos so no need binders.  -PTH intact 289, will start calcitriol once phos improves.  6. Encephalopathy:  -on Rocephin  -Plan as per Neuro and primary team  -awaiting PEG and Plan as GI.  -NGT attempted but unsuccessful; CT AP ordered and shows no obstruction.  7. Hypokalemia:  -give kcl 20meq iv once.  -give kcl repletion to keep K >3.5meq/L  -monitor K.     Discussed the assessment and plan with Primary Team/Nurse

## 2023-08-06 NOTE — PROGRESS NOTE ADULT - PROBLEM SELECTOR PLAN 8
H/O seizures.   C/w [place]acon.  Neurology consulted - Dr. Whitney-  > ordered EEG-potential epileptogenic factors in the left posterior temporal lobe and focal cerebral dysfunction in L  hemisphere

## 2023-08-06 NOTE — CHART NOTE - NSCHARTNOTEFT_GEN_A_CORE
Assessed patient at the bedside with nurse, recently returned from break.   (+) BRB pooling in the RIGHT intertragal notch, no over hemorrhage noted on exam. Hemodynamically stable.     Patient AOx0, non-verbal since admission. Admitted for acute on chronic Encephalopathy with c/f seizures, lass likely CVA.   Neurology and Cardiology consulted on admission. CT code stroke protocol shows chronic LEFT MCA infarct with no evidence of acute infarct. EEG showing seizure tendency, medications optimized as per neurology. Continued on secondary stroke prevention as tolerated in NPO status.    GOC: DNR/DNI, agreeable to PEG tube placement. Planning for PEG tomorrow 8/7 by Dr. Pratt.     Unsuccessful placement of NGT by primary team w/ assistance of surgical team on Friday 8/4. CT a/p negative for anatomical limitations. Still remains NPO c/w IVF.    Patient still unable to follow commands and unable to obtain collateral.   (+) b/l PERRLA, conjunctiva and sclera clear.   Otoscope exam shows _______.    Plan:   Holding FD Lovenox and ASA suppository.  Follow up CBC, T&S, Coag panel in the AM along with pre-op labs.  OBTAIN  urgent CT arin facial and CTH.    Plan discussed with Attending.

## 2023-08-06 NOTE — PROGRESS NOTE ADULT - PROBLEM SELECTOR PLAN 2
Pt. not receiving any nutrition for multiple days probably resulting in abnormal electrolytes. Could not place NG tube. Peg tubed being placed on 8/7  Potassium was 3.1-> repleated.

## 2023-08-06 NOTE — PROGRESS NOTE ADULT - SUBJECTIVE AND OBJECTIVE BOX
CHIEF COMPLAINT:Patient is a 77y old  Female who presents with a chief complaint of Altered mental status.Pt appears comfortable.    	  REVIEW OF SYSTEMS:  unable to obtain    PHYSICAL EXAM:  T(C): 36.4 (08-06-23 @ 04:50), Max: 36.4 (08-05-23 @ 14:58)  HR: 81 (08-06-23 @ 04:50) (81 - 83)  BP: 156/69 (08-06-23 @ 04:50) (151/74 - 161/85)  RR: 18 (08-06-23 @ 04:50) (17 - 18)  SpO2: 97% (08-06-23 @ 04:50) (97% - 99%)  Wt(kg): --  I&O's Summary      Appearance: Normal	  HEENT:   Normal oral mucosa, PERRL, EOMI	  Lymphatic: No lymphadenopathy  Cardiovascular: Normal S1 S2, No JVD, No murmurs, No edema  Respiratory: Lungs clear to auscultation	  Gastrointestinal:  Soft, Non-tender, + BS	  Skin: No rashes, No ecchymoses, No cyanosis	  Extremities: Normal range of motion, No clubbing, cyanosis or edema  Vascular: Peripheral pulses palpable 2+ bilaterally    MEDICATIONS  (STANDING):  aspirin Suppository 300 milliGRAM(s) Rectal daily  cloNIDine Patch 0.2 mG/24Hr(s) 1 patch Transdermal <User Schedule>  dextrose 5%. 1000 milliLiter(s) (70 mL/Hr) IV Continuous <Continuous>  enoxaparin Injectable 30 milliGRAM(s) SubCutaneous every 24 hours  hydrALAZINE Injectable 10 milliGRAM(s) IV Push every 6 hours  potassium chloride  10 mEq/100 mL IVPB 10 milliEquivalent(s) IV Intermittent every 1 hour  valproate sodium  IVPB 500 milliGRAM(s) IV Intermittent every 8 hours      LABS:	 	                       9.4    11.33 )-----------( 275      ( 06 Aug 2023 06:44 )             30.0     08-06    141  |  111<H>  |  26<H>  ----------------------------<  77  3.1<L>   |  18<L>  |  1.59<H>    Ca    8.5      06 Aug 2023 06:44  Phos  3.9     08-06  Mg     2.2     08-06    TPro  6.9  /  Alb  2.2<L>  /  TBili  0.3  /  DBili  x   /  AST  12  /  ALT  13  /  AlkPhos  81  08-06      Lipid Profile: Cholesterol 152  LDL --  HDL 51  TG 93  Ldl calc 82  Ratio --    HgA1c:   TSH: Thyroid Stimulating Hormone, Serum: 3.59 uU/mL (07-28 @ 05:03)

## 2023-08-06 NOTE — PROGRESS NOTE ADULT - PROBLEM SELECTOR PLAN 6
As per Nephrology   possible r/o urinary rentention vs MARISA vs r/o ATN.   order urinalysis, urine lytes->WNL  -renal ultrasound  and bladder scan  was negative  adjust meds for GFR As per Nephrology   -renal ultrasound  and bladder scan  was negative  adjust meds for GFR

## 2023-08-06 NOTE — PROGRESS NOTE ADULT - ASSESSMENT
Patient is a 77 female from [facility] PMHx HTN, HLD, CVA (w/ residual aphasia and R sided hemiparesis/plegia) who was sent to the hospital due to altered mental status. Patient is being admitted to medicine for further work up and rule out stroke vs encephalopathy (metabolic vs infectious).  Patient is a 77 female from [facility] PMHx HTN, HLD, CVA (w/ residual aphasia and R sided hemiparesis/plegia) who was sent to the hospital due to altered mental status. Patient is being admitted to medicine for further work up and rule out stroke vs encephalopathy (metabolic vs infectious).

## 2023-08-06 NOTE — PROGRESS NOTE ADULT - PROBLEM SELECTOR PLAN 1
Pt. has been NPO for a few days on fluids.   Consulted with GI  will place Peg tube  on 8/7   Tried placing NG tube with fail and surgery tried and failed as well on 8/4  Ordered a ct abdomen and pelvis -> no sign of hiatal hernia. Pt. has been NPO for a few days on fluids.   Consulted with GI  will place Peg tube  on 8/7   Tried placing NG tube with fail and surgery tried and failed as well on 8/4  Ordered a ct abdomen and pelvis -> no sign of hiatal hernia.  pt. has oral thrush but NPO

## 2023-08-06 NOTE — PROGRESS NOTE ADULT - ASSESSMENT
77 female from [facility] PMHx HTN, HLD, CVA (w/ residual aphasia and R sided hemiparesis/plegia) who was sent to the hospital due to altered mental status,UTI.  1.No new CVA on MRI.  2.UTI-s/p ABX.  3.HTN- clonidine patch .2mg q wk,IV hydralazine.  5.Lipid d/o-hold statin.  6.Plan for PEG placement.  7.Hypernatremia and SUSAN-change IVF D% with kcl.  8.Replace k+.  9.GI and DVT prophylaxis. 77 female from [facility] PMHx HTN, HLD, CVA (w/ residual aphasia and R sided hemiparesis/plegia) who was sent to the hospital due to altered mental status,UTI.  1.No new CVA on MRI.  2.UTI-s/p ABX.  3.HTN- clonidine patch .2mg q wk,IV hydralazine.  5.Lipid d/o-hold statin.  6.Plan for PEG placement.  7.Hypernatremia and SUSAN-change IVF D% with kcl.  8.Replace k+.  9.GI and DVT prophylaxis. 77 female from [facility] PMHx HTN, HLD, CVA (w/ residual aphasia and R sided hemiparesis/plegia) who was sent to the hospital due to altered mental status,UTI.  1.No new CVA on MRI.  2.UTI-s/p ABX.  3.HTN- clonidine patch .2mg q wk,IV hydralazine.  5.Lipid d/o-hold statin.  6.Plan for PEG placement.  7.Hypernatremia and SUSAN-change IVF D% with kcl.  8.Replace k+.  9.GI and DVT prophylaxis.

## 2023-08-06 NOTE — PROGRESS NOTE ADULT - SUBJECTIVE AND OBJECTIVE BOX
PGY-1 Progress Note discussed with attending    PAGER #: [[phone]] TILL 5:00 PM  PLEASE CONTACT ON CALL TEAM:  - On Call Team (Please refer to Tyler) FROM 5:00 PM - 8:30PM  - Nightfloat Team FROM 8:30 -7:30 AM    CHIEF COMPLAINT & BRIEF HOSPITAL COURSE: No acute events. Seen pt. at bedside , no changes.     INTERVAL HPI/OVERNIGHT EVENTS:   MEDICATIONS  (STANDING):  aspirin Suppository 300 milliGRAM(s) Rectal daily  cloNIDine Patch 0.2 mG/24Hr(s) 1 patch Transdermal <User Schedule>  dextrose 5%. 1000 milliLiter(s) (70 mL/Hr) IV Continuous <Continuous>  enoxaparin Injectable 30 milliGRAM(s) SubCutaneous every 24 hours  hydrALAZINE Injectable 10 milliGRAM(s) IV Push every 6 hours  potassium chloride  10 mEq/100 mL IVPB 10 milliEquivalent(s) IV Intermittent every 1 hour  valproate sodium  IVPB 500 milliGRAM(s) IV Intermittent every 8 hours    MEDICATIONS  (PRN):    Vital Signs Last 24 Hrs  T(C): 36.4 (06 Aug 2023 04:50), Max: 36.4 (05 Aug 2023 14:58)  T(F): 97.5 (06 Aug 2023 04:50), Max: 97.5 (05 Aug 2023 14:58)  HR: 81 (06 Aug 2023 04:50) (81 - 83)  BP: 156/69 (06 Aug 2023 04:50) (151/74 - 161/85)  BP(mean): --  RR: 18 (06 Aug 2023 04:50) (17 - 18)  SpO2: 97% (06 Aug 2023 04:50) (97% - 99%)    Parameters below as of 06 Aug 2023 04:50  Patient On (Oxygen Delivery Method): room air        PHYSICAL EXAMINATION:  GENERAL: NAD, well built  HEAD:  Atraumatic, Normocephalic  EYES:  conjunctiva and sclera clear  CHEST/LUNG: Clear to auscultation. No rales, rhonchi, wheezing, or rubs  HEART: Regular rate and rhythm; No murmurs, rubs, or gallops  ABDOMEN: Soft, Nontender, Nondistended; Bowel sounds present  NERVOUS SYSTEM:  Alert & Oriented X3,    EXTREMITIES:  2+ Peripheral Pulses, No clubbing, cyanosis, or edema  SKIN: warm dry                          9.4    11.33 )-----------( 275      ( 06 Aug 2023 06:44 )             30.0     08-06    141  |  111<H>  |  26<H>  ----------------------------<  77  3.1<L>   |  18<L>  |  1.59<H>    Ca    8.5      06 Aug 2023 06:44  Phos  3.9     08-06  Mg     2.2     08-06    TPro  6.9  /  Alb  2.2<L>  /  TBili  0.3  /  DBili  x   /  AST  12  /  ALT  13  /  AlkPhos  81  08-06    LIVER FUNCTIONS - ( 06 Aug 2023 06:44 )  Alb: 2.2 g/dL / Pro: 6.9 g/dL / ALK PHOS: 81 U/L / ALT: 13 U/L DA / AST: 12 U/L / GGT: x                   CAPILLARY BLOOD GLUCOSE      RADIOLOGY & ADDITIONAL TESTS:                   PGY-1 Progress Note discussed with attending    PAGER #: [[phone]] TILL 5:00 PM  PLEASE CONTACT ON CALL TEAM:  - On Call Team (Please refer to Tyler) FROM 5:00 PM - 8:30PM  - Nightfloat Team FROM 8:30 -7:30 AM    CHIEF COMPLAINT & BRIEF HOSPITAL COURSE: No acute events. Seen pt. at bedside , no changes.     INTERVAL HPI/OVERNIGHT EVENTS:   MEDICATIONS  (STANDING):  aspirin Suppository 300 milliGRAM(s) Rectal daily  cloNIDine Patch 0.2 mG/24Hr(s) 1 patch Transdermal <User Schedule>  dextrose 5%. 1000 milliLiter(s) (70 mL/Hr) IV Continuous <Continuous>  enoxaparin Injectable 30 milliGRAM(s) SubCutaneous every 24 hours  hydrALAZINE Injectable 10 milliGRAM(s) IV Push every 6 hours  potassium chloride  10 mEq/100 mL IVPB 10 milliEquivalent(s) IV Intermittent every 1 hour  valproate sodium  IVPB 500 milliGRAM(s) IV Intermittent every 8 hours    MEDICATIONS  (PRN):    Vital Signs Last 24 Hrs  T(C): 36.4 (06 Aug 2023 04:50), Max: 36.4 (05 Aug 2023 14:58)  T(F): 97.5 (06 Aug 2023 04:50), Max: 97.5 (05 Aug 2023 14:58)  HR: 81 (06 Aug 2023 04:50) (81 - 83)  BP: 156/69 (06 Aug 2023 04:50) (151/74 - 161/85)  BP(mean): --  RR: 18 (06 Aug 2023 04:50) (17 - 18)  SpO2: 97% (06 Aug 2023 04:50) (97% - 99%)    Parameters below as of 06 Aug 2023 04:50  Patient On (Oxygen Delivery Method): room air        PHYSICAL EXAMINATION:  GENERAL: NAD, well built  HEAD:  Atraumatic, Normocephalic  EYES:  conjunctiva and sclera clear  CHEST/LUNG: Clear to auscultation. No rales, rhonchi, wheezing, or rubs  HEART: Regular rate and rhythm; No murmurs, rubs, or gallops  ABDOMEN: Soft, Nontender, Nondistended; Bowel sounds present  NERVOUS SYSTEM:  Alert & Oriented X3,    EXTREMITIES:  2+ Peripheral Pulses, No clubbing, cyanosis, or edema  SKIN: warm dry                          9.4    11.33 )-----------( 275      ( 06 Aug 2023 06:44 )             30.0     08-06    141  |  111<H>  |  26<H>  ----------------------------<  77  3.1<L>   |  18<L>  |  1.59<H>    Ca    8.5      06 Aug 2023 06:44  Phos  3.9     08-06  Mg     2.2     08-06    TPro  6.9  /  Alb  2.2<L>  /  TBili  0.3  /  DBili  x   /  AST  12  /  ALT  13  /  AlkPhos  81  08-06    LIVER FUNCTIONS - ( 06 Aug 2023 06:44 )  Alb: 2.2 g/dL / Pro: 6.9 g/dL / ALK PHOS: 81 U/L / ALT: 13 U/L DA / AST: 12 U/L / GGT: x                   CAPILLARY BLOOD GLUCOSE      RADIOLOGY & ADDITIONAL TESTS:                   PGY-1 Progress Note discussed with attending    PAGER #: [[phone]] TILL 5:00 PM  PLEASE CONTACT ON CALL TEAM:  - On Call Team (Please refer to Tyler) FROM 5:00 PM - 8:30PM  - Nightfloat Team FROM 8:30 -7:30 AM    CHIEF COMPLAINT & BRIEF HOSPITAL COURSE: No acute events. Seen pt. at bedside , no changes.     INTERVAL HPI/OVERNIGHT EVENTS:   MEDICATIONS  (STANDING):  aspirin Suppository 300 milliGRAM(s) Rectal daily  cloNIDine Patch 0.2 mG/24Hr(s) 1 patch Transdermal <User Schedule>  dextrose 5%. 1000 milliLiter(s) (70 mL/Hr) IV Continuous <Continuous>  enoxaparin Injectable 30 milliGRAM(s) SubCutaneous every 24 hours  hydrALAZINE Injectable 10 milliGRAM(s) IV Push every 6 hours  potassium chloride  10 mEq/100 mL IVPB 10 milliEquivalent(s) IV Intermittent every 1 hour  valproate sodium  IVPB 500 milliGRAM(s) IV Intermittent every 8 hours    MEDICATIONS  (PRN):    Vital Signs Last 24 Hrs  T(C): 36.4 (06 Aug 2023 04:50), Max: 36.4 (05 Aug 2023 14:58)  T(F): 97.5 (06 Aug 2023 04:50), Max: 97.5 (05 Aug 2023 14:58)  HR: 81 (06 Aug 2023 04:50) (81 - 83)  BP: 156/69 (06 Aug 2023 04:50) (151/74 - 161/85)  BP(mean): --  RR: 18 (06 Aug 2023 04:50) (17 - 18)  SpO2: 97% (06 Aug 2023 04:50) (97% - 99%)    Parameters below as of 06 Aug 2023 04:50  Patient On (Oxygen Delivery Method): room air        PHYSICAL EXAMINATION:  GENERAL: NAD, well built  HEAD:  Atraumatic, Normocephalic  EYES:  conjunctiva and sclera clear  CHEST/LUNG: Clear to auscultation. No rales, rhonchi, wheezing, or rubs  HEART: Regular rate and rhythm; No murmurs, rubs, or gallops  ABDOMEN: Soft, Nontender, Nondistended; Bowel sounds present  NERVOUS SYSTEM:  Alert & Oriented X3,    EXTREMITIES:  2+ Peripheral Pulses, No clubbing, cyanosis, or edema  SKIN: warm dry                          9.4    11.33 )-----------( 275      ( 06 Aug 2023 06:44 )             30.0     08-06    141  |  111<H>  |  26<H>  ----------------------------<  77  3.1<L>   |  18<L>  |  1.59<H>    Ca    8.5      06 Aug 2023 06:44  Phos  3.9     08-06  Mg     2.2     08-06    TPro  6.9  /  Alb  2.2<L>  /  TBili  0.3  /  DBili  x   /  AST  12  /  ALT  13  /  AlkPhos  81  08-06    LIVER FUNCTIONS - ( 06 Aug 2023 06:44 )  Alb: 2.2 g/dL / Pro: 6.9 g/dL / ALK PHOS: 81 U/L / ALT: 13 U/L DA / AST: 12 U/L / GGT: x                   CAPILLARY BLOOD GLUCOSE      RADIOLOGY & ADDITIONAL TESTS:

## 2023-08-07 DIAGNOSIS — R33.9 RETENTION OF URINE, UNSPECIFIED: ICD-10-CM

## 2023-08-07 DIAGNOSIS — H92.21 OTORRHAGIA, RIGHT EAR: ICD-10-CM

## 2023-08-07 LAB
ALBUMIN SERPL ELPH-MCNC: 2.5 G/DL — LOW (ref 3.5–5)
ALP SERPL-CCNC: 87 U/L — SIGNIFICANT CHANGE UP (ref 40–120)
ALT FLD-CCNC: 13 U/L DA — SIGNIFICANT CHANGE UP (ref 10–60)
ANION GAP SERPL CALC-SCNC: 10 MMOL/L — SIGNIFICANT CHANGE UP (ref 5–17)
ANISOCYTOSIS BLD QL: SLIGHT — SIGNIFICANT CHANGE UP
APTT BLD: 34.6 SEC — SIGNIFICANT CHANGE UP (ref 24.5–35.6)
AST SERPL-CCNC: 17 U/L — SIGNIFICANT CHANGE UP (ref 10–40)
BASOPHILS # BLD AUTO: 0.12 K/UL — SIGNIFICANT CHANGE UP (ref 0–0.2)
BASOPHILS NFR BLD AUTO: 1 % — SIGNIFICANT CHANGE UP (ref 0–2)
BILIRUB SERPL-MCNC: 0.3 MG/DL — SIGNIFICANT CHANGE UP (ref 0.2–1.2)
BLD GP AB SCN SERPL QL: SIGNIFICANT CHANGE UP
BUN SERPL-MCNC: 24 MG/DL — HIGH (ref 7–18)
CALCIUM SERPL-MCNC: 8.9 MG/DL — SIGNIFICANT CHANGE UP (ref 8.4–10.5)
CHLORIDE SERPL-SCNC: 110 MMOL/L — HIGH (ref 96–108)
CO2 SERPL-SCNC: 18 MMOL/L — LOW (ref 22–31)
CREAT SERPL-MCNC: 1.47 MG/DL — HIGH (ref 0.5–1.3)
EGFR: 37 ML/MIN/1.73M2 — LOW
EOSINOPHIL # BLD AUTO: 0 K/UL — SIGNIFICANT CHANGE UP (ref 0–0.5)
EOSINOPHIL NFR BLD AUTO: 0 % — SIGNIFICANT CHANGE UP (ref 0–6)
GLUCOSE BLDC GLUCOMTR-MCNC: 82 MG/DL — SIGNIFICANT CHANGE UP (ref 70–99)
GLUCOSE BLDC GLUCOMTR-MCNC: 83 MG/DL — SIGNIFICANT CHANGE UP (ref 70–99)
GLUCOSE BLDC GLUCOMTR-MCNC: 84 MG/DL — SIGNIFICANT CHANGE UP (ref 70–99)
GLUCOSE BLDC GLUCOMTR-MCNC: 86 MG/DL — SIGNIFICANT CHANGE UP (ref 70–99)
GLUCOSE SERPL-MCNC: 80 MG/DL — SIGNIFICANT CHANGE UP (ref 70–99)
HCT VFR BLD CALC: 30.1 % — LOW (ref 34.5–45)
HGB BLD-MCNC: 9.5 G/DL — LOW (ref 11.5–15.5)
INR BLD: 1.05 RATIO — SIGNIFICANT CHANGE UP (ref 0.85–1.18)
LG PLATELETS BLD QL AUTO: SLIGHT — SIGNIFICANT CHANGE UP
LYMPHOCYTES # BLD AUTO: 18 % — SIGNIFICANT CHANGE UP (ref 13–44)
LYMPHOCYTES # BLD AUTO: 2.24 K/UL — SIGNIFICANT CHANGE UP (ref 1–3.3)
MACROCYTES BLD QL: SLIGHT — SIGNIFICANT CHANGE UP
MAGNESIUM SERPL-MCNC: 2.2 MG/DL — SIGNIFICANT CHANGE UP (ref 1.6–2.6)
MANUAL SMEAR VERIFICATION: SIGNIFICANT CHANGE UP
MCHC RBC-ENTMCNC: 28.4 PG — SIGNIFICANT CHANGE UP (ref 27–34)
MCHC RBC-ENTMCNC: 31.6 GM/DL — LOW (ref 32–36)
MCV RBC AUTO: 90.1 FL — SIGNIFICANT CHANGE UP (ref 80–100)
METAMYELOCYTES # FLD: 5 % — HIGH (ref 0–0)
MICROCYTES BLD QL: SLIGHT — SIGNIFICANT CHANGE UP
MONOCYTES # BLD AUTO: 1 K/UL — HIGH (ref 0–0.9)
MONOCYTES NFR BLD AUTO: 8 % — SIGNIFICANT CHANGE UP (ref 2–14)
NEUTROPHILS # BLD AUTO: 8.47 K/UL — HIGH (ref 1.8–7.4)
NEUTROPHILS NFR BLD AUTO: 68 % — SIGNIFICANT CHANGE UP (ref 43–77)
NRBC # BLD: 0 /100 — SIGNIFICANT CHANGE UP (ref 0–0)
OVALOCYTES BLD QL SMEAR: SLIGHT — SIGNIFICANT CHANGE UP
PHOSPHATE SERPL-MCNC: 3.7 MG/DL — SIGNIFICANT CHANGE UP (ref 2.5–4.5)
PLAT MORPH BLD: NORMAL — SIGNIFICANT CHANGE UP
PLATELET # BLD AUTO: 287 K/UL — SIGNIFICANT CHANGE UP (ref 150–400)
POIKILOCYTOSIS BLD QL AUTO: SLIGHT — SIGNIFICANT CHANGE UP
POLYCHROMASIA BLD QL SMEAR: SLIGHT — SIGNIFICANT CHANGE UP
POTASSIUM SERPL-MCNC: 3.6 MMOL/L — SIGNIFICANT CHANGE UP (ref 3.5–5.3)
POTASSIUM SERPL-SCNC: 3.6 MMOL/L — SIGNIFICANT CHANGE UP (ref 3.5–5.3)
PROT SERPL-MCNC: 7.3 G/DL — SIGNIFICANT CHANGE UP (ref 6–8.3)
PROTHROM AB SERPL-ACNC: 11.9 SEC — SIGNIFICANT CHANGE UP (ref 9.5–13)
RBC # BLD: 3.34 M/UL — LOW (ref 3.8–5.2)
RBC # FLD: 14.6 % — HIGH (ref 10.3–14.5)
RBC BLD AUTO: ABNORMAL
SODIUM SERPL-SCNC: 138 MMOL/L — SIGNIFICANT CHANGE UP (ref 135–145)
WBC # BLD: 12.46 K/UL — HIGH (ref 3.8–10.5)
WBC # FLD AUTO: 12.46 K/UL — HIGH (ref 3.8–10.5)

## 2023-08-07 PROCEDURE — 99223 1ST HOSP IP/OBS HIGH 75: CPT

## 2023-08-07 PROCEDURE — 70481 CT ORBIT/EAR/FOSSA W/DYE: CPT | Mod: 26,XU

## 2023-08-07 PROCEDURE — 70460 CT HEAD/BRAIN W/DYE: CPT | Mod: 26

## 2023-08-07 PROCEDURE — 99222 1ST HOSP IP/OBS MODERATE 55: CPT

## 2023-08-07 RX ORDER — HYDRALAZINE HCL 50 MG
10 TABLET ORAL ONCE
Refills: 0 | Status: COMPLETED | OUTPATIENT
Start: 2023-08-07 | End: 2023-08-07

## 2023-08-07 RX ADMIN — Medication 55 MILLIGRAM(S): at 12:05

## 2023-08-07 RX ADMIN — Medication 10 MILLIGRAM(S): at 06:02

## 2023-08-07 RX ADMIN — DEXTROSE MONOHYDRATE, SODIUM CHLORIDE, AND POTASSIUM CHLORIDE 50 MILLILITER(S): 50; .745; 4.5 INJECTION, SOLUTION INTRAVENOUS at 19:48

## 2023-08-07 RX ADMIN — Medication 10 MILLIGRAM(S): at 20:15

## 2023-08-07 RX ADMIN — Medication 10 MILLIGRAM(S): at 12:03

## 2023-08-07 RX ADMIN — Medication 55 MILLIGRAM(S): at 00:57

## 2023-08-07 RX ADMIN — Medication 10 MILLIGRAM(S): at 18:34

## 2023-08-07 RX ADMIN — Medication 55 MILLIGRAM(S): at 18:34

## 2023-08-07 RX ADMIN — Medication 1 PATCH: at 07:43

## 2023-08-07 RX ADMIN — Medication 10 MILLIGRAM(S): at 00:57

## 2023-08-07 RX ADMIN — Medication 1 PATCH: at 19:30

## 2023-08-07 NOTE — CONSULT NOTE ADULT - ASSESSMENT
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 GEOVANNI Berry was consulted on 8/4 for dysphagia. [facility] Dr. Nunes was consulted today for PEG placement.    Patient presented from [facility] for AMS from baseline, eyes fixed in one direction, unable to swallow food. At baseline, she is able to provide one word answers and swallows food.   Hospital course included: Repeat imaging showing chronic infarcts, no new infarct, complicated by hypertensive emergency 204/98, requiring IV hydralazine, and unable to place NGT after multiple attempts.     #Dysphagia  #CVA  #Hypertensive emergency  Patient presented with AMS from baseline, at time of exam, she is groaning however non-participatory, unable to follow commands. [facility] was requested to place PEG. Palliative following.    	- Tentative PEG placement tomorrow, 8/8, with Dr. Nunes  	- AM labs: CBC, CMP, PT/INR  	- GI Dr. Berry to remain primary for all other GI issues  	- Please hold Lovenox x 24 hrs of procedure if safe per primary team, last dose received 8/6 @ 4PM  	- Medical optimization per primary team    This note and its recommendations herein are preliminary until such time as cosigned by an attending.    [facility] will continue to follow.  Thank you for this consult! 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 GEOVANNI Berry was consulted on 8/4 for dysphagia. [facility] Dr. Nunes was consulted today for PEG placement.    Patient presented from [facility] for AMS from baseline, eyes fixed in one direction, unable to swallow food. At baseline, she is able to provide one word answers and swallows food.   Hospital course included: Repeat imaging showing chronic infarcts, no new infarct, complicated by hypertensive emergency 204/98, requiring IV hydralazine, and unable to place NGT after multiple attempts.     #Dysphagia  #CVA  #Hypertensive emergency  Patient presented with AMS from baseline, at time of exam, she is groaning however non-participatory, unable to follow commands. [facility] was requested to place PEG. Palliative following.    	- Tentative PEG placement tomorrow, 8/8, with Dr. Nunes  	- AM labs: CBC, CMP, PT/INR  	- GI Dr. Berry to remain primary for all other GI issues  	- Please hold Lovenox x 24 hrs of procedure if safe per primary team, last dose received 8/6 @ 4PM  	- Medical optimization per primary team    This note and its recommendations herein are preliminary until such time as cosigned by an attending.    [facility] will continue to follow.  Thank you for this consult! 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 GEOVANNI Berry was consulted on 8/4 for dysphagia. [facility] Dr. Nunes was consulted today for PEG placement.    Patient presented from [facility] for AMS from baseline, eyes fixed in one direction, unable to swallow food. At baseline, she is able to provide one word answers and swallows food.   Hospital course included: Repeat imaging showing chronic infarcts, no new infarct, complicated by hypertensive emergency 204/98, requiring IV hydralazine, and unable to place NGT after multiple attempts.     #Dysphagia  #CVA  #Hypertensive emergency  Patient presented with AMS from baseline, at time of exam, she is groaning however non-participatory, unable to follow commands. [facility] was requested to place PEG. Palliative following.    	- Tentative PEG placement tomorrow, 8/8, with Dr. Nunes  	- AM labs: CBC, CMP, PT/INR  	- GI Dr. Berry to remain primary for all other GI issues  	- Please hold Lovenox x 24 hrs of procedure if safe per primary team, last dose received 8/6 @ 4PM  	- Medical optimization per primary team    This note and its recommendations herein are preliminary until such time as cosigned by an attending.    [facility] will continue to follow.  Thank you for this consult!

## 2023-08-07 NOTE — CONSULT NOTE ADULT - ASSESSMENT
Recommend CT with /without contrast with thin cuts through right petrous and posterior fossa to obtain an image of the right auditory system. Also has left PD - will make recommendations Recommend CT with /without contrast with thin cuts through right petrous and posterior fossa to obtain an image of the right auditory system. Also has left PD - will make recommendations after  CT. Likely vascular soft tissue / AVM is the source of bleeding.

## 2023-08-07 NOTE — PROGRESS NOTE ADULT - ASSESSMENT
77 female from [facility] PMHx HTN, HLD, CVA (w/ residual aphasia and R sided hemiparesis/plegia) who was sent to the hospital due to altered mental status,UTI.  1.No new CVA on MRI.  2.UTI-s/p ABX.  3.HTN- clonidine patch .2mg q wk,IV hydralazine.  5.Lipid d/o-hold statin.  6.Plan for PEG placement.  7.Hypernatremia and SUSAN-IVF D5 with kcl.  8.GI and DVT prophylaxis. 77 female from [facility] PMHx HTN, HLD, CVA (w/ residual aphasia and R sided hemiparesis/plegia) who was sent to the hospital due to altered mental status,UTI.  1.No new CVA on MRI.  2.UTI-s/p ABX.  3.HTN- clonidine patch .2mg q wk,IV hydralazine.  5.Lipid d/o-hold statin.  6.Plan for PEG placement.  7.Hypernatremia and SUSAN-IVF D5 with kcl.  8.GI and DVT prophylaxis. 77 female from [facility] PMHx HTN, HLD, CVA (w/ residual aphasia and R sided hemiparesis/plegia) who was sent to the hospital due to altered mental status,UTI.  1.No new CVA on MRI.  2.UTI-s/p ABX.  3.HTN- clonidine patch .2mg q wk,IV hydralazine.  5.Lipid d/o-hold statin.  6.Plan for PEG placement.  7.Hypernatremia and SUSAN-IVF D5 with kcl.  8.GI and DVT prophylaxis.

## 2023-08-07 NOTE — PROGRESS NOTE ADULT - PROBLEM SELECTOR PLAN 4
P/w altered mental status   c/w cinamet, sertraline, baclofen. Hold as patient is NPO. P/w altered mental status   pt. was nonverbal   - pt. responded yes to pain when touching right ear. It has been the first word heard by anyone since admission.

## 2023-08-07 NOTE — PROGRESS NOTE ADULT - PROBLEM SELECTOR PLAN 7
F/U Lipid profile.   Start atorvastatin when passes SnS. pt. has no urinated since this morning.   bladder scan showed over 400   ordered straight cath  follow up since 6 hours after

## 2023-08-07 NOTE — PROGRESS NOTE ADULT - SUBJECTIVE AND OBJECTIVE BOX
CHIEF COMPLAINT:Patient is a 77y old  Female who presents with a chief complaint of Altered mental status. Pt appears comfortable.    	  REVIEW OF SYSTEMS:    [x ] Unable to obtain    PHYSICAL EXAM:  T(C): 36.3 (08-07-23 @ 04:40), Max: 36.6 (08-06-23 @ 14:42)  HR: 70 (08-07-23 @ 04:40) (70 - 91)  BP: 162/91 (08-07-23 @ 04:40) (162/75 - 184/75)  RR: 18 (08-07-23 @ 04:40) (18 - 18)  SpO2: 99% (08-07-23 @ 04:40) (95% - 100%)  Wt(kg): --  I&O's Summary      Appearance: Normal	  HEENT:   Normal oral mucosa, PERRL, EOMI	  Lymphatic: No lymphadenopathy  Cardiovascular: Normal S1 S2, No JVD, No murmurs, No edema  Respiratory: Lungs clear to auscultation	  Gastrointestinal:  Soft, Non-tender, + BS	  Skin: No rashes, No ecchymoses, No cyanosis	  Extremities: Normal range of motion, No clubbing, cyanosis or edema  Vascular: Peripheral pulses palpable 2+ bilaterally    MEDICATIONS  (STANDING):  cloNIDine Patch 0.2 mG/24Hr(s) 1 patch Transdermal <User Schedule>  dextrose 5% with potassium chloride 20 mEq/L 1000 milliLiter(s) (50 mL/Hr) IV Continuous <Continuous>  hydrALAZINE Injectable 10 milliGRAM(s) IV Push every 6 hours  valproate sodium  IVPB 500 milliGRAM(s) IV Intermittent every 8 hours      	  LABS:	 	                            9.5    12.46 )-----------( 287      ( 07 Aug 2023 06:16 )             30.1     08-07    138  |  110<H>  |  24<H>  ----------------------------<  80  3.6   |  18<L>  |  1.47<H>    Ca    8.9      07 Aug 2023 06:16  Phos  3.7     08-07  Mg     2.2     08-07    TPro  7.3  /  Alb  2.5<L>  /  TBili  0.3  /  DBili  x   /  AST  17  /  ALT  13  /  AlkPhos  87  08-07      Lipid Profile: Cholesterol 152  LDL --  HDL 51  TG 93  Ldl calc 82    TSH: Thyroid Stimulating Hormone, Serum: 3.59 uU/mL (07-28 @ 05:03)

## 2023-08-07 NOTE — PROGRESS NOTE ADULT - ASSESSMENT
1. SUSAN possible to MARISA and ATN  Renal sono shows no hdyro with b/l kidneys around 9.2cm  -Scr improving to 1.47mg/dL and continue IVFs. with renal recovery; at baseline scr.  -urinalysis shows blood with proteinuria; FeNa >1%, spot protein to creatinine ratio is 1.5gm  -Adjust meds to eGFR and avoid IV Gadolinium contrast,NSAIDs, and phosphate enema.  -Monitor I/O's daily.   -Monitor SMA daily.  2. Hypernatremia due to water deficit and insensible losses. Pt is clinically euvolemic.   -Na stable 135; primary team changed to D5W with 20meq kcl at 50cc/hr since blood glucose is around 80s  -Monitor I/O's. Check Serum Na Daily. Avoid high solute intake diet and sodium bicarbonate infuse. Avoid overcorrection of NA (8-10meq/day)  3. 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  -previous Scr around 1.2 to 1.6mg/dL in Oct 2022.  -Keep patient euvolemic and renal diet  -Avoid Nephrotoxic Meds/ Agents such as (NSAIDs, IV contrast, Aminoglycosides such as gentamicin, -Gadolinium contrast, Phosphate containing enemas, etc..)  -Adjust Medications according to eGFR  4. HTN:   -bp is acceptable. continue bp meds  -titrate bp meds to keep sbp >110 and < 130  5. Mineral Bone Disease:  -improving phos so no need binders.  -PTH intact 289, will start calcitriol once phos improves.  6. Encephalopathy:  -on Rocephin  -Plan as per Neuro and primary team  -awaiting PEG and Plan as GI.  -NGT attempted but unsuccessful; CT AP ordered and shows no obstruction.  7. Hypokalemia:  -stable K. Kcl with fluids.  -give kcl repletion to keep K >3.5meq/L  -monitor K.     Discussed the assessment and plan with Primary Team/Nurse

## 2023-08-07 NOTE — CONSULT NOTE ADULT - SUBJECTIVE AND OBJECTIVE BOX
Patient is a 77y old  Female who presents with a chief complaint of Altered mental status. 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      Seizures      Chronic constipation      No significant past surgical history          FAMILY HISTORY:        Social Hx:  Nonsmoker, no drug or alcohol use    MEDICATIONS  (STANDING):  cloNIDine Patch 0.2 mG/24Hr(s) 1 patch Transdermal <User Schedule>  dextrose 5% with potassium chloride 20 mEq/L 1000 milliLiter(s) (50 mL/Hr) IV Continuous <Continuous>  hydrALAZINE Injectable 10 milliGRAM(s) IV Push every 6 hours  valproate sodium  IVPB 500 milliGRAM(s) IV Intermittent every 8 hours       Allergies    &quot; NATURAL RUBBER&quot; (Other)  latex (Other)  penicillins (Unknown)    Intolerances        ROS: Pertinent positives in HPI, all other ROS were reviewed and are negative.      Vital Signs Last 24 Hrs  T(C): 36.4 (07 Aug 2023 21:08), Max: 36.6 (07 Aug 2023 12:01)  T(F): 97.5 (07 Aug 2023 21:08), Max: 97.8 (07 Aug 2023 12:01)  HR: 105 (07 Aug 2023 21:08) (70 - 105)  BP: 153/77 (07 Aug 2023 21:08) (153/77 - 196/78)  BP(mean): --  RR: 16 (07 Aug 2023 21:08) (16 - 18)  SpO2: 98% (07 Aug 2023 21:08) (97% - 99%)    Parameters below as of 07 Aug 2023 21:08  Patient On (Oxygen Delivery Method): room air            Constitutional: awake and alert.  HEENT: PERRLA, EOMI,   Neck: Supple.  Respiratory: Breath sounds are clear bilaterally  Cardiovascular: S1 and S2, regular / irregular rhythm  Gastrointestinal: soft, nontender  Extremities:  no edema  Vascular: Caritid Bruit - no  Musculoskeletal: no joint swelling/tenderness, no abnormal movements  Skin: No rashes    Neurological exam:  HF: A x O x 3. Appropriately interactive, normal affect. Speech fluent, No Aphasia or paraphasic errors. Naming /repetition intact   CN: KEILA, EOMI, VFF, facial sensation normal, no NLFD, tongue midline, Palate moves equally, SCM equal bilaterally  Motor: No pronator drift, Strength 5/5 in all 4 ext, normal bulk and tone, no tremor, rigidity or bradykinesia.    Sens: Intact to light touch / PP/ VS/ JS    Reflexes: Symmetric and normal . BJ 2+, BR 2+, KJ 2+, AJ 2+, downgoing toes b/l  Coord:  No FNFA, dysmetria, ANGELICA intact   Gait/Balance: Normal/Cannot test    NIHSS:          Labs:                        9.5    12.46 )-----------( 287      ( 07 Aug 2023 06:16 )             30.1     08-07    138  |  110<H>  |  24<H>  ----------------------------<  80  3.6   |  18<L>  |  1.47<H>    Ca    8.9      07 Aug 2023 06:16  Phos  3.7     08-07  Mg     2.2     08-07    TPro  7.3  /  Alb  2.5<L>  /  TBili  0.3  /  DBili  x   /  AST  17  /  ALT  13  /  AlkPhos  87  08-07      07-28 Chol 152 LDL -- HDL 51 Trig 93  PT/INR - ( 07 Aug 2023 06:16 )   PT: 11.9 sec;   INR: 1.05 ratio         PTT - ( 07 Aug 2023 06:16 )  PTT:34.6 sec    Radiology report:  - CT head:  - MRI brain  - MRA head/carotids  - EEG    A/P:    - No IV tpa given because…  - ASA/ PLavix  - Statin  - Dysphagia screen  - DVT prophylaxia  - MRI/A brain-carotids  - PT eval  - Speech/swallow eval    Total Critical Care Time spent:   Patient is a 77y old  Female who presents with a chief complaint of Altered mental status. (07 Aug 2023 14:03)      HPI:  Brought to the ER for altered mental status from a skilled nursing facility where she is being cared for because of a left MCA infarct causing aphasia and right hemiplegia. Called for neurology consult because of slow bleeding from right ear. She had been given lovenox and rectal aspirin suppository in addition to anticoagulation from before. But all these were discontinued when the bleeding was noted.    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      Seizures      Chronic constipation      No significant past surgical history          FAMILY HISTORY:        Social Hx:  Nonsmoker, no drug or alcohol use    MEDICATIONS  (STANDING):  cloNIDine Patch 0.2 mG/24Hr(s) 1 patch Transdermal <User Schedule>  dextrose 5% with potassium chloride 20 mEq/L 1000 milliLiter(s) (50 mL/Hr) IV Continuous <Continuous>  hydrALAZINE Injectable 10 milliGRAM(s) IV Push every 6 hours  valproate sodium  IVPB 500 milliGRAM(s) IV Intermittent every 8 hours       Allergies    &quot; NATURAL RUBBER&quot; (Other)  latex (Other)  penicillins (Unknown)    Intolerances        ROS: Pertinent positives in HPI, all other ROS were reviewed and are negative.      Vital Signs Last 24 Hrs  T(C): 36.4 (07 Aug 2023 21:08), Max: 36.6 (07 Aug 2023 12:01)  T(F): 97.5 (07 Aug 2023 21:08), Max: 97.8 (07 Aug 2023 12:01)  HR: 105 (07 Aug 2023 21:08) (70 - 105)  BP: 153/77 (07 Aug 2023 21:08) (153/77 - 196/78)  BP(mean): --  RR: 16 (07 Aug 2023 21:08) (16 - 18)  SpO2: 98% (07 Aug 2023 21:08) (97% - 99%)    Parameters below as of 07 Aug 2023 21:08  Patient On (Oxygen Delivery Method): room air            Constitutional: awake and alert.  HEENT: PERRLA, EOMI, Ear right - dark colored clotted blood  Neck: Supple.  Respiratory: Breath sounds are clear bilaterally  Cardiovascular: S1 and S2, / irregular rhythm  Gastrointestinal: soft, nontender  Extremities:  no edema  Vascular: Caritid Bruit - no  Musculoskeletal: no joint swelling/tenderness, no abnormal movements  Skin: No rashes    Neurological exam:  HF:Aphasia; follows a few commands. Spastic right hemiplegia; left resting tremor facial bradykinesia  CN: KEILA, EOMI, VF right hemianopsia, facial sensation normal, R NLFD, tongue midline, Palate moves equally, SCM equal bilaterally  Motor: Right spastic hemiplegia with left tremor, rigidity an bradykinesia.    Sens: Intact to light touch / PP/ VS/ JS    Reflexes: Asymmetric and brisker on the right Babinski b/l  Coord:  cannot test  Gait/Balance: Cannot test    NIHSS:          Labs:                        9.5    12.46 )-----------( 287      ( 07 Aug 2023 06:16 )             30.1     08-07    138  |  110<H>  |  24<H>  ----------------------------<  80  3.6   |  18<L>  |  1.47<H>    Ca    8.9      07 Aug 2023 06:16  Phos  3.7     08-07  Mg     2.2     08-07    TPro  7.3  /  Alb  2.5<L>  /  TBili  0.3  /  DBili  x   /  AST  17  /  ALT  13  /  AlkPhos  87  08-07      07-28 Chol 152 LDL -- HDL 51 Trig 93  PT/INR - ( 07 Aug 2023 06:16 )   PT: 11.9 sec;   INR: 1.05 ratio         PTT - ( 07 Aug 2023 06:16 )  PTT:34.6 sec    Radiology report:  - CT head:  < from: CT Head No Cont (08.06.23 @ 22:19) >  ACC: [id number] EXAM:  CT MAXILLOFACIAL   ORDERED BY: DONNA PERERA     ACC: [id number] EXAM:  CT BRAIN   ORDERED BY: DONNA PERERA     PROCEDURE DATE:  08/06/2023          INTERPRETATION:  CLINICAL HISTORY: Right ear bright red with blood   drainage. History of CVA.    TECHNIQUE: CT of the maxillofacial region and head without contrast dated   8/6/2023  Head CT consists of transaxial images acquired from the skull base to   vertex without contrast. Coronal and sagittal reformatted images are   provided.  Maxillofacial CT consists of thin section transaxial images through the   facial region with coronal and sagittal reformatted images provided from   the transaxial source data.    COMPARISON: CT head 7/27/2023 and brain MRI 7/28/2023    FINDINGS:    Head CT:  There is no acute intracranial hemorrhage, vasogenic edema or evidence   for acute large vascular territory infarct. Sequela of chronic left MCA   territory infarct with encephalomalacia and volume loss in the left   cerebral hemisphere, basal ganglia and thalamus similar to prior exam.   Patchy areas of low-attenuation in the bihemispheric white matter,   nonspecific, but likely the sequela of chronic microvascular changes.    The ventricles, sulci and cisternal spaces are stable in size and   configuration demonstrating ex vacuo dilatation of the body of the left   lateral ventricle and cerebral volume loss. There is no midline shift or   abnormal extra-axial fluid collection.    The calvarium is intact. There are no suspicious osteoblastic or lytic   calvarial lesions. The visualized mastoid air cells are clear.    Maxillofacial CT:  There are no acute facial fractures or dislocations. The orbital rims,   walls, floors, lamina papyracea and zygomatic arches are intact. The   temporomandibular joints are located.    The globes are of normal contour and the lenses are located. There is no   preseptal periorbital soft tissue swelling. There is no retrobulbar   hematoma. The optic nerve sheath complexes and extraocular muscles are   symmetric and normal appearing bilaterally.    There are no air-fluid levels present within the paranasal sinuses. There   is mild mucosal thickening within both maxillary sinuses. The remaining   paranasal sinuses appear clear.    The visualized soft tissues of the neck have a unremarkable noncontrast   appearance.    IMPRESSION:  Head CT: No acute intracranial pathology. Stable chronic findings.    Maxillofacial CT: No acute facial fracture or dislocation.    --- End of Report ---            CADY SHARMA MD; Attending Radiologist  This document has been electronically signed. Aug  7 2023  3:25AM    < end of copied text >   Patient is a 77y old  Female who presents with a chief complaint of Altered mental status. (07 Aug 2023 14:03)      HPI:  Brought to the ER for altered mental status from a skilled nursing facility where she is being cared for because of a left MCA infarct causing aphasia and right hemiplegia. Called for neurology consult because of slow bleeding from right ear. She had been given lovenox and rectal aspirin suppository in addition to anticoagulation from before. But all these were discontinued when the bleeding was noted.    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      Seizures      Chronic constipation      No significant past surgical history          FAMILY HISTORY:        Social Hx:  Nonsmoker, no drug or alcohol use    MEDICATIONS  (STANDING):  cloNIDine Patch 0.2 mG/24Hr(s) 1 patch Transdermal <User Schedule>  dextrose 5% with potassium chloride 20 mEq/L 1000 milliLiter(s) (50 mL/Hr) IV Continuous <Continuous>  hydrALAZINE Injectable 10 milliGRAM(s) IV Push every 6 hours  valproate sodium  IVPB 500 milliGRAM(s) IV Intermittent every 8 hours       Allergies    &quot; NATURAL RUBBER&quot; (Other)  latex (Other)  penicillins (Unknown)    Intolerances        ROS: Pertinent positives in HPI, all other ROS were reviewed and are negative.      Vital Signs Last 24 Hrs  T(C): 36.4 (07 Aug 2023 21:08), Max: 36.6 (07 Aug 2023 12:01)  T(F): 97.5 (07 Aug 2023 21:08), Max: 97.8 (07 Aug 2023 12:01)  HR: 105 (07 Aug 2023 21:08) (70 - 105)  BP: 153/77 (07 Aug 2023 21:08) (153/77 - 196/78)  BP(mean): --  RR: 16 (07 Aug 2023 21:08) (16 - 18)  SpO2: 98% (07 Aug 2023 21:08) (97% - 99%)    Parameters below as of 07 Aug 2023 21:08  Patient On (Oxygen Delivery Method): room air            Constitutional: awake and alert.  HEENT: PERRLA, EOMI, Ear right - dark colored clotted blood  Neck: Supple.  Respiratory: Breath sounds are clear bilaterally  Cardiovascular: S1 and S2, / irregular rhythm  Gastrointestinal: soft, nontender  Extremities:  no edema  Vascular: Caritid Bruit - no  Musculoskeletal: no joint swelling/tenderness, no abnormal movements  Skin: No rashes    Neurological exam:  HF:Aphasia; follows a few commands. Spastic right hemiplegia; left resting tremor facial bradykinesia  CN: KEILA, EOMI, VF right hemianopsia, facial sensation normal, R NLFD, tongue midline, Palate moves equally, SCM equal bilaterally  Motor: Right spastic hemiplegia with left tremor, rigidity an bradykinesia.    Sens: Intact to light touch / PP/ VS/ JS    Reflexes: Asymmetric and brisker on the right Babinski b/l  Coord:  cannot test  Gait/Balance: Cannot test    NIHSS:          Labs:                        9.5    12.46 )-----------( 287      ( 07 Aug 2023 06:16 )             30.1     08-07    138  |  110<H>  |  24<H>  ----------------------------<  80  3.6   |  18<L>  |  1.47<H>    Ca    8.9      07 Aug 2023 06:16  Phos  3.7     08-07  Mg     2.2     08-07    TPro  7.3  /  Alb  2.5<L>  /  TBili  0.3  /  DBili  x   /  AST  17  /  ALT  13  /  AlkPhos  87  08-07      07-28 Chol 152 LDL -- HDL 51 Trig 93  PT/INR - ( 07 Aug 2023 06:16 )   PT: 11.9 sec;   INR: 1.05 ratio         PTT - ( 07 Aug 2023 06:16 )  PTT:34.6 sec    Radiology report:  - CT head:  < from: CT Head No Cont (08.06.23 @ 22:19) >  ACC: [id number] EXAM:  CT MAXILLOFACIAL   ORDERED BY: DONNA PERERA     ACC: [id number] EXAM:  CT BRAIN   ORDERED BY: DONNA PERERA     PROCEDURE DATE:  08/06/2023          INTERPRETATION:  CLINICAL HISTORY: Right ear bright red with blood   drainage. History of CVA.    TECHNIQUE: CT of the maxillofacial region and head without contrast dated   8/6/2023  Head CT consists of transaxial images acquired from the skull base to   vertex without contrast. Coronal and sagittal reformatted images are   provided.  Maxillofacial CT consists of thin section transaxial images through the   facial region with coronal and sagittal reformatted images provided from   the transaxial source data.    COMPARISON: CT head 7/27/2023 and brain MRI 7/28/2023    FINDINGS:    Head CT:  There is no acute intracranial hemorrhage, vasogenic edema or evidence   for acute large vascular territory infarct. Sequela of chronic left MCA   territory infarct with encephalomalacia and volume loss in the left   cerebral hemisphere, basal ganglia and thalamus similar to prior exam.   Patchy areas of low-attenuation in the bihemispheric white matter,   nonspecific, but likely the sequela of chronic microvascular changes.    The ventricles, sulci and cisternal spaces are stable in size and   configuration demonstrating ex vacuo dilatation of the body of the left   lateral ventricle and cerebral volume loss. There is no midline shift or   abnormal extra-axial fluid collection.    The calvarium is intact. There are no suspicious osteoblastic or lytic   calvarial lesions. The visualized mastoid air cells are clear.    Maxillofacial CT:  There are no acute facial fractures or dislocations. The orbital rims,   walls, floors, lamina papyracea and zygomatic arches are intact. The   temporomandibular joints are located.    The globes are of normal contour and the lenses are located. There is no   preseptal periorbital soft tissue swelling. There is no retrobulbar   hematoma. The optic nerve sheath complexes and extraocular muscles are   symmetric and normal appearing bilaterally.    There are no air-fluid levels present within the paranasal sinuses. There   is mild mucosal thickening within both maxillary sinuses. The remaining   paranasal sinuses appear clear.    The visualized soft tissues of the neck have a unremarkable noncontrast   appearance.    IMPRESSION:  Head CT: No acute intracranial pathology. Stable chronic findings.    Maxillofacial CT: No acute facial fracture or dislocation.    --- End of Report ---            CADY SHARMA MD; Attending Radiologist  This document has been electronically signed. Aug  7 2023  3:25AM    < end of copied text >   Patient is a 77y old  Female who presents with a chief complaint of Altered mental status. (07 Aug 2023 14:03)      HPI:  Brought to the ER for altered mental status from a skilled nursing facility where she is being cared for because of a left MCA infarct causing aphasia and right hemiplegia. Called for neurology consult because of slow bleeding from right ear. She had been given lovenox and rectal aspirin suppository in addition to anticoagulation from before. But all these were discontinued when the bleeding was noted.    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      Seizures      Chronic constipation      No significant past surgical history          FAMILY HISTORY:        Social Hx:  Nonsmoker, no drug or alcohol use    MEDICATIONS  (STANDING):  cloNIDine Patch 0.2 mG/24Hr(s) 1 patch Transdermal <User Schedule>  dextrose 5% with potassium chloride 20 mEq/L 1000 milliLiter(s) (50 mL/Hr) IV Continuous <Continuous>  hydrALAZINE Injectable 10 milliGRAM(s) IV Push every 6 hours  valproate sodium  IVPB 500 milliGRAM(s) IV Intermittent every 8 hours       Allergies    &quot; NATURAL RUBBER&quot; (Other)  latex (Other)  penicillins (Unknown)    Intolerances        ROS: Pertinent positives in HPI, all other ROS were reviewed and are negative.      Vital Signs Last 24 Hrs  T(C): 36.4 (07 Aug 2023 21:08), Max: 36.6 (07 Aug 2023 12:01)  T(F): 97.5 (07 Aug 2023 21:08), Max: 97.8 (07 Aug 2023 12:01)  HR: 105 (07 Aug 2023 21:08) (70 - 105)  BP: 153/77 (07 Aug 2023 21:08) (153/77 - 196/78)  BP(mean): --  RR: 16 (07 Aug 2023 21:08) (16 - 18)  SpO2: 98% (07 Aug 2023 21:08) (97% - 99%)    Parameters below as of 07 Aug 2023 21:08  Patient On (Oxygen Delivery Method): room air            Constitutional: awake and alert.  HEENT: PERRLA, EOMI, Ear right - dark colored clotted blood  Neck: Supple.  Respiratory: Breath sounds are clear bilaterally  Cardiovascular: S1 and S2, / irregular rhythm  Gastrointestinal: soft, nontender  Extremities:  no edema  Vascular: Caritid Bruit - no  Musculoskeletal: no joint swelling/tenderness, no abnormal movements  Skin: No rashes    Neurological exam:  HF:Aphasia; follows a few commands. Spastic right hemiplegia; left resting tremor facial bradykinesia  CN: KEILA, EOMI, VF right hemianopsia, facial sensation normal, R NLFD, tongue midline, Palate moves equally, SCM equal bilaterally  Motor: Right spastic hemiplegia with left tremor, rigidity an bradykinesia.    Sens: Intact to light touch / PP/ VS/ JS    Reflexes: Asymmetric and brisker on the right Babinski b/l  Coord:  cannot test  Gait/Balance: Cannot test    NIHSS:          Labs:                        9.5    12.46 )-----------( 287      ( 07 Aug 2023 06:16 )             30.1     08-07    138  |  110<H>  |  24<H>  ----------------------------<  80  3.6   |  18<L>  |  1.47<H>    Ca    8.9      07 Aug 2023 06:16  Phos  3.7     08-07  Mg     2.2     08-07    TPro  7.3  /  Alb  2.5<L>  /  TBili  0.3  /  DBili  x   /  AST  17  /  ALT  13  /  AlkPhos  87  08-07      07-28 Chol 152 LDL -- HDL 51 Trig 93  PT/INR - ( 07 Aug 2023 06:16 )   PT: 11.9 sec;   INR: 1.05 ratio         PTT - ( 07 Aug 2023 06:16 )  PTT:34.6 sec    Radiology report:  - CT head:  < from: CT Head No Cont (08.06.23 @ 22:19) >  ACC: [id number] EXAM:  CT MAXILLOFACIAL   ORDERED BY: DONNA PERERA     ACC: [id number] EXAM:  CT BRAIN   ORDERED BY: DONNA PERERA     PROCEDURE DATE:  08/06/2023          INTERPRETATION:  CLINICAL HISTORY: Right ear bright red with blood   drainage. History of CVA.    TECHNIQUE: CT of the maxillofacial region and head without contrast dated   8/6/2023  Head CT consists of transaxial images acquired from the skull base to   vertex without contrast. Coronal and sagittal reformatted images are   provided.  Maxillofacial CT consists of thin section transaxial images through the   facial region with coronal and sagittal reformatted images provided from   the transaxial source data.    COMPARISON: CT head 7/27/2023 and brain MRI 7/28/2023    FINDINGS:    Head CT:  There is no acute intracranial hemorrhage, vasogenic edema or evidence   for acute large vascular territory infarct. Sequela of chronic left MCA   territory infarct with encephalomalacia and volume loss in the left   cerebral hemisphere, basal ganglia and thalamus similar to prior exam.   Patchy areas of low-attenuation in the bihemispheric white matter,   nonspecific, but likely the sequela of chronic microvascular changes.    The ventricles, sulci and cisternal spaces are stable in size and   configuration demonstrating ex vacuo dilatation of the body of the left   lateral ventricle and cerebral volume loss. There is no midline shift or   abnormal extra-axial fluid collection.    The calvarium is intact. There are no suspicious osteoblastic or lytic   calvarial lesions. The visualized mastoid air cells are clear.    Maxillofacial CT:  There are no acute facial fractures or dislocations. The orbital rims,   walls, floors, lamina papyracea and zygomatic arches are intact. The   temporomandibular joints are located.    The globes are of normal contour and the lenses are located. There is no   preseptal periorbital soft tissue swelling. There is no retrobulbar   hematoma. The optic nerve sheath complexes and extraocular muscles are   symmetric and normal appearing bilaterally.    There are no air-fluid levels present within the paranasal sinuses. There   is mild mucosal thickening within both maxillary sinuses. The remaining   paranasal sinuses appear clear.    The visualized soft tissues of the neck have a unremarkable noncontrast   appearance.    IMPRESSION:  Head CT: No acute intracranial pathology. Stable chronic findings.    Maxillofacial CT: No acute facial fracture or dislocation.    --- End of Report ---            CADY SHARMA MD; Attending Radiologist  This document has been electronically signed. Aug  7 2023  3:25AM    < end of copied text >

## 2023-08-07 NOTE — PROGRESS NOTE ADULT - PROBLEM SELECTOR PLAN 8
H/O seizures.   C/w Depacon.  Neurology consulted - Dr. Whitney-  > ordered EEG-potential epileptogenic factors in the left posterior temporal lobe and focal cerebral dysfunction in L  hemisphere F/U Lipid profile.   Start atorvastatin when passes SnS.

## 2023-08-07 NOTE — PROGRESS NOTE ADULT - PROBLEM SELECTOR PLAN 1
Pt. has been NPO for a few days on fluids.   Consulted with GI  will place Peg tube  on 8/7   Tried placing NG tube with fail and surgery tried and failed as well on 8/4  Ordered a ct abdomen and pelvis -> no sign of hiatal hernia.  pt. has oral thrush but NPO Pt. has been NPO for a few days on fluids.   Consulted with GI  will place Peg tube  on 8/8  Tried placing NG tube with fail and surgery tried and failed as well on 8/4  Ordered a ct abdomen and pelvis -> no sign of hiatal hernia.  pt. has oral thrush but NPO

## 2023-08-07 NOTE — CONSULT NOTE ADULT - SUBJECTIVE AND OBJECTIVE BOX
PROBLEM DIAGNOSES  Problem: Other ovarian cyst  Assessment and Plan:     Problem: Need for prophylactic measure  Assessment and Plan:     Problem: Screening for substance abuse  Assessment and Plan: [facility] ADVANCED GI CONSULTATION    Patient is a 77y old  Female who presents with a chief complaint of Altered mental status. (07 Aug 2023 08:31)    HPI:  Patient is a 77 female from [facility] PMHx HTN, HLD, CVA (w/ residual aphasia and R sided hemiparesis/plegia) who was sent to the hospital due to altered mental status. Patient is not able to present any history. Patient is lying down in bed, awake and alert. However, patient does not speak, is not able to follow commands and does not turn face or move eyes when her name is called. As per nursing home papers  patient was noted to have change in mental status with eye fixed in one direction and unable to swallow food. Patient's sister Marika San ([phone]) was called to inquire about baseline mental status. Patient can respond with one word answers, swallows food, however does not move arms/legs on baseline. Ed note also reports patient is exhibiting b/l upper arm weakness but resident is unable to confirm that during evaluation. Unable to obtain any ROS from the patient at this time.     Of note: Patient's sister noted that patient sister was recently started on medication for muscle spasms at nursing home.  (27 Jul 2023 19:19)        PMH/PSH:  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      Seizures      Chronic constipation      No significant past surgical history            FH:  FAMILY HISTORY:        MEDS:  MEDICATIONS  (STANDING):  cloNIDine Patch 0.2 mG/24Hr(s) 1 patch Transdermal <User Schedule>  dextrose 5% with potassium chloride 20 mEq/L 1000 milliLiter(s) (50 mL/Hr) IV Continuous <Continuous>  hydrALAZINE Injectable 10 milliGRAM(s) IV Push every 6 hours  valproate sodium  IVPB 500 milliGRAM(s) IV Intermittent every 8 hours    MEDICATIONS  (PRN):    Allergies    &quot; NATURAL RUBBER&quot; (Other)  latex (Other)  penicillins (Unknown)    Intolerances          ROS: A detailed set of ROS were asked and negative except those outlined in GI HPI.  ______________________________________________________________________  PHYSICAL EXAM:  T(C): 36.3 (08-07-23 @ 04:40), Max: 36.6 (08-06-23 @ 14:42)  HR: 70 (08-07-23 @ 04:40)  BP: 162/91 (08-07-23 @ 04:40)  RR: 18 (08-07-23 @ 04:40)  SpO2: 99% (08-07-23 @ 04:40)  Wt(kg): --      GEN: NAD, lethargic  HEENT: EOMI, conjunctivae anicteric, neck supple, dry mucous membranes  PULM: LSCTAB, no wheezing, rales, or rhonchi  CV: RRR, no m/r/b  GI: taut near epigastrium, ND; +BS in all four quadrants  MSK: Unable to assess strength or movement  NEURO: A&O x 0  ______________________________________________________________________  LABS:                        9.5    12.46 )-----------( 287      ( 07 Aug 2023 06:16 )             30.1     08-07    138  |  110<H>  |  24<H>  ----------------------------<  80  3.6   |  18<L>  |  1.47<H>    Ca    8.9      07 Aug 2023 06:16  Phos  3.7     08-07  Mg     2.2     08-07    TPro  7.3  /  Alb  2.5<L>  /  TBili  0.3  /  DBili  x   /  AST  17  /  ALT  13  /  AlkPhos  87  08-07    LIVER FUNCTIONS - ( 07 Aug 2023 06:16 )  Alb: 2.5 g/dL / Pro: 7.3 g/dL / ALK PHOS: 87 U/L / ALT: 13 U/L DA / AST: 17 U/L / GGT: x           PT/INR - ( 07 Aug 2023 06:16 )   PT: 11.9 sec;   INR: 1.05 ratio         PTT - ( 07 Aug 2023 06:16 )  PTT:34.6 sec  ____________________________________________    IMAGING:      < from: CT Abdomen and Pelvis No Cont (08.04.23 @ 20:43) >  CT ABDOMEN AND PELVIS   ORDERED BY: HAZEL LEÓN     PROCEDURE DATE:  08/04/2023          INTERPRETATION:  CLINICAL INFORMATION: Difficulty placing nasogastric   tube.  Rule out hiatal hernia.    COMPARISON: None.    CONTRAST/COMPLICATIONS:  IV Contrast: NONE  Oral Contrast: NONE  Complications: None reported at time of study completion    PROCEDURE:  CT of the Abdomen and Pelvis was performed.  Sagittal and coronal reformats were performed.    FINDINGS:  LOWER CHEST: The left ventricle appears asymmetrically enlarged.    Moderate atherosclerotic calcification of the left anterior descending   coronary artery.    LIVER: Within normal limits.  BILE DUCTS: Normal caliber.  GALLBLADDER: Vicarious excretion of IV contrast material in the   gallbladder lumen.  SPLEEN: Within normal limits.  PANCREAS: Within normal limits.  ADRENALS: Within normal limits.  KIDNEYS/URETERS: Cortical medullary differentiation is visualized in both   kidneys, suspicious for IV contrast retention.    BLADDER: Within normal limits.  REPRODUCTIVE ORGANS: Hysterectomy.  The ovaries are not visualized.    BOWEL: No bowel obstruction. Appendix is normal.  Oral contrast in the   colon.  PERITONEUM: No ascites.  VESSELS: Mild atheroscleroticcalcification of the aortoiliac tree.  No   abdominal aortic aneurysm.  Infrarenal IVC filter in place.  RETROPERITONEUM/LYMPH NODES: No lymphadenopathy.  ABDOMINAL WALL: Trace subcutaneous edema in the flanks and proximal   thighs. Small fat-containing umbilical hernia.  Vertical midline incision   in the lower ventral abdominal wall.  BONES: Mild degenerative changes in the spine and hips.    IMPRESSION:  No hiatal hernia.    No evidence of bowel obstruction, active inflammatory process or   intra-abdominal source for infection, within limits of noncontrast CT   technique.    There is vicarious excretion of contrast in the gallbladder.  There is   suspicion for contrast retention in both kidneys.  The possibility of   contrast-induced nephropathy and/or acute kidney injury should be   excluded..    < end of copied text >   [facility] ADVANCED GI CONSULTATION    Patient is a 77y old  Female who presents with a chief complaint of Altered mental status. (07 Aug 2023 08:31)    HPI:  Patient is a 77 female from [facility] PMHx HTN, HLD, CVA (w/ residual aphasia and R sided hemiparesis/plegia) who was sent to the hospital due to altered mental status. Patient is not able to present any history. Patient is lying down in bed, awake and alert. However, patient does not speak, is not able to follow commands and does not turn face or move eyes when her name is called. As per nursing home papers  patient was noted to have change in mental status with eye fixed in one direction and unable to swallow food. Patient's sister Marika San ([phone]) was called to inquire about baseline mental status. Patient can respond with one word answers, swallows food, however does not move arms/legs on baseline. Ed note also reports patient is exhibiting b/l upper arm weakness but resident is unable to confirm that during evaluation. Unable to obtain any ROS from the patient at this time.     Of note: Patient's sister noted that patient sister was recently started on medication for muscle spasms at nursing home.  (27 Jul 2023 19:19)        PMH/PSH:  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      Seizures      Chronic constipation      No significant past surgical history            FH:  FAMILY HISTORY:        MEDS:  MEDICATIONS  (STANDING):  cloNIDine Patch 0.2 mG/24Hr(s) 1 patch Transdermal <User Schedule>  dextrose 5% with potassium chloride 20 mEq/L 1000 milliLiter(s) (50 mL/Hr) IV Continuous <Continuous>  hydrALAZINE Injectable 10 milliGRAM(s) IV Push every 6 hours  valproate sodium  IVPB 500 milliGRAM(s) IV Intermittent every 8 hours    MEDICATIONS  (PRN):    Allergies    &quot; NATURAL RUBBER&quot; (Other)  latex (Other)  penicillins (Unknown)    Intolerances          ROS: A detailed set of ROS were asked and negative except those outlined in GI HPI.  ______________________________________________________________________  PHYSICAL EXAM:  T(C): 36.3 (08-07-23 @ 04:40), Max: 36.6 (08-06-23 @ 14:42)  HR: 70 (08-07-23 @ 04:40)  BP: 162/91 (08-07-23 @ 04:40)  RR: 18 (08-07-23 @ 04:40)  SpO2: 99% (08-07-23 @ 04:40)  Wt(kg): --      GEN: NAD, lethargic  HEENT: EOMI, conjunctivae anicteric, neck supple, dry mucous membranes  PULM: LSCTAB, no wheezing, rales, or rhonchi  CV: RRR, no m/r/b  GI: taut near epigastrium, ND; +BS in all four quadrants  MSK: Unable to assess strength or movement  NEURO: A&O x 0  ______________________________________________________________________  LABS:                        9.5    12.46 )-----------( 287      ( 07 Aug 2023 06:16 )             30.1     08-07    138  |  110<H>  |  24<H>  ----------------------------<  80  3.6   |  18<L>  |  1.47<H>    Ca    8.9      07 Aug 2023 06:16  Phos  3.7     08-07  Mg     2.2     08-07    TPro  7.3  /  Alb  2.5<L>  /  TBili  0.3  /  DBili  x   /  AST  17  /  ALT  13  /  AlkPhos  87  08-07    LIVER FUNCTIONS - ( 07 Aug 2023 06:16 )  Alb: 2.5 g/dL / Pro: 7.3 g/dL / ALK PHOS: 87 U/L / ALT: 13 U/L DA / AST: 17 U/L / GGT: x           PT/INR - ( 07 Aug 2023 06:16 )   PT: 11.9 sec;   INR: 1.05 ratio         PTT - ( 07 Aug 2023 06:16 )  PTT:34.6 sec  ____________________________________________    IMAGING:      < from: CT Abdomen and Pelvis No Cont (08.04.23 @ 20:43) >  CT ABDOMEN AND PELVIS   ORDERED BY: HAZEL LEÓN     PROCEDURE DATE:  08/04/2023          INTERPRETATION:  CLINICAL INFORMATION: Difficulty placing nasogastric   tube.  Rule out hiatal hernia.    COMPARISON: None.    CONTRAST/COMPLICATIONS:  IV Contrast: NONE  Oral Contrast: NONE  Complications: None reported at time of study completion    PROCEDURE:  CT of the Abdomen and Pelvis was performed.  Sagittal and coronal reformats were performed.    FINDINGS:  LOWER CHEST: The left ventricle appears asymmetrically enlarged.    Moderate atherosclerotic calcification of the left anterior descending   coronary artery.    LIVER: Within normal limits.  BILE DUCTS: Normal caliber.  GALLBLADDER: Vicarious excretion of IV contrast material in the   gallbladder lumen.  SPLEEN: Within normal limits.  PANCREAS: Within normal limits.  ADRENALS: Within normal limits.  KIDNEYS/URETERS: Cortical medullary differentiation is visualized in both   kidneys, suspicious for IV contrast retention.    BLADDER: Within normal limits.  REPRODUCTIVE ORGANS: Hysterectomy.  The ovaries are not visualized.    BOWEL: No bowel obstruction. Appendix is normal.  Oral contrast in the   colon.  PERITONEUM: No ascites.  VESSELS: Mild atheroscleroticcalcification of the aortoiliac tree.  No   abdominal aortic aneurysm.  Infrarenal IVC filter in place.  RETROPERITONEUM/LYMPH NODES: No lymphadenopathy.  ABDOMINAL WALL: Trace subcutaneous edema in the flanks and proximal   thighs. Small fat-containing umbilical hernia.  Vertical midline incision   in the lower ventral abdominal wall.  BONES: Mild degenerative changes in the spine and hips.    IMPRESSION:  No hiatal hernia.    No evidence of bowel obstruction, active inflammatory process or   intra-abdominal source for infection, within limits of noncontrast CT   technique.    There is vicarious excretion of contrast in the gallbladder.  There is   suspicion for contrast retention in both kidneys.  The possibility of   contrast-induced nephropathy and/or acute kidney injury should be   excluded..    < end of copied text >   [facility] ADVANCED GI CONSULTATION    Patient is a 77y old  Female who presents with a chief complaint of Altered mental status. (07 Aug 2023 08:31)    HPI:  Patient is a 77 female from [facility] PMHx HTN, HLD, CVA (w/ residual aphasia and R sided hemiparesis/plegia) who was sent to the hospital due to altered mental status. Patient is not able to present any history. Patient is lying down in bed, awake and alert. However, patient does not speak, is not able to follow commands and does not turn face or move eyes when her name is called. As per nursing home papers  patient was noted to have change in mental status with eye fixed in one direction and unable to swallow food. Patient's sister Marika San ([phone]) was called to inquire about baseline mental status. Patient can respond with one word answers, swallows food, however does not move arms/legs on baseline. Ed note also reports patient is exhibiting b/l upper arm weakness but resident is unable to confirm that during evaluation. Unable to obtain any ROS from the patient at this time.     Of note: Patient's sister noted that patient sister was recently started on medication for muscle spasms at nursing home.  (27 Jul 2023 19:19)        PMH/PSH:  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      Seizures      Chronic constipation      No significant past surgical history            FH:  FAMILY HISTORY:        MEDS:  MEDICATIONS  (STANDING):  cloNIDine Patch 0.2 mG/24Hr(s) 1 patch Transdermal <User Schedule>  dextrose 5% with potassium chloride 20 mEq/L 1000 milliLiter(s) (50 mL/Hr) IV Continuous <Continuous>  hydrALAZINE Injectable 10 milliGRAM(s) IV Push every 6 hours  valproate sodium  IVPB 500 milliGRAM(s) IV Intermittent every 8 hours    MEDICATIONS  (PRN):    Allergies    &quot; NATURAL RUBBER&quot; (Other)  latex (Other)  penicillins (Unknown)    Intolerances          ROS: A detailed set of ROS were asked and negative except those outlined in GI HPI.  ______________________________________________________________________  PHYSICAL EXAM:  T(C): 36.3 (08-07-23 @ 04:40), Max: 36.6 (08-06-23 @ 14:42)  HR: 70 (08-07-23 @ 04:40)  BP: 162/91 (08-07-23 @ 04:40)  RR: 18 (08-07-23 @ 04:40)  SpO2: 99% (08-07-23 @ 04:40)  Wt(kg): --      GEN: NAD, lethargic  HEENT: EOMI, conjunctivae anicteric, neck supple, dry mucous membranes  PULM: LSCTAB, no wheezing, rales, or rhonchi  CV: RRR, no m/r/b  GI: taut near epigastrium, ND; +BS in all four quadrants  MSK: Unable to assess strength or movement  NEURO: A&O x 0  ______________________________________________________________________  LABS:                        9.5    12.46 )-----------( 287      ( 07 Aug 2023 06:16 )             30.1     08-07    138  |  110<H>  |  24<H>  ----------------------------<  80  3.6   |  18<L>  |  1.47<H>    Ca    8.9      07 Aug 2023 06:16  Phos  3.7     08-07  Mg     2.2     08-07    TPro  7.3  /  Alb  2.5<L>  /  TBili  0.3  /  DBili  x   /  AST  17  /  ALT  13  /  AlkPhos  87  08-07    LIVER FUNCTIONS - ( 07 Aug 2023 06:16 )  Alb: 2.5 g/dL / Pro: 7.3 g/dL / ALK PHOS: 87 U/L / ALT: 13 U/L DA / AST: 17 U/L / GGT: x           PT/INR - ( 07 Aug 2023 06:16 )   PT: 11.9 sec;   INR: 1.05 ratio         PTT - ( 07 Aug 2023 06:16 )  PTT:34.6 sec  ____________________________________________    IMAGING:      < from: CT Abdomen and Pelvis No Cont (08.04.23 @ 20:43) >  CT ABDOMEN AND PELVIS   ORDERED BY: HAZEL LEÓN     PROCEDURE DATE:  08/04/2023          INTERPRETATION:  CLINICAL INFORMATION: Difficulty placing nasogastric   tube.  Rule out hiatal hernia.    COMPARISON: None.    CONTRAST/COMPLICATIONS:  IV Contrast: NONE  Oral Contrast: NONE  Complications: None reported at time of study completion    PROCEDURE:  CT of the Abdomen and Pelvis was performed.  Sagittal and coronal reformats were performed.    FINDINGS:  LOWER CHEST: The left ventricle appears asymmetrically enlarged.    Moderate atherosclerotic calcification of the left anterior descending   coronary artery.    LIVER: Within normal limits.  BILE DUCTS: Normal caliber.  GALLBLADDER: Vicarious excretion of IV contrast material in the   gallbladder lumen.  SPLEEN: Within normal limits.  PANCREAS: Within normal limits.  ADRENALS: Within normal limits.  KIDNEYS/URETERS: Cortical medullary differentiation is visualized in both   kidneys, suspicious for IV contrast retention.    BLADDER: Within normal limits.  REPRODUCTIVE ORGANS: Hysterectomy.  The ovaries are not visualized.    BOWEL: No bowel obstruction. Appendix is normal.  Oral contrast in the   colon.  PERITONEUM: No ascites.  VESSELS: Mild atheroscleroticcalcification of the aortoiliac tree.  No   abdominal aortic aneurysm.  Infrarenal IVC filter in place.  RETROPERITONEUM/LYMPH NODES: No lymphadenopathy.  ABDOMINAL WALL: Trace subcutaneous edema in the flanks and proximal   thighs. Small fat-containing umbilical hernia.  Vertical midline incision   in the lower ventral abdominal wall.  BONES: Mild degenerative changes in the spine and hips.    IMPRESSION:  No hiatal hernia.    No evidence of bowel obstruction, active inflammatory process or   intra-abdominal source for infection, within limits of noncontrast CT   technique.    There is vicarious excretion of contrast in the gallbladder.  There is   suspicion for contrast retention in both kidneys.  The possibility of   contrast-induced nephropathy and/or acute kidney injury should be   excluded..    < end of copied text >   PROBLEM DIAGNOSES  Problem: Other ovarian cyst  Assessment and Plan: Exploratory Laparotomy, Bilateral Ovarian Cystectomy, Staging Via Pfannenstiel Incision    Problem: Need for prophylactic measure  Assessment and Plan:     Problem: Screening for substance abuse  Assessment and Plan: PROBLEM DIAGNOSES  Problem: Other ovarian cyst  Assessment and Plan: Exploratory Laparotomy, Bilateral Ovarian Cystectomy, Staging Via Pfannenstiel Incision    Problem: Need for prophylactic measure  Assessment and Plan: Low risk.  Surgical Team to evaluate need for Pharmacologic VTE Prophylaxis    Problem: Screening for substance abuse  Assessment and Plan: Opioid screening tool score =1.  Low risk for potential future opioid abuse

## 2023-08-07 NOTE — PROGRESS NOTE ADULT - SUBJECTIVE AND OBJECTIVE BOX
PGY-1 Progress Note discussed with attending    PAGER #: [[phone]] TILL 5:00 PM  PLEASE CONTACT ON CALL TEAM:  - On Call Team (Please refer to Tyler) FROM 5:00 PM - 8:30PM  - Nightfloat Team FROM 8:30 -7:30 AM    CHIEF COMPLAINT & BRIEF HOSPITAL COURSE: Pt. bleeding from right ear. Will consult ENT. Pt . getting peg placements     INTERVAL HPI/OVERNIGHT EVENTS:   MEDICATIONS  (STANDING):  cloNIDine Patch 0.2 mG/24Hr(s) 1 patch Transdermal <User Schedule>  dextrose 5% with potassium chloride 20 mEq/L 1000 milliLiter(s) (50 mL/Hr) IV Continuous <Continuous>  hydrALAZINE Injectable 10 milliGRAM(s) IV Push every 6 hours  valproate sodium  IVPB 500 milliGRAM(s) IV Intermittent every 8 hours    MEDICATIONS  (PRN):          Vital Signs Last 24 Hrs  T(C): 36.3 (07 Aug 2023 04:40), Max: 36.6 (06 Aug 2023 14:42)  T(F): 97.3 (07 Aug 2023 04:40), Max: 97.9 (06 Aug 2023 14:42)  HR: 70 (07 Aug 2023 04:40) (70 - 91)  BP: 162/91 (07 Aug 2023 04:40) (162/75 - 184/75)  BP(mean): --  RR: 18 (07 Aug 2023 04:40) (18 - 18)  SpO2: 99% (07 Aug 2023 04:40) (95% - 100%)    Parameters below as of 07 Aug 2023 04:40  Patient On (Oxygen Delivery Method): room air        PHYSICAL EXAMINATION:  GENERAL: NAD, well built  HEAD:  Atraumatic, Normocephalic, bleeding from right ear.   EYES:  conjunctiva and sclera clear  CHEST/LUNG: Clear to auscultation. No rales, rhonchi, wheezing, or rubs  HEART: Regular rate and rhythm; No murmurs, rubs, or gallops  ABDOMEN: Soft, Nontender, Nondistended; Bowel sounds present  NERVOUS SYSTEM:  Alert & Oriented X3,    EXTREMITIES:  2+ Peripheral Pulses, No clubbing, cyanosis, or edema  SKIN: warm dry                          9.5    12.46 )-----------( 287      ( 07 Aug 2023 06:16 )             30.1     08-07    138  |  110<H>  |  24<H>  ----------------------------<  80  3.6   |  18<L>  |  1.47<H>    Ca    8.9      07 Aug 2023 06:16  Phos  3.7     08-07  Mg     2.2     08-07    TPro  7.3  /  Alb  2.5<L>  /  TBili  0.3  /  DBili  x   /  AST  17  /  ALT  13  /  AlkPhos  87  08-07    LIVER FUNCTIONS - ( 07 Aug 2023 06:16 )  Alb: 2.5 g/dL / Pro: 7.3 g/dL / ALK PHOS: 87 U/L / ALT: 13 U/L DA / AST: 17 U/L / GGT: x               PT/INR - ( 07 Aug 2023 06:16 )   PT: 11.9 sec;   INR: 1.05 ratio         PTT - ( 07 Aug 2023 06:16 )  PTT:34.6 sec    CAPILLARY BLOOD GLUCOSE      RADIOLOGY & ADDITIONAL TESTS:                   PGY-1 Progress Note discussed with attending    PAGER #: [[phone]] TILL 5:00 PM  PLEASE CONTACT ON CALL TEAM:  - On Call Team (Please refer to Tyler) FROM 5:00 PM - 8:30PM  - Nightfloat Team FROM 8:30 -7:30 AM    CHIEF COMPLAINT & BRIEF HOSPITAL COURSE: Pt. bleeding from right ear. Will consult ENT. Pt . getting peg placements     INTERVAL HPI/OVERNIGHT EVENTS:   MEDICATIONS  (STANDING):  cloNIDine Patch 0.2 mG/24Hr(s) 1 patch Transdermal <User Schedule>  dextrose 5% with potassium chloride 20 mEq/L 1000 milliLiter(s) (50 mL/Hr) IV Continuous <Continuous>  hydrALAZINE Injectable 10 milliGRAM(s) IV Push every 6 hours  valproate sodium  IVPB 500 milliGRAM(s) IV Intermittent every 8 hours    MEDICATIONS  (PRN):          Vital Signs Last 24 Hrs  T(C): 36.3 (07 Aug 2023 04:40), Max: 36.6 (06 Aug 2023 14:42)  T(F): 97.3 (07 Aug 2023 04:40), Max: 97.9 (06 Aug 2023 14:42)  HR: 70 (07 Aug 2023 04:40) (70 - 91)  BP: 162/91 (07 Aug 2023 04:40) (162/75 - 184/75)  BP(mean): --  RR: 18 (07 Aug 2023 04:40) (18 - 18)  SpO2: 99% (07 Aug 2023 04:40) (95% - 100%)    Parameters below as of 07 Aug 2023 04:40  Patient On (Oxygen Delivery Method): room air        PHYSICAL EXAMINATION:  GENERAL: NAD, well built  HEAD:  Atraumatic, Normocephalic, bleeding from right ear.   EYES:  conjunctiva and sclera clear  CHEST/LUNG: Clear to auscultation. No rales, rhonchi, wheezing, or rubs  HEART: Regular rate and rhythm; No murmurs, rubs, or gallops  ABDOMEN: Soft, Nontender, Nondistended; Bowel sounds present  NERVOUS SYSTEM:  Alert & Oriented X3,    EXTREMITIES:  2+ Peripheral Pulses, No clubbing, cyanosis, or edema  SKIN: warm dry                          9.5    12.46 )-----------( 287      ( 07 Aug 2023 06:16 )             30.1     08-07    138  |  110<H>  |  24<H>  ----------------------------<  80  3.6   |  18<L>  |  1.47<H>    Ca    8.9      07 Aug 2023 06:16  Phos  3.7     08-07  Mg     2.2     08-07    TPro  7.3  /  Alb  2.5<L>  /  TBili  0.3  /  DBili  x   /  AST  17  /  ALT  13  /  AlkPhos  87  08-07    LIVER FUNCTIONS - ( 07 Aug 2023 06:16 )  Alb: 2.5 g/dL / Pro: 7.3 g/dL / ALK PHOS: 87 U/L / ALT: 13 U/L DA / AST: 17 U/L / GGT: x               PT/INR - ( 07 Aug 2023 06:16 )   PT: 11.9 sec;   INR: 1.05 ratio         PTT - ( 07 Aug 2023 06:16 )  PTT:34.6 sec    CAPILLARY BLOOD GLUCOSE      RADIOLOGY & ADDITIONAL TESTS:                   PGY-1 Progress Note discussed with attending    PAGER #: [[phone]] TILL 5:00 PM  PLEASE CONTACT ON CALL TEAM:  - On Call Team (Please refer to Tyler) FROM 5:00 PM - 8:30PM  - Nightfloat Team FROM 8:30 -7:30 AM    CHIEF COMPLAINT & BRIEF HOSPITAL COURSE: Pt. bleeding from right ear. Will consult ENT. Pt . getting peg placements     INTERVAL HPI/OVERNIGHT EVENTS:   MEDICATIONS  (STANDING):  cloNIDine Patch 0.2 mG/24Hr(s) 1 patch Transdermal <User Schedule>  dextrose 5% with potassium chloride 20 mEq/L 1000 milliLiter(s) (50 mL/Hr) IV Continuous <Continuous>  hydrALAZINE Injectable 10 milliGRAM(s) IV Push every 6 hours  valproate sodium  IVPB 500 milliGRAM(s) IV Intermittent every 8 hours    MEDICATIONS  (PRN):          Vital Signs Last 24 Hrs  T(C): 36.3 (07 Aug 2023 04:40), Max: 36.6 (06 Aug 2023 14:42)  T(F): 97.3 (07 Aug 2023 04:40), Max: 97.9 (06 Aug 2023 14:42)  HR: 70 (07 Aug 2023 04:40) (70 - 91)  BP: 162/91 (07 Aug 2023 04:40) (162/75 - 184/75)  BP(mean): --  RR: 18 (07 Aug 2023 04:40) (18 - 18)  SpO2: 99% (07 Aug 2023 04:40) (95% - 100%)    Parameters below as of 07 Aug 2023 04:40  Patient On (Oxygen Delivery Method): room air        PHYSICAL EXAMINATION:  GENERAL: NAD, well built  HEAD:  Atraumatic, Normocephalic, bleeding from right ear.   EYES:  conjunctiva and sclera clear  CHEST/LUNG: Clear to auscultation. No rales, rhonchi, wheezing, or rubs  HEART: Regular rate and rhythm; No murmurs, rubs, or gallops  ABDOMEN: Soft, Nontender, Nondistended; Bowel sounds present  NERVOUS SYSTEM:  Alert & Oriented X3,    EXTREMITIES:  2+ Peripheral Pulses, No clubbing, cyanosis, or edema  SKIN: warm dry                          9.5    12.46 )-----------( 287      ( 07 Aug 2023 06:16 )             30.1     08-07    138  |  110<H>  |  24<H>  ----------------------------<  80  3.6   |  18<L>  |  1.47<H>    Ca    8.9      07 Aug 2023 06:16  Phos  3.7     08-07  Mg     2.2     08-07    TPro  7.3  /  Alb  2.5<L>  /  TBili  0.3  /  DBili  x   /  AST  17  /  ALT  13  /  AlkPhos  87  08-07    LIVER FUNCTIONS - ( 07 Aug 2023 06:16 )  Alb: 2.5 g/dL / Pro: 7.3 g/dL / ALK PHOS: 87 U/L / ALT: 13 U/L DA / AST: 17 U/L / GGT: x               PT/INR - ( 07 Aug 2023 06:16 )   PT: 11.9 sec;   INR: 1.05 ratio         PTT - ( 07 Aug 2023 06:16 )  PTT:34.6 sec    CAPILLARY BLOOD GLUCOSE      RADIOLOGY & ADDITIONAL TESTS:

## 2023-08-07 NOTE — PROGRESS NOTE ADULT - ASSESSMENT
Patient is a 77 female from [facility] PMHx HTN, HLD, CVA (w/ residual aphasia and R sided hemiparesis/plegia) who was sent to the hospital due to altered mental status. Patient is being admitted to medicine for further work up and rule out stroke vs encephalopathy (metabolic vs infectious).  Patient is a 77 female from [facility] PMHx HTN, HLD, CVA (w/ residual aphasia and R sided hemiparesis/plegia) who was sent to the hospital due to altered mental status. Patient is being admitted to medicine for further work up and rule out stroke vs encephalopathy (metabolic vs infectious).  Patient is a 77 female from [facility] PMHx HTN, HLD, CVA (w/ residual aphasia and R sided hemiparesis/plegia) who was sent to the hospital due to altered mental status. Patient is being admitted to medicine for further work up and rule out stroke vs encephalopathy (metabolic vs infectious).

## 2023-08-07 NOTE — PROGRESS NOTE ADULT - PROBLEM SELECTOR PLAN 9
Lovenox. (renally dosed) H/O seizures.   C/w Depacon.  Neurology consulted - Dr. Whitney-  > ordered EEG-potential epileptogenic factors in the left posterior temporal lobe and focal cerebral dysfunction in L  hemisphere

## 2023-08-07 NOTE — PROGRESS NOTE ADULT - SUBJECTIVE AND OBJECTIVE BOX
NEPHROLOGY MEDICAL CARE, [facility] - Dr. Ajay Green/ Dr. Mert Madison/ Dr. Chris Calderon/ Dr. Carson Cook    Date of Service: 08-07-23 @ 14:04    Patient was seen and examined at bedside.    CC: patient is okay and NAD    Vital Signs Last 24 Hrs  T(C): 36.6 (07 Aug 2023 12:01), Max: 36.6 (06 Aug 2023 14:42)  T(F): 97.8 (07 Aug 2023 12:01), Max: 97.9 (06 Aug 2023 14:42)  HR: 88 (07 Aug 2023 12:46) (70 - 92)  BP: 187/81 (07 Aug 2023 12:46) (162/75 - 196/78)  BP(mean): --  RR: 18 (07 Aug 2023 12:46) (18 - 18)  SpO2: 98% (07 Aug 2023 12:46) (95% - 99%)    Parameters below as of 07 Aug 2023 12:46  Patient On (Oxygen Delivery Method): room air          PHYSICAL EXAM:  General: No acute respiratory distress.  Eyes: conjunctiva and sclera clear  ENMT: Atraumatic, Normocephalic, supple, No JVD present. Moist mucous membranes  Respiratory: Bilateral clear lungs; No rales, rhonchi, wheezing  Cardiovascular: S1S2+; no m/r/g  Gastrointestinal: Soft, Non-tender, Nondistended; Bowel sounds present  Neuro: eyes are open; hemiparesis.  Ext:  1+pedal edema, No Cyanosis  Skin: No visible rashes      MEDICATIONS:  MEDICATIONS  (STANDING):  cloNIDine Patch 0.2 mG/24Hr(s) 1 patch Transdermal <User Schedule>  dextrose 5% with potassium chloride 20 mEq/L 1000 milliLiter(s) (50 mL/Hr) IV Continuous <Continuous>  hydrALAZINE Injectable 10 milliGRAM(s) IV Push every 6 hours  valproate sodium  IVPB 500 milliGRAM(s) IV Intermittent every 8 hours    MEDICATIONS  (PRN):          LABS:                        9.5    12.46 )-----------( 287      ( 07 Aug 2023 06:16 )             30.1     08-07    138  |  110<H>  |  24<H>  ----------------------------<  80  3.6   |  18<L>  |  1.47<H>    Ca    8.9      07 Aug 2023 06:16  Phos  3.7     08-07  Mg     2.2     08-07    TPro  7.3  /  Alb  2.5<L>  /  TBili  0.3  /  DBili  x   /  AST  17  /  ALT  13  /  AlkPhos  87  08-07    PT/INR - ( 07 Aug 2023 06:16 )   PT: 11.9 sec;   INR: 1.05 ratio         PTT - ( 07 Aug 2023 06:16 )  PTT:34.6 sec  Urinalysis Basic - ( 07 Aug 2023 06:16 )    Color: x / Appearance: x / SG: x / pH: x  Gluc: 80 mg/dL / Ketone: x  / Bili: x / Urobili: x   Blood: x / Protein: x / Nitrite: x   Leuk Esterase: x / RBC: x / WBC x   Sq Epi: x / Non Sq Epi: x / Bacteria: x      Magnesium: 2.2 mg/dL (08-07 @ 06:16)  Phosphorus: 3.7 mg/dL (08-07 @ 06:16)    Urine studies    PTH and Vit D:         NEPHROLOGY MEDICAL CARE, [facility] - Dr. Ajay Green/ Dr. Mert Madison/ Dr. Chris Calderon/ Dr. Carson Cook    Date of Service: 08-07-23 @ 14:04    Patient was seen and examined at bedside.    CC: patient is okay and NAD    Vital Signs Last 24 Hrs  T(C): 36.6 (07 Aug 2023 12:01), Max: 36.6 (06 Aug 2023 14:42)  T(F): 97.8 (07 Aug 2023 12:01), Max: 97.9 (06 Aug 2023 14:42)  HR: 88 (07 Aug 2023 12:46) (70 - 92)  BP: 187/81 (07 Aug 2023 12:46) (162/75 - 196/78)  BP(mean): --  RR: 18 (07 Aug 2023 12:46) (18 - 18)  SpO2: 98% (07 Aug 2023 12:46) (95% - 99%)    Parameters below as of 07 Aug 2023 12:46  Patient On (Oxygen Delivery Method): room air          PHYSICAL EXAM:  General: No acute respiratory distress.  Eyes: conjunctiva and sclera clear  ENMT: Atraumatic, Normocephalic, supple, No JVD present. Moist mucous membranes  Respiratory: Bilateral clear lungs; No rales, rhonchi, wheezing  Cardiovascular: S1S2+; no m/r/g  Gastrointestinal: Soft, Non-tender, Nondistended; Bowel sounds present  Neuro: eyes are open; hemiparesis.  Ext:  1+pedal edema, No Cyanosis  Skin: No visible rashes      MEDICATIONS:  MEDICATIONS  (STANDING):  cloNIDine Patch 0.2 mG/24Hr(s) 1 patch Transdermal <User Schedule>  dextrose 5% with potassium chloride 20 mEq/L 1000 milliLiter(s) (50 mL/Hr) IV Continuous <Continuous>  hydrALAZINE Injectable 10 milliGRAM(s) IV Push every 6 hours  valproate sodium  IVPB 500 milliGRAM(s) IV Intermittent every 8 hours    MEDICATIONS  (PRN):          LABS:                        9.5    12.46 )-----------( 287      ( 07 Aug 2023 06:16 )             30.1     08-07    138  |  110<H>  |  24<H>  ----------------------------<  80  3.6   |  18<L>  |  1.47<H>    Ca    8.9      07 Aug 2023 06:16  Phos  3.7     08-07  Mg     2.2     08-07    TPro  7.3  /  Alb  2.5<L>  /  TBili  0.3  /  DBili  x   /  AST  17  /  ALT  13  /  AlkPhos  87  08-07    PT/INR - ( 07 Aug 2023 06:16 )   PT: 11.9 sec;   INR: 1.05 ratio         PTT - ( 07 Aug 2023 06:16 )  PTT:34.6 sec  Urinalysis Basic - ( 07 Aug 2023 06:16 )    Color: x / Appearance: x / SG: x / pH: x  Gluc: 80 mg/dL / Ketone: x  / Bili: x / Urobili: x   Blood: x / Protein: x / Nitrite: x   Leuk Esterase: x / RBC: x / WBC x   Sq Epi: x / Non Sq Epi: x / Bacteria: x      Magnesium: 2.2 mg/dL (08-07 @ 06:16)  Phosphorus: 3.7 mg/dL (08-07 @ 06:16)    Urine studies    PTH and Vit D:         NEPHROLOGY MEDICAL CARE, [facility] - Dr. Ajay Green/ Dr. Mert Madison/ Dr. Chris Calderon/ Dr. Carson Cook    Date of Service: 08-07-23 @ 14:04    Patient was seen and examined at bedside.    CC: patient is okay and NAD    Vital Signs Last 24 Hrs  T(C): 36.6 (07 Aug 2023 12:01), Max: 36.6 (06 Aug 2023 14:42)  T(F): 97.8 (07 Aug 2023 12:01), Max: 97.9 (06 Aug 2023 14:42)  HR: 88 (07 Aug 2023 12:46) (70 - 92)  BP: 187/81 (07 Aug 2023 12:46) (162/75 - 196/78)  BP(mean): --  RR: 18 (07 Aug 2023 12:46) (18 - 18)  SpO2: 98% (07 Aug 2023 12:46) (95% - 99%)    Parameters below as of 07 Aug 2023 12:46  Patient On (Oxygen Delivery Method): room air          PHYSICAL EXAM:  General: No acute respiratory distress.  Eyes: conjunctiva and sclera clear  ENMT: Atraumatic, Normocephalic, supple, No JVD present. Moist mucous membranes  Respiratory: Bilateral clear lungs; No rales, rhonchi, wheezing  Cardiovascular: S1S2+; no m/r/g  Gastrointestinal: Soft, Non-tender, Nondistended; Bowel sounds present  Neuro: eyes are open; hemiparesis.  Ext:  1+pedal edema, No Cyanosis  Skin: No visible rashes      MEDICATIONS:  MEDICATIONS  (STANDING):  cloNIDine Patch 0.2 mG/24Hr(s) 1 patch Transdermal <User Schedule>  dextrose 5% with potassium chloride 20 mEq/L 1000 milliLiter(s) (50 mL/Hr) IV Continuous <Continuous>  hydrALAZINE Injectable 10 milliGRAM(s) IV Push every 6 hours  valproate sodium  IVPB 500 milliGRAM(s) IV Intermittent every 8 hours    MEDICATIONS  (PRN):          LABS:                        9.5    12.46 )-----------( 287      ( 07 Aug 2023 06:16 )             30.1     08-07    138  |  110<H>  |  24<H>  ----------------------------<  80  3.6   |  18<L>  |  1.47<H>    Ca    8.9      07 Aug 2023 06:16  Phos  3.7     08-07  Mg     2.2     08-07    TPro  7.3  /  Alb  2.5<L>  /  TBili  0.3  /  DBili  x   /  AST  17  /  ALT  13  /  AlkPhos  87  08-07    PT/INR - ( 07 Aug 2023 06:16 )   PT: 11.9 sec;   INR: 1.05 ratio         PTT - ( 07 Aug 2023 06:16 )  PTT:34.6 sec  Urinalysis Basic - ( 07 Aug 2023 06:16 )    Color: x / Appearance: x / SG: x / pH: x  Gluc: 80 mg/dL / Ketone: x  / Bili: x / Urobili: x   Blood: x / Protein: x / Nitrite: x   Leuk Esterase: x / RBC: x / WBC x   Sq Epi: x / Non Sq Epi: x / Bacteria: x      Magnesium: 2.2 mg/dL (08-07 @ 06:16)  Phosphorus: 3.7 mg/dL (08-07 @ 06:16)    Urine studies    PTH and Vit D:

## 2023-08-07 NOTE — PROGRESS NOTE ADULT - ASSESSMENT
1. Dysphagia  2. Failure to thrive  3. Constipation  4. Anemia  5. No evidence of acute GI bleeding  6. Hypokalemia    Suggestions:    1. NPO  2. IVF hydration  3. Monitor electrolytes  4. Peg placement postponed to 8/8/2023 by Dr Nunes  5. Consider NGT feeding meanwhile  6. Aspiration precaution  7. Monitor H/H  8. Transfuse PRBC as needed  9. Protonix daily  10. Avoid NSAID  11. Daily stool softener as needed  12. DVT prophylaxis

## 2023-08-07 NOTE — PROGRESS NOTE ADULT - SUBJECTIVE AND OBJECTIVE BOX
[   ] ICU                                          [   ] CCU                                      [ X  ] Medical Floor      Patient is a 77 year old female with dysphagia. GI consulted for Peg tube placement.     Patient is a 77 female from [facility] with past medical history significant for HTN, HLD, CVA (w/ residual aphasia and R sided hemiparesis/plegia) who was sent to the emergency room with for altered mental status. Patient is not able to provide any history. Patient is lying down in bed, awake and alert. However, patient does not speak, is not able to follow commands and does not turn face or move eyes when her name is called. Patient is admitted with CVA. Now patient with dysphagia, poor po intake, failure to thrive and weight loss. No abdominal pain, nausea, vomiting, hematemesis, hematochezia, melena, fever, chills, chest pain, SOB, cough, hematuria, dysuria  or diarrhea reported.      Patient is comfortable. No new complaints reported, No abdominal pain, N/V, hematemesis, hematochezia, melena, fever, chills, chest pain, SOB, cough or diarrhea reported.      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 1000 milliLiter(s) (70 mL/Hr) IV Continuous <Continuous>  enoxaparin Injectable 30 milliGRAM(s) SubCutaneous every 24 hours  hydrALAZINE Injectable 10 milliGRAM(s) IV Push every 6 hours  potassium chloride  10 mEq/100 mL IVPB 10 milliEquivalent(s) IV Intermittent every 1 hour  valproate sodium  IVPB 500 milliGRAM(s) IV Intermittent every 8 hours         SOCIAL HISTORY  Advanced Directives:       [ X ] Full Code       [  ] DNR  Marital Status:         [  ] M      [ X ] S      [  ] D       [  ] W  Children:       [ X ] Yes      [  ] No  Occupation:        [  ] Employed       [ X ] Unemployed       [  ] Retired  Diet:       [ X ] Regular       [  ] PEG feeding          [  ] NG tube feeding  Drug Use:           [ X ] Patient denied          [  ] Yes  Alcohol:           [ X ] No             [  ] Yes (socially)         [  ] Yes (chronic)  Tobacco:           [  ] Yes           [X  ] No      FAMILY HISTORY  [ X ] Heart Disease            [ X ] Diabetes             [ X ] HTN             [  ] Colon Cancer             [  ] Stomach Cancer              [  ] Pancreatic Cancer      VITALS  Vital Signs Last 24 Hrs  T(C): 36.3 (07 Aug 2023 04:40), Max: 36.6 (06 Aug 2023 14:42)  T(F): 97.3 (07 Aug 2023 04:40), Max: 97.9 (06 Aug 2023 14:42)  HR: 70 (07 Aug 2023 04:40) (70 - 91)  BP: 162/91 (07 Aug 2023 04:40) (162/75 - 184/75)   RR: 18 (07 Aug 2023 04:40) (18 - 18)  SpO2: 99% (07 Aug 2023 04:40) (95% - 99%)  Parameters below as of 07 Aug 2023 04:40  Patient On (Oxygen Delivery Method): room air       MEDICATIONS  (STANDING):  cloNIDine Patch 0.2 mG/24Hr(s) 1 patch Transdermal <User Schedule>  dextrose 5% with potassium chloride 20 mEq/L 1000 milliLiter(s) (50 mL/Hr) IV Continuous <Continuous>  hydrALAZINE Injectable 10 milliGRAM(s) IV Push every 6 hours  valproate sodium  IVPB 500 milliGRAM(s) IV Intermittent every 8 hours    MEDICATIONS  (PRN):                            9.5    12.46 )-----------( 287      ( 07 Aug 2023 06:16 )             30.1       08-07    138  |  110<H>  |  24<H>  ----------------------------<  80  3.6   |  18<L>  |  1.47<H>    Ca    8.9      07 Aug 2023 06:16  Phos  3.7     08-07  Mg     2.2     08-07    TPro  7.3  /  Alb  2.5<L>  /  TBili  0.3  /  DBili  x   /  AST  17  /  ALT  13  /  AlkPhos  87  08-07      PT/INR - ( 07 Aug 2023 06:16 )   PT: 11.9 sec;   INR: 1.05 ratio         PTT - ( 07 Aug 2023 06:16 )  PTT:34.6 sec [   ] ICU                                          [   ] CCU                                      [ X  ] Medical Floor      Patient is a 77 year old female with dysphagia. GI consulted for Peg tube placement.     Patient is a 77 female from [facility] with past medical history significant for HTN, HLD, CVA (w/ residual aphasia and R sided hemiparesis/plegia) who was sent to the emergency room with for altered mental status. Patient is not able to provide any history. Patient is lying down in bed, awake and alert. However, patient does not speak, is not able to follow commands and does not turn face or move eyes when her name is called. Patient is admitted with CVA. Now patient with dysphagia, poor po intake, failure to thrive and weight loss. No abdominal pain, nausea, vomiting, hematemesis, hematochezia, melena, fever, chills, chest pain, SOB, cough, hematuria, dysuria  or diarrhea reported.      Patient is comfortable. No new complaints reported, No abdominal pain, N/V, hematemesis, hematochezia, melena, fever, chills, chest pain, SOB, cough or diarrhea reported.      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 1000 milliLiter(s) (70 mL/Hr) IV Continuous <Continuous>  enoxaparin Injectable 30 milliGRAM(s) SubCutaneous every 24 hours  hydrALAZINE Injectable 10 milliGRAM(s) IV Push every 6 hours  potassium chloride  10 mEq/100 mL IVPB 10 milliEquivalent(s) IV Intermittent every 1 hour  valproate sodium  IVPB 500 milliGRAM(s) IV Intermittent every 8 hours         SOCIAL HISTORY  Advanced Directives:       [ X ] Full Code       [  ] DNR  Marital Status:         [  ] M      [ X ] S      [  ] D       [  ] W  Children:       [ X ] Yes      [  ] No  Occupation:        [  ] Employed       [ X ] Unemployed       [  ] Retired  Diet:       [ X ] Regular       [  ] PEG feeding          [  ] NG tube feeding  Drug Use:           [ X ] Patient denied          [  ] Yes  Alcohol:           [ X ] No             [  ] Yes (socially)         [  ] Yes (chronic)  Tobacco:           [  ] Yes           [X  ] No      FAMILY HISTORY  [ X ] Heart Disease            [ X ] Diabetes             [ X ] HTN             [  ] Colon Cancer             [  ] Stomach Cancer              [  ] Pancreatic Cancer      VITALS  Vital Signs Last 24 Hrs  T(C): 36.3 (07 Aug 2023 04:40), Max: 36.6 (06 Aug 2023 14:42)  T(F): 97.3 (07 Aug 2023 04:40), Max: 97.9 (06 Aug 2023 14:42)  HR: 70 (07 Aug 2023 04:40) (70 - 91)  BP: 162/91 (07 Aug 2023 04:40) (162/75 - 184/75)   RR: 18 (07 Aug 2023 04:40) (18 - 18)  SpO2: 99% (07 Aug 2023 04:40) (95% - 99%)  Parameters below as of 07 Aug 2023 04:40  Patient On (Oxygen Delivery Method): room air       MEDICATIONS  (STANDING):  cloNIDine Patch 0.2 mG/24Hr(s) 1 patch Transdermal <User Schedule>  dextrose 5% with potassium chloride 20 mEq/L 1000 milliLiter(s) (50 mL/Hr) IV Continuous <Continuous>  hydrALAZINE Injectable 10 milliGRAM(s) IV Push every 6 hours  valproate sodium  IVPB 500 milliGRAM(s) IV Intermittent every 8 hours    MEDICATIONS  (PRN):                            9.5    12.46 )-----------( 287      ( 07 Aug 2023 06:16 )             30.1       08-07    138  |  110<H>  |  24<H>  ----------------------------<  80  3.6   |  18<L>  |  1.47<H>    Ca    8.9      07 Aug 2023 06:16  Phos  3.7     08-07  Mg     2.2     08-07    TPro  7.3  /  Alb  2.5<L>  /  TBili  0.3  /  DBili  x   /  AST  17  /  ALT  13  /  AlkPhos  87  08-07      PT/INR - ( 07 Aug 2023 06:16 )   PT: 11.9 sec;   INR: 1.05 ratio         PTT - ( 07 Aug 2023 06:16 )  PTT:34.6 sec [   ] ICU                                          [   ] CCU                                      [ X  ] Medical Floor      Patient is a 77 year old female with dysphagia. GI consulted for Peg tube placement.     Patient is a 77 female from [facility] with past medical history significant for HTN, HLD, CVA (w/ residual aphasia and R sided hemiparesis/plegia) who was sent to the emergency room with for altered mental status. Patient is not able to provide any history. Patient is lying down in bed, awake and alert. However, patient does not speak, is not able to follow commands and does not turn face or move eyes when her name is called. Patient is admitted with CVA. Now patient with dysphagia, poor po intake, failure to thrive and weight loss. No abdominal pain, nausea, vomiting, hematemesis, hematochezia, melena, fever, chills, chest pain, SOB, cough, hematuria, dysuria  or diarrhea reported.      Patient is comfortable. No new complaints reported, No abdominal pain, N/V, hematemesis, hematochezia, melena, fever, chills, chest pain, SOB, cough or diarrhea reported.      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 1000 milliLiter(s) (70 mL/Hr) IV Continuous <Continuous>  enoxaparin Injectable 30 milliGRAM(s) SubCutaneous every 24 hours  hydrALAZINE Injectable 10 milliGRAM(s) IV Push every 6 hours  potassium chloride  10 mEq/100 mL IVPB 10 milliEquivalent(s) IV Intermittent every 1 hour  valproate sodium  IVPB 500 milliGRAM(s) IV Intermittent every 8 hours         SOCIAL HISTORY  Advanced Directives:       [ X ] Full Code       [  ] DNR  Marital Status:         [  ] M      [ X ] S      [  ] D       [  ] W  Children:       [ X ] Yes      [  ] No  Occupation:        [  ] Employed       [ X ] Unemployed       [  ] Retired  Diet:       [ X ] Regular       [  ] PEG feeding          [  ] NG tube feeding  Drug Use:           [ X ] Patient denied          [  ] Yes  Alcohol:           [ X ] No             [  ] Yes (socially)         [  ] Yes (chronic)  Tobacco:           [  ] Yes           [X  ] No      FAMILY HISTORY  [ X ] Heart Disease            [ X ] Diabetes             [ X ] HTN             [  ] Colon Cancer             [  ] Stomach Cancer              [  ] Pancreatic Cancer      VITALS  Vital Signs Last 24 Hrs  T(C): 36.3 (07 Aug 2023 04:40), Max: 36.6 (06 Aug 2023 14:42)  T(F): 97.3 (07 Aug 2023 04:40), Max: 97.9 (06 Aug 2023 14:42)  HR: 70 (07 Aug 2023 04:40) (70 - 91)  BP: 162/91 (07 Aug 2023 04:40) (162/75 - 184/75)   RR: 18 (07 Aug 2023 04:40) (18 - 18)  SpO2: 99% (07 Aug 2023 04:40) (95% - 99%)  Parameters below as of 07 Aug 2023 04:40  Patient On (Oxygen Delivery Method): room air       MEDICATIONS  (STANDING):  cloNIDine Patch 0.2 mG/24Hr(s) 1 patch Transdermal <User Schedule>  dextrose 5% with potassium chloride 20 mEq/L 1000 milliLiter(s) (50 mL/Hr) IV Continuous <Continuous>  hydrALAZINE Injectable 10 milliGRAM(s) IV Push every 6 hours  valproate sodium  IVPB 500 milliGRAM(s) IV Intermittent every 8 hours    MEDICATIONS  (PRN):                            9.5    12.46 )-----------( 287      ( 07 Aug 2023 06:16 )             30.1       08-07    138  |  110<H>  |  24<H>  ----------------------------<  80  3.6   |  18<L>  |  1.47<H>    Ca    8.9      07 Aug 2023 06:16  Phos  3.7     08-07  Mg     2.2     08-07    TPro  7.3  /  Alb  2.5<L>  /  TBili  0.3  /  DBili  x   /  AST  17  /  ALT  13  /  AlkPhos  87  08-07      PT/INR - ( 07 Aug 2023 06:16 )   PT: 11.9 sec;   INR: 1.05 ratio         PTT - ( 07 Aug 2023 06:16 )  PTT:34.6 sec

## 2023-08-08 ENCOUNTER — TRANSCRIPTION ENCOUNTER (OUTPATIENT)
Age: 78
End: 2023-08-08

## 2023-08-08 DIAGNOSIS — Z01.818 ENCOUNTER FOR OTHER PREPROCEDURAL EXAMINATION: ICD-10-CM

## 2023-08-08 LAB
ALBUMIN SERPL ELPH-MCNC: 2.3 G/DL — LOW (ref 3.5–5)
ALP SERPL-CCNC: 83 U/L — SIGNIFICANT CHANGE UP (ref 40–120)
ALT FLD-CCNC: 13 U/L DA — SIGNIFICANT CHANGE UP (ref 10–60)
ANION GAP SERPL CALC-SCNC: 11 MMOL/L — SIGNIFICANT CHANGE UP (ref 5–17)
APTT BLD: 29.9 SEC — SIGNIFICANT CHANGE UP (ref 24.5–35.6)
AST SERPL-CCNC: 15 U/L — SIGNIFICANT CHANGE UP (ref 10–40)
BILIRUB SERPL-MCNC: 0.3 MG/DL — SIGNIFICANT CHANGE UP (ref 0.2–1.2)
BUN SERPL-MCNC: 22 MG/DL — HIGH (ref 7–18)
CALCIUM SERPL-MCNC: 8.9 MG/DL — SIGNIFICANT CHANGE UP (ref 8.4–10.5)
CHLORIDE SERPL-SCNC: 111 MMOL/L — HIGH (ref 96–108)
CO2 SERPL-SCNC: 18 MMOL/L — LOW (ref 22–31)
CREAT SERPL-MCNC: 1.47 MG/DL — HIGH (ref 0.5–1.3)
EGFR: 37 ML/MIN/1.73M2 — LOW
GLUCOSE BLDC GLUCOMTR-MCNC: 136 MG/DL — HIGH (ref 70–99)
GLUCOSE BLDC GLUCOMTR-MCNC: 170 MG/DL — HIGH (ref 70–99)
GLUCOSE BLDC GLUCOMTR-MCNC: 73 MG/DL — SIGNIFICANT CHANGE UP (ref 70–99)
GLUCOSE BLDC GLUCOMTR-MCNC: 76 MG/DL — SIGNIFICANT CHANGE UP (ref 70–99)
GLUCOSE BLDC GLUCOMTR-MCNC: 81 MG/DL — SIGNIFICANT CHANGE UP (ref 70–99)
GLUCOSE SERPL-MCNC: 72 MG/DL — SIGNIFICANT CHANGE UP (ref 70–99)
HCT VFR BLD CALC: 28.9 % — LOW (ref 34.5–45)
HGB BLD-MCNC: 9 G/DL — LOW (ref 11.5–15.5)
INR BLD: 1.1 RATIO — SIGNIFICANT CHANGE UP (ref 0.85–1.18)
MAGNESIUM SERPL-MCNC: 2.3 MG/DL — SIGNIFICANT CHANGE UP (ref 1.6–2.6)
MCHC RBC-ENTMCNC: 28.1 PG — SIGNIFICANT CHANGE UP (ref 27–34)
MCHC RBC-ENTMCNC: 31.1 GM/DL — LOW (ref 32–36)
MCV RBC AUTO: 90.3 FL — SIGNIFICANT CHANGE UP (ref 80–100)
NRBC # BLD: 0 /100 WBCS — SIGNIFICANT CHANGE UP (ref 0–0)
PHOSPHATE SERPL-MCNC: 3.9 MG/DL — SIGNIFICANT CHANGE UP (ref 2.5–4.5)
PLATELET # BLD AUTO: 286 K/UL — SIGNIFICANT CHANGE UP (ref 150–400)
POTASSIUM SERPL-MCNC: 3.6 MMOL/L — SIGNIFICANT CHANGE UP (ref 3.5–5.3)
POTASSIUM SERPL-SCNC: 3.6 MMOL/L — SIGNIFICANT CHANGE UP (ref 3.5–5.3)
PROT SERPL-MCNC: 7 G/DL — SIGNIFICANT CHANGE UP (ref 6–8.3)
PROTHROM AB SERPL-ACNC: 12.5 SEC — SIGNIFICANT CHANGE UP (ref 9.5–13)
RBC # BLD: 3.2 M/UL — LOW (ref 3.8–5.2)
RBC # FLD: 14.8 % — HIGH (ref 10.3–14.5)
SODIUM SERPL-SCNC: 140 MMOL/L — SIGNIFICANT CHANGE UP (ref 135–145)
WBC # BLD: 11 K/UL — HIGH (ref 3.8–10.5)
WBC # FLD AUTO: 11 K/UL — HIGH (ref 3.8–10.5)

## 2023-08-08 PROCEDURE — 99222 1ST HOSP IP/OBS MODERATE 55: CPT

## 2023-08-08 PROCEDURE — 43235 EGD DIAGNOSTIC BRUSH WASH: CPT

## 2023-08-08 DEVICE — PEG KT SAFETY 20FR: Type: IMPLANTABLE DEVICE | Status: FUNCTIONAL

## 2023-08-08 RX ADMIN — Medication 55 MILLIGRAM(S): at 12:35

## 2023-08-08 RX ADMIN — Medication 1 PATCH: at 07:45

## 2023-08-08 RX ADMIN — Medication 55 MILLIGRAM(S): at 00:46

## 2023-08-08 RX ADMIN — Medication 55 MILLIGRAM(S): at 19:34

## 2023-08-08 RX ADMIN — Medication 1 PATCH: at 06:57

## 2023-08-08 RX ADMIN — Medication 10 MILLIGRAM(S): at 19:34

## 2023-08-08 RX ADMIN — Medication 10 MILLIGRAM(S): at 00:45

## 2023-08-08 RX ADMIN — Medication 10 MILLIGRAM(S): at 06:07

## 2023-08-08 RX ADMIN — Medication 1 PATCH: at 06:22

## 2023-08-08 RX ADMIN — Medication 10 MILLIGRAM(S): at 12:35

## 2023-08-08 NOTE — PROGRESS NOTE ADULT - PROBLEM SELECTOR PLAN 8
pt. has no urinated since this morning.   bladder scan showed over 400   ordered straight cath  follow up since 6 hours after

## 2023-08-08 NOTE — CONSULT NOTE ADULT - RESPIRATORY
respirations non-labored
clear to auscultation bilaterally/no wheezes/no rales/no rhonchi/no respiratory distress/no use of accessory muscles/no subcutaneous emphysema/airway patent/breath sounds equal

## 2023-08-08 NOTE — PROGRESS NOTE ADULT - ASSESSMENT
1. Anemia  2. Dysphagia  3. Constipation  4. Failure to thrive  5. No evidence of acute GI bleeding  6. Hypokalemia    Suggestions:    1. NPO  2. IVF hydration  3. Monitor electrolytes  4. Peg placement today by Dr Nunes  5. Consider NGT feeding meanwhile  6. Aspiration precaution  7. Monitor H/H  8. Transfuse PRBC as needed  9. Protonix daily  10. Avoid NSAID  11. Daily stool softener as needed  12. DVT prophylaxis

## 2023-08-08 NOTE — PROGRESS NOTE ADULT - NUTRITIONAL ASSESSMENT
This patient has been assessed with a concern for Malnutrition and has been determined to have a diagnosis/diagnoses of Severe protein-calorie malnutrition.    This patient is being managed with:   Diet NPO-  Entered: Jul 27 2023  7:28PM    No h/o CHF or Cardiac Dz  EKG shows ____ |CXR shows______  VSS with no signs of active infection  No signs of volume overload or ACS  +/ BP still remains elevated will c/w BP meds as sceduled  +/ Will hold BP ACE/ ARB in the AM of surgery  +/ c/w BB and CCB up to and including day of surgery  +/ Will hold all oral and non-insulin meds to prevent lactic acidosis and hypoglycemia  +/ Reduce bedtime Lantus 10-25% the night prior | Reduce 1/2-2/3 total (Basal + Prandial) the AM of surgery  +/ STOP prandial insulin and ISS once fasting  +/ c/w DAPT therapy for recent PCI/ ASA monotherapy if completed DAPT >1month for recent PCI  +/ HOLD Warfarin 5-d prior, if high risk bridge with lovenox 3-d prior | low-risk no need for bridge  +/ High risk procedure, hold DOAC 2-d prior >resume 1-d after | low-risk, hold 1-d prior > resume next day  +/ HOLD Heparin 4-5-h prior | HOLD (last dose) of Lovenox 24-h prior  f/u PRE-OP labs: CBC, CMP, Mg/Phos, Coags, T&S, and serum hCG  JACK: ____% risk of SANTANA, intra-operatively or up to 30-d post-op  RCRI: ____ points, Class ____ Risk with ____% 30-d risk of death, MI, or cardiac arrest  Patient is at _____risk for urgent/emergent _______risk proceudre  Planned for ______ by Surgery, Podiatry, Vascular on _______ This patient has been assessed with a concern for Malnutrition and has been determined to have a diagnosis/diagnoses of Severe protein-calorie malnutrition.    This patient is being managed with:   Diet NPO-  Entered: Jul 27 2023  7:28PM    f/u PRE-OP labs: CBC, CMP, Mg/Phos, Coags, T&S, and serum hCG  JACK: ____% risk of SANTANA, intra-operatively or up to 30-d post-op  RCRI: ____ points, Class ____ Risk with ____% 30-d risk of death, MI, or cardiac arrest  Patient is at _____risk for urgent/emergent _______risk proceudre  Planned for ______ by Surgery, Podiatry, Vascular on _______

## 2023-08-08 NOTE — PROGRESS NOTE ADULT - ASSESSMENT
77 female from [facility] HTN, HLD, CVA (w/ residual aphasia and R sided hemiparesis/plegia) who was sent to the hospital due to altered mental status,UTI.  1.Neurology eval noted for bleeding from ear, CT -.  2.UTI-s/p ABX.  3.HTN- clonidine patch .2mg q wk,IV hydralazine.  5.Lipid d/o-hold statin.  6.Plan for PEG placement.  7.Hypernatremia and SUSAN-IVF D5 with kcl.  8.GI and DVT prophylaxis. 77 female from [facility] HTN, HLD, CVA (w/ residual aphasia and R sided hemiparesis/plegia) who was sent to the hospital due to altered mental status,UTI.  1.Neurology eval noted for bleeding from ear, CT -.  2.UTI-s/p ABX.  3.HTN- clonidine patch .2mg q wk,IV hydralazine.  5.Lipid d/o-hold statin.  6.Plan for PEG placement.  7.Hypernatremia and SUSAN-IVF D5 with kcl.  8.GI and DVT prophylaxis. 77 female from 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  1.Neurology eval noted for bleeding from ear, CT -.  2.UTI-s/p ABX.  3.HTN- clonidine patch .2mg q wk,IV hydralazine.  5.Lipid d/o-hold statin.  6.Plan for PEG placement.  7.Hypernatremia and SUSAN-IVF D5 with kcl.  8.GI and DVT prophylaxis.

## 2023-08-08 NOTE — CONSULT NOTE ADULT - SUBJECTIVE AND OBJECTIVE BOX
Attending: Dr. Mcadams      HPI:  77 female from [facility] PMHx HTN, HLD, CVA (w/ residual aphasia and R sided hemiparesis/plegia), PSHx Hysterectomy, admitted to medical services w/ altered mental status. Surgical consult called for Gastrostomy tube placement. GI unable to place one today. Pt poor historian, unable to obtain hx, ROS limited.        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  Seizures  Chronic constipation      MEDICATIONS  (STANDING):  cloNIDine Patch 0.2 mG/24Hr(s) 1 patch Transdermal <User Schedule>  dextrose 5% with potassium chloride 20 mEq/L 1000 milliLiter(s) (50 mL/Hr) IV Continuous <Continuous>  hydrALAZINE Injectable 10 milliGRAM(s) IV Push every 6 hours  valproate sodium  IVPB 500 milliGRAM(s) IV Intermittent every 8 hours      Allergies    &quot; NATURAL RUBBER&quot; (Other)  latex (Other)  penicillins (Unknown)    Intolerances    SOCIAL HISTORY:  Unknown   FAMILY HISTORY:  Unknown       Vital Signs Last 24 Hrs  T(C): 36.7 (08 Aug 2023 14:42), Max: 37.1 (08 Aug 2023 05:29)  T(F): 98.1 (08 Aug 2023 14:42), Max: 98.8 (08 Aug 2023 05:29)  HR: 96 (08 Aug 2023 16:17) (86 - 121)  BP: 141/63 (08 Aug 2023 16:17) (109/66 - 182/82)  BP(mean): --  RR: 18 (08 Aug 2023 16:17) (16 - 19)  SpO2: 98% (08 Aug 2023 16:17) (97% - 100%)    Parameters below as of 08 Aug 2023 14:42  Patient On (Oxygen Delivery Method): room air      I&O's Summary    07 Aug 2023 07:01  -  08 Aug 2023 07:00  --------------------------------------------------------  IN: 0 mL / OUT: 550 mL / NET: -550 mL        LABS:                        9.0    11.00 )-----------( 286      ( 08 Aug 2023 05:15 )             28.9     08-08    140  |  111<H>  |  22<H>  ----------------------------<  72  3.6   |  18<L>  |  1.47<H>    Ca    8.9      08 Aug 2023 05:15  Phos  3.9     08-08  Mg     2.3     08-08    TPro  7.0  /  Alb  2.3<L>  /  TBili  0.3  /  DBili  x   /  AST  15  /  ALT  13  /  AlkPhos  83  08-08    PT/INR - ( 08 Aug 2023 10:09 )   PT: 12.5 sec;   INR: 1.10 ratio         PTT - ( 08 Aug 2023 10:09 )  PTT:29.9 sec  Urinalysis Basic - ( 08 Aug 2023 05:15 )    Color: x / Appearance: x / SG: x / pH: x  Gluc: 72 mg/dL / Ketone: x  / Bili: x / Urobili: x   Blood: x / Protein: x / Nitrite: x   Leuk Esterase: x / RBC: x / WBC x   Sq Epi: x / Non Sq Epi: x / Bacteria: x      CAPILLARY BLOOD GLUCOSE    POCT Blood Glucose.: 76 mg/dL (08 Aug 2023 11:46)  POCT Blood Glucose.: 136 mg/dL (08 Aug 2023 06:31)  POCT Blood Glucose.: 170 mg/dL (08 Aug 2023 00:19)    LIVER FUNCTIONS - ( 08 Aug 2023 05:15 )  Alb: 2.3 g/dL / Pro: 7.0 g/dL / ALK PHOS: 83 U/L / ALT: 13 U/L DA / AST: 15 U/L / GGT: x               RADIOLOGY & ADDITIONAL STUDIES:  < from: CT Abdomen and Pelvis No Cont (08.04.23 @ 20:43) >  FINDINGS:  LOWER CHEST: The left ventricle appears asymmetrically enlarged.    Moderate atherosclerotic calcification of the left anterior descending   coronary artery.    LIVER: Within normal limits.  BILE DUCTS: Normal caliber.  GALLBLADDER: Vicarious excretion of IV contrast material in the   gallbladder lumen.  SPLEEN: Within normal limits.  PANCREAS: Within normal limits.  ADRENALS: Within normal limits.  KIDNEYS/URETERS: Cortical medullary differentiation is visualized in both   kidneys, suspicious for IV contrast retention.    BLADDER: Within normal limits.  REPRODUCTIVE ORGANS: Hysterectomy.  The ovaries are not visualized.    BOWEL: No bowel obstruction. Appendix is normal.  Oral contrast in the   colon.  PERITONEUM: No ascites.  VESSELS: Mild atheroscleroticcalcification of the aortoiliac tree.  No   abdominal aortic aneurysm.  Infrarenal IVC filter in place.  RETROPERITONEUM/LYMPH NODES: No lymphadenopathy.  ABDOMINAL WALL: Trace subcutaneous edema in the flanks and proximal   thighs. Small fat-containing umbilical hernia.  Vertical midline incision   in the lower ventral abdominal wall.  BONES: Mild degenerative changes in the spine and hips.    IMPRESSION:  No hiatal hernia.    No evidence of bowel obstruction, active inflammatory process or   intra-abdominal source for infection, within limits of noncontrast CT   technique.    There is vicarious excretion of contrast in the gallbladder.  There is   suspicion for contrast retention in both kidneys.  The possibility of   contrast-induced nephropathy and/or acute kidney injury should be   excluded..      --- End of Report ---      < end of copied text >   Attending: Dr. Mcadams      HPI:  77 female from [facility] PMHx HTN, HLD, CVA (w/ residual aphasia and R sided hemiparesis/plegia), PSHx Hysterectomy, admitted to medical services w/ altered mental status. Surgical consult called for Gastrostomy tube placement. GI unable to place one today. Pt poor historian, unable to obtain hx, ROS limited.        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  Seizures  Chronic constipation      MEDICATIONS  (STANDING):  cloNIDine Patch 0.2 mG/24Hr(s) 1 patch Transdermal <User Schedule>  dextrose 5% with potassium chloride 20 mEq/L 1000 milliLiter(s) (50 mL/Hr) IV Continuous <Continuous>  hydrALAZINE Injectable 10 milliGRAM(s) IV Push every 6 hours  valproate sodium  IVPB 500 milliGRAM(s) IV Intermittent every 8 hours      Allergies    &quot; NATURAL RUBBER&quot; (Other)  latex (Other)  penicillins (Unknown)    Intolerances    SOCIAL HISTORY:  Unknown   FAMILY HISTORY:  Unknown       Vital Signs Last 24 Hrs  T(C): 36.7 (08 Aug 2023 14:42), Max: 37.1 (08 Aug 2023 05:29)  T(F): 98.1 (08 Aug 2023 14:42), Max: 98.8 (08 Aug 2023 05:29)  HR: 96 (08 Aug 2023 16:17) (86 - 121)  BP: 141/63 (08 Aug 2023 16:17) (109/66 - 182/82)  BP(mean): --  RR: 18 (08 Aug 2023 16:17) (16 - 19)  SpO2: 98% (08 Aug 2023 16:17) (97% - 100%)    Parameters below as of 08 Aug 2023 14:42  Patient On (Oxygen Delivery Method): room air      I&O's Summary    07 Aug 2023 07:01  -  08 Aug 2023 07:00  --------------------------------------------------------  IN: 0 mL / OUT: 550 mL / NET: -550 mL        LABS:                        9.0    11.00 )-----------( 286      ( 08 Aug 2023 05:15 )             28.9     08-08    140  |  111<H>  |  22<H>  ----------------------------<  72  3.6   |  18<L>  |  1.47<H>    Ca    8.9      08 Aug 2023 05:15  Phos  3.9     08-08  Mg     2.3     08-08    TPro  7.0  /  Alb  2.3<L>  /  TBili  0.3  /  DBili  x   /  AST  15  /  ALT  13  /  AlkPhos  83  08-08    PT/INR - ( 08 Aug 2023 10:09 )   PT: 12.5 sec;   INR: 1.10 ratio         PTT - ( 08 Aug 2023 10:09 )  PTT:29.9 sec  Urinalysis Basic - ( 08 Aug 2023 05:15 )    Color: x / Appearance: x / SG: x / pH: x  Gluc: 72 mg/dL / Ketone: x  / Bili: x / Urobili: x   Blood: x / Protein: x / Nitrite: x   Leuk Esterase: x / RBC: x / WBC x   Sq Epi: x / Non Sq Epi: x / Bacteria: x      CAPILLARY BLOOD GLUCOSE    POCT Blood Glucose.: 76 mg/dL (08 Aug 2023 11:46)  POCT Blood Glucose.: 136 mg/dL (08 Aug 2023 06:31)  POCT Blood Glucose.: 170 mg/dL (08 Aug 2023 00:19)    LIVER FUNCTIONS - ( 08 Aug 2023 05:15 )  Alb: 2.3 g/dL / Pro: 7.0 g/dL / ALK PHOS: 83 U/L / ALT: 13 U/L DA / AST: 15 U/L / GGT: x               RADIOLOGY & ADDITIONAL STUDIES:  < from: CT Abdomen and Pelvis No Cont (08.04.23 @ 20:43) >  FINDINGS:  LOWER CHEST: The left ventricle appears asymmetrically enlarged.    Moderate atherosclerotic calcification of the left anterior descending   coronary artery.    LIVER: Within normal limits.  BILE DUCTS: Normal caliber.  GALLBLADDER: Vicarious excretion of IV contrast material in the   gallbladder lumen.  SPLEEN: Within normal limits.  PANCREAS: Within normal limits.  ADRENALS: Within normal limits.  KIDNEYS/URETERS: Cortical medullary differentiation is visualized in both   kidneys, suspicious for IV contrast retention.    BLADDER: Within normal limits.  REPRODUCTIVE ORGANS: Hysterectomy.  The ovaries are not visualized.    BOWEL: No bowel obstruction. Appendix is normal.  Oral contrast in the   colon.  PERITONEUM: No ascites.  VESSELS: Mild atheroscleroticcalcification of the aortoiliac tree.  No   abdominal aortic aneurysm.  Infrarenal IVC filter in place.  RETROPERITONEUM/LYMPH NODES: No lymphadenopathy.  ABDOMINAL WALL: Trace subcutaneous edema in the flanks and proximal   thighs. Small fat-containing umbilical hernia.  Vertical midline incision   in the lower ventral abdominal wall.  BONES: Mild degenerative changes in the spine and hips.    IMPRESSION:  No hiatal hernia.    No evidence of bowel obstruction, active inflammatory process or   intra-abdominal source for infection, within limits of noncontrast CT   technique.    There is vicarious excretion of contrast in the gallbladder.  There is   suspicion for contrast retention in both kidneys.  The possibility of   contrast-induced nephropathy and/or acute kidney injury should be   excluded..      --- End of Report ---      < end of copied text >   Attending: Dr. Mcadams      HPI:  77 female from [facility] PMHx HTN, HLD, CVA (w/ residual aphasia and R sided hemiparesis/plegia), PSHx Hysterectomy, admitted to medical services w/ altered mental status. Surgical consult called for Gastrostomy tube placement. GI unable to place one today. Pt poor historian, unable to obtain hx, ROS limited.        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  Seizures  Chronic constipation      MEDICATIONS  (STANDING):  cloNIDine Patch 0.2 mG/24Hr(s) 1 patch Transdermal <User Schedule>  dextrose 5% with potassium chloride 20 mEq/L 1000 milliLiter(s) (50 mL/Hr) IV Continuous <Continuous>  hydrALAZINE Injectable 10 milliGRAM(s) IV Push every 6 hours  valproate sodium  IVPB 500 milliGRAM(s) IV Intermittent every 8 hours      Allergies    &quot; NATURAL RUBBER&quot; (Other)  latex (Other)  penicillins (Unknown)    Intolerances    SOCIAL HISTORY:  Unknown   FAMILY HISTORY:  Unknown       Vital Signs Last 24 Hrs  T(C): 36.7 (08 Aug 2023 14:42), Max: 37.1 (08 Aug 2023 05:29)  T(F): 98.1 (08 Aug 2023 14:42), Max: 98.8 (08 Aug 2023 05:29)  HR: 96 (08 Aug 2023 16:17) (86 - 121)  BP: 141/63 (08 Aug 2023 16:17) (109/66 - 182/82)  BP(mean): --  RR: 18 (08 Aug 2023 16:17) (16 - 19)  SpO2: 98% (08 Aug 2023 16:17) (97% - 100%)    Parameters below as of 08 Aug 2023 14:42  Patient On (Oxygen Delivery Method): room air      I&O's Summary    07 Aug 2023 07:01  -  08 Aug 2023 07:00  --------------------------------------------------------  IN: 0 mL / OUT: 550 mL / NET: -550 mL        LABS:                        9.0    11.00 )-----------( 286      ( 08 Aug 2023 05:15 )             28.9     08-08    140  |  111<H>  |  22<H>  ----------------------------<  72  3.6   |  18<L>  |  1.47<H>    Ca    8.9      08 Aug 2023 05:15  Phos  3.9     08-08  Mg     2.3     08-08    TPro  7.0  /  Alb  2.3<L>  /  TBili  0.3  /  DBili  x   /  AST  15  /  ALT  13  /  AlkPhos  83  08-08    PT/INR - ( 08 Aug 2023 10:09 )   PT: 12.5 sec;   INR: 1.10 ratio         PTT - ( 08 Aug 2023 10:09 )  PTT:29.9 sec  Urinalysis Basic - ( 08 Aug 2023 05:15 )    Color: x / Appearance: x / SG: x / pH: x  Gluc: 72 mg/dL / Ketone: x  / Bili: x / Urobili: x   Blood: x / Protein: x / Nitrite: x   Leuk Esterase: x / RBC: x / WBC x   Sq Epi: x / Non Sq Epi: x / Bacteria: x      CAPILLARY BLOOD GLUCOSE    POCT Blood Glucose.: 76 mg/dL (08 Aug 2023 11:46)  POCT Blood Glucose.: 136 mg/dL (08 Aug 2023 06:31)  POCT Blood Glucose.: 170 mg/dL (08 Aug 2023 00:19)    LIVER FUNCTIONS - ( 08 Aug 2023 05:15 )  Alb: 2.3 g/dL / Pro: 7.0 g/dL / ALK PHOS: 83 U/L / ALT: 13 U/L DA / AST: 15 U/L / GGT: x               RADIOLOGY & ADDITIONAL STUDIES:  < from: CT Abdomen and Pelvis No Cont (08.04.23 @ 20:43) >  FINDINGS:  LOWER CHEST: The left ventricle appears asymmetrically enlarged.    Moderate atherosclerotic calcification of the left anterior descending   coronary artery.    LIVER: Within normal limits.  BILE DUCTS: Normal caliber.  GALLBLADDER: Vicarious excretion of IV contrast material in the   gallbladder lumen.  SPLEEN: Within normal limits.  PANCREAS: Within normal limits.  ADRENALS: Within normal limits.  KIDNEYS/URETERS: Cortical medullary differentiation is visualized in both   kidneys, suspicious for IV contrast retention.    BLADDER: Within normal limits.  REPRODUCTIVE ORGANS: Hysterectomy.  The ovaries are not visualized.    BOWEL: No bowel obstruction. Appendix is normal.  Oral contrast in the   colon.  PERITONEUM: No ascites.  VESSELS: Mild atheroscleroticcalcification of the aortoiliac tree.  No   abdominal aortic aneurysm.  Infrarenal IVC filter in place.  RETROPERITONEUM/LYMPH NODES: No lymphadenopathy.  ABDOMINAL WALL: Trace subcutaneous edema in the flanks and proximal   thighs. Small fat-containing umbilical hernia.  Vertical midline incision   in the lower ventral abdominal wall.  BONES: Mild degenerative changes in the spine and hips.    IMPRESSION:  No hiatal hernia.    No evidence of bowel obstruction, active inflammatory process or   intra-abdominal source for infection, within limits of noncontrast CT   technique.    There is vicarious excretion of contrast in the gallbladder.  There is   suspicion for contrast retention in both kidneys.  The possibility of   contrast-induced nephropathy and/or acute kidney injury should be   excluded..      --- End of Report ---      < end of copied text >

## 2023-08-08 NOTE — PROGRESS NOTE ADULT - PROBLEM SELECTOR PLAN 1
Pt. has been NPO for a few days on fluids.   Consulted with GI  will place Peg tube  on 8/8  Tried placing NG tube with fail and surgery tried and failed as well on 8/4  Ordered a ct abdomen and pelvis -> no sign of hiatal hernia.  pt. has oral thrush but NPO

## 2023-08-08 NOTE — CHART NOTE - NSCHARTNOTEFT_GEN_A_CORE
Patient underwent EGD with PEG placement today however unable to place PEG successfully due to ? too much subcutaneous fat vs intervening transverse colon in front of stomach, needle could not be passed into stomach, also notable for single inflammatory nodule in stomach. Recommend IR vs surgical placement of gastrostomy tube. Primary team aware.    GI will sign off at this time.  Thank you for involving us in the care of Ms. Radha Thomas.  Please re-consult GI PRN.

## 2023-08-08 NOTE — PROGRESS NOTE ADULT - PROBLEM SELECTOR PLAN 2
Pt. has bleeding form right   f/u with nuerology consult    CT HEAD: Old infarctions in the LEFT corona radiata, LEFT thalamus, LEFT   external capsule and LEFT posterior frontal lobe with compensatory   enlargement of the LEFT lateral ventricle. Wallerian degeneration in the   LEFT cerebral peduncle. Old infarction in the RIGHT cerebellum.  No   abnormal enhancement is seen.    bleeding is slowing down bu some clear fluid is coming out of it.

## 2023-08-08 NOTE — PROGRESS NOTE ADULT - SUBJECTIVE AND OBJECTIVE BOX
INTERVAL HPI/OVERNIGHT EVENTS:  Patient seen,events noticed ,s/p peg placement  VITAL SIGNS:  T(F): 98.8 (08-08-23 @ 05:29)  HR: 98 (08-08-23 @ 05:29)  BP: 148/76 (08-08-23 @ 05:29)  RR: 18 (08-08-23 @ 05:29)  SpO2: 98% (08-08-23 @ 05:29)  Wt(kg): --    PHYSICAL EXAM:  awake  Constitutional:  Eyes:  ENMT:perrla  Neck:  Respiratory:clear  Cardiovascular:s12s,m-none  Gastrointestinal:soft,bs pos  Extremities:  Vascular:  Neurological:no focal deficit  Musculoskeletal:    MEDICATIONS  (STANDING):  cloNIDine Patch 0.2 mG/24Hr(s) 1 patch Transdermal <User Schedule>  dextrose 5% with potassium chloride 20 mEq/L 1000 milliLiter(s) (50 mL/Hr) IV Continuous <Continuous>  hydrALAZINE Injectable 10 milliGRAM(s) IV Push every 6 hours  valproate sodium  IVPB 500 milliGRAM(s) IV Intermittent every 8 hours    MEDICATIONS  (PRN):      Allergies    &quot; NATURAL RUBBER&quot; (Other)  latex (Other)  penicillins (Unknown)    Intolerances        LABS:                        9.0    11.00 )-----------( 286      ( 08 Aug 2023 05:15 )             28.9     08-08    140  |  111<H>  |  22<H>  ----------------------------<  72  3.6   |  18<L>  |  1.47<H>    Ca    8.9      08 Aug 2023 05:15  Phos  3.9     08-08  Mg     2.3     08-08    TPro  7.0  /  Alb  2.3<L>  /  TBili  0.3  /  DBili  x   /  AST  15  /  ALT  13  /  AlkPhos  83  08-08    PT/INR - ( 07 Aug 2023 06:16 )   PT: 11.9 sec;   INR: 1.05 ratio         PTT - ( 07 Aug 2023 06:16 )  PTT:34.6 sec  Urinalysis Basic - ( 08 Aug 2023 05:15 )    Color: x / Appearance: x / SG: x / pH: x  Gluc: 72 mg/dL / Ketone: x  / Bili: x / Urobili: x   Blood: x / Protein: x / Nitrite: x   Leuk Esterase: x / RBC: x / WBC x   Sq Epi: x / Non Sq Epi: x / Bacteria: x        Assessment and Plan:   · Assessment	  Patient is a 77 female from [facility] PMHx HTN, HLD, CVA (w/ residual aphasia and R sided hemiparesis/plegia) who was sent to the hospital due to altered mental status. Patient is being admitted to medicine for further work up and rule out stroke vs encephalopathy (metabolic vs infectious).        Nutritional Assessment:  · Nutritional Assessment	This patient has been assessed with a concern for Malnutrition and has been determined to have a diagnosis/diagnoses of Severe protein-calorie malnutrition.    This patient is being managed with:   Diet NPO-  Entered: Jul 27 2023  7:28PM     Problem/Plan - 1:  ·  Problem: Adult failure to thrive.   Consulted with GI  will place Peg tube  on 8/8  s/p peg placemet  will initiate feeding     Problem/Plan - 2:  ·  Problem: Ear bleeding, right.   ·  Plan: Pt. has bleeding form right ear.  F/u consult with ENT.     Problem/Plan - 3:  ·  Problem: CVA (cerebrovascular accident).   ·  Plan: altered mental status  HEAD CT: Chronic left MCA territory infarction.  CT PERFUSION demonstrated: Perfusion abnormality corresponding to the chronically infarcted left MCA territory. INFARCT CORE: 57 mL. TISSUE AT RISK: 236 mL.  CTA COW and  CTA NECK: neg   Failed dysphagia and speech and swallow. -> palliative will talk to family about peg tube placement. Family does not want DNR/DNI.     Problem/Plan - 4:  ·  Problem: Acute encephalopathy.   ·  Plan: P/w altered mental status   pt. was nonverbal   - pt. responded yes to pain when touching right ear. It has been the first word heard by anyone since admission.     Problem/Plan - 5:  ·  Problem: Hypertension.   ·  Plan: Cardiology consulted Dr. Dunn->  clonidine patch .2 mg weekly.     Problem/Plan - 6:  ·  Problem: SUSAN (acute kidney injury).   ·  Plan: As per Nephrology   -renal ultrasound  and bladder scan  was negative  adjust meds for GFR.     Problem/Plan - 7:  ·  Problem: Urinary retention.  ·  Plan: pt. has no urinated since this morning.   bladder scan showed over 400   ordered straight cath  follow up since 6 hours after.     Problem/Plan - 8:  ·  Problem: Hyperlipidemia.  ·  Plan: F/U Lipid profile.   Start atorvastatin when passes SnS.     Problem/Plan - 9:  ·  Problem: Seizure.  ·  Plan: H/O seizures.   C/w Depacon.  Neurology consulted - Dr. Whitney-  > ordered EEG-potential epileptogenic factors in the left posterior temporal lobe and focal cerebral dysfunction in L  hemisphere.     Problem/Plan - 10:  ·  Problem: Prophylactic measure.   ·  Plan; Lovenox. (renally dosed).  d/c planning back to [facility] once cleared    FOR FURHTER COVERAGE TILL 8/20 PLEASE CALL  DR DUPONT     INTERVAL HPI/OVERNIGHT EVENTS:  Patient seen,events noticed ,s/p peg placement  VITAL SIGNS:  T(F): 98.8 (08-08-23 @ 05:29)  HR: 98 (08-08-23 @ 05:29)  BP: 148/76 (08-08-23 @ 05:29)  RR: 18 (08-08-23 @ 05:29)  SpO2: 98% (08-08-23 @ 05:29)  Wt(kg): --    PHYSICAL EXAM:  awake  Constitutional:  Eyes:  ENMT:perrla  Neck:  Respiratory:clear  Cardiovascular:s12s,m-none  Gastrointestinal:soft,bs pos  Extremities:  Vascular:  Neurological:no focal deficit  Musculoskeletal:    MEDICATIONS  (STANDING):  cloNIDine Patch 0.2 mG/24Hr(s) 1 patch Transdermal <User Schedule>  dextrose 5% with potassium chloride 20 mEq/L 1000 milliLiter(s) (50 mL/Hr) IV Continuous <Continuous>  hydrALAZINE Injectable 10 milliGRAM(s) IV Push every 6 hours  valproate sodium  IVPB 500 milliGRAM(s) IV Intermittent every 8 hours    MEDICATIONS  (PRN):      Allergies    &quot; NATURAL RUBBER&quot; (Other)  latex (Other)  penicillins (Unknown)    Intolerances        LABS:                        9.0    11.00 )-----------( 286      ( 08 Aug 2023 05:15 )             28.9     08-08    140  |  111<H>  |  22<H>  ----------------------------<  72  3.6   |  18<L>  |  1.47<H>    Ca    8.9      08 Aug 2023 05:15  Phos  3.9     08-08  Mg     2.3     08-08    TPro  7.0  /  Alb  2.3<L>  /  TBili  0.3  /  DBili  x   /  AST  15  /  ALT  13  /  AlkPhos  83  08-08    PT/INR - ( 07 Aug 2023 06:16 )   PT: 11.9 sec;   INR: 1.05 ratio         PTT - ( 07 Aug 2023 06:16 )  PTT:34.6 sec  Urinalysis Basic - ( 08 Aug 2023 05:15 )    Color: x / Appearance: x / SG: x / pH: x  Gluc: 72 mg/dL / Ketone: x  / Bili: x / Urobili: x   Blood: x / Protein: x / Nitrite: x   Leuk Esterase: x / RBC: x / WBC x   Sq Epi: x / Non Sq Epi: x / Bacteria: x        Assessment and Plan:   · Assessment	  Patient is a 77 female from [facility] PMHx HTN, HLD, CVA (w/ residual aphasia and R sided hemiparesis/plegia) who was sent to the hospital due to altered mental status. Patient is being admitted to medicine for further work up and rule out stroke vs encephalopathy (metabolic vs infectious).        Nutritional Assessment:  · Nutritional Assessment	This patient has been assessed with a concern for Malnutrition and has been determined to have a diagnosis/diagnoses of Severe protein-calorie malnutrition.    This patient is being managed with:   Diet NPO-  Entered: Jul 27 2023  7:28PM     Problem/Plan - 1:  ·  Problem: Adult failure to thrive.   Consulted with GI  will place Peg tube  on 8/8  s/p peg placemet  will initiate feeding     Problem/Plan - 2:  ·  Problem: Ear bleeding, right.   ·  Plan: Pt. has bleeding form right ear.  F/u consult with ENT.     Problem/Plan - 3:  ·  Problem: CVA (cerebrovascular accident).   ·  Plan: altered mental status  HEAD CT: Chronic left MCA territory infarction.  CT PERFUSION demonstrated: Perfusion abnormality corresponding to the chronically infarcted left MCA territory. INFARCT CORE: 57 mL. TISSUE AT RISK: 236 mL.  CTA COW and  CTA NECK: neg   Failed dysphagia and speech and swallow. -> palliative will talk to family about peg tube placement. Family does not want DNR/DNI.     Problem/Plan - 4:  ·  Problem: Acute encephalopathy.   ·  Plan: P/w altered mental status   pt. was nonverbal   - pt. responded yes to pain when touching right ear. It has been the first word heard by anyone since admission.     Problem/Plan - 5:  ·  Problem: Hypertension.   ·  Plan: Cardiology consulted Dr. Dunn->  clonidine patch .2 mg weekly.     Problem/Plan - 6:  ·  Problem: SUSAN (acute kidney injury).   ·  Plan: As per Nephrology   -renal ultrasound  and bladder scan  was negative  adjust meds for GFR.     Problem/Plan - 7:  ·  Problem: Urinary retention.  ·  Plan: pt. has no urinated since this morning.   bladder scan showed over 400   ordered straight cath  follow up since 6 hours after.     Problem/Plan - 8:  ·  Problem: Hyperlipidemia.  ·  Plan: F/U Lipid profile.   Start atorvastatin when passes SnS.     Problem/Plan - 9:  ·  Problem: Seizure.  ·  Plan: H/O seizures.   C/w Depacon.  Neurology consulted - Dr. Whitney-  > ordered EEG-potential epileptogenic factors in the left posterior temporal lobe and focal cerebral dysfunction in L  hemisphere.     Problem/Plan - 10:  ·  Problem: Prophylactic measure.   ·  Plan; Lovenox. (renally dosed).  d/c planning back to [facility] once cleared    FOR FURHTER COVERAGE TILL 8/20 PLEASE CALL  DR DUPONT     INTERVAL HPI/OVERNIGHT EVENTS:  Patient seen,events noticed ,s/p peg placement  VITAL SIGNS:  T(F): 98.8 (08-08-23 @ 05:29)  HR: 98 (08-08-23 @ 05:29)  BP: 148/76 (08-08-23 @ 05:29)  RR: 18 (08-08-23 @ 05:29)  SpO2: 98% (08-08-23 @ 05:29)  Wt(kg): --    PHYSICAL EXAM:  awake  Constitutional:  Eyes:  ENMT:perrla  Neck:  Respiratory:clear  Cardiovascular:s12s,m-none  Gastrointestinal:soft,bs pos  Extremities:  Vascular:  Neurological:no focal deficit  Musculoskeletal:    MEDICATIONS  (STANDING):  cloNIDine Patch 0.2 mG/24Hr(s) 1 patch Transdermal <User Schedule>  dextrose 5% with potassium chloride 20 mEq/L 1000 milliLiter(s) (50 mL/Hr) IV Continuous <Continuous>  hydrALAZINE Injectable 10 milliGRAM(s) IV Push every 6 hours  valproate sodium  IVPB 500 milliGRAM(s) IV Intermittent every 8 hours    MEDICATIONS  (PRN):      Allergies    &quot; NATURAL RUBBER&quot; (Other)  latex (Other)  penicillins (Unknown)    Intolerances        LABS:                        9.0    11.00 )-----------( 286      ( 08 Aug 2023 05:15 )             28.9     08-08    140  |  111<H>  |  22<H>  ----------------------------<  72  3.6   |  18<L>  |  1.47<H>    Ca    8.9      08 Aug 2023 05:15  Phos  3.9     08-08  Mg     2.3     08-08    TPro  7.0  /  Alb  2.3<L>  /  TBili  0.3  /  DBili  x   /  AST  15  /  ALT  13  /  AlkPhos  83  08-08    PT/INR - ( 07 Aug 2023 06:16 )   PT: 11.9 sec;   INR: 1.05 ratio         PTT - ( 07 Aug 2023 06:16 )  PTT:34.6 sec  Urinalysis Basic - ( 08 Aug 2023 05:15 )    Color: x / Appearance: x / SG: x / pH: x  Gluc: 72 mg/dL / Ketone: x  / Bili: x / Urobili: x   Blood: x / Protein: x / Nitrite: x   Leuk Esterase: x / RBC: x / WBC x   Sq Epi: x / Non Sq Epi: x / Bacteria: x        Assessment and Plan:   · Assessment	  Patient is a 77 female from [facility] PMHx HTN, HLD, CVA (w/ residual aphasia and R sided hemiparesis/plegia) who was sent to the hospital due to altered mental status. Patient is being admitted to medicine for further work up and rule out stroke vs encephalopathy (metabolic vs infectious).        Nutritional Assessment:  · Nutritional Assessment	This patient has been assessed with a concern for Malnutrition and has been determined to have a diagnosis/diagnoses of Severe protein-calorie malnutrition.    This patient is being managed with:   Diet NPO-  Entered: Jul 27 2023  7:28PM     Problem/Plan - 1:  ·  Problem: Adult failure to thrive.   Consulted with GI  will place Peg tube  on 8/8  s/p peg placemet  will initiate feeding     Problem/Plan - 2:  ·  Problem: Ear bleeding, right.   ·  Plan: Pt. has bleeding form right ear.  F/u consult with ENT.     Problem/Plan - 3:  ·  Problem: CVA (cerebrovascular accident).   ·  Plan: altered mental status  HEAD CT: Chronic left MCA territory infarction.  CT PERFUSION demonstrated: Perfusion abnormality corresponding to the chronically infarcted left MCA territory. INFARCT CORE: 57 mL. TISSUE AT RISK: 236 mL.  CTA COW and  CTA NECK: neg   Failed dysphagia and speech and swallow. -> palliative will talk to family about peg tube placement. Family does not want DNR/DNI.     Problem/Plan - 4:  ·  Problem: Acute encephalopathy.   ·  Plan: P/w altered mental status   pt. was nonverbal   - pt. responded yes to pain when touching right ear. It has been the first word heard by anyone since admission.     Problem/Plan - 5:  ·  Problem: Hypertension.   ·  Plan: Cardiology consulted Dr. Dunn->  clonidine patch .2 mg weekly.     Problem/Plan - 6:  ·  Problem: SUSAN (acute kidney injury).   ·  Plan: As per Nephrology   -renal ultrasound  and bladder scan  was negative  adjust meds for GFR.     Problem/Plan - 7:  ·  Problem: Urinary retention.  ·  Plan: pt. has no urinated since this morning.   bladder scan showed over 400   ordered straight cath  follow up since 6 hours after.     Problem/Plan - 8:  ·  Problem: Hyperlipidemia.  ·  Plan: F/U Lipid profile.   Start atorvastatin when passes SnS.     Problem/Plan - 9:  ·  Problem: Seizure.  ·  Plan: H/O seizures.   C/w Depacon.  Neurology consulted - Dr. Whitney-  > ordered EEG-potential epileptogenic factors in the left posterior temporal lobe and focal cerebral dysfunction in L  hemisphere.     Problem/Plan - 10:  ·  Problem: Prophylactic measure.   ·  Plan; Lovenox. (renally dosed).  d/c planning back to [facility] once cleared    FOR FURHTER COVERAGE TILL 8/20 PLEASE CALL  DR DUPONT

## 2023-08-08 NOTE — PROGRESS NOTE ADULT - ASSESSMENT
Patient is a 77 female from [facility] PMHx HTN, HLD, CVA (w/ residual aphasia and R sided hemiparesis/plegia) who was sent to the hospital due to altered mental status. Patient is being admitted to medicine for further work up and rule out stroke vs encephalopathy (metabolic vs infectious).  Patient is a 77 female from [facility] PMHx HTN, HLD, CVA (w/ residual aphasia and R sided hemiparesis/plegia) who was sent to the hospital due to altered mental status. Patient is being admitted to medicine for further work up and rule out stroke vs encephalopathy (metabolic vs infectious).  Patient is a 77 female from [facility] PMHx HTN, HLD, CVA (w/ residual aphasia and R sided hemiparesis/plegia) who was sent to the hospital due to altered mental status. Patient is being admitted to medicine for further work up and rule out stroke vs encephalopathy (metabolic vs infectious).

## 2023-08-08 NOTE — CONSULT NOTE ADULT - ASSESSMENT
78 y/o Female w/ Failure to thrive; hx CVA   Unsuccessful PEG placement w/ GI     -Plan for Open Gastrostomy Tube placement 8/9  -NPO  -IVF   -Pre op labs in AM    -Care as per medical team   -D/w Dr. Mcadams and agrees   76 y/o Female w/ Failure to thrive; hx CVA   Unsuccessful PEG placement w/ GI     -Plan for Open Gastrostomy Tube placement 8/9  -NPO  -IVF   -Pre op labs in AM    -Care as per medical team   -D/w Dr. Mcadams and agrees

## 2023-08-08 NOTE — PROGRESS NOTE ADULT - PROBLEM SELECTOR PLAN 4
P/w altered mental status   pt. was nonverbal   - pt. responded yes to pain when touching right ear. It has been the first word heard by anyone since admission.

## 2023-08-08 NOTE — PROGRESS NOTE ADULT - SUBJECTIVE AND OBJECTIVE BOX
PHONE CALL FROM 'S OFFICE

Received phone call from Cierra Johnston who stated she was the supervisor from the 's 
office. Per Cierra, a deputy will not be available to received report for approximately an 
hour. Awaiting return phone call. [   ] ICU                                          [   ] CCU                                      [ X  ] Medical Floor      Patient is a 77 year old female with dysphagia. GI consulted for Peg tube placement.     Patient is a 77 female from [facility] with past medical history significant for HTN, HLD, CVA (w/ residual aphasia and R sided hemiparesis/plegia) who was sent to the emergency room with for altered mental status. Patient is not able to provide any history. Patient is lying down in bed, awake and alert. However, patient does not speak, is not able to follow commands and does not turn face or move eyes when her name is called. Patient is admitted with CVA. Now patient with dysphagia, poor po intake, failure to thrive and weight loss. No abdominal pain, nausea, vomiting, hematemesis, hematochezia, melena, fever, chills, chest pain, SOB, cough, hematuria, dysuria  or diarrhea reported.      Patient is comfortable. No new complaints reported, No abdominal pain, N/V, hematemesis, hematochezia, melena, fever, chills, chest pain, SOB, cough or diarrhea reported.      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 1000 milliLiter(s) (70 mL/Hr) IV Continuous <Continuous>  enoxaparin Injectable 30 milliGRAM(s) SubCutaneous every 24 hours  hydrALAZINE Injectable 10 milliGRAM(s) IV Push every 6 hours  potassium chloride  10 mEq/100 mL IVPB 10 milliEquivalent(s) IV Intermittent every 1 hour  valproate sodium  IVPB 500 milliGRAM(s) IV Intermittent every 8 hours         SOCIAL HISTORY  Advanced Directives:       [ X ] Full Code       [  ] DNR  Marital Status:         [  ] M      [ X ] S      [  ] D       [  ] W  Children:       [ X ] Yes      [  ] No  Occupation:        [  ] Employed       [ X ] Unemployed       [  ] Retired  Diet:       [ X ] Regular       [  ] PEG feeding          [  ] NG tube feeding  Drug Use:           [ X ] Patient denied          [  ] Yes  Alcohol:           [ X ] No             [  ] Yes (socially)         [  ] Yes (chronic)  Tobacco:           [  ] Yes           [X  ] No      FAMILY HISTORY  [ X ] Heart Disease            [ X ] Diabetes             [ X ] HTN             [  ] Colon Cancer             [  ] Stomach Cancer              [  ] Pancreatic Cancer        VITALS  Vital Signs Last 24 Hrs  T(C): 36.7 (08 Aug 2023 14:42), Max: 37.1 (08 Aug 2023 05:29)  T(F): 98.1 (08 Aug 2023 14:42), Max: 98.8 (08 Aug 2023 05:29)  HR: 121 (08 Aug 2023 14:42) (86 - 121)  BP: 134/82 (08 Aug 2023 14:42) (134/82 - 182/82)   RR: 17 (08 Aug 2023 14:42) (16 - 19)  SpO2: 98% (08 Aug 2023 14:42) (98% - 100%)  Parameters below as of 08 Aug 2023 14:42  Patient On (Oxygen Delivery Method): room air       MEDICATIONS  (STANDING):  cloNIDine Patch 0.2 mG/24Hr(s) 1 patch Transdermal <User Schedule>  dextrose 5% with potassium chloride 20 mEq/L 1000 milliLiter(s) (50 mL/Hr) IV Continuous <Continuous>  hydrALAZINE Injectable 10 milliGRAM(s) IV Push every 6 hours  valproate sodium  IVPB 500 milliGRAM(s) IV Intermittent every 8 hours    MEDICATIONS  (PRN):                            9.0    11.00 )-----------( 286      ( 08 Aug 2023 05:15 )             28.9       08-08    140  |  111<H>  |  22<H>  ----------------------------<  72  3.6   |  18<L>  |  1.47<H>    Ca    8.9      08 Aug 2023 05:15  Phos  3.9     08-08  Mg     2.3     08-08    TPro  7.0  /  Alb  2.3<L>  /  TBili  0.3  /  DBili  x   /  AST  15  /  ALT  13  /  AlkPhos  83  08-08      PT/INR - ( 08 Aug 2023 10:09 )   PT: 12.5 sec;   INR: 1.10 ratio         PTT - ( 08 Aug 2023 10:09 )  PTT:29.9 sec [   ] ICU                                          [   ] CCU                                      [ X  ] Medical Floor      Patient is a 77 year old female with dysphagia. GI consulted for Peg tube placement.     Patient is a 77 female from [facility] with past medical history significant for HTN, HLD, CVA (w/ residual aphasia and R sided hemiparesis/plegia) who was sent to the emergency room with for altered mental status. Patient is not able to provide any history. Patient is lying down in bed, awake and alert. However, patient does not speak, is not able to follow commands and does not turn face or move eyes when her name is called. Patient is admitted with CVA. Now patient with dysphagia, poor po intake, failure to thrive and weight loss. No abdominal pain, nausea, vomiting, hematemesis, hematochezia, melena, fever, chills, chest pain, SOB, cough, hematuria, dysuria  or diarrhea reported.      Patient is comfortable. No new complaints reported, No abdominal pain, N/V, hematemesis, hematochezia, melena, fever, chills, chest pain, SOB, cough or diarrhea reported.      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 1000 milliLiter(s) (70 mL/Hr) IV Continuous <Continuous>  enoxaparin Injectable 30 milliGRAM(s) SubCutaneous every 24 hours  hydrALAZINE Injectable 10 milliGRAM(s) IV Push every 6 hours  potassium chloride  10 mEq/100 mL IVPB 10 milliEquivalent(s) IV Intermittent every 1 hour  valproate sodium  IVPB 500 milliGRAM(s) IV Intermittent every 8 hours         SOCIAL HISTORY  Advanced Directives:       [ X ] Full Code       [  ] DNR  Marital Status:         [  ] M      [ X ] S      [  ] D       [  ] W  Children:       [ X ] Yes      [  ] No  Occupation:        [  ] Employed       [ X ] Unemployed       [  ] Retired  Diet:       [ X ] Regular       [  ] PEG feeding          [  ] NG tube feeding  Drug Use:           [ X ] Patient denied          [  ] Yes  Alcohol:           [ X ] No             [  ] Yes (socially)         [  ] Yes (chronic)  Tobacco:           [  ] Yes           [X  ] No      FAMILY HISTORY  [ X ] Heart Disease            [ X ] Diabetes             [ X ] HTN             [  ] Colon Cancer             [  ] Stomach Cancer              [  ] Pancreatic Cancer        VITALS  Vital Signs Last 24 Hrs  T(C): 36.7 (08 Aug 2023 14:42), Max: 37.1 (08 Aug 2023 05:29)  T(F): 98.1 (08 Aug 2023 14:42), Max: 98.8 (08 Aug 2023 05:29)  HR: 121 (08 Aug 2023 14:42) (86 - 121)  BP: 134/82 (08 Aug 2023 14:42) (134/82 - 182/82)   RR: 17 (08 Aug 2023 14:42) (16 - 19)  SpO2: 98% (08 Aug 2023 14:42) (98% - 100%)  Parameters below as of 08 Aug 2023 14:42  Patient On (Oxygen Delivery Method): room air       MEDICATIONS  (STANDING):  cloNIDine Patch 0.2 mG/24Hr(s) 1 patch Transdermal <User Schedule>  dextrose 5% with potassium chloride 20 mEq/L 1000 milliLiter(s) (50 mL/Hr) IV Continuous <Continuous>  hydrALAZINE Injectable 10 milliGRAM(s) IV Push every 6 hours  valproate sodium  IVPB 500 milliGRAM(s) IV Intermittent every 8 hours    MEDICATIONS  (PRN):                            9.0    11.00 )-----------( 286      ( 08 Aug 2023 05:15 )             28.9       08-08    140  |  111<H>  |  22<H>  ----------------------------<  72  3.6   |  18<L>  |  1.47<H>    Ca    8.9      08 Aug 2023 05:15  Phos  3.9     08-08  Mg     2.3     08-08    TPro  7.0  /  Alb  2.3<L>  /  TBili  0.3  /  DBili  x   /  AST  15  /  ALT  13  /  AlkPhos  83  08-08      PT/INR - ( 08 Aug 2023 10:09 )   PT: 12.5 sec;   INR: 1.10 ratio         PTT - ( 08 Aug 2023 10:09 )  PTT:29.9 sec [   ] ICU                                          [   ] CCU                                      [ X  ] Medical Floor      Patient is a 77 year old female with dysphagia. GI consulted for Peg tube placement.     Patient is a 77 female from [facility] with past medical history significant for HTN, HLD, CVA (w/ residual aphasia and R sided hemiparesis/plegia) who was sent to the emergency room with for altered mental status. Patient is not able to provide any history. Patient is lying down in bed, awake and alert. However, patient does not speak, is not able to follow commands and does not turn face or move eyes when her name is called. Patient is admitted with CVA. Now patient with dysphagia, poor po intake, failure to thrive and weight loss. No abdominal pain, nausea, vomiting, hematemesis, hematochezia, melena, fever, chills, chest pain, SOB, cough, hematuria, dysuria  or diarrhea reported.      Patient is comfortable. No new complaints reported, No abdominal pain, N/V, hematemesis, hematochezia, melena, fever, chills, chest pain, SOB, cough or diarrhea reported.      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 1000 milliLiter(s) (70 mL/Hr) IV Continuous <Continuous>  enoxaparin Injectable 30 milliGRAM(s) SubCutaneous every 24 hours  hydrALAZINE Injectable 10 milliGRAM(s) IV Push every 6 hours  potassium chloride  10 mEq/100 mL IVPB 10 milliEquivalent(s) IV Intermittent every 1 hour  valproate sodium  IVPB 500 milliGRAM(s) IV Intermittent every 8 hours         SOCIAL HISTORY  Advanced Directives:       [ X ] Full Code       [  ] DNR  Marital Status:         [  ] M      [ X ] S      [  ] D       [  ] W  Children:       [ X ] Yes      [  ] No  Occupation:        [  ] Employed       [ X ] Unemployed       [  ] Retired  Diet:       [ X ] Regular       [  ] PEG feeding          [  ] NG tube feeding  Drug Use:           [ X ] Patient denied          [  ] Yes  Alcohol:           [ X ] No             [  ] Yes (socially)         [  ] Yes (chronic)  Tobacco:           [  ] Yes           [X  ] No      FAMILY HISTORY  [ X ] Heart Disease            [ X ] Diabetes             [ X ] HTN             [  ] Colon Cancer             [  ] Stomach Cancer              [  ] Pancreatic Cancer        VITALS  Vital Signs Last 24 Hrs  T(C): 36.7 (08 Aug 2023 14:42), Max: 37.1 (08 Aug 2023 05:29)  T(F): 98.1 (08 Aug 2023 14:42), Max: 98.8 (08 Aug 2023 05:29)  HR: 121 (08 Aug 2023 14:42) (86 - 121)  BP: 134/82 (08 Aug 2023 14:42) (134/82 - 182/82)   RR: 17 (08 Aug 2023 14:42) (16 - 19)  SpO2: 98% (08 Aug 2023 14:42) (98% - 100%)  Parameters below as of 08 Aug 2023 14:42  Patient On (Oxygen Delivery Method): room air       MEDICATIONS  (STANDING):  cloNIDine Patch 0.2 mG/24Hr(s) 1 patch Transdermal <User Schedule>  dextrose 5% with potassium chloride 20 mEq/L 1000 milliLiter(s) (50 mL/Hr) IV Continuous <Continuous>  hydrALAZINE Injectable 10 milliGRAM(s) IV Push every 6 hours  valproate sodium  IVPB 500 milliGRAM(s) IV Intermittent every 8 hours    MEDICATIONS  (PRN):                            9.0    11.00 )-----------( 286      ( 08 Aug 2023 05:15 )             28.9       08-08    140  |  111<H>  |  22<H>  ----------------------------<  72  3.6   |  18<L>  |  1.47<H>    Ca    8.9      08 Aug 2023 05:15  Phos  3.9     08-08  Mg     2.3     08-08    TPro  7.0  /  Alb  2.3<L>  /  TBili  0.3  /  DBili  x   /  AST  15  /  ALT  13  /  AlkPhos  83  08-08      PT/INR - ( 08 Aug 2023 10:09 )   PT: 12.5 sec;   INR: 1.10 ratio         PTT - ( 08 Aug 2023 10:09 )  PTT:29.9 sec

## 2023-08-08 NOTE — PROGRESS NOTE ADULT - ASSESSMENT
1. USSAN possible to MARISA and ATN  Renal sono shows no hdyro with b/l kidneys around 9.2cm  -Scr remains stable at 1.47mg/dL and continue IVFs since NPO. with renal recovery; at baseline scr.  -urinalysis shows blood with proteinuria; FeNa >1%, spot protein to creatinine ratio is 1.5gm  -Adjust meds to eGFR and avoid IV Gadolinium contrast,NSAIDs, and phosphate enema.  -Monitor I/O's daily.   -Monitor SMA daily.  2. Hypernatremia due to water deficit and insensible losses. Pt is clinically euvolemic.   -Na stable 135; on D5W with 20meq kcl at 50cc/hr since blood glucose is around 80s  -Monitor I/O's. Check Serum Na Daily. Avoid high solute intake diet and sodium bicarbonate infuse. Avoid overcorrection of NA (8-10meq/day)  3. 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  -previous Scr around 1.2 to 1.6mg/dL in Oct 2022.  -Keep patient euvolemic and renal diet  -Avoid Nephrotoxic Meds/ Agents such as (NSAIDs, IV contrast, Aminoglycosides such as gentamicin, -Gadolinium contrast, Phosphate containing enemas, etc..)  -Adjust Medications according to eGFR  4. HTN:   -bp is acceptable. continue bp meds  -titrate bp meds to keep sbp >110 and < 130  5. Mineral Bone Disease:  -improving phos so no need binders.  -PTH intact 289, will start calcitriol once phos improves.  6. Encephalopathy:  -on Rocephin  -Plan as per Neuro and primary team  -awaiting PEG and Plan as GI.  -NGT attempted but unsuccessful; CT AP ordered and shows no obstruction.  7. Hypokalemia:  -stable K. Kcl with fluids.  -give kcl repletion to keep K >3.5meq/L  -monitor K.     Discussed the assessment and plan with Primary Team/Nurse   1. SUSAN possible to MARISA and ATN  Renal sono shows no hdyro with b/l kidneys around 9.2cm  -Scr remains stable at 1.47mg/dL and continue IVFs since NPO. with renal recovery; at baseline scr.  -urinalysis shows blood with proteinuria; FeNa >1%, spot protein to creatinine ratio is 1.5gm  -Adjust meds to eGFR and avoid IV Gadolinium contrast,NSAIDs, and phosphate enema.  -Monitor I/O's daily.   -Monitor SMA daily.  2. Hypernatremia due to water deficit and insensible losses. Pt is clinically euvolemic.   -Na stable 135; on D5W with 20meq kcl at 50cc/hr since blood glucose is around 80s  -Monitor I/O's. Check Serum Na Daily. Avoid high solute intake diet and sodium bicarbonate infuse. Avoid overcorrection of NA (8-10meq/day)  3. 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  -previous Scr around 1.2 to 1.6mg/dL in Oct 2022.  -Keep patient euvolemic and renal diet  -Avoid Nephrotoxic Meds/ Agents such as (NSAIDs, IV contrast, Aminoglycosides such as gentamicin, -Gadolinium contrast, Phosphate containing enemas, etc..)  -Adjust Medications according to eGFR  4. HTN:   -bp is acceptable. continue bp meds  -titrate bp meds to keep sbp >110 and < 130  5. Mineral Bone Disease:  -improving phos so no need binders.  -PTH intact 289, will start calcitriol once phos improves.  6. Encephalopathy:  -on Rocephin  -Plan as per Neuro and primary team  -awaiting PEG and Plan as GI.  -NGT attempted but unsuccessful; CT AP ordered and shows no obstruction.  7. Hypokalemia:  -stable K. Kcl with fluids.  -give kcl repletion to keep K >3.5meq/L  -monitor K.     Discussed the assessment and plan with Primary Team/Nurse

## 2023-08-08 NOTE — PROGRESS NOTE ADULT - SUBJECTIVE AND OBJECTIVE BOX
NEPHROLOGY MEDICAL CARE, [facility] - Dr. Ajay Green/ Dr. Mert Madison/ Dr. Chris Calderon/ Dr. Carson Cook    Date of Service: 08-08-23 @ 11:59    Patient was seen and examined at bedside.    CC: patient is okay and NAD    Vital Signs Last 24 Hrs  T(C): 37.1 (08 Aug 2023 05:29), Max: 37.1 (08 Aug 2023 05:29)  T(F): 98.8 (08 Aug 2023 05:29), Max: 98.8 (08 Aug 2023 05:29)  HR: 98 (08 Aug 2023 05:29) (88 - 105)  BP: 148/76 (08 Aug 2023 05:29) (148/76 - 196/78)  BP(mean): --  RR: 18 (08 Aug 2023 05:29) (16 - 19)  SpO2: 98% (08 Aug 2023 05:29) (97% - 100%)    Parameters below as of 08 Aug 2023 05:29  Patient On (Oxygen Delivery Method): room air        08-07 @ 07:01  -  08-08 @ 07:00  --------------------------------------------------------  IN: 0 mL / OUT: 550 mL / NET: -550 mL        PHYSICAL EXAM:  General: No acute respiratory distress.  Eyes: conjunctiva and sclera clear  ENMT: Atraumatic, Normocephalic, supple, No JVD present. Moist mucous membranes  Respiratory: Bilateral clear lungs; No rales, rhonchi, wheezing  Cardiovascular: S1S2+; no m/r/g  Gastrointestinal: Soft, Non-tender, Nondistended; Bowel sounds present  Neuro: eyes are open; hemiparesis.  Ext:  1+pedal edema, No Cyanosis  Skin: No visible rashes      MEDICATIONS:  MEDICATIONS  (STANDING):  cloNIDine Patch 0.2 mG/24Hr(s) 1 patch Transdermal <User Schedule>  dextrose 5% with potassium chloride 20 mEq/L 1000 milliLiter(s) (50 mL/Hr) IV Continuous <Continuous>  hydrALAZINE Injectable 10 milliGRAM(s) IV Push every 6 hours  valproate sodium  IVPB 500 milliGRAM(s) IV Intermittent every 8 hours    MEDICATIONS  (PRN):          LABS:                        9.0    11.00 )-----------( 286      ( 08 Aug 2023 05:15 )             28.9     08-08    140  |  111<H>  |  22<H>  ----------------------------<  72  3.6   |  18<L>  |  1.47<H>    Ca    8.9      08 Aug 2023 05:15  Phos  3.9     08-08  Mg     2.3     08-08    TPro  7.0  /  Alb  2.3<L>  /  TBili  0.3  /  DBili  x   /  AST  15  /  ALT  13  /  AlkPhos  83  08-08    PT/INR - ( 08 Aug 2023 10:09 )   PT: 12.5 sec;   INR: 1.10 ratio         PTT - ( 08 Aug 2023 10:09 )  PTT:29.9 sec  Urinalysis Basic - ( 08 Aug 2023 05:15 )    Color: x / Appearance: x / SG: x / pH: x  Gluc: 72 mg/dL / Ketone: x  / Bili: x / Urobili: x   Blood: x / Protein: x / Nitrite: x   Leuk Esterase: x / RBC: x / WBC x   Sq Epi: x / Non Sq Epi: x / Bacteria: x      Magnesium: 2.3 mg/dL (08-08 @ 05:15)  Phosphorus: 3.9 mg/dL (08-08 @ 05:15)    Urine studies    PTH and Vit D:         NEPHROLOGY MEDICAL CARE, [facility] - Dr. Ajay Green/ Dr. Mert Madison/ Dr. Chris Calderon/ Dr. Carson Cook    Date of Service: 08-08-23 @ 11:59    Patient was seen and examined at bedside.    CC: patient is okay and NAD    Vital Signs Last 24 Hrs  T(C): 37.1 (08 Aug 2023 05:29), Max: 37.1 (08 Aug 2023 05:29)  T(F): 98.8 (08 Aug 2023 05:29), Max: 98.8 (08 Aug 2023 05:29)  HR: 98 (08 Aug 2023 05:29) (88 - 105)  BP: 148/76 (08 Aug 2023 05:29) (148/76 - 196/78)  BP(mean): --  RR: 18 (08 Aug 2023 05:29) (16 - 19)  SpO2: 98% (08 Aug 2023 05:29) (97% - 100%)    Parameters below as of 08 Aug 2023 05:29  Patient On (Oxygen Delivery Method): room air        08-07 @ 07:01  -  08-08 @ 07:00  --------------------------------------------------------  IN: 0 mL / OUT: 550 mL / NET: -550 mL        PHYSICAL EXAM:  General: No acute respiratory distress.  Eyes: conjunctiva and sclera clear  ENMT: Atraumatic, Normocephalic, supple, No JVD present. Moist mucous membranes  Respiratory: Bilateral clear lungs; No rales, rhonchi, wheezing  Cardiovascular: S1S2+; no m/r/g  Gastrointestinal: Soft, Non-tender, Nondistended; Bowel sounds present  Neuro: eyes are open; hemiparesis.  Ext:  1+pedal edema, No Cyanosis  Skin: No visible rashes      MEDICATIONS:  MEDICATIONS  (STANDING):  cloNIDine Patch 0.2 mG/24Hr(s) 1 patch Transdermal <User Schedule>  dextrose 5% with potassium chloride 20 mEq/L 1000 milliLiter(s) (50 mL/Hr) IV Continuous <Continuous>  hydrALAZINE Injectable 10 milliGRAM(s) IV Push every 6 hours  valproate sodium  IVPB 500 milliGRAM(s) IV Intermittent every 8 hours    MEDICATIONS  (PRN):          LABS:                        9.0    11.00 )-----------( 286      ( 08 Aug 2023 05:15 )             28.9     08-08    140  |  111<H>  |  22<H>  ----------------------------<  72  3.6   |  18<L>  |  1.47<H>    Ca    8.9      08 Aug 2023 05:15  Phos  3.9     08-08  Mg     2.3     08-08    TPro  7.0  /  Alb  2.3<L>  /  TBili  0.3  /  DBili  x   /  AST  15  /  ALT  13  /  AlkPhos  83  08-08    PT/INR - ( 08 Aug 2023 10:09 )   PT: 12.5 sec;   INR: 1.10 ratio         PTT - ( 08 Aug 2023 10:09 )  PTT:29.9 sec  Urinalysis Basic - ( 08 Aug 2023 05:15 )    Color: x / Appearance: x / SG: x / pH: x  Gluc: 72 mg/dL / Ketone: x  / Bili: x / Urobili: x   Blood: x / Protein: x / Nitrite: x   Leuk Esterase: x / RBC: x / WBC x   Sq Epi: x / Non Sq Epi: x / Bacteria: x      Magnesium: 2.3 mg/dL (08-08 @ 05:15)  Phosphorus: 3.9 mg/dL (08-08 @ 05:15)    Urine studies    PTH and Vit D:         NEPHROLOGY MEDICAL CARE, [facility] - Dr. Ajay Green/ Dr. Mert Madison/ Dr. Chris Calderon/ Dr. Carson Cook    Date of Service: 08-08-23 @ 11:59    Patient was seen and examined at bedside.    CC: patient is okay and NAD    Vital Signs Last 24 Hrs  T(C): 37.1 (08 Aug 2023 05:29), Max: 37.1 (08 Aug 2023 05:29)  T(F): 98.8 (08 Aug 2023 05:29), Max: 98.8 (08 Aug 2023 05:29)  HR: 98 (08 Aug 2023 05:29) (88 - 105)  BP: 148/76 (08 Aug 2023 05:29) (148/76 - 196/78)  BP(mean): --  RR: 18 (08 Aug 2023 05:29) (16 - 19)  SpO2: 98% (08 Aug 2023 05:29) (97% - 100%)    Parameters below as of 08 Aug 2023 05:29  Patient On (Oxygen Delivery Method): room air        08-07 @ 07:01  -  08-08 @ 07:00  --------------------------------------------------------  IN: 0 mL / OUT: 550 mL / NET: -550 mL        PHYSICAL EXAM:  General: No acute respiratory distress.  Eyes: conjunctiva and sclera clear  ENMT: Atraumatic, Normocephalic, supple, No JVD present. Moist mucous membranes  Respiratory: Bilateral clear lungs; No rales, rhonchi, wheezing  Cardiovascular: S1S2+; no m/r/g  Gastrointestinal: Soft, Non-tender, Nondistended; Bowel sounds present  Neuro: eyes are open; hemiparesis.  Ext:  1+pedal edema, No Cyanosis  Skin: No visible rashes      MEDICATIONS:  MEDICATIONS  (STANDING):  cloNIDine Patch 0.2 mG/24Hr(s) 1 patch Transdermal <User Schedule>  dextrose 5% with potassium chloride 20 mEq/L 1000 milliLiter(s) (50 mL/Hr) IV Continuous <Continuous>  hydrALAZINE Injectable 10 milliGRAM(s) IV Push every 6 hours  valproate sodium  IVPB 500 milliGRAM(s) IV Intermittent every 8 hours    MEDICATIONS  (PRN):          LABS:                        9.0    11.00 )-----------( 286      ( 08 Aug 2023 05:15 )             28.9     08-08    140  |  111<H>  |  22<H>  ----------------------------<  72  3.6   |  18<L>  |  1.47<H>    Ca    8.9      08 Aug 2023 05:15  Phos  3.9     08-08  Mg     2.3     08-08    TPro  7.0  /  Alb  2.3<L>  /  TBili  0.3  /  DBili  x   /  AST  15  /  ALT  13  /  AlkPhos  83  08-08    PT/INR - ( 08 Aug 2023 10:09 )   PT: 12.5 sec;   INR: 1.10 ratio         PTT - ( 08 Aug 2023 10:09 )  PTT:29.9 sec  Urinalysis Basic - ( 08 Aug 2023 05:15 )    Color: x / Appearance: x / SG: x / pH: x  Gluc: 72 mg/dL / Ketone: x  / Bili: x / Urobili: x   Blood: x / Protein: x / Nitrite: x   Leuk Esterase: x / RBC: x / WBC x   Sq Epi: x / Non Sq Epi: x / Bacteria: x      Magnesium: 2.3 mg/dL (08-08 @ 05:15)  Phosphorus: 3.9 mg/dL (08-08 @ 05:15)    Urine studies    PTH and Vit D:

## 2023-08-08 NOTE — PROGRESS NOTE ADULT - SUBJECTIVE AND OBJECTIVE BOX
CHIEF COMPLAINT:Patient is a 77y old  Female who presents with a chief complaint of Altered mental status. Pt appears comfortable.    	  REVIEW OF SYSTEMS:  [ z] Unable to obtain    PHYSICAL EXAM:  T(C): 37.1 (08-08-23 @ 05:29), Max: 37.1 (08-08-23 @ 05:29)  HR: 98 (08-08-23 @ 05:29) (88 - 105)  BP: 148/76 (08-08-23 @ 05:29) (148/76 - 196/78)  RR: 18 (08-08-23 @ 05:29) (16 - 19)  SpO2: 98% (08-08-23 @ 05:29) (97% - 100%)  Wt(kg): --  I&O's Summary    07 Aug 2023 07:01  -  08 Aug 2023 07:00  --------------------------------------------------------  IN: 0 mL / OUT: 550 mL / NET: -550 mL        Appearance: Normal	  HEENT:   Normal oral mucosa, PERRL, EOMI	  Lymphatic: No lymphadenopathy  Cardiovascular: Normal S1 S2, No JVD, No murmurs, No edema  Respiratory: Lungs clear to auscultation	  Gastrointestinal:  Soft, Non-tender, + BS	  Skin: No rashes, No ecchymoses, No cyanosis	  Extremities: Normal range of motion, No clubbing, cyanosis or edema  Vascular: Peripheral pulses palpable 2+ bilaterally    MEDICATIONS  (STANDING):  cloNIDine Patch 0.2 mG/24Hr(s) 1 patch Transdermal <User Schedule>  dextrose 5% with potassium chloride 20 mEq/L 1000 milliLiter(s) (50 mL/Hr) IV Continuous <Continuous>  hydrALAZINE Injectable 10 milliGRAM(s) IV Push every 6 hours  valproate sodium  IVPB 500 milliGRAM(s) IV Intermittent every 8 hours        LABS:	 	                      9.0    11.00 )-----------( 286      ( 08 Aug 2023 05:15 )             28.9     08-08    140  |  111<H>  |  22<H>  ----------------------------<  72  3.6   |  18<L>  |  1.47<H>    Ca    8.9      08 Aug 2023 05:15  Phos  3.9     08-08  Mg     2.3     08-08    TPro  7.0  /  Alb  2.3<L>  /  TBili  0.3  /  DBili  x   /  AST  15  /  ALT  13  /  AlkPhos  83  08-08    proBNP:   Lipid Profile: Cholesterol 152  LDL --  HDL 51  TG 93  Ldl calc 82  Ratio --    HgA1c:   TSH: Thyroid Stimulating Hormone, Serum: 3.59 uU/mL (07-28 @ 05:03)      	  < from: CT Internal Auditory Canals w/ IV Cont (08.07.23 @ 23:56) >  ACC: [id number] EXAM:  CT IAC IC   ORDERED BY: CELESTINA ROBINS     ACC: [id number] EXAM:  CT BRAIN IC   ORDERED BY: DONNA PERERA     PROCEDURE DATE:  08/07/2023          INTERPRETATION:  CT head with and without IV contrast    CLINICAL INFORMATION: Intracranial mass.    TECHNIQUE: Contiguous axial  4 mm sections were obtained through the head   both preceding and following the intravenous administration of 95 cc of   Omnipaque 350/ 5 cc discarded.   This scan was performed using automatic   exposure control (radiation dose reduction software) to obtain a   diagnostic image quality scan with patient dose as low as reasonably   achievable.    FINDINGS:  No previous examinations are available for review.    The brain demonstrates old infarctions in the LEFT corona radiata, LEFT   thalamus, LEFT external capsule and LEFT posterior frontal lobe with   compensatory enlargement of the LEFT lateral ventricle. Wallerian   degeneration in the LEFT cerebral peduncle. Old infarction in the RIGHT   cerebellum. No acute cerebral cortical infarct is seen.  No intracranial   hemorrhage is found. No abnormal enhancement is seen.    The ventricles, sulci and basal cisterns appear show mild global atrophy   and to a moderate degree in the cerebellum.    The vertebral and internal carotid arteries demonstrate expected   enhancement indicating their patency.    The orbits are unremarkable.  The paranasal sinuses are clear.  The nasal   cavity remains intact.  The nasopharynx is symmetric.  The central skull   base, petrous temporal bones and calvarium remain intact.        CT temporal bones with and without IV contrast    CLINICAL INFORMATION: RIGHT hemotympanum    TECHNIQUE: Contiguous .5 mm data set was obtained through the petrous   temporal bones.  Images were obtained preceding and following the   intravenous administration of 90 cc of Omnipaque 350/ 10 cc discarded.    Axial, sagittal and coronal 1 mm sections were reconstructed through the   middle ear cavities.  This scan was performed using automatic exposure   control (radiation dose reduction software) to obtain a diagnostic image   quality scan with patient dose as low as reasonably achievable.    FINDINGS:   No previous examinations are available for review. Patient   motion degrades image quality.    The right temporal bone demonstrates an intact external canal.  The   tympanic membrane does not appear abnormally thickened.  The middle ear   cavity demonstrates no abnormal soft tissue or fluid.  Prussak's space is   clear of disease.  The ossicular chain is intact without displacement,   erosion or abnormal soft tissue.  The facial nerve has an unremarkable   course through the middle ear cavity.  The epitympanum is clear.  The   mastoid antrum is clear.  Adjacent posterior air cells are clear without   abnormal fluid or coalescent disease.  Air cells to the mastoid tip   remain clear.    The right inner ear structures including the cochlea, vestibule and semi   circular canals remain intact.  Adjacent ossificationis normal.  The   internal auditory canal is not widened.    The left temporal bone demonstrates an intact external canal with minimal   cerumen.  The tympanic membrane does not appear abnormally thickened.    The middle ear cavity demonstrates no abnormal soft tissue or fluid.    Prussak's space is clear of disease.  The ossicular chain is intact   without displacement, erosion or abnormal soft tissue.  The facial nerve   has an unremarkable course through the middle ear cavity.  The   epitympanum is clear.  The mastoid antrum is clear.  Adjacent posterior   air cells are clear without abnormal fluid or coalescent disease.  Air   cells to the mastoid tip remain clear.    The left inner ear structures including the cochlea, vestibule and semi  circular canals remain intact.  Adjacent ossification is normal.  The   internal auditory canal is not widened.    The central skull base is intact.  Petrous apices remain clear of   disease.  The visualized paranasal sinuses are clear.    The visualized intracranial contents appear intact.      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 Wallerian degeneration in the   LEFT cerebral peduncle. Old infarction in the RIGHT cerebellum.    No   abnormal enhancement is seen.    CT temporal bones: Patient motion degrades image quality        1.   Right temporal bone:  No gross abnormality.        2.   Left temporal bone:  No gross abnormality. Minimal cerumen in   the LEFT external auditory canal.    --- End of Report ---            RENATE TATE MD; Attending Radiologist  This document has been electronically signed. Aug  8 2023 10:45AM    < end of copied text >           CHIEF COMPLAINT:Patient is a 77y old  Female who presents with a chief complaint of Altered mental status. Pt appears comfortable.    	  REVIEW OF SYSTEMS:  [ z] Unable to obtain    PHYSICAL EXAM:  T(C): 37.1 (08-08-23 @ 05:29), Max: 37.1 (08-08-23 @ 05:29)  HR: 98 (08-08-23 @ 05:29) (88 - 105)  BP: 148/76 (08-08-23 @ 05:29) (148/76 - 196/78)  RR: 18 (08-08-23 @ 05:29) (16 - 19)  SpO2: 98% (08-08-23 @ 05:29) (97% - 100%)  Wt(kg): --  I&O's Summary    07 Aug 2023 07:01  -  08 Aug 2023 07:00  --------------------------------------------------------  IN: 0 mL / OUT: 550 mL / NET: -550 mL        Appearance: Normal	  HEENT:   Normal oral mucosa, PERRL, EOMI	  Lymphatic: No lymphadenopathy  Cardiovascular: Normal S1 S2, No JVD, No murmurs, No edema  Respiratory: Lungs clear to auscultation	  Gastrointestinal:  Soft, Non-tender, + BS	  Skin: No rashes, No ecchymoses, No cyanosis	  Extremities: Normal range of motion, No clubbing, cyanosis or edema  Vascular: Peripheral pulses palpable 2+ bilaterally    MEDICATIONS  (STANDING):  cloNIDine Patch 0.2 mG/24Hr(s) 1 patch Transdermal <User Schedule>  dextrose 5% with potassium chloride 20 mEq/L 1000 milliLiter(s) (50 mL/Hr) IV Continuous <Continuous>  hydrALAZINE Injectable 10 milliGRAM(s) IV Push every 6 hours  valproate sodium  IVPB 500 milliGRAM(s) IV Intermittent every 8 hours        LABS:	 	                      9.0    11.00 )-----------( 286      ( 08 Aug 2023 05:15 )             28.9     08-08    140  |  111<H>  |  22<H>  ----------------------------<  72  3.6   |  18<L>  |  1.47<H>    Ca    8.9      08 Aug 2023 05:15  Phos  3.9     08-08  Mg     2.3     08-08    TPro  7.0  /  Alb  2.3<L>  /  TBili  0.3  /  DBili  x   /  AST  15  /  ALT  13  /  AlkPhos  83  08-08    proBNP:   Lipid Profile: Cholesterol 152  LDL --  HDL 51  TG 93  Ldl calc 82  Ratio --    HgA1c:   TSH: Thyroid Stimulating Hormone, Serum: 3.59 uU/mL (07-28 @ 05:03)      	  < from: CT Internal Auditory Canals w/ IV Cont (08.07.23 @ 23:56) >  ACC: [id number] EXAM:  CT IAC IC   ORDERED BY: CELESTINA ROBINS     ACC: [id number] EXAM:  CT BRAIN IC   ORDERED BY: DONNA PERERA     PROCEDURE DATE:  08/07/2023          INTERPRETATION:  CT head with and without IV contrast    CLINICAL INFORMATION: Intracranial mass.    TECHNIQUE: Contiguous axial  4 mm sections were obtained through the head   both preceding and following the intravenous administration of 95 cc of   Omnipaque 350/ 5 cc discarded.   This scan was performed using automatic   exposure control (radiation dose reduction software) to obtain a   diagnostic image quality scan with patient dose as low as reasonably   achievable.    FINDINGS:  No previous examinations are available for review.    The brain demonstrates old infarctions in the LEFT corona radiata, LEFT   thalamus, LEFT external capsule and LEFT posterior frontal lobe with   compensatory enlargement of the LEFT lateral ventricle. Wallerian   degeneration in the LEFT cerebral peduncle. Old infarction in the RIGHT   cerebellum. No acute cerebral cortical infarct is seen.  No intracranial   hemorrhage is found. No abnormal enhancement is seen.    The ventricles, sulci and basal cisterns appear show mild global atrophy   and to a moderate degree in the cerebellum.    The vertebral and internal carotid arteries demonstrate expected   enhancement indicating their patency.    The orbits are unremarkable.  The paranasal sinuses are clear.  The nasal   cavity remains intact.  The nasopharynx is symmetric.  The central skull   base, petrous temporal bones and calvarium remain intact.        CT temporal bones with and without IV contrast    CLINICAL INFORMATION: RIGHT hemotympanum    TECHNIQUE: Contiguous .5 mm data set was obtained through the petrous   temporal bones.  Images were obtained preceding and following the   intravenous administration of 90 cc of Omnipaque 350/ 10 cc discarded.    Axial, sagittal and coronal 1 mm sections were reconstructed through the   middle ear cavities.  This scan was performed using automatic exposure   control (radiation dose reduction software) to obtain a diagnostic image   quality scan with patient dose as low as reasonably achievable.    FINDINGS:   No previous examinations are available for review. Patient   motion degrades image quality.    The right temporal bone demonstrates an intact external canal.  The   tympanic membrane does not appear abnormally thickened.  The middle ear   cavity demonstrates no abnormal soft tissue or fluid.  Prussak's space is   clear of disease.  The ossicular chain is intact without displacement,   erosion or abnormal soft tissue.  The facial nerve has an unremarkable   course through the middle ear cavity.  The epitympanum is clear.  The   mastoid antrum is clear.  Adjacent posterior air cells are clear without   abnormal fluid or coalescent disease.  Air cells to the mastoid tip   remain clear.    The right inner ear structures including the cochlea, vestibule and semi   circular canals remain intact.  Adjacent ossificationis normal.  The   internal auditory canal is not widened.    The left temporal bone demonstrates an intact external canal with minimal   cerumen.  The tympanic membrane does not appear abnormally thickened.    The middle ear cavity demonstrates no abnormal soft tissue or fluid.    Prussak's space is clear of disease.  The ossicular chain is intact   without displacement, erosion or abnormal soft tissue.  The facial nerve   has an unremarkable course through the middle ear cavity.  The   epitympanum is clear.  The mastoid antrum is clear.  Adjacent posterior   air cells are clear without abnormal fluid or coalescent disease.  Air   cells to the mastoid tip remain clear.    The left inner ear structures including the cochlea, vestibule and semi  circular canals remain intact.  Adjacent ossification is normal.  The   internal auditory canal is not widened.    The central skull base is intact.  Petrous apices remain clear of   disease.  The visualized paranasal sinuses are clear.    The visualized intracranial contents appear intact.      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 Wallerian degeneration in the   LEFT cerebral peduncle. Old infarction in the RIGHT cerebellum.    No   abnormal enhancement is seen.    CT temporal bones: Patient motion degrades image quality        1.   Right temporal bone:  No gross abnormality.        2.   Left temporal bone:  No gross abnormality. Minimal cerumen in   the LEFT external auditory canal.    --- End of Report ---            RENATE TATE MD; Attending Radiologist  This document has been electronically signed. Aug  8 2023 10:45AM    < end of copied text >           CHIEF COMPLAINT:Patient is a 77y old  Female who presents with a chief complaint of Altered mental status. Pt appears comfortable.    	  REVIEW OF SYSTEMS:  [ z] Unable to obtain    PHYSICAL EXAM:  T(C): 37.1 (08-08-23 @ 05:29), Max: 37.1 (08-08-23 @ 05:29)  HR: 98 (08-08-23 @ 05:29) (88 - 105)  BP: 148/76 (08-08-23 @ 05:29) (148/76 - 196/78)  RR: 18 (08-08-23 @ 05:29) (16 - 19)  SpO2: 98% (08-08-23 @ 05:29) (97% - 100%)  Wt(kg): --  I&O's Summary    07 Aug 2023 07:01  -  08 Aug 2023 07:00  --------------------------------------------------------  IN: 0 mL / OUT: 550 mL / NET: -550 mL        Appearance: Normal	  HEENT:   Normal oral mucosa, PERRL, EOMI	  Lymphatic: No lymphadenopathy  Cardiovascular: Normal S1 S2, No JVD, No murmurs, No edema  Respiratory: Lungs clear to auscultation	  Gastrointestinal:  Soft, Non-tender, + BS	  Skin: No rashes, No ecchymoses, No cyanosis	  Extremities: Normal range of motion, No clubbing, cyanosis or edema  Vascular: Peripheral pulses palpable 2+ bilaterally    MEDICATIONS  (STANDING):  cloNIDine Patch 0.2 mG/24Hr(s) 1 patch Transdermal <User Schedule>  dextrose 5% with potassium chloride 20 mEq/L 1000 milliLiter(s) (50 mL/Hr) IV Continuous <Continuous>  hydrALAZINE Injectable 10 milliGRAM(s) IV Push every 6 hours  valproate sodium  IVPB 500 milliGRAM(s) IV Intermittent every 8 hours        LABS:	 	                      9.0    11.00 )-----------( 286      ( 08 Aug 2023 05:15 )             28.9     08-08    140  |  111<H>  |  22<H>  ----------------------------<  72  3.6   |  18<L>  |  1.47<H>    Ca    8.9      08 Aug 2023 05:15  Phos  3.9     08-08  Mg     2.3     08-08    TPro  7.0  /  Alb  2.3<L>  /  TBili  0.3  /  DBili  x   /  AST  15  /  ALT  13  /  AlkPhos  83  08-08    proBNP:   Lipid Profile: Cholesterol 152  LDL --  HDL 51  TG 93  Ldl calc 82  Ratio --    HgA1c:   TSH: Thyroid Stimulating Hormone, Serum: 3.59 uU/mL (07-28 @ 05:03)      	  < from: CT Internal Auditory Canals w/ IV Cont (08.07.23 @ 23:56) >  ACC: [id number] EXAM:  CT IAC IC   ORDERED BY: CELESTINA ROBINS     ACC: [id number] EXAM:  CT BRAIN IC   ORDERED BY: DONNA PERERA     PROCEDURE DATE:  08/07/2023          INTERPRETATION:  CT head with and without IV contrast    CLINICAL INFORMATION: Intracranial mass.    TECHNIQUE: Contiguous axial  4 mm sections were obtained through the head   both preceding and following the intravenous administration of 95 cc of   Omnipaque 350/ 5 cc discarded.   This scan was performed using automatic   exposure control (radiation dose reduction software) to obtain a   diagnostic image quality scan with patient dose as low as reasonably   achievable.    FINDINGS:  No previous examinations are available for review.    The brain demonstrates old infarctions in the LEFT corona radiata, LEFT   thalamus, LEFT external capsule and LEFT posterior frontal lobe with   compensatory enlargement of the LEFT lateral ventricle. Wallerian   degeneration in the LEFT cerebral peduncle. Old infarction in the RIGHT   cerebellum. No acute cerebral cortical infarct is seen.  No intracranial   hemorrhage is found. No abnormal enhancement is seen.    The ventricles, sulci and basal cisterns appear show mild global atrophy   and to a moderate degree in the cerebellum.    The vertebral and internal carotid arteries demonstrate expected   enhancement indicating their patency.    The orbits are unremarkable.  The paranasal sinuses are clear.  The nasal   cavity remains intact.  The nasopharynx is symmetric.  The central skull   base, petrous temporal bones and calvarium remain intact.        CT temporal bones with and without IV contrast    CLINICAL INFORMATION: RIGHT hemotympanum    TECHNIQUE: Contiguous .5 mm data set was obtained through the petrous   temporal bones.  Images were obtained preceding and following the   intravenous administration of 90 cc of Omnipaque 350/ 10 cc discarded.    Axial, sagittal and coronal 1 mm sections were reconstructed through the   middle ear cavities.  This scan was performed using automatic exposure   control (radiation dose reduction software) to obtain a diagnostic image   quality scan with patient dose as low as reasonably achievable.    FINDINGS:   No previous examinations are available for review. Patient   motion degrades image quality.    The right temporal bone demonstrates an intact external canal.  The   tympanic membrane does not appear abnormally thickened.  The middle ear   cavity demonstrates no abnormal soft tissue or fluid.  Prussak's space is   clear of disease.  The ossicular chain is intact without displacement,   erosion or abnormal soft tissue.  The facial nerve has an unremarkable   course through the middle ear cavity.  The epitympanum is clear.  The   mastoid antrum is clear.  Adjacent posterior air cells are clear without   abnormal fluid or coalescent disease.  Air cells to the mastoid tip   remain clear.    The right inner ear structures including the cochlea, vestibule and semi   circular canals remain intact.  Adjacent ossificationis normal.  The   internal auditory canal is not widened.    The left temporal bone demonstrates an intact external canal with minimal   cerumen.  The tympanic membrane does not appear abnormally thickened.    The middle ear cavity demonstrates no abnormal soft tissue or fluid.    Prussak's space is clear of disease.  The ossicular chain is intact   without displacement, erosion or abnormal soft tissue.  The facial nerve   has an unremarkable course through the middle ear cavity.  The   epitympanum is clear.  The mastoid antrum is clear.  Adjacent posterior   air cells are clear without abnormal fluid or coalescent disease.  Air   cells to the mastoid tip remain clear.    The left inner ear structures including the cochlea, vestibule and semi  circular canals remain intact.  Adjacent ossification is normal.  The   internal auditory canal is not widened.    The central skull base is intact.  Petrous apices remain clear of   disease.  The visualized paranasal sinuses are clear.    The visualized intracranial contents appear intact.      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 Wallerian degeneration in the   LEFT cerebral peduncle. Old infarction in the RIGHT cerebellum.    No   abnormal enhancement is seen.    CT temporal bones: Patient motion degrades image quality        1.   Right temporal bone:  No gross abnormality.        2.   Left temporal bone:  No gross abnormality. Minimal cerumen in   the LEFT external auditory canal.    --- End of Report ---            RENATE TATE MD; Attending Radiologist  This document has been electronically signed. Aug  8 2023 10:45AM    < end of copied text >

## 2023-08-08 NOTE — PROGRESS NOTE ADULT - PROBLEM SELECTOR PLAN 5
No h/o CHF or Cardiac Dz  VSS with no signs of active infection  No signs of volume overload or ACS   c/w BP meds as scheduled  - pt/PTT INR was taken this morning  _> peg placement this afternoon.

## 2023-08-09 DIAGNOSIS — G20 PARKINSON'S DISEASE: ICD-10-CM

## 2023-08-09 LAB
ALBUMIN SERPL ELPH-MCNC: 2.2 G/DL — LOW (ref 3.5–5)
ALP SERPL-CCNC: 78 U/L — SIGNIFICANT CHANGE UP (ref 40–120)
ALT FLD-CCNC: 11 U/L DA — SIGNIFICANT CHANGE UP (ref 10–60)
ANION GAP SERPL CALC-SCNC: 11 MMOL/L — SIGNIFICANT CHANGE UP (ref 5–17)
AST SERPL-CCNC: 15 U/L — SIGNIFICANT CHANGE UP (ref 10–40)
BASE EXCESS BLDV CALC-SCNC: -7.1 MMOL/L — SIGNIFICANT CHANGE UP
BILIRUB SERPL-MCNC: 0.3 MG/DL — SIGNIFICANT CHANGE UP (ref 0.2–1.2)
BLOOD GAS COMMENTS, VENOUS: SIGNIFICANT CHANGE UP
BUN SERPL-MCNC: 20 MG/DL — HIGH (ref 7–18)
CA-I SERPL-SCNC: SIGNIFICANT CHANGE UP MMOL/L (ref 1.15–1.33)
CALCIUM SERPL-MCNC: 8.6 MG/DL — SIGNIFICANT CHANGE UP (ref 8.4–10.5)
CHLORIDE SERPL-SCNC: 113 MMOL/L — HIGH (ref 96–108)
CO2 SERPL-SCNC: 16 MMOL/L — LOW (ref 22–31)
CREAT SERPL-MCNC: 1.67 MG/DL — HIGH (ref 0.5–1.3)
EGFR: 31 ML/MIN/1.73M2 — LOW
GAS PNL BLDV: 133 MMOL/L — LOW (ref 136–145)
GAS PNL BLDV: SIGNIFICANT CHANGE UP
GLUCOSE BLDC GLUCOMTR-MCNC: 76 MG/DL — SIGNIFICANT CHANGE UP (ref 70–99)
GLUCOSE BLDC GLUCOMTR-MCNC: 80 MG/DL — SIGNIFICANT CHANGE UP (ref 70–99)
GLUCOSE BLDC GLUCOMTR-MCNC: 84 MG/DL — SIGNIFICANT CHANGE UP (ref 70–99)
GLUCOSE BLDC GLUCOMTR-MCNC: 89 MG/DL — SIGNIFICANT CHANGE UP (ref 70–99)
GLUCOSE SERPL-MCNC: 73 MG/DL — SIGNIFICANT CHANGE UP (ref 70–99)
HCO3 BLDV-SCNC: 17 MMOL/L — LOW (ref 22–29)
HCT VFR BLD CALC: 26.4 % — LOW (ref 34.5–45)
HGB BLD-MCNC: 8.3 G/DL — LOW (ref 11.5–15.5)
HOROWITZ INDEX BLDV+IHG-RTO: 21 — SIGNIFICANT CHANGE UP
LACTATE BLDV-MCNC: 0.8 MMOL/L — SIGNIFICANT CHANGE UP (ref 0.5–2)
MAGNESIUM SERPL-MCNC: 2.1 MG/DL — SIGNIFICANT CHANGE UP (ref 1.6–2.6)
MCHC RBC-ENTMCNC: 28.5 PG — SIGNIFICANT CHANGE UP (ref 27–34)
MCHC RBC-ENTMCNC: 31.4 GM/DL — LOW (ref 32–36)
MCV RBC AUTO: 90.7 FL — SIGNIFICANT CHANGE UP (ref 80–100)
NRBC # BLD: 0 /100 WBCS — SIGNIFICANT CHANGE UP (ref 0–0)
PCO2 BLDV: 29 MMHG — LOW (ref 39–42)
PH BLDV: 7.37 — SIGNIFICANT CHANGE UP (ref 7.32–7.43)
PHOSPHATE SERPL-MCNC: 3.9 MG/DL — SIGNIFICANT CHANGE UP (ref 2.5–4.5)
PLATELET # BLD AUTO: 257 K/UL — SIGNIFICANT CHANGE UP (ref 150–400)
PO2 BLDV: 66 MMHG — SIGNIFICANT CHANGE UP
POTASSIUM BLDV-SCNC: 4 MMOL/L — SIGNIFICANT CHANGE UP (ref 3.5–5.1)
POTASSIUM SERPL-MCNC: 3.7 MMOL/L — SIGNIFICANT CHANGE UP (ref 3.5–5.3)
POTASSIUM SERPL-SCNC: 3.7 MMOL/L — SIGNIFICANT CHANGE UP (ref 3.5–5.3)
PROT SERPL-MCNC: 6.5 G/DL — SIGNIFICANT CHANGE UP (ref 6–8.3)
RBC # BLD: 2.91 M/UL — LOW (ref 3.8–5.2)
RBC # FLD: 15.1 % — HIGH (ref 10.3–14.5)
SAO2 % BLDV: 95.6 % — SIGNIFICANT CHANGE UP
SODIUM SERPL-SCNC: 140 MMOL/L — SIGNIFICANT CHANGE UP (ref 135–145)
WBC # BLD: 12.3 K/UL — HIGH (ref 3.8–10.5)
WBC # FLD AUTO: 12.3 K/UL — HIGH (ref 3.8–10.5)

## 2023-08-09 PROCEDURE — 99232 SBSQ HOSP IP/OBS MODERATE 35: CPT

## 2023-08-09 RX ORDER — CHLORHEXIDINE GLUCONATE 213 G/1000ML
1 SOLUTION TOPICAL ONCE
Refills: 0 | Status: COMPLETED | OUTPATIENT
Start: 2023-08-09 | End: 2023-08-10

## 2023-08-09 RX ORDER — CHLORHEXIDINE GLUCONATE 213 G/1000ML
1 SOLUTION TOPICAL DAILY
Refills: 0 | Status: DISCONTINUED | OUTPATIENT
Start: 2023-08-09 | End: 2023-08-14

## 2023-08-09 RX ADMIN — Medication 1 PATCH: at 07:53

## 2023-08-09 RX ADMIN — Medication 10 MILLIGRAM(S): at 18:28

## 2023-08-09 RX ADMIN — DEXTROSE MONOHYDRATE, SODIUM CHLORIDE, AND POTASSIUM CHLORIDE 50 MILLILITER(S): 50; .745; 4.5 INJECTION, SOLUTION INTRAVENOUS at 23:42

## 2023-08-09 RX ADMIN — Medication 55 MILLIGRAM(S): at 20:17

## 2023-08-09 RX ADMIN — Medication 10 MILLIGRAM(S): at 06:36

## 2023-08-09 RX ADMIN — Medication 10 MILLIGRAM(S): at 12:56

## 2023-08-09 RX ADMIN — Medication 1 PATCH: at 19:40

## 2023-08-09 RX ADMIN — Medication 55 MILLIGRAM(S): at 12:56

## 2023-08-09 RX ADMIN — DEXTROSE MONOHYDRATE, SODIUM CHLORIDE, AND POTASSIUM CHLORIDE 50 MILLILITER(S): 50; .745; 4.5 INJECTION, SOLUTION INTRAVENOUS at 00:28

## 2023-08-09 RX ADMIN — Medication 55 MILLIGRAM(S): at 03:38

## 2023-08-09 RX ADMIN — Medication 10 MILLIGRAM(S): at 00:28

## 2023-08-09 NOTE — PROGRESS NOTE ADULT - SUBJECTIVE AND OBJECTIVE BOX
PGY-1 Progress Note discussed with attending    PAGER #: [[phone]] TILL 5:00 PM  PLEASE CONTACT ON CALL TEAM:  - On Call Team (Please refer to Tyler) FROM 5:00 PM - 8:30PM  - Nightfloat Team FROM 8:30 -7:30 AM    CHIEF COMPLAINT & BRIEF HOSPITAL COURSE: no acute overnight events. Pt. ear is stopping to bleed. Pt. is anox0    INTERVAL HPI/OVERNIGHT EVENTS:   MEDICATIONS  (STANDING):  chlorhexidine 2% Cloths 1 Application(s) Topical daily  chlorhexidine 2% Cloths 1 Application(s) Topical once  cloNIDine Patch 0.2 mG/24Hr(s) 1 patch Transdermal <User Schedule>  dextrose 5% with potassium chloride 20 mEq/L 1000 milliLiter(s) (50 mL/Hr) IV Continuous <Continuous>  hydrALAZINE Injectable 10 milliGRAM(s) IV Push every 6 hours  valproate sodium  IVPB 500 milliGRAM(s) IV Intermittent every 8 hours    MEDICATIONS  (PRN):      Vital Signs Last 24 Hrs  T(C): 36.6 (10 Aug 2023 04:58), Max: 37.1 (09 Aug 2023 21:00)  T(F): 97.8 (10 Aug 2023 04:58), Max: 98.8 (09 Aug 2023 21:00)  HR: 97 (10 Aug 2023 04:58) (88 - 110)  BP: 144/84 (10 Aug 2023 04:58) (134/62 - 169/72)  BP(mean): --  RR: 18 (10 Aug 2023 04:58) (18 - 18)  SpO2: 98% (10 Aug 2023 04:58) (98% - 98%)    Parameters below as of 10 Aug 2023 04:58  Patient On (Oxygen Delivery Method): room air        PHYSICAL EXAMINATION:  GENERAL: NAD  HEAD:  Atraumatic, Normocephalic  EYES:  conjunctiva and sclera clear  CHEST/LUNG: Clear to auscultation. No rales, rhonchi, wheezing, or rubs  HEART: Regular rate and rhythm; No murmurs, rubs, or gallops  ABDOMEN: Soft, Nontender, Nondistended; Bowel sounds present  NERVOUS SYSTEM: ANOx0   EXTREMITIES:  2+ Peripheral Pulses, No clubbing, cyanosis, or edema  SKIN: warm dry                          8.4    13.15 )-----------( 283      ( 10 Aug 2023 05:59 )             26.4     08-10    138  |  111<H>  |  21<H>  ----------------------------<  82  3.9   |  17<L>  |  x     Ca    9.1      10 Aug 2023 05:59  Phos  3.9     08-09  Mg     2.1     08-10    TPro  x   /  Alb  2.3<L>  /  TBili  x   /  DBili  x   /  AST  x   /  ALT  x   /  AlkPhos  x   08-10    LIVER FUNCTIONS - ( 10 Aug 2023 05:59 )  Alb: 2.3 g/dL / Pro: x     / ALK PHOS: x     / ALT: x     / AST: x     / GGT: x               PT/INR - ( 08 Aug 2023 10:09 )   PT: 12.5 sec;   INR: 1.10 ratio         PTT - ( 08 Aug 2023 10:09 )  PTT:29.9 sec    CAPILLARY BLOOD GLUCOSE      RADIOLOGY & ADDITIONAL TESTS:                   PGY-1 Progress Note discussed with attending    PAGER #: [[phone]] TILL 5:00 PM  PLEASE CONTACT ON CALL TEAM:  - On Call Team (Please refer to Tyler) FROM 5:00 PM - 8:30PM  - Nightfloat Team FROM 8:30 -7:30 AM    CHIEF COMPLAINT & BRIEF HOSPITAL COURSE: no acute overnight events. Pt. ear is stopping to bleed. Pt. is anox0    INTERVAL HPI/OVERNIGHT EVENTS:   MEDICATIONS  (STANDING):  chlorhexidine 2% Cloths 1 Application(s) Topical daily  chlorhexidine 2% Cloths 1 Application(s) Topical once  cloNIDine Patch 0.2 mG/24Hr(s) 1 patch Transdermal <User Schedule>  dextrose 5% with potassium chloride 20 mEq/L 1000 milliLiter(s) (50 mL/Hr) IV Continuous <Continuous>  hydrALAZINE Injectable 10 milliGRAM(s) IV Push every 6 hours  valproate sodium  IVPB 500 milliGRAM(s) IV Intermittent every 8 hours    MEDICATIONS  (PRN):      Vital Signs Last 24 Hrs  T(C): 36.6 (10 Aug 2023 04:58), Max: 37.1 (09 Aug 2023 21:00)  T(F): 97.8 (10 Aug 2023 04:58), Max: 98.8 (09 Aug 2023 21:00)  HR: 97 (10 Aug 2023 04:58) (88 - 110)  BP: 144/84 (10 Aug 2023 04:58) (134/62 - 169/72)  BP(mean): --  RR: 18 (10 Aug 2023 04:58) (18 - 18)  SpO2: 98% (10 Aug 2023 04:58) (98% - 98%)    Parameters below as of 10 Aug 2023 04:58  Patient On (Oxygen Delivery Method): room air        PHYSICAL EXAMINATION:  GENERAL: NAD  HEAD:  Atraumatic, Normocephalic  EYES:  conjunctiva and sclera clear  CHEST/LUNG: Clear to auscultation. No rales, rhonchi, wheezing, or rubs  HEART: Regular rate and rhythm; No murmurs, rubs, or gallops  ABDOMEN: Soft, Nontender, Nondistended; Bowel sounds present  NERVOUS SYSTEM: ANOx0   EXTREMITIES:  2+ Peripheral Pulses, No clubbing, cyanosis, or edema  SKIN: warm dry                          8.4    13.15 )-----------( 283      ( 10 Aug 2023 05:59 )             26.4     08-10    138  |  111<H>  |  21<H>  ----------------------------<  82  3.9   |  17<L>  |  x     Ca    9.1      10 Aug 2023 05:59  Phos  3.9     08-09  Mg     2.1     08-10    TPro  x   /  Alb  2.3<L>  /  TBili  x   /  DBili  x   /  AST  x   /  ALT  x   /  AlkPhos  x   08-10    LIVER FUNCTIONS - ( 10 Aug 2023 05:59 )  Alb: 2.3 g/dL / Pro: x     / ALK PHOS: x     / ALT: x     / AST: x     / GGT: x               PT/INR - ( 08 Aug 2023 10:09 )   PT: 12.5 sec;   INR: 1.10 ratio         PTT - ( 08 Aug 2023 10:09 )  PTT:29.9 sec    CAPILLARY BLOOD GLUCOSE      RADIOLOGY & ADDITIONAL TESTS:                   PGY-1 Progress Note discussed with attending    PAGER #: [[phone]] TILL 5:00 PM  PLEASE CONTACT ON CALL TEAM:  - On Call Team (Please refer to Tyler) FROM 5:00 PM - 8:30PM  - Nightfloat Team FROM 8:30 -7:30 AM    CHIEF COMPLAINT & BRIEF HOSPITAL COURSE: no acute overnight events. Pt. ear is stopping to bleed. Pt. is anox0    INTERVAL HPI/OVERNIGHT EVENTS:   MEDICATIONS  (STANDING):  chlorhexidine 2% Cloths 1 Application(s) Topical daily  chlorhexidine 2% Cloths 1 Application(s) Topical once  cloNIDine Patch 0.2 mG/24Hr(s) 1 patch Transdermal <User Schedule>  dextrose 5% with potassium chloride 20 mEq/L 1000 milliLiter(s) (50 mL/Hr) IV Continuous <Continuous>  hydrALAZINE Injectable 10 milliGRAM(s) IV Push every 6 hours  valproate sodium  IVPB 500 milliGRAM(s) IV Intermittent every 8 hours    MEDICATIONS  (PRN):      Vital Signs Last 24 Hrs  T(C): 36.6 (10 Aug 2023 04:58), Max: 37.1 (09 Aug 2023 21:00)  T(F): 97.8 (10 Aug 2023 04:58), Max: 98.8 (09 Aug 2023 21:00)  HR: 97 (10 Aug 2023 04:58) (88 - 110)  BP: 144/84 (10 Aug 2023 04:58) (134/62 - 169/72)  BP(mean): --  RR: 18 (10 Aug 2023 04:58) (18 - 18)  SpO2: 98% (10 Aug 2023 04:58) (98% - 98%)    Parameters below as of 10 Aug 2023 04:58  Patient On (Oxygen Delivery Method): room air        PHYSICAL EXAMINATION:  GENERAL: NAD  HEAD:  Atraumatic, Normocephalic  EYES:  conjunctiva and sclera clear  CHEST/LUNG: Clear to auscultation. No rales, rhonchi, wheezing, or rubs  HEART: Regular rate and rhythm; No murmurs, rubs, or gallops  ABDOMEN: Soft, Nontender, Nondistended; Bowel sounds present  NERVOUS SYSTEM: ANOx0   EXTREMITIES:  2+ Peripheral Pulses, No clubbing, cyanosis, or edema  SKIN: warm dry                          8.4    13.15 )-----------( 283      ( 10 Aug 2023 05:59 )             26.4     08-10    138  |  111<H>  |  21<H>  ----------------------------<  82  3.9   |  17<L>  |  x     Ca    9.1      10 Aug 2023 05:59  Phos  3.9     08-09  Mg     2.1     08-10    TPro  x   /  Alb  2.3<L>  /  TBili  x   /  DBili  x   /  AST  x   /  ALT  x   /  AlkPhos  x   08-10    LIVER FUNCTIONS - ( 10 Aug 2023 05:59 )  Alb: 2.3 g/dL / Pro: x     / ALK PHOS: x     / ALT: x     / AST: x     / GGT: x               PT/INR - ( 08 Aug 2023 10:09 )   PT: 12.5 sec;   INR: 1.10 ratio         PTT - ( 08 Aug 2023 10:09 )  PTT:29.9 sec    CAPILLARY BLOOD GLUCOSE      RADIOLOGY & ADDITIONAL TESTS:

## 2023-08-09 NOTE — PROGRESS NOTE ADULT - PROBLEM SELECTOR PLAN 7
As per Nephrology   -renal ultrasound  and bladder scan  was negative  adjust meds for GFR  -cont on ariel ivf

## 2023-08-09 NOTE — PROGRESS NOTE ADULT - ASSESSMENT
1. SUSAN possible to MARISA and ATN  Renal sono shows no hdyro with b/l kidneys around 9.2cm  -Scr slightly increase to 1.6mg/dL with stable electrolytes and continue IVFs since NPO. with renal recovery; at baseline scr.  -urinalysis shows blood with proteinuria; FeNa >1%, spot protein to creatinine ratio is 1.5gm  -Adjust meds to eGFR and avoid IV Gadolinium contrast,NSAIDs, and phosphate enema.  -Monitor I/O's daily.   -Monitor SMA daily.  2. Hypernatremia due to water deficit and insensible losses. Pt is clinically euvolemic.   -Na stable 135; on D5W with 20meq kcl at 50cc/hr since blood glucose is around 80s  -Monitor I/O's. Check Serum Na Daily. Avoid high solute intake diet and sodium bicarbonate infuse. Avoid overcorrection of NA (8-10meq/day)  3. 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  -previous Scr around 1.2 to 1.6mg/dL in Oct 2022.  -Keep patient euvolemic and renal diet  -Avoid Nephrotoxic Meds/ Agents such as (NSAIDs, IV contrast, Aminoglycosides such as gentamicin, -Gadolinium contrast, Phosphate containing enemas, etc..)  -Adjust Medications according to eGFR  4. HTN:   -bp is acceptable. continue bp meds  -titrate bp meds to keep sbp >110 and < 130  5. Mineral Bone Disease:  -improving phos so no need binders.  -PTH intact 289, will start calcitriol once phos improves.  6. Encephalopathy:  -on Rocephin  -Plan as per Neuro and primary team  -unable to place PEG via EGD; family refused PEG in the OR and now want the PEG in the OR.   -NGT attempted but unsuccessful; CT AP ordered and shows no obstruction.  7. Hypokalemia:  -stable K. Kcl with fluids.  -give kcl repletion to keep K >3.5meq/L  -monitor K.   8. Low CO2:  -trends down then obtain abg or vbg  -monitor CO2.    Discussed the assessment and plan with Primary Team/Nurse

## 2023-08-09 NOTE — PROGRESS NOTE ADULT - PROBLEM SELECTOR PLAN 6
Pt. has history of PD. Neurology saw her and agreed.   No need to start medication because she bed bound.  She has a tremor in her hands.

## 2023-08-09 NOTE — PROGRESS NOTE ADULT - PROBLEM SELECTOR PLAN 1
Pt. has been NPO for a few days on IV fluids.   Consulted with GI  Peg tube  on 8/8  Tried placing NG tube with fail and surgery tried and failed as well on 8/4  Ordered a ct abdomen and pelvis -> no sign of hiatal hernia.  pt. has oral thrush but NPO  -Going for open gastrostomy tube placement by sx today 8/9  -Consider start of ngt feed if need be post opt

## 2023-08-09 NOTE — PROGRESS NOTE ADULT - SUBJECTIVE AND OBJECTIVE BOX
Pt planned for G-tube, was cancelled for 8-9  ICU Vital Signs Last 24 Hrs  T(C): 37.1 (09 Aug 2023 21:00), Max: 37.2 (09 Aug 2023 05:20)  T(F): 98.8 (09 Aug 2023 21:00), Max: 99 (09 Aug 2023 05:20)  HR: 103 (09 Aug 2023 21:00) (88 - 110)  BP: 160/70 (09 Aug 2023 21:00) (134/62 - 169/72)  BP(mean): --  ABP: --  ABP(mean): --  RR: 18 (09 Aug 2023 21:00) (16 - 18)  SpO2: 98% (09 Aug 2023 21:00) (96% - 98%)    O2 Parameters below as of 09 Aug 2023 21:00  Patient On (Oxygen Delivery Method): room air        Abd: soft nt  lungs: good air entry bilat  no calf tenderness  s1 s2 rrr                          8.3    12.30 )-----------( 257      ( 09 Aug 2023 06:53 )             26.4   08-09    140  |  113<H>  |  20<H>  ----------------------------<  73  3.7   |  16<L>  |  1.67<H>    Ca    8.6      09 Aug 2023 06:53  Phos  3.9     08-09  Mg     2.1     08-09    TPro  6.5  /  Alb  2.2<L>  /  TBili  0.3  /  DBili  x   /  AST  15  /  ALT  11  /  AlkPhos  78  08-09

## 2023-08-09 NOTE — PROGRESS NOTE ADULT - ASSESSMENT
Reviewed CT c+ thin cuts through temporal bone - no tumor/blood. Ext ear canal abrasion  unknown cause limited bleeding; continue to hold anticoagulation and antiplatelet agent until bleeding stops.

## 2023-08-09 NOTE — PROGRESS NOTE ADULT - SUBJECTIVE AND OBJECTIVE BOX
CHIEF COMPLAINT:Patient is a 77y old  Female who presents with a chief complaint of Altered mental status. Pt appears comfortable,PEG placement unable to be done..    	  REVIEW OF SYSTEMS:    [ x] Unable to obtain    PHYSICAL EXAM:  T(C): 37.2 (08-09-23 @ 05:20), Max: 37.3 (08-08-23 @ 20:54)  HR: 95 (08-09-23 @ 05:20) (86 - 121)  BP: 142/65 (08-09-23 @ 05:20) (109/66 - 179/83)  RR: 16 (08-09-23 @ 05:20) (16 - 18)  SpO2: 96% (08-09-23 @ 05:20) (96% - 99%)  Wt(kg): --  I&O's Summary    08 Aug 2023 07:01  -  09 Aug 2023 07:00  --------------------------------------------------------  IN: 0 mL / OUT: 650 mL / NET: -650 mL        Appearance: Normal	  HEENT:   Normal oral mucosa, PERRL, EOMI	  Lymphatic: No lymphadenopathy  Cardiovascular: Normal S1 S2, No JVD, No murmurs, No edema  Respiratory: Lungs clear to auscultation	  Gastrointestinal:  Soft, Non-tender, + BS	  Skin: No rashes, No ecchymoses, No cyanosis	  Extremities: Normal range of motion, No clubbing, cyanosis or edema  Vascular: Peripheral pulses palpable 2+ bilaterally    MEDICATIONS  (STANDING):  cloNIDine Patch 0.2 mG/24Hr(s) 1 patch Transdermal <User Schedule>  dextrose 5% with potassium chloride 20 mEq/L 1000 milliLiter(s) (50 mL/Hr) IV Continuous <Continuous>  hydrALAZINE Injectable 10 milliGRAM(s) IV Push every 6 hours  valproate sodium  IVPB 500 milliGRAM(s) IV Intermittent every 8 hours      	  	  LABS:	 	                      8.3    12.30 )-----------( 257      ( 09 Aug 2023 06:53 )             26.4     08-09    140  |  113<H>  |  20<H>  ----------------------------<  73  3.7   |  16<L>  |  1.67<H>    Ca    8.6      09 Aug 2023 06:53  Phos  3.9     08-09  Mg     2.1     08-09    TPro  6.5  /  Alb  2.2<L>  /  TBili  0.3  /  DBili  x   /  AST  15  /  ALT  11  /  AlkPhos  78  08-09    proBNP:   Lipid Profile: Cholesterol 152  LDL --  HDL 51  TG 93  Ldl calc 82  Ratio --    HgA1c:   TSH: Thyroid Stimulating Hormone, Serum: 3.59 uU/mL (07-28 @ 05:03)

## 2023-08-09 NOTE — CHART NOTE - NSCHARTNOTEFT_GEN_A_CORE
spoke with IR and cannot do GT  Spoke to family  Presently family wants to try feeding her and does not want to proceed with the surgery today

## 2023-08-09 NOTE — PROGRESS NOTE ADULT - SUBJECTIVE AND OBJECTIVE BOX
Patient was seen and examined at bedside.    CC: Bleeding dark colored clotted blood  from right ear; history of     Vital Signs Last 24 Hrs  T(C): 36.4 (09 Aug 2023 14:09), Max: 37.3 (08 Aug 2023 20:54)  T(F): 97.5 (09 Aug 2023 14:09), Max: 99.1 (08 Aug 2023 20:54)  HR: 110 (09 Aug 2023 14:09) (87 - 110)  BP: 169/72 (09 Aug 2023 14:09) (142/65 - 169/72)  BP(mean): --  RR: 18 (09 Aug 2023 14:09) (16 - 18)  SpO2: 98% (09 Aug 2023 14:09) (96% - 99%)    Parameters below as of 09 Aug 2023 14:09  Patient On (Oxygen Delivery Method): room air        08-08 @ 07:01  -  08-09 @ 07:00  --------------------------------------------------------  IN: 0 mL / OUT: 650 mL / NET: -650 mL    08-09 @ 07:01  -  08-09 @ 17:01  --------------------------------------------------------  IN: 0 mL / OUT: 100 mL / NET: -100 mL        PHYSICAL EXAM:  General: No acute respiratory distress.  Eyes: conjunctiva and sclera clear  ENMT: Atraumatic, Normocephalic, supple, No JVD present. Moist mucous membranes; following flushing of ext right ear canal, otoscopic evaluation reveals right inferior frontal wall abrasion with very small amount of fresh bright colored blood. Tympani membrane intact Respiratory: Bilateral clear lungs; No rales, rhonchi, wheezing  Cardiovascular: S1S2+; no m/r/g  Gastrointestinal: Soft, Non-tender, Nondistended; Bowel sounds present  : mcclellan's cath with clear urine  Neuro: eyes are open; hemiparesis. right and left PD  Ext:  1+pedal edema, No Cyanosis  Skin: No visible rashes    MEDICATIONS:  MEDICATIONS  (STANDING):  chlorhexidine 2% Cloths 1 Application(s) Topical daily  cloNIDine Patch 0.2 mG/24Hr(s) 1 patch Transdermal <User Schedule>  dextrose 5% with potassium chloride 20 mEq/L 1000 milliLiter(s) (50 mL/Hr) IV Continuous <Continuous>  hydrALAZINE Injectable 10 milliGRAM(s) IV Push every 6 hours  valproate sodium  IVPB 500 milliGRAM(s) IV Intermittent every 8 hours    MEDICATIONS  (PRN):          LABS:                        8.3    12.30 )-----------( 257      ( 09 Aug 2023 06:53 )             26.4     08-09    140  |  113<H>  |  20<H>  ----------------------------<  73  3.7   |  16<L>  |  1.67<H>    Ca    8.6      09 Aug 2023 06:53  Phos  3.9     08-09  Mg     2.1     08-09    TPro  6.5  /  Alb  2.2<L>  /  TBili  0.3  /  DBili  x   /  AST  15  /  ALT  11  /  AlkPhos  78  08-09    PT/INR - ( 08 Aug 2023 10:09 )   PT: 12.5 sec;   INR: 1.10 ratio         PTT - ( 08 Aug 2023 10:09 )  PTT:29.9 sec  Urinalysis Basic - ( 09 Aug 2023 06:53 )    Color: x / Appearance: x / SG: x / pH: x  Gluc: 73 mg/dL / Ketone: x  / Bili: x / Urobili: x   Blood: x / Protein: x / Nitrite: x   Leuk Esterase: x / RBC: x / WBC x   Sq Epi: x / Non Sq Epi: x / Bacteria: x      Magnesium: 2.1 mg/dL (08-09 @ 06:53)  Phosphorus: 3.9 mg/dL (08-09 @ 06:53)    Urine studies    PTH and Vit D:  < from: CT Internal Auditory Canals w/ IV Cont (08.07.23 @ 23:56) >    ACC: [id number] EXAM:  CT IAC IC   ORDERED BY: CELESTINA ROBINS     ACC: [id number] EXAM:  CT BRAIN IC   ORDERED BY: DONNA PERERA     PROCEDURE DATE:  08/07/2023          INTERPRETATION:  CT head with and without IV contrast    CLINICAL INFORMATION: Intracranial mass.    TECHNIQUE: Contiguous axial  4 mm sections were obtained through the head   both preceding and following the intravenous administration of 95 cc of   Omnipaque 350/ 5 cc discarded.   This scan was performed using automatic   exposure control (radiation dose reduction software) to obtain a   diagnostic image quality scan with patient dose as low as reasonably   achievable.    FINDINGS:  No previous examinations are available for review.    The brain demonstrates old infarctions in the LEFT corona radiata, LEFT   thalamus, LEFT external capsule and LEFT posterior frontal lobe with   compensatory enlargement of the LEFT lateral ventricle. Wallerian   degeneration in the LEFT cerebral peduncle. Old infarction in the RIGHT   cerebellum. No acute cerebral cortical infarct is seen.  No intracranial   hemorrhage is found. No abnormal enhancement is seen.    The ventricles, sulci and basal cisterns appear show mild global atrophy   and to a moderate degree in the cerebellum.    The vertebral and internal carotid arteries demonstrate expected   enhancement indicating their patency.    The orbits are unremarkable.  The paranasal sinuses are clear.  The nasal   cavity remains intact.  The nasopharynx is symmetric.  The central skull   base, petrous temporal bones and calvarium remain intact.        CT temporal bones with and without IV contrast    CLINICAL INFORMATION: RIGHT hemotympanum    TECHNIQUE: Contiguous .5 mm data set was obtained through the petrous   temporal bones.  Images were obtained preceding and following the   intravenous administration of 90 cc of Omnipaque 350/ 10 cc discarded.    Axial, sagittal and coronal 1 mm sections were reconstructed through the   middle ear cavities.  This scan was performed using automatic exposure   control (radiation dose reduction software) to obtain a diagnostic image   quality scan with patient dose as low as reasonably achievable.    FINDINGS:   No previous examinations are available for review. Patient   motion degrades image quality.    The right temporal bone demonstrates an intact external canal.  The   tympanic membrane does not appear abnormally thickened.  The middle ear   cavity demonstrates no abnormal soft tissue or fluid.  Prussak's space is   clear of disease.  The ossicular chain is intact without displacement,   erosion or abnormal soft tissue.  The facial nerve has an unremarkable   course through the middle ear cavity.  The epitympanum is clear.  The   mastoid antrum is clear.  Adjacent posterior air cells are clear without   abnormal fluid or coalescent disease.  Air cells to the mastoid tip   remain clear.    The right inner ear structures including the cochlea, vestibule and semi   circular canals remain intact.  Adjacent ossificationis normal.  The   internal auditory canal is not widened.    The left temporal bone demonstrates an intact external canal with minimal   cerumen.  The tympanic membrane does not appear abnormally thickened.    The middle ear cavity demonstrates no abnormal soft tissue or fluid.    Prussak's space is clear of disease.  The ossicular chain is intact   without displacement, erosion or abnormal soft tissue.  The facial nerve   has an unremarkable course through the middle ear cavity.  The   epitympanum is clear.  The mastoid antrum is clear.  Adjacent posterior   air cells are clear without abnormal fluid or coalescent disease.  Air   cells to the mastoid tip remain clear.    The left inner ear structures including the cochlea, vestibule and semi  circular canals remain intact.  Adjacent ossification is normal.  The   internal auditory canal is not widened.    The central skull base is intact.  Petrous apices remain clear of   disease.  The visualized paranasal sinuses are clear.    The visualized intracranial contents appear intact.      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 Wallerian degeneration in the   LEFT cerebral peduncle. Old infarction in the RIGHT cerebellum.    No   abnormal enhancement is seen.    CT temporal bones: Patient motion degrades image quality        1.   Right temporal bone:  No gross abnormality.        2.   Left temporal bone:  No gross abnormality. Minimal cerumen in   the LEFT external auditory canal.    --- End of Report ---            RENATE TATE MD; Attending Radiologist  This document has been electronically signed. Aug  8 2023 10:45AM    < end of copied text >   Patient was seen and examined at bedside.    CC: Bleeding dark colored clotted blood  from right ear; history of     Vital Signs Last 24 Hrs  T(C): 36.4 (09 Aug 2023 14:09), Max: 37.3 (08 Aug 2023 20:54)  T(F): 97.5 (09 Aug 2023 14:09), Max: 99.1 (08 Aug 2023 20:54)  HR: 110 (09 Aug 2023 14:09) (87 - 110)  BP: 169/72 (09 Aug 2023 14:09) (142/65 - 169/72)  BP(mean): --  RR: 18 (09 Aug 2023 14:09) (16 - 18)  SpO2: 98% (09 Aug 2023 14:09) (96% - 99%)    Parameters below as of 09 Aug 2023 14:09  Patient On (Oxygen Delivery Method): room air        08-08 @ 07:01  -  08-09 @ 07:00  --------------------------------------------------------  IN: 0 mL / OUT: 650 mL / NET: -650 mL    08-09 @ 07:01  -  08-09 @ 17:01  --------------------------------------------------------  IN: 0 mL / OUT: 100 mL / NET: -100 mL        PHYSICAL EXAM:  General: No acute respiratory distress.  Eyes: conjunctiva and sclera clear  ENMT: Atraumatic, Normocephalic, supple, No JVD present. Moist mucous membranes; following flushing of ext right ear canal, otoscopic evaluation reveals right inferior frontal wall abrasion with very small amount of fresh bright colored blood. Tympani membrane intact Respiratory: Bilateral clear lungs; No rales, rhonchi, wheezing  Cardiovascular: S1S2+; no m/r/g  Gastrointestinal: Soft, Non-tender, Nondistended; Bowel sounds present  : mcclellan's cath with clear urine  Neuro: eyes are open; hemiparesis. right and left PD  Ext:  1+pedal edema, No Cyanosis  Skin: No visible rashes    MEDICATIONS:  MEDICATIONS  (STANDING):  chlorhexidine 2% Cloths 1 Application(s) Topical daily  cloNIDine Patch 0.2 mG/24Hr(s) 1 patch Transdermal <User Schedule>  dextrose 5% with potassium chloride 20 mEq/L 1000 milliLiter(s) (50 mL/Hr) IV Continuous <Continuous>  hydrALAZINE Injectable 10 milliGRAM(s) IV Push every 6 hours  valproate sodium  IVPB 500 milliGRAM(s) IV Intermittent every 8 hours    MEDICATIONS  (PRN):          LABS:                        8.3    12.30 )-----------( 257      ( 09 Aug 2023 06:53 )             26.4     08-09    140  |  113<H>  |  20<H>  ----------------------------<  73  3.7   |  16<L>  |  1.67<H>    Ca    8.6      09 Aug 2023 06:53  Phos  3.9     08-09  Mg     2.1     08-09    TPro  6.5  /  Alb  2.2<L>  /  TBili  0.3  /  DBili  x   /  AST  15  /  ALT  11  /  AlkPhos  78  08-09    PT/INR - ( 08 Aug 2023 10:09 )   PT: 12.5 sec;   INR: 1.10 ratio         PTT - ( 08 Aug 2023 10:09 )  PTT:29.9 sec  Urinalysis Basic - ( 09 Aug 2023 06:53 )    Color: x / Appearance: x / SG: x / pH: x  Gluc: 73 mg/dL / Ketone: x  / Bili: x / Urobili: x   Blood: x / Protein: x / Nitrite: x   Leuk Esterase: x / RBC: x / WBC x   Sq Epi: x / Non Sq Epi: x / Bacteria: x      Magnesium: 2.1 mg/dL (08-09 @ 06:53)  Phosphorus: 3.9 mg/dL (08-09 @ 06:53)    Urine studies    PTH and Vit D:  < from: CT Internal Auditory Canals w/ IV Cont (08.07.23 @ 23:56) >    ACC: [id number] EXAM:  CT IAC IC   ORDERED BY: CELESTINA ROBINS     ACC: [id number] EXAM:  CT BRAIN IC   ORDERED BY: DONNA PERERA     PROCEDURE DATE:  08/07/2023          INTERPRETATION:  CT head with and without IV contrast    CLINICAL INFORMATION: Intracranial mass.    TECHNIQUE: Contiguous axial  4 mm sections were obtained through the head   both preceding and following the intravenous administration of 95 cc of   Omnipaque 350/ 5 cc discarded.   This scan was performed using automatic   exposure control (radiation dose reduction software) to obtain a   diagnostic image quality scan with patient dose as low as reasonably   achievable.    FINDINGS:  No previous examinations are available for review.    The brain demonstrates old infarctions in the LEFT corona radiata, LEFT   thalamus, LEFT external capsule and LEFT posterior frontal lobe with   compensatory enlargement of the LEFT lateral ventricle. Wallerian   degeneration in the LEFT cerebral peduncle. Old infarction in the RIGHT   cerebellum. No acute cerebral cortical infarct is seen.  No intracranial   hemorrhage is found. No abnormal enhancement is seen.    The ventricles, sulci and basal cisterns appear show mild global atrophy   and to a moderate degree in the cerebellum.    The vertebral and internal carotid arteries demonstrate expected   enhancement indicating their patency.    The orbits are unremarkable.  The paranasal sinuses are clear.  The nasal   cavity remains intact.  The nasopharynx is symmetric.  The central skull   base, petrous temporal bones and calvarium remain intact.        CT temporal bones with and without IV contrast    CLINICAL INFORMATION: RIGHT hemotympanum    TECHNIQUE: Contiguous .5 mm data set was obtained through the petrous   temporal bones.  Images were obtained preceding and following the   intravenous administration of 90 cc of Omnipaque 350/ 10 cc discarded.    Axial, sagittal and coronal 1 mm sections were reconstructed through the   middle ear cavities.  This scan was performed using automatic exposure   control (radiation dose reduction software) to obtain a diagnostic image   quality scan with patient dose as low as reasonably achievable.    FINDINGS:   No previous examinations are available for review. Patient   motion degrades image quality.    The right temporal bone demonstrates an intact external canal.  The   tympanic membrane does not appear abnormally thickened.  The middle ear   cavity demonstrates no abnormal soft tissue or fluid.  Prussak's space is   clear of disease.  The ossicular chain is intact without displacement,   erosion or abnormal soft tissue.  The facial nerve has an unremarkable   course through the middle ear cavity.  The epitympanum is clear.  The   mastoid antrum is clear.  Adjacent posterior air cells are clear without   abnormal fluid or coalescent disease.  Air cells to the mastoid tip   remain clear.    The right inner ear structures including the cochlea, vestibule and semi   circular canals remain intact.  Adjacent ossificationis normal.  The   internal auditory canal is not widened.    The left temporal bone demonstrates an intact external canal with minimal   cerumen.  The tympanic membrane does not appear abnormally thickened.    The middle ear cavity demonstrates no abnormal soft tissue or fluid.    Prussak's space is clear of disease.  The ossicular chain is intact   without displacement, erosion or abnormal soft tissue.  The facial nerve   has an unremarkable course through the middle ear cavity.  The   epitympanum is clear.  The mastoid antrum is clear.  Adjacent posterior   air cells are clear without abnormal fluid or coalescent disease.  Air   cells to the mastoid tip remain clear.    The left inner ear structures including the cochlea, vestibule and semi  circular canals remain intact.  Adjacent ossification is normal.  The   internal auditory canal is not widened.    The central skull base is intact.  Petrous apices remain clear of   disease.  The visualized paranasal sinuses are clear.    The visualized intracranial contents appear intact.      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 Wallerian degeneration in the   LEFT cerebral peduncle. Old infarction in the RIGHT cerebellum.    No   abnormal enhancement is seen.    CT temporal bones: Patient motion degrades image quality        1.   Right temporal bone:  No gross abnormality.        2.   Left temporal bone:  No gross abnormality. Minimal cerumen in   the LEFT external auditory canal.    --- End of Report ---            RENATE TATE MD; Attending Radiologist  This document has been electronically signed. Aug  8 2023 10:45AM    < end of copied text >   Patient was seen and examined at bedside.    CC: Bleeding dark colored clotted blood  from right ear; history of     Vital Signs Last 24 Hrs  T(C): 36.4 (09 Aug 2023 14:09), Max: 37.3 (08 Aug 2023 20:54)  T(F): 97.5 (09 Aug 2023 14:09), Max: 99.1 (08 Aug 2023 20:54)  HR: 110 (09 Aug 2023 14:09) (87 - 110)  BP: 169/72 (09 Aug 2023 14:09) (142/65 - 169/72)  BP(mean): --  RR: 18 (09 Aug 2023 14:09) (16 - 18)  SpO2: 98% (09 Aug 2023 14:09) (96% - 99%)    Parameters below as of 09 Aug 2023 14:09  Patient On (Oxygen Delivery Method): room air        08-08 @ 07:01  -  08-09 @ 07:00  --------------------------------------------------------  IN: 0 mL / OUT: 650 mL / NET: -650 mL    08-09 @ 07:01  -  08-09 @ 17:01  --------------------------------------------------------  IN: 0 mL / OUT: 100 mL / NET: -100 mL        PHYSICAL EXAM:  General: No acute respiratory distress.  Eyes: conjunctiva and sclera clear  ENMT: Atraumatic, Normocephalic, supple, No JVD present. Moist mucous membranes; following flushing of ext right ear canal, otoscopic evaluation reveals right inferior frontal wall abrasion with very small amount of fresh bright colored blood. Tympani membrane intact Respiratory: Bilateral clear lungs; No rales, rhonchi, wheezing  Cardiovascular: S1S2+; no m/r/g  Gastrointestinal: Soft, Non-tender, Nondistended; Bowel sounds present  : mcclellan's cath with clear urine  Neuro: eyes are open; hemiparesis. right and left PD  Ext:  1+pedal edema, No Cyanosis  Skin: No visible rashes    MEDICATIONS:  MEDICATIONS  (STANDING):  chlorhexidine 2% Cloths 1 Application(s) Topical daily  cloNIDine Patch 0.2 mG/24Hr(s) 1 patch Transdermal <User Schedule>  dextrose 5% with potassium chloride 20 mEq/L 1000 milliLiter(s) (50 mL/Hr) IV Continuous <Continuous>  hydrALAZINE Injectable 10 milliGRAM(s) IV Push every 6 hours  valproate sodium  IVPB 500 milliGRAM(s) IV Intermittent every 8 hours    MEDICATIONS  (PRN):          LABS:                        8.3    12.30 )-----------( 257      ( 09 Aug 2023 06:53 )             26.4     08-09    140  |  113<H>  |  20<H>  ----------------------------<  73  3.7   |  16<L>  |  1.67<H>    Ca    8.6      09 Aug 2023 06:53  Phos  3.9     08-09  Mg     2.1     08-09    TPro  6.5  /  Alb  2.2<L>  /  TBili  0.3  /  DBili  x   /  AST  15  /  ALT  11  /  AlkPhos  78  08-09    PT/INR - ( 08 Aug 2023 10:09 )   PT: 12.5 sec;   INR: 1.10 ratio         PTT - ( 08 Aug 2023 10:09 )  PTT:29.9 sec  Urinalysis Basic - ( 09 Aug 2023 06:53 )    Color: x / Appearance: x / SG: x / pH: x  Gluc: 73 mg/dL / Ketone: x  / Bili: x / Urobili: x   Blood: x / Protein: x / Nitrite: x   Leuk Esterase: x / RBC: x / WBC x   Sq Epi: x / Non Sq Epi: x / Bacteria: x      Magnesium: 2.1 mg/dL (08-09 @ 06:53)  Phosphorus: 3.9 mg/dL (08-09 @ 06:53)    Urine studies    PTH and Vit D:  < from: CT Internal Auditory Canals w/ IV Cont (08.07.23 @ 23:56) >    ACC: [id number] EXAM:  CT IAC IC   ORDERED BY: CELESTINA ROBINS     ACC: [id number] EXAM:  CT BRAIN IC   ORDERED BY: DONNA PERERA     PROCEDURE DATE:  08/07/2023          INTERPRETATION:  CT head with and without IV contrast    CLINICAL INFORMATION: Intracranial mass.    TECHNIQUE: Contiguous axial  4 mm sections were obtained through the head   both preceding and following the intravenous administration of 95 cc of   Omnipaque 350/ 5 cc discarded.   This scan was performed using automatic   exposure control (radiation dose reduction software) to obtain a   diagnostic image quality scan with patient dose as low as reasonably   achievable.    FINDINGS:  No previous examinations are available for review.    The brain demonstrates old infarctions in the LEFT corona radiata, LEFT   thalamus, LEFT external capsule and LEFT posterior frontal lobe with   compensatory enlargement of the LEFT lateral ventricle. Wallerian   degeneration in the LEFT cerebral peduncle. Old infarction in the RIGHT   cerebellum. No acute cerebral cortical infarct is seen.  No intracranial   hemorrhage is found. No abnormal enhancement is seen.    The ventricles, sulci and basal cisterns appear show mild global atrophy   and to a moderate degree in the cerebellum.    The vertebral and internal carotid arteries demonstrate expected   enhancement indicating their patency.    The orbits are unremarkable.  The paranasal sinuses are clear.  The nasal   cavity remains intact.  The nasopharynx is symmetric.  The central skull   base, petrous temporal bones and calvarium remain intact.        CT temporal bones with and without IV contrast    CLINICAL INFORMATION: RIGHT hemotympanum    TECHNIQUE: Contiguous .5 mm data set was obtained through the petrous   temporal bones.  Images were obtained preceding and following the   intravenous administration of 90 cc of Omnipaque 350/ 10 cc discarded.    Axial, sagittal and coronal 1 mm sections were reconstructed through the   middle ear cavities.  This scan was performed using automatic exposure   control (radiation dose reduction software) to obtain a diagnostic image   quality scan with patient dose as low as reasonably achievable.    FINDINGS:   No previous examinations are available for review. Patient   motion degrades image quality.    The right temporal bone demonstrates an intact external canal.  The   tympanic membrane does not appear abnormally thickened.  The middle ear   cavity demonstrates no abnormal soft tissue or fluid.  Prussak's space is   clear of disease.  The ossicular chain is intact without displacement,   erosion or abnormal soft tissue.  The facial nerve has an unremarkable   course through the middle ear cavity.  The epitympanum is clear.  The   mastoid antrum is clear.  Adjacent posterior air cells are clear without   abnormal fluid or coalescent disease.  Air cells to the mastoid tip   remain clear.    The right inner ear structures including the cochlea, vestibule and semi   circular canals remain intact.  Adjacent ossificationis normal.  The   internal auditory canal is not widened.    The left temporal bone demonstrates an intact external canal with minimal   cerumen.  The tympanic membrane does not appear abnormally thickened.    The middle ear cavity demonstrates no abnormal soft tissue or fluid.    Prussak's space is clear of disease.  The ossicular chain is intact   without displacement, erosion or abnormal soft tissue.  The facial nerve   has an unremarkable course through the middle ear cavity.  The   epitympanum is clear.  The mastoid antrum is clear.  Adjacent posterior   air cells are clear without abnormal fluid or coalescent disease.  Air   cells to the mastoid tip remain clear.    The left inner ear structures including the cochlea, vestibule and semi  circular canals remain intact.  Adjacent ossification is normal.  The   internal auditory canal is not widened.    The central skull base is intact.  Petrous apices remain clear of   disease.  The visualized paranasal sinuses are clear.    The visualized intracranial contents appear intact.      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 Wallerian degeneration in the   LEFT cerebral peduncle. Old infarction in the RIGHT cerebellum.    No   abnormal enhancement is seen.    CT temporal bones: Patient motion degrades image quality        1.   Right temporal bone:  No gross abnormality.        2.   Left temporal bone:  No gross abnormality. Minimal cerumen in   the LEFT external auditory canal.    --- End of Report ---            RENATE TATE MD; Attending Radiologist  This document has been electronically signed. Aug  8 2023 10:45AM    < end of copied text >

## 2023-08-09 NOTE — PROGRESS NOTE ADULT - SUBJECTIVE AND OBJECTIVE BOX
NEPHROLOGY MEDICAL CARE, [facility] - Dr. Ajay Green/ Dr. Mert Madison/ Dr. Chris Calderon/ Dr. Carson Cook    Date of Service: 08-09-23 @ 17:01    Patient was seen and examined at bedside.    CC: patient is NAD    Vital Signs Last 24 Hrs  T(C): 36.4 (09 Aug 2023 14:09), Max: 37.3 (08 Aug 2023 20:54)  T(F): 97.5 (09 Aug 2023 14:09), Max: 99.1 (08 Aug 2023 20:54)  HR: 110 (09 Aug 2023 14:09) (87 - 110)  BP: 169/72 (09 Aug 2023 14:09) (142/65 - 169/72)  BP(mean): --  RR: 18 (09 Aug 2023 14:09) (16 - 18)  SpO2: 98% (09 Aug 2023 14:09) (96% - 99%)    Parameters below as of 09 Aug 2023 14:09  Patient On (Oxygen Delivery Method): room air        08-08 @ 07:01  -  08-09 @ 07:00  --------------------------------------------------------  IN: 0 mL / OUT: 650 mL / NET: -650 mL    08-09 @ 07:01  -  08-09 @ 17:01  --------------------------------------------------------  IN: 0 mL / OUT: 100 mL / NET: -100 mL        PHYSICAL EXAM:  General: No acute respiratory distress.  Eyes: conjunctiva and sclera clear  ENMT: Atraumatic, Normocephalic, supple, No JVD present. Moist mucous membranes  Respiratory: Bilateral clear lungs; No rales, rhonchi, wheezing  Cardiovascular: S1S2+; no m/r/g  Gastrointestinal: Soft, Non-tender, Nondistended; Bowel sounds present  : mcclellan's cath with clear urine  Neuro: eyes are open; hemiparesis.  Ext:  1+pedal edema, No Cyanosis  Skin: No visible rashes    MEDICATIONS:  MEDICATIONS  (STANDING):  chlorhexidine 2% Cloths 1 Application(s) Topical daily  cloNIDine Patch 0.2 mG/24Hr(s) 1 patch Transdermal <User Schedule>  dextrose 5% with potassium chloride 20 mEq/L 1000 milliLiter(s) (50 mL/Hr) IV Continuous <Continuous>  hydrALAZINE Injectable 10 milliGRAM(s) IV Push every 6 hours  valproate sodium  IVPB 500 milliGRAM(s) IV Intermittent every 8 hours    MEDICATIONS  (PRN):          LABS:                        8.3    12.30 )-----------( 257      ( 09 Aug 2023 06:53 )             26.4     08-09    140  |  113<H>  |  20<H>  ----------------------------<  73  3.7   |  16<L>  |  1.67<H>    Ca    8.6      09 Aug 2023 06:53  Phos  3.9     08-09  Mg     2.1     08-09    TPro  6.5  /  Alb  2.2<L>  /  TBili  0.3  /  DBili  x   /  AST  15  /  ALT  11  /  AlkPhos  78  08-09    PT/INR - ( 08 Aug 2023 10:09 )   PT: 12.5 sec;   INR: 1.10 ratio         PTT - ( 08 Aug 2023 10:09 )  PTT:29.9 sec  Urinalysis Basic - ( 09 Aug 2023 06:53 )    Color: x / Appearance: x / SG: x / pH: x  Gluc: 73 mg/dL / Ketone: x  / Bili: x / Urobili: x   Blood: x / Protein: x / Nitrite: x   Leuk Esterase: x / RBC: x / WBC x   Sq Epi: x / Non Sq Epi: x / Bacteria: x      Magnesium: 2.1 mg/dL (08-09 @ 06:53)  Phosphorus: 3.9 mg/dL (08-09 @ 06:53)    Urine studies    PTH and Vit D:         NEPHROLOGY MEDICAL CARE, [facility] - Dr. Ajay Green/ Dr. Mert Madison/ Dr. Chris Calderon/ Dr. Carson Cook    Date of Service: 08-09-23 @ 17:01    Patient was seen and examined at bedside.    CC: patient is NAD    Vital Signs Last 24 Hrs  T(C): 36.4 (09 Aug 2023 14:09), Max: 37.3 (08 Aug 2023 20:54)  T(F): 97.5 (09 Aug 2023 14:09), Max: 99.1 (08 Aug 2023 20:54)  HR: 110 (09 Aug 2023 14:09) (87 - 110)  BP: 169/72 (09 Aug 2023 14:09) (142/65 - 169/72)  BP(mean): --  RR: 18 (09 Aug 2023 14:09) (16 - 18)  SpO2: 98% (09 Aug 2023 14:09) (96% - 99%)    Parameters below as of 09 Aug 2023 14:09  Patient On (Oxygen Delivery Method): room air        08-08 @ 07:01  -  08-09 @ 07:00  --------------------------------------------------------  IN: 0 mL / OUT: 650 mL / NET: -650 mL    08-09 @ 07:01  -  08-09 @ 17:01  --------------------------------------------------------  IN: 0 mL / OUT: 100 mL / NET: -100 mL        PHYSICAL EXAM:  General: No acute respiratory distress.  Eyes: conjunctiva and sclera clear  ENMT: Atraumatic, Normocephalic, supple, No JVD present. Moist mucous membranes  Respiratory: Bilateral clear lungs; No rales, rhonchi, wheezing  Cardiovascular: S1S2+; no m/r/g  Gastrointestinal: Soft, Non-tender, Nondistended; Bowel sounds present  : mcclellan's cath with clear urine  Neuro: eyes are open; hemiparesis.  Ext:  1+pedal edema, No Cyanosis  Skin: No visible rashes    MEDICATIONS:  MEDICATIONS  (STANDING):  chlorhexidine 2% Cloths 1 Application(s) Topical daily  cloNIDine Patch 0.2 mG/24Hr(s) 1 patch Transdermal <User Schedule>  dextrose 5% with potassium chloride 20 mEq/L 1000 milliLiter(s) (50 mL/Hr) IV Continuous <Continuous>  hydrALAZINE Injectable 10 milliGRAM(s) IV Push every 6 hours  valproate sodium  IVPB 500 milliGRAM(s) IV Intermittent every 8 hours    MEDICATIONS  (PRN):          LABS:                        8.3    12.30 )-----------( 257      ( 09 Aug 2023 06:53 )             26.4     08-09    140  |  113<H>  |  20<H>  ----------------------------<  73  3.7   |  16<L>  |  1.67<H>    Ca    8.6      09 Aug 2023 06:53  Phos  3.9     08-09  Mg     2.1     08-09    TPro  6.5  /  Alb  2.2<L>  /  TBili  0.3  /  DBili  x   /  AST  15  /  ALT  11  /  AlkPhos  78  08-09    PT/INR - ( 08 Aug 2023 10:09 )   PT: 12.5 sec;   INR: 1.10 ratio         PTT - ( 08 Aug 2023 10:09 )  PTT:29.9 sec  Urinalysis Basic - ( 09 Aug 2023 06:53 )    Color: x / Appearance: x / SG: x / pH: x  Gluc: 73 mg/dL / Ketone: x  / Bili: x / Urobili: x   Blood: x / Protein: x / Nitrite: x   Leuk Esterase: x / RBC: x / WBC x   Sq Epi: x / Non Sq Epi: x / Bacteria: x      Magnesium: 2.1 mg/dL (08-09 @ 06:53)  Phosphorus: 3.9 mg/dL (08-09 @ 06:53)    Urine studies    PTH and Vit D:         NEPHROLOGY MEDICAL CARE, [facility] - Dr. Ajay Green/ Dr. Mert Madison/ Dr. Chris Calderon/ Dr. Carson Cook    Date of Service: 08-09-23 @ 17:01    Patient was seen and examined at bedside.    CC: patient is NAD    Vital Signs Last 24 Hrs  T(C): 36.4 (09 Aug 2023 14:09), Max: 37.3 (08 Aug 2023 20:54)  T(F): 97.5 (09 Aug 2023 14:09), Max: 99.1 (08 Aug 2023 20:54)  HR: 110 (09 Aug 2023 14:09) (87 - 110)  BP: 169/72 (09 Aug 2023 14:09) (142/65 - 169/72)  BP(mean): --  RR: 18 (09 Aug 2023 14:09) (16 - 18)  SpO2: 98% (09 Aug 2023 14:09) (96% - 99%)    Parameters below as of 09 Aug 2023 14:09  Patient On (Oxygen Delivery Method): room air        08-08 @ 07:01  -  08-09 @ 07:00  --------------------------------------------------------  IN: 0 mL / OUT: 650 mL / NET: -650 mL    08-09 @ 07:01  -  08-09 @ 17:01  --------------------------------------------------------  IN: 0 mL / OUT: 100 mL / NET: -100 mL        PHYSICAL EXAM:  General: No acute respiratory distress.  Eyes: conjunctiva and sclera clear  ENMT: Atraumatic, Normocephalic, supple, No JVD present. Moist mucous membranes  Respiratory: Bilateral clear lungs; No rales, rhonchi, wheezing  Cardiovascular: S1S2+; no m/r/g  Gastrointestinal: Soft, Non-tender, Nondistended; Bowel sounds present  : mcclellan's cath with clear urine  Neuro: eyes are open; hemiparesis.  Ext:  1+pedal edema, No Cyanosis  Skin: No visible rashes    MEDICATIONS:  MEDICATIONS  (STANDING):  chlorhexidine 2% Cloths 1 Application(s) Topical daily  cloNIDine Patch 0.2 mG/24Hr(s) 1 patch Transdermal <User Schedule>  dextrose 5% with potassium chloride 20 mEq/L 1000 milliLiter(s) (50 mL/Hr) IV Continuous <Continuous>  hydrALAZINE Injectable 10 milliGRAM(s) IV Push every 6 hours  valproate sodium  IVPB 500 milliGRAM(s) IV Intermittent every 8 hours    MEDICATIONS  (PRN):          LABS:                        8.3    12.30 )-----------( 257      ( 09 Aug 2023 06:53 )             26.4     08-09    140  |  113<H>  |  20<H>  ----------------------------<  73  3.7   |  16<L>  |  1.67<H>    Ca    8.6      09 Aug 2023 06:53  Phos  3.9     08-09  Mg     2.1     08-09    TPro  6.5  /  Alb  2.2<L>  /  TBili  0.3  /  DBili  x   /  AST  15  /  ALT  11  /  AlkPhos  78  08-09    PT/INR - ( 08 Aug 2023 10:09 )   PT: 12.5 sec;   INR: 1.10 ratio         PTT - ( 08 Aug 2023 10:09 )  PTT:29.9 sec  Urinalysis Basic - ( 09 Aug 2023 06:53 )    Color: x / Appearance: x / SG: x / pH: x  Gluc: 73 mg/dL / Ketone: x  / Bili: x / Urobili: x   Blood: x / Protein: x / Nitrite: x   Leuk Esterase: x / RBC: x / WBC x   Sq Epi: x / Non Sq Epi: x / Bacteria: x      Magnesium: 2.1 mg/dL (08-09 @ 06:53)  Phosphorus: 3.9 mg/dL (08-09 @ 06:53)    Urine studies    PTH and Vit D:

## 2023-08-09 NOTE — PROGRESS NOTE ADULT - SUBJECTIVE AND OBJECTIVE BOX
[   ] ICU                                          [   ] CCU                                      [ X  ] Medical Floor      Patient is a 77 year old female with dysphagia. GI consulted for Peg tube placement.     Patient is a 77 female from [facility] with past medical history significant for HTN, HLD, CVA (w/ residual aphasia and R sided hemiparesis/plegia) who was sent to the emergency room with for altered mental status. Patient is not able to provide any history. Patient is lying down in bed, awake and alert. However, patient does not speak, is not able to follow commands and does not turn face or move eyes when her name is called. Patient is admitted with CVA. Now patient with dysphagia, poor po intake, failure to thrive and weight loss. No abdominal pain, nausea, vomiting, hematemesis, hematochezia, melena, fever, chills, chest pain, SOB, cough, hematuria, dysuria  or diarrhea reported.      Patient is comfortable. No new complaints reported, No abdominal pain, N/V, hematemesis, hematochezia, melena, fever, chills, chest pain, SOB, cough or diarrhea reported.      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 1000 milliLiter(s) (70 mL/Hr) IV Continuous <Continuous>  enoxaparin Injectable 30 milliGRAM(s) SubCutaneous every 24 hours  hydrALAZINE Injectable 10 milliGRAM(s) IV Push every 6 hours  potassium chloride  10 mEq/100 mL IVPB 10 milliEquivalent(s) IV Intermittent every 1 hour  valproate sodium  IVPB 500 milliGRAM(s) IV Intermittent every 8 hours         SOCIAL HISTORY  Advanced Directives:       [ X ] Full Code       [  ] DNR  Marital Status:         [  ] M      [ X ] S      [  ] D       [  ] W  Children:       [ X ] Yes      [  ] No  Occupation:        [  ] Employed       [ X ] Unemployed       [  ] Retired  Diet:       [ X ] Regular       [  ] PEG feeding          [  ] NG tube feeding  Drug Use:           [ X ] Patient denied          [  ] Yes  Alcohol:           [ X ] No             [  ] Yes (socially)         [  ] Yes (chronic)  Tobacco:           [  ] Yes           [X  ] No      FAMILY HISTORY  [ X ] Heart Disease            [ X ] Diabetes             [ X ] HTN             [  ] Colon Cancer             [  ] Stomach Cancer              [  ] Pancreatic Cancer      VITALS  Vital Signs Last 24 Hrs  T(C): 37.2 (09 Aug 2023 05:20), Max: 37.3 (08 Aug 2023 20:54)  T(F): 99 (09 Aug 2023 05:20), Max: 99.1 (08 Aug 2023 20:54)  HR: 95 (09 Aug 2023 05:20) (86 - 121)  BP: 142/65 (09 Aug 2023 05:20) (109/66 - 179/83)   RR: 16 (09 Aug 2023 05:20) (16 - 18)  SpO2: 96% (09 Aug 2023 05:20) (96% - 99%)  Parameters below as of 09 Aug 2023 05:20  Patient On (Oxygen Delivery Method): room air       MEDICATIONS  (STANDING):  cloNIDine Patch 0.2 mG/24Hr(s) 1 patch Transdermal <User Schedule>  dextrose 5% with potassium chloride 20 mEq/L 1000 milliLiter(s) (50 mL/Hr) IV Continuous <Continuous>  hydrALAZINE Injectable 10 milliGRAM(s) IV Push every 6 hours  valproate sodium  IVPB 500 milliGRAM(s) IV Intermittent every 8 hours    MEDICATIONS  (PRN):                            8.3    12.30 )-----------( 257      ( 09 Aug 2023 06:53 )             26.4       08-09    140  |  113<H>  |  20<H>  ----------------------------<  73  3.7   |  16<L>  |  1.67<H>    Ca    8.6      09 Aug 2023 06:53  Phos  3.9     08-09  Mg     2.1     08-09    TPro  6.5  /  Alb  2.2<L>  /  TBili  0.3  /  DBili  x   /  AST  15  /  ALT  11  /  AlkPhos  78  08-09      PT/INR - ( 08 Aug 2023 10:09 )   PT: 12.5 sec;   INR: 1.10 ratio         PTT - ( 08 Aug 2023 10:09 )  PTT:29.9 sec [   ] ICU                                          [   ] CCU                                      [ X  ] Medical Floor      Patient is a 77 year old female with dysphagia. GI consulted for Peg tube placement.     Patient is a 77 female from [facility] with past medical history significant for HTN, HLD, CVA (w/ residual aphasia and R sided hemiparesis/plegia) who was sent to the emergency room with for altered mental status. Patient is not able to provide any history. Patient is lying down in bed, awake and alert. However, patient does not speak, is not able to follow commands and does not turn face or move eyes when her name is called. Patient is admitted with CVA. Now patient with dysphagia, poor po intake, failure to thrive and weight loss. No abdominal pain, nausea, vomiting, hematemesis, hematochezia, melena, fever, chills, chest pain, SOB, cough, hematuria, dysuria  or diarrhea reported.      Patient is comfortable. No new complaints reported, No abdominal pain, N/V, hematemesis, hematochezia, melena, fever, chills, chest pain, SOB, cough or diarrhea reported.      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 1000 milliLiter(s) (70 mL/Hr) IV Continuous <Continuous>  enoxaparin Injectable 30 milliGRAM(s) SubCutaneous every 24 hours  hydrALAZINE Injectable 10 milliGRAM(s) IV Push every 6 hours  potassium chloride  10 mEq/100 mL IVPB 10 milliEquivalent(s) IV Intermittent every 1 hour  valproate sodium  IVPB 500 milliGRAM(s) IV Intermittent every 8 hours         SOCIAL HISTORY  Advanced Directives:       [ X ] Full Code       [  ] DNR  Marital Status:         [  ] M      [ X ] S      [  ] D       [  ] W  Children:       [ X ] Yes      [  ] No  Occupation:        [  ] Employed       [ X ] Unemployed       [  ] Retired  Diet:       [ X ] Regular       [  ] PEG feeding          [  ] NG tube feeding  Drug Use:           [ X ] Patient denied          [  ] Yes  Alcohol:           [ X ] No             [  ] Yes (socially)         [  ] Yes (chronic)  Tobacco:           [  ] Yes           [X  ] No      FAMILY HISTORY  [ X ] Heart Disease            [ X ] Diabetes             [ X ] HTN             [  ] Colon Cancer             [  ] Stomach Cancer              [  ] Pancreatic Cancer      VITALS  Vital Signs Last 24 Hrs  T(C): 37.2 (09 Aug 2023 05:20), Max: 37.3 (08 Aug 2023 20:54)  T(F): 99 (09 Aug 2023 05:20), Max: 99.1 (08 Aug 2023 20:54)  HR: 95 (09 Aug 2023 05:20) (86 - 121)  BP: 142/65 (09 Aug 2023 05:20) (109/66 - 179/83)   RR: 16 (09 Aug 2023 05:20) (16 - 18)  SpO2: 96% (09 Aug 2023 05:20) (96% - 99%)  Parameters below as of 09 Aug 2023 05:20  Patient On (Oxygen Delivery Method): room air       MEDICATIONS  (STANDING):  cloNIDine Patch 0.2 mG/24Hr(s) 1 patch Transdermal <User Schedule>  dextrose 5% with potassium chloride 20 mEq/L 1000 milliLiter(s) (50 mL/Hr) IV Continuous <Continuous>  hydrALAZINE Injectable 10 milliGRAM(s) IV Push every 6 hours  valproate sodium  IVPB 500 milliGRAM(s) IV Intermittent every 8 hours    MEDICATIONS  (PRN):                            8.3    12.30 )-----------( 257      ( 09 Aug 2023 06:53 )             26.4       08-09    140  |  113<H>  |  20<H>  ----------------------------<  73  3.7   |  16<L>  |  1.67<H>    Ca    8.6      09 Aug 2023 06:53  Phos  3.9     08-09  Mg     2.1     08-09    TPro  6.5  /  Alb  2.2<L>  /  TBili  0.3  /  DBili  x   /  AST  15  /  ALT  11  /  AlkPhos  78  08-09      PT/INR - ( 08 Aug 2023 10:09 )   PT: 12.5 sec;   INR: 1.10 ratio         PTT - ( 08 Aug 2023 10:09 )  PTT:29.9 sec [   ] ICU                                          [   ] CCU                                      [ X  ] Medical Floor      Patient is a 77 year old female with dysphagia. GI consulted for Peg tube placement.     Patient is a 77 female from [facility] with past medical history significant for HTN, HLD, CVA (w/ residual aphasia and R sided hemiparesis/plegia) who was sent to the emergency room with for altered mental status. Patient is not able to provide any history. Patient is lying down in bed, awake and alert. However, patient does not speak, is not able to follow commands and does not turn face or move eyes when her name is called. Patient is admitted with CVA. Now patient with dysphagia, poor po intake, failure to thrive and weight loss. No abdominal pain, nausea, vomiting, hematemesis, hematochezia, melena, fever, chills, chest pain, SOB, cough, hematuria, dysuria  or diarrhea reported.      Patient is comfortable. No new complaints reported, No abdominal pain, N/V, hematemesis, hematochezia, melena, fever, chills, chest pain, SOB, cough or diarrhea reported.      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 1000 milliLiter(s) (70 mL/Hr) IV Continuous <Continuous>  enoxaparin Injectable 30 milliGRAM(s) SubCutaneous every 24 hours  hydrALAZINE Injectable 10 milliGRAM(s) IV Push every 6 hours  potassium chloride  10 mEq/100 mL IVPB 10 milliEquivalent(s) IV Intermittent every 1 hour  valproate sodium  IVPB 500 milliGRAM(s) IV Intermittent every 8 hours         SOCIAL HISTORY  Advanced Directives:       [ X ] Full Code       [  ] DNR  Marital Status:         [  ] M      [ X ] S      [  ] D       [  ] W  Children:       [ X ] Yes      [  ] No  Occupation:        [  ] Employed       [ X ] Unemployed       [  ] Retired  Diet:       [ X ] Regular       [  ] PEG feeding          [  ] NG tube feeding  Drug Use:           [ X ] Patient denied          [  ] Yes  Alcohol:           [ X ] No             [  ] Yes (socially)         [  ] Yes (chronic)  Tobacco:           [  ] Yes           [X  ] No      FAMILY HISTORY  [ X ] Heart Disease            [ X ] Diabetes             [ X ] HTN             [  ] Colon Cancer             [  ] Stomach Cancer              [  ] Pancreatic Cancer      VITALS  Vital Signs Last 24 Hrs  T(C): 37.2 (09 Aug 2023 05:20), Max: 37.3 (08 Aug 2023 20:54)  T(F): 99 (09 Aug 2023 05:20), Max: 99.1 (08 Aug 2023 20:54)  HR: 95 (09 Aug 2023 05:20) (86 - 121)  BP: 142/65 (09 Aug 2023 05:20) (109/66 - 179/83)   RR: 16 (09 Aug 2023 05:20) (16 - 18)  SpO2: 96% (09 Aug 2023 05:20) (96% - 99%)  Parameters below as of 09 Aug 2023 05:20  Patient On (Oxygen Delivery Method): room air       MEDICATIONS  (STANDING):  cloNIDine Patch 0.2 mG/24Hr(s) 1 patch Transdermal <User Schedule>  dextrose 5% with potassium chloride 20 mEq/L 1000 milliLiter(s) (50 mL/Hr) IV Continuous <Continuous>  hydrALAZINE Injectable 10 milliGRAM(s) IV Push every 6 hours  valproate sodium  IVPB 500 milliGRAM(s) IV Intermittent every 8 hours    MEDICATIONS  (PRN):                            8.3    12.30 )-----------( 257      ( 09 Aug 2023 06:53 )             26.4       08-09    140  |  113<H>  |  20<H>  ----------------------------<  73  3.7   |  16<L>  |  1.67<H>    Ca    8.6      09 Aug 2023 06:53  Phos  3.9     08-09  Mg     2.1     08-09    TPro  6.5  /  Alb  2.2<L>  /  TBili  0.3  /  DBili  x   /  AST  15  /  ALT  11  /  AlkPhos  78  08-09      PT/INR - ( 08 Aug 2023 10:09 )   PT: 12.5 sec;   INR: 1.10 ratio         PTT - ( 08 Aug 2023 10:09 )  PTT:29.9 sec

## 2023-08-09 NOTE — PROGRESS NOTE ADULT - SUBJECTIVE AND OBJECTIVE BOX
CARYN LEIVA  MR# [id number]  77yFemale        Patient is a 77y old  Female who presents with a chief complaint of Altered mental status. (09 Aug 2023 11:24)      INTERVAL HPI/OVERNIGHT EVENTS:  Patient seen and examined at bedside. No notations of chest pain, palpitation, SOB, orthopnea, nausea, vomiting or abdominal pain.    ALLERGIES  &quot; NATURAL RUBBER&quot; (Other)  latex (Other)  penicillins (Unknown)      MEDICATIONS  cloNIDine Patch 0.2 mG/24Hr(s) 1 patch Transdermal <User Schedule>  dextrose 5% with potassium chloride 20 mEq/L 1000 milliLiter(s) IV Continuous <Continuous>  hydrALAZINE Injectable 10 milliGRAM(s) IV Push every 6 hours  valproate sodium  IVPB 500 milliGRAM(s) IV Intermittent every 8 hours              REVIEW OF SYSTEMS:  CONSTITUTIONAL: No fever, weight loss, or fatigue  EYES: No eye pain, visual disturbances, or discharge  ENT:  No difficulty hearing, tinnitus, vertigo; No sinus or throat pain  NECK: No pain or stiffness  RESPIRATORY: No cough, wheezing, chills or hemoptysis; No Shortness of Breath  CARDIOVASCULAR: No chest pain, palpitations, passing out, dizziness, or leg swelling  GASTROINTESTINAL: No abdominal or epigastric pain. No nausea, vomiting, or hematemesis; No diarrhea or constipation. No melena or hematochezia.  GENITOURINARY: No dysuria, frequency, hematuria, or incontinence  NEUROLOGICAL: No headaches, memory loss, loss of strength, numbness, or tremors  SKIN: No itching, burning, rashes, or lesions   LYMPH Nodes: No enlarged glands  ENDOCRINE: No heat or cold intolerance; No hair loss  MUSCULOSKELETAL: No joint pain or swelling; No muscle, back, or extremity pain  PSYCHIATRIC: No depression, anxiety, mood swings, or difficulty sleeping  HEME/LYMPH: No easy bruising, or bleeding gums  ALLERGY AND IMMUNOLOGIC: No hives or eczema	    [ ] All others negative	  [ ] Unable to obtain      T(C): 37.2 (08-09-23 @ 05:20), Max: 37.3 (08-08-23 @ 20:54)  T(F): 99 (08-09-23 @ 05:20), Max: 99.1 (08-08-23 @ 20:54)  HR: 95 (08-09-23 @ 05:20) (86 - 121)  BP: 142/65 (08-09-23 @ 05:20) (109/66 - 179/83)  RR: 16 (08-09-23 @ 05:20) (16 - 18)  SpO2: 96% (08-09-23 @ 05:20) (96% - 99%)  Wt(kg): --    I&O's Summary    08 Aug 2023 07:01  -  09 Aug 2023 07:00  --------------------------------------------------------  IN: 0 mL / OUT: 650 mL / NET: -650 mL          PHYSICAL EXAM:  A X O x  HEAD:  Atraumatic, Normocephalic  EYES: EOMI, PERRLA, conjunctiva and sclera clear  NECK: Supple, No JVD, Normal thyroid  Resp: CTAB, No crackles, wheezing,   CVS: Regular rate and rhythm; No discernable murmurs, rubs, or gallops  ABD: Soft, Nontender, Nondistended; Bowel sounds present  EXTREMITIES:  2+ Peripheral Pulses, No edema  LYMPH: No dicernable lymphadenopathy noted  GENERAL: NAD, well-groomed, well-developed      LABS:                        8.3    12.30 )-----------( 257      ( 09 Aug 2023 06:53 )             26.4     08-09    140  |  113<H>  |  20<H>  ----------------------------<  73  3.7   |  16<L>  |  1.67<H>    Ca    8.6      09 Aug 2023 06:53  Phos  3.9     08-09  Mg     2.1     08-09    TPro  6.5  /  Alb  2.2<L>  /  TBili  0.3  /  DBili  x   /  AST  15  /  ALT  11  /  AlkPhos  78  08-09    PT/INR - ( 08 Aug 2023 10:09 )   PT: 12.5 sec;   INR: 1.10 ratio         PTT - ( 08 Aug 2023 10:09 )  PTT:29.9 sec  Urinalysis Basic - ( 09 Aug 2023 06:53 )    Color: x / Appearance: x / SG: x / pH: x  Gluc: 73 mg/dL / Ketone: x  / Bili: x / Urobili: x   Blood: x / Protein: x / Nitrite: x   Leuk Esterase: x / RBC: x / WBC x   Sq Epi: x / Non Sq Epi: x / Bacteria: x      CAPILLARY BLOOD GLUCOSE      POCT Blood Glucose.: 84 mg/dL (09 Aug 2023 11:19)  POCT Blood Glucose.: 80 mg/dL (09 Aug 2023 05:56)  POCT Blood Glucose.: 81 mg/dL (08 Aug 2023 23:29)  POCT Blood Glucose.: 73 mg/dL (08 Aug 2023 18:54)  POCT Blood Glucose.: 76 mg/dL (08 Aug 2023 11:46)      Troponins:  ProBNP:  Lipid Profile:   HgA1c:  TSH:           RADIOLOGY & ADDITIONAL TESTS:    Imaging Personally Reviewed:  [ ] YES  [ ] NO      Consultant(s) Notes Reviewed:  [x ] YES  [ ] NO    Care Discussed with Consultants/Other Providers [ x] YES  [ ] NO          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      Seizures      Chronic constipation      No significant past surgical history            Altered mental status    Yes    Handoff    MEWS Score    HTN (hypertension)    HLD (hyperlipidemia)    Cerebrovascular accident (CVA)    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             CARYN LEIVA  MR# [id number]  77yFemale        Patient is a 77y old  Female who presents with a chief complaint of Altered mental status. (09 Aug 2023 11:24)      INTERVAL HPI/OVERNIGHT EVENTS:  Patient seen and examined at bedside. No notations of chest pain, palpitation, SOB, orthopnea, nausea, vomiting or abdominal pain.    ALLERGIES  &quot; NATURAL RUBBER&quot; (Other)  latex (Other)  penicillins (Unknown)      MEDICATIONS  cloNIDine Patch 0.2 mG/24Hr(s) 1 patch Transdermal <User Schedule>  dextrose 5% with potassium chloride 20 mEq/L 1000 milliLiter(s) IV Continuous <Continuous>  hydrALAZINE Injectable 10 milliGRAM(s) IV Push every 6 hours  valproate sodium  IVPB 500 milliGRAM(s) IV Intermittent every 8 hours              REVIEW OF SYSTEMS:  CONSTITUTIONAL: No fever, weight loss, or fatigue  EYES: No eye pain, visual disturbances, or discharge  ENT:  No difficulty hearing, tinnitus, vertigo; No sinus or throat pain  NECK: No pain or stiffness  RESPIRATORY: No cough, wheezing, chills or hemoptysis; No Shortness of Breath  CARDIOVASCULAR: No chest pain, palpitations, passing out, dizziness, or leg swelling  GASTROINTESTINAL: No abdominal or epigastric pain. No nausea, vomiting, or hematemesis; No diarrhea or constipation. No melena or hematochezia.  GENITOURINARY: No dysuria, frequency, hematuria, or incontinence  NEUROLOGICAL: No headaches, memory loss, loss of strength, numbness, or tremors  SKIN: No itching, burning, rashes, or lesions   LYMPH Nodes: No enlarged glands  ENDOCRINE: No heat or cold intolerance; No hair loss  MUSCULOSKELETAL: No joint pain or swelling; No muscle, back, or extremity pain  PSYCHIATRIC: No depression, anxiety, mood swings, or difficulty sleeping  HEME/LYMPH: No easy bruising, or bleeding gums  ALLERGY AND IMMUNOLOGIC: No hives or eczema	    [ ] All others negative	  [ ] Unable to obtain      T(C): 37.2 (08-09-23 @ 05:20), Max: 37.3 (08-08-23 @ 20:54)  T(F): 99 (08-09-23 @ 05:20), Max: 99.1 (08-08-23 @ 20:54)  HR: 95 (08-09-23 @ 05:20) (86 - 121)  BP: 142/65 (08-09-23 @ 05:20) (109/66 - 179/83)  RR: 16 (08-09-23 @ 05:20) (16 - 18)  SpO2: 96% (08-09-23 @ 05:20) (96% - 99%)  Wt(kg): --    I&O's Summary    08 Aug 2023 07:01  -  09 Aug 2023 07:00  --------------------------------------------------------  IN: 0 mL / OUT: 650 mL / NET: -650 mL          PHYSICAL EXAM:  A X O x  HEAD:  Atraumatic, Normocephalic  EYES: EOMI, PERRLA, conjunctiva and sclera clear  NECK: Supple, No JVD, Normal thyroid  Resp: CTAB, No crackles, wheezing,   CVS: Regular rate and rhythm; No discernable murmurs, rubs, or gallops  ABD: Soft, Nontender, Nondistended; Bowel sounds present  EXTREMITIES:  2+ Peripheral Pulses, No edema  LYMPH: No dicernable lymphadenopathy noted  GENERAL: NAD, well-groomed, well-developed      LABS:                        8.3    12.30 )-----------( 257      ( 09 Aug 2023 06:53 )             26.4     08-09    140  |  113<H>  |  20<H>  ----------------------------<  73  3.7   |  16<L>  |  1.67<H>    Ca    8.6      09 Aug 2023 06:53  Phos  3.9     08-09  Mg     2.1     08-09    TPro  6.5  /  Alb  2.2<L>  /  TBili  0.3  /  DBili  x   /  AST  15  /  ALT  11  /  AlkPhos  78  08-09    PT/INR - ( 08 Aug 2023 10:09 )   PT: 12.5 sec;   INR: 1.10 ratio         PTT - ( 08 Aug 2023 10:09 )  PTT:29.9 sec  Urinalysis Basic - ( 09 Aug 2023 06:53 )    Color: x / Appearance: x / SG: x / pH: x  Gluc: 73 mg/dL / Ketone: x  / Bili: x / Urobili: x   Blood: x / Protein: x / Nitrite: x   Leuk Esterase: x / RBC: x / WBC x   Sq Epi: x / Non Sq Epi: x / Bacteria: x      CAPILLARY BLOOD GLUCOSE      POCT Blood Glucose.: 84 mg/dL (09 Aug 2023 11:19)  POCT Blood Glucose.: 80 mg/dL (09 Aug 2023 05:56)  POCT Blood Glucose.: 81 mg/dL (08 Aug 2023 23:29)  POCT Blood Glucose.: 73 mg/dL (08 Aug 2023 18:54)  POCT Blood Glucose.: 76 mg/dL (08 Aug 2023 11:46)      Troponins:  ProBNP:  Lipid Profile:   HgA1c:  TSH:           RADIOLOGY & ADDITIONAL TESTS:    Imaging Personally Reviewed:  [ ] YES  [ ] NO      Consultant(s) Notes Reviewed:  [x ] YES  [ ] NO    Care Discussed with Consultants/Other Providers [ x] YES  [ ] NO          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      Seizures      Chronic constipation      No significant past surgical history            Altered mental status    Yes    Handoff    MEWS Score    HTN (hypertension)    HLD (hyperlipidemia)    Cerebrovascular accident (CVA)    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             CARYN LEIVA  MR# [id number]  77yFemale        Patient is a 77y old  Female who presents with a chief complaint of Altered mental status. (09 Aug 2023 11:24)      INTERVAL HPI/OVERNIGHT EVENTS:  Patient seen and examined at bedside. No notations of chest pain, palpitation, SOB, orthopnea, nausea, vomiting or abdominal pain.    ALLERGIES  &quot; NATURAL RUBBER&quot; (Other)  latex (Other)  penicillins (Unknown)      MEDICATIONS  cloNIDine Patch 0.2 mG/24Hr(s) 1 patch Transdermal <User Schedule>  dextrose 5% with potassium chloride 20 mEq/L 1000 milliLiter(s) IV Continuous <Continuous>  hydrALAZINE Injectable 10 milliGRAM(s) IV Push every 6 hours  valproate sodium  IVPB 500 milliGRAM(s) IV Intermittent every 8 hours              REVIEW OF SYSTEMS:  CONSTITUTIONAL: No fever, weight loss, or fatigue  EYES: No eye pain, visual disturbances, or discharge  ENT:  No difficulty hearing, tinnitus, vertigo; No sinus or throat pain  NECK: No pain or stiffness  RESPIRATORY: No cough, wheezing, chills or hemoptysis; No Shortness of Breath  CARDIOVASCULAR: No chest pain, palpitations, passing out, dizziness, or leg swelling  GASTROINTESTINAL: No abdominal or epigastric pain. No nausea, vomiting, or hematemesis; No diarrhea or constipation. No melena or hematochezia.  GENITOURINARY: No dysuria, frequency, hematuria, or incontinence  NEUROLOGICAL: No headaches, memory loss, loss of strength, numbness, or tremors  SKIN: No itching, burning, rashes, or lesions   LYMPH Nodes: No enlarged glands  ENDOCRINE: No heat or cold intolerance; No hair loss  MUSCULOSKELETAL: No joint pain or swelling; No muscle, back, or extremity pain  PSYCHIATRIC: No depression, anxiety, mood swings, or difficulty sleeping  HEME/LYMPH: No easy bruising, or bleeding gums  ALLERGY AND IMMUNOLOGIC: No hives or eczema	    [ ] All others negative	  [ ] Unable to obtain      T(C): 37.2 (08-09-23 @ 05:20), Max: 37.3 (08-08-23 @ 20:54)  T(F): 99 (08-09-23 @ 05:20), Max: 99.1 (08-08-23 @ 20:54)  HR: 95 (08-09-23 @ 05:20) (86 - 121)  BP: 142/65 (08-09-23 @ 05:20) (109/66 - 179/83)  RR: 16 (08-09-23 @ 05:20) (16 - 18)  SpO2: 96% (08-09-23 @ 05:20) (96% - 99%)  Wt(kg): --    I&O's Summary    08 Aug 2023 07:01  -  09 Aug 2023 07:00  --------------------------------------------------------  IN: 0 mL / OUT: 650 mL / NET: -650 mL          PHYSICAL EXAM:  A X O x  HEAD:  Atraumatic, Normocephalic  EYES: EOMI, PERRLA, conjunctiva and sclera clear  NECK: Supple, No JVD, Normal thyroid  Resp: CTAB, No crackles, wheezing,   CVS: Regular rate and rhythm; No discernable murmurs, rubs, or gallops  ABD: Soft, Nontender, Nondistended; Bowel sounds present  EXTREMITIES:  2+ Peripheral Pulses, No edema  LYMPH: No dicernable lymphadenopathy noted  GENERAL: NAD, well-groomed, well-developed      LABS:                        8.3    12.30 )-----------( 257      ( 09 Aug 2023 06:53 )             26.4     08-09    140  |  113<H>  |  20<H>  ----------------------------<  73  3.7   |  16<L>  |  1.67<H>    Ca    8.6      09 Aug 2023 06:53  Phos  3.9     08-09  Mg     2.1     08-09    TPro  6.5  /  Alb  2.2<L>  /  TBili  0.3  /  DBili  x   /  AST  15  /  ALT  11  /  AlkPhos  78  08-09    PT/INR - ( 08 Aug 2023 10:09 )   PT: 12.5 sec;   INR: 1.10 ratio         PTT - ( 08 Aug 2023 10:09 )  PTT:29.9 sec  Urinalysis Basic - ( 09 Aug 2023 06:53 )    Color: x / Appearance: x / SG: x / pH: x  Gluc: 73 mg/dL / Ketone: x  / Bili: x / Urobili: x   Blood: x / Protein: x / Nitrite: x   Leuk Esterase: x / RBC: x / WBC x   Sq Epi: x / Non Sq Epi: x / Bacteria: x      CAPILLARY BLOOD GLUCOSE      POCT Blood Glucose.: 84 mg/dL (09 Aug 2023 11:19)  POCT Blood Glucose.: 80 mg/dL (09 Aug 2023 05:56)  POCT Blood Glucose.: 81 mg/dL (08 Aug 2023 23:29)  POCT Blood Glucose.: 73 mg/dL (08 Aug 2023 18:54)  POCT Blood Glucose.: 76 mg/dL (08 Aug 2023 11:46)      Troponins:  ProBNP:  Lipid Profile:   HgA1c:  TSH:           RADIOLOGY & ADDITIONAL TESTS:    Imaging Personally Reviewed:  [ ] YES  [ ] NO      Consultant(s) Notes Reviewed:  [x ] YES  [ ] NO    Care Discussed with Consultants/Other Providers [ x] YES  [ ] NO          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      Seizures      Chronic constipation      No significant past surgical history            Altered mental status    Yes    Handoff    MEWS Score    HTN (hypertension)    HLD (hyperlipidemia)    Cerebrovascular accident (CVA)    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

## 2023-08-09 NOTE — PROGRESS NOTE ADULT - PROBLEM SELECTOR PLAN 3
altered mental status  HEAD CT: Chronic left MCA territory infarction.  CT PERFUSION demonstrated: Perfusion abnormality corresponding to the chronically infarcted left MCA territory. INFARCT CORE: 57 mL. TISSUE AT RISK: 236 mL.  CTA COW and  CTA NECK: neg   Pt can not move limbs or speak. She failed dysphagia screen but family desires to try speech and swallow even though she can not. Palliative can re-consult. Tazorac Counseling:  Patient advised that medication is irritating and drying.  Patient may need to apply sparingly and wash off after an hour before eventually leaving it on overnight.  The patient verbalized understanding of the proper use and possible adverse effects of tazorac.  All of the patient's questions and concerns were addressed.

## 2023-08-09 NOTE — PROGRESS NOTE ADULT - ASSESSMENT
preop gastrostomy tube placement if consents in am  type and screen  npo  ivf  please indicate medical optimization for procedure on chart

## 2023-08-09 NOTE — PROGRESS NOTE ADULT - ASSESSMENT
77 female from [facility] PMHx HTN, HLD, CVA (w/ residual aphasia and R sided hemiparesis/plegia) who was sent to the hospital due to altered mental status,UTI.  1.Neurology eval noted for bleeding from ear, CT -.  2.UTI-s/p ABX.  3.HTN- clonidine patch .2mg q wk,IV hydralazine.  5.Lipid d/o-hold statin.  6.Plan for G tube placement in am by surgery.  7.Hypernatremia and SUSAN-IVF D5 with kcl.  8.GI and DVT prophylaxis. 77 female from [facility] PMHx HTN, HLD, CVA (w/ residual aphasia and R sided hemiparesis/plegia) who was sent to the hospital due to altered mental status,UTI.  1.Neurology eval noted for bleeding from ear, CT -.  2.UTI-s/p ABX.  3.HTN- clonidine patch .2mg q wk,IV hydralazine.  5.Lipid d/o-hold statin.  6.Plan for G tube placement in am by surgery.  7.Hypernatremia and SUSAN-IVF D5 with kcl.  8.GI and DVT prophylaxis. 77 female from [facility] PMHx HTN, HLD, CVA (w/ residual aphasia and R sided hemiparesis/plegia) who was sent to the hospital due to altered mental status,UTI.  1.Neurology eval noted for bleeding from ear, CT -.  2.UTI-s/p ABX.  3.HTN- clonidine patch .2mg q wk,IV hydralazine.  5.Lipid d/o-hold statin.  6.Plan for G tube placement in am by surgery.  7.Hypernatremia and SUSAN-IVF D5 with kcl.  8.GI and DVT prophylaxis.

## 2023-08-09 NOTE — PROGRESS NOTE ADULT - ASSESSMENT
Patient is a 77 female from [facility] PMHx HTN, HLD, CVA (w/ residual aphasia and R sided hemiparesis/plegia) who was sent to the hospital due to altered mental status. Patient is being admitted to medicine for further work up and rule out stroke vs encephalopathy (metabolic vs infectious).  Patient is a 77 female from [facility] PMHx HTN, HLD, CVA (w/ residual aphasia and R sided hemiparesis/plegia) who was sent to the hospital due to altered mental status. Patient is being admitted to medicine for further work up and rule out stroke vs encephalopathy (metabolic vs infectious).  Patient is a 77 female from [facility] PMHx HTN, HLD, CVA (w/ residual aphasia and R sided hemiparesis/plegia) who was sent to the hospital due to altered mental status. Patient is being admitted to medicine for further work up and rule out stroke vs encephalopathy (metabolic vs infectious).

## 2023-08-09 NOTE — PROGRESS NOTE ADULT - ASSESSMENT
78 y/o Female w/ Failure to thrive; hx CVA   Unsuccessful PEG placement w/ GI     -Plan for Open Gastrostomy Tube placement 8/9  -IVF   -npo  type and screen  Dr. Mcadams for consent this am  	  76 y/o Female w/ Failure to thrive; hx CVA   Unsuccessful PEG placement w/ GI     -Plan for Open Gastrostomy Tube placement 8/9  -IVF   -npo  type and screen  Dr. Mcadams for consent this am

## 2023-08-09 NOTE — PROGRESS NOTE ADULT - ASSESSMENT
Patient is a 77 female from [facility] PMHx HTN, HLD, CVA (w/ residual aphasia and R sided hemiparesis/plegia) who was sent to the hospital due to altered mental status. Patient is being admitted to medicine for further work up and rule out stroke vs encephalopathy (metabolic vs infectious).  Patient is a 77 female from [facility] PMHx HTN, HLD, CVA (w/ residual aphasia and R sided hemiparesis/plegia) who was sent to the hospital due to altered mental status. Patient is being admitted to medicine for further work up and rule out stroke vs encephalopathy (metabolic vs infectious).  Patient is a 77 female from [facility] PMHx HTN, HLD, CVA (w/ residual aphasia and R sided hemiparesis/plegia) who was sent to the hospital due to altered mental status. Patient is being admitted to medicine for further work up and rule out stroke vs encephalopathy (metabolic vs infectious).

## 2023-08-09 NOTE — CHART NOTE - NSCHARTNOTEFT_GEN_A_CORE
Assessment:     Factors impacting intake: [ ] none [ ] nausea  [ ] vomiting [ ] diarrhea [ ] constipation  [ ]chewing problems [ ] swallowing issues  [ ] other:     Diet Presciption: Diet, NPO (07-27-23 @ 19:29)    Intake:     Daily   % Weight Change    Pertinent Medications: MEDICATIONS  (STANDING):  cloNIDine Patch 0.2 mG/24Hr(s) 1 patch Transdermal <User Schedule>  dextrose 5% with potassium chloride 20 mEq/L 1000 milliLiter(s) (50 mL/Hr) IV Continuous <Continuous>  hydrALAZINE Injectable 10 milliGRAM(s) IV Push every 6 hours  valproate sodium  IVPB 500 milliGRAM(s) IV Intermittent every 8 hours    MEDICATIONS  (PRN):    Pertinent Labs: 08-09 Na140 mmol/L Glu 73 mg/dL K+ 3.7 mmol/L Cr  1.67 mg/dL<H> BUN 20 mg/dL<H> 08-09 Phos 3.9 mg/dL 08-09 Alb 2.2 g/dL<L> 07-28 Chol 152 mg/dL LDL --    HDL 51 mg/dL Trig 93 mg/dL     CAPILLARY BLOOD GLUCOSE      POCT Blood Glucose.: 80 mg/dL (09 Aug 2023 05:56)  POCT Blood Glucose.: 81 mg/dL (08 Aug 2023 23:29)  POCT Blood Glucose.: 73 mg/dL (08 Aug 2023 18:54)  POCT Blood Glucose.: 76 mg/dL (08 Aug 2023 11:46)      Skin:     Estimated Needs:   [ ] no change since previous assessment  [ ] recalculated:     Previous Nutrition Diagnosis:   [ ] Inadequate Energy Intake [ ]Inadequate Oral Intake [ ] Excessive Energy Intake   [ ] Underweight [ ] Increased Nutrient Needs [ ] Overweight/Obesity  [ ] Swallowing Difficult   [ ] Altered GI Function [ ] Unintended Weight Loss [ ] Food & Nutrition Related Knowledge Deficit [ ] Malnutrition   [ ] Not Ready for Diet/Life Style Changes     Nutrition Diagnosis is [ ] ongoing  [ ] Improving   [ ] resolved [ ] not applicable     New Nutrition Diagnosis: [ ] not applicable       Interventions:   Recommend  [ ] Change Diet To:  [ ] Nutrition Supplement  [ ] Nutrition Support  [ ] Other:     Monitoring and Evaluation:   [ ] PO intake [ x ] Tolerance to diet prescription [ x ] weights [ x ] labs[ x ] follow up per protocol  [ ] other: Assessment:       Nutrition reassessment for follow-up and NPO status. Chart reviewed, pt visited, alert, no-verbal/confused per chart; NPO x 13 to 14d; failed Swallow evaluation and failed NGT attempt, s/p Palliative consult, Severe Malnutrition and Family requesting PEG feeding per MD, GI consulted, s/p Attempted PEG placement 8/8/23, unsuccessful, pending surgery GT tube insertion, Surgery on the case;  eGFR=31, PO4=3.9  K=3.7  Nephrology on the case        Factors impacting intake: [ x ] other: change in mental status; difficulty chewing; difficulty feeding self; difficulty swallowing; acute on chronic comorbidities including acute encephalopathy, SUSAN on CKD, h/o CVA    Diet Prescription: Diet, NPO (07-27-23 @ 19:29)    Intake: NPO     Daily % Weight Change: no new data     Pertinent Medications: MEDICATIONS  (STANDING):  cloNIDine Patch 0.2 mG/24Hr(s) 1 patch Transdermal <User Schedule>  dextrose 5% with potassium chloride 20 mEq/L 1000 milliLiter(s) (50 mL/Hr) IV Continuous <Continuous>  hydrALAZINE Injectable 10 milliGRAM(s) IV Push every 6 hours  valproate sodium  IVPB 500 milliGRAM(s) IV Intermittent every 8 hours    MEDICATIONS  (PRN):    Pertinent Labs: 08-09 Na140 mmol/L Glu 73 mg/dL K+ 3.7 mmol/L Cr  1.67 mg/dL<H> BUN 20 mg/dL<H> 08-09 Phos 3.9 mg/dL 08-09 Alb 2.2 g/dL<L> 07-28 Chol 152 mg/dL LDL --    HDL 51 mg/dL Trig 93 mg/dL     CAPILLARY BLOOD GLUCOSE    POCT Blood Glucose.: 80 mg/dL (09 Aug 2023 05:56)  POCT Blood Glucose.: 81 mg/dL (08 Aug 2023 23:29)  POCT Blood Glucose.: 73 mg/dL (08 Aug 2023 18:54)  POCT Blood Glucose.: 76 mg/dL (08 Aug 2023 11:46)    Estimated Needs:   [ x ] no change since previous assessment  [ ] recalculated:     Previous Nutrition Diagnosis:   [ x ]Inadequate Oral Intake   [ x ] Severe Malnutrition     Nutrition Diagnosis is [ x ] ongoing  [ ] Improving   [ ] resolved [ ] not applicable     New Nutrition Diagnosis: [ x ] not applicable     Interventions:   Recommend  [ x ] Advance diet or consider alternate nutrition support if NPO prolonged further as medically feasible/if consistent with Goal of Care   [ ] Nutrition Supplement  [ x ] Nutrition Support:  If TF started as medically feasible, Goal: Jevity 1.5 @ 60ml/h x 16h (960ml, 1440kcal, 61g protein, 729ml water daily at goal)               If worsening renal function with K/PO4 elevated, may consider Nepro @ 55ml/h x 16h (880ml, 1584kcal, 71g protein, 642ml water daily at goal rate)                       MD to adjust free water as needed   [ x ] Other: Discussed with MD/RN    Monitoring and Evaluation:   [ ] PO intake [ x ] Tolerance to diet prescription [ x ] weights [ x ] labs[ x ] follow up per protocol  [ ] other:

## 2023-08-09 NOTE — PROGRESS NOTE ADULT - ASSESSMENT
1. Anemia  2. Dysphagia  3. Constipation  4. Failure to thrive  5. No evidence of acute GI bleeding  6. S/p unsuccessful endoscopic Peg placement    Suggestions:    1. NPO  2. IVF hydration  3. Monitor electrolytes  4. Peg placement By surgery  5. Consider NGT feeding meanwhile  6. Aspiration precaution  7. Monitor H/H  8. Transfuse PRBC as needed  9. Protonix daily  10. Avoid NSAID  11. Daily stool softener as needed  12. DVT prophylaxis

## 2023-08-09 NOTE — PROGRESS NOTE ADULT - PROBLEM SELECTOR PLAN 1
Pt. has been NPO for a few days on fluids.   Consulted with GI  will place Peg tube  on 8/8  Tried placing NG tube with fail and surgery tried and failed as well on 8/4  Ordered a ct abdomen and pelvis -> no sign of hiatal hernia.  Surgery was going to come and put peg tube in but family wants to try speech and swallow even though patient can not follow commands. Palliative reconsulted to try to speak to family.

## 2023-08-09 NOTE — PROGRESS NOTE ADULT - PROBLEM SELECTOR PLAN 2
Pt. has bleeding form right   Neurology examined the ear and it was seen to be just in the ear canal. No further imagine warranted. It was cleaned with water.

## 2023-08-09 NOTE — PROGRESS NOTE ADULT - SUBJECTIVE AND OBJECTIVE BOX
77 female from [facility] PMHx HTN, HLD, CVA (w/ residual aphasia and R sided hemiparesis/plegia), PSHx Hysterectomy, admitted to medical services w/ altered mental status. Surgical consult called for Gastrostomy tube placement. GI unable to place . 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  Seizures  Chronic constipation      MEDICATIONS  (STANDING):  cloNIDine Patch 0.2 mG/24Hr(s) 1 patch Transdermal <User Schedule>  dextrose 5% with potassium chloride 20 mEq/L 1000 milliLiter(s) (50 mL/Hr) IV Continuous <Continuous>  hydrALAZINE Injectable 10 milliGRAM(s) IV Push every 6 hours  valproate sodium  IVPB 500 milliGRAM(s) IV Intermittent every 8 hours      Allergies    &quot; NATURAL RUBBER&quot; (Other)  latex (Other)  penicillins (Unknown)    Intolerances    SOCIAL HISTORY:  Unknown   FAMILY HISTORY:  Unknown       ICU Vital Signs Last 24 Hrs  T(C): 37.2 (09 Aug 2023 05:20), Max: 37.3 (08 Aug 2023 20:54)  T(F): 99 (09 Aug 2023 05:20), Max: 99.1 (08 Aug 2023 20:54)  HR: 95 (09 Aug 2023 05:20) (86 - 121)  BP: 142/65 (09 Aug 2023 05:20) (109/66 - 179/83)  BP(mean): --  ABP: --  ABP(mean): --  RR: 16 (09 Aug 2023 05:20) (16 - 18)  SpO2: 96% (09 Aug 2023 05:20) (96% - 99%)        LABS:                        9.0    11.00 )-----------( 286      ( 08 Aug 2023 05:15 )             28.9     08-08    140  |  111<H>  |  22<H>  ----------------------------<  72  3.6   |  18<L>  |  1.47<H>    Ca    8.9      08 Aug 2023 05:15  Phos  3.9     08-08  Mg     2.3     08-08    TPro  7.0  /  Alb  2.3<L>  /  TBili  0.3  /  DBili  x   /  AST  15  /  ALT  13  /  AlkPhos  83  08-08    PT/INR - ( 08 Aug 2023 10:09 )   PT: 12.5 sec;   INR: 1.10 ratio         PTT - ( 08 Aug 2023 10:09 )  PTT:29.9 sec  Urinalysis Basic - ( 08 Aug 2023 05:15 )    Color: x / Appearance: x / SG: x / pH: x  Gluc: 72 mg/dL / Ketone: x  / Bili: x / Urobili: x   Blood: x / Protein: x / Nitrite: x   Leuk Esterase: x / RBC: x / WBC x   Sq Epi: x / Non Sq Epi: x / Bacteria: x              RADIOLOGY & ADDITIONAL STUDIES:  < from: CT Abdomen and Pelvis No Cont (08.04.23 @ 20:43) >  FINDINGS:  LOWER CHEST: The left ventricle appears asymmetrically enlarged.    Moderate atherosclerotic calcification of the left anterior descending   coronary artery.    LIVER: Within normal limits.  BILE DUCTS: Normal caliber.  GALLBLADDER: Vicarious excretion of IV contrast material in the   gallbladder lumen.  SPLEEN: Within normal limits.  PANCREAS: Within normal limits.  ADRENALS: Within normal limits.  KIDNEYS/URETERS: Cortical medullary differentiation is visualized in both   kidneys, suspicious for IV contrast retention.    BLADDER: Within normal limits.  REPRODUCTIVE ORGANS: Hysterectomy.  The ovaries are not visualized.    BOWEL: No bowel obstruction. Appendix is normal.  Oral contrast in the   colon.  PERITONEUM: No ascites.  VESSELS: Mild atheroscleroticcalcification of the aortoiliac tree.  No   abdominal aortic aneurysm.  Infrarenal IVC filter in place.  RETROPERITONEUM/LYMPH NODES: No lymphadenopathy.  ABDOMINAL WALL: Trace subcutaneous edema in the flanks and proximal   thighs. Small fat-containing umbilical hernia.  Vertical midline incision   in the lower ventral abdominal wall.  BONES: Mild degenerative changes in the spine and hips.    IMPRESSION:  No hiatal hernia.    No evidence of bowel obstruction, active inflammatory process or   intra-abdominal source for infection, within limits of noncontrast CT   technique.    There is vicarious excretion of contrast in the gallbladder.  There is   suspicion for contrast retention in both kidneys.  The possibility of   contrast-induced nephropathy and/or acute kidney injury should be   excluded..      --- End of Report ---      < end of copied text >          Physical Exam:     · Respiratory	respirations non-labored  · Cardiovascular	regular rate and rhythm  · Gastrointestinal	soft; nontender; nondistended; no guarding; no rigidity; no palpable jose  · Gastrointestinal Comments	lower abdomen, vertical scar   77 female from [facility] PMHx HTN, HLD, CVA (w/ residual aphasia and R sided hemiparesis/plegia), PSHx Hysterectomy, admitted to medical services w/ altered mental status. Surgical consult called for Gastrostomy tube placement. GI unable to place . 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  Seizures  Chronic constipation      MEDICATIONS  (STANDING):  cloNIDine Patch 0.2 mG/24Hr(s) 1 patch Transdermal <User Schedule>  dextrose 5% with potassium chloride 20 mEq/L 1000 milliLiter(s) (50 mL/Hr) IV Continuous <Continuous>  hydrALAZINE Injectable 10 milliGRAM(s) IV Push every 6 hours  valproate sodium  IVPB 500 milliGRAM(s) IV Intermittent every 8 hours      Allergies    &quot; NATURAL RUBBER&quot; (Other)  latex (Other)  penicillins (Unknown)    Intolerances    SOCIAL HISTORY:  Unknown   FAMILY HISTORY:  Unknown       ICU Vital Signs Last 24 Hrs  T(C): 37.2 (09 Aug 2023 05:20), Max: 37.3 (08 Aug 2023 20:54)  T(F): 99 (09 Aug 2023 05:20), Max: 99.1 (08 Aug 2023 20:54)  HR: 95 (09 Aug 2023 05:20) (86 - 121)  BP: 142/65 (09 Aug 2023 05:20) (109/66 - 179/83)  BP(mean): --  ABP: --  ABP(mean): --  RR: 16 (09 Aug 2023 05:20) (16 - 18)  SpO2: 96% (09 Aug 2023 05:20) (96% - 99%)        LABS:                        9.0    11.00 )-----------( 286      ( 08 Aug 2023 05:15 )             28.9     08-08    140  |  111<H>  |  22<H>  ----------------------------<  72  3.6   |  18<L>  |  1.47<H>    Ca    8.9      08 Aug 2023 05:15  Phos  3.9     08-08  Mg     2.3     08-08    TPro  7.0  /  Alb  2.3<L>  /  TBili  0.3  /  DBili  x   /  AST  15  /  ALT  13  /  AlkPhos  83  08-08    PT/INR - ( 08 Aug 2023 10:09 )   PT: 12.5 sec;   INR: 1.10 ratio         PTT - ( 08 Aug 2023 10:09 )  PTT:29.9 sec  Urinalysis Basic - ( 08 Aug 2023 05:15 )    Color: x / Appearance: x / SG: x / pH: x  Gluc: 72 mg/dL / Ketone: x  / Bili: x / Urobili: x   Blood: x / Protein: x / Nitrite: x   Leuk Esterase: x / RBC: x / WBC x   Sq Epi: x / Non Sq Epi: x / Bacteria: x              RADIOLOGY & ADDITIONAL STUDIES:  < from: CT Abdomen and Pelvis No Cont (08.04.23 @ 20:43) >  FINDINGS:  LOWER CHEST: The left ventricle appears asymmetrically enlarged.    Moderate atherosclerotic calcification of the left anterior descending   coronary artery.    LIVER: Within normal limits.  BILE DUCTS: Normal caliber.  GALLBLADDER: Vicarious excretion of IV contrast material in the   gallbladder lumen.  SPLEEN: Within normal limits.  PANCREAS: Within normal limits.  ADRENALS: Within normal limits.  KIDNEYS/URETERS: Cortical medullary differentiation is visualized in both   kidneys, suspicious for IV contrast retention.    BLADDER: Within normal limits.  REPRODUCTIVE ORGANS: Hysterectomy.  The ovaries are not visualized.    BOWEL: No bowel obstruction. Appendix is normal.  Oral contrast in the   colon.  PERITONEUM: No ascites.  VESSELS: Mild atheroscleroticcalcification of the aortoiliac tree.  No   abdominal aortic aneurysm.  Infrarenal IVC filter in place.  RETROPERITONEUM/LYMPH NODES: No lymphadenopathy.  ABDOMINAL WALL: Trace subcutaneous edema in the flanks and proximal   thighs. Small fat-containing umbilical hernia.  Vertical midline incision   in the lower ventral abdominal wall.  BONES: Mild degenerative changes in the spine and hips.    IMPRESSION:  No hiatal hernia.    No evidence of bowel obstruction, active inflammatory process or   intra-abdominal source for infection, within limits of noncontrast CT   technique.    There is vicarious excretion of contrast in the gallbladder.  There is   suspicion for contrast retention in both kidneys.  The possibility of   contrast-induced nephropathy and/or acute kidney injury should be   excluded..      --- End of Report ---      < end of copied text >          Physical Exam:     · Respiratory	respirations non-labored  · Cardiovascular	regular rate and rhythm  · Gastrointestinal	soft; nontender; nondistended; no guarding; no rigidity; no palpable jose  · Gastrointestinal Comments	lower abdomen, vertical scar   77 female from [facility] PMHx HTN, HLD, CVA (w/ residual aphasia and R sided hemiparesis/plegia), PSHx Hysterectomy, admitted to medical services w/ altered mental status. Surgical consult called for Gastrostomy tube placement. GI unable to place . 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  Seizures  Chronic constipation      MEDICATIONS  (STANDING):  cloNIDine Patch 0.2 mG/24Hr(s) 1 patch Transdermal <User Schedule>  dextrose 5% with potassium chloride 20 mEq/L 1000 milliLiter(s) (50 mL/Hr) IV Continuous <Continuous>  hydrALAZINE Injectable 10 milliGRAM(s) IV Push every 6 hours  valproate sodium  IVPB 500 milliGRAM(s) IV Intermittent every 8 hours      Allergies    &quot; NATURAL RUBBER&quot; (Other)  latex (Other)  penicillins (Unknown)    Intolerances    SOCIAL HISTORY:  Unknown   FAMILY HISTORY:  Unknown       ICU Vital Signs Last 24 Hrs  T(C): 37.2 (09 Aug 2023 05:20), Max: 37.3 (08 Aug 2023 20:54)  T(F): 99 (09 Aug 2023 05:20), Max: 99.1 (08 Aug 2023 20:54)  HR: 95 (09 Aug 2023 05:20) (86 - 121)  BP: 142/65 (09 Aug 2023 05:20) (109/66 - 179/83)  BP(mean): --  ABP: --  ABP(mean): --  RR: 16 (09 Aug 2023 05:20) (16 - 18)  SpO2: 96% (09 Aug 2023 05:20) (96% - 99%)        LABS:                        9.0    11.00 )-----------( 286      ( 08 Aug 2023 05:15 )             28.9     08-08    140  |  111<H>  |  22<H>  ----------------------------<  72  3.6   |  18<L>  |  1.47<H>    Ca    8.9      08 Aug 2023 05:15  Phos  3.9     08-08  Mg     2.3     08-08    TPro  7.0  /  Alb  2.3<L>  /  TBili  0.3  /  DBili  x   /  AST  15  /  ALT  13  /  AlkPhos  83  08-08    PT/INR - ( 08 Aug 2023 10:09 )   PT: 12.5 sec;   INR: 1.10 ratio         PTT - ( 08 Aug 2023 10:09 )  PTT:29.9 sec  Urinalysis Basic - ( 08 Aug 2023 05:15 )    Color: x / Appearance: x / SG: x / pH: x  Gluc: 72 mg/dL / Ketone: x  / Bili: x / Urobili: x   Blood: x / Protein: x / Nitrite: x   Leuk Esterase: x / RBC: x / WBC x   Sq Epi: x / Non Sq Epi: x / Bacteria: x              RADIOLOGY & ADDITIONAL STUDIES:  < from: CT Abdomen and Pelvis No Cont (08.04.23 @ 20:43) >  FINDINGS:  LOWER CHEST: The left ventricle appears asymmetrically enlarged.    Moderate atherosclerotic calcification of the left anterior descending   coronary artery.    LIVER: Within normal limits.  BILE DUCTS: Normal caliber.  GALLBLADDER: Vicarious excretion of IV contrast material in the   gallbladder lumen.  SPLEEN: Within normal limits.  PANCREAS: Within normal limits.  ADRENALS: Within normal limits.  KIDNEYS/URETERS: Cortical medullary differentiation is visualized in both   kidneys, suspicious for IV contrast retention.    BLADDER: Within normal limits.  REPRODUCTIVE ORGANS: Hysterectomy.  The ovaries are not visualized.    BOWEL: No bowel obstruction. Appendix is normal.  Oral contrast in the   colon.  PERITONEUM: No ascites.  VESSELS: Mild atheroscleroticcalcification of the aortoiliac tree.  No   abdominal aortic aneurysm.  Infrarenal IVC filter in place.  RETROPERITONEUM/LYMPH NODES: No lymphadenopathy.  ABDOMINAL WALL: Trace subcutaneous edema in the flanks and proximal   thighs. Small fat-containing umbilical hernia.  Vertical midline incision   in the lower ventral abdominal wall.  BONES: Mild degenerative changes in the spine and hips.    IMPRESSION:  No hiatal hernia.    No evidence of bowel obstruction, active inflammatory process or   intra-abdominal source for infection, within limits of noncontrast CT   technique.    There is vicarious excretion of contrast in the gallbladder.  There is   suspicion for contrast retention in both kidneys.  The possibility of   contrast-induced nephropathy and/or acute kidney injury should be   excluded..      --- End of Report ---      < end of copied text >          Physical Exam:     · Respiratory	respirations non-labored  · Cardiovascular	regular rate and rhythm  · Gastrointestinal	soft; nontender; nondistended; no guarding; no rigidity; no palpable jose  · Gastrointestinal Comments	lower abdomen, vertical scar

## 2023-08-10 DIAGNOSIS — R13.10 DYSPHAGIA, UNSPECIFIED: ICD-10-CM

## 2023-08-10 LAB
ALBUMIN SERPL ELPH-MCNC: 2.3 G/DL — LOW (ref 3.5–5)
ALP SERPL-CCNC: 84 U/L — SIGNIFICANT CHANGE UP (ref 40–120)
ALT FLD-CCNC: 11 U/L DA — SIGNIFICANT CHANGE UP (ref 10–60)
ANION GAP SERPL CALC-SCNC: 10 MMOL/L — SIGNIFICANT CHANGE UP (ref 5–17)
AST SERPL-CCNC: 16 U/L — SIGNIFICANT CHANGE UP (ref 10–40)
BILIRUB SERPL-MCNC: 0.3 MG/DL — SIGNIFICANT CHANGE UP (ref 0.2–1.2)
BUN SERPL-MCNC: 21 MG/DL — HIGH (ref 7–18)
CALCIUM SERPL-MCNC: 9.1 MG/DL — SIGNIFICANT CHANGE UP (ref 8.4–10.5)
CHLORIDE SERPL-SCNC: 111 MMOL/L — HIGH (ref 96–108)
CO2 SERPL-SCNC: 17 MMOL/L — LOW (ref 22–31)
CREAT SERPL-MCNC: 1.93 MG/DL — HIGH (ref 0.5–1.3)
EGFR: 26 ML/MIN/1.73M2 — LOW
GLUCOSE BLDC GLUCOMTR-MCNC: 83 MG/DL — SIGNIFICANT CHANGE UP (ref 70–99)
GLUCOSE BLDC GLUCOMTR-MCNC: 92 MG/DL — SIGNIFICANT CHANGE UP (ref 70–99)
GLUCOSE SERPL-MCNC: 82 MG/DL — SIGNIFICANT CHANGE UP (ref 70–99)
HCT VFR BLD CALC: 26.4 % — LOW (ref 34.5–45)
HGB BLD-MCNC: 8.4 G/DL — LOW (ref 11.5–15.5)
MAGNESIUM SERPL-MCNC: 2.1 MG/DL — SIGNIFICANT CHANGE UP (ref 1.6–2.6)
MCHC RBC-ENTMCNC: 28.8 PG — SIGNIFICANT CHANGE UP (ref 27–34)
MCHC RBC-ENTMCNC: 31.8 GM/DL — LOW (ref 32–36)
MCV RBC AUTO: 90.4 FL — SIGNIFICANT CHANGE UP (ref 80–100)
NRBC # BLD: 0 /100 WBCS — SIGNIFICANT CHANGE UP (ref 0–0)
PHOSPHATE SERPL-MCNC: 4.2 MG/DL — SIGNIFICANT CHANGE UP (ref 2.5–4.5)
PLATELET # BLD AUTO: 283 K/UL — SIGNIFICANT CHANGE UP (ref 150–400)
POTASSIUM SERPL-MCNC: 3.9 MMOL/L — SIGNIFICANT CHANGE UP (ref 3.5–5.3)
POTASSIUM SERPL-SCNC: 3.9 MMOL/L — SIGNIFICANT CHANGE UP (ref 3.5–5.3)
PROT SERPL-MCNC: 6.8 G/DL — SIGNIFICANT CHANGE UP (ref 6–8.3)
RBC # BLD: 2.92 M/UL — LOW (ref 3.8–5.2)
RBC # FLD: 15.3 % — HIGH (ref 10.3–14.5)
SODIUM SERPL-SCNC: 138 MMOL/L — SIGNIFICANT CHANGE UP (ref 135–145)
WBC # BLD: 13.15 K/UL — HIGH (ref 3.8–10.5)
WBC # FLD AUTO: 13.15 K/UL — HIGH (ref 3.8–10.5)

## 2023-08-10 PROCEDURE — 99232 SBSQ HOSP IP/OBS MODERATE 35: CPT

## 2023-08-10 RX ORDER — CEFTRIAXONE 500 MG/1
1000 INJECTION, POWDER, FOR SOLUTION INTRAMUSCULAR; INTRAVENOUS EVERY 24 HOURS
Refills: 0 | Status: COMPLETED | OUTPATIENT
Start: 2023-08-10 | End: 2023-08-13

## 2023-08-10 RX ORDER — CARBIDOPA AND LEVODOPA 25; 100 MG/1; MG/1
1 TABLET ORAL
Refills: 0 | Status: DISCONTINUED | OUTPATIENT
Start: 2023-08-10 | End: 2023-08-13

## 2023-08-10 RX ADMIN — CARBIDOPA AND LEVODOPA 1 TABLET(S): 25; 100 TABLET ORAL at 19:30

## 2023-08-10 RX ADMIN — Medication 10 MILLIGRAM(S): at 12:39

## 2023-08-10 RX ADMIN — Medication 10 MILLIGRAM(S): at 07:05

## 2023-08-10 RX ADMIN — Medication 1 PATCH: at 07:05

## 2023-08-10 RX ADMIN — Medication 10 MILLIGRAM(S): at 00:08

## 2023-08-10 RX ADMIN — Medication 55 MILLIGRAM(S): at 20:50

## 2023-08-10 RX ADMIN — Medication 10 MILLIGRAM(S): at 18:21

## 2023-08-10 RX ADMIN — DEXTROSE MONOHYDRATE, SODIUM CHLORIDE, AND POTASSIUM CHLORIDE 75 MILLILITER(S): 50; .745; 4.5 INJECTION, SOLUTION INTRAVENOUS at 20:50

## 2023-08-10 RX ADMIN — Medication 1 PATCH: at 19:30

## 2023-08-10 RX ADMIN — Medication 55 MILLIGRAM(S): at 12:39

## 2023-08-10 RX ADMIN — CEFTRIAXONE 100 MILLIGRAM(S): 500 INJECTION, POWDER, FOR SOLUTION INTRAMUSCULAR; INTRAVENOUS at 14:37

## 2023-08-10 RX ADMIN — Medication 55 MILLIGRAM(S): at 04:09

## 2023-08-10 RX ADMIN — CHLORHEXIDINE GLUCONATE 1 APPLICATION(S): 213 SOLUTION TOPICAL at 05:56

## 2023-08-10 RX ADMIN — CHLORHEXIDINE GLUCONATE 1 APPLICATION(S): 213 SOLUTION TOPICAL at 12:40

## 2023-08-10 NOTE — PROGRESS NOTE ADULT - ASSESSMENT
Patient is a 77 female from [facility] PMHx HTN, HLD, CVA (w/ residual aphasia and R sided hemiparesis/plegia) who was sent to the hospital due to altered mental status, surgery c/s for PEG placement    PLAN  - Failed speech and swallow; IR unable to place Gtube  - Pending palliative discussion with family for consent to PEG  - Reconsult surgery if HCP agreeable to PEG placement   - Remainder of care per primary team Patient is a 77 female from [facility] PMHx HTN, HLD, CVA (w/ residual aphasia and R sided hemiparesis/plegia) who was sent to the hospital due to altered mental status, surgery c/s for PEG placement    PLAN  - Failed speech and swallow; IR unable to place Gtube  - Pending palliative discussion with family for consent to PEG  - Reconsult surgery if HCP agreeable to PEG placement   - Remainder of care per primary team

## 2023-08-10 NOTE — PROGRESS NOTE ADULT - SUBJECTIVE AND OBJECTIVE BOX
INTERVAL HPI/OVERNIGHT EVENTS: NALOYDAO. AVSS. WBC 13(12). Patient seen and examined at bedside. Pt is nonverbal unable to elicit ROS    Vital Signs Last 24 Hrs  T(C): 36.6 (10 Aug 2023 04:58), Max: 37.1 (09 Aug 2023 21:00)  T(F): 97.8 (10 Aug 2023 04:58), Max: 98.8 (09 Aug 2023 21:00)  HR: 97 (10 Aug 2023 04:58) (88 - 110)  BP: 144/84 (10 Aug 2023 04:58) (134/62 - 169/72)  BP(mean): --  RR: 18 (10 Aug 2023 04:58) (18 - 18)  SpO2: 98% (10 Aug 2023 04:58) (98% - 98%)    Parameters below as of 10 Aug 2023 04:58  Patient On (Oxygen Delivery Method): room air      I&O's Detail    09 Aug 2023 07:01  -  10 Aug 2023 07:00  --------------------------------------------------------  IN:  Total IN: 0 mL    OUT:    Indwelling Catheter - Urethral (mL): 350 mL  Total OUT: 350 mL    Total NET: -350 mL            Physical Exam:  General: NAD  HEENT: NC/AT, trachea midline  Skin: No jaundice, no icterus  Abdomen: soft,  nondistended, nontender   Extremities: non edematous b/l    Lines/Drains/Tubes:     Drains/Tubes:     08-09-23 @ 07:01  -  08-10-23 @ 07:00  --------------------------------------------------------  IN: 0 mL / OUT: 350 mL / NET: -350 mL        Labs:                        8.4    13.15 )-----------( 283      ( 10 Aug 2023 05:59 )             26.4     08-10    138  |  111<H>  |  21<H>  ----------------------------<  82  3.9   |  17<L>  |  1.93<H>    Ca    9.1      10 Aug 2023 05:59  Phos  4.2     08-10  Mg     2.1     08-10    TPro  6.8  /  Alb  2.3<L>  /  TBili  0.3  /  DBili  x   /  AST  16  /  ALT  11  /  AlkPhos  84  08-10        RADIOLOGY & ADDITIONAL STUDIES:

## 2023-08-10 NOTE — PROGRESS NOTE ADULT - PROBLEM SELECTOR PLAN 1
-multifactorial etiology in the setting of L MCA CVA, Parkinson's Disease, and dementia  -s/p unsuccessful PEG placement with GI, surgery consulted 8/8 and plans for open gastrostomy 8/9 but family refused to consent requesting speech and swallow  -prior speech and swallow 7/31 appreciated, poor participation for trials of ice chips rec for NPO  -hospice appropriate, prior GOC discussions with palliative care team 8/2 indicating wishes for longterm feeding tube and DNR/I -multifactorial etiology in the setting of L MCA CVA, Parkinson's Disease, and dementia  -s/p unsuccessful PEG placement with GI, surgery consulted 8/8 and plans for open gastrostomy 8/9 but family refused to consent requesting speech and swallow  -prior speech and swallow 7/31 appreciated, poor participation for trials of ice chips rec for NPO, family was requesting repeat speech and swallow but unlikely to have different outcome, pt is minimally arousable and unable to follow commands  -hospice appropriate, decline hospice want to proceed with open gastrostomy

## 2023-08-10 NOTE — PROGRESS NOTE ADULT - ASSESSMENT
1. Anemia  2. Dysphagia  3. Constipation  4. Failure to thrive  5. No evidence of acute GI bleeding  6. S/p unsuccessful endoscopic Peg placement    Suggestions:    1. NPO  2. IVF hydration  3. Monitor electrolytes  4. Peg placement By surgery  5. Consider NGT feeding meanwhile  6. Aspiration precaution  7. Monitor H/H  8. Transfuse PRBC as needed  9. Protonix daily  10. Avoid NSAID  11. Daily stool softener as needed  12. Palliative follow up  13. DVT prophylaxis

## 2023-08-10 NOTE — PROGRESS NOTE ADULT - PROBLEM SELECTOR PLAN 2
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  -now with worsening mental status and dysphagia, hospice appropriate  -palliative will follow for GOC, G-tube vs hospice 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  -now with worsening mental status and dysphagia, hospice appropriate  -family wishes for longterm feeding tube, declines hospice

## 2023-08-10 NOTE — PROGRESS NOTE ADULT - SUBJECTIVE AND OBJECTIVE BOX
PGY-1 Progress Note discussed with attending    PAGER #: [[phone]] TILL 5:00 PM  PLEASE CONTACT ON CALL TEAM:  - On Call Team (Please refer to Tyler) FROM 5:00 PM - 8:30PM  - Nightfloat Team FROM 8:30 -7:30 AM    CHIEF COMPLAINT & BRIEF HOSPITAL COURSE: No acute overnight events. Ear is clearing up. waiting on family's decision to proceed with surgical placement of  peg tube    INTERVAL HPI/OVERNIGHT EVENTS:   MEDICATIONS  (STANDING):  chlorhexidine 2% Cloths 1 Application(s) Topical daily  cloNIDine Patch 0.2 mG/24Hr(s) 1 patch Transdermal <User Schedule>  dextrose 5% with potassium chloride 20 mEq/L 1000 milliLiter(s) (50 mL/Hr) IV Continuous <Continuous>  hydrALAZINE Injectable 10 milliGRAM(s) IV Push every 6 hours  valproate sodium  IVPB 500 milliGRAM(s) IV Intermittent every 8 hours    MEDICATIONS  (PRN):      Vital Signs Last 24 Hrs  T(C): 36.5 (10 Aug 2023 14:03), Max: 37.1 (09 Aug 2023 21:00)  T(F): 97.7 (10 Aug 2023 14:03), Max: 98.8 (09 Aug 2023 21:00)  HR: 105 (10 Aug 2023 14:03) (88 - 110)  BP: 174/83 (10 Aug 2023 14:03) (134/62 - 174/83)  BP(mean): --  RR: 18 (10 Aug 2023 14:03) (18 - 18)  SpO2: 99% (10 Aug 2023 14:03) (98% - 99%)    Parameters below as of 10 Aug 2023 14:03  Patient On (Oxygen Delivery Method): room air        PHYSICAL EXAMINATION:  GENERAL: NAD, well built  HEAD:  Atraumatic, Normocephalic, slight bleeding out of right ear  EYES:  conjunctiva and sclera clear  CHEST/LUNG: Clear to auscultation. No rales, rhonchi, wheezing, or rubs  HEART: Regular rate and rhythm; No murmurs, rubs, or gallops  ABDOMEN: Soft, Nontender, Nondistended; Bowel sounds present  EXTREMITIES:  2+ Peripheral Pulses, No clubbing, cyanosis, or edema  SKIN: warm dry                          8.4    13.15 )-----------( 283      ( 10 Aug 2023 05:59 )             26.4     08-10    138  |  111<H>  |  21<H>  ----------------------------<  82  3.9   |  17<L>  |  1.93<H>    Ca    9.1      10 Aug 2023 05:59  Phos  4.2     08-10  Mg     2.1     08-10    TPro  6.8  /  Alb  2.3<L>  /  TBili  0.3  /  DBili  x   /  AST  16  /  ALT  11  /  AlkPhos  84  08-10    LIVER FUNCTIONS - ( 10 Aug 2023 05:59 )  Alb: 2.3 g/dL / Pro: 6.8 g/dL / ALK PHOS: 84 U/L / ALT: 11 U/L DA / AST: 16 U/L / GGT: x                   CAPILLARY BLOOD GLUCOSE      RADIOLOGY & ADDITIONAL TESTS:                   PGY-1 Progress Note discussed with attending    PAGER #: [[phone]] TILL 5:00 PM  PLEASE CONTACT ON CALL TEAM:  - On Call Team (Please refer to Tyler) FROM 5:00 PM - 8:30PM  - Nightfloat Team FROM 8:30 -7:30 AM    CHIEF COMPLAINT & BRIEF HOSPITAL COURSE: No acute overnight events. Ear is clearing up. waiting on family's decision to proceed with surgical placement of  peg tube    INTERVAL HPI/OVERNIGHT EVENTS:   MEDICATIONS  (STANDING):  chlorhexidine 2% Cloths 1 Application(s) Topical daily  cloNIDine Patch 0.2 mG/24Hr(s) 1 patch Transdermal <User Schedule>  dextrose 5% with potassium chloride 20 mEq/L 1000 milliLiter(s) (50 mL/Hr) IV Continuous <Continuous>  hydrALAZINE Injectable 10 milliGRAM(s) IV Push every 6 hours  valproate sodium  IVPB 500 milliGRAM(s) IV Intermittent every 8 hours    MEDICATIONS  (PRN):      Vital Signs Last 24 Hrs  T(C): 36.5 (10 Aug 2023 14:03), Max: 37.1 (09 Aug 2023 21:00)  T(F): 97.7 (10 Aug 2023 14:03), Max: 98.8 (09 Aug 2023 21:00)  HR: 105 (10 Aug 2023 14:03) (88 - 110)  BP: 174/83 (10 Aug 2023 14:03) (134/62 - 174/83)  BP(mean): --  RR: 18 (10 Aug 2023 14:03) (18 - 18)  SpO2: 99% (10 Aug 2023 14:03) (98% - 99%)    Parameters below as of 10 Aug 2023 14:03  Patient On (Oxygen Delivery Method): room air        PHYSICAL EXAMINATION:  GENERAL: NAD, well built  HEAD:  Atraumatic, Normocephalic, slight bleeding out of right ear  EYES:  conjunctiva and sclera clear  CHEST/LUNG: Clear to auscultation. No rales, rhonchi, wheezing, or rubs  HEART: Regular rate and rhythm; No murmurs, rubs, or gallops  ABDOMEN: Soft, Nontender, Nondistended; Bowel sounds present  EXTREMITIES:  2+ Peripheral Pulses, No clubbing, cyanosis, or edema  SKIN: warm dry                          8.4    13.15 )-----------( 283      ( 10 Aug 2023 05:59 )             26.4     08-10    138  |  111<H>  |  21<H>  ----------------------------<  82  3.9   |  17<L>  |  1.93<H>    Ca    9.1      10 Aug 2023 05:59  Phos  4.2     08-10  Mg     2.1     08-10    TPro  6.8  /  Alb  2.3<L>  /  TBili  0.3  /  DBili  x   /  AST  16  /  ALT  11  /  AlkPhos  84  08-10    LIVER FUNCTIONS - ( 10 Aug 2023 05:59 )  Alb: 2.3 g/dL / Pro: 6.8 g/dL / ALK PHOS: 84 U/L / ALT: 11 U/L DA / AST: 16 U/L / GGT: x                   CAPILLARY BLOOD GLUCOSE      RADIOLOGY & ADDITIONAL TESTS:                   PGY-1 Progress Note discussed with attending    PAGER #: [[phone]] TILL 5:00 PM  PLEASE CONTACT ON CALL TEAM:  - On Call Team (Please refer to Tyler) FROM 5:00 PM - 8:30PM  - Nightfloat Team FROM 8:30 -7:30 AM    CHIEF COMPLAINT & BRIEF HOSPITAL COURSE: No acute overnight events. Ear is clearing up. waiting on family's decision to proceed with surgical placement of  peg tube    INTERVAL HPI/OVERNIGHT EVENTS:   MEDICATIONS  (STANDING):  chlorhexidine 2% Cloths 1 Application(s) Topical daily  cloNIDine Patch 0.2 mG/24Hr(s) 1 patch Transdermal <User Schedule>  dextrose 5% with potassium chloride 20 mEq/L 1000 milliLiter(s) (50 mL/Hr) IV Continuous <Continuous>  hydrALAZINE Injectable 10 milliGRAM(s) IV Push every 6 hours  valproate sodium  IVPB 500 milliGRAM(s) IV Intermittent every 8 hours    MEDICATIONS  (PRN):      Vital Signs Last 24 Hrs  T(C): 36.5 (10 Aug 2023 14:03), Max: 37.1 (09 Aug 2023 21:00)  T(F): 97.7 (10 Aug 2023 14:03), Max: 98.8 (09 Aug 2023 21:00)  HR: 105 (10 Aug 2023 14:03) (88 - 110)  BP: 174/83 (10 Aug 2023 14:03) (134/62 - 174/83)  BP(mean): --  RR: 18 (10 Aug 2023 14:03) (18 - 18)  SpO2: 99% (10 Aug 2023 14:03) (98% - 99%)    Parameters below as of 10 Aug 2023 14:03  Patient On (Oxygen Delivery Method): room air        PHYSICAL EXAMINATION:  GENERAL: NAD, well built  HEAD:  Atraumatic, Normocephalic, slight bleeding out of right ear  EYES:  conjunctiva and sclera clear  CHEST/LUNG: Clear to auscultation. No rales, rhonchi, wheezing, or rubs  HEART: Regular rate and rhythm; No murmurs, rubs, or gallops  ABDOMEN: Soft, Nontender, Nondistended; Bowel sounds present  EXTREMITIES:  2+ Peripheral Pulses, No clubbing, cyanosis, or edema  SKIN: warm dry                          8.4    13.15 )-----------( 283      ( 10 Aug 2023 05:59 )             26.4     08-10    138  |  111<H>  |  21<H>  ----------------------------<  82  3.9   |  17<L>  |  1.93<H>    Ca    9.1      10 Aug 2023 05:59  Phos  4.2     08-10  Mg     2.1     08-10    TPro  6.8  /  Alb  2.3<L>  /  TBili  0.3  /  DBili  x   /  AST  16  /  ALT  11  /  AlkPhos  84  08-10    LIVER FUNCTIONS - ( 10 Aug 2023 05:59 )  Alb: 2.3 g/dL / Pro: 6.8 g/dL / ALK PHOS: 84 U/L / ALT: 11 U/L DA / AST: 16 U/L / GGT: x                   CAPILLARY BLOOD GLUCOSE      RADIOLOGY & ADDITIONAL TESTS:                   PGY-1 Progress Note discussed with attending    PAGER #: [[phone]] TILL 5:00 PM  PLEASE CONTACT ON CALL TEAM:  - On Call Team (Please refer to Tyler) FROM 5:00 PM - 8:30PM  - Nightfloat Team FROM 8:30 -7:30 AM    CHIEF COMPLAINT & BRIEF HOSPITAL COURSE: No acute overnight events. Ear is clearing up. Spoke to family yesterday who felt patient could swallow and wanted another SnS eval. The proxy did not consent to surgery but today she said she never did not consent and thought the surgery was happening today even though no one told it would be rescheduled for today She was very upset on the phone. we respoke to surgery and it is now scheduled for 8/14. Surgery documented that she did not consent to surgery the firs time.     INTERVAL HPI/OVERNIGHT EVENTS:   MEDICATIONS  (STANDING):  chlorhexidine 2% Cloths 1 Application(s) Topical daily  cloNIDine Patch 0.2 mG/24Hr(s) 1 patch Transdermal <User Schedule>  dextrose 5% with potassium chloride 20 mEq/L 1000 milliLiter(s) (50 mL/Hr) IV Continuous <Continuous>  hydrALAZINE Injectable 10 milliGRAM(s) IV Push every 6 hours  valproate sodium  IVPB 500 milliGRAM(s) IV Intermittent every 8 hours    MEDICATIONS  (PRN):      Vital Signs Last 24 Hrs  T(C): 36.5 (10 Aug 2023 14:03), Max: 37.1 (09 Aug 2023 21:00)  T(F): 97.7 (10 Aug 2023 14:03), Max: 98.8 (09 Aug 2023 21:00)  HR: 105 (10 Aug 2023 14:03) (88 - 110)  BP: 174/83 (10 Aug 2023 14:03) (134/62 - 174/83)  BP(mean): --  RR: 18 (10 Aug 2023 14:03) (18 - 18)  SpO2: 99% (10 Aug 2023 14:03) (98% - 99%)    Parameters below as of 10 Aug 2023 14:03  Patient On (Oxygen Delivery Method): room air        PHYSICAL EXAMINATION:  GENERAL: NAD, well built  HEAD:  Atraumatic, Normocephalic, slight bleeding out of right ear  EYES:  conjunctiva and sclera clear  CHEST/LUNG: Clear to auscultation. No rales, rhonchi, wheezing, or rubs  HEART: Regular rate and rhythm; No murmurs, rubs, or gallops  ABDOMEN: Soft, Nontender, Nondistended; Bowel sounds present  EXTREMITIES:  2+ Peripheral Pulses, No clubbing, cyanosis, or edema  SKIN: warm dry                          8.4    13.15 )-----------( 283      ( 10 Aug 2023 05:59 )             26.4     08-10    138  |  111<H>  |  21<H>  ----------------------------<  82  3.9   |  17<L>  |  1.93<H>    Ca    9.1      10 Aug 2023 05:59  Phos  4.2     08-10  Mg     2.1     08-10    TPro  6.8  /  Alb  2.3<L>  /  TBili  0.3  /  DBili  x   /  AST  16  /  ALT  11  /  AlkPhos  84  08-10    LIVER FUNCTIONS - ( 10 Aug 2023 05:59 )  Alb: 2.3 g/dL / Pro: 6.8 g/dL / ALK PHOS: 84 U/L / ALT: 11 U/L DA / AST: 16 U/L / GGT: x                   CAPILLARY BLOOD GLUCOSE      RADIOLOGY & ADDITIONAL TESTS:                   PGY-1 Progress Note discussed with attending    PAGER #: [[phone]] TILL 5:00 PM  PLEASE CONTACT ON CALL TEAM:  - On Call Team (Please refer to Tyler) FROM 5:00 PM - 8:30PM  - Nightfloat Team FROM 8:30 -7:30 AM    CHIEF COMPLAINT & BRIEF HOSPITAL COURSE: No acute overnight events. Ear is clearing up. Spoke to family yesterday who felt patient could swallow and wanted another SnS eval. The proxy did not consent to surgery but today she said she never did not consent and thought the surgery was happening today even though no one told it would be rescheduled for today She was very upset on the phone. we respoke to surgery and it is now scheduled for 8/14. Surgery documented that she did not consent to surgery the firs time.     INTERVAL HPI/OVERNIGHT EVENTS:   MEDICATIONS  (STANDING):  chlorhexidine 2% Cloths 1 Application(s) Topical daily  cloNIDine Patch 0.2 mG/24Hr(s) 1 patch Transdermal <User Schedule>  dextrose 5% with potassium chloride 20 mEq/L 1000 milliLiter(s) (50 mL/Hr) IV Continuous <Continuous>  hydrALAZINE Injectable 10 milliGRAM(s) IV Push every 6 hours  valproate sodium  IVPB 500 milliGRAM(s) IV Intermittent every 8 hours    MEDICATIONS  (PRN):      Vital Signs Last 24 Hrs  T(C): 36.5 (10 Aug 2023 14:03), Max: 37.1 (09 Aug 2023 21:00)  T(F): 97.7 (10 Aug 2023 14:03), Max: 98.8 (09 Aug 2023 21:00)  HR: 105 (10 Aug 2023 14:03) (88 - 110)  BP: 174/83 (10 Aug 2023 14:03) (134/62 - 174/83)  BP(mean): --  RR: 18 (10 Aug 2023 14:03) (18 - 18)  SpO2: 99% (10 Aug 2023 14:03) (98% - 99%)    Parameters below as of 10 Aug 2023 14:03  Patient On (Oxygen Delivery Method): room air        PHYSICAL EXAMINATION:  GENERAL: NAD, well built  HEAD:  Atraumatic, Normocephalic, slight bleeding out of right ear  EYES:  conjunctiva and sclera clear  CHEST/LUNG: Clear to auscultation. No rales, rhonchi, wheezing, or rubs  HEART: Regular rate and rhythm; No murmurs, rubs, or gallops  ABDOMEN: Soft, Nontender, Nondistended; Bowel sounds present  EXTREMITIES:  2+ Peripheral Pulses, No clubbing, cyanosis, or edema  SKIN: warm dry                          8.4    13.15 )-----------( 283      ( 10 Aug 2023 05:59 )             26.4     08-10    138  |  111<H>  |  21<H>  ----------------------------<  82  3.9   |  17<L>  |  1.93<H>    Ca    9.1      10 Aug 2023 05:59  Phos  4.2     08-10  Mg     2.1     08-10    TPro  6.8  /  Alb  2.3<L>  /  TBili  0.3  /  DBili  x   /  AST  16  /  ALT  11  /  AlkPhos  84  08-10    LIVER FUNCTIONS - ( 10 Aug 2023 05:59 )  Alb: 2.3 g/dL / Pro: 6.8 g/dL / ALK PHOS: 84 U/L / ALT: 11 U/L DA / AST: 16 U/L / GGT: x                   CAPILLARY BLOOD GLUCOSE      RADIOLOGY & ADDITIONAL TESTS:                   PGY-1 Progress Note discussed with attending    PAGER #: [[phone]] TILL 5:00 PM  PLEASE CONTACT ON CALL TEAM:  - On Call Team (Please refer to Tyler) FROM 5:00 PM - 8:30PM  - Nightfloat Team FROM 8:30 -7:30 AM    CHIEF COMPLAINT & BRIEF HOSPITAL COURSE: No acute overnight events. Ear is clearing up. Spoke to family yesterday who felt patient could swallow and wanted another SnS eval. The proxy did not consent to surgery but today she said she never did not consent and thought the surgery was happening today even though no one told it would be rescheduled for today She was very upset on the phone. we respoke to surgery and it is now scheduled for 8/14. Surgery documented that she did not consent to surgery the firs time.     INTERVAL HPI/OVERNIGHT EVENTS:   MEDICATIONS  (STANDING):  chlorhexidine 2% Cloths 1 Application(s) Topical daily  cloNIDine Patch 0.2 mG/24Hr(s) 1 patch Transdermal <User Schedule>  dextrose 5% with potassium chloride 20 mEq/L 1000 milliLiter(s) (50 mL/Hr) IV Continuous <Continuous>  hydrALAZINE Injectable 10 milliGRAM(s) IV Push every 6 hours  valproate sodium  IVPB 500 milliGRAM(s) IV Intermittent every 8 hours    MEDICATIONS  (PRN):      Vital Signs Last 24 Hrs  T(C): 36.5 (10 Aug 2023 14:03), Max: 37.1 (09 Aug 2023 21:00)  T(F): 97.7 (10 Aug 2023 14:03), Max: 98.8 (09 Aug 2023 21:00)  HR: 105 (10 Aug 2023 14:03) (88 - 110)  BP: 174/83 (10 Aug 2023 14:03) (134/62 - 174/83)  BP(mean): --  RR: 18 (10 Aug 2023 14:03) (18 - 18)  SpO2: 99% (10 Aug 2023 14:03) (98% - 99%)    Parameters below as of 10 Aug 2023 14:03  Patient On (Oxygen Delivery Method): room air        PHYSICAL EXAMINATION:  GENERAL: NAD, well built  HEAD:  Atraumatic, Normocephalic, slight bleeding out of right ear  EYES:  conjunctiva and sclera clear  CHEST/LUNG: Clear to auscultation. No rales, rhonchi, wheezing, or rubs  HEART: Regular rate and rhythm; No murmurs, rubs, or gallops  ABDOMEN: Soft, Nontender, Nondistended; Bowel sounds present  EXTREMITIES:  2+ Peripheral Pulses, No clubbing, cyanosis, or edema  SKIN: warm dry                          8.4    13.15 )-----------( 283      ( 10 Aug 2023 05:59 )             26.4     08-10    138  |  111<H>  |  21<H>  ----------------------------<  82  3.9   |  17<L>  |  1.93<H>    Ca    9.1      10 Aug 2023 05:59  Phos  4.2     08-10  Mg     2.1     08-10    TPro  6.8  /  Alb  2.3<L>  /  TBili  0.3  /  DBili  x   /  AST  16  /  ALT  11  /  AlkPhos  84  08-10    LIVER FUNCTIONS - ( 10 Aug 2023 05:59 )  Alb: 2.3 g/dL / Pro: 6.8 g/dL / ALK PHOS: 84 U/L / ALT: 11 U/L DA / AST: 16 U/L / GGT: x                   CAPILLARY BLOOD GLUCOSE      RADIOLOGY & ADDITIONAL TESTS:

## 2023-08-10 NOTE — PROGRESS NOTE ADULT - ASSESSMENT
1. SUSAN possible to MARISA and ATN  Renal sono shows no hdyro with b/l kidneys around 9.2cm  -Scr increased to 1.9mg/dL with stable electrolytes and continue IVFs since NPO. rate of fluids was increased. pt is still close to her baseline.  -urinalysis shows blood with proteinuria; FeNa >1%, spot protein to creatinine ratio is 1.5gm  -Adjust meds to eGFR and avoid IV Gadolinium contrast,NSAIDs, and phosphate enema.  -Monitor I/O's daily.   -Monitor SMA daily.  2. Hypernatremia due to water deficit and insensible losses. Pt is clinically euvolemic.   -Na stable; on D5W with 20meq kcl at 50cc/hr since blood glucose is around 80s  -Monitor I/O's. Check Serum Na Daily. Avoid high solute intake diet and sodium bicarbonate infuse. Avoid overcorrection of NA (8-10meq/day)  3. 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  -previous Scr around 1.2 to 1.6mg/dL in Oct 2022.  -Keep patient euvolemic and renal diet  -Avoid Nephrotoxic Meds/ Agents such as (NSAIDs, IV contrast, Aminoglycosides such as gentamicin, -Gadolinium contrast, Phosphate containing enemas, etc..)  -Adjust Medications according to eGFR  4. HTN:   -bp is acceptable. continue bp meds  -titrate bp meds to keep sbp >110 and < 130  5. Mineral Bone Disease:  -improving phos so no need binders.  -PTH intact 289, will start calcitriol once phos improves.  6. Encephalopathy:  -Rocephin was restarted.  -Plan as per Neuro and primary team  -unable to place PEG via EGD; family refused PEG in the OR but agreed however they are still undecided.   -NGT attempted but unsuccessful; CT AP ordered and shows no obstruction.  7. Hypokalemia:  -stable K. Kcl with fluids.  -give kcl repletion to keep K >3.5meq/L  -monitor K.   8.0 Acidosis:  -CO2 is okay; vbg noted with okay ph.   -monitor CO2.    Discussed the assessment and plan with Primary Team/Nurse 1. SUSAN possible to MARISA and ATN  Renal sono shows no hdyro with b/l kidneys around 9.2cm  -Scr increased to 1.9mg/dL with stable electrolytes and continue IVFs since NPO. rate of fluids was increased. pt is still close to her baseline.  -s/p mcclellan's cath placed for urinary retention, few days back.  -urinalysis shows blood with proteinuria; FeNa >1%, spot protein to creatinine ratio is 1.5gm  -Adjust meds to eGFR and avoid IV Gadolinium contrast,NSAIDs, and phosphate enema.  -Monitor I/O's daily.   -Monitor SMA daily.  2. Hypernatremia due to water deficit and insensible losses. Pt is clinically euvolemic.   -Na stable; on D5W with 20meq kcl at 50cc/hr since blood glucose is around 80s  -Monitor I/O's. Check Serum Na Daily. Avoid high solute intake diet and sodium bicarbonate infuse. Avoid overcorrection of NA (8-10meq/day)  3. 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  -previous Scr around 1.2 to 1.6mg/dL in Oct 2022.  -Keep patient euvolemic and renal diet  -Avoid Nephrotoxic Meds/ Agents such as (NSAIDs, IV contrast, Aminoglycosides such as gentamicin, -Gadolinium contrast, Phosphate containing enemas, etc..)  -Adjust Medications according to eGFR  4. HTN:   -bp is acceptable. continue bp meds  -titrate bp meds to keep sbp >110 and < 130  5. Mineral Bone Disease:  -improving phos so no need binders.  -PTH intact 289, will start calcitriol once phos improves.  6. Encephalopathy:  -Rocephin was restarted.  -Plan as per Neuro and primary team  -unable to place PEG via EGD; family refused PEG in the OR but agreed however they are still undecided.   -NGT attempted but unsuccessful; CT AP ordered and shows no obstruction.  7. Hypokalemia:  -stable K. Kcl with fluids.  -give kcl repletion to keep K >3.5meq/L  -monitor K.   8.0 Acidosis:  -CO2 is okay; vbg noted with okay ph.   -monitor CO2.    Discussed the assessment and plan with Primary Team/Nurse 1. SUSAN possible to MRAISA and ATN  Renal sono shows no hdyro with b/l kidneys around 9.2cm  -Scr increased to 1.9mg/dL with stable electrolytes and continue IVFs since NPO. rate of fluids was increased. pt is still close to her baseline.  -s/p mcclellan's cath placed for urinary retention, few days back.  -urinalysis shows blood with proteinuria; FeNa >1%, spot protein to creatinine ratio is 1.5gm  -Adjust meds to eGFR and avoid IV Gadolinium contrast,NSAIDs, and phosphate enema.  -Monitor I/O's daily.   -Monitor SMA daily.  2. Hypernatremia due to water deficit and insensible losses. Pt is clinically euvolemic.   -Na stable; on D5W with 20meq kcl at 50cc/hr since blood glucose is around 80s  -Monitor I/O's. Check Serum Na Daily. Avoid high solute intake diet and sodium bicarbonate infuse. Avoid overcorrection of NA (8-10meq/day)  3. 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  -previous Scr around 1.2 to 1.6mg/dL in Oct 2022.  -Keep patient euvolemic and renal diet  -Avoid Nephrotoxic Meds/ Agents such as (NSAIDs, IV contrast, Aminoglycosides such as gentamicin, -Gadolinium contrast, Phosphate containing enemas, etc..)  -Adjust Medications according to eGFR  4. HTN:   -bp is acceptable. continue bp meds  -titrate bp meds to keep sbp >110 and < 130  5. Mineral Bone Disease:  -improving phos so no need binders.  -PTH intact 289, will start calcitriol once phos improves.  6. Encephalopathy:  -Rocephin was restarted.  -Plan as per Neuro and primary team  -unable to place PEG via EGD; family refused PEG in the OR but agreed however they are still undecided.   -NGT attempted but unsuccessful; CT AP ordered and shows no obstruction.  7. Hypokalemia:  -stable K. Kcl with fluids.  -give kcl repletion to keep K >3.5meq/L  -monitor K.   8.0 Acidosis:  -CO2 is okay; vbg noted with okay ph.   -monitor CO2.    Discussed the assessment and plan with Primary Team/Nurse

## 2023-08-10 NOTE — PROGRESS NOTE ADULT - PROBLEM SELECTOR PLAN 3
Clinical evidence indicates that the patient has Severe protein calorie malnutrition/ 3rd degree    In context of     Acute Illness/Injury (>7days)       Energy/Food intake <50% of estimated energy requirement >5 days alb 2.3 8/10  Weight loss: Moderate - severe (lbs lost recently)  Body Fat loss: Severe   (Cachexia, temporal wasting, contracted, muscle atrophy)  Muscle mass loss: Severe  (Skin failure/pressure ulcers)  Fluid Accumulation: Severe (Fluid overload, ascites, pleural effusions)   Strength: weakened severe (bedbound)    Recommend:   No oral intake since PTA only IVF, need to clarify the GOC. Clinical evidence indicates that the patient has Severe protein calorie malnutrition/ 3rd degree    In context of     Acute Illness/Injury (>7days)       Energy/Food intake <50% of estimated energy requirement >5 days alb 2.3 8/10  Weight loss: Moderate - severe (lbs lost recently)  Body Fat loss: Severe   (temporal wasting, contracted, muscle atrophy)  Muscle mass loss: moderate   Fluid Accumulation: Severe (Fluid overload, anasarca, pleural effusions)   Strength: weakened severe (bedbound)    Recommend:   No oral intake since 7/27 only IVF, unable to place NGT after multiple attempts, GI unable to place PEG, family wishes to proceed with open gastrostomy.

## 2023-08-10 NOTE — PROGRESS NOTE ADULT - PROBLEM SELECTOR PLAN 3
altered mental status  HEAD CT: Chronic left MCA territory infarction.  CT PERFUSION demonstrated: Perfusion abnormality corresponding to the chronically infarcted left MCA territory. INFARCT CORE: 57 mL. TISSUE AT RISK: 236 mL.  CTA COW and  CTA NECK: neg   Pt can not move limbs or speak. She failed dysphagia screen ans SnS

## 2023-08-10 NOTE — PROGRESS NOTE ADULT - SUBJECTIVE AND OBJECTIVE BOX
CARYN LEIVA  MR[facility] [id number]  [facility]        Patient is a 77y old  Female who presents with a chief complaint of Altered mental status. (10 Aug 2023 11:46)      INTERVAL HPI/OVERNIGHT EVENTS:  Patient seen and examined at bedside. No notations of chest pain, palpitation, SOB, orthopnea, nausea, vomiting or abdominal pain.    ALLERGIES  &quot; NATURAL RUBBER&quot; (Other)  latex (Other)  penicillins (Unknown)      MEDICATIONS  chlorhexidine 2% Cloths 1 Application(s) Topical daily  cloNIDine Patch 0.2 mG/24Hr(s) 1 patch Transdermal <User Schedule>  dextrose 5% with potassium chloride 20 mEq/L 1000 milliLiter(s) IV Continuous <Continuous>  hydrALAZINE Injectable 10 milliGRAM(s) IV Push every 6 hours  valproate sodium  IVPB 500 milliGRAM(s) IV Intermittent every 8 hours              REVIEW OF SYSTEMS:  CONSTITUTIONAL: No fever, weight loss, or fatigue  EYES: No eye pain, visual disturbances, or discharge  ENT:  No difficulty hearing, tinnitus, vertigo; No sinus or throat pain  NECK: No pain or stiffness  RESPIRATORY: No cough, wheezing, chills or hemoptysis; No Shortness of Breath  CARDIOVASCULAR: No chest pain, palpitations, passing out, dizziness, or leg swelling  GASTROINTESTINAL: No abdominal or epigastric pain. No nausea, vomiting, or hematemesis; No diarrhea or constipation. No melena or hematochezia.  GENITOURINARY: No dysuria, frequency, hematuria, or incontinence  NEUROLOGICAL: No headaches, memory loss, loss of strength, numbness, or tremors  SKIN: No itching, burning, rashes, or lesions   LYMPH Nodes: No enlarged glands  ENDOCRINE: No heat or cold intolerance; No hair loss  MUSCULOSKELETAL: No joint pain or swelling; No muscle, back, or extremity pain  PSYCHIATRIC: No depression, anxiety, mood swings, or difficulty sleeping  HEME/LYMPH: No easy bruising, or bleeding gums  ALLERGY AND IMMUNOLOGIC: No hives or eczema	    [ ] All others negative	  [ ] Unable to obtain      T(C): 36.6 (08-10-23 @ 04:58), Max: 37.1 (08-09-23 @ 21:00)  T(F): 97.8 (08-10-23 @ 04:58), Max: 98.8 (08-09-23 @ 21:00)  HR: 97 (08-10-23 @ 04:58) (88 - 110)  BP: 144/84 (08-10-23 @ 04:58) (134/62 - 169/72)  RR: 18 (08-10-23 @ 04:58) (18 - 18)  SpO2: 98% (08-10-23 @ 04:58) (98% - 98%)  Wt(kg): --    I&O's Summary    09 Aug 2023 07:01  -  10 Aug 2023 07:00  --------------------------------------------------------  IN: 0 mL / OUT: 350 mL / NET: -350 mL          PHYSICAL EXAM:  A X O x  HEAD:  Atraumatic, Normocephalic  EYES: EOMI, PERRLA, conjunctiva and sclera clear  NECK: Supple, No JVD, Normal thyroid  Resp: CTAB, No crackles, wheezing,   CVS: Regular rate and rhythm; No discernable murmurs, rubs, or gallops  ABD: Soft, Nontender, Nondistended; Bowel sounds present  EXTREMITIES:  2+ Peripheral Pulses, No edema  LYMPH: No dicernable lymphadenopathy noted  GENERAL: NAD, well-groomed, well-developed      LABS:                        8.4    13.15 )-----------( 283      ( 10 Aug 2023 05:59 )             26.4     08-10    138  |  111<H>  |  21<H>  ----------------------------<  82  3.9   |  17<L>  |  1.93<H>    Ca    9.1      10 Aug 2023 05:59  Phos  4.2     08-10  Mg     2.1     08-10    TPro  6.8  /  Alb  2.3<L>  /  TBili  0.3  /  DBili  x   /  AST  16  /  ALT  11  /  AlkPhos  84  08-10      Urinalysis Basic - ( 10 Aug 2023 05:59 )    Color: x / Appearance: x / SG: x / pH: x  Gluc: 82 mg/dL / Ketone: x  / Bili: x / Urobili: x   Blood: x / Protein: x / Nitrite: x   Leuk Esterase: x / RBC: x / WBC x   Sq Epi: x / Non Sq Epi: x / Bacteria: x      CAPILLARY BLOOD GLUCOSE      POCT Blood Glucose.: 92 mg/dL (10 Aug 2023 11:28)  POCT Blood Glucose.: 83 mg/dL (10 Aug 2023 05:28)  POCT Blood Glucose.: 76 mg/dL (09 Aug 2023 23:46)  POCT Blood Glucose.: 89 mg/dL (09 Aug 2023 18:11)      Troponins:  ProBNP:  Lipid Profile:   HgA1c:  TSH:           RADIOLOGY & ADDITIONAL TESTS:    Imaging Personally Reviewed:  [ ] YES  [ ] NO      Consultant(s) Notes Reviewed:  [x ] YES  [ ] NO    Care Discussed with Consultants/Other Providers [ x] YES  [ ] NO          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      Seizures      Chronic constipation      No significant past surgical history            Altered mental status    Yes    Handoff    MEWS Score    HTN (hypertension)    HLD (hyperlipidemia)    Cerebrovascular accident (CVA)    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    Seizures    Chronic constipation    AMS (altered mental status)    CVA (cerebrovascular accident)    Acute encephalopathy    Leukocytosis    Hypertension    Hyperlipidemia    Prophylactic measure    Seizure    Abnormal EKG    Electrolyte imbalance    Hypernatremia    SUSAN (acute kidney injury)    Palliative care encounter    Debility    Severe protein-calorie malnutrition    SUSAN (acute kidney injury)    Adult failure to thrive    Ear bleeding, right    Urinary retention    Preop testing    Parkinson disease    No significant past surgical history    A)NOTIFICATION    90+    SysAdmin_VisitLink             CARY[facility] LEIV[facility]  [facility] [id number]  [facility]        Patient is a 77y old  Female who presents with a chief complaint of Altered mental status. (10 Aug 2023 11:46)      INTERVAL HPI/OVERNIGHT EVENTS:  Patient seen and examined at bedside. No notations of chest pain, palpitation, SOB, orthopnea, nausea, vomiting or abdominal pain.    ALLERGIES  &quot; NATURAL RUBBER&quot; (Other)  latex (Other)  penicillins (Unknown)      MEDICATIONS  chlorhexidine 2% Cloths 1 Application(s) Topical daily  cloNIDine Patch 0.2 mG/24Hr(s) 1 patch Transdermal <User Schedule>  dextrose 5% with potassium chloride 20 mEq/L 1000 milliLiter(s) IV Continuous <Continuous>  hydrALAZINE Injectable 10 milliGRAM(s) IV Push every 6 hours  valproate sodium  IVPB 500 milliGRAM(s) IV Intermittent every 8 hours              REVIEW OF SYSTEMS:  CONSTITUTIONAL: No fever, weight loss, or fatigue  EYES: No eye pain, visual disturbances, or discharge  ENT:  No difficulty hearing, tinnitus, vertigo; No sinus or throat pain  NECK: No pain or stiffness  RESPIRATORY: No cough, wheezing, chills or hemoptysis; No Shortness of Breath  CARDIOVASCULAR: No chest pain, palpitations, passing out, dizziness, or leg swelling  GASTROINTESTINAL: No abdominal or epigastric pain. No nausea, vomiting, or hematemesis; No diarrhea or constipation. No melena or hematochezia.  GENITOURINARY: No dysuria, frequency, hematuria, or incontinence  NEUROLOGICAL: No headaches, memory loss, loss of strength, numbness, or tremors  SKIN: No itching, burning, rashes, or lesions   LYMPH Nodes: No enlarged glands  ENDOCRINE: No heat or cold intolerance; No hair loss  MUSCULOSKELETAL: No joint pain or swelling; No muscle, back, or extremity pain  PSYCHIATRIC: No depression, anxiety, mood swings, or difficulty sleeping  HEME/LYMPH: No easy bruising, or bleeding gums  ALLERGY AND IMMUNOLOGIC: No hives or eczema	    [ ] All others negative	  [ ] Unable to obtain      T(C): 36.6 (08-10-23 @ 04:58), Max: 37.1 (08-09-23 @ 21:00)  T(F): 97.8 (08-10-23 @ 04:58), Max: 98.8 (08-09-23 @ 21:00)  HR: 97 (08-10-23 @ 04:58) (88 - 110)  BP: 144/84 (08-10-23 @ 04:58) (134/62 - 169/72)  RR: 18 (08-10-23 @ 04:58) (18 - 18)  SpO2: 98% (08-10-23 @ 04:58) (98% - 98%)  Wt(kg): --    I&O's Summary    09 Aug 2023 07:01  -  10 Aug 2023 07:00  --------------------------------------------------------  IN: 0 mL / OUT: 350 mL / NET: -350 mL          PHYSICAL EXAM:  A X O x  HEAD:  Atraumatic, Normocephalic  EYES: EOMI, PERRLA, conjunctiva and sclera clear  NECK: Supple, No JVD, Normal thyroid  Resp: CTAB, No crackles, wheezing,   CVS: Regular rate and rhythm; No discernable murmurs, rubs, or gallops  ABD: Soft, Nontender, Nondistended; Bowel sounds present  EXTREMITIES:  2+ Peripheral Pulses, No edema  LYMPH: No dicernable lymphadenopathy noted  GENERAL: NAD, well-groomed, well-developed      LABS:                        8.4    13.15 )-----------( 283      ( 10 Aug 2023 05:59 )             26.4     08-10    138  |  111<H>  |  21<H>  ----------------------------<  82  3.9   |  17<L>  |  1.93<H>    Ca    9.1      10 Aug 2023 05:59  Phos  4.2     08-10  Mg     2.1     08-10    TPro  6.8  /  Alb  2.3<L>  /  TBili  0.3  /  DBili  x   /  AST  16  /  ALT  11  /  AlkPhos  84  08-10      Urinalysis Basic - ( 10 Aug 2023 05:59 )    Color: x / Appearance: x / SG: x / pH: x  Gluc: 82 mg/dL / Ketone: x  / Bili: x / Urobili: x   Blood: x / Protein: x / Nitrite: x   Leuk Esterase: x / RBC: x / WBC x   Sq Epi: x / Non Sq Epi: x / Bacteria: x      CAPILLARY BLOOD GLUCOSE      POCT Blood Glucose.: 92 mg/dL (10 Aug 2023 11:28)  POCT Blood Glucose.: 83 mg/dL (10 Aug 2023 05:28)  POCT Blood Glucose.: 76 mg/dL (09 Aug 2023 23:46)  POCT Blood Glucose.: 89 mg/dL (09 Aug 2023 18:11)      Troponins:  ProBNP:  Lipid Profile:   HgA1c:  TSH:           RADIOLOGY & ADDITIONAL TESTS:    Imaging Personally Reviewed:  [ ] YES  [ ] NO      Consultant(s) Notes Reviewed:  [x ] YES  [ ] NO    Care Discussed with Consultants/Other Providers [ x] YES  [ ] NO          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      Seizures      Chronic constipation      No significant past surgical history            Altered mental status    Yes    Handoff    MEWS Score    HTN (hypertension)    HLD (hyperlipidemia)    Cerebrovascular accident (CVA)    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    Seizures    Chronic constipation    AMS (altered mental status)    CVA (cerebrovascular accident)    Acute encephalopathy    Leukocytosis    Hypertension    Hyperlipidemia    Prophylactic measure    Seizure    Abnormal EKG    Electrolyte imbalance    Hypernatremia    SUSAN (acute kidney injury)    Palliative care encounter    Debility    Severe protein-calorie malnutrition    SUSAN (acute kidney injury)    Adult failure to thrive    Ear bleeding, right    Urinary retention    Preop testing    Parkinson disease    No significant past surgical history    A)NOTIFICATION    90+    SysAdmin_VisitLink             CARY[facility] LEIVA  [facility] [id number]  [facility]        Patient is a 77y old  Female who presents with a chief complaint of Altered mental status. (10 Aug 2023 11:46)      INTERVAL HPI/OVERNIGHT EVENTS:  Patient seen and examined at bedside. No notations of chest pain, palpitation, SOB, orthopnea, nausea, vomiting or abdominal pain.    ALLERGIES  &quot; NATURAL RUBBER&quot; (Other)  latex (Other)  penicillins (Unknown)      MEDICATIONS  chlorhexidine 2% Cloths 1 Application(s) Topical daily  cloNIDine Patch 0.2 mG/24Hr(s) 1 patch Transdermal <User Schedule>  dextrose 5% with potassium chloride 20 mEq/L 1000 milliLiter(s) IV Continuous <Continuous>  hydrALAZINE Injectable 10 milliGRAM(s) IV Push every 6 hours  valproate sodium  IVPB 500 milliGRAM(s) IV Intermittent every 8 hours              REVIEW OF SYSTEMS:  CONSTITUTIONAL: No fever, weight loss, or fatigue  EYES: No eye pain, visual disturbances, or discharge  ENT:  No difficulty hearing, tinnitus, vertigo; No sinus or throat pain  NECK: No pain or stiffness  RESPIRATORY: No cough, wheezing, chills or hemoptysis; No Shortness of Breath  CARDIOVASCULAR: No chest pain, palpitations, passing out, dizziness, or leg swelling  GASTROINTESTINAL: No abdominal or epigastric pain. No nausea, vomiting, or hematemesis; No diarrhea or constipation. No melena or hematochezia.  GENITOURINARY: No dysuria, frequency, hematuria, or incontinence  NEUROLOGICAL: No headaches, memory loss, loss of strength, numbness, or tremors  SKIN: No itching, burning, rashes, or lesions   LYMPH Nodes: No enlarged glands  ENDOCRINE: No heat or cold intolerance; No hair loss  MUSCULOSKELETAL: No joint pain or swelling; No muscle, back, or extremity pain  PSYCHIATRIC: No depression, anxiety, mood swings, or difficulty sleeping  HEME/LYMPH: No easy bruising, or bleeding gums  ALLERGY AND IMMUNOLOGIC: No hives or eczema	    [ ] All others negative	  [ ] Unable to obtain      T(C): 36.6 (08-10-23 @ 04:58), Max: 37.1 (08-09-23 @ 21:00)  T(F): 97.8 (08-10-23 @ 04:58), Max: 98.8 (08-09-23 @ 21:00)  HR: 97 (08-10-23 @ 04:58) (88 - 110)  BP: 144/84 (08-10-23 @ 04:58) (134/62 - 169/72)  RR: 18 (08-10-23 @ 04:58) (18 - 18)  SpO2: 98% (08-10-23 @ 04:58) (98% - 98%)  Wt(kg): --    I&O's Summary    09 Aug 2023 07:01  -  10 Aug 2023 07:00  --------------------------------------------------------  IN: 0 mL / OUT: 350 mL / NET: -350 mL          PHYSICAL EXAM:  A X O x  HEAD:  Atraumatic, Normocephalic  EYES: EOMI, PERRLA, conjunctiva and sclera clear  NECK: Supple, No JVD, Normal thyroid  Resp: CTAB, No crackles, wheezing,   CVS: Regular rate and rhythm; No discernable murmurs, rubs, or gallops  ABD: Soft, Nontender, Nondistended; Bowel sounds present  EXTREMITIES:  2+ Peripheral Pulses, No edema  LYMPH: No dicernable lymphadenopathy noted  GENERAL: NAD, well-groomed, well-developed      LABS:                        8.4    13.15 )-----------( 283      ( 10 Aug 2023 05:59 )             26.4     08-10    138  |  111<H>  |  21<H>  ----------------------------<  82  3.9   |  17<L>  |  1.93<H>    Ca    9.1      10 Aug 2023 05:59  Phos  4.2     08-10  Mg     2.1     08-10    TPro  6.8  /  Alb  2.3<L>  /  TBili  0.3  /  DBili  x   /  AST  16  /  ALT  11  /  AlkPhos  84  08-10      Urinalysis Basic - ( 10 Aug 2023 05:59 )    Color: x / Appearance: x / SG: x / pH: x  Gluc: 82 mg/dL / Ketone: x  / Bili: x / Urobili: x   Blood: x / Protein: x / Nitrite: x   Leuk Esterase: x / RBC: x / WBC x   Sq Epi: x / Non Sq Epi: x / Bacteria: x      CAPILLARY BLOOD GLUCOSE      POCT Blood Glucose.: 92 mg/dL (10 Aug 2023 11:28)  POCT Blood Glucose.: 83 mg/dL (10 Aug 2023 05:28)  POCT Blood Glucose.: 76 mg/dL (09 Aug 2023 23:46)  POCT Blood Glucose.: 89 mg/dL (09 Aug 2023 18:11)      Troponins:  ProBNP:  Lipid Profile:   HgA1c:  TSH:           RADIOLOGY & ADDITIONAL TESTS:    Imaging Personally Reviewed:  [ ] YES  [ ] NO      Consultant(s) Notes Reviewed:  [x ] YES  [ ] NO    Care Discussed with Consultants/Other Providers [ x] YES  [ ] NO          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      Seizures      Chronic constipation      No significant past surgical history            Altered mental status    Yes    Handoff    MEWS Score    HTN (hypertension)    HLD (hyperlipidemia)    Cerebrovascular accident (CVA)    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    Seizures    Chronic constipation    AMS (altered mental status)    CVA (cerebrovascular accident)    Acute encephalopathy    Leukocytosis    Hypertension    Hyperlipidemia    Prophylactic measure    Seizure    Abnormal EKG    Electrolyte imbalance    Hypernatremia    SUSAN (acute kidney injury)    Palliative care encounter    Debility    Severe protein-calorie malnutrition    SUSAN (acute kidney injury)    Adult failure to thrive    Ear bleeding, right    Urinary retention    Preop testing    Parkinson disease    No significant past surgical history    A)NOTIFICATION    90+    SysAdmin_VisitLink

## 2023-08-10 NOTE — PROGRESS NOTE ADULT - PROBLEM SELECTOR PLAN 2
Pt. has bleeding form right   Neurology examined the ear and it was seen to be just in the ear canal. No further imagine warranted. It was cleaned with water.  Less blood on 8/10 and healing

## 2023-08-10 NOTE — PROGRESS NOTE ADULT - PROBLEM SELECTOR PLAN 4
-GOC as above, declines hospice wishes to proceed with open gastrostomy ASAP  -remains high risk for morbidity and mortality and readmission, family wishes to proceed with feeding tube placement despite these risks    Discussed with primary team, to reschedule with surgery possibly 8/14.     Palliative care to sign-off. Please reconsult PRN.

## 2023-08-10 NOTE — PROGRESS NOTE ADULT - PROBLEM SELECTOR PLAN 1
Pt. is NPO  Gi could not place peg tube on 8/8 due to enlarged colon. Surgery consulted but family could not decide. Palliative is going to talk to them.

## 2023-08-10 NOTE — PROGRESS NOTE ADULT - NUTRITIONAL ASSESSMENT
This patient has been assessed with a concern for Malnutrition and has been determined to have a diagnosis/diagnoses of Severe protein-calorie malnutrition.    This patient is being managed with:   Diet NPO-  Entered: Jul 27 2023  7:28PM    This patient has been assessed with a concern for Malnutrition and has been determined to have a diagnosis/diagnoses of Severe protein-calorie malnutrition.    This patient is being managed with:   Diet NPO-  Entered: Jul 27 2023  7:28PM

## 2023-08-10 NOTE — PROGRESS NOTE ADULT - ASSESSMENT
Patient is a 77 female from [facility] PMHx HTN, HLD, CVA (w/ residual aphasia and R sided hemiparesis/plegia) who was sent to the hospital due to altered mental status. Patient is being admitted to medicine for further work up and rule out stroke vs encephalopathy (metabolic vs infectious).  Patient is a 77 female from [facility] PMHx HTN, HLD, CVA (w/ residual aphasia and R sided hemiparesis/plegia) who was sent to the hospital due to altered mental status. Patient is being admitted to medicine for further work up and rule out stroke vs encephalopathy (metabolic vs infectious).

## 2023-08-10 NOTE — PROGRESS NOTE ADULT - SUBJECTIVE AND OBJECTIVE BOX
follow up on:  complex medical decision making related to goals of care    [facility] Geriatric and Palliative Consult Service:  Naye Salgado DO: cell ([phone])  Mark Crowe MD: cell ([phone])  Nadir Masters NP: cell ([phone])   Alexx Hi LMSW: cell ([phone])   Beena Chandler NP: via [employer]    Can contact any Palliative Team member via [employer] for consults and questions      OVERNIGHT EVENTS: No acute events. Pt was scheduled for open gastrostomy yesterday 8/9, family refused to consent, palliative care re-consulted to clarify the GOC. Seen at the bedside, A/Ox0, brief eye opening to verbal stimuli, no verbal response.     Present Symptoms: Mild, Moderate, Severe  Pain:             Location -                               Aggravating factors -             Quality -             Radiation -             Timing-             Severity (0-10 scale):             Minimal acceptable level (0-10 scale):  Fatigue:  Nausea:  Lack of Appetite:   SOB:  Depression:  Anxiety:  Review of Systems:  Unable to obtain due to poor mentation    CPOT:    https://www.sccm.org/getattachment/[email or web address]/Critical-Care-Pain-Observation-Tool-(CPOT)  PAIN AD Score:   [email or web address] (press ctrl +  left click to view)MEDICATIONS  (STANDING):  chlorhexidine 2% Cloths 1 Application(s) Topical daily  cloNIDine Patch 0.2 mG/24Hr(s) 1 patch Transdermal <User Schedule>  dextrose 5% with potassium chloride 20 mEq/L 1000 milliLiter(s) (50 mL/Hr) IV Continuous <Continuous>  hydrALAZINE Injectable 10 milliGRAM(s) IV Push every 6 hours  valproate sodium  IVPB 500 milliGRAM(s) IV Intermittent every 8 hours    MEDICATIONS  (PRN):      PHYSICAL EXAM:  Vital Signs Last 24 Hrs  T(C): 36.6 (10 Aug 2023 04:58), Max: 37.1 (09 Aug 2023 21:00)  T(F): 97.8 (10 Aug 2023 04:58), Max: 98.8 (09 Aug 2023 21:00)  HR: 97 (10 Aug 2023 04:58) (88 - 110)  BP: 144/84 (10 Aug 2023 04:58) (134/62 - 169/72)  BP(mean): --  RR: 18 (10 Aug 2023 04:58) (18 - 18)  SpO2: 98% (10 Aug 2023 04:58) (98% - 98%)    Parameters below as of 10 Aug 2023 04:58  Patient On (Oxygen Delivery Method): room air        General: alert  oriented x 0  lethargic obese brief eye opening to verbal stimuli      Palliative Performance Scale/Karnofsky Score: 10%  http://npcrc.org/files/news/palliative_performance_scale_ppsv2.pdf    HEENT: no abnormal lesion, mmm  Lungs: nonlabor in RA  CV: RRR, S1S2  GI: soft non distended non tender  incontinent  : incontinent  Musculoskeletal: weakness x4 edema x4 (anasarca)   fully bedbound  Skin: no abnormal skin lesions, poor skin turgor  Neuro: severe cognitive impairment dysphagia   Oral intake ability: unable/only mouth care    LABS:                          8.4    13.15 )-----------( 283      ( 10 Aug 2023 05:59 )             26.4     08-10    138  |  111<H>  |  21<H>  ----------------------------<  82  3.9   |  17<L>  |  1.93<H>    Ca    9.1      10 Aug 2023 05:59  Phos  4.2     08-10  Mg     2.1     08-10    TPro  6.8  /  Alb  2.3<L>  /  TBili  0.3  /  DBili  x   /  AST  16  /  ALT  11  /  AlkPhos  84  08-10    Urinalysis Basic - ( 10 Aug 2023 05:59 )    Color: x / Appearance: x / SG: x / pH: x  Gluc: 82 mg/dL / Ketone: x  / Bili: x / Urobili: x   Blood: x / Protein: x / Nitrite: x   Leuk Esterase: x / RBC: x / WBC x   Sq Epi: x / Non Sq Epi: x / Bacteria: x        RADIOLOGY & ADDITIONAL STUDIES: follow up on:  complex medical decision making related to goals of care    [facility] Geriatric and Palliative Consult Service:  Naye Salgado DO: cell [phone])  Mark Crowe MD: cell ([phone])  Nadir Masters NP: cell ([phone])   Alexx Hi LMSW: cell ([phone])   Beena Chandler NP: via [employer]    Can contact any Palliative Team member via [employer] for consults and questions      OVERNIGHT EVENTS: No acute events. Pt was scheduled for open gastrostomy yesterday 8/9, family refused to consent, palliative care re-consulted to clarify the GOC. Seen at the bedside, A/Ox0, brief eye opening to verbal stimuli, no verbal response.     Present Symptoms: Mild, Moderate, Severe  Pain:             Location -                               Aggravating factors -             Quality -             Radiation -             Timing-             Severity (0-10 scale):             Minimal acceptable level (0-10 scale):  Fatigue:  Nausea:  Lack of Appetite:   SOB:  Depression:  Anxiety:  Review of Systems:  Unable to obtain due to poor mentation    CPOT:    https://www.sccm.org/getattachment/[email or web address]/Critical-Care-Pain-Observation-Tool-(CPOT)  PAIN AD Score:   [email or web address]://[email or web address] (press ctrl +  left click to view)MEDICATIONS  (STANDING):  chlorhexidine 2% Cloths 1 Application(s) Topical daily  cloNIDine Patch 0.2 mG/24Hr(s) 1 patch Transdermal <User Schedule>  dextrose 5% with potassium chloride 20 mEq/L 1000 milliLiter(s) (50 mL/Hr) IV Continuous <Continuous>  hydrALAZINE Injectable 10 milliGRAM(s) IV Push every 6 hours  valproate sodium  IVPB 500 milliGRAM(s) IV Intermittent every 8 hours    MEDICATIONS  (PRN):      PHYSICAL EXAM:  Vital Signs Last 24 Hrs  T(C): 36.6 (10 Aug 2023 04:58), Max: 37.1 (09 Aug 2023 21:00)  T(F): 97.8 (10 Aug 2023 04:58), Max: 98.8 (09 Aug 2023 21:00)  HR: 97 (10 Aug 2023 04:58) (88 - 110)  BP: 144/84 (10 Aug 2023 04:58) (134/62 - 169/72)  BP(mean): --  RR: 18 (10 Aug 2023 04:58) (18 - 18)  SpO2: 98% (10 Aug 2023 04:58) (98% - 98%)    Parameters below as of 10 Aug 2023 04:58  Patient On (Oxygen Delivery Method): room air        General: alert  oriented x 0  lethargic obese brief eye opening to verbal stimuli      Palliative Performance Scale/Karnofsky Score: 10%  http://npcrc.org/files/news/palliative_performance_scale_ppsv2.pdf    HEENT: no abnormal lesion, mmm  Lungs: nonlabor in RA  CV: RRR, S1S2  GI: soft non distended non tender  incontinent  : incontinent  Musculoskeletal: weakness x4 edema x4 (anasarca)   fully bedbound  Skin: no abnormal skin lesions, poor skin turgor  Neuro: severe cognitive impairment dysphagia   Oral intake ability: unable/only mouth care    LABS:                          8.4    13.15 )-----------( 283      ( 10 Aug 2023 05:59 )             26.4     08-10    138  |  111<H>  |  21<H>  ----------------------------<  82  3.9   |  17<L>  |  1.93<H>    Ca    9.1      10 Aug 2023 05:59  Phos  4.2     08-10  Mg     2.1     08-10    TPro  6.8  /  Alb  2.3<L>  /  TBili  0.3  /  DBili  x   /  AST  16  /  ALT  11  /  AlkPhos  84  08-10    Urinalysis Basic - ( 10 Aug 2023 05:59 )    Color: x / Appearance: x / SG: x / pH: x  Gluc: 82 mg/dL / Ketone: x  / Bili: x / Urobili: x   Blood: x / Protein: x / Nitrite: x   Leuk Esterase: x / RBC: x / WBC x   Sq Epi: x / Non Sq Epi: x / Bacteria: x        RADIOLOGY & ADDITIONAL STUDIES: follow up on:  complex medical decision making related to goals of care    [facility] Geriatric and Palliative Consult Service:  Naye Salgado DO: cell ([phone])  Mark Crowe MD: cell ([phone])  Nadir Masters NP: cell ([phone])   Alexx Hi LMSW: cell ([phone])   Beena Chandler NP: via [employer] Teams    Can contact any Palliative Team member via [employer] Teams for consults and questions      OVERNIGHT EVENTS: No acute events. Pt was scheduled for open gastrostomy yesterday 8/9, family refused to consent, palliative care re-consulted to clarify the GOC. Seen at the bedside, A/Ox0, brief eye opening to verbal stimuli, no verbal response.     Present Symptoms: Mild, Moderate, Severe  Pain:             Location -                               Aggravating factors -             Quality -             Radiation -             Timing-             Severity (0-10 scale):             Minimal acceptable level (0-10 scale):  Fatigue:  Nausea:  Lack of Appetite:   SOB:  Depression:  Anxiety:  Review of Systems:  Unable to obtain due to poor mentation    CPOT:    https://www.sccm.org/getattachment/[email or web address]-5599a0333a1x/Critical-Care-Pain-Observation-Tool-(CPOT)  PAIN AD Score:   [email or web address] (press ctrl +  left click to view)MEDICATIONS  (STANDING):  chlorhexidine 2% Cloths 1 Application(s) Topical daily  cloNIDine Patch 0.2 mG/24Hr(s) 1 patch Transdermal <User Schedule>  dextrose 5% with potassium chloride 20 mEq/L 1000 milliLiter(s) (50 mL/Hr) IV Continuous <Continuous>  hydrALAZINE Injectable 10 milliGRAM(s) IV Push every 6 hours  valproate sodium  IVPB 500 milliGRAM(s) IV Intermittent every 8 hours    MEDICATIONS  (PRN):      PHYSICAL EXAM:  Vital Signs Last 24 Hrs  T(C): 36.6 (10 Aug 2023 04:58), Max: 37.1 (09 Aug 2023 21:00)  T(F): 97.8 (10 Aug 2023 04:58), Max: 98.8 (09 Aug 2023 21:00)  HR: 97 (10 Aug 2023 04:58) (88 - 110)  BP: 144/84 (10 Aug 2023 04:58) (134/62 - 169/72)  BP(mean): --  RR: 18 (10 Aug 2023 04:58) (18 - 18)  SpO2: 98% (10 Aug 2023 04:58) (98% - 98%)    Parameters below as of 10 Aug 2023 04:58  Patient On (Oxygen Delivery Method): room air        General: alert  oriented x 0  lethargic obese brief eye opening to verbal stimuli      Palliative Performance Scale/Karnofsky Score: 10%  http://npcrc.org/files/news/palliative_performance_scale_ppsv2.pdf    HEENT: no abnormal lesion, mmm  Lungs: nonlabor in RA  CV: RRR, S1S2  GI: soft non distended non tender  incontinent  : incontinent  Musculoskeletal: weakness x4 edema x4 (anasarca)   fully bedbound  Skin: no abnormal skin lesions, poor skin turgor  Neuro: severe cognitive impairment dysphagia   Oral intake ability: unable/only mouth care    LABS:                          8.4    13.15 )-----------( 283      ( 10 Aug 2023 05:59 )             26.4     08-10    138  |  111<H>  |  21<H>  ----------------------------<  82  3.9   |  17<L>  |  1.93<H>    Ca    9.1      10 Aug 2023 05:59  Phos  4.2     08-10  Mg     2.1     08-10    TPro  6.8  /  Alb  2.3<L>  /  TBili  0.3  /  DBili  x   /  AST  16  /  ALT  11  /  AlkPhos  84  08-10    Urinalysis Basic - ( 10 Aug 2023 05:59 )    Color: x / Appearance: x / SG: x / pH: x  Gluc: 82 mg/dL / Ketone: x  / Bili: x / Urobili: x   Blood: x / Protein: x / Nitrite: x   Leuk Esterase: x / RBC: x / WBC x   Sq Epi: x / Non Sq Epi: x / Bacteria: x        RADIOLOGY & ADDITIONAL STUDIES:

## 2023-08-10 NOTE — PROGRESS NOTE ADULT - SUBJECTIVE AND OBJECTIVE BOX
NEPHROLOGY MEDICAL CARE, [facility] - Dr. Ajay Green/ Dr. Mert Madison/ Dr. Chris Calderon/ Dr. Carson Cook    Date of Service: 08-10-23 @ 16:40    Patient was seen and examined at bedside.    CC: patient is okay and NAD    Vital Signs Last 24 Hrs  T(C): 36.5 (10 Aug 2023 14:03), Max: 37.1 (09 Aug 2023 21:00)  T(F): 97.7 (10 Aug 2023 14:03), Max: 98.8 (09 Aug 2023 21:00)  HR: 105 (10 Aug 2023 14:03) (88 - 106)  BP: 174/83 (10 Aug 2023 14:03) (134/62 - 174/83)  BP(mean): --  RR: 18 (10 Aug 2023 14:03) (18 - 18)  SpO2: 99% (10 Aug 2023 14:03) (98% - 99%)    Parameters below as of 10 Aug 2023 14:03  Patient On (Oxygen Delivery Method): room air        08-09 @ 07:01  -  08-10 @ 07:00  --------------------------------------------------------  IN: 0 mL / OUT: 350 mL / NET: -350 mL        PHYSICAL EXAM:  General: No acute respiratory distress.  Eyes: conjunctiva and sclera clear  ENMT: Atraumatic, Normocephalic, supple, No JVD present. Moist mucous membranes  Respiratory: Bilateral clear lungs; No rales, rhonchi, wheezing  Cardiovascular: S1S2+; no m/r/g  Gastrointestinal: Soft, Non-tender, Nondistended; Bowel sounds present  : mcclellan's cath with muddy brown urine  Neuro: eyes are open; hemiparesis.  Ext:  1+pedal edema, No Cyanosis  Skin: No visible rashes      MEDICATIONS:  MEDICATIONS  (STANDING):  carbidopa/levodopa 25/100 Disintegrating Tablet 1 Tablet(s) Oral <User Schedule>  cefTRIAXone   IVPB 1000 milliGRAM(s) IV Intermittent every 24 hours  chlorhexidine 2% Cloths 1 Application(s) Topical daily  cloNIDine Patch 0.2 mG/24Hr(s) 1 patch Transdermal <User Schedule>  dextrose 5% with potassium chloride 20 mEq/L 1000 milliLiter(s) (75 mL/Hr) IV Continuous <Continuous>  hydrALAZINE Injectable 10 milliGRAM(s) IV Push every 6 hours  valproate sodium  IVPB 500 milliGRAM(s) IV Intermittent every 8 hours    MEDICATIONS  (PRN):          LABS:                        8.4    13.15 )-----------( 283      ( 10 Aug 2023 05:59 )             26.4     08-10    138  |  111<H>  |  21<H>  ----------------------------<  82  3.9   |  17<L>  |  1.93<H>    Ca    9.1      10 Aug 2023 05:59  Phos  4.2     08-10  Mg     2.1     08-10    TPro  6.8  /  Alb  2.3<L>  /  TBili  0.3  /  DBili  x   /  AST  16  /  ALT  11  /  AlkPhos  84  08-10      Urinalysis Basic - ( 10 Aug 2023 05:59 )    Color: x / Appearance: x / SG: x / pH: x  Gluc: 82 mg/dL / Ketone: x  / Bili: x / Urobili: x   Blood: x / Protein: x / Nitrite: x   Leuk Esterase: x / RBC: x / WBC x   Sq Epi: x / Non Sq Epi: x / Bacteria: x      Phosphorus: 4.2 mg/dL (08-10 @ 05:59)  Magnesium: 2.1 mg/dL (08-10 @ 05:59)    Urine studies    PTH and Vit D:         NEPHROLOGY MEDICAL CARE, [facility] - Dr. Ajay Green/ Dr. Mert Madison/ Dr. Chris Calderon/ Dr. Carson Cook    Date of Service: 08-10-23 @ 16:40    Patient was seen and examined at bedside.    CC: patient is okay and NAD    Vital Signs Last 24 Hrs  T(C): 36.5 (10 Aug 2023 14:03), Max: 37.1 (09 Aug 2023 21:00)  T(F): 97.7 (10 Aug 2023 14:03), Max: 98.8 (09 Aug 2023 21:00)  HR: 105 (10 Aug 2023 14:03) (88 - 106)  BP: 174/83 (10 Aug 2023 14:03) (134/62 - 174/83)  BP(mean): --  RR: 18 (10 Aug 2023 14:03) (18 - 18)  SpO2: 99% (10 Aug 2023 14:03) (98% - 99%)    Parameters below as of 10 Aug 2023 14:03  Patient On (Oxygen Delivery Method): room air        08-09 @ 07:01  -  08-10 @ 07:00  --------------------------------------------------------  IN: 0 mL / OUT: 350 mL / NET: -350 mL        PHYSICAL EXAM:  General: No acute respiratory distress.  Eyes: conjunctiva and sclera clear  ENMT: Atraumatic, Normocephalic, supple, No JVD present. Moist mucous membranes  Respiratory: Bilateral clear lungs; No rales, rhonchi, wheezing  Cardiovascular: S1S2+; no m/r/g  Gastrointestinal: Soft, Non-tender, Nondistended; Bowel sounds present  : mcclellan's cath with muddy brown urine  Neuro: eyes are open; hemiparesis.  Ext:  1+pedal edema, No Cyanosis  Skin: No visible rashes      MEDICATIONS:  MEDICATIONS  (STANDING):  carbidopa/levodopa 25/100 Disintegrating Tablet 1 Tablet(s) Oral <User Schedule>  cefTRIAXone   IVPB 1000 milliGRAM(s) IV Intermittent every 24 hours  chlorhexidine 2% Cloths 1 Application(s) Topical daily  cloNIDine Patch 0.2 mG/24Hr(s) 1 patch Transdermal <User Schedule>  dextrose 5% with potassium chloride 20 mEq/L 1000 milliLiter(s) (75 mL/Hr) IV Continuous <Continuous>  hydrALAZINE Injectable 10 milliGRAM(s) IV Push every 6 hours  valproate sodium  IVPB 500 milliGRAM(s) IV Intermittent every 8 hours    MEDICATIONS  (PRN):          LABS:                        8.4    13.15 )-----------( 283      ( 10 Aug 2023 05:59 )             26.4     08-10    138  |  111<H>  |  21<H>  ----------------------------<  82  3.9   |  17<L>  |  1.93<H>    Ca    9.1      10 Aug 2023 05:59  Phos  4.2     08-10  Mg     2.1     08-10    TPro  6.8  /  Alb  2.3<L>  /  TBili  0.3  /  DBili  x   /  AST  16  /  ALT  11  /  AlkPhos  84  08-10      Urinalysis Basic - ( 10 Aug 2023 05:59 )    Color: x / Appearance: x / SG: x / pH: x  Gluc: 82 mg/dL / Ketone: x  / Bili: x / Urobili: x   Blood: x / Protein: x / Nitrite: x   Leuk Esterase: x / RBC: x / WBC x   Sq Epi: x / Non Sq Epi: x / Bacteria: x      Phosphorus: 4.2 mg/dL (08-10 @ 05:59)  Magnesium: 2.1 mg/dL (08-10 @ 05:59)    Urine studies    PTH and Vit D:         NEPHROLOGY MEDICAL CARE, [facility] - Dr. Ajay Green/ Dr. Mert Madison/ Dr. Chris Calderon/ Dr. Carson Cook    Date of Service: 08-10-23 @ 16:40    Patient was seen and examined at bedside.    CC: patient is okay and NAD    Vital Signs Last 24 Hrs  T(C): 36.5 (10 Aug 2023 14:03), Max: 37.1 (09 Aug 2023 21:00)  T(F): 97.7 (10 Aug 2023 14:03), Max: 98.8 (09 Aug 2023 21:00)  HR: 105 (10 Aug 2023 14:03) (88 - 106)  BP: 174/83 (10 Aug 2023 14:03) (134/62 - 174/83)  BP(mean): --  RR: 18 (10 Aug 2023 14:03) (18 - 18)  SpO2: 99% (10 Aug 2023 14:03) (98% - 99%)    Parameters below as of 10 Aug 2023 14:03  Patient On (Oxygen Delivery Method): room air        08-09 @ 07:01  -  08-10 @ 07:00  --------------------------------------------------------  IN: 0 mL / OUT: 350 mL / NET: -350 mL        PHYSICAL EXAM:  General: No acute respiratory distress.  Eyes: conjunctiva and sclera clear  ENMT: Atraumatic, Normocephalic, supple, No JVD present. Moist mucous membranes  Respiratory: Bilateral clear lungs; No rales, rhonchi, wheezing  Cardiovascular: S1S2+; no m/r/g  Gastrointestinal: Soft, Non-tender, Nondistended; Bowel sounds present  : mcclellan's cath with muddy brown urine  Neuro: eyes are open; hemiparesis.  Ext:  1+pedal edema, No Cyanosis  Skin: No visible rashes      MEDICATIONS:  MEDICATIONS  (STANDING):  carbidopa/levodopa 25/100 Disintegrating Tablet 1 Tablet(s) Oral <User Schedule>  cefTRIAXone   IVPB 1000 milliGRAM(s) IV Intermittent every 24 hours  chlorhexidine 2% Cloths 1 Application(s) Topical daily  cloNIDine Patch 0.2 mG/24Hr(s) 1 patch Transdermal <User Schedule>  dextrose 5% with potassium chloride 20 mEq/L 1000 milliLiter(s) (75 mL/Hr) IV Continuous <Continuous>  hydrALAZINE Injectable 10 milliGRAM(s) IV Push every 6 hours  valproate sodium  IVPB 500 milliGRAM(s) IV Intermittent every 8 hours    MEDICATIONS  (PRN):          LABS:                        8.4    13.15 )-----------( 283      ( 10 Aug 2023 05:59 )             26.4     08-10    138  |  111<H>  |  21<H>  ----------------------------<  82  3.9   |  17<L>  |  1.93<H>    Ca    9.1      10 Aug 2023 05:59  Phos  4.2     08-10  Mg     2.1     08-10    TPro  6.8  /  Alb  2.3<L>  /  TBili  0.3  /  DBili  x   /  AST  16  /  ALT  11  /  AlkPhos  84  08-10      Urinalysis Basic - ( 10 Aug 2023 05:59 )    Color: x / Appearance: x / SG: x / pH: x  Gluc: 82 mg/dL / Ketone: x  / Bili: x / Urobili: x   Blood: x / Protein: x / Nitrite: x   Leuk Esterase: x / RBC: x / WBC x   Sq Epi: x / Non Sq Epi: x / Bacteria: x      Phosphorus: 4.2 mg/dL (08-10 @ 05:59)  Magnesium: 2.1 mg/dL (08-10 @ 05:59)    Urine studies    PTH and Vit D:

## 2023-08-10 NOTE — PROGRESS NOTE ADULT - ASSESSMENT
Patient is a 77 female from [facility] PMHx HTN, HLD, CVA (w/ residual aphasia and R sided hemiparesis/plegia) who was sent to the hospital due to altered mental status. Patient is being admitted to medicine for further work up and rule out stroke vs encephalopathy (metabolic vs infectious).  Patient is a 77 female from [facility] PMHx HTN, HLD, CVA (w/ residual aphasia and R sided hemiparesis/plegia) who was sent to the hospital due to altered mental status. Patient is being admitted to medicine for further work up and rule out stroke vs encephalopathy (metabolic vs infectious).  Patient is a 77 female from [facility] PMHx HTN, HLD, CVA (w/ residual aphasia and R sided hemiparesis/plegia) who was sent to the hospital due to altered mental status. Patient is being admitted to medicine for further work up and rule out stroke vs encephalopathy (metabolic vs infectious).

## 2023-08-10 NOTE — CHART NOTE - NSCHARTNOTEFT_GEN_A_CORE
Spoke to patient sister, Tish Acharya (HCP) updated her on patient's status regarding PEG tube placement scheduled for Monday, 8/14.  Family very frustrated as they assumed patient was going to have surgery for today, 8/10/23. After she did not consent to surgery yesterday 8/9/23 resulting to the procedure being cancelled (documented by surgical attending, Dr. Mcadams). Family claims that she did consent and then was informed for the procedure being rescheduled today however is unable to name who she spoke to.   Informed family of the documented conversation with surgical attending stating that family requested another speech and swallow evaluation.   Family updated on failed S/S evaluation and recommendations.  Family claims someone called today saying surgery will be for today. Confirmed with surgery that this was not the case, no record of re-scheduled surgery.  Surgery confirmed only available time for non-urgent PEG tube placement is for Monday, 8/14.  Explained to family multiple times. Reassured family that her electrolytes will continued to be monitored and replaced as indicated.   Family wishes for sooner surgery but agreeable to plan for Monday PEG.

## 2023-08-10 NOTE — PROGRESS NOTE ADULT - SUBJECTIVE AND OBJECTIVE BOX
[   ] ICU                                          [   ] CCU                                      [ X  ] Medical Floor      Patient is a 77 year old female with dysphagia. GI consulted for Peg tube placement.     Patient is a 77 female from [facility] with past medical history significant for HTN, HLD, CVA (w/ residual aphasia and R sided hemiparesis/plegia) who was sent to the emergency room with for altered mental status. Patient is not able to provide any history. Patient is lying down in bed, awake and alert. However, patient does not speak, is not able to follow commands and does not turn face or move eyes when her name is called. Patient is admitted with CVA. Now patient with dysphagia, poor po intake, failure to thrive and weight loss. No abdominal pain, nausea, vomiting, hematemesis, hematochezia, melena, fever, chills, chest pain, SOB, cough, hematuria, dysuria  or diarrhea reported.      Patient is comfortable. No new complaints reported, No abdominal pain, N/V, hematemesis, hematochezia, melena, fever, chills, chest pain, SOB, cough or diarrhea reported.      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 1000 milliLiter(s) (70 mL/Hr) IV Continuous <Continuous>  enoxaparin Injectable 30 milliGRAM(s) SubCutaneous every 24 hours  hydrALAZINE Injectable 10 milliGRAM(s) IV Push every 6 hours  potassium chloride  10 mEq/100 mL IVPB 10 milliEquivalent(s) IV Intermittent every 1 hour  valproate sodium  IVPB 500 milliGRAM(s) IV Intermittent every 8 hours         SOCIAL HISTORY  Advanced Directives:       [ X ] Full Code       [  ] DNR  Marital Status:         [  ] M      [ X ] S      [  ] D       [  ] W  Children:       [ X ] Yes      [  ] No  Occupation:        [  ] Employed       [ X ] Unemployed       [  ] Retired  Diet:       [ X ] Regular       [  ] PEG feeding          [  ] NG tube feeding  Drug Use:           [ X ] Patient denied          [  ] Yes  Alcohol:           [ X ] No             [  ] Yes (socially)         [  ] Yes (chronic)  Tobacco:           [  ] Yes           [X  ] No      FAMILY HISTORY  [ X ] Heart Disease            [ X ] Diabetes             [ X ] HTN             [  ] Colon Cancer             [  ] Stomach Cancer              [  ] Pancreatic Cancer        VITALS  Vital Signs Last 24 Hrs  T(C): 36.5 (10 Aug 2023 14:03), Max: 37.1 (09 Aug 2023 21:00)  T(F): 97.7 (10 Aug 2023 14:03), Max: 98.8 (09 Aug 2023 21:00)  HR: 105 (10 Aug 2023 14:03) (88 - 106)  BP: 174/83 (10 Aug 2023 14:03) (134/62 - 174/83)   RR: 18 (10 Aug 2023 14:03) (18 - 18)  SpO2: 99% (10 Aug 2023 14:03) (98% - 99%)  Parameters below as of 10 Aug 2023 14:03  Patient On (Oxygen Delivery Method): room air       MEDICATIONS  (STANDING):  cefTRIAXone   IVPB 1000 milliGRAM(s) IV Intermittent every 24 hours  chlorhexidine 2% Cloths 1 Application(s) Topical daily  cloNIDine Patch 0.2 mG/24Hr(s) 1 patch Transdermal <User Schedule>  dextrose 5% with potassium chloride 20 mEq/L 1000 milliLiter(s) (75 mL/Hr) IV Continuous <Continuous>  hydrALAZINE Injectable 10 milliGRAM(s) IV Push every 6 hours  valproate sodium  IVPB 500 milliGRAM(s) IV Intermittent every 8 hours    MEDICATIONS  (PRN):                            8.4    13.15 )-----------( 283      ( 10 Aug 2023 05:59 )             26.4       08-10    138  |  111<H>  |  21<H>  ----------------------------<  82  3.9   |  17<L>  |  1.93<H>    Ca    9.1      10 Aug 2023 05:59  Phos  4.2     08-10  Mg     2.1     08-10    TPro  6.8  /  Alb  2.3<L>  /  TBili  0.3  /  DBili  x   /  AST  16  /  ALT  11  /  AlkPhos  84  08-10       [   ] ICU                                          [   ] CCU                                      [ X  ] Medical Floor      Patient is a 77 year old female with dysphagia. GI consulted for Peg tube placement.     Patient is a 77 female from [facility] with past medical history significant for HTN, HLD, CVA (w/ residual aphasia and R sided hemiparesis/plegia) who was sent to the emergency room with for altered mental status. Patient is not able to provide any history. Patient is lying down in bed, awake and alert. However, patient does not speak, is not able to follow commands and does not turn face or move eyes when her name is called. Patient is admitted with CVA. Now patient with dysphagia, poor po intake, failure to thrive and weight loss. No abdominal pain, nausea, vomiting, hematemesis, hematochezia, melena, fever, chills, chest pain, SOB, cough, hematuria, dysuria  or diarrhea reported.      Patient is comfortable. No new complaints reported, No abdominal pain, N/V, hematemesis, hematochezia, melena, fever, chills, chest pain, SOB, cough or diarrhea reported.      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 1000 milliLiter(s) (70 mL/Hr) IV Continuous <Continuous>  enoxaparin Injectable 30 milliGRAM(s) SubCutaneous every 24 hours  hydrALAZINE Injectable 10 milliGRAM(s) IV Push every 6 hours  potassium chloride  10 mEq/100 mL IVPB 10 milliEquivalent(s) IV Intermittent every 1 hour  valproate sodium  IVPB 500 milliGRAM(s) IV Intermittent every 8 hours         SOCIAL HISTORY  Advanced Directives:       [ X ] Full Code       [  ] DNR  Marital Status:         [  ] M      [ X ] S      [  ] D       [  ] W  Children:       [ X ] Yes      [  ] No  Occupation:        [  ] Employed       [ X ] Unemployed       [  ] Retired  Diet:       [ X ] Regular       [  ] PEG feeding          [  ] NG tube feeding  Drug Use:           [ X ] Patient denied          [  ] Yes  Alcohol:           [ X ] No             [  ] Yes (socially)         [  ] Yes (chronic)  Tobacco:           [  ] Yes           [X  ] No      FAMILY HISTORY  [ X ] Heart Disease            [ X ] Diabetes             [ X ] HTN             [  ] Colon Cancer             [  ] Stomach Cancer              [  ] Pancreatic Cancer        VITALS  Vital Signs Last 24 Hrs  T(C): 36.5 (10 Aug 2023 14:03), Max: 37.1 (09 Aug 2023 21:00)  T(F): 97.7 (10 Aug 2023 14:03), Max: 98.8 (09 Aug 2023 21:00)  HR: 105 (10 Aug 2023 14:03) (88 - 106)  BP: 174/83 (10 Aug 2023 14:03) (134/62 - 174/83)   RR: 18 (10 Aug 2023 14:03) (18 - 18)  SpO2: 99% (10 Aug 2023 14:03) (98% - 99%)  Parameters below as of 10 Aug 2023 14:03  Patient On (Oxygen Delivery Method): room air       MEDICATIONS  (STANDING):  cefTRIAXone   IVPB 1000 milliGRAM(s) IV Intermittent every 24 hours  chlorhexidine 2% Cloths 1 Application(s) Topical daily  cloNIDine Patch 0.2 mG/24Hr(s) 1 patch Transdermal <User Schedule>  dextrose 5% with potassium chloride 20 mEq/L 1000 milliLiter(s) (75 mL/Hr) IV Continuous <Continuous>  hydrALAZINE Injectable 10 milliGRAM(s) IV Push every 6 hours  valproate sodium  IVPB 500 milliGRAM(s) IV Intermittent every 8 hours    MEDICATIONS  (PRN):                            8.4    13.15 )-----------( 283      ( 10 Aug 2023 05:59 )             26.4       08-10    138  |  111<H>  |  21<H>  ----------------------------<  82  3.9   |  17<L>  |  1.93<H>    Ca    9.1      10 Aug 2023 05:59  Phos  4.2     08-10  Mg     2.1     08-10    TPro  6.8  /  Alb  2.3<L>  /  TBili  0.3  /  DBili  x   /  AST  16  /  ALT  11  /  AlkPhos  84  08-10       [   ] ICU                                          [   ] CCU                                      [ X  ] Medical Floor      Patient is a 77 year old female with dysphagia. GI consulted for Peg tube placement.     Patient is a 77 female from [facility] with past medical history significant for HTN, HLD, CVA (w/ residual aphasia and R sided hemiparesis/plegia) who was sent to the emergency room with for altered mental status. Patient is not able to provide any history. Patient is lying down in bed, awake and alert. However, patient does not speak, is not able to follow commands and does not turn face or move eyes when her name is called. Patient is admitted with CVA. Now patient with dysphagia, poor po intake, failure to thrive and weight loss. No abdominal pain, nausea, vomiting, hematemesis, hematochezia, melena, fever, chills, chest pain, SOB, cough, hematuria, dysuria  or diarrhea reported.      Patient is comfortable. No new complaints reported, No abdominal pain, N/V, hematemesis, hematochezia, melena, fever, chills, chest pain, SOB, cough or diarrhea reported.      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 1000 milliLiter(s) (70 mL/Hr) IV Continuous <Continuous>  enoxaparin Injectable 30 milliGRAM(s) SubCutaneous every 24 hours  hydrALAZINE Injectable 10 milliGRAM(s) IV Push every 6 hours  potassium chloride  10 mEq/100 mL IVPB 10 milliEquivalent(s) IV Intermittent every 1 hour  valproate sodium  IVPB 500 milliGRAM(s) IV Intermittent every 8 hours         SOCIAL HISTORY  Advanced Directives:       [ X ] Full Code       [  ] DNR  Marital Status:         [  ] M      [ X ] S      [  ] D       [  ] W  Children:       [ X ] Yes      [  ] No  Occupation:        [  ] Employed       [ X ] Unemployed       [  ] Retired  Diet:       [ X ] Regular       [  ] PEG feeding          [  ] NG tube feeding  Drug Use:           [ X ] Patient denied          [  ] Yes  Alcohol:           [ X ] No             [  ] Yes (socially)         [  ] Yes (chronic)  Tobacco:           [  ] Yes           [X  ] No      FAMILY HISTORY  [ X ] Heart Disease            [ X ] Diabetes             [ X ] HTN             [  ] Colon Cancer             [  ] Stomach Cancer              [  ] Pancreatic Cancer        VITALS  Vital Signs Last 24 Hrs  T(C): 36.5 (10 Aug 2023 14:03), Max: 37.1 (09 Aug 2023 21:00)  T(F): 97.7 (10 Aug 2023 14:03), Max: 98.8 (09 Aug 2023 21:00)  HR: 105 (10 Aug 2023 14:03) (88 - 106)  BP: 174/83 (10 Aug 2023 14:03) (134/62 - 174/83)   RR: 18 (10 Aug 2023 14:03) (18 - 18)  SpO2: 99% (10 Aug 2023 14:03) (98% - 99%)  Parameters below as of 10 Aug 2023 14:03  Patient On (Oxygen Delivery Method): room air       MEDICATIONS  (STANDING):  cefTRIAXone   IVPB 1000 milliGRAM(s) IV Intermittent every 24 hours  chlorhexidine 2% Cloths 1 Application(s) Topical daily  cloNIDine Patch 0.2 mG/24Hr(s) 1 patch Transdermal <User Schedule>  dextrose 5% with potassium chloride 20 mEq/L 1000 milliLiter(s) (75 mL/Hr) IV Continuous <Continuous>  hydrALAZINE Injectable 10 milliGRAM(s) IV Push every 6 hours  valproate sodium  IVPB 500 milliGRAM(s) IV Intermittent every 8 hours    MEDICATIONS  (PRN):                            8.4    13.15 )-----------( 283      ( 10 Aug 2023 05:59 )             26.4       08-10    138  |  111<H>  |  21<H>  ----------------------------<  82  3.9   |  17<L>  |  1.93<H>    Ca    9.1      10 Aug 2023 05:59  Phos  4.2     08-10  Mg     2.1     08-10    TPro  6.8  /  Alb  2.3<L>  /  TBili  0.3  /  DBili  x   /  AST  16  /  ALT  11  /  AlkPhos  84  08-10

## 2023-08-10 NOTE — PROGRESS NOTE ADULT - SUBJECTIVE AND OBJECTIVE BOX
CHIEF COMPLAINT:Patient is a 77y old  Female who presents with a chief complaint of Altered mental status. Pt appears comfortable.    	  REVIEW OF SYSTEMS:  unable to obtain      PHYSICAL EXAM:  T(C): 36.6 (08-10-23 @ 04:58), Max: 37.1 (08-09-23 @ 21:00)  HR: 97 (08-10-23 @ 04:58) (88 - 110)  BP: 144/84 (08-10-23 @ 04:58) (134/62 - 169/72)  RR: 18 (08-10-23 @ 04:58) (18 - 18)  SpO2: 98% (08-10-23 @ 04:58) (98% - 98%)  Wt(kg): --  I&O's Summary    09 Aug 2023 07:01  -  10 Aug 2023 07:00  --------------------------------------------------------  IN: 0 mL / OUT: 350 mL / NET: -350 mL        Appearance: Normal	  HEENT:   Normal oral mucosa, PERRL, EOMI	  Lymphatic: No lymphadenopathy  Cardiovascular: Normal S1 S2, No JVD, No murmurs, No edema  Respiratory: Lungs clear to auscultation	  Psychiatry: A & O x 3, Mood & affect appropriate  Gastrointestinal:  Soft, Non-tender, + BS	  Skin: No rashes, No ecchymoses, No cyanosis	  Neurologic: Non-focal  Extremities: Normal range of motion, No clubbing, cyanosis or edema  Vascular: Peripheral pulses palpable 2+ bilaterally    MEDICATIONS  (STANDING):  chlorhexidine 2% Cloths 1 Application(s) Topical daily  cloNIDine Patch 0.2 mG/24Hr(s) 1 patch Transdermal <User Schedule>  dextrose 5% with potassium chloride 20 mEq/L 1000 milliLiter(s) (50 mL/Hr) IV Continuous <Continuous>  hydrALAZINE Injectable 10 milliGRAM(s) IV Push every 6 hours  valproate sodium  IVPB 500 milliGRAM(s) IV Intermittent every 8 hours        LABS:	 	                        8.4    13.15 )-----------( 283      ( 10 Aug 2023 05:59 )             26.4     08-10    138  |  111<H>  |  21<H>  ----------------------------<  82  3.9   |  17<L>  |  1.93<H>    Ca    9.1      10 Aug 2023 05:59  Phos  4.2     08-10  Mg     2.1     08-10    TPro  6.8  /  Alb  2.3<L>  /  TBili  0.3  /  DBili  x   /  AST  16  /  ALT  11  /  AlkPhos  84  08-10    proBNP:   Lipid Profile: Cholesterol 152  LDL --  HDL 51  TG 93  Ldl calc 82    TSH: Thyroid Stimulating Hormone, Serum: 3.59 uU/mL (07-28 @ 05:03)

## 2023-08-10 NOTE — PROGRESS NOTE ADULT - PROBLEM SELECTOR PLAN 3
altered mental status  HEAD CT: Chronic left MCA territory infarction.  CT PERFUSION demonstrated: Perfusion abnormality corresponding to the chronically infarcted left MCA territory. INFARCT CORE: 57 mL. TISSUE AT RISK: 236 mL.  CTA COW and  CTA NECK: neg   Pt can not move limbs or speak. She failed dysphagia screen but family desires to try speech and swallow even though she can not. Palliative can re-consult.

## 2023-08-10 NOTE — PROGRESS NOTE ADULT - ASSESSMENT
77 female from [facility] PMHx HTN, HLD, CVA (w/ residual aphasia and R sided hemiparesis/plegia) who was sent to the hospital due to altered mental status,UTI.  1.Neurology eval noted for bleeding from ear, CT -.  2.UTI-s/p ABX.  3.HTN- clonidine patch .2mg q wk,IV hydralazine.  5.Lipid d/o-hold statin.  6.Plan for G tube placement cancelled.family wants to try feed her.  7.Hypernatremia and SUSAN-IVF D5 with kcl.  8.GI and DVT prophylaxis. 77 female from [facility] PMHx HTN, HLD, CVA (w/ residual aphasia and R sided hemiparesis/plegia) who was sent to the hospital due to altered mental status,UTI.  1.Neurology eval noted for bleeding from ear, CT -.  2.UTI-s/p ABX.  3.HTN- clonidine patch .2mg q wk,IV hydralazine.  5.Lipid d/o-hold statin.  6.Plan for G tube placement cancelled.family wants to try feed her.  7.Hypernatremia and SUSAN-IVF D5 with kcl.  8.GI and DVT prophylaxis. 77 female from [facility] PMHx HTN, HLD, CVA (w/ residual aphasia and R sided hemiparesis/plegia) who was sent to the hospital due to altered mental status,UTI.  1.Neurology eval noted for bleeding from ear, CT -.  2.UTI-s/p ABX.  3.HTN- clonidine patch .2mg q wk,IV hydralazine.  5.Lipid d/o-hold statin.  6.Plan for G tube placement cancelled.family wants to try feed her.  7.Hypernatremia and SUSAN-IVF D5 with kcl.  8.GI and DVT prophylaxis.

## 2023-08-10 NOTE — PROGRESS NOTE ADULT - CONVERSATION DETAILS
Multiple calls placed to pt's sister Marika San [phone] to clarify the GOC regarding longterm feeding tube placement. Reviewed benefits/burdens of longterm feeding tube and remains high risk for morbidity and mortality and readmission, family wishes to proceed with feeding tube placement despite these risks. She stated that she will consent to surgery for open gastrostomy and agrees to consent for procedure and anaesthesia. She states that she thought the pt was going to surgery today. States she wants surgery ASAP. Much support provided. Multiple calls placed to pt's sister Marika San [phone] to clarify the GOC regarding longterm feeding tube placement. Reviewed benefits/burdens of longterm feeding tube and remains high risk for morbidity and mortality and readmission, family wishes to proceed with feeding tube placement despite these risks. She stated that she will consent to surgery for open gastrostomy and agrees to consent for procedure and anaesthesia. She states that she thought the pt was going to surgery today. States she wants surgery ASAP. Much support provided. Multiple calls placed to pt's sister Marika San [phone] to clarify the GOC regarding longterm feeding tube placement. Reviewed benefits/burdens of longterm feeding tube and remains high risk for morbidity and mortality and readmission, family wishes to proceed with feeding tube placement despite these risks. She stated that she will consent to surgery for open gastrostomy and agrees to consent for procedure and anaesthesia. She states that she thought the pt was going to surgery today. States she wants surgery ASAP. Much support provided.

## 2023-08-11 DIAGNOSIS — N39.0 URINARY TRACT INFECTION, SITE NOT SPECIFIED: ICD-10-CM

## 2023-08-11 LAB
ALBUMIN SERPL ELPH-MCNC: 2.2 G/DL — LOW (ref 3.5–5)
ALP SERPL-CCNC: 89 U/L — SIGNIFICANT CHANGE UP (ref 40–120)
ALT FLD-CCNC: 8 U/L DA — LOW (ref 10–60)
ANION GAP SERPL CALC-SCNC: 13 MMOL/L — SIGNIFICANT CHANGE UP (ref 5–17)
AST SERPL-CCNC: 31 U/L — SIGNIFICANT CHANGE UP (ref 10–40)
BILIRUB SERPL-MCNC: 0.3 MG/DL — SIGNIFICANT CHANGE UP (ref 0.2–1.2)
BUN SERPL-MCNC: 21 MG/DL — HIGH (ref 7–18)
CALCIUM SERPL-MCNC: 8.6 MG/DL — SIGNIFICANT CHANGE UP (ref 8.4–10.5)
CHLORIDE SERPL-SCNC: 108 MMOL/L — SIGNIFICANT CHANGE UP (ref 96–108)
CO2 SERPL-SCNC: 15 MMOL/L — LOW (ref 22–31)
CREAT SERPL-MCNC: 2.06 MG/DL — HIGH (ref 0.5–1.3)
EGFR: 24 ML/MIN/1.73M2 — LOW
GLUCOSE BLDC GLUCOMTR-MCNC: 74 MG/DL — SIGNIFICANT CHANGE UP (ref 70–99)
GLUCOSE BLDC GLUCOMTR-MCNC: 77 MG/DL — SIGNIFICANT CHANGE UP (ref 70–99)
GLUCOSE BLDC GLUCOMTR-MCNC: 82 MG/DL — SIGNIFICANT CHANGE UP (ref 70–99)
GLUCOSE BLDC GLUCOMTR-MCNC: 92 MG/DL — SIGNIFICANT CHANGE UP (ref 70–99)
GLUCOSE BLDC GLUCOMTR-MCNC: 93 MG/DL — SIGNIFICANT CHANGE UP (ref 70–99)
GLUCOSE SERPL-MCNC: 76 MG/DL — SIGNIFICANT CHANGE UP (ref 70–99)
HCT VFR BLD CALC: 28.5 % — LOW (ref 34.5–45)
HGB BLD-MCNC: 8.9 G/DL — LOW (ref 11.5–15.5)
MAGNESIUM SERPL-MCNC: 2.1 MG/DL — SIGNIFICANT CHANGE UP (ref 1.6–2.6)
MCHC RBC-ENTMCNC: 28.6 PG — SIGNIFICANT CHANGE UP (ref 27–34)
MCHC RBC-ENTMCNC: 31.2 GM/DL — LOW (ref 32–36)
MCV RBC AUTO: 91.6 FL — SIGNIFICANT CHANGE UP (ref 80–100)
NRBC # BLD: 0 /100 WBCS — SIGNIFICANT CHANGE UP (ref 0–0)
PHOSPHATE SERPL-MCNC: 4.5 MG/DL — SIGNIFICANT CHANGE UP (ref 2.5–4.5)
PLATELET # BLD AUTO: 315 K/UL — SIGNIFICANT CHANGE UP (ref 150–400)
POTASSIUM SERPL-MCNC: 4.9 MMOL/L — SIGNIFICANT CHANGE UP (ref 3.5–5.3)
POTASSIUM SERPL-SCNC: 4.9 MMOL/L — SIGNIFICANT CHANGE UP (ref 3.5–5.3)
PROT SERPL-MCNC: 7.2 G/DL — SIGNIFICANT CHANGE UP (ref 6–8.3)
RBC # BLD: 3.11 M/UL — LOW (ref 3.8–5.2)
RBC # FLD: 15.8 % — HIGH (ref 10.3–14.5)
SODIUM SERPL-SCNC: 136 MMOL/L — SIGNIFICANT CHANGE UP (ref 135–145)
WBC # BLD: 13.02 K/UL — HIGH (ref 3.8–10.5)
WBC # FLD AUTO: 13.02 K/UL — HIGH (ref 3.8–10.5)

## 2023-08-11 RX ORDER — SODIUM CHLORIDE 9 MG/ML
1000 INJECTION, SOLUTION INTRAVENOUS
Refills: 0 | Status: DISCONTINUED | OUTPATIENT
Start: 2023-08-11 | End: 2023-08-13

## 2023-08-11 RX ADMIN — Medication 55 MILLIGRAM(S): at 20:11

## 2023-08-11 RX ADMIN — SODIUM CHLORIDE 50 MILLILITER(S): 9 INJECTION, SOLUTION INTRAVENOUS at 23:20

## 2023-08-11 RX ADMIN — Medication 10 MILLIGRAM(S): at 13:05

## 2023-08-11 RX ADMIN — CHLORHEXIDINE GLUCONATE 1 APPLICATION(S): 213 SOLUTION TOPICAL at 13:06

## 2023-08-11 RX ADMIN — Medication 10 MILLIGRAM(S): at 05:31

## 2023-08-11 RX ADMIN — CARBIDOPA AND LEVODOPA 1 TABLET(S): 25; 100 TABLET ORAL at 09:04

## 2023-08-11 RX ADMIN — CEFTRIAXONE 100 MILLIGRAM(S): 500 INJECTION, POWDER, FOR SOLUTION INTRAMUSCULAR; INTRAVENOUS at 14:41

## 2023-08-11 RX ADMIN — Medication 10 MILLIGRAM(S): at 18:47

## 2023-08-11 RX ADMIN — Medication 55 MILLIGRAM(S): at 13:06

## 2023-08-11 RX ADMIN — Medication 55 MILLIGRAM(S): at 04:53

## 2023-08-11 RX ADMIN — Medication 10 MILLIGRAM(S): at 01:00

## 2023-08-11 RX ADMIN — Medication 1 PATCH: at 19:30

## 2023-08-11 NOTE — PROGRESS NOTE ADULT - SUBJECTIVE AND OBJECTIVE BOX
[   ] ICU                                          [   ] CCU                                      [ X  ] Medical Floor      Patient is a 77 year old female with dysphagia. GI consulted for Peg tube placement.     Patient is a 77 female from [facility] with past medical history significant for HTN, HLD, CVA (w/ residual aphasia and R sided hemiparesis/plegia) who was sent to the emergency room with for altered mental status. Patient is not able to provide any history. Patient is lying down in bed, awake and alert. However, patient does not speak, is not able to follow commands and does not turn face or move eyes when her name is called. Patient is admitted with CVA. Now patient with dysphagia, poor po intake, failure to thrive and weight loss. No abdominal pain, nausea, vomiting, hematemesis, hematochezia, melena, fever, chills, chest pain, SOB, cough, hematuria, dysuria  or diarrhea reported.      Patient is comfortable. No new complaints reported, No abdominal pain, N/V, hematemesis, hematochezia, melena, fever, chills, chest pain, SOB, cough or diarrhea reported.      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 1000 milliLiter(s) (70 mL/Hr) IV Continuous <Continuous>  enoxaparin Injectable 30 milliGRAM(s) SubCutaneous every 24 hours  hydrALAZINE Injectable 10 milliGRAM(s) IV Push every 6 hours  potassium chloride  10 mEq/100 mL IVPB 10 milliEquivalent(s) IV Intermittent every 1 hour  valproate sodium  IVPB 500 milliGRAM(s) IV Intermittent every 8 hours         SOCIAL HISTORY  Advanced Directives:       [ X ] Full Code       [  ] DNR  Marital Status:         [  ] M      [ X ] S      [  ] D       [  ] W  Children:       [ X ] Yes      [  ] No  Occupation:        [  ] Employed       [ X ] Unemployed       [  ] Retired  Diet:       [ X ] Regular       [  ] PEG feeding          [  ] NG tube feeding  Drug Use:           [ X ] Patient denied          [  ] Yes  Alcohol:           [ X ] No             [  ] Yes (socially)         [  ] Yes (chronic)  Tobacco:           [  ] Yes           [X  ] No      FAMILY HISTORY  [ X ] Heart Disease            [ X ] Diabetes             [ X ] HTN             [  ] Colon Cancer             [  ] Stomach Cancer              [  ] Pancreatic Cancer      VITALS  Vital Signs Last 24 Hrs  T(C): 37.3 (11 Aug 2023 07:40), Max: 37.3 (11 Aug 2023 05:13)  T(F): 99.1 (11 Aug 2023 07:40), Max: 99.2 (11 Aug 2023 05:13)  HR: 98 (11 Aug 2023 07:40) (95 - 108)  BP: 154/77 (11 Aug 2023 07:40) (147/76 - 174/86)   RR: 18 (11 Aug 2023 07:40) (18 - 18)  SpO2: 98% (11 Aug 2023 07:40) (98% - 99%)  Parameters below as of 11 Aug 2023 07:40  Patient On (Oxygen Delivery Method): room air       MEDICATIONS  (STANDING):  carbidopa/levodopa 25/100 Disintegrating Tablet 1 Tablet(s) Oral <User Schedule>  cefTRIAXone   IVPB 1000 milliGRAM(s) IV Intermittent every 24 hours  chlorhexidine 2% Cloths 1 Application(s) Topical daily  cloNIDine Patch 0.2 mG/24Hr(s) 1 patch Transdermal <User Schedule>  dextrose 5%. 1000 milliLiter(s) (50 mL/Hr) IV Continuous <Continuous>  hydrALAZINE Injectable 10 milliGRAM(s) IV Push every 6 hours  valproate sodium  IVPB 500 milliGRAM(s) IV Intermittent every 8 hours                             8.9    13.02 )-----------( 315      ( 11 Aug 2023 05:50 )             28.5       08-11    136  |  108  |  21<H>  ----------------------------<  76  4.9   |  15<L>  |  2.06<H>    Ca    8.6      11 Aug 2023 05:50  Phos  4.5     08-11  Mg     2.1     08-11    TPro  7.2  /  Alb  2.2<L>  /  TBili  0.3  /  DBili  x   /  AST  31  /  ALT  8<L>  /  AlkPhos  89  08-11       [   ] ICU                                          [   ] CCU                                      [ X  ] Medical Floor      Patient is a 77 year old female with dysphagia. GI consulted for Peg tube placement.     Patient is a 77 female from [facility] with past medical history significant for HTN, HLD, CVA (w/ residual aphasia and R sided hemiparesis/plegia) who was sent to the emergency room with for altered mental status. Patient is not able to provide any history. Patient is lying down in bed, awake and alert. However, patient does not speak, is not able to follow commands and does not turn face or move eyes when her name is called. Patient is admitted with CVA. Now patient with dysphagia, poor po intake, failure to thrive and weight loss. No abdominal pain, nausea, vomiting, hematemesis, hematochezia, melena, fever, chills, chest pain, SOB, cough, hematuria, dysuria  or diarrhea reported.      Patient is comfortable. No new complaints reported, No abdominal pain, N/V, hematemesis, hematochezia, melena, fever, chills, chest pain, SOB, cough or diarrhea reported.      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 1000 milliLiter(s) (70 mL/Hr) IV Continuous <Continuous>  enoxaparin Injectable 30 milliGRAM(s) SubCutaneous every 24 hours  hydrALAZINE Injectable 10 milliGRAM(s) IV Push every 6 hours  potassium chloride  10 mEq/100 mL IVPB 10 milliEquivalent(s) IV Intermittent every 1 hour  valproate sodium  IVPB 500 milliGRAM(s) IV Intermittent every 8 hours         SOCIAL HISTORY  Advanced Directives:       [ X ] Full Code       [  ] DNR  Marital Status:         [  ] M      [ X ] S      [  ] D       [  ] W  Children:       [ X ] Yes      [  ] No  Occupation:        [  ] Employed       [ X ] Unemployed       [  ] Retired  Diet:       [ X ] Regular       [  ] PEG feeding          [  ] NG tube feeding  Drug Use:           [ X ] Patient denied          [  ] Yes  Alcohol:           [ X ] No             [  ] Yes (socially)         [  ] Yes (chronic)  Tobacco:           [  ] Yes           [X  ] No      FAMILY HISTORY  [ X ] Heart Disease            [ X ] Diabetes             [ X ] HTN             [  ] Colon Cancer             [  ] Stomach Cancer              [  ] Pancreatic Cancer      VITALS  Vital Signs Last 24 Hrs  T(C): 37.3 (11 Aug 2023 07:40), Max: 37.3 (11 Aug 2023 05:13)  T(F): 99.1 (11 Aug 2023 07:40), Max: 99.2 (11 Aug 2023 05:13)  HR: 98 (11 Aug 2023 07:40) (95 - 108)  BP: 154/77 (11 Aug 2023 07:40) (147/76 - 174/86)   RR: 18 (11 Aug 2023 07:40) (18 - 18)  SpO2: 98% (11 Aug 2023 07:40) (98% - 99%)  Parameters below as of 11 Aug 2023 07:40  Patient On (Oxygen Delivery Method): room air       MEDICATIONS  (STANDING):  carbidopa/levodopa 25/100 Disintegrating Tablet 1 Tablet(s) Oral <User Schedule>  cefTRIAXone   IVPB 1000 milliGRAM(s) IV Intermittent every 24 hours  chlorhexidine 2% Cloths 1 Application(s) Topical daily  cloNIDine Patch 0.2 mG/24Hr(s) 1 patch Transdermal <User Schedule>  dextrose 5%. 1000 milliLiter(s) (50 mL/Hr) IV Continuous <Continuous>  hydrALAZINE Injectable 10 milliGRAM(s) IV Push every 6 hours  valproate sodium  IVPB 500 milliGRAM(s) IV Intermittent every 8 hours                             8.9    13.02 )-----------( 315      ( 11 Aug 2023 05:50 )             28.5       08-11    136  |  108  |  21<H>  ----------------------------<  76  4.9   |  15<L>  |  2.06<H>    Ca    8.6      11 Aug 2023 05:50  Phos  4.5     08-11  Mg     2.1     08-11    TPro  7.2  /  Alb  2.2<L>  /  TBili  0.3  /  DBili  x   /  AST  31  /  ALT  8<L>  /  AlkPhos  89  08-11       [   ] ICU                                          [   ] CCU                                      [ X  ] Medical Floor      Patient is a 77 year old female with dysphagia. GI consulted for Peg tube placement.     Patient is a 77 female from [facility] with past medical history significant for HTN, HLD, CVA (w/ residual aphasia and R sided hemiparesis/plegia) who was sent to the emergency room with for altered mental status. Patient is not able to provide any history. Patient is lying down in bed, awake and alert. However, patient does not speak, is not able to follow commands and does not turn face or move eyes when her name is called. Patient is admitted with CVA. Now patient with dysphagia, poor po intake, failure to thrive and weight loss. No abdominal pain, nausea, vomiting, hematemesis, hematochezia, melena, fever, chills, chest pain, SOB, cough, hematuria, dysuria  or diarrhea reported.      Patient is comfortable. No new complaints reported, No abdominal pain, N/V, hematemesis, hematochezia, melena, fever, chills, chest pain, SOB, cough or diarrhea reported.      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 1000 milliLiter(s) (70 mL/Hr) IV Continuous <Continuous>  enoxaparin Injectable 30 milliGRAM(s) SubCutaneous every 24 hours  hydrALAZINE Injectable 10 milliGRAM(s) IV Push every 6 hours  potassium chloride  10 mEq/100 mL IVPB 10 milliEquivalent(s) IV Intermittent every 1 hour  valproate sodium  IVPB 500 milliGRAM(s) IV Intermittent every 8 hours         SOCIAL HISTORY  Advanced Directives:       [ X ] Full Code       [  ] DNR  Marital Status:         [  ] M      [ X ] S      [  ] D       [  ] W  Children:       [ X ] Yes      [  ] No  Occupation:        [  ] Employed       [ X ] Unemployed       [  ] Retired  Diet:       [ X ] Regular       [  ] PEG feeding          [  ] NG tube feeding  Drug Use:           [ X ] Patient denied          [  ] Yes  Alcohol:           [ X ] No             [  ] Yes (socially)         [  ] Yes (chronic)  Tobacco:           [  ] Yes           [X  ] No      FAMILY HISTORY  [ X ] Heart Disease            [ X ] Diabetes             [ X ] HTN             [  ] Colon Cancer             [  ] Stomach Cancer              [  ] Pancreatic Cancer      VITALS  Vital Signs Last 24 Hrs  T(C): 37.3 (11 Aug 2023 07:40), Max: 37.3 (11 Aug 2023 05:13)  T(F): 99.1 (11 Aug 2023 07:40), Max: 99.2 (11 Aug 2023 05:13)  HR: 98 (11 Aug 2023 07:40) (95 - 108)  BP: 154/77 (11 Aug 2023 07:40) (147/76 - 174/86)   RR: 18 (11 Aug 2023 07:40) (18 - 18)  SpO2: 98% (11 Aug 2023 07:40) (98% - 99%)  Parameters below as of 11 Aug 2023 07:40  Patient On (Oxygen Delivery Method): room air       MEDICATIONS  (STANDING):  carbidopa/levodopa 25/100 Disintegrating Tablet 1 Tablet(s) Oral <User Schedule>  cefTRIAXone   IVPB 1000 milliGRAM(s) IV Intermittent every 24 hours  chlorhexidine 2% Cloths 1 Application(s) Topical daily  cloNIDine Patch 0.2 mG/24Hr(s) 1 patch Transdermal <User Schedule>  dextrose 5%. 1000 milliLiter(s) (50 mL/Hr) IV Continuous <Continuous>  hydrALAZINE Injectable 10 milliGRAM(s) IV Push every 6 hours  valproate sodium  IVPB 500 milliGRAM(s) IV Intermittent every 8 hours                             8.9    13.02 )-----------( 315      ( 11 Aug 2023 05:50 )             28.5       08-11    136  |  108  |  21<H>  ----------------------------<  76  4.9   |  15<L>  |  2.06<H>    Ca    8.6      11 Aug 2023 05:50  Phos  4.5     08-11  Mg     2.1     08-11    TPro  7.2  /  Alb  2.2<L>  /  TBili  0.3  /  DBili  x   /  AST  31  /  ALT  8<L>  /  AlkPhos  89  08-11

## 2023-08-11 NOTE — PROGRESS NOTE ADULT - ASSESSMENT
1. SUSAN possible to MARISA and ATN  Renal sono shows no hdyro with b/l kidneys around 9.2cm  -Scr not much change today at 2.0mg/dl from 1.9mg/dL with stable electrolytes and continue IVFs since NPO. Fluids not improving Scr and will go back to gentle hydration without potassium.  pt is still close to her baseline.  -s/p mcclellan's cath placed for urinary retention, few days back.  -urinalysis shows blood with proteinuria; FeNa >1%, spot protein to creatinine ratio is 1.5gm  -Adjust meds to eGFR and avoid IV Gadolinium contrast,NSAIDs, and phosphate enema.  -Monitor I/O's daily.   -Monitor SMA daily.  2. Hypernatremia due to water deficit and insensible losses. Pt is clinically euvolemic.   -Na stable; on D5W since blood glucose is around 80s  -Monitor I/O's. Check Serum Na Daily. Avoid high solute intake diet and sodium bicarbonate infuse. Avoid overcorrection of NA (8-10meq/day)  3. 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  -previous Scr around 1.2 to 1.6mg/dL in Oct 2022.  -Keep patient euvolemic and renal diet  -Avoid Nephrotoxic Meds/ Agents such as (NSAIDs, IV contrast, Aminoglycosides such as gentamicin, -Gadolinium contrast, Phosphate containing enemas, etc..)  -Adjust Medications according to eGFR  4. HTN:   -bp is acceptable. continue bp meds  -titrate bp meds to keep sbp >110 and < 130  5. Mineral Bone Disease:  -improving phos so no need binders.  -PTH intact 289, will start calcitriol once phos improves.  6. Encephalopathy:  -on Rocephin.  -Plan as per Neuro and primary team  -unable to place PEG via EGD;   -NGT attempted but unsuccessful; CT AP ordered and shows no obstruction.  -family agreed for PEG and will place on Monday in the OR.   7. Hypokalemia:  -stable K. remove kcl from fluids.  -give kcl repletion to keep K >3.5meq/L  -monitor K.   8.0 Acidosis:  -CO2 trending down; previous vbg noted with okay ph.   -monitor CO2.    Discussed the assessment and plan with Primary Team/Nurse

## 2023-08-11 NOTE — PROGRESS NOTE ADULT - ASSESSMENT
77y.o. Female with failure to thrive, s/p unsuccessful PEG placement    -Plan for open Gtube placement 8/14/23  -Medical optimization  -Keep NPO as pt failed S/S    This note and its recommendations herein are preliminary until such time as cosigned by an attending.    -Contents of this note signed out to deya HALL [phone]  77y.o. Female with failure to thrive, s/p unsuccessful PEG placement    -Plan for open Gtube placement 8/14/23  -Medical optimization  -Keep NPO as pt failed S/S    This note and its recommendations herein are preliminary until such time as cosigned by an attending.    -Contents of this note signed out to deya HALL [phone]  77y.o. Female with failure to thrive, s/p unsuccessful PEG placement    -Plan for open Gtube placement 8/14/23  -Medical optimization  -Keep NPO as pt failed S/S    This note and its recommendations herein are preliminary until such time as cosigned by an attending.    -Contents of this note signed out to deya HALL [phone]

## 2023-08-11 NOTE — PROGRESS NOTE ADULT - SUBJECTIVE AND OBJECTIVE BOX
PGY-1 Progress Note discussed with attending    PAGER #: [[phone]] TILL 5:00 PM  PLEASE CONTACT ON CALL TEAM:  - On Call Team (Please refer to Tyler) FROM 5:00 PM - 8:30PM  - Nightfloat Team FROM 8:30 -7:30 AM    CHIEF COMPLAINT & BRIEF HOSPITAL COURSE: No acute overnight events. Ear is clearing up. Surgery is scheduled for 8/14.    INTERVAL HPI/OVERNIGHT EVENTS:   MEDICATIONS  (STANDING):  chlorhexidine 2% Cloths 1 Application(s) Topical daily  cloNIDine Patch 0.2 mG/24Hr(s) 1 patch Transdermal <User Schedule>  dextrose 5% with potassium chloride 20 mEq/L 1000 milliLiter(s) (50 mL/Hr) IV Continuous <Continuous>  hydrALAZINE Injectable 10 milliGRAM(s) IV Push every 6 hours  valproate sodium  IVPB 500 milliGRAM(s) IV Intermittent every 8 hours    MEDICATIONS  (PRN):      Vital Signs Last 24 Hrs  T(C): 36.5 (10 Aug 2023 14:03), Max: 37.1 (09 Aug 2023 21:00)  T(F): 97.7 (10 Aug 2023 14:03), Max: 98.8 (09 Aug 2023 21:00)  HR: 105 (10 Aug 2023 14:03) (88 - 110)  BP: 174/83 (10 Aug 2023 14:03) (134/62 - 174/83)  BP(mean): --  RR: 18 (10 Aug 2023 14:03) (18 - 18)  SpO2: 99% (10 Aug 2023 14:03) (98% - 99%)    Parameters below as of 10 Aug 2023 14:03  Patient On (Oxygen Delivery Method): room air        PHYSICAL EXAMINATION:  GENERAL: NAD, well built  HEAD:  Atraumatic, Normocephalic, slight bleeding out of right ear  EYES:  conjunctiva and sclera clear  CHEST/LUNG: Clear to auscultation. No rales, rhonchi, wheezing, or rubs  HEART: Regular rate and rhythm; No murmurs, rubs, or gallops  ABDOMEN: Soft, Nontender, Nondistended; Bowel sounds present  EXTREMITIES:  2+ Peripheral Pulses, No clubbing, cyanosis, or edema  SKIN: warm dry                          8.4    13.15 )-----------( 283      ( 10 Aug 2023 05:59 )             26.4     08-10    138  |  111<H>  |  21<H>  ----------------------------<  82  3.9   |  17<L>  |  1.93<H>    Ca    9.1      10 Aug 2023 05:59  Phos  4.2     08-10  Mg     2.1     08-10    TPro  6.8  /  Alb  2.3<L>  /  TBili  0.3  /  DBili  x   /  AST  16  /  ALT  11  /  AlkPhos  84  08-10    LIVER FUNCTIONS - ( 10 Aug 2023 05:59 )  Alb: 2.3 g/dL / Pro: 6.8 g/dL / ALK PHOS: 84 U/L / ALT: 11 U/L DA / AST: 16 U/L / GGT: x                   CAPILLARY BLOOD GLUCOSE      RADIOLOGY & ADDITIONAL TESTS:                   PGY-1 Progress Note discussed with attending    PAGER #: [[phone]] TILL 5:00 PM  PLEASE CONTACT ON CALL TEAM:  - On Call Team (Please refer to Tyler) FROM 5:00 PM - 8:30PM  - Nightfloat Team FROM 8:30 -7:30 AM    CHIEF COMPLAINT & BRIEF HOSPITAL COURSE: No acute overnight events. Ear is clearing up. Surgery is scheduled for 8/14.    INTERVAL HPI/OVERNIGHT EVENTS:   MEDICATIONS  (STANDING):  chlorhexidine 2% Cloths 1 Application(s) Topical daily  cloNIDine Patch 0.2 mG/24Hr(s) 1 patch Transdermal <User Schedule>  dextrose 5% with potassium chloride 20 mEq/L 1000 milliLiter(s) (50 mL/Hr) IV Continuous <Continuous>  hydrALAZINE Injectable 10 milliGRAM(s) IV Push every 6 hours  valproate sodium  IVPB 500 milliGRAM(s) IV Intermittent every 8 hours    MEDICATIONS  (PRN):      Vital Signs Last 24 Hrs  T(C): 36.5 (10 Aug 2023 14:03), Max: 37.1 (09 Aug 2023 21:00)  T(F): 97.7 (10 Aug 2023 14:03), Max: 98.8 (09 Aug 2023 21:00)  HR: 105 (10 Aug 2023 14:03) (88 - 110)  BP: 174/83 (10 Aug 2023 14:03) (134/62 - 174/83)  BP(mean): --  RR: 18 (10 Aug 2023 14:03) (18 - 18)  SpO2: 99% (10 Aug 2023 14:03) (98% - 99%)    Parameters below as of 10 Aug 2023 14:03  Patient On (Oxygen Delivery Method): room air        PHYSICAL EXAMINATION:  GENERAL: NAD, well built  HEAD:  Atraumatic, Normocephalic, slight bleeding out of right ear  EYES:  conjunctiva and sclera clear  CHEST/LUNG: Clear to auscultation. No rales, rhonchi, wheezing, or rubs  HEART: Regular rate and rhythm; No murmurs, rubs, or gallops  ABDOMEN: Soft, Nontender, Nondistended; Bowel sounds present  EXTREMITIES:  2+ Peripheral Pulses, No clubbing, cyanosis, or edema  SKIN: warm dry                          8.4    13.15 )-----------( 283      ( 10 Aug 2023 05:59 )             26.4     08-10    138  |  111<H>  |  21<H>  ----------------------------<  82  3.9   |  17<L>  |  1.93<H>    Ca    9.1      10 Aug 2023 05:59  Phos  4.2     08-10  Mg     2.1     08-10    TPro  6.8  /  Alb  2.3<L>  /  TBili  0.3  /  DBili  x   /  AST  16  /  ALT  11  /  AlkPhos  84  08-10    LIVER FUNCTIONS - ( 10 Aug 2023 05:59 )  Alb: 2.3 g/dL / Pro: 6.8 g/dL / ALK PHOS: 84 U/L / ALT: 11 U/L DA / AST: 16 U/L / GGT: x                   CAPILLARY BLOOD GLUCOSE      RADIOLOGY & ADDITIONAL TESTS:                   PGY-1 Progress Note discussed with attending    PAGER #: [[phone]] TILL 5:00 PM  PLEASE CONTACT ON CALL TEAM:  - On Call Team (Please refer to Tyler) FROM 5:00 PM - 8:30PM  - Nightfloat Team FROM 8:30 -7:30 AM    CHIEF COMPLAINT & BRIEF HOSPITAL COURSE: No acute overnight events. Ear is clearing up. Surgery is scheduled for 8/14.    INTERVAL HPI/OVERNIGHT EVENTS:   MEDICATIONS  (STANDING):  chlorhexidine 2% Cloths 1 Application(s) Topical daily  cloNIDine Patch 0.2 mG/24Hr(s) 1 patch Transdermal <User Schedule>  dextrose 5% with potassium chloride 20 mEq/L 1000 milliLiter(s) (50 mL/Hr) IV Continuous <Continuous>  hydrALAZINE Injectable 10 milliGRAM(s) IV Push every 6 hours  valproate sodium  IVPB 500 milliGRAM(s) IV Intermittent every 8 hours    MEDICATIONS  (PRN):      Vital Signs Last 24 Hrs  T(C): 36.5 (10 Aug 2023 14:03), Max: 37.1 (09 Aug 2023 21:00)  T(F): 97.7 (10 Aug 2023 14:03), Max: 98.8 (09 Aug 2023 21:00)  HR: 105 (10 Aug 2023 14:03) (88 - 110)  BP: 174/83 (10 Aug 2023 14:03) (134/62 - 174/83)  BP(mean): --  RR: 18 (10 Aug 2023 14:03) (18 - 18)  SpO2: 99% (10 Aug 2023 14:03) (98% - 99%)    Parameters below as of 10 Aug 2023 14:03  Patient On (Oxygen Delivery Method): room air        PHYSICAL EXAMINATION:  GENERAL: NAD, well built  HEAD:  Atraumatic, Normocephalic, slight bleeding out of right ear  EYES:  conjunctiva and sclera clear  CHEST/LUNG: Clear to auscultation. No rales, rhonchi, wheezing, or rubs  HEART: Regular rate and rhythm; No murmurs, rubs, or gallops  ABDOMEN: Soft, Nontender, Nondistended; Bowel sounds present  EXTREMITIES:  2+ Peripheral Pulses, No clubbing, cyanosis, or edema  SKIN: warm dry                          8.4    13.15 )-----------( 283      ( 10 Aug 2023 05:59 )             26.4     08-10    138  |  111<H>  |  21<H>  ----------------------------<  82  3.9   |  17<L>  |  1.93<H>    Ca    9.1      10 Aug 2023 05:59  Phos  4.2     08-10  Mg     2.1     08-10    TPro  6.8  /  Alb  2.3<L>  /  TBili  0.3  /  DBili  x   /  AST  16  /  ALT  11  /  AlkPhos  84  08-10    LIVER FUNCTIONS - ( 10 Aug 2023 05:59 )  Alb: 2.3 g/dL / Pro: 6.8 g/dL / ALK PHOS: 84 U/L / ALT: 11 U/L DA / AST: 16 U/L / GGT: x                   CAPILLARY BLOOD GLUCOSE      RADIOLOGY & ADDITIONAL TESTS:

## 2023-08-11 NOTE — PROGRESS NOTE ADULT - SUBJECTIVE AND OBJECTIVE BOX
INTERVAL HPI/OVERNIGHT EVENTS:  Pt resting comfortably. No overnight events.  Discussion with family with primary team noted.  NPO.  +BM noted.    MEDICATIONS  (STANDING):  carbidopa/levodopa 25/100 Disintegrating Tablet 1 Tablet(s) Oral <User Schedule>  cefTRIAXone   IVPB 1000 milliGRAM(s) IV Intermittent every 24 hours  chlorhexidine 2% Cloths 1 Application(s) Topical daily  cloNIDine Patch 0.2 mG/24Hr(s) 1 patch Transdermal <User Schedule>  dextrose 5% with potassium chloride 20 mEq/L 1000 milliLiter(s) (75 mL/Hr) IV Continuous <Continuous>  hydrALAZINE Injectable 10 milliGRAM(s) IV Push every 6 hours  valproate sodium  IVPB 500 milliGRAM(s) IV Intermittent every 8 hours    Vital Signs Last 24 Hrs  T(C): 36.8 (11 Aug 2023 06:31), Max: 37.3 (11 Aug 2023 05:13)  T(F): 98.2 (11 Aug 2023 06:31), Max: 99.2 (11 Aug 2023 05:13)  HR: 108 (11 Aug 2023 06:31) (95 - 108)  BP: 158/77 (11 Aug 2023 06:31) (147/76 - 174/86)  BP(mean): --  RR: 18 (11 Aug 2023 06:31) (18 - 18)  SpO2: 98% (11 Aug 2023 06:31) (98% - 99%)    Parameters below as of 11 Aug 2023 06:31  Patient On (Oxygen Delivery Method): room air    Physical:  General: NAD.  Abdomen: Soft nondistended, nontender.    LABS:                        8.9    13.02 )-----------( 315      ( 11 Aug 2023 05:50 )             28.5             08-11    136  |  108  |  21<H>  ----------------------------<  76  4.9   |  15<L>  |  2.06<H>    Ca    8.6      11 Aug 2023 05:50  Phos  4.5     08-11  Mg     2.1     08-11    TPro  7.2  /  Alb  2.2<L>  /  TBili  0.3  /  DBili  x   /  AST  31  /  ALT  8<L>  /  AlkPhos  89  08-11

## 2023-08-11 NOTE — PROGRESS NOTE ADULT - ASSESSMENT
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  1.Neurology eval noted for bleeding from ear, CT -.  2.UTI-s/p ABX.  3.HTN- clonidine patch .2mg q wk,IV hydralazine.  5.Lipid d/o-hold statin.  6.Plan for G tube placement 8/14/23.  7.Hypernatremia and SUSAN-IVF D5 with kcl.  8.GI and DVT prophylaxis. 77 female from 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  1.Neurology eval noted for bleeding from ear, CT -.  2.UTI-s/p ABX.  3.HTN- clonidine patch .2mg q wk,IV hydralazine.  5.Lipid d/o-hold statin.  6.Plan for G tube placement 8/14/23.  7.Hypernatremia and SUSAN-IVF D5 with kcl.  8.GI and DVT prophylaxis. 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  1.Neurology eval noted for bleeding from ear, CT -.  2.UTI-s/p ABX.  3.HTN- clonidine patch .2mg q wk,IV hydralazine.  5.Lipid d/o-hold statin.  6.Plan for G tube placement 8/14/23.  7.Hypernatremia and SUSAN-IVF D5 with kcl.  8.GI and DVT prophylaxis.

## 2023-08-11 NOTE — CHART NOTE - NSCHARTNOTEFT_GEN_A_CORE
Assessment:     Factors impacting intake: [ ] none [ ] nausea  [ ] vomiting [ ] diarrhea [ ] constipation  [ ]chewing problems [ ] swallowing issues  [ ] other:     Diet Presciption: Diet, NPO (07-27-23 @ 19:29)    Intake:     Daily   % Weight Change    Pertinent Medications: MEDICATIONS  (STANDING):  carbidopa/levodopa 25/100 Disintegrating Tablet 1 Tablet(s) Oral <User Schedule>  cefTRIAXone   IVPB 1000 milliGRAM(s) IV Intermittent every 24 hours  chlorhexidine 2% Cloths 1 Application(s) Topical daily  cloNIDine Patch 0.2 mG/24Hr(s) 1 patch Transdermal <User Schedule>  dextrose 5% with potassium chloride 20 mEq/L 1000 milliLiter(s) (75 mL/Hr) IV Continuous <Continuous>  hydrALAZINE Injectable 10 milliGRAM(s) IV Push every 6 hours  valproate sodium  IVPB 500 milliGRAM(s) IV Intermittent every 8 hours    MEDICATIONS  (PRN):    Pertinent Labs: 08-11 Na136 mmol/L Glu 76 mg/dL K+ 4.9 mmol/L Cr  2.06 mg/dL<H> BUN 21 mg/dL<H> 08-11 Phos 4.5 mg/dL 08-11 Alb 2.2 g/dL<L> 07-28 Chol 152 mg/dL LDL --    HDL 51 mg/dL Trig 93 mg/dL     CAPILLARY BLOOD GLUCOSE      POCT Blood Glucose.: 77 mg/dL (11 Aug 2023 07:16)  POCT Blood Glucose.: 92 mg/dL (10 Aug 2023 23:59)  POCT Blood Glucose.: 92 mg/dL (10 Aug 2023 11:28)      Skin:     Estimated Needs:   [ ] no change since previous assessment  [ ] recalculated:     Previous Nutrition Diagnosis:   [ ] Inadequate Energy Intake [ ]Inadequate Oral Intake [ ] Excessive Energy Intake   [ ] Underweight [ ] Increased Nutrient Needs [ ] Overweight/Obesity  [ ] Swallowing Difficult   [ ] Altered GI Function [ ] Unintended Weight Loss [ ] Food & Nutrition Related Knowledge Deficit [ ] Malnutrition   [ ] Not Ready for Diet/Life Style Changes     Nutrition Diagnosis is [ ] ongoing  [ ] Improving   [ ] resolved [ ] not applicable     New Nutrition Diagnosis: [ ] not applicable       Interventions:   Recommend  [ ] Change Diet To:  [ ] Nutrition Supplement  [ ] Nutrition Support  [ ] Other:     Monitoring and Evaluation:   [ ] PO intake [ x ] Tolerance to diet prescription [ x ] weights [ x ] labs[ x ] follow up per protocol  [ ] other: Assessment:       Nutrition reassessment for follow-up and NPO status. Chart reviewed, pt visited, alert, no-verbal/confused per chart; NPO x 15 to 16d; failed Swallow evaluation and failed NGT attempt, s/p Palliative consult, Severe Malnutrition and Family requesting PEG feeding per MD, GI consulted, s/p Attempted PEG placement 8/8/23, unsuccessful, s/p Palliative follow-up with family, now pending surgery GT tube insertion, Surgery on the case;  eGFR=24, PO4=4.5  K=4.9  Nephrology on the case        Factors impacting intake: [ x ] other: change in mental status; difficulty chewing; difficulty feeding self; difficulty swallowing; acute on chronic comorbidities including acute encephalopathy, SUSAN on CKD, h/o CVA    Diet Prescription: Diet, NPO (07-27-23 @ 19:29)    Intake: NPO at present     Daily % Weight Change: no new data     Pertinent Medications: MEDICATIONS  (STANDING):  carbidopa/levodopa 25/100 Disintegrating Tablet 1 Tablet(s) Oral <User Schedule>  cefTRIAXone   IVPB 1000 milliGRAM(s) IV Intermittent every 24 hours  chlorhexidine 2% Cloths 1 Application(s) Topical daily  cloNIDine Patch 0.2 mG/24Hr(s) 1 patch Transdermal <User Schedule>  dextrose 5% with potassium chloride 20 mEq/L 1000 milliLiter(s) (75 mL/Hr) IV Continuous <Continuous>  hydrALAZINE Injectable 10 milliGRAM(s) IV Push every 6 hours  valproate sodium  IVPB 500 milliGRAM(s) IV Intermittent every 8 hours    MEDICATIONS  (PRN):    Pertinent Labs: 08-11 Na136 mmol/L Glu 76 mg/dL K+ 4.9 mmol/L Cr  2.06 mg/dL<H> BUN 21 mg/dL<H> 08-11 Phos 4.5 mg/dL 08-11 Alb 2.2 g/dL<L> 07-28 Chol 152 mg/dL LDL --    HDL 51 mg/dL Trig 93 mg/dL     CAPILLARY BLOOD GLUCOSE    POCT Blood Glucose.: 77 mg/dL (11 Aug 2023 07:16)  POCT Blood Glucose.: 92 mg/dL (10 Aug 2023 23:59)  POCT Blood Glucose.: 92 mg/dL (10 Aug 2023 11:28)    Estimated Needs:   [ x ] no change since previous assessment  [ ] recalculated:     Previous Nutrition Diagnosis:   [ x ]Inadequate Oral Intake   [ x ] Severe Malnutrition     Nutrition Diagnosis is [ x ] ongoing  [ ] Improving   [ ] resolved [ ] not applicable     New Nutrition Diagnosis: [ x ] not applicable     Interventions:   Recommend  [ x ] Advance diet or consider alternate nutrition support if NPO prolonged further as medically feasible/if consistent with Goal of Care   [ ] Nutrition Supplement  [ x ] Nutrition Support:  If TF started as medically feasible, Goal: Jevity 1.5 @ 60ml/h x 16h (960ml, 1440kcal, 61g protein, 729ml water daily at goal)               If worsening renal function with K/PO4 elevated, may consider Nepro @ 55ml/h x 16h (880ml, 1584kcal, 71g protein, 642ml water daily at goal rate)                       MD to adjust free water as needed   [ x ] Other: Discussed with tem    Monitoring and Evaluation:   [ ] PO intake [ x ] Tolerance to diet prescription [ x ] weights [ x ] labs[ x ] follow up per protocol  [ ] other: Assessment:       Nutrition reassessment for follow-up and NPO status. Chart reviewed, pt visited, alert, no-verbal/confused per chart; NPO x 15 to 16d; s/p failed NGT attempt, failed PEG insertion attempt 8/8/23; Swallow re-evaluation per family request on 8/10/23 with recommendation of NPO, non-oral nutrition/hydration/medications; Palliative follow-up with family, now pending surgery open GT tube insertion possible on 8/14/23, Surgery on the case; eGFR=24, PO4=4.5  K=4.9  Nephrology on the case        Factors impacting intake: [ x ] other: change in mental status; difficulty chewing; difficulty feeding self; difficulty swallowing; acute on chronic comorbidities including acute encephalopathy, SUSAN on CKD, h/o CVA    Diet Prescription: Diet, NPO (07-27-23 @ 19:29)    Intake: NPO at present     Daily % Weight Change: no new data     Pertinent Medications: MEDICATIONS  (STANDING):  carbidopa/levodopa 25/100 Disintegrating Tablet 1 Tablet(s) Oral <User Schedule>  cefTRIAXone   IVPB 1000 milliGRAM(s) IV Intermittent every 24 hours  chlorhexidine 2% Cloths 1 Application(s) Topical daily  cloNIDine Patch 0.2 mG/24Hr(s) 1 patch Transdermal <User Schedule>  dextrose 5% with potassium chloride 20 mEq/L 1000 milliLiter(s) (75 mL/Hr) IV Continuous <Continuous>  hydrALAZINE Injectable 10 milliGRAM(s) IV Push every 6 hours  valproate sodium  IVPB 500 milliGRAM(s) IV Intermittent every 8 hours    MEDICATIONS  (PRN):    Pertinent Labs: 08-11 Na136 mmol/L Glu 76 mg/dL K+ 4.9 mmol/L Cr  2.06 mg/dL<H> BUN 21 mg/dL<H> 08-11 Phos 4.5 mg/dL 08-11 Alb 2.2 g/dL<L> 07-28 Chol 152 mg/dL LDL --    HDL 51 mg/dL Trig 93 mg/dL     CAPILLARY BLOOD GLUCOSE    POCT Blood Glucose.: 77 mg/dL (11 Aug 2023 07:16)  POCT Blood Glucose.: 92 mg/dL (10 Aug 2023 23:59)  POCT Blood Glucose.: 92 mg/dL (10 Aug 2023 11:28)    Estimated Needs:   [ x ] no change since previous assessment  [ ] recalculated:     Previous Nutrition Diagnosis:   [ x ]Inadequate Oral Intake   [ x ] Severe Malnutrition     Nutrition Diagnosis is [ x ] ongoing  [ ] Improving   [ ] resolved [ ] not applicable     New Nutrition Diagnosis: [ x ] not applicable     Interventions:   Recommend  [ x ] Advance diet or consider alternate nutrition support if NPO prolonged further as medically feasible/if consistent with Goal of Care   [ ] Nutrition Supplement  [ x ] Nutrition Support:  If TF started as medically feasible, Goal: Jevity 1.5 @ 60ml/h x 16h (960ml, 1440kcal, 61g protein, 729ml water daily at goal)               If worsening renal function with K/PO4 elevated, may consider Nepro @ 55ml/h x 16h (880ml, 1584kcal, 71g protein, 642ml water daily at goal rate)                       MD to adjust free water as needed   [ x ] Other: Discussed with tem    Monitoring and Evaluation:   [ ] PO intake [ x ] Tolerance to diet prescription [ x ] weights [ x ] labs[ x ] follow up per protocol  [ ] other:

## 2023-08-11 NOTE — PROGRESS NOTE ADULT - SUBJECTIVE AND OBJECTIVE BOX
CHIEF COMPLAINT:Patient is a 77y old  Female who presents with a chief complaint of Altered mental status. Pt appears comfortable.    	  REVIEW OF SYSTEMS:    	  [x ] Unable to obtain    PHYSICAL EXAM:  T(C): 37.3 (08-11-23 @ 07:40), Max: 37.3 (08-11-23 @ 05:13)  HR: 98 (08-11-23 @ 07:40) (95 - 108)  BP: 154/77 (08-11-23 @ 07:40) (147/76 - 174/86)  RR: 18 (08-11-23 @ 07:40) (18 - 18)  SpO2: 98% (08-11-23 @ 07:40) (98% - 99%)  Wt(kg): --  I&O's Summary      Appearance: Normal	  HEENT:   Normal oral mucosa, PERRL, EOMI	  Lymphatic: No lymphadenopathy  Cardiovascular: Normal S1 S2, No JVD, No murmurs, +2 edema  Respiratory: Lungs clear to auscultation	  Gastrointestinal:  Soft, Non-tender, + BS	  Skin: No rashes, No ecchymoses, No cyanosis	  Extremities: Normal range of motion, No clubbing, cyanosis +2 edema  Vascular: Peripheral pulses palpable 2+ bilaterally    MEDICATIONS  (STANDING):  carbidopa/levodopa 25/100 Disintegrating Tablet 1 Tablet(s) Oral <User Schedule>  cefTRIAXone   IVPB 1000 milliGRAM(s) IV Intermittent every 24 hours  chlorhexidine 2% Cloths 1 Application(s) Topical daily  cloNIDine Patch 0.2 mG/24Hr(s) 1 patch Transdermal <User Schedule>  dextrose 5% with potassium chloride 20 mEq/L 1000 milliLiter(s) (75 mL/Hr) IV Continuous <Continuous>  hydrALAZINE Injectable 10 milliGRAM(s) IV Push every 6 hours  valproate sodium  IVPB 500 milliGRAM(s) IV Intermittent every 8 hours      	  	  LABS:	 	                        8.9    13.02 )-----------( 315      ( 11 Aug 2023 05:50 )             28.5     08-11    136  |  108  |  21<H>  ----------------------------<  76  4.9   |  15<L>  |  2.06<H>    Ca    8.6      11 Aug 2023 05:50  Phos  4.5     08-11  Mg     2.1     08-11    TPro  7.2  /  Alb  2.2<L>  /  TBili  0.3  /  DBili  x   /  AST  31  /  ALT  8<L>  /  AlkPhos  89  08-11      Lipid Profile: Cholesterol 152  LDL --  HDL 51  TG 93  Ldl calc 82    TSH: Thyroid Stimulating Hormone, Serum: 3.59 uU/mL (07-28 @ 05:03)      	    < from: Transthoracic Echocardiogram (07.28.23 @ 07:13) >  OBSERVATIONS:  Mitral Valve: Normal mitral valve. Trace mitral  regurgitation.  Aortic Root: Normal aortic root.  Aortic Valve: Normal trileaflet aortic valve.  Left Atrium: Normal left atrium.  LA volume index = 21  cc/m2.  Left Ventricle: Endocardium not well visualized; grossly  normal left ventricular systolic function.   Segmental wall  motion could not be assessed. Increased relative wall  thickness with normal left ventricular (LV) mass index,  consistent with concentric LV remodeling. Grade I diastolic  dysfunction (Impaired relaxation, mild).  Right Heart: Normal right atrium. Normal right ventricular  size and function. There is trace tricuspid regurgitation.  There is trace pulmonic regurgitation.  Pericardium/PleuraNormal pericardium with no pericardial  effusion.  Hemodynamic: RA Pressure is 8 mm Hg. RV systolic pressure  is 46 mm Hg. Mild pulmonary hypertension. Agitated saline  injection was negative for intracardiac shunt.    < end of copied text >

## 2023-08-11 NOTE — PROGRESS NOTE ADULT - SUBJECTIVE AND OBJECTIVE BOX
NEPHROLOGY MEDICAL CARE, [facility] - Dr. Ajay Green/ Dr. Mert Madison/ Dr. Chris Calderon/ Dr. Carson Cook    Date of Service: 08-11-23 @ 13:06    Patient was seen and examined at bedside.    CC: patient is no change clinically.     Vital Signs Last 24 Hrs  T(C): 37.3 (11 Aug 2023 07:40), Max: 37.3 (11 Aug 2023 05:13)  T(F): 99.1 (11 Aug 2023 07:40), Max: 99.2 (11 Aug 2023 05:13)  HR: 98 (11 Aug 2023 07:40) (95 - 108)  BP: 154/77 (11 Aug 2023 07:40) (147/76 - 174/86)  BP(mean): --  RR: 18 (11 Aug 2023 07:40) (18 - 18)  SpO2: 98% (11 Aug 2023 07:40) (98% - 99%)    Parameters below as of 11 Aug 2023 07:40  Patient On (Oxygen Delivery Method): room air          PHYSICAL EXAM:  General: No acute respiratory distress.  Eyes: conjunctiva and sclera clear  ENMT: Atraumatic, Normocephalic, supple, No JVD present. Moist mucous membranes  Respiratory: Bilateral clear lungs; No rales, rhonchi, wheezing  Cardiovascular: S1S2+; no m/r/g  Gastrointestinal: Soft, Non-tender, Nondistended; Bowel sounds present  : mcclellan's cath with muddy brown urine  Neuro: eyes are open; hemiparesis.  Ext:  1+pedal edema, No Cyanosis  Skin: No visible rashes      MEDICATIONS:  MEDICATIONS  (STANDING):  carbidopa/levodopa 25/100 Disintegrating Tablet 1 Tablet(s) Oral <User Schedule>  cefTRIAXone   IVPB 1000 milliGRAM(s) IV Intermittent every 24 hours  chlorhexidine 2% Cloths 1 Application(s) Topical daily  cloNIDine Patch 0.2 mG/24Hr(s) 1 patch Transdermal <User Schedule>  dextrose 5% with potassium chloride 20 mEq/L 1000 milliLiter(s) (75 mL/Hr) IV Continuous <Continuous>  hydrALAZINE Injectable 10 milliGRAM(s) IV Push every 6 hours  valproate sodium  IVPB 500 milliGRAM(s) IV Intermittent every 8 hours    MEDICATIONS  (PRN):          LABS:                        8.9    13.02 )-----------( 315      ( 11 Aug 2023 05:50 )             28.5     08-11    136  |  108  |  21<H>  ----------------------------<  76  4.9   |  15<L>  |  2.06<H>    Ca    8.6      11 Aug 2023 05:50  Phos  4.5     08-11  Mg     2.1     08-11    TPro  7.2  /  Alb  2.2<L>  /  TBili  0.3  /  DBili  x   /  AST  31  /  ALT  8<L>  /  AlkPhos  89  08-11      Urinalysis Basic - ( 11 Aug 2023 05:50 )    Color: x / Appearance: x / SG: x / pH: x  Gluc: 76 mg/dL / Ketone: x  / Bili: x / Urobili: x   Blood: x / Protein: x / Nitrite: x   Leuk Esterase: x / RBC: x / WBC x   Sq Epi: x / Non Sq Epi: x / Bacteria: x      Magnesium: 2.1 mg/dL (08-11 @ 05:50)  Phosphorus: 4.5 mg/dL (08-11 @ 05:50)    Urine studies    PTH and Vit D:         NEPHROLOGY MEDICAL CARE, [facility] - Dr. Ajay Green/ Dr. Mert Madison/ Dr. Chris Calderon/ Dr. Carson Cook    Date of Service: 08-11-23 @ 13:06    Patient was seen and examined at bedside.    CC: patient is no change clinically.     Vital Signs Last 24 Hrs  T(C): 37.3 (11 Aug 2023 07:40), Max: 37.3 (11 Aug 2023 05:13)  T(F): 99.1 (11 Aug 2023 07:40), Max: 99.2 (11 Aug 2023 05:13)  HR: 98 (11 Aug 2023 07:40) (95 - 108)  BP: 154/77 (11 Aug 2023 07:40) (147/76 - 174/86)  BP(mean): --  RR: 18 (11 Aug 2023 07:40) (18 - 18)  SpO2: 98% (11 Aug 2023 07:40) (98% - 99%)    Parameters below as of 11 Aug 2023 07:40  Patient On (Oxygen Delivery Method): room air          PHYSICAL EXAM:  General: No acute respiratory distress.  Eyes: conjunctiva and sclera clear  ENMT: Atraumatic, Normocephalic, supple, No JVD present. Moist mucous membranes  Respiratory: Bilateral clear lungs; No rales, rhonchi, wheezing  Cardiovascular: S1S2+; no m/r/g  Gastrointestinal: Soft, Non-tender, Nondistended; Bowel sounds present  : mcclellan's cath with muddy brown urine  Neuro: eyes are open; hemiparesis.  Ext:  1+pedal edema, No Cyanosis  Skin: No visible rashes      MEDICATIONS:  MEDICATIONS  (STANDING):  carbidopa/levodopa 25/100 Disintegrating Tablet 1 Tablet(s) Oral <User Schedule>  cefTRIAXone   IVPB 1000 milliGRAM(s) IV Intermittent every 24 hours  chlorhexidine 2% Cloths 1 Application(s) Topical daily  cloNIDine Patch 0.2 mG/24Hr(s) 1 patch Transdermal <User Schedule>  dextrose 5% with potassium chloride 20 mEq/L 1000 milliLiter(s) (75 mL/Hr) IV Continuous <Continuous>  hydrALAZINE Injectable 10 milliGRAM(s) IV Push every 6 hours  valproate sodium  IVPB 500 milliGRAM(s) IV Intermittent every 8 hours    MEDICATIONS  (PRN):          LABS:                        8.9    13.02 )-----------( 315      ( 11 Aug 2023 05:50 )             28.5     08-11    136  |  108  |  21<H>  ----------------------------<  76  4.9   |  15<L>  |  2.06<H>    Ca    8.6      11 Aug 2023 05:50  Phos  4.5     08-11  Mg     2.1     08-11    TPro  7.2  /  Alb  2.2<L>  /  TBili  0.3  /  DBili  x   /  AST  31  /  ALT  8<L>  /  AlkPhos  89  08-11      Urinalysis Basic - ( 11 Aug 2023 05:50 )    Color: x / Appearance: x / SG: x / pH: x  Gluc: 76 mg/dL / Ketone: x  / Bili: x / Urobili: x   Blood: x / Protein: x / Nitrite: x   Leuk Esterase: x / RBC: x / WBC x   Sq Epi: x / Non Sq Epi: x / Bacteria: x      Magnesium: 2.1 mg/dL (08-11 @ 05:50)  Phosphorus: 4.5 mg/dL (08-11 @ 05:50)    Urine studies    PTH and Vit D:         NEPHROLOGY MEDICAL CARE, [facility] - Dr. Ajay Green/ Dr. Mert Madison/ Dr. Chris Calderon/ Dr. Carson Cook    Date of Service: 08-11-23 @ 13:06    Patient was seen and examined at bedside.    CC: patient is no change clinically.     Vital Signs Last 24 Hrs  T(C): 37.3 (11 Aug 2023 07:40), Max: 37.3 (11 Aug 2023 05:13)  T(F): 99.1 (11 Aug 2023 07:40), Max: 99.2 (11 Aug 2023 05:13)  HR: 98 (11 Aug 2023 07:40) (95 - 108)  BP: 154/77 (11 Aug 2023 07:40) (147/76 - 174/86)  BP(mean): --  RR: 18 (11 Aug 2023 07:40) (18 - 18)  SpO2: 98% (11 Aug 2023 07:40) (98% - 99%)    Parameters below as of 11 Aug 2023 07:40  Patient On (Oxygen Delivery Method): room air          PHYSICAL EXAM:  General: No acute respiratory distress.  Eyes: conjunctiva and sclera clear  ENMT: Atraumatic, Normocephalic, supple, No JVD present. Moist mucous membranes  Respiratory: Bilateral clear lungs; No rales, rhonchi, wheezing  Cardiovascular: S1S2+; no m/r/g  Gastrointestinal: Soft, Non-tender, Nondistended; Bowel sounds present  : mcclellan's cath with muddy brown urine  Neuro: eyes are open; hemiparesis.  Ext:  1+pedal edema, No Cyanosis  Skin: No visible rashes      MEDICATIONS:  MEDICATIONS  (STANDING):  carbidopa/levodopa 25/100 Disintegrating Tablet 1 Tablet(s) Oral <User Schedule>  cefTRIAXone   IVPB 1000 milliGRAM(s) IV Intermittent every 24 hours  chlorhexidine 2% Cloths 1 Application(s) Topical daily  cloNIDine Patch 0.2 mG/24Hr(s) 1 patch Transdermal <User Schedule>  dextrose 5% with potassium chloride 20 mEq/L 1000 milliLiter(s) (75 mL/Hr) IV Continuous <Continuous>  hydrALAZINE Injectable 10 milliGRAM(s) IV Push every 6 hours  valproate sodium  IVPB 500 milliGRAM(s) IV Intermittent every 8 hours    MEDICATIONS  (PRN):          LABS:                        8.9    13.02 )-----------( 315      ( 11 Aug 2023 05:50 )             28.5     08-11    136  |  108  |  21<H>  ----------------------------<  76  4.9   |  15<L>  |  2.06<H>    Ca    8.6      11 Aug 2023 05:50  Phos  4.5     08-11  Mg     2.1     08-11    TPro  7.2  /  Alb  2.2<L>  /  TBili  0.3  /  DBili  x   /  AST  31  /  ALT  8<L>  /  AlkPhos  89  08-11      Urinalysis Basic - ( 11 Aug 2023 05:50 )    Color: x / Appearance: x / SG: x / pH: x  Gluc: 76 mg/dL / Ketone: x  / Bili: x / Urobili: x   Blood: x / Protein: x / Nitrite: x   Leuk Esterase: x / RBC: x / WBC x   Sq Epi: x / Non Sq Epi: x / Bacteria: x      Magnesium: 2.1 mg/dL (08-11 @ 05:50)  Phosphorus: 4.5 mg/dL (08-11 @ 05:50)    Urine studies    PTH and Vit D:

## 2023-08-11 NOTE — PROGRESS NOTE ADULT - ASSESSMENT
Patient is a 77 female from [facility] PMHx HTN, HLD, CVA (w/ residual aphasia and R sided hemiparesis/plegia) who was sent to the hospital due to altered mental status. Patient is being admitted to medicine for further work up and rule out stroke vs encephalopathy (metabolic vs infectious).  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 Patient is being admitted to medicine for further work up and rule out stroke vs encephalopathy (metabolic vs infectious).  Patient is a 77 female from [facility] PMHx HTN, HLD, CVA (w/ residual aphasia and R sided hemiparesis/plegia) who was sent to the hospital due to altered mental status. Patient is being admitted to medicine for further work up and rule out stroke vs encephalopathy (metabolic vs infectious).

## 2023-08-12 DIAGNOSIS — D64.9 ANEMIA, UNSPECIFIED: ICD-10-CM

## 2023-08-12 LAB
ALBUMIN SERPL ELPH-MCNC: 1.9 G/DL — LOW (ref 3.5–5)
ALP SERPL-CCNC: 75 U/L — SIGNIFICANT CHANGE UP (ref 40–120)
ALT FLD-CCNC: <6 U/L DA — LOW (ref 10–60)
ANION GAP SERPL CALC-SCNC: 10 MMOL/L — SIGNIFICANT CHANGE UP (ref 5–17)
AST SERPL-CCNC: 13 U/L — SIGNIFICANT CHANGE UP (ref 10–40)
BASOPHILS # BLD AUTO: 0.06 K/UL — SIGNIFICANT CHANGE UP (ref 0–0.2)
BASOPHILS NFR BLD AUTO: 0.6 % — SIGNIFICANT CHANGE UP (ref 0–2)
BILIRUB SERPL-MCNC: 0.3 MG/DL — SIGNIFICANT CHANGE UP (ref 0.2–1.2)
BLD GP AB SCN SERPL QL: SIGNIFICANT CHANGE UP
BUN SERPL-MCNC: 23 MG/DL — HIGH (ref 7–18)
CALCIUM SERPL-MCNC: 8.3 MG/DL — LOW (ref 8.4–10.5)
CHLORIDE SERPL-SCNC: 110 MMOL/L — HIGH (ref 96–108)
CO2 SERPL-SCNC: 16 MMOL/L — LOW (ref 22–31)
CREAT SERPL-MCNC: 2.05 MG/DL — HIGH (ref 0.5–1.3)
EGFR: 25 ML/MIN/1.73M2 — LOW
EOSINOPHIL # BLD AUTO: 0.07 K/UL — SIGNIFICANT CHANGE UP (ref 0–0.5)
EOSINOPHIL NFR BLD AUTO: 0.7 % — SIGNIFICANT CHANGE UP (ref 0–6)
GLUCOSE BLDC GLUCOMTR-MCNC: 77 MG/DL — SIGNIFICANT CHANGE UP (ref 70–99)
GLUCOSE BLDC GLUCOMTR-MCNC: 78 MG/DL — SIGNIFICANT CHANGE UP (ref 70–99)
GLUCOSE BLDC GLUCOMTR-MCNC: 82 MG/DL — SIGNIFICANT CHANGE UP (ref 70–99)
GLUCOSE SERPL-MCNC: 82 MG/DL — SIGNIFICANT CHANGE UP (ref 70–99)
HCT VFR BLD CALC: 23.6 % — LOW (ref 34.5–45)
HCT VFR BLD CALC: 27.1 % — LOW (ref 34.5–45)
HGB BLD-MCNC: 7.4 G/DL — LOW (ref 11.5–15.5)
HGB BLD-MCNC: 8.6 G/DL — LOW (ref 11.5–15.5)
IMM GRANULOCYTES NFR BLD AUTO: 8.9 % — HIGH (ref 0–0.9)
LYMPHOCYTES # BLD AUTO: 1.74 K/UL — SIGNIFICANT CHANGE UP (ref 1–3.3)
LYMPHOCYTES # BLD AUTO: 17.2 % — SIGNIFICANT CHANGE UP (ref 13–44)
MAGNESIUM SERPL-MCNC: 1.9 MG/DL — SIGNIFICANT CHANGE UP (ref 1.6–2.6)
MCHC RBC-ENTMCNC: 28.2 PG — SIGNIFICANT CHANGE UP (ref 27–34)
MCHC RBC-ENTMCNC: 31.4 GM/DL — LOW (ref 32–36)
MCHC RBC-ENTMCNC: 31.7 GM/DL — LOW (ref 32–36)
MCV RBC AUTO: 88.9 FL — SIGNIFICANT CHANGE UP (ref 80–100)
MCV RBC AUTO: 90.1 FL — SIGNIFICANT CHANGE UP (ref 80–100)
MONOCYTES # BLD AUTO: 1.06 K/UL — HIGH (ref 0–0.9)
MONOCYTES NFR BLD AUTO: 10.5 % — SIGNIFICANT CHANGE UP (ref 2–14)
NEUTROPHILS # BLD AUTO: 6.31 K/UL — SIGNIFICANT CHANGE UP (ref 1.8–7.4)
NEUTROPHILS NFR BLD AUTO: 62.1 % — SIGNIFICANT CHANGE UP (ref 43–77)
NRBC # BLD: 0 /100 WBCS — SIGNIFICANT CHANGE UP (ref 0–0)
PHOSPHATE SERPL-MCNC: 4.4 MG/DL — SIGNIFICANT CHANGE UP (ref 2.5–4.5)
PLATELET # BLD AUTO: 245 K/UL — SIGNIFICANT CHANGE UP (ref 150–400)
PLATELET # BLD AUTO: 287 K/UL — SIGNIFICANT CHANGE UP (ref 150–400)
POTASSIUM SERPL-MCNC: 4.2 MMOL/L — SIGNIFICANT CHANGE UP (ref 3.5–5.3)
POTASSIUM SERPL-SCNC: 4.2 MMOL/L — SIGNIFICANT CHANGE UP (ref 3.5–5.3)
PROT SERPL-MCNC: 5.8 G/DL — LOW (ref 6–8.3)
RBC # BLD: 2.62 M/UL — LOW (ref 3.8–5.2)
RBC # BLD: 3.05 M/UL — LOW (ref 3.8–5.2)
RBC # FLD: 15.7 % — HIGH (ref 10.3–14.5)
RBC # FLD: 15.8 % — HIGH (ref 10.3–14.5)
SODIUM SERPL-SCNC: 136 MMOL/L — SIGNIFICANT CHANGE UP (ref 135–145)
WBC # BLD: 10.14 K/UL — SIGNIFICANT CHANGE UP (ref 3.8–10.5)
WBC # BLD: 11.03 K/UL — HIGH (ref 3.8–10.5)
WBC # FLD AUTO: 10.14 K/UL — SIGNIFICANT CHANGE UP (ref 3.8–10.5)
WBC # FLD AUTO: 11.03 K/UL — HIGH (ref 3.8–10.5)

## 2023-08-12 PROCEDURE — 99232 SBSQ HOSP IP/OBS MODERATE 35: CPT | Mod: 57

## 2023-08-12 RX ADMIN — Medication 55 MILLIGRAM(S): at 12:26

## 2023-08-12 RX ADMIN — CARBIDOPA AND LEVODOPA 1 TABLET(S): 25; 100 TABLET ORAL at 19:20

## 2023-08-12 RX ADMIN — Medication 1 PATCH: at 07:27

## 2023-08-12 RX ADMIN — Medication 10 MILLIGRAM(S): at 12:30

## 2023-08-12 RX ADMIN — Medication 55 MILLIGRAM(S): at 04:12

## 2023-08-12 RX ADMIN — Medication 10 MILLIGRAM(S): at 17:28

## 2023-08-12 RX ADMIN — Medication 55 MILLIGRAM(S): at 20:52

## 2023-08-12 RX ADMIN — CHLORHEXIDINE GLUCONATE 1 APPLICATION(S): 213 SOLUTION TOPICAL at 12:31

## 2023-08-12 RX ADMIN — Medication 1 PATCH: at 19:30

## 2023-08-12 RX ADMIN — Medication 10 MILLIGRAM(S): at 06:19

## 2023-08-12 RX ADMIN — SODIUM CHLORIDE 50 MILLILITER(S): 9 INJECTION, SOLUTION INTRAVENOUS at 17:59

## 2023-08-12 RX ADMIN — CEFTRIAXONE 100 MILLIGRAM(S): 500 INJECTION, POWDER, FOR SOLUTION INTRAMUSCULAR; INTRAVENOUS at 15:27

## 2023-08-12 RX ADMIN — Medication 10 MILLIGRAM(S): at 00:01

## 2023-08-12 NOTE — PROGRESS NOTE ADULT - ASSESSMENT
Patient is a 77 female from [facility] PMHx HTN, HLD, CVA (w/ residual aphasia and R sided hemiparesis/plegia) who was sent to the hospital due to altered mental status. Patient is being admitted to medicine for further work up and rule out stroke vs encephalopathy (metabolic vs infectious).  Patient is a 77 female from [facility] PMHx HTN, HLD, CVA (w/ residual aphasia and R sided hemiparesis/plegia) who was sent to the hospital due to altered mental status. Patient is being admitted to medicine for further work up and rule out stroke vs encephalopathy (metabolic vs infectious).  Patient is a 77 female from [facility] PMHx HTN, HLD, CVA (w/ residual aphasia and R sided hemiparesis/plegia) who was sent to the hospital due to altered mental status. Patient is being admitted to medicine for further work up and rule out stroke vs encephalopathy (metabolic vs infectious).

## 2023-08-12 NOTE — PROGRESS NOTE ADULT - SUBJECTIVE AND OBJECTIVE BOX
PGY-3 Progress Note discussed with attending    PAGER #: [[phone]] TILL 5:00 PM  PLEASE CONTACT ON CALL TEAM:  - On Call Team (Please refer to Tyler) FROM 5:00 PM - 8:30PM  - Nightfloat Team FROM 8:30 -7:30 AM    CHIEF COMPLAINT & BRIEF HOSPITAL COURSE:      INTERVAL HPI/OVERNIGHT EVENTS: Patient was examined at bedside, stable, NAD. Hb dropped from 8.9 to 7.4. Repeat CBC done. No acute events overnight,      REVIEW OF SYSTEMS:  CONSTITUTIONAL: No fever, weight loss, or fatigue  RESPIRATORY: No cough, wheezing, chills or hemoptysis; No shortness of breath  CARDIOVASCULAR: No chest pain, palpitations, dizziness, or leg swelling  GASTROINTESTINAL: No abdominal pain. No nausea, vomiting, or hematemesis; No diarrhea or constipation. No melena or hematochezia.  GENITOURINARY: No dysuria or hematuria, urinary frequency  NEUROLOGICAL: No headaches, memory loss, loss of strength, numbness, or tremors  SKIN: No itching, burning, rashes, or lesions     MEDICATIONS  (STANDING):  carbidopa/levodopa 25/100 Disintegrating Tablet 1 Tablet(s) Oral <User Schedule>  cefTRIAXone   IVPB 1000 milliGRAM(s) IV Intermittent every 24 hours  chlorhexidine 2% Cloths 1 Application(s) Topical daily  cloNIDine Patch 0.2 mG/24Hr(s) 1 patch Transdermal <User Schedule>  dextrose 5%. 1000 milliLiter(s) (50 mL/Hr) IV Continuous <Continuous>  hydrALAZINE Injectable 10 milliGRAM(s) IV Push every 6 hours  valproate sodium  IVPB 500 milliGRAM(s) IV Intermittent every 8 hours    MEDICATIONS  (PRN):      Vital Signs Last 24 Hrs  T(C): 37 (12 Aug 2023 13:00), Max: 37.7 (11 Aug 2023 20:55)  T(F): 98.6 (12 Aug 2023 13:00), Max: 99.9 (11 Aug 2023 20:55)  HR: 96 (12 Aug 2023 13:00) (94 - 106)  BP: 122/67 (12 Aug 2023 13:00) (122/67 - 159/61)  BP(mean): --  RR: 16 (12 Aug 2023 13:00) (16 - 18)  SpO2: 99% (12 Aug 2023 13:00) (99% - 100%)    Parameters below as of 12 Aug 2023 13:00  Patient On (Oxygen Delivery Method): room air        PHYSICAL EXAMINATION:  GENERAL: NAD, well built  HEAD:  Atraumatic, Normocephalic  EYES:  conjunctiva and sclera clear  NECK: Supple, No JVD, Normal thyroid  CHEST/LUNG: Clear to auscultation. Clear to percussion bilaterally; No rales, rhonchi, wheezing, or rubs  HEART: Regular rate and rhythm; No murmurs, rubs, or gallops  ABDOMEN: Soft, Nontender, Nondistended; Bowel sounds present, no pain or masses on palpation  NERVOUS SYSTEM:  Alert & Oriented X0  : voiding well  EXTREMITIES:  2+ Peripheral Pulses, No clubbing, cyanosis, or edema  SKIN: warm dry                          7.4    10.14 )-----------( 245      ( 12 Aug 2023 05:37 )             23.6     08-12    136  |  110<H>  |  23<H>  ----------------------------<  82  4.2   |  16<L>  |  2.05<H>    Ca    8.3<L>      12 Aug 2023 05:37  Phos  4.4     08-12  Mg     1.9     08-12    TPro  5.8<L>  /  Alb  1.9<L>  /  TBili  0.3  /  DBili  x   /  AST  13  /  ALT  <6<L>  /  AlkPhos  75  08-12    LIVER FUNCTIONS - ( 12 Aug 2023 05:37 )  Alb: 1.9 g/dL / Pro: 5.8 g/dL / ALK PHOS: 75 U/L / ALT: <6 U/L DA / AST: 13 U/L / GGT: x                   I&O's Summary    11 Aug 2023 07:01  -  12 Aug 2023 07:00  --------------------------------------------------------  IN: 0 mL / OUT: 400 mL / NET: -400 mL    12 Aug 2023 07:01  -  12 Aug 2023 17:25  --------------------------------------------------------  IN: 0 mL / OUT: 230 mL / NET: -230 mL            CAPILLARY BLOOD GLUCOSE      RADIOLOGY & ADDITIONAL TESTS:                   PGY-3 Progress Note discussed with attending    PAGER #: [[phone]] TILL 5:00 PM  PLEASE CONTACT ON CALL TEAM:  - On Call Team (Please refer to Tyler) FROM 5:00 PM - 8:30PM  - Nightfloat Team FROM 8:30 -7:30 AM    CHIEF COMPLAINT & BRIEF HOSPITAL COURSE:      INTERVAL HPI/OVERNIGHT EVENTS: Patient was examined at bedside, stable, NAD. Hb dropped from 8.9 to 7.4. Repeat CBC done. No acute events overnight,      REVIEW OF SYSTEMS:  CONSTITUTIONAL: No fever, weight loss, or fatigue  RESPIRATORY: No cough, wheezing, chills or hemoptysis; No shortness of breath  CARDIOVASCULAR: No chest pain, palpitations, dizziness, or leg swelling  GASTROINTESTINAL: No abdominal pain. No nausea, vomiting, or hematemesis; No diarrhea or constipation. No melena or hematochezia.  GENITOURINARY: No dysuria or hematuria, urinary frequency  NEUROLOGICAL: No headaches, memory loss, loss of strength, numbness, or tremors  SKIN: No itching, burning, rashes, or lesions     MEDICATIONS  (STANDING):  carbidopa/levodopa 25/100 Disintegrating Tablet 1 Tablet(s) Oral <User Schedule>  cefTRIAXone   IVPB 1000 milliGRAM(s) IV Intermittent every 24 hours  chlorhexidine 2% Cloths 1 Application(s) Topical daily  cloNIDine Patch 0.2 mG/24Hr(s) 1 patch Transdermal <User Schedule>  dextrose 5%. 1000 milliLiter(s) (50 mL/Hr) IV Continuous <Continuous>  hydrALAZINE Injectable 10 milliGRAM(s) IV Push every 6 hours  valproate sodium  IVPB 500 milliGRAM(s) IV Intermittent every 8 hours    MEDICATIONS  (PRN):      Vital Signs Last 24 Hrs  T(C): 37 (12 Aug 2023 13:00), Max: 37.7 (11 Aug 2023 20:55)  T(F): 98.6 (12 Aug 2023 13:00), Max: 99.9 (11 Aug 2023 20:55)  HR: 96 (12 Aug 2023 13:00) (94 - 106)  BP: 122/67 (12 Aug 2023 13:00) (122/67 - 159/61)  BP(mean): --  RR: 16 (12 Aug 2023 13:00) (16 - 18)  SpO2: 99% (12 Aug 2023 13:00) (99% - 100%)    Parameters below as of 12 Aug 2023 13:00  Patient On (Oxygen Delivery Method): room air        PHYSICAL EXAMINATION:  GENERAL: NAD, well built  HEAD:  Atraumatic, Normocephalic  EYES:  conjunctiva and sclera clear  NECK: Supple, No JVD, Normal thyroid  CHEST/LUNG: Clear to auscultation. Clear to percussion bilaterally; No rales, rhonchi, wheezing, or rubs  HEART: Regular rate and rhythm; No murmurs, rubs, or gallops  ABDOMEN: Soft, Nontender, Nondistended; Bowel sounds present, no pain or masses on palpation  NERVOUS SYSTEM:  Alert & Oriented X0  : voiding well  EXTREMITIES:  2+ Peripheral Pulses, No clubbing, cyanosis, or edema  SKIN: warm dry                          7.4    10.14 )-----------( 245      ( 12 Aug 2023 05:37 )             23.6     08-12    136  |  110<H>  |  23<H>  ----------------------------<  82  4.2   |  16<L>  |  2.05<H>    Ca    8.3<L>      12 Aug 2023 05:37  Phos  4.4     08-12  Mg     1.9     08-12    TPro  5.8<L>  /  Alb  1.9<L>  /  TBili  0.3  /  DBili  x   /  AST  13  /  ALT  <6<L>  /  AlkPhos  75  08-12    LIVER FUNCTIONS - ( 12 Aug 2023 05:37 )  Alb: 1.9 g/dL / Pro: 5.8 g/dL / ALK PHOS: 75 U/L / ALT: <6 U/L DA / AST: 13 U/L / GGT: x                   I&O's Summary    11 Aug 2023 07:01  -  12 Aug 2023 07:00  --------------------------------------------------------  IN: 0 mL / OUT: 400 mL / NET: -400 mL    12 Aug 2023 07:01  -  12 Aug 2023 17:25  --------------------------------------------------------  IN: 0 mL / OUT: 230 mL / NET: -230 mL            CAPILLARY BLOOD GLUCOSE      RADIOLOGY & ADDITIONAL TESTS:                   PGY-3 Progress Note discussed with attending    PAGER #: [[phone]] TILL 5:00 PM  PLEASE CONTACT ON CALL TEAM:  - On Call Team (Please refer to Tyler) FROM 5:00 PM - 8:30PM  - Nightfloat Team FROM 8:30 -7:30 AM    CHIEF COMPLAINT & BRIEF HOSPITAL COURSE:      INTERVAL HPI/OVERNIGHT EVENTS: Patient was examined at bedside, stable, NAD. Hb dropped from 8.9 to 7.4. Repeat CBC done. No acute events overnight,      REVIEW OF SYSTEMS:  CONSTITUTIONAL: No fever, weight loss, or fatigue  RESPIRATORY: No cough, wheezing, chills or hemoptysis; No shortness of breath  CARDIOVASCULAR: No chest pain, palpitations, dizziness, or leg swelling  GASTROINTESTINAL: No abdominal pain. No nausea, vomiting, or hematemesis; No diarrhea or constipation. No melena or hematochezia.  GENITOURINARY: No dysuria or hematuria, urinary frequency  NEUROLOGICAL: No headaches, memory loss, loss of strength, numbness, or tremors  SKIN: No itching, burning, rashes, or lesions     MEDICATIONS  (STANDING):  carbidopa/levodopa 25/100 Disintegrating Tablet 1 Tablet(s) Oral <User Schedule>  cefTRIAXone   IVPB 1000 milliGRAM(s) IV Intermittent every 24 hours  chlorhexidine 2% Cloths 1 Application(s) Topical daily  cloNIDine Patch 0.2 mG/24Hr(s) 1 patch Transdermal <User Schedule>  dextrose 5%. 1000 milliLiter(s) (50 mL/Hr) IV Continuous <Continuous>  hydrALAZINE Injectable 10 milliGRAM(s) IV Push every 6 hours  valproate sodium  IVPB 500 milliGRAM(s) IV Intermittent every 8 hours    MEDICATIONS  (PRN):      Vital Signs Last 24 Hrs  T(C): 37 (12 Aug 2023 13:00), Max: 37.7 (11 Aug 2023 20:55)  T(F): 98.6 (12 Aug 2023 13:00), Max: 99.9 (11 Aug 2023 20:55)  HR: 96 (12 Aug 2023 13:00) (94 - 106)  BP: 122/67 (12 Aug 2023 13:00) (122/67 - 159/61)  BP(mean): --  RR: 16 (12 Aug 2023 13:00) (16 - 18)  SpO2: 99% (12 Aug 2023 13:00) (99% - 100%)    Parameters below as of 12 Aug 2023 13:00  Patient On (Oxygen Delivery Method): room air        PHYSICAL EXAMINATION:  GENERAL: NAD, well built  HEAD:  Atraumatic, Normocephalic  EYES:  conjunctiva and sclera clear  NECK: Supple, No JVD, Normal thyroid  CHEST/LUNG: Clear to auscultation. Clear to percussion bilaterally; No rales, rhonchi, wheezing, or rubs  HEART: Regular rate and rhythm; No murmurs, rubs, or gallops  ABDOMEN: Soft, Nontender, Nondistended; Bowel sounds present, no pain or masses on palpation  NERVOUS SYSTEM:  Alert & Oriented X0  : voiding well  EXTREMITIES:  2+ Peripheral Pulses, No clubbing, cyanosis, or edema  SKIN: warm dry                          7.4    10.14 )-----------( 245      ( 12 Aug 2023 05:37 )             23.6     08-12    136  |  110<H>  |  23<H>  ----------------------------<  82  4.2   |  16<L>  |  2.05<H>    Ca    8.3<L>      12 Aug 2023 05:37  Phos  4.4     08-12  Mg     1.9     08-12    TPro  5.8<L>  /  Alb  1.9<L>  /  TBili  0.3  /  DBili  x   /  AST  13  /  ALT  <6<L>  /  AlkPhos  75  08-12    LIVER FUNCTIONS - ( 12 Aug 2023 05:37 )  Alb: 1.9 g/dL / Pro: 5.8 g/dL / ALK PHOS: 75 U/L / ALT: <6 U/L DA / AST: 13 U/L / GGT: x                   I&O's Summary    11 Aug 2023 07:01  -  12 Aug 2023 07:00  --------------------------------------------------------  IN: 0 mL / OUT: 400 mL / NET: -400 mL    12 Aug 2023 07:01  -  12 Aug 2023 17:25  --------------------------------------------------------  IN: 0 mL / OUT: 230 mL / NET: -230 mL            CAPILLARY BLOOD GLUCOSE      RADIOLOGY & ADDITIONAL TESTS:

## 2023-08-12 NOTE — PROGRESS NOTE ADULT - SUBJECTIVE AND OBJECTIVE BOX
NEPHROLOGY MEDICAL CARE, [facility] - Dr. Ajay Green/ Dr. Mert Madison/ Dr. Chris Calderon/ Dr. Carson Cook    Date of Service: 08-12-23 @ 10:34    Patient was seen and examined at bedside.    CC: patient is okay and NAD    Vital Signs Last 24 Hrs  T(C): 36.7 (12 Aug 2023 04:58), Max: 37.7 (11 Aug 2023 20:55)  T(F): 98.1 (12 Aug 2023 04:58), Max: 99.9 (11 Aug 2023 20:55)  HR: 101 (12 Aug 2023 04:58) (98 - 106)  BP: 138/86 (12 Aug 2023 04:58) (138/86 - 152/77)  BP(mean): --  RR: 18 (12 Aug 2023 04:58) (16 - 18)  SpO2: 99% (12 Aug 2023 04:58) (99% - 99%)    Parameters below as of 12 Aug 2023 04:58  Patient On (Oxygen Delivery Method): room air        08-11 @ 07:01  -  08-12 @ 07:00  --------------------------------------------------------  IN: 0 mL / OUT: 400 mL / NET: -400 mL        PHYSICAL EXAM:  General: No acute respiratory distress.  Eyes: conjunctiva and sclera clear  ENMT: Atraumatic, Normocephalic; Moist mucous membranes  Respiratory: Bilateral clear lungs; No rales, rhonchi, wheezing  Cardiovascular: S1S2+; no m/r/g  Gastrointestinal: Soft, Non-tender, Nondistended; Bowel sounds present  : mcclellan's cath with clear urine  Neuro: eyes are open; hemiparesis.  Ext:  1+pedal edema, No Cyanosis  Skin: No visible rashes    MEDICATIONS:  MEDICATIONS  (STANDING):  carbidopa/levodopa 25/100 Disintegrating Tablet 1 Tablet(s) Oral <User Schedule>  cefTRIAXone   IVPB 1000 milliGRAM(s) IV Intermittent every 24 hours  chlorhexidine 2% Cloths 1 Application(s) Topical daily  cloNIDine Patch 0.2 mG/24Hr(s) 1 patch Transdermal <User Schedule>  dextrose 5%. 1000 milliLiter(s) (50 mL/Hr) IV Continuous <Continuous>  hydrALAZINE Injectable 10 milliGRAM(s) IV Push every 6 hours  valproate sodium  IVPB 500 milliGRAM(s) IV Intermittent every 8 hours    MEDICATIONS  (PRN):          LABS:                        7.4    10.14 )-----------( 245      ( 12 Aug 2023 05:37 )             23.6     08-12    136  |  110<H>  |  23<H>  ----------------------------<  82  4.2   |  16<L>  |  2.05<H>    Ca    8.3<L>      12 Aug 2023 05:37  Phos  4.4     08-12  Mg     1.9     08-12    TPro  5.8<L>  /  Alb  1.9<L>  /  TBili  0.3  /  DBili  x   /  AST  13  /  ALT  <6<L>  /  AlkPhos  75  08-12      Urinalysis Basic - ( 12 Aug 2023 05:37 )    Color: x / Appearance: x / SG: x / pH: x  Gluc: 82 mg/dL / Ketone: x  / Bili: x / Urobili: x   Blood: x / Protein: x / Nitrite: x   Leuk Esterase: x / RBC: x / WBC x   Sq Epi: x / Non Sq Epi: x / Bacteria: x      Phosphorus: 4.4 mg/dL (08-12 @ 05:37)  Magnesium: 1.9 mg/dL (08-12 @ 05:37)    Urine studies    PTH and Vit D:         NEPHROLOGY MEDICAL CARE, [facility] - Dr. Ajay Green/ Dr. Mert Madison/ Dr. Chris Calderon/ Dr. Carson Cook    Date of Service: 08-12-23 @ 10:34    Patient was seen and examined at bedside.    CC: patient is okay and NAD    Vital Signs Last 24 Hrs  T(C): 36.7 (12 Aug 2023 04:58), Max: 37.7 (11 Aug 2023 20:55)  T(F): 98.1 (12 Aug 2023 04:58), Max: 99.9 (11 Aug 2023 20:55)  HR: 101 (12 Aug 2023 04:58) (98 - 106)  BP: 138/86 (12 Aug 2023 04:58) (138/86 - 152/77)  BP(mean): --  RR: 18 (12 Aug 2023 04:58) (16 - 18)  SpO2: 99% (12 Aug 2023 04:58) (99% - 99%)    Parameters below as of 12 Aug 2023 04:58  Patient On (Oxygen Delivery Method): room air        08-11 @ 07:01  -  08-12 @ 07:00  --------------------------------------------------------  IN: 0 mL / OUT: 400 mL / NET: -400 mL        PHYSICAL EXAM:  General: No acute respiratory distress.  Eyes: conjunctiva and sclera clear  ENMT: Atraumatic, Normocephalic; Moist mucous membranes  Respiratory: Bilateral clear lungs; No rales, rhonchi, wheezing  Cardiovascular: S1S2+; no m/r/g  Gastrointestinal: Soft, Non-tender, Nondistended; Bowel sounds present  : mcclellan's cath with clear urine  Neuro: eyes are open; hemiparesis.  Ext:  1+pedal edema, No Cyanosis  Skin: No visible rashes    MEDICATIONS:  MEDICATIONS  (STANDING):  carbidopa/levodopa 25/100 Disintegrating Tablet 1 Tablet(s) Oral <User Schedule>  cefTRIAXone   IVPB 1000 milliGRAM(s) IV Intermittent every 24 hours  chlorhexidine 2% Cloths 1 Application(s) Topical daily  cloNIDine Patch 0.2 mG/24Hr(s) 1 patch Transdermal <User Schedule>  dextrose 5%. 1000 milliLiter(s) (50 mL/Hr) IV Continuous <Continuous>  hydrALAZINE Injectable 10 milliGRAM(s) IV Push every 6 hours  valproate sodium  IVPB 500 milliGRAM(s) IV Intermittent every 8 hours    MEDICATIONS  (PRN):          LABS:                        7.4    10.14 )-----------( 245      ( 12 Aug 2023 05:37 )             23.6     08-12    136  |  110<H>  |  23<H>  ----------------------------<  82  4.2   |  16<L>  |  2.05<H>    Ca    8.3<L>      12 Aug 2023 05:37  Phos  4.4     08-12  Mg     1.9     08-12    TPro  5.8<L>  /  Alb  1.9<L>  /  TBili  0.3  /  DBili  x   /  AST  13  /  ALT  <6<L>  /  AlkPhos  75  08-12      Urinalysis Basic - ( 12 Aug 2023 05:37 )    Color: x / Appearance: x / SG: x / pH: x  Gluc: 82 mg/dL / Ketone: x  / Bili: x / Urobili: x   Blood: x / Protein: x / Nitrite: x   Leuk Esterase: x / RBC: x / WBC x   Sq Epi: x / Non Sq Epi: x / Bacteria: x      Phosphorus: 4.4 mg/dL (08-12 @ 05:37)  Magnesium: 1.9 mg/dL (08-12 @ 05:37)    Urine studies    PTH and Vit D:         NEPHROLOGY MEDICAL CARE, [facility] - Dr. Ajay Green/ Dr. Mert Madison/ Dr. Chris Calderon/ Dr. Carson Cook    Date of Service: 08-12-23 @ 10:34    Patient was seen and examined at bedside.    CC: patient is okay and NAD    Vital Signs Last 24 Hrs  T(C): 36.7 (12 Aug 2023 04:58), Max: 37.7 (11 Aug 2023 20:55)  T(F): 98.1 (12 Aug 2023 04:58), Max: 99.9 (11 Aug 2023 20:55)  HR: 101 (12 Aug 2023 04:58) (98 - 106)  BP: 138/86 (12 Aug 2023 04:58) (138/86 - 152/77)  BP(mean): --  RR: 18 (12 Aug 2023 04:58) (16 - 18)  SpO2: 99% (12 Aug 2023 04:58) (99% - 99%)    Parameters below as of 12 Aug 2023 04:58  Patient On (Oxygen Delivery Method): room air        08-11 @ 07:01  -  08-12 @ 07:00  --------------------------------------------------------  IN: 0 mL / OUT: 400 mL / NET: -400 mL        PHYSICAL EXAM:  General: No acute respiratory distress.  Eyes: conjunctiva and sclera clear  ENMT: Atraumatic, Normocephalic; Moist mucous membranes  Respiratory: Bilateral clear lungs; No rales, rhonchi, wheezing  Cardiovascular: S1S2+; no m/r/g  Gastrointestinal: Soft, Non-tender, Nondistended; Bowel sounds present  : mcclellan's cath with clear urine  Neuro: eyes are open; hemiparesis.  Ext:  1+pedal edema, No Cyanosis  Skin: No visible rashes    MEDICATIONS:  MEDICATIONS  (STANDING):  carbidopa/levodopa 25/100 Disintegrating Tablet 1 Tablet(s) Oral <User Schedule>  cefTRIAXone   IVPB 1000 milliGRAM(s) IV Intermittent every 24 hours  chlorhexidine 2% Cloths 1 Application(s) Topical daily  cloNIDine Patch 0.2 mG/24Hr(s) 1 patch Transdermal <User Schedule>  dextrose 5%. 1000 milliLiter(s) (50 mL/Hr) IV Continuous <Continuous>  hydrALAZINE Injectable 10 milliGRAM(s) IV Push every 6 hours  valproate sodium  IVPB 500 milliGRAM(s) IV Intermittent every 8 hours    MEDICATIONS  (PRN):          LABS:                        7.4    10.14 )-----------( 245      ( 12 Aug 2023 05:37 )             23.6     08-12    136  |  110<H>  |  23<H>  ----------------------------<  82  4.2   |  16<L>  |  2.05<H>    Ca    8.3<L>      12 Aug 2023 05:37  Phos  4.4     08-12  Mg     1.9     08-12    TPro  5.8<L>  /  Alb  1.9<L>  /  TBili  0.3  /  DBili  x   /  AST  13  /  ALT  <6<L>  /  AlkPhos  75  08-12      Urinalysis Basic - ( 12 Aug 2023 05:37 )    Color: x / Appearance: x / SG: x / pH: x  Gluc: 82 mg/dL / Ketone: x  / Bili: x / Urobili: x   Blood: x / Protein: x / Nitrite: x   Leuk Esterase: x / RBC: x / WBC x   Sq Epi: x / Non Sq Epi: x / Bacteria: x      Phosphorus: 4.4 mg/dL (08-12 @ 05:37)  Magnesium: 1.9 mg/dL (08-12 @ 05:37)    Urine studies    PTH and Vit D:

## 2023-08-12 NOTE — CHART NOTE - NSCHARTNOTEFT_GEN_A_CORE
Patient's carbidopa-levodopa disintegrating tablet is no longer manufactured. Pharmacy got a 7 day supply from another pharmacy but unable to get more. There is 10/100mg ODT that is available. Please double check with PCP/Neurologist regarding the medication and make appropriate modifications.

## 2023-08-12 NOTE — PROGRESS NOTE ADULT - SUBJECTIVE AND OBJECTIVE BOX
[   ] ICU                                          [   ] CCU                                      [ X  ] Medical Floor      Patient is a 77 year old female with dysphagia. GI consulted for Peg tube placement.     Patient is a 77 female from [facility] with past medical history significant for HTN, HLD, CVA (w/ residual aphasia and R sided hemiparesis/plegia) who was sent to the emergency room with for altered mental status. Patient is not able to provide any history. Patient is lying down in bed, awake and alert. However, patient does not speak, is not able to follow commands and does not turn face or move eyes when her name is called. Patient is admitted with CVA. Now patient with dysphagia, poor po intake, failure to thrive and weight loss. No abdominal pain, nausea, vomiting, hematemesis, hematochezia, melena, fever, chills, chest pain, SOB, cough, hematuria, dysuria  or diarrhea reported.      Patient is comfortable. No new complaints reported, No abdominal pain, N/V, hematemesis, hematochezia, melena, fever, chills, chest pain, SOB, cough or diarrhea reported.      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 1000 milliLiter(s) (70 mL/Hr) IV Continuous <Continuous>  enoxaparin Injectable 30 milliGRAM(s) SubCutaneous every 24 hours  hydrALAZINE Injectable 10 milliGRAM(s) IV Push every 6 hours  potassium chloride  10 mEq/100 mL IVPB 10 milliEquivalent(s) IV Intermittent every 1 hour  valproate sodium  IVPB 500 milliGRAM(s) IV Intermittent every 8 hours         SOCIAL HISTORY  Advanced Directives:       [ X ] Full Code       [  ] DNR  Marital Status:         [  ] M      [ X ] S      [  ] D       [  ] W  Children:       [ X ] Yes      [  ] No  Occupation:        [  ] Employed       [ X ] Unemployed       [  ] Retired  Diet:       [ X ] Regular       [  ] PEG feeding          [  ] NG tube feeding  Drug Use:           [ X ] Patient denied          [  ] Yes  Alcohol:           [ X ] No             [  ] Yes (socially)         [  ] Yes (chronic)  Tobacco:           [  ] Yes           [X  ] No      FAMILY HISTORY  [ X ] Heart Disease            [ X ] Diabetes             [ X ] HTN             [  ] Colon Cancer             [  ] Stomach Cancer              [  ] Pancreatic Cancer      VITALS  Vital Signs Last 24 Hrs  T(C): 36.7 (12 Aug 2023 04:58), Max: 37.7 (11 Aug 2023 20:55)  T(F): 98.1 (12 Aug 2023 04:58), Max: 99.9 (11 Aug 2023 20:55)  HR: 101 (12 Aug 2023 04:58) (98 - 106)  BP: 138/86 (12 Aug 2023 04:58) (138/86 - 152/77)   RR: 18 (12 Aug 2023 04:58) (16 - 18)  SpO2: 99% (12 Aug 2023 04:58) (99% - 99%)  Parameters below as of 12 Aug 2023 04:58  Patient On (Oxygen Delivery Method): room air       MEDICATIONS  (STANDING):  carbidopa/levodopa 25/100 Disintegrating Tablet 1 Tablet(s) Oral <User Schedule>  cefTRIAXone   IVPB 1000 milliGRAM(s) IV Intermittent every 24 hours  chlorhexidine 2% Cloths 1 Application(s) Topical daily  cloNIDine Patch 0.2 mG/24Hr(s) 1 patch Transdermal <User Schedule>  dextrose 5%. 1000 milliLiter(s) (50 mL/Hr) IV Continuous <Continuous>  hydrALAZINE Injectable 10 milliGRAM(s) IV Push every 6 hours  valproate sodium  IVPB 500 milliGRAM(s) IV Intermittent every 8 hours    MEDICATIONS  (PRN):                            7.4    10.14 )-----------( 245      ( 12 Aug 2023 05:37 )             23.6       08-12    136  |  110<H>  |  23<H>  ----------------------------<  82  4.2   |  16<L>  |  2.05<H>    Ca    8.3<L>      12 Aug 2023 05:37  Phos  4.4     08-12  Mg     1.9     08-12    TPro  5.8<L>  /  Alb  1.9<L>  /  TBili  0.3  /  DBili  x   /  AST  13  /  ALT  <6<L>  /  AlkPhos  75  08-12       [   ] ICU                                          [   ] CCU                                      [ X  ] Medical Floor      Patient is a 77 year old female with dysphagia. GI consulted for Peg tube placement.     Patient is a 77 female from [facility] with past medical history significant for HTN, HLD, CVA (w/ residual aphasia and R sided hemiparesis/plegia) who was sent to the emergency room with for altered mental status. Patient is not able to provide any history. Patient is lying down in bed, awake and alert. However, patient does not speak, is not able to follow commands and does not turn face or move eyes when her name is called. Patient is admitted with CVA. Now patient with dysphagia, poor po intake, failure to thrive and weight loss. No abdominal pain, nausea, vomiting, hematemesis, hematochezia, melena, fever, chills, chest pain, SOB, cough, hematuria, dysuria  or diarrhea reported.      Patient is comfortable. No new complaints reported, No abdominal pain, N/V, hematemesis, hematochezia, melena, fever, chills, chest pain, SOB, cough or diarrhea reported.      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 1000 milliLiter(s) (70 mL/Hr) IV Continuous <Continuous>  enoxaparin Injectable 30 milliGRAM(s) SubCutaneous every 24 hours  hydrALAZINE Injectable 10 milliGRAM(s) IV Push every 6 hours  potassium chloride  10 mEq/100 mL IVPB 10 milliEquivalent(s) IV Intermittent every 1 hour  valproate sodium  IVPB 500 milliGRAM(s) IV Intermittent every 8 hours         SOCIAL HISTORY  Advanced Directives:       [ X ] Full Code       [  ] DNR  Marital Status:         [  ] M      [ X ] S      [  ] D       [  ] W  Children:       [ X ] Yes      [  ] No  Occupation:        [  ] Employed       [ X ] Unemployed       [  ] Retired  Diet:       [ X ] Regular       [  ] PEG feeding          [  ] NG tube feeding  Drug Use:           [ X ] Patient denied          [  ] Yes  Alcohol:           [ X ] No             [  ] Yes (socially)         [  ] Yes (chronic)  Tobacco:           [  ] Yes           [X  ] No      FAMILY HISTORY  [ X ] Heart Disease            [ X ] Diabetes             [ X ] HTN             [  ] Colon Cancer             [  ] Stomach Cancer              [  ] Pancreatic Cancer      VITALS  Vital Signs Last 24 Hrs  T(C): 36.7 (12 Aug 2023 04:58), Max: 37.7 (11 Aug 2023 20:55)  T(F): 98.1 (12 Aug 2023 04:58), Max: 99.9 (11 Aug 2023 20:55)  HR: 101 (12 Aug 2023 04:58) (98 - 106)  BP: 138/86 (12 Aug 2023 04:58) (138/86 - 152/77)   RR: 18 (12 Aug 2023 04:58) (16 - 18)  SpO2: 99% (12 Aug 2023 04:58) (99% - 99%)  Parameters below as of 12 Aug 2023 04:58  Patient On (Oxygen Delivery Method): room air       MEDICATIONS  (STANDING):  carbidopa/levodopa 25/100 Disintegrating Tablet 1 Tablet(s) Oral <User Schedule>  cefTRIAXone   IVPB 1000 milliGRAM(s) IV Intermittent every 24 hours  chlorhexidine 2% Cloths 1 Application(s) Topical daily  cloNIDine Patch 0.2 mG/24Hr(s) 1 patch Transdermal <User Schedule>  dextrose 5%. 1000 milliLiter(s) (50 mL/Hr) IV Continuous <Continuous>  hydrALAZINE Injectable 10 milliGRAM(s) IV Push every 6 hours  valproate sodium  IVPB 500 milliGRAM(s) IV Intermittent every 8 hours    MEDICATIONS  (PRN):                            7.4    10.14 )-----------( 245      ( 12 Aug 2023 05:37 )             23.6       08-12    136  |  110<H>  |  23<H>  ----------------------------<  82  4.2   |  16<L>  |  2.05<H>    Ca    8.3<L>      12 Aug 2023 05:37  Phos  4.4     08-12  Mg     1.9     08-12    TPro  5.8<L>  /  Alb  1.9<L>  /  TBili  0.3  /  DBili  x   /  AST  13  /  ALT  <6<L>  /  AlkPhos  75  08-12       [   ] ICU                                          [   ] CCU                                      [ X  ] Medical Floor      Patient is a 77 year old female with dysphagia. GI consulted for Peg tube placement.     Patient is a 77 female from [facility] with past medical history significant for HTN, HLD, CVA (w/ residual aphasia and R sided hemiparesis/plegia) who was sent to the emergency room with for altered mental status. Patient is not able to provide any history. Patient is lying down in bed, awake and alert. However, patient does not speak, is not able to follow commands and does not turn face or move eyes when her name is called. Patient is admitted with CVA. Now patient with dysphagia, poor po intake, failure to thrive and weight loss. No abdominal pain, nausea, vomiting, hematemesis, hematochezia, melena, fever, chills, chest pain, SOB, cough, hematuria, dysuria  or diarrhea reported.      Patient is comfortable. No new complaints reported, No abdominal pain, N/V, hematemesis, hematochezia, melena, fever, chills, chest pain, SOB, cough or diarrhea reported.      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 1000 milliLiter(s) (70 mL/Hr) IV Continuous <Continuous>  enoxaparin Injectable 30 milliGRAM(s) SubCutaneous every 24 hours  hydrALAZINE Injectable 10 milliGRAM(s) IV Push every 6 hours  potassium chloride  10 mEq/100 mL IVPB 10 milliEquivalent(s) IV Intermittent every 1 hour  valproate sodium  IVPB 500 milliGRAM(s) IV Intermittent every 8 hours         SOCIAL HISTORY  Advanced Directives:       [ X ] Full Code       [  ] DNR  Marital Status:         [  ] M      [ X ] S      [  ] D       [  ] W  Children:       [ X ] Yes      [  ] No  Occupation:        [  ] Employed       [ X ] Unemployed       [  ] Retired  Diet:       [ X ] Regular       [  ] PEG feeding          [  ] NG tube feeding  Drug Use:           [ X ] Patient denied          [  ] Yes  Alcohol:           [ X ] No             [  ] Yes (socially)         [  ] Yes (chronic)  Tobacco:           [  ] Yes           [X  ] No      FAMILY HISTORY  [ X ] Heart Disease            [ X ] Diabetes             [ X ] HTN             [  ] Colon Cancer             [  ] Stomach Cancer              [  ] Pancreatic Cancer      VITALS  Vital Signs Last 24 Hrs  T(C): 36.7 (12 Aug 2023 04:58), Max: 37.7 (11 Aug 2023 20:55)  T(F): 98.1 (12 Aug 2023 04:58), Max: 99.9 (11 Aug 2023 20:55)  HR: 101 (12 Aug 2023 04:58) (98 - 106)  BP: 138/86 (12 Aug 2023 04:58) (138/86 - 152/77)   RR: 18 (12 Aug 2023 04:58) (16 - 18)  SpO2: 99% (12 Aug 2023 04:58) (99% - 99%)  Parameters below as of 12 Aug 2023 04:58  Patient On (Oxygen Delivery Method): room air       MEDICATIONS  (STANDING):  carbidopa/levodopa 25/100 Disintegrating Tablet 1 Tablet(s) Oral <User Schedule>  cefTRIAXone   IVPB 1000 milliGRAM(s) IV Intermittent every 24 hours  chlorhexidine 2% Cloths 1 Application(s) Topical daily  cloNIDine Patch 0.2 mG/24Hr(s) 1 patch Transdermal <User Schedule>  dextrose 5%. 1000 milliLiter(s) (50 mL/Hr) IV Continuous <Continuous>  hydrALAZINE Injectable 10 milliGRAM(s) IV Push every 6 hours  valproate sodium  IVPB 500 milliGRAM(s) IV Intermittent every 8 hours    MEDICATIONS  (PRN):                            7.4    10.14 )-----------( 245      ( 12 Aug 2023 05:37 )             23.6       08-12    136  |  110<H>  |  23<H>  ----------------------------<  82  4.2   |  16<L>  |  2.05<H>    Ca    8.3<L>      12 Aug 2023 05:37  Phos  4.4     08-12  Mg     1.9     08-12    TPro  5.8<L>  /  Alb  1.9<L>  /  TBili  0.3  /  DBili  x   /  AST  13  /  ALT  <6<L>  /  AlkPhos  75  08-12

## 2023-08-12 NOTE — PROGRESS NOTE ADULT - ASSESSMENT
77 female from [facility] PMHx HTN, HLD, CVA (w/ residual aphasia and R sided hemiparesis/plegia) who was sent to the hospital due to altered mental status,UTI.  1.Neurology eval noted for bleeding from ear, CT -.  2.UTI-s/p ABX.  3.HTN- clonidine patch .2mg q wk,IV hydralazine.  5.Lipid d/o-hold statin.  6.Plan for G tube placement 8/14/23.  7.Hypernatremia and SUSAN-IVF D5 with kcl.  8.Anemia-transfuse 1 PRBC prior to OR.  9.GI and DVT prophylaxis. 77 female from [facility] PMHx HTN, HLD, CVA (w/ residual aphasia and R sided hemiparesis/plegia) who was sent to the hospital due to altered mental status,UTI.  1.Neurology eval noted for bleeding from ear, CT -.  2.UTI-s/p ABX.  3.HTN- clonidine patch .2mg q wk,IV hydralazine.  5.Lipid d/o-hold statin.  6.Plan for G tube placement 8/14/23.  7.Hypernatremia and SUSAN-IVF D5 with kcl.  8.Anemia-transfuse 1 PRBC prior to OR.  9.GI and DVT prophylaxis. 77 female from [facility] PMHx HTN, HLD, CVA (w/ residual aphasia and R sided hemiparesis/plegia) who was sent to the hospital due to altered mental status,UTI.  1.Neurology eval noted for bleeding from ear, CT -.  2.UTI-s/p ABX.  3.HTN- clonidine patch .2mg q wk,IV hydralazine.  5.Lipid d/o-hold statin.  6.Plan for G tube placement 8/14/23.  7.Hypernatremia and SUSAN-IVF D5 with kcl.  8.Anemia-transfuse 1 PRBC prior to OR.  9.GI and DVT prophylaxis.

## 2023-08-12 NOTE — PROGRESS NOTE ADULT - ASSESSMENT
1. SUSAN possible to MARISA and ATN  Renal sono shows no hdyro with b/l kidneys around 9.2cm  -Scr remains stable and unchanged at 2.0mg/dl with stable electrolytes and continue IVFs since NPO. continue gentle hydration without potassium.  pt is still close to her baseline.  -s/p mcclellan's cath placed for urinary retention, few days back.  -urinalysis shows blood with proteinuria; FeNa >1%, spot protein to creatinine ratio is 1.5gm  -Adjust meds to eGFR and avoid IV Gadolinium contrast,NSAIDs, and phosphate enema.  -Monitor I/O's daily.   -Monitor SMA daily.  2. Hypernatremia due to water deficit and insensible losses. Pt is clinically euvolemic.   -Na stable; on D5W since blood glucose is around 80s  -Monitor I/O's. Check Serum Na Daily. Avoid high solute intake diet and sodium bicarbonate infuse. Avoid overcorrection of NA (8-10meq/day)  3. 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  -previous Scr around 1.2 to 1.6mg/dL in Oct 2022.  -Keep patient euvolemic and renal diet  -Avoid Nephrotoxic Meds/ Agents such as (NSAIDs, IV contrast, Aminoglycosides such as gentamicin, -Gadolinium contrast, Phosphate containing enemas, etc..)  -Adjust Medications according to eGFR  4. HTN:   -bp is acceptable. continue bp meds  -titrate bp meds to keep sbp >110 and < 130  5. Mineral Bone Disease:  -stable phos  -PTH intact 289, will start calcitriol once PEG placed.  6. Encephalopathy:  -on Rocephin.  -Plan as per Neuro and primary team  -unable to place PEG via EGD;   -NGT attempted but unsuccessful; CT AP ordered and shows no obstruction.  -family agreed for PEG and will place on Monday in the OR.   7. Hypokalemia:  -stable K.   -give kcl repletion to keep K >3.5meq/L  -monitor K.   8.0 Acidosis:  -CO2 slowly improving; previous vbg noted with okay ph.   -monitor CO2.  9. Anemia:  -hb is trending down.   -send iron panel and ferritin in am.  -monitor CBC.  -transfuse if hb <7.0    Discussed the assessment and plan with Primary Team/Nurse

## 2023-08-12 NOTE — PROGRESS NOTE ADULT - PROBLEM SELECTOR PROBLEM 2
Patient was given Cipro 500mg for prophylaxis before cystoscopy.    The patient was prepped and draped in the usual sterile fashion. The urethra was anesthetized using 2% lidocaine jelly local.    Anemia

## 2023-08-12 NOTE — PROGRESS NOTE ADULT - SUBJECTIVE AND OBJECTIVE BOX
CHIEF COMPLAINT:Patient is a 77y old  Female who presents with a chief complaint of Altered mental status. Pt appears comfortable.    	  REVIEW OF SYSTEMS:  unable to obtain      PHYSICAL EXAM:  T(C): 37 (08-12-23 @ 13:00), Max: 37.7 (08-11-23 @ 20:55)  HR: 96 (08-12-23 @ 13:00) (94 - 106)  BP: 122/67 (08-12-23 @ 13:00) (122/67 - 159/61)  RR: 16 (08-12-23 @ 13:00) (16 - 18)  SpO2: 99% (08-12-23 @ 13:00) (99% - 100%)  Wt(kg): --  I&O's Summary    11 Aug 2023 07:01  -  12 Aug 2023 07:00  --------------------------------------------------------  IN: 0 mL / OUT: 400 mL / NET: -400 mL        Appearance: Normal	  HEENT:   Normal oral mucosa, PERRL, EOMI	  Lymphatic: No lymphadenopathy  Cardiovascular: Normal S1 S2, No JVD, No murmurs, +1 edema  Respiratory: Lungs clear to auscultation	  Gastrointestinal:  Soft, Non-tender, + BS	  Skin: No rashes, No ecchymoses, No cyanosis	  Extremities: Normal range of motion, No clubbing, cyanosis +1 edema  Vascular: Peripheral pulses palpable 2+ bilaterally    MEDICATIONS  (STANDING):  carbidopa/levodopa 25/100 Disintegrating Tablet 1 Tablet(s) Oral <User Schedule>  cefTRIAXone   IVPB 1000 milliGRAM(s) IV Intermittent every 24 hours  chlorhexidine 2% Cloths 1 Application(s) Topical daily  cloNIDine Patch 0.2 mG/24Hr(s) 1 patch Transdermal <User Schedule>  dextrose 5%. 1000 milliLiter(s) (50 mL/Hr) IV Continuous <Continuous>  hydrALAZINE Injectable 10 milliGRAM(s) IV Push every 6 hours  valproate sodium  IVPB 500 milliGRAM(s) IV Intermittent every 8 hours      LABS:	 	                                  7.4    10.14 )-----------( 245      ( 12 Aug 2023 05:37 )             23.6     08-12    136  |  110<H>  |  23<H>  ----------------------------<  82  4.2   |  16<L>  |  2.05<H>    Ca    8.3<L>      12 Aug 2023 05:37  Phos  4.4     08-12  Mg     1.9     08-12    TPro  5.8<L>  /  Alb  1.9<L>  /  TBili  0.3  /  DBili  x   /  AST  13  /  ALT  <6<L>  /  AlkPhos  75  08-12    proBNP:   Lipid Profile: Cholesterol 152  LDL --  HDL 51  TG 93  Ldl calc 82  Ratio --    HgA1c:   TSH: Thyroid Stimulating Hormone, Serum: 3.59 uU/mL (07-28 @ 05:03)

## 2023-08-13 ENCOUNTER — TRANSCRIPTION ENCOUNTER (OUTPATIENT)
Age: 78
End: 2023-08-13

## 2023-08-13 DIAGNOSIS — Z01.818 ENCOUNTER FOR OTHER PREPROCEDURAL EXAMINATION: ICD-10-CM

## 2023-08-13 LAB
ALBUMIN SERPL ELPH-MCNC: 2.1 G/DL — LOW (ref 3.5–5)
ALP SERPL-CCNC: 88 U/L — SIGNIFICANT CHANGE UP (ref 40–120)
ALT FLD-CCNC: <6 U/L DA — LOW (ref 10–60)
ANION GAP SERPL CALC-SCNC: 10 MMOL/L — SIGNIFICANT CHANGE UP (ref 5–17)
ANISOCYTOSIS BLD QL: SLIGHT — SIGNIFICANT CHANGE UP
AST SERPL-CCNC: 16 U/L — SIGNIFICANT CHANGE UP (ref 10–40)
BASOPHILS # BLD AUTO: 0.09 K/UL — SIGNIFICANT CHANGE UP (ref 0–0.2)
BASOPHILS NFR BLD AUTO: 0.8 % — SIGNIFICANT CHANGE UP (ref 0–2)
BILIRUB SERPL-MCNC: 0.3 MG/DL — SIGNIFICANT CHANGE UP (ref 0.2–1.2)
BUN SERPL-MCNC: 20 MG/DL — HIGH (ref 7–18)
CALCIUM SERPL-MCNC: 8.5 MG/DL — SIGNIFICANT CHANGE UP (ref 8.4–10.5)
CHLORIDE SERPL-SCNC: 109 MMOL/L — HIGH (ref 96–108)
CO2 SERPL-SCNC: 15 MMOL/L — LOW (ref 22–31)
CREAT SERPL-MCNC: 1.96 MG/DL — HIGH (ref 0.5–1.3)
EGFR: 26 ML/MIN/1.73M2 — LOW
EOSINOPHIL # BLD AUTO: 0.05 K/UL — SIGNIFICANT CHANGE UP (ref 0–0.5)
EOSINOPHIL NFR BLD AUTO: 0.5 % — SIGNIFICANT CHANGE UP (ref 0–6)
FERRITIN SERPL-MCNC: 363 NG/ML — HIGH (ref 13–330)
GLUCOSE BLDC GLUCOMTR-MCNC: 76 MG/DL — SIGNIFICANT CHANGE UP (ref 70–99)
GLUCOSE BLDC GLUCOMTR-MCNC: 77 MG/DL — SIGNIFICANT CHANGE UP (ref 70–99)
GLUCOSE BLDC GLUCOMTR-MCNC: 84 MG/DL — SIGNIFICANT CHANGE UP (ref 70–99)
GLUCOSE BLDC GLUCOMTR-MCNC: 85 MG/DL — SIGNIFICANT CHANGE UP (ref 70–99)
GLUCOSE BLDC GLUCOMTR-MCNC: 87 MG/DL — SIGNIFICANT CHANGE UP (ref 70–99)
GLUCOSE SERPL-MCNC: 69 MG/DL — LOW (ref 70–99)
HCT VFR BLD CALC: 27.3 % — LOW (ref 34.5–45)
HGB BLD-MCNC: 8.6 G/DL — LOW (ref 11.5–15.5)
HYPOCHROMIA BLD QL: SLIGHT — SIGNIFICANT CHANGE UP
IMM GRANULOCYTES NFR BLD AUTO: 6.9 % — HIGH (ref 0–0.9)
IRON SATN MFR SERPL: 11 % — LOW (ref 15–50)
IRON SATN MFR SERPL: 19 UG/DL — LOW (ref 40–150)
LYMPHOCYTES # BLD AUTO: 1.27 K/UL — SIGNIFICANT CHANGE UP (ref 1–3.3)
LYMPHOCYTES # BLD AUTO: 11.7 % — LOW (ref 13–44)
MAGNESIUM SERPL-MCNC: 1.9 MG/DL — SIGNIFICANT CHANGE UP (ref 1.6–2.6)
MANUAL SMEAR VERIFICATION: SIGNIFICANT CHANGE UP
MCHC RBC-ENTMCNC: 28 PG — SIGNIFICANT CHANGE UP (ref 27–34)
MCHC RBC-ENTMCNC: 31.5 GM/DL — LOW (ref 32–36)
MCV RBC AUTO: 88.9 FL — SIGNIFICANT CHANGE UP (ref 80–100)
MONOCYTES # BLD AUTO: 1.59 K/UL — HIGH (ref 0–0.9)
MONOCYTES NFR BLD AUTO: 14.6 % — HIGH (ref 2–14)
NEUTROPHILS # BLD AUTO: 7.11 K/UL — SIGNIFICANT CHANGE UP (ref 1.8–7.4)
NEUTROPHILS NFR BLD AUTO: 65.5 % — SIGNIFICANT CHANGE UP (ref 43–77)
NRBC # BLD: 0 /100 WBCS — SIGNIFICANT CHANGE UP (ref 0–0)
PHOSPHATE SERPL-MCNC: 4.6 MG/DL — HIGH (ref 2.5–4.5)
PLAT MORPH BLD: NORMAL — SIGNIFICANT CHANGE UP
PLATELET # BLD AUTO: 301 K/UL — SIGNIFICANT CHANGE UP (ref 150–400)
PLATELET COUNT - ESTIMATE: NORMAL — SIGNIFICANT CHANGE UP
POIKILOCYTOSIS BLD QL AUTO: SLIGHT — SIGNIFICANT CHANGE UP
POTASSIUM SERPL-MCNC: 4.1 MMOL/L — SIGNIFICANT CHANGE UP (ref 3.5–5.3)
POTASSIUM SERPL-SCNC: 4.1 MMOL/L — SIGNIFICANT CHANGE UP (ref 3.5–5.3)
PROT SERPL-MCNC: 6.7 G/DL — SIGNIFICANT CHANGE UP (ref 6–8.3)
RBC # BLD: 3.07 M/UL — LOW (ref 3.8–5.2)
RBC # FLD: 15.8 % — HIGH (ref 10.3–14.5)
RBC BLD AUTO: ABNORMAL
SODIUM SERPL-SCNC: 134 MMOL/L — LOW (ref 135–145)
TIBC SERPL-MCNC: 176 UG/DL — LOW (ref 250–450)
UIBC SERPL-MCNC: 157 UG/DL — SIGNIFICANT CHANGE UP (ref 110–370)
WBC # BLD: 10.86 K/UL — HIGH (ref 3.8–10.5)
WBC # FLD AUTO: 10.86 K/UL — HIGH (ref 3.8–10.5)

## 2023-08-13 PROCEDURE — 99232 SBSQ HOSP IP/OBS MODERATE 35: CPT | Mod: 57

## 2023-08-13 RX ORDER — SODIUM CHLORIDE 9 MG/ML
1000 INJECTION, SOLUTION INTRAVENOUS
Refills: 0 | Status: DISCONTINUED | OUTPATIENT
Start: 2023-08-13 | End: 2023-08-14

## 2023-08-13 RX ORDER — IRON SUCROSE 20 MG/ML
200 INJECTION, SOLUTION INTRAVENOUS EVERY 24 HOURS
Refills: 0 | Status: DISCONTINUED | OUTPATIENT
Start: 2023-08-13 | End: 2023-08-14

## 2023-08-13 RX ADMIN — Medication 10 MILLIGRAM(S): at 00:24

## 2023-08-13 RX ADMIN — Medication 55 MILLIGRAM(S): at 12:42

## 2023-08-13 RX ADMIN — Medication 55 MILLIGRAM(S): at 20:50

## 2023-08-13 RX ADMIN — Medication 1 PATCH: at 19:29

## 2023-08-13 RX ADMIN — Medication 10 MILLIGRAM(S): at 12:42

## 2023-08-13 RX ADMIN — IRON SUCROSE 110 MILLIGRAM(S): 20 INJECTION, SOLUTION INTRAVENOUS at 13:33

## 2023-08-13 RX ADMIN — CHLORHEXIDINE GLUCONATE 1 APPLICATION(S): 213 SOLUTION TOPICAL at 12:49

## 2023-08-13 RX ADMIN — Medication 10 MILLIGRAM(S): at 18:42

## 2023-08-13 RX ADMIN — CEFTRIAXONE 100 MILLIGRAM(S): 500 INJECTION, POWDER, FOR SOLUTION INTRAMUSCULAR; INTRAVENOUS at 15:21

## 2023-08-13 RX ADMIN — Medication 1 PATCH: at 12:42

## 2023-08-13 RX ADMIN — Medication 1 PATCH: at 07:12

## 2023-08-13 RX ADMIN — Medication 55 MILLIGRAM(S): at 04:12

## 2023-08-13 RX ADMIN — SODIUM CHLORIDE 40 MILLILITER(S): 9 INJECTION, SOLUTION INTRAVENOUS at 11:59

## 2023-08-13 NOTE — PROGRESS NOTE ADULT - ASSESSMENT
Patient is a 77 female from [facility] PMHx HTN, HLD, CVA (w/ residual aphasia and R sided hemiparesis/plegia) who was sent to the hospital due to altered mental status. Patient is being admitted to medicine for further work up and rule out stroke vs encephalopathy (metabolic vs infectious).  Patient is a 77 female from [facility] PMHx HTN, HLD, CVA (w/ residual aphasia and R sided hemiparesis/plegia) who was sent to the hospital due to altered mental status. Patient is being admitted to medicine for further work up and rule out stroke vs encephalopathy (metabolic vs infectious).  Patient is a 77 female from [facility] PMHx HTN, HLD, CVA (w/ residual aphasia and R sided hemiparesis/plegia) who was sent to the hospital due to altered mental status. Patient is being admitted to medicine for further work up and rule out stroke vs encephalopathy (metabolic vs infectious).

## 2023-08-13 NOTE — PROGRESS NOTE ADULT - ASSESSMENT
1. SUSAN possible to MARISA and ATN  Renal sono shows no hdyro with b/l kidneys around 9.2cm  -Scr remains stable and unchanged at 1.9mg/dl with stable electrolytes and continue IVFs since NPO. continue gentle hydration.  pt is still close to her baseline.  -s/p mcclellan's cath placed for urinary retention, few days back.  -urinalysis shows blood with proteinuria; FeNa >1%, spot protein to creatinine ratio is 1.5gm  -Adjust meds to eGFR and avoid IV Gadolinium contrast,NSAIDs, and phosphate enema.  -Monitor I/O's daily.   -Monitor SMA daily.  2. Hypernatremia due to water deficit and insensible losses. Pt is clinically euvolemic.   -Na trending down. change fluids to D5 1/2 NS at 40cc/hr since NPO  -Monitor I/O's. Check Serum Na Daily. Avoid high solute intake diet and sodium bicarbonate infuse. Avoid overcorrection of NA (8-10meq/day)  3. 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  -previous Scr around 1.2 to 1.6mg/dL in Oct 2022.  -Keep patient euvolemic and renal diet  -Avoid Nephrotoxic Meds/ Agents such as (NSAIDs, IV contrast, Aminoglycosides such as gentamicin, -Gadolinium contrast, Phosphate containing enemas, etc..)  -Adjust Medications according to eGFR  4. HTN:   -bp is acceptable. continue bp meds  -titrate bp meds to keep sbp >110 and < 130  5. Mineral Bone Disease:  -stable phos  -PTH intact 289, will start calcitriol once PEG placed.  6. Encephalopathy:  -on Rocephin.  -Plan as per Neuro and primary team  -unable to place PEG via EGD;   -NGT attempted but unsuccessful; CT AP ordered and shows no obstruction.  -family agreed for PEG and will place on Monday in the OR.   7. Hypokalemia:  -stable K.   -give kcl repletion to keep K >3.5meq/L  -monitor K.   8.0 Acidosis:  -CO2 slowly improving; previous vbg noted with okay ph.   -monitor CO2.  9. Anemia:  -rpt hb better at 8.6g/dL   -low iron sat and f/u ferritin   -monitor CBC.  -transfuse if hb <7.0    Discussed the assessment and plan with Primary Team/Nurse

## 2023-08-13 NOTE — PROGRESS NOTE ADULT - SUBJECTIVE AND OBJECTIVE BOX
NEPHROLOGY MEDICAL CARE, [facility] - Dr. Ajay Green/ Dr. Mert Madison/ Dr. Chris Calderon/ Dr. Carson Cook    Date of Service: 08-13-23 @ 11:40    Patient was seen and examined at bedside.    CC: patient is tachycardia this morning  pt is NAD    Vital Signs Last 24 Hrs  T(C): 36.7 (13 Aug 2023 09:20), Max: 37.5 (13 Aug 2023 05:13)  T(F): 98.1 (13 Aug 2023 09:20), Max: 99.5 (13 Aug 2023 05:13)  HR: 112 (13 Aug 2023 09:20) (94 - 123)  BP: 161/95 (13 Aug 2023 09:20) (122/54 - 176/92)  BP(mean): --  RR: 20 (13 Aug 2023 09:20) (16 - 20)  SpO2: 99% (13 Aug 2023 09:20) (98% - 100%)    Parameters below as of 13 Aug 2023 09:20  Patient On (Oxygen Delivery Method): room air        08-12 @ 07:01  -  08-13 @ 07:00  --------------------------------------------------------  IN: 0 mL / OUT: 630 mL / NET: -630 mL        PHYSICAL EXAM:  General: No acute respiratory distress.  Eyes: conjunctiva and sclera clear  ENMT: Atraumatic, Normocephalic; Moist mucous membranes  Respiratory: Bilateral clear lungs; No rales, rhonchi, wheezing  Cardiovascular: S1S2+; no m/r/g  Gastrointestinal: Soft, Non-tender, Nondistended; Bowel sounds present  : mcclellan's cath with clear urine  Neuro: eyes are open; hemiparesis.  Ext:  1+pedal edema, No Cyanosis  Skin: No visible rashes      MEDICATIONS:  MEDICATIONS  (STANDING):  cefTRIAXone   IVPB 1000 milliGRAM(s) IV Intermittent every 24 hours  chlorhexidine 2% Cloths 1 Application(s) Topical daily  cloNIDine Patch 0.2 mG/24Hr(s) 1 patch Transdermal <User Schedule>  dextrose 5%. 1000 milliLiter(s) (50 mL/Hr) IV Continuous <Continuous>  hydrALAZINE Injectable 10 milliGRAM(s) IV Push every 6 hours  valproate sodium  IVPB 500 milliGRAM(s) IV Intermittent every 8 hours    MEDICATIONS  (PRN):          LABS:                        8.6    10.86 )-----------( 301      ( 13 Aug 2023 05:30 )             27.3     08-13    134<L>  |  109<H>  |  20<H>  ----------------------------<  69<L>  4.1   |  15<L>  |  1.96<H>    Ca    8.5      13 Aug 2023 05:30  Phos  4.6     08-13  Mg     1.9     08-13    TPro  6.7  /  Alb  2.1<L>  /  TBili  0.3  /  DBili  x   /  AST  16  /  ALT  <6<L>  /  AlkPhos  88  08-13      Urinalysis Basic - ( 13 Aug 2023 05:30 )    Color: x / Appearance: x / SG: x / pH: x  Gluc: 69 mg/dL / Ketone: x  / Bili: x / Urobili: x   Blood: x / Protein: x / Nitrite: x   Leuk Esterase: x / RBC: x / WBC x   Sq Epi: x / Non Sq Epi: x / Bacteria: x      Phosphorus: 4.6 mg/dL (08-13 @ 05:30)  Magnesium: 1.9 mg/dL (08-13 @ 05:30)    Urine studies    PTH and Vit D:         NEPHROLOGY MEDICAL CARE, [facility] - Dr. Ajay Green/ Dr. Mert Madison/ Dr. Chris Calderon/ Dr. Carson Cook    Date of Service: 08-13-23 @ 11:40    Patient was seen and examined at bedside.    CC: patient is tachycardia this morning  pt is NAD    Vital Signs Last 24 Hrs  T(C): 36.7 (13 Aug 2023 09:20), Max: 37.5 (13 Aug 2023 05:13)  T(F): 98.1 (13 Aug 2023 09:20), Max: 99.5 (13 Aug 2023 05:13)  HR: 112 (13 Aug 2023 09:20) (94 - 123)  BP: 161/95 (13 Aug 2023 09:20) (122/54 - 176/92)  BP(mean): --  RR: 20 (13 Aug 2023 09:20) (16 - 20)  SpO2: 99% (13 Aug 2023 09:20) (98% - 100%)    Parameters below as of 13 Aug 2023 09:20  Patient On (Oxygen Delivery Method): room air        08-12 @ 07:01  -  08-13 @ 07:00  --------------------------------------------------------  IN: 0 mL / OUT: 630 mL / NET: -630 mL        PHYSICAL EXAM:  General: No acute respiratory distress.  Eyes: conjunctiva and sclera clear  ENMT: Atraumatic, Normocephalic; Moist mucous membranes  Respiratory: Bilateral clear lungs; No rales, rhonchi, wheezing  Cardiovascular: S1S2+; no m/r/g  Gastrointestinal: Soft, Non-tender, Nondistended; Bowel sounds present  : mcclellan's cath with clear urine  Neuro: eyes are open; hemiparesis.  Ext:  1+pedal edema, No Cyanosis  Skin: No visible rashes      MEDICATIONS:  MEDICATIONS  (STANDING):  cefTRIAXone   IVPB 1000 milliGRAM(s) IV Intermittent every 24 hours  chlorhexidine 2% Cloths 1 Application(s) Topical daily  cloNIDine Patch 0.2 mG/24Hr(s) 1 patch Transdermal <User Schedule>  dextrose 5%. 1000 milliLiter(s) (50 mL/Hr) IV Continuous <Continuous>  hydrALAZINE Injectable 10 milliGRAM(s) IV Push every 6 hours  valproate sodium  IVPB 500 milliGRAM(s) IV Intermittent every 8 hours    MEDICATIONS  (PRN):          LABS:                        8.6    10.86 )-----------( 301      ( 13 Aug 2023 05:30 )             27.3     08-13    134<L>  |  109<H>  |  20<H>  ----------------------------<  69<L>  4.1   |  15<L>  |  1.96<H>    Ca    8.5      13 Aug 2023 05:30  Phos  4.6     08-13  Mg     1.9     08-13    TPro  6.7  /  Alb  2.1<L>  /  TBili  0.3  /  DBili  x   /  AST  16  /  ALT  <6<L>  /  AlkPhos  88  08-13      Urinalysis Basic - ( 13 Aug 2023 05:30 )    Color: x / Appearance: x / SG: x / pH: x  Gluc: 69 mg/dL / Ketone: x  / Bili: x / Urobili: x   Blood: x / Protein: x / Nitrite: x   Leuk Esterase: x / RBC: x / WBC x   Sq Epi: x / Non Sq Epi: x / Bacteria: x      Phosphorus: 4.6 mg/dL (08-13 @ 05:30)  Magnesium: 1.9 mg/dL (08-13 @ 05:30)    Urine studies    PTH and Vit D:         NEPHROLOGY MEDICAL CARE, [facility] - Dr. Ajay Green/ Dr. Mert Madison/ Dr. Chris Calderon/ Dr. Carson Cook    Date of Service: 08-13-23 @ 11:40    Patient was seen and examined at bedside.    CC: patient is tachycardia this morning  pt is NAD    Vital Signs Last 24 Hrs  T(C): 36.7 (13 Aug 2023 09:20), Max: 37.5 (13 Aug 2023 05:13)  T(F): 98.1 (13 Aug 2023 09:20), Max: 99.5 (13 Aug 2023 05:13)  HR: 112 (13 Aug 2023 09:20) (94 - 123)  BP: 161/95 (13 Aug 2023 09:20) (122/54 - 176/92)  BP(mean): --  RR: 20 (13 Aug 2023 09:20) (16 - 20)  SpO2: 99% (13 Aug 2023 09:20) (98% - 100%)    Parameters below as of 13 Aug 2023 09:20  Patient On (Oxygen Delivery Method): room air        08-12 @ 07:01  -  08-13 @ 07:00  --------------------------------------------------------  IN: 0 mL / OUT: 630 mL / NET: -630 mL        PHYSICAL EXAM:  General: No acute respiratory distress.  Eyes: conjunctiva and sclera clear  ENMT: Atraumatic, Normocephalic; Moist mucous membranes  Respiratory: Bilateral clear lungs; No rales, rhonchi, wheezing  Cardiovascular: S1S2+; no m/r/g  Gastrointestinal: Soft, Non-tender, Nondistended; Bowel sounds present  : mcclellan's cath with clear urine  Neuro: eyes are open; hemiparesis.  Ext:  1+pedal edema, No Cyanosis  Skin: No visible rashes      MEDICATIONS:  MEDICATIONS  (STANDING):  cefTRIAXone   IVPB 1000 milliGRAM(s) IV Intermittent every 24 hours  chlorhexidine 2% Cloths 1 Application(s) Topical daily  cloNIDine Patch 0.2 mG/24Hr(s) 1 patch Transdermal <User Schedule>  dextrose 5%. 1000 milliLiter(s) (50 mL/Hr) IV Continuous <Continuous>  hydrALAZINE Injectable 10 milliGRAM(s) IV Push every 6 hours  valproate sodium  IVPB 500 milliGRAM(s) IV Intermittent every 8 hours    MEDICATIONS  (PRN):          LABS:                        8.6    10.86 )-----------( 301      ( 13 Aug 2023 05:30 )             27.3     08-13    134<L>  |  109<H>  |  20<H>  ----------------------------<  69<L>  4.1   |  15<L>  |  1.96<H>    Ca    8.5      13 Aug 2023 05:30  Phos  4.6     08-13  Mg     1.9     08-13    TPro  6.7  /  Alb  2.1<L>  /  TBili  0.3  /  DBili  x   /  AST  16  /  ALT  <6<L>  /  AlkPhos  88  08-13      Urinalysis Basic - ( 13 Aug 2023 05:30 )    Color: x / Appearance: x / SG: x / pH: x  Gluc: 69 mg/dL / Ketone: x  / Bili: x / Urobili: x   Blood: x / Protein: x / Nitrite: x   Leuk Esterase: x / RBC: x / WBC x   Sq Epi: x / Non Sq Epi: x / Bacteria: x      Phosphorus: 4.6 mg/dL (08-13 @ 05:30)  Magnesium: 1.9 mg/dL (08-13 @ 05:30)    Urine studies    PTH and Vit D:

## 2023-08-13 NOTE — PROGRESS NOTE ADULT - SUBJECTIVE AND OBJECTIVE BOX
PGY-1 Progress Note discussed with attending    PAGER #: [[phone]] TILL 5:00 PM  PLEASE CONTACT ON CALL TEAM:  - On Call Team (Please refer to Tyler) FROM 5:00 PM - 8:30PM  - Nightfloat Team FROM 8:30 -7:30 AM    CHIEF COMPLAINT & BRIEF HOSPITAL COURSE: No acute overnight events. Ear is clearing up. Surgery is scheduled for 8/14.    INTERVAL HPI/OVERNIGHT EVENTS:   MEDICATIONS  (STANDING):  chlorhexidine 2% Cloths 1 Application(s) Topical daily  cloNIDine Patch 0.2 mG/24Hr(s) 1 patch Transdermal <User Schedule>  dextrose 5% with potassium chloride 20 mEq/L 1000 milliLiter(s) (50 mL/Hr) IV Continuous <Continuous>  hydrALAZINE Injectable 10 milliGRAM(s) IV Push every 6 hours  valproate sodium  IVPB 500 milliGRAM(s) IV Intermittent every 8 hours    MEDICATIONS  (PRN):      Vital Signs Last 24 Hrs  T(C): 36.5 (10 Aug 2023 14:03), Max: 37.1 (09 Aug 2023 21:00)  T(F): 97.7 (10 Aug 2023 14:03), Max: 98.8 (09 Aug 2023 21:00)  HR: 105 (10 Aug 2023 14:03) (88 - 110)  BP: 174/83 (10 Aug 2023 14:03) (134/62 - 174/83)  BP(mean): --  RR: 18 (10 Aug 2023 14:03) (18 - 18)  SpO2: 99% (10 Aug 2023 14:03) (98% - 99%)    Parameters below as of 10 Aug 2023 14:03  Patient On (Oxygen Delivery Method): room air        PHYSICAL EXAMINATION:  GENERAL: NAD, well built  HEAD:  Atraumatic, Normocephalic, slight bleeding out of right ear  EYES:  conjunctiva and sclera clear  CHEST/LUNG: Clear to auscultation. No rales, rhonchi, wheezing, or rubs  HEART: Regular rate and rhythm; No murmurs, rubs, or gallops  ABDOMEN: Soft, Nontender, Nondistended; Bowel sounds present  EXTREMITIES:  2+ Peripheral Pulses, No clubbing, cyanosis, or edema  SKIN: warm dry                          8.4    13.15 )-----------( 283      ( 10 Aug 2023 05:59 )             26.4     08-10    138  |  111<H>  |  21<H>  ----------------------------<  82  3.9   |  17<L>  |  1.93<H>    Ca    9.1      10 Aug 2023 05:59  Phos  4.2     08-10  Mg     2.1     08-10    TPro  6.8  /  Alb  2.3<L>  /  TBili  0.3  /  DBili  x   /  AST  16  /  ALT  11  /  AlkPhos  84  08-10    LIVER FUNCTIONS - ( 10 Aug 2023 05:59 )  Alb: 2.3 g/dL / Pro: 6.8 g/dL / ALK PHOS: 84 U/L / ALT: 11 U/L DA / AST: 16 U/L / GGT: x                   CAPILLARY BLOOD GLUCOSE      RADIOLOGY & ADDITIONAL TESTS:                   PGY-1 Progress Note discussed with attending    PAGER #: [[phone]] TILL 5:00 PM  PLEASE CONTACT ON CALL TEAM:  - On Call Team (Please refer to Tyler) FROM 5:00 PM - 8:30PM  - Nightfloat Team FROM 8:30 -7:30 AM    CHIEF COMPLAINT & BRIEF HOSPITAL COURSE: No acute overnight events. Ear is clearing up. Surgery is scheduled for 8/14.    INTERVAL HPI/OVERNIGHT EVENTS:   MEDICATIONS  (STANDING):  chlorhexidine 2% Cloths 1 Application(s) Topical daily  cloNIDine Patch 0.2 mG/24Hr(s) 1 patch Transdermal <User Schedule>  dextrose 5% with potassium chloride 20 mEq/L 1000 milliLiter(s) (50 mL/Hr) IV Continuous <Continuous>  hydrALAZINE Injectable 10 milliGRAM(s) IV Push every 6 hours  valproate sodium  IVPB 500 milliGRAM(s) IV Intermittent every 8 hours    MEDICATIONS  (PRN):      Vital Signs Last 24 Hrs  T(C): 36.5 (10 Aug 2023 14:03), Max: 37.1 (09 Aug 2023 21:00)  T(F): 97.7 (10 Aug 2023 14:03), Max: 98.8 (09 Aug 2023 21:00)  HR: 105 (10 Aug 2023 14:03) (88 - 110)  BP: 174/83 (10 Aug 2023 14:03) (134/62 - 174/83)  BP(mean): --  RR: 18 (10 Aug 2023 14:03) (18 - 18)  SpO2: 99% (10 Aug 2023 14:03) (98% - 99%)    Parameters below as of 10 Aug 2023 14:03  Patient On (Oxygen Delivery Method): room air        PHYSICAL EXAMINATION:  GENERAL: NAD, well built  HEAD:  Atraumatic, Normocephalic, slight bleeding out of right ear  EYES:  conjunctiva and sclera clear  CHEST/LUNG: Clear to auscultation. No rales, rhonchi, wheezing, or rubs  HEART: Regular rate and rhythm; No murmurs, rubs, or gallops  ABDOMEN: Soft, Nontender, Nondistended; Bowel sounds present  EXTREMITIES:  2+ Peripheral Pulses, No clubbing, cyanosis, or edema  SKIN: warm dry                          8.4    13.15 )-----------( 283      ( 10 Aug 2023 05:59 )             26.4     08-10    138  |  111<H>  |  21<H>  ----------------------------<  82  3.9   |  17<L>  |  1.93<H>    Ca    9.1      10 Aug 2023 05:59  Phos  4.2     08-10  Mg     2.1     08-10    TPro  6.8  /  Alb  2.3<L>  /  TBili  0.3  /  DBili  x   /  AST  16  /  ALT  11  /  AlkPhos  84  08-10    LIVER FUNCTIONS - ( 10 Aug 2023 05:59 )  Alb: 2.3 g/dL / Pro: 6.8 g/dL / ALK PHOS: 84 U/L / ALT: 11 U/L DA / AST: 16 U/L / GGT: x                   CAPILLARY BLOOD GLUCOSE      RADIOLOGY & ADDITIONAL TESTS:                   PGY-1 Progress Note discussed with attending    PAGER #: [[phone]] TILL 5:00 PM  PLEASE CONTACT ON CALL TEAM:  - On Call Team (Please refer to Tyler) FROM 5:00 PM - 8:30PM  - Nightfloat Team FROM 8:30 -7:30 AM    CHIEF COMPLAINT & BRIEF HOSPITAL COURSE: No acute overnight events. Ear is clearing up. Surgery is scheduled for 8/14.    INTERVAL HPI/OVERNIGHT EVENTS:   MEDICATIONS  (STANDING):  chlorhexidine 2% Cloths 1 Application(s) Topical daily  cloNIDine Patch 0.2 mG/24Hr(s) 1 patch Transdermal <User Schedule>  dextrose 5% with potassium chloride 20 mEq/L 1000 milliLiter(s) (50 mL/Hr) IV Continuous <Continuous>  hydrALAZINE Injectable 10 milliGRAM(s) IV Push every 6 hours  valproate sodium  IVPB 500 milliGRAM(s) IV Intermittent every 8 hours    MEDICATIONS  (PRN):      Vital Signs Last 24 Hrs  T(C): 36.5 (10 Aug 2023 14:03), Max: 37.1 (09 Aug 2023 21:00)  T(F): 97.7 (10 Aug 2023 14:03), Max: 98.8 (09 Aug 2023 21:00)  HR: 105 (10 Aug 2023 14:03) (88 - 110)  BP: 174/83 (10 Aug 2023 14:03) (134/62 - 174/83)  BP(mean): --  RR: 18 (10 Aug 2023 14:03) (18 - 18)  SpO2: 99% (10 Aug 2023 14:03) (98% - 99%)    Parameters below as of 10 Aug 2023 14:03  Patient On (Oxygen Delivery Method): room air        PHYSICAL EXAMINATION:  GENERAL: NAD, well built  HEAD:  Atraumatic, Normocephalic, slight bleeding out of right ear  EYES:  conjunctiva and sclera clear  CHEST/LUNG: Clear to auscultation. No rales, rhonchi, wheezing, or rubs  HEART: Regular rate and rhythm; No murmurs, rubs, or gallops  ABDOMEN: Soft, Nontender, Nondistended; Bowel sounds present  EXTREMITIES:  2+ Peripheral Pulses, No clubbing, cyanosis, or edema  SKIN: warm dry                          8.4    13.15 )-----------( 283      ( 10 Aug 2023 05:59 )             26.4     08-10    138  |  111<H>  |  21<H>  ----------------------------<  82  3.9   |  17<L>  |  1.93<H>    Ca    9.1      10 Aug 2023 05:59  Phos  4.2     08-10  Mg     2.1     08-10    TPro  6.8  /  Alb  2.3<L>  /  TBili  0.3  /  DBili  x   /  AST  16  /  ALT  11  /  AlkPhos  84  08-10    LIVER FUNCTIONS - ( 10 Aug 2023 05:59 )  Alb: 2.3 g/dL / Pro: 6.8 g/dL / ALK PHOS: 84 U/L / ALT: 11 U/L DA / AST: 16 U/L / GGT: x                   CAPILLARY BLOOD GLUCOSE      RADIOLOGY & ADDITIONAL TESTS:

## 2023-08-13 NOTE — PROGRESS NOTE ADULT - SUBJECTIVE AND OBJECTIVE BOX
[   ] ICU                                          [   ] CCU                                      [  X ] Medical Floor      Patient is a 77 year old female with dysphagia. GI consulted for Peg tube placement.     Patient is a 77 female from [facility] with past medical history significant for HTN, HLD, CVA (w/ residual aphasia and R sided hemiparesis/plegia) who was sent to the emergency room with for altered mental status. Patient is not able to provide any history. Patient is lying down in bed, awake and alert. However, patient does not speak, is not able to follow commands and does not turn face or move eyes when her name is called. Patient is admitted with CVA. Now patient with dysphagia, poor po intake, failure to thrive and weight loss. No abdominal pain, nausea, vomiting, hematemesis, hematochezia, melena, fever, chills, chest pain, SOB, cough, hematuria, dysuria  or diarrhea reported.      Patient is comfortable. No new complaints reported, No abdominal pain, N/V, hematemesis, hematochezia, melena, fever, chills, chest pain, SOB, cough or diarrhea reported.      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 1000 milliLiter(s) (70 mL/Hr) IV Continuous <Continuous>  enoxaparin Injectable 30 milliGRAM(s) SubCutaneous every 24 hours  hydrALAZINE Injectable 10 milliGRAM(s) IV Push every 6 hours  potassium chloride  10 mEq/100 mL IVPB 10 milliEquivalent(s) IV Intermittent every 1 hour  valproate sodium  IVPB 500 milliGRAM(s) IV Intermittent every 8 hours         SOCIAL HISTORY  Advanced Directives:       [ X ] Full Code       [  ] DNR  Marital Status:         [  ] M      [ X ] S      [  ] D       [  ] W  Children:       [ X ] Yes      [  ] No  Occupation:        [  ] Employed       [ X ] Unemployed       [  ] Retired  Diet:       [ X ] Regular       [  ] PEG feeding          [  ] NG tube feeding  Drug Use:           [ X ] Patient denied          [  ] Yes  Alcohol:           [ X ] No             [  ] Yes (socially)         [  ] Yes (chronic)  Tobacco:           [  ] Yes           [X  ] No      FAMILY HISTORY  [ X ] Heart Disease            [ X ] Diabetes             [ X ] HTN             [  ] Colon Cancer             [  ] Stomach Cancer              [  ] Pancreatic Cancer      VITALS  Vital Signs Last 24 Hrs  T(C): 36.7 (13 Aug 2023 09:20), Max: 37.5 (13 Aug 2023 05:13)  T(F): 98.1 (13 Aug 2023 09:20), Max: 99.5 (13 Aug 2023 05:13)  HR: 112 (13 Aug 2023 09:20) (96 - 123)  BP: 161/95 (13 Aug 2023 09:20) (122/54 - 176/92)   RR: 20 (13 Aug 2023 09:20) (16 - 20)  SpO2: 99% (13 Aug 2023 09:20) (98% - 99%)  Parameters below as of 13 Aug 2023 09:20  Patient On (Oxygen Delivery Method): room air       MEDICATIONS  (STANDING):  cefTRIAXone   IVPB 1000 milliGRAM(s) IV Intermittent every 24 hours  chlorhexidine 2% Cloths 1 Application(s) Topical daily  cloNIDine Patch 0.3 mG/24Hr(s) 1 patch Transdermal <User Schedule>  dextrose 5% + sodium chloride 0.45%. 1000 milliLiter(s) (40 mL/Hr) IV Continuous <Continuous>  hydrALAZINE Injectable 10 milliGRAM(s) IV Push every 6 hours  iron sucrose IVPB 200 milliGRAM(s) IV Intermittent every 24 hours  valproate sodium  IVPB 500 milliGRAM(s) IV Intermittent every 8 hours    MEDICATIONS  (PRN):                            8.6    10.86 )-----------( 301      ( 13 Aug 2023 05:30 )             27.3       08-13    134<L>  |  109<H>  |  20<H>  ----------------------------<  69<L>  4.1   |  15<L>  |  1.96<H>    Ca    8.5      13 Aug 2023 05:30  Phos  4.6     08-13  Mg     1.9     08-13    TPro  6.7  /  Alb  2.1<L>  /  TBili  0.3  /  DBili  x   /  AST  16  /  ALT  <6<L>  /  AlkPhos  88  08-13       [   ] ICU                                          [   ] CCU                                      [  X ] Medical Floor      Patient is a 77 year old female with dysphagia. GI consulted for Peg tube placement.     Patient is a 77 female from [facility] with past medical history significant for HTN, HLD, CVA (w/ residual aphasia and R sided hemiparesis/plegia) who was sent to the emergency room with for altered mental status. Patient is not able to provide any history. Patient is lying down in bed, awake and alert. However, patient does not speak, is not able to follow commands and does not turn face or move eyes when her name is called. Patient is admitted with CVA. Now patient with dysphagia, poor po intake, failure to thrive and weight loss. No abdominal pain, nausea, vomiting, hematemesis, hematochezia, melena, fever, chills, chest pain, SOB, cough, hematuria, dysuria  or diarrhea reported.      Patient is comfortable. No new complaints reported, No abdominal pain, N/V, hematemesis, hematochezia, melena, fever, chills, chest pain, SOB, cough or diarrhea reported.      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 1000 milliLiter(s) (70 mL/Hr) IV Continuous <Continuous>  enoxaparin Injectable 30 milliGRAM(s) SubCutaneous every 24 hours  hydrALAZINE Injectable 10 milliGRAM(s) IV Push every 6 hours  potassium chloride  10 mEq/100 mL IVPB 10 milliEquivalent(s) IV Intermittent every 1 hour  valproate sodium  IVPB 500 milliGRAM(s) IV Intermittent every 8 hours         SOCIAL HISTORY  Advanced Directives:       [ X ] Full Code       [  ] DNR  Marital Status:         [  ] M      [ X ] S      [  ] D       [  ] W  Children:       [ X ] Yes      [  ] No  Occupation:        [  ] Employed       [ X ] Unemployed       [  ] Retired  Diet:       [ X ] Regular       [  ] PEG feeding          [  ] NG tube feeding  Drug Use:           [ X ] Patient denied          [  ] Yes  Alcohol:           [ X ] No             [  ] Yes (socially)         [  ] Yes (chronic)  Tobacco:           [  ] Yes           [X  ] No      FAMILY HISTORY  [ X ] Heart Disease            [ X ] Diabetes             [ X ] HTN             [  ] Colon Cancer             [  ] Stomach Cancer              [  ] Pancreatic Cancer      VITALS  Vital Signs Last 24 Hrs  T(C): 36.7 (13 Aug 2023 09:20), Max: 37.5 (13 Aug 2023 05:13)  T(F): 98.1 (13 Aug 2023 09:20), Max: 99.5 (13 Aug 2023 05:13)  HR: 112 (13 Aug 2023 09:20) (96 - 123)  BP: 161/95 (13 Aug 2023 09:20) (122/54 - 176/92)   RR: 20 (13 Aug 2023 09:20) (16 - 20)  SpO2: 99% (13 Aug 2023 09:20) (98% - 99%)  Parameters below as of 13 Aug 2023 09:20  Patient On (Oxygen Delivery Method): room air       MEDICATIONS  (STANDING):  cefTRIAXone   IVPB 1000 milliGRAM(s) IV Intermittent every 24 hours  chlorhexidine 2% Cloths 1 Application(s) Topical daily  cloNIDine Patch 0.3 mG/24Hr(s) 1 patch Transdermal <User Schedule>  dextrose 5% + sodium chloride 0.45%. 1000 milliLiter(s) (40 mL/Hr) IV Continuous <Continuous>  hydrALAZINE Injectable 10 milliGRAM(s) IV Push every 6 hours  iron sucrose IVPB 200 milliGRAM(s) IV Intermittent every 24 hours  valproate sodium  IVPB 500 milliGRAM(s) IV Intermittent every 8 hours    MEDICATIONS  (PRN):                            8.6    10.86 )-----------( 301      ( 13 Aug 2023 05:30 )             27.3       08-13    134<L>  |  109<H>  |  20<H>  ----------------------------<  69<L>  4.1   |  15<L>  |  1.96<H>    Ca    8.5      13 Aug 2023 05:30  Phos  4.6     08-13  Mg     1.9     08-13    TPro  6.7  /  Alb  2.1<L>  /  TBili  0.3  /  DBili  x   /  AST  16  /  ALT  <6<L>  /  AlkPhos  88  08-13       [   ] ICU                                          [   ] CCU                                      [  X ] Medical Floor      Patient is a 77 year old female with dysphagia. GI consulted for Peg tube placement.     Patient is a 77 female from [facility] with past medical history significant for HTN, HLD, CVA (w/ residual aphasia and R sided hemiparesis/plegia) who was sent to the emergency room with for altered mental status. Patient is not able to provide any history. Patient is lying down in bed, awake and alert. However, patient does not speak, is not able to follow commands and does not turn face or move eyes when her name is called. Patient is admitted with CVA. Now patient with dysphagia, poor po intake, failure to thrive and weight loss. No abdominal pain, nausea, vomiting, hematemesis, hematochezia, melena, fever, chills, chest pain, SOB, cough, hematuria, dysuria  or diarrhea reported.      Patient is comfortable. No new complaints reported, No abdominal pain, N/V, hematemesis, hematochezia, melena, fever, chills, chest pain, SOB, cough or diarrhea reported.      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 1000 milliLiter(s) (70 mL/Hr) IV Continuous <Continuous>  enoxaparin Injectable 30 milliGRAM(s) SubCutaneous every 24 hours  hydrALAZINE Injectable 10 milliGRAM(s) IV Push every 6 hours  potassium chloride  10 mEq/100 mL IVPB 10 milliEquivalent(s) IV Intermittent every 1 hour  valproate sodium  IVPB 500 milliGRAM(s) IV Intermittent every 8 hours         SOCIAL HISTORY  Advanced Directives:       [ X ] Full Code       [  ] DNR  Marital Status:         [  ] M      [ X ] S      [  ] D       [  ] W  Children:       [ X ] Yes      [  ] No  Occupation:        [  ] Employed       [ X ] Unemployed       [  ] Retired  Diet:       [ X ] Regular       [  ] PEG feeding          [  ] NG tube feeding  Drug Use:           [ X ] Patient denied          [  ] Yes  Alcohol:           [ X ] No             [  ] Yes (socially)         [  ] Yes (chronic)  Tobacco:           [  ] Yes           [X  ] No      FAMILY HISTORY  [ X ] Heart Disease            [ X ] Diabetes             [ X ] HTN             [  ] Colon Cancer             [  ] Stomach Cancer              [  ] Pancreatic Cancer      VITALS  Vital Signs Last 24 Hrs  T(C): 36.7 (13 Aug 2023 09:20), Max: 37.5 (13 Aug 2023 05:13)  T(F): 98.1 (13 Aug 2023 09:20), Max: 99.5 (13 Aug 2023 05:13)  HR: 112 (13 Aug 2023 09:20) (96 - 123)  BP: 161/95 (13 Aug 2023 09:20) (122/54 - 176/92)   RR: 20 (13 Aug 2023 09:20) (16 - 20)  SpO2: 99% (13 Aug 2023 09:20) (98% - 99%)  Parameters below as of 13 Aug 2023 09:20  Patient On (Oxygen Delivery Method): room air       MEDICATIONS  (STANDING):  cefTRIAXone   IVPB 1000 milliGRAM(s) IV Intermittent every 24 hours  chlorhexidine 2% Cloths 1 Application(s) Topical daily  cloNIDine Patch 0.3 mG/24Hr(s) 1 patch Transdermal <User Schedule>  dextrose 5% + sodium chloride 0.45%. 1000 milliLiter(s) (40 mL/Hr) IV Continuous <Continuous>  hydrALAZINE Injectable 10 milliGRAM(s) IV Push every 6 hours  iron sucrose IVPB 200 milliGRAM(s) IV Intermittent every 24 hours  valproate sodium  IVPB 500 milliGRAM(s) IV Intermittent every 8 hours    MEDICATIONS  (PRN):                            8.6    10.86 )-----------( 301      ( 13 Aug 2023 05:30 )             27.3       08-13    134<L>  |  109<H>  |  20<H>  ----------------------------<  69<L>  4.1   |  15<L>  |  1.96<H>    Ca    8.5      13 Aug 2023 05:30  Phos  4.6     08-13  Mg     1.9     08-13    TPro  6.7  /  Alb  2.1<L>  /  TBili  0.3  /  DBili  x   /  AST  16  /  ALT  <6<L>  /  AlkPhos  88  08-13

## 2023-08-13 NOTE — PROGRESS NOTE ADULT - ASSESSMENT
77 female from [facility] PMHx HTN, HLD, CVA (w/ residual aphasia and R sided hemiparesis/plegia) who was sent to the hospital due to altered mental status,UTI.  1.Neurology eval noted for bleeding from ear, CT -.  2.UTI-s/p ABX.  3.HTN- inc clonidine patch .3mg q wk,IV hydralazine.  5.Lipid d/o-hold statin.  6.Plan for G tube placement 8/14/23.  7.Hypernatremia and SUSAN-IVF D5 with kcl.  8.Anemia-I  iron-d/w renal.  9.GI and DVT prophylaxis. 77 female from [facility] PMHx HTN, HLD, CVA (w/ residual aphasia and R sided hemiparesis/plegia) who was sent to the hospital due to altered mental status,UTI.  1.Neurology eval noted for bleeding from ear, CT -.  2.UTI-s/p ABX.  3.HTN- inc clonidine patch .3mg q wk,IV hydralazine.  5.Lipid d/o-hold statin.  6.Plan for G tube placement 8/14/23.  7.Hypernatremia and SUSAN-IVF D5 with kcl.  8.Anemia-I  iron-d/w renal.  9.GI and DVT prophylaxis. 77 female from [facility] PMHx HTN, HLD, CVA (w/ residual aphasia and R sided hemiparesis/plegia) who was sent to the hospital due to altered mental status,UTI.  1.Neurology eval noted for bleeding from ear, CT -.  2.UTI-s/p ABX.  3.HTN- inc clonidine patch .3mg q wk,IV hydralazine.  5.Lipid d/o-hold statin.  6.Plan for G tube placement 8/14/23.  7.Hypernatremia and SUSAN-IVF D5 with kcl.  8.Anemia-I  iron-d/w renal.  9.GI and DVT prophylaxis.

## 2023-08-13 NOTE — PROGRESS NOTE ADULT - ASSESSMENT
76 y/o female with failure to thrive, s/p unsuccessful PEG placement  afebrile, tachycardic 116, leukocytosis 10.86     Plan   - repeat coags preoperatively  - open G tube placement booked for 8/14/23  - medical optimization, document medical clearance in chart  - Keep NPO + IVF  - follow GI recommendations  - consent pending   - remainder of care as per primary team   78 y/o female with failure to thrive, s/p unsuccessful PEG placement  afebrile, tachycardic 116, leukocytosis 10.86     Plan   - repeat coags preoperatively  - open G tube placement booked for 8/14/23  - medical optimization, document medical clearance in chart  - Keep NPO + IVF  - follow GI recommendations  - consent pending   - remainder of care as per primary team

## 2023-08-13 NOTE — PROGRESS NOTE ADULT - SUBJECTIVE AND OBJECTIVE BOX
CHIEF COMPLAINT:Patient is a 77y old  Female who presents with a chief complaint of Altered mental status. Pt appears comfortable.    	  REVIEW OF SYSTEMS:  CONSTITUTIONAL: No fever, weight loss, or fatigue  EYES: No eye pain, visual disturbances, or discharge  ENT:  No difficulty hearing, tinnitus, vertigo; No sinus or throat pain  NECK: No pain or stiffness  RESPIRATORY: No cough, wheezing, chills or hemoptysis; No Shortness of Breath  CARDIOVASCULAR: No chest pain, palpitations, passing out, dizziness, or leg swelling  GASTROINTESTINAL: No abdominal or epigastric pain. No nausea, vomiting, or hematemesis; No diarrhea or constipation. No melena or hematochezia.  GENITOURINARY: No dysuria, frequency, hematuria, or incontinence  NEUROLOGICAL: No headaches, memory loss, loss of strength, numbness, or tremors  SKIN: No itching, burning, rashes, or lesions   LYMPH Nodes: No enlarged glands  ENDOCRINE: No heat or cold intolerance; No hair loss  MUSCULOSKELETAL: No joint pain or swelling; No muscle, back, or extremity pain  PSYCHIATRIC: No depression, anxiety, mood swings, or difficulty sleeping  HEME/LYMPH: No easy bruising, or bleeding gums  ALLERGY AND IMMUNOLOGIC: No hives or eczema	      PHYSICAL EXAM:  T(C): 36.7 (08-13-23 @ 09:20), Max: 37.5 (08-13-23 @ 05:13)  HR: 112 (08-13-23 @ 09:20) (94 - 123)  BP: 161/95 (08-13-23 @ 09:20) (122/54 - 176/92)  RR: 20 (08-13-23 @ 09:20) (16 - 20)  SpO2: 99% (08-13-23 @ 09:20) (98% - 100%)  Wt(kg): --  I&O's Summary    12 Aug 2023 07:01  -  13 Aug 2023 07:00  --------------------------------------------------------  IN: 0 mL / OUT: 630 mL / NET: -630 mL        Appearance: Normal	  HEENT:   Normal oral mucosa, PERRL, EOMI	  Lymphatic: No lymphadenopathy  Cardiovascular: Normal S1 S2, No JVD, No murmurs, No edema  Respiratory: Lungs clear to auscultation	  Gastrointestinal:  Soft, Non-tender, + BS	  Skin: No rashes, No ecchymoses, No cyanosis	  Extremities: Normal range of motion, No clubbing, cyanosis or edema  Vascular: Peripheral pulses palpable 2+ bilaterally    MEDICATIONS  (STANDING):  cefTRIAXone   IVPB 1000 milliGRAM(s) IV Intermittent every 24 hours  chlorhexidine 2% Cloths 1 Application(s) Topical daily  cloNIDine Patch 0.2 mG/24Hr(s) 1 patch Transdermal <User Schedule>  dextrose 5% + sodium chloride 0.45%. 1000 milliLiter(s) (40 mL/Hr) IV Continuous <Continuous>  hydrALAZINE Injectable 10 milliGRAM(s) IV Push every 6 hours  iron sucrose IVPB 200 milliGRAM(s) IV Intermittent every 24 hours  valproate sodium  IVPB 500 milliGRAM(s) IV Intermittent every 8 hours      	  LABS:	 	                          8.6    10.86 )-----------( 301      ( 13 Aug 2023 05:30 )             27.3     08-13    134<L>  |  109<H>  |  20<H>  ----------------------------<  69<L>  4.1   |  15<L>  |  1.96<H>    Ca    8.5      13 Aug 2023 05:30  Phos  4.6     08-13  Mg     1.9     08-13    TPro  6.7  /  Alb  2.1<L>  /  TBili  0.3  /  DBili  x   /  AST  16  /  ALT  <6<L>  /  AlkPhos  88  08-13    proBNP:   Lipid Profile: Cholesterol 152  LDL --  HDL 51  TG 93  Ldl calc 82  Ratio --    HgA1c:   TSH: Thyroid Stimulating Hormone, Serum: 3.59 uU/mL (07-28 @ 05:03)

## 2023-08-13 NOTE — PROGRESS NOTE ADULT - PROBLEM SELECTOR PLAN 2
c/w BP meds as schedule   f/u PRE-OP labs: CBC, CMP, Mg/Phos, Coags, T&S  JACK: 0.8 % risk of SANTANA, intra-operatively or up to 30-d post-op  RCRI: 1.0 points, Class II Risk with 6.0 % 30-d risk of death, MI, or cardiac arrest  Patient is at low risk for urgent risk procedure  Planned for peg tube placement by Surgery on 8/14

## 2023-08-13 NOTE — PROGRESS NOTE ADULT - NSPROGADDITIONALINFOA_GEN_ALL_CORE
Abdomen mostly tender in the LUQ  gallstone pancreatitis
spoke to the family and agreed for gastrostomy tube  All the options, benefits and risks were discussed  Informed phone consent obtained  Pt is also high risk for surgery

## 2023-08-13 NOTE — PROGRESS NOTE ADULT - SUBJECTIVE AND OBJECTIVE BOX
INTERVAL HPI/OVERNIGHT EVENTS:  Pt resting comfortably in bed. Appears comfortable.      MEDICATIONS  (STANDING):  cefTRIAXone   IVPB 1000 milliGRAM(s) IV Intermittent every 24 hours  chlorhexidine 2% Cloths 1 Application(s) Topical daily  cloNIDine Patch 0.2 mG/24Hr(s) 1 patch Transdermal <User Schedule>  dextrose 5%. 1000 milliLiter(s) (50 mL/Hr) IV Continuous <Continuous>  hydrALAZINE Injectable 10 milliGRAM(s) IV Push every 6 hours  valproate sodium  IVPB 500 milliGRAM(s) IV Intermittent every 8 hours    MEDICATIONS  (PRN):      Vital Signs Last 24 Hrs  T(C): 37.5 (13 Aug 2023 05:13), Max: 37.5 (13 Aug 2023 05:13)  T(F): 99.5 (13 Aug 2023 05:13), Max: 99.5 (13 Aug 2023 05:13)  HR: 116 (13 Aug 2023 05:13) (94 - 123)  BP: 122/54 (13 Aug 2023 05:13) (122/54 - 176/92)  BP(mean): --  RR: 16 (13 Aug 2023 05:13) (16 - 18)  SpO2: 98% (13 Aug 2023 05:13) (98% - 100%)    Parameters below as of 13 Aug 2023 05:13  Patient On (Oxygen Delivery Method): room air        Physical:  General: Awake. NAD.  Resp: Unlabored breathing.   Abdomen: Soft nondistended, nontender to palpation. Well healed lap scars noted. No grimacing.     I&O's Detail    12 Aug 2023 07:01  -  13 Aug 2023 07:00  --------------------------------------------------------  IN:  Total IN: 0 mL    OUT:    Indwelling Catheter - Urethral (mL): 630 mL  Total OUT: 630 mL    Total NET: -630 mL          LABS:                        8.6    10.86 )-----------( 301      ( 13 Aug 2023 05:30 )             27.3             08-13    134<L>  |  109<H>  |  20<H>  ----------------------------<  69<L>  4.1   |  15<L>  |  1.96<H>    Ca    8.5      13 Aug 2023 05:30  Phos  4.6     08-13  Mg     1.9     08-13    TPro  6.7  /  Alb  2.1<L>  /  TBili  0.3  /  DBili  x   /  AST  16  /  ALT  <6<L>  /  AlkPhos  88  08-13

## 2023-08-14 ENCOUNTER — TRANSCRIPTION ENCOUNTER (OUTPATIENT)
Age: 78
End: 2023-08-14

## 2023-08-14 LAB
ALBUMIN SERPL ELPH-MCNC: 2 G/DL — LOW (ref 3.5–5)
ALP SERPL-CCNC: 90 U/L — SIGNIFICANT CHANGE UP (ref 40–120)
ALT FLD-CCNC: 6 U/L DA — LOW (ref 10–60)
ANION GAP SERPL CALC-SCNC: 11 MMOL/L — SIGNIFICANT CHANGE UP (ref 5–17)
APTT BLD: 31 SEC — SIGNIFICANT CHANGE UP (ref 24.5–35.6)
AST SERPL-CCNC: 16 U/L — SIGNIFICANT CHANGE UP (ref 10–40)
BASOPHILS # BLD AUTO: 0.06 K/UL — SIGNIFICANT CHANGE UP (ref 0–0.2)
BASOPHILS NFR BLD AUTO: 0.6 % — SIGNIFICANT CHANGE UP (ref 0–2)
BILIRUB SERPL-MCNC: 0.2 MG/DL — SIGNIFICANT CHANGE UP (ref 0.2–1.2)
BUN SERPL-MCNC: 22 MG/DL — HIGH (ref 7–18)
CALCIUM SERPL-MCNC: 8.4 MG/DL — SIGNIFICANT CHANGE UP (ref 8.4–10.5)
CHLORIDE SERPL-SCNC: 109 MMOL/L — HIGH (ref 96–108)
CO2 SERPL-SCNC: 15 MMOL/L — LOW (ref 22–31)
CREAT SERPL-MCNC: 1.99 MG/DL — HIGH (ref 0.5–1.3)
EGFR: 25 ML/MIN/1.73M2 — LOW
EOSINOPHIL # BLD AUTO: 0.04 K/UL — SIGNIFICANT CHANGE UP (ref 0–0.5)
EOSINOPHIL NFR BLD AUTO: 0.4 % — SIGNIFICANT CHANGE UP (ref 0–6)
GLUCOSE BLDC GLUCOMTR-MCNC: 78 MG/DL — SIGNIFICANT CHANGE UP (ref 70–99)
GLUCOSE BLDC GLUCOMTR-MCNC: 82 MG/DL — SIGNIFICANT CHANGE UP (ref 70–99)
GLUCOSE BLDC GLUCOMTR-MCNC: 92 MG/DL — SIGNIFICANT CHANGE UP (ref 70–99)
GLUCOSE SERPL-MCNC: 78 MG/DL — SIGNIFICANT CHANGE UP (ref 70–99)
HCT VFR BLD CALC: 26.6 % — LOW (ref 34.5–45)
HGB BLD-MCNC: 8.4 G/DL — LOW (ref 11.5–15.5)
IMM GRANULOCYTES NFR BLD AUTO: 5.2 % — HIGH (ref 0–0.9)
INR BLD: 1.22 RATIO — HIGH (ref 0.85–1.18)
LYMPHOCYTES # BLD AUTO: 1.14 K/UL — SIGNIFICANT CHANGE UP (ref 1–3.3)
LYMPHOCYTES # BLD AUTO: 10.6 % — LOW (ref 13–44)
MAGNESIUM SERPL-MCNC: 1.8 MG/DL — SIGNIFICANT CHANGE UP (ref 1.6–2.6)
MCHC RBC-ENTMCNC: 27.7 PG — SIGNIFICANT CHANGE UP (ref 27–34)
MCHC RBC-ENTMCNC: 31.6 GM/DL — LOW (ref 32–36)
MCV RBC AUTO: 87.8 FL — SIGNIFICANT CHANGE UP (ref 80–100)
MONOCYTES # BLD AUTO: 1.44 K/UL — HIGH (ref 0–0.9)
MONOCYTES NFR BLD AUTO: 13.4 % — SIGNIFICANT CHANGE UP (ref 2–14)
NEUTROPHILS # BLD AUTO: 7.5 K/UL — HIGH (ref 1.8–7.4)
NEUTROPHILS NFR BLD AUTO: 69.8 % — SIGNIFICANT CHANGE UP (ref 43–77)
NRBC # BLD: 0 /100 WBCS — SIGNIFICANT CHANGE UP (ref 0–0)
PHOSPHATE SERPL-MCNC: 4.9 MG/DL — HIGH (ref 2.5–4.5)
PLATELET # BLD AUTO: 270 K/UL — SIGNIFICANT CHANGE UP (ref 150–400)
POTASSIUM SERPL-MCNC: 4.1 MMOL/L — SIGNIFICANT CHANGE UP (ref 3.5–5.3)
POTASSIUM SERPL-SCNC: 4.1 MMOL/L — SIGNIFICANT CHANGE UP (ref 3.5–5.3)
PROT SERPL-MCNC: 6.3 G/DL — SIGNIFICANT CHANGE UP (ref 6–8.3)
PROTHROM AB SERPL-ACNC: 13.8 SEC — HIGH (ref 9.5–13)
RBC # BLD: 3.03 M/UL — LOW (ref 3.8–5.2)
RBC # FLD: 15.9 % — HIGH (ref 10.3–14.5)
SODIUM SERPL-SCNC: 135 MMOL/L — SIGNIFICANT CHANGE UP (ref 135–145)
WBC # BLD: 10.74 K/UL — HIGH (ref 3.8–10.5)
WBC # FLD AUTO: 10.74 K/UL — HIGH (ref 3.8–10.5)

## 2023-08-14 PROCEDURE — 43830 GSTRST OPEN WO CONSTJ TUBE: CPT

## 2023-08-14 DEVICE — FEEDING TUBE GASTROSTOMY MIC-KEY 18FR: Type: IMPLANTABLE DEVICE | Status: FUNCTIONAL

## 2023-08-14 RX ORDER — IRON SUCROSE 20 MG/ML
200 INJECTION, SOLUTION INTRAVENOUS EVERY 24 HOURS
Refills: 0 | Status: COMPLETED | OUTPATIENT
Start: 2023-08-14 | End: 2023-08-16

## 2023-08-14 RX ORDER — MORPHINE SULFATE 50 MG/1
2 CAPSULE, EXTENDED RELEASE ORAL EVERY 6 HOURS
Refills: 0 | Status: DISCONTINUED | OUTPATIENT
Start: 2023-08-14 | End: 2023-08-16

## 2023-08-14 RX ORDER — SODIUM CHLORIDE 9 MG/ML
1000 INJECTION, SOLUTION INTRAVENOUS
Refills: 0 | Status: DISCONTINUED | OUTPATIENT
Start: 2023-08-14 | End: 2023-08-17

## 2023-08-14 RX ORDER — HYDRALAZINE HCL 50 MG
10 TABLET ORAL EVERY 6 HOURS
Refills: 0 | Status: DISCONTINUED | OUTPATIENT
Start: 2023-08-14 | End: 2023-08-16

## 2023-08-14 RX ORDER — FENTANYL CITRATE 50 UG/ML
25 INJECTION INTRAVENOUS
Refills: 0 | Status: DISCONTINUED | OUTPATIENT
Start: 2023-08-14 | End: 2023-08-14

## 2023-08-14 RX ORDER — VALPROIC ACID (AS SODIUM SALT) 250 MG/5ML
500 SOLUTION, ORAL ORAL EVERY 8 HOURS
Refills: 0 | Status: DISCONTINUED | OUTPATIENT
Start: 2023-08-14 | End: 2023-08-29

## 2023-08-14 RX ORDER — HYDROMORPHONE HYDROCHLORIDE 2 MG/ML
0.5 INJECTION INTRAMUSCULAR; INTRAVENOUS; SUBCUTANEOUS
Refills: 0 | Status: DISCONTINUED | OUTPATIENT
Start: 2023-08-14 | End: 2023-08-14

## 2023-08-14 RX ORDER — CHLORHEXIDINE GLUCONATE 213 G/1000ML
1 SOLUTION TOPICAL DAILY
Refills: 0 | Status: DISCONTINUED | OUTPATIENT
Start: 2023-08-14 | End: 2023-08-24

## 2023-08-14 RX ORDER — ACETAMINOPHEN 500 MG
1000 TABLET ORAL ONCE
Refills: 0 | Status: DISCONTINUED | OUTPATIENT
Start: 2023-08-14 | End: 2023-08-14

## 2023-08-14 RX ADMIN — Medication 55 MILLIGRAM(S): at 04:35

## 2023-08-14 RX ADMIN — Medication 10 MILLIGRAM(S): at 05:34

## 2023-08-14 RX ADMIN — Medication 10 MILLIGRAM(S): at 12:44

## 2023-08-14 RX ADMIN — Medication 10 MILLIGRAM(S): at 20:08

## 2023-08-14 RX ADMIN — Medication 10 MILLIGRAM(S): at 00:29

## 2023-08-14 RX ADMIN — Medication 55 MILLIGRAM(S): at 12:50

## 2023-08-14 RX ADMIN — IRON SUCROSE 110 MILLIGRAM(S): 20 INJECTION, SOLUTION INTRAVENOUS at 21:53

## 2023-08-14 RX ADMIN — Medication 1 PATCH: at 07:58

## 2023-08-14 RX ADMIN — Medication 55 MILLIGRAM(S): at 22:14

## 2023-08-14 RX ADMIN — CHLORHEXIDINE GLUCONATE 1 APPLICATION(S): 213 SOLUTION TOPICAL at 12:50

## 2023-08-14 NOTE — PROGRESS NOTE ADULT - ASSESSMENT
Patient is a 77 female from [facility] PMHx HTN, HLD, CVA (w/ residual aphasia and R sided hemiparesis/plegia) who was sent to the hospital due to altered mental status. Patient is being admitted to medicine for further work up and rule out stroke vs encephalopathy (metabolic vs infectious).  Patient is a 77 female from [facility] PMHx HTN, HLD, CVA (w/ residual aphasia and R sided hemiparesis/plegia) who was sent to the hospital due to altered mental status. Patient is being admitted to medicine for further work up and rule out stroke vs encephalopathy (metabolic vs infectious).  Patient is a 77 female from [facility] PMHx HTN, HLD, CVA (w/ residual aphasia and R sided hemiparesis/plegia) who was sent to the hospital due to altered mental status. Patient is being admitted to medicine for further work up and rule out stroke vs encephalopathy (metabolic vs infectious).

## 2023-08-14 NOTE — PROGRESS NOTE ADULT - ASSESSMENT
77 female from [facility] PMHx HTN, HLD, CVA (w/ residual aphasia and R sided hemiparesis/plegia) who was sent to the hospital due to altered mental status,UTI.  1.Neurology eval noted for bleeding from ear, CT -.  2.UTI-s/p ABX.  3.HTN-  clonidine patch .3mg q wk,IV hydralazine.  5.Lipid d/o-hold statin.  6.Plan for G tube placement today.Pt at moderate risk for naya-operative cardiac complication.  7.Hypernatremia and SUSAN-IVF D5 with kcl.  8.Anemia-I  iron-d/w renal.  9.GI and DVT prophylaxis. 77 female from [facility] PMHx HTN, HLD, CVA (w/ residual aphasia and R sided hemiparesis/plegia) who was sent to the hospital due to altered mental status,UTI.  1.Neurology eval noted for bleeding from ear, CT -.  2.UTI-s/p ABX.  3.HTN-  clonidine patch .3mg q wk,IV hydralazine.  5.Lipid d/o-hold statin.  6.Plan for G tube placement today.Pt at moderate risk for naya-operative cardiac complication.  7.Hypernatremia and SUSAN-IVF D5 with kcl.  8.Anemia-I  iron-d/w renal.  9.GI and DVT prophylaxis. 77 female from [facility] PMHx HTN, HLD, CVA (w/ residual aphasia and R sided hemiparesis/plegia) who was sent to the hospital due to altered mental status,UTI.  1.Neurology eval noted for bleeding from ear, CT -.  2.UTI-s/p ABX.  3.HTN-  clonidine patch .3mg q wk,IV hydralazine.  5.Lipid d/o-hold statin.  6.Plan for G tube placement today.Pt at moderate risk for naya-operative cardiac complication.  7.Hypernatremia and SUSAN-IVF D5 with kcl.  8.Anemia-I  iron-d/w renal.  9.GI and DVT prophylaxis.

## 2023-08-14 NOTE — PROGRESS NOTE ADULT - SUBJECTIVE AND OBJECTIVE BOX
[   ] ICU                                          [   ] CCU                                      [  X ] Medical Floor      Patient is a 77 year old female with dysphagia. GI consulted for Peg tube placement.     Patient is a 77 female from [facility] with past medical history significant for HTN, HLD, CVA (w/ residual aphasia and R sided hemiparesis/plegia) who was sent to the emergency room with for altered mental status. Patient is not able to provide any history. Patient is lying down in bed, awake and alert. However, patient does not speak, is not able to follow commands and does not turn face or move eyes when her name is called. Patient is admitted with CVA. Now patient with dysphagia, poor po intake, failure to thrive and weight loss. No abdominal pain, nausea, vomiting, hematemesis, hematochezia, melena, fever, chills, chest pain, SOB, cough, hematuria, dysuria  or diarrhea reported.      Patient is comfortable. No new complaints reported, No abdominal pain, N/V, hematemesis, hematochezia, melena, fever, chills, chest pain, SOB, cough or diarrhea reported.      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 1000 milliLiter(s) (70 mL/Hr) IV Continuous <Continuous>  enoxaparin Injectable 30 milliGRAM(s) SubCutaneous every 24 hours  hydrALAZINE Injectable 10 milliGRAM(s) IV Push every 6 hours  potassium chloride  10 mEq/100 mL IVPB 10 milliEquivalent(s) IV Intermittent every 1 hour  valproate sodium  IVPB 500 milliGRAM(s) IV Intermittent every 8 hours         SOCIAL HISTORY  Advanced Directives:       [ X ] Full Code       [  ] DNR  Marital Status:         [  ] M      [ X ] S      [  ] D       [  ] W  Children:       [ X ] Yes      [  ] No  Occupation:        [  ] Employed       [ X ] Unemployed       [  ] Retired  Diet:       [ X ] Regular       [  ] PEG feeding          [  ] NG tube feeding  Drug Use:           [ X ] Patient denied          [  ] Yes  Alcohol:           [ X ] No             [  ] Yes (socially)         [  ] Yes (chronic)  Tobacco:           [  ] Yes           [X  ] No      FAMILY HISTORY  [ X ] Heart Disease            [ X ] Diabetes             [ X ] HTN             [  ] Colon Cancer             [  ] Stomach Cancer              [  ] Pancreatic Cancer      VITALS  Vital Signs Last 24 Hrs  T(C): 36.9 (14 Aug 2023 05:06), Max: 37 (13 Aug 2023 21:30)  T(F): 98.4 (14 Aug 2023 05:06), Max: 98.6 (13 Aug 2023 21:30)  HR: 101 (14 Aug 2023 12:51) (98 - 107)  BP: 166/96 (14 Aug 2023 12:51) (160/75 - 175/84)   RR: 16 (14 Aug 2023 12:51) (16 - 18)  SpO2: 99% (14 Aug 2023 12:51) (98% - 100%)  Parameters below as of 14 Aug 2023 12:51  Patient On (Oxygen Delivery Method): room air                             8.4    10.74 )-----------( 270      ( 14 Aug 2023 04:55 )             26.6       08-14    135  |  109<H>  |  22<H>  ----------------------------<  78  4.1   |  15<L>  |  1.99<H>    Ca    8.4      14 Aug 2023 04:55  Phos  4.9     08-14  Mg     1.8     08-14    TPro  6.3  /  Alb  2.0<L>  /  TBili  0.2  /  DBili  x   /  AST  16  /  ALT  6<L>  /  AlkPhos  90  08-14      PT/INR - ( 14 Aug 2023 04:55 )   PT: 13.8 sec;   INR: 1.22 ratio         PTT - ( 14 Aug 2023 04:55 )  PTT:31.0 sec [   ] ICU                                          [   ] CCU                                      [  X ] Medical Floor      Patient is a 77 year old female with dysphagia. GI consulted for Peg tube placement.     Patient is a 77 female from [facility] with past medical history significant for HTN, HLD, CVA (w/ residual aphasia and R sided hemiparesis/plegia) who was sent to the emergency room with for altered mental status. Patient is not able to provide any history. Patient is lying down in bed, awake and alert. However, patient does not speak, is not able to follow commands and does not turn face or move eyes when her name is called. Patient is admitted with CVA. Now patient with dysphagia, poor po intake, failure to thrive and weight loss. No abdominal pain, nausea, vomiting, hematemesis, hematochezia, melena, fever, chills, chest pain, SOB, cough, hematuria, dysuria  or diarrhea reported.      Patient is comfortable. No new complaints reported, No abdominal pain, N/V, hematemesis, hematochezia, melena, fever, chills, chest pain, SOB, cough or diarrhea reported.      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 1000 milliLiter(s) (70 mL/Hr) IV Continuous <Continuous>  enoxaparin Injectable 30 milliGRAM(s) SubCutaneous every 24 hours  hydrALAZINE Injectable 10 milliGRAM(s) IV Push every 6 hours  potassium chloride  10 mEq/100 mL IVPB 10 milliEquivalent(s) IV Intermittent every 1 hour  valproate sodium  IVPB 500 milliGRAM(s) IV Intermittent every 8 hours         SOCIAL HISTORY  Advanced Directives:       [ X ] Full Code       [  ] DNR  Marital Status:         [  ] M      [ X ] S      [  ] D       [  ] W  Children:       [ X ] Yes      [  ] No  Occupation:        [  ] Employed       [ X ] Unemployed       [  ] Retired  Diet:       [ X ] Regular       [  ] PEG feeding          [  ] NG tube feeding  Drug Use:           [ X ] Patient denied          [  ] Yes  Alcohol:           [ X ] No             [  ] Yes (socially)         [  ] Yes (chronic)  Tobacco:           [  ] Yes           [X  ] No      FAMILY HISTORY  [ X ] Heart Disease            [ X ] Diabetes             [ X ] HTN             [  ] Colon Cancer             [  ] Stomach Cancer              [  ] Pancreatic Cancer      VITALS  Vital Signs Last 24 Hrs  T(C): 36.9 (14 Aug 2023 05:06), Max: 37 (13 Aug 2023 21:30)  T(F): 98.4 (14 Aug 2023 05:06), Max: 98.6 (13 Aug 2023 21:30)  HR: 101 (14 Aug 2023 12:51) (98 - 107)  BP: 166/96 (14 Aug 2023 12:51) (160/75 - 175/84)   RR: 16 (14 Aug 2023 12:51) (16 - 18)  SpO2: 99% (14 Aug 2023 12:51) (98% - 100%)  Parameters below as of 14 Aug 2023 12:51  Patient On (Oxygen Delivery Method): room air                             8.4    10.74 )-----------( 270      ( 14 Aug 2023 04:55 )             26.6       08-14    135  |  109<H>  |  22<H>  ----------------------------<  78  4.1   |  15<L>  |  1.99<H>    Ca    8.4      14 Aug 2023 04:55  Phos  4.9     08-14  Mg     1.8     08-14    TPro  6.3  /  Alb  2.0<L>  /  TBili  0.2  /  DBili  x   /  AST  16  /  ALT  6<L>  /  AlkPhos  90  08-14      PT/INR - ( 14 Aug 2023 04:55 )   PT: 13.8 sec;   INR: 1.22 ratio         PTT - ( 14 Aug 2023 04:55 )  PTT:31.0 sec [   ] ICU                                          [   ] CCU                                      [  X ] Medical Floor      Patient is a 77 year old female with dysphagia. GI consulted for Peg tube placement.     Patient is a 77 female from [facility] with past medical history significant for HTN, HLD, CVA (w/ residual aphasia and R sided hemiparesis/plegia) who was sent to the emergency room with for altered mental status. Patient is not able to provide any history. Patient is lying down in bed, awake and alert. However, patient does not speak, is not able to follow commands and does not turn face or move eyes when her name is called. Patient is admitted with CVA. Now patient with dysphagia, poor po intake, failure to thrive and weight loss. No abdominal pain, nausea, vomiting, hematemesis, hematochezia, melena, fever, chills, chest pain, SOB, cough, hematuria, dysuria  or diarrhea reported.      Patient is comfortable. No new complaints reported, No abdominal pain, N/V, hematemesis, hematochezia, melena, fever, chills, chest pain, SOB, cough or diarrhea reported.      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 1000 milliLiter(s) (70 mL/Hr) IV Continuous <Continuous>  enoxaparin Injectable 30 milliGRAM(s) SubCutaneous every 24 hours  hydrALAZINE Injectable 10 milliGRAM(s) IV Push every 6 hours  potassium chloride  10 mEq/100 mL IVPB 10 milliEquivalent(s) IV Intermittent every 1 hour  valproate sodium  IVPB 500 milliGRAM(s) IV Intermittent every 8 hours         SOCIAL HISTORY  Advanced Directives:       [ X ] Full Code       [  ] DNR  Marital Status:         [  ] M      [ X ] S      [  ] D       [  ] W  Children:       [ X ] Yes      [  ] No  Occupation:        [  ] Employed       [ X ] Unemployed       [  ] Retired  Diet:       [ X ] Regular       [  ] PEG feeding          [  ] NG tube feeding  Drug Use:           [ X ] Patient denied          [  ] Yes  Alcohol:           [ X ] No             [  ] Yes (socially)         [  ] Yes (chronic)  Tobacco:           [  ] Yes           [X  ] No      FAMILY HISTORY  [ X ] Heart Disease            [ X ] Diabetes             [ X ] HTN             [  ] Colon Cancer             [  ] Stomach Cancer              [  ] Pancreatic Cancer      VITALS  Vital Signs Last 24 Hrs  T(C): 36.9 (14 Aug 2023 05:06), Max: 37 (13 Aug 2023 21:30)  T(F): 98.4 (14 Aug 2023 05:06), Max: 98.6 (13 Aug 2023 21:30)  HR: 101 (14 Aug 2023 12:51) (98 - 107)  BP: 166/96 (14 Aug 2023 12:51) (160/75 - 175/84)   RR: 16 (14 Aug 2023 12:51) (16 - 18)  SpO2: 99% (14 Aug 2023 12:51) (98% - 100%)  Parameters below as of 14 Aug 2023 12:51  Patient On (Oxygen Delivery Method): room air                             8.4    10.74 )-----------( 270      ( 14 Aug 2023 04:55 )             26.6       08-14    135  |  109<H>  |  22<H>  ----------------------------<  78  4.1   |  15<L>  |  1.99<H>    Ca    8.4      14 Aug 2023 04:55  Phos  4.9     08-14  Mg     1.8     08-14    TPro  6.3  /  Alb  2.0<L>  /  TBili  0.2  /  DBili  x   /  AST  16  /  ALT  6<L>  /  AlkPhos  90  08-14      PT/INR - ( 14 Aug 2023 04:55 )   PT: 13.8 sec;   INR: 1.22 ratio         PTT - ( 14 Aug 2023 04:55 )  PTT:31.0 sec

## 2023-08-14 NOTE — PROGRESS NOTE ADULT - SUBJECTIVE AND OBJECTIVE BOX
CHIEF COMPLAINT:Patient is a 77y old  Female who presents with a chief complaint of Altered mental status. Pt appears comfortable.    	  REVIEW OF SYSTEMS:    [x ] Unable to obtain    PHYSICAL EXAM:  T(C): 36.9 (08-14-23 @ 05:06), Max: 37 (08-13-23 @ 21:30)  HR: 107 (08-14-23 @ 05:06) (97 - 112)  BP: 161/90 (08-14-23 @ 05:06) (152/80 - 175/84)  RR: 16 (08-14-23 @ 05:06) (16 - 20)  SpO2: 100% (08-14-23 @ 05:06) (97% - 100%)  Wt(kg): --  I&O's Summary    13 Aug 2023 07:01  -  14 Aug 2023 07:00  --------------------------------------------------------  IN: 0 mL / OUT: 400 mL / NET: -400 mL        Appearance: Normal	  HEENT:   Normal oral mucosa, PERRL, EOMI	  Lymphatic: No lymphadenopathy  Cardiovascular: Normal S1 S2, No JVD, No murmurs, +1 edema  Respiratory: Lungs clear to auscultation	  Gastrointestinal:  Soft, Non-tender, + BS	  Skin: No rashes, No ecchymoses, No cyanosis	  Extremities: Normal range of motion, No clubbing, cyanosis +1 edema  Vascular: Peripheral pulses palpable 2+ bilaterally    MEDICATIONS  (STANDING):  chlorhexidine 2% Cloths 1 Application(s) Topical daily  cloNIDine Patch 0.3 mG/24Hr(s) 1 patch Transdermal <User Schedule>  dextrose 5% + sodium chloride 0.45%. 1000 milliLiter(s) (40 mL/Hr) IV Continuous <Continuous>  hydrALAZINE Injectable 10 milliGRAM(s) IV Push every 6 hours  iron sucrose IVPB 200 milliGRAM(s) IV Intermittent every 24 hours  valproate sodium  IVPB 500 milliGRAM(s) IV Intermittent every 8 hours        LABS:	 	                            8.4    10.74 )-----------( 270      ( 14 Aug 2023 04:55 )             26.6     08-14    135  |  109<H>  |  22<H>  ----------------------------<  78  4.1   |  15<L>  |  1.99<H>    Ca    8.4      14 Aug 2023 04:55  Phos  4.9     08-14  Mg     1.8     08-14    TPro  6.3  /  Alb  2.0<L>  /  TBili  0.2  /  DBili  x   /  AST  16  /  ALT  6<L>  /  AlkPhos  90  08-14    proBNP:   Lipid Profile: Cholesterol 152  LDL --  HDL 51  TG 93  Ldl calc 82  Ratio --    HgA1c:   TSH: Thyroid Stimulating Hormone, Serum: 3.59 uU/mL (07-28 @ 05:03)      	      < from: Transthoracic Echocardiogram (07.28.23 @ 07:13) >  OBSERVATIONS:  Mitral Valve: Normal mitral valve. Trace mitral  regurgitation.  Aortic Root: Normal aortic root.  Aortic Valve: Normal trileaflet aortic valve.  Left Atrium: Normal left atrium.  LA volume index = 21  cc/m2.  Left Ventricle: Endocardium not well visualized; grossly  normal left ventricular systolic function.   Segmental wall  motion could not be assessed. Increased relative wall  thickness with normal left ventricular (LV) mass index,  consistent with concentric LV remodeling. Grade I diastolic  dysfunction (Impaired relaxation, mild).  Right Heart: Normal right atrium. Normal right ventricular  size and function. There is trace tricuspid regurgitation.  There is trace pulmonic regurgitation.  Pericardium/PleuraNormal pericardium with no pericardial  effusion.  Hemodynamic: RA Pressure is 8 mm Hg. RV systolic pressure  is 46 mm Hg. Mild pulmonary hypertension. Agitated saline  injection was negative for intracardiac shunt.    < end of copied text >

## 2023-08-14 NOTE — BRIEF OPERATIVE NOTE - OPERATION/FINDINGS
open gastrostomy tube placement, 18 Prydeinig tube, 10cc saline open gastrostomy tube placement, 18 [place]ian tube, 10cc saline open gastrostomy tube placement, 18 [place] tube, 10cc saline

## 2023-08-14 NOTE — PROGRESS NOTE ADULT - SUBJECTIVE AND OBJECTIVE BOX
PGY-1 Progress Note discussed with attending    PAGER #: [[phone]] TILL 5:00 PM  PLEASE CONTACT ON CALL TEAM:  - On Call Team (Please refer to Tyler) FROM 5:00 PM - 8:30PM  - Nightfloat Team FROM 8:30 -7:30 AM    CHIEF COMPLAINT & BRIEF HOSPITAL COURSE:    INTERVAL HPI/OVERNIGHT EVENTS:   MEDICATIONS  (STANDING):  chlorhexidine 2% Cloths 1 Application(s) Topical daily  cloNIDine Patch 0.3 mG/24Hr(s) 1 patch Transdermal <User Schedule>  dextrose 5% + sodium chloride 0.45%. 1000 milliLiter(s) (40 mL/Hr) IV Continuous <Continuous>  hydrALAZINE Injectable 10 milliGRAM(s) IV Push every 6 hours  iron sucrose IVPB 200 milliGRAM(s) IV Intermittent every 24 hours  valproate sodium  IVPB 500 milliGRAM(s) IV Intermittent every 8 hours    MEDICATIONS  (PRN):      REVIEW OF SYSTEMS:  CONSTITUTIONAL: No fever, weight loss, or fatigue  RESPIRATORY: No shortness of breath  CARDIOVASCULAR: No chest pain  GASTROINTESTINAL: No abdominal pain.  GENITOURINARY: No dysuria  NEUROLOGICAL: No headaches  SKIN: No itching, burning, rashes    Vital Signs Last 24 Hrs  T(C): 36.9 (14 Aug 2023 05:06), Max: 37 (13 Aug 2023 21:30)  T(F): 98.4 (14 Aug 2023 05:06), Max: 98.6 (13 Aug 2023 21:30)  HR: 107 (14 Aug 2023 05:06) (97 - 107)  BP: 161/90 (14 Aug 2023 05:06) (152/80 - 175/84)  BP(mean): --  RR: 16 (14 Aug 2023 05:06) (16 - 19)  SpO2: 100% (14 Aug 2023 05:06) (97% - 100%)    Parameters below as of 14 Aug 2023 05:06  Patient On (Oxygen Delivery Method): room air        PHYSICAL EXAMINATION:  GENERAL: NAD, well built  HEAD:  Atraumatic, Normocephalic  EYES:  conjunctiva and sclera clear  CHEST/LUNG: Clear to auscultation. No rales, rhonchi, wheezing, or rubs  HEART: Regular rate and rhythm; No murmurs, rubs, or gallops  ABDOMEN: Soft, Nontender, Nondistended; Bowel sounds present  NERVOUS SYSTEM:  Alert & Oriented X3,    EXTREMITIES:  2+ Peripheral Pulses, No clubbing, cyanosis, or edema  SKIN: warm dry                          8.4    10.74 )-----------( 270      ( 14 Aug 2023 04:55 )             26.6     08-14    135  |  109<H>  |  22<H>  ----------------------------<  78  4.1   |  15<L>  |  1.99<H>    Ca    8.4      14 Aug 2023 04:55  Phos  4.9     08-14  Mg     1.8     08-14    TPro  6.3  /  Alb  2.0<L>  /  TBili  0.2  /  DBili  x   /  AST  16  /  ALT  6<L>  /  AlkPhos  90  08-14    LIVER FUNCTIONS - ( 14 Aug 2023 04:55 )  Alb: 2.0 g/dL / Pro: 6.3 g/dL / ALK PHOS: 90 U/L / ALT: 6 U/L DA / AST: 16 U/L / GGT: x               PT/INR - ( 14 Aug 2023 04:55 )   PT: 13.8 sec;   INR: 1.22 ratio         PTT - ( 14 Aug 2023 04:55 )  PTT:31.0 sec    CAPILLARY BLOOD GLUCOSE      RADIOLOGY & ADDITIONAL TESTS:                   PGY-1 Progress Note discussed with attending    PAGER #: [[phone]] TILL 5:00 PM  PLEASE CONTACT ON CALL TEAM:  - On Call Team (Please refer to Tyler) FROM 5:00 PM - 8:30PM  - Nightfloat Team FROM 8:30 -7:30 AM    CHIEF COMPLAINT & BRIEF HOSPITAL COURSE:    INTERVAL HPI/OVERNIGHT EVENTS:   MEDICATIONS  (STANDING):  chlorhexidine 2% Cloths 1 Application(s) Topical daily  cloNIDine Patch 0.3 mG/24Hr(s) 1 patch Transdermal <User Schedule>  dextrose 5% + sodium chloride 0.45%. 1000 milliLiter(s) (40 mL/Hr) IV Continuous <Continuous>  hydrALAZINE Injectable 10 milliGRAM(s) IV Push every 6 hours  iron sucrose IVPB 200 milliGRAM(s) IV Intermittent every 24 hours  valproate sodium  IVPB 500 milliGRAM(s) IV Intermittent every 8 hours    MEDICATIONS  (PRN):      REVIEW OF SYSTEMS:  CONSTITUTIONAL: No fever, weight loss, or fatigue  RESPIRATORY: No shortness of breath  CARDIOVASCULAR: No chest pain  GASTROINTESTINAL: No abdominal pain.  GENITOURINARY: No dysuria  NEUROLOGICAL: No headaches  SKIN: No itching, burning, rashes    Vital Signs Last 24 Hrs  T(C): 36.9 (14 Aug 2023 05:06), Max: 37 (13 Aug 2023 21:30)  T(F): 98.4 (14 Aug 2023 05:06), Max: 98.6 (13 Aug 2023 21:30)  HR: 107 (14 Aug 2023 05:06) (97 - 107)  BP: 161/90 (14 Aug 2023 05:06) (152/80 - 175/84)  BP(mean): --  RR: 16 (14 Aug 2023 05:06) (16 - 19)  SpO2: 100% (14 Aug 2023 05:06) (97% - 100%)    Parameters below as of 14 Aug 2023 05:06  Patient On (Oxygen Delivery Method): room air        PHYSICAL EXAMINATION:  GENERAL: NAD, well built  HEAD:  Atraumatic, Normocephalic  EYES:  conjunctiva and sclera clear  CHEST/LUNG: Clear to auscultation. No rales, rhonchi, wheezing, or rubs  HEART: Regular rate and rhythm; No murmurs, rubs, or gallops  ABDOMEN: Soft, Nontender, Nondistended; Bowel sounds present  NERVOUS SYSTEM:  Alert & Oriented X3,    EXTREMITIES:  2+ Peripheral Pulses, No clubbing, cyanosis, or edema  SKIN: warm dry                          8.4    10.74 )-----------( 270      ( 14 Aug 2023 04:55 )             26.6     08-14    135  |  109<H>  |  22<H>  ----------------------------<  78  4.1   |  15<L>  |  1.99<H>    Ca    8.4      14 Aug 2023 04:55  Phos  4.9     08-14  Mg     1.8     08-14    TPro  6.3  /  Alb  2.0<L>  /  TBili  0.2  /  DBili  x   /  AST  16  /  ALT  6<L>  /  AlkPhos  90  08-14    LIVER FUNCTIONS - ( 14 Aug 2023 04:55 )  Alb: 2.0 g/dL / Pro: 6.3 g/dL / ALK PHOS: 90 U/L / ALT: 6 U/L DA / AST: 16 U/L / GGT: x               PT/INR - ( 14 Aug 2023 04:55 )   PT: 13.8 sec;   INR: 1.22 ratio         PTT - ( 14 Aug 2023 04:55 )  PTT:31.0 sec    CAPILLARY BLOOD GLUCOSE      RADIOLOGY & ADDITIONAL TESTS:                   PGY-1 Progress Note discussed with attending    PAGER #: [[phone]] TILL 5:00 PM  PLEASE CONTACT ON CALL TEAM:  - On Call Team (Please refer to Tyler) FROM 5:00 PM - 8:30PM  - Nightfloat Team FROM 8:30 -7:30 AM    CHIEF COMPLAINT & BRIEF HOSPITAL COURSE:    INTERVAL HPI/OVERNIGHT EVENTS:   MEDICATIONS  (STANDING):  chlorhexidine 2% Cloths 1 Application(s) Topical daily  cloNIDine Patch 0.3 mG/24Hr(s) 1 patch Transdermal <User Schedule>  dextrose 5% + sodium chloride 0.45%. 1000 milliLiter(s) (40 mL/Hr) IV Continuous <Continuous>  hydrALAZINE Injectable 10 milliGRAM(s) IV Push every 6 hours  iron sucrose IVPB 200 milliGRAM(s) IV Intermittent every 24 hours  valproate sodium  IVPB 500 milliGRAM(s) IV Intermittent every 8 hours    MEDICATIONS  (PRN):      REVIEW OF SYSTEMS:  CONSTITUTIONAL: No fever, weight loss, or fatigue  RESPIRATORY: No shortness of breath  CARDIOVASCULAR: No chest pain  GASTROINTESTINAL: No abdominal pain.  GENITOURINARY: No dysuria  NEUROLOGICAL: No headaches  SKIN: No itching, burning, rashes    Vital Signs Last 24 Hrs  T(C): 36.9 (14 Aug 2023 05:06), Max: 37 (13 Aug 2023 21:30)  T(F): 98.4 (14 Aug 2023 05:06), Max: 98.6 (13 Aug 2023 21:30)  HR: 107 (14 Aug 2023 05:06) (97 - 107)  BP: 161/90 (14 Aug 2023 05:06) (152/80 - 175/84)  BP(mean): --  RR: 16 (14 Aug 2023 05:06) (16 - 19)  SpO2: 100% (14 Aug 2023 05:06) (97% - 100%)    Parameters below as of 14 Aug 2023 05:06  Patient On (Oxygen Delivery Method): room air        PHYSICAL EXAMINATION:  GENERAL: NAD, well built  HEAD:  Atraumatic, Normocephalic  EYES:  conjunctiva and sclera clear  CHEST/LUNG: Clear to auscultation. No rales, rhonchi, wheezing, or rubs  HEART: Regular rate and rhythm; No murmurs, rubs, or gallops  ABDOMEN: Soft, Nontender, Nondistended; Bowel sounds present  NERVOUS SYSTEM:  Alert & Oriented X3,    EXTREMITIES:  2+ Peripheral Pulses, No clubbing, cyanosis, or edema  SKIN: warm dry                          8.4    10.74 )-----------( 270      ( 14 Aug 2023 04:55 )             26.6     08-14    135  |  109<H>  |  22<H>  ----------------------------<  78  4.1   |  15<L>  |  1.99<H>    Ca    8.4      14 Aug 2023 04:55  Phos  4.9     08-14  Mg     1.8     08-14    TPro  6.3  /  Alb  2.0<L>  /  TBili  0.2  /  DBili  x   /  AST  16  /  ALT  6<L>  /  AlkPhos  90  08-14    LIVER FUNCTIONS - ( 14 Aug 2023 04:55 )  Alb: 2.0 g/dL / Pro: 6.3 g/dL / ALK PHOS: 90 U/L / ALT: 6 U/L DA / AST: 16 U/L / GGT: x               PT/INR - ( 14 Aug 2023 04:55 )   PT: 13.8 sec;   INR: 1.22 ratio         PTT - ( 14 Aug 2023 04:55 )  PTT:31.0 sec    CAPILLARY BLOOD GLUCOSE      RADIOLOGY & ADDITIONAL TESTS:                   PGY-1 Progress Note discussed with attending    PAGER #: [[phone]] TILL 5:00 PM  PLEASE CONTACT ON CALL TEAM:  - On Call Team (Please refer to Tyler) FROM 5:00 PM - 8:30PM  - Nightfloat Team FROM 8:30 -7:30 AM    INTERVAL HPI/OVERNIGHT EVENTS:     Pt lying comfortably in bed. Non-verbal. Scheduled for PEG tube placement today.    MEDICATIONS  (STANDING):  chlorhexidine 2% Cloths 1 Application(s) Topical daily  cloNIDine Patch 0.3 mG/24Hr(s) 1 patch Transdermal <User Schedule>  dextrose 5% + sodium chloride 0.45%. 1000 milliLiter(s) (40 mL/Hr) IV Continuous <Continuous>  hydrALAZINE Injectable 10 milliGRAM(s) IV Push every 6 hours  iron sucrose IVPB 200 milliGRAM(s) IV Intermittent every 24 hours  valproate sodium  IVPB 500 milliGRAM(s) IV Intermittent every 8 hours    MEDICATIONS  (PRN):      REVIEW OF SYSTEMS: Unable to obtain, pt non-verbal      Vital Signs Last 24 Hrs  T(C): 36.9 (14 Aug 2023 05:06), Max: 37 (13 Aug 2023 21:30)  T(F): 98.4 (14 Aug 2023 05:06), Max: 98.6 (13 Aug 2023 21:30)  HR: 107 (14 Aug 2023 05:06) (97 - 107)  BP: 161/90 (14 Aug 2023 05:06) (152/80 - 175/84)  BP(mean): --  RR: 16 (14 Aug 2023 05:06) (16 - 19)  SpO2: 100% (14 Aug 2023 05:06) (97% - 100%)    Parameters below as of 14 Aug 2023 05:06  Patient On (Oxygen Delivery Method): room air        PHYSICAL EXAMINATION:  GENERAL: NAD, well built, barbara urine draining from mcclellan  HEAD:  Atraumatic, Normocephalic  EYES:  conjunctiva and sclera clear  CHEST/LUNG: Clear to auscultation. No rales, rhonchi, wheezing, or rubs  HEART: Regular rate and rhythm; No murmurs, rubs, or gallops  ABDOMEN: Soft, Nontender, Nondistended; Bowel sounds present  NERVOUS SYSTEM:  Alert & Oriented X1, Non-verbal, reactive to pain  EXTREMITIES:  2+ Peripheral Pulses, +pitting edema in b/l LE's  SKIN: warm dry                          8.4    10.74 )-----------( 270      ( 14 Aug 2023 04:55 )             26.6     08-14    135  |  109<H>  |  22<H>  ----------------------------<  78  4.1   |  15<L>  |  1.99<H>    Ca    8.4      14 Aug 2023 04:55  Phos  4.9     08-14  Mg     1.8     08-14    TPro  6.3  /  Alb  2.0<L>  /  TBili  0.2  /  DBili  x   /  AST  16  /  ALT  6<L>  /  AlkPhos  90  08-14    LIVER FUNCTIONS - ( 14 Aug 2023 04:55 )  Alb: 2.0 g/dL / Pro: 6.3 g/dL / ALK PHOS: 90 U/L / ALT: 6 U/L DA / AST: 16 U/L / GGT: x               PT/INR - ( 14 Aug 2023 04:55 )   PT: 13.8 sec;   INR: 1.22 ratio         PTT - ( 14 Aug 2023 04:55 )  PTT:31.0 sec    CAPILLARY BLOOD GLUCOSE      RADIOLOGY & ADDITIONAL TESTS:                   PGY-1 Progress Note discussed with attending    PAGER #: [[phone]] TILL 5:00 PM  PLEASE CONTACT ON CALL TEAM:  - On Call Team (Please refer to Tyler) FROM 5:00 PM - 8:30PM  - Nightfloat Team FROM 8:30 -7:30 AM    INTERVAL HPI/OVERNIGHT EVENTS:     Pt lying comfortably in bed. Non-verbal. Scheduled for PEG tube placement today.    MEDICATIONS  (STANDING):  chlorhexidine 2% Cloths 1 Application(s) Topical daily  cloNIDine Patch 0.3 mG/24Hr(s) 1 patch Transdermal <User Schedule>  dextrose 5% + sodium chloride 0.45%. 1000 milliLiter(s) (40 mL/Hr) IV Continuous <Continuous>  hydrALAZINE Injectable 10 milliGRAM(s) IV Push every 6 hours  iron sucrose IVPB 200 milliGRAM(s) IV Intermittent every 24 hours  valproate sodium  IVPB 500 milliGRAM(s) IV Intermittent every 8 hours    MEDICATIONS  (PRN):      REVIEW OF SYSTEMS: Unable to obtain, pt non-verbal      Vital Signs Last 24 Hrs  T(C): 36.9 (14 Aug 2023 05:06), Max: 37 (13 Aug 2023 21:30)  T(F): 98.4 (14 Aug 2023 05:06), Max: 98.6 (13 Aug 2023 21:30)  HR: 107 (14 Aug 2023 05:06) (97 - 107)  BP: 161/90 (14 Aug 2023 05:06) (152/80 - 175/84)  BP(mean): --  RR: 16 (14 Aug 2023 05:06) (16 - 19)  SpO2: 100% (14 Aug 2023 05:06) (97% - 100%)    Parameters below as of 14 Aug 2023 05:06  Patient On (Oxygen Delivery Method): room air        PHYSICAL EXAMINATION:  GENERAL: NAD, well built, barbara urine draining from mcclellan  HEAD:  Atraumatic, Normocephalic  EYES:  conjunctiva and sclera clear  CHEST/LUNG: Clear to auscultation. No rales, rhonchi, wheezing, or rubs  HEART: Regular rate and rhythm; No murmurs, rubs, or gallops  ABDOMEN: Soft, Nontender, Nondistended; Bowel sounds present  NERVOUS SYSTEM:  Alert & Oriented X1, Non-verbal, reactive to pain  EXTREMITIES:  2+ Peripheral Pulses, +pitting edema in b/l LE's  SKIN: warm dry                          8.4    10.74 )-----------( 270      ( 14 Aug 2023 04:55 )             26.6     08-14    135  |  109<H>  |  22<H>  ----------------------------<  78  4.1   |  15<L>  |  1.99<H>    Ca    8.4      14 Aug 2023 04:55  Phos  4.9     08-14  Mg     1.8     08-14    TPro  6.3  /  Alb  2.0<L>  /  TBili  0.2  /  DBili  x   /  AST  16  /  ALT  6<L>  /  AlkPhos  90  08-14    LIVER FUNCTIONS - ( 14 Aug 2023 04:55 )  Alb: 2.0 g/dL / Pro: 6.3 g/dL / ALK PHOS: 90 U/L / ALT: 6 U/L DA / AST: 16 U/L / GGT: x               PT/INR - ( 14 Aug 2023 04:55 )   PT: 13.8 sec;   INR: 1.22 ratio         PTT - ( 14 Aug 2023 04:55 )  PTT:31.0 sec    CAPILLARY BLOOD GLUCOSE      RADIOLOGY & ADDITIONAL TESTS:                   PGY-1 Progress Note discussed with attending    PAGER #: [[phone]] TILL 5:00 PM  PLEASE CONTACT ON CALL TEAM:  - On Call Team (Please refer to Tyler) FROM 5:00 PM - 8:30PM  - Nightfloat Team FROM 8:30 -7:30 AM    INTERVAL HPI/OVERNIGHT EVENTS:     Pt lying comfortably in bed. Non-verbal. Scheduled for PEG tube placement today.    MEDICATIONS  (STANDING):  chlorhexidine 2% Cloths 1 Application(s) Topical daily  cloNIDine Patch 0.3 mG/24Hr(s) 1 patch Transdermal <User Schedule>  dextrose 5% + sodium chloride 0.45%. 1000 milliLiter(s) (40 mL/Hr) IV Continuous <Continuous>  hydrALAZINE Injectable 10 milliGRAM(s) IV Push every 6 hours  iron sucrose IVPB 200 milliGRAM(s) IV Intermittent every 24 hours  valproate sodium  IVPB 500 milliGRAM(s) IV Intermittent every 8 hours    MEDICATIONS  (PRN):      REVIEW OF SYSTEMS: Unable to obtain, pt non-verbal      Vital Signs Last 24 Hrs  T(C): 36.9 (14 Aug 2023 05:06), Max: 37 (13 Aug 2023 21:30)  T(F): 98.4 (14 Aug 2023 05:06), Max: 98.6 (13 Aug 2023 21:30)  HR: 107 (14 Aug 2023 05:06) (97 - 107)  BP: 161/90 (14 Aug 2023 05:06) (152/80 - 175/84)  BP(mean): --  RR: 16 (14 Aug 2023 05:06) (16 - 19)  SpO2: 100% (14 Aug 2023 05:06) (97% - 100%)    Parameters below as of 14 Aug 2023 05:06  Patient On (Oxygen Delivery Method): room air        PHYSICAL EXAMINATION:  GENERAL: NAD, well built, barbara urine draining from mcclellan  HEAD:  Atraumatic, Normocephalic  EYES:  conjunctiva and sclera clear  CHEST/LUNG: Clear to auscultation. No rales, rhonchi, wheezing, or rubs  HEART: Regular rate and rhythm; No murmurs, rubs, or gallops  ABDOMEN: Soft, Nontender, Nondistended; Bowel sounds present  NERVOUS SYSTEM:  Alert & Oriented X1, Non-verbal, reactive to pain  EXTREMITIES:  2+ Peripheral Pulses, +pitting edema in b/l LE's  SKIN: warm dry                          8.4    10.74 )-----------( 270      ( 14 Aug 2023 04:55 )             26.6     08-14    135  |  109<H>  |  22<H>  ----------------------------<  78  4.1   |  15<L>  |  1.99<H>    Ca    8.4      14 Aug 2023 04:55  Phos  4.9     08-14  Mg     1.8     08-14    TPro  6.3  /  Alb  2.0<L>  /  TBili  0.2  /  DBili  x   /  AST  16  /  ALT  6<L>  /  AlkPhos  90  08-14    LIVER FUNCTIONS - ( 14 Aug 2023 04:55 )  Alb: 2.0 g/dL / Pro: 6.3 g/dL / ALK PHOS: 90 U/L / ALT: 6 U/L DA / AST: 16 U/L / GGT: x               PT/INR - ( 14 Aug 2023 04:55 )   PT: 13.8 sec;   INR: 1.22 ratio         PTT - ( 14 Aug 2023 04:55 )  PTT:31.0 sec    CAPILLARY BLOOD GLUCOSE      RADIOLOGY & ADDITIONAL TESTS:

## 2023-08-14 NOTE — PROGRESS NOTE ADULT - PROBLEM SELECTOR PLAN 1
Pt. is NPO  Peg tube placement 8/14 Pt. is NPO  Open G tube placed on 8/14 8/14 allow for one day of gravity drainage. Can resume feeds tomorrow 8/15  IV tylenol for pain, morphine for breakthrough pain

## 2023-08-14 NOTE — PACU DISCHARGE NOTE - COMMENTS
Clear for discharge from pacu. No anesthetic complications  to5s
Clear for discharge from pacu. No anesthetic complications

## 2023-08-14 NOTE — PROGRESS NOTE ADULT - ASSESSMENT
Hospitalist Discharge Summary     Patient ID:  Preet Stone  [id number]  40 y.o.  1963    PCP on record: Africa Aguilar MD    Admit date: 1/5/2023  Discharge date and time: 1/10/2023    Please note that this dictation was completed with [employer], the [employer] voice recognition software. Quite often unanticipated grammatical, syntax, homophones, and other interpretive errors are inadvertently transcribed by the computer software. Please disregard these errors. Please excuse any errors that have escaped final proofreading. Admission Diagnoses: SBO (small bowel obstruction) ([facility] [address] [K56.609]    Discharge Diagnoses: Active Problems:    SBO (small bowel obstruction) ([facility] [address] (11/29/2022)           Hospital Course:     #Recurrent small bowel obstruction POA resolved  #Abdominal pain due to above  #Intractable nausea  #Nonresolving diarrhea  #History of rectal cancer s/p resection/radiation  #History of gastric ulcer  #Concern for rectal stricture  -CT abdomen pelvis chest on admission with small bowel obstruction  -NG taken out per general surgery recommendation  - Flexoid sigmoidoscopy [place] Dr. Ronnald Najjar showed possible rectal stricture     -Advance diet   -Pain management  -Zenpep added  -Zofran and Compazine as needed  -Stool WBC and enteric bacteria PCR added      -Discussed with on-call gastroenterologist at [facility] [place] regarding doing EGD/EUS while patient is admitted and given poor resources for outpatient follow-ups. Gastroenterology agreed  - [facility] doctors paged 1/9/2023 and 1/10/2023 on diversion for all beds  -Gastroenterology group which covers [place] Drs. Carrick's are same and will be transferred to [facility] for further management of recurrent small bowel obstruction,  abdominal pain with intractable nausea and diarrhea by GI specialist.  -Patient excepted to medical service appreciate help    # Suspected ESBL UTI   - UA w/ bacteria, Nitrites and 1. Anemia  2. Dysphagia  3. Constipation  4. Failure to thrive  5. No evidence of acute GI bleeding  6. S/p unsuccessful endoscopic Peg placement    Suggestions:    1. NPO  2. IVF hydration  3. Monitor electrolytes  4. Peg placement By surgery  5. Consider NGT feeding meanwhile  6. Aspiration precaution  7. Monitor H/H  8. Transfuse PRBC as needed  9. Protonix daily  10. Avoid NSAID  11. Daily stool softener as needed  12. Palliative follow up  13. DVT prophylaxis leukoesterase , wbc and RBC   - UCX pending   - Merrem         PVC  -EKG recurrent PVCs  -Keep K above 4 mag above 2  -On metoprolol 12.5 mg twice daily      #Chronic urinary retention s/p Mar[place]  -Changed on 1/10/2022 at [facility]  -Continue finasteride and Flomax        Acute on Chronic Pancreatitis  Pancreatic Anomaly  -scheduled for egd w/ EUS with GI as outpt but unable to make this appointment  -cannot r/o pancreatic cancer with recent ct    -Matthew Alston added        Htn / mood disorder   -Continue home regimen     Polysubstance abuse  Patient was counseled extensively on the need to abstain from drugs its addictive tendencies, its deleterious effects on the brain, cardiovascular system, lungs as well as its financial & social sequelae      CONSULTATIONS:  IP CONSULT TO GENERAL SURGERY  IP CONSULT TO GENERAL SURGERY    Excerpted HPI from H&P of Milena Najera MD:  Henrietta Saucedo is a 61 y.o. male w/ hx of rectal cancer s/p resection, htn, gastric ulcers, bph, mood disorder who presents to ed with multiple complaints. Reports 3 months of generalized sharp abdominal pain. Followed by GI at [facility] is scheduled for egd w/ endoscopic us. Was advised to present to [place] tonight but came to [facility] due to lack of transportation. Also reports about 25 loose stools daily. The patient denies any fever, chills, chest pain, cough, congestion, recent illness, palpitations, or dysuria. Remarkable vitals on ER Presentations: 180/101  Labs Remarkable for: hgb 9.0, k-2.2  ER Images: ct abd et pelvis: chronic pancreatitis. 1.  Chronic pancreatitis. Superimposed Increased size of cystic lesions in the pancreatic head and tail may  represent pseudocysts although malignancy is difficult to exclude. Increase ascites in the abdomen and pelvis.    Small bowel obstruction     ______________________________________________________________________  DISCHARGE SUMMARY/HOSPITAL COURSE:  for full details see H&P, daily progress notes, labs, consult notes. _______________________________________________________________________  Patient seen and examined by me on discharge day. Pertinent Findings:  Gen:    Not in distress  Chest: Clear lungs  CVS:   Regular rhythm. No edema  Abd:  Soft, not distended, not tender  Neuro:  Alert with good insight. Oriented to person, place, and time   _______________________________________________________________________  DISCHARGE MEDICATIONS:   Current Discharge Medication List          My Recommended Diet, Activity, Wound Care, and follow-up labs are listed in the patient's Discharge Insturctions which I have personally completed and reviewed. _______________________________________________________________________  DISPOSITION:  [facility]   Home with Family:    Home with HH/PT/OT/RN:    SNF/LTC:    NAUN:    OTHER:        Condition at Discharge:  Stable  _______________________________________________________________________  Follow up with:   PCP : Holly Collins MD  Follow-up Information       Follow up With Specialties Details Why [facility]  Follow up on 1/11/2023 Your have an appointment  1-11-3 @ 11:20AM  you must keep this appointment. take your ID, Insurance Card and discharge papers. very important- [address] MD General Surgery, Surgery General, Oncology Follow up on 1/26/2023 1-26-23  at [facility]   please follow-up with Surgeon   For repeated SBO.  Lacey Collins MD Internal Medicine Physician Follow up in 1 week(s) Please call your PCP office to schedule follow-up post hospital  [facility]  [phone]                  Total time in minutes spent coordinating this discharge (includes going over instructions, follow-up, prescriptions, and preparing report for sign off to her PCP) : 55 minutes    Signed:  Nicolás Munoz MD

## 2023-08-15 LAB
ALBUMIN SERPL ELPH-MCNC: 1.9 G/DL — LOW (ref 3.5–5)
ALP SERPL-CCNC: 92 U/L — SIGNIFICANT CHANGE UP (ref 40–120)
ALT FLD-CCNC: 8 U/L DA — LOW (ref 10–60)
ANION GAP SERPL CALC-SCNC: 11 MMOL/L — SIGNIFICANT CHANGE UP (ref 5–17)
AST SERPL-CCNC: 21 U/L — SIGNIFICANT CHANGE UP (ref 10–40)
BASOPHILS # BLD AUTO: 0.08 K/UL — SIGNIFICANT CHANGE UP (ref 0–0.2)
BASOPHILS NFR BLD AUTO: 0.5 % — SIGNIFICANT CHANGE UP (ref 0–2)
BILIRUB SERPL-MCNC: 0.2 MG/DL — SIGNIFICANT CHANGE UP (ref 0.2–1.2)
BUN SERPL-MCNC: 22 MG/DL — HIGH (ref 7–18)
CALCIUM SERPL-MCNC: 9.1 MG/DL — SIGNIFICANT CHANGE UP (ref 8.4–10.5)
CHLORIDE SERPL-SCNC: 110 MMOL/L — HIGH (ref 96–108)
CO2 SERPL-SCNC: 14 MMOL/L — LOW (ref 22–31)
CREAT SERPL-MCNC: 2.18 MG/DL — HIGH (ref 0.5–1.3)
EGFR: 23 ML/MIN/1.73M2 — LOW
EOSINOPHIL # BLD AUTO: 0.02 K/UL — SIGNIFICANT CHANGE UP (ref 0–0.5)
EOSINOPHIL NFR BLD AUTO: 0.1 % — SIGNIFICANT CHANGE UP (ref 0–6)
GLUCOSE BLDC GLUCOMTR-MCNC: 122 MG/DL — HIGH (ref 70–99)
GLUCOSE BLDC GLUCOMTR-MCNC: 71 MG/DL — SIGNIFICANT CHANGE UP (ref 70–99)
GLUCOSE BLDC GLUCOMTR-MCNC: 81 MG/DL — SIGNIFICANT CHANGE UP (ref 70–99)
GLUCOSE SERPL-MCNC: 86 MG/DL — SIGNIFICANT CHANGE UP (ref 70–99)
HCT VFR BLD CALC: 27.3 % — LOW (ref 34.5–45)
HGB BLD-MCNC: 8.5 G/DL — LOW (ref 11.5–15.5)
IMM GRANULOCYTES NFR BLD AUTO: 4.7 % — HIGH (ref 0–0.9)
LYMPHOCYTES # BLD AUTO: 1.22 K/UL — SIGNIFICANT CHANGE UP (ref 1–3.3)
LYMPHOCYTES # BLD AUTO: 8.3 % — LOW (ref 13–44)
MAGNESIUM SERPL-MCNC: 1.9 MG/DL — SIGNIFICANT CHANGE UP (ref 1.6–2.6)
MCHC RBC-ENTMCNC: 28 PG — SIGNIFICANT CHANGE UP (ref 27–34)
MCHC RBC-ENTMCNC: 31.1 GM/DL — LOW (ref 32–36)
MCV RBC AUTO: 89.8 FL — SIGNIFICANT CHANGE UP (ref 80–100)
MONOCYTES # BLD AUTO: 2.42 K/UL — HIGH (ref 0–0.9)
MONOCYTES NFR BLD AUTO: 16.5 % — HIGH (ref 2–14)
NEUTROPHILS # BLD AUTO: 10.21 K/UL — HIGH (ref 1.8–7.4)
NEUTROPHILS NFR BLD AUTO: 69.9 % — SIGNIFICANT CHANGE UP (ref 43–77)
NRBC # BLD: 0 /100 WBCS — SIGNIFICANT CHANGE UP (ref 0–0)
PHOSPHATE SERPL-MCNC: 5.2 MG/DL — HIGH (ref 2.5–4.5)
PLATELET # BLD AUTO: 282 K/UL — SIGNIFICANT CHANGE UP (ref 150–400)
POTASSIUM SERPL-MCNC: 4.4 MMOL/L — SIGNIFICANT CHANGE UP (ref 3.5–5.3)
POTASSIUM SERPL-SCNC: 4.4 MMOL/L — SIGNIFICANT CHANGE UP (ref 3.5–5.3)
PROT SERPL-MCNC: 6.4 G/DL — SIGNIFICANT CHANGE UP (ref 6–8.3)
RBC # BLD: 3.04 M/UL — LOW (ref 3.8–5.2)
RBC # FLD: 16.2 % — HIGH (ref 10.3–14.5)
SODIUM SERPL-SCNC: 135 MMOL/L — SIGNIFICANT CHANGE UP (ref 135–145)
WBC # BLD: 14.64 K/UL — HIGH (ref 3.8–10.5)
WBC # FLD AUTO: 14.64 K/UL — HIGH (ref 3.8–10.5)

## 2023-08-15 RX ORDER — ACETAMINOPHEN 500 MG
1000 TABLET ORAL ONCE
Refills: 0 | Status: COMPLETED | OUTPATIENT
Start: 2023-08-15 | End: 2023-08-15

## 2023-08-15 RX ADMIN — Medication 1 PATCH: at 14:41

## 2023-08-15 RX ADMIN — IRON SUCROSE 110 MILLIGRAM(S): 20 INJECTION, SOLUTION INTRAVENOUS at 21:40

## 2023-08-15 RX ADMIN — Medication 10 MILLIGRAM(S): at 05:41

## 2023-08-15 RX ADMIN — Medication 1000 MILLIGRAM(S): at 03:02

## 2023-08-15 RX ADMIN — Medication 55 MILLIGRAM(S): at 14:53

## 2023-08-15 RX ADMIN — Medication 1 PATCH: at 20:34

## 2023-08-15 RX ADMIN — SODIUM CHLORIDE 50 MILLILITER(S): 9 INJECTION, SOLUTION INTRAVENOUS at 11:55

## 2023-08-15 RX ADMIN — Medication 10 MILLIGRAM(S): at 14:52

## 2023-08-15 RX ADMIN — Medication 10 MILLIGRAM(S): at 00:22

## 2023-08-15 RX ADMIN — SODIUM CHLORIDE 50 MILLILITER(S): 9 INJECTION, SOLUTION INTRAVENOUS at 08:28

## 2023-08-15 RX ADMIN — Medication 55 MILLIGRAM(S): at 05:41

## 2023-08-15 RX ADMIN — CHLORHEXIDINE GLUCONATE 1 APPLICATION(S): 213 SOLUTION TOPICAL at 11:55

## 2023-08-15 RX ADMIN — Medication 55 MILLIGRAM(S): at 21:40

## 2023-08-15 RX ADMIN — Medication 10 MILLIGRAM(S): at 18:39

## 2023-08-15 RX ADMIN — Medication 400 MILLIGRAM(S): at 01:59

## 2023-08-15 NOTE — PROGRESS NOTE ADULT - ASSESSMENT
1. SUSAN possible to MARISA and ATN  Renal sono shows no hdyro with b/l kidneys around 9.2cm  -Scr slowly increasing to 2.1mg/dl possible due to ATN again. off fluids since PEG feeding started.    -s/p mcclellan's cath placed for urinary retention during this admission.   -urinalysis shows blood with proteinuria; FeNa >1%, spot protein to creatinine ratio is 1.5gm  -Adjust meds to eGFR and avoid IV Gadolinium contrast,NSAIDs, and phosphate enema.  -Monitor I/O's daily.   -Monitor SMA daily.  2. Hypernatremia due to water deficit and insensible losses. Pt is clinically euvolemic.   -Na stable.   -Monitor I/O's. Check Serum Na Daily. Avoid high solute intake diet and sodium bicarbonate infuse. Avoid overcorrection of NA (8-10meq/day)  3. CKD stage 4 most likely due to hypertensive nephrosclerosis  -new baseline scr around 1.8mg/dL with uPCR 1.5gm.  -previous Scr around 1.2 to 1.6mg/dL in Oct 2022.  -Keep patient euvolemic and renal diet  -Avoid Nephrotoxic Meds/ Agents such as (NSAIDs, IV contrast, Aminoglycosides such as gentamicin, -Gadolinium contrast, Phosphate containing enemas, etc..)  -Adjust Medications according to eGFR  4. HTN:   -bp is acceptable. continue bp meds  -titrate bp meds to keep sbp >110 and < 130  5. Mineral Bone Disease:  -phos is slowly increasing  -PTH intact 289, will start calcitriol once PEG placed.  6. Encephalopathy:  -on Rocephin.  -Plan as per Neuro and primary team  -unable to place PEG via EGD;   -s/p PEG placed in OR on 08/14/23.  7. Hypokalemia:  -stable K.   -give kcl repletion to keep K >3.5meq/L  -monitor K.   8. Acidosis:  -CO2 worsening and obtain abg or vbg, may need bicarbonate drip.     -monitor CO2.  9. Anemia:  -hb is stable.    -low iron sat and ferritin are okay. on iv iron x 3 days.  -monitor CBC.  -transfuse if hb <7.0    Discussed the assessment and plan with Primary Team/Nurse

## 2023-08-15 NOTE — PROGRESS NOTE ADULT - NUTRITIONAL ASSESSMENT
This patient has been assessed with a concern for Malnutrition and has been determined to have a diagnosis/diagnoses of Severe protein-calorie malnutrition.    This patient is being managed with:   Diet NPO with Tube Feed-  Tube Feeding Modality: Gastrostomy  Jevity 1.5 Amuro  Total Volume for 24 Hours (mL): 960  Continuous  Starting Tube Feed Rate {mL per Hour}: 30  Increase Tube Feed Rate by (mL): 10     Every 6 hours  Until Goal Tube Feed Rate (mL per Hour): 60  Tube Feed Duration (in Hours): 16  Tube Feed Start Time: 00:00  Entered: Aug 15 2023  9:37AM   This patient has been assessed with a concern for Malnutrition and has been determined to have a diagnosis/diagnoses of Severe protein-calorie malnutrition.    This patient is being managed with:   Diet NPO with Tube Feed-  Tube Feeding Modality: Gastrostomy  Jevity 1.5 Mauro  Total Volume for 24 Hours (mL): 960  Continuous  Starting Tube Feed Rate {mL per Hour}: 30  Increase Tube Feed Rate by (mL): 10     Every 6 hours  Until Goal Tube Feed Rate (mL per Hour): 60  Tube Feed Duration (in Hours): 16  Tube Feed Start Time: 00:00  Entered: Aug 15 2023  9:37AM

## 2023-08-15 NOTE — PROGRESS NOTE ADULT - ASSESSMENT
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 Patient is being admitted to medicine for further work up and rule out stroke vs encephalopathy (metabolic vs infectious).  Patient is a 77 female from [facility] PMHx HTN, HLD, CVA (w/ residual aphasia and R sided hemiparesis/plegia) who was sent to the hospital due to altered mental status. Patient is being admitted to medicine for further work up and rule out stroke vs encephalopathy (metabolic vs infectious).  Patient is a 77 female from [facility] PMHx HTN, HLD, CVA (w/ residual aphasia and R sided hemiparesis/plegia) who was sent to the hospital due to altered mental status. Patient is being admitted to medicine for further work up and rule out stroke vs encephalopathy (metabolic vs infectious).

## 2023-08-15 NOTE — PROGRESS NOTE ADULT - PROBLEM SELECTOR PLAN 5
GENERAL SURGERY POSTOP VISIT    Taty John  1961  [id number]    Date of service: 7/24/2023    Subjective:  This is a postoperative visit for Taty John, a 62 year old female who underwent laparoscopic gastrojejunostomy creation due to acquired pyloric stenosis on 5/24/23, complicated by GOO due to edema with possible angulation not appreciated on the EGD at the anastomosis requiring GJ tube placement on 6/7/23. She was discharged to a SNF on 6/19/23 but then readmitted on 6/29/23 due to leukocytosis with JANNA and CT demonstrating significant subcutaneous gas on her abdominal wall. Her leukocytosis improved with antibiotics and she was otherwise asymptomatic. She was discharged back to SNF on 7/6/23.    Since her last visit, she continues to flush the J port but otherwise has not used it for fluids/feeds. She has had three episodes of emesis this past week, each of which have come on very suddenly. The first two episodes were after dinner (casserole and a soda) and the last was mid-afternoon. She vented after this most recent episode with minimal output. Yesterday, she then had another episode where she felt full and nauseated very quickly. She vented immediately and 700cc came out that was a combination of her breakfast and bile. Remains on erythromycin, taking it before breakfast and dinner. She tries to drink about 60 oz of water per day. Has had a mild cough this past week but this is improving.     Denies fever or pain, denies any issues with constipation or diarrhea.   Up and walking, using wheelchair less. Continues to have home therapy  Has returned home from the NH    Objective:  Vitals  Vitals:    07/24/23 1124   BP: 124/80       HEENT: Moist, non-pale mucosa.  Cardiac: normocardic  Pulmonary: nonlabored breathing on room air  Abdomen: soft, non-tender, non-distended. GJ present with both limbs capped Incisions: well healed without concern. No palpable crepitus, tenderness, or overlying skin  changes across abdominal wall    Pathology:   None    Labs:    Recent Labs   Lab 07/20/23  0459   WBC 5.6      Recent Labs   Lab 07/20/23  0459   HCT 33.0*      Recent Labs   Lab 07/20/23  0459   HGB 10.3*      Recent Labs   Lab 07/20/23  0459         Recent Labs   Lab 07/20/23  0459   Sodium 146*      Recent Labs   Lab 07/20/23  0459   Potassium 4.2      Recent Labs   Lab 07/20/23  0459   Chloride 109      Recent Labs   Lab 07/20/23  0459   Glucose 86      Recent Labs   Lab 07/20/23  0459   Carbon Dioxide 30      Recent Labs   Lab 07/20/23  0459   BUN 9      Recent Labs   Lab 07/20/23  0459   Creatinine 0.82          Assessment and Plan:  Taty John is a 62 year old female who underwent laparoscopic gastrojejunostomy creation due to acquired pyloric stenosis on 5/24/23, complicated by GOO due to edema with possible angulation not appreciated on the EGD at the anastomosis requiring GJ tube placement on 6/7/23. She was discharged to a SNF on 6/19/23 but then readmitted on 6/29/23 due to leukocytosis with JANNA and CT demonstrating significant subcutaneous gas on her abdominal wall. Her leukocytosis improved with antibiotics and she was otherwise asymptomatic.     Continues with intermittent nausea despite gastrojejunostomy appearing patent on recent CT and patient is able to go days without episodes, suggesting this is not a mechanical obstruction but likely related to a functional issue like gastroparesis. We discussed continuing to vent PRN and to continue erythromycin as a prokinetic. She wishes to avoid reglan due to the potential extrapyramidal symptoms and her neuro history. Discussed repeating an EGD to ensure there is no mechanical cause but otherwise discussed possibly pursuing gastric emptying study and possible gastric pacemaker placement at a facility currently performing this. She declines EGD at this time.     - Patient will consider gastric emptying study  - Will refer back to GI for  additional thoughts given no mechanical obstruction seen but with ongoing intermittent nausea and emesis    Crow Marcano MD  General Surgery  [phone]       P/w altered mental status   pt. is nonverbal

## 2023-08-15 NOTE — PROGRESS NOTE ADULT - SUBJECTIVE AND OBJECTIVE BOX
NEPHROLOGY MEDICAL CARE, [facility] - Dr. Ajay Green/ Dr. Mert Madison/ Dr. Chris Calderon/ Dr. Carson Cook    Date of Service: 08-15-23 @ 13:28    Patient was seen and examined at bedside.    CC: patient is okay and got PEG yesterday.    Vital Signs Last 24 Hrs  T(C): 36.7 (15 Aug 2023 05:03), Max: 36.7 (15 Aug 2023 05:03)  T(F): 98.1 (15 Aug 2023 05:03), Max: 98.1 (15 Aug 2023 05:03)  HR: 70 (15 Aug 2023 05:03) (70 - 119)  BP: 154/80 (15 Aug 2023 05:03) (110/63 - 156/83)  BP(mean): 85 (14 Aug 2023 16:36) (76 - 86)  RR: 16 (15 Aug 2023 05:03) (14 - 20)  SpO2: 98% (15 Aug 2023 05:03) (98% - 100%)    Parameters below as of 15 Aug 2023 05:03  Patient On (Oxygen Delivery Method): room air        08-14 @ 07:01  -  08-15 @ 07:00  --------------------------------------------------------  IN: 0 mL / OUT: 350 mL / NET: -350 mL        PHYSICAL EXAM:  General: No acute respiratory distress.  Eyes: conjunctiva and sclera clear  ENMT: Atraumatic, Normocephalic; Moist mucous membranes  Respiratory: Bilateral clear lungs; No rales, rhonchi, wheezing  Cardiovascular: S1S2+; no m/r/g  Gastrointestinal: Soft, Non-tender, Nondistended; Bowel sounds present; PEG  : mcclellan's cath with muddy brown urine  Neuro: eyes are open; hemiparesis.  Ext:  1+pedal edema, No Cyanosis  Skin: No visible rashes      MEDICATIONS:  MEDICATIONS  (STANDING):  chlorhexidine 2% Cloths 1 Application(s) Topical daily  cloNIDine Patch 0.3 mG/24Hr(s) 1 patch Transdermal <User Schedule>  dextrose 5%. 1000 milliLiter(s) (50 mL/Hr) IV Continuous <Continuous>  hydrALAZINE Injectable 10 milliGRAM(s) IV Push every 6 hours  iron sucrose IVPB 200 milliGRAM(s) IV Intermittent every 24 hours  valproate sodium  IVPB 500 milliGRAM(s) IV Intermittent every 8 hours    MEDICATIONS  (PRN):  morphine  - Injectable 2 milliGRAM(s) IV Push every 6 hours PRN Severe Pain (7 - 10)          LABS:                        8.5    14.64 )-----------( 282      ( 15 Aug 2023 05:31 )             27.3     08-15    135  |  110<H>  |  22<H>  ----------------------------<  86  4.4   |  14<L>  |  2.18<H>    Ca    9.1      15 Aug 2023 05:31  Phos  5.2     08-15  Mg     1.9     08-15    TPro  6.4  /  Alb  1.9<L>  /  TBili  0.2  /  DBili  x   /  AST  21  /  ALT  8<L>  /  AlkPhos  92  08-15    PT/INR - ( 14 Aug 2023 04:55 )   PT: 13.8 sec;   INR: 1.22 ratio         PTT - ( 14 Aug 2023 04:55 )  PTT:31.0 sec  Urinalysis Basic - ( 15 Aug 2023 05:31 )    Color: x / Appearance: x / SG: x / pH: x  Gluc: 86 mg/dL / Ketone: x  / Bili: x / Urobili: x   Blood: x / Protein: x / Nitrite: x   Leuk Esterase: x / RBC: x / WBC x   Sq Epi: x / Non Sq Epi: x / Bacteria: x      Magnesium: 1.9 mg/dL (08-15 @ 05:31)  Phosphorus: 5.2 mg/dL (08-15 @ 05:31)    Urine studies    PTH and Vit D:         NEPHROLOGY MEDICAL CARE, [facility] - Dr. Ajay Green/ Dr. Mert Madison/ Dr. Chris Calderon/ Dr. Carson Cook    Date of Service: 08-15-23 @ 13:28    Patient was seen and examined at bedside.    CC: patient is okay and got PEG yesterday.    Vital Signs Last 24 Hrs  T(C): 36.7 (15 Aug 2023 05:03), Max: 36.7 (15 Aug 2023 05:03)  T(F): 98.1 (15 Aug 2023 05:03), Max: 98.1 (15 Aug 2023 05:03)  HR: 70 (15 Aug 2023 05:03) (70 - 119)  BP: 154/80 (15 Aug 2023 05:03) (110/63 - 156/83)  BP(mean): 85 (14 Aug 2023 16:36) (76 - 86)  RR: 16 (15 Aug 2023 05:03) (14 - 20)  SpO2: 98% (15 Aug 2023 05:03) (98% - 100%)    Parameters below as of 15 Aug 2023 05:03  Patient On (Oxygen Delivery Method): room air        08-14 @ 07:01  -  08-15 @ 07:00  --------------------------------------------------------  IN: 0 mL / OUT: 350 mL / NET: -350 mL        PHYSICAL EXAM:  General: No acute respiratory distress.  Eyes: conjunctiva and sclera clear  ENMT: Atraumatic, Normocephalic; Moist mucous membranes  Respiratory: Bilateral clear lungs; No rales, rhonchi, wheezing  Cardiovascular: S1S2+; no m/r/g  Gastrointestinal: Soft, Non-tender, Nondistended; Bowel sounds present; PEG  : mcclellan's cath with muddy brown urine  Neuro: eyes are open; hemiparesis.  Ext:  1+pedal edema, No Cyanosis  Skin: No visible rashes      MEDICATIONS:  MEDICATIONS  (STANDING):  chlorhexidine 2% Cloths 1 Application(s) Topical daily  cloNIDine Patch 0.3 mG/24Hr(s) 1 patch Transdermal <User Schedule>  dextrose 5%. 1000 milliLiter(s) (50 mL/Hr) IV Continuous <Continuous>  hydrALAZINE Injectable 10 milliGRAM(s) IV Push every 6 hours  iron sucrose IVPB 200 milliGRAM(s) IV Intermittent every 24 hours  valproate sodium  IVPB 500 milliGRAM(s) IV Intermittent every 8 hours    MEDICATIONS  (PRN):  morphine  - Injectable 2 milliGRAM(s) IV Push every 6 hours PRN Severe Pain (7 - 10)          LABS:                        8.5    14.64 )-----------( 282      ( 15 Aug 2023 05:31 )             27.3     08-15    135  |  110<H>  |  22<H>  ----------------------------<  86  4.4   |  14<L>  |  2.18<H>    Ca    9.1      15 Aug 2023 05:31  Phos  5.2     08-15  Mg     1.9     08-15    TPro  6.4  /  Alb  1.9<L>  /  TBili  0.2  /  DBili  x   /  AST  21  /  ALT  8<L>  /  AlkPhos  92  08-15    PT/INR - ( 14 Aug 2023 04:55 )   PT: 13.8 sec;   INR: 1.22 ratio         PTT - ( 14 Aug 2023 04:55 )  PTT:31.0 sec  Urinalysis Basic - ( 15 Aug 2023 05:31 )    Color: x / Appearance: x / SG: x / pH: x  Gluc: 86 mg/dL / Ketone: x  / Bili: x / Urobili: x   Blood: x / Protein: x / Nitrite: x   Leuk Esterase: x / RBC: x / WBC x   Sq Epi: x / Non Sq Epi: x / Bacteria: x      Magnesium: 1.9 mg/dL (08-15 @ 05:31)  Phosphorus: 5.2 mg/dL (08-15 @ 05:31)    Urine studies    PTH and Vit D:         NEPHROLOGY MEDICAL CARE, [facility] - Dr. Ajay Green/ Dr. Mert Madison/ Dr. Chris Calderon/ Dr. Carson Cook    Date of Service: 08-15-23 @ 13:28    Patient was seen and examined at bedside.    CC: patient is okay and got PEG yesterday.    Vital Signs Last 24 Hrs  T(C): 36.7 (15 Aug 2023 05:03), Max: 36.7 (15 Aug 2023 05:03)  T(F): 98.1 (15 Aug 2023 05:03), Max: 98.1 (15 Aug 2023 05:03)  HR: 70 (15 Aug 2023 05:03) (70 - 119)  BP: 154/80 (15 Aug 2023 05:03) (110/63 - 156/83)  BP(mean): 85 (14 Aug 2023 16:36) (76 - 86)  RR: 16 (15 Aug 2023 05:03) (14 - 20)  SpO2: 98% (15 Aug 2023 05:03) (98% - 100%)    Parameters below as of 15 Aug 2023 05:03  Patient On (Oxygen Delivery Method): room air        08-14 @ 07:01  -  08-15 @ 07:00  --------------------------------------------------------  IN: 0 mL / OUT: 350 mL / NET: -350 mL        PHYSICAL EXAM:  General: No acute respiratory distress.  Eyes: conjunctiva and sclera clear  ENMT: Atraumatic, Normocephalic; Moist mucous membranes  Respiratory: Bilateral clear lungs; No rales, rhonchi, wheezing  Cardiovascular: S1S2+; no m/r/g  Gastrointestinal: Soft, Non-tender, Nondistended; Bowel sounds present; PEG  : mcclellan's cath with muddy brown urine  Neuro: eyes are open; hemiparesis.  Ext:  1+pedal edema, No Cyanosis  Skin: No visible rashes      MEDICATIONS:  MEDICATIONS  (STANDING):  chlorhexidine 2% Cloths 1 Application(s) Topical daily  cloNIDine Patch 0.3 mG/24Hr(s) 1 patch Transdermal <User Schedule>  dextrose 5%. 1000 milliLiter(s) (50 mL/Hr) IV Continuous <Continuous>  hydrALAZINE Injectable 10 milliGRAM(s) IV Push every 6 hours  iron sucrose IVPB 200 milliGRAM(s) IV Intermittent every 24 hours  valproate sodium  IVPB 500 milliGRAM(s) IV Intermittent every 8 hours    MEDICATIONS  (PRN):  morphine  - Injectable 2 milliGRAM(s) IV Push every 6 hours PRN Severe Pain (7 - 10)          LABS:                        8.5    14.64 )-----------( 282      ( 15 Aug 2023 05:31 )             27.3     08-15    135  |  110<H>  |  22<H>  ----------------------------<  86  4.4   |  14<L>  |  2.18<H>    Ca    9.1      15 Aug 2023 05:31  Phos  5.2     08-15  Mg     1.9     08-15    TPro  6.4  /  Alb  1.9<L>  /  TBili  0.2  /  DBili  x   /  AST  21  /  ALT  8<L>  /  AlkPhos  92  08-15    PT/INR - ( 14 Aug 2023 04:55 )   PT: 13.8 sec;   INR: 1.22 ratio         PTT - ( 14 Aug 2023 04:55 )  PTT:31.0 sec  Urinalysis Basic - ( 15 Aug 2023 05:31 )    Color: x / Appearance: x / SG: x / pH: x  Gluc: 86 mg/dL / Ketone: x  / Bili: x / Urobili: x   Blood: x / Protein: x / Nitrite: x   Leuk Esterase: x / RBC: x / WBC x   Sq Epi: x / Non Sq Epi: x / Bacteria: x      Magnesium: 1.9 mg/dL (08-15 @ 05:31)  Phosphorus: 5.2 mg/dL (08-15 @ 05:31)    Urine studies    PTH and Vit D:

## 2023-08-15 NOTE — PROGRESS NOTE ADULT - ASSESSMENT
77 female from [facility] PMHx HTN, HLD, CVA (w/ residual aphasia and R sided hemiparesis/plegia) who was sent to the hospital due to altered mental status,UTI.  1.Neurology eval noted for bleeding from ear, CT -.  2.UTI-s/p ABX.  3.HTN-  clonidine patch .3mg q wk,IV hydralazine can be change to PO via G tube-25mg q8h..  5.Lipid d/o-hold statin.  6.Surgical f/u-s/p G tube  7.Hypernatremia and SUSAN-IVF D5 with kcl.  8.Anemia-  iron.  9.GI and DVT prophylaxis. 77 female from [facility] PMHx HTN, HLD, CVA (w/ residual aphasia and R sided hemiparesis/plegia) who was sent to the hospital due to altered mental status,UTI.  1.Neurology eval noted for bleeding from ear, CT -.  2.UTI-s/p ABX.  3.HTN-  clonidine patch .3mg q wk,IV hydralazine can be change to PO via G tube-25mg q8h..  5.Lipid d/o-hold statin.  6.Surgical f/u-s/p G tube  7.Hypernatremia and SUSAN-IVF D5 with kcl.  8.Anemia-  iron.  9.GI and DVT prophylaxis. 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  1.Neurology eval noted for bleeding from ear, CT -.  2.UTI-s/p ABX.  3.HTN-  clonidine patch .3mg q wk,IV hydralazine can be change to PO via G tube-25mg q8h..  5.Lipid d/o-hold statin.  6.Surgical f/u-s/p G tube  7.Hypernatremia and SUSAN-IVF D5 with kcl.  8.Anemia-  iron.  9.GI and DVT prophylaxis.

## 2023-08-15 NOTE — PROGRESS NOTE ADULT - PROBLEM SELECTOR PLAN 2
c/w BP meds as schedule   f/u PRE-OP labs: CBC, CMP, Mg/Phos, Coags, T&S  JACK: 0.8 % risk of SANTANA, intra-operatively or up to 30-d post-op  RCRI: 1.0 points, Class II Risk with 6.0 % 30-d risk of death, MI, or cardiac arrest  Patient is at low risk for urgent risk procedure  Planned for peg tube placement by Surgery on 8/14 c/w BP meds as schedule   f/u PRE-OP labs: CBC, CMP, Mg/Phos, Coags, T&S  JACK: 0.8 % risk of SANTANA, intra-operatively or up to 30-d post-op  RCRI: 1.0 points, Class II Risk with 6.0 % 30-d risk of death, MI, or cardiac arrest  Patient is at low risk for urgent risk procedure  G tube placement by Surgery on 8/14

## 2023-08-15 NOTE — PROGRESS NOTE ADULT - SUBJECTIVE AND OBJECTIVE BOX
[   ] ICU                                          [   ] CCU                                      [  X ] Medical Floor      Patient is a 77 year old female with dysphagia. GI consulted for Peg tube placement.     Patient is a 77 female from [facility] with past medical history significant for HTN, HLD, CVA (w/ residual aphasia and R sided hemiparesis/plegia) who was sent to the emergency room with for altered mental status. Patient is not able to provide any history. Patient is lying down in bed, awake and alert. However, patient does not speak, is not able to follow commands and does not turn face or move eyes when her name is called. Patient is admitted with CVA. Now patient with dysphagia, poor po intake, failure to thrive and weight loss. No abdominal pain, nausea, vomiting, hematemesis, hematochezia, melena, fever, chills, chest pain, SOB, cough, hematuria, dysuria  or diarrhea reported.      Patient is comfortable. No new complaints reported, No abdominal pain, N/V, hematemesis, hematochezia, melena, fever, chills, chest pain, SOB, cough or diarrhea reported.      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 1000 milliLiter(s) (70 mL/Hr) IV Continuous <Continuous>  enoxaparin Injectable 30 milliGRAM(s) SubCutaneous every 24 hours  hydrALAZINE Injectable 10 milliGRAM(s) IV Push every 6 hours  potassium chloride  10 mEq/100 mL IVPB 10 milliEquivalent(s) IV Intermittent every 1 hour  valproate sodium  IVPB 500 milliGRAM(s) IV Intermittent every 8 hours         SOCIAL HISTORY  Advanced Directives:       [ X ] Full Code       [  ] DNR  Marital Status:         [  ] M      [ X ] S      [  ] D       [  ] W  Children:       [ X ] Yes      [  ] No  Occupation:        [  ] Employed       [ X ] Unemployed       [  ] Retired  Diet:       [ X ] Regular       [  ] PEG feeding          [  ] NG tube feeding  Drug Use:           [ X ] Patient denied          [  ] Yes  Alcohol:           [ X ] No             [  ] Yes (socially)         [  ] Yes (chronic)  Tobacco:           [  ] Yes           [X  ] No      FAMILY HISTORY  [ X ] Heart Disease            [ X ] Diabetes             [ X ] HTN             [  ] Colon Cancer             [  ] Stomach Cancer              [  ] Pancreatic Cancer      VITALS  Vital Signs Last 24 Hrs  T(C): 36.7 (15 Aug 2023 05:03), Max: 36.7 (15 Aug 2023 05:03)  T(F): 98.1 (15 Aug 2023 05:03), Max: 98.1 (15 Aug 2023 05:03)  HR: 70 (15 Aug 2023 05:03) (70 - 119)  BP: 154/80 (15 Aug 2023 05:03) (110/63 - 166/96)  BP(mean): 85 (14 Aug 2023 16:36) (76 - 86)  RR: 16 (15 Aug 2023 05:03) (14 - 20)  SpO2: 98% (15 Aug 2023 05:03) (98% - 100%)    Parameters below as of 15 Aug 2023 05:03  Patient On (Oxygen Delivery Method): room air       MEDICATIONS  (STANDING):  chlorhexidine 2% Cloths 1 Application(s) Topical daily  cloNIDine Patch 0.3 mG/24Hr(s) 1 patch Transdermal <User Schedule>  dextrose 5%. 1000 milliLiter(s) (50 mL/Hr) IV Continuous <Continuous>  hydrALAZINE Injectable 10 milliGRAM(s) IV Push every 6 hours  iron sucrose IVPB 200 milliGRAM(s) IV Intermittent every 24 hours  valproate sodium  IVPB 500 milliGRAM(s) IV Intermittent every 8 hours    MEDICATIONS  (PRN):  morphine  - Injectable 2 milliGRAM(s) IV Push every 6 hours PRN Severe Pain (7 - 10)                            8.5    14.64 )-----------( 282      ( 15 Aug 2023 05:31 )             27.3       08-15    135  |  110<H>  |  22<H>  ----------------------------<  86  4.4   |  14<L>  |  2.18<H>    Ca    9.1      15 Aug 2023 05:31  Phos  5.2     08-15  Mg     1.9     08-15    TPro  6.4  /  Alb  1.9<L>  /  TBili  0.2  /  DBili  x   /  AST  21  /  ALT  8<L>  /  AlkPhos  92  08-15      PT/INR - ( 14 Aug 2023 04:55 )   PT: 13.8 sec;   INR: 1.22 ratio         PTT - ( 14 Aug 2023 04:55 )  PTT:31.0 sec [   ] ICU                                          [   ] CCU                                      [  X ] Medical Floor      Patient is a 77 year old female with dysphagia. GI consulted for Peg tube placement.     Patient is a 77 female from [facility] with past medical history significant for HTN, HLD, CVA (w/ residual aphasia and R sided hemiparesis/plegia) who was sent to the emergency room with for altered mental status. Patient is not able to provide any history. Patient is lying down in bed, awake and alert. However, patient does not speak, is not able to follow commands and does not turn face or move eyes when her name is called. Patient is admitted with CVA. Now patient with dysphagia, poor po intake, failure to thrive and weight loss. No abdominal pain, nausea, vomiting, hematemesis, hematochezia, melena, fever, chills, chest pain, SOB, cough, hematuria, dysuria  or diarrhea reported.      Patient is comfortable. No new complaints reported, No abdominal pain, N/V, hematemesis, hematochezia, melena, fever, chills, chest pain, SOB, cough or diarrhea reported.      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 1000 milliLiter(s) (70 mL/Hr) IV Continuous <Continuous>  enoxaparin Injectable 30 milliGRAM(s) SubCutaneous every 24 hours  hydrALAZINE Injectable 10 milliGRAM(s) IV Push every 6 hours  potassium chloride  10 mEq/100 mL IVPB 10 milliEquivalent(s) IV Intermittent every 1 hour  valproate sodium  IVPB 500 milliGRAM(s) IV Intermittent every 8 hours         SOCIAL HISTORY  Advanced Directives:       [ X ] Full Code       [  ] DNR  Marital Status:         [  ] M      [ X ] S      [  ] D       [  ] W  Children:       [ X ] Yes      [  ] No  Occupation:        [  ] Employed       [ X ] Unemployed       [  ] Retired  Diet:       [ X ] Regular       [  ] PEG feeding          [  ] NG tube feeding  Drug Use:           [ X ] Patient denied          [  ] Yes  Alcohol:           [ X ] No             [  ] Yes (socially)         [  ] Yes (chronic)  Tobacco:           [  ] Yes           [X  ] No      FAMILY HISTORY  [ X ] Heart Disease            [ X ] Diabetes             [ X ] HTN             [  ] Colon Cancer             [  ] Stomach Cancer              [  ] Pancreatic Cancer      VITALS  Vital Signs Last 24 Hrs  T(C): 36.7 (15 Aug 2023 05:03), Max: 36.7 (15 Aug 2023 05:03)  T(F): 98.1 (15 Aug 2023 05:03), Max: 98.1 (15 Aug 2023 05:03)  HR: 70 (15 Aug 2023 05:03) (70 - 119)  BP: 154/80 (15 Aug 2023 05:03) (110/63 - 166/96)  BP(mean): 85 (14 Aug 2023 16:36) (76 - 86)  RR: 16 (15 Aug 2023 05:03) (14 - 20)  SpO2: 98% (15 Aug 2023 05:03) (98% - 100%)    Parameters below as of 15 Aug 2023 05:03  Patient On (Oxygen Delivery Method): room air       MEDICATIONS  (STANDING):  chlorhexidine 2% Cloths 1 Application(s) Topical daily  cloNIDine Patch 0.3 mG/24Hr(s) 1 patch Transdermal <User Schedule>  dextrose 5%. 1000 milliLiter(s) (50 mL/Hr) IV Continuous <Continuous>  hydrALAZINE Injectable 10 milliGRAM(s) IV Push every 6 hours  iron sucrose IVPB 200 milliGRAM(s) IV Intermittent every 24 hours  valproate sodium  IVPB 500 milliGRAM(s) IV Intermittent every 8 hours    MEDICATIONS  (PRN):  morphine  - Injectable 2 milliGRAM(s) IV Push every 6 hours PRN Severe Pain (7 - 10)                            8.5    14.64 )-----------( 282      ( 15 Aug 2023 05:31 )             27.3       08-15    135  |  110<H>  |  22<H>  ----------------------------<  86  4.4   |  14<L>  |  2.18<H>    Ca    9.1      15 Aug 2023 05:31  Phos  5.2     08-15  Mg     1.9     08-15    TPro  6.4  /  Alb  1.9<L>  /  TBili  0.2  /  DBili  x   /  AST  21  /  ALT  8<L>  /  AlkPhos  92  08-15      PT/INR - ( 14 Aug 2023 04:55 )   PT: 13.8 sec;   INR: 1.22 ratio         PTT - ( 14 Aug 2023 04:55 )  PTT:31.0 sec [   ] ICU                                          [   ] CCU                                      [  X ] Medical Floor      Patient is a 77 year old female with dysphagia. GI consulted for Peg tube placement.     Patient is a 77 female from [facility] with past medical history significant for HTN, HLD, CVA (w/ residual aphasia and R sided hemiparesis/plegia) who was sent to the emergency room with for altered mental status. Patient is not able to provide any history. Patient is lying down in bed, awake and alert. However, patient does not speak, is not able to follow commands and does not turn face or move eyes when her name is called. Patient is admitted with CVA. Now patient with dysphagia, poor po intake, failure to thrive and weight loss. No abdominal pain, nausea, vomiting, hematemesis, hematochezia, melena, fever, chills, chest pain, SOB, cough, hematuria, dysuria  or diarrhea reported.      Patient is comfortable. No new complaints reported, No abdominal pain, N/V, hematemesis, hematochezia, melena, fever, chills, chest pain, SOB, cough or diarrhea reported.      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 1000 milliLiter(s) (70 mL/Hr) IV Continuous <Continuous>  enoxaparin Injectable 30 milliGRAM(s) SubCutaneous every 24 hours  hydrALAZINE Injectable 10 milliGRAM(s) IV Push every 6 hours  potassium chloride  10 mEq/100 mL IVPB 10 milliEquivalent(s) IV Intermittent every 1 hour  valproate sodium  IVPB 500 milliGRAM(s) IV Intermittent every 8 hours         SOCIAL HISTORY  Advanced Directives:       [ X ] Full Code       [  ] DNR  Marital Status:         [  ] M      [ X ] S      [  ] D       [  ] W  Children:       [ X ] Yes      [  ] No  Occupation:        [  ] Employed       [ X ] Unemployed       [  ] Retired  Diet:       [ X ] Regular       [  ] PEG feeding          [  ] NG tube feeding  Drug Use:           [ X ] Patient denied          [  ] Yes  Alcohol:           [ X ] No             [  ] Yes (socially)         [  ] Yes (chronic)  Tobacco:           [  ] Yes           [X  ] No      FAMILY HISTORY  [ X ] Heart Disease            [ X ] Diabetes             [ X ] HTN             [  ] Colon Cancer             [  ] Stomach Cancer              [  ] Pancreatic Cancer      VITALS  Vital Signs Last 24 Hrs  T(C): 36.7 (15 Aug 2023 05:03), Max: 36.7 (15 Aug 2023 05:03)  T(F): 98.1 (15 Aug 2023 05:03), Max: 98.1 (15 Aug 2023 05:03)  HR: 70 (15 Aug 2023 05:03) (70 - 119)  BP: 154/80 (15 Aug 2023 05:03) (110/63 - 166/96)  BP(mean): 85 (14 Aug 2023 16:36) (76 - 86)  RR: 16 (15 Aug 2023 05:03) (14 - 20)  SpO2: 98% (15 Aug 2023 05:03) (98% - 100%)    Parameters below as of 15 Aug 2023 05:03  Patient On (Oxygen Delivery Method): room air       MEDICATIONS  (STANDING):  chlorhexidine 2% Cloths 1 Application(s) Topical daily  cloNIDine Patch 0.3 mG/24Hr(s) 1 patch Transdermal <User Schedule>  dextrose 5%. 1000 milliLiter(s) (50 mL/Hr) IV Continuous <Continuous>  hydrALAZINE Injectable 10 milliGRAM(s) IV Push every 6 hours  iron sucrose IVPB 200 milliGRAM(s) IV Intermittent every 24 hours  valproate sodium  IVPB 500 milliGRAM(s) IV Intermittent every 8 hours    MEDICATIONS  (PRN):  morphine  - Injectable 2 milliGRAM(s) IV Push every 6 hours PRN Severe Pain (7 - 10)                            8.5    14.64 )-----------( 282      ( 15 Aug 2023 05:31 )             27.3       08-15    135  |  110<H>  |  22<H>  ----------------------------<  86  4.4   |  14<L>  |  2.18<H>    Ca    9.1      15 Aug 2023 05:31  Phos  5.2     08-15  Mg     1.9     08-15    TPro  6.4  /  Alb  1.9<L>  /  TBili  0.2  /  DBili  x   /  AST  21  /  ALT  8<L>  /  AlkPhos  92  08-15      PT/INR - ( 14 Aug 2023 04:55 )   PT: 13.8 sec;   INR: 1.22 ratio         PTT - ( 14 Aug 2023 04:55 )  PTT:31.0 sec

## 2023-08-15 NOTE — CHART NOTE - NSCHARTNOTEFT_GEN_A_CORE
Assessment:     Factors impacting intake: [ ] none [ ] nausea  [ ] vomiting [ ] diarrhea [ ] constipation  [ ]chewing problems [ ] swallowing issues  [ ] other:     Diet Presciption: Diet, NPO (08-14-23 @ 16:38)    Intake:     Daily   % Weight Change    Pertinent Medications: MEDICATIONS  (STANDING):  chlorhexidine 2% Cloths 1 Application(s) Topical daily  cloNIDine Patch 0.3 mG/24Hr(s) 1 patch Transdermal <User Schedule>  dextrose 5%. 1000 milliLiter(s) (50 mL/Hr) IV Continuous <Continuous>  hydrALAZINE Injectable 10 milliGRAM(s) IV Push every 6 hours  iron sucrose IVPB 200 milliGRAM(s) IV Intermittent every 24 hours  valproate sodium  IVPB 500 milliGRAM(s) IV Intermittent every 8 hours    MEDICATIONS  (PRN):  morphine  - Injectable 2 milliGRAM(s) IV Push every 6 hours PRN Severe Pain (7 - 10)    Pertinent Labs: 08-15 Na135 mmol/L Glu 86 mg/dL K+ 4.4 mmol/L Cr  2.18 mg/dL<H> BUN 22 mg/dL<H> 08-15 Phos 5.2 mg/dL<H> 08-15 Alb 1.9 g/dL<L> 07-28 Chol 152 mg/dL LDL --    HDL 51 mg/dL Trig 93 mg/dL     CAPILLARY BLOOD GLUCOSE      POCT Blood Glucose.: 81 mg/dL (15 Aug 2023 05:42)  POCT Blood Glucose.: 78 mg/dL (14 Aug 2023 23:48)  POCT Blood Glucose.: 92 mg/dL (14 Aug 2023 16:54)  POCT Blood Glucose.: 82 mg/dL (14 Aug 2023 11:16)      Skin:     Estimated Needs:   [ ] no change since previous assessment  [ ] recalculated:     Previous Nutrition Diagnosis:   [ ] Inadequate Energy Intake [ ]Inadequate Oral Intake [ ] Excessive Energy Intake   [ ] Underweight [ ] Increased Nutrient Needs [ ] Overweight/Obesity  [ ] Swallowing Difficult   [ ] Altered GI Function [ ] Unintended Weight Loss [ ] Food & Nutrition Related Knowledge Deficit [ ] Malnutrition   [ ] Not Ready for Diet/Life Style Changes     Nutrition Diagnosis is [ ] ongoing  [ ] Improving   [ ] resolved [ ] not applicable     New Nutrition Diagnosis: [ ] not applicable       Interventions:   Recommend  [ ] Change Diet To:  [ ] Nutrition Supplement  [ ] Nutrition Support  [ ] Other:     Monitoring and Evaluation:   [ ] PO intake [ x ] Tolerance to diet prescription [ x ] weights [ x ] labs[ x ] follow up per protocol  [ ] other: Assessment:       Nutrition reassessment for follow-up and NPO status. Chart reviewed, pt visited, alert, no-verbal/confused; NPO x 19 to 20d; s/p failed NGT attempt, failed PEG insertion attempt 8/8/23; Swallow re-evaluation per family request on 8/10/23 with recommendation of NPO, non-oral nutrition/hydration/medications; Palliative follow-up with family, s/p Gastrotomy tube insertion by Surgery 8/14/23, TF to be started today per MD; eGFR=23, PO4=5.2  K=4.4  Nephrology on the case        Factors impacting intake: [ x ] other: change in mental status; difficulty chewing; difficulty feeding self; difficulty swallowing; acute on chronic comorbidities including acute encephalopathy, SUSAN on CKD, h/o CVA    Diet Prescription: Diet, NPO (08-14-23 @ 16:38)    Intake: NPO at present    Daily % Weight Change: no new data     Pertinent Medications: MEDICATIONS  (STANDING):  chlorhexidine 2% Cloths 1 Application(s) Topical daily  cloNIDine Patch 0.3 mG/24Hr(s) 1 patch Transdermal <User Schedule>  dextrose 5%. 1000 milliLiter(s) (50 mL/Hr) IV Continuous <Continuous>  hydrALAZINE Injectable 10 milliGRAM(s) IV Push every 6 hours  iron sucrose IVPB 200 milliGRAM(s) IV Intermittent every 24 hours  valproate sodium  IVPB 500 milliGRAM(s) IV Intermittent every 8 hours    MEDICATIONS  (PRN):  morphine  - Injectable 2 milliGRAM(s) IV Push every 6 hours PRN Severe Pain (7 - 10)    Pertinent Labs: 08-15 Na135 mmol/L Glu 86 mg/dL K+ 4.4 mmol/L Cr  2.18 mg/dL<H> BUN 22 mg/dL<H> 08-15 Phos 5.2 mg/dL<H> 08-15 Alb 1.9 g/dL<L> 07-28 Chol 152 mg/dL LDL --    HDL 51 mg/dL Trig 93 mg/dL     CAPILLARY BLOOD GLUCOSE    POCT Blood Glucose.: 81 mg/dL (15 Aug 2023 05:42)  POCT Blood Glucose.: 78 mg/dL (14 Aug 2023 23:48)  POCT Blood Glucose.: 92 mg/dL (14 Aug 2023 16:54)  POCT Blood Glucose.: 82 mg/dL (14 Aug 2023 11:16)    Skin: skin tear; 3+/4+ extremity edema     Estimated Needs:   [ x ] no change since previous assessment  [ ] recalculated:     Previous Nutrition Diagnosis:   [ x ]Inadequate Oral Intake   [ x ] Severe Malnutrition     Nutrition Diagnosis is [ x ] ongoing  [ ] Improving   [ ] resolved [ ] not applicable     New Nutrition Diagnosis: [ x ] not applicable     Interventions:   Recommend  [ x ] s/p G-T tube insertion, TF to be started per MD today   [ ] Nutrition Supplement  [ x ] Nutrition Support:  If TF started as medically feasible, Goal: Jevity 1.5 @ 60ml/h x 16h (960ml, 1440kcal, 61g protein, 729ml water daily at goal)               If worsening renal function with further K/PO4 elevated, may consider Nepro @ 55ml/h x 16h (880ml, 1584kcal, 71g protein, 642ml water daily at goal rate)                       MD to adjust free water as needed   [ x ] Other: Discussed with tem                   Recheck wt with calibrated bedscale when feasible                    Pt to be followed by dietitian at skilled nursing facility when medically ready to be discharged     Monitoring and Evaluation:   [ ] PO intake [ x ] Tolerance to diet prescription [ x ] weights [ x ] labs[ x ] follow up per protocol  [ ] other: Assessment:       Nutrition reassessment for follow-up and NPO status. Chart reviewed, pt visited, alert, no-verbal/confused; NPO x 19 to 20d; s/p failed NGT attempt, failed PEG insertion attempt 8/8/23; Swallow re-evaluation per family request on 8/10/23 with recommendation of NPO, non-oral nutrition/hydration/medications; Palliative follow-up with family, s/p open gastrostomy tube placement (18 french tube ) by Surgery 8/14/23, TF to be started today per MD; eGFR=23, PO4=5.2  K=4.4  Nephrology on the case        Factors impacting intake: [ x ] other: change in mental status; difficulty chewing; difficulty feeding self; difficulty swallowing; acute on chronic comorbidities including acute encephalopathy, SUSAN on CKD, h/o CVA    Diet Prescription: Diet, NPO (08-14-23 @ 16:38)    Intake: NPO at present    Daily % Weight Change: no new data     Pertinent Medications: MEDICATIONS  (STANDING):  chlorhexidine 2% Cloths 1 Application(s) Topical daily  cloNIDine Patch 0.3 mG/24Hr(s) 1 patch Transdermal <User Schedule>  dextrose 5%. 1000 milliLiter(s) (50 mL/Hr) IV Continuous <Continuous>  hydrALAZINE Injectable 10 milliGRAM(s) IV Push every 6 hours  iron sucrose IVPB 200 milliGRAM(s) IV Intermittent every 24 hours  valproate sodium  IVPB 500 milliGRAM(s) IV Intermittent every 8 hours    MEDICATIONS  (PRN):  morphine  - Injectable 2 milliGRAM(s) IV Push every 6 hours PRN Severe Pain (7 - 10)    Pertinent Labs: 08-15 Na135 mmol/L Glu 86 mg/dL K+ 4.4 mmol/L Cr  2.18 mg/dL<H> BUN 22 mg/dL<H> 08-15 Phos 5.2 mg/dL<H> 08-15 Alb 1.9 g/dL<L> 07-28 Chol 152 mg/dL LDL --    HDL 51 mg/dL Trig 93 mg/dL     CAPILLARY BLOOD GLUCOSE    POCT Blood Glucose.: 81 mg/dL (15 Aug 2023 05:42)  POCT Blood Glucose.: 78 mg/dL (14 Aug 2023 23:48)  POCT Blood Glucose.: 92 mg/dL (14 Aug 2023 16:54)  POCT Blood Glucose.: 82 mg/dL (14 Aug 2023 11:16)    Skin: skin tear; 3+/4+ extremity edema     Estimated Needs:   [ x ] no change since previous assessment  [ ] recalculated:     Previous Nutrition Diagnosis:   [ x ]Inadequate Oral Intake   [ x ] Severe Malnutrition     Nutrition Diagnosis is [ x ] ongoing  [ ] Improving   [ ] resolved [ ] not applicable     New Nutrition Diagnosis: [ x ] not applicable     Interventions:   Recommend  [ x ] s/p G-T tube insertion, TF to be started per MD today   [ ] Nutrition Supplement  [ x ] Nutrition Support:  If TF started as medically feasible, Goal: Jevity 1.5 @ 60ml/h x 16h (960ml, 1440kcal, 61g protein, 729ml water daily at goal)               If worsening renal function with further K/PO4 elevated, may consider Nepro @ 55ml/h x 16h (880ml, 1584kcal, 71g protein, 642ml water daily at goal rate)                       MD to adjust free water as needed   [ x ] Other: Discussed with tem                   Recheck wt with calibrated bedscale when feasible                    Pt to be followed by dietitian at skilled nursing facility when medically ready to be discharged     Monitoring and Evaluation:   [ ] PO intake [ x ] Tolerance to diet prescription [ x ] weights [ x ] labs[ x ] follow up per protocol  [ ] other: Assessment:       Nutrition reassessment for follow-up NPO status, verbal consult by MD for TF recommendation. Chart reviewed, pt visited, alert, no-verbal/confused; NPO x 19 to 20d; s/p failed NGT attempt, failed PEG insertion attempt 8/8/23; Swallow re-evaluation per family request on 8/10/23 with recommendation of NPO, non-oral nutrition/hydration/medications; Palliative follow-up with family, s/p open gastrostomy tube placement (18 french tube ) by Surgery 8/14/23, TF to be started today per MD; eGFR=23, PO4=5.2  K=4.4  Nephrology on the case        Factors impacting intake: [ x ] other: change in mental status; difficulty chewing; difficulty feeding self; difficulty swallowing; acute on chronic comorbidities including acute encephalopathy, SUSAN on CKD, h/o CVA    Diet Prescription: Diet, NPO (08-14-23 @ 16:38)    Intake: NPO at present    Daily % Weight Change: no new data     Pertinent Medications: MEDICATIONS  (STANDING):  chlorhexidine 2% Cloths 1 Application(s) Topical daily  cloNIDine Patch 0.3 mG/24Hr(s) 1 patch Transdermal <User Schedule>  dextrose 5%. 1000 milliLiter(s) (50 mL/Hr) IV Continuous <Continuous>  hydrALAZINE Injectable 10 milliGRAM(s) IV Push every 6 hours  iron sucrose IVPB 200 milliGRAM(s) IV Intermittent every 24 hours  valproate sodium  IVPB 500 milliGRAM(s) IV Intermittent every 8 hours    MEDICATIONS  (PRN):  morphine  - Injectable 2 milliGRAM(s) IV Push every 6 hours PRN Severe Pain (7 - 10)    Pertinent Labs: 08-15 Na135 mmol/L Glu 86 mg/dL K+ 4.4 mmol/L Cr  2.18 mg/dL<H> BUN 22 mg/dL<H> 08-15 Phos 5.2 mg/dL<H> 08-15 Alb 1.9 g/dL<L> 07-28 Chol 152 mg/dL LDL --    HDL 51 mg/dL Trig 93 mg/dL     CAPILLARY BLOOD GLUCOSE    POCT Blood Glucose.: 81 mg/dL (15 Aug 2023 05:42)  POCT Blood Glucose.: 78 mg/dL (14 Aug 2023 23:48)  POCT Blood Glucose.: 92 mg/dL (14 Aug 2023 16:54)  POCT Blood Glucose.: 82 mg/dL (14 Aug 2023 11:16)    Skin: skin tear; 3+/4+ extremity edema     Estimated Needs:   [ x ] no change since previous assessment  [ ] recalculated:     Previous Nutrition Diagnosis:   [ x ]Inadequate Oral Intake   [ x ] Severe Malnutrition     Nutrition Diagnosis is [ x ] ongoing  [ ] Improving   [ ] resolved [ ] not applicable     New Nutrition Diagnosis: [ x ] not applicable     Interventions:   Recommend  [ x ] s/p G-T tube insertion, TF to be started per MD today   [ ] Nutrition Supplement  [ x ] Nutrition Support:  If TF started as medically feasible, Goal: Jevity 1.5 @ 60ml/h x 16h (960ml, 1440kcal, 61g protein, 729ml water daily at goal)               If worsening renal function with further K/PO4 elevated, may consider Nepro @ 55ml/h x 16h (880ml, 1584kcal, 71g protein, 642ml water daily at goal rate)                       MD to adjust free water as needed   [ x ] Other: Discussed with tem                   Recheck wt with calibrated bedscale when feasible                    Pt to be followed by dietitian at skilled nursing facility when medically ready to be discharged     Monitoring and Evaluation:   [ ] PO intake [ x ] Tolerance to diet prescription [ x ] weights [ x ] labs[ x ] follow up per protocol  [ ] other:

## 2023-08-15 NOTE — CHART NOTE - NSCHARTNOTEFT_GEN_A_CORE
Patient seen and examined at bedside. Patient completely nonverbal, in no apparent distress. G tube remains to gravity.     Vital Signs Last 24 Hrs  T(C): 36.7 (15 Aug 2023 05:03), Max: 36.7 (15 Aug 2023 05:03)  T(F): 98.1 (15 Aug 2023 05:03), Max: 98.1 (15 Aug 2023 05:03)  HR: 70 (15 Aug 2023 05:03) (70 - 119)  BP: 154/80 (15 Aug 2023 05:03) (110/63 - 166/96)  BP(mean): 85 (14 Aug 2023 16:36) (76 - 86)  RR: 16 (15 Aug 2023 05:03) (14 - 20)  SpO2: 98% (15 Aug 2023 05:03) (98% - 100%)    Parameters below as of 15 Aug 2023 05:03  Patient On (Oxygen Delivery Method): room air    PHYSICAL  Gen- Lying in bed, nonverbal  Resp- nml WoB on RA  Abd- soft, nondistender, G tube in place to gravity, gastric output                          8.5    14.64 )-----------( 282      ( 15 Aug 2023 05:31 )             27.3   08-15    135  |  110<H>  |  22<H>  ----------------------------<  86  4.4   |  14<L>  |  2.18<H>    Ca    9.1      15 Aug 2023 05:31  Phos  5.2     08-15  Mg     1.9     08-15    TPro  6.4  /  Alb  1.9<L>  /  TBili  0.2  /  DBili  x   /  AST  21  /  ALT  8<L>  /  AlkPhos  92  08-15    77F s/p G tube placement for failure to thrive  - Can start feeds this AM  - Rest of care per primary

## 2023-08-15 NOTE — PROGRESS NOTE ADULT - PROBLEM SELECTOR PLAN 1
Pt. is NPO  Open G tube placed on 8/14 8/14 allow for one day of gravity drainage. Can resume feeds tomorrow 8/15  IV tylenol for pain, morphine for breakthrough pain Pt. is NPO  Open G tube placed on 8/14  Feeds through G tube resumed  IV tylenol for pain, morphine for breakthrough pain Pt. is NPO  Open G tube placed on 8/14  Feeds through G tube resumed  IV tylenol for pain, morphine for breakthrough pain  IV iron sucrose started 8/15 for 3 days as per nephro

## 2023-08-15 NOTE — PROGRESS NOTE ADULT - ASSESSMENT
78yo s/p g-tube placement    - May begin using g-tube for feeds  - Monitor for signs of leak  - Reconsult surgery as needed 76yo s/p g-tube placement    - May begin using g-tube for feeds  - Monitor for signs of leak  - Reconsult surgery as needed

## 2023-08-15 NOTE — PROGRESS NOTE ADULT - ASSESSMENT
1. Anemia  2. Dysphagia  3. Constipation  4. Failure to thrive  5. No evidence of acute GI bleeding  6. S/p unsuccessful endoscopic Peg placement  7. S/p gastrostomy tube placement by surgery    Suggestions:    1. NPO  2. IVF hydration  3. Monitor electrolytes  4. Peg feeding as per surgery  5. Protonix daily  6. Aspiration precaution  7. Monitor H/H  8. Transfuse PRBC as needed  9. Palliative follow up  10. Avoid NSAID  11. Daily stool softener as needed  12. DVT prophylaxis

## 2023-08-15 NOTE — PROGRESS NOTE ADULT - SUBJECTIVE AND OBJECTIVE BOX
Patient assessed at bedside, remains hemodynamically stable and afebrile, tachycardic overnight resolved in AM. No acute events overnight. G tube to gravity overnight.    Vital Signs Last 24 Hrs  T(C): 36.7 (15 Aug 2023 05:03), Max: 36.7 (15 Aug 2023 05:03)  T(F): 98.1 (15 Aug 2023 05:03), Max: 98.1 (15 Aug 2023 05:03)  HR: 70 (15 Aug 2023 05:03) (70 - 119)  BP: 154/80 (15 Aug 2023 05:03) (110/63 - 166/96)  BP(mean): 85 (14 Aug 2023 16:36) (76 - 86)  RR: 16 (15 Aug 2023 05:03) (14 - 20)  SpO2: 98% (15 Aug 2023 05:03) (98% - 100%)    Parameters below as of 15 Aug 2023 05:03  Patient On (Oxygen Delivery Method): room air    MEDICATIONS  (STANDING):  chlorhexidine 2% Cloths 1 Application(s) Topical daily  cloNIDine Patch 0.3 mG/24Hr(s) 1 patch Transdermal <User Schedule>  dextrose 5%. 1000 milliLiter(s) (50 mL/Hr) IV Continuous <Continuous>  hydrALAZINE Injectable 10 milliGRAM(s) IV Push every 6 hours  iron sucrose IVPB 200 milliGRAM(s) IV Intermittent every 24 hours  valproate sodium  IVPB 500 milliGRAM(s) IV Intermittent every 8 hours    MEDICATIONS  (PRN):  morphine  - Injectable 2 milliGRAM(s) IV Push every 6 hours PRN Severe Pain (7 - 10)      Physical:  Pt laying in bed comfortably, NAD  Resp: Normal resp effort and rate  Abd: Soft, NDNT, dressings c/d/i, g-tube to gravity, bilious gastric content within bag                          8.5    14.64 )-----------( 282      ( 15 Aug 2023 05:31 )             27.3   08-15    135  |  110<H>  |  22<H>  ----------------------------<  86  4.4   |  14<L>  |  2.18<H>    Ca    9.1      15 Aug 2023 05:31  Phos  5.2     08-15  Mg     1.9     08-15    TPro  6.4  /  Alb  1.9<L>  /  TBili  0.2  /  DBili  x   /  AST  21  /  ALT  8<L>  /  AlkPhos  92  08-15

## 2023-08-15 NOTE — PROGRESS NOTE ADULT - SUBJECTIVE AND OBJECTIVE BOX
PGY-1 Progress Note discussed with attending    PAGER #: [[phone]] TILL 5:00 PM  PLEASE CONTACT ON CALL TEAM:  - On Call Team (Please refer to Tyler) FROM 5:00 PM - 8:30PM  - Nightfloat Team FROM 8:30 -7:30 AM    INTERVAL HPI/OVERNIGHT EVENTS:       MEDICATIONS  (STANDING):  chlorhexidine 2% Cloths 1 Application(s) Topical daily  cloNIDine Patch 0.3 mG/24Hr(s) 1 patch Transdermal <User Schedule>  dextrose 5%. 1000 milliLiter(s) (50 mL/Hr) IV Continuous <Continuous>  hydrALAZINE Injectable 10 milliGRAM(s) IV Push every 6 hours  iron sucrose IVPB 200 milliGRAM(s) IV Intermittent every 24 hours  valproate sodium  IVPB 500 milliGRAM(s) IV Intermittent every 8 hours    MEDICATIONS  (PRN):  morphine  - Injectable 2 milliGRAM(s) IV Push every 6 hours PRN Severe Pain (7 - 10)      REVIEW OF SYSTEMS:  CONSTITUTIONAL: No fever, weight loss, or fatigue  RESPIRATORY: No shortness of breath  CARDIOVASCULAR: No chest pain  GASTROINTESTINAL: No abdominal pain.  GENITOURINARY: No dysuria  NEUROLOGICAL: No headaches  SKIN: No itching, burning, rashes    Vital Signs Last 24 Hrs  T(C): 36.7 (15 Aug 2023 05:03), Max: 36.7 (15 Aug 2023 05:03)  T(F): 98.1 (15 Aug 2023 05:03), Max: 98.1 (15 Aug 2023 05:03)  HR: 70 (15 Aug 2023 05:03) (70 - 119)  BP: 154/80 (15 Aug 2023 05:03) (110/63 - 166/96)  BP(mean): 85 (14 Aug 2023 16:36) (76 - 86)  RR: 16 (15 Aug 2023 05:03) (14 - 20)  SpO2: 98% (15 Aug 2023 05:03) (98% - 100%)    Parameters below as of 15 Aug 2023 05:03  Patient On (Oxygen Delivery Method): room air        PHYSICAL EXAMINATION:  GENERAL: NAD, well built  HEAD:  Atraumatic, Normocephalic  EYES:  conjunctiva and sclera clear  CHEST/LUNG: Clear to auscultation. No rales, rhonchi, wheezing, or rubs  HEART: Regular rate and rhythm; No murmurs, rubs, or gallops  ABDOMEN: Soft, Nontender, Nondistended; Bowel sounds present  NERVOUS SYSTEM:  Alert & Oriented X3,    EXTREMITIES:  2+ Peripheral Pulses, No clubbing, cyanosis, or edema  SKIN: warm dry                          8.5    14.64 )-----------( 282      ( 15 Aug 2023 05:31 )             27.3     08-15    135  |  110<H>  |  22<H>  ----------------------------<  86  4.4   |  14<L>  |  2.18<H>    Ca    9.1      15 Aug 2023 05:31  Phos  5.2     08-15  Mg     1.9     08-15    TPro  6.4  /  Alb  1.9<L>  /  TBili  0.2  /  DBili  x   /  AST  21  /  ALT  8<L>  /  AlkPhos  92  08-15    LIVER FUNCTIONS - ( 15 Aug 2023 05:31 )  Alb: 1.9 g/dL / Pro: 6.4 g/dL / ALK PHOS: 92 U/L / ALT: 8 U/L DA / AST: 21 U/L / GGT: x               PT/INR - ( 14 Aug 2023 04:55 )   PT: 13.8 sec;   INR: 1.22 ratio         PTT - ( 14 Aug 2023 04:55 )  PTT:31.0 sec    CAPILLARY BLOOD GLUCOSE      RADIOLOGY & ADDITIONAL TESTS:                   PGY-1 Progress Note discussed with attending    PAGER #: [[phone]] TILL 5:00 PM  PLEASE CONTACT ON CALL TEAM:  - On Call Team (Please refer to Tyler) FROM 5:00 PM - 8:30PM  - Nightfloat Team FROM 8:30 -7:30 AM    INTERVAL HPI/OVERNIGHT EVENTS:       MEDICATIONS  (STANDING):  chlorhexidine 2% Cloths 1 Application(s) Topical daily  cloNIDine Patch 0.3 mG/24Hr(s) 1 patch Transdermal <User Schedule>  dextrose 5%. 1000 milliLiter(s) (50 mL/Hr) IV Continuous <Continuous>  hydrALAZINE Injectable 10 milliGRAM(s) IV Push every 6 hours  iron sucrose IVPB 200 milliGRAM(s) IV Intermittent every 24 hours  valproate sodium  IVPB 500 milliGRAM(s) IV Intermittent every 8 hours    MEDICATIONS  (PRN):  morphine  - Injectable 2 milliGRAM(s) IV Push every 6 hours PRN Severe Pain (7 - 10)      REVIEW OF SYSTEMS:  CONSTITUTIONAL: No fever, weight loss, or fatigue  RESPIRATORY: No shortness of breath  CARDIOVASCULAR: No chest pain  GASTROINTESTINAL: No abdominal pain.  GENITOURINARY: No dysuria  NEUROLOGICAL: No headaches  SKIN: No itching, burning, rashes    Vital Signs Last 24 Hrs  T(C): 36.7 (15 Aug 2023 05:03), Max: 36.7 (15 Aug 2023 05:03)  T(F): 98.1 (15 Aug 2023 05:03), Max: 98.1 (15 Aug 2023 05:03)  HR: 70 (15 Aug 2023 05:03) (70 - 119)  BP: 154/80 (15 Aug 2023 05:03) (110/63 - 166/96)  BP(mean): 85 (14 Aug 2023 16:36) (76 - 86)  RR: 16 (15 Aug 2023 05:03) (14 - 20)  SpO2: 98% (15 Aug 2023 05:03) (98% - 100%)    Parameters below as of 15 Aug 2023 05:03  Patient On (Oxygen Delivery Method): room air        PHYSICAL EXAMINATION:  GENERAL: NAD, well built  HEAD:  Atraumatic, Normocephalic  EYES:  conjunctiva and sclera clear  CHEST/LUNG: Clear to auscultation. No rales, rhonchi, wheezing, or rubs  HEART: Regular rate and rhythm; No murmurs, rubs, or gallops  ABDOMEN: Soft, Nontender, Nondistended; Bowel sounds present  NERVOUS SYSTEM:  Alert & Oriented X3,    EXTREMITIES:  2+ Peripheral Pulses, No clubbing, cyanosis, or edema  SKIN: warm dry                          8.5    14.64 )-----------( 282      ( 15 Aug 2023 05:31 )             27.3     08-15    135  |  110<H>  |  22<H>  ----------------------------<  86  4.4   |  14<L>  |  2.18<H>    Ca    9.1      15 Aug 2023 05:31  Phos  5.2     08-15  Mg     1.9     08-15    TPro  6.4  /  Alb  1.9<L>  /  TBili  0.2  /  DBili  x   /  AST  21  /  ALT  8<L>  /  AlkPhos  92  08-15    LIVER FUNCTIONS - ( 15 Aug 2023 05:31 )  Alb: 1.9 g/dL / Pro: 6.4 g/dL / ALK PHOS: 92 U/L / ALT: 8 U/L DA / AST: 21 U/L / GGT: x               PT/INR - ( 14 Aug 2023 04:55 )   PT: 13.8 sec;   INR: 1.22 ratio         PTT - ( 14 Aug 2023 04:55 )  PTT:31.0 sec    CAPILLARY BLOOD GLUCOSE      RADIOLOGY & ADDITIONAL TESTS:                   PGY-1 Progress Note discussed with attending    PAGER #: [[phone]] TILL 5:00 PM  PLEASE CONTACT ON CALL TEAM:  - On Call Team (Please refer to Tyler) FROM 5:00 PM - 8:30PM  - Nightfloat Team FROM 8:30 -7:30 AM    INTERVAL HPI/OVERNIGHT EVENTS:       MEDICATIONS  (STANDING):  chlorhexidine 2% Cloths 1 Application(s) Topical daily  cloNIDine Patch 0.3 mG/24Hr(s) 1 patch Transdermal <User Schedule>  dextrose 5%. 1000 milliLiter(s) (50 mL/Hr) IV Continuous <Continuous>  hydrALAZINE Injectable 10 milliGRAM(s) IV Push every 6 hours  iron sucrose IVPB 200 milliGRAM(s) IV Intermittent every 24 hours  valproate sodium  IVPB 500 milliGRAM(s) IV Intermittent every 8 hours    MEDICATIONS  (PRN):  morphine  - Injectable 2 milliGRAM(s) IV Push every 6 hours PRN Severe Pain (7 - 10)      REVIEW OF SYSTEMS:  CONSTITUTIONAL: No fever, weight loss, or fatigue  RESPIRATORY: No shortness of breath  CARDIOVASCULAR: No chest pain  GASTROINTESTINAL: No abdominal pain.  GENITOURINARY: No dysuria  NEUROLOGICAL: No headaches  SKIN: No itching, burning, rashes    Vital Signs Last 24 Hrs  T(C): 36.7 (15 Aug 2023 05:03), Max: 36.7 (15 Aug 2023 05:03)  T(F): 98.1 (15 Aug 2023 05:03), Max: 98.1 (15 Aug 2023 05:03)  HR: 70 (15 Aug 2023 05:03) (70 - 119)  BP: 154/80 (15 Aug 2023 05:03) (110/63 - 166/96)  BP(mean): 85 (14 Aug 2023 16:36) (76 - 86)  RR: 16 (15 Aug 2023 05:03) (14 - 20)  SpO2: 98% (15 Aug 2023 05:03) (98% - 100%)    Parameters below as of 15 Aug 2023 05:03  Patient On (Oxygen Delivery Method): room air        PHYSICAL EXAMINATION:  GENERAL: NAD, well built  HEAD:  Atraumatic, Normocephalic  EYES:  conjunctiva and sclera clear  CHEST/LUNG: Clear to auscultation. No rales, rhonchi, wheezing, or rubs  HEART: Regular rate and rhythm; No murmurs, rubs, or gallops  ABDOMEN: Soft, Nontender, Nondistended; Bowel sounds present  NERVOUS SYSTEM:  Alert & Oriented X3,    EXTREMITIES:  2+ Peripheral Pulses, No clubbing, cyanosis, or edema  SKIN: warm dry                          8.5    14.64 )-----------( 282      ( 15 Aug 2023 05:31 )             27.3     08-15    135  |  110<H>  |  22<H>  ----------------------------<  86  4.4   |  14<L>  |  2.18<H>    Ca    9.1      15 Aug 2023 05:31  Phos  5.2     08-15  Mg     1.9     08-15    TPro  6.4  /  Alb  1.9<L>  /  TBili  0.2  /  DBili  x   /  AST  21  /  ALT  8<L>  /  AlkPhos  92  08-15    LIVER FUNCTIONS - ( 15 Aug 2023 05:31 )  Alb: 1.9 g/dL / Pro: 6.4 g/dL / ALK PHOS: 92 U/L / ALT: 8 U/L DA / AST: 21 U/L / GGT: x               PT/INR - ( 14 Aug 2023 04:55 )   PT: 13.8 sec;   INR: 1.22 ratio         PTT - ( 14 Aug 2023 04:55 )  PTT:31.0 sec    CAPILLARY BLOOD GLUCOSE      RADIOLOGY & ADDITIONAL TESTS:                   PGY-1 Progress Note discussed with attending    PAGER #: [[phone]] TILL 5:00 PM  PLEASE CONTACT ON CALL TEAM:  - On Call Team (Please refer to Tyler) FROM 5:00 PM - 8:30PM  - Nightfloat Team FROM 8:30 -7:30 AM    INTERVAL HPI/OVERNIGHT EVENTS:     Pt began feeding through G tube today. The surrounding skin around the ostomy appears healthy. Pt has EJ line from surgery to be removed today.    MEDICATIONS  (STANDING):  chlorhexidine 2% Cloths 1 Application(s) Topical daily  cloNIDine Patch 0.3 mG/24Hr(s) 1 patch Transdermal <User Schedule>  dextrose 5%. 1000 milliLiter(s) (50 mL/Hr) IV Continuous <Continuous>  hydrALAZINE Injectable 10 milliGRAM(s) IV Push every 6 hours  iron sucrose IVPB 200 milliGRAM(s) IV Intermittent every 24 hours  valproate sodium  IVPB 500 milliGRAM(s) IV Intermittent every 8 hours    MEDICATIONS  (PRN):  morphine  - Injectable 2 milliGRAM(s) IV Push every 6 hours PRN Severe Pain (7 - 10)      REVIEW OF SYSTEMS: Unable to obtain. Pt non-verbal        Vital Signs Last 24 Hrs  T(C): 36.7 (15 Aug 2023 05:03), Max: 36.7 (15 Aug 2023 05:03)  T(F): 98.1 (15 Aug 2023 05:03), Max: 98.1 (15 Aug 2023 05:03)  HR: 70 (15 Aug 2023 05:03) (70 - 119)  BP: 154/80 (15 Aug 2023 05:03) (110/63 - 166/96)  BP(mean): 85 (14 Aug 2023 16:36) (76 - 86)  RR: 16 (15 Aug 2023 05:03) (14 - 20)  SpO2: 98% (15 Aug 2023 05:03) (98% - 100%)    Parameters below as of 15 Aug 2023 05:03  Patient On (Oxygen Delivery Method): room air        PHYSICAL EXAMINATION:  GENERAL: NAD, well built, +EJ line in right side of neck  HEAD:  Atraumatic, Normocephalic  EYES:  conjunctiva and sclera clear  CHEST/LUNG: Clear to auscultation. No rales, rhonchi, wheezing, or rubs  HEART: Regular rate and rhythm; No murmurs, rubs, or gallops  ABDOMEN: +G tube present. Soft, Nontender, Nondistended; Bowel sounds present  NERVOUS SYSTEM:  Alert & Oriented X0,    EXTREMITIES:  2+ Peripheral Pulses, No clubbing, cyanosis, or edema  SKIN: warm dry                          8.5    14.64 )-----------( 282      ( 15 Aug 2023 05:31 )             27.3     08-15    135  |  110<H>  |  22<H>  ----------------------------<  86  4.4   |  14<L>  |  2.18<H>    Ca    9.1      15 Aug 2023 05:31  Phos  5.2     08-15  Mg     1.9     08-15    TPro  6.4  /  Alb  1.9<L>  /  TBili  0.2  /  DBili  x   /  AST  21  /  ALT  8<L>  /  AlkPhos  92  08-15    LIVER FUNCTIONS - ( 15 Aug 2023 05:31 )  Alb: 1.9 g/dL / Pro: 6.4 g/dL / ALK PHOS: 92 U/L / ALT: 8 U/L DA / AST: 21 U/L / GGT: x               PT/INR - ( 14 Aug 2023 04:55 )   PT: 13.8 sec;   INR: 1.22 ratio         PTT - ( 14 Aug 2023 04:55 )  PTT:31.0 sec    CAPILLARY BLOOD GLUCOSE      RADIOLOGY & ADDITIONAL TESTS:                   PGY-1 Progress Note discussed with attending    PAGER #: [[phone]] TILL 5:00 PM  PLEASE CONTACT ON CALL TEAM:  - On Call Team (Please refer to Tyler) FROM 5:00 PM - 8:30PM  - Nightfloat Team FROM 8:30 -7:30 AM    INTERVAL HPI/OVERNIGHT EVENTS:     Pt began feeding through G tube today. The surrounding skin around the ostomy appears healthy. Pt has EJ line from surgery to be removed today.    MEDICATIONS  (STANDING):  chlorhexidine 2% Cloths 1 Application(s) Topical daily  cloNIDine Patch 0.3 mG/24Hr(s) 1 patch Transdermal <User Schedule>  dextrose 5%. 1000 milliLiter(s) (50 mL/Hr) IV Continuous <Continuous>  hydrALAZINE Injectable 10 milliGRAM(s) IV Push every 6 hours  iron sucrose IVPB 200 milliGRAM(s) IV Intermittent every 24 hours  valproate sodium  IVPB 500 milliGRAM(s) IV Intermittent every 8 hours    MEDICATIONS  (PRN):  morphine  - Injectable 2 milliGRAM(s) IV Push every 6 hours PRN Severe Pain (7 - 10)      REVIEW OF SYSTEMS: Unable to obtain. Pt non-verbal        Vital Signs Last 24 Hrs  T(C): 36.7 (15 Aug 2023 05:03), Max: 36.7 (15 Aug 2023 05:03)  T(F): 98.1 (15 Aug 2023 05:03), Max: 98.1 (15 Aug 2023 05:03)  HR: 70 (15 Aug 2023 05:03) (70 - 119)  BP: 154/80 (15 Aug 2023 05:03) (110/63 - 166/96)  BP(mean): 85 (14 Aug 2023 16:36) (76 - 86)  RR: 16 (15 Aug 2023 05:03) (14 - 20)  SpO2: 98% (15 Aug 2023 05:03) (98% - 100%)    Parameters below as of 15 Aug 2023 05:03  Patient On (Oxygen Delivery Method): room air        PHYSICAL EXAMINATION:  GENERAL: NAD, well built, +EJ line in right side of neck  HEAD:  Atraumatic, Normocephalic  EYES:  conjunctiva and sclera clear  CHEST/LUNG: Clear to auscultation. No rales, rhonchi, wheezing, or rubs  HEART: Regular rate and rhythm; No murmurs, rubs, or gallops  ABDOMEN: +G tube present. Soft, Nontender, Nondistended; Bowel sounds present  NERVOUS SYSTEM:  Alert & Oriented X0,    EXTREMITIES:  2+ Peripheral Pulses, No clubbing, cyanosis, or edema  SKIN: warm dry                          8.5    14.64 )-----------( 282      ( 15 Aug 2023 05:31 )             27.3     08-15    135  |  110<H>  |  22<H>  ----------------------------<  86  4.4   |  14<L>  |  2.18<H>    Ca    9.1      15 Aug 2023 05:31  Phos  5.2     08-15  Mg     1.9     08-15    TPro  6.4  /  Alb  1.9<L>  /  TBili  0.2  /  DBili  x   /  AST  21  /  ALT  8<L>  /  AlkPhos  92  08-15    LIVER FUNCTIONS - ( 15 Aug 2023 05:31 )  Alb: 1.9 g/dL / Pro: 6.4 g/dL / ALK PHOS: 92 U/L / ALT: 8 U/L DA / AST: 21 U/L / GGT: x               PT/INR - ( 14 Aug 2023 04:55 )   PT: 13.8 sec;   INR: 1.22 ratio         PTT - ( 14 Aug 2023 04:55 )  PTT:31.0 sec    CAPILLARY BLOOD GLUCOSE      RADIOLOGY & ADDITIONAL TESTS:                   PGY-1 Progress Note discussed with attending    PAGER #: [[phone]] TILL 5:00 PM  PLEASE CONTACT ON CALL TEAM:  - On Call Team (Please refer to Tyler) FROM 5:00 PM - 8:30PM  - Nightfloat Team FROM 8:30 -7:30 AM    INTERVAL HPI/OVERNIGHT EVENTS:     Pt began feeding through G tube today. The surrounding skin around the ostomy appears healthy. Pt has EJ line from surgery to be removed today.    MEDICATIONS  (STANDING):  chlorhexidine 2% Cloths 1 Application(s) Topical daily  cloNIDine Patch 0.3 mG/24Hr(s) 1 patch Transdermal <User Schedule>  dextrose 5%. 1000 milliLiter(s) (50 mL/Hr) IV Continuous <Continuous>  hydrALAZINE Injectable 10 milliGRAM(s) IV Push every 6 hours  iron sucrose IVPB 200 milliGRAM(s) IV Intermittent every 24 hours  valproate sodium  IVPB 500 milliGRAM(s) IV Intermittent every 8 hours    MEDICATIONS  (PRN):  morphine  - Injectable 2 milliGRAM(s) IV Push every 6 hours PRN Severe Pain (7 - 10)      REVIEW OF SYSTEMS: Unable to obtain. Pt non-verbal        Vital Signs Last 24 Hrs  T(C): 36.7 (15 Aug 2023 05:03), Max: 36.7 (15 Aug 2023 05:03)  T(F): 98.1 (15 Aug 2023 05:03), Max: 98.1 (15 Aug 2023 05:03)  HR: 70 (15 Aug 2023 05:03) (70 - 119)  BP: 154/80 (15 Aug 2023 05:03) (110/63 - 166/96)  BP(mean): 85 (14 Aug 2023 16:36) (76 - 86)  RR: 16 (15 Aug 2023 05:03) (14 - 20)  SpO2: 98% (15 Aug 2023 05:03) (98% - 100%)    Parameters below as of 15 Aug 2023 05:03  Patient On (Oxygen Delivery Method): room air        PHYSICAL EXAMINATION:  GENERAL: NAD, well built, +EJ line in right side of neck  HEAD:  Atraumatic, Normocephalic  EYES:  conjunctiva and sclera clear  CHEST/LUNG: Clear to auscultation. No rales, rhonchi, wheezing, or rubs  HEART: Regular rate and rhythm; No murmurs, rubs, or gallops  ABDOMEN: +G tube present. Soft, Nontender, Nondistended; Bowel sounds present  NERVOUS SYSTEM:  Alert & Oriented X0,    EXTREMITIES:  2+ Peripheral Pulses, No clubbing, cyanosis, or edema  SKIN: warm dry                          8.5    14.64 )-----------( 282      ( 15 Aug 2023 05:31 )             27.3     08-15    135  |  110<H>  |  22<H>  ----------------------------<  86  4.4   |  14<L>  |  2.18<H>    Ca    9.1      15 Aug 2023 05:31  Phos  5.2     08-15  Mg     1.9     08-15    TPro  6.4  /  Alb  1.9<L>  /  TBili  0.2  /  DBili  x   /  AST  21  /  ALT  8<L>  /  AlkPhos  92  08-15    LIVER FUNCTIONS - ( 15 Aug 2023 05:31 )  Alb: 1.9 g/dL / Pro: 6.4 g/dL / ALK PHOS: 92 U/L / ALT: 8 U/L DA / AST: 21 U/L / GGT: x               PT/INR - ( 14 Aug 2023 04:55 )   PT: 13.8 sec;   INR: 1.22 ratio         PTT - ( 14 Aug 2023 04:55 )  PTT:31.0 sec    CAPILLARY BLOOD GLUCOSE      RADIOLOGY & ADDITIONAL TESTS:

## 2023-08-15 NOTE — PROGRESS NOTE ADULT - SUBJECTIVE AND OBJECTIVE BOX
CHIEF COMPLAINT:Patient is a 77y old  Female who presents with a chief complaint of Altered mental status. Pt appears comfortable.    	  REVIEW OF SYSTEMS:  CONSTITUTIONAL: No fever, weight loss, or fatigue  EYES: No eye pain, visual disturbances, or discharge  ENT:  No difficulty hearing, tinnitus, vertigo; No sinus or throat pain  NECK: No pain or stiffness  RESPIRATORY: No cough, wheezing, chills or hemoptysis; No Shortness of Breath  CARDIOVASCULAR: No chest pain, palpitations, passing out, dizziness, or leg swelling  GASTROINTESTINAL: No abdominal or epigastric pain. No nausea, vomiting, or hematemesis; No diarrhea or constipation. No melena or hematochezia.  GENITOURINARY: No dysuria, frequency, hematuria, or incontinence  NEUROLOGICAL: No headaches, memory loss, loss of strength, numbness, or tremors  SKIN: No itching, burning, rashes, or lesions   LYMPH Nodes: No enlarged glands  ENDOCRINE: No heat or cold intolerance; No hair loss  MUSCULOSKELETAL: No joint pain or swelling; No muscle, back, or extremity pain  PSYCHIATRIC: No depression, anxiety, mood swings, or difficulty sleeping  HEME/LYMPH: No easy bruising, or bleeding gums  ALLERGY AND IMMUNOLOGIC: No hives or eczema	        PHYSICAL EXAM:  T(C): 36.7 (08-15-23 @ 05:03), Max: 36.7 (08-15-23 @ 05:03)  HR: 70 (08-15-23 @ 05:03) (70 - 119)  BP: 154/80 (08-15-23 @ 05:03) (110/63 - 166/96)  RR: 16 (08-15-23 @ 05:03) (14 - 20)  SpO2: 98% (08-15-23 @ 05:03) (98% - 100%)  Wt(kg): --  I&O's Summary    14 Aug 2023 07:01  -  15 Aug 2023 07:00  --------------------------------------------------------  IN: 0 mL / OUT: 350 mL / NET: -350 mL        Appearance: Normal	  HEENT:   Normal oral mucosa, PERRL, EOMI	  Lymphatic: No lymphadenopathy  Cardiovascular: Normal S1 S2, No JVD, No murmurs, No edema  Respiratory: Lungs clear to auscultation	  Psychiatry: A & O x 3, Mood & affect appropriate  Gastrointestinal:  Soft, Non-tender, + BS	  Skin: No rashes, No ecchymoses, No cyanosis	  Neurologic: Non-focal  Extremities: Normal range of motion, No clubbing, cyanosis or edema  Vascular: Peripheral pulses palpable 2+ bilaterally    MEDICATIONS  (STANDING):  chlorhexidine 2% Cloths 1 Application(s) Topical daily  cloNIDine Patch 0.3 mG/24Hr(s) 1 patch Transdermal <User Schedule>  dextrose 5%. 1000 milliLiter(s) (50 mL/Hr) IV Continuous <Continuous>  hydrALAZINE Injectable 10 milliGRAM(s) IV Push every 6 hours  iron sucrose IVPB 200 milliGRAM(s) IV Intermittent every 24 hours  valproate sodium  IVPB 500 milliGRAM(s) IV Intermittent every 8 hours        LABS:	 	                      8.5    14.64 )-----------( 282      ( 15 Aug 2023 05:31 )             27.3     08-15    135  |  110<H>  |  22<H>  ----------------------------<  86  4.4   |  14<L>  |  2.18<H>    Ca    9.1      15 Aug 2023 05:31  Phos  5.2     08-15  Mg     1.9     08-15    TPro  6.4  /  Alb  1.9<L>  /  TBili  0.2  /  DBili  x   /  AST  21  /  ALT  8<L>  /  AlkPhos  92  08-15      Lipid Profile: Cholesterol 152  HDL 51  TG 93  Ldl calc 82      TSH: Thyroid Stimulating Hormone, Serum: 3.59 uU/mL (07-28 @ 05:03)

## 2023-08-16 ENCOUNTER — TRANSCRIPTION ENCOUNTER (OUTPATIENT)
Age: 78
End: 2023-08-16

## 2023-08-16 LAB
ALBUMIN SERPL ELPH-MCNC: 1.7 G/DL — LOW (ref 3.5–5)
ALP SERPL-CCNC: 90 U/L — SIGNIFICANT CHANGE UP (ref 40–120)
ALT FLD-CCNC: 10 U/L DA — SIGNIFICANT CHANGE UP (ref 10–60)
ANION GAP SERPL CALC-SCNC: 9 MMOL/L — SIGNIFICANT CHANGE UP (ref 5–17)
AST SERPL-CCNC: 19 U/L — SIGNIFICANT CHANGE UP (ref 10–40)
BILIRUB SERPL-MCNC: 0.2 MG/DL — SIGNIFICANT CHANGE UP (ref 0.2–1.2)
BUN SERPL-MCNC: 26 MG/DL — HIGH (ref 7–18)
CALCIUM SERPL-MCNC: 7.7 MG/DL — LOW (ref 8.4–10.5)
CHLORIDE SERPL-SCNC: 109 MMOL/L — HIGH (ref 96–108)
CO2 SERPL-SCNC: 16 MMOL/L — LOW (ref 22–31)
CREAT SERPL-MCNC: 2.24 MG/DL — HIGH (ref 0.5–1.3)
EGFR: 22 ML/MIN/1.73M2 — LOW
GLUCOSE BLDC GLUCOMTR-MCNC: 135 MG/DL — HIGH (ref 70–99)
GLUCOSE BLDC GLUCOMTR-MCNC: 139 MG/DL — HIGH (ref 70–99)
GLUCOSE BLDC GLUCOMTR-MCNC: 169 MG/DL — HIGH (ref 70–99)
GLUCOSE BLDC GLUCOMTR-MCNC: 178 MG/DL — HIGH (ref 70–99)
GLUCOSE SERPL-MCNC: 171 MG/DL — HIGH (ref 70–99)
HCT VFR BLD CALC: 23.4 % — LOW (ref 34.5–45)
HCT VFR BLD CALC: 24.1 % — LOW (ref 34.5–45)
HGB BLD-MCNC: 7.4 G/DL — LOW (ref 11.5–15.5)
HGB BLD-MCNC: 7.6 G/DL — LOW (ref 11.5–15.5)
MAGNESIUM SERPL-MCNC: 1.9 MG/DL — SIGNIFICANT CHANGE UP (ref 1.6–2.6)
MCHC RBC-ENTMCNC: 28.1 PG — SIGNIFICANT CHANGE UP (ref 27–34)
MCHC RBC-ENTMCNC: 28.5 PG — SIGNIFICANT CHANGE UP (ref 27–34)
MCHC RBC-ENTMCNC: 31.5 GM/DL — LOW (ref 32–36)
MCHC RBC-ENTMCNC: 31.6 GM/DL — LOW (ref 32–36)
MCV RBC AUTO: 89 FL — SIGNIFICANT CHANGE UP (ref 80–100)
MCV RBC AUTO: 90.3 FL — SIGNIFICANT CHANGE UP (ref 80–100)
NRBC # BLD: 0 /100 WBCS — SIGNIFICANT CHANGE UP (ref 0–0)
PHOSPHATE SERPL-MCNC: 4.6 MG/DL — HIGH (ref 2.5–4.5)
PLATELET # BLD AUTO: 251 K/UL — SIGNIFICANT CHANGE UP (ref 150–400)
PLATELET # BLD AUTO: 262 K/UL — SIGNIFICANT CHANGE UP (ref 150–400)
POTASSIUM SERPL-MCNC: 4.1 MMOL/L — SIGNIFICANT CHANGE UP (ref 3.5–5.3)
POTASSIUM SERPL-SCNC: 4.1 MMOL/L — SIGNIFICANT CHANGE UP (ref 3.5–5.3)
PROT SERPL-MCNC: 5.9 G/DL — LOW (ref 6–8.3)
RBC # BLD: 2.63 M/UL — LOW (ref 3.8–5.2)
RBC # BLD: 2.67 M/UL — LOW (ref 3.8–5.2)
RBC # FLD: 16.5 % — HIGH (ref 10.3–14.5)
RBC # FLD: 16.7 % — HIGH (ref 10.3–14.5)
SARS-COV-2 RNA SPEC QL NAA+PROBE: SIGNIFICANT CHANGE UP
SODIUM SERPL-SCNC: 134 MMOL/L — LOW (ref 135–145)
WBC # BLD: 16.35 K/UL — HIGH (ref 3.8–10.5)
WBC # BLD: 17.31 K/UL — HIGH (ref 3.8–10.5)
WBC # FLD AUTO: 16.35 K/UL — HIGH (ref 3.8–10.5)
WBC # FLD AUTO: 17.31 K/UL — HIGH (ref 3.8–10.5)

## 2023-08-16 RX ORDER — CARBIDOPA AND LEVODOPA 25; 100 MG/1; MG/1
1 TABLET ORAL
Refills: 0 | Status: DISCONTINUED | OUTPATIENT
Start: 2023-08-16 | End: 2023-08-24

## 2023-08-16 RX ORDER — ASPIRIN/CALCIUM CARB/MAGNESIUM 324 MG
324 TABLET ORAL DAILY
Refills: 0 | Status: DISCONTINUED | OUTPATIENT
Start: 2023-08-16 | End: 2023-08-24

## 2023-08-16 RX ORDER — METOPROLOL TARTRATE 50 MG
25 TABLET ORAL
Refills: 0 | Status: DISCONTINUED | OUTPATIENT
Start: 2023-08-16 | End: 2023-08-24

## 2023-08-16 RX ORDER — ATORVASTATIN CALCIUM 80 MG/1
20 TABLET, FILM COATED ORAL AT BEDTIME
Refills: 0 | Status: DISCONTINUED | OUTPATIENT
Start: 2023-08-16 | End: 2023-08-24

## 2023-08-16 RX ORDER — PANTOPRAZOLE SODIUM 20 MG/1
40 TABLET, DELAYED RELEASE ORAL DAILY
Refills: 0 | Status: DISCONTINUED | OUTPATIENT
Start: 2023-08-16 | End: 2023-08-24

## 2023-08-16 RX ADMIN — Medication 25 MILLIGRAM(S): at 21:41

## 2023-08-16 RX ADMIN — MORPHINE SULFATE 2 MILLIGRAM(S): 50 CAPSULE, EXTENDED RELEASE ORAL at 10:29

## 2023-08-16 RX ADMIN — Medication 324 MILLIGRAM(S): at 13:00

## 2023-08-16 RX ADMIN — ATORVASTATIN CALCIUM 20 MILLIGRAM(S): 80 TABLET, FILM COATED ORAL at 21:41

## 2023-08-16 RX ADMIN — Medication 1 PATCH: at 08:00

## 2023-08-16 RX ADMIN — Medication 10 MILLIGRAM(S): at 00:52

## 2023-08-16 RX ADMIN — PANTOPRAZOLE SODIUM 40 MILLIGRAM(S): 20 TABLET, DELAYED RELEASE ORAL at 13:00

## 2023-08-16 RX ADMIN — SODIUM CHLORIDE 50 MILLILITER(S): 9 INJECTION, SOLUTION INTRAVENOUS at 21:41

## 2023-08-16 RX ADMIN — MORPHINE SULFATE 2 MILLIGRAM(S): 50 CAPSULE, EXTENDED RELEASE ORAL at 10:50

## 2023-08-16 RX ADMIN — Medication 55 MILLIGRAM(S): at 14:28

## 2023-08-16 RX ADMIN — Medication 1 PATCH: at 19:38

## 2023-08-16 RX ADMIN — Medication 25 MILLIGRAM(S): at 12:16

## 2023-08-16 RX ADMIN — CARBIDOPA AND LEVODOPA 1 TABLET(S): 25; 100 TABLET ORAL at 18:53

## 2023-08-16 RX ADMIN — Medication 10 MILLIGRAM(S): at 06:02

## 2023-08-16 RX ADMIN — IRON SUCROSE 110 MILLIGRAM(S): 20 INJECTION, SOLUTION INTRAVENOUS at 21:41

## 2023-08-16 RX ADMIN — Medication 55 MILLIGRAM(S): at 21:41

## 2023-08-16 RX ADMIN — Medication 55 MILLIGRAM(S): at 06:04

## 2023-08-16 RX ADMIN — CHLORHEXIDINE GLUCONATE 1 APPLICATION(S): 213 SOLUTION TOPICAL at 13:00

## 2023-08-16 NOTE — PROGRESS NOTE ADULT - PROBLEM SELECTOR PLAN 7
Pt. on sublingual carbidopa levodopa but having trouble and had to suctioned even though sublingual -Sublingual carbidopa-levodopa stopped for aspiration precautions  -Cont crushed carbidopa-levodopa through feeding tube

## 2023-08-16 NOTE — CHART NOTE - NSCHARTNOTEFT_GEN_A_CORE
Assessment:     Factors impacting intake: [ ] none [ ] nausea  [ ] vomiting [ ] diarrhea [ ] constipation  [ ]chewing problems [ ] swallowing issues  [ ] other:     Diet Presciption: Diet, NPO with Tube Feed:   Tube Feeding Modality: Gastrostomy  Jevity 1.5 Mauro  Total Volume for 24 Hours (mL): 960  Continuous  Starting Tube Feed Rate {mL per Hour}: 30  Increase Tube Feed Rate by (mL): 10     Every 6 hours  Until Goal Tube Feed Rate (mL per Hour): 60  Tube Feed Duration (in Hours): 16  Tube Feed Start Time: 00:00 (08-15-23 @ 09:37)    Intake:     Daily   % Weight Change    Pertinent Medications: MEDICATIONS  (STANDING):  chlorhexidine 2% Cloths 1 Application(s) Topical daily  cloNIDine Patch 0.3 mG/24Hr(s) 1 patch Transdermal <User Schedule>  dextrose 5%. 1000 milliLiter(s) (50 mL/Hr) IV Continuous <Continuous>  hydrALAZINE Injectable 10 milliGRAM(s) IV Push every 6 hours  iron sucrose IVPB 200 milliGRAM(s) IV Intermittent every 24 hours  valproate sodium  IVPB 500 milliGRAM(s) IV Intermittent every 8 hours    MEDICATIONS  (PRN):  morphine  - Injectable 2 milliGRAM(s) IV Push every 6 hours PRN Severe Pain (7 - 10)    Pertinent Labs: 08-15 Na135 mmol/L Glu 86 mg/dL K+ 4.4 mmol/L Cr  2.18 mg/dL<H> BUN 22 mg/dL<H> 08-15 Phos 5.2 mg/dL<H> 08-15 Alb 1.9 g/dL<L> 07-28 Chol 152 mg/dL LDL --    HDL 51 mg/dL Trig 93 mg/dL     CAPILLARY BLOOD GLUCOSE      POCT Blood Glucose.: 139 mg/dL (16 Aug 2023 06:24)  POCT Blood Glucose.: 135 mg/dL (16 Aug 2023 00:36)  POCT Blood Glucose.: 122 mg/dL (15 Aug 2023 17:50)  POCT Blood Glucose.: 71 mg/dL (15 Aug 2023 11:43)      Skin:     Estimated Needs:   [ ] no change since previous assessment  [ ] recalculated:     Previous Nutrition Diagnosis:   [ ] Inadequate Energy Intake [ ]Inadequate Oral Intake [ ] Excessive Energy Intake   [ ] Underweight [ ] Increased Nutrient Needs [ ] Overweight/Obesity  [ ] Swallowing Difficult   [ ] Altered GI Function [ ] Unintended Weight Loss [ ] Food & Nutrition Related Knowledge Deficit [ ] Malnutrition   [ ] Not Ready for Diet/Life Style Changes     Nutrition Diagnosis is [ ] ongoing  [ ] Improving   [ ] resolved [ ] not applicable     New Nutrition Diagnosis: [ ] not applicable       Interventions:   Recommend  [ ] Change Diet To:  [ ] Nutrition Supplement  [ ] Nutrition Support  [ ] Other:     Monitoring and Evaluation:   [ ] PO intake [ x ] Tolerance to diet prescription [ x ] weights [ x ] labs[ x ] follow up per protocol  [ ] other: Assessment:       Nutrition reassessment for follow-up TF tolerance. Chart reviewed, pt visited, no-verbal/confused; s/p open gastrostomy tube placement 8/14/23, TF started 8/15/23, progressing and tolerated at present, increase as tolerated to goal rate. Pending discharge planning to skilled nursing facility when medically ready per team     Factors impacting intake: [ x ] other: change in mental status; difficulty chewing; difficulty feeding self; difficulty swallowing; acute on chronic comorbidities including acute encephalopathy, SUSAN on CKD, h/o CVA    Diet Prescription:   Diet, NPO with Tube Feed:   Tube Feeding Modality: Gastrostomy  Jevity 1.5 Mauro  Total Volume for 24 Hours (mL): 960  Continuous  Starting Tube Feed Rate {mL per Hour}: 30  Increase Tube Feed Rate by (mL): 10     Every 6 hours  Until Goal Tube Feed Rate (mL per Hour): 60  Tube Feed Duration (in Hours): 16  Tube Feed Start Time: 00:00 (08-15-23 @ 09:37)    Pertinent Medications: MEDICATIONS  (STANDING):  chlorhexidine 2% Cloths 1 Application(s) Topical daily  cloNIDine Patch 0.3 mG/24Hr(s) 1 patch Transdermal <User Schedule>  dextrose 5%. 1000 milliLiter(s) (50 mL/Hr) IV Continuous <Continuous>  hydrALAZINE Injectable 10 milliGRAM(s) IV Push every 6 hours  iron sucrose IVPB 200 milliGRAM(s) IV Intermittent every 24 hours  valproate sodium  IVPB 500 milliGRAM(s) IV Intermittent every 8 hours    MEDICATIONS  (PRN):  morphine  - Injectable 2 milliGRAM(s) IV Push every 6 hours PRN Severe Pain (7 - 10)    Pertinent Labs: 08-15 Na135 mmol/L Glu 86 mg/dL K+ 4.4 mmol/L Cr  2.18 mg/dL<H> BUN 22 mg/dL<H> 08-15 Phos 5.2 mg/dL<H> 08-15 Alb 1.9 g/dL<L> 07-28 Chol 152 mg/dL LDL --    HDL 51 mg/dL Trig 93 mg/dL     CAPILLARY BLOOD GLUCOSE    POCT Blood Glucose.: 139 mg/dL (16 Aug 2023 06:24)  POCT Blood Glucose.: 135 mg/dL (16 Aug 2023 00:36)  POCT Blood Glucose.: 122 mg/dL (15 Aug 2023 17:50)  POCT Blood Glucose.: 71 mg/dL (15 Aug 2023 11:43)  Skin: skin tear; 3+/4+ extremity edema     Estimated Needs:   [ x ] no change since previous assessment  [ ] recalculated:     Previous Nutrition Diagnosis:   [ x ]Inadequate Oral Intake   [ x ] Severe Malnutrition     Nutrition Diagnosis is [ x ] ongoing  [ ] Improving   [ ] resolved [ ] not applicable     New Nutrition Diagnosis: [ x ] not applicable     Interventions:   Recommend  [ x ] Continue diet Rx as ordered with TF Rx    [ ] Nutrition Supplement  [ x ] Nutrition Support:  Goal: Jevity 1.5 @ 60ml/h x 16h (960ml, 1440kcal, 61g protein, 729ml water daily at goal)               If worsening renal function with further K/PO4 elevated, may consider Nepro @ 55ml/h x 16h (880ml, 1584kcal, 71g protein, 642ml water daily at goal rate)                       MD to adjust free water as needed   [ x ] Other:  Discussed with team                    Pt to be followed by dietitian at skilled nursing facility when medically ready to be discharged     Monitoring and Evaluation:   [ ] PO intake [ x ] Tolerance to diet prescription [ x ] weights [ x ] labs[ x ] follow up per protocol  [ ] other: Assessment:       Nutrition reassessment for follow-up TF tolerance. Chart reviewed, pt visited, no-verbal/confused; s/p open gastrostomy tube placement 8/14/23, TF started 8/15/23, progressing and tolerated at present, increased as tolerated to goal rate. Pending discharge planning to skilled nursing facility when medically ready per team     Factors impacting intake: [ x ] other: change in mental status; difficulty chewing; difficulty feeding self; difficulty swallowing; acute on chronic comorbidities including acute encephalopathy, SUSAN on CKD, h/o CVA    Diet Prescription:   Diet, NPO with Tube Feed:   Tube Feeding Modality: Gastrostomy  Jevity 1.5 Mauro  Total Volume for 24 Hours (mL): 960  Continuous  Starting Tube Feed Rate {mL per Hour}: 30  Increase Tube Feed Rate by (mL): 10     Every 6 hours  Until Goal Tube Feed Rate (mL per Hour): 60  Tube Feed Duration (in Hours): 16  Tube Feed Start Time: 00:00 (08-15-23 @ 09:37)    Pertinent Medications: MEDICATIONS  (STANDING):  chlorhexidine 2% Cloths 1 Application(s) Topical daily  cloNIDine Patch 0.3 mG/24Hr(s) 1 patch Transdermal <User Schedule>  dextrose 5%. 1000 milliLiter(s) (50 mL/Hr) IV Continuous <Continuous>  hydrALAZINE Injectable 10 milliGRAM(s) IV Push every 6 hours  iron sucrose IVPB 200 milliGRAM(s) IV Intermittent every 24 hours  valproate sodium  IVPB 500 milliGRAM(s) IV Intermittent every 8 hours    MEDICATIONS  (PRN):  morphine  - Injectable 2 milliGRAM(s) IV Push every 6 hours PRN Severe Pain (7 - 10)    Pertinent Labs: 08-15 Na135 mmol/L Glu 86 mg/dL K+ 4.4 mmol/L Cr  2.18 mg/dL<H> BUN 22 mg/dL<H> 08-15 Phos 5.2 mg/dL<H> 08-15 Alb 1.9 g/dL<L> 07-28 Chol 152 mg/dL LDL --    HDL 51 mg/dL Trig 93 mg/dL     CAPILLARY BLOOD GLUCOSE    POCT Blood Glucose.: 139 mg/dL (16 Aug 2023 06:24)  POCT Blood Glucose.: 135 mg/dL (16 Aug 2023 00:36)  POCT Blood Glucose.: 122 mg/dL (15 Aug 2023 17:50)  POCT Blood Glucose.: 71 mg/dL (15 Aug 2023 11:43)  Skin: skin tear; 3+/4+ extremity edema     Estimated Needs:   [ x ] no change since previous assessment  [ ] recalculated:     Previous Nutrition Diagnosis:   [ x ]Inadequate Oral Intake   [ x ] Severe Malnutrition     Nutrition Diagnosis is [ x ] ongoing  [ ] Improving   [ ] resolved [ ] not applicable     New Nutrition Diagnosis: [ x ] not applicable     Interventions:   Recommend  [ x ] Continue diet Rx as ordered with TF Rx    [ ] Nutrition Supplement  [ x ] Nutrition Support:  Goal: Jevity 1.5 @ 60ml/h x 16h (960ml, 1440kcal, 61g protein, 729ml water daily at goal)               If worsening renal function with further K/PO4 elevated, may consider Nepro @ 55ml/h x 16h (880ml, 1584kcal, 71g protein, 642ml water daily at goal rate)                       MD to adjust free water as needed   [ x ] Other:  Discussed with team                    Pt to be followed by dietitian at skilled nursing facility when medically ready to be discharged     Monitoring and Evaluation:   [ ] PO intake [ x ] Tolerance to diet prescription [ x ] weights [ x ] labs[ x ] follow up per protocol  [ ] other:

## 2023-08-16 NOTE — PROGRESS NOTE ADULT - NUTRITIONAL ASSESSMENT
This patient has been assessed with a concern for Malnutrition and has been determined to have a diagnosis/diagnoses of Severe protein-calorie malnutrition.    This patient is being managed with:   Diet NPO with Tube Feed-  Tube Feeding Modality: Gastrostomy  Jevity 1.5 Mauro  Total Volume for 24 Hours (mL): 960  Continuous  Starting Tube Feed Rate {mL per Hour}: 30  Increase Tube Feed Rate by (mL): 10     Every 6 hours  Until Goal Tube Feed Rate (mL per Hour): 60  Tube Feed Duration (in Hours): 16  Tube Feed Start Time: 00:00  Entered: Aug 15 2023  9:37AM

## 2023-08-16 NOTE — PROGRESS NOTE ADULT - SUBJECTIVE AND OBJECTIVE BOX
Date of Service 08-16-23 @ 11:16    CHIEF COMPLAINT:Patient is a 77y old  Female who presents with a chief complaint of Altered mental status. Pt appears comfortable.    	  REVIEW OF SYSTEMS:  [x ] Unable to obtain    PHYSICAL EXAM:  T(C): 36.9 (08-16-23 @ 05:27), Max: 37.1 (08-15-23 @ 21:44)  HR: 116 (08-16-23 @ 05:27) (110 - 117)  BP: 138/66 (08-16-23 @ 05:27) (135/68 - 164/74)  RR: 17 (08-16-23 @ 05:27) (17 - 18)  SpO2: 95% (08-16-23 @ 05:27) (95% - 98%)  Wt(kg): --  I&O's Summary    15 Aug 2023 07:01  -  16 Aug 2023 07:00  --------------------------------------------------------  IN: 30 mL / OUT: 345 mL / NET: -315 mL        Appearance: Normal	  HEENT:   Normal oral mucosa, PERRL, EOMI	  Lymphatic: No lymphadenopathy  Cardiovascular: Normal S1 S2, No JVD, No murmurs, No edema  Respiratory: Lungs clear to auscultation	  Gastrointestinal:  Soft, Non-tender, + BS	  Skin: No rashes, No ecchymoses, No cyanosis	  Extremities: Normal range of motion, No clubbing, cyanosis or edema  Vascular: Peripheral pulses palpable 2+ bilaterally    MEDICATIONS  (STANDING):  chlorhexidine 2% Cloths 1 Application(s) Topical daily  cloNIDine Patch 0.3 mG/24Hr(s) 1 patch Transdermal <User Schedule>  dextrose 5%. 1000 milliLiter(s) (50 mL/Hr) IV Continuous <Continuous>  hydrALAZINE Injectable 10 milliGRAM(s) IV Push every 6 hours  iron sucrose IVPB 200 milliGRAM(s) IV Intermittent every 24 hours  valproate sodium  IVPB 500 milliGRAM(s) IV Intermittent every 8 hours      LABS:	 	                       7.4    16.35 )-----------( 251      ( 16 Aug 2023 10:15 )             23.4     08-16    134<L>  |  109<H>  |  26<H>  ----------------------------<  171<H>  4.1   |  16<L>  |  2.24<H>    Ca    7.7<L>      16 Aug 2023 10:15  Phos  4.6     08-16  Mg     1.9     08-16    TPro  5.9<L>  /  Alb  1.7<L>  /  TBili  0.2  /  DBili  x   /  AST  19  /  ALT  10  /  AlkPhos  90  08-16      Lipid Profile: Cholesterol 152  LDL --  HDL 51  TG 93  Ldl calc 82  Ratio --    HgA1c:   TSH: Thyroid Stimulating Hormone, Serum: 3.59 uU/mL (07-28 @ 05:03)

## 2023-08-16 NOTE — DISCHARGE NOTE PROVIDER - HOSPITAL COURSE
77F from [facility], bedbound, PMHx HTN, HLD, CVA (w/ residual aphasia and R sided hemiparesis/plegia), and Parkinoson's Disease, sent from facility for acute change from baseline. Patient is non-verbal, collaterla obtained from chart. Per NH paperwork, noted to have fixed eyes in one direction and unable to follow commands, track, respond when called, speak, or swallow food or medications. At baseline patient can respond with one word answers, swallows food, however is quadraplegic. CTH showing chronic LEFT MCA infarct and chronic RIGHT cerebellum infarct. Admitted to [facility] for CVA w/u vs breakthrough seizures. Neurolgy and Cardiology consulted. UCx (+) Providencia stuartii and Proteus mirabalis treated with Rocephin x3d. Nephrology consulted. Renal U/S w/out stones or hydronephrosis. SUSAN improved with abx and short course of IVF. EEG showed potential epileptogenic factors in left posterior temporal lobe and focal cerebral dysfunction in L hemisphere. MRI showed chronic extensive LEFT cerebral hemisphere infarct s/o hemorrhagic origin at the time of CVA, w/out acute pathology. Resumed on secondary stroke prevention as tolerated. FAILED dysphagia screen and speech and swallow evaluation. Medications administered adjusted for IV route, as unable to tolerate PO. NGT attempted multiple times with unsucessful placement. IVF continued for nutrient maintence. Palliative care consulted for FTT and with poor nutritional status. GOC: DNR/DNI. Family wishing for PEG tube placement. CT a/p without acute abnormalities or structural limitations. Given prolonged NPO status and electrolyte abnormalities, maintence IVF with D5 rec by Nephrology. Nutrition following. GI unable to place PEG 8/8/23. Surgery consulted for PEG tube placement sheduled for 8/9. Family revoked PEG tube decison and wished for speech and swallow re-evaluation, PEG tube cancelled. Patient not a candidate for s/s re-eval. Course c/b RIGHT ear bleeding, unclear etiology. Neurology re-consulted. Rpt CTH, maxilo-facial, and internal auditory canals all non-diagnostic, without trauma or intra-paranchymal bleed. AC and anti-thromboliytics held for persistently bleeding ear. No indications for rpt MRI as per neuro. Also noted to develop acute urinary retention now requiring FC 8/7. Palliative care re-consulted for complex GOC discussion and worsening nutritional status. Patient had G-tube placed by surgery and was functional. Patient G-Tube was dislodged. Palliative care was reconsulted and decision was made to make patient hospice with comfort measures only. Surgery removed the G tube and patient was discharged to [facility] with hospice care . 77F from [facility], bedbound, PMHx HTN, HLD, CVA (w/ residual aphasia and R sided hemiparesis/plegia), and Parkinoson's Disease, sent from facility for acute change from baseline. Patient is non-verbal, collaterla obtained from chart. Per NH paperwork, noted to have fixed eyes in one direction and unable to follow commands, track, respond when called, speak, or swallow food or medications. At baseline patient can respond with one word answers, swallows food, however is quadraplegic. CTH showing chronic LEFT MCA infarct and chronic RIGHT cerebellum infarct. Admitted to [facility] for CVA w/u vs breakthrough seizures. Neurolgy and Cardiology consulted. UCx (+) Providencia stuartii and Proteus mirabalis treated with Rocephin x3d. Nephrology consulted. Renal U/S w/out stones or hydronephrosis. SUSAN improved with abx and short course of IVF. EEG showed potential epileptogenic factors in left posterior temporal lobe and focal cerebral dysfunction in L hemisphere. MRI showed chronic extensive LEFT cerebral hemisphere infarct s/o hemorrhagic origin at the time of CVA, w/out acute pathology. Resumed on secondary stroke prevention as tolerated. FAILED dysphagia screen and speech and swallow evaluation. Medications administered adjusted for IV route, as unable to tolerate PO. NGT attempted multiple times with unsucessful placement. IVF continued for nutrient maintence. Palliative care consulted for FTT and with poor nutritional status. GOC: DNR/DNI. Family wishing for PEG tube placement. CT a/p without acute abnormalities or structural limitations. Given prolonged NPO status and electrolyte abnormalities, maintence IVF with D5 rec by Nephrology. Nutrition following. GI unable to place PEG 8/8/23. Surgery consulted for PEG tube placement sheduled for 8/9. Family revoked PEG tube decison and wished for speech and swallow re-evaluation, PEG tube cancelled. Patient not a candidate for s/s re-eval. Course c/b RIGHT ear bleeding, unclear etiology. Neurology re-consulted. Rpt CTH, maxilo-facial, and internal auditory canals all non-diagnostic, without trauma or intra-paranchymal bleed. AC and anti-thromboliytics held for persistently bleeding ear. No indications for rpt MRI as per neuro. Also noted to develop acute urinary retention now requiring FC 8/7. Palliative care re-consulted for complex GOC discussion and worsening nutritional status. Patient had G-tube placed by surgery and was functional. Patient G-Tube was dislodged. Palliative care was reconsulted and decision was made to make patient hospice with comfort measures only. Surgery removed the G tube and patient was discharged to [facility] with hospice care . 77F from [facility], bedbound, PMHx HTN, HLD, CVA (w/ residual aphasia and R sided hemiparesis/plegia), and Parkinoson's Disease, sent from facility for acute change from baseline. Patient is non-verbal, collaterla obtained from chart. Per NH paperwork, noted to have fixed eyes in one direction and unable to follow commands, track, respond when called, speak, or swallow food or medications. At baseline patient can respond with one word answers, swallows food, however is quadraplegic. CTH showing chronic LEFT MCA infarct and chronic RIGHT cerebellum infarct. Admitted to [facility] for CVA w/u vs breakthrough seizures. Neurolgy and Cardiology consulted. UCx (+) Providencia stuartii and Proteus mirabalis treated with Rocephin x3d. Nephrology consulted. Renal U/S w/out stones or hydronephrosis. SUSAN improved with abx and short course of IVF. EEG showed potential epileptogenic factors in left posterior temporal lobe and focal cerebral dysfunction in L hemisphere. MRI showed chronic extensive LEFT cerebral hemisphere infarct s/o hemorrhagic origin at the time of CVA, w/out acute pathology. Resumed on secondary stroke prevention as tolerated. FAILED dysphagia screen and speech and swallow evaluation. Medications administered adjusted for IV route, as unable to tolerate PO. NGT attempted multiple times with unsucessful placement. IVF continued for nutrient maintence. Palliative care consulted for FTT and with poor nutritional status. GOC: DNR/DNI. Family wishing for PEG tube placement. CT a/p without acute abnormalities or structural limitations. Given prolonged NPO status and electrolyte abnormalities, maintence IVF with D5 rec by Nephrology. Nutrition following. GI unable to place PEG 8/8/23. Surgery consulted for PEG tube placement sheduled for 8/9. Family revoked PEG tube decison and wished for speech and swallow re-evaluation, PEG tube cancelled. Patient not a candidate for s/s re-eval. Course c/b RIGHT ear bleeding, unclear etiology. Neurology re-consulted. Rpt CTH, maxilo-facial, and internal auditory canals all non-diagnostic, without trauma or intra-paranchymal bleed. AC and anti-thromboliytics held for persistently bleeding ear. No indications for rpt MRI as per neuro. Also noted to develop acute urinary retention now requiring FC 8/7. Palliative care re-consulted for complex GOC discussion and worsening nutritional status. Patient had G-tube placed by surgery and was functional. Patient G-Tube was dislodged. Palliative care was reconsulted and decision was made to make patient hospice with comfort measures only. Surgery removed the G tube and patient was discharged to [facility] with hospice care .

## 2023-08-16 NOTE — PROGRESS NOTE ADULT - SUBJECTIVE AND OBJECTIVE BOX
[   ] ICU                                          [   ] CCU                                      [ X  ] Medical Floor      Patient is a 77 year old female with dysphagia. GI consulted for Peg tube placement.     Patient is a 77 female from [facility] with past medical history significant for HTN, HLD, CVA (w/ residual aphasia and R sided hemiparesis/plegia) who was sent to the emergency room with for altered mental status. Patient is not able to provide any history. Patient is lying down in bed, awake and alert. However, patient does not speak, is not able to follow commands and does not turn face or move eyes when her name is called. Patient is admitted with CVA. Now patient with dysphagia, poor po intake, failure to thrive and weight loss. No abdominal pain, nausea, vomiting, hematemesis, hematochezia, melena, fever, chills, chest pain, SOB, cough, hematuria, dysuria  or diarrhea reported.      Patient is comfortable. No new complaints reported, No abdominal pain, N/V, hematemesis, hematochezia, melena, fever, chills, chest pain, SOB, cough or diarrhea reported.      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 1000 milliLiter(s) (70 mL/Hr) IV Continuous <Continuous>  enoxaparin Injectable 30 milliGRAM(s) SubCutaneous every 24 hours  hydrALAZINE Injectable 10 milliGRAM(s) IV Push every 6 hours  potassium chloride  10 mEq/100 mL IVPB 10 milliEquivalent(s) IV Intermittent every 1 hour  valproate sodium  IVPB 500 milliGRAM(s) IV Intermittent every 8 hours         SOCIAL HISTORY  Advanced Directives:       [ X ] Full Code       [  ] DNR  Marital Status:         [  ] M      [ X ] S      [  ] D       [  ] W  Children:       [ X ] Yes      [  ] No  Occupation:        [  ] Employed       [ X ] Unemployed       [  ] Retired  Diet:       [ X ] Regular       [  ] PEG feeding          [  ] NG tube feeding  Drug Use:           [ X ] Patient denied          [  ] Yes  Alcohol:           [ X ] No             [  ] Yes (socially)         [  ] Yes (chronic)  Tobacco:           [  ] Yes           [X  ] No      FAMILY HISTORY  [ X ] Heart Disease            [ X ] Diabetes             [ X ] HTN             [  ] Colon Cancer             [  ] Stomach Cancer              [  ] Pancreatic Cancer      VITALS  Vital Signs Last 24 Hrs  T(C): 36.9 (16 Aug 2023 05:27), Max: 37.1 (15 Aug 2023 21:44)  T(F): 98.4 (16 Aug 2023 05:27), Max: 98.8 (15 Aug 2023 21:44)  HR: 116 (16 Aug 2023 05:27) (110 - 117)  BP: 138/66 (16 Aug 2023 05:27) (135/68 - 164/74)   RR: 17 (16 Aug 2023 05:27) (17 - 18)  SpO2: 95% (16 Aug 2023 05:27) (95% - 98%)  Parameters below as of 16 Aug 2023 05:27  Patient On (Oxygen Delivery Method): room air         MEDICATIONS  (STANDING):  chlorhexidine 2% Cloths 1 Application(s) Topical daily  cloNIDine Patch 0.3 mG/24Hr(s) 1 patch Transdermal <User Schedule>  dextrose 5%. 1000 milliLiter(s) (50 mL/Hr) IV Continuous <Continuous>  hydrALAZINE Injectable 10 milliGRAM(s) IV Push every 6 hours  iron sucrose IVPB 200 milliGRAM(s) IV Intermittent every 24 hours  valproate sodium  IVPB 500 milliGRAM(s) IV Intermittent every 8 hours    MEDICATIONS  (PRN):  morphine  - Injectable 2 milliGRAM(s) IV Push every 6 hours PRN Severe Pain (7 - 10)                            8.5    14.64 )-----------( 282      ( 15 Aug 2023 05:31 )             27.3       08-15    135  |  110<H>  |  22<H>  ----------------------------<  86  4.4   |  14<L>  |  2.18<H>    Ca    9.1      15 Aug 2023 05:31  Phos  5.2     08-15  Mg     1.9     08-15    TPro  6.4  /  Alb  1.9<L>  /  TBili  0.2  /  DBili  x   /  AST  21  /  ALT  8<L>  /  AlkPhos  92  08-15       [   ] ICU                                          [   ] CCU                                      [ X  ] Medical Floor      Patient is a 77 year old female with dysphagia. GI consulted for Peg tube placement.     Patient is a 77 female from [facility] with past medical history significant for HTN, HLD, CVA (w/ residual aphasia and R sided hemiparesis/plegia) who was sent to the emergency room with for altered mental status. Patient is not able to provide any history. Patient is lying down in bed, awake and alert. However, patient does not speak, is not able to follow commands and does not turn face or move eyes when her name is called. Patient is admitted with CVA. Now patient with dysphagia, poor po intake, failure to thrive and weight loss. No abdominal pain, nausea, vomiting, hematemesis, hematochezia, melena, fever, chills, chest pain, SOB, cough, hematuria, dysuria  or diarrhea reported.      Patient is comfortable. No new complaints reported, No abdominal pain, N/V, hematemesis, hematochezia, melena, fever, chills, chest pain, SOB, cough or diarrhea reported.      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 1000 milliLiter(s) (70 mL/Hr) IV Continuous <Continuous>  enoxaparin Injectable 30 milliGRAM(s) SubCutaneous every 24 hours  hydrALAZINE Injectable 10 milliGRAM(s) IV Push every 6 hours  potassium chloride  10 mEq/100 mL IVPB 10 milliEquivalent(s) IV Intermittent every 1 hour  valproate sodium  IVPB 500 milliGRAM(s) IV Intermittent every 8 hours         SOCIAL HISTORY  Advanced Directives:       [ X ] Full Code       [  ] DNR  Marital Status:         [  ] M      [ X ] S      [  ] D       [  ] W  Children:       [ X ] Yes      [  ] No  Occupation:        [  ] Employed       [ X ] Unemployed       [  ] Retired  Diet:       [ X ] Regular       [  ] PEG feeding          [  ] NG tube feeding  Drug Use:           [ X ] Patient denied          [  ] Yes  Alcohol:           [ X ] No             [  ] Yes (socially)         [  ] Yes (chronic)  Tobacco:           [  ] Yes           [X  ] No      FAMILY HISTORY  [ X ] Heart Disease            [ X ] Diabetes             [ X ] HTN             [  ] Colon Cancer             [  ] Stomach Cancer              [  ] Pancreatic Cancer      VITALS  Vital Signs Last 24 Hrs  T(C): 36.9 (16 Aug 2023 05:27), Max: 37.1 (15 Aug 2023 21:44)  T(F): 98.4 (16 Aug 2023 05:27), Max: 98.8 (15 Aug 2023 21:44)  HR: 116 (16 Aug 2023 05:27) (110 - 117)  BP: 138/66 (16 Aug 2023 05:27) (135/68 - 164/74)   RR: 17 (16 Aug 2023 05:27) (17 - 18)  SpO2: 95% (16 Aug 2023 05:27) (95% - 98%)  Parameters below as of 16 Aug 2023 05:27  Patient On (Oxygen Delivery Method): room air         MEDICATIONS  (STANDING):  chlorhexidine 2% Cloths 1 Application(s) Topical daily  cloNIDine Patch 0.3 mG/24Hr(s) 1 patch Transdermal <User Schedule>  dextrose 5%. 1000 milliLiter(s) (50 mL/Hr) IV Continuous <Continuous>  hydrALAZINE Injectable 10 milliGRAM(s) IV Push every 6 hours  iron sucrose IVPB 200 milliGRAM(s) IV Intermittent every 24 hours  valproate sodium  IVPB 500 milliGRAM(s) IV Intermittent every 8 hours    MEDICATIONS  (PRN):  morphine  - Injectable 2 milliGRAM(s) IV Push every 6 hours PRN Severe Pain (7 - 10)                            8.5    14.64 )-----------( 282      ( 15 Aug 2023 05:31 )             27.3       08-15    135  |  110<H>  |  22<H>  ----------------------------<  86  4.4   |  14<L>  |  2.18<H>    Ca    9.1      15 Aug 2023 05:31  Phos  5.2     08-15  Mg     1.9     08-15    TPro  6.4  /  Alb  1.9<L>  /  TBili  0.2  /  DBili  x   /  AST  21  /  ALT  8<L>  /  AlkPhos  92  08-15       [   ] ICU                                          [   ] CCU                                      [ X  ] Medical Floor      Patient is a 77 year old female with dysphagia. GI consulted for Peg tube placement.     Patient is a 77 female from [facility] with past medical history significant for HTN, HLD, CVA (w/ residual aphasia and R sided hemiparesis/plegia) who was sent to the emergency room with for altered mental status. Patient is not able to provide any history. Patient is lying down in bed, awake and alert. However, patient does not speak, is not able to follow commands and does not turn face or move eyes when her name is called. Patient is admitted with CVA. Now patient with dysphagia, poor po intake, failure to thrive and weight loss. No abdominal pain, nausea, vomiting, hematemesis, hematochezia, melena, fever, chills, chest pain, SOB, cough, hematuria, dysuria  or diarrhea reported.      Patient is comfortable. No new complaints reported, No abdominal pain, N/V, hematemesis, hematochezia, melena, fever, chills, chest pain, SOB, cough or diarrhea reported.      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 1000 milliLiter(s) (70 mL/Hr) IV Continuous <Continuous>  enoxaparin Injectable 30 milliGRAM(s) SubCutaneous every 24 hours  hydrALAZINE Injectable 10 milliGRAM(s) IV Push every 6 hours  potassium chloride  10 mEq/100 mL IVPB 10 milliEquivalent(s) IV Intermittent every 1 hour  valproate sodium  IVPB 500 milliGRAM(s) IV Intermittent every 8 hours         SOCIAL HISTORY  Advanced Directives:       [ X ] Full Code       [  ] DNR  Marital Status:         [  ] M      [ X ] S      [  ] D       [  ] W  Children:       [ X ] Yes      [  ] No  Occupation:        [  ] Employed       [ X ] Unemployed       [  ] Retired  Diet:       [ X ] Regular       [  ] PEG feeding          [  ] NG tube feeding  Drug Use:           [ X ] Patient denied          [  ] Yes  Alcohol:           [ X ] No             [  ] Yes (socially)         [  ] Yes (chronic)  Tobacco:           [  ] Yes           [X  ] No      FAMILY HISTORY  [ X ] Heart Disease            [ X ] Diabetes             [ X ] HTN             [  ] Colon Cancer             [  ] Stomach Cancer              [  ] Pancreatic Cancer      VITALS  Vital Signs Last 24 Hrs  T(C): 36.9 (16 Aug 2023 05:27), Max: 37.1 (15 Aug 2023 21:44)  T(F): 98.4 (16 Aug 2023 05:27), Max: 98.8 (15 Aug 2023 21:44)  HR: 116 (16 Aug 2023 05:27) (110 - 117)  BP: 138/66 (16 Aug 2023 05:27) (135/68 - 164/74)   RR: 17 (16 Aug 2023 05:27) (17 - 18)  SpO2: 95% (16 Aug 2023 05:27) (95% - 98%)  Parameters below as of 16 Aug 2023 05:27  Patient On (Oxygen Delivery Method): room air         MEDICATIONS  (STANDING):  chlorhexidine 2% Cloths 1 Application(s) Topical daily  cloNIDine Patch 0.3 mG/24Hr(s) 1 patch Transdermal <User Schedule>  dextrose 5%. 1000 milliLiter(s) (50 mL/Hr) IV Continuous <Continuous>  hydrALAZINE Injectable 10 milliGRAM(s) IV Push every 6 hours  iron sucrose IVPB 200 milliGRAM(s) IV Intermittent every 24 hours  valproate sodium  IVPB 500 milliGRAM(s) IV Intermittent every 8 hours    MEDICATIONS  (PRN):  morphine  - Injectable 2 milliGRAM(s) IV Push every 6 hours PRN Severe Pain (7 - 10)                            8.5    14.64 )-----------( 282      ( 15 Aug 2023 05:31 )             27.3       08-15    135  |  110<H>  |  22<H>  ----------------------------<  86  4.4   |  14<L>  |  2.18<H>    Ca    9.1      15 Aug 2023 05:31  Phos  5.2     08-15  Mg     1.9     08-15    TPro  6.4  /  Alb  1.9<L>  /  TBili  0.2  /  DBili  x   /  AST  21  /  ALT  8<L>  /  AlkPhos  92  08-15

## 2023-08-16 NOTE — PROGRESS NOTE ADULT - PROBLEM SELECTOR PLAN 1
Pt. is NPO  Open G tube placed on 8/14  Feeds through G tube resumed  IV tylenol for pain, morphine for breakthrough pain  IV iron sucrose started 8/15 for 3 days as per nephro

## 2023-08-16 NOTE — PROGRESS NOTE ADULT - ASSESSMENT
Patient is a 77 female from [facility], HLD, CVA (w/ residual aphasia and R sided hemiparesis/plegia) who was sent to the hospital due to altered mental status. Patient is being admitted to medicine for further work up and rule out stroke vs encephalopathy (metabolic vs infectious).  Patient is a 77 female from [facility] HTN, HLD, CVA (w/ residual aphasia and R sided hemiparesis/plegia) who was sent to the hospital due to altered mental status. Patient is being admitted to medicine for further work up and rule out stroke vs encephalopathy (metabolic vs infectious).  Patient is a 77 female from [facility] HTN, HLD, CVA (w/ residual aphasia and R sided hemiparesis/plegia) who was sent to the hospital due to altered mental status. Patient is being admitted to medicine for further work up and rule out stroke vs encephalopathy (metabolic vs infectious).

## 2023-08-16 NOTE — DISCHARGE NOTE PROVIDER - NSDCMRMEDTOKEN_GEN_ALL_CORE_FT
acetaminophen 325 mg oral tablet: 2 tab(s) orally every 6 hours, As needed, Temp greater or equal to 38C (100.4F), Mild Pain (1 - 3)  aspirin 81 mg oral tablet, chewable: 1 tab(s) orally once a day  baclofen 10 mg oral tablet: 1 tab(s) orally 2 times a day  calcium (as carbonate)-vitamin D 600 mg-5 mcg (200 intl units) oral tablet: 1 tab(s) orally once a day  famotidine 40 mg oral tablet: 1 tab(s) orally once a day (at bedtime)  ferrous sulfate 325 mg (65 mg elemental iron) oral tablet: 1 tab(s) orally once a day  furosemide 40 mg oral tablet: 1 tab(s) orally once a day  losartan 100 mg oral tablet: 1 tab(s) orally once a day  Multiple Vitamins oral tablet: 1 tab(s) orally once a day  niCARdipine 20 mg oral capsule: 2 cap(s) orally once a day in the morning  niCARdipine 20 mg oral capsule: 1 cap(s) orally 2 times a day at 1 pm and 9 pm  PeriGuard topical ointment: Apply topically to affected area 2 times a day to right and left buttocks  PeriGuard topical ointment: Apply topically to affected area  Senna 8.6 mg oral tablet: 2 tab(s) orally once a day (at bedtime)  sertraline 100 mg oral tablet: 1 tab(s) orally once a day  simvastatin 10 mg oral tablet: 1 tab(s) orally once a day (at bedtime)  Sinemet 25 mg-100 mg oral tablet: 1 tab(s) orally 3 times a day at 7 am, 1 pm, and 5 pm  valproic acid 250 mg/5 mL oral liquid: 10 milliliter(s) orally every 8 hours  Vitamin C 500 mg oral capsule: 1 orally  Vitamin C 500 mg oral tablet: 1 tab(s) orally once a day   acetaminophen 650 mg/20.3 mL oral suspension: 20.3 milliliter(s) orally every 8 hours as needed for  mild pain  cloNIDine 0.1 mg/24 hr transdermal film, extended release: 1 patch transdermal every 7 days  polyethylene glycol 3350 oral powder for reconstitution: 17 gram(s) orally once a day as needed for  constipation  valproic acid 250 mg/5 mL oral liquid: 10 milliliter(s) orally every 8 hours

## 2023-08-16 NOTE — PROGRESS NOTE ADULT - PROBLEM SELECTOR PLAN 2
c/w BP meds as schedule   f/u PRE-OP labs: CBC, CMP, Mg/Phos, Coags, T&S  JCAK: 0.8 % risk of SANTANA, intra-operatively or up to 30-d post-op  RCRI: 1.0 points, Class II Risk with 6.0 % 30-d risk of death, MI, or cardiac arrest  Patient is at low risk for urgent risk procedure  G tube placement by Surgery on 8/14 c/w BP meds as schedule   f/u PRE-OP labs: CBC, CMP, Mg/Phos, Coags, T&S  JACK: 0.8 % risk of SANTANA, intra-operatively or up to 30-d post-op  RCRI: 1.0 points, Class II Risk with 6.0 % 30-d risk of death, MI, or cardiac arrest  Patient is at low risk for urgent risk procedure  G tube placement by Surgery on 8/14

## 2023-08-16 NOTE — PROGRESS NOTE ADULT - SUBJECTIVE AND OBJECTIVE BOX
NEPHROLOGY MEDICAL CARE, [facility] - Dr. Ajay Green/ Dr. Mert Madison/ Dr. Chris Calderon/ Dr. Carson Cook    Date of Service: 08-16-23 @ 17:18    Patient was seen and examined at bedside.    CC: pt is okay and NAD  family at bedside    Vital Signs Last 24 Hrs  T(C): 37 (16 Aug 2023 13:23), Max: 37.1 (15 Aug 2023 21:44)  T(F): 98.6 (16 Aug 2023 13:23), Max: 98.8 (15 Aug 2023 21:44)  HR: 111 (16 Aug 2023 13:23) (106 - 116)  BP: 149/79 (16 Aug 2023 13:23) (129/81 - 149/79)  BP(mean): 96 (16 Aug 2023 12:15) (96 - 96)  RR: 18 (16 Aug 2023 13:23) (17 - 20)  SpO2: 100% (16 Aug 2023 13:23) (95% - 100%)    Parameters below as of 16 Aug 2023 13:23  Patient On (Oxygen Delivery Method): room air        08-15 @ 07:01  -  08-16 @ 07:00  --------------------------------------------------------  IN: 30 mL / OUT: 345 mL / NET: -315 mL        PHYSICAL EXAM:  General: No acute respiratory distress.  Eyes: conjunctiva and sclera clear  ENMT: Atraumatic, Normocephalic; Moist mucous membranes  Respiratory: Bilateral clear lungs; No rales, rhonchi, wheezing  Cardiovascular: S1S2+; no m/r/g  Gastrointestinal: Soft, Non-tender, Nondistended; Bowel sounds present; PEG  : mcclellan's cath with muddy brown urine  Neuro: eyes are open; hemiparesis.  Ext:  1+pedal edema, No Cyanosis  Skin: No visible rashes      MEDICATIONS:  MEDICATIONS  (STANDING):  aspirin  chewable 324 milliGRAM(s) Oral daily  atorvastatin 20 milliGRAM(s) Oral at bedtime  carbidopa/levodopa  25/100 1 Tablet(s) Oral <User Schedule>  chlorhexidine 2% Cloths 1 Application(s) Topical daily  cloNIDine Patch 0.3 mG/24Hr(s) 1 patch Transdermal <User Schedule>  dextrose 5%. 1000 milliLiter(s) (50 mL/Hr) IV Continuous <Continuous>  iron sucrose IVPB 200 milliGRAM(s) IV Intermittent every 24 hours  metoprolol tartrate 25 milliGRAM(s) Oral two times a day  pantoprazole   Suspension 40 milliGRAM(s) Oral daily  valproate sodium  IVPB 500 milliGRAM(s) IV Intermittent every 8 hours    MEDICATIONS  (PRN):  morphine  - Injectable 2 milliGRAM(s) IV Push every 6 hours PRN Severe Pain (7 - 10)          LABS:                        7.6    17.31 )-----------( 262      ( 16 Aug 2023 12:05 )             24.1     08-16    134<L>  |  109<H>  |  26<H>  ----------------------------<  171<H>  4.1   |  16<L>  |  2.24<H>    Ca    7.7<L>      16 Aug 2023 10:15  Phos  4.6     08-16  Mg     1.9     08-16    TPro  5.9<L>  /  Alb  1.7<L>  /  TBili  0.2  /  DBili  x   /  AST  19  /  ALT  10  /  AlkPhos  90  08-16      Urinalysis Basic - ( 16 Aug 2023 10:15 )    Color: x / Appearance: x / SG: x / pH: x  Gluc: 171 mg/dL / Ketone: x  / Bili: x / Urobili: x   Blood: x / Protein: x / Nitrite: x   Leuk Esterase: x / RBC: x / WBC x   Sq Epi: x / Non Sq Epi: x / Bacteria: x      Magnesium: 1.9 mg/dL (08-16 @ 10:15)  Phosphorus: 4.6 mg/dL (08-16 @ 10:15)    Urine studies    PTH and Vit D:         NEPHROLOGY MEDICAL CARE, [facility] - Dr. Ajay Green/ Dr. Mert Madison/ Dr. Chris Calderon/ Dr. Carson Cook    Date of Service: 08-16-23 @ 17:18    Patient was seen and examined at bedside.    CC: pt is okay and NAD  family at bedside    Vital Signs Last 24 Hrs  T(C): 37 (16 Aug 2023 13:23), Max: 37.1 (15 Aug 2023 21:44)  T(F): 98.6 (16 Aug 2023 13:23), Max: 98.8 (15 Aug 2023 21:44)  HR: 111 (16 Aug 2023 13:23) (106 - 116)  BP: 149/79 (16 Aug 2023 13:23) (129/81 - 149/79)  BP(mean): 96 (16 Aug 2023 12:15) (96 - 96)  RR: 18 (16 Aug 2023 13:23) (17 - 20)  SpO2: 100% (16 Aug 2023 13:23) (95% - 100%)    Parameters below as of 16 Aug 2023 13:23  Patient On (Oxygen Delivery Method): room air        08-15 @ 07:01  -  08-16 @ 07:00  --------------------------------------------------------  IN: 30 mL / OUT: 345 mL / NET: -315 mL        PHYSICAL EXAM:  General: No acute respiratory distress.  Eyes: conjunctiva and sclera clear  ENMT: Atraumatic, Normocephalic; Moist mucous membranes  Respiratory: Bilateral clear lungs; No rales, rhonchi, wheezing  Cardiovascular: S1S2+; no m/r/g  Gastrointestinal: Soft, Non-tender, Nondistended; Bowel sounds present; PEG  : mcclellan's cath with muddy brown urine  Neuro: eyes are open; hemiparesis.  Ext:  1+pedal edema, No Cyanosis  Skin: No visible rashes      MEDICATIONS:  MEDICATIONS  (STANDING):  aspirin  chewable 324 milliGRAM(s) Oral daily  atorvastatin 20 milliGRAM(s) Oral at bedtime  carbidopa/levodopa  25/100 1 Tablet(s) Oral <User Schedule>  chlorhexidine 2% Cloths 1 Application(s) Topical daily  cloNIDine Patch 0.3 mG/24Hr(s) 1 patch Transdermal <User Schedule>  dextrose 5%. 1000 milliLiter(s) (50 mL/Hr) IV Continuous <Continuous>  iron sucrose IVPB 200 milliGRAM(s) IV Intermittent every 24 hours  metoprolol tartrate 25 milliGRAM(s) Oral two times a day  pantoprazole   Suspension 40 milliGRAM(s) Oral daily  valproate sodium  IVPB 500 milliGRAM(s) IV Intermittent every 8 hours    MEDICATIONS  (PRN):  morphine  - Injectable 2 milliGRAM(s) IV Push every 6 hours PRN Severe Pain (7 - 10)          LABS:                        7.6    17.31 )-----------( 262      ( 16 Aug 2023 12:05 )             24.1     08-16    134<L>  |  109<H>  |  26<H>  ----------------------------<  171<H>  4.1   |  16<L>  |  2.24<H>    Ca    7.7<L>      16 Aug 2023 10:15  Phos  4.6     08-16  Mg     1.9     08-16    TPro  5.9<L>  /  Alb  1.7<L>  /  TBili  0.2  /  DBili  x   /  AST  19  /  ALT  10  /  AlkPhos  90  08-16      Urinalysis Basic - ( 16 Aug 2023 10:15 )    Color: x / Appearance: x / SG: x / pH: x  Gluc: 171 mg/dL / Ketone: x  / Bili: x / Urobili: x   Blood: x / Protein: x / Nitrite: x   Leuk Esterase: x / RBC: x / WBC x   Sq Epi: x / Non Sq Epi: x / Bacteria: x      Magnesium: 1.9 mg/dL (08-16 @ 10:15)  Phosphorus: 4.6 mg/dL (08-16 @ 10:15)    Urine studies    PTH and Vit D:         NEPHROLOGY MEDICAL CARE, [facility] - Dr. Ajay Green/ Dr. Mert Madison/ Dr. Chris Calderon/ Dr. Carson Cook    Date of Service: 08-16-23 @ 17:18    Patient was seen and examined at bedside.    CC: pt is okay and NAD  family at bedside    Vital Signs Last 24 Hrs  T(C): 37 (16 Aug 2023 13:23), Max: 37.1 (15 Aug 2023 21:44)  T(F): 98.6 (16 Aug 2023 13:23), Max: 98.8 (15 Aug 2023 21:44)  HR: 111 (16 Aug 2023 13:23) (106 - 116)  BP: 149/79 (16 Aug 2023 13:23) (129/81 - 149/79)  BP(mean): 96 (16 Aug 2023 12:15) (96 - 96)  RR: 18 (16 Aug 2023 13:23) (17 - 20)  SpO2: 100% (16 Aug 2023 13:23) (95% - 100%)    Parameters below as of 16 Aug 2023 13:23  Patient On (Oxygen Delivery Method): room air        08-15 @ 07:01  -  08-16 @ 07:00  --------------------------------------------------------  IN: 30 mL / OUT: 345 mL / NET: -315 mL        PHYSICAL EXAM:  General: No acute respiratory distress.  Eyes: conjunctiva and sclera clear  ENMT: Atraumatic, Normocephalic; Moist mucous membranes  Respiratory: Bilateral clear lungs; No rales, rhonchi, wheezing  Cardiovascular: S1S2+; no m/r/g  Gastrointestinal: Soft, Non-tender, Nondistended; Bowel sounds present; PEG  : mcclellan's cath with muddy brown urine  Neuro: eyes are open; hemiparesis.  Ext:  1+pedal edema, No Cyanosis  Skin: No visible rashes      MEDICATIONS:  MEDICATIONS  (STANDING):  aspirin  chewable 324 milliGRAM(s) Oral daily  atorvastatin 20 milliGRAM(s) Oral at bedtime  carbidopa/levodopa  25/100 1 Tablet(s) Oral <User Schedule>  chlorhexidine 2% Cloths 1 Application(s) Topical daily  cloNIDine Patch 0.3 mG/24Hr(s) 1 patch Transdermal <User Schedule>  dextrose 5%. 1000 milliLiter(s) (50 mL/Hr) IV Continuous <Continuous>  iron sucrose IVPB 200 milliGRAM(s) IV Intermittent every 24 hours  metoprolol tartrate 25 milliGRAM(s) Oral two times a day  pantoprazole   Suspension 40 milliGRAM(s) Oral daily  valproate sodium  IVPB 500 milliGRAM(s) IV Intermittent every 8 hours    MEDICATIONS  (PRN):  morphine  - Injectable 2 milliGRAM(s) IV Push every 6 hours PRN Severe Pain (7 - 10)          LABS:                        7.6    17.31 )-----------( 262      ( 16 Aug 2023 12:05 )             24.1     08-16    134<L>  |  109<H>  |  26<H>  ----------------------------<  171<H>  4.1   |  16<L>  |  2.24<H>    Ca    7.7<L>      16 Aug 2023 10:15  Phos  4.6     08-16  Mg     1.9     08-16    TPro  5.9<L>  /  Alb  1.7<L>  /  TBili  0.2  /  DBili  x   /  AST  19  /  ALT  10  /  AlkPhos  90  08-16      Urinalysis Basic - ( 16 Aug 2023 10:15 )    Color: x / Appearance: x / SG: x / pH: x  Gluc: 171 mg/dL / Ketone: x  / Bili: x / Urobili: x   Blood: x / Protein: x / Nitrite: x   Leuk Esterase: x / RBC: x / WBC x   Sq Epi: x / Non Sq Epi: x / Bacteria: x      Magnesium: 1.9 mg/dL (08-16 @ 10:15)  Phosphorus: 4.6 mg/dL (08-16 @ 10:15)    Urine studies    PTH and Vit D:

## 2023-08-16 NOTE — PROGRESS NOTE ADULT - SUBJECTIVE AND OBJECTIVE BOX
PGY-1 Progress Note discussed with attending    PAGER #: [[phone]] TILL 5:00 PM  PLEASE CONTACT ON CALL TEAM:  - On Call Team (Please refer to Tyler) FROM 5:00 PM - 8:30PM  - Nightfloat Team FROM 8:30 -7:30 AM    CHIEF COMPLAINT & BRIEF HOSPITAL COURSE:    INTERVAL HPI/OVERNIGHT EVENTS:   MEDICATIONS  (STANDING):  chlorhexidine 2% Cloths 1 Application(s) Topical daily  cloNIDine Patch 0.3 mG/24Hr(s) 1 patch Transdermal <User Schedule>  dextrose 5%. 1000 milliLiter(s) (50 mL/Hr) IV Continuous <Continuous>  hydrALAZINE Injectable 10 milliGRAM(s) IV Push every 6 hours  iron sucrose IVPB 200 milliGRAM(s) IV Intermittent every 24 hours  valproate sodium  IVPB 500 milliGRAM(s) IV Intermittent every 8 hours    MEDICATIONS  (PRN):  morphine  - Injectable 2 milliGRAM(s) IV Push every 6 hours PRN Severe Pain (7 - 10)      REVIEW OF SYSTEMS:  CONSTITUTIONAL: No fever, weight loss, or fatigue  RESPIRATORY: No shortness of breath  CARDIOVASCULAR: No chest pain  GASTROINTESTINAL: No abdominal pain.  GENITOURINARY: No dysuria  NEUROLOGICAL: No headaches  SKIN: No itching, burning, rashes    Vital Signs Last 24 Hrs  T(C): 36.9 (16 Aug 2023 05:27), Max: 37.1 (15 Aug 2023 21:44)  T(F): 98.4 (16 Aug 2023 05:27), Max: 98.8 (15 Aug 2023 21:44)  HR: 116 (16 Aug 2023 05:27) (110 - 117)  BP: 138/66 (16 Aug 2023 05:27) (135/68 - 164/74)  BP(mean): --  RR: 17 (16 Aug 2023 05:27) (17 - 18)  SpO2: 95% (16 Aug 2023 05:27) (95% - 98%)    Parameters below as of 16 Aug 2023 05:27  Patient On (Oxygen Delivery Method): room air        PHYSICAL EXAMINATION:  GENERAL: NAD, well built  HEAD:  Atraumatic, Normocephalic  EYES:  conjunctiva and sclera clear  CHEST/LUNG: Clear to auscultation. No rales, rhonchi, wheezing, or rubs  HEART: Regular rate and rhythm; No murmurs, rubs, or gallops  ABDOMEN: Soft, Nontender, Nondistended; Bowel sounds present  NERVOUS SYSTEM:  Alert & Oriented X3,    EXTREMITIES:  2+ Peripheral Pulses, No clubbing, cyanosis, or edema  SKIN: warm dry                          8.5    14.64 )-----------( 282      ( 15 Aug 2023 05:31 )             27.3     08-15    135  |  110<H>  |  22<H>  ----------------------------<  86  4.4   |  14<L>  |  2.18<H>    Ca    9.1      15 Aug 2023 05:31  Phos  5.2     08-15  Mg     1.9     08-15    TPro  6.4  /  Alb  1.9<L>  /  TBili  0.2  /  DBili  x   /  AST  21  /  ALT  8<L>  /  AlkPhos  92  08-15    LIVER FUNCTIONS - ( 15 Aug 2023 05:31 )  Alb: 1.9 g/dL / Pro: 6.4 g/dL / ALK PHOS: 92 U/L / ALT: 8 U/L DA / AST: 21 U/L / GGT: x                   CAPILLARY BLOOD GLUCOSE      RADIOLOGY & ADDITIONAL TESTS:                   PGY-1 Progress Note discussed with attending    PAGER #: [[phone]] TILL 5:00 PM  PLEASE CONTACT ON CALL TEAM:  - On Call Team (Please refer to Tyler) FROM 5:00 PM - 8:30PM  - Nightfloat Team FROM 8:30 -7:30 AM    CHIEF COMPLAINT & BRIEF HOSPITAL COURSE:    INTERVAL HPI/OVERNIGHT EVENTS:   MEDICATIONS  (STANDING):  chlorhexidine 2% Cloths 1 Application(s) Topical daily  cloNIDine Patch 0.3 mG/24Hr(s) 1 patch Transdermal <User Schedule>  dextrose 5%. 1000 milliLiter(s) (50 mL/Hr) IV Continuous <Continuous>  hydrALAZINE Injectable 10 milliGRAM(s) IV Push every 6 hours  iron sucrose IVPB 200 milliGRAM(s) IV Intermittent every 24 hours  valproate sodium  IVPB 500 milliGRAM(s) IV Intermittent every 8 hours    MEDICATIONS  (PRN):  morphine  - Injectable 2 milliGRAM(s) IV Push every 6 hours PRN Severe Pain (7 - 10)      REVIEW OF SYSTEMS:  CONSTITUTIONAL: No fever, weight loss, or fatigue  RESPIRATORY: No shortness of breath  CARDIOVASCULAR: No chest pain  GASTROINTESTINAL: No abdominal pain.  GENITOURINARY: No dysuria  NEUROLOGICAL: No headaches  SKIN: No itching, burning, rashes    Vital Signs Last 24 Hrs  T(C): 36.9 (16 Aug 2023 05:27), Max: 37.1 (15 Aug 2023 21:44)  T(F): 98.4 (16 Aug 2023 05:27), Max: 98.8 (15 Aug 2023 21:44)  HR: 116 (16 Aug 2023 05:27) (110 - 117)  BP: 138/66 (16 Aug 2023 05:27) (135/68 - 164/74)  BP(mean): --  RR: 17 (16 Aug 2023 05:27) (17 - 18)  SpO2: 95% (16 Aug 2023 05:27) (95% - 98%)    Parameters below as of 16 Aug 2023 05:27  Patient On (Oxygen Delivery Method): room air        PHYSICAL EXAMINATION:  GENERAL: NAD, well built  HEAD:  Atraumatic, Normocephalic  EYES:  conjunctiva and sclera clear  CHEST/LUNG: Clear to auscultation. No rales, rhonchi, wheezing, or rubs  HEART: Regular rate and rhythm; No murmurs, rubs, or gallops  ABDOMEN: Soft, Nontender, Nondistended; Bowel sounds present  NERVOUS SYSTEM:  Alert & Oriented X3,    EXTREMITIES:  2+ Peripheral Pulses, No clubbing, cyanosis, or edema  SKIN: warm dry                          8.5    14.64 )-----------( 282      ( 15 Aug 2023 05:31 )             27.3     08-15    135  |  110<H>  |  22<H>  ----------------------------<  86  4.4   |  14<L>  |  2.18<H>    Ca    9.1      15 Aug 2023 05:31  Phos  5.2     08-15  Mg     1.9     08-15    TPro  6.4  /  Alb  1.9<L>  /  TBili  0.2  /  DBili  x   /  AST  21  /  ALT  8<L>  /  AlkPhos  92  08-15    LIVER FUNCTIONS - ( 15 Aug 2023 05:31 )  Alb: 1.9 g/dL / Pro: 6.4 g/dL / ALK PHOS: 92 U/L / ALT: 8 U/L DA / AST: 21 U/L / GGT: x                   CAPILLARY BLOOD GLUCOSE      RADIOLOGY & ADDITIONAL TESTS:                   PGY-1 Progress Note discussed with attending    PAGER #: [[phone]] TILL 5:00 PM  PLEASE CONTACT ON CALL TEAM:  - On Call Team (Please refer to Tyler) FROM 5:00 PM - 8:30PM  - Nightfloat Team FROM 8:30 -7:30 AM    CHIEF COMPLAINT & BRIEF HOSPITAL COURSE:    INTERVAL HPI/OVERNIGHT EVENTS:   MEDICATIONS  (STANDING):  chlorhexidine 2% Cloths 1 Application(s) Topical daily  cloNIDine Patch 0.3 mG/24Hr(s) 1 patch Transdermal <User Schedule>  dextrose 5%. 1000 milliLiter(s) (50 mL/Hr) IV Continuous <Continuous>  hydrALAZINE Injectable 10 milliGRAM(s) IV Push every 6 hours  iron sucrose IVPB 200 milliGRAM(s) IV Intermittent every 24 hours  valproate sodium  IVPB 500 milliGRAM(s) IV Intermittent every 8 hours    MEDICATIONS  (PRN):  morphine  - Injectable 2 milliGRAM(s) IV Push every 6 hours PRN Severe Pain (7 - 10)      REVIEW OF SYSTEMS:  CONSTITUTIONAL: No fever, weight loss, or fatigue  RESPIRATORY: No shortness of breath  CARDIOVASCULAR: No chest pain  GASTROINTESTINAL: No abdominal pain.  GENITOURINARY: No dysuria  NEUROLOGICAL: No headaches  SKIN: No itching, burning, rashes    Vital Signs Last 24 Hrs  T(C): 36.9 (16 Aug 2023 05:27), Max: 37.1 (15 Aug 2023 21:44)  T(F): 98.4 (16 Aug 2023 05:27), Max: 98.8 (15 Aug 2023 21:44)  HR: 116 (16 Aug 2023 05:27) (110 - 117)  BP: 138/66 (16 Aug 2023 05:27) (135/68 - 164/74)  BP(mean): --  RR: 17 (16 Aug 2023 05:27) (17 - 18)  SpO2: 95% (16 Aug 2023 05:27) (95% - 98%)    Parameters below as of 16 Aug 2023 05:27  Patient On (Oxygen Delivery Method): room air        PHYSICAL EXAMINATION:  GENERAL: NAD, well built  HEAD:  Atraumatic, Normocephalic  EYES:  conjunctiva and sclera clear  CHEST/LUNG: Clear to auscultation. No rales, rhonchi, wheezing, or rubs  HEART: Regular rate and rhythm; No murmurs, rubs, or gallops  ABDOMEN: Soft, Nontender, Nondistended; Bowel sounds present  NERVOUS SYSTEM:  Alert & Oriented X3,    EXTREMITIES:  2+ Peripheral Pulses, No clubbing, cyanosis, or edema  SKIN: warm dry                          8.5    14.64 )-----------( 282      ( 15 Aug 2023 05:31 )             27.3     08-15    135  |  110<H>  |  22<H>  ----------------------------<  86  4.4   |  14<L>  |  2.18<H>    Ca    9.1      15 Aug 2023 05:31  Phos  5.2     08-15  Mg     1.9     08-15    TPro  6.4  /  Alb  1.9<L>  /  TBili  0.2  /  DBili  x   /  AST  21  /  ALT  8<L>  /  AlkPhos  92  08-15    LIVER FUNCTIONS - ( 15 Aug 2023 05:31 )  Alb: 1.9 g/dL / Pro: 6.4 g/dL / ALK PHOS: 92 U/L / ALT: 8 U/L DA / AST: 21 U/L / GGT: x                   CAPILLARY BLOOD GLUCOSE      RADIOLOGY & ADDITIONAL TESTS:                   PGY-1 Progress Note discussed with attending    PAGER #: [[phone]] TILL 5:00 PM  PLEASE CONTACT ON CALL TEAM:  - On Call Team (Please refer to Tyler) FROM 5:00 PM - 8:30PM  - Nightfloat Team FROM 8:30 -7:30 AM    CHIEF COMPLAINT & BRIEF HOSPITAL COURSE:  Failure to thrive    INTERVAL HPI/OVERNIGHT EVENTS:  No acute events overnight. Pt receiving feeds through G tube.     MEDICATIONS  (STANDING):  chlorhexidine 2% Cloths 1 Application(s) Topical daily  cloNIDine Patch 0.3 mG/24Hr(s) 1 patch Transdermal <User Schedule>  dextrose 5%. 1000 milliLiter(s) (50 mL/Hr) IV Continuous <Continuous>  hydrALAZINE Injectable 10 milliGRAM(s) IV Push every 6 hours  iron sucrose IVPB 200 milliGRAM(s) IV Intermittent every 24 hours  valproate sodium  IVPB 500 milliGRAM(s) IV Intermittent every 8 hours    MEDICATIONS  (PRN):  morphine  - Injectable 2 milliGRAM(s) IV Push every 6 hours PRN Severe Pain (7 - 10)      REVIEW OF SYSTEMS: Unable to obtain, pt non-verbal    Vital Signs Last 24 Hrs  T(C): 36.9 (16 Aug 2023 05:27), Max: 37.1 (15 Aug 2023 21:44)  T(F): 98.4 (16 Aug 2023 05:27), Max: 98.8 (15 Aug 2023 21:44)  HR: 116 (16 Aug 2023 05:27) (110 - 117)  BP: 138/66 (16 Aug 2023 05:27) (135/68 - 164/74)  BP(mean): --  RR: 17 (16 Aug 2023 05:27) (17 - 18)  SpO2: 95% (16 Aug 2023 05:27) (95% - 98%)    Parameters below as of 16 Aug 2023 05:27  Patient On (Oxygen Delivery Method): room air        PHYSICAL EXAMINATION:  GENERAL: NAD, well built. Non-verbal with G tube, EJ line on right side of neck  HEAD:  Atraumatic, Normocephalic  EYES:  conjunctiva and sclera clear  CHEST/LUNG: Clear to auscultation. No rales, rhonchi, wheezing, or rubs  HEART: Regular rate and rhythm; No murmurs, rubs, or gallops  ABDOMEN: Soft, Nontender, Nondistended; Bowel sounds present  NERVOUS SYSTEM:  Alert & Oriented X0,    EXTREMITIES:  +Edema in all 4 extremities. 2+ Peripheral Pulses, No clubbing, cyanosis  SKIN: warm dry                          8.5    14.64 )-----------( 282      ( 15 Aug 2023 05:31 )             27.3     08-15    135  |  110<H>  |  22<H>  ----------------------------<  86  4.4   |  14<L>  |  2.18<H>    Ca    9.1      15 Aug 2023 05:31  Phos  5.2     08-15  Mg     1.9     08-15    TPro  6.4  /  Alb  1.9<L>  /  TBili  0.2  /  DBili  x   /  AST  21  /  ALT  8<L>  /  AlkPhos  92  08-15    LIVER FUNCTIONS - ( 15 Aug 2023 05:31 )  Alb: 1.9 g/dL / Pro: 6.4 g/dL / ALK PHOS: 92 U/L / ALT: 8 U/L DA / AST: 21 U/L / GGT: x                   CAPILLARY BLOOD GLUCOSE      RADIOLOGY & ADDITIONAL TESTS:                   PGY-1 Progress Note discussed with attending    PAGER #: [[phone]] TILL 5:00 PM  PLEASE CONTACT ON CALL TEAM:  - On Call Team (Please refer to Tyler) FROM 5:00 PM - 8:30PM  - Nightfloat Team FROM 8:30 -7:30 AM    CHIEF COMPLAINT & BRIEF HOSPITAL COURSE:  Failure to thrive    INTERVAL HPI/OVERNIGHT EVENTS:  No acute events overnight. Pt receiving feeds through G tube.     MEDICATIONS  (STANDING):  chlorhexidine 2% Cloths 1 Application(s) Topical daily  cloNIDine Patch 0.3 mG/24Hr(s) 1 patch Transdermal <User Schedule>  dextrose 5%. 1000 milliLiter(s) (50 mL/Hr) IV Continuous <Continuous>  hydrALAZINE Injectable 10 milliGRAM(s) IV Push every 6 hours  iron sucrose IVPB 200 milliGRAM(s) IV Intermittent every 24 hours  valproate sodium  IVPB 500 milliGRAM(s) IV Intermittent every 8 hours    MEDICATIONS  (PRN):  morphine  - Injectable 2 milliGRAM(s) IV Push every 6 hours PRN Severe Pain (7 - 10)      REVIEW OF SYSTEMS: Unable to obtain, pt non-verbal    Vital Signs Last 24 Hrs  T(C): 36.9 (16 Aug 2023 05:27), Max: 37.1 (15 Aug 2023 21:44)  T(F): 98.4 (16 Aug 2023 05:27), Max: 98.8 (15 Aug 2023 21:44)  HR: 116 (16 Aug 2023 05:27) (110 - 117)  BP: 138/66 (16 Aug 2023 05:27) (135/68 - 164/74)  BP(mean): --  RR: 17 (16 Aug 2023 05:27) (17 - 18)  SpO2: 95% (16 Aug 2023 05:27) (95% - 98%)    Parameters below as of 16 Aug 2023 05:27  Patient On (Oxygen Delivery Method): room air        PHYSICAL EXAMINATION:  GENERAL: NAD, well built. Non-verbal with G tube, EJ line on right side of neck  HEAD:  Atraumatic, Normocephalic  EYES:  conjunctiva and sclera clear  CHEST/LUNG: Clear to auscultation. No rales, rhonchi, wheezing, or rubs  HEART: Regular rate and rhythm; No murmurs, rubs, or gallops  ABDOMEN: Soft, Nontender, Nondistended; Bowel sounds present  NERVOUS SYSTEM:  Alert & Oriented X0,    EXTREMITIES:  +Edema in all 4 extremities. 2+ Peripheral Pulses, No clubbing, cyanosis  SKIN: warm dry                          8.5    14.64 )-----------( 282      ( 15 Aug 2023 05:31 )             27.3     08-15    135  |  110<H>  |  22<H>  ----------------------------<  86  4.4   |  14<L>  |  2.18<H>    Ca    9.1      15 Aug 2023 05:31  Phos  5.2     08-15  Mg     1.9     08-15    TPro  6.4  /  Alb  1.9<L>  /  TBili  0.2  /  DBili  x   /  AST  21  /  ALT  8<L>  /  AlkPhos  92  08-15    LIVER FUNCTIONS - ( 15 Aug 2023 05:31 )  Alb: 1.9 g/dL / Pro: 6.4 g/dL / ALK PHOS: 92 U/L / ALT: 8 U/L DA / AST: 21 U/L / GGT: x                   CAPILLARY BLOOD GLUCOSE      RADIOLOGY & ADDITIONAL TESTS:                   PGY-1 Progress Note discussed with attending    PAGER #: [[phone]] TILL 5:00 PM  PLEASE CONTACT ON CALL TEAM:  - On Call Team (Please refer to Tyler) FROM 5:00 PM - 8:30PM  - Nightfloat Team FROM 8:30 -7:30 AM    CHIEF COMPLAINT & BRIEF HOSPITAL COURSE:  Failure to thrive    INTERVAL HPI/OVERNIGHT EVENTS:  No acute events overnight. Pt receiving feeds through G tube.     MEDICATIONS  (STANDING):  chlorhexidine 2% Cloths 1 Application(s) Topical daily  cloNIDine Patch 0.3 mG/24Hr(s) 1 patch Transdermal <User Schedule>  dextrose 5%. 1000 milliLiter(s) (50 mL/Hr) IV Continuous <Continuous>  hydrALAZINE Injectable 10 milliGRAM(s) IV Push every 6 hours  iron sucrose IVPB 200 milliGRAM(s) IV Intermittent every 24 hours  valproate sodium  IVPB 500 milliGRAM(s) IV Intermittent every 8 hours    MEDICATIONS  (PRN):  morphine  - Injectable 2 milliGRAM(s) IV Push every 6 hours PRN Severe Pain (7 - 10)      REVIEW OF SYSTEMS: Unable to obtain, pt non-verbal    Vital Signs Last 24 Hrs  T(C): 36.9 (16 Aug 2023 05:27), Max: 37.1 (15 Aug 2023 21:44)  T(F): 98.4 (16 Aug 2023 05:27), Max: 98.8 (15 Aug 2023 21:44)  HR: 116 (16 Aug 2023 05:27) (110 - 117)  BP: 138/66 (16 Aug 2023 05:27) (135/68 - 164/74)  BP(mean): --  RR: 17 (16 Aug 2023 05:27) (17 - 18)  SpO2: 95% (16 Aug 2023 05:27) (95% - 98%)    Parameters below as of 16 Aug 2023 05:27  Patient On (Oxygen Delivery Method): room air        PHYSICAL EXAMINATION:  GENERAL: NAD, well built. Non-verbal with G tube, EJ line on right side of neck  HEAD:  Atraumatic, Normocephalic  EYES:  conjunctiva and sclera clear  CHEST/LUNG: Clear to auscultation. No rales, rhonchi, wheezing, or rubs  HEART: Regular rate and rhythm; No murmurs, rubs, or gallops  ABDOMEN: Soft, Nontender, Nondistended; Bowel sounds present  NERVOUS SYSTEM:  Alert & Oriented X0,    EXTREMITIES:  +Edema in all 4 extremities. 2+ Peripheral Pulses, No clubbing, cyanosis  SKIN: warm dry                          8.5    14.64 )-----------( 282      ( 15 Aug 2023 05:31 )             27.3     08-15    135  |  110<H>  |  22<H>  ----------------------------<  86  4.4   |  14<L>  |  2.18<H>    Ca    9.1      15 Aug 2023 05:31  Phos  5.2     08-15  Mg     1.9     08-15    TPro  6.4  /  Alb  1.9<L>  /  TBili  0.2  /  DBili  x   /  AST  21  /  ALT  8<L>  /  AlkPhos  92  08-15    LIVER FUNCTIONS - ( 15 Aug 2023 05:31 )  Alb: 1.9 g/dL / Pro: 6.4 g/dL / ALK PHOS: 92 U/L / ALT: 8 U/L DA / AST: 21 U/L / GGT: x                   CAPILLARY BLOOD GLUCOSE      RADIOLOGY & ADDITIONAL TESTS:

## 2023-08-16 NOTE — PROGRESS NOTE ADULT - ASSESSMENT
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  1.Neurology eval noted for bleeding from ear, CT -.  2.UTI-s/p ABX.  3.HTN-  clonidine patch .3mg q wk,lopressor 25mg bid.  5.Lipid d/o- statin.  6.Surgical f/u-s/p G tube-TF and free water.  7.AKIfree water.  8.Anemia-  iron.  9.GI and DVT prophylaxis. 77 female from [facility] PMHx HTN, HLD, CVA (w/ residual aphasia and R sided hemiparesis/plegia) who was sent to the hospital due to altered mental status,UTI.  1.Neurology eval noted for bleeding from ear, CT -.  2.UTI-s/p ABX.  3.HTN-  clonidine patch .3mg q wk,lopressor 25mg bid.  5.Lipid d/o- statin.  6.Surgical f/u-s/p G tube-TF and free water.  7.AKIfree water.  8.Anemia-  iron.  9.GI and DVT prophylaxis. 77 female from [facility] PMHx HTN, HLD, CVA (w/ residual aphasia and R sided hemiparesis/plegia) who was sent to the hospital due to altered mental status,UTI.  1.Neurology eval noted for bleeding from ear, CT -.  2.UTI-s/p ABX.  3.HTN-  clonidine patch .3mg q wk,lopressor 25mg bid.  5.Lipid d/o- statin.  6.Surgical f/u-s/p G tube-TF and free water.  7.AKIfree water.  8.Anemia-  iron.  9.GI and DVT prophylaxis.

## 2023-08-16 NOTE — PROGRESS NOTE ADULT - ASSESSMENT
1. SUSAN possible to MARISA and ATN  Renal sono shows no hdyro with b/l kidneys around 9.2cm  -Scr is unchanged at 2.2mg/dl possible due to ATN again with stable electrolytes.     -s/p mcclellan's cath placed for urinary retention during this admission.   -urinalysis shows blood with proteinuria; FeNa >1%, spot protein to creatinine ratio is 1.5gm  -Adjust meds to eGFR and avoid IV Gadolinium contrast,NSAIDs, and phosphate enema.  -Monitor I/O's daily.   -Monitor SMA daily.  2. Hypernatremia due to water deficit and insensible losses. Pt is clinically euvolemic.   -Na stable.   -Monitor I/O's. Check Serum Na Daily. Avoid high solute intake diet and sodium bicarbonate infuse. Avoid overcorrection of NA (8-10meq/day)  3. CKD stage 4 most likely due to hypertensive nephrosclerosis  -new baseline scr around 1.8mg/dL with uPCR 1.5gm.  -previous Scr around 1.2 to 1.6mg/dL in Oct 2022.  -Keep patient euvolemic and renal diet  -Avoid Nephrotoxic Meds/ Agents such as (NSAIDs, IV contrast, Aminoglycosides such as gentamicin, -Gadolinium contrast, Phosphate containing enemas, etc..)  -Adjust Medications according to eGFR  4. HTN:   -bp is acceptable. continue bp meds  -titrate bp meds to keep sbp >110 and < 130  5. Mineral Bone Disease:  -phos is improving.  -PTH intact 289, will start calcitriol once Phos has improved.   6. Encephalopathy:  -s/p Rocephin.  -Plan as per Neuro and primary team  -unable to place PEG via EGD;   -s/p PEG placed in OR on 08/14/23.  7. Hypokalemia:  -stable K.   -give kcl repletion to keep K >3.5meq/L  -monitor K.   8. Acidosis:  -CO2 is improving.      -monitor CO2.  9. Anemia:  -hb is stable.    -low iron sat and ferritin are okay. on iv iron x 3 days.  -monitor CBC.  -transfuse if hb <7.0    Discussed with family at bedside.   Discussed the assessment and plan with Primary Team/Nurse

## 2023-08-16 NOTE — DISCHARGE NOTE PROVIDER - DISCHARGE SERVICE FOR PATIENT
on the discharge service for the patient. I have reviewed and made amendments to the documentation where necessary.
- - -

## 2023-08-16 NOTE — PROGRESS NOTE ADULT - NUTRITIONAL ASSESSMENT
This patient has been assessed with a concern for Malnutrition and has been determined to have a diagnosis/diagnoses of Severe protein-calorie malnutrition.    This patient is being managed with:   Diet NPO with Tube Feed-  Tube Feeding Modality: Gastrostomy  Jevity 1.5 Mauor  Total Volume for 24 Hours (mL): 960  Continuous  Starting Tube Feed Rate {mL per Hour}: 30  Increase Tube Feed Rate by (mL): 10     Every 6 hours  Until Goal Tube Feed Rate (mL per Hour): 60  Tube Feed Duration (in Hours): 16  Tube Feed Start Time: 00:00  Entered: Aug 15 2023  9:37AM   This patient has been assessed with a concern for Malnutrition and has been determined to have a diagnosis/diagnoses of Severe protein-calorie malnutrition.    This patient is being managed with:   Diet NPO with Tube Feed-  Tube Feeding Modality: Gastrostomy  Jevity 1.5 Mauro  Total Volume for 24 Hours (mL): 960  Continuous  Starting Tube Feed Rate {mL per Hour}: 30  Increase Tube Feed Rate by (mL): 10     Every 6 hours  Until Goal Tube Feed Rate (mL per Hour): 60  Tube Feed Duration (in Hours): 16  Tube Feed Start Time: 00:00  Entered: Aug 15 2023  9:37AM

## 2023-08-16 NOTE — DISCHARGE NOTE PROVIDER - NSDCCPCAREPLAN_GEN_ALL_CORE_FT
PRINCIPAL DISCHARGE DIAGNOSIS  Diagnosis: Adult failure to thrive  Assessment and Plan of Treatment:       SECONDARY DISCHARGE DIAGNOSES  Diagnosis: Hypertension  Assessment and Plan of Treatment:     Diagnosis: Hyperlipidemia  Assessment and Plan of Treatment:     Diagnosis: SUSAN (acute kidney injury)  Assessment and Plan of Treatment:     Diagnosis: Ear bleeding, right  Assessment and Plan of Treatment:     Diagnosis: Anemia  Assessment and Plan of Treatment:     Diagnosis: CVA (cerebrovascular accident)  Assessment and Plan of Treatment:     Diagnosis: AMS (altered mental status)  Assessment and Plan of Treatment:      PRINCIPAL DISCHARGE DIAGNOSIS  Diagnosis: Adult failure to thrive  Assessment and Plan of Treatment: You were admitted for failure to thrive due to decreased oral intake. You had G tube placed but it was disodged. Your family decided for hospice care. Surgery removed G tube and you were discharged home with hospice care.      SECONDARY DISCHARGE DIAGNOSES  Diagnosis: Hospice care  Assessment and Plan of Treatment: Your family decided for hospice care. You were discharged to [facility] with hospice care.    Diagnosis: AMS (altered mental status)  Assessment and Plan of Treatment: Your family decided for hospice care. You were discharged to [facility] with hospice care.    Diagnosis: Adult failure to thrive  Assessment and Plan of Treatment: You were admitted for failure to thrive due to decreased oral intake. You had G tube placed but it was disodged. Your family decided for hospice care. Surgery removed G tube and you were discharged home with hospice care.    Diagnosis: Hypertension  Assessment and Plan of Treatment: Please continue to take the clonidine patch to control your blood pressure. Your family decided for hospice care. You were discharged to [facility] with hospice care.    Diagnosis: Hyperlipidemia  Assessment and Plan of Treatment: Your family decided for hospice care. You were discharged to [facility] with hospice care.    Diagnosis: SUSAN (acute kidney injury)  Assessment and Plan of Treatment:     Diagnosis: Ear bleeding, right  Assessment and Plan of Treatment:     Diagnosis: Anemia  Assessment and Plan of Treatment: Your family decided for hospice care. You were discharged to [facility] with hospice care.    Diagnosis: CVA (cerebrovascular accident)  Assessment and Plan of Treatment: Your family decided for hospice care. You were discharged to [facility] with hospice care.     PRINCIPAL DISCHARGE DIAGNOSIS  Diagnosis: Adult failure to thrive  Assessment and Plan of Treatment: You were admitted for failure to thrive due to decreased oral intake. You had G tube placed but it was disodged. Your family decided for hospice care. Surgery removed G tube and you were discharged home with hospice care.      SECONDARY DISCHARGE DIAGNOSES  Diagnosis: Hospice care  Assessment and Plan of Treatment: Your family decided for hospice care. You were discharged to [facility] with hospice care.    Diagnosis: AMS (altered mental status)  Assessment and Plan of Treatment: Your family decided for hospice care. You were discharged to [facility] with hospice care.    Diagnosis: Adult failure to thrive  Assessment and Plan of Treatment: You were admitted for failure to thrive due to decreased oral intake. You had G tube placed but it was disodged. Your family decided for hospice care. Surgery removed G tube and you were discharged home with hospice care.    Diagnosis: Hypertension  Assessment and Plan of Treatment: Please continue to take the clonidine patch to control your blood pressure. Your family decided for hospice care. You were discharged to [facility] with hospice care.    Diagnosis: Hyperlipidemia  Assessment and Plan of Treatment: Your family decided for hospice care. You were discharged to [facility] with hospice care.    Diagnosis: SUSAN (acute kidney injury)  Assessment and Plan of Treatment:     Diagnosis: Ear bleeding, right  Assessment and Plan of Treatment:     Diagnosis: Anemia  Assessment and Plan of Treatment: Your family decided for hospice care. You were discharged to [facility] with hospice care.    Diagnosis: CVA (cerebrovascular accident)  Assessment and Plan of Treatment: Your family decided for hospice care. You were discharged to [facility] with hospice care.     PRINCIPAL DISCHARGE DIAGNOSIS  Diagnosis: Adult failure to thrive  Assessment and Plan of Treatment: You were admitted for failure to thrive due to decreased oral intake. You had G tube placed but it was disodged. Your family decided for hospice care. Surgery removed G tube and you were discharged home with hospice care.      SECONDARY DISCHARGE DIAGNOSES  Diagnosis: Hospice care  Assessment and Plan of Treatment: Your family decided for hospice care. You were discharged to [facility] with hospice care.    Diagnosis: AMS (altered mental status)  Assessment and Plan of Treatment: Your family decided for hospice care. You were discharged to [facility] with hospice care.    Diagnosis: Adult failure to thrive  Assessment and Plan of Treatment: You were admitted for failure to thrive due to decreased oral intake. You had G tube placed but it was disodged. Your family decided for hospice care. Surgery removed G tube and you were discharged home with hospice care.    Diagnosis: Hypertension  Assessment and Plan of Treatment: Please continue to take the clonidine patch to control your blood pressure. Your family decided for hospice care. You were discharged to [facility] with hospice care.    Diagnosis: Hyperlipidemia  Assessment and Plan of Treatment: Your family decided for hospice care. You were discharged to [facility] with hospice care.    Diagnosis: SUSAN (acute kidney injury)  Assessment and Plan of Treatment:     Diagnosis: Ear bleeding, right  Assessment and Plan of Treatment:     Diagnosis: Anemia  Assessment and Plan of Treatment: Your family decided for hospice care. You were discharged to [facility] with hospice care.    Diagnosis: CVA (cerebrovascular accident)  Assessment and Plan of Treatment: Your family decided for hospice care. You were discharged to [facility] with hospice care.

## 2023-08-17 ENCOUNTER — TRANSCRIPTION ENCOUNTER (OUTPATIENT)
Age: 78
End: 2023-08-17

## 2023-08-17 LAB
ACANTHOCYTES BLD QL SMEAR: SLIGHT — SIGNIFICANT CHANGE UP
ALBUMIN SERPL ELPH-MCNC: 1.7 G/DL — LOW (ref 3.5–5)
ALP SERPL-CCNC: 163 U/L — HIGH (ref 40–120)
ALT FLD-CCNC: 27 U/L DA — SIGNIFICANT CHANGE UP (ref 10–60)
ANION GAP SERPL CALC-SCNC: 11 MMOL/L — SIGNIFICANT CHANGE UP (ref 5–17)
ANISOCYTOSIS BLD QL: SIGNIFICANT CHANGE UP
APPEARANCE UR: ABNORMAL
AST SERPL-CCNC: 129 U/L — HIGH (ref 10–40)
BACTERIA # UR AUTO: ABNORMAL /HPF
BACTERIA # UR AUTO: NEGATIVE /HPF — SIGNIFICANT CHANGE UP
BASOPHILS # BLD AUTO: 0 K/UL — SIGNIFICANT CHANGE UP (ref 0–0.2)
BASOPHILS NFR BLD AUTO: 0 % — SIGNIFICANT CHANGE UP (ref 0–2)
BILIRUB SERPL-MCNC: 0.2 MG/DL — SIGNIFICANT CHANGE UP (ref 0.2–1.2)
BILIRUB UR-MCNC: ABNORMAL
BUN SERPL-MCNC: 31 MG/DL — HIGH (ref 7–18)
BURR CELLS BLD QL SMEAR: PRESENT — SIGNIFICANT CHANGE UP
CALCIUM SERPL-MCNC: 7.3 MG/DL — LOW (ref 8.4–10.5)
CHLORIDE SERPL-SCNC: 107 MMOL/L — SIGNIFICANT CHANGE UP (ref 96–108)
CO2 SERPL-SCNC: 15 MMOL/L — LOW (ref 22–31)
COLOR SPEC: ABNORMAL
COLOR SPEC: SIGNIFICANT CHANGE UP
COMMENT - URINE: SIGNIFICANT CHANGE UP
CREAT SERPL-MCNC: 2.21 MG/DL — HIGH (ref 0.5–1.3)
DIFF PNL FLD: ABNORMAL
EGFR: 22 ML/MIN/1.73M2 — LOW
EOSINOPHIL # BLD AUTO: 0 K/UL — SIGNIFICANT CHANGE UP (ref 0–0.5)
EOSINOPHIL NFR BLD AUTO: 0 % — SIGNIFICANT CHANGE UP (ref 0–6)
EPI CELLS # UR: PRESENT
EPI CELLS # UR: SIGNIFICANT CHANGE UP
GLUCOSE BLDC GLUCOMTR-MCNC: 128 MG/DL — HIGH (ref 70–99)
GLUCOSE BLDC GLUCOMTR-MCNC: 145 MG/DL — HIGH (ref 70–99)
GLUCOSE BLDC GLUCOMTR-MCNC: 153 MG/DL — HIGH (ref 70–99)
GLUCOSE SERPL-MCNC: 185 MG/DL — HIGH (ref 70–99)
GLUCOSE UR QL: NEGATIVE MG/DL — SIGNIFICANT CHANGE UP
HCT VFR BLD CALC: 23.8 % — LOW (ref 34.5–45)
HGB BLD-MCNC: 7.5 G/DL — LOW (ref 11.5–15.5)
HYPOCHROMIA BLD QL: SLIGHT — SIGNIFICANT CHANGE UP
KETONES UR-MCNC: ABNORMAL MG/DL
KETONES UR-MCNC: NEGATIVE MG/DL — SIGNIFICANT CHANGE UP
LEUKOCYTE ESTERASE UR-ACNC: ABNORMAL
LYMPHOCYTES # BLD AUTO: 1.54 K/UL — SIGNIFICANT CHANGE UP (ref 1–3.3)
LYMPHOCYTES # BLD AUTO: 9 % — LOW (ref 13–44)
MAGNESIUM SERPL-MCNC: 1.9 MG/DL — SIGNIFICANT CHANGE UP (ref 1.6–2.6)
MANUAL SMEAR VERIFICATION: SIGNIFICANT CHANGE UP
MCHC RBC-ENTMCNC: 28.2 PG — SIGNIFICANT CHANGE UP (ref 27–34)
MCHC RBC-ENTMCNC: 31.5 GM/DL — LOW (ref 32–36)
MCV RBC AUTO: 89.5 FL — SIGNIFICANT CHANGE UP (ref 80–100)
METAMYELOCYTES # FLD: 3 % — HIGH (ref 0–0)
MICROCYTES BLD QL: SLIGHT — SIGNIFICANT CHANGE UP
MONOCYTES # BLD AUTO: 0.51 K/UL — SIGNIFICANT CHANGE UP (ref 0–0.9)
MONOCYTES NFR BLD AUTO: 3 % — SIGNIFICANT CHANGE UP (ref 2–14)
NEUTROPHILS # BLD AUTO: 13.36 K/UL — HIGH (ref 1.8–7.4)
NEUTROPHILS NFR BLD AUTO: 72 % — SIGNIFICANT CHANGE UP (ref 43–77)
NEUTS BAND # BLD: 6 % — SIGNIFICANT CHANGE UP (ref 0–8)
NITRITE UR-MCNC: NEGATIVE — SIGNIFICANT CHANGE UP
NRBC # BLD: 0 /100 — SIGNIFICANT CHANGE UP (ref 0–0)
PH UR: 5 — SIGNIFICANT CHANGE UP (ref 5–8)
PHOSPHATE SERPL-MCNC: 3.7 MG/DL — SIGNIFICANT CHANGE UP (ref 2.5–4.5)
PLAT MORPH BLD: NORMAL — SIGNIFICANT CHANGE UP
PLATELET # BLD AUTO: 266 K/UL — SIGNIFICANT CHANGE UP (ref 150–400)
PLATELET COUNT - ESTIMATE: NORMAL — SIGNIFICANT CHANGE UP
POTASSIUM SERPL-MCNC: 4.4 MMOL/L — SIGNIFICANT CHANGE UP (ref 3.5–5.3)
POTASSIUM SERPL-SCNC: 4.4 MMOL/L — SIGNIFICANT CHANGE UP (ref 3.5–5.3)
PROT SERPL-MCNC: 6.1 G/DL — SIGNIFICANT CHANGE UP (ref 6–8.3)
PROT UR-MCNC: 100 MG/DL
PROT UR-MCNC: 300 MG/DL
RBC # BLD: 2.66 M/UL — LOW (ref 3.8–5.2)
RBC # FLD: 16.5 % — HIGH (ref 10.3–14.5)
RBC BLD AUTO: ABNORMAL
RBC CASTS # UR COMP ASSIST: 150 /HPF — HIGH (ref 0–4)
RBC CASTS # UR COMP ASSIST: >50 /HPF — HIGH (ref 0–4)
SODIUM SERPL-SCNC: 133 MMOL/L — LOW (ref 135–145)
SP GR SPEC: 1.02 — SIGNIFICANT CHANGE UP (ref 1–1.03)
UROBILINOGEN FLD QL: 1 MG/DL — SIGNIFICANT CHANGE UP (ref 0.2–1)
VARIANT LYMPHS # BLD: 7 % — HIGH (ref 0–6)
WBC # BLD: 17.13 K/UL — HIGH (ref 3.8–10.5)
WBC # FLD AUTO: 17.13 K/UL — HIGH (ref 3.8–10.5)
WBC UR QL: 30 /HPF — HIGH (ref 0–5)
WBC UR QL: >50 /HPF — HIGH (ref 0–5)

## 2023-08-17 RX ORDER — CEFTRIAXONE 500 MG/1
1000 INJECTION, POWDER, FOR SOLUTION INTRAMUSCULAR; INTRAVENOUS EVERY 24 HOURS
Refills: 0 | Status: DISCONTINUED | OUTPATIENT
Start: 2023-08-18 | End: 2023-08-18

## 2023-08-17 RX ORDER — ACETAMINOPHEN 500 MG
850 TABLET ORAL EVERY 6 HOURS
Refills: 0 | Status: DISCONTINUED | OUTPATIENT
Start: 2023-08-17 | End: 2023-08-17

## 2023-08-17 RX ORDER — CIPROFLOXACIN LACTATE 400MG/40ML
200 VIAL (ML) INTRAVENOUS ONCE
Refills: 0 | Status: DISCONTINUED | OUTPATIENT
Start: 2023-08-17 | End: 2023-08-17

## 2023-08-17 RX ORDER — CEFTRIAXONE 500 MG/1
INJECTION, POWDER, FOR SOLUTION INTRAMUSCULAR; INTRAVENOUS
Refills: 0 | Status: DISCONTINUED | OUTPATIENT
Start: 2023-08-17 | End: 2023-08-18

## 2023-08-17 RX ORDER — CIPROFLOXACIN LACTATE 400MG/40ML
VIAL (ML) INTRAVENOUS
Refills: 0 | Status: DISCONTINUED | OUTPATIENT
Start: 2023-08-17 | End: 2023-08-17

## 2023-08-17 RX ORDER — ACETAMINOPHEN 500 MG
650 TABLET ORAL EVERY 6 HOURS
Refills: 0 | Status: DISCONTINUED | OUTPATIENT
Start: 2023-08-17 | End: 2023-08-24

## 2023-08-17 RX ORDER — ACETAMINOPHEN 500 MG
1000 TABLET ORAL ONCE
Refills: 0 | Status: COMPLETED | OUTPATIENT
Start: 2023-08-17 | End: 2023-08-17

## 2023-08-17 RX ORDER — CEFTRIAXONE 500 MG/1
1000 INJECTION, POWDER, FOR SOLUTION INTRAMUSCULAR; INTRAVENOUS EVERY 24 HOURS
Refills: 0 | Status: DISCONTINUED | OUTPATIENT
Start: 2023-08-17 | End: 2023-08-17

## 2023-08-17 RX ORDER — CEFTRIAXONE 500 MG/1
1000 INJECTION, POWDER, FOR SOLUTION INTRAMUSCULAR; INTRAVENOUS ONCE
Refills: 0 | Status: COMPLETED | OUTPATIENT
Start: 2023-08-17 | End: 2023-08-17

## 2023-08-17 RX ADMIN — Medication 55 MILLIGRAM(S): at 14:48

## 2023-08-17 RX ADMIN — CEFTRIAXONE 100 MILLIGRAM(S): 500 INJECTION, POWDER, FOR SOLUTION INTRAMUSCULAR; INTRAVENOUS at 21:54

## 2023-08-17 RX ADMIN — CARBIDOPA AND LEVODOPA 1 TABLET(S): 25; 100 TABLET ORAL at 20:24

## 2023-08-17 RX ADMIN — Medication 1 PATCH: at 07:45

## 2023-08-17 RX ADMIN — ATORVASTATIN CALCIUM 20 MILLIGRAM(S): 80 TABLET, FILM COATED ORAL at 21:53

## 2023-08-17 RX ADMIN — CARBIDOPA AND LEVODOPA 1 TABLET(S): 25; 100 TABLET ORAL at 12:36

## 2023-08-17 RX ADMIN — Medication 55 MILLIGRAM(S): at 21:54

## 2023-08-17 RX ADMIN — CHLORHEXIDINE GLUCONATE 1 APPLICATION(S): 213 SOLUTION TOPICAL at 12:34

## 2023-08-17 RX ADMIN — CARBIDOPA AND LEVODOPA 1 TABLET(S): 25; 100 TABLET ORAL at 08:21

## 2023-08-17 RX ADMIN — Medication 1 PATCH: at 20:52

## 2023-08-17 RX ADMIN — CARBIDOPA AND LEVODOPA 1 TABLET(S): 25; 100 TABLET ORAL at 14:48

## 2023-08-17 RX ADMIN — Medication 324 MILLIGRAM(S): at 12:36

## 2023-08-17 RX ADMIN — Medication 25 MILLIGRAM(S): at 05:29

## 2023-08-17 RX ADMIN — Medication 55 MILLIGRAM(S): at 05:36

## 2023-08-17 RX ADMIN — PANTOPRAZOLE SODIUM 40 MILLIGRAM(S): 20 TABLET, DELAYED RELEASE ORAL at 12:34

## 2023-08-17 RX ADMIN — Medication 25 MILLIGRAM(S): at 18:25

## 2023-08-17 RX ADMIN — Medication 400 MILLIGRAM(S): at 14:31

## 2023-08-17 NOTE — PROVIDER CONTACT NOTE (OTHER) - ASSESSMENT
Pt AOx0, room air, VS can be found in flowsheet. Pt nonverbal. Tachypneic, tachycardic, febrile.
Pt remains non-verbal, with elevated BP

## 2023-08-17 NOTE — PROVIDER CONTACT NOTE (OTHER) - ACTION/TREATMENT ORDERED:
Awaiting new orders, Alter MD in code at the moment
Provider aware, IV tylenol ordered. CTX ordered. Collect UA. BCx to be collected by provider.

## 2023-08-17 NOTE — CHART NOTE - NSCHARTNOTEFT_GEN_A_CORE
UA noted to be positive, UCx sent, will start rocephin for CAUTI, 1g qd. Primary team to follow culture results

## 2023-08-17 NOTE — PROGRESS NOTE ADULT - SUBJECTIVE AND OBJECTIVE BOX
[   ] ICU                                          [   ] CCU                                      [ X  ] Medical Floor      Patient is a 77 year old female with dysphagia. GI consulted for Peg tube placement.     Patient is a 77 female from [facility] with past medical history significant for HTN, HLD, CVA (w/ residual aphasia and R sided hemiparesis/plegia) who was sent to the emergency room with for altered mental status. Patient is not able to provide any history. Patient is lying down in bed, awake and alert. However, patient does not speak, is not able to follow commands and does not turn face or move eyes when her name is called. Patient is admitted with CVA. Now patient with dysphagia, poor po intake, failure to thrive and weight loss. No abdominal pain, nausea, vomiting, hematemesis, hematochezia, melena, fever, chills, chest pain, SOB, cough, hematuria, dysuria  or diarrhea reported.      Patient is comfortable. No new complaints reported, No abdominal pain, N/V, hematemesis, hematochezia, melena, fever, chills, chest pain, SOB, cough or diarrhea reported.      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 1000 milliLiter(s) (70 mL/Hr) IV Continuous <Continuous>  enoxaparin Injectable 30 milliGRAM(s) SubCutaneous every 24 hours  hydrALAZINE Injectable 10 milliGRAM(s) IV Push every 6 hours  potassium chloride  10 mEq/100 mL IVPB 10 milliEquivalent(s) IV Intermittent every 1 hour  valproate sodium  IVPB 500 milliGRAM(s) IV Intermittent every 8 hours         SOCIAL HISTORY  Advanced Directives:       [ X ] Full Code       [  ] DNR  Marital Status:         [  ] M      [ X ] S      [  ] D       [  ] W  Children:       [ X ] Yes      [  ] No  Occupation:        [  ] Employed       [ X ] Unemployed       [  ] Retired  Diet:       [ X ] Regular       [  ] PEG feeding          [  ] NG tube feeding  Drug Use:           [ X ] Patient denied          [  ] Yes  Alcohol:           [ X ] No             [  ] Yes (socially)         [  ] Yes (chronic)  Tobacco:           [  ] Yes           [X  ] No      FAMILY HISTORY  [ X ] Heart Disease            [ X ] Diabetes             [ X ] HTN             [  ] Colon Cancer             [  ] Stomach Cancer              [  ] Pancreatic Cancer        VITALS  Vital Signs Last 24 Hrs  T(C): 37.4 (17 Aug 2023 05:27), Max: 37.4 (17 Aug 2023 05:27)  T(F): 99.3 (17 Aug 2023 05:27), Max: 99.3 (17 Aug 2023 05:27)  HR: 114 (17 Aug 2023 05:27) (103 - 114)  BP: 144/76 (17 Aug 2023 05:27) (141/72 - 160/76)   RR: 20 (17 Aug 2023 05:27) (18 - 20)  SpO2: 98% (17 Aug 2023 05:27) (98% - 100%)  Parameters below as of 17 Aug 2023 05:27  Patient On (Oxygen Delivery Method): room air       MEDICATIONS  (STANDING):  aspirin  chewable 324 milliGRAM(s) Oral daily  atorvastatin 20 milliGRAM(s) Oral at bedtime  carbidopa/levodopa  25/100 1 Tablet(s) Oral <User Schedule>  chlorhexidine 2% Cloths 1 Application(s) Topical daily  cloNIDine Patch 0.3 mG/24Hr(s) 1 patch Transdermal <User Schedule>  metoprolol tartrate 25 milliGRAM(s) Oral two times a day  pantoprazole   Suspension 40 milliGRAM(s) Oral daily  valproate sodium  IVPB 500 milliGRAM(s) IV Intermittent every 8 hours    MEDICATIONS  (PRN):  morphine  - Injectable 2 milliGRAM(s) IV Push every 6 hours PRN Severe Pain (7 - 10)                            7.5    17.13 )-----------( 266      ( 17 Aug 2023 04:48 )             23.8       08-17    133<L>  |  107  |  31<H>  ----------------------------<  185<H>  4.4   |  15<L>  |  2.21<H>    Ca    7.3<L>      17 Aug 2023 04:48  Phos  3.7     08-17  Mg     1.9     08-17    TPro  6.1  /  Alb  1.7<L>  /  TBili  0.2  /  DBili  x   /  AST  129<H>  /  ALT  27  /  AlkPhos  163<H>  08-17       [   ] ICU                                          [   ] CCU                                      [ X  ] Medical Floor      Patient is a 77 year old female with dysphagia. GI consulted for Peg tube placement.     Patient is a 77 female from [facility] with past medical history significant for HTN, HLD, CVA (w/ residual aphasia and R sided hemiparesis/plegia) who was sent to the emergency room with for altered mental status. Patient is not able to provide any history. Patient is lying down in bed, awake and alert. However, patient does not speak, is not able to follow commands and does not turn face or move eyes when her name is called. Patient is admitted with CVA. Now patient with dysphagia, poor po intake, failure to thrive and weight loss. No abdominal pain, nausea, vomiting, hematemesis, hematochezia, melena, fever, chills, chest pain, SOB, cough, hematuria, dysuria  or diarrhea reported.      Patient is comfortable. No new complaints reported, No abdominal pain, N/V, hematemesis, hematochezia, melena, fever, chills, chest pain, SOB, cough or diarrhea reported.      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 1000 milliLiter(s) (70 mL/Hr) IV Continuous <Continuous>  enoxaparin Injectable 30 milliGRAM(s) SubCutaneous every 24 hours  hydrALAZINE Injectable 10 milliGRAM(s) IV Push every 6 hours  potassium chloride  10 mEq/100 mL IVPB 10 milliEquivalent(s) IV Intermittent every 1 hour  valproate sodium  IVPB 500 milliGRAM(s) IV Intermittent every 8 hours         SOCIAL HISTORY  Advanced Directives:       [ X ] Full Code       [  ] DNR  Marital Status:         [  ] M      [ X ] S      [  ] D       [  ] W  Children:       [ X ] Yes      [  ] No  Occupation:        [  ] Employed       [ X ] Unemployed       [  ] Retired  Diet:       [ X ] Regular       [  ] PEG feeding          [  ] NG tube feeding  Drug Use:           [ X ] Patient denied          [  ] Yes  Alcohol:           [ X ] No             [  ] Yes (socially)         [  ] Yes (chronic)  Tobacco:           [  ] Yes           [X  ] No      FAMILY HISTORY  [ X ] Heart Disease            [ X ] Diabetes             [ X ] HTN             [  ] Colon Cancer             [  ] Stomach Cancer              [  ] Pancreatic Cancer        VITALS  Vital Signs Last 24 Hrs  T(C): 37.4 (17 Aug 2023 05:27), Max: 37.4 (17 Aug 2023 05:27)  T(F): 99.3 (17 Aug 2023 05:27), Max: 99.3 (17 Aug 2023 05:27)  HR: 114 (17 Aug 2023 05:27) (103 - 114)  BP: 144/76 (17 Aug 2023 05:27) (141/72 - 160/76)   RR: 20 (17 Aug 2023 05:27) (18 - 20)  SpO2: 98% (17 Aug 2023 05:27) (98% - 100%)  Parameters below as of 17 Aug 2023 05:27  Patient On (Oxygen Delivery Method): room air       MEDICATIONS  (STANDING):  aspirin  chewable 324 milliGRAM(s) Oral daily  atorvastatin 20 milliGRAM(s) Oral at bedtime  carbidopa/levodopa  25/100 1 Tablet(s) Oral <User Schedule>  chlorhexidine 2% Cloths 1 Application(s) Topical daily  cloNIDine Patch 0.3 mG/24Hr(s) 1 patch Transdermal <User Schedule>  metoprolol tartrate 25 milliGRAM(s) Oral two times a day  pantoprazole   Suspension 40 milliGRAM(s) Oral daily  valproate sodium  IVPB 500 milliGRAM(s) IV Intermittent every 8 hours    MEDICATIONS  (PRN):  morphine  - Injectable 2 milliGRAM(s) IV Push every 6 hours PRN Severe Pain (7 - 10)                            7.5    17.13 )-----------( 266      ( 17 Aug 2023 04:48 )             23.8       08-17    133<L>  |  107  |  31<H>  ----------------------------<  185<H>  4.4   |  15<L>  |  2.21<H>    Ca    7.3<L>      17 Aug 2023 04:48  Phos  3.7     08-17  Mg     1.9     08-17    TPro  6.1  /  Alb  1.7<L>  /  TBili  0.2  /  DBili  x   /  AST  129<H>  /  ALT  27  /  AlkPhos  163<H>  08-17       [   ] ICU                                          [   ] CCU                                      [ X  ] Medical Floor      Patient is a 77 year old female with dysphagia. GI consulted for Peg tube placement.     Patient is a 77 female from [facility] with past medical history significant for HTN, HLD, CVA (w/ residual aphasia and R sided hemiparesis/plegia) who was sent to the emergency room with for altered mental status. Patient is not able to provide any history. Patient is lying down in bed, awake and alert. However, patient does not speak, is not able to follow commands and does not turn face or move eyes when her name is called. Patient is admitted with CVA. Now patient with dysphagia, poor po intake, failure to thrive and weight loss. No abdominal pain, nausea, vomiting, hematemesis, hematochezia, melena, fever, chills, chest pain, SOB, cough, hematuria, dysuria  or diarrhea reported.      Patient is comfortable. No new complaints reported, No abdominal pain, N/V, hematemesis, hematochezia, melena, fever, chills, chest pain, SOB, cough or diarrhea reported.      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 1000 milliLiter(s) (70 mL/Hr) IV Continuous <Continuous>  enoxaparin Injectable 30 milliGRAM(s) SubCutaneous every 24 hours  hydrALAZINE Injectable 10 milliGRAM(s) IV Push every 6 hours  potassium chloride  10 mEq/100 mL IVPB 10 milliEquivalent(s) IV Intermittent every 1 hour  valproate sodium  IVPB 500 milliGRAM(s) IV Intermittent every 8 hours         SOCIAL HISTORY  Advanced Directives:       [ X ] Full Code       [  ] DNR  Marital Status:         [  ] M      [ X ] S      [  ] D       [  ] W  Children:       [ X ] Yes      [  ] No  Occupation:        [  ] Employed       [ X ] Unemployed       [  ] Retired  Diet:       [ X ] Regular       [  ] PEG feeding          [  ] NG tube feeding  Drug Use:           [ X ] Patient denied          [  ] Yes  Alcohol:           [ X ] No             [  ] Yes (socially)         [  ] Yes (chronic)  Tobacco:           [  ] Yes           [X  ] No      FAMILY HISTORY  [ X ] Heart Disease            [ X ] Diabetes             [ X ] HTN             [  ] Colon Cancer             [  ] Stomach Cancer              [  ] Pancreatic Cancer        VITALS  Vital Signs Last 24 Hrs  T(C): 37.4 (17 Aug 2023 05:27), Max: 37.4 (17 Aug 2023 05:27)  T(F): 99.3 (17 Aug 2023 05:27), Max: 99.3 (17 Aug 2023 05:27)  HR: 114 (17 Aug 2023 05:27) (103 - 114)  BP: 144/76 (17 Aug 2023 05:27) (141/72 - 160/76)   RR: 20 (17 Aug 2023 05:27) (18 - 20)  SpO2: 98% (17 Aug 2023 05:27) (98% - 100%)  Parameters below as of 17 Aug 2023 05:27  Patient On (Oxygen Delivery Method): room air       MEDICATIONS  (STANDING):  aspirin  chewable 324 milliGRAM(s) Oral daily  atorvastatin 20 milliGRAM(s) Oral at bedtime  carbidopa/levodopa  25/100 1 Tablet(s) Oral <User Schedule>  chlorhexidine 2% Cloths 1 Application(s) Topical daily  cloNIDine Patch 0.3 mG/24Hr(s) 1 patch Transdermal <User Schedule>  metoprolol tartrate 25 milliGRAM(s) Oral two times a day  pantoprazole   Suspension 40 milliGRAM(s) Oral daily  valproate sodium  IVPB 500 milliGRAM(s) IV Intermittent every 8 hours    MEDICATIONS  (PRN):  morphine  - Injectable 2 milliGRAM(s) IV Push every 6 hours PRN Severe Pain (7 - 10)                            7.5    17.13 )-----------( 266      ( 17 Aug 2023 04:48 )             23.8       08-17    133<L>  |  107  |  31<H>  ----------------------------<  185<H>  4.4   |  15<L>  |  2.21<H>    Ca    7.3<L>      17 Aug 2023 04:48  Phos  3.7     08-17  Mg     1.9     08-17    TPro  6.1  /  Alb  1.7<L>  /  TBili  0.2  /  DBili  x   /  AST  129<H>  /  ALT  27  /  AlkPhos  163<H>  08-17       Hydroquinone Pregnancy And Lactation Text: This medication has not been assigned a Pregnancy Risk Category but animal studies failed to show danger with the topical medication. It is unknown if the medication is excreted in breast milk.

## 2023-08-17 NOTE — PROGRESS NOTE ADULT - SUBJECTIVE AND OBJECTIVE BOX
PGY-1 Progress Note discussed with attending    PAGER #: [[phone]] TILL 5:00 PM  PLEASE CONTACT ON CALL TEAM:  - On Call Team (Please refer to Tyler) FROM 5:00 PM - 8:30PM  - Nightfloat Team FROM 8:30 -7:30 AM    CHIEF COMPLAINT & BRIEF HOSPITAL COURSE:      INTERVAL HPI/OVERNIGHT EVENTS:       MEDICATIONS  (STANDING):  aspirin  chewable 324 milliGRAM(s) Oral daily  atorvastatin 20 milliGRAM(s) Oral at bedtime  carbidopa/levodopa  25/100 1 Tablet(s) Oral <User Schedule>  chlorhexidine 2% Cloths 1 Application(s) Topical daily  cloNIDine Patch 0.3 mG/24Hr(s) 1 patch Transdermal <User Schedule>  metoprolol tartrate 25 milliGRAM(s) Oral two times a day  pantoprazole   Suspension 40 milliGRAM(s) Oral daily  valproate sodium  IVPB 500 milliGRAM(s) IV Intermittent every 8 hours    MEDICATIONS  (PRN):  morphine  - Injectable 2 milliGRAM(s) IV Push every 6 hours PRN Severe Pain (7 - 10)      REVIEW OF SYSTEMS:  CONSTITUTIONAL: No fever, weight loss, or fatigue  RESPIRATORY: No shortness of breath  CARDIOVASCULAR: No chest pain  GASTROINTESTINAL: No abdominal pain.  GENITOURINARY: No dysuria  NEUROLOGICAL: No headaches  SKIN: No itching, burning, rashes    Vital Signs Last 24 Hrs  T(C): 37.4 (17 Aug 2023 05:27), Max: 37.4 (17 Aug 2023 05:27)  T(F): 99.3 (17 Aug 2023 05:27), Max: 99.3 (17 Aug 2023 05:27)  HR: 114 (17 Aug 2023 05:27) (103 - 114)  BP: 144/76 (17 Aug 2023 05:27) (129/81 - 160/76)  BP(mean): 96 (16 Aug 2023 12:15) (96 - 96)  RR: 20 (17 Aug 2023 05:27) (18 - 20)  SpO2: 98% (17 Aug 2023 05:27) (97% - 100%)    Parameters below as of 17 Aug 2023 05:27  Patient On (Oxygen Delivery Method): room air        PHYSICAL EXAMINATION:  GENERAL: NAD, well built  HEAD:  Atraumatic, Normocephalic  EYES:  conjunctiva and sclera clear  CHEST/LUNG: Clear to auscultation. No rales, rhonchi, wheezing, or rubs  HEART: Regular rate and rhythm; No murmurs, rubs, or gallops  ABDOMEN: Soft, Nontender, Nondistended; Bowel sounds present  NERVOUS SYSTEM:  Alert & Oriented X3,    EXTREMITIES:  2+ Peripheral Pulses, No clubbing, cyanosis, or edema  SKIN: warm dry                          7.5    17.13 )-----------( 266      ( 17 Aug 2023 04:48 )             23.8     08-17    133<L>  |  107  |  31<H>  ----------------------------<  185<H>  4.4   |  15<L>  |  2.21<H>    Ca    7.3<L>      17 Aug 2023 04:48  Phos  3.7     08-17  Mg     1.9     08-17    TPro  6.1  /  Alb  1.7<L>  /  TBili  0.2  /  DBili  x   /  AST  129<H>  /  ALT  27  /  AlkPhos  163<H>  08-17    LIVER FUNCTIONS - ( 17 Aug 2023 04:48 )  Alb: 1.7 g/dL / Pro: 6.1 g/dL / ALK PHOS: 163 U/L / ALT: 27 U/L DA / AST: 129 U/L / GGT: x                   CAPILLARY BLOOD GLUCOSE      RADIOLOGY & ADDITIONAL TESTS:                   PGY-1 Progress Note discussed with attending    PAGER #: [[phone]] TILL 5:00 PM  PLEASE CONTACT ON CALL TEAM:  - On Call Team (Please refer to Tyler) FROM 5:00 PM - 8:30PM  - Nightfloat Team FROM 8:30 -7:30 AM    CHIEF COMPLAINT & BRIEF HOSPITAL COURSE:      INTERVAL HPI/OVERNIGHT EVENTS:       MEDICATIONS  (STANDING):  aspirin  chewable 324 milliGRAM(s) Oral daily  atorvastatin 20 milliGRAM(s) Oral at bedtime  carbidopa/levodopa  25/100 1 Tablet(s) Oral <User Schedule>  chlorhexidine 2% Cloths 1 Application(s) Topical daily  cloNIDine Patch 0.3 mG/24Hr(s) 1 patch Transdermal <User Schedule>  metoprolol tartrate 25 milliGRAM(s) Oral two times a day  pantoprazole   Suspension 40 milliGRAM(s) Oral daily  valproate sodium  IVPB 500 milliGRAM(s) IV Intermittent every 8 hours    MEDICATIONS  (PRN):  morphine  - Injectable 2 milliGRAM(s) IV Push every 6 hours PRN Severe Pain (7 - 10)      REVIEW OF SYSTEMS:  CONSTITUTIONAL: No fever, weight loss, or fatigue  RESPIRATORY: No shortness of breath  CARDIOVASCULAR: No chest pain  GASTROINTESTINAL: No abdominal pain.  GENITOURINARY: No dysuria  NEUROLOGICAL: No headaches  SKIN: No itching, burning, rashes    Vital Signs Last 24 Hrs  T(C): 37.4 (17 Aug 2023 05:27), Max: 37.4 (17 Aug 2023 05:27)  T(F): 99.3 (17 Aug 2023 05:27), Max: 99.3 (17 Aug 2023 05:27)  HR: 114 (17 Aug 2023 05:27) (103 - 114)  BP: 144/76 (17 Aug 2023 05:27) (129/81 - 160/76)  BP(mean): 96 (16 Aug 2023 12:15) (96 - 96)  RR: 20 (17 Aug 2023 05:27) (18 - 20)  SpO2: 98% (17 Aug 2023 05:27) (97% - 100%)    Parameters below as of 17 Aug 2023 05:27  Patient On (Oxygen Delivery Method): room air        PHYSICAL EXAMINATION:  GENERAL: NAD, well built  HEAD:  Atraumatic, Normocephalic  EYES:  conjunctiva and sclera clear  CHEST/LUNG: Clear to auscultation. No rales, rhonchi, wheezing, or rubs  HEART: Regular rate and rhythm; No murmurs, rubs, or gallops  ABDOMEN: Soft, Nontender, Nondistended; Bowel sounds present  NERVOUS SYSTEM:  Alert & Oriented X3,    EXTREMITIES:  2+ Peripheral Pulses, No clubbing, cyanosis, or edema  SKIN: warm dry                          7.5    17.13 )-----------( 266      ( 17 Aug 2023 04:48 )             23.8     08-17    133<L>  |  107  |  31<H>  ----------------------------<  185<H>  4.4   |  15<L>  |  2.21<H>    Ca    7.3<L>      17 Aug 2023 04:48  Phos  3.7     08-17  Mg     1.9     08-17    TPro  6.1  /  Alb  1.7<L>  /  TBili  0.2  /  DBili  x   /  AST  129<H>  /  ALT  27  /  AlkPhos  163<H>  08-17    LIVER FUNCTIONS - ( 17 Aug 2023 04:48 )  Alb: 1.7 g/dL / Pro: 6.1 g/dL / ALK PHOS: 163 U/L / ALT: 27 U/L DA / AST: 129 U/L / GGT: x                   CAPILLARY BLOOD GLUCOSE      RADIOLOGY & ADDITIONAL TESTS:                   PGY-1 Progress Note discussed with attending    PAGER #: [[phone]] TILL 5:00 PM  PLEASE CONTACT ON CALL TEAM:  - On Call Team (Please refer to Tyler) FROM 5:00 PM - 8:30PM  - Nightfloat Team FROM 8:30 -7:30 AM    CHIEF COMPLAINT & BRIEF HOSPITAL COURSE:      INTERVAL HPI/OVERNIGHT EVENTS:       MEDICATIONS  (STANDING):  aspirin  chewable 324 milliGRAM(s) Oral daily  atorvastatin 20 milliGRAM(s) Oral at bedtime  carbidopa/levodopa  25/100 1 Tablet(s) Oral <User Schedule>  chlorhexidine 2% Cloths 1 Application(s) Topical daily  cloNIDine Patch 0.3 mG/24Hr(s) 1 patch Transdermal <User Schedule>  metoprolol tartrate 25 milliGRAM(s) Oral two times a day  pantoprazole   Suspension 40 milliGRAM(s) Oral daily  valproate sodium  IVPB 500 milliGRAM(s) IV Intermittent every 8 hours    MEDICATIONS  (PRN):  morphine  - Injectable 2 milliGRAM(s) IV Push every 6 hours PRN Severe Pain (7 - 10)      REVIEW OF SYSTEMS:  CONSTITUTIONAL: No fever, weight loss, or fatigue  RESPIRATORY: No shortness of breath  CARDIOVASCULAR: No chest pain  GASTROINTESTINAL: No abdominal pain.  GENITOURINARY: No dysuria  NEUROLOGICAL: No headaches  SKIN: No itching, burning, rashes    Vital Signs Last 24 Hrs  T(C): 37.4 (17 Aug 2023 05:27), Max: 37.4 (17 Aug 2023 05:27)  T(F): 99.3 (17 Aug 2023 05:27), Max: 99.3 (17 Aug 2023 05:27)  HR: 114 (17 Aug 2023 05:27) (103 - 114)  BP: 144/76 (17 Aug 2023 05:27) (129/81 - 160/76)  BP(mean): 96 (16 Aug 2023 12:15) (96 - 96)  RR: 20 (17 Aug 2023 05:27) (18 - 20)  SpO2: 98% (17 Aug 2023 05:27) (97% - 100%)    Parameters below as of 17 Aug 2023 05:27  Patient On (Oxygen Delivery Method): room air        PHYSICAL EXAMINATION:  GENERAL: NAD, well built  HEAD:  Atraumatic, Normocephalic  EYES:  conjunctiva and sclera clear  CHEST/LUNG: Clear to auscultation. No rales, rhonchi, wheezing, or rubs  HEART: Regular rate and rhythm; No murmurs, rubs, or gallops  ABDOMEN: Soft, Nontender, Nondistended; Bowel sounds present  NERVOUS SYSTEM:  Alert & Oriented X3,    EXTREMITIES:  2+ Peripheral Pulses, No clubbing, cyanosis, or edema  SKIN: warm dry                          7.5    17.13 )-----------( 266      ( 17 Aug 2023 04:48 )             23.8     08-17    133<L>  |  107  |  31<H>  ----------------------------<  185<H>  4.4   |  15<L>  |  2.21<H>    Ca    7.3<L>      17 Aug 2023 04:48  Phos  3.7     08-17  Mg     1.9     08-17    TPro  6.1  /  Alb  1.7<L>  /  TBili  0.2  /  DBili  x   /  AST  129<H>  /  ALT  27  /  AlkPhos  163<H>  08-17    LIVER FUNCTIONS - ( 17 Aug 2023 04:48 )  Alb: 1.7 g/dL / Pro: 6.1 g/dL / ALK PHOS: 163 U/L / ALT: 27 U/L DA / AST: 129 U/L / GGT: x                   CAPILLARY BLOOD GLUCOSE      RADIOLOGY & ADDITIONAL TESTS:                   PGY-1 Progress Note discussed with attending    PAGER #: [[phone]] TILL 5:00 PM  PLEASE CONTACT ON CALL TEAM:  - On Call Team (Please refer to Tyler) FROM 5:00 PM - 8:30PM  - Nightfloat Team FROM 8:30 -7:30 AM    CHIEF COMPLAINT & BRIEF HOSPITAL COURSE:  Failure to thrive    INTERVAL HPI/OVERNIGHT EVENTS:   Pt seen lying in bed. No acute events overnight. Pt noted to meet SIRS criteria. Will f/u UA    MEDICATIONS  (STANDING):  aspirin  chewable 324 milliGRAM(s) Oral daily  atorvastatin 20 milliGRAM(s) Oral at bedtime  carbidopa/levodopa  25/100 1 Tablet(s) Oral <User Schedule>  chlorhexidine 2% Cloths 1 Application(s) Topical daily  cloNIDine Patch 0.3 mG/24Hr(s) 1 patch Transdermal <User Schedule>  metoprolol tartrate 25 milliGRAM(s) Oral two times a day  pantoprazole   Suspension 40 milliGRAM(s) Oral daily  valproate sodium  IVPB 500 milliGRAM(s) IV Intermittent every 8 hours    MEDICATIONS  (PRN):  morphine  - Injectable 2 milliGRAM(s) IV Push every 6 hours PRN Severe Pain (7 - 10)      REVIEW OF SYSTEMS: Unable to obtain as pt is non-verbal      Vital Signs Last 24 Hrs  T(C): 37.4 (17 Aug 2023 05:27), Max: 37.4 (17 Aug 2023 05:27)  T(F): 99.3 (17 Aug 2023 05:27), Max: 99.3 (17 Aug 2023 05:27)  HR: 114 (17 Aug 2023 05:27) (103 - 114)  BP: 144/76 (17 Aug 2023 05:27) (129/81 - 160/76)  BP(mean): 96 (16 Aug 2023 12:15) (96 - 96)  RR: 20 (17 Aug 2023 05:27) (18 - 20)  SpO2: 98% (17 Aug 2023 05:27) (97% - 100%)    Parameters below as of 17 Aug 2023 05:27  Patient On (Oxygen Delivery Method): room air        PHYSICAL EXAMINATION:  GENERAL: NAD, well built, mcclellan draining barbara urine  HEAD:  Atraumatic, Normocephalic  EYES:  conjunctiva and sclera clear  CHEST/LUNG: Clear to auscultation. No rales, rhonchi, wheezing, or rubs  HEART: Regular rate and rhythm; No murmurs, rubs, or gallops  ABDOMEN: Soft, Nontender, Nondistended; Bowel sounds present  NERVOUS SYSTEM:  Alert & Oriented X0,    EXTREMITIES:  +edema. 2+ Peripheral Pulses, No clubbing, cyanosis  SKIN: warm dry                          7.5    17.13 )-----------( 266      ( 17 Aug 2023 04:48 )             23.8     08-17    133<L>  |  107  |  31<H>  ----------------------------<  185<H>  4.4   |  15<L>  |  2.21<H>    Ca    7.3<L>      17 Aug 2023 04:48  Phos  3.7     08-17  Mg     1.9     08-17    TPro  6.1  /  Alb  1.7<L>  /  TBili  0.2  /  DBili  x   /  AST  129<H>  /  ALT  27  /  AlkPhos  163<H>  08-17    LIVER FUNCTIONS - ( 17 Aug 2023 04:48 )  Alb: 1.7 g/dL / Pro: 6.1 g/dL / ALK PHOS: 163 U/L / ALT: 27 U/L DA / AST: 129 U/L / GGT: x                   CAPILLARY BLOOD GLUCOSE      RADIOLOGY & ADDITIONAL TESTS:                   PGY-1 Progress Note discussed with attending    PAGER #: [[phone]] TILL 5:00 PM  PLEASE CONTACT ON CALL TEAM:  - On Call Team (Please refer to Tyler) FROM 5:00 PM - 8:30PM  - Nightfloat Team FROM 8:30 -7:30 AM    CHIEF COMPLAINT & BRIEF HOSPITAL COURSE:  Failure to thrive    INTERVAL HPI/OVERNIGHT EVENTS:   Pt seen lying in bed. No acute events overnight. Pt noted to meet SIRS criteria. Will f/u UA    MEDICATIONS  (STANDING):  aspirin  chewable 324 milliGRAM(s) Oral daily  atorvastatin 20 milliGRAM(s) Oral at bedtime  carbidopa/levodopa  25/100 1 Tablet(s) Oral <User Schedule>  chlorhexidine 2% Cloths 1 Application(s) Topical daily  cloNIDine Patch 0.3 mG/24Hr(s) 1 patch Transdermal <User Schedule>  metoprolol tartrate 25 milliGRAM(s) Oral two times a day  pantoprazole   Suspension 40 milliGRAM(s) Oral daily  valproate sodium  IVPB 500 milliGRAM(s) IV Intermittent every 8 hours    MEDICATIONS  (PRN):  morphine  - Injectable 2 milliGRAM(s) IV Push every 6 hours PRN Severe Pain (7 - 10)      REVIEW OF SYSTEMS: Unable to obtain as pt is non-verbal      Vital Signs Last 24 Hrs  T(C): 37.4 (17 Aug 2023 05:27), Max: 37.4 (17 Aug 2023 05:27)  T(F): 99.3 (17 Aug 2023 05:27), Max: 99.3 (17 Aug 2023 05:27)  HR: 114 (17 Aug 2023 05:27) (103 - 114)  BP: 144/76 (17 Aug 2023 05:27) (129/81 - 160/76)  BP(mean): 96 (16 Aug 2023 12:15) (96 - 96)  RR: 20 (17 Aug 2023 05:27) (18 - 20)  SpO2: 98% (17 Aug 2023 05:27) (97% - 100%)    Parameters below as of 17 Aug 2023 05:27  Patient On (Oxygen Delivery Method): room air        PHYSICAL EXAMINATION:  GENERAL: NAD, well built, mcclellan draining barbara urine  HEAD:  Atraumatic, Normocephalic  EYES:  conjunctiva and sclera clear  CHEST/LUNG: Clear to auscultation. No rales, rhonchi, wheezing, or rubs  HEART: Regular rate and rhythm; No murmurs, rubs, or gallops  ABDOMEN: Soft, Nontender, Nondistended; Bowel sounds present  NERVOUS SYSTEM:  Alert & Oriented X0,    EXTREMITIES:  +edema. 2+ Peripheral Pulses, No clubbing, cyanosis  SKIN: warm dry                          7.5    17.13 )-----------( 266      ( 17 Aug 2023 04:48 )             23.8     08-17    133<L>  |  107  |  31<H>  ----------------------------<  185<H>  4.4   |  15<L>  |  2.21<H>    Ca    7.3<L>      17 Aug 2023 04:48  Phos  3.7     08-17  Mg     1.9     08-17    TPro  6.1  /  Alb  1.7<L>  /  TBili  0.2  /  DBili  x   /  AST  129<H>  /  ALT  27  /  AlkPhos  163<H>  08-17    LIVER FUNCTIONS - ( 17 Aug 2023 04:48 )  Alb: 1.7 g/dL / Pro: 6.1 g/dL / ALK PHOS: 163 U/L / ALT: 27 U/L DA / AST: 129 U/L / GGT: x                   CAPILLARY BLOOD GLUCOSE      RADIOLOGY & ADDITIONAL TESTS:                   PGY-1 Progress Note discussed with attending    PAGER #: [[phone]] TILL 5:00 PM  PLEASE CONTACT ON CALL TEAM:  - On Call Team (Please refer to Tyler) FROM 5:00 PM - 8:30PM  - Nightfloat Team FROM 8:30 -7:30 AM    CHIEF COMPLAINT & BRIEF HOSPITAL COURSE:  Failure to thrive    INTERVAL HPI/OVERNIGHT EVENTS:   Pt seen lying in bed. No acute events overnight. Pt noted to meet SIRS criteria. Will f/u UA    MEDICATIONS  (STANDING):  aspirin  chewable 324 milliGRAM(s) Oral daily  atorvastatin 20 milliGRAM(s) Oral at bedtime  carbidopa/levodopa  25/100 1 Tablet(s) Oral <User Schedule>  chlorhexidine 2% Cloths 1 Application(s) Topical daily  cloNIDine Patch 0.3 mG/24Hr(s) 1 patch Transdermal <User Schedule>  metoprolol tartrate 25 milliGRAM(s) Oral two times a day  pantoprazole   Suspension 40 milliGRAM(s) Oral daily  valproate sodium  IVPB 500 milliGRAM(s) IV Intermittent every 8 hours    MEDICATIONS  (PRN):  morphine  - Injectable 2 milliGRAM(s) IV Push every 6 hours PRN Severe Pain (7 - 10)      REVIEW OF SYSTEMS: Unable to obtain as pt is non-verbal      Vital Signs Last 24 Hrs  T(C): 37.4 (17 Aug 2023 05:27), Max: 37.4 (17 Aug 2023 05:27)  T(F): 99.3 (17 Aug 2023 05:27), Max: 99.3 (17 Aug 2023 05:27)  HR: 114 (17 Aug 2023 05:27) (103 - 114)  BP: 144/76 (17 Aug 2023 05:27) (129/81 - 160/76)  BP(mean): 96 (16 Aug 2023 12:15) (96 - 96)  RR: 20 (17 Aug 2023 05:27) (18 - 20)  SpO2: 98% (17 Aug 2023 05:27) (97% - 100%)    Parameters below as of 17 Aug 2023 05:27  Patient On (Oxygen Delivery Method): room air        PHYSICAL EXAMINATION:  GENERAL: NAD, well built, mcclellan draining barbara urine  HEAD:  Atraumatic, Normocephalic  EYES:  conjunctiva and sclera clear  CHEST/LUNG: Clear to auscultation. No rales, rhonchi, wheezing, or rubs  HEART: Regular rate and rhythm; No murmurs, rubs, or gallops  ABDOMEN: Soft, Nontender, Nondistended; Bowel sounds present  NERVOUS SYSTEM:  Alert & Oriented X0,    EXTREMITIES:  +edema. 2+ Peripheral Pulses, No clubbing, cyanosis  SKIN: warm dry                          7.5    17.13 )-----------( 266      ( 17 Aug 2023 04:48 )             23.8     08-17    133<L>  |  107  |  31<H>  ----------------------------<  185<H>  4.4   |  15<L>  |  2.21<H>    Ca    7.3<L>      17 Aug 2023 04:48  Phos  3.7     08-17  Mg     1.9     08-17    TPro  6.1  /  Alb  1.7<L>  /  TBili  0.2  /  DBili  x   /  AST  129<H>  /  ALT  27  /  AlkPhos  163<H>  08-17    LIVER FUNCTIONS - ( 17 Aug 2023 04:48 )  Alb: 1.7 g/dL / Pro: 6.1 g/dL / ALK PHOS: 163 U/L / ALT: 27 U/L DA / AST: 129 U/L / GGT: x                   CAPILLARY BLOOD GLUCOSE      RADIOLOGY & ADDITIONAL TESTS:

## 2023-08-17 NOTE — PROGRESS NOTE ADULT - PROBLEM SELECTOR PLAN 8
creatine rising and urine output low  raising fluids  put on Rocephin possible UTI -Sublingual carbidopa-levodopa stopped for aspiration precautions  -Cont crushed carbidopa-levodopa through feeding tube

## 2023-08-17 NOTE — PROGRESS NOTE ADULT - SUBJECTIVE AND OBJECTIVE BOX
NEPHROLOGY MEDICAL CARE, [facility] - Dr. Ajay Green/ Dr. Mert Madison/ Dr. Chris Calderon/ Dr. Carson Cook    Date of Service: 23 @ 14:12    Patient was seen and examined at bedside.    CC: patient is NAD    Vital Signs Last 24 Hrs  T(C): 37.4 (17 Aug 2023 05:27), Max: 37.4 (17 Aug 2023 05:27)  T(F): 99.3 (17 Aug 2023 05:), Max: 99.3 (17 Aug 2023 05:27)  HR: 114 (17 Aug 2023 05:) (103 - 114)  BP: 144/76 (17 Aug 2023 05:) (141/72 - 160/76)  BP(mean): --  RR: 20 (17 Aug 2023 05:) (18 - 20)  SpO2: 98% (17 Aug 2023 05:) (98% - 99%)    Parameters below as of 17 Aug 2023 05:27  Patient On (Oxygen Delivery Method): room air        08-16 @ 07:01  -   @ 07:00  --------------------------------------------------------  IN: 1320 mL / OUT: 1000 mL / NET: 320 mL        PHYSICAL EXAM:  General: No acute respiratory distress.  Eyes: conjunctiva and sclera clear  ENMT: Atraumatic, Normocephalic; Moist mucous membranes  Respiratory: Bilateral clear lungs; No rales, rhonchi, wheezing  Cardiovascular: S1S2+; no m/r/g  Gastrointestinal: Soft, Non-tender, Nondistended; Bowel sounds present; PEG  : mcclellan's cath with muddy brown urine  Neuro: eyes are open; hemiparesis; non-verbal  Ext:  1+pedal edema, No Cyanosis  Skin: No visible rashes    MEDICATIONS:  MEDICATIONS  (STANDING):  acetaminophen   IVPB .. 1000 milliGRAM(s) IV Intermittent once  aspirin  chewable 324 milliGRAM(s) Oral daily  atorvastatin 20 milliGRAM(s) Oral at bedtime  carbidopa/levodopa  25/100 1 Tablet(s) Oral <User Schedule>  cefTRIAXone   IVPB 1000 milliGRAM(s) IV Intermittent every 24 hours  chlorhexidine 2% Cloths 1 Application(s) Topical daily  cloNIDine Patch 0.3 mG/24Hr(s) 1 patch Transdermal <User Schedule>  metoprolol tartrate 25 milliGRAM(s) Oral two times a day  pantoprazole   Suspension 40 milliGRAM(s) Oral daily  valproate sodium  IVPB 500 milliGRAM(s) IV Intermittent every 8 hours    MEDICATIONS  (PRN):  morphine  - Injectable 2 milliGRAM(s) IV Push every 6 hours PRN Severe Pain (7 - 10)          LABS:                        7.5    17.13 )-----------( 266      ( 17 Aug 2023 04:48 )             23.8         133<L>  |  107  |  31<H>  ----------------------------<  185<H>  4.4   |  15<L>  |  2.21<H>    Ca    7.3<L>      17 Aug 2023 04:48  Phos  3.7       Mg     1.9         TPro  6.1  /  Alb  1.7<L>  /  TBili  0.2  /  DBili  x   /  AST  129<H>  /  ALT  27  /  AlkPhos  163<H>        Urinalysis Basic - ( 17 Aug 2023 11:35 )    Color: Orange / Appearance: Turbid / S.018 / pH: x  Gluc: x / Ketone: Negative mg/dL  / Bili: Small / Urobili: 1.0 mg/dL   Blood: x / Protein: 300 mg/dL / Nitrite: Negative   Leuk Esterase: Moderate / RBC: >50 /HPF / WBC >50 /HPF   Sq Epi: x / Non Sq Epi: x / Bacteria: Negative /HPF      Magnesium: 1.9 mg/dL ( @ 04:48)  Phosphorus: 3.7 mg/dL ( @ 04:48)    Urine studies    PTH and Vit D:         NEPHROLOGY MEDICAL CARE, [facility] - Dr. Ajay Green/ Dr. Mert Madison/ Dr. Chris Calderon/ Dr. Carson Cook    Date of Service: 23 @ 14:12    Patient was seen and examined at bedside.    CC: patient is NAD    Vital Signs Last 24 Hrs  T(C): 37.4 (17 Aug 2023 05:27), Max: 37.4 (17 Aug 2023 05:27)  T(F): 99.3 (17 Aug 2023 05:), Max: 99.3 (17 Aug 2023 05:27)  HR: 114 (17 Aug 2023 05:) (103 - 114)  BP: 144/76 (17 Aug 2023 05:) (141/72 - 160/76)  BP(mean): --  RR: 20 (17 Aug 2023 05:) (18 - 20)  SpO2: 98% (17 Aug 2023 05:) (98% - 99%)    Parameters below as of 17 Aug 2023 05:27  Patient On (Oxygen Delivery Method): room air        08-16 @ 07:01  -   @ 07:00  --------------------------------------------------------  IN: 1320 mL / OUT: 1000 mL / NET: 320 mL        PHYSICAL EXAM:  General: No acute respiratory distress.  Eyes: conjunctiva and sclera clear  ENMT: Atraumatic, Normocephalic; Moist mucous membranes  Respiratory: Bilateral clear lungs; No rales, rhonchi, wheezing  Cardiovascular: S1S2+; no m/r/g  Gastrointestinal: Soft, Non-tender, Nondistended; Bowel sounds present; PEG  : mcclellan's cath with muddy brown urine  Neuro: eyes are open; hemiparesis; non-verbal  Ext:  1+pedal edema, No Cyanosis  Skin: No visible rashes    MEDICATIONS:  MEDICATIONS  (STANDING):  acetaminophen   IVPB .. 1000 milliGRAM(s) IV Intermittent once  aspirin  chewable 324 milliGRAM(s) Oral daily  atorvastatin 20 milliGRAM(s) Oral at bedtime  carbidopa/levodopa  25/100 1 Tablet(s) Oral <User Schedule>  cefTRIAXone   IVPB 1000 milliGRAM(s) IV Intermittent every 24 hours  chlorhexidine 2% Cloths 1 Application(s) Topical daily  cloNIDine Patch 0.3 mG/24Hr(s) 1 patch Transdermal <User Schedule>  metoprolol tartrate 25 milliGRAM(s) Oral two times a day  pantoprazole   Suspension 40 milliGRAM(s) Oral daily  valproate sodium  IVPB 500 milliGRAM(s) IV Intermittent every 8 hours    MEDICATIONS  (PRN):  morphine  - Injectable 2 milliGRAM(s) IV Push every 6 hours PRN Severe Pain (7 - 10)          LABS:                        7.5    17.13 )-----------( 266      ( 17 Aug 2023 04:48 )             23.8         133<L>  |  107  |  31<H>  ----------------------------<  185<H>  4.4   |  15<L>  |  2.21<H>    Ca    7.3<L>      17 Aug 2023 04:48  Phos  3.7       Mg     1.9         TPro  6.1  /  Alb  1.7<L>  /  TBili  0.2  /  DBili  x   /  AST  129<H>  /  ALT  27  /  AlkPhos  163<H>        Urinalysis Basic - ( 17 Aug 2023 11:35 )    Color: Orange / Appearance: Turbid / S.018 / pH: x  Gluc: x / Ketone: Negative mg/dL  / Bili: Small / Urobili: 1.0 mg/dL   Blood: x / Protein: 300 mg/dL / Nitrite: Negative   Leuk Esterase: Moderate / RBC: >50 /HPF / WBC >50 /HPF   Sq Epi: x / Non Sq Epi: x / Bacteria: Negative /HPF      Magnesium: 1.9 mg/dL ( @ 04:48)  Phosphorus: 3.7 mg/dL ( @ 04:48)    Urine studies    PTH and Vit D:         NEPHROLOGY MEDICAL CARE, [facility] - Dr. Ajay Green/ Dr. Mert Madison/ Dr. Chris Calderon/ Dr. Carson Cook    Date of Service: 23 @ 14:12    Patient was seen and examined at bedside.    CC: patient is NAD    Vital Signs Last 24 Hrs  T(C): 37.4 (17 Aug 2023 05:27), Max: 37.4 (17 Aug 2023 05:27)  T(F): 99.3 (17 Aug 2023 05:), Max: 99.3 (17 Aug 2023 05:27)  HR: 114 (17 Aug 2023 05:) (103 - 114)  BP: 144/76 (17 Aug 2023 05:) (141/72 - 160/76)  BP(mean): --  RR: 20 (17 Aug 2023 05:) (18 - 20)  SpO2: 98% (17 Aug 2023 05:) (98% - 99%)    Parameters below as of 17 Aug 2023 05:27  Patient On (Oxygen Delivery Method): room air        08-16 @ 07:01  -   @ 07:00  --------------------------------------------------------  IN: 1320 mL / OUT: 1000 mL / NET: 320 mL        PHYSICAL EXAM:  General: No acute respiratory distress.  Eyes: conjunctiva and sclera clear  ENMT: Atraumatic, Normocephalic; Moist mucous membranes  Respiratory: Bilateral clear lungs; No rales, rhonchi, wheezing  Cardiovascular: S1S2+; no m/r/g  Gastrointestinal: Soft, Non-tender, Nondistended; Bowel sounds present; PEG  : mcclellan's cath with muddy brown urine  Neuro: eyes are open; hemiparesis; non-verbal  Ext:  1+pedal edema, No Cyanosis  Skin: No visible rashes    MEDICATIONS:  MEDICATIONS  (STANDING):  acetaminophen   IVPB .. 1000 milliGRAM(s) IV Intermittent once  aspirin  chewable 324 milliGRAM(s) Oral daily  atorvastatin 20 milliGRAM(s) Oral at bedtime  carbidopa/levodopa  25/100 1 Tablet(s) Oral <User Schedule>  cefTRIAXone   IVPB 1000 milliGRAM(s) IV Intermittent every 24 hours  chlorhexidine 2% Cloths 1 Application(s) Topical daily  cloNIDine Patch 0.3 mG/24Hr(s) 1 patch Transdermal <User Schedule>  metoprolol tartrate 25 milliGRAM(s) Oral two times a day  pantoprazole   Suspension 40 milliGRAM(s) Oral daily  valproate sodium  IVPB 500 milliGRAM(s) IV Intermittent every 8 hours    MEDICATIONS  (PRN):  morphine  - Injectable 2 milliGRAM(s) IV Push every 6 hours PRN Severe Pain (7 - 10)          LABS:                        7.5    17.13 )-----------( 266      ( 17 Aug 2023 04:48 )             23.8         133<L>  |  107  |  31<H>  ----------------------------<  185<H>  4.4   |  15<L>  |  2.21<H>    Ca    7.3<L>      17 Aug 2023 04:48  Phos  3.7       Mg     1.9         TPro  6.1  /  Alb  1.7<L>  /  TBili  0.2  /  DBili  x   /  AST  129<H>  /  ALT  27  /  AlkPhos  163<H>        Urinalysis Basic - ( 17 Aug 2023 11:35 )    Color: Orange / Appearance: Turbid / S.018 / pH: x  Gluc: x / Ketone: Negative mg/dL  / Bili: Small / Urobili: 1.0 mg/dL   Blood: x / Protein: 300 mg/dL / Nitrite: Negative   Leuk Esterase: Moderate / RBC: >50 /HPF / WBC >50 /HPF   Sq Epi: x / Non Sq Epi: x / Bacteria: Negative /HPF      Magnesium: 1.9 mg/dL ( @ 04:48)  Phosphorus: 3.7 mg/dL ( @ 04:48)    Urine studies    PTH and Vit D:

## 2023-08-17 NOTE — DISCHARGE NOTE NURSING/CASE MANAGEMENT/SOCIAL WORK - PATIENT PORTAL LINK FT
You can access the FollowMyHealth Patient Portal offered by [facility] by registering at the following website: [email or web address]. By joining [email or web address] FollowMyHealth portal, you will also be able to view your health information using other applications (apps) compatible with our system. You can access the FollowMyHealth Patient Portal offered by [facility] by registering at the following website: [email or web address]. By joining [employer]’s FollowMyHealth portal, you will also be able to view your health information using other applications (apps) compatible with our system. You can access the FollowMyHealth Patient Portal offered by [facility] by registering at the following website: [email or web address]. By joining [employer]’s FollowMyHealth portal, you will also be able to view your health information using other applications (apps) compatible with our system.

## 2023-08-17 NOTE — PROGRESS NOTE ADULT - ASSESSMENT
Patient is a 77 female from [facility] PMHx HTN, HLD, CVA (w/ residual aphasia and R sided hemiparesis/plegia) who was sent to the hospital due to altered mental status. Patient is being admitted to medicine for further work up and rule out stroke vs encephalopathy (metabolic vs infectious).  Patient is a 77 female from [facility] PMHx HTN, HLD, CVA (w/ residual aphasia and R sided hemiparesis/plegia) who was sent to the hospital due to altered mental status. Patient is being admitted to medicine for further work up and rule out stroke vs encephalopathy (metabolic vs infectious).  Patient is a 77 female from [facility] PMHx HTN, HLD, CVA (w/ residual aphasia and R sided hemiparesis/plegia) who was sent to the hospital due to altered mental status. Patient is being admitted to medicine for further work up and rule out stroke vs encephalopathy (metabolic vs infectious).

## 2023-08-17 NOTE — PROGRESS NOTE ADULT - PROBLEM SELECTOR PLAN 6
Cardiology consulted Dr. Dunn->  clonidine patch .2 mg weekly. P/w altered mental status   pt. is nonverbal

## 2023-08-17 NOTE — PROGRESS NOTE ADULT - SUBJECTIVE AND OBJECTIVE BOX
Date of Service 08-17-23 @ 11:36    CHIEF COMPLAINT:Patient is a 77y old  Female who presents with a chief complaint of Altered mental status. Pt appears comfortable.    	  REVIEW OF SYSTEMS:    [x ] Unable to obtain    PHYSICAL EXAM:  T(C): 37.4 (08-17-23 @ 05:27), Max: 37.4 (08-17-23 @ 05:27)  HR: 114 (08-17-23 @ 05:27) (103 - 114)  BP: 144/76 (08-17-23 @ 05:27) (129/81 - 160/76)  RR: 20 (08-17-23 @ 05:27) (18 - 20)  SpO2: 98% (08-17-23 @ 05:27) (97% - 100%)  Wt(kg): --  I&O's Summary    16 Aug 2023 07:01  -  17 Aug 2023 07:00  --------------------------------------------------------  IN: 1320 mL / OUT: 1000 mL / NET: 320 mL        Appearance: Normal	  HEENT:   Normal oral mucosa, PERRL, EOMI	  Lymphatic: No lymphadenopathy  Cardiovascular: Normal S1 S2, No JVD, No murmurs, +2 edema  Respiratory: Lungs clear to auscultation	  Gastrointestinal:  Soft, Non-tender, + BS	  Skin: No rashes, No ecchymoses, No cyanosis	  Extremities: Normal range of motion, No clubbing, cyanosis +2 edema  Vascular: Peripheral pulses palpable 2+ bilaterally    MEDICATIONS  (STANDING):  aspirin  chewable 324 milliGRAM(s) Oral daily  atorvastatin 20 milliGRAM(s) Oral at bedtime  carbidopa/levodopa  25/100 1 Tablet(s) Oral <User Schedule>  chlorhexidine 2% Cloths 1 Application(s) Topical daily  cloNIDine Patch 0.3 mG/24Hr(s) 1 patch Transdermal <User Schedule>  metoprolol tartrate 25 milliGRAM(s) Oral two times a day  pantoprazole   Suspension 40 milliGRAM(s) Oral daily  valproate sodium  IVPB 500 milliGRAM(s) IV Intermittent every 8 hours    	  	  LABS:	 	                       7.5    17.13 )-----------( 266      ( 17 Aug 2023 04:48 )             23.8     08-17    133<L>  |  107  |  31<H>  ----------------------------<  185<H>  4.4   |  15<L>  |  2.21<H>    Ca    7.3<L>      17 Aug 2023 04:48  Phos  3.7     08-17  Mg     1.9     08-17    TPro  6.1  /  Alb  1.7<L>  /  TBili  0.2  /  DBili  x   /  AST  129<H>  /  ALT  27  /  AlkPhos  163<H>  08-17      Lipid Profile: Cholesterol 152  LDL --  HDL 51  TG 93  Ldl calc 82    HgA1c:   TSH: Thyroid Stimulating Hormone, Serum: 3.59 uU/mL (07-28 @ 05:03)

## 2023-08-17 NOTE — PROVIDER CONTACT NOTE (OTHER) - SITUATION
Pt with no IVFs and NPO, clarifying need for IVF and prn medication to lower current BP
Pt febrile, temp 100.6 F. Tachypneic and tachycardic.

## 2023-08-17 NOTE — PROGRESS NOTE ADULT - PROBLEM SELECTOR PLAN 2
c/w BP meds as schedule   f/u PRE-OP labs: CBC, CMP, Mg/Phos, Coags, T&S  JACK: 0.8 % risk of SANTANA, intra-operatively or up to 30-d post-op  RCRI: 1.0 points, Class II Risk with 6.0 % 30-d risk of death, MI, or cardiac arrest  Patient is at low risk for urgent risk procedure  G tube placement by Surgery on 8/14

## 2023-08-17 NOTE — PROVIDER CONTACT NOTE (OTHER) - RECOMMENDATIONS
Provider notified, recommend IV tylneol.
Order for prn antihypertensives and IVF's if indicated by provider

## 2023-08-17 NOTE — DISCHARGE NOTE NURSING/CASE MANAGEMENT/SOCIAL WORK - NSDPFAC_GEN_ALL_CORE
[facility] located [address] [place] [address], [phone] [facility] located [address] [place] [address], [phone] [facility] located [address] [place] [address], [phone]

## 2023-08-17 NOTE — DISCHARGE NOTE NURSING/CASE MANAGEMENT/SOCIAL WORK - NSDCPEFALRISK_GEN_ALL_CORE
For information on Fall & Injury Prevention, visit: [email or web address] OR  [email or web address] OR  https://www.cdc.gov/steadi/patient.html For information on Fall & Injury Prevention, visit: https://www.[facility]/news/[email or web address]-[email or web address]-[email or web address]-mobility OR  https://www.[email or web address]/news/fall-[email or web address]-[email or web address]-[email or web address]-avoid-injury OR  https://www.cdc.gov/steadi/patient.html For information on Fall & Injury Prevention, visit: [email or web address]://[email or web address].[email or web address]/[email or web address]-[email or web address]-[email or web address]-mobility OR  https://www.[email or web address]/news/[email or web address]-tips-[email or web address]-avoid-injury OR  https://www.cdc.gov/steadi/patient.html

## 2023-08-17 NOTE — PROGRESS NOTE ADULT - PROBLEM SELECTOR PLAN 7
-Sublingual carbidopa-levodopa stopped for aspiration precautions  -Cont crushed carbidopa-levodopa through feeding tube Cardiology consulted Dr. Dunn->  clonidine patch .2 mg weekly.

## 2023-08-17 NOTE — PROGRESS NOTE ADULT - ASSESSMENT
77 female from [facility] PMHx HTN, HLD, CVA (w/ residual aphasia and R sided hemiparesis/plegia) who was sent to the hospital due to altered mental status,UTI.  1.UTI-s/p ABX.  2.HTN-  clonidine patch .3mg q wk,lopressor 25mg bid.  3.Lipid d/o- statin.  4.Surgical f/u-s/p G tube-TF and free water.  5.SUSAN-free water.  6.Anemia-  iron.  7.GI and DVT prophylaxis.  8.Dopplers-r/o dvt. 77 female from [facility] PMHx HTN, HLD, CVA (w/ residual aphasia and R sided hemiparesis/plegia) who was sent to the hospital due to altered mental status,UTI.  1.UTI-s/p ABX.  2.HTN-  clonidine patch .3mg q wk,lopressor 25mg bid.  3.Lipid d/o- statin.  4.Surgical f/u-s/p G tube-TF and free water.  5.SUSAN-free water.  6.Anemia-  iron.  7.GI and DVT prophylaxis.  8.Dopplers-r/o dvt. 77 female from [facility] PMHx HTN, HLD, CVA (w/ residual aphasia and R sided hemiparesis/plegia) who was sent to the hospital due to altered mental status,UTI.  1.UTI-s/p ABX.  2.HTN-  clonidine patch .3mg q wk,lopressor 25mg bid.  3.Lipid d/o- statin.  4.Surgical f/u-s/p G tube-TF and free water.  5.SUSAN-free water.  6.Anemia-  iron.  7.GI and DVT prophylaxis.  8.Dopplers-r/o dvt.

## 2023-08-17 NOTE — PROGRESS NOTE ADULT - PROBLEM SELECTOR PLAN 1
Pt. is NPO  Open G tube placed on 8/14  Feeds through G tube resumed  IV tylenol for pain, morphine for breakthrough pain  IV iron sucrose started 8/15 for 3 days as per nephro Pt. is NPO  Open G tube placed on 8/14  Feeds through G tube resumed  IV tylenol for pain, morphine for breakthrough pain  IV iron sucrose started 8/15 for 3 days as per nephro  f/u LE doppler b/l

## 2023-08-17 NOTE — PROGRESS NOTE ADULT - ASSESSMENT
1. SUSAN possible to MARISA and ATN  Renal sono shows no hdyro with b/l kidneys around 9.2cm  -Scr is unchanged at 2.2mg/dl possible due to ATN again with stable electrolytes.     -s/p mcclellan's cath placed for urinary retention during this admission.   -urinalysis shows blood with proteinuria; FeNa >1%, spot protein to creatinine ratio is 1.5gm  -Adjust meds to eGFR and avoid IV Gadolinium contrast,NSAIDs, and phosphate enema.  -Monitor I/O's daily.   -Monitor SMA daily.  2. Hypernatremia due to water deficit and insensible losses. Pt is clinically euvolemic.   -Na is around 133; d/c D5W.   -Monitor I/O's. Check Serum Na Daily. Avoid high solute intake diet and sodium bicarbonate infuse. Avoid overcorrection of NA (8-10meq/day)  3. CKD stage 4 most likely due to hypertensive nephrosclerosis  -new baseline scr around 1.8mg/dL with uPCR 1.5gm.  -previous Scr around 1.2 to 1.6mg/dL in Oct 2022.  -Keep patient euvolemic and renal diet  -Avoid Nephrotoxic Meds/ Agents such as (NSAIDs, IV contrast, Aminoglycosides such as gentamicin, -Gadolinium contrast, Phosphate containing enemas, etc..)  -Adjust Medications according to eGFR  4. HTN:   -bp is acceptable. continue bp meds  -titrate bp meds to keep sbp >110 and < 130  5. Mineral Bone Disease:  -phos is improving.  -PTH intact 289, will start calcitriol once Phos has improved.   6. Encephalopathy:  -Leukocytosis worsening.  -s/p Rocephin.  -Plan as per Neuro and primary team  -unable to place PEG via EGD;   -s/p PEG placed in OR on 08/14/23.  7. Hypokalemia:  -stable K.   -give kcl repletion to keep K >3.5meq/L  -monitor K.   8. Acidosis:  -CO2 is unchanged.    -monitor CO2.  9. Anemia:  -hb is trending down.  -low iron sat and ferritin are okay. s/p iv iron x 3 days.  -monitor CBC.  -transfuse if hb <7.0  10. Elevated LFTs  -hold lipitor for now  -monitor LFTs       1. USSAN possible to MARISA and ATN  Renal sono shows no hdyro with b/l kidneys around 9.2cm  -Scr is unchanged at 2.2mg/dl possible due to ATN again with stable electrolytes.     -s/p mcclellan's cath placed for urinary retention during this admission.   -urinalysis shows blood with proteinuria; FeNa >1%, spot protein to creatinine ratio is 1.5gm  -Adjust meds to eGFR and avoid IV Gadolinium contrast,NSAIDs, and phosphate enema.  -Monitor I/O's daily.   -Monitor SMA daily.  2. Hypernatremia due to water deficit and insensible losses. Pt is clinically euvolemic.   -Na is around 133; d/c D5W.   -Monitor I/O's. Check Serum Na Daily. Avoid high solute intake diet and sodium bicarbonate infuse. Avoid overcorrection of NA (8-10meq/day)  3. CKD stage 4 most likely due to hypertensive nephrosclerosis  -new baseline scr around 1.8mg/dL with uPCR 1.5gm.  -previous Scr around 1.2 to 1.6mg/dL in Oct 2022.  -Keep patient euvolemic and renal diet  -Avoid Nephrotoxic Meds/ Agents such as (NSAIDs, IV contrast, Aminoglycosides such as gentamicin, -Gadolinium contrast, Phosphate containing enemas, etc..)  -Adjust Medications according to eGFR  4. HTN:   -bp is acceptable. continue bp meds  -titrate bp meds to keep sbp >110 and < 130  5. Mineral Bone Disease:  -phos is improving.  -PTH intact 289, will start calcitriol once Phos has improved.   6. Encephalopathy:  -Leukocytosis worsening.  -s/p Rocephin.  -Plan as per Neuro and primary team  -unable to place PEG via EGD;   -s/p PEG placed in OR on 08/14/23.  7. Hypokalemia:  -stable K.   -give kcl repletion to keep K >3.5meq/L  -monitor K.   8. Acidosis:  -CO2 is unchanged.    -monitor CO2.  9. Anemia:  -hb is trending down.  -low iron sat and ferritin are okay. s/p iv iron x 3 days.  -monitor CBC.  -transfuse if hb <7.0  10. Elevated LFTs  -hold lipitor for now  -monitor LFTs       1. SUSAN possible to MARISA and ATN  Renal sono shows no hdyro with b/l kidneys around 9.2cm  -Scr is unchanged at 2.2mg/dl possible due to ATN again with stable electrolytes.     -s/p mcclellan's cath placed for urinary retention during this admission.   -urinalysis shows blood with proteinuria; FeNa >1%, spot protein to creatinine ratio is 1.5gm  -Adjust meds to eGFR and avoid IV Gadolinium contrast,NSAIDs, and phosphate enema.  -Monitor I/O's daily.   -Monitor SMA daily.  2. Hypernatremia due to water deficit and insensible losses. Pt is clinically euvolemic.   -Na is around 133; d/c D5W.   -Monitor I/O's. Check Serum Na Daily. Avoid high solute intake diet and sodium bicarbonate infuse. Avoid overcorrection of NA (8-10meq/day)  3. CKD stage 4 most likely due to hypertensive nephrosclerosis  -new baseline scr around 1.8mg/dL with uPCR 1.5gm.  -previous Scr around 1.2 to 1.6mg/dL in Oct 2022.  -Keep patient euvolemic and renal diet  -Avoid Nephrotoxic Meds/ Agents such as (NSAIDs, IV contrast, Aminoglycosides such as gentamicin, -Gadolinium contrast, Phosphate containing enemas, etc..)  -Adjust Medications according to eGFR  4. HTN:   -bp is acceptable. continue bp meds  -titrate bp meds to keep sbp >110 and < 130  5. Mineral Bone Disease:  -phos is improving.  -PTH intact 289, will start calcitriol once Phos has improved.   6. Encephalopathy:  -Leukocytosis worsening.  -s/p Rocephin.  -Plan as per Neuro and primary team  -unable to place PEG via EGD;   -s/p PEG placed in OR on 08/14/23.  7. Hypokalemia:  -stable K.   -give kcl repletion to keep K >3.5meq/L  -monitor K.   8. Acidosis:  -CO2 is unchanged.    -monitor CO2.  9. Anemia:  -hb is trending down.  -low iron sat and ferritin are okay. s/p iv iron x 3 days.  -monitor CBC.  -transfuse if hb <7.0  10. Elevated LFTs  -recommend to hold lipitor for now  -monitor LFTs

## 2023-08-18 DIAGNOSIS — R74.8 ABNORMAL LEVELS OF OTHER SERUM ENZYMES: ICD-10-CM

## 2023-08-18 LAB
ALBUMIN SERPL ELPH-MCNC: 1.7 G/DL — LOW (ref 3.5–5)
ALP SERPL-CCNC: 241 U/L — HIGH (ref 40–120)
ALT FLD-CCNC: 20 U/L DA — SIGNIFICANT CHANGE UP (ref 10–60)
ANION GAP SERPL CALC-SCNC: 11 MMOL/L — SIGNIFICANT CHANGE UP (ref 5–17)
ANION GAP SERPL CALC-SCNC: 9 MMOL/L — SIGNIFICANT CHANGE UP (ref 5–17)
AST SERPL-CCNC: 230 U/L — HIGH (ref 10–40)
BASOPHILS # BLD AUTO: 0.18 K/UL — SIGNIFICANT CHANGE UP (ref 0–0.2)
BASOPHILS NFR BLD AUTO: 0.9 % — SIGNIFICANT CHANGE UP (ref 0–2)
BILIRUB SERPL-MCNC: 0.4 MG/DL — SIGNIFICANT CHANGE UP (ref 0.2–1.2)
BUN SERPL-MCNC: 39 MG/DL — HIGH (ref 7–18)
CALCIUM SERPL-MCNC: 7.5 MG/DL — LOW (ref 8.4–10.5)
CALCIUM SERPL-MCNC: 8 MG/DL — LOW (ref 8.4–10.5)
CHLORIDE SERPL-SCNC: 107 MMOL/L — SIGNIFICANT CHANGE UP (ref 96–108)
CHLORIDE SERPL-SCNC: 109 MMOL/L — HIGH (ref 96–108)
CO2 SERPL-SCNC: 17 MMOL/L — LOW (ref 22–31)
CO2 SERPL-SCNC: 18 MMOL/L — LOW (ref 22–31)
CREAT SERPL-MCNC: 2.25 MG/DL — HIGH (ref 0.5–1.3)
CREAT SERPL-MCNC: 2.27 MG/DL — HIGH (ref 0.5–1.3)
EGFR: 22 ML/MIN/1.73M2 — LOW
EOSINOPHIL # BLD AUTO: 0.07 K/UL — SIGNIFICANT CHANGE UP (ref 0–0.5)
EOSINOPHIL NFR BLD AUTO: 0.4 % — SIGNIFICANT CHANGE UP (ref 0–6)
GLUCOSE BLDC GLUCOMTR-MCNC: 114 MG/DL — HIGH (ref 70–99)
GLUCOSE BLDC GLUCOMTR-MCNC: 122 MG/DL — HIGH (ref 70–99)
GLUCOSE BLDC GLUCOMTR-MCNC: 144 MG/DL — HIGH (ref 70–99)
GLUCOSE BLDC GLUCOMTR-MCNC: 180 MG/DL — HIGH (ref 70–99)
GLUCOSE SERPL-MCNC: 117 MG/DL — HIGH (ref 70–99)
GLUCOSE SERPL-MCNC: 129 MG/DL — HIGH (ref 70–99)
HCT VFR BLD CALC: 26.6 % — LOW (ref 34.5–45)
HGB BLD-MCNC: 8.4 G/DL — LOW (ref 11.5–15.5)
IMM GRANULOCYTES NFR BLD AUTO: 13.6 % — HIGH (ref 0–0.9)
LACTATE SERPL-SCNC: 1.6 MMOL/L — SIGNIFICANT CHANGE UP (ref 0.7–2)
LYMPHOCYTES # BLD AUTO: 1.93 K/UL — SIGNIFICANT CHANGE UP (ref 1–3.3)
LYMPHOCYTES # BLD AUTO: 9.8 % — LOW (ref 13–44)
MAGNESIUM SERPL-MCNC: 2.2 MG/DL — SIGNIFICANT CHANGE UP (ref 1.6–2.6)
MCHC RBC-ENTMCNC: 28.3 PG — SIGNIFICANT CHANGE UP (ref 27–34)
MCHC RBC-ENTMCNC: 31.6 GM/DL — LOW (ref 32–36)
MCV RBC AUTO: 89.6 FL — SIGNIFICANT CHANGE UP (ref 80–100)
MONOCYTES # BLD AUTO: 1.9 K/UL — HIGH (ref 0–0.9)
MONOCYTES NFR BLD AUTO: 9.7 % — SIGNIFICANT CHANGE UP (ref 2–14)
NEUTROPHILS # BLD AUTO: 12.9 K/UL — HIGH (ref 1.8–7.4)
NEUTROPHILS NFR BLD AUTO: 65.6 % — SIGNIFICANT CHANGE UP (ref 43–77)
NRBC # BLD: 0 /100 WBCS — SIGNIFICANT CHANGE UP (ref 0–0)
PHOSPHATE SERPL-MCNC: 3.9 MG/DL — SIGNIFICANT CHANGE UP (ref 2.5–4.5)
PLATELET # BLD AUTO: 291 K/UL — SIGNIFICANT CHANGE UP (ref 150–400)
POTASSIUM SERPL-MCNC: 5.2 MMOL/L — SIGNIFICANT CHANGE UP (ref 3.5–5.3)
POTASSIUM SERPL-MCNC: 5.5 MMOL/L — HIGH (ref 3.5–5.3)
POTASSIUM SERPL-SCNC: 5.2 MMOL/L — SIGNIFICANT CHANGE UP (ref 3.5–5.3)
POTASSIUM SERPL-SCNC: 5.5 MMOL/L — HIGH (ref 3.5–5.3)
PROT SERPL-MCNC: 6.4 G/DL — SIGNIFICANT CHANGE UP (ref 6–8.3)
RBC # BLD: 2.97 M/UL — LOW (ref 3.8–5.2)
RBC # FLD: 17 % — HIGH (ref 10.3–14.5)
SODIUM SERPL-SCNC: 135 MMOL/L — SIGNIFICANT CHANGE UP (ref 135–145)
SODIUM SERPL-SCNC: 136 MMOL/L — SIGNIFICANT CHANGE UP (ref 135–145)
WBC # BLD: 19.66 K/UL — HIGH (ref 3.8–10.5)
WBC # FLD AUTO: 19.66 K/UL — HIGH (ref 3.8–10.5)

## 2023-08-18 PROCEDURE — 76705 ECHO EXAM OF ABDOMEN: CPT | Mod: 26

## 2023-08-18 RX ORDER — CEFEPIME 1 G/1
1000 INJECTION, POWDER, FOR SOLUTION INTRAMUSCULAR; INTRAVENOUS EVERY 24 HOURS
Refills: 0 | Status: DISCONTINUED | OUTPATIENT
Start: 2023-08-19 | End: 2023-08-25

## 2023-08-18 RX ORDER — CEFEPIME 1 G/1
1000 INJECTION, POWDER, FOR SOLUTION INTRAMUSCULAR; INTRAVENOUS ONCE
Refills: 0 | Status: COMPLETED | OUTPATIENT
Start: 2023-08-18 | End: 2023-08-18

## 2023-08-18 RX ORDER — SODIUM ZIRCONIUM CYCLOSILICATE 10 G/10G
10 POWDER, FOR SUSPENSION ORAL ONCE
Refills: 0 | Status: COMPLETED | OUTPATIENT
Start: 2023-08-18 | End: 2023-08-18

## 2023-08-18 RX ORDER — CEFEPIME 1 G/1
INJECTION, POWDER, FOR SOLUTION INTRAMUSCULAR; INTRAVENOUS
Refills: 0 | Status: DISCONTINUED | OUTPATIENT
Start: 2023-08-18 | End: 2023-08-25

## 2023-08-18 RX ADMIN — Medication 1 PATCH: at 07:45

## 2023-08-18 RX ADMIN — CEFEPIME 1000 MILLIGRAM(S): 1 INJECTION, POWDER, FOR SOLUTION INTRAMUSCULAR; INTRAVENOUS at 15:28

## 2023-08-18 RX ADMIN — SODIUM ZIRCONIUM CYCLOSILICATE 10 GRAM(S): 10 POWDER, FOR SUSPENSION ORAL at 12:24

## 2023-08-18 RX ADMIN — Medication 25 MILLIGRAM(S): at 05:31

## 2023-08-18 RX ADMIN — CARBIDOPA AND LEVODOPA 1 TABLET(S): 25; 100 TABLET ORAL at 18:49

## 2023-08-18 RX ADMIN — PANTOPRAZOLE SODIUM 40 MILLIGRAM(S): 20 TABLET, DELAYED RELEASE ORAL at 12:22

## 2023-08-18 RX ADMIN — Medication 55 MILLIGRAM(S): at 21:47

## 2023-08-18 RX ADMIN — CARBIDOPA AND LEVODOPA 1 TABLET(S): 25; 100 TABLET ORAL at 12:21

## 2023-08-18 RX ADMIN — Medication 55 MILLIGRAM(S): at 14:00

## 2023-08-18 RX ADMIN — Medication 1 PATCH: at 21:42

## 2023-08-18 RX ADMIN — CHLORHEXIDINE GLUCONATE 1 APPLICATION(S): 213 SOLUTION TOPICAL at 13:53

## 2023-08-18 RX ADMIN — Medication 25 MILLIGRAM(S): at 18:48

## 2023-08-18 RX ADMIN — Medication 55 MILLIGRAM(S): at 05:31

## 2023-08-18 RX ADMIN — CARBIDOPA AND LEVODOPA 1 TABLET(S): 25; 100 TABLET ORAL at 15:30

## 2023-08-18 RX ADMIN — Medication 324 MILLIGRAM(S): at 12:21

## 2023-08-18 RX ADMIN — CARBIDOPA AND LEVODOPA 1 TABLET(S): 25; 100 TABLET ORAL at 07:31

## 2023-08-18 RX ADMIN — Medication 1 PATCH: at 13:53

## 2023-08-18 RX ADMIN — ATORVASTATIN CALCIUM 20 MILLIGRAM(S): 80 TABLET, FILM COATED ORAL at 21:47

## 2023-08-18 NOTE — PROGRESS NOTE ADULT - ASSESSMENT
77 female from [facility] PMHx HTN, HLD, CVA (w/ residual aphasia and R sided hemiparesis/plegia) who was sent to the hospital due to altered mental status,UTI.  1.UTI-s/p ABX.  2.HTN-  dec clonidine patch .1mg q wk,lopressor 25mg bid.  3.Lipid d/o- statin.  4.Surgical f/u-s/p G tube-TF and free water.  5.SUSAN-free water.  6.Anemia-  iron.  7.GI and DVT prophylaxis.  8.Dopplers-r/o dvt.  9.Hyperkalemia-lokelma. 77 female from [facility] PMHx HTN, HLD, CVA (w/ residual aphasia and R sided hemiparesis/plegia) who was sent to the hospital due to altered mental status,UTI.  1.UTI-s/p ABX.  2.HTN-  dec clonidine patch .1mg q wk,lopressor 25mg bid.  3.Lipid d/o- statin.  4.Surgical f/u-s/p G tube-TF and free water.  5.SUSAN-free water.  6.Anemia-  iron.  7.GI and DVT prophylaxis.  8.Dopplers-r/o dvt.  9.Hyperkalemia-lokelma. 77 female from [facility] PMHx HTN, HLD, CVA (w/ residual aphasia and R sided hemiparesis/plegia) who was sent to the hospital due to altered mental status,UTI.  1.UTI-s/p ABX.  2.HTN-  dec clonidine patch .1mg q wk,lopressor 25mg bid.  3.Lipid d/o- statin.  4.Surgical f/u-s/p G tube-TF and free water.  5.SUSAN-free water.  6.Anemia-  iron.  7.GI and DVT prophylaxis.  8.Dopplers-r/o dvt.  9.Hyperkalemia-lokelma.

## 2023-08-18 NOTE — PROGRESS NOTE ADULT - ASSESSMENT
1. Anemia  2. Dysphagia  3. Constipation  4. Failure to thrive  5. No evidence of acute GI bleeding  6. S/p unsuccessful endoscopic Peg placement  7. S/p gastrostomy tube placement by surgery    Suggestions:    1. NPO  2. IVF hydration  3. Monitor electrolytes  4. Peg feeding as per surgery  5. Protonix daily  6. Aspiration precaution  7. Monitor H/H  8. Transfuse PRBC as needed  9. Avoid NSAID  10. Daily stool softener as needed  11. DVT prophylaxis

## 2023-08-18 NOTE — CONSULT NOTE ADULT - SUBJECTIVE AND OBJECTIVE BOX
Patient is a 77y old  Female who presents with a chief complaint of Altered mental status. (18 Aug 2023 14:27)  [facility] 77 [facility] (w/ residual aphasia and R sided hemiparesis/plegia) who was sent to the hospital due to altered mental status. Patient is not able to present any history. Patient is lying down in bed, awake and alert. However, patient does not speak, is not able to follow commands and does not turn face or move eyes when her name is called. As per nursing home papers  patient was noted to have change in mental status with eye fixed in one direction and unable to swallow food. Patient's sister Marika San ([phone]) was called to inquire about baseline mental status. Patient can respond with one word answers, swallows food, however does not move arms/legs on baseline. Ed note also reports patient is exhibiting b/l upper arm weakness but resident is unable to confirm that during evaluation. Unable to obtain any ROS from the patient at this time.     77 [facility], CVA (w/ residual aphasia and R sided hemiparesis/plegia), PSHx Hysterectomy, admitted to medical services w/ altered mental status. Surgical consult called for Gastrostomy tube placement. GI unable to place one today. Pt poor historian, unable to obtain hx, ROS limited.          REVIEW OF SYSTEMS: Unable to obtain due  to mental status unless mentioned in HPI      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  Seizures  Chronic constipation  No significant past surgical history        SOCIAL HISTORY  Alcohol: Does not drink  Tobacco: Does not smoke  Illicit substance use: None      FAMILY HISTORY: Non contributory to the present illness        ALLERGIES: &quot; NATURAL RUBBER&quot; (Other)  latex (Other)  penicillins (Unknown)        Vital Signs Last 24 Hrs  T(C): 36.8 (18 Aug 2023 12:47), Max: 37.3 (17 Aug 2023 20:36)  T(F): 98.2 (18 Aug 2023 12:47), Max: 99.2 (17 Aug 2023 20:36)  HR: 98 (18 Aug 2023 12:47) (88 - 99)  BP: 130/62 (18 Aug 2023 12:47) (123/79 - 134/71)  BP(mean): --  RR: 18 (18 Aug 2023 12:47) (18 - 18)  SpO2: 94% (18 Aug 2023 12:47) (94% - 98%)    Parameters below as of 18 Aug 2023 12:47  Patient On (Oxygen Delivery Method): room air          PHYSICAL EXAM:  GENERAL: Not in distress   CHEST/LUNG:  Not using accessory muscles   HEART: s1 and s2 present  ABDOMEN:  Nontender and  Nondistended  EXTREMITIES: No pedal  edema  CNS:       LABS:                        8.4    19.66 )-----------( 291      ( 18 Aug 2023 07:20 )             26.6       08-18    136  |  109<H>  |  39<H>  ----------------------------<  129<H>  5.2   |  18<L>  |  2.27<H>    Ca    7.5<L>      18 Aug 2023 11:20  Phos  3.9     08-18  Mg     2.2     08-18    TPro  6.4  /  Alb  1.7<L>  /  TBili  0.4  /  DBili  x   /  AST  230<H>  /  ALT  20  /  AlkPhos  241<H>  08-18        CAPILLARY BLOOD GLUCOSE  POCT Blood Glucose.: 144 mg/dL (18 Aug 2023 11:49)  POCT Blood Glucose.: 122 mg/dL (18 Aug 2023 00:03)        Urinalysis Basic - ( 18 Aug 2023 11:20 )  Color: x / Appearance: x / SG: x / pH: x  Gluc: 129 mg/dL / Ketone: x  / Bili: x / Urobili: x   Blood: x / Protein: x / Nitrite: x   Leuk Esterase: x / RBC: x / WBC x   Sq Epi: x / Non Sq Epi: x / Bacteria: x        MEDICATIONS  (STANDING):  aspirin  chewable 324 milliGRAM(s) Oral daily  atorvastatin 20 milliGRAM(s) Oral at bedtime  carbidopa/levodopa  25/100 1 Tablet(s) Oral <User Schedule>  cefTRIAXone   IVPB      cefTRIAXone   IVPB 1000 milliGRAM(s) IV Intermittent every 24 hours  chlorhexidine 2% Cloths 1 Application(s) Topical daily  cloNIDine Patch 0.1 mG/24Hr(s) 1 patch Transdermal every 7 days  metoprolol tartrate 25 milliGRAM(s) Oral two times a day  pantoprazole   Suspension 40 milliGRAM(s) Oral daily  valproate sodium  IVPB 500 milliGRAM(s) IV Intermittent every 8 hours    MEDICATIONS  (PRN):  acetaminophen   Oral Liquid .. 650 milliGRAM(s) Enteral Tube every 6 hours PRN Temp greater or equal to 38C (100.4F)  morphine  - Injectable 2 milliGRAM(s) IV Push every 6 hours PRN Severe Pain (7 - 10)          RADIOLOGY & ADDITIONAL TESTS:             Patient is a 77y old  Female who presents with a chief complaint of Altered mental status. (18 Aug 2023 14:27)  [facility] 77 [facility], [facility], [facility] (w/ residual aphasia and R sided hemiparesis/plegia) who was sent to the hospital due to altered mental status. Patient is not able to present any history. Patient is lying down in bed, awake and alert. However, patient does not speak, is not able to follow commands and does not turn face or move eyes when her name is called. As per nursing home papers  patient was noted to have change in mental status with eye fixed in one direction and unable to swallow food. Patient's sister Marika San ([phone]) was called to inquire about baseline mental status. Patient can respond with one word answers, swallows food, however does not move arms/legs on baseline. Ed note also reports patient is exhibiting b/l upper arm weakness but resident is unable to confirm that during evaluation. Unable to obtain any ROS from the patient at this time.     77 [facility], H[facility], CVA (w/ residual aphasia and R sided hemiparesis/plegia), PSHx Hysterectomy, admitted to medical services w/ altered mental status. Surgical consult called for Gastrostomy tube placement. GI unable to place one today. Pt poor historian, unable to obtain hx, ROS limited.          REVIEW OF SYSTEMS: Unable to obtain due  to mental status unless mentioned in HPI      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  Seizures  Chronic constipation  No significant past surgical history        SOCIAL HISTORY  Alcohol: Does not drink  Tobacco: Does not smoke  Illicit substance use: None      FAMILY HISTORY: Non contributory to the present illness        ALLERGIES: &quot; NATURAL RUBBER&quot; (Other)  latex (Other)  penicillins (Unknown)        Vital Signs Last 24 Hrs  T(C): 36.8 (18 Aug 2023 12:47), Max: 37.3 (17 Aug 2023 20:36)  T(F): 98.2 (18 Aug 2023 12:47), Max: 99.2 (17 Aug 2023 20:36)  HR: 98 (18 Aug 2023 12:47) (88 - 99)  BP: 130/62 (18 Aug 2023 12:47) (123/79 - 134/71)  BP(mean): --  RR: 18 (18 Aug 2023 12:47) (18 - 18)  SpO2: 94% (18 Aug 2023 12:47) (94% - 98%)    Parameters below as of 18 Aug 2023 12:47  Patient On (Oxygen Delivery Method): room air          PHYSICAL EXAM:  GENERAL: Not in distress   CHEST/LUNG:  Not using accessory muscles   HEART: s1 and s2 present  ABDOMEN:  Nontender and  Nondistended  EXTREMITIES: No pedal  edema  CNS:       LABS:                        8.4    19.66 )-----------( 291      ( 18 Aug 2023 07:20 )             26.6       08-18    136  |  109<H>  |  39<H>  ----------------------------<  129<H>  5.2   |  18<L>  |  2.27<H>    Ca    7.5<L>      18 Aug 2023 11:20  Phos  3.9     08-18  Mg     2.2     08-18    TPro  6.4  /  Alb  1.7<L>  /  TBili  0.4  /  DBili  x   /  AST  230<H>  /  ALT  20  /  AlkPhos  241<H>  08-18        CAPILLARY BLOOD GLUCOSE  POCT Blood Glucose.: 144 mg/dL (18 Aug 2023 11:49)  POCT Blood Glucose.: 122 mg/dL (18 Aug 2023 00:03)        Urinalysis Basic - ( 18 Aug 2023 11:20 )  Color: x / Appearance: x / SG: x / pH: x  Gluc: 129 mg/dL / Ketone: x  / Bili: x / Urobili: x   Blood: x / Protein: x / Nitrite: x   Leuk Esterase: x / RBC: x / WBC x   Sq Epi: x / Non Sq Epi: x / Bacteria: x        MEDICATIONS  (STANDING):  aspirin  chewable 324 milliGRAM(s) Oral daily  atorvastatin 20 milliGRAM(s) Oral at bedtime  carbidopa/levodopa  25/100 1 Tablet(s) Oral <User Schedule>  cefTRIAXone   IVPB      cefTRIAXone   IVPB 1000 milliGRAM(s) IV Intermittent every 24 hours  chlorhexidine 2% Cloths 1 Application(s) Topical daily  cloNIDine Patch 0.1 mG/24Hr(s) 1 patch Transdermal every 7 days  metoprolol tartrate 25 milliGRAM(s) Oral two times a day  pantoprazole   Suspension 40 milliGRAM(s) Oral daily  valproate sodium  IVPB 500 milliGRAM(s) IV Intermittent every 8 hours    MEDICATIONS  (PRN):  acetaminophen   Oral Liquid .. 650 milliGRAM(s) Enteral Tube every 6 hours PRN Temp greater or equal to 38C (100.4F)  morphine  - Injectable 2 milliGRAM(s) IV Push every 6 hours PRN Severe Pain (7 - 10)          RADIOLOGY & ADDITIONAL TESTS:             Patient is a 77y old  Female who presents with a chief complaint of Altered mental status. (18 Aug 2023 14:27)  [facility] 77 [facility] (w/ residual aphasia and R sided hemiparesis/plegia) who was sent to the hospital due to altered mental status. Patient is not able to present any history. Patient is lying down in bed, awake and alert. However, patient does not speak, is not able to follow commands and does not turn face or move eyes when her name is called. As per nursing home papers  patient was noted to have change in mental status with eye fixed in one direction and unable to swallow food. Patient's sister Marika San ([phone]) was called to inquire about baseline mental status. Patient can respond with one word answers, swallows food, however does not move arms/legs on baseline. Ed note also reports patient is exhibiting b/l upper arm weakness but resident is unable to confirm that during evaluation. Unable to obtain any ROS from the patient at this time.     77 [facility], [facility]A (w/ residual aphasia and R sided hemiparesis/plegia), PSHx Hysterectomy, admitted to medical services w/ altered mental status. Surgical consult called for Gastrostomy tube placement. GI unable to place one today. Pt poor historian, unable to obtain hx, ROS limited.          REVIEW OF SYSTEMS: Unable to obtain due  to mental status unless mentioned in HPI      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  Seizures  Chronic constipation  No significant past surgical history        SOCIAL HISTORY  Alcohol: Does not drink  Tobacco: Does not smoke  Illicit substance use: None      FAMILY HISTORY: Non contributory to the present illness        ALLERGIES: &quot; NATURAL RUBBER&quot; (Other)  latex (Other)  penicillins (Unknown)        Vital Signs Last 24 Hrs  T(C): 36.8 (18 Aug 2023 12:47), Max: 37.3 (17 Aug 2023 20:36)  T(F): 98.2 (18 Aug 2023 12:47), Max: 99.2 (17 Aug 2023 20:36)  HR: 98 (18 Aug 2023 12:47) (88 - 99)  BP: 130/62 (18 Aug 2023 12:47) (123/79 - 134/71)  BP(mean): --  RR: 18 (18 Aug 2023 12:47) (18 - 18)  SpO2: 94% (18 Aug 2023 12:47) (94% - 98%)    Parameters below as of 18 Aug 2023 12:47  Patient On (Oxygen Delivery Method): room air          PHYSICAL EXAM:  GENERAL: Not in distress   CHEST/LUNG:  Not using accessory muscles   HEART: s1 and s2 present  ABDOMEN:  Nontender and  Nondistended  EXTREMITIES: No pedal  edema  CNS:       LABS:                        8.4    19.66 )-----------( 291      ( 18 Aug 2023 07:20 )             26.6       08-18    136  |  109<H>  |  39<H>  ----------------------------<  129<H>  5.2   |  18<L>  |  2.27<H>    Ca    7.5<L>      18 Aug 2023 11:20  Phos  3.9     08-18  Mg     2.2     08-18    TPro  6.4  /  Alb  1.7<L>  /  TBili  0.4  /  DBili  x   /  AST  230<H>  /  ALT  20  /  AlkPhos  241<H>  08-18        CAPILLARY BLOOD GLUCOSE  POCT Blood Glucose.: 144 mg/dL (18 Aug 2023 11:49)  POCT Blood Glucose.: 122 mg/dL (18 Aug 2023 00:03)        Urinalysis Basic - ( 18 Aug 2023 11:20 )  Color: x / Appearance: x / SG: x / pH: x  Gluc: 129 mg/dL / Ketone: x  / Bili: x / Urobili: x   Blood: x / Protein: x / Nitrite: x   Leuk Esterase: x / RBC: x / WBC x   Sq Epi: x / Non Sq Epi: x / Bacteria: x        MEDICATIONS  (STANDING):  aspirin  chewable 324 milliGRAM(s) Oral daily  atorvastatin 20 milliGRAM(s) Oral at bedtime  carbidopa/levodopa  25/100 1 Tablet(s) Oral <User Schedule>  cefTRIAXone   IVPB      cefTRIAXone   IVPB 1000 milliGRAM(s) IV Intermittent every 24 hours  chlorhexidine 2% Cloths 1 Application(s) Topical daily  cloNIDine Patch 0.1 mG/24Hr(s) 1 patch Transdermal every 7 days  metoprolol tartrate 25 milliGRAM(s) Oral two times a day  pantoprazole   Suspension 40 milliGRAM(s) Oral daily  valproate sodium  IVPB 500 milliGRAM(s) IV Intermittent every 8 hours    MEDICATIONS  (PRN):  acetaminophen   Oral Liquid .. 650 milliGRAM(s) Enteral Tube every 6 hours PRN Temp greater or equal to 38C (100.4F)  morphine  - Injectable 2 milliGRAM(s) IV Push every 6 hours PRN Severe Pain (7 - 10)          RADIOLOGY & ADDITIONAL TESTS:               Patient is a 77y old  Female who is from [facility] and with PMHx of HTN, HLD, CVA (w/ residual aphasia and R sided hemiparesis/plegia) who was sent to the hospital on 7/27/23, due to altered mental status. During the hospital course she has Gastrostomy tube placement by Surgery for poor oral intake and Dysphagia. On 8/17/23 she became septic and during sepsis work up found to have positive Urine analysis and started on ceftriaxone. Hence, the ID consult requested to assist with further evaluation and antibiotic management.      REVIEW OF SYSTEMS: Unable to obtain due  to mental status unless mentioned in HPI      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  Seizures  Chronic constipation  No significant past surgical history      SOCIAL HISTORY  Alcohol: Does not drink  Tobacco: Does not smoke  Illicit substance use: None      FAMILY HISTORY: Non contributory to the present illness      ALLERGIES: &quot; NATURAL RUBBER&quot; (Other)  latex (Other)  penicillins (Unknown)        Vital Signs Last 24 Hrs  T(C): 36.8 (18 Aug 2023 12:47), Max: 37.3 (17 Aug 2023 20:36)  T(F): 98.2 (18 Aug 2023 12:47), Max: 99.2 (17 Aug 2023 20:36)  HR: 98 (18 Aug 2023 12:47) (88 - 99)  BP: 130/62 (18 Aug 2023 12:47) (123/79 - 134/71)  BP(mean): --  RR: 18 (18 Aug 2023 12:47) (18 - 18)  SpO2: 94% (18 Aug 2023 12:47) (94% - 98%)    Parameters below as of 18 Aug 2023 12:47  Patient On (Oxygen Delivery Method): room air      PHYSICAL EXAM:  GENERAL: Not in acute distress   CHEST/LUNG:  Not using accessory muscles   HEART: s1 and s2 present  ABDOMEN:  grossly obese  EXTREMITIES: edematous  CNS: Awake, somewhat alert but Non verbal       LABS:                        8.4    19.66 )-----------( 291      ( 18 Aug 2023 07:20 )             26.6       08-18    136  |  109<H>  |  39<H>  ----------------------------<  129<H>  5.2   |  18<L>  |  2.27<H>    Ca    7.5<L>      18 Aug 2023 11:20  Phos  3.9     08-18  Mg     2.2     08-18    TPro  6.4  /  Alb  1.7<L>  /  TBili  0.4  /  DBili  x   /  AST  230<H>  /  ALT  20  /  AlkPhos  241<H>  08-18      CAPILLARY BLOOD GLUCOSE  POCT Blood Glucose.: 144 mg/dL (18 Aug 2023 11:49)  POCT Blood Glucose.: 122 mg/dL (18 Aug 2023 00:03)      Urinalysis Basic - ( 18 Aug 2023 11:20 )  Color: x / Appearance: x / SG: x / pH: x  Gluc: 129 mg/dL / Ketone: x  / Bili: x / Urobili: x   Blood: x / Protein: x / Nitrite: x   Leuk Esterase: x / RBC: x / WBC x   Sq Epi: x / Non Sq Epi: x / Bacteria: x        MEDICATIONS  (STANDING):  aspirin  chewable 324 milliGRAM(s) Oral daily  atorvastatin 20 milliGRAM(s) Oral at bedtime  carbidopa/levodopa  25/100 1 Tablet(s) Oral <User Schedule>  cefTRIAXone   IVPB      cefTRIAXone   IVPB 1000 milliGRAM(s) IV Intermittent every 24 hours  chlorhexidine 2% Cloths 1 Application(s) Topical daily  cloNIDine Patch 0.1 mG/24Hr(s) 1 patch Transdermal every 7 days  metoprolol tartrate 25 milliGRAM(s) Oral two times a day  pantoprazole   Suspension 40 milliGRAM(s) Oral daily  valproate sodium  IVPB 500 milliGRAM(s) IV Intermittent every 8 hours    MEDICATIONS  (PRN):  acetaminophen   Oral Liquid .. 650 milliGRAM(s) Enteral Tube every 6 hours PRN Temp greater or equal to 38C (100.4F)  morphine  - Injectable 2 milliGRAM(s) IV Push every 6 hours PRN Severe Pain (7 - 10)      RADIOLOGY & ADDITIONAL TESTS:    8/18/23 : US Abdomen Liver Followup (08.18.23 @ 14:47) IMPRESSION: Subtle diffuse heterogeneous echotexture of the liver   parenchyma, which may represent underlying hepatocellular disease.        MICROBIOLOGY DATA:    Urine Microscopic-Add On (NC) (08.17.23 @ 18:40)   Red Blood Cell - Urine: 150 /HPF  White Blood Cell - Urine: 30 /HPF  Bacteria: Few /HPF  Squamous Epithelial Cells: PresentUrine Microscopic-Add On (NC) (08.17.23 @ 11:35)   Squamous Epithelial Cells: None Seen  Bacteria: Negative /HPF  Comment - Urine: Moderate budding yeast and yeast-like cells  White Blood Cell - Urine: >50 /HPF  Red Blood Cell - Urine: >50 /HPFCOVID-19 PCR . (08.16.23 @ 17:40)   COVID-19 PCR: NotDetec:              Patient is a 77y old  Female who is from [facility] and with PMHx of HTN, HLD, CVA (w/ residual aphasia and R sided hemiparesis/plegia) who was sent to the hospital on 7/27/23, due to altered mental status. During the hospital course she has Gastrostomy tube placement by Surgery for poor oral intake and Dysphagia. On 8/17/23 she became septic and during sepsis work up found to have positive Urine analysis and started on ceftriaxone. Hence, the ID consult requested to assist with further evaluation and antibiotic management.      REVIEW OF SYSTEMS: Unable to obtain due  to mental status unless mentioned in HPI      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  Seizures  Chronic constipation  No significant past surgical history      SOCIAL HISTORY  Alcohol: Does not drink  Tobacco: Does not smoke  Illicit substance use: None      FAMILY HISTORY: Non contributory to the present illness      ALLERGIES: &quot; NATURAL RUBBER&quot; (Other)  latex (Other)  penicillins (Unknown)        Vital Signs Last 24 Hrs  T(C): 36.8 (18 Aug 2023 12:47), Max: 37.3 (17 Aug 2023 20:36)  T(F): 98.2 (18 Aug 2023 12:47), Max: 99.2 (17 Aug 2023 20:36)  HR: 98 (18 Aug 2023 12:47) (88 - 99)  BP: 130/62 (18 Aug 2023 12:47) (123/79 - 134/71)  BP(mean): --  RR: 18 (18 Aug 2023 12:47) (18 - 18)  SpO2: 94% (18 Aug 2023 12:47) (94% - 98%)    Parameters below as of 18 Aug 2023 12:47  Patient On (Oxygen Delivery Method): room air      PHYSICAL EXAM:  GENERAL: Not in acute distress   CHEST/LUNG:  Not using accessory muscles   HEART: s1 and s2 present  ABDOMEN:  grossly obese  EXTREMITIES: edematous  CNS: Awake, somewhat alert but Non verbal       LABS:                        8.4    19.66 )-----------( 291      ( 18 Aug 2023 07:20 )             26.6       08-18    136  |  109<H>  |  39<H>  ----------------------------<  129<H>  5.2   |  18<L>  |  2.27<H>    Ca    7.5<L>      18 Aug 2023 11:20  Phos  3.9     08-18  Mg     2.2     08-18    TPro  6.4  /  Alb  1.7<L>  /  TBili  0.4  /  DBili  x   /  AST  230<H>  /  ALT  20  /  AlkPhos  241<H>  08-18      CAPILLARY BLOOD GLUCOSE  POCT Blood Glucose.: 144 mg/dL (18 Aug 2023 11:49)  POCT Blood Glucose.: 122 mg/dL (18 Aug 2023 00:03)      Urinalysis Basic - ( 18 Aug 2023 11:20 )  Color: x / Appearance: x / SG: x / pH: x  Gluc: 129 mg/dL / Ketone: x  / Bili: x / Urobili: x   Blood: x / Protein: x / Nitrite: x   Leuk Esterase: x / RBC: x / WBC x   Sq Epi: x / Non Sq Epi: x / Bacteria: x        MEDICATIONS  (STANDING):  aspirin  chewable 324 milliGRAM(s) Oral daily  atorvastatin 20 milliGRAM(s) Oral at bedtime  carbidopa/levodopa  25/100 1 Tablet(s) Oral <User Schedule>  cefTRIAXone   IVPB      cefTRIAXone   IVPB 1000 milliGRAM(s) IV Intermittent every 24 hours  chlorhexidine 2% Cloths 1 Application(s) Topical daily  cloNIDine Patch 0.1 mG/24Hr(s) 1 patch Transdermal every 7 days  metoprolol tartrate 25 milliGRAM(s) Oral two times a day  pantoprazole   Suspension 40 milliGRAM(s) Oral daily  valproate sodium  IVPB 500 milliGRAM(s) IV Intermittent every 8 hours    MEDICATIONS  (PRN):  acetaminophen   Oral Liquid .. 650 milliGRAM(s) Enteral Tube every 6 hours PRN Temp greater or equal to 38C (100.4F)  morphine  - Injectable 2 milliGRAM(s) IV Push every 6 hours PRN Severe Pain (7 - 10)      RADIOLOGY & ADDITIONAL TESTS:    8/18/23 : US Abdomen Liver Followup (08.18.23 @ 14:47) IMPRESSION: Subtle diffuse heterogeneous echotexture of the liver   parenchyma, which may represent underlying hepatocellular disease.        MICROBIOLOGY DATA:    Urine Microscopic-Add On (NC) (08.17.23 @ 18:40)   Red Blood Cell - Urine: 150 /HPF  White Blood Cell - Urine: 30 /HPF  Bacteria: Few /HPF  Squamous Epithelial Cells: PresentUrine Microscopic-Add On (NC) (08.17.23 @ 11:35)   Squamous Epithelial Cells: None Seen  Bacteria: Negative /HPF  Comment - Urine: Moderate budding yeast and yeast-like cells  White Blood Cell - Urine: >50 /HPF  Red Blood Cell - Urine: >50 /HPFCOVID-19 PCR . (08.16.23 @ 17:40)   COVID-19 PCR: NotDetec:              Patient is a 77y old  Female who is from [facility] and with PMHx of HTN, HLD, CVA (w/ residual aphasia and R sided hemiparesis/plegia) who was sent to the hospital on 7/27/23, due to altered mental status. During the hospital course she has Gastrostomy tube placement by Surgery for poor oral intake and Dysphagia. On 8/17/23 she became septic and during sepsis work up found to have positive Urine analysis and started on ceftriaxone. Hence, the ID consult requested to assist with further evaluation and antibiotic management.      REVIEW OF SYSTEMS: Unable to obtain due  to mental status unless mentioned in HPI      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  Seizures  Chronic constipation  No significant past surgical history      SOCIAL HISTORY  Alcohol: Does not drink  Tobacco: Does not smoke  Illicit substance use: None      FAMILY HISTORY: Non contributory to the present illness      ALLERGIES: &quot; NATURAL RUBBER&quot; (Other)  latex (Other)  penicillins (Unknown)        Vital Signs Last 24 Hrs  T(C): 36.8 (18 Aug 2023 12:47), Max: 37.3 (17 Aug 2023 20:36)  T(F): 98.2 (18 Aug 2023 12:47), Max: 99.2 (17 Aug 2023 20:36)  HR: 98 (18 Aug 2023 12:47) (88 - 99)  BP: 130/62 (18 Aug 2023 12:47) (123/79 - 134/71)  BP(mean): --  RR: 18 (18 Aug 2023 12:47) (18 - 18)  SpO2: 94% (18 Aug 2023 12:47) (94% - 98%)    Parameters below as of 18 Aug 2023 12:47  Patient On (Oxygen Delivery Method): room air      PHYSICAL EXAM:  GENERAL: Not in acute distress   CHEST/LUNG:  Not using accessory muscles   HEART: s1 and s2 present  ABDOMEN:  grossly obese  EXTREMITIES: edematous  CNS: Awake, somewhat alert but Non verbal       LABS:                        8.4    19.66 )-----------( 291      ( 18 Aug 2023 07:20 )             26.6       08-18    136  |  109<H>  |  39<H>  ----------------------------<  129<H>  5.2   |  18<L>  |  2.27<H>    Ca    7.5<L>      18 Aug 2023 11:20  Phos  3.9     08-18  Mg     2.2     08-18    TPro  6.4  /  Alb  1.7<L>  /  TBili  0.4  /  DBili  x   /  AST  230<H>  /  ALT  20  /  AlkPhos  241<H>  08-18      CAPILLARY BLOOD GLUCOSE  POCT Blood Glucose.: 144 mg/dL (18 Aug 2023 11:49)  POCT Blood Glucose.: 122 mg/dL (18 Aug 2023 00:03)      Urinalysis Basic - ( 18 Aug 2023 11:20 )  Color: x / Appearance: x / SG: x / pH: x  Gluc: 129 mg/dL / Ketone: x  / Bili: x / Urobili: x   Blood: x / Protein: x / Nitrite: x   Leuk Esterase: x / RBC: x / WBC x   Sq Epi: x / Non Sq Epi: x / Bacteria: x        MEDICATIONS  (STANDING):  aspirin  chewable 324 milliGRAM(s) Oral daily  atorvastatin 20 milliGRAM(s) Oral at bedtime  carbidopa/levodopa  25/100 1 Tablet(s) Oral <User Schedule>  cefTRIAXone   IVPB      cefTRIAXone   IVPB 1000 milliGRAM(s) IV Intermittent every 24 hours  chlorhexidine 2% Cloths 1 Application(s) Topical daily  cloNIDine Patch 0.1 mG/24Hr(s) 1 patch Transdermal every 7 days  metoprolol tartrate 25 milliGRAM(s) Oral two times a day  pantoprazole   Suspension 40 milliGRAM(s) Oral daily  valproate sodium  IVPB 500 milliGRAM(s) IV Intermittent every 8 hours    MEDICATIONS  (PRN):  acetaminophen   Oral Liquid .. 650 milliGRAM(s) Enteral Tube every 6 hours PRN Temp greater or equal to 38C (100.4F)  morphine  - Injectable 2 milliGRAM(s) IV Push every 6 hours PRN Severe Pain (7 - 10)      RADIOLOGY & ADDITIONAL TESTS:    8/18/23 : US Abdomen Liver Followup (08.18.23 @ 14:47) IMPRESSION: Subtle diffuse heterogeneous echotexture of the liver   parenchyma, which may represent underlying hepatocellular disease.        MICROBIOLOGY DATA:    Urine Microscopic-Add On (NC) (08.17.23 @ 18:40)   Red Blood Cell - Urine: 150 /HPF  White Blood Cell - Urine: 30 /HPF  Bacteria: Few /HPF  Squamous Epithelial Cells: PresentUrine Microscopic-Add On (NC) (08.17.23 @ 11:35)   Squamous Epithelial Cells: None Seen  Bacteria: Negative /HPF  Comment - Urine: Moderate budding yeast and yeast-like cells  White Blood Cell - Urine: >50 /HPF  Red Blood Cell - Urine: >50 /HPFCOVID-19 PCR . (08.16.23 @ 17:40)   COVID-19 PCR: NotDetec:

## 2023-08-18 NOTE — PROGRESS NOTE ADULT - PROBLEM SELECTOR PLAN 5
8/17 Pt meets SIRS criteria  f/u UA 8/17 Pt meets SIRS criteria  - likely 2/2 indwelling mcclellan cath 8/17 Pt meets SIRS criteria  UA siginificant for elevated WBC's  Pt started on Cefepime as per Dr. Correa's recommendation

## 2023-08-18 NOTE — PROGRESS NOTE ADULT - SUBJECTIVE AND OBJECTIVE BOX
[   ] ICU                                          [   ] CCU                                      [ X  ] Medical Floor      Patient is a 77 year old female with dysphagia. GI consulted for Peg tube placement.     Patient is a 77 female from [facility] with past medical history significant for HTN, HLD, CVA (w/ residual aphasia and R sided hemiparesis/plegia) who was sent to the emergency room with for altered mental status. Patient is not able to provide any history. Patient is lying down in bed, awake and alert. However, patient does not speak, is not able to follow commands and does not turn face or move eyes when her name is called. Patient is admitted with CVA. Now patient with dysphagia, poor po intake, failure to thrive and weight loss. No abdominal pain, nausea, vomiting, hematemesis, hematochezia, melena, fever, chills, chest pain, SOB, cough, hematuria, dysuria  or diarrhea reported.      Patient is comfortable. No new complaints reported, No abdominal pain, N/V, hematemesis, hematochezia, melena, fever, chills, chest pain, SOB, cough or diarrhea reported.      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 1000 milliLiter(s) (70 mL/Hr) IV Continuous <Continuous>  enoxaparin Injectable 30 milliGRAM(s) SubCutaneous every 24 hours  hydrALAZINE Injectable 10 milliGRAM(s) IV Push every 6 hours  potassium chloride  10 mEq/100 mL IVPB 10 milliEquivalent(s) IV Intermittent every 1 hour  valproate sodium  IVPB 500 milliGRAM(s) IV Intermittent every 8 hours         SOCIAL HISTORY  Advanced Directives:       [ X ] Full Code       [  ] DNR  Marital Status:         [  ] M      [ X ] S      [  ] D       [  ] W  Children:       [ X ] Yes      [  ] No  Occupation:        [  ] Employed       [ X ] Unemployed       [  ] Retired  Diet:       [ X ] Regular       [  ] PEG feeding          [  ] NG tube feeding  Drug Use:           [ X ] Patient denied          [  ] Yes  Alcohol:           [ X ] No             [  ] Yes (socially)         [  ] Yes (chronic)  Tobacco:           [  ] Yes           [X  ] No      FAMILY HISTORY  [ X ] Heart Disease            [ X ] Diabetes             [ X ] HTN             [  ] Colon Cancer             [  ] Stomach Cancer              [  ] Pancreatic Cancer      VITALS  Vital Signs Last 24 Hrs  T(C): 36.8 (18 Aug 2023 12:47), Max: 37.3 (17 Aug 2023 20:36)  T(F): 98.2 (18 Aug 2023 12:47), Max: 99.2 (17 Aug 2023 20:36)  HR: 98 (18 Aug 2023 12:47) (88 - 99)  BP: 130/62 (18 Aug 2023 12:47) (123/79 - 134/71)   RR: 18 (18 Aug 2023 12:47) (18 - 18)  SpO2: 94% (18 Aug 2023 12:47) (94% - 98%)  Parameters below as of 18 Aug 2023 12:47  Patient On (Oxygen Delivery Method): room air       MEDICATIONS  (STANDING):  aspirin  chewable 324 milliGRAM(s) Oral daily  atorvastatin 20 milliGRAM(s) Oral at bedtime  carbidopa/levodopa  25/100 1 Tablet(s) Oral <User Schedule>  cefTRIAXone   IVPB      cefTRIAXone   IVPB 1000 milliGRAM(s) IV Intermittent every 24 hours  chlorhexidine 2% Cloths 1 Application(s) Topical daily  cloNIDine Patch 0.1 mG/24Hr(s) 1 patch Transdermal every 7 days  metoprolol tartrate 25 milliGRAM(s) Oral two times a day  pantoprazole   Suspension 40 milliGRAM(s) Oral daily  valproate sodium  IVPB 500 milliGRAM(s) IV Intermittent every 8 hours    MEDICATIONS  (PRN):  acetaminophen   Oral Liquid .. 650 milliGRAM(s) Enteral Tube every 6 hours PRN Temp greater or equal to 38C (100.4F)  morphine  - Injectable 2 milliGRAM(s) IV Push every 6 hours PRN Severe Pain (7 - 10)                            8.4    19.66 )-----------( 291      ( 18 Aug 2023 07:20 )             26.6       08-18    136  |  109<H>  |  39<H>  ----------------------------<  129<H>  5.2   |  18<L>  |  2.27<H>    Ca    7.5<L>      18 Aug 2023 11:20  Phos  3.9     08-18  Mg     2.2     08-18    TPro  6.4  /  Alb  1.7<L>  /  TBili  0.4  /  DBili  x   /  AST  230<H>  /  ALT  20  /  AlkPhos  241<H>  08-18       [   ] ICU                                          [   ] CCU                                      [ X  ] Medical Floor      Patient is a 77 year old female with dysphagia. GI consulted for Peg tube placement.     Patient is a 77 female from [facility] with past medical history significant for HTN, HLD, CVA (w/ residual aphasia and R sided hemiparesis/plegia) who was sent to the emergency room with for altered mental status. Patient is not able to provide any history. Patient is lying down in bed, awake and alert. However, patient does not speak, is not able to follow commands and does not turn face or move eyes when her name is called. Patient is admitted with CVA. Now patient with dysphagia, poor po intake, failure to thrive and weight loss. No abdominal pain, nausea, vomiting, hematemesis, hematochezia, melena, fever, chills, chest pain, SOB, cough, hematuria, dysuria  or diarrhea reported.      Patient is comfortable. No new complaints reported, No abdominal pain, N/V, hematemesis, hematochezia, melena, fever, chills, chest pain, SOB, cough or diarrhea reported.      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 1000 milliLiter(s) (70 mL/Hr) IV Continuous <Continuous>  enoxaparin Injectable 30 milliGRAM(s) SubCutaneous every 24 hours  hydrALAZINE Injectable 10 milliGRAM(s) IV Push every 6 hours  potassium chloride  10 mEq/100 mL IVPB 10 milliEquivalent(s) IV Intermittent every 1 hour  valproate sodium  IVPB 500 milliGRAM(s) IV Intermittent every 8 hours         SOCIAL HISTORY  Advanced Directives:       [ X ] Full Code       [  ] DNR  Marital Status:         [  ] M      [ X ] S      [  ] D       [  ] W  Children:       [ X ] Yes      [  ] No  Occupation:        [  ] Employed       [ X ] Unemployed       [  ] Retired  Diet:       [ X ] Regular       [  ] PEG feeding          [  ] NG tube feeding  Drug Use:           [ X ] Patient denied          [  ] Yes  Alcohol:           [ X ] No             [  ] Yes (socially)         [  ] Yes (chronic)  Tobacco:           [  ] Yes           [X  ] No      FAMILY HISTORY  [ X ] Heart Disease            [ X ] Diabetes             [ X ] HTN             [  ] Colon Cancer             [  ] Stomach Cancer              [  ] Pancreatic Cancer      VITALS  Vital Signs Last 24 Hrs  T(C): 36.8 (18 Aug 2023 12:47), Max: 37.3 (17 Aug 2023 20:36)  T(F): 98.2 (18 Aug 2023 12:47), Max: 99.2 (17 Aug 2023 20:36)  HR: 98 (18 Aug 2023 12:47) (88 - 99)  BP: 130/62 (18 Aug 2023 12:47) (123/79 - 134/71)   RR: 18 (18 Aug 2023 12:47) (18 - 18)  SpO2: 94% (18 Aug 2023 12:47) (94% - 98%)  Parameters below as of 18 Aug 2023 12:47  Patient On (Oxygen Delivery Method): room air       MEDICATIONS  (STANDING):  aspirin  chewable 324 milliGRAM(s) Oral daily  atorvastatin 20 milliGRAM(s) Oral at bedtime  carbidopa/levodopa  25/100 1 Tablet(s) Oral <User Schedule>  cefTRIAXone   IVPB      cefTRIAXone   IVPB 1000 milliGRAM(s) IV Intermittent every 24 hours  chlorhexidine 2% Cloths 1 Application(s) Topical daily  cloNIDine Patch 0.1 mG/24Hr(s) 1 patch Transdermal every 7 days  metoprolol tartrate 25 milliGRAM(s) Oral two times a day  pantoprazole   Suspension 40 milliGRAM(s) Oral daily  valproate sodium  IVPB 500 milliGRAM(s) IV Intermittent every 8 hours    MEDICATIONS  (PRN):  acetaminophen   Oral Liquid .. 650 milliGRAM(s) Enteral Tube every 6 hours PRN Temp greater or equal to 38C (100.4F)  morphine  - Injectable 2 milliGRAM(s) IV Push every 6 hours PRN Severe Pain (7 - 10)                            8.4    19.66 )-----------( 291      ( 18 Aug 2023 07:20 )             26.6       08-18    136  |  109<H>  |  39<H>  ----------------------------<  129<H>  5.2   |  18<L>  |  2.27<H>    Ca    7.5<L>      18 Aug 2023 11:20  Phos  3.9     08-18  Mg     2.2     08-18    TPro  6.4  /  Alb  1.7<L>  /  TBili  0.4  /  DBili  x   /  AST  230<H>  /  ALT  20  /  AlkPhos  241<H>  08-18       [   ] ICU                                          [   ] CCU                                      [ X  ] Medical Floor      Patient is a 77 year old female with dysphagia. GI consulted for Peg tube placement.     Patient is a 77 female from [facility] with past medical history significant for HTN, HLD, CVA (w/ residual aphasia and R sided hemiparesis/plegia) who was sent to the emergency room with for altered mental status. Patient is not able to provide any history. Patient is lying down in bed, awake and alert. However, patient does not speak, is not able to follow commands and does not turn face or move eyes when her name is called. Patient is admitted with CVA. Now patient with dysphagia, poor po intake, failure to thrive and weight loss. No abdominal pain, nausea, vomiting, hematemesis, hematochezia, melena, fever, chills, chest pain, SOB, cough, hematuria, dysuria  or diarrhea reported.      Patient is comfortable. No new complaints reported, No abdominal pain, N/V, hematemesis, hematochezia, melena, fever, chills, chest pain, SOB, cough or diarrhea reported.      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 1000 milliLiter(s) (70 mL/Hr) IV Continuous <Continuous>  enoxaparin Injectable 30 milliGRAM(s) SubCutaneous every 24 hours  hydrALAZINE Injectable 10 milliGRAM(s) IV Push every 6 hours  potassium chloride  10 mEq/100 mL IVPB 10 milliEquivalent(s) IV Intermittent every 1 hour  valproate sodium  IVPB 500 milliGRAM(s) IV Intermittent every 8 hours         SOCIAL HISTORY  Advanced Directives:       [ X ] Full Code       [  ] DNR  Marital Status:         [  ] M      [ X ] S      [  ] D       [  ] W  Children:       [ X ] Yes      [  ] No  Occupation:        [  ] Employed       [ X ] Unemployed       [  ] Retired  Diet:       [ X ] Regular       [  ] PEG feeding          [  ] NG tube feeding  Drug Use:           [ X ] Patient denied          [  ] Yes  Alcohol:           [ X ] No             [  ] Yes (socially)         [  ] Yes (chronic)  Tobacco:           [  ] Yes           [X  ] No      FAMILY HISTORY  [ X ] Heart Disease            [ X ] Diabetes             [ X ] HTN             [  ] Colon Cancer             [  ] Stomach Cancer              [  ] Pancreatic Cancer      VITALS  Vital Signs Last 24 Hrs  T(C): 36.8 (18 Aug 2023 12:47), Max: 37.3 (17 Aug 2023 20:36)  T(F): 98.2 (18 Aug 2023 12:47), Max: 99.2 (17 Aug 2023 20:36)  HR: 98 (18 Aug 2023 12:47) (88 - 99)  BP: 130/62 (18 Aug 2023 12:47) (123/79 - 134/71)   RR: 18 (18 Aug 2023 12:47) (18 - 18)  SpO2: 94% (18 Aug 2023 12:47) (94% - 98%)  Parameters below as of 18 Aug 2023 12:47  Patient On (Oxygen Delivery Method): room air       MEDICATIONS  (STANDING):  aspirin  chewable 324 milliGRAM(s) Oral daily  atorvastatin 20 milliGRAM(s) Oral at bedtime  carbidopa/levodopa  25/100 1 Tablet(s) Oral <User Schedule>  cefTRIAXone   IVPB      cefTRIAXone   IVPB 1000 milliGRAM(s) IV Intermittent every 24 hours  chlorhexidine 2% Cloths 1 Application(s) Topical daily  cloNIDine Patch 0.1 mG/24Hr(s) 1 patch Transdermal every 7 days  metoprolol tartrate 25 milliGRAM(s) Oral two times a day  pantoprazole   Suspension 40 milliGRAM(s) Oral daily  valproate sodium  IVPB 500 milliGRAM(s) IV Intermittent every 8 hours    MEDICATIONS  (PRN):  acetaminophen   Oral Liquid .. 650 milliGRAM(s) Enteral Tube every 6 hours PRN Temp greater or equal to 38C (100.4F)  morphine  - Injectable 2 milliGRAM(s) IV Push every 6 hours PRN Severe Pain (7 - 10)                            8.4    19.66 )-----------( 291      ( 18 Aug 2023 07:20 )             26.6       08-18    136  |  109<H>  |  39<H>  ----------------------------<  129<H>  5.2   |  18<L>  |  2.27<H>    Ca    7.5<L>      18 Aug 2023 11:20  Phos  3.9     08-18  Mg     2.2     08-18    TPro  6.4  /  Alb  1.7<L>  /  TBili  0.4  /  DBili  x   /  AST  230<H>  /  ALT  20  /  AlkPhos  241<H>  08-18

## 2023-08-18 NOTE — PROGRESS NOTE ADULT - ASSESSMENT
1. SUSAN possible to MARISA and ATN  Renal sono shows no hdyro with b/l kidneys around 9.2cm  -Scr is unchanged at 2.2mg/dl possible due to ATN again with stable electrolytes.     -s/p mcclellan's cath placed for urinary retention during this admission.   -urinalysis shows blood with proteinuria; FeNa >1%, spot protein to creatinine ratio is 1.5gm  -Adjust meds to eGFR and avoid IV Gadolinium contrast,NSAIDs, and phosphate enema.  -Monitor I/O's daily.   -Monitor SMA daily.  2. Hypernatremia due to water deficit and insensible losses. Pt is clinically euvolemic.   -Na is stable and off D5W.   -Monitor I/O's. Check Serum Na Daily. Avoid high solute intake diet and sodium bicarbonate infuse. Avoid overcorrection of NA (8-10meq/day)  3. CKD stage 4 most likely due to hypertensive nephrosclerosis  -new baseline scr around 1.8mg/dL with uPCR 1.5gm.  -previous Scr around 1.2 to 1.6mg/dL in Oct 2022.  -Keep patient euvolemic and renal diet  -Avoid Nephrotoxic Meds/ Agents such as (NSAIDs, IV contrast, Aminoglycosides such as gentamicin, -Gadolinium contrast, Phosphate containing enemas, etc..)  -Adjust Medications according to eGFR  4. HTN:   -bp is acceptable. continue bp meds  -titrate bp meds to keep sbp >110 and < 130  5. Mineral Bone Disease:  -phos is improving.  -PTH intact 289, will start calcitriol once Phos has improved.   6. Encephalopathy:  -Leukocytosis worsening.  -s/p Rocephin.  -Plan as per Neuro and primary team  -unable to place PEG via EGD;   -s/p PEG placed in OR on 08/14/23.  7. Hypokalemia:  -stable K.   -give kcl repletion to keep K >3.5meq/L  -monitor K.   8. Acidosis:  -CO2 is unchanged.    -monitor CO2.  9. Anemia:  -hb is stable.  -low iron sat and ferritin are okay. s/p iv iron x 3 days.  -monitor CBC.  -transfuse if hb <7.0  10. Elevated LFTs  -recommend to hold lipitor for now  -monitor LFTs  11. Hyperkalemia due to susan on ckd.   -K better s/p lokelmia.  -start low K diet. give Lokelma prn if K >5.5  -Keep pt euvolemic. Avoid ACE inh/ ARBs, NSAIDs, and Aldactone or potassium sparing diuretics. Monitor K+ daily.

## 2023-08-18 NOTE — PROGRESS NOTE ADULT - PROBLEM SELECTOR PLAN 2
c/w BP meds as schedule   f/u PRE-OP labs: CBC, CMP, Mg/Phos, Coags, T&S  JCAK: 0.8 % risk of SANTANA, intra-operatively or up to 30-d post-op  RCRI: 1.0 points, Class II Risk with 6.0 % 30-d risk of death, MI, or cardiac arrest  Patient is at low risk for urgent risk procedure  G tube placement by Surgery on 8/14 c/w BP meds as schedule   f/u PRE-OP labs: CBC, CMP, Mg/Phos, Coags, T&S  JACK: 0.8 % risk of SANTANA, intra-operatively or up to 30-d post-op  RCRI: 1.0 points, Class II Risk with 6.0 % 30-d risk of death, MI, or cardiac arrest  Patient is at low risk for urgent risk procedure  G tube placement by Surgery on 8/14 Pt. has bleeding form right   Healing

## 2023-08-18 NOTE — CONSULT NOTE ADULT - ASSESSMENT
Patient is a 77y old  Female who is from [facility] and with PMHx of HTN, HLD, CVA (w/ residual aphasia and R sided hemiparesis/plegia) who was sent to the hospital on 7/27/23, due to altered mental status. During the hospital course she has Gastrostomy tube placement by Surgery for poor oral intake and Dysphagia. On 8/17/23 she became septic and during sepsis work up found to have positive Urine analysis and started on ceftriaxone. Hence, the ID consult requested to assist with further evaluation and antibiotic management.    # Sepsis  as of 8/17/23 ( Fever + tachycardia + leukocytosis)  # Complicated UTI- s/p exchange Denise catheter on 8/17/23  # s/p CVA with aphasia and dysphagia- s/p  Gastrostomy tube    would recommend:    1. Follow up Urine and Blood cultures, are in process  2. Monitor WBC count  3. Please change Ceftriaxone to cefepime to cover resistant organisms  4. Aspiration precaution    d/w house staff. Dr. Oconnell    will follow the patient with you and make further recommendation based on the clinical course and Lab results  Thank you for the opportunity to participate in Ms. LEIVA's care    Attending Attestation:    Spent more than 65 minutes on total encounter, more than 50 % of the visit was spent counseling and/or coordinating care by the Attending physician.       Patient is a 77y old  Female who is from [facility] and with PMHx of HTN, HLD, CVA (w/ residual aphasia and R sided hemiparesis/plegia) who was sent to the hospital on 7/27/23, due to altered mental status. During the hospital course she has Gastrostomy tube placement by Surgery for poor oral intake and Dysphagia. On 8/17/23 she became septic and during sepsis work up found to have positive Urine analysis and started on ceftriaxone. Hence, the ID consult requested to assist with further evaluation and antibiotic management.    # Sepsis  as of 8/17/23 ( Fever + tachycardia + leukocytosis)  # Complicated UTI- s/p exchange Denise catheter on 8/17/23  # s/p CVA with aphasia and dysphagia- s/p  Gastrostomy tube    would recommend:    1. Follow up Urine and Blood cultures, are in process  2. Monitor WBC count  3. Please change Ceftriaxone to cefepime to cover resistant organisms  4. Aspiration precaution    d/w house staff. Dr. Oconnell    will follow the patient with you and make further recommendation based on the clinical course and Lab results  Thank you for the opportunity to participate in Ms. LEIVA's care    Attending Attestation:    Spent more than 65 minutes on total encounter, more than 50 % of the visit was spent counseling and/or coordinating care by the Attending physician.       Patient is a 77y old  Female who is from [facility] and with PMHx of HTN, HLD, CVA (w/ residual aphasia and R sided hemiparesis/plegia) who was sent to the hospital on 7/27/23, due to altered mental status. During the hospital course she has Gastrostomy tube placement by Surgery for poor oral intake and Dysphagia. On 8/17/23 she became septic and during sepsis work up found to have positive Urine analysis and started on ceftriaxone. Hence, the ID consult requested to assist with further evaluation and antibiotic management.    # Sepsis  as of 8/17/23 ( Fever + tachycardia + leukocytosis)  # Complicated UTI- s/p exchange Denise catheter on 8/17/23  # s/p CVA with aphasia and dysphagia- s/p  Gastrostomy tube    would recommend:    1. Follow up Urine and Blood cultures, are in process  2. Monitor WBC count  3. Please change Ceftriaxone to cefepime to cover resistant organisms  4. Aspiration precaution    d/w house staff. Dr. Oconnell    will follow the patient with you and make further recommendation based on the clinical course and Lab results  Thank you for the opportunity to participate in Ms. LEIVA's care    Attending Attestation:    Spent more than 65 minutes on total encounter, more than 50 % of the visit was spent counseling and/or coordinating care by the Attending physician.

## 2023-08-18 NOTE — PROGRESS NOTE ADULT - PROBLEM SELECTOR PLAN 1
Pt. is NPO  Open G tube placed on 8/14  Feeds through G tube resumed  IV tylenol for pain, morphine for breakthrough pain  IV iron sucrose started 8/15 for 3 days as per nephro  f/u LE doppler b/l Pt. is NPO  Open G tube placed on 8/14  Feeds through G tube resumed  IV tylenol for pain, morphine for breakthrough pain  IV iron sucrose started 8/15 for 3 days as per nephro  G tube placement by Surgery on 8/14  f/u LE doppler b/l

## 2023-08-18 NOTE — PROGRESS NOTE ADULT - ASSESSMENT
Patient is a 77 female from [facility] PMHx HTN, HLD, CVA (w/ residual aphasia and R sided hemiparesis/plegia) who was sent to the hospital due to altered mental status. Patient is being admitted to medicine for further work up and rule out stroke vs encephalopathy (metabolic vs infectious).  Patient is a 77 female from [facility] PMHx HTN, HLD, CVA (w/ residual aphasia and R sided hemiparesis/plegia) who was sent to the hospital due to altered mental status. Patient is being admitted to medicine for further work up and rule out stroke vs encephalopathy (metabolic vs infectious).  Patient is a 77 female from [facility] PMHx HTN, HLD, CVA (w/ residual aphasia and R sided hemiparesis/plegia) who was sent to the hospital due to altered mental status. Patient is being admitted to medicine for further work up and rule out stroke vs encephalopathy (metabolic vs infectious).

## 2023-08-18 NOTE — PROGRESS NOTE ADULT - SUBJECTIVE AND OBJECTIVE BOX
PGY-1 Progress Note discussed with attending    PAGER #: [[phone]] TILL 5:00 PM  PLEASE CONTACT ON CALL TEAM:  - On Call Team (Please refer to Tyler) FROM 5:00 PM - 8:30PM  - Nightfloat Team FROM 8:30 -7:30 AM    CHIEF COMPLAINT & BRIEF HOSPITAL COURSE:    INTERVAL HPI/OVERNIGHT EVENTS:   MEDICATIONS  (STANDING):  aspirin  chewable 324 milliGRAM(s) Oral daily  atorvastatin 20 milliGRAM(s) Oral at bedtime  carbidopa/levodopa  25/100 1 Tablet(s) Oral <User Schedule>  cefTRIAXone   IVPB      cefTRIAXone   IVPB 1000 milliGRAM(s) IV Intermittent every 24 hours  chlorhexidine 2% Cloths 1 Application(s) Topical daily  cloNIDine Patch 0.3 mG/24Hr(s) 1 patch Transdermal <User Schedule>  metoprolol tartrate 25 milliGRAM(s) Oral two times a day  pantoprazole   Suspension 40 milliGRAM(s) Oral daily  valproate sodium  IVPB 500 milliGRAM(s) IV Intermittent every 8 hours    MEDICATIONS  (PRN):  acetaminophen   Oral Liquid .. 650 milliGRAM(s) Enteral Tube every 6 hours PRN Temp greater or equal to 38C (100.4F)  morphine  - Injectable 2 milliGRAM(s) IV Push every 6 hours PRN Severe Pain (7 - 10)      REVIEW OF SYSTEMS:  CONSTITUTIONAL: No fever, weight loss, or fatigue  RESPIRATORY: No shortness of breath  CARDIOVASCULAR: No chest pain  GASTROINTESTINAL: No abdominal pain.  GENITOURINARY: No dysuria  NEUROLOGICAL: No headaches  SKIN: No itching, burning, rashes    Vital Signs Last 24 Hrs  T(C): 37.1 (18 Aug 2023 04:54), Max: 38.1 (17 Aug 2023 14:21)  T(F): 98.8 (18 Aug 2023 04:54), Max: 100.6 (17 Aug 2023 14:21)  HR: 99 (18 Aug 2023 04:54) (88 - 112)  BP: 134/71 (18 Aug 2023 04:54) (123/79 - 162/81)  BP(mean): --  RR: 18 (18 Aug 2023 04:54) (18 - 20)  SpO2: 96% (18 Aug 2023 04:54) (96% - 98%)    Parameters below as of 18 Aug 2023 04:54  Patient On (Oxygen Delivery Method): room air        PHYSICAL EXAMINATION:  GENERAL: NAD, well built  HEAD:  Atraumatic, Normocephalic  EYES:  conjunctiva and sclera clear  CHEST/LUNG: Clear to auscultation. No rales, rhonchi, wheezing, or rubs  HEART: Regular rate and rhythm; No murmurs, rubs, or gallops  ABDOMEN: Soft, Nontender, Nondistended; Bowel sounds present  NERVOUS SYSTEM:  Alert & Oriented X3,    EXTREMITIES:  2+ Peripheral Pulses, No clubbing, cyanosis, or edema  SKIN: warm dry                          8.4    19.66 )-----------( 291      ( 18 Aug 2023 07:20 )             26.6     08-18    135  |  107  |  39<H>  ----------------------------<  117<H>  5.5<H>   |  17<L>  |  2.25<H>    Ca    8.0<L>      18 Aug 2023 07:20  Phos  3.9     08-18  Mg     2.2     08-18    TPro  6.4  /  Alb  1.7<L>  /  TBili  0.4  /  DBili  x   /  AST  230<H>  /  ALT  20  /  AlkPhos  241<H>  08-18    LIVER FUNCTIONS - ( 18 Aug 2023 07:20 )  Alb: 1.7 g/dL / Pro: 6.4 g/dL / ALK PHOS: 241 U/L / ALT: 20 U/L DA / AST: 230 U/L / GGT: x                   CAPILLARY BLOOD GLUCOSE      RADIOLOGY & ADDITIONAL TESTS:                   PGY-1 Progress Note discussed with attending    PAGER #: [[phone]] TILL 5:00 PM  PLEASE CONTACT ON CALL TEAM:  - On Call Team (Please refer to Tyler) FROM 5:00 PM - 8:30PM  - Nightfloat Team FROM 8:30 -7:30 AM    CHIEF COMPLAINT & BRIEF HOSPITAL COURSE:    INTERVAL HPI/OVERNIGHT EVENTS:   MEDICATIONS  (STANDING):  aspirin  chewable 324 milliGRAM(s) Oral daily  atorvastatin 20 milliGRAM(s) Oral at bedtime  carbidopa/levodopa  25/100 1 Tablet(s) Oral <User Schedule>  cefTRIAXone   IVPB      cefTRIAXone   IVPB 1000 milliGRAM(s) IV Intermittent every 24 hours  chlorhexidine 2% Cloths 1 Application(s) Topical daily  cloNIDine Patch 0.3 mG/24Hr(s) 1 patch Transdermal <User Schedule>  metoprolol tartrate 25 milliGRAM(s) Oral two times a day  pantoprazole   Suspension 40 milliGRAM(s) Oral daily  valproate sodium  IVPB 500 milliGRAM(s) IV Intermittent every 8 hours    MEDICATIONS  (PRN):  acetaminophen   Oral Liquid .. 650 milliGRAM(s) Enteral Tube every 6 hours PRN Temp greater or equal to 38C (100.4F)  morphine  - Injectable 2 milliGRAM(s) IV Push every 6 hours PRN Severe Pain (7 - 10)      REVIEW OF SYSTEMS:  CONSTITUTIONAL: No fever, weight loss, or fatigue  RESPIRATORY: No shortness of breath  CARDIOVASCULAR: No chest pain  GASTROINTESTINAL: No abdominal pain.  GENITOURINARY: No dysuria  NEUROLOGICAL: No headaches  SKIN: No itching, burning, rashes    Vital Signs Last 24 Hrs  T(C): 37.1 (18 Aug 2023 04:54), Max: 38.1 (17 Aug 2023 14:21)  T(F): 98.8 (18 Aug 2023 04:54), Max: 100.6 (17 Aug 2023 14:21)  HR: 99 (18 Aug 2023 04:54) (88 - 112)  BP: 134/71 (18 Aug 2023 04:54) (123/79 - 162/81)  BP(mean): --  RR: 18 (18 Aug 2023 04:54) (18 - 20)  SpO2: 96% (18 Aug 2023 04:54) (96% - 98%)    Parameters below as of 18 Aug 2023 04:54  Patient On (Oxygen Delivery Method): room air        PHYSICAL EXAMINATION:  GENERAL: NAD, well built  HEAD:  Atraumatic, Normocephalic  EYES:  conjunctiva and sclera clear  CHEST/LUNG: Clear to auscultation. No rales, rhonchi, wheezing, or rubs  HEART: Regular rate and rhythm; No murmurs, rubs, or gallops  ABDOMEN: Soft, Nontender, Nondistended; Bowel sounds present  NERVOUS SYSTEM:  Alert & Oriented X3,    EXTREMITIES:  2+ Peripheral Pulses, No clubbing, cyanosis, or edema  SKIN: warm dry                          8.4    19.66 )-----------( 291      ( 18 Aug 2023 07:20 )             26.6     08-18    135  |  107  |  39<H>  ----------------------------<  117<H>  5.5<H>   |  17<L>  |  2.25<H>    Ca    8.0<L>      18 Aug 2023 07:20  Phos  3.9     08-18  Mg     2.2     08-18    TPro  6.4  /  Alb  1.7<L>  /  TBili  0.4  /  DBili  x   /  AST  230<H>  /  ALT  20  /  AlkPhos  241<H>  08-18    LIVER FUNCTIONS - ( 18 Aug 2023 07:20 )  Alb: 1.7 g/dL / Pro: 6.4 g/dL / ALK PHOS: 241 U/L / ALT: 20 U/L DA / AST: 230 U/L / GGT: x                   CAPILLARY BLOOD GLUCOSE      RADIOLOGY & ADDITIONAL TESTS:                   PGY-1 Progress Note discussed with attending    PAGER #: [[phone]] TILL 5:00 PM  PLEASE CONTACT ON CALL TEAM:  - On Call Team (Please refer to Tyler) FROM 5:00 PM - 8:30PM  - Nightfloat Team FROM 8:30 -7:30 AM    CHIEF COMPLAINT & BRIEF HOSPITAL COURSE:    INTERVAL HPI/OVERNIGHT EVENTS:   MEDICATIONS  (STANDING):  aspirin  chewable 324 milliGRAM(s) Oral daily  atorvastatin 20 milliGRAM(s) Oral at bedtime  carbidopa/levodopa  25/100 1 Tablet(s) Oral <User Schedule>  cefTRIAXone   IVPB      cefTRIAXone   IVPB 1000 milliGRAM(s) IV Intermittent every 24 hours  chlorhexidine 2% Cloths 1 Application(s) Topical daily  cloNIDine Patch 0.3 mG/24Hr(s) 1 patch Transdermal <User Schedule>  metoprolol tartrate 25 milliGRAM(s) Oral two times a day  pantoprazole   Suspension 40 milliGRAM(s) Oral daily  valproate sodium  IVPB 500 milliGRAM(s) IV Intermittent every 8 hours    MEDICATIONS  (PRN):  acetaminophen   Oral Liquid .. 650 milliGRAM(s) Enteral Tube every 6 hours PRN Temp greater or equal to 38C (100.4F)  morphine  - Injectable 2 milliGRAM(s) IV Push every 6 hours PRN Severe Pain (7 - 10)      REVIEW OF SYSTEMS:  CONSTITUTIONAL: No fever, weight loss, or fatigue  RESPIRATORY: No shortness of breath  CARDIOVASCULAR: No chest pain  GASTROINTESTINAL: No abdominal pain.  GENITOURINARY: No dysuria  NEUROLOGICAL: No headaches  SKIN: No itching, burning, rashes    Vital Signs Last 24 Hrs  T(C): 37.1 (18 Aug 2023 04:54), Max: 38.1 (17 Aug 2023 14:21)  T(F): 98.8 (18 Aug 2023 04:54), Max: 100.6 (17 Aug 2023 14:21)  HR: 99 (18 Aug 2023 04:54) (88 - 112)  BP: 134/71 (18 Aug 2023 04:54) (123/79 - 162/81)  BP(mean): --  RR: 18 (18 Aug 2023 04:54) (18 - 20)  SpO2: 96% (18 Aug 2023 04:54) (96% - 98%)    Parameters below as of 18 Aug 2023 04:54  Patient On (Oxygen Delivery Method): room air        PHYSICAL EXAMINATION:  GENERAL: NAD, well built  HEAD:  Atraumatic, Normocephalic  EYES:  conjunctiva and sclera clear  CHEST/LUNG: Clear to auscultation. No rales, rhonchi, wheezing, or rubs  HEART: Regular rate and rhythm; No murmurs, rubs, or gallops  ABDOMEN: Soft, Nontender, Nondistended; Bowel sounds present  NERVOUS SYSTEM:  Alert & Oriented X3,    EXTREMITIES:  2+ Peripheral Pulses, No clubbing, cyanosis, or edema  SKIN: warm dry                          8.4    19.66 )-----------( 291      ( 18 Aug 2023 07:20 )             26.6     08-18    135  |  107  |  39<H>  ----------------------------<  117<H>  5.5<H>   |  17<L>  |  2.25<H>    Ca    8.0<L>      18 Aug 2023 07:20  Phos  3.9     08-18  Mg     2.2     08-18    TPro  6.4  /  Alb  1.7<L>  /  TBili  0.4  /  DBili  x   /  AST  230<H>  /  ALT  20  /  AlkPhos  241<H>  08-18    LIVER FUNCTIONS - ( 18 Aug 2023 07:20 )  Alb: 1.7 g/dL / Pro: 6.4 g/dL / ALK PHOS: 241 U/L / ALT: 20 U/L DA / AST: 230 U/L / GGT: x                   CAPILLARY BLOOD GLUCOSE      RADIOLOGY & ADDITIONAL TESTS:                   PGY-1 Progress Note discussed with attending    PAGER #: [[phone]] TILL 5:00 PM  PLEASE CONTACT ON CALL TEAM:  - On Call Team (Please refer to Tyler) FROM 5:00 PM - 8:30PM  - Nightfloat Team FROM 8:30 -7:30 AM    CHIEF COMPLAINT & BRIEF HOSPITAL COURSE:  Failure to thrive    INTERVAL HPI/OVERNIGHT EVENTS:   Pt mcclellan changed and had UA collected. Started on cefepime. Pt no longer febrile or tachycardic    MEDICATIONS  (STANDING):  aspirin  chewable 324 milliGRAM(s) Oral daily  atorvastatin 20 milliGRAM(s) Oral at bedtime  carbidopa/levodopa  25/100 1 Tablet(s) Oral <User Schedule>  cefTRIAXone   IVPB      cefTRIAXone   IVPB 1000 milliGRAM(s) IV Intermittent every 24 hours  chlorhexidine 2% Cloths 1 Application(s) Topical daily  cloNIDine Patch 0.3 mG/24Hr(s) 1 patch Transdermal <User Schedule>  metoprolol tartrate 25 milliGRAM(s) Oral two times a day  pantoprazole   Suspension 40 milliGRAM(s) Oral daily  valproate sodium  IVPB 500 milliGRAM(s) IV Intermittent every 8 hours    MEDICATIONS  (PRN):  acetaminophen   Oral Liquid .. 650 milliGRAM(s) Enteral Tube every 6 hours PRN Temp greater or equal to 38C (100.4F)  morphine  - Injectable 2 milliGRAM(s) IV Push every 6 hours PRN Severe Pain (7 - 10)      REVIEW OF SYSTEMS: Unable to obtain since pt non-verbal    Vital Signs Last 24 Hrs  T(C): 37.1 (18 Aug 2023 04:54), Max: 38.1 (17 Aug 2023 14:21)  T(F): 98.8 (18 Aug 2023 04:54), Max: 100.6 (17 Aug 2023 14:21)  HR: 99 (18 Aug 2023 04:54) (88 - 112)  BP: 134/71 (18 Aug 2023 04:54) (123/79 - 162/81)  BP(mean): --  RR: 18 (18 Aug 2023 04:54) (18 - 20)  SpO2: 96% (18 Aug 2023 04:54) (96% - 98%)    Parameters below as of 18 Aug 2023 04:54  Patient On (Oxygen Delivery Method): room air        PHYSICAL EXAMINATION:  GENERAL: NAD, well built, non-verbal, mcclellan draining barbara urine  HEAD:  Atraumatic, Normocephalic  EYES:  conjunctiva and sclera clear  CHEST/LUNG: Clear to auscultation. No rales, rhonchi, wheezing, or rubs  HEART: Regular rate and rhythm; No murmurs, rubs, or gallops  ABDOMEN: Soft, Nontender, Nondistended; Bowel sounds present  NERVOUS SYSTEM:  Alert & Oriented X0,    EXTREMITIES:  +Edema in all 4 extremities. 2+ Peripheral Pulses, No clubbing, cyanosis,  SKIN: warm dry                          8.4    19.66 )-----------( 291      ( 18 Aug 2023 07:20 )             26.6     08-18    135  |  107  |  39<H>  ----------------------------<  117<H>  5.5<H>   |  17<L>  |  2.25<H>    Ca    8.0<L>      18 Aug 2023 07:20  Phos  3.9     08-18  Mg     2.2     08-18    TPro  6.4  /  Alb  1.7<L>  /  TBili  0.4  /  DBili  x   /  AST  230<H>  /  ALT  20  /  AlkPhos  241<H>  08-18    LIVER FUNCTIONS - ( 18 Aug 2023 07:20 )  Alb: 1.7 g/dL / Pro: 6.4 g/dL / ALK PHOS: 241 U/L / ALT: 20 U/L DA / AST: 230 U/L / GGT: x                   CAPILLARY BLOOD GLUCOSE      RADIOLOGY & ADDITIONAL TESTS:                   PGY-1 Progress Note discussed with attending    PAGER #: [[phone]] TILL 5:00 PM  PLEASE CONTACT ON CALL TEAM:  - On Call Team (Please refer to Tyler) FROM 5:00 PM - 8:30PM  - Nightfloat Team FROM 8:30 -7:30 AM    CHIEF COMPLAINT & BRIEF HOSPITAL COURSE:  Failure to thrive    INTERVAL HPI/OVERNIGHT EVENTS:   Pt mcclellan changed and had UA collected. Started on cefepime. Pt no longer febrile or tachycardic    MEDICATIONS  (STANDING):  aspirin  chewable 324 milliGRAM(s) Oral daily  atorvastatin 20 milliGRAM(s) Oral at bedtime  carbidopa/levodopa  25/100 1 Tablet(s) Oral <User Schedule>  cefTRIAXone   IVPB      cefTRIAXone   IVPB 1000 milliGRAM(s) IV Intermittent every 24 hours  chlorhexidine 2% Cloths 1 Application(s) Topical daily  cloNIDine Patch 0.3 mG/24Hr(s) 1 patch Transdermal <User Schedule>  metoprolol tartrate 25 milliGRAM(s) Oral two times a day  pantoprazole   Suspension 40 milliGRAM(s) Oral daily  valproate sodium  IVPB 500 milliGRAM(s) IV Intermittent every 8 hours    MEDICATIONS  (PRN):  acetaminophen   Oral Liquid .. 650 milliGRAM(s) Enteral Tube every 6 hours PRN Temp greater or equal to 38C (100.4F)  morphine  - Injectable 2 milliGRAM(s) IV Push every 6 hours PRN Severe Pain (7 - 10)      REVIEW OF SYSTEMS: Unable to obtain since pt non-verbal    Vital Signs Last 24 Hrs  T(C): 37.1 (18 Aug 2023 04:54), Max: 38.1 (17 Aug 2023 14:21)  T(F): 98.8 (18 Aug 2023 04:54), Max: 100.6 (17 Aug 2023 14:21)  HR: 99 (18 Aug 2023 04:54) (88 - 112)  BP: 134/71 (18 Aug 2023 04:54) (123/79 - 162/81)  BP(mean): --  RR: 18 (18 Aug 2023 04:54) (18 - 20)  SpO2: 96% (18 Aug 2023 04:54) (96% - 98%)    Parameters below as of 18 Aug 2023 04:54  Patient On (Oxygen Delivery Method): room air        PHYSICAL EXAMINATION:  GENERAL: NAD, well built, non-verbal, mcclellan draining barbara urine  HEAD:  Atraumatic, Normocephalic  EYES:  conjunctiva and sclera clear  CHEST/LUNG: Clear to auscultation. No rales, rhonchi, wheezing, or rubs  HEART: Regular rate and rhythm; No murmurs, rubs, or gallops  ABDOMEN: Soft, Nontender, Nondistended; Bowel sounds present  NERVOUS SYSTEM:  Alert & Oriented X0,    EXTREMITIES:  +Edema in all 4 extremities. 2+ Peripheral Pulses, No clubbing, cyanosis,  SKIN: warm dry                          8.4    19.66 )-----------( 291      ( 18 Aug 2023 07:20 )             26.6     08-18    135  |  107  |  39<H>  ----------------------------<  117<H>  5.5<H>   |  17<L>  |  2.25<H>    Ca    8.0<L>      18 Aug 2023 07:20  Phos  3.9     08-18  Mg     2.2     08-18    TPro  6.4  /  Alb  1.7<L>  /  TBili  0.4  /  DBili  x   /  AST  230<H>  /  ALT  20  /  AlkPhos  241<H>  08-18    LIVER FUNCTIONS - ( 18 Aug 2023 07:20 )  Alb: 1.7 g/dL / Pro: 6.4 g/dL / ALK PHOS: 241 U/L / ALT: 20 U/L DA / AST: 230 U/L / GGT: x                   CAPILLARY BLOOD GLUCOSE      RADIOLOGY & ADDITIONAL TESTS:                   PGY-1 Progress Note discussed with attending    PAGER #: [[phone]] TILL 5:00 PM  PLEASE CONTACT ON CALL TEAM:  - On Call Team (Please refer to Tyler) FROM 5:00 PM - 8:30PM  - Nightfloat Team FROM 8:30 -7:30 AM    CHIEF COMPLAINT & BRIEF HOSPITAL COURSE:  Failure to thrive    INTERVAL HPI/OVERNIGHT EVENTS:   Pt mcclellan changed and had UA collected. Started on cefepime. Pt no longer febrile or tachycardic    MEDICATIONS  (STANDING):  aspirin  chewable 324 milliGRAM(s) Oral daily  atorvastatin 20 milliGRAM(s) Oral at bedtime  carbidopa/levodopa  25/100 1 Tablet(s) Oral <User Schedule>  cefTRIAXone   IVPB      cefTRIAXone   IVPB 1000 milliGRAM(s) IV Intermittent every 24 hours  chlorhexidine 2% Cloths 1 Application(s) Topical daily  cloNIDine Patch 0.3 mG/24Hr(s) 1 patch Transdermal <User Schedule>  metoprolol tartrate 25 milliGRAM(s) Oral two times a day  pantoprazole   Suspension 40 milliGRAM(s) Oral daily  valproate sodium  IVPB 500 milliGRAM(s) IV Intermittent every 8 hours    MEDICATIONS  (PRN):  acetaminophen   Oral Liquid .. 650 milliGRAM(s) Enteral Tube every 6 hours PRN Temp greater or equal to 38C (100.4F)  morphine  - Injectable 2 milliGRAM(s) IV Push every 6 hours PRN Severe Pain (7 - 10)      REVIEW OF SYSTEMS: Unable to obtain since pt non-verbal    Vital Signs Last 24 Hrs  T(C): 37.1 (18 Aug 2023 04:54), Max: 38.1 (17 Aug 2023 14:21)  T(F): 98.8 (18 Aug 2023 04:54), Max: 100.6 (17 Aug 2023 14:21)  HR: 99 (18 Aug 2023 04:54) (88 - 112)  BP: 134/71 (18 Aug 2023 04:54) (123/79 - 162/81)  BP(mean): --  RR: 18 (18 Aug 2023 04:54) (18 - 20)  SpO2: 96% (18 Aug 2023 04:54) (96% - 98%)    Parameters below as of 18 Aug 2023 04:54  Patient On (Oxygen Delivery Method): room air        PHYSICAL EXAMINATION:  GENERAL: NAD, well built, non-verbal, mcclellan draining barbara urine  HEAD:  Atraumatic, Normocephalic  EYES:  conjunctiva and sclera clear  CHEST/LUNG: Clear to auscultation. No rales, rhonchi, wheezing, or rubs  HEART: Regular rate and rhythm; No murmurs, rubs, or gallops  ABDOMEN: Soft, Nontender, Nondistended; Bowel sounds present  NERVOUS SYSTEM:  Alert & Oriented X0,    EXTREMITIES:  +Edema in all 4 extremities. 2+ Peripheral Pulses, No clubbing, cyanosis,  SKIN: warm dry                          8.4    19.66 )-----------( 291      ( 18 Aug 2023 07:20 )             26.6     08-18    135  |  107  |  39<H>  ----------------------------<  117<H>  5.5<H>   |  17<L>  |  2.25<H>    Ca    8.0<L>      18 Aug 2023 07:20  Phos  3.9     08-18  Mg     2.2     08-18    TPro  6.4  /  Alb  1.7<L>  /  TBili  0.4  /  DBili  x   /  AST  230<H>  /  ALT  20  /  AlkPhos  241<H>  08-18    LIVER FUNCTIONS - ( 18 Aug 2023 07:20 )  Alb: 1.7 g/dL / Pro: 6.4 g/dL / ALK PHOS: 241 U/L / ALT: 20 U/L DA / AST: 230 U/L / GGT: x                   CAPILLARY BLOOD GLUCOSE      RADIOLOGY & ADDITIONAL TESTS:

## 2023-08-18 NOTE — PROGRESS NOTE ADULT - PROBLEM SELECTOR PLAN 3
Pt. has bleeding form right   Healing altered mental status  HEAD CT: Chronic left MCA territory infarction.  CT PERFUSION demonstrated: Perfusion abnormality corresponding to the chronically infarcted left MCA territory. INFARCT CORE: 57 mL. TISSUE AT RISK: 236 mL.  CTA COW and  CTA NECK: neg   Pt can not move limbs or speak. She failed dysphagia screen ans SnS

## 2023-08-18 NOTE — PROGRESS NOTE ADULT - PROBLEM SELECTOR PLAN 8
-Sublingual carbidopa-levodopa stopped for aspiration precautions  -Cont crushed carbidopa-levodopa through feeding tube

## 2023-08-18 NOTE — PROGRESS NOTE ADULT - PROBLEM SELECTOR PLAN 4
altered mental status  HEAD CT: Chronic left MCA territory infarction.  CT PERFUSION demonstrated: Perfusion abnormality corresponding to the chronically infarcted left MCA territory. INFARCT CORE: 57 mL. TISSUE AT RISK: 236 mL.  CTA COW and  CTA NECK: neg   Pt can not move limbs or speak. She failed dysphagia screen ans SnS -f/u hepatitis panel  -f/u acetaminophen  -f/u salicyclic acid  -f/u valproate level

## 2023-08-18 NOTE — PROGRESS NOTE ADULT - SUBJECTIVE AND OBJECTIVE BOX
NEPHROLOGY MEDICAL CARE, [facility] - Dr. Ajay Green/ Dr. Mert Madison/ Dr. Chris Calderon/ Dr. Carson Cook    Date of Service: 08-18-23 @ 12:55    Patient was seen and examined at bedside.    CC: pt is NAD    Vital Signs Last 24 Hrs  T(C): 36.8 (18 Aug 2023 12:47), Max: 38.1 (17 Aug 2023 14:21)  T(F): 98.2 (18 Aug 2023 12:47), Max: 100.6 (17 Aug 2023 14:21)  HR: 98 (18 Aug 2023 12:47) (88 - 112)  BP: 130/62 (18 Aug 2023 12:47) (123/79 - 162/81)  BP(mean): --  RR: 18 (18 Aug 2023 12:47) (18 - 20)  SpO2: 94% (18 Aug 2023 12:47) (94% - 98%)    Parameters below as of 18 Aug 2023 12:47  Patient On (Oxygen Delivery Method): room air        08-17 @ 07:01  -  08-18 @ 07:00  --------------------------------------------------------  IN: 0 mL / OUT: 550 mL / NET: -550 mL        PHYSICAL EXAM:  General: No acute respiratory distress.  Eyes: conjunctiva and sclera clear  ENMT: Atraumatic, Normocephalic; Moist mucous membranes  Respiratory: Bilateral clear lungs; No rales, rhonchi, wheezing  Cardiovascular: S1S2+; no m/r/g  Gastrointestinal: Soft, Non-tender, Nondistended; Bowel sounds present; PEG  : mcclellan's cath with muddy brown urine  Neuro: eyes are open; hemiparesis; non-verbal  Ext:  1+pedal edema, No Cyanosis  Skin: No visible rashes    MEDICATIONS:  MEDICATIONS  (STANDING):  aspirin  chewable 324 milliGRAM(s) Oral daily  atorvastatin 20 milliGRAM(s) Oral at bedtime  carbidopa/levodopa  25/100 1 Tablet(s) Oral <User Schedule>  cefTRIAXone   IVPB      cefTRIAXone   IVPB 1000 milliGRAM(s) IV Intermittent every 24 hours  chlorhexidine 2% Cloths 1 Application(s) Topical daily  cloNIDine Patch 0.1 mG/24Hr(s) 1 patch Transdermal every 7 days  metoprolol tartrate 25 milliGRAM(s) Oral two times a day  pantoprazole   Suspension 40 milliGRAM(s) Oral daily  valproate sodium  IVPB 500 milliGRAM(s) IV Intermittent every 8 hours    MEDICATIONS  (PRN):  acetaminophen   Oral Liquid .. 650 milliGRAM(s) Enteral Tube every 6 hours PRN Temp greater or equal to 38C (100.4F)  morphine  - Injectable 2 milliGRAM(s) IV Push every 6 hours PRN Severe Pain (7 - 10)          LABS:                        8.4    19.66 )-----------( 291      ( 18 Aug 2023 07:20 )             26.6     08-18    136  |  109<H>  |  39<H>  ----------------------------<  129<H>  5.2   |  18<L>  |  2.27<H>    Ca    7.5<L>      18 Aug 2023 11:20  Phos  3.9     08-18  Mg     2.2     08-18    TPro  6.4  /  Alb  1.7<L>  /  TBili  0.4  /  DBili  x   /  AST  230<H>  /  ALT  20  /  AlkPhos  241<H>  08-18      Urinalysis Basic - ( 18 Aug 2023 11:20 )    Color: x / Appearance: x / SG: x / pH: x  Gluc: 129 mg/dL / Ketone: x  / Bili: x / Urobili: x   Blood: x / Protein: x / Nitrite: x   Leuk Esterase: x / RBC: x / WBC x   Sq Epi: x / Non Sq Epi: x / Bacteria: x      Magnesium: 2.2 mg/dL (08-18 @ 07:20)  Phosphorus: 3.9 mg/dL (08-18 @ 07:20)    Urine studies    PTH and Vit D:         NEPHROLOGY MEDICAL CARE, [facility] - Dr. Ajay Green/ Dr. Mert Madison/ Dr. Chris Calderon/ Dr. Carson Cook    Date of Service: 08-18-23 @ 12:55    Patient was seen and examined at bedside.    CC: pt is NAD    Vital Signs Last 24 Hrs  T(C): 36.8 (18 Aug 2023 12:47), Max: 38.1 (17 Aug 2023 14:21)  T(F): 98.2 (18 Aug 2023 12:47), Max: 100.6 (17 Aug 2023 14:21)  HR: 98 (18 Aug 2023 12:47) (88 - 112)  BP: 130/62 (18 Aug 2023 12:47) (123/79 - 162/81)  BP(mean): --  RR: 18 (18 Aug 2023 12:47) (18 - 20)  SpO2: 94% (18 Aug 2023 12:47) (94% - 98%)    Parameters below as of 18 Aug 2023 12:47  Patient On (Oxygen Delivery Method): room air        08-17 @ 07:01  -  08-18 @ 07:00  --------------------------------------------------------  IN: 0 mL / OUT: 550 mL / NET: -550 mL        PHYSICAL EXAM:  General: No acute respiratory distress.  Eyes: conjunctiva and sclera clear  ENMT: Atraumatic, Normocephalic; Moist mucous membranes  Respiratory: Bilateral clear lungs; No rales, rhonchi, wheezing  Cardiovascular: S1S2+; no m/r/g  Gastrointestinal: Soft, Non-tender, Nondistended; Bowel sounds present; PEG  : mcclellan's cath with muddy brown urine  Neuro: eyes are open; hemiparesis; non-verbal  Ext:  1+pedal edema, No Cyanosis  Skin: No visible rashes    MEDICATIONS:  MEDICATIONS  (STANDING):  aspirin  chewable 324 milliGRAM(s) Oral daily  atorvastatin 20 milliGRAM(s) Oral at bedtime  carbidopa/levodopa  25/100 1 Tablet(s) Oral <User Schedule>  cefTRIAXone   IVPB      cefTRIAXone   IVPB 1000 milliGRAM(s) IV Intermittent every 24 hours  chlorhexidine 2% Cloths 1 Application(s) Topical daily  cloNIDine Patch 0.1 mG/24Hr(s) 1 patch Transdermal every 7 days  metoprolol tartrate 25 milliGRAM(s) Oral two times a day  pantoprazole   Suspension 40 milliGRAM(s) Oral daily  valproate sodium  IVPB 500 milliGRAM(s) IV Intermittent every 8 hours    MEDICATIONS  (PRN):  acetaminophen   Oral Liquid .. 650 milliGRAM(s) Enteral Tube every 6 hours PRN Temp greater or equal to 38C (100.4F)  morphine  - Injectable 2 milliGRAM(s) IV Push every 6 hours PRN Severe Pain (7 - 10)          LABS:                        8.4    19.66 )-----------( 291      ( 18 Aug 2023 07:20 )             26.6     08-18    136  |  109<H>  |  39<H>  ----------------------------<  129<H>  5.2   |  18<L>  |  2.27<H>    Ca    7.5<L>      18 Aug 2023 11:20  Phos  3.9     08-18  Mg     2.2     08-18    TPro  6.4  /  Alb  1.7<L>  /  TBili  0.4  /  DBili  x   /  AST  230<H>  /  ALT  20  /  AlkPhos  241<H>  08-18      Urinalysis Basic - ( 18 Aug 2023 11:20 )    Color: x / Appearance: x / SG: x / pH: x  Gluc: 129 mg/dL / Ketone: x  / Bili: x / Urobili: x   Blood: x / Protein: x / Nitrite: x   Leuk Esterase: x / RBC: x / WBC x   Sq Epi: x / Non Sq Epi: x / Bacteria: x      Magnesium: 2.2 mg/dL (08-18 @ 07:20)  Phosphorus: 3.9 mg/dL (08-18 @ 07:20)    Urine studies    PTH and Vit D:         NEPHROLOGY MEDICAL CARE, [facility] - Dr. Ajay Green/ Dr. Mert Madison/ Dr. Chris Calderon/ Dr. Carson Cook    Date of Service: 08-18-23 @ 12:55    Patient was seen and examined at bedside.    CC: pt is NAD    Vital Signs Last 24 Hrs  T(C): 36.8 (18 Aug 2023 12:47), Max: 38.1 (17 Aug 2023 14:21)  T(F): 98.2 (18 Aug 2023 12:47), Max: 100.6 (17 Aug 2023 14:21)  HR: 98 (18 Aug 2023 12:47) (88 - 112)  BP: 130/62 (18 Aug 2023 12:47) (123/79 - 162/81)  BP(mean): --  RR: 18 (18 Aug 2023 12:47) (18 - 20)  SpO2: 94% (18 Aug 2023 12:47) (94% - 98%)    Parameters below as of 18 Aug 2023 12:47  Patient On (Oxygen Delivery Method): room air        08-17 @ 07:01  -  08-18 @ 07:00  --------------------------------------------------------  IN: 0 mL / OUT: 550 mL / NET: -550 mL        PHYSICAL EXAM:  General: No acute respiratory distress.  Eyes: conjunctiva and sclera clear  ENMT: Atraumatic, Normocephalic; Moist mucous membranes  Respiratory: Bilateral clear lungs; No rales, rhonchi, wheezing  Cardiovascular: S1S2+; no m/r/g  Gastrointestinal: Soft, Non-tender, Nondistended; Bowel sounds present; PEG  : mcclellan's cath with muddy brown urine  Neuro: eyes are open; hemiparesis; non-verbal  Ext:  1+pedal edema, No Cyanosis  Skin: No visible rashes    MEDICATIONS:  MEDICATIONS  (STANDING):  aspirin  chewable 324 milliGRAM(s) Oral daily  atorvastatin 20 milliGRAM(s) Oral at bedtime  carbidopa/levodopa  25/100 1 Tablet(s) Oral <User Schedule>  cefTRIAXone   IVPB      cefTRIAXone   IVPB 1000 milliGRAM(s) IV Intermittent every 24 hours  chlorhexidine 2% Cloths 1 Application(s) Topical daily  cloNIDine Patch 0.1 mG/24Hr(s) 1 patch Transdermal every 7 days  metoprolol tartrate 25 milliGRAM(s) Oral two times a day  pantoprazole   Suspension 40 milliGRAM(s) Oral daily  valproate sodium  IVPB 500 milliGRAM(s) IV Intermittent every 8 hours    MEDICATIONS  (PRN):  acetaminophen   Oral Liquid .. 650 milliGRAM(s) Enteral Tube every 6 hours PRN Temp greater or equal to 38C (100.4F)  morphine  - Injectable 2 milliGRAM(s) IV Push every 6 hours PRN Severe Pain (7 - 10)          LABS:                        8.4    19.66 )-----------( 291      ( 18 Aug 2023 07:20 )             26.6     08-18    136  |  109<H>  |  39<H>  ----------------------------<  129<H>  5.2   |  18<L>  |  2.27<H>    Ca    7.5<L>      18 Aug 2023 11:20  Phos  3.9     08-18  Mg     2.2     08-18    TPro  6.4  /  Alb  1.7<L>  /  TBili  0.4  /  DBili  x   /  AST  230<H>  /  ALT  20  /  AlkPhos  241<H>  08-18      Urinalysis Basic - ( 18 Aug 2023 11:20 )    Color: x / Appearance: x / SG: x / pH: x  Gluc: 129 mg/dL / Ketone: x  / Bili: x / Urobili: x   Blood: x / Protein: x / Nitrite: x   Leuk Esterase: x / RBC: x / WBC x   Sq Epi: x / Non Sq Epi: x / Bacteria: x      Magnesium: 2.2 mg/dL (08-18 @ 07:20)  Phosphorus: 3.9 mg/dL (08-18 @ 07:20)    Urine studies    PTH and Vit D:

## 2023-08-18 NOTE — PROGRESS NOTE ADULT - SUBJECTIVE AND OBJECTIVE BOX
Date of Service 08-18-23 @ 11:44    CHIEF COMPLAINT:Patient is a 77y old  Female who presents with a chief complaint of Altered mental status. Pt appears comfortable.    	  REVIEW OF SYSTEMS:  CONSTITUTIONAL: No fever, weight loss, or fatigue  EYES: No eye pain, visual disturbances, or discharge  ENT:  No difficulty hearing, tinnitus, vertigo; No sinus or throat pain  NECK: No pain or stiffness  RESPIRATORY: No cough, wheezing, chills or hemoptysis; No Shortness of Breath  CARDIOVASCULAR: No chest pain, palpitations, passing out, dizziness, or leg swelling  GASTROINTESTINAL: No abdominal or epigastric pain. No nausea, vomiting, or hematemesis; No diarrhea or constipation. No melena or hematochezia.  GENITOURINARY: No dysuria, frequency, hematuria, or incontinence  NEUROLOGICAL: No headaches, memory loss, loss of strength, numbness, or tremors  SKIN: No itching, burning, rashes, or lesions   LYMPH Nodes: No enlarged glands  ENDOCRINE: No heat or cold intolerance; No hair loss  MUSCULOSKELETAL: No joint pain or swelling; No muscle, back, or extremity pain  PSYCHIATRIC: No depression, anxiety, mood swings, or difficulty sleeping  HEME/LYMPH: No easy bruising, or bleeding gums  ALLERGY AND IMMUNOLOGIC: No hives or eczema	        PHYSICAL EXAM:  T(C): 37.1 (08-18-23 @ 04:54), Max: 38.1 (08-17-23 @ 14:21)  HR: 99 (08-18-23 @ 04:54) (88 - 112)  BP: 134/71 (08-18-23 @ 04:54) (123/79 - 162/81)  RR: 18 (08-18-23 @ 04:54) (18 - 20)  SpO2: 96% (08-18-23 @ 04:54) (96% - 98%)  Wt(kg): --  I&O's Summary    17 Aug 2023 07:01  -  18 Aug 2023 07:00  --------------------------------------------------------  IN: 0 mL / OUT: 550 mL / NET: -550 mL        Appearance: Normal	  HEENT:   Normal oral mucosa, PERRL, EOMI	  Lymphatic: No lymphadenopathy  Cardiovascular: Normal S1 S2, No JVD, No murmurs, No edema  Respiratory: Lungs clear to auscultation	  Gastrointestinal:  Soft, Non-tender, + BS	  Skin: No rashes, No ecchymoses, No cyanosis	  Extremities: Normal range of motion, No clubbing, cyanosis or edema  Vascular: Peripheral pulses palpable 2+ bilaterally    MEDICATIONS  (STANDING):  aspirin  chewable 324 milliGRAM(s) Oral daily  atorvastatin 20 milliGRAM(s) Oral at bedtime  carbidopa/levodopa  25/100 1 Tablet(s) Oral <User Schedule>  cefTRIAXone   IVPB      cefTRIAXone   IVPB 1000 milliGRAM(s) IV Intermittent every 24 hours  chlorhexidine 2% Cloths 1 Application(s) Topical daily  cloNIDine Patch 0.3 mG/24Hr(s) 1 patch Transdermal <User Schedule>  metoprolol tartrate 25 milliGRAM(s) Oral two times a day  pantoprazole   Suspension 40 milliGRAM(s) Oral daily  valproate sodium  IVPB 500 milliGRAM(s) IV Intermittent every 8 hours        LABS:	 	                       8.4    19.66 )-----------( 291      ( 18 Aug 2023 07:20 )             26.6     08-18    135  |  107  |  39<H>  ----------------------------<  117<H>  5.5<H>   |  17<L>  |  2.25<H>    Ca    8.0<L>      18 Aug 2023 07:20  Phos  3.9     08-18  Mg     2.2     08-18    TPro  6.4  /  Alb  1.7<L>  /  TBili  0.4  /  DBili  x   /  AST  230<H>  /  ALT  20  /  AlkPhos  241<H>  08-18    proBNP:   Lipid Profile: Cholesterol 152  LDL --  HDL 51  TG 93  Ldl calc 82  Ratio --    HgA1c:   TSH: Thyroid Stimulating Hormone, Serum: 3.59 uU/mL (07-28 @ 05:03)

## 2023-08-19 LAB
ALBUMIN SERPL ELPH-MCNC: 1.5 G/DL — LOW (ref 3.5–5)
ALP SERPL-CCNC: 243 U/L — HIGH (ref 40–120)
ALT FLD-CCNC: 20 U/L DA — SIGNIFICANT CHANGE UP (ref 10–60)
ANION GAP SERPL CALC-SCNC: 10 MMOL/L — SIGNIFICANT CHANGE UP (ref 5–17)
AST SERPL-CCNC: 186 U/L — HIGH (ref 10–40)
BASOPHILS # BLD AUTO: 0.12 K/UL — SIGNIFICANT CHANGE UP (ref 0–0.2)
BASOPHILS NFR BLD AUTO: 0.7 % — SIGNIFICANT CHANGE UP (ref 0–2)
BILIRUB SERPL-MCNC: 0.3 MG/DL — SIGNIFICANT CHANGE UP (ref 0.2–1.2)
BUN SERPL-MCNC: 44 MG/DL — HIGH (ref 7–18)
CALCIUM SERPL-MCNC: 7.5 MG/DL — LOW (ref 8.4–10.5)
CHLORIDE SERPL-SCNC: 108 MMOL/L — SIGNIFICANT CHANGE UP (ref 96–108)
CO2 SERPL-SCNC: 18 MMOL/L — LOW (ref 22–31)
CREAT SERPL-MCNC: 2.15 MG/DL — HIGH (ref 0.5–1.3)
EGFR: 23 ML/MIN/1.73M2 — LOW
EOSINOPHIL # BLD AUTO: 0.07 K/UL — SIGNIFICANT CHANGE UP (ref 0–0.5)
EOSINOPHIL NFR BLD AUTO: 0.4 % — SIGNIFICANT CHANGE UP (ref 0–6)
GLUCOSE BLDC GLUCOMTR-MCNC: 135 MG/DL — HIGH (ref 70–99)
GLUCOSE BLDC GLUCOMTR-MCNC: 152 MG/DL — HIGH (ref 70–99)
GLUCOSE BLDC GLUCOMTR-MCNC: 171 MG/DL — HIGH (ref 70–99)
GLUCOSE BLDC GLUCOMTR-MCNC: 189 MG/DL — HIGH (ref 70–99)
GLUCOSE SERPL-MCNC: 136 MG/DL — HIGH (ref 70–99)
HCT VFR BLD CALC: 24.4 % — LOW (ref 34.5–45)
HGB BLD-MCNC: 7.6 G/DL — LOW (ref 11.5–15.5)
IMM GRANULOCYTES NFR BLD AUTO: 10.5 % — HIGH (ref 0–0.9)
LYMPHOCYTES # BLD AUTO: 1.79 K/UL — SIGNIFICANT CHANGE UP (ref 1–3.3)
LYMPHOCYTES # BLD AUTO: 10.5 % — LOW (ref 13–44)
MAGNESIUM SERPL-MCNC: 2.4 MG/DL — SIGNIFICANT CHANGE UP (ref 1.6–2.6)
MCHC RBC-ENTMCNC: 27.9 PG — SIGNIFICANT CHANGE UP (ref 27–34)
MCHC RBC-ENTMCNC: 31.1 GM/DL — LOW (ref 32–36)
MCV RBC AUTO: 89.7 FL — SIGNIFICANT CHANGE UP (ref 80–100)
MONOCYTES # BLD AUTO: 1.7 K/UL — HIGH (ref 0–0.9)
MONOCYTES NFR BLD AUTO: 9.9 % — SIGNIFICANT CHANGE UP (ref 2–14)
NEUTROPHILS # BLD AUTO: 11.63 K/UL — HIGH (ref 1.8–7.4)
NEUTROPHILS NFR BLD AUTO: 68 % — SIGNIFICANT CHANGE UP (ref 43–77)
NRBC # BLD: 0 /100 WBCS — SIGNIFICANT CHANGE UP (ref 0–0)
PHOSPHATE SERPL-MCNC: 4.2 MG/DL — SIGNIFICANT CHANGE UP (ref 2.5–4.5)
PLATELET # BLD AUTO: 266 K/UL — SIGNIFICANT CHANGE UP (ref 150–400)
POTASSIUM SERPL-MCNC: 5.4 MMOL/L — HIGH (ref 3.5–5.3)
POTASSIUM SERPL-SCNC: 5.4 MMOL/L — HIGH (ref 3.5–5.3)
PROT SERPL-MCNC: 6 G/DL — SIGNIFICANT CHANGE UP (ref 6–8.3)
RBC # BLD: 2.72 M/UL — LOW (ref 3.8–5.2)
RBC # FLD: 17 % — HIGH (ref 10.3–14.5)
SODIUM SERPL-SCNC: 136 MMOL/L — SIGNIFICANT CHANGE UP (ref 135–145)
WBC # BLD: 17.11 K/UL — HIGH (ref 3.8–10.5)
WBC # FLD AUTO: 17.11 K/UL — HIGH (ref 3.8–10.5)

## 2023-08-19 PROCEDURE — 93970 EXTREMITY STUDY: CPT | Mod: 26

## 2023-08-19 RX ORDER — SODIUM ZIRCONIUM CYCLOSILICATE 10 G/10G
10 POWDER, FOR SUSPENSION ORAL ONCE
Refills: 0 | Status: COMPLETED | OUTPATIENT
Start: 2023-08-19 | End: 2023-08-19

## 2023-08-19 RX ADMIN — CARBIDOPA AND LEVODOPA 1 TABLET(S): 25; 100 TABLET ORAL at 06:30

## 2023-08-19 RX ADMIN — Medication 1 PATCH: at 19:44

## 2023-08-19 RX ADMIN — Medication 55 MILLIGRAM(S): at 05:36

## 2023-08-19 RX ADMIN — Medication 324 MILLIGRAM(S): at 11:11

## 2023-08-19 RX ADMIN — Medication 55 MILLIGRAM(S): at 21:03

## 2023-08-19 RX ADMIN — Medication 1 PATCH: at 05:32

## 2023-08-19 RX ADMIN — SODIUM ZIRCONIUM CYCLOSILICATE 10 GRAM(S): 10 POWDER, FOR SUSPENSION ORAL at 19:03

## 2023-08-19 RX ADMIN — Medication 55 MILLIGRAM(S): at 14:09

## 2023-08-19 RX ADMIN — CHLORHEXIDINE GLUCONATE 1 APPLICATION(S): 213 SOLUTION TOPICAL at 12:40

## 2023-08-19 RX ADMIN — ATORVASTATIN CALCIUM 20 MILLIGRAM(S): 80 TABLET, FILM COATED ORAL at 21:03

## 2023-08-19 RX ADMIN — CEFEPIME 100 MILLIGRAM(S): 1 INJECTION, POWDER, FOR SOLUTION INTRAMUSCULAR; INTRAVENOUS at 16:06

## 2023-08-19 RX ADMIN — CARBIDOPA AND LEVODOPA 1 TABLET(S): 25; 100 TABLET ORAL at 11:11

## 2023-08-19 RX ADMIN — PANTOPRAZOLE SODIUM 40 MILLIGRAM(S): 20 TABLET, DELAYED RELEASE ORAL at 11:12

## 2023-08-19 RX ADMIN — Medication 25 MILLIGRAM(S): at 19:04

## 2023-08-19 RX ADMIN — Medication 25 MILLIGRAM(S): at 05:37

## 2023-08-19 RX ADMIN — CARBIDOPA AND LEVODOPA 1 TABLET(S): 25; 100 TABLET ORAL at 16:06

## 2023-08-19 RX ADMIN — CARBIDOPA AND LEVODOPA 1 TABLET(S): 25; 100 TABLET ORAL at 21:04

## 2023-08-19 NOTE — PROGRESS NOTE ADULT - PROBLEM SELECTOR PLAN 1
Pt. is NPO  Open G tube placed on 8/14  Feeds through G tube resumed  IV tylenol for pain, morphine for breakthrough pain  IV iron sucrose started 8/15 for 3 days as per nephro  G tube placement by Surgery on 8/14  f/u LE doppler b/l

## 2023-08-19 NOTE — PROGRESS NOTE ADULT - ASSESSMENT
Patient is a 77 female from [facility] PMHx HTN, HLD, CVA (w/ residual aphasia and R sided hemiparesis/plegia) who was sent to the hospital due to altered mental status. Patient is being admitted to medicine for further work up and rule out stroke vs encephalopathy (metabolic vs infectious).  Patient is a 77 female from [facility] PMHx HTN, HLD, CVA (w/ residual aphasia and R sided hemiparesis/plegia) who was sent to the hospital due to altered mental status. Patient is being admitted to medicine for further work up and rule out stroke vs encephalopathy (metabolic vs infectious).  Patient is a 77 female from [facility] PMHx HTN, HLD, CVA (w/ residual aphasia and R sided hemiparesis/plegia) who was sent to the hospital due to altered mental status. Patient is being admitted to medicine for further work up and rule out stroke vs encephalopathy (metabolic vs infectious).

## 2023-08-19 NOTE — PROGRESS NOTE ADULT - ASSESSMENT
77 female from [facility] PMHx HTN, HLD, CVA (w/ residual aphasia and R sided hemiparesis/plegia) who was sent to the hospital due to altered mental status,UTI.  1.UTI-s/p ABX.  2.HTN- clonidine patch .1mg q wk,lopressor 25mg bid.  3.Lipid d/o- statin.  4.Surgical f/u-s/p G tube-TF and free water.  5.SUSAN-free water.  6.Anemia-  iron.  7.GI and DVT prophylaxis.  8.Dopplers-r/o dvt.  9.Hyperkalemia-lokelma. 77 female from [facility] PMHx HTN, HLD, CVA (w/ residual aphasia and R sided hemiparesis/plegia) who was sent to the hospital due to altered mental status,UTI.  1.UTI-s/p ABX.  2.HTN- clonidine patch .1mg q wk,lopressor 25mg bid.  3.Lipid d/o- statin.  4.Surgical f/u-s/p G tube-TF and free water.  5.SUSAN-free water.  6.Anemia-  iron.  7.GI and DVT prophylaxis.  8.Dopplers-r/o dvt.  9.Hyperkalemia-lokelma. 77 female from [facility] PMHx HTN, HLD, CVA (w/ residual aphasia and R sided hemiparesis/plegia) who was sent to the hospital due to altered mental status,UTI.  1.UTI-s/p ABX.  2.HTN- clonidine patch .1mg q wk,lopressor 25mg bid.  3.Lipid d/o- statin.  4.Surgical f/u-s/p G tube-TF and free water.  5.SUSAN-free water.  6.Anemia-  iron.  7.GI and DVT prophylaxis.  8.Dopplers-r/o dvt.  9.Hyperkalemia-lokelma.

## 2023-08-19 NOTE — PROGRESS NOTE ADULT - ASSESSMENT
1. SUSAN possible to MARISA and ATN  Renal sono shows no hdyro with b/l kidneys around 9.2cm  -Scr slightly improved to  2.15mg/dl possible due to ATN again with stable electrolytes.     -s/p mcclellan's cath placed for urinary retention during this admission; discontinue by primary team.   -urinalysis shows blood with proteinuria; FeNa >1%, spot protein to creatinine ratio is 1.5gm  -Adjust meds to eGFR and avoid IV Gadolinium contrast,NSAIDs, and phosphate enema.  -Monitor I/O's daily.   -Monitor SMA daily.  2. Hypernatremia due to water deficit and insensible losses. Pt is clinically euvolemic.   -Na is stable and off D5W.   -Monitor I/O's. Check Serum Na Daily. Avoid high solute intake diet and sodium bicarbonate infuse. Avoid overcorrection of NA (8-10meq/day)  3. CKD stage 4 most likely due to hypertensive nephrosclerosis  -new baseline scr around 1.8mg/dL with uPCR 1.5gm.  -previous Scr around 1.2 to 1.6mg/dL in Oct 2022.  -Keep patient euvolemic and renal diet  -Avoid Nephrotoxic Meds/ Agents such as (NSAIDs, IV contrast, Aminoglycosides such as gentamicin, -Gadolinium contrast, Phosphate containing enemas, etc..)  -Adjust Medications according to eGFR  4. HTN:   -bp is acceptable. continue bp meds  -titrate bp meds to keep sbp >110 and < 130  5. Mineral Bone Disease:  -phos is improving.  -PTH intact 289, will start calcitriol once Phos has improved.   6. Encephalopathy:  -Leukocytosis improving.  -s/p Rocephin.  -Plan as per Neuro and primary team  -s/p PEG placed in OR on 08/14/23.  7. Hypokalemia:  -stable K.   -give kcl repletion to keep K >3.5meq/L  -monitor K.   8. Acidosis:  -CO2 is unchanged.    -monitor CO2.  9. Anemia:  -hb is stable.  -low iron sat and ferritin are okay. s/p iv iron x 3 days.  -monitor CBC.  -transfuse if hb <7.0  10. Elevated LFTs  -slowly improving; US abd done shows fatty liver  -monitor LFTs  11. Hyperkalemia due to susan on ckd.   -elevated K and give lokelmia.  -give Nephro tube feeding. give Lokelma prn if K >5.5  -Keep pt euvolemic. Avoid ACE inh/ ARBs, NSAIDs, and Aldactone or potassium sparing diuretics. Monitor K+ daily.

## 2023-08-19 NOTE — PROGRESS NOTE ADULT - SUBJECTIVE AND OBJECTIVE BOX
PGY-1 Progress Note discussed with attending    PAGER #: [[phone]] TILL 5:00 PM  PLEASE CONTACT ON CALL TEAM:  - On Call Team (Please refer to Tyler) FROM 5:00 PM - 8:30PM  - Nightfloat Team FROM 8:30 -7:30 AM    CC: Patient is a 77y old  Female who presents with a chief complaint of Altered mental status. (18 Aug 2023 14:35)      OVERNIGHT EVENTS:    SUBJECTIVE / INTERVAL HPI: Patient seen and examined at bedside.     ROS:  CONSTITUTIONAL: No weakness, fevers or chills  EYES/ENT: No visual changes;  No vertigo or throat pain   NECK: No pain or stiffness  RESPIRATORY: No cough, wheezing, hemoptysis; No shortness of breath  CARDIOVASCULAR: No chest pain or palpitations  GASTROINTESTINAL: No abdominal or epigastric pain. No nausea, vomiting, or hematemesis; No diarrhea or constipation. No melena or hematochezia.  GENITOURINARY: No dysuria, frequency or hematuria  NEUROLOGICAL: No numbness or weakness  SKIN: No itching, burning, rashes, or lesions     VITAL SIGNS:  Vital Signs Last 24 Hrs  T(C): 37.1 (19 Aug 2023 05:26), Max: 37.1 (19 Aug 2023 05:26)  T(F): 98.8 (19 Aug 2023 05:26), Max: 98.8 (19 Aug 2023 05:26)  HR: 86 (19 Aug 2023 05:26) (79 - 98)  BP: 128/68 (19 Aug 2023 05:26) (115/66 - 130/62)  BP(mean): --  RR: 18 (19 Aug 2023 05:26) (18 - 18)  SpO2: 98% (19 Aug 2023 05:26) (94% - 99%)    Parameters below as of 19 Aug 2023 05:26  Patient On (Oxygen Delivery Method): room air        PHYSICAL EXAM:    General: WDWN  HEENT: NC/AT; PERRL, anicteric sclera; MMM  Neck: supple  Cardiovascular: +S1/S2; RRR  Respiratory: CTA B/L; no W/R/R  Gastrointestinal: soft, NT/ND; +BSx4  Extremities: WWP; no edema, clubbing or cyanosis  Vascular: 2+ radial, DP/PT pulses B/L  Skin: Warm, dry, good turgor, no rashes, or ecchymoses  Neurological: AAOx3; no focal deficits    MEDICATIONS:  MEDICATIONS  (STANDING):  aspirin  chewable 324 milliGRAM(s) Oral daily  atorvastatin 20 milliGRAM(s) Oral at bedtime  carbidopa/levodopa  25/100 1 Tablet(s) Oral <User Schedule>  cefepime   IVPB 1000 milliGRAM(s) IV Intermittent every 24 hours  cefepime   IVPB      chlorhexidine 2% Cloths 1 Application(s) Topical daily  cloNIDine Patch 0.1 mG/24Hr(s) 1 patch Transdermal every 7 days  metoprolol tartrate 25 milliGRAM(s) Oral two times a day  pantoprazole   Suspension 40 milliGRAM(s) Oral daily  valproate sodium  IVPB 500 milliGRAM(s) IV Intermittent every 8 hours    MEDICATIONS  (PRN):  acetaminophen   Oral Liquid .. 650 milliGRAM(s) Enteral Tube every 6 hours PRN Temp greater or equal to 38C (100.4F)  morphine  - Injectable 2 milliGRAM(s) IV Push every 6 hours PRN Severe Pain (7 - 10)      ALLERGIES:  Allergies    &quot; NATURAL RUBBER&quot; (Other)  latex (Other)  penicillins (Unknown)    Intolerances        LABS:                        7.6    17.11 )-----------( 266      ( 19 Aug 2023 07:42 )             24.4     08-19    136  |  108  |  44<H>  ----------------------------<  136<H>  5.4<H>   |  18<L>  |  2.15<H>    Ca    7.5<L>      19 Aug 2023 07:42  Phos  4.2     08-19  Mg     2.4     08-19    TPro  6.0  /  Alb  1.5<L>  /  TBili  0.3  /  DBili  x   /  AST  186<H>  /  ALT  20  /  AlkPhos  243<H>  08-19      Urinalysis Basic - ( 19 Aug 2023 07:42 )    Color: x / Appearance: x / SG: x / pH: x  Gluc: 136 mg/dL / Ketone: x  / Bili: x / Urobili: x   Blood: x / Protein: x / Nitrite: x   Leuk Esterase: x / RBC: x / WBC x   Sq Epi: x / Non Sq Epi: x / Bacteria: x      CAPILLARY BLOOD GLUCOSE      POCT Blood Glucose.: 152 mg/dL (19 Aug 2023 07:34)      RADIOLOGY & ADDITIONAL TESTS: Reviewed. PGY-1 Progress Note discussed with attending    PAGER #: [[phone]] TILL 5:00 PM  PLEASE CONTACT ON CALL TEAM:  - On Call Team (Please refer to Tyler) FROM 5:00 PM - 8:30PM  - Nightfloat Team FROM 8:30 -7:30 AM    CC: Patient is a 77y old  Female who presents with a chief complaint of Altered mental status. (18 Aug 2023 14:35)      OVERNIGHT EVENTS:    SUBJECTIVE / INTERVAL HPI: Patient seen and examined at bedside.     ROS:  CONSTITUTIONAL: No weakness, fevers or chills  EYES/ENT: No visual changes;  No vertigo or throat pain   NECK: No pain or stiffness  RESPIRATORY: No cough, wheezing, hemoptysis; No shortness of breath  CARDIOVASCULAR: No chest pain or palpitations  GASTROINTESTINAL: No abdominal or epigastric pain. No nausea, vomiting, or hematemesis; No diarrhea or constipation. No melena or hematochezia.  GENITOURINARY: No dysuria, frequency or hematuria  NEUROLOGICAL: No numbness or weakness  SKIN: No itching, burning, rashes, or lesions     VITAL SIGNS:  Vital Signs Last 24 Hrs  T(C): 37.1 (19 Aug 2023 05:26), Max: 37.1 (19 Aug 2023 05:26)  T(F): 98.8 (19 Aug 2023 05:26), Max: 98.8 (19 Aug 2023 05:26)  HR: 86 (19 Aug 2023 05:26) (79 - 98)  BP: 128/68 (19 Aug 2023 05:26) (115/66 - 130/62)  BP(mean): --  RR: 18 (19 Aug 2023 05:26) (18 - 18)  SpO2: 98% (19 Aug 2023 05:26) (94% - 99%)    Parameters below as of 19 Aug 2023 05:26  Patient On (Oxygen Delivery Method): room air        PHYSICAL EXAM:    General: WDWN  HEENT: NC/AT; PERRL, anicteric sclera; MMM  Neck: supple  Cardiovascular: +S1/S2; RRR  Respiratory: CTA B/L; no W/R/R  Gastrointestinal: soft, NT/ND; +BSx4  Extremities: WWP; no edema, clubbing or cyanosis  Vascular: 2+ radial, DP/PT pulses B/L  Skin: Warm, dry, good turgor, no rashes, or ecchymoses  Neurological: AAOx3; no focal deficits    MEDICATIONS:  MEDICATIONS  (STANDING):  aspirin  chewable 324 milliGRAM(s) Oral daily  atorvastatin 20 milliGRAM(s) Oral at bedtime  carbidopa/levodopa  25/100 1 Tablet(s) Oral <User Schedule>  cefepime   IVPB 1000 milliGRAM(s) IV Intermittent every 24 hours  cefepime   IVPB      chlorhexidine 2% Cloths 1 Application(s) Topical daily  cloNIDine Patch 0.1 mG/24Hr(s) 1 patch Transdermal every 7 days  metoprolol tartrate 25 milliGRAM(s) Oral two times a day  pantoprazole   Suspension 40 milliGRAM(s) Oral daily  valproate sodium  IVPB 500 milliGRAM(s) IV Intermittent every 8 hours    MEDICATIONS  (PRN):  acetaminophen   Oral Liquid .. 650 milliGRAM(s) Enteral Tube every 6 hours PRN Temp greater or equal to 38C (100.4F)  morphine  - Injectable 2 milliGRAM(s) IV Push every 6 hours PRN Severe Pain (7 - 10)      ALLERGIES:  Allergies    &quot; NATURAL RUBBER&quot; (Other)  latex (Other)  penicillins (Unknown)    Intolerances        LABS:                        7.6    17.11 )-----------( 266      ( 19 Aug 2023 07:42 )             24.4     08-19    136  |  108  |  44<H>  ----------------------------<  136<H>  5.4<H>   |  18<L>  |  2.15<H>    Ca    7.5<L>      19 Aug 2023 07:42  Phos  4.2     08-19  Mg     2.4     08-19    TPro  6.0  /  Alb  1.5<L>  /  TBili  0.3  /  DBili  x   /  AST  186<H>  /  ALT  20  /  AlkPhos  243<H>  08-19      Urinalysis Basic - ( 19 Aug 2023 07:42 )    Color: x / Appearance: x / SG: x / pH: x  Gluc: 136 mg/dL / Ketone: x  / Bili: x / Urobili: x   Blood: x / Protein: x / Nitrite: x   Leuk Esterase: x / RBC: x / WBC x   Sq Epi: x / Non Sq Epi: x / Bacteria: x      CAPILLARY BLOOD GLUCOSE      POCT Blood Glucose.: 152 mg/dL (19 Aug 2023 07:34)      RADIOLOGY & ADDITIONAL TESTS: Reviewed. PGY-1 Progress Note discussed with attending    PAGER #: [[phone]] TILL 5:00 PM  PLEASE CONTACT ON CALL TEAM:  - On Call Team (Please refer to Tyler) FROM 5:00 PM - 8:30PM  - Nightfloat Team FROM 8:30 -7:30 AM    CC: Patient is a 77y old  Female who presents with a chief complaint of Altered mental status. (18 Aug 2023 14:35)      OVERNIGHT EVENTS:    SUBJECTIVE / INTERVAL HPI: Patient seen and examined at bedside.     ROS:  CONSTITUTIONAL: No weakness, fevers or chills  EYES/ENT: No visual changes;  No vertigo or throat pain   NECK: No pain or stiffness  RESPIRATORY: No cough, wheezing, hemoptysis; No shortness of breath  CARDIOVASCULAR: No chest pain or palpitations  GASTROINTESTINAL: No abdominal or epigastric pain. No nausea, vomiting, or hematemesis; No diarrhea or constipation. No melena or hematochezia.  GENITOURINARY: No dysuria, frequency or hematuria  NEUROLOGICAL: No numbness or weakness  SKIN: No itching, burning, rashes, or lesions     VITAL SIGNS:  Vital Signs Last 24 Hrs  T(C): 37.1 (19 Aug 2023 05:26), Max: 37.1 (19 Aug 2023 05:26)  T(F): 98.8 (19 Aug 2023 05:26), Max: 98.8 (19 Aug 2023 05:26)  HR: 86 (19 Aug 2023 05:26) (79 - 98)  BP: 128/68 (19 Aug 2023 05:26) (115/66 - 130/62)  BP(mean): --  RR: 18 (19 Aug 2023 05:26) (18 - 18)  SpO2: 98% (19 Aug 2023 05:26) (94% - 99%)    Parameters below as of 19 Aug 2023 05:26  Patient On (Oxygen Delivery Method): room air        PHYSICAL EXAM:    General: WDWN  HEENT: NC/AT; PERRL, anicteric sclera; MMM  Neck: supple  Cardiovascular: +S1/S2; RRR  Respiratory: CTA B/L; no W/R/R  Gastrointestinal: soft, NT/ND; +BSx4  Extremities: WWP; no edema, clubbing or cyanosis  Vascular: 2+ radial, DP/PT pulses B/L  Skin: Warm, dry, good turgor, no rashes, or ecchymoses  Neurological: AAOx3; no focal deficits    MEDICATIONS:  MEDICATIONS  (STANDING):  aspirin  chewable 324 milliGRAM(s) Oral daily  atorvastatin 20 milliGRAM(s) Oral at bedtime  carbidopa/levodopa  25/100 1 Tablet(s) Oral <User Schedule>  cefepime   IVPB 1000 milliGRAM(s) IV Intermittent every 24 hours  cefepime   IVPB      chlorhexidine 2% Cloths 1 Application(s) Topical daily  cloNIDine Patch 0.1 mG/24Hr(s) 1 patch Transdermal every 7 days  metoprolol tartrate 25 milliGRAM(s) Oral two times a day  pantoprazole   Suspension 40 milliGRAM(s) Oral daily  valproate sodium  IVPB 500 milliGRAM(s) IV Intermittent every 8 hours    MEDICATIONS  (PRN):  acetaminophen   Oral Liquid .. 650 milliGRAM(s) Enteral Tube every 6 hours PRN Temp greater or equal to 38C (100.4F)  morphine  - Injectable 2 milliGRAM(s) IV Push every 6 hours PRN Severe Pain (7 - 10)      ALLERGIES:  Allergies    &quot; NATURAL RUBBER&quot; (Other)  latex (Other)  penicillins (Unknown)    Intolerances        LABS:                        7.6    17.11 )-----------( 266      ( 19 Aug 2023 07:42 )             24.4     08-19    136  |  108  |  44<H>  ----------------------------<  136<H>  5.4<H>   |  18<L>  |  2.15<H>    Ca    7.5<L>      19 Aug 2023 07:42  Phos  4.2     08-19  Mg     2.4     08-19    TPro  6.0  /  Alb  1.5<L>  /  TBili  0.3  /  DBili  x   /  AST  186<H>  /  ALT  20  /  AlkPhos  243<H>  08-19      Urinalysis Basic - ( 19 Aug 2023 07:42 )    Color: x / Appearance: x / SG: x / pH: x  Gluc: 136 mg/dL / Ketone: x  / Bili: x / Urobili: x   Blood: x / Protein: x / Nitrite: x   Leuk Esterase: x / RBC: x / WBC x   Sq Epi: x / Non Sq Epi: x / Bacteria: x      CAPILLARY BLOOD GLUCOSE      POCT Blood Glucose.: 152 mg/dL (19 Aug 2023 07:34)      RADIOLOGY & ADDITIONAL TESTS: Reviewed. PGY-1 Progress Note discussed with attending    PAGER #: [[phone]] TILL 5:00 PM  PLEASE CONTACT ON CALL TEAM:  - On Call Team (Please refer to Tyler) FROM 5:00 PM - 8:30PM  - Nightfloat Team FROM 8:30 -7:30 AM    CC: Patient is a 77y old  Female who presents with a chief complaint of Altered mental status. (18 Aug 2023 14:35)      OVERNIGHT EVENTS: none    SUBJECTIVE / INTERVAL HPI: Patient seen and examined at bedside. Minimally responsive. No acute distress.     ROS: Unable to obtain ROS due to mental status    VITAL SIGNS:  Vital Signs Last 24 Hrs  T(C): 37.1 (19 Aug 2023 05:26), Max: 37.1 (19 Aug 2023 05:26)  T(F): 98.8 (19 Aug 2023 05:26), Max: 98.8 (19 Aug 2023 05:26)  HR: 86 (19 Aug 2023 05:26) (79 - 98)  BP: 128/68 (19 Aug 2023 05:26) (115/66 - 130/62)  BP(mean): --  RR: 18 (19 Aug 2023 05:26) (18 - 18)  SpO2: 98% (19 Aug 2023 05:26) (94% - 99%)    Parameters below as of 19 Aug 2023 05:26  Patient On (Oxygen Delivery Method): room air        PHYSICAL EXAM:    GENERAL: NAD, well built, non-verbal, mcclellan draining barbara urine  HEAD:  Atraumatic, Normocephalic  EYES:  conjunctiva and sclera clear  CHEST/LUNG: Clear to auscultation. No rales, rhonchi, wheezing, or rubs  HEART: Regular rate and rhythm; No murmurs, rubs, or gallops  ABDOMEN: Soft, Nontender, Nondistended; Bowel sounds present, PEG tube in place with site clean dry and intact  NERVOUS SYSTEM:  Alert & Oriented X0,    EXTREMITIES:  +Edema in all 4 extremities. 2+ Peripheral Pulses, No clubbing, cyanosis,  SKIN: warm dry      MEDICATIONS:  MEDICATIONS  (STANDING):  aspirin  chewable 324 milliGRAM(s) Oral daily  atorvastatin 20 milliGRAM(s) Oral at bedtime  carbidopa/levodopa  25/100 1 Tablet(s) Oral <User Schedule>  cefepime   IVPB 1000 milliGRAM(s) IV Intermittent every 24 hours  cefepime   IVPB      chlorhexidine 2% Cloths 1 Application(s) Topical daily  cloNIDine Patch 0.1 mG/24Hr(s) 1 patch Transdermal every 7 days  metoprolol tartrate 25 milliGRAM(s) Oral two times a day  pantoprazole   Suspension 40 milliGRAM(s) Oral daily  valproate sodium  IVPB 500 milliGRAM(s) IV Intermittent every 8 hours    MEDICATIONS  (PRN):  acetaminophen   Oral Liquid .. 650 milliGRAM(s) Enteral Tube every 6 hours PRN Temp greater or equal to 38C (100.4F)  morphine  - Injectable 2 milliGRAM(s) IV Push every 6 hours PRN Severe Pain (7 - 10)      ALLERGIES:  Allergies    &quot; NATURAL RUBBER&quot; (Other)  latex (Other)  penicillins (Unknown)    Intolerances        LABS:                        7.6    17.11 )-----------( 266      ( 19 Aug 2023 07:42 )             24.4     08-19    136  |  108  |  44<H>  ----------------------------<  136<H>  5.4<H>   |  18<L>  |  2.15<H>    Ca    7.5<L>      19 Aug 2023 07:42  Phos  4.2     08-19  Mg     2.4     08-19    TPro  6.0  /  Alb  1.5<L>  /  TBili  0.3  /  DBili  x   /  AST  186<H>  /  ALT  20  /  AlkPhos  243<H>  08-19      Urinalysis Basic - ( 19 Aug 2023 07:42 )    Color: x / Appearance: x / SG: x / pH: x  Gluc: 136 mg/dL / Ketone: x  / Bili: x / Urobili: x   Blood: x / Protein: x / Nitrite: x   Leuk Esterase: x / RBC: x / WBC x   Sq Epi: x / Non Sq Epi: x / Bacteria: x      CAPILLARY BLOOD GLUCOSE      POCT Blood Glucose.: 152 mg/dL (19 Aug 2023 07:34)      RADIOLOGY & ADDITIONAL TESTS: Reviewed. PGY-1 Progress Note discussed with attending    PAGER #: [[phone]] TILL 5:00 PM  PLEASE CONTACT ON CALL TEAM:  - On Call Team (Please refer to Tyler) FROM 5:00 PM - 8:30PM  - Nightfloat Team FROM 8:30 -7:30 AM    CC: Patient is a 77y old  Female who presents with a chief complaint of Altered mental status. (18 Aug 2023 14:35)      OVERNIGHT EVENTS: none    SUBJECTIVE / INTERVAL HPI: Patient seen and examined at bedside. Minimally responsive. No acute distress.     ROS: Unable to obtain ROS due to mental status    VITAL SIGNS:  Vital Signs Last 24 Hrs  T(C): 37.1 (19 Aug 2023 05:26), Max: 37.1 (19 Aug 2023 05:26)  T(F): 98.8 (19 Aug 2023 05:26), Max: 98.8 (19 Aug 2023 05:26)  HR: 86 (19 Aug 2023 05:26) (79 - 98)  BP: 128/68 (19 Aug 2023 05:26) (115/66 - 130/62)  BP(mean): --  RR: 18 (19 Aug 2023 05:26) (18 - 18)  SpO2: 98% (19 Aug 2023 05:26) (94% - 99%)    Parameters below as of 19 Aug 2023 05:26  Patient On (Oxygen Delivery Method): room air        PHYSICAL EXAM:    GENERAL: NAD, well built, non-verbal, mcclellan draining barbara urine  HEAD:  Atraumatic, Normocephalic  EYES:  conjunctiva and sclera clear  CHEST/LUNG: Clear to auscultation. No rales, rhonchi, wheezing, or rubs  HEART: Regular rate and rhythm; No murmurs, rubs, or gallops  ABDOMEN: Soft, Nontender, Nondistended; Bowel sounds present, PEG tube in place with site clean dry and intact  NERVOUS SYSTEM:  Alert & Oriented X0,    EXTREMITIES:  +Edema in all 4 extremities. 2+ Peripheral Pulses, No clubbing, cyanosis,  SKIN: warm dry      MEDICATIONS:  MEDICATIONS  (STANDING):  aspirin  chewable 324 milliGRAM(s) Oral daily  atorvastatin 20 milliGRAM(s) Oral at bedtime  carbidopa/levodopa  25/100 1 Tablet(s) Oral <User Schedule>  cefepime   IVPB 1000 milliGRAM(s) IV Intermittent every 24 hours  cefepime   IVPB      chlorhexidine 2% Cloths 1 Application(s) Topical daily  cloNIDine Patch 0.1 mG/24Hr(s) 1 patch Transdermal every 7 days  metoprolol tartrate 25 milliGRAM(s) Oral two times a day  pantoprazole   Suspension 40 milliGRAM(s) Oral daily  valproate sodium  IVPB 500 milliGRAM(s) IV Intermittent every 8 hours    MEDICATIONS  (PRN):  acetaminophen   Oral Liquid .. 650 milliGRAM(s) Enteral Tube every 6 hours PRN Temp greater or equal to 38C (100.4F)  morphine  - Injectable 2 milliGRAM(s) IV Push every 6 hours PRN Severe Pain (7 - 10)      ALLERGIES:  Allergies    &quot; NATURAL RUBBER&quot; (Other)  latex (Other)  penicillins (Unknown)    Intolerances        LABS:                        7.6    17.11 )-----------( 266      ( 19 Aug 2023 07:42 )             24.4     08-19    136  |  108  |  44<H>  ----------------------------<  136<H>  5.4<H>   |  18<L>  |  2.15<H>    Ca    7.5<L>      19 Aug 2023 07:42  Phos  4.2     08-19  Mg     2.4     08-19    TPro  6.0  /  Alb  1.5<L>  /  TBili  0.3  /  DBili  x   /  AST  186<H>  /  ALT  20  /  AlkPhos  243<H>  08-19      Urinalysis Basic - ( 19 Aug 2023 07:42 )    Color: x / Appearance: x / SG: x / pH: x  Gluc: 136 mg/dL / Ketone: x  / Bili: x / Urobili: x   Blood: x / Protein: x / Nitrite: x   Leuk Esterase: x / RBC: x / WBC x   Sq Epi: x / Non Sq Epi: x / Bacteria: x      CAPILLARY BLOOD GLUCOSE      POCT Blood Glucose.: 152 mg/dL (19 Aug 2023 07:34)      RADIOLOGY & ADDITIONAL TESTS: Reviewed. PGY-1 Progress Note discussed with attending    PAGER #: [[phone]] TILL 5:00 PM  PLEASE CONTACT ON CALL TEAM:  - On Call Team (Please refer to Tyler) FROM 5:00 PM - 8:30PM  - Nightfloat Team FROM 8:30 -7:30 AM    CC: Patient is a 77y old  Female who presents with a chief complaint of Altered mental status. (18 Aug 2023 14:35)      OVERNIGHT EVENTS: none    SUBJECTIVE / INTERVAL HPI: Patient seen and examined at bedside. Minimally responsive. No acute distress.     ROS: Unable to obtain ROS due to mental status    VITAL SIGNS:  Vital Signs Last 24 Hrs  T(C): 37.1 (19 Aug 2023 05:26), Max: 37.1 (19 Aug 2023 05:26)  T(F): 98.8 (19 Aug 2023 05:26), Max: 98.8 (19 Aug 2023 05:26)  HR: 86 (19 Aug 2023 05:26) (79 - 98)  BP: 128/68 (19 Aug 2023 05:26) (115/66 - 130/62)  BP(mean): --  RR: 18 (19 Aug 2023 05:26) (18 - 18)  SpO2: 98% (19 Aug 2023 05:26) (94% - 99%)    Parameters below as of 19 Aug 2023 05:26  Patient On (Oxygen Delivery Method): room air        PHYSICAL EXAM:    GENERAL: NAD, well built, non-verbal, mcclellan draining barbara urine  HEAD:  Atraumatic, Normocephalic  EYES:  conjunctiva and sclera clear  CHEST/LUNG: Clear to auscultation. No rales, rhonchi, wheezing, or rubs  HEART: Regular rate and rhythm; No murmurs, rubs, or gallops  ABDOMEN: Soft, Nontender, Nondistended; Bowel sounds present, PEG tube in place with site clean dry and intact  NERVOUS SYSTEM:  Alert & Oriented X0,    EXTREMITIES:  +Edema in all 4 extremities. 2+ Peripheral Pulses, No clubbing, cyanosis,  SKIN: warm dry      MEDICATIONS:  MEDICATIONS  (STANDING):  aspirin  chewable 324 milliGRAM(s) Oral daily  atorvastatin 20 milliGRAM(s) Oral at bedtime  carbidopa/levodopa  25/100 1 Tablet(s) Oral <User Schedule>  cefepime   IVPB 1000 milliGRAM(s) IV Intermittent every 24 hours  cefepime   IVPB      chlorhexidine 2% Cloths 1 Application(s) Topical daily  cloNIDine Patch 0.1 mG/24Hr(s) 1 patch Transdermal every 7 days  metoprolol tartrate 25 milliGRAM(s) Oral two times a day  pantoprazole   Suspension 40 milliGRAM(s) Oral daily  valproate sodium  IVPB 500 milliGRAM(s) IV Intermittent every 8 hours    MEDICATIONS  (PRN):  acetaminophen   Oral Liquid .. 650 milliGRAM(s) Enteral Tube every 6 hours PRN Temp greater or equal to 38C (100.4F)  morphine  - Injectable 2 milliGRAM(s) IV Push every 6 hours PRN Severe Pain (7 - 10)      ALLERGIES:  Allergies    &quot; NATURAL RUBBER&quot; (Other)  latex (Other)  penicillins (Unknown)    Intolerances        LABS:                        7.6    17.11 )-----------( 266      ( 19 Aug 2023 07:42 )             24.4     08-19    136  |  108  |  44<H>  ----------------------------<  136<H>  5.4<H>   |  18<L>  |  2.15<H>    Ca    7.5<L>      19 Aug 2023 07:42  Phos  4.2     08-19  Mg     2.4     08-19    TPro  6.0  /  Alb  1.5<L>  /  TBili  0.3  /  DBili  x   /  AST  186<H>  /  ALT  20  /  AlkPhos  243<H>  08-19      Urinalysis Basic - ( 19 Aug 2023 07:42 )    Color: x / Appearance: x / SG: x / pH: x  Gluc: 136 mg/dL / Ketone: x  / Bili: x / Urobili: x   Blood: x / Protein: x / Nitrite: x   Leuk Esterase: x / RBC: x / WBC x   Sq Epi: x / Non Sq Epi: x / Bacteria: x      CAPILLARY BLOOD GLUCOSE      POCT Blood Glucose.: 152 mg/dL (19 Aug 2023 07:34)      RADIOLOGY & ADDITIONAL TESTS: Reviewed.

## 2023-08-19 NOTE — PROGRESS NOTE ADULT - ASSESSMENT
1. Anemia  2. Dysphagia  3. Constipation  4. Failure to thrive  5. No evidence of acute GI bleeding  6. S/p unsuccessful endoscopic Peg placement  7. S/p gastrostomy tube placement by surgery    Suggestions:    1. NPO  2. IVF hydration  3. Monitor electrolytes  4. Continue Peg feeding   5. Protonix daily  6. Aspiration precaution  7. Monitor H/H  8. Transfuse PRBC as needed  9. Avoid NSAID  10. Daily stool softener as needed  11. DVT prophylaxis

## 2023-08-19 NOTE — PROGRESS NOTE ADULT - ASSESSMENT
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 During the hospital course she has Gastrostomy tube placement by Surgery for poor oral intake and Dysphagia. On 8/17/23 she became septic and during sepsis work up found to have positive Urine analysis and started on ceftriaxone. Hence, the ID consult requested to assist with further evaluation and antibiotic management.    # Sepsis  as of 8/17/23 ( Fever + tachycardia + leukocytosis)  # Complicated UTI- s/p exchange Mcclellan catheter on 8/17/23 - s/p removal of mcclellan catheter and placed a primafit on 8/19/23  # s/p CVA with aphasia and dysphagia- s/p  Gastrostomy tube    would recommend:    1. Follow up Urine culture, is in process  2. Monitor WBC count, started trending down  3. Continue Cefepime to cover resistant organisms  4. Aspiration precaution    Attending Attestation:    Spent more than 45 minutes on total encounter, more than 50 % of the visit was spent counseling and/or coordinating care by the Attending physician. Patient is a 77y old  Female who is from [facility] and with PMHx of HTN, HLD, CVA (w/ residual aphasia and R sided hemiparesis/plegia) who was sent to the hospital on 7/27/23, due to altered mental status. During the hospital course she has Gastrostomy tube placement by Surgery for poor oral intake and Dysphagia. On 8/17/23 she became septic and during sepsis work up found to have positive Urine analysis and started on ceftriaxone. Hence, the ID consult requested to assist with further evaluation and antibiotic management.    # Sepsis  as of 8/17/23 ( Fever + tachycardia + leukocytosis)  # Complicated UTI- s/p exchange Mcclellan catheter on 8/17/23 - s/p removal of mcclellan catheter and placed a primafit on 8/19/23  # s/p CVA with aphasia and dysphagia- s/p  Gastrostomy tube    would recommend:    1. Follow up Urine culture, is in process  2. Monitor WBC count, started trending down  3. Continue Cefepime to cover resistant organisms  4. Aspiration precaution    Attending Attestation:    Spent more than 45 minutes on total encounter, more than 50 % of the visit was spent counseling and/or coordinating care by the Attending physician. Patient is a 77y old  Female who is from [facility] and with PMHx of HTN, HLD, CVA (w/ residual aphasia and R sided hemiparesis/plegia) who was sent to the hospital on 7/27/23, due to altered mental status. During the hospital course she has Gastrostomy tube placement by Surgery for poor oral intake and Dysphagia. On 8/17/23 she became septic and during sepsis work up found to have positive Urine analysis and started on ceftriaxone. Hence, the ID consult requested to assist with further evaluation and antibiotic management.    # Sepsis  as of 8/17/23 ( Fever + tachycardia + leukocytosis)  # Complicated UTI- s/p exchange Mcclellan catheter on 8/17/23 - s/p removal of mcclellan catheter and placed a primafit on 8/19/23  # s/p CVA with aphasia and dysphagia- s/p  Gastrostomy tube    would recommend:    1. Follow up Urine culture, is in process  2. Monitor WBC count, started trending down  3. Continue Cefepime to cover resistant organisms  4. Aspiration precaution    Attending Attestation:    Spent more than 45 minutes on total encounter, more than 50 % of the visit was spent counseling and/or coordinating care by the Attending physician.

## 2023-08-19 NOTE — PROGRESS NOTE ADULT - SUBJECTIVE AND OBJECTIVE BOX
[   ] ICU                                          [   ] CCU                                      [ X  ] Medical Floor      Patient is a 77 year old female with dysphagia. GI consulted for Peg tube placement.     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 Patient is not able to provide any history. Patient is lying down in bed, awake and alert. However, patient does not speak, is not able to follow commands and does not turn face or move eyes when her name is called. Patient is admitted with CVA. Now patient with dysphagia, poor po intake, failure to thrive and weight loss. No abdominal pain, nausea, vomiting, hematemesis, hematochezia, melena, fever, chills, chest pain, SOB, cough, hematuria, dysuria  or diarrhea reported.      Patient is comfortable. No new complaints reported, No abdominal pain, N/V, hematemesis, hematochezia, melena, fever, chills, chest pain, SOB, cough or diarrhea reported.      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 1000 milliLiter(s) (70 mL/Hr) IV Continuous <Continuous>  enoxaparin Injectable 30 milliGRAM(s) SubCutaneous every 24 hours  hydrALAZINE Injectable 10 milliGRAM(s) IV Push every 6 hours  potassium chloride  10 mEq/100 mL IVPB 10 milliEquivalent(s) IV Intermittent every 1 hour  valproate sodium  IVPB 500 milliGRAM(s) IV Intermittent every 8 hours         SOCIAL HISTORY  Advanced Directives:       [ X ] Full Code       [  ] DNR  Marital Status:         [  ] M      [ X ] S      [  ] D       [  ] W  Children:       [ X ] Yes      [  ] No  Occupation:        [  ] Employed       [ X ] Unemployed       [  ] Retired  Diet:       [ X ] Regular       [  ] PEG feeding          [  ] NG tube feeding  Drug Use:           [ X ] Patient denied          [  ] Yes  Alcohol:           [ X ] No             [  ] Yes (socially)         [  ] Yes (chronic)  Tobacco:           [  ] Yes           [X  ] No      FAMILY HISTORY  [ X ] Heart Disease            [ X ] Diabetes             [ X ] HTN             [  ] Colon Cancer             [  ] Stomach Cancer              [  ] Pancreatic Cancer      VITALS  Vital Signs Last 24 Hrs  T(C): 37.1 (19 Aug 2023 05:26), Max: 37.1 (19 Aug 2023 05:26)  T(F): 98.8 (19 Aug 2023 05:26), Max: 98.8 (19 Aug 2023 05:26)  HR: 86 (19 Aug 2023 05:26) (79 - 98)  BP: 128/68 (19 Aug 2023 05:26) (115/66 - 130/62)   RR: 18 (19 Aug 2023 05:26) (18 - 18)  SpO2: 98% (19 Aug 2023 05:26) (94% - 99%)  Parameters below as of 19 Aug 2023 05:26  Patient On (Oxygen Delivery Method): room air       MEDICATIONS  (STANDING):  aspirin  chewable 324 milliGRAM(s) Oral daily  atorvastatin 20 milliGRAM(s) Oral at bedtime  carbidopa/levodopa  25/100 1 Tablet(s) Oral <User Schedule>  cefepime   IVPB 1000 milliGRAM(s) IV Intermittent every 24 hours  cefepime   IVPB      chlorhexidine 2% Cloths 1 Application(s) Topical daily  cloNIDine Patch 0.1 mG/24Hr(s) 1 patch Transdermal every 7 days  metoprolol tartrate 25 milliGRAM(s) Oral two times a day  pantoprazole   Suspension 40 milliGRAM(s) Oral daily  sodium zirconium cyclosilicate 10 Gram(s) Oral once  valproate sodium  IVPB 500 milliGRAM(s) IV Intermittent every 8 hours    MEDICATIONS  (PRN):  acetaminophen   Oral Liquid .. 650 milliGRAM(s) Enteral Tube every 6 hours PRN Temp greater or equal to 38C (100.4F)  morphine  - Injectable 2 milliGRAM(s) IV Push every 6 hours PRN Severe Pain (7 - 10)                            7.6    17.11 )-----------( 266      ( 19 Aug 2023 07:42 )             24.4       08-19    136  |  108  |  44<H>  ----------------------------<  136<H>  5.4<H>   |  18<L>  |  2.15<H>    Ca    7.5<L>      19 Aug 2023 07:42  Phos  4.2     08-19  Mg     2.4     08-19    TPro  6.0  /  Alb  1.5<L>  /  TBili  0.3  /  DBili  x   /  AST  186<H>  /  ALT  20  /  AlkPhos  243<H>  08-19       [   ] ICU                                          [   ] CCU                                      [ X  ] Medical Floor      Patient is a 77 year old female with dysphagia. GI consulted for Peg tube placement.     Patient is a 77 female from [facility] with past medical history significant for HTN, HLD, CVA (w/ residual aphasia and R sided hemiparesis/plegia) who was sent to the emergency room with for altered mental status. Patient is not able to provide any history. Patient is lying down in bed, awake and alert. However, patient does not speak, is not able to follow commands and does not turn face or move eyes when her name is called. Patient is admitted with CVA. Now patient with dysphagia, poor po intake, failure to thrive and weight loss. No abdominal pain, nausea, vomiting, hematemesis, hematochezia, melena, fever, chills, chest pain, SOB, cough, hematuria, dysuria  or diarrhea reported.      Patient is comfortable. No new complaints reported, No abdominal pain, N/V, hematemesis, hematochezia, melena, fever, chills, chest pain, SOB, cough or diarrhea reported.      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 1000 milliLiter(s) (70 mL/Hr) IV Continuous <Continuous>  enoxaparin Injectable 30 milliGRAM(s) SubCutaneous every 24 hours  hydrALAZINE Injectable 10 milliGRAM(s) IV Push every 6 hours  potassium chloride  10 mEq/100 mL IVPB 10 milliEquivalent(s) IV Intermittent every 1 hour  valproate sodium  IVPB 500 milliGRAM(s) IV Intermittent every 8 hours         SOCIAL HISTORY  Advanced Directives:       [ X ] Full Code       [  ] DNR  Marital Status:         [  ] M      [ X ] S      [  ] D       [  ] W  Children:       [ X ] Yes      [  ] No  Occupation:        [  ] Employed       [ X ] Unemployed       [  ] Retired  Diet:       [ X ] Regular       [  ] PEG feeding          [  ] NG tube feeding  Drug Use:           [ X ] Patient denied          [  ] Yes  Alcohol:           [ X ] No             [  ] Yes (socially)         [  ] Yes (chronic)  Tobacco:           [  ] Yes           [X  ] No      FAMILY HISTORY  [ X ] Heart Disease            [ X ] Diabetes             [ X ] HTN             [  ] Colon Cancer             [  ] Stomach Cancer              [  ] Pancreatic Cancer      VITALS  Vital Signs Last 24 Hrs  T(C): 37.1 (19 Aug 2023 05:26), Max: 37.1 (19 Aug 2023 05:26)  T(F): 98.8 (19 Aug 2023 05:26), Max: 98.8 (19 Aug 2023 05:26)  HR: 86 (19 Aug 2023 05:26) (79 - 98)  BP: 128/68 (19 Aug 2023 05:26) (115/66 - 130/62)   RR: 18 (19 Aug 2023 05:26) (18 - 18)  SpO2: 98% (19 Aug 2023 05:26) (94% - 99%)  Parameters below as of 19 Aug 2023 05:26  Patient On (Oxygen Delivery Method): room air       MEDICATIONS  (STANDING):  aspirin  chewable 324 milliGRAM(s) Oral daily  atorvastatin 20 milliGRAM(s) Oral at bedtime  carbidopa/levodopa  25/100 1 Tablet(s) Oral <User Schedule>  cefepime   IVPB 1000 milliGRAM(s) IV Intermittent every 24 hours  cefepime   IVPB      chlorhexidine 2% Cloths 1 Application(s) Topical daily  cloNIDine Patch 0.1 mG/24Hr(s) 1 patch Transdermal every 7 days  metoprolol tartrate 25 milliGRAM(s) Oral two times a day  pantoprazole   Suspension 40 milliGRAM(s) Oral daily  sodium zirconium cyclosilicate 10 Gram(s) Oral once  valproate sodium  IVPB 500 milliGRAM(s) IV Intermittent every 8 hours    MEDICATIONS  (PRN):  acetaminophen   Oral Liquid .. 650 milliGRAM(s) Enteral Tube every 6 hours PRN Temp greater or equal to 38C (100.4F)  morphine  - Injectable 2 milliGRAM(s) IV Push every 6 hours PRN Severe Pain (7 - 10)                            7.6    17.11 )-----------( 266      ( 19 Aug 2023 07:42 )             24.4       08-19    136  |  108  |  44<H>  ----------------------------<  136<H>  5.4<H>   |  18<L>  |  2.15<H>    Ca    7.5<L>      19 Aug 2023 07:42  Phos  4.2     08-19  Mg     2.4     08-19    TPro  6.0  /  Alb  1.5<L>  /  TBili  0.3  /  DBili  x   /  AST  186<H>  /  ALT  20  /  AlkPhos  243<H>  08-19       [   ] ICU                                          [   ] CCU                                      [ X  ] Medical Floor      Patient is a 77 year old female with dysphagia. GI consulted for Peg tube placement.     Patient is a 77 female from [facility] with past medical history significant for HTN, HLD, CVA (w/ residual aphasia and R sided hemiparesis/plegia) who was sent to the emergency room with for altered mental status. Patient is not able to provide any history. Patient is lying down in bed, awake and alert. However, patient does not speak, is not able to follow commands and does not turn face or move eyes when her name is called. Patient is admitted with CVA. Now patient with dysphagia, poor po intake, failure to thrive and weight loss. No abdominal pain, nausea, vomiting, hematemesis, hematochezia, melena, fever, chills, chest pain, SOB, cough, hematuria, dysuria  or diarrhea reported.      Patient is comfortable. No new complaints reported, No abdominal pain, N/V, hematemesis, hematochezia, melena, fever, chills, chest pain, SOB, cough or diarrhea reported.      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 1000 milliLiter(s) (70 mL/Hr) IV Continuous <Continuous>  enoxaparin Injectable 30 milliGRAM(s) SubCutaneous every 24 hours  hydrALAZINE Injectable 10 milliGRAM(s) IV Push every 6 hours  potassium chloride  10 mEq/100 mL IVPB 10 milliEquivalent(s) IV Intermittent every 1 hour  valproate sodium  IVPB 500 milliGRAM(s) IV Intermittent every 8 hours         SOCIAL HISTORY  Advanced Directives:       [ X ] Full Code       [  ] DNR  Marital Status:         [  ] M      [ X ] S      [  ] D       [  ] W  Children:       [ X ] Yes      [  ] No  Occupation:        [  ] Employed       [ X ] Unemployed       [  ] Retired  Diet:       [ X ] Regular       [  ] PEG feeding          [  ] NG tube feeding  Drug Use:           [ X ] Patient denied          [  ] Yes  Alcohol:           [ X ] No             [  ] Yes (socially)         [  ] Yes (chronic)  Tobacco:           [  ] Yes           [X  ] No      FAMILY HISTORY  [ X ] Heart Disease            [ X ] Diabetes             [ X ] HTN             [  ] Colon Cancer             [  ] Stomach Cancer              [  ] Pancreatic Cancer      VITALS  Vital Signs Last 24 Hrs  T(C): 37.1 (19 Aug 2023 05:26), Max: 37.1 (19 Aug 2023 05:26)  T(F): 98.8 (19 Aug 2023 05:26), Max: 98.8 (19 Aug 2023 05:26)  HR: 86 (19 Aug 2023 05:26) (79 - 98)  BP: 128/68 (19 Aug 2023 05:26) (115/66 - 130/62)   RR: 18 (19 Aug 2023 05:26) (18 - 18)  SpO2: 98% (19 Aug 2023 05:26) (94% - 99%)  Parameters below as of 19 Aug 2023 05:26  Patient On (Oxygen Delivery Method): room air       MEDICATIONS  (STANDING):  aspirin  chewable 324 milliGRAM(s) Oral daily  atorvastatin 20 milliGRAM(s) Oral at bedtime  carbidopa/levodopa  25/100 1 Tablet(s) Oral <User Schedule>  cefepime   IVPB 1000 milliGRAM(s) IV Intermittent every 24 hours  cefepime   IVPB      chlorhexidine 2% Cloths 1 Application(s) Topical daily  cloNIDine Patch 0.1 mG/24Hr(s) 1 patch Transdermal every 7 days  metoprolol tartrate 25 milliGRAM(s) Oral two times a day  pantoprazole   Suspension 40 milliGRAM(s) Oral daily  sodium zirconium cyclosilicate 10 Gram(s) Oral once  valproate sodium  IVPB 500 milliGRAM(s) IV Intermittent every 8 hours    MEDICATIONS  (PRN):  acetaminophen   Oral Liquid .. 650 milliGRAM(s) Enteral Tube every 6 hours PRN Temp greater or equal to 38C (100.4F)  morphine  - Injectable 2 milliGRAM(s) IV Push every 6 hours PRN Severe Pain (7 - 10)                            7.6    17.11 )-----------( 266      ( 19 Aug 2023 07:42 )             24.4       08-19    136  |  108  |  44<H>  ----------------------------<  136<H>  5.4<H>   |  18<L>  |  2.15<H>    Ca    7.5<L>      19 Aug 2023 07:42  Phos  4.2     08-19  Mg     2.4     08-19    TPro  6.0  /  Alb  1.5<L>  /  TBili  0.3  /  DBili  x   /  AST  186<H>  /  ALT  20  /  AlkPhos  243<H>  08-19

## 2023-08-19 NOTE — PROGRESS NOTE ADULT - SUBJECTIVE AND OBJECTIVE BOX
Patient is seen and examined at the bed side, is afebrile. The Blood cultures from 8/17 have NGTD and urine culture is in process. She is s/p removal of mcclellan catheter and placed a primafit.       REVIEW OF SYSTEMS: Unable to obtain due  to mental status       ALLERGIES: &quot; NATURAL RUBBER&quot; (Other)  latex (Other)  penicillins (Unknown)      Vital Signs Last 24 Hrs  T(C): 36.7 (19 Aug 2023 13:37), Max: 37.1 (19 Aug 2023 05:26)  T(F): 98.1 (19 Aug 2023 13:37), Max: 98.8 (19 Aug 2023 05:26)  HR: 90 (19 Aug 2023 18:50) (79 - 90)  BP: 134/64 (19 Aug 2023 18:50) (115/66 - 134/64)  BP(mean): --  RR: 18 (19 Aug 2023 13:37) (18 - 18)  SpO2: 95% (19 Aug 2023 13:37) (95% - 99%)    Parameters below as of 19 Aug 2023 13:37  Patient On (Oxygen Delivery Method): room air        PHYSICAL EXAM:  GENERAL: Not in acute distress   CHEST/LUNG:  Not using accessory muscles   HEART: s1 and s2 present  ABDOMEN:  grossly obese  EXTREMITIES: edematous  CNS: Awake, somewhat alert but Non verbal       LABS:                                   7.6    17.11 )-----------( 266      ( 19 Aug 2023 07:42 )             24.4                8.4    19.66 )-----------( 291      ( 18 Aug 2023 07:20 )             26.6       08-19    136  |  108  |  44<H>  ----------------------------<  136<H>  5.4<H>   |  18<L>  |  2.15<H>    Ca    7.5<L>      19 Aug 2023 07:42  Phos  4.2     08-19  Mg     2.4     08-19    TPro  6.0  /  Alb  1.5<L>  /  TBili  0.3  /  DBili  x   /  AST  186<H>  /  ALT  20  /  AlkPhos  243<H>  08-19 08-18    136  |  109<H>  |  39<H>  ----------------------------<  129<H>  5.2   |  18<L>  |  2.27<H>    Ca    7.5<L>      18 Aug 2023 11:20  Phos  3.9     08-18  Mg     2.2     08-18    TPro  6.4  /  Alb  1.7<L>  /  TBili  0.4  /  DBili  x   /  AST  230<H>  /  ALT  20  /  AlkPhos  241<H>  08-18      CAPILLARY BLOOD GLUCOSE  POCT Blood Glucose.: 144 mg/dL (18 Aug 2023 11:49)  POCT Blood Glucose.: 122 mg/dL (18 Aug 2023 00:03)      Urinalysis Basic - ( 18 Aug 2023 11:20 )  Color: x / Appearance: x / SG: x / pH: x  Gluc: 129 mg/dL / Ketone: x  / Bili: x / Urobili: x   Blood: x / Protein: x / Nitrite: x   Leuk Esterase: x / RBC: x / WBC x   Sq Epi: x / Non Sq Epi: x / Bacteria: x        MEDICATIONS  (STANDING):    aspirin  chewable 324 milliGRAM(s) Oral daily  atorvastatin 20 milliGRAM(s) Oral at bedtime  carbidopa/levodopa  25/100 1 Tablet(s) Oral <User Schedule>  cefepime   IVPB 1000 milliGRAM(s) IV Intermittent every 24 hours  cefepime   IVPB      chlorhexidine 2% Cloths 1 Application(s) Topical daily  cloNIDine Patch 0.1 mG/24Hr(s) 1 patch Transdermal every 7 days  metoprolol tartrate 25 milliGRAM(s) Oral two times a day  pantoprazole   Suspension 40 milliGRAM(s) Oral daily  valproate sodium  IVPB 500 milliGRAM(s) IV Intermittent every 8 hours      RADIOLOGY & ADDITIONAL TESTS:    8/18/23 : US Abdomen Liver Followup (08.18.23 @ 14:47) IMPRESSION: Subtle diffuse heterogeneous echotexture of the liver   parenchyma, which may represent underlying hepatocellular disease.        MICROBIOLOGY DATA:  Culture - Blood (08.17.23 @ 14:36)   Specimen Source: .Blood Blood-Peripheral  Culture Results: No growth at 24 hours    Culture - Blood (08.17.23 @ 14:35)   Specimen Source: .Blood Blood-Peripheral  Culture Results: No growth at 24 hours      Urine Microscopic-Add On (NC) (08.17.23 @ 18:40)   Red Blood Cell - Urine: 150 /HPF  White Blood Cell - Urine: 30 /HPF  Bacteria: Few /HPF  Squamous Epithelial Cells: Present    Urine Microscopic-Add On (NC) (08.17.23 @ 11:35)   Squamous Epithelial Cells: None Seen  Bacteria: Negative /HPF  Comment - Urine: Moderate budding yeast and yeast-like cells  White Blood Cell - Urine: >50 /HPF  Red Blood Cell - Urine: >50 /HPF      COVID-19 PCR . (08.16.23 @ 17:40)   COVID-19 PCR: NotDetec:

## 2023-08-19 NOTE — PROGRESS NOTE ADULT - SUBJECTIVE AND OBJECTIVE BOX
NEPHROLOGY MEDICAL CARE, [facility] - Dr. Ajay Green/ Dr. Mert Madison/ Dr. Chris Calderon/ Dr. Carson Cook    Date of Service: 08-19-23 @ 11:15    Patient was seen and examined at bedside.    CC: patient is NAD    Vital Signs Last 24 Hrs  T(C): 37.1 (19 Aug 2023 05:26), Max: 37.1 (19 Aug 2023 05:26)  T(F): 98.8 (19 Aug 2023 05:26), Max: 98.8 (19 Aug 2023 05:26)  HR: 86 (19 Aug 2023 05:26) (79 - 98)  BP: 128/68 (19 Aug 2023 05:26) (115/66 - 130/62)  BP(mean): --  RR: 18 (19 Aug 2023 05:26) (18 - 18)  SpO2: 98% (19 Aug 2023 05:26) (94% - 99%)    Parameters below as of 19 Aug 2023 05:26  Patient On (Oxygen Delivery Method): room air        08-18 @ 07:01  -  08-19 @ 07:00  --------------------------------------------------------  IN: 420 mL / OUT: 850 mL / NET: -430 mL        PHYSICAL EXAM:    General: No acute respiratory distress.  Eyes: conjunctiva and sclera clear  ENMT: Atraumatic, Normocephalic; Moist mucous membranes  Respiratory: Bilateral clear lungs; No rales, rhonchi, wheezing  Cardiovascular: S1S2+; no m/r/g  Gastrointestinal: Soft, Non-tender, Nondistended; Bowel sounds present; PEG  Neuro: eyes are open; hemiparesis; non-verbal  Ext:  1+pedal edema, No Cyanosis  Skin: No visible rashes    MEDICATIONS:  MEDICATIONS  (STANDING):  aspirin  chewable 324 milliGRAM(s) Oral daily  atorvastatin 20 milliGRAM(s) Oral at bedtime  carbidopa/levodopa  25/100 1 Tablet(s) Oral <User Schedule>  cefepime   IVPB 1000 milliGRAM(s) IV Intermittent every 24 hours  cefepime   IVPB      chlorhexidine 2% Cloths 1 Application(s) Topical daily  cloNIDine Patch 0.1 mG/24Hr(s) 1 patch Transdermal every 7 days  metoprolol tartrate 25 milliGRAM(s) Oral two times a day  pantoprazole   Suspension 40 milliGRAM(s) Oral daily  valproate sodium  IVPB 500 milliGRAM(s) IV Intermittent every 8 hours    MEDICATIONS  (PRN):  acetaminophen   Oral Liquid .. 650 milliGRAM(s) Enteral Tube every 6 hours PRN Temp greater or equal to 38C (100.4F)  morphine  - Injectable 2 milliGRAM(s) IV Push every 6 hours PRN Severe Pain (7 - 10)          LABS:                        7.6    17.11 )-----------( 266      ( 19 Aug 2023 07:42 )             24.4     08-19    136  |  108  |  44<H>  ----------------------------<  136<H>  5.4<H>   |  18<L>  |  2.15<H>    Ca    7.5<L>      19 Aug 2023 07:42  Phos  4.2     08-19  Mg     2.4     08-19    TPro  6.0  /  Alb  1.5<L>  /  TBili  0.3  /  DBili  x   /  AST  186<H>  /  ALT  20  /  AlkPhos  243<H>  08-19      Urinalysis Basic - ( 19 Aug 2023 07:42 )    Color: x / Appearance: x / SG: x / pH: x  Gluc: 136 mg/dL / Ketone: x  / Bili: x / Urobili: x   Blood: x / Protein: x / Nitrite: x   Leuk Esterase: x / RBC: x / WBC x   Sq Epi: x / Non Sq Epi: x / Bacteria: x      Magnesium: 2.4 mg/dL (08-19 @ 07:42)  Phosphorus: 4.2 mg/dL (08-19 @ 07:42)    Urine studies    PTH and Vit D:         NEPHROLOGY MEDICAL CARE, [facility] - Dr. Ajay Green/ Dr. Mert Madison/ Dr. Chris Calderon/ Dr. Carson Cook    Date of Service: 08-19-23 @ 11:15    Patient was seen and examined at bedside.    CC: patient is NAD    Vital Signs Last 24 Hrs  T(C): 37.1 (19 Aug 2023 05:26), Max: 37.1 (19 Aug 2023 05:26)  T(F): 98.8 (19 Aug 2023 05:26), Max: 98.8 (19 Aug 2023 05:26)  HR: 86 (19 Aug 2023 05:26) (79 - 98)  BP: 128/68 (19 Aug 2023 05:26) (115/66 - 130/62)  BP(mean): --  RR: 18 (19 Aug 2023 05:26) (18 - 18)  SpO2: 98% (19 Aug 2023 05:26) (94% - 99%)    Parameters below as of 19 Aug 2023 05:26  Patient On (Oxygen Delivery Method): room air        08-18 @ 07:01  -  08-19 @ 07:00  --------------------------------------------------------  IN: 420 mL / OUT: 850 mL / NET: -430 mL        PHYSICAL EXAM:    General: No acute respiratory distress.  Eyes: conjunctiva and sclera clear  ENMT: Atraumatic, Normocephalic; Moist mucous membranes  Respiratory: Bilateral clear lungs; No rales, rhonchi, wheezing  Cardiovascular: S1S2+; no m/r/g  Gastrointestinal: Soft, Non-tender, Nondistended; Bowel sounds present; PEG  Neuro: eyes are open; hemiparesis; non-verbal  Ext:  1+pedal edema, No Cyanosis  Skin: No visible rashes    MEDICATIONS:  MEDICATIONS  (STANDING):  aspirin  chewable 324 milliGRAM(s) Oral daily  atorvastatin 20 milliGRAM(s) Oral at bedtime  carbidopa/levodopa  25/100 1 Tablet(s) Oral <User Schedule>  cefepime   IVPB 1000 milliGRAM(s) IV Intermittent every 24 hours  cefepime   IVPB      chlorhexidine 2% Cloths 1 Application(s) Topical daily  cloNIDine Patch 0.1 mG/24Hr(s) 1 patch Transdermal every 7 days  metoprolol tartrate 25 milliGRAM(s) Oral two times a day  pantoprazole   Suspension 40 milliGRAM(s) Oral daily  valproate sodium  IVPB 500 milliGRAM(s) IV Intermittent every 8 hours    MEDICATIONS  (PRN):  acetaminophen   Oral Liquid .. 650 milliGRAM(s) Enteral Tube every 6 hours PRN Temp greater or equal to 38C (100.4F)  morphine  - Injectable 2 milliGRAM(s) IV Push every 6 hours PRN Severe Pain (7 - 10)          LABS:                        7.6    17.11 )-----------( 266      ( 19 Aug 2023 07:42 )             24.4     08-19    136  |  108  |  44<H>  ----------------------------<  136<H>  5.4<H>   |  18<L>  |  2.15<H>    Ca    7.5<L>      19 Aug 2023 07:42  Phos  4.2     08-19  Mg     2.4     08-19    TPro  6.0  /  Alb  1.5<L>  /  TBili  0.3  /  DBili  x   /  AST  186<H>  /  ALT  20  /  AlkPhos  243<H>  08-19      Urinalysis Basic - ( 19 Aug 2023 07:42 )    Color: x / Appearance: x / SG: x / pH: x  Gluc: 136 mg/dL / Ketone: x  / Bili: x / Urobili: x   Blood: x / Protein: x / Nitrite: x   Leuk Esterase: x / RBC: x / WBC x   Sq Epi: x / Non Sq Epi: x / Bacteria: x      Magnesium: 2.4 mg/dL (08-19 @ 07:42)  Phosphorus: 4.2 mg/dL (08-19 @ 07:42)    Urine studies    PTH and Vit D:         NEPHROLOGY MEDICAL CARE, [facility] - Dr. Ajay Green/ Dr. Mert Madison/ Dr. Chris Calderon/ Dr. Carson Cook    Date of Service: 08-19-23 @ 11:15    Patient was seen and examined at bedside.    CC: patient is NAD    Vital Signs Last 24 Hrs  T(C): 37.1 (19 Aug 2023 05:26), Max: 37.1 (19 Aug 2023 05:26)  T(F): 98.8 (19 Aug 2023 05:26), Max: 98.8 (19 Aug 2023 05:26)  HR: 86 (19 Aug 2023 05:26) (79 - 98)  BP: 128/68 (19 Aug 2023 05:26) (115/66 - 130/62)  BP(mean): --  RR: 18 (19 Aug 2023 05:26) (18 - 18)  SpO2: 98% (19 Aug 2023 05:26) (94% - 99%)    Parameters below as of 19 Aug 2023 05:26  Patient On (Oxygen Delivery Method): room air        08-18 @ 07:01  -  08-19 @ 07:00  --------------------------------------------------------  IN: 420 mL / OUT: 850 mL / NET: -430 mL        PHYSICAL EXAM:    General: No acute respiratory distress.  Eyes: conjunctiva and sclera clear  ENMT: Atraumatic, Normocephalic; Moist mucous membranes  Respiratory: Bilateral clear lungs; No rales, rhonchi, wheezing  Cardiovascular: S1S2+; no m/r/g  Gastrointestinal: Soft, Non-tender, Nondistended; Bowel sounds present; PEG  Neuro: eyes are open; hemiparesis; non-verbal  Ext:  1+pedal edema, No Cyanosis  Skin: No visible rashes    MEDICATIONS:  MEDICATIONS  (STANDING):  aspirin  chewable 324 milliGRAM(s) Oral daily  atorvastatin 20 milliGRAM(s) Oral at bedtime  carbidopa/levodopa  25/100 1 Tablet(s) Oral <User Schedule>  cefepime   IVPB 1000 milliGRAM(s) IV Intermittent every 24 hours  cefepime   IVPB      chlorhexidine 2% Cloths 1 Application(s) Topical daily  cloNIDine Patch 0.1 mG/24Hr(s) 1 patch Transdermal every 7 days  metoprolol tartrate 25 milliGRAM(s) Oral two times a day  pantoprazole   Suspension 40 milliGRAM(s) Oral daily  valproate sodium  IVPB 500 milliGRAM(s) IV Intermittent every 8 hours    MEDICATIONS  (PRN):  acetaminophen   Oral Liquid .. 650 milliGRAM(s) Enteral Tube every 6 hours PRN Temp greater or equal to 38C (100.4F)  morphine  - Injectable 2 milliGRAM(s) IV Push every 6 hours PRN Severe Pain (7 - 10)          LABS:                        7.6    17.11 )-----------( 266      ( 19 Aug 2023 07:42 )             24.4     08-19    136  |  108  |  44<H>  ----------------------------<  136<H>  5.4<H>   |  18<L>  |  2.15<H>    Ca    7.5<L>      19 Aug 2023 07:42  Phos  4.2     08-19  Mg     2.4     08-19    TPro  6.0  /  Alb  1.5<L>  /  TBili  0.3  /  DBili  x   /  AST  186<H>  /  ALT  20  /  AlkPhos  243<H>  08-19      Urinalysis Basic - ( 19 Aug 2023 07:42 )    Color: x / Appearance: x / SG: x / pH: x  Gluc: 136 mg/dL / Ketone: x  / Bili: x / Urobili: x   Blood: x / Protein: x / Nitrite: x   Leuk Esterase: x / RBC: x / WBC x   Sq Epi: x / Non Sq Epi: x / Bacteria: x      Magnesium: 2.4 mg/dL (08-19 @ 07:42)  Phosphorus: 4.2 mg/dL (08-19 @ 07:42)    Urine studies    PTH and Vit D:

## 2023-08-19 NOTE — PROGRESS NOTE ADULT - SUBJECTIVE AND OBJECTIVE BOX
Date of Service 08-19-23 @ 11:49    CHIEF COMPLAINT:Patient is a 77y old  Female who presents with a chief complaint of Altered mental status. Pt appears comfortable.    	  REVIEW OF SYSTEMS:  	  [ x] Unable to obtain    PHYSICAL EXAM:  T(C): 37.1 (08-19-23 @ 05:26), Max: 37.1 (08-19-23 @ 05:26)  HR: 86 (08-19-23 @ 05:26) (79 - 98)  BP: 128/68 (08-19-23 @ 05:26) (115/66 - 130/62)  RR: 18 (08-19-23 @ 05:26) (18 - 18)  SpO2: 98% (08-19-23 @ 05:26) (94% - 99%)  Wt(kg): --  I&O's Summary    18 Aug 2023 07:01  -  19 Aug 2023 07:00  --------------------------------------------------------  IN: 420 mL / OUT: 850 mL / NET: -430 mL        Appearance: Normal	  HEENT:   Normal oral mucosa, PERRL, EOMI	  Lymphatic: No lymphadenopathy  Cardiovascular: Normal S1 S2, No JVD, No murmurs, No edema  Respiratory: Lungs clear to auscultation	  Gastrointestinal:  Soft, Non-tender, + BS	  Skin: No rashes, No ecchymoses, No cyanosis	  Extremities: Normal range of motion, No clubbing, cyanosis or edema  Vascular: Peripheral pulses palpable 2+ bilaterally    MEDICATIONS  (STANDING):  aspirin  chewable 324 milliGRAM(s) Oral daily  atorvastatin 20 milliGRAM(s) Oral at bedtime  carbidopa/levodopa  25/100 1 Tablet(s) Oral <User Schedule>  cefepime   IVPB 1000 milliGRAM(s) IV Intermittent every 24 hours  cefepime   IVPB      chlorhexidine 2% Cloths 1 Application(s) Topical daily  cloNIDine Patch 0.1 mG/24Hr(s) 1 patch Transdermal every 7 days  metoprolol tartrate 25 milliGRAM(s) Oral two times a day  pantoprazole   Suspension 40 milliGRAM(s) Oral daily  sodium zirconium cyclosilicate 10 Gram(s) Oral once  valproate sodium  IVPB 500 milliGRAM(s) IV Intermittent every 8 hours       	  	  LABS:	 	    Blood cx-.                              7.6    17.11 )-----------( 266      ( 19 Aug 2023 07:42 )             24.4     08-19    136  |  108  |  44<H>  ----------------------------<  136<H>  5.4<H>   |  18<L>  |  2.15<H>    Ca    7.5<L>      19 Aug 2023 07:42  Phos  4.2     08-19  Mg     2.4     08-19    TPro  6.0  /  Alb  1.5<L>  /  TBili  0.3  /  DBili  x   /  AST  186<H>  /  ALT  20  /  AlkPhos  243<H>  08-19    proBNP:   Lipid Profile: Cholesterol 152  LDL --  HDL 51  TG 93  Ldl calc 82  Ratio --    HgA1c:   TSH: Thyroid Stimulating Hormone, Serum: 3.59 uU/mL (07-28 @ 05:03)      	  < from: US Abdomen Liver Followup (08.18.23 @ 14:47) >  ACC: [id number] EXAM:  US ABD LIVER FOLLOWUP   ORDERED BY: JOLENE KRUSE     PROCEDURE DATE:  08/18/2023          INTERPRETATION:  CLINICAL INFORMATION: Transaminitis.    COMPARISON: Noncontrast CT abdomen and pelvis dated 8/4/3, renal   ultrasound dated 7/30/2023.    TECHNIQUE: Sonography of the right upper quadrant.    FINDINGS:    LIVER: Liver measures 14.2 cm. There is subtle diffuse heterogeneous   echotexture of the liver parenchyma without evidence of discrete lesion.   There are normal arterial waveforms within the hepatic artery.  BILE DUCTS: Normal caliber. Common bile duct measures 5 mm.  GALLBLADDER: Borderline thickening of the gallbladder wall to 3 mm which   may be related to adjacent hepatocellular disease. No gallstones are   identified. There is no pericholecystic fluid..  PANCREAS: Poorly visualized.  RIGHT KIDNEY: 10.0 x 4.2 x 4.7 cm. No hydronephrosis. No renal masses or   calculi.  ASCITES: None.  IVC: Visualized portions are within normal limits.    MISCELLANEOUS: There is a trace right pleural effusion.    IMPRESSION: Subtle diffuse heterogeneous echotexture of the liver   parenchyma, which may represent underlying hepatocellular disease.    --- End of Report ---           GARFIELD BRADSHAW DO; Resident Radiologist  This document has been electronically signed.  EDWIN STEIN MD; Attending Radiologist  This document has been electronically signed. Aug 18 2023  4:11PM    < end of copied text >         Date of Service 08-19-23 @ 11:49    CHIEF COMPLAINT:Patient is a 77y old  Female who presents with a chief complaint of Altered mental status. Pt appears comfortable.    	  REVIEW OF SYSTEMS:  	  [ x] Unable to obtain    PHYSICAL EXAM:  T(C): 37.1 (08-19-23 @ 05:26), Max: 37.1 (08-19-23 @ 05:26)  HR: 86 (08-19-23 @ 05:26) (79 - 98)  BP: 128/68 (08-19-23 @ 05:26) (115/66 - 130/62)  RR: 18 (08-19-23 @ 05:26) (18 - 18)  SpO2: 98% (08-19-23 @ 05:26) (94% - 99%)  Wt(kg): --  I&O's Summary    18 Aug 2023 07:01  -  19 Aug 2023 07:00  --------------------------------------------------------  IN: 420 mL / OUT: 850 mL / NET: -430 mL        Appearance: Normal	  HEENT:   Normal oral mucosa, PERRL, EOMI	  Lymphatic: No lymphadenopathy  Cardiovascular: Normal S1 S2, No JVD, No murmurs, No edema  Respiratory: Lungs clear to auscultation	  Gastrointestinal:  Soft, Non-tender, + BS	  Skin: No rashes, No ecchymoses, No cyanosis	  Extremities: Normal range of motion, No clubbing, cyanosis or edema  Vascular: Peripheral pulses palpable 2+ bilaterally    MEDICATIONS  (STANDING):  aspirin  chewable 324 milliGRAM(s) Oral daily  atorvastatin 20 milliGRAM(s) Oral at bedtime  carbidopa/levodopa  25/100 1 Tablet(s) Oral <User Schedule>  cefepime   IVPB 1000 milliGRAM(s) IV Intermittent every 24 hours  cefepime   IVPB      chlorhexidine 2% Cloths 1 Application(s) Topical daily  cloNIDine Patch 0.1 mG/24Hr(s) 1 patch Transdermal every 7 days  metoprolol tartrate 25 milliGRAM(s) Oral two times a day  pantoprazole   Suspension 40 milliGRAM(s) Oral daily  sodium zirconium cyclosilicate 10 Gram(s) Oral once  valproate sodium  IVPB 500 milliGRAM(s) IV Intermittent every 8 hours       	  	  LABS:	 	    Blood cx-.                              7.6    17.11 )-----------( 266      ( 19 Aug 2023 07:42 )             24.4     08-19    136  |  108  |  44<H>  ----------------------------<  136<H>  5.4<H>   |  18<L>  |  2.15<H>    Ca    7.5<L>      19 Aug 2023 07:42  Phos  4.2     08-19  Mg     2.4     08-19    TPro  6.0  /  Alb  1.5<L>  /  TBili  0.3  /  DBili  x   /  AST  186<H>  /  ALT  20  /  AlkPhos  243<H>  08-19    proBNP:   Lipid Profile: Cholesterol 152  LDL --  HDL 51  TG 93  Ldl calc 82  Ratio --    HgA1c:   TSH: Thyroid Stimulating Hormone, Serum: 3.59 uU/mL (07-28 @ 05:03)      	  < from: US Abdomen Liver Followup (08.18.23 @ 14:47) >  ACC: [id number] EXAM:  US ABD LIVER FOLLOWUP   ORDERED BY: JOLENE KRUSE     PROCEDURE DATE:  08/18/2023          INTERPRETATION:  CLINICAL INFORMATION: Transaminitis.    COMPARISON: Noncontrast CT abdomen and pelvis dated 8/4/3, renal   ultrasound dated 7/30/2023.    TECHNIQUE: Sonography of the right upper quadrant.    FINDINGS:    LIVER: Liver measures 14.2 cm. There is subtle diffuse heterogeneous   echotexture of the liver parenchyma without evidence of discrete lesion.   There are normal arterial waveforms within the hepatic artery.  BILE DUCTS: Normal caliber. Common bile duct measures 5 mm.  GALLBLADDER: Borderline thickening of the gallbladder wall to 3 mm which   may be related to adjacent hepatocellular disease. No gallstones are   identified. There is no pericholecystic fluid..  PANCREAS: Poorly visualized.  RIGHT KIDNEY: 10.0 x 4.2 x 4.7 cm. No hydronephrosis. No renal masses or   calculi.  ASCITES: None.  IVC: Visualized portions are within normal limits.    MISCELLANEOUS: There is a trace right pleural effusion.    IMPRESSION: Subtle diffuse heterogeneous echotexture of the liver   parenchyma, which may represent underlying hepatocellular disease.    --- End of Report ---           GARFIELD BRADSHAW DO; Resident Radiologist  This document has been electronically signed.  EDWIN STEIN MD; Attending Radiologist  This document has been electronically signed. Aug 18 2023  4:11PM    < end of copied text >         Date of Service 08-19-23 @ 11:49    CHIEF COMPLAINT:Patient is a 77y old  Female who presents with a chief complaint of Altered mental status. Pt appears comfortable.    	  REVIEW OF SYSTEMS:  	  [ x] Unable to obtain    PHYSICAL EXAM:  T(C): 37.1 (08-19-23 @ 05:26), Max: 37.1 (08-19-23 @ 05:26)  HR: 86 (08-19-23 @ 05:26) (79 - 98)  BP: 128/68 (08-19-23 @ 05:26) (115/66 - 130/62)  RR: 18 (08-19-23 @ 05:26) (18 - 18)  SpO2: 98% (08-19-23 @ 05:26) (94% - 99%)  Wt(kg): --  I&O's Summary    18 Aug 2023 07:01  -  19 Aug 2023 07:00  --------------------------------------------------------  IN: 420 mL / OUT: 850 mL / NET: -430 mL        Appearance: Normal	  HEENT:   Normal oral mucosa, PERRL, EOMI	  Lymphatic: No lymphadenopathy  Cardiovascular: Normal S1 S2, No JVD, No murmurs, No edema  Respiratory: Lungs clear to auscultation	  Gastrointestinal:  Soft, Non-tender, + BS	  Skin: No rashes, No ecchymoses, No cyanosis	  Extremities: Normal range of motion, No clubbing, cyanosis or edema  Vascular: Peripheral pulses palpable 2+ bilaterally    MEDICATIONS  (STANDING):  aspirin  chewable 324 milliGRAM(s) Oral daily  atorvastatin 20 milliGRAM(s) Oral at bedtime  carbidopa/levodopa  25/100 1 Tablet(s) Oral <User Schedule>  cefepime   IVPB 1000 milliGRAM(s) IV Intermittent every 24 hours  cefepime   IVPB      chlorhexidine 2% Cloths 1 Application(s) Topical daily  cloNIDine Patch 0.1 mG/24Hr(s) 1 patch Transdermal every 7 days  metoprolol tartrate 25 milliGRAM(s) Oral two times a day  pantoprazole   Suspension 40 milliGRAM(s) Oral daily  sodium zirconium cyclosilicate 10 Gram(s) Oral once  valproate sodium  IVPB 500 milliGRAM(s) IV Intermittent every 8 hours       	  	  LABS:	 	    Blood cx-.                              7.6    17.11 )-----------( 266      ( 19 Aug 2023 07:42 )             24.4     08-19    136  |  108  |  44<H>  ----------------------------<  136<H>  5.4<H>   |  18<L>  |  2.15<H>    Ca    7.5<L>      19 Aug 2023 07:42  Phos  4.2     08-19  Mg     2.4     08-19    TPro  6.0  /  Alb  1.5<L>  /  TBili  0.3  /  DBili  x   /  AST  186<H>  /  ALT  20  /  AlkPhos  243<H>  08-19    proBNP:   Lipid Profile: Cholesterol 152  LDL --  HDL 51  TG 93  Ldl calc 82  Ratio --    HgA1c:   TSH: Thyroid Stimulating Hormone, Serum: 3.59 uU/mL (07-28 @ 05:03)      	  < from: US Abdomen Liver Followup (08.18.23 @ 14:47) >  ACC: [id number] EXAM:  US ABD LIVER FOLLOWUP   ORDERED BY: JOLENE KRUSE     PROCEDURE DATE:  08/18/2023          INTERPRETATION:  CLINICAL INFORMATION: Transaminitis.    COMPARISON: Noncontrast CT abdomen and pelvis dated 8/4/3, renal   ultrasound dated 7/30/2023.    TECHNIQUE: Sonography of the right upper quadrant.    FINDINGS:    LIVER: Liver measures 14.2 cm. There is subtle diffuse heterogeneous   echotexture of the liver parenchyma without evidence of discrete lesion.   There are normal arterial waveforms within the hepatic artery.  BILE DUCTS: Normal caliber. Common bile duct measures 5 mm.  GALLBLADDER: Borderline thickening of the gallbladder wall to 3 mm which   may be related to adjacent hepatocellular disease. No gallstones are   identified. There is no pericholecystic fluid..  PANCREAS: Poorly visualized.  RIGHT KIDNEY: 10.0 x 4.2 x 4.7 cm. No hydronephrosis. No renal masses or   calculi.  ASCITES: None.  IVC: Visualized portions are within normal limits.    MISCELLANEOUS: There is a trace right pleural effusion.    IMPRESSION: Subtle diffuse heterogeneous echotexture of the liver   parenchyma, which may represent underlying hepatocellular disease.    --- End of Report ---           GARFIELD BRADSHAW DO; Resident Radiologist  This document has been electronically signed.  EDWIN STEIN MD; Attending Radiologist  This document has been electronically signed. Aug 18 2023  4:11PM    < end of copied text >

## 2023-08-19 NOTE — PROGRESS NOTE ADULT - PROBLEM SELECTOR PLAN 5
8/17 Pt meets SIRS criteria  UA siginificant for elevated WBC's  Pt started on Cefepime as per Dr. Correa's recommendation

## 2023-08-20 LAB
ALBUMIN SERPL ELPH-MCNC: 1.4 G/DL — LOW (ref 3.5–5)
ALP SERPL-CCNC: 156 U/L — HIGH (ref 40–120)
ALT FLD-CCNC: 19 U/L DA — SIGNIFICANT CHANGE UP (ref 10–60)
ANION GAP SERPL CALC-SCNC: 6 MMOL/L — SIGNIFICANT CHANGE UP (ref 5–17)
ANION GAP SERPL CALC-SCNC: 8 MMOL/L — SIGNIFICANT CHANGE UP (ref 5–17)
ANISOCYTOSIS BLD QL: SLIGHT — SIGNIFICANT CHANGE UP
AST SERPL-CCNC: 116 U/L — HIGH (ref 10–40)
BASOPHILS # BLD AUTO: 0.16 K/UL — SIGNIFICANT CHANGE UP (ref 0–0.2)
BASOPHILS NFR BLD AUTO: 1 % — SIGNIFICANT CHANGE UP (ref 0–2)
BILIRUB SERPL-MCNC: 0.2 MG/DL — SIGNIFICANT CHANGE UP (ref 0.2–1.2)
BUN SERPL-MCNC: 45 MG/DL — HIGH (ref 7–18)
BUN SERPL-MCNC: 48 MG/DL — HIGH (ref 7–18)
CALCIUM SERPL-MCNC: 7.5 MG/DL — LOW (ref 8.4–10.5)
CALCIUM SERPL-MCNC: SIGNIFICANT CHANGE UP MG/DL (ref 8.4–10.5)
CHLORIDE SERPL-SCNC: 108 MMOL/L — SIGNIFICANT CHANGE UP (ref 96–108)
CHLORIDE SERPL-SCNC: 109 MMOL/L — HIGH (ref 96–108)
CO2 SERPL-SCNC: 15 MMOL/L — LOW (ref 22–31)
CO2 SERPL-SCNC: 20 MMOL/L — LOW (ref 22–31)
CREAT SERPL-MCNC: 2.04 MG/DL — HIGH (ref 0.5–1.3)
CREAT SERPL-MCNC: 2.08 MG/DL — HIGH (ref 0.5–1.3)
CULTURE RESULTS: SIGNIFICANT CHANGE UP
EGFR: 24 ML/MIN/1.73M2 — LOW
EGFR: 25 ML/MIN/1.73M2 — LOW
EOSINOPHIL # BLD AUTO: 0 K/UL — SIGNIFICANT CHANGE UP (ref 0–0.5)
EOSINOPHIL NFR BLD AUTO: 0 % — SIGNIFICANT CHANGE UP (ref 0–6)
GLUCOSE BLDC GLUCOMTR-MCNC: 100 MG/DL — HIGH (ref 70–99)
GLUCOSE BLDC GLUCOMTR-MCNC: 104 MG/DL — HIGH (ref 70–99)
GLUCOSE BLDC GLUCOMTR-MCNC: 124 MG/DL — HIGH (ref 70–99)
GLUCOSE BLDC GLUCOMTR-MCNC: 143 MG/DL — HIGH (ref 70–99)
GLUCOSE SERPL-MCNC: 137 MG/DL — HIGH (ref 70–99)
GLUCOSE SERPL-MCNC: 141 MG/DL — HIGH (ref 70–99)
HCT VFR BLD CALC: 23.7 % — LOW (ref 34.5–45)
HGB BLD-MCNC: 7.5 G/DL — LOW (ref 11.5–15.5)
LYMPHOCYTES # BLD AUTO: 17 % — SIGNIFICANT CHANGE UP (ref 13–44)
LYMPHOCYTES # BLD AUTO: 2.74 K/UL — SIGNIFICANT CHANGE UP (ref 1–3.3)
MAGNESIUM SERPL-MCNC: 2.1 MG/DL — SIGNIFICANT CHANGE UP (ref 1.6–2.6)
MANUAL SMEAR VERIFICATION: SIGNIFICANT CHANGE UP
MCHC RBC-ENTMCNC: 28.5 PG — SIGNIFICANT CHANGE UP (ref 27–34)
MCHC RBC-ENTMCNC: 31.6 GM/DL — LOW (ref 32–36)
MCV RBC AUTO: 90.1 FL — SIGNIFICANT CHANGE UP (ref 80–100)
METAMYELOCYTES # FLD: 2 % — HIGH (ref 0–0)
MONOCYTES # BLD AUTO: 1.29 K/UL — HIGH (ref 0–0.9)
MONOCYTES NFR BLD AUTO: 8 % — SIGNIFICANT CHANGE UP (ref 2–14)
MYELOCYTES NFR BLD: 4 % — HIGH (ref 0–0)
NEUTROPHILS # BLD AUTO: 10.94 K/UL — HIGH (ref 1.8–7.4)
NEUTROPHILS NFR BLD AUTO: 68 % — SIGNIFICANT CHANGE UP (ref 43–77)
NRBC # BLD: 0 /100 — SIGNIFICANT CHANGE UP (ref 0–0)
OVALOCYTES BLD QL SMEAR: SLIGHT — SIGNIFICANT CHANGE UP
PHOSPHATE SERPL-MCNC: 4.8 MG/DL — HIGH (ref 2.5–4.5)
PLAT MORPH BLD: NORMAL — SIGNIFICANT CHANGE UP
PLATELET # BLD AUTO: 253 K/UL — SIGNIFICANT CHANGE UP (ref 150–400)
PLATELET COUNT - ESTIMATE: NORMAL — SIGNIFICANT CHANGE UP
POIKILOCYTOSIS BLD QL AUTO: SLIGHT — SIGNIFICANT CHANGE UP
POTASSIUM SERPL-MCNC: 4.9 MMOL/L — SIGNIFICANT CHANGE UP (ref 3.5–5.3)
POTASSIUM SERPL-MCNC: >10 MMOL/L — CRITICAL HIGH (ref 3.5–5.3)
POTASSIUM SERPL-SCNC: 4.9 MMOL/L — SIGNIFICANT CHANGE UP (ref 3.5–5.3)
POTASSIUM SERPL-SCNC: >10 MMOL/L — CRITICAL HIGH (ref 3.5–5.3)
PROT SERPL-MCNC: 5.8 G/DL — LOW (ref 6–8.3)
RBC # BLD: 2.63 M/UL — LOW (ref 3.8–5.2)
RBC # FLD: 17.3 % — HIGH (ref 10.3–14.5)
RBC BLD AUTO: ABNORMAL
SODIUM SERPL-SCNC: 131 MMOL/L — LOW (ref 135–145)
SODIUM SERPL-SCNC: 135 MMOL/L — SIGNIFICANT CHANGE UP (ref 135–145)
SPECIMEN SOURCE: SIGNIFICANT CHANGE UP
WBC # BLD: 16.09 K/UL — HIGH (ref 3.8–10.5)
WBC # FLD AUTO: 16.09 K/UL — HIGH (ref 3.8–10.5)

## 2023-08-20 RX ADMIN — Medication 650 MILLIGRAM(S): at 06:20

## 2023-08-20 RX ADMIN — Medication 1 PATCH: at 06:01

## 2023-08-20 RX ADMIN — CEFEPIME 100 MILLIGRAM(S): 1 INJECTION, POWDER, FOR SOLUTION INTRAMUSCULAR; INTRAVENOUS at 14:28

## 2023-08-20 RX ADMIN — Medication 25 MILLIGRAM(S): at 06:12

## 2023-08-20 RX ADMIN — Medication 55 MILLIGRAM(S): at 22:00

## 2023-08-20 RX ADMIN — Medication 1 PATCH: at 18:10

## 2023-08-20 RX ADMIN — Medication 1 PATCH: at 12:00

## 2023-08-20 RX ADMIN — Medication 55 MILLIGRAM(S): at 14:43

## 2023-08-20 RX ADMIN — CHLORHEXIDINE GLUCONATE 1 APPLICATION(S): 213 SOLUTION TOPICAL at 14:30

## 2023-08-20 RX ADMIN — CARBIDOPA AND LEVODOPA 1 TABLET(S): 25; 100 TABLET ORAL at 06:33

## 2023-08-20 RX ADMIN — Medication 55 MILLIGRAM(S): at 06:12

## 2023-08-20 NOTE — CONSULT NOTE ADULT - PROVIDER SPECIALTY LIST ADULT
Cardiology
Surgery
Neurology
Infectious Disease
Nephrology
Gastroenterology
Neurology
Palliative Care
Gastroenterology
Surgery

## 2023-08-20 NOTE — PROGRESS NOTE ADULT - ATTENDING COMMENTS
-Going for open gastrostomy tube placement by sx today 8/9  -Consider start of ngt feed if need be post opt  -Maintain on IVF for now.
d/w staff plan of care    FOR FURTHER COVERAGE TILL 8/20 TH PLEASE CALL DR DUPONT
8/9/23-  -Going for open gastrostomy tube placement by sx today 8/9  -Consider start of ngt feed if need be post opt  -Maintain on IVF for now.    8/10/23 - After consult yesterday with IR, PEG placement seemed difficult to do via IR and open gastrotomy placement by sx seemed to be the best option. Amidst this d/w hcp they choose not to proceed today with sx and instead to repeat a swallow eval in hope of pt's spontaneous improvement, although this seems very unlikely and advised against. Palliative care consult called for more clear GOC and to address family's concerns with ultimate goals for comfort.
8/9/23-  -Going for open gastrostomy tube placement by sx today 8/9  -Consider start of ngt feed if need be post opt  -Maintain on IVF for now.    8/10/23 - After consult yesterday with IR, PEG placement seemed difficult to do via IR and open gastrotomy placement by sx seemed to be the best option. Amidst this d/w hcp they choose not to proceed today with sx and instead to repeat a swallow eval in hope of pt's spontaneous improvement, although this seems very unlikely and advised against. Palliative care consult called for more clear GOC and to address family's concerns with ultimate goals for comfort.    8/12/23 - Pt failed speech and swallow assessment. Plan for open G-tube placement on 8/14/23 by sx. Medically optimization till them, continue ariel IVF support.
8/9/23-  -Going for open gastrostomy tube placement by sx today 8/9  -Consider start of ngt feed if need be post opt  -Maintain on IVF for now.    8/10/23 - After consult yesterday with IR, PEG placement seemed difficult to do via IR and open gastrotomy placement by sx seemed to be the best option. Amidst this d/w hcp they choose not to proceed today with sx and instead to repeat a swallow eval in hope of pt's spontaneous improvement, although this seems very unlikely and advised against. Palliative care consult called for more clear GOC and to address family's concerns with ultimate goals for comfort.    8/13/23 - Pt failed speech and swallow assessment. Plan for open G-tube placement on 8/14/23 by sx. Medically optimization till them, continue ariel IVF support.    8/14/23 - Awaiting open G-tube placement by sx today. Cont on IVF support for now.    8/15/23 - S/P g-tube placement by sx yesterday. Cleared for started of feed via g-tube (start at slow rate with gradually rise of rate based on residual; with full aspiration precautions in effect).
8/9/23-  -Going for open gastrostomy tube placement by sx today 8/9  -Consider start of ngt feed if need be post opt  -Maintain on IVF for now.    8/10/23 - After consult yesterday with IR, PEG placement seemed difficult to do via IR and open gastrotomy placement by sx seemed to be the best option. Amidst this d/w hcp they choose not to proceed today with sx and instead to repeat a swallow eval in hope of pt's spontaneous improvement, although this seems very unlikely and advised against. Palliative care consult called for more clear GOC and to address family's concerns with ultimate goals for comfort.    8/13/23 - Pt failed speech and swallow assessment. Plan for open G-tube placement on 8/14/23 by sx. Medically optimization till them, continue ariel IVF support.    8/14/23 - Awaiting open G-tube placement by sx today. Cont on IVF support for now.    8/15/23 - S/P g-tube placement by sx yesterday. Cleared for started of feed via g-tube (start at slow rate with gradually rise of rate based on residual; with full aspiration precautions in effect).    8/16/23 - Feed tolerated well via g-tube. Pt medically stable and cleared for d/c back to rehab.
8/9/23-  -Going for open gastrostomy tube placement by sx today 8/9  -Consider start of ngt feed if need be post opt  -Maintain on IVF for now.    8/10/23 - After consult yesterday with IR, PEG placement seemed difficult to do via IR and open gastrotomy placement by sx seemed to be the best option. Amidst this d/w hcp they choose not to proceed today with sx and instead to repeat a swallow eval in hope of pt's spontaneous improvement, although this seems very unlikely and advised against. Palliative care consult called for more clear GOC and to address family's concerns with ultimate goals for comfort.    8/13/23 - Pt failed speech and swallow assessment. Plan for open G-tube placement on 8/14/23 by sx. Medically optimization till them, continue ariel IVF support.    8/14/23 - Awaiting open G-tube placement by sx today. Cont on IVF support for now.    8/15/23 - S/P g-tube placement by sx yesterday. Cleared for started of feed via g-tube (start at slow rate with gradually rise of rate based on residual; with full aspiration precautions in effect).    8/16/23 - Feed tolerated well via g-tube. Pt medically stable and cleared for d/c back to rehab.    8/17/23 - Pt's d/c has been postponed due to fever / chills and leukocytosis appearing likely to be UTI. U/C sent; started on IV Rocephin.    8/18/23 - No U/C available to guide abx for d/c. F/U u/a & u/c.
8/9/23-  -Going for open gastrostomy tube placement by sx today 8/9  -Consider start of ngt feed if need be post opt  -Maintain on IVF for now.    8/10/23 - After consult yesterday with IR, PEG placement seemed difficult to do via IR and open gastrotomy placement by sx seemed to be the best option. Amidst this d/w hcp they choose not to proceed today with sx and instead to repeat a swallow eval in hope of pt's spontaneous improvement, although this seems very unlikely and advised against. Palliative care consult called for more clear GOC and to address family's concerns with ultimate goals for comfort.    8/13/23 - Pt failed speech and swallow assessment. Plan for open G-tube placement on 8/14/23 by sx. Medically optimization till them, continue ariel IVF support.    8/14/23 Awaiting open G-tube placement by sx today. Cont on IVF support for now.
8/9/23-  -Going for open gastrostomy tube placement by sx today 8/9  -Consider start of ngt feed if need be post opt  -Maintain on IVF for now.    8/10/23 - After consult yesterday with IR, PEG placement seemed difficult to do via IR and open gastrotomy placement by sx seemed to be the best option. Amidst this d/w hcp they choose not to proceed today with sx and instead to repeat a swallow eval in hope of pt's spontaneous improvement, although this seems very unlikely and advised against. Palliative care consult called for more clear GOC and to address family's concerns with ultimate goals for comfort.    8/11/23 - Pt failed speech and swallow assessment. Plan for open G-tube placement on 8/14/23 by sx. Medically optimization till them, continue ariel IVF support.
8/9/23-  -Going for open gastrostomy tube placement by sx today 8/9  -Consider start of ngt feed if need be post opt  -Maintain on IVF for now.    8/10/23 - After consult yesterday with IR, PEG placement seemed difficult to do via IR and open gastrotomy placement by sx seemed to be the best option. Amidst this d/w hcp they choose not to proceed today with sx and instead to repeat a swallow eval in hope of pt's spontaneous improvement, although this seems very unlikely and advised against. Palliative care consult called for more clear GOC and to address family's concerns with ultimate goals for comfort.    8/13/23 - Pt failed speech and swallow assessment. Plan for open G-tube placement on 8/14/23 by sx. Medically optimization till them, continue ariel IVF support.
8/9/23-  -Going for open gastrostomy tube placement by sx today 8/9  -Consider start of ngt feed if need be post opt  -Maintain on IVF for now.    8/10/23 - After consult yesterday with IR, PEG placement seemed difficult to do via IR and open gastrotomy placement by sx seemed to be the best option. Amidst this d/w hcp they choose not to proceed today with sx and instead to repeat a swallow eval in hope of pt's spontaneous improvement, although this seems very unlikely and advised against. Palliative care consult called for more clear GOC and to address family's concerns with ultimate goals for comfort.    8/13/23 - Pt failed speech and swallow assessment. Plan for open G-tube placement on 8/14/23 by sx. Medically optimization till them, continue ariel IVF support.    8/14/23 - Awaiting open G-tube placement by sx today. Cont on IVF support for now.    8/15/23 - S/P g-tube placement by sx yesterday. Cleared for started of feed via g-tube (start at slow rate with gradually rise of rate based on residual; with full aspiration precautions in effect).    8/16/23 - Feed tolerated well via g-tube. Pt medically stable and cleared for d/c back to rehab.    8/17/23 - Pt's d/c has been postponed due to fever / chills and leukocytosis appearing likely to be UTI. U/C sent; started on IV Rocephin.
8/9/23-  -Going for open gastrostomy tube placement by sx today 8/9  -Consider start of ngt feed if need be post opt  -Maintain on IVF for now.    8/10/23 - After consult yesterday with IR, PEG placement seemed difficult to do via IR and open gastrotomy placement by sx seemed to be the best option. Amidst this d/w hcp they choose not to proceed today with sx and instead to repeat a swallow eval in hope of pt's spontaneous improvement, although this seems very unlikely and advised against. Palliative care consult called for more clear GOC and to address family's concerns with ultimate goals for comfort.    8/13/23 - Pt failed speech and swallow assessment. Plan for open G-tube placement on 8/14/23 by sx. Medically optimization till them, continue ariel IVF support.    8/14/23 - Awaiting open G-tube placement by sx today. Cont on IVF support for now.    8/15/23 - S/P g-tube placement by sx yesterday. Cleared for started of feed via g-tube (start at slow rate with gradually rise of rate based on residual; with full aspiration precautions in effect).    8/16/23 - Feed tolerated well via g-tube. Pt medically stable and cleared for d/c back to rehab.    8/17/23 - Pt's d/c has been postponed due to fever / chills and leukocytosis appearing likely to be UTI. U/C sent; started on IV Rocephin.    8/18/23 - No U/C available to guide abx for d/c. F/U u/a & u/c.    8/19/23 - Awaiting U/C results with sensitivity / resistance studies.
d/w staff plan of care
8/9/23-  -Going for open gastrostomy tube placement by sx today 8/9  -Consider start of ngt feed if need be post opt  -Maintain on IVF for now.    8/10/23 - After consult yesterday with IR, PEG placement seemed difficult to do via IR and open gastrotomy placement by sx seemed to be the best option. Amidst this d/w hcp they choose not to proceed today with sx and instead to repeat a swallow eval in hope of pt's spontaneous improvement, although this seems very unlikely and advised against. Palliative care consult called for more clear GOC and to address family's concerns with ultimate goals for comfort.    8/13/23 - Pt failed speech and swallow assessment. Plan for open G-tube placement on 8/14/23 by sx. Medically optimization till them, continue ariel IVF support.    8/14/23 - Awaiting open G-tube placement by sx today. Cont on IVF support for now.    8/15/23 - S/P g-tube placement by sx yesterday. Cleared for started of feed via g-tube (start at slow rate with gradually rise of rate based on residual; with full aspiration precautions in effect).    8/16/23 - Feed tolerated well via g-tube. Pt medically stable and cleared for d/c back to rehab.    8/17/23 - Pt's d/c has been postponed due to fever / chills and leukocytosis appearing likely to be UTI. U/C sent; started on IV Rocephin.    8/18/23 - No U/C available to guide abx for d/c. F/U u/a & u/c.    8/19/23 - Awaiting U/C results with sensitivity / resistance studies.    8/20/23- Sx recommending CT abdo to ascertain location of tip of g-tube given concern about naya-ostomy cite leak. Holding feed for now. U/C to date have been negative, however ID consult recommending bladder scan.

## 2023-08-20 NOTE — CONSULT NOTE ADULT - SUBJECTIVE AND OBJECTIVE BOX
HPI: Patient is a 77F PMHx HTN, HLD, CVA (w/ residual aphasia and R sided hemiparesis/plegia) s/p open G-tube placement 8/14 for parenteral nutrition. Surgery consulted for leakage around g-tube site. Patient seen and examined at bedside. Nonverbal, unable to perform ROS. Temp 100.4 this AM.       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      Seizures      Chronic constipation      No significant past surgical history          MEDICATIONS  (STANDING):  aspirin  chewable 324 milliGRAM(s) Oral daily  atorvastatin 20 milliGRAM(s) Oral at bedtime  carbidopa/levodopa  25/100 1 Tablet(s) Oral <User Schedule>  cefepime   IVPB 1000 milliGRAM(s) IV Intermittent every 24 hours  cefepime   IVPB      chlorhexidine 2% Cloths 1 Application(s) Topical daily  cloNIDine Patch 0.1 mG/24Hr(s) 1 patch Transdermal every 7 days  metoprolol tartrate 25 milliGRAM(s) Oral two times a day  pantoprazole   Suspension 40 milliGRAM(s) Oral daily  valproate sodium  IVPB 500 milliGRAM(s) IV Intermittent every 8 hours    MEDICATIONS  (PRN):  acetaminophen   Oral Liquid .. 650 milliGRAM(s) Enteral Tube every 6 hours PRN Temp greater or equal to 38C (100.4F)  morphine  - Injectable 2 milliGRAM(s) IV Push every 6 hours PRN Severe Pain (7 - 10)      Allergies    &quot; NATURAL RUBBER&quot; (Other)  latex (Other)  penicillins (Unknown)    Intolerances        ROS: Negative except per HPI.     SOCIAL HISTORY:  Smoking history   ETOH history    OBJECTIVE:    Vital Signs Last 24 Hrs  T(C): 37.3 (20 Aug 2023 07:11), Max: 38 (20 Aug 2023 05:26)  T(F): 99.1 (20 Aug 2023 07:11), Max: 100.4 (20 Aug 2023 05:26)  HR: 95 (20 Aug 2023 05:26) (86 - 95)  BP: 135/82 (20 Aug 2023 05:26) (124/60 - 140/66)  BP(mean): --  RR: 20 (20 Aug 2023 05:26) (18 - 20)  SpO2: 98% (20 Aug 2023 05:26) (95% - 98%)    Parameters below as of 20 Aug 2023 05:26  Patient On (Oxygen Delivery Method): room air        I&O's Summary    19 Aug 2023 07:01  -  20 Aug 2023 07:00  --------------------------------------------------------  IN: 360 mL / OUT: 300 mL / NET: 60 mL        LABS:                        7.5    16.09 )-----------( 253      ( 20 Aug 2023 07:30 )             23.7     08-20    135  |  109<H>  |  48<H>  ----------------------------<  137<H>  4.9   |  20<L>  |  2.04<H>    Ca    7.5<L>      20 Aug 2023 09:10  Phos  4.8     08-20  Mg     2.1     08-20    TPro  5.8<L>  /  Alb  1.4<L>  /  TBili  0.2  /  DBili  x   /  AST  116<H>  /  ALT  19  /  AlkPhos  156<H>  08-20      Urinalysis Basic - ( 20 Aug 2023 09:10 )    Color: x / Appearance: x / SG: x / pH: x  Gluc: 137 mg/dL / Ketone: x  / Bili: x / Urobili: x   Blood: x / Protein: x / Nitrite: x   Leuk Esterase: x / RBC: x / WBC x   Sq Epi: x / Non Sq Epi: x / Bacteria: x      CAPILLARY BLOOD GLUCOSE      POCT Blood Glucose.: 124 mg/dL (20 Aug 2023 11:23)  POCT Blood Glucose.: 143 mg/dL (20 Aug 2023 06:25)  POCT Blood Glucose.: 104 mg/dL (20 Aug 2023 00:19)  POCT Blood Glucose.: 189 mg/dL (19 Aug 2023 18:08)    LIVER FUNCTIONS - ( 20 Aug 2023 07:30 )  Alb: 1.4 g/dL / Pro: 5.8 g/dL / ALK PHOS: 156 U/L / ALT: 19 U/L DA / AST: 116 U/L / GGT: x             Cultures:      PHYSICAL EXAM:   General: NAD.   Cardio: RR  Pulm: Normal chest rise and expansion. Non-labored breathing.  GI: abd soft, nondistended. G-tube sutured in place, brown viscous liquid noted around insertion site, some erythema of skin surrounding PEG site, no fluctuance appreciated    RADIOLOGY & ADDITIONAL STUDIES:

## 2023-08-20 NOTE — CONSULT NOTE ADULT - CONSULT REQUESTED DATE/TIME
07-Aug-2023 10:29
07-Aug-2023 14:55
28-Jul-2023 19:06
28-Jul-2023 14:03
30-Jul-2023 14:19
20-Aug-2023 12:59
18-Aug-2023 14:35
02-Aug-2023 15:22
03-Aug-2023 08:37
08-Aug-2023 18:06

## 2023-08-20 NOTE — CONSULT NOTE ADULT - REASON FOR ADMISSION
Altered mental status.

## 2023-08-20 NOTE — PROGRESS NOTE ADULT - PROBLEM SELECTOR PLAN 9
creatine rising and urine output low  raising fluids  put on Rocephin possible UTI creatine rising and urine output low  raising fluids

## 2023-08-20 NOTE — PROGRESS NOTE ADULT - SUBJECTIVE AND OBJECTIVE BOX
[   ] ICU                                          [   ] CCU                                      [ X  ] Medical Floor      Patient is a 77 year old female with dysphagia. GI consulted for Peg tube placement.     Patient is a 77 female from [facility] with past medical history significant for HTN, HLD, CVA (w/ residual aphasia and R sided hemiparesis/plegia) who was sent to the emergency room with for altered mental status. Patient is not able to provide any history. Patient is lying down in bed, awake and alert. However, patient does not speak, is not able to follow commands and does not turn face or move eyes when her name is called. Patient is admitted with CVA. Now patient with dysphagia, poor po intake, failure to thrive and weight loss. No abdominal pain, nausea, vomiting, hematemesis, hematochezia, melena, fever, chills, chest pain, SOB, cough, hematuria, dysuria  or diarrhea reported.      Patient is comfortable. No new complaints reported except Peg tube malfunction (leaking the feeding from it's side), No abdominal pain, N/V, hematemesis, hematochezia, melena, fever, chills, chest pain, SOB, cough or diarrhea reported.      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 1000 milliLiter(s) (70 mL/Hr) IV Continuous <Continuous>  enoxaparin Injectable 30 milliGRAM(s) SubCutaneous every 24 hours  hydrALAZINE Injectable 10 milliGRAM(s) IV Push every 6 hours  potassium chloride  10 mEq/100 mL IVPB 10 milliEquivalent(s) IV Intermittent every 1 hour  valproate sodium  IVPB 500 milliGRAM(s) IV Intermittent every 8 hours         SOCIAL HISTORY  Advanced Directives:       [ X ] Full Code       [  ] DNR  Marital Status:         [  ] M      [ X ] S      [  ] D       [  ] W  Children:       [ X ] Yes      [  ] No  Occupation:        [  ] Employed       [ X ] Unemployed       [  ] Retired  Diet:       [ X ] Regular       [  ] PEG feeding          [  ] NG tube feeding  Drug Use:           [ X ] Patient denied          [  ] Yes  Alcohol:           [ X ] No             [  ] Yes (socially)         [  ] Yes (chronic)  Tobacco:           [  ] Yes           [X  ] No      FAMILY HISTORY  [ X ] Heart Disease            [ X ] Diabetes             [ X ] HTN             [  ] Colon Cancer             [  ] Stomach Cancer              [  ] Pancreatic Cancer      VITALS  Vital Signs Last 24 Hrs  T(C): 37.3 (20 Aug 2023 07:11), Max: 38 (20 Aug 2023 05:26)  T(F): 99.1 (20 Aug 2023 07:11), Max: 100.4 (20 Aug 2023 05:26)  HR: 95 (20 Aug 2023 05:26) (86 - 95)  BP: 135/82 (20 Aug 2023 05:26) (124/60 - 140/66)   RR: 20 (20 Aug 2023 05:26) (18 - 20)  SpO2: 98% (20 Aug 2023 05:26) (95% - 98%)  Parameters below as of 20 Aug 2023 05:26  Patient On (Oxygen Delivery Method): room air       MEDICATIONS  (STANDING):  aspirin  chewable 324 milliGRAM(s) Oral daily  atorvastatin 20 milliGRAM(s) Oral at bedtime  carbidopa/levodopa  25/100 1 Tablet(s) Oral <User Schedule>  cefepime   IVPB      cefepime   IVPB 1000 milliGRAM(s) IV Intermittent every 24 hours  chlorhexidine 2% Cloths 1 Application(s) Topical daily  cloNIDine Patch 0.1 mG/24Hr(s) 1 patch Transdermal every 7 days  metoprolol tartrate 25 milliGRAM(s) Oral two times a day  pantoprazole   Suspension 40 milliGRAM(s) Oral daily  valproate sodium  IVPB 500 milliGRAM(s) IV Intermittent every 8 hours    MEDICATIONS  (PRN):  acetaminophen   Oral Liquid .. 650 milliGRAM(s) Enteral Tube every 6 hours PRN Temp greater or equal to 38C (100.4F)  morphine  - Injectable 2 milliGRAM(s) IV Push every 6 hours PRN Severe Pain (7 - 10)                            7.5    16.09 )-----------( 253      ( 20 Aug 2023 07:30 )             23.7       08-20    135  |  109<H>  |  48<H>  ----------------------------<  137<H>  4.9   |  20<L>  |  2.04<H>    Ca    7.5<L>      20 Aug 2023 09:10  Phos  4.8     08-20  Mg     2.1     08-20    TPro  5.8<L>  /  Alb  1.4<L>  /  TBili  0.2  /  DBili  x   /  AST  116<H>  /  ALT  19  /  AlkPhos  156<H>  08-20       [   ] ICU                                          [   ] CCU                                      [ X  ] Medical Floor      Patient is a 77 year old female with dysphagia. GI consulted for Peg tube placement.     Patient is a 77 female from [facility] with past medical history significant for HTN, HLD, CVA (w/ residual aphasia and R sided hemiparesis/plegia) who was sent to the emergency room with for altered mental status. Patient is not able to provide any history. Patient is lying down in bed, awake and alert. However, patient does not speak, is not able to follow commands and does not turn face or move eyes when her name is called. Patient is admitted with CVA. Now patient with dysphagia, poor po intake, failure to thrive and weight loss. No abdominal pain, nausea, vomiting, hematemesis, hematochezia, melena, fever, chills, chest pain, SOB, cough, hematuria, dysuria  or diarrhea reported.      Patient is comfortable. No new complaints reported except Peg tube malfunction (leaking the feeding from it's side), No abdominal pain, N/V, hematemesis, hematochezia, melena, fever, chills, chest pain, SOB, cough or diarrhea reported.      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 1000 milliLiter(s) (70 mL/Hr) IV Continuous <Continuous>  enoxaparin Injectable 30 milliGRAM(s) SubCutaneous every 24 hours  hydrALAZINE Injectable 10 milliGRAM(s) IV Push every 6 hours  potassium chloride  10 mEq/100 mL IVPB 10 milliEquivalent(s) IV Intermittent every 1 hour  valproate sodium  IVPB 500 milliGRAM(s) IV Intermittent every 8 hours         SOCIAL HISTORY  Advanced Directives:       [ X ] Full Code       [  ] DNR  Marital Status:         [  ] M      [ X ] S      [  ] D       [  ] W  Children:       [ X ] Yes      [  ] No  Occupation:        [  ] Employed       [ X ] Unemployed       [  ] Retired  Diet:       [ X ] Regular       [  ] PEG feeding          [  ] NG tube feeding  Drug Use:           [ X ] Patient denied          [  ] Yes  Alcohol:           [ X ] No             [  ] Yes (socially)         [  ] Yes (chronic)  Tobacco:           [  ] Yes           [X  ] No      FAMILY HISTORY  [ X ] Heart Disease            [ X ] Diabetes             [ X ] HTN             [  ] Colon Cancer             [  ] Stomach Cancer              [  ] Pancreatic Cancer      VITALS  Vital Signs Last 24 Hrs  T(C): 37.3 (20 Aug 2023 07:11), Max: 38 (20 Aug 2023 05:26)  T(F): 99.1 (20 Aug 2023 07:11), Max: 100.4 (20 Aug 2023 05:26)  HR: 95 (20 Aug 2023 05:26) (86 - 95)  BP: 135/82 (20 Aug 2023 05:26) (124/60 - 140/66)   RR: 20 (20 Aug 2023 05:26) (18 - 20)  SpO2: 98% (20 Aug 2023 05:26) (95% - 98%)  Parameters below as of 20 Aug 2023 05:26  Patient On (Oxygen Delivery Method): room air       MEDICATIONS  (STANDING):  aspirin  chewable 324 milliGRAM(s) Oral daily  atorvastatin 20 milliGRAM(s) Oral at bedtime  carbidopa/levodopa  25/100 1 Tablet(s) Oral <User Schedule>  cefepime   IVPB      cefepime   IVPB 1000 milliGRAM(s) IV Intermittent every 24 hours  chlorhexidine 2% Cloths 1 Application(s) Topical daily  cloNIDine Patch 0.1 mG/24Hr(s) 1 patch Transdermal every 7 days  metoprolol tartrate 25 milliGRAM(s) Oral two times a day  pantoprazole   Suspension 40 milliGRAM(s) Oral daily  valproate sodium  IVPB 500 milliGRAM(s) IV Intermittent every 8 hours    MEDICATIONS  (PRN):  acetaminophen   Oral Liquid .. 650 milliGRAM(s) Enteral Tube every 6 hours PRN Temp greater or equal to 38C (100.4F)  morphine  - Injectable 2 milliGRAM(s) IV Push every 6 hours PRN Severe Pain (7 - 10)                            7.5    16.09 )-----------( 253      ( 20 Aug 2023 07:30 )             23.7       08-20    135  |  109<H>  |  48<H>  ----------------------------<  137<H>  4.9   |  20<L>  |  2.04<H>    Ca    7.5<L>      20 Aug 2023 09:10  Phos  4.8     08-20  Mg     2.1     08-20    TPro  5.8<L>  /  Alb  1.4<L>  /  TBili  0.2  /  DBili  x   /  AST  116<H>  /  ALT  19  /  AlkPhos  156<H>  08-20       [   ] ICU                                          [   ] CCU                                      [ X  ] Medical Floor      Patient is a 77 year old female with dysphagia. GI consulted for Peg tube placement.     Patient is a 77 female from [facility] with past medical history significant for HTN, HLD, CVA (w/ residual aphasia and R sided hemiparesis/plegia) who was sent to the emergency room with for altered mental status. Patient is not able to provide any history. Patient is lying down in bed, awake and alert. However, patient does not speak, is not able to follow commands and does not turn face or move eyes when her name is called. Patient is admitted with CVA. Now patient with dysphagia, poor po intake, failure to thrive and weight loss. No abdominal pain, nausea, vomiting, hematemesis, hematochezia, melena, fever, chills, chest pain, SOB, cough, hematuria, dysuria  or diarrhea reported.      Patient is comfortable. No new complaints reported except Peg tube malfunction (leaking the feeding from it's side), No abdominal pain, N/V, hematemesis, hematochezia, melena, fever, chills, chest pain, SOB, cough or diarrhea reported.      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 1000 milliLiter(s) (70 mL/Hr) IV Continuous <Continuous>  enoxaparin Injectable 30 milliGRAM(s) SubCutaneous every 24 hours  hydrALAZINE Injectable 10 milliGRAM(s) IV Push every 6 hours  potassium chloride  10 mEq/100 mL IVPB 10 milliEquivalent(s) IV Intermittent every 1 hour  valproate sodium  IVPB 500 milliGRAM(s) IV Intermittent every 8 hours         SOCIAL HISTORY  Advanced Directives:       [ X ] Full Code       [  ] DNR  Marital Status:         [  ] M      [ X ] S      [  ] D       [  ] W  Children:       [ X ] Yes      [  ] No  Occupation:        [  ] Employed       [ X ] Unemployed       [  ] Retired  Diet:       [ X ] Regular       [  ] PEG feeding          [  ] NG tube feeding  Drug Use:           [ X ] Patient denied          [  ] Yes  Alcohol:           [ X ] No             [  ] Yes (socially)         [  ] Yes (chronic)  Tobacco:           [  ] Yes           [X  ] No      FAMILY HISTORY  [ X ] Heart Disease            [ X ] Diabetes             [ X ] HTN             [  ] Colon Cancer             [  ] Stomach Cancer              [  ] Pancreatic Cancer      VITALS  Vital Signs Last 24 Hrs  T(C): 37.3 (20 Aug 2023 07:11), Max: 38 (20 Aug 2023 05:26)  T(F): 99.1 (20 Aug 2023 07:11), Max: 100.4 (20 Aug 2023 05:26)  HR: 95 (20 Aug 2023 05:26) (86 - 95)  BP: 135/82 (20 Aug 2023 05:26) (124/60 - 140/66)   RR: 20 (20 Aug 2023 05:26) (18 - 20)  SpO2: 98% (20 Aug 2023 05:26) (95% - 98%)  Parameters below as of 20 Aug 2023 05:26  Patient On (Oxygen Delivery Method): room air       MEDICATIONS  (STANDING):  aspirin  chewable 324 milliGRAM(s) Oral daily  atorvastatin 20 milliGRAM(s) Oral at bedtime  carbidopa/levodopa  25/100 1 Tablet(s) Oral <User Schedule>  cefepime   IVPB      cefepime   IVPB 1000 milliGRAM(s) IV Intermittent every 24 hours  chlorhexidine 2% Cloths 1 Application(s) Topical daily  cloNIDine Patch 0.1 mG/24Hr(s) 1 patch Transdermal every 7 days  metoprolol tartrate 25 milliGRAM(s) Oral two times a day  pantoprazole   Suspension 40 milliGRAM(s) Oral daily  valproate sodium  IVPB 500 milliGRAM(s) IV Intermittent every 8 hours    MEDICATIONS  (PRN):  acetaminophen   Oral Liquid .. 650 milliGRAM(s) Enteral Tube every 6 hours PRN Temp greater or equal to 38C (100.4F)  morphine  - Injectable 2 milliGRAM(s) IV Push every 6 hours PRN Severe Pain (7 - 10)                            7.5    16.09 )-----------( 253      ( 20 Aug 2023 07:30 )             23.7       08-20    135  |  109<H>  |  48<H>  ----------------------------<  137<H>  4.9   |  20<L>  |  2.04<H>    Ca    7.5<L>      20 Aug 2023 09:10  Phos  4.8     08-20  Mg     2.1     08-20    TPro  5.8<L>  /  Alb  1.4<L>  /  TBili  0.2  /  DBili  x   /  AST  116<H>  /  ALT  19  /  AlkPhos  156<H>  08-20

## 2023-08-20 NOTE — PROGRESS NOTE ADULT - ASSESSMENT
77 female from [facility] PMHx HTN, HLD, CVA (w/ residual aphasia and R sided hemiparesis/plegia) who was sent to the hospital due to altered mental status,UTI.  1.UTI-s/p ABX.  2.HTN-clonidine patch .1mg q wk,lopressor 25mg bid.  3.Lipid d/o- statin.  4.Surgical f/u-s/p G tube-TF and free water.  5.SUSAN-free water.  6.Anemia-  iron.  7.GI and DVT prophylaxis.  8.Dopplers-r/o dvt.   77 female from [facility] PMHx HTN, HLD, CVA (w/ residual aphasia and R sided hemiparesis/plegia) who was sent to the hospital due to altered mental status,UTI.  1.UTI-s/p ABX.  2.HTN-clonidine patch .1mg q wk,lopressor 25mg bid.  3.Lipid d/o- statin.  4.Surgical f/u-s/p G tube-TF and free water.  5.SUSAN-free water.  6.Anemia-  iron.  7.GI and DVT prophylaxis.  8.Dopplers-r/o dvt.   77 female from [facility] PMHx HTN, HLD, CVA (w/ residual aphasia and R sided hemiparesis/plegia) who was sent to the hospital due to altered mental status,UTI.  1.UTI-s/p ABX.  2.HTN-clonidine patch .1mg q wk,lopressor 25mg bid.  3.Lipid d/o- statin.  4.Surgical f/u-s/p G tube-TF and free water.  5.SUSAN-free water.  6.Anemia-  iron.  7.GI and DVT prophylaxis.  8.Dopplers-r/o dvt.

## 2023-08-20 NOTE — PROGRESS NOTE ADULT - SUBJECTIVE AND OBJECTIVE BOX
Patient is seen and examined at the bed side, is afebrile now, spiked fever last night. . The Urine culture has no growth and blood cultures remains negative to date.       REVIEW OF SYSTEMS: Unable to obtain due  to mental status       ALLERGIES: &quot; NATURAL RUBBER&quot; (Other)  latex (Other)  penicillins (Unknown)      Vital Signs Last 24 Hrs  T(C): 36.5 (20 Aug 2023 13:48), Max: 38 (20 Aug 2023 05:26)  T(F): 97.7 (20 Aug 2023 13:48), Max: 100.4 (20 Aug 2023 05:26)  HR: 92 (20 Aug 2023 13:48) (86 - 95)  BP: 148/72 (20 Aug 2023 13:48) (134/64 - 148/72)  BP(mean): --  RR: 19 (20 Aug 2023 13:48) (18 - 20)  SpO2: 100% (20 Aug 2023 13:48) (96% - 100%)    Parameters below as of 20 Aug 2023 13:48  Patient On (Oxygen Delivery Method): room air        PHYSICAL EXAM:  GENERAL: Not in acute distress   CHEST/LUNG:  Not using accessory muscles   HEART: s1 and s2 present  ABDOMEN:  grossly obese  EXTREMITIES: edematous  CNS: Awake, somewhat alert but Non verbal       LABS:                        7.5    16.09 )-----------( 253      ( 20 Aug 2023 07:30 )             23.7                                    7.6    17.11 )-----------( 266      ( 19 Aug 2023 07:42 )             24.4       08-20    135  |  109<H>  |  48<H>  ----------------------------<  137<H>  4.9   |  20<L>  |  2.04<H>    Ca    7.5<L>      20 Aug 2023 09:10  Phos  4.8     08-20  Mg     2.1     08-20    TPro  5.8<L>  /  Alb  1.4<L>  /  TBili  0.2  /  DBili  x   /  AST  116<H>  /  ALT  19  /  AlkPhos  156<H>  08-20 08-19    136  |  108  |  44<H>  ----------------------------<  136<H>  5.4<H>   |  18<L>  |  2.15<H>    Ca    7.5<L>      19 Aug 2023 07:42  Phos  4.2     08-19  Mg     2.4     08-19    TPro  6.0  /  Alb  1.5<L>  /  TBili  0.3  /  DBili  x   /  AST  186<H>  /  ALT  20  /  AlkPhos  243<H>  08-19    CAPILLARY BLOOD GLUCOSE  POCT Blood Glucose.: 144 mg/dL (18 Aug 2023 11:49)  POCT Blood Glucose.: 122 mg/dL (18 Aug 2023 00:03)      Urinalysis Basic - ( 18 Aug 2023 11:20 )  Color: x / Appearance: x / SG: x / pH: x  Gluc: 129 mg/dL / Ketone: x  / Bili: x / Urobili: x   Blood: x / Protein: x / Nitrite: x   Leuk Esterase: x / RBC: x / WBC x   Sq Epi: x / Non Sq Epi: x / Bacteria: x        MEDICATIONS  (STANDING):    aspirin  chewable 324 milliGRAM(s) Oral daily  atorvastatin 20 milliGRAM(s) Oral at bedtime  carbidopa/levodopa  25/100 1 Tablet(s) Oral <User Schedule>  cefepime   IVPB 1000 milliGRAM(s) IV Intermittent every 24 hours  cefepime   IVPB      chlorhexidine 2% Cloths 1 Application(s) Topical daily  cloNIDine Patch 0.1 mG/24Hr(s) 1 patch Transdermal every 7 days  metoprolol tartrate 25 milliGRAM(s) Oral two times a day  pantoprazole   Suspension 40 milliGRAM(s) Oral daily  valproate sodium  IVPB 500 milliGRAM(s) IV Intermittent every 8 hours        MICROBIOLOGY DATA:  Culture - Urine (08.17.23 @ 23:15)   Specimen Source: Catheterized Catheterized  Culture Results:   <10,000 CFU/mL Normal Urogenital Anila      Culture - Blood (08.17.23 @ 14:36)   Specimen Source: .Blood Blood-Peripheral  Culture Results: No growth at 48 hours    Culture - Blood (08.17.23 @ 14:35)   Specimen Source: .Blood Blood-Peripheral  Culture Results: No growth at 48 hours      Urine Microscopic-Add On (NC) (08.17.23 @ 18:40)   Red Blood Cell - Urine: 150 /HPF  White Blood Cell - Urine: 30 /HPF  Bacteria: Few /HPF  Squamous Epithelial Cells: Present    Urine Microscopic-Add On (NC) (08.17.23 @ 11:35)   Squamous Epithelial Cells: None Seen  Bacteria: Negative /HPF  Comment - Urine: Moderate budding yeast and yeast-like cells  White Blood Cell - Urine: >50 /HPF  Red Blood Cell - Urine: >50 /HPF      COVID-19 PCR . (08.16.23 @ 17:40)   COVID-19 PCR: NotDetec:

## 2023-08-20 NOTE — PROGRESS NOTE ADULT - ASSESSMENT
1. Anemia  2. Dysphagia  3. Constipation  4. Failure to thrive  5. No evidence of acute GI bleeding  6. S/p unsuccessful endoscopic Peg placement  7. S/p gastrostomy tube placement by surgery  8. Peg tube malfunction    Suggestions:    1. NPO  2. IVF hydration  3. Hold Peg feeding  4. Check abdominal X-ray to confirm Peg position  5. Surgical follow up  6. Monitor electrolytes  7. Protonix daily  8. Aspiration precaution  9. Monitor H/H  10. Transfuse PRBC as needed  11. Avoid NSAID  12. Daily stool softener as needed  13. DVT prophylaxis

## 2023-08-20 NOTE — PROGRESS NOTE ADULT - PROBLEM SELECTOR PLAN 1
Pt. is NPO  Open G tube placed on 8/14  Feeds through G tube resumed  IV tylenol for pain, morphine for breakthrough pain  IV iron sucrose started 8/15 for 3 days as per nephro  G tube placement by Surgery on 8/14  f/u LE doppler b/l Pt. is NPO  Open G tube placed on 8/14  Feeds through G tube resumed  IV tylenol for pain, morphine for breakthrough pain  IV iron sucrose started 8/15 for 3 days as per nephro  G tube placement by Surgery on 8/14  LE doppler b/l showed no signs of DVT  8/20 leakage noted around site of G tube. Feedings stopped and surgery consulted  f/u CT abdomen/pelvis

## 2023-08-20 NOTE — PROGRESS NOTE ADULT - ASSESSMENT
Patient is a 77 female from [facility] PMHx HTN, HLD, CVA (w/ residual aphasia and R sided hemiparesis/plegia) who was sent to the hospital due to altered mental status. Patient is being admitted to medicine for further work up and rule out stroke vs encephalopathy (metabolic vs infectious).  Patient is a 77 female from [facility] PMHx HTN, HLD, CVA (w/ residual aphasia and R sided hemiparesis/plegia) who was sent to the hospital due to altered mental status. Patient is being admitted to medicine for further work up and rule out stroke vs encephalopathy (metabolic vs infectious).  Patient is a 77 female from [facility] PMHx HTN, HLD, CVA (w/ residual aphasia and R sided hemiparesis/plegia) who was sent to the hospital due to altered mental status. Patient is being admitted to medicine for further work up and rule out stroke vs encephalopathy (metabolic vs infectious).

## 2023-08-20 NOTE — PROGRESS NOTE ADULT - SUBJECTIVE AND OBJECTIVE BOX
Date of Service 08-20-23 @ 12:08    CHIEF COMPLAINT:Patient is a 77y old  Female who presents with a chief complaint of Altered mental status.Pt appears comfortable.    	  REVIEW OF SYSTEMS:  unable to obtain        PHYSICAL EXAM:  T(C): 37.3 (08-20-23 @ 07:11), Max: 38 (08-20-23 @ 05:26)  HR: 95 (08-20-23 @ 05:26) (86 - 95)  BP: 135/82 (08-20-23 @ 05:26) (124/60 - 140/66)  RR: 20 (08-20-23 @ 05:26) (18 - 20)  SpO2: 98% (08-20-23 @ 05:26) (95% - 98%)  Wt(kg): --  I&O's Summary    19 Aug 2023 07:01  -  20 Aug 2023 07:00  --------------------------------------------------------  IN: 360 mL / OUT: 300 mL / NET: 60 mL        Appearance: Normal	  HEENT:   Normal oral mucosa, PERRL, EOMI	  Lymphatic: No lymphadenopathy  Cardiovascular: Normal S1 S2, No JVD, No murmurs, No edema  Respiratory: Lungs clear to auscultation	  Gastrointestinal:  Soft, Non-tender, + BS	  Skin: No rashes, No ecchymoses, No cyanosis	  Extremities: Normal range of motion, No clubbing, cyanosis or edema  Vascular: Peripheral pulses palpable 2+ bilaterally    MEDICATIONS  (STANDING):  aspirin  chewable 324 milliGRAM(s) Oral daily  atorvastatin 20 milliGRAM(s) Oral at bedtime  carbidopa/levodopa  25/100 1 Tablet(s) Oral <User Schedule>  cefepime   IVPB      cefepime   IVPB 1000 milliGRAM(s) IV Intermittent every 24 hours  chlorhexidine 2% Cloths 1 Application(s) Topical daily  cloNIDine Patch 0.1 mG/24Hr(s) 1 patch Transdermal every 7 days  metoprolol tartrate 25 milliGRAM(s) Oral two times a day  pantoprazole   Suspension 40 milliGRAM(s) Oral daily  valproate sodium  IVPB 500 milliGRAM(s) IV Intermittent every 8 hours      	  	  LABS:	 	                         7.5    16.09 )-----------( 253      ( 20 Aug 2023 07:30 )             23.7     08-20    135  |  109<H>  |  48<H>  ----------------------------<  137<H>  4.9   |  20<L>  |  2.04<H>    Ca    7.5<L>      20 Aug 2023 09:10  Phos  4.8     08-20  Mg     2.1     08-20    TPro  5.8<L>  /  Alb  1.4<L>  /  TBili  0.2  /  DBili  x   /  AST  116<H>  /  ALT  19  /  AlkPhos  156<H>  08-20    proBNP:   Lipid Profile: Cholesterol 152  LDL --  HDL 51  TG 93  Ldl calc 82  Ratio --    HgA1c:   TSH: Thyroid Stimulating Hormone, Serum: 3.59 uU/mL (07-28 @ 05:03)      	        Blood cx-.  urine cx-.

## 2023-08-20 NOTE — PROGRESS NOTE ADULT - SUBJECTIVE AND OBJECTIVE BOX
NEPHROLOGY MEDICAL CARE, [facility] - Dr. Ajay Green/ Dr. Mert Madison/ Dr. Chris Calderon/ Dr. Carson Cook    Date of Service: 08-20-23 @ 12:08    Patient was seen and examined at bedside.    CC: pt is NAD    Vital Signs Last 24 Hrs  T(C): 37.3 (20 Aug 2023 07:11), Max: 38 (20 Aug 2023 05:26)  T(F): 99.1 (20 Aug 2023 07:11), Max: 100.4 (20 Aug 2023 05:26)  HR: 95 (20 Aug 2023 05:26) (86 - 95)  BP: 135/82 (20 Aug 2023 05:26) (124/60 - 140/66)  BP(mean): --  RR: 20 (20 Aug 2023 05:26) (18 - 20)  SpO2: 98% (20 Aug 2023 05:26) (95% - 98%)    Parameters below as of 20 Aug 2023 05:26  Patient On (Oxygen Delivery Method): room air        08-19 @ 07:01  -  08-20 @ 07:00  --------------------------------------------------------  IN: 360 mL / OUT: 300 mL / NET: 60 mL        PHYSICAL EXAM:  General: No acute respiratory distress.  Eyes: conjunctiva and sclera clear  ENMT: Atraumatic, Normocephalic; Moist mucous membranes  Respiratory: Bilateral clear lungs; No rales, rhonchi, wheezing  Cardiovascular: S1S2+; no m/r/g  Gastrointestinal: Soft, Non-tender, Nondistended; Bowel sounds present; PEG  Neuro: eyes are open; hemiparesis; non-verbal  Ext:  1+pedal edema, No Cyanosis  Skin: No visible rashes    MEDICATIONS:  MEDICATIONS  (STANDING):  aspirin  chewable 324 milliGRAM(s) Oral daily  atorvastatin 20 milliGRAM(s) Oral at bedtime  carbidopa/levodopa  25/100 1 Tablet(s) Oral <User Schedule>  cefepime   IVPB 1000 milliGRAM(s) IV Intermittent every 24 hours  cefepime   IVPB      chlorhexidine 2% Cloths 1 Application(s) Topical daily  cloNIDine Patch 0.1 mG/24Hr(s) 1 patch Transdermal every 7 days  metoprolol tartrate 25 milliGRAM(s) Oral two times a day  pantoprazole   Suspension 40 milliGRAM(s) Oral daily  valproate sodium  IVPB 500 milliGRAM(s) IV Intermittent every 8 hours    MEDICATIONS  (PRN):  acetaminophen   Oral Liquid .. 650 milliGRAM(s) Enteral Tube every 6 hours PRN Temp greater or equal to 38C (100.4F)  morphine  - Injectable 2 milliGRAM(s) IV Push every 6 hours PRN Severe Pain (7 - 10)          LABS:                        7.5    16.09 )-----------( 253      ( 20 Aug 2023 07:30 )             23.7     08-20    135  |  109<H>  |  48<H>  ----------------------------<  137<H>  4.9   |  20<L>  |  2.04<H>    Ca    7.5<L>      20 Aug 2023 09:10  Phos  4.8     08-20  Mg     2.1     08-20    TPro  5.8<L>  /  Alb  1.4<L>  /  TBili  0.2  /  DBili  x   /  AST  116<H>  /  ALT  19  /  AlkPhos  156<H>  08-20      Urinalysis Basic - ( 20 Aug 2023 09:10 )    Color: x / Appearance: x / SG: x / pH: x  Gluc: 137 mg/dL / Ketone: x  / Bili: x / Urobili: x   Blood: x / Protein: x / Nitrite: x   Leuk Esterase: x / RBC: x / WBC x   Sq Epi: x / Non Sq Epi: x / Bacteria: x      Magnesium: 2.1 mg/dL (08-20 @ 07:30)  Phosphorus: 4.8 mg/dL (08-20 @ 07:30)    Urine studies    PTH and Vit D:         NEPHROLOGY MEDICAL CARE, [facility] - Dr. Ajay Green/ Dr. Mert Madison/ Dr. Chris Calderon/ Dr. Carson Cook    Date of Service: 08-20-23 @ 12:08    Patient was seen and examined at bedside.    CC: pt is NAD    Vital Signs Last 24 Hrs  T(C): 37.3 (20 Aug 2023 07:11), Max: 38 (20 Aug 2023 05:26)  T(F): 99.1 (20 Aug 2023 07:11), Max: 100.4 (20 Aug 2023 05:26)  HR: 95 (20 Aug 2023 05:26) (86 - 95)  BP: 135/82 (20 Aug 2023 05:26) (124/60 - 140/66)  BP(mean): --  RR: 20 (20 Aug 2023 05:26) (18 - 20)  SpO2: 98% (20 Aug 2023 05:26) (95% - 98%)    Parameters below as of 20 Aug 2023 05:26  Patient On (Oxygen Delivery Method): room air        08-19 @ 07:01  -  08-20 @ 07:00  --------------------------------------------------------  IN: 360 mL / OUT: 300 mL / NET: 60 mL        PHYSICAL EXAM:  General: No acute respiratory distress.  Eyes: conjunctiva and sclera clear  ENMT: Atraumatic, Normocephalic; Moist mucous membranes  Respiratory: Bilateral clear lungs; No rales, rhonchi, wheezing  Cardiovascular: S1S2+; no m/r/g  Gastrointestinal: Soft, Non-tender, Nondistended; Bowel sounds present; PEG  Neuro: eyes are open; hemiparesis; non-verbal  Ext:  1+pedal edema, No Cyanosis  Skin: No visible rashes    MEDICATIONS:  MEDICATIONS  (STANDING):  aspirin  chewable 324 milliGRAM(s) Oral daily  atorvastatin 20 milliGRAM(s) Oral at bedtime  carbidopa/levodopa  25/100 1 Tablet(s) Oral <User Schedule>  cefepime   IVPB 1000 milliGRAM(s) IV Intermittent every 24 hours  cefepime   IVPB      chlorhexidine 2% Cloths 1 Application(s) Topical daily  cloNIDine Patch 0.1 mG/24Hr(s) 1 patch Transdermal every 7 days  metoprolol tartrate 25 milliGRAM(s) Oral two times a day  pantoprazole   Suspension 40 milliGRAM(s) Oral daily  valproate sodium  IVPB 500 milliGRAM(s) IV Intermittent every 8 hours    MEDICATIONS  (PRN):  acetaminophen   Oral Liquid .. 650 milliGRAM(s) Enteral Tube every 6 hours PRN Temp greater or equal to 38C (100.4F)  morphine  - Injectable 2 milliGRAM(s) IV Push every 6 hours PRN Severe Pain (7 - 10)          LABS:                        7.5    16.09 )-----------( 253      ( 20 Aug 2023 07:30 )             23.7     08-20    135  |  109<H>  |  48<H>  ----------------------------<  137<H>  4.9   |  20<L>  |  2.04<H>    Ca    7.5<L>      20 Aug 2023 09:10  Phos  4.8     08-20  Mg     2.1     08-20    TPro  5.8<L>  /  Alb  1.4<L>  /  TBili  0.2  /  DBili  x   /  AST  116<H>  /  ALT  19  /  AlkPhos  156<H>  08-20      Urinalysis Basic - ( 20 Aug 2023 09:10 )    Color: x / Appearance: x / SG: x / pH: x  Gluc: 137 mg/dL / Ketone: x  / Bili: x / Urobili: x   Blood: x / Protein: x / Nitrite: x   Leuk Esterase: x / RBC: x / WBC x   Sq Epi: x / Non Sq Epi: x / Bacteria: x      Magnesium: 2.1 mg/dL (08-20 @ 07:30)  Phosphorus: 4.8 mg/dL (08-20 @ 07:30)    Urine studies    PTH and Vit D:         NEPHROLOGY MEDICAL CARE, [facility] - Dr. Ajay Green/ Dr. Mert Madison/ Dr. Chris Calderon/ Dr. Carson Cook    Date of Service: 08-20-23 @ 12:08    Patient was seen and examined at bedside.    CC: pt is NAD    Vital Signs Last 24 Hrs  T(C): 37.3 (20 Aug 2023 07:11), Max: 38 (20 Aug 2023 05:26)  T(F): 99.1 (20 Aug 2023 07:11), Max: 100.4 (20 Aug 2023 05:26)  HR: 95 (20 Aug 2023 05:26) (86 - 95)  BP: 135/82 (20 Aug 2023 05:26) (124/60 - 140/66)  BP(mean): --  RR: 20 (20 Aug 2023 05:26) (18 - 20)  SpO2: 98% (20 Aug 2023 05:26) (95% - 98%)    Parameters below as of 20 Aug 2023 05:26  Patient On (Oxygen Delivery Method): room air        08-19 @ 07:01  -  08-20 @ 07:00  --------------------------------------------------------  IN: 360 mL / OUT: 300 mL / NET: 60 mL        PHYSICAL EXAM:  General: No acute respiratory distress.  Eyes: conjunctiva and sclera clear  ENMT: Atraumatic, Normocephalic; Moist mucous membranes  Respiratory: Bilateral clear lungs; No rales, rhonchi, wheezing  Cardiovascular: S1S2+; no m/r/g  Gastrointestinal: Soft, Non-tender, Nondistended; Bowel sounds present; PEG  Neuro: eyes are open; hemiparesis; non-verbal  Ext:  1+pedal edema, No Cyanosis  Skin: No visible rashes    MEDICATIONS:  MEDICATIONS  (STANDING):  aspirin  chewable 324 milliGRAM(s) Oral daily  atorvastatin 20 milliGRAM(s) Oral at bedtime  carbidopa/levodopa  25/100 1 Tablet(s) Oral <User Schedule>  cefepime   IVPB 1000 milliGRAM(s) IV Intermittent every 24 hours  cefepime   IVPB      chlorhexidine 2% Cloths 1 Application(s) Topical daily  cloNIDine Patch 0.1 mG/24Hr(s) 1 patch Transdermal every 7 days  metoprolol tartrate 25 milliGRAM(s) Oral two times a day  pantoprazole   Suspension 40 milliGRAM(s) Oral daily  valproate sodium  IVPB 500 milliGRAM(s) IV Intermittent every 8 hours    MEDICATIONS  (PRN):  acetaminophen   Oral Liquid .. 650 milliGRAM(s) Enteral Tube every 6 hours PRN Temp greater or equal to 38C (100.4F)  morphine  - Injectable 2 milliGRAM(s) IV Push every 6 hours PRN Severe Pain (7 - 10)          LABS:                        7.5    16.09 )-----------( 253      ( 20 Aug 2023 07:30 )             23.7     08-20    135  |  109<H>  |  48<H>  ----------------------------<  137<H>  4.9   |  20<L>  |  2.04<H>    Ca    7.5<L>      20 Aug 2023 09:10  Phos  4.8     08-20  Mg     2.1     08-20    TPro  5.8<L>  /  Alb  1.4<L>  /  TBili  0.2  /  DBili  x   /  AST  116<H>  /  ALT  19  /  AlkPhos  156<H>  08-20      Urinalysis Basic - ( 20 Aug 2023 09:10 )    Color: x / Appearance: x / SG: x / pH: x  Gluc: 137 mg/dL / Ketone: x  / Bili: x / Urobili: x   Blood: x / Protein: x / Nitrite: x   Leuk Esterase: x / RBC: x / WBC x   Sq Epi: x / Non Sq Epi: x / Bacteria: x      Magnesium: 2.1 mg/dL (08-20 @ 07:30)  Phosphorus: 4.8 mg/dL (08-20 @ 07:30)    Urine studies    PTH and Vit D:

## 2023-08-20 NOTE — PROGRESS NOTE ADULT - ASSESSMENT
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 During the hospital course she has Gastrostomy tube placement by Surgery for poor oral intake and Dysphagia. On 8/17/23 she became septic and during sepsis work up found to have positive Urine analysis and started on ceftriaxone. Hence, the ID consult requested to assist with further evaluation and antibiotic management.    # Sepsis  as of 8/17/23 ( Fever + tachycardia + leukocytosis)  # Complicated UTI- s/p exchange Mcclellan catheter on 8/17/23 - s/p removal of mcclellan catheter and placed a primafit on 8/19/23  # s/p CVA with aphasia and dysphagia- s/p  Gastrostomy tube    would recommend:    1. Please Bladder sacn to assess for Urinary retention  2. Monitor WBC count, is trending down slowly  3. Continue Cefepime based on previous C & S  4. Aspiration precaution  5. Monitor Temp and c/w supportive care    Attending Attestation:    Spent more than 45 minutes on total encounter, more than 50 % of the visit was spent counseling and/or coordinating care by the Attending physician.         Patient is a 77y old  Female who is from [facility] and with PMHx of HTN, HLD, CVA (w/ residual aphasia and R sided hemiparesis/plegia) who was sent to the hospital on 7/27/23, due to altered mental status. During the hospital course she has Gastrostomy tube placement by Surgery for poor oral intake and Dysphagia. On 8/17/23 she became septic and during sepsis work up found to have positive Urine analysis and started on ceftriaxone. Hence, the ID consult requested to assist with further evaluation and antibiotic management.    # Sepsis  as of 8/17/23 ( Fever + tachycardia + leukocytosis)  # Complicated UTI- s/p exchange Mcclellan catheter on 8/17/23 - s/p removal of mcclellan catheter and placed a primafit on 8/19/23  # s/p CVA with aphasia and dysphagia- s/p  Gastrostomy tube    would recommend:    1. Please Bladder sacn to assess for Urinary retention  2. Monitor WBC count, is trending down slowly  3. Continue Cefepime based on previous C & S  4. Aspiration precaution  5. Monitor Temp and c/w supportive care    Attending Attestation:    Spent more than 45 minutes on total encounter, more than 50 % of the visit was spent counseling and/or coordinating care by the Attending physician.         Patient is a 77y old  Female who is from [facility] and with PMHx of HTN, HLD, CVA (w/ residual aphasia and R sided hemiparesis/plegia) who was sent to the hospital on 7/27/23, due to altered mental status. During the hospital course she has Gastrostomy tube placement by Surgery for poor oral intake and Dysphagia. On 8/17/23 she became septic and during sepsis work up found to have positive Urine analysis and started on ceftriaxone. Hence, the ID consult requested to assist with further evaluation and antibiotic management.    # Sepsis  as of 8/17/23 ( Fever + tachycardia + leukocytosis)  # Complicated UTI- s/p exchange Mcclellan catheter on 8/17/23 - s/p removal of mcclellan catheter and placed a primafit on 8/19/23  # s/p CVA with aphasia and dysphagia- s/p  Gastrostomy tube    would recommend:    1. Please Bladder sacn to assess for Urinary retention  2. Monitor WBC count, is trending down slowly  3. Continue Cefepime based on previous C & S  4. Aspiration precaution  5. Monitor Temp and c/w supportive care    Attending Attestation:    Spent more than 45 minutes on total encounter, more than 50 % of the visit was spent counseling and/or coordinating care by the Attending physician.         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 During the hospital course she has Gastrostomy tube placement by Surgery for poor oral intake and Dysphagia. On 8/17/23 she became septic and during sepsis work up found to have positive Urine analysis and started on ceftriaxone. Hence, the ID consult requested to assist with further evaluation and antibiotic management.    # Sepsis  as of 8/17/23 ( Fever + tachycardia + leukocytosis)  # Complicated UTI- s/p exchange Mcclellan catheter on 8/17/23 - s/p removal of mcclellan catheter and placed a primafit on 8/19/23  # s/p CVA with aphasia and dysphagia- s/p  Gastrostomy tube    would recommend:    1. Please Bladder scan to assess for Urinary retention  2. Monitor WBC count, is trending down slowly  3. Continue Cefepime based on previous C & S  4. Aspiration precaution  5. Monitor Temp and c/w supportive care    d/w House staff, DR. Oconnell    Attending Attestation:    Spent more than 45 minutes on total encounter, more than 50 % of the visit was spent counseling and/or coordinating care by the Attending physician.         Patient is a 77y old  Female who is from [facility] and with PMHx of HTN, HLD, CVA (w/ residual aphasia and R sided hemiparesis/plegia) who was sent to the hospital on 7/27/23, due to altered mental status. During the hospital course she has Gastrostomy tube placement by Surgery for poor oral intake and Dysphagia. On 8/17/23 she became septic and during sepsis work up found to have positive Urine analysis and started on ceftriaxone. Hence, the ID consult requested to assist with further evaluation and antibiotic management.    # Sepsis  as of 8/17/23 ( Fever + tachycardia + leukocytosis)  # Complicated UTI- s/p exchange Mcclellan catheter on 8/17/23 - s/p removal of mcclellan catheter and placed a primafit on 8/19/23  # s/p CVA with aphasia and dysphagia- s/p  Gastrostomy tube    would recommend:    1. Please Bladder scan to assess for Urinary retention  2. Monitor WBC count, is trending down slowly  3. Continue Cefepime based on previous C & S  4. Aspiration precaution  5. Monitor Temp and c/w supportive care    d/w House staff, DR. Oconnell    Attending Attestation:    Spent more than 45 minutes on total encounter, more than 50 % of the visit was spent counseling and/or coordinating care by the Attending physician.         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 During the hospital course she has Gastrostomy tube placement by Surgery for poor oral intake and Dysphagia. On 8/17/23 she became septic and during sepsis work up found to have positive Urine analysis and started on ceftriaxone. Hence, the ID consult requested to assist with further evaluation and antibiotic management.    # Sepsis  as of 8/17/23 ( Fever + tachycardia + leukocytosis)  # Complicated UTI- s/p exchange Mcclellan catheter on 8/17/23 - s/p removal of mcclellan catheter and placed a primafit on 8/19/23  # s/p CVA with aphasia and dysphagia- s/p  Gastrostomy tube    would recommend:    1. Please Bladder scan to assess for Urinary retention  2. Monitor WBC count, is trending down slowly  3. Continue Cefepime based on previous C & S  4. Aspiration precaution  5. Monitor Temp and c/w supportive care    d/w House staff, DR. Oconnell    Attending Attestation:    Spent more than 45 minutes on total encounter, more than 50 % of the visit was spent counseling and/or coordinating care by the Attending physician.

## 2023-08-20 NOTE — PROGRESS NOTE ADULT - PROBLEM SELECTOR PLAN 10
Strict I and O  -8/18 trial of void attempted Strict I and O  -8/18 trial of void attempted  -8/20 pt now on primacath

## 2023-08-20 NOTE — PROGRESS NOTE ADULT - ATTENDING SUPERVISION STATEMENT
The referral has been faxed to the genetics department.   
Resident

## 2023-08-20 NOTE — CONSULT NOTE ADULT - NS ATTEND AMEND GEN_ALL_CORE FT
Pt s/p G tube placement last week  Surgery consulted for some leaking around tube  Recommend CT scan to confirm that tube has not been dislodged  Pending CT results may be able to resume using it for feeds    Minoo Moreno MD  Attending Physician

## 2023-08-20 NOTE — CONSULT NOTE ADULT - ASSESSMENT
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 Moreno

## 2023-08-20 NOTE — CONSULT NOTE ADULT - CONSULT REASON
Discuss complex medical decision making related to goals of care
UTI
PEG placement
Failure to thrive, PEG tube placement
ABNL EKG
Encephalopathy
Dysphagia
Bleeding right ear
SUSAN
leakage around g-tube

## 2023-08-20 NOTE — PROGRESS NOTE ADULT - SUBJECTIVE AND OBJECTIVE BOX
PGY-1 Progress Note discussed with attending    PAGER #: [[phone]] TILL 5:00 PM  PLEASE CONTACT ON CALL TEAM:  - On Call Team (Please refer to Tyler) FROM 5:00 PM - 8:30PM  - Nightfloat Team FROM 8:30 -7:30 AM    CHIEF COMPLAINT & BRIEF HOSPITAL COURSE:      INTERVAL HPI/OVERNIGHT EVENTS:       MEDICATIONS  (STANDING):  aspirin  chewable 324 milliGRAM(s) Oral daily  atorvastatin 20 milliGRAM(s) Oral at bedtime  carbidopa/levodopa  25/100 1 Tablet(s) Oral <User Schedule>  cefepime   IVPB 1000 milliGRAM(s) IV Intermittent every 24 hours  cefepime   IVPB      chlorhexidine 2% Cloths 1 Application(s) Topical daily  cloNIDine Patch 0.1 mG/24Hr(s) 1 patch Transdermal every 7 days  metoprolol tartrate 25 milliGRAM(s) Oral two times a day  pantoprazole   Suspension 40 milliGRAM(s) Oral daily  valproate sodium  IVPB 500 milliGRAM(s) IV Intermittent every 8 hours    MEDICATIONS  (PRN):  acetaminophen   Oral Liquid .. 650 milliGRAM(s) Enteral Tube every 6 hours PRN Temp greater or equal to 38C (100.4F)  morphine  - Injectable 2 milliGRAM(s) IV Push every 6 hours PRN Severe Pain (7 - 10)      REVIEW OF SYSTEMS:  CONSTITUTIONAL: No fever, weight loss, or fatigue  RESPIRATORY: No shortness of breath  CARDIOVASCULAR: No chest pain  GASTROINTESTINAL: No abdominal pain.  GENITOURINARY: No dysuria  NEUROLOGICAL: No headaches  SKIN: No itching, burning, rashes    Vital Signs Last 24 Hrs  T(C): 37.3 (20 Aug 2023 07:11), Max: 38 (20 Aug 2023 05:26)  T(F): 99.1 (20 Aug 2023 07:11), Max: 100.4 (20 Aug 2023 05:26)  HR: 95 (20 Aug 2023 05:26) (86 - 95)  BP: 135/82 (20 Aug 2023 05:26) (124/60 - 140/66)  BP(mean): --  RR: 20 (20 Aug 2023 05:26) (18 - 20)  SpO2: 98% (20 Aug 2023 05:26) (95% - 98%)    Parameters below as of 20 Aug 2023 05:26  Patient On (Oxygen Delivery Method): room air        PHYSICAL EXAMINATION:  GENERAL: NAD, well built  HEAD:  Atraumatic, Normocephalic  EYES:  conjunctiva and sclera clear  CHEST/LUNG: Clear to auscultation. No rales, rhonchi, wheezing, or rubs  HEART: Regular rate and rhythm; No murmurs, rubs, or gallops  ABDOMEN: Soft, Nontender, Nondistended; Bowel sounds present  NERVOUS SYSTEM:  Alert & Oriented X3,    EXTREMITIES:  2+ Peripheral Pulses, No clubbing, cyanosis, or edema  SKIN: warm dry                          7.6    17.11 )-----------( 266      ( 19 Aug 2023 07:42 )             24.4     08-19    136  |  108  |  44<H>  ----------------------------<  136<H>  5.4<H>   |  18<L>  |  2.15<H>    Ca    7.5<L>      19 Aug 2023 07:42  Phos  4.2     08-19  Mg     2.4     08-19    TPro  6.0  /  Alb  1.5<L>  /  TBili  0.3  /  DBili  x   /  AST  186<H>  /  ALT  20  /  AlkPhos  243<H>  08-19    LIVER FUNCTIONS - ( 19 Aug 2023 07:42 )  Alb: 1.5 g/dL / Pro: 6.0 g/dL / ALK PHOS: 243 U/L / ALT: 20 U/L DA / AST: 186 U/L / GGT: x                   CAPILLARY BLOOD GLUCOSE      RADIOLOGY & ADDITIONAL TESTS:                   PGY-1 Progress Note discussed with attending    PAGER #: [[phone]] TILL 5:00 PM  PLEASE CONTACT ON CALL TEAM:  - On Call Team (Please refer to Tyler) FROM 5:00 PM - 8:30PM  - Nightfloat Team FROM 8:30 -7:30 AM    CHIEF COMPLAINT & BRIEF HOSPITAL COURSE:      INTERVAL HPI/OVERNIGHT EVENTS:       MEDICATIONS  (STANDING):  aspirin  chewable 324 milliGRAM(s) Oral daily  atorvastatin 20 milliGRAM(s) Oral at bedtime  carbidopa/levodopa  25/100 1 Tablet(s) Oral <User Schedule>  cefepime   IVPB 1000 milliGRAM(s) IV Intermittent every 24 hours  cefepime   IVPB      chlorhexidine 2% Cloths 1 Application(s) Topical daily  cloNIDine Patch 0.1 mG/24Hr(s) 1 patch Transdermal every 7 days  metoprolol tartrate 25 milliGRAM(s) Oral two times a day  pantoprazole   Suspension 40 milliGRAM(s) Oral daily  valproate sodium  IVPB 500 milliGRAM(s) IV Intermittent every 8 hours    MEDICATIONS  (PRN):  acetaminophen   Oral Liquid .. 650 milliGRAM(s) Enteral Tube every 6 hours PRN Temp greater or equal to 38C (100.4F)  morphine  - Injectable 2 milliGRAM(s) IV Push every 6 hours PRN Severe Pain (7 - 10)      REVIEW OF SYSTEMS:  CONSTITUTIONAL: No fever, weight loss, or fatigue  RESPIRATORY: No shortness of breath  CARDIOVASCULAR: No chest pain  GASTROINTESTINAL: No abdominal pain.  GENITOURINARY: No dysuria  NEUROLOGICAL: No headaches  SKIN: No itching, burning, rashes    Vital Signs Last 24 Hrs  T(C): 37.3 (20 Aug 2023 07:11), Max: 38 (20 Aug 2023 05:26)  T(F): 99.1 (20 Aug 2023 07:11), Max: 100.4 (20 Aug 2023 05:26)  HR: 95 (20 Aug 2023 05:26) (86 - 95)  BP: 135/82 (20 Aug 2023 05:26) (124/60 - 140/66)  BP(mean): --  RR: 20 (20 Aug 2023 05:26) (18 - 20)  SpO2: 98% (20 Aug 2023 05:26) (95% - 98%)    Parameters below as of 20 Aug 2023 05:26  Patient On (Oxygen Delivery Method): room air        PHYSICAL EXAMINATION:  GENERAL: NAD, well built  HEAD:  Atraumatic, Normocephalic  EYES:  conjunctiva and sclera clear  CHEST/LUNG: Clear to auscultation. No rales, rhonchi, wheezing, or rubs  HEART: Regular rate and rhythm; No murmurs, rubs, or gallops  ABDOMEN: Soft, Nontender, Nondistended; Bowel sounds present  NERVOUS SYSTEM:  Alert & Oriented X3,    EXTREMITIES:  2+ Peripheral Pulses, No clubbing, cyanosis, or edema  SKIN: warm dry                          7.6    17.11 )-----------( 266      ( 19 Aug 2023 07:42 )             24.4     08-19    136  |  108  |  44<H>  ----------------------------<  136<H>  5.4<H>   |  18<L>  |  2.15<H>    Ca    7.5<L>      19 Aug 2023 07:42  Phos  4.2     08-19  Mg     2.4     08-19    TPro  6.0  /  Alb  1.5<L>  /  TBili  0.3  /  DBili  x   /  AST  186<H>  /  ALT  20  /  AlkPhos  243<H>  08-19    LIVER FUNCTIONS - ( 19 Aug 2023 07:42 )  Alb: 1.5 g/dL / Pro: 6.0 g/dL / ALK PHOS: 243 U/L / ALT: 20 U/L DA / AST: 186 U/L / GGT: x                   CAPILLARY BLOOD GLUCOSE      RADIOLOGY & ADDITIONAL TESTS:                   PGY-1 Progress Note discussed with attending    PAGER #: [[phone]] TILL 5:00 PM  PLEASE CONTACT ON CALL TEAM:  - On Call Team (Please refer to Tyler) FROM 5:00 PM - 8:30PM  - Nightfloat Team FROM 8:30 -7:30 AM    CHIEF COMPLAINT & BRIEF HOSPITAL COURSE:      INTERVAL HPI/OVERNIGHT EVENTS:       MEDICATIONS  (STANDING):  aspirin  chewable 324 milliGRAM(s) Oral daily  atorvastatin 20 milliGRAM(s) Oral at bedtime  carbidopa/levodopa  25/100 1 Tablet(s) Oral <User Schedule>  cefepime   IVPB 1000 milliGRAM(s) IV Intermittent every 24 hours  cefepime   IVPB      chlorhexidine 2% Cloths 1 Application(s) Topical daily  cloNIDine Patch 0.1 mG/24Hr(s) 1 patch Transdermal every 7 days  metoprolol tartrate 25 milliGRAM(s) Oral two times a day  pantoprazole   Suspension 40 milliGRAM(s) Oral daily  valproate sodium  IVPB 500 milliGRAM(s) IV Intermittent every 8 hours    MEDICATIONS  (PRN):  acetaminophen   Oral Liquid .. 650 milliGRAM(s) Enteral Tube every 6 hours PRN Temp greater or equal to 38C (100.4F)  morphine  - Injectable 2 milliGRAM(s) IV Push every 6 hours PRN Severe Pain (7 - 10)      REVIEW OF SYSTEMS:  CONSTITUTIONAL: No fever, weight loss, or fatigue  RESPIRATORY: No shortness of breath  CARDIOVASCULAR: No chest pain  GASTROINTESTINAL: No abdominal pain.  GENITOURINARY: No dysuria  NEUROLOGICAL: No headaches  SKIN: No itching, burning, rashes    Vital Signs Last 24 Hrs  T(C): 37.3 (20 Aug 2023 07:11), Max: 38 (20 Aug 2023 05:26)  T(F): 99.1 (20 Aug 2023 07:11), Max: 100.4 (20 Aug 2023 05:26)  HR: 95 (20 Aug 2023 05:26) (86 - 95)  BP: 135/82 (20 Aug 2023 05:26) (124/60 - 140/66)  BP(mean): --  RR: 20 (20 Aug 2023 05:26) (18 - 20)  SpO2: 98% (20 Aug 2023 05:26) (95% - 98%)    Parameters below as of 20 Aug 2023 05:26  Patient On (Oxygen Delivery Method): room air        PHYSICAL EXAMINATION:  GENERAL: NAD, well built  HEAD:  Atraumatic, Normocephalic  EYES:  conjunctiva and sclera clear  CHEST/LUNG: Clear to auscultation. No rales, rhonchi, wheezing, or rubs  HEART: Regular rate and rhythm; No murmurs, rubs, or gallops  ABDOMEN: Soft, Nontender, Nondistended; Bowel sounds present  NERVOUS SYSTEM:  Alert & Oriented X3,    EXTREMITIES:  2+ Peripheral Pulses, No clubbing, cyanosis, or edema  SKIN: warm dry                          7.6    17.11 )-----------( 266      ( 19 Aug 2023 07:42 )             24.4     08-19    136  |  108  |  44<H>  ----------------------------<  136<H>  5.4<H>   |  18<L>  |  2.15<H>    Ca    7.5<L>      19 Aug 2023 07:42  Phos  4.2     08-19  Mg     2.4     08-19    TPro  6.0  /  Alb  1.5<L>  /  TBili  0.3  /  DBili  x   /  AST  186<H>  /  ALT  20  /  AlkPhos  243<H>  08-19    LIVER FUNCTIONS - ( 19 Aug 2023 07:42 )  Alb: 1.5 g/dL / Pro: 6.0 g/dL / ALK PHOS: 243 U/L / ALT: 20 U/L DA / AST: 186 U/L / GGT: x                   CAPILLARY BLOOD GLUCOSE      RADIOLOGY & ADDITIONAL TESTS:                   PGY-1 Progress Note discussed with attending    PAGER #: [[phone]] TILL 5:00 PM  PLEASE CONTACT ON CALL TEAM:  - On Call Team (Please refer to Tyler) FROM 5:00 PM - 8:30PM  - Nightfloat Team FROM 8:30 -7:30 AM    CHIEF COMPLAINT & BRIEF HOSPITAL COURSE:  Failure to thrive    INTERVAL HPI/OVERNIGHT EVENTS:   Pt in no acute distress at bedside this morning. Pt found to have leakage from G tube site so tube feeds have been held. Pt also had a low-grade fever overnight to 100.4, which resolved when temperature was measured the next time.    MEDICATIONS  (STANDING):  aspirin  chewable 324 milliGRAM(s) Oral daily  atorvastatin 20 milliGRAM(s) Oral at bedtime  carbidopa/levodopa  25/100 1 Tablet(s) Oral <User Schedule>  cefepime   IVPB 1000 milliGRAM(s) IV Intermittent every 24 hours  cefepime   IVPB      chlorhexidine 2% Cloths 1 Application(s) Topical daily  cloNIDine Patch 0.1 mG/24Hr(s) 1 patch Transdermal every 7 days  metoprolol tartrate 25 milliGRAM(s) Oral two times a day  pantoprazole   Suspension 40 milliGRAM(s) Oral daily  valproate sodium  IVPB 500 milliGRAM(s) IV Intermittent every 8 hours    MEDICATIONS  (PRN):  acetaminophen   Oral Liquid .. 650 milliGRAM(s) Enteral Tube every 6 hours PRN Temp greater or equal to 38C (100.4F)  morphine  - Injectable 2 milliGRAM(s) IV Push every 6 hours PRN Severe Pain (7 - 10)      REVIEW OF SYSTEMS: unable to assess as pt is non-verbal      Vital Signs Last 24 Hrs  T(C): 37.3 (20 Aug 2023 07:11), Max: 38 (20 Aug 2023 05:26)  T(F): 99.1 (20 Aug 2023 07:11), Max: 100.4 (20 Aug 2023 05:26)  HR: 95 (20 Aug 2023 05:26) (86 - 95)  BP: 135/82 (20 Aug 2023 05:26) (124/60 - 140/66)  BP(mean): --  RR: 20 (20 Aug 2023 05:26) (18 - 20)  SpO2: 98% (20 Aug 2023 05:26) (95% - 98%)    Parameters below as of 20 Aug 2023 05:26  Patient On (Oxygen Delivery Method): room air        PHYSICAL EXAMINATION:  GENERAL: NAD, well built, mcclellan removed and urine draining from primacath  HEAD:  Atraumatic, Normocephalic  EYES:  conjunctiva and sclera clear  CHEST/LUNG: Clear to auscultation. No rales, rhonchi, wheezing, or rubs  HEART: Regular rate and rhythm; No murmurs, rubs, or gallops  ABDOMEN: +leakage of brown viscous fluid from G tube site. Soft, Nontender, Nondistended; Bowel sounds present  NERVOUS SYSTEM:  Alert & Oriented X0,    EXTREMITIES:  +edema in all 4 extremities. 2+ Peripheral Pulses, No clubbing, cyanosis  SKIN: warm dry                          7.6    17.11 )-----------( 266      ( 19 Aug 2023 07:42 )             24.4     08-19    136  |  108  |  44<H>  ----------------------------<  136<H>  5.4<H>   |  18<L>  |  2.15<H>    Ca    7.5<L>      19 Aug 2023 07:42  Phos  4.2     08-19  Mg     2.4     08-19    TPro  6.0  /  Alb  1.5<L>  /  TBili  0.3  /  DBili  x   /  AST  186<H>  /  ALT  20  /  AlkPhos  243<H>  08-19    LIVER FUNCTIONS - ( 19 Aug 2023 07:42 )  Alb: 1.5 g/dL / Pro: 6.0 g/dL / ALK PHOS: 243 U/L / ALT: 20 U/L DA / AST: 186 U/L / GGT: x                   CAPILLARY BLOOD GLUCOSE      RADIOLOGY & ADDITIONAL TESTS:                   PGY-1 Progress Note discussed with attending    PAGER #: [[phone]] TILL 5:00 PM  PLEASE CONTACT ON CALL TEAM:  - On Call Team (Please refer to Tyelr) FROM 5:00 PM - 8:30PM  - Nightfloat Team FROM 8:30 -7:30 AM    CHIEF COMPLAINT & BRIEF HOSPITAL COURSE:  Failure to thrive    INTERVAL HPI/OVERNIGHT EVENTS:   Pt in no acute distress at bedside this morning. Pt found to have leakage from G tube site so tube feeds have been held. Pt also had a low-grade fever overnight to 100.4, which resolved when temperature was measured the next time.    MEDICATIONS  (STANDING):  aspirin  chewable 324 milliGRAM(s) Oral daily  atorvastatin 20 milliGRAM(s) Oral at bedtime  carbidopa/levodopa  25/100 1 Tablet(s) Oral <User Schedule>  cefepime   IVPB 1000 milliGRAM(s) IV Intermittent every 24 hours  cefepime   IVPB      chlorhexidine 2% Cloths 1 Application(s) Topical daily  cloNIDine Patch 0.1 mG/24Hr(s) 1 patch Transdermal every 7 days  metoprolol tartrate 25 milliGRAM(s) Oral two times a day  pantoprazole   Suspension 40 milliGRAM(s) Oral daily  valproate sodium  IVPB 500 milliGRAM(s) IV Intermittent every 8 hours    MEDICATIONS  (PRN):  acetaminophen   Oral Liquid .. 650 milliGRAM(s) Enteral Tube every 6 hours PRN Temp greater or equal to 38C (100.4F)  morphine  - Injectable 2 milliGRAM(s) IV Push every 6 hours PRN Severe Pain (7 - 10)      REVIEW OF SYSTEMS: unable to assess as pt is non-verbal      Vital Signs Last 24 Hrs  T(C): 37.3 (20 Aug 2023 07:11), Max: 38 (20 Aug 2023 05:26)  T(F): 99.1 (20 Aug 2023 07:11), Max: 100.4 (20 Aug 2023 05:26)  HR: 95 (20 Aug 2023 05:26) (86 - 95)  BP: 135/82 (20 Aug 2023 05:26) (124/60 - 140/66)  BP(mean): --  RR: 20 (20 Aug 2023 05:26) (18 - 20)  SpO2: 98% (20 Aug 2023 05:26) (95% - 98%)    Parameters below as of 20 Aug 2023 05:26  Patient On (Oxygen Delivery Method): room air        PHYSICAL EXAMINATION:  GENERAL: NAD, well built, mcclellan removed and urine draining from primacath  HEAD:  Atraumatic, Normocephalic  EYES:  conjunctiva and sclera clear  CHEST/LUNG: Clear to auscultation. No rales, rhonchi, wheezing, or rubs  HEART: Regular rate and rhythm; No murmurs, rubs, or gallops  ABDOMEN: +leakage of brown viscous fluid from G tube site. Soft, Nontender, Nondistended; Bowel sounds present  NERVOUS SYSTEM:  Alert & Oriented X0,    EXTREMITIES:  +edema in all 4 extremities. 2+ Peripheral Pulses, No clubbing, cyanosis  SKIN: warm dry                          7.6    17.11 )-----------( 266      ( 19 Aug 2023 07:42 )             24.4     08-19    136  |  108  |  44<H>  ----------------------------<  136<H>  5.4<H>   |  18<L>  |  2.15<H>    Ca    7.5<L>      19 Aug 2023 07:42  Phos  4.2     08-19  Mg     2.4     08-19    TPro  6.0  /  Alb  1.5<L>  /  TBili  0.3  /  DBili  x   /  AST  186<H>  /  ALT  20  /  AlkPhos  243<H>  08-19    LIVER FUNCTIONS - ( 19 Aug 2023 07:42 )  Alb: 1.5 g/dL / Pro: 6.0 g/dL / ALK PHOS: 243 U/L / ALT: 20 U/L DA / AST: 186 U/L / GGT: x                   CAPILLARY BLOOD GLUCOSE      RADIOLOGY & ADDITIONAL TESTS:                   PGY-1 Progress Note discussed with attending    PAGER #: [[phone]] TILL 5:00 PM  PLEASE CONTACT ON CALL TEAM:  - On Call Team (Please refer to Tyler) FROM 5:00 PM - 8:30PM  - Nightfloat Team FROM 8:30 -7:30 AM    CHIEF COMPLAINT & BRIEF HOSPITAL COURSE:  Failure to thrive    INTERVAL HPI/OVERNIGHT EVENTS:   Pt in no acute distress at bedside this morning. Pt found to have leakage from G tube site so tube feeds have been held. Pt also had a low-grade fever overnight to 100.4, which resolved when temperature was measured the next time.    MEDICATIONS  (STANDING):  aspirin  chewable 324 milliGRAM(s) Oral daily  atorvastatin 20 milliGRAM(s) Oral at bedtime  carbidopa/levodopa  25/100 1 Tablet(s) Oral <User Schedule>  cefepime   IVPB 1000 milliGRAM(s) IV Intermittent every 24 hours  cefepime   IVPB      chlorhexidine 2% Cloths 1 Application(s) Topical daily  cloNIDine Patch 0.1 mG/24Hr(s) 1 patch Transdermal every 7 days  metoprolol tartrate 25 milliGRAM(s) Oral two times a day  pantoprazole   Suspension 40 milliGRAM(s) Oral daily  valproate sodium  IVPB 500 milliGRAM(s) IV Intermittent every 8 hours    MEDICATIONS  (PRN):  acetaminophen   Oral Liquid .. 650 milliGRAM(s) Enteral Tube every 6 hours PRN Temp greater or equal to 38C (100.4F)  morphine  - Injectable 2 milliGRAM(s) IV Push every 6 hours PRN Severe Pain (7 - 10)      REVIEW OF SYSTEMS: unable to assess as pt is non-verbal      Vital Signs Last 24 Hrs  T(C): 37.3 (20 Aug 2023 07:11), Max: 38 (20 Aug 2023 05:26)  T(F): 99.1 (20 Aug 2023 07:11), Max: 100.4 (20 Aug 2023 05:26)  HR: 95 (20 Aug 2023 05:26) (86 - 95)  BP: 135/82 (20 Aug 2023 05:26) (124/60 - 140/66)  BP(mean): --  RR: 20 (20 Aug 2023 05:26) (18 - 20)  SpO2: 98% (20 Aug 2023 05:26) (95% - 98%)    Parameters below as of 20 Aug 2023 05:26  Patient On (Oxygen Delivery Method): room air        PHYSICAL EXAMINATION:  GENERAL: NAD, well built, mcclellan removed and urine draining from primacath  HEAD:  Atraumatic, Normocephalic  EYES:  conjunctiva and sclera clear  CHEST/LUNG: Clear to auscultation. No rales, rhonchi, wheezing, or rubs  HEART: Regular rate and rhythm; No murmurs, rubs, or gallops  ABDOMEN: +leakage of brown viscous fluid from G tube site. Soft, Nontender, Nondistended; Bowel sounds present  NERVOUS SYSTEM:  Alert & Oriented X0,    EXTREMITIES:  +edema in all 4 extremities. 2+ Peripheral Pulses, No clubbing, cyanosis  SKIN: warm dry                          7.6    17.11 )-----------( 266      ( 19 Aug 2023 07:42 )             24.4     08-19    136  |  108  |  44<H>  ----------------------------<  136<H>  5.4<H>   |  18<L>  |  2.15<H>    Ca    7.5<L>      19 Aug 2023 07:42  Phos  4.2     08-19  Mg     2.4     08-19    TPro  6.0  /  Alb  1.5<L>  /  TBili  0.3  /  DBili  x   /  AST  186<H>  /  ALT  20  /  AlkPhos  243<H>  08-19    LIVER FUNCTIONS - ( 19 Aug 2023 07:42 )  Alb: 1.5 g/dL / Pro: 6.0 g/dL / ALK PHOS: 243 U/L / ALT: 20 U/L DA / AST: 186 U/L / GGT: x                   CAPILLARY BLOOD GLUCOSE      RADIOLOGY & ADDITIONAL TESTS:

## 2023-08-20 NOTE — PROGRESS NOTE ADULT - ASSESSMENT
1. SUSAN possible to MARISA and ATN  Renal sono shows no hdyro with b/l kidneys around 9.2cm  -Scr slightly improved to  2.0mg/dl possible due to ATN again with stable electrolytes.     -s/p mcclellan's cath placed for urinary retention during this admission; discontinue by primary team.   -urinalysis shows blood with proteinuria; FeNa >1%, spot protein to creatinine ratio is 1.5gm  -Adjust meds to eGFR and avoid IV Gadolinium contrast,NSAIDs, and phosphate enema.  -Monitor I/O's daily.   -Monitor SMA daily.  2. Hypernatremia due to water deficit and insensible losses. Pt is clinically euvolemic.   -Na is stable and off D5W.   -Monitor I/O's. Check Serum Na Daily. Avoid high solute intake diet and sodium bicarbonate infuse. Avoid overcorrection of NA (8-10meq/day)  3. CKD stage 4 most likely due to hypertensive nephrosclerosis  -new baseline scr around 1.8mg/dL with uPCR 1.5gm.  -previous Scr around 1.2 to 1.6mg/dL in Oct 2022.  -Keep patient euvolemic and renal diet  -Avoid Nephrotoxic Meds/ Agents such as (NSAIDs, IV contrast, Aminoglycosides such as gentamicin, -Gadolinium contrast, Phosphate containing enemas, etc..)  -Adjust Medications according to eGFR  4. HTN:   -bp is acceptable. continue bp meds  -titrate bp meds to keep sbp >110 and < 130  5. Mineral Bone Disease:  -phos is improving.  -PTH intact 289, will start calcitriol once Phos has improved.   6. Encephalopathy:  -Leukocytosis improving.  -s/p Rocephin.  -Plan as per Neuro and primary team  -s/p PEG placed in OR on 08/14/23.  7. Hypokalemia:  -stable K.   -give kcl repletion to keep K >3.5meq/L  -monitor K.   8. Acidosis:  -CO2 is unchanged.    -monitor CO2.  9. Anemia:  -hb is stable.  -low iron sat and ferritin are okay. s/p iv iron x 3 days.  -monitor CBC.  -transfuse if hb <7.0  10. Elevated LFTs  -slowly improving; US abd done shows fatty liver  -monitor LFTs  11. Hyperkalemia due to susan on ckd.   -elevated K >10; lab error and rpt bmp shows K 4.0meq/dL.   -give Nephro tube feeding. give Lokelma prn if K >5.5  -Keep pt euvolemic. Avoid ACE inh/ ARBs, NSAIDs, and Aldactone or potassium sparing diuretics. Monitor K+ daily.

## 2023-08-20 NOTE — CONSULT NOTE ADULT - CONSULT REQUESTED BY NAME
Dr. Mason
Jamal DIAZ
Tavdy
MD
Dr. Mason
DR. Eli ( Covering DR. Mason)
Davis Mason MD
Dr. Oconnell
Dr Mason

## 2023-08-21 LAB
ALBUMIN SERPL ELPH-MCNC: 1.6 G/DL — LOW (ref 3.5–5)
ALP SERPL-CCNC: 198 U/L — HIGH (ref 40–120)
ALT FLD-CCNC: 20 U/L DA — SIGNIFICANT CHANGE UP (ref 10–60)
ANION GAP SERPL CALC-SCNC: 10 MMOL/L — SIGNIFICANT CHANGE UP (ref 5–17)
AST SERPL-CCNC: 67 U/L — HIGH (ref 10–40)
BASOPHILS # BLD AUTO: 0.09 K/UL — SIGNIFICANT CHANGE UP (ref 0–0.2)
BASOPHILS NFR BLD AUTO: 0.6 % — SIGNIFICANT CHANGE UP (ref 0–2)
BILIRUB SERPL-MCNC: 0.3 MG/DL — SIGNIFICANT CHANGE UP (ref 0.2–1.2)
BUN SERPL-MCNC: 45 MG/DL — HIGH (ref 7–18)
CALCIUM SERPL-MCNC: 8 MG/DL — LOW (ref 8.4–10.5)
CHLORIDE SERPL-SCNC: 110 MMOL/L — HIGH (ref 96–108)
CO2 SERPL-SCNC: 18 MMOL/L — LOW (ref 22–31)
CREAT SERPL-MCNC: 1.95 MG/DL — HIGH (ref 0.5–1.3)
EGFR: 26 ML/MIN/1.73M2 — LOW
EOSINOPHIL # BLD AUTO: 0.09 K/UL — SIGNIFICANT CHANGE UP (ref 0–0.5)
EOSINOPHIL NFR BLD AUTO: 0.6 % — SIGNIFICANT CHANGE UP (ref 0–6)
GLUCOSE BLDC GLUCOMTR-MCNC: 101 MG/DL — HIGH (ref 70–99)
GLUCOSE BLDC GLUCOMTR-MCNC: 103 MG/DL — HIGH (ref 70–99)
GLUCOSE BLDC GLUCOMTR-MCNC: 89 MG/DL — SIGNIFICANT CHANGE UP (ref 70–99)
GLUCOSE BLDC GLUCOMTR-MCNC: 95 MG/DL — SIGNIFICANT CHANGE UP (ref 70–99)
GLUCOSE SERPL-MCNC: 95 MG/DL — SIGNIFICANT CHANGE UP (ref 70–99)
HCT VFR BLD CALC: 23.6 % — LOW (ref 34.5–45)
HGB BLD-MCNC: 7.4 G/DL — LOW (ref 11.5–15.5)
IMM GRANULOCYTES NFR BLD AUTO: 8.4 % — HIGH (ref 0–0.9)
LYMPHOCYTES # BLD AUTO: 1.94 K/UL — SIGNIFICANT CHANGE UP (ref 1–3.3)
LYMPHOCYTES # BLD AUTO: 11.9 % — LOW (ref 13–44)
MAGNESIUM SERPL-MCNC: 2.5 MG/DL — SIGNIFICANT CHANGE UP (ref 1.6–2.6)
MCHC RBC-ENTMCNC: 27.9 PG — SIGNIFICANT CHANGE UP (ref 27–34)
MCHC RBC-ENTMCNC: 31.4 GM/DL — LOW (ref 32–36)
MCV RBC AUTO: 89.1 FL — SIGNIFICANT CHANGE UP (ref 80–100)
MONOCYTES # BLD AUTO: 1.38 K/UL — HIGH (ref 0–0.9)
MONOCYTES NFR BLD AUTO: 8.5 % — SIGNIFICANT CHANGE UP (ref 2–14)
NEUTROPHILS # BLD AUTO: 11.41 K/UL — HIGH (ref 1.8–7.4)
NEUTROPHILS NFR BLD AUTO: 70 % — SIGNIFICANT CHANGE UP (ref 43–77)
NRBC # BLD: 0 /100 WBCS — SIGNIFICANT CHANGE UP (ref 0–0)
PHOSPHATE SERPL-MCNC: 5.3 MG/DL — HIGH (ref 2.5–4.5)
PLATELET # BLD AUTO: 306 K/UL — SIGNIFICANT CHANGE UP (ref 150–400)
POTASSIUM SERPL-MCNC: 4.6 MMOL/L — SIGNIFICANT CHANGE UP (ref 3.5–5.3)
POTASSIUM SERPL-SCNC: 4.6 MMOL/L — SIGNIFICANT CHANGE UP (ref 3.5–5.3)
PROT SERPL-MCNC: 6.3 G/DL — SIGNIFICANT CHANGE UP (ref 6–8.3)
RBC # BLD: 2.65 M/UL — LOW (ref 3.8–5.2)
RBC # FLD: 17.2 % — HIGH (ref 10.3–14.5)
SODIUM SERPL-SCNC: 138 MMOL/L — SIGNIFICANT CHANGE UP (ref 135–145)
WBC # BLD: 16.27 K/UL — HIGH (ref 3.8–10.5)
WBC # FLD AUTO: 16.27 K/UL — HIGH (ref 3.8–10.5)

## 2023-08-21 PROCEDURE — 74176 CT ABD & PELVIS W/O CONTRAST: CPT | Mod: 26

## 2023-08-21 RX ORDER — SODIUM CHLORIDE 9 MG/ML
1000 INJECTION, SOLUTION INTRAVENOUS
Refills: 0 | Status: DISCONTINUED | OUTPATIENT
Start: 2023-08-21 | End: 2023-08-22

## 2023-08-21 RX ADMIN — Medication 55 MILLIGRAM(S): at 06:29

## 2023-08-21 RX ADMIN — CEFEPIME 100 MILLIGRAM(S): 1 INJECTION, POWDER, FOR SOLUTION INTRAMUSCULAR; INTRAVENOUS at 16:56

## 2023-08-21 RX ADMIN — Medication 1 PATCH: at 18:34

## 2023-08-21 RX ADMIN — Medication 1 PATCH: at 07:00

## 2023-08-21 RX ADMIN — Medication 55 MILLIGRAM(S): at 15:43

## 2023-08-21 RX ADMIN — SODIUM CHLORIDE 50 MILLILITER(S): 9 INJECTION, SOLUTION INTRAVENOUS at 00:56

## 2023-08-21 RX ADMIN — CHLORHEXIDINE GLUCONATE 1 APPLICATION(S): 213 SOLUTION TOPICAL at 13:04

## 2023-08-21 RX ADMIN — Medication 55 MILLIGRAM(S): at 21:50

## 2023-08-21 NOTE — PROGRESS NOTE ADULT - ASSESSMENT
1. Anemia  2. Dysphagia  3. Constipation  4. Failure to thrive  5. No evidence of acute GI bleeding  6. S/p unsuccessful endoscopic Peg placement  7. S/p gastrostomy tube placement by surgery  8. Peg tube malfunction    Suggestions:    1. NPO  2. IVF hydration  3. Hold Peg feeding  4. Check CT-scan of abdomen and pelvis to confirm Peg position  5. Surgical follow up  6. Monitor electrolytes  7. Protonix daily  8. Aspiration precaution  9. Monitor H/H  10. Transfuse PRBC as needed  11. Avoid NSAID  12. Daily stool softener as needed  13. DVT prophylaxis

## 2023-08-21 NOTE — PROGRESS NOTE ADULT - SUBJECTIVE AND OBJECTIVE BOX
Patient is seen and examined at the bed side, is afebrile now, the fever curve is down.  The Bladder scan shows 185 ml urine, is less than 200.       REVIEW OF SYSTEMS: Unable to obtain due  to mental status       ALLERGIES: &quot; NATURAL RUBBER&quot; (Other)  latex (Other)  penicillins (Unknown)      Vital Signs Last 24 Hrs  T(C): 37.3 (21 Aug 2023 13:29), Max: 37.3 (21 Aug 2023 13:29)  T(F): 99.1 (21 Aug 2023 13:29), Max: 99.1 (21 Aug 2023 13:29)  HR: 95 (21 Aug 2023 13:29) (86 - 95)  BP: 129/86 (21 Aug 2023 13:29) (117/64 - 148/75)  BP(mean): --  RR: 18 (21 Aug 2023 13:29) (17 - 18)  SpO2: 99% (21 Aug 2023 13:29) (98% - 100%)    Parameters below as of 21 Aug 2023 13:29  Patient On (Oxygen Delivery Method): room air      PHYSICAL EXAM:  GENERAL: Not in acute distress   CHEST/LUNG:  Not using accessory muscles   HEART: s1 and s2 present  ABDOMEN:  grossly obese  EXTREMITIES: edematous  CNS: Awake, somewhat alert but Non verbal       LABS:                        7.4    16.27 )-----------( 306      ( 21 Aug 2023 06:00 )             23.6                           7.5    16.09 )-----------( 253      ( 20 Aug 2023 07:30 )             23.7       08-21    138  |  110<H>  |  45<H>  ----------------------------<  95  4.6   |  18<L>  |  1.95<H>    Ca    8.0<L>      21 Aug 2023 06:00  Phos  5.3     08-21  Mg     2.5     08-21    TPro  6.3  /  Alb  1.6<L>  /  TBili  0.3  /  DBili  x   /  AST  67<H>  /  ALT  20  /  AlkPhos  198<H>  08-21    08-20    135  |  109<H>  |  48<H>  ----------------------------<  137<H>  4.9   |  20<L>  |  2.04<H>    Ca    7.5<L>      20 Aug 2023 09:10  Phos  4.8     08-20  Mg     2.1     08-20    TPro  5.8<L>  /  Alb  1.4<L>  /  TBili  0.2  /  DBili  x   /  AST  116<H>  /  ALT  19  /  AlkPhos  156<H>  08-20      CAPILLARY BLOOD GLUCOSE  POCT Blood Glucose.: 144 mg/dL (18 Aug 2023 11:49)  POCT Blood Glucose.: 122 mg/dL (18 Aug 2023 00:03)      Urinalysis Basic - ( 18 Aug 2023 11:20 )  Color: x / Appearance: x / SG: x / pH: x  Gluc: 129 mg/dL / Ketone: x  / Bili: x / Urobili: x   Blood: x / Protein: x / Nitrite: x   Leuk Esterase: x / RBC: x / WBC x   Sq Epi: x / Non Sq Epi: x / Bacteria: x        MEDICATIONS  (STANDING):    aspirin  chewable 324 milliGRAM(s) Oral daily  atorvastatin 20 milliGRAM(s) Oral at bedtime  carbidopa/levodopa  25/100 1 Tablet(s) Oral <User Schedule>  cefepime   IVPB 1000 milliGRAM(s) IV Intermittent every 24 hours  cefepime   IVPB      chlorhexidine 2% Cloths 1 Application(s) Topical daily  cloNIDine Patch 0.1 mG/24Hr(s) 1 patch Transdermal every 7 days  dextrose 5% + sodium chloride 0.9%. 1000 milliLiter(s) (50 mL/Hr) IV Continuous <Continuous>  metoprolol tartrate 25 milliGRAM(s) Oral two times a day  pantoprazole   Suspension 40 milliGRAM(s) Oral daily  valproate sodium  IVPB 500 milliGRAM(s) IV Intermittent every 8 hours      MICROBIOLOGY DATA:    Culture - Urine (08.17.23 @ 23:15)   Specimen Source: Catheterized Catheterized  Culture Results:   <10,000 CFU/mL Normal Urogenital Anila      Culture - Blood (08.17.23 @ 14:36)   Specimen Source: .Blood Blood-Peripheral  Culture Results: No growth at 72 hours    Culture - Blood (08.17.23 @ 14:35)   Specimen Source: .Blood Blood-Peripheral  Culture Results: No growth at 72 hours      Urine Microscopic-Add On (NC) (08.17.23 @ 18:40)   Red Blood Cell - Urine: 150 /HPF  White Blood Cell - Urine: 30 /HPF  Bacteria: Few /HPF  Squamous Epithelial Cells: Present    Urine Microscopic-Add On (NC) (08.17.23 @ 11:35)   Squamous Epithelial Cells: None Seen  Bacteria: Negative /HPF  Comment - Urine: Moderate budding yeast and yeast-like cells  White Blood Cell - Urine: >50 /HPF  Red Blood Cell - Urine: >50 /HPF      COVID-19 PCR . (08.16.23 @ 17:40)   COVID-19 PCR: NotDetec:

## 2023-08-21 NOTE — PROGRESS NOTE ADULT - SUBJECTIVE AND OBJECTIVE BOX
[   ] ICU                                          [   ] CCU                                      [X   ] Medical Floor      Patient is a 77 year old female with dysphagia. GI consulted for Peg tube placement.     Patient is a 77 female from [facility] with past medical history significant for HTN, HLD, CVA (w/ residual aphasia and R sided hemiparesis/plegia) who was sent to the emergency room with for altered mental status. Patient is not able to provide any history. Patient is lying down in bed, awake and alert. However, patient does not speak, is not able to follow commands and does not turn face or move eyes when her name is called. Patient is admitted with CVA. Now patient with dysphagia, poor po intake, failure to thrive and weight loss. No abdominal pain, nausea, vomiting, hematemesis, hematochezia, melena, fever, chills, chest pain, SOB, cough, hematuria, dysuria  or diarrhea reported.      Patient is comfortable. No new complaints reported except Peg tube malfunction (leaking the feeding from it's side), No abdominal pain, N/V, hematemesis, hematochezia, melena, fever, chills, chest pain, SOB, cough or diarrhea reported.      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 1000 milliLiter(s) (70 mL/Hr) IV Continuous <Continuous>  enoxaparin Injectable 30 milliGRAM(s) SubCutaneous every 24 hours  hydrALAZINE Injectable 10 milliGRAM(s) IV Push every 6 hours  potassium chloride  10 mEq/100 mL IVPB 10 milliEquivalent(s) IV Intermittent every 1 hour  valproate sodium  IVPB 500 milliGRAM(s) IV Intermittent every 8 hours         SOCIAL HISTORY  Advanced Directives:       [ X ] Full Code       [  ] DNR  Marital Status:         [  ] M      [ X ] S      [  ] D       [  ] W  Children:       [ X ] Yes      [  ] No  Occupation:        [  ] Employed       [ X ] Unemployed       [  ] Retired  Diet:       [ X ] Regular       [  ] PEG feeding          [  ] NG tube feeding  Drug Use:           [ X ] Patient denied          [  ] Yes  Alcohol:           [ X ] No             [  ] Yes (socially)         [  ] Yes (chronic)  Tobacco:           [  ] Yes           [X  ] No      FAMILY HISTORY  [ X ] Heart Disease            [ X ] Diabetes             [ X ] HTN             [  ] Colon Cancer             [  ] Stomach Cancer              [  ] Pancreatic Cancer          VITALS  Vital Signs Last 24 Hrs  T(C): 37.1 (21 Aug 2023 05:57), Max: 37.1 (20 Aug 2023 20:47)  T(F): 98.8 (21 Aug 2023 05:57), Max: 98.8 (21 Aug 2023 05:57)  HR: 91 (21 Aug 2023 05:57) (86 - 92)  BP: 117/64 (21 Aug 2023 05:57) (117/64 - 148/75)   RR: 17 (21 Aug 2023 05:57) (17 - 19)  SpO2: 98% (21 Aug 2023 05:57) (98% - 100%)  Parameters below as of 21 Aug 2023 05:57  Patient On (Oxygen Delivery Method): room air       MEDICATIONS  (STANDING):  aspirin  chewable 324 milliGRAM(s) Oral daily  atorvastatin 20 milliGRAM(s) Oral at bedtime  carbidopa/levodopa  25/100 1 Tablet(s) Oral <User Schedule>  cefepime   IVPB 1000 milliGRAM(s) IV Intermittent every 24 hours  cefepime   IVPB      chlorhexidine 2% Cloths 1 Application(s) Topical daily  cloNIDine Patch 0.1 mG/24Hr(s) 1 patch Transdermal every 7 days  dextrose 5% + sodium chloride 0.9%. 1000 milliLiter(s) (50 mL/Hr) IV Continuous <Continuous>  metoprolol tartrate 25 milliGRAM(s) Oral two times a day  pantoprazole   Suspension 40 milliGRAM(s) Oral daily  valproate sodium  IVPB 500 milliGRAM(s) IV Intermittent every 8 hours    MEDICATIONS  (PRN):  acetaminophen   Oral Liquid .. 650 milliGRAM(s) Enteral Tube every 6 hours PRN Temp greater or equal to 38C (100.4F)  morphine  - Injectable 2 milliGRAM(s) IV Push every 6 hours PRN Severe Pain (7 - 10)                            7.4    16.27 )-----------( 306      ( 21 Aug 2023 06:00 )             23.6       08-21    138  |  110<H>  |  45<H>  ----------------------------<  95  4.6   |  18<L>  |  1.95<H>    Ca    8.0<L>      21 Aug 2023 06:00  Phos  5.3     08-21  Mg     2.5     08-21    TPro  6.3  /  Alb  1.6<L>  /  TBili  0.3  /  DBili  x   /  AST  67<H>  /  ALT  20  /  AlkPhos  198<H>  08-21       [   ] ICU                                          [   ] CCU                                      [X   ] Medical Floor      Patient is a 77 year old female with dysphagia. GI consulted for Peg tube placement.     Patient is a 77 female from [facility] with past medical history significant for HTN, HLD, CVA (w/ residual aphasia and R sided hemiparesis/plegia) who was sent to the emergency room with for altered mental status. Patient is not able to provide any history. Patient is lying down in bed, awake and alert. However, patient does not speak, is not able to follow commands and does not turn face or move eyes when her name is called. Patient is admitted with CVA. Now patient with dysphagia, poor po intake, failure to thrive and weight loss. No abdominal pain, nausea, vomiting, hematemesis, hematochezia, melena, fever, chills, chest pain, SOB, cough, hematuria, dysuria  or diarrhea reported.      Patient is comfortable. No new complaints reported except Peg tube malfunction (leaking the feeding from it's side), No abdominal pain, N/V, hematemesis, hematochezia, melena, fever, chills, chest pain, SOB, cough or diarrhea reported.      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 1000 milliLiter(s) (70 mL/Hr) IV Continuous <Continuous>  enoxaparin Injectable 30 milliGRAM(s) SubCutaneous every 24 hours  hydrALAZINE Injectable 10 milliGRAM(s) IV Push every 6 hours  potassium chloride  10 mEq/100 mL IVPB 10 milliEquivalent(s) IV Intermittent every 1 hour  valproate sodium  IVPB 500 milliGRAM(s) IV Intermittent every 8 hours         SOCIAL HISTORY  Advanced Directives:       [ X ] Full Code       [  ] DNR  Marital Status:         [  ] M      [ X ] S      [  ] D       [  ] W  Children:       [ X ] Yes      [  ] No  Occupation:        [  ] Employed       [ X ] Unemployed       [  ] Retired  Diet:       [ X ] Regular       [  ] PEG feeding          [  ] NG tube feeding  Drug Use:           [ X ] Patient denied          [  ] Yes  Alcohol:           [ X ] No             [  ] Yes (socially)         [  ] Yes (chronic)  Tobacco:           [  ] Yes           [X  ] No      FAMILY HISTORY  [ X ] Heart Disease            [ X ] Diabetes             [ X ] HTN             [  ] Colon Cancer             [  ] Stomach Cancer              [  ] Pancreatic Cancer          VITALS  Vital Signs Last 24 Hrs  T(C): 37.1 (21 Aug 2023 05:57), Max: 37.1 (20 Aug 2023 20:47)  T(F): 98.8 (21 Aug 2023 05:57), Max: 98.8 (21 Aug 2023 05:57)  HR: 91 (21 Aug 2023 05:57) (86 - 92)  BP: 117/64 (21 Aug 2023 05:57) (117/64 - 148/75)   RR: 17 (21 Aug 2023 05:57) (17 - 19)  SpO2: 98% (21 Aug 2023 05:57) (98% - 100%)  Parameters below as of 21 Aug 2023 05:57  Patient On (Oxygen Delivery Method): room air       MEDICATIONS  (STANDING):  aspirin  chewable 324 milliGRAM(s) Oral daily  atorvastatin 20 milliGRAM(s) Oral at bedtime  carbidopa/levodopa  25/100 1 Tablet(s) Oral <User Schedule>  cefepime   IVPB 1000 milliGRAM(s) IV Intermittent every 24 hours  cefepime   IVPB      chlorhexidine 2% Cloths 1 Application(s) Topical daily  cloNIDine Patch 0.1 mG/24Hr(s) 1 patch Transdermal every 7 days  dextrose 5% + sodium chloride 0.9%. 1000 milliLiter(s) (50 mL/Hr) IV Continuous <Continuous>  metoprolol tartrate 25 milliGRAM(s) Oral two times a day  pantoprazole   Suspension 40 milliGRAM(s) Oral daily  valproate sodium  IVPB 500 milliGRAM(s) IV Intermittent every 8 hours    MEDICATIONS  (PRN):  acetaminophen   Oral Liquid .. 650 milliGRAM(s) Enteral Tube every 6 hours PRN Temp greater or equal to 38C (100.4F)  morphine  - Injectable 2 milliGRAM(s) IV Push every 6 hours PRN Severe Pain (7 - 10)                            7.4    16.27 )-----------( 306      ( 21 Aug 2023 06:00 )             23.6       08-21    138  |  110<H>  |  45<H>  ----------------------------<  95  4.6   |  18<L>  |  1.95<H>    Ca    8.0<L>      21 Aug 2023 06:00  Phos  5.3     08-21  Mg     2.5     08-21    TPro  6.3  /  Alb  1.6<L>  /  TBili  0.3  /  DBili  x   /  AST  67<H>  /  ALT  20  /  AlkPhos  198<H>  08-21       [   ] ICU                                          [   ] CCU                                      [X   ] Medical Floor      Patient is a 77 year old female with dysphagia. GI consulted for Peg tube placement.     Patient is a 77 female from [facility] with past medical history significant for HTN, HLD, CVA (w/ residual aphasia and R sided hemiparesis/plegia) who was sent to the emergency room with for altered mental status. Patient is not able to provide any history. Patient is lying down in bed, awake and alert. However, patient does not speak, is not able to follow commands and does not turn face or move eyes when her name is called. Patient is admitted with CVA. Now patient with dysphagia, poor po intake, failure to thrive and weight loss. No abdominal pain, nausea, vomiting, hematemesis, hematochezia, melena, fever, chills, chest pain, SOB, cough, hematuria, dysuria  or diarrhea reported.      Patient is comfortable. No new complaints reported except Peg tube malfunction (leaking the feeding from it's side), No abdominal pain, N/V, hematemesis, hematochezia, melena, fever, chills, chest pain, SOB, cough or diarrhea reported.      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 1000 milliLiter(s) (70 mL/Hr) IV Continuous <Continuous>  enoxaparin Injectable 30 milliGRAM(s) SubCutaneous every 24 hours  hydrALAZINE Injectable 10 milliGRAM(s) IV Push every 6 hours  potassium chloride  10 mEq/100 mL IVPB 10 milliEquivalent(s) IV Intermittent every 1 hour  valproate sodium  IVPB 500 milliGRAM(s) IV Intermittent every 8 hours         SOCIAL HISTORY  Advanced Directives:       [ X ] Full Code       [  ] DNR  Marital Status:         [  ] M      [ X ] S      [  ] D       [  ] W  Children:       [ X ] Yes      [  ] No  Occupation:        [  ] Employed       [ X ] Unemployed       [  ] Retired  Diet:       [ X ] Regular       [  ] PEG feeding          [  ] NG tube feeding  Drug Use:           [ X ] Patient denied          [  ] Yes  Alcohol:           [ X ] No             [  ] Yes (socially)         [  ] Yes (chronic)  Tobacco:           [  ] Yes           [X  ] No      FAMILY HISTORY  [ X ] Heart Disease            [ X ] Diabetes             [ X ] HTN             [  ] Colon Cancer             [  ] Stomach Cancer              [  ] Pancreatic Cancer          VITALS  Vital Signs Last 24 Hrs  T(C): 37.1 (21 Aug 2023 05:57), Max: 37.1 (20 Aug 2023 20:47)  T(F): 98.8 (21 Aug 2023 05:57), Max: 98.8 (21 Aug 2023 05:57)  HR: 91 (21 Aug 2023 05:57) (86 - 92)  BP: 117/64 (21 Aug 2023 05:57) (117/64 - 148/75)   RR: 17 (21 Aug 2023 05:57) (17 - 19)  SpO2: 98% (21 Aug 2023 05:57) (98% - 100%)  Parameters below as of 21 Aug 2023 05:57  Patient On (Oxygen Delivery Method): room air       MEDICATIONS  (STANDING):  aspirin  chewable 324 milliGRAM(s) Oral daily  atorvastatin 20 milliGRAM(s) Oral at bedtime  carbidopa/levodopa  25/100 1 Tablet(s) Oral <User Schedule>  cefepime   IVPB 1000 milliGRAM(s) IV Intermittent every 24 hours  cefepime   IVPB      chlorhexidine 2% Cloths 1 Application(s) Topical daily  cloNIDine Patch 0.1 mG/24Hr(s) 1 patch Transdermal every 7 days  dextrose 5% + sodium chloride 0.9%. 1000 milliLiter(s) (50 mL/Hr) IV Continuous <Continuous>  metoprolol tartrate 25 milliGRAM(s) Oral two times a day  pantoprazole   Suspension 40 milliGRAM(s) Oral daily  valproate sodium  IVPB 500 milliGRAM(s) IV Intermittent every 8 hours    MEDICATIONS  (PRN):  acetaminophen   Oral Liquid .. 650 milliGRAM(s) Enteral Tube every 6 hours PRN Temp greater or equal to 38C (100.4F)  morphine  - Injectable 2 milliGRAM(s) IV Push every 6 hours PRN Severe Pain (7 - 10)                            7.4    16.27 )-----------( 306      ( 21 Aug 2023 06:00 )             23.6       08-21    138  |  110<H>  |  45<H>  ----------------------------<  95  4.6   |  18<L>  |  1.95<H>    Ca    8.0<L>      21 Aug 2023 06:00  Phos  5.3     08-21  Mg     2.5     08-21    TPro  6.3  /  Alb  1.6<L>  /  TBili  0.3  /  DBili  x   /  AST  67<H>  /  ALT  20  /  AlkPhos  198<H>  08-21

## 2023-08-21 NOTE — PROGRESS NOTE ADULT - ASSESSMENT
77 female from [facility] PMHx HTN, HLD, CVA (w/ residual aphasia and R sided hemiparesis/plegia) who was sent to the hospital due to altered mental status,UTI.  1.UTI-s/p ABX.  2.HTN-clonidine patch .1mg q wk,lopressor 25mg bid.  3.Lipid d/o- statin.  4.Surgical f/u-s/p G tube-?leak CT abd and pelvis..  5.SUSAN-free water.  6.Anemia-  iron.  7.GI and DVT prophylaxis.     77 female from [facility] PMHx HTN, HLD, CVA (w/ residual aphasia and R sided hemiparesis/plegia) who was sent to the hospital due to altered mental status,UTI.  1.UTI-s/p ABX.  2.HTN-clonidine patch .1mg q wk,lopressor 25mg bid.  3.Lipid d/o- statin.  4.Surgical f/u-s/p G tube-?leak CT abd and pelvis..  5.SUSAN-free water.  6.Anemia-  iron.  7.GI and DVT prophylaxis.     77 female from [facility] PMHx HTN, HLD, CVA (w/ residual aphasia and R sided hemiparesis/plegia) who was sent to the hospital due to altered mental status,UTI.  1.UTI-s/p ABX.  2.HTN-clonidine patch .1mg q wk,lopressor 25mg bid.  3.Lipid d/o- statin.  4.Surgical f/u-s/p G tube-?leak CT abd and pelvis..  5.SUSAN-free water.  6.Anemia-  iron.  7.GI and DVT prophylaxis.

## 2023-08-21 NOTE — PROGRESS NOTE ADULT - SUBJECTIVE AND OBJECTIVE BOX
PGY-1 Progress Note discussed with attending    PAGER #: [[phone]] TILL 5:00 PM  PLEASE CONTACT ON CALL TEAM:  - On Call Team (Please refer to Tyler) FROM 5:00 PM - 8:30PM  - Nightfloat Team FROM 8:30 -7:30 AM    CHIEF COMPLAINT & BRIEF HOSPITAL COURSE:      INTERVAL HPI/OVERNIGHT EVENTS:       MEDICATIONS  (STANDING):  aspirin  chewable 324 milliGRAM(s) Oral daily  atorvastatin 20 milliGRAM(s) Oral at bedtime  carbidopa/levodopa  25/100 1 Tablet(s) Oral <User Schedule>  cefepime   IVPB 1000 milliGRAM(s) IV Intermittent every 24 hours  cefepime   IVPB      chlorhexidine 2% Cloths 1 Application(s) Topical daily  cloNIDine Patch 0.1 mG/24Hr(s) 1 patch Transdermal every 7 days  dextrose 5% + sodium chloride 0.9%. 1000 milliLiter(s) (50 mL/Hr) IV Continuous <Continuous>  metoprolol tartrate 25 milliGRAM(s) Oral two times a day  pantoprazole   Suspension 40 milliGRAM(s) Oral daily  valproate sodium  IVPB 500 milliGRAM(s) IV Intermittent every 8 hours    MEDICATIONS  (PRN):  acetaminophen   Oral Liquid .. 650 milliGRAM(s) Enteral Tube every 6 hours PRN Temp greater or equal to 38C (100.4F)  morphine  - Injectable 2 milliGRAM(s) IV Push every 6 hours PRN Severe Pain (7 - 10)      REVIEW OF SYSTEMS:  CONSTITUTIONAL: No fever, weight loss, or fatigue  RESPIRATORY: No shortness of breath  CARDIOVASCULAR: No chest pain  GASTROINTESTINAL: No abdominal pain.  GENITOURINARY: No dysuria  NEUROLOGICAL: No headaches  SKIN: No itching, burning, rashes    Vital Signs Last 24 Hrs  T(C): 37.1 (21 Aug 2023 05:57), Max: 37.1 (20 Aug 2023 20:47)  T(F): 98.8 (21 Aug 2023 05:57), Max: 98.8 (21 Aug 2023 05:57)  HR: 91 (21 Aug 2023 05:57) (86 - 92)  BP: 117/64 (21 Aug 2023 05:57) (117/64 - 148/75)  BP(mean): --  RR: 17 (21 Aug 2023 05:57) (17 - 19)  SpO2: 98% (21 Aug 2023 05:57) (98% - 100%)    Parameters below as of 21 Aug 2023 05:57  Patient On (Oxygen Delivery Method): room air        PHYSICAL EXAMINATION:  GENERAL: NAD, well built  HEAD:  Atraumatic, Normocephalic  EYES:  conjunctiva and sclera clear  CHEST/LUNG: Clear to auscultation. No rales, rhonchi, wheezing, or rubs  HEART: Regular rate and rhythm; No murmurs, rubs, or gallops  ABDOMEN: Soft, Nontender, Nondistended; Bowel sounds present  NERVOUS SYSTEM:  Alert & Oriented X3,    EXTREMITIES:  2+ Peripheral Pulses, No clubbing, cyanosis, or edema  SKIN: warm dry                          7.4    16.27 )-----------( 306      ( 21 Aug 2023 06:00 )             23.6     08-21    138  |  110<H>  |  45<H>  ----------------------------<  95  4.6   |  18<L>  |  1.95<H>    Ca    8.0<L>      21 Aug 2023 06:00  Phos  5.3     08-21  Mg     2.5     08-21    TPro  6.3  /  Alb  1.6<L>  /  TBili  0.3  /  DBili  x   /  AST  67<H>  /  ALT  20  /  AlkPhos  198<H>  08-21    LIVER FUNCTIONS - ( 21 Aug 2023 06:00 )  Alb: 1.6 g/dL / Pro: 6.3 g/dL / ALK PHOS: 198 U/L / ALT: 20 U/L DA / AST: 67 U/L / GGT: x                   CAPILLARY BLOOD GLUCOSE      RADIOLOGY & ADDITIONAL TESTS:                   PGY-1 Progress Note discussed with attending    PAGER #: [[phone]] TILL 5:00 PM  PLEASE CONTACT ON CALL TEAM:  - On Call Team (Please refer to Tyler) FROM 5:00 PM - 8:30PM  - Nightfloat Team FROM 8:30 -7:30 AM    CHIEF COMPLAINT & BRIEF HOSPITAL COURSE:      INTERVAL HPI/OVERNIGHT EVENTS:       MEDICATIONS  (STANDING):  aspirin  chewable 324 milliGRAM(s) Oral daily  atorvastatin 20 milliGRAM(s) Oral at bedtime  carbidopa/levodopa  25/100 1 Tablet(s) Oral <User Schedule>  cefepime   IVPB 1000 milliGRAM(s) IV Intermittent every 24 hours  cefepime   IVPB      chlorhexidine 2% Cloths 1 Application(s) Topical daily  cloNIDine Patch 0.1 mG/24Hr(s) 1 patch Transdermal every 7 days  dextrose 5% + sodium chloride 0.9%. 1000 milliLiter(s) (50 mL/Hr) IV Continuous <Continuous>  metoprolol tartrate 25 milliGRAM(s) Oral two times a day  pantoprazole   Suspension 40 milliGRAM(s) Oral daily  valproate sodium  IVPB 500 milliGRAM(s) IV Intermittent every 8 hours    MEDICATIONS  (PRN):  acetaminophen   Oral Liquid .. 650 milliGRAM(s) Enteral Tube every 6 hours PRN Temp greater or equal to 38C (100.4F)  morphine  - Injectable 2 milliGRAM(s) IV Push every 6 hours PRN Severe Pain (7 - 10)      REVIEW OF SYSTEMS:  CONSTITUTIONAL: No fever, weight loss, or fatigue  RESPIRATORY: No shortness of breath  CARDIOVASCULAR: No chest pain  GASTROINTESTINAL: No abdominal pain.  GENITOURINARY: No dysuria  NEUROLOGICAL: No headaches  SKIN: No itching, burning, rashes    Vital Signs Last 24 Hrs  T(C): 37.1 (21 Aug 2023 05:57), Max: 37.1 (20 Aug 2023 20:47)  T(F): 98.8 (21 Aug 2023 05:57), Max: 98.8 (21 Aug 2023 05:57)  HR: 91 (21 Aug 2023 05:57) (86 - 92)  BP: 117/64 (21 Aug 2023 05:57) (117/64 - 148/75)  BP(mean): --  RR: 17 (21 Aug 2023 05:57) (17 - 19)  SpO2: 98% (21 Aug 2023 05:57) (98% - 100%)    Parameters below as of 21 Aug 2023 05:57  Patient On (Oxygen Delivery Method): room air        PHYSICAL EXAMINATION:  GENERAL: NAD, well built  HEAD:  Atraumatic, Normocephalic  EYES:  conjunctiva and sclera clear  CHEST/LUNG: Clear to auscultation. No rales, rhonchi, wheezing, or rubs  HEART: Regular rate and rhythm; No murmurs, rubs, or gallops  ABDOMEN: Soft, Nontender, Nondistended; Bowel sounds present  NERVOUS SYSTEM:  Alert & Oriented X3,    EXTREMITIES:  2+ Peripheral Pulses, No clubbing, cyanosis, or edema  SKIN: warm dry                          7.4    16.27 )-----------( 306      ( 21 Aug 2023 06:00 )             23.6     08-21    138  |  110<H>  |  45<H>  ----------------------------<  95  4.6   |  18<L>  |  1.95<H>    Ca    8.0<L>      21 Aug 2023 06:00  Phos  5.3     08-21  Mg     2.5     08-21    TPro  6.3  /  Alb  1.6<L>  /  TBili  0.3  /  DBili  x   /  AST  67<H>  /  ALT  20  /  AlkPhos  198<H>  08-21    LIVER FUNCTIONS - ( 21 Aug 2023 06:00 )  Alb: 1.6 g/dL / Pro: 6.3 g/dL / ALK PHOS: 198 U/L / ALT: 20 U/L DA / AST: 67 U/L / GGT: x                   CAPILLARY BLOOD GLUCOSE      RADIOLOGY & ADDITIONAL TESTS:                   PGY-1 Progress Note discussed with attending    PAGER #: [[phone]] TILL 5:00 PM  PLEASE CONTACT ON CALL TEAM:  - On Call Team (Please refer to Tyler) FROM 5:00 PM - 8:30PM  - Nightfloat Team FROM 8:30 -7:30 AM    CHIEF COMPLAINT & BRIEF HOSPITAL COURSE:      INTERVAL HPI/OVERNIGHT EVENTS:       MEDICATIONS  (STANDING):  aspirin  chewable 324 milliGRAM(s) Oral daily  atorvastatin 20 milliGRAM(s) Oral at bedtime  carbidopa/levodopa  25/100 1 Tablet(s) Oral <User Schedule>  cefepime   IVPB 1000 milliGRAM(s) IV Intermittent every 24 hours  cefepime   IVPB      chlorhexidine 2% Cloths 1 Application(s) Topical daily  cloNIDine Patch 0.1 mG/24Hr(s) 1 patch Transdermal every 7 days  dextrose 5% + sodium chloride 0.9%. 1000 milliLiter(s) (50 mL/Hr) IV Continuous <Continuous>  metoprolol tartrate 25 milliGRAM(s) Oral two times a day  pantoprazole   Suspension 40 milliGRAM(s) Oral daily  valproate sodium  IVPB 500 milliGRAM(s) IV Intermittent every 8 hours    MEDICATIONS  (PRN):  acetaminophen   Oral Liquid .. 650 milliGRAM(s) Enteral Tube every 6 hours PRN Temp greater or equal to 38C (100.4F)  morphine  - Injectable 2 milliGRAM(s) IV Push every 6 hours PRN Severe Pain (7 - 10)      REVIEW OF SYSTEMS:  CONSTITUTIONAL: No fever, weight loss, or fatigue  RESPIRATORY: No shortness of breath  CARDIOVASCULAR: No chest pain  GASTROINTESTINAL: No abdominal pain.  GENITOURINARY: No dysuria  NEUROLOGICAL: No headaches  SKIN: No itching, burning, rashes    Vital Signs Last 24 Hrs  T(C): 37.1 (21 Aug 2023 05:57), Max: 37.1 (20 Aug 2023 20:47)  T(F): 98.8 (21 Aug 2023 05:57), Max: 98.8 (21 Aug 2023 05:57)  HR: 91 (21 Aug 2023 05:57) (86 - 92)  BP: 117/64 (21 Aug 2023 05:57) (117/64 - 148/75)  BP(mean): --  RR: 17 (21 Aug 2023 05:57) (17 - 19)  SpO2: 98% (21 Aug 2023 05:57) (98% - 100%)    Parameters below as of 21 Aug 2023 05:57  Patient On (Oxygen Delivery Method): room air        PHYSICAL EXAMINATION:  GENERAL: NAD, well built  HEAD:  Atraumatic, Normocephalic  EYES:  conjunctiva and sclera clear  CHEST/LUNG: Clear to auscultation. No rales, rhonchi, wheezing, or rubs  HEART: Regular rate and rhythm; No murmurs, rubs, or gallops  ABDOMEN: Soft, Nontender, Nondistended; Bowel sounds present  NERVOUS SYSTEM:  Alert & Oriented X3,    EXTREMITIES:  2+ Peripheral Pulses, No clubbing, cyanosis, or edema  SKIN: warm dry                          7.4    16.27 )-----------( 306      ( 21 Aug 2023 06:00 )             23.6     08-21    138  |  110<H>  |  45<H>  ----------------------------<  95  4.6   |  18<L>  |  1.95<H>    Ca    8.0<L>      21 Aug 2023 06:00  Phos  5.3     08-21  Mg     2.5     08-21    TPro  6.3  /  Alb  1.6<L>  /  TBili  0.3  /  DBili  x   /  AST  67<H>  /  ALT  20  /  AlkPhos  198<H>  08-21    LIVER FUNCTIONS - ( 21 Aug 2023 06:00 )  Alb: 1.6 g/dL / Pro: 6.3 g/dL / ALK PHOS: 198 U/L / ALT: 20 U/L DA / AST: 67 U/L / GGT: x                   CAPILLARY BLOOD GLUCOSE      RADIOLOGY & ADDITIONAL TESTS:                   PGY-1 Progress Note discussed with attending    PAGER #: [[phone]] TILL 5:00 PM  PLEASE CONTACT ON CALL TEAM:  - On Call Team (Please refer to Tyler) FROM 5:00 PM - 8:30PM  - Nightfloat Team FROM 8:30 -7:30 AM    CHIEF COMPLAINT & BRIEF HOSPITAL COURSE:  Failure to Thrive    INTERVAL HPI/OVERNIGHT EVENTS:   Pt's systolic pressure was 117 in the morning    MEDICATIONS  (STANDING):  aspirin  chewable 324 milliGRAM(s) Oral daily  atorvastatin 20 milliGRAM(s) Oral at bedtime  carbidopa/levodopa  25/100 1 Tablet(s) Oral <User Schedule>  cefepime   IVPB 1000 milliGRAM(s) IV Intermittent every 24 hours  cefepime   IVPB      chlorhexidine 2% Cloths 1 Application(s) Topical daily  cloNIDine Patch 0.1 mG/24Hr(s) 1 patch Transdermal every 7 days  dextrose 5% + sodium chloride 0.9%. 1000 milliLiter(s) (50 mL/Hr) IV Continuous <Continuous>  metoprolol tartrate 25 milliGRAM(s) Oral two times a day  pantoprazole   Suspension 40 milliGRAM(s) Oral daily  valproate sodium  IVPB 500 milliGRAM(s) IV Intermittent every 8 hours    MEDICATIONS  (PRN):  acetaminophen   Oral Liquid .. 650 milliGRAM(s) Enteral Tube every 6 hours PRN Temp greater or equal to 38C (100.4F)  morphine  - Injectable 2 milliGRAM(s) IV Push every 6 hours PRN Severe Pain (7 - 10)      REVIEW OF SYSTEMS:  CONSTITUTIONAL: No fever, weight loss, or fatigue  RESPIRATORY: No shortness of breath  CARDIOVASCULAR: No chest pain  GASTROINTESTINAL: No abdominal pain.  GENITOURINARY: No dysuria  NEUROLOGICAL: No headaches  SKIN: No itching, burning, rashes    Vital Signs Last 24 Hrs  T(C): 37.1 (21 Aug 2023 05:57), Max: 37.1 (20 Aug 2023 20:47)  T(F): 98.8 (21 Aug 2023 05:57), Max: 98.8 (21 Aug 2023 05:57)  HR: 91 (21 Aug 2023 05:57) (86 - 92)  BP: 117/64 (21 Aug 2023 05:57) (117/64 - 148/75)  BP(mean): --  RR: 17 (21 Aug 2023 05:57) (17 - 19)  SpO2: 98% (21 Aug 2023 05:57) (98% - 100%)    Parameters below as of 21 Aug 2023 05:57  Patient On (Oxygen Delivery Method): room air        PHYSICAL EXAMINATION:  GENERAL: NAD, well built  HEAD:  Atraumatic, Normocephalic  EYES:  conjunctiva and sclera clear  CHEST/LUNG: Clear to auscultation. No rales, rhonchi, wheezing, or rubs  HEART: Regular rate and rhythm; No murmurs, rubs, or gallops  ABDOMEN: Soft, Nontender, Nondistended; Bowel sounds present  NERVOUS SYSTEM:  Alert & Oriented X3,    EXTREMITIES:  2+ Peripheral Pulses, No clubbing, cyanosis, or edema  SKIN: warm dry                          7.4    16.27 )-----------( 306      ( 21 Aug 2023 06:00 )             23.6     08-21    138  |  110<H>  |  45<H>  ----------------------------<  95  4.6   |  18<L>  |  1.95<H>    Ca    8.0<L>      21 Aug 2023 06:00  Phos  5.3     08-21  Mg     2.5     08-21    TPro  6.3  /  Alb  1.6<L>  /  TBili  0.3  /  DBili  x   /  AST  67<H>  /  ALT  20  /  AlkPhos  198<H>  08-21    LIVER FUNCTIONS - ( 21 Aug 2023 06:00 )  Alb: 1.6 g/dL / Pro: 6.3 g/dL / ALK PHOS: 198 U/L / ALT: 20 U/L DA / AST: 67 U/L / GGT: x                   CAPILLARY BLOOD GLUCOSE      RADIOLOGY & ADDITIONAL TESTS:                   PGY-1 Progress Note discussed with attending    PAGER #: [[phone]] TILL 5:00 PM  PLEASE CONTACT ON CALL TEAM:  - On Call Team (Please refer to Tyler) FROM 5:00 PM - 8:30PM  - Nightfloat Team FROM 8:30 -7:30 AM    CHIEF COMPLAINT & BRIEF HOSPITAL COURSE:  Failure to Thrive    INTERVAL HPI/OVERNIGHT EVENTS:   Pt's systolic pressure was 117 in the morning    MEDICATIONS  (STANDING):  aspirin  chewable 324 milliGRAM(s) Oral daily  atorvastatin 20 milliGRAM(s) Oral at bedtime  carbidopa/levodopa  25/100 1 Tablet(s) Oral <User Schedule>  cefepime   IVPB 1000 milliGRAM(s) IV Intermittent every 24 hours  cefepime   IVPB      chlorhexidine 2% Cloths 1 Application(s) Topical daily  cloNIDine Patch 0.1 mG/24Hr(s) 1 patch Transdermal every 7 days  dextrose 5% + sodium chloride 0.9%. 1000 milliLiter(s) (50 mL/Hr) IV Continuous <Continuous>  metoprolol tartrate 25 milliGRAM(s) Oral two times a day  pantoprazole   Suspension 40 milliGRAM(s) Oral daily  valproate sodium  IVPB 500 milliGRAM(s) IV Intermittent every 8 hours    MEDICATIONS  (PRN):  acetaminophen   Oral Liquid .. 650 milliGRAM(s) Enteral Tube every 6 hours PRN Temp greater or equal to 38C (100.4F)  morphine  - Injectable 2 milliGRAM(s) IV Push every 6 hours PRN Severe Pain (7 - 10)      REVIEW OF SYSTEMS:  CONSTITUTIONAL: No fever, weight loss, or fatigue  RESPIRATORY: No shortness of breath  CARDIOVASCULAR: No chest pain  GASTROINTESTINAL: No abdominal pain.  GENITOURINARY: No dysuria  NEUROLOGICAL: No headaches  SKIN: No itching, burning, rashes    Vital Signs Last 24 Hrs  T(C): 37.1 (21 Aug 2023 05:57), Max: 37.1 (20 Aug 2023 20:47)  T(F): 98.8 (21 Aug 2023 05:57), Max: 98.8 (21 Aug 2023 05:57)  HR: 91 (21 Aug 2023 05:57) (86 - 92)  BP: 117/64 (21 Aug 2023 05:57) (117/64 - 148/75)  BP(mean): --  RR: 17 (21 Aug 2023 05:57) (17 - 19)  SpO2: 98% (21 Aug 2023 05:57) (98% - 100%)    Parameters below as of 21 Aug 2023 05:57  Patient On (Oxygen Delivery Method): room air        PHYSICAL EXAMINATION:  GENERAL: NAD, well built  HEAD:  Atraumatic, Normocephalic  EYES:  conjunctiva and sclera clear  CHEST/LUNG: Clear to auscultation. No rales, rhonchi, wheezing, or rubs  HEART: Regular rate and rhythm; No murmurs, rubs, or gallops  ABDOMEN: Soft, Nontender, Nondistended; Bowel sounds present  NERVOUS SYSTEM:  Alert & Oriented X3,    EXTREMITIES:  2+ Peripheral Pulses, No clubbing, cyanosis, or edema  SKIN: warm dry                          7.4    16.27 )-----------( 306      ( 21 Aug 2023 06:00 )             23.6     08-21    138  |  110<H>  |  45<H>  ----------------------------<  95  4.6   |  18<L>  |  1.95<H>    Ca    8.0<L>      21 Aug 2023 06:00  Phos  5.3     08-21  Mg     2.5     08-21    TPro  6.3  /  Alb  1.6<L>  /  TBili  0.3  /  DBili  x   /  AST  67<H>  /  ALT  20  /  AlkPhos  198<H>  08-21    LIVER FUNCTIONS - ( 21 Aug 2023 06:00 )  Alb: 1.6 g/dL / Pro: 6.3 g/dL / ALK PHOS: 198 U/L / ALT: 20 U/L DA / AST: 67 U/L / GGT: x                   CAPILLARY BLOOD GLUCOSE      RADIOLOGY & ADDITIONAL TESTS:                   PGY-1 Progress Note discussed with attending    PAGER #: [[phone]] TILL 5:00 PM  PLEASE CONTACT ON CALL TEAM:  - On Call Team (Please refer to Tyler) FROM 5:00 PM - 8:30PM  - Nightfloat Team FROM 8:30 -7:30 AM    CHIEF COMPLAINT & BRIEF HOSPITAL COURSE:  Failure to Thrive    INTERVAL HPI/OVERNIGHT EVENTS:   Pt's systolic pressure was 117 in the morning    MEDICATIONS  (STANDING):  aspirin  chewable 324 milliGRAM(s) Oral daily  atorvastatin 20 milliGRAM(s) Oral at bedtime  carbidopa/levodopa  25/100 1 Tablet(s) Oral <User Schedule>  cefepime   IVPB 1000 milliGRAM(s) IV Intermittent every 24 hours  cefepime   IVPB      chlorhexidine 2% Cloths 1 Application(s) Topical daily  cloNIDine Patch 0.1 mG/24Hr(s) 1 patch Transdermal every 7 days  dextrose 5% + sodium chloride 0.9%. 1000 milliLiter(s) (50 mL/Hr) IV Continuous <Continuous>  metoprolol tartrate 25 milliGRAM(s) Oral two times a day  pantoprazole   Suspension 40 milliGRAM(s) Oral daily  valproate sodium  IVPB 500 milliGRAM(s) IV Intermittent every 8 hours    MEDICATIONS  (PRN):  acetaminophen   Oral Liquid .. 650 milliGRAM(s) Enteral Tube every 6 hours PRN Temp greater or equal to 38C (100.4F)  morphine  - Injectable 2 milliGRAM(s) IV Push every 6 hours PRN Severe Pain (7 - 10)      REVIEW OF SYSTEMS:  CONSTITUTIONAL: No fever, weight loss, or fatigue  RESPIRATORY: No shortness of breath  CARDIOVASCULAR: No chest pain  GASTROINTESTINAL: No abdominal pain.  GENITOURINARY: No dysuria  NEUROLOGICAL: No headaches  SKIN: No itching, burning, rashes    Vital Signs Last 24 Hrs  T(C): 37.1 (21 Aug 2023 05:57), Max: 37.1 (20 Aug 2023 20:47)  T(F): 98.8 (21 Aug 2023 05:57), Max: 98.8 (21 Aug 2023 05:57)  HR: 91 (21 Aug 2023 05:57) (86 - 92)  BP: 117/64 (21 Aug 2023 05:57) (117/64 - 148/75)  BP(mean): --  RR: 17 (21 Aug 2023 05:57) (17 - 19)  SpO2: 98% (21 Aug 2023 05:57) (98% - 100%)    Parameters below as of 21 Aug 2023 05:57  Patient On (Oxygen Delivery Method): room air        PHYSICAL EXAMINATION:  GENERAL: NAD, well built  HEAD:  Atraumatic, Normocephalic  EYES:  conjunctiva and sclera clear  CHEST/LUNG: Clear to auscultation. No rales, rhonchi, wheezing, or rubs  HEART: Regular rate and rhythm; No murmurs, rubs, or gallops  ABDOMEN: Soft, Nontender, Nondistended; Bowel sounds present  NERVOUS SYSTEM:  Alert & Oriented X3,    EXTREMITIES:  2+ Peripheral Pulses, No clubbing, cyanosis, or edema  SKIN: warm dry                          7.4    16.27 )-----------( 306      ( 21 Aug 2023 06:00 )             23.6     08-21    138  |  110<H>  |  45<H>  ----------------------------<  95  4.6   |  18<L>  |  1.95<H>    Ca    8.0<L>      21 Aug 2023 06:00  Phos  5.3     08-21  Mg     2.5     08-21    TPro  6.3  /  Alb  1.6<L>  /  TBili  0.3  /  DBili  x   /  AST  67<H>  /  ALT  20  /  AlkPhos  198<H>  08-21    LIVER FUNCTIONS - ( 21 Aug 2023 06:00 )  Alb: 1.6 g/dL / Pro: 6.3 g/dL / ALK PHOS: 198 U/L / ALT: 20 U/L DA / AST: 67 U/L / GGT: x                   CAPILLARY BLOOD GLUCOSE      RADIOLOGY & ADDITIONAL TESTS:                   PGY-1 Progress Note discussed with attending    PAGER #: [[phone]] TILL 5:00 PM  PLEASE CONTACT ON CALL TEAM:  - On Call Team (Please refer to Tyler) FROM 5:00 PM - 8:30PM  - Nightfloat Team FROM 8:30 -7:30 AM    CHIEF COMPLAINT & BRIEF HOSPITAL COURSE:  Failure to Thrive    INTERVAL HPI/OVERNIGHT EVENTS:   Pt's systolic pressure was 117 overnight so BP medication was held. No other acute events. Pt seen this morning with unknown brown viscous fluid on the left side of her body    MEDICATIONS  (STANDING):  aspirin  chewable 324 milliGRAM(s) Oral daily  atorvastatin 20 milliGRAM(s) Oral at bedtime  carbidopa/levodopa  25/100 1 Tablet(s) Oral <User Schedule>  cefepime   IVPB 1000 milliGRAM(s) IV Intermittent every 24 hours  cefepime   IVPB      chlorhexidine 2% Cloths 1 Application(s) Topical daily  cloNIDine Patch 0.1 mG/24Hr(s) 1 patch Transdermal every 7 days  dextrose 5% + sodium chloride 0.9%. 1000 milliLiter(s) (50 mL/Hr) IV Continuous <Continuous>  metoprolol tartrate 25 milliGRAM(s) Oral two times a day  pantoprazole   Suspension 40 milliGRAM(s) Oral daily  valproate sodium  IVPB 500 milliGRAM(s) IV Intermittent every 8 hours    MEDICATIONS  (PRN):  acetaminophen   Oral Liquid .. 650 milliGRAM(s) Enteral Tube every 6 hours PRN Temp greater or equal to 38C (100.4F)  morphine  - Injectable 2 milliGRAM(s) IV Push every 6 hours PRN Severe Pain (7 - 10)      REVIEW OF SYSTEMS: Unable to obtain as pt is non-verbal    Vital Signs Last 24 Hrs  T(C): 37.1 (21 Aug 2023 05:57), Max: 37.1 (20 Aug 2023 20:47)  T(F): 98.8 (21 Aug 2023 05:57), Max: 98.8 (21 Aug 2023 05:57)  HR: 91 (21 Aug 2023 05:57) (86 - 92)  BP: 117/64 (21 Aug 2023 05:57) (117/64 - 148/75)  BP(mean): --  RR: 17 (21 Aug 2023 05:57) (17 - 19)  SpO2: 98% (21 Aug 2023 05:57) (98% - 100%)    Parameters below as of 21 Aug 2023 05:57  Patient On (Oxygen Delivery Method): room air        PHYSICAL EXAMINATION:  GENERAL: NAD, well built, non-verbal, open G tube with tube feeds paused. Covered in unknown brown viscous fluid this morning  HEAD:  Atraumatic, Normocephalic  EYES:  conjunctiva and sclera clear  CHEST/LUNG: Clear to auscultation. No rales, rhonchi, wheezing, or rubs  HEART: Regular rate and rhythm; No murmurs, rubs, or gallops  ABDOMEN: Open G tube out of place. Soft, Nontender, Nondistended; Bowel sounds present  NERVOUS SYSTEM:  Alert & Oriented X0,    EXTREMITIES:  2+ Peripheral Pulses, No clubbing, cyanosis, or edema  SKIN: warm dry                          7.4    16.27 )-----------( 306      ( 21 Aug 2023 06:00 )             23.6     08-21    138  |  110<H>  |  45<H>  ----------------------------<  95  4.6   |  18<L>  |  1.95<H>    Ca    8.0<L>      21 Aug 2023 06:00  Phos  5.3     08-21  Mg     2.5     08-21    TPro  6.3  /  Alb  1.6<L>  /  TBili  0.3  /  DBili  x   /  AST  67<H>  /  ALT  20  /  AlkPhos  198<H>  08-21    LIVER FUNCTIONS - ( 21 Aug 2023 06:00 )  Alb: 1.6 g/dL / Pro: 6.3 g/dL / ALK PHOS: 198 U/L / ALT: 20 U/L DA / AST: 67 U/L / GGT: x                   CAPILLARY BLOOD GLUCOSE      RADIOLOGY & ADDITIONAL TESTS:                   PGY-1 Progress Note discussed with attending    PAGER #: [[phone]] TILL 5:00 PM  PLEASE CONTACT ON CALL TEAM:  - On Call Team (Please refer to Tyler) FROM 5:00 PM - 8:30PM  - Nightfloat Team FROM 8:30 -7:30 AM    CHIEF COMPLAINT & BRIEF HOSPITAL COURSE:  Failure to Thrive    INTERVAL HPI/OVERNIGHT EVENTS:   Pt's systolic pressure was 117 overnight so BP medication was held. No other acute events. Pt seen this morning with unknown brown viscous fluid on the left side of her body    MEDICATIONS  (STANDING):  aspirin  chewable 324 milliGRAM(s) Oral daily  atorvastatin 20 milliGRAM(s) Oral at bedtime  carbidopa/levodopa  25/100 1 Tablet(s) Oral <User Schedule>  cefepime   IVPB 1000 milliGRAM(s) IV Intermittent every 24 hours  cefepime   IVPB      chlorhexidine 2% Cloths 1 Application(s) Topical daily  cloNIDine Patch 0.1 mG/24Hr(s) 1 patch Transdermal every 7 days  dextrose 5% + sodium chloride 0.9%. 1000 milliLiter(s) (50 mL/Hr) IV Continuous <Continuous>  metoprolol tartrate 25 milliGRAM(s) Oral two times a day  pantoprazole   Suspension 40 milliGRAM(s) Oral daily  valproate sodium  IVPB 500 milliGRAM(s) IV Intermittent every 8 hours    MEDICATIONS  (PRN):  acetaminophen   Oral Liquid .. 650 milliGRAM(s) Enteral Tube every 6 hours PRN Temp greater or equal to 38C (100.4F)  morphine  - Injectable 2 milliGRAM(s) IV Push every 6 hours PRN Severe Pain (7 - 10)      REVIEW OF SYSTEMS: Unable to obtain as pt is non-verbal    Vital Signs Last 24 Hrs  T(C): 37.1 (21 Aug 2023 05:57), Max: 37.1 (20 Aug 2023 20:47)  T(F): 98.8 (21 Aug 2023 05:57), Max: 98.8 (21 Aug 2023 05:57)  HR: 91 (21 Aug 2023 05:57) (86 - 92)  BP: 117/64 (21 Aug 2023 05:57) (117/64 - 148/75)  BP(mean): --  RR: 17 (21 Aug 2023 05:57) (17 - 19)  SpO2: 98% (21 Aug 2023 05:57) (98% - 100%)    Parameters below as of 21 Aug 2023 05:57  Patient On (Oxygen Delivery Method): room air        PHYSICAL EXAMINATION:  GENERAL: NAD, well built, non-verbal, open G tube with tube feeds paused. Covered in unknown brown viscous fluid this morning  HEAD:  Atraumatic, Normocephalic  EYES:  conjunctiva and sclera clear  CHEST/LUNG: Clear to auscultation. No rales, rhonchi, wheezing, or rubs  HEART: Regular rate and rhythm; No murmurs, rubs, or gallops  ABDOMEN: Open G tube out of place. Soft, Nontender, Nondistended; Bowel sounds present  NERVOUS SYSTEM:  Alert & Oriented X0,    EXTREMITIES:  2+ Peripheral Pulses, No clubbing, cyanosis, or edema  SKIN: warm dry                          7.4    16.27 )-----------( 306      ( 21 Aug 2023 06:00 )             23.6     08-21    138  |  110<H>  |  45<H>  ----------------------------<  95  4.6   |  18<L>  |  1.95<H>    Ca    8.0<L>      21 Aug 2023 06:00  Phos  5.3     08-21  Mg     2.5     08-21    TPro  6.3  /  Alb  1.6<L>  /  TBili  0.3  /  DBili  x   /  AST  67<H>  /  ALT  20  /  AlkPhos  198<H>  08-21    LIVER FUNCTIONS - ( 21 Aug 2023 06:00 )  Alb: 1.6 g/dL / Pro: 6.3 g/dL / ALK PHOS: 198 U/L / ALT: 20 U/L DA / AST: 67 U/L / GGT: x                   CAPILLARY BLOOD GLUCOSE      RADIOLOGY & ADDITIONAL TESTS:                   PGY-1 Progress Note discussed with attending    PAGER #: [[phone]] TILL 5:00 PM  PLEASE CONTACT ON CALL TEAM:  - On Call Team (Please refer to Tyler) FROM 5:00 PM - 8:30PM  - Nightfloat Team FROM 8:30 -7:30 AM    CHIEF COMPLAINT & BRIEF HOSPITAL COURSE:  Failure to Thrive    INTERVAL HPI/OVERNIGHT EVENTS:   Pt's systolic pressure was 117 overnight so BP medication was held. No other acute events. Pt seen this morning with unknown brown viscous fluid on the left side of her body    MEDICATIONS  (STANDING):  aspirin  chewable 324 milliGRAM(s) Oral daily  atorvastatin 20 milliGRAM(s) Oral at bedtime  carbidopa/levodopa  25/100 1 Tablet(s) Oral <User Schedule>  cefepime   IVPB 1000 milliGRAM(s) IV Intermittent every 24 hours  cefepime   IVPB      chlorhexidine 2% Cloths 1 Application(s) Topical daily  cloNIDine Patch 0.1 mG/24Hr(s) 1 patch Transdermal every 7 days  dextrose 5% + sodium chloride 0.9%. 1000 milliLiter(s) (50 mL/Hr) IV Continuous <Continuous>  metoprolol tartrate 25 milliGRAM(s) Oral two times a day  pantoprazole   Suspension 40 milliGRAM(s) Oral daily  valproate sodium  IVPB 500 milliGRAM(s) IV Intermittent every 8 hours    MEDICATIONS  (PRN):  acetaminophen   Oral Liquid .. 650 milliGRAM(s) Enteral Tube every 6 hours PRN Temp greater or equal to 38C (100.4F)  morphine  - Injectable 2 milliGRAM(s) IV Push every 6 hours PRN Severe Pain (7 - 10)      REVIEW OF SYSTEMS: Unable to obtain as pt is non-verbal    Vital Signs Last 24 Hrs  T(C): 37.1 (21 Aug 2023 05:57), Max: 37.1 (20 Aug 2023 20:47)  T(F): 98.8 (21 Aug 2023 05:57), Max: 98.8 (21 Aug 2023 05:57)  HR: 91 (21 Aug 2023 05:57) (86 - 92)  BP: 117/64 (21 Aug 2023 05:57) (117/64 - 148/75)  BP(mean): --  RR: 17 (21 Aug 2023 05:57) (17 - 19)  SpO2: 98% (21 Aug 2023 05:57) (98% - 100%)    Parameters below as of 21 Aug 2023 05:57  Patient On (Oxygen Delivery Method): room air        PHYSICAL EXAMINATION:  GENERAL: NAD, well built, non-verbal, open G tube with tube feeds paused. Covered in unknown brown viscous fluid this morning  HEAD:  Atraumatic, Normocephalic  EYES:  conjunctiva and sclera clear  CHEST/LUNG: Clear to auscultation. No rales, rhonchi, wheezing, or rubs  HEART: Regular rate and rhythm; No murmurs, rubs, or gallops  ABDOMEN: Open G tube out of place. Soft, Nontender, Nondistended; Bowel sounds present  NERVOUS SYSTEM:  Alert & Oriented X0,    EXTREMITIES:  2+ Peripheral Pulses, No clubbing, cyanosis, or edema  SKIN: warm dry                          7.4    16.27 )-----------( 306      ( 21 Aug 2023 06:00 )             23.6     08-21    138  |  110<H>  |  45<H>  ----------------------------<  95  4.6   |  18<L>  |  1.95<H>    Ca    8.0<L>      21 Aug 2023 06:00  Phos  5.3     08-21  Mg     2.5     08-21    TPro  6.3  /  Alb  1.6<L>  /  TBili  0.3  /  DBili  x   /  AST  67<H>  /  ALT  20  /  AlkPhos  198<H>  08-21    LIVER FUNCTIONS - ( 21 Aug 2023 06:00 )  Alb: 1.6 g/dL / Pro: 6.3 g/dL / ALK PHOS: 198 U/L / ALT: 20 U/L DA / AST: 67 U/L / GGT: x                   CAPILLARY BLOOD GLUCOSE      RADIOLOGY & ADDITIONAL TESTS:

## 2023-08-21 NOTE — PROGRESS NOTE ADULT - PROBLEM SELECTOR PROBLEM 3
No. GENEVA screening performed.  STOP BANG Legend: 0-2 = LOW Risk; 3-4 = INTERMEDIATE Risk; 5-8 = HIGH Risk CVA (cerebrovascular accident)

## 2023-08-21 NOTE — PROGRESS NOTE ADULT - SUBJECTIVE AND OBJECTIVE BOX
report of gtube leak with feeds,,,stopped  ICU Vital Signs Last 24 Hrs  T(C): 37.1 (21 Aug 2023 05:57), Max: 37.1 (20 Aug 2023 20:47)  T(F): 98.8 (21 Aug 2023 05:57), Max: 98.8 (21 Aug 2023 05:57)  HR: 91 (21 Aug 2023 05:57) (86 - 92)  BP: 117/64 (21 Aug 2023 05:57) (117/64 - 148/75)  BP(mean): --  ABP: --  ABP(mean): --  RR: 17 (21 Aug 2023 05:57) (17 - 19)  SpO2: 98% (21 Aug 2023 05:57) (98% - 100%)    O2 Parameters below as of 21 Aug 2023 05:57  Patient On (Oxygen Delivery Method): room air        Abd: soft nt nd  gtube:no drainage noted,   placed to bedside bag                        7.4    16.27 )-----------( 306      ( 21 Aug 2023 06:00 )             23.6   08-21    138  |  110<H>  |  45<H>  ----------------------------<  95  4.6   |  18<L>  |  1.95<H>    Ca    8.0<L>      21 Aug 2023 06:00  Phos  5.3     08-21  Mg     2.5     08-21    TPro  6.3  /  Alb  1.6<L>  /  TBili  0.3  /  DBili  x   /  AST  67<H>  /  ALT  20  /  AlkPhos  198<H>  08-21

## 2023-08-21 NOTE — PROGRESS NOTE ADULT - ASSESSMENT
1. SUSAN possible to MARISA and ATN  Renal sono shows no hdyro with b/l kidneys around 9.2cm  -Scr is stabel at 1.9mg/dl possible due to ATN improving with stable electrolytes.     -s/p mcclellan's cath placed for urinary retention during this admission; discontinue by primary team.   -urinalysis shows blood with proteinuria; FeNa >1%, spot protein to creatinine ratio is 1.5gm  -Adjust meds to eGFR and avoid IV Gadolinium contrast,NSAIDs, and phosphate enema.  -Monitor I/O's daily.   -Monitor SMA daily.  2. Hypernatremia due to water deficit and insensible losses. Pt is clinically euvolemic.   -Na is stable and off D5W.   -Monitor I/O's. Check Serum Na Daily. Avoid high solute intake diet and sodium bicarbonate infuse. Avoid overcorrection of NA (8-10meq/day)  3. CKD stage 4 most likely due to hypertensive nephrosclerosis  -new baseline scr around 1.8mg/dL with uPCR 1.5gm.  -previous Scr around 1.2 to 1.6mg/dL in Oct 2022.  -Keep patient euvolemic and renal diet  -Avoid Nephrotoxic Meds/ Agents such as (NSAIDs, IV contrast, Aminoglycosides such as gentamicin, -Gadolinium contrast, Phosphate containing enemas, etc..)  -Adjust Medications according to eGFR  4. HTN:   -bp is acceptable. continue bp meds  -titrate bp meds to keep sbp >110 and < 130  5. Mineral Bone Disease:  -phos is okay.   -PTH intact 289, will start calcitriol once Phos has improved.   6. Encephalopathy:  -Leukocytosis unchanged.  -s/p Rocephin.  -Plan as per Neuro and primary team  -s/p PEG placed in OR on 08/14/23.  7. Hypokalemia:  -stable K.   -give kcl repletion to keep K >3.5meq/L  -monitor K.   8. Acidosis:  -CO2 is unchanged.    -monitor CO2.  9. Anemia:  -hb is stable.  -low iron sat and ferritin are okay. s/p iv iron x 3 days.  -monitor CBC.  -transfuse if hb <7.0  10. Elevated LFTs  -slowly improving; US abd done shows fatty liver  -monitor LFTs  11. Hyperkalemia due to susan on ckd.   -stable.   -give Nephro tube feeding. give Lokelma prn if K >5.5  -Keep pt euvolemic. Avoid ACE inh/ ARBs, NSAIDs, and Aldactone or potassium sparing diuretics. Monitor K+ daily.

## 2023-08-21 NOTE — PROGRESS NOTE ADULT - SUBJECTIVE AND OBJECTIVE BOX
NEPHROLOGY MEDICAL CARE, [facility] - Dr. Ajay Green/ Dr. Mert Madison/ Dr. Chris Calderon/ Dr. Carson Cook    Date of Service: 08-21-23 @ 17:42    Patient was seen and examined at bedside.    CC: pt is NAD     Vital Signs Last 24 Hrs  T(C): 37.3 (21 Aug 2023 13:29), Max: 37.3 (21 Aug 2023 13:29)  T(F): 99.1 (21 Aug 2023 13:29), Max: 99.1 (21 Aug 2023 13:29)  HR: 95 (21 Aug 2023 13:29) (86 - 95)  BP: 129/86 (21 Aug 2023 13:29) (117/64 - 148/75)  BP(mean): --  RR: 18 (21 Aug 2023 13:29) (17 - 18)  SpO2: 99% (21 Aug 2023 13:29) (98% - 100%)    Parameters below as of 21 Aug 2023 13:29  Patient On (Oxygen Delivery Method): room air          PHYSICAL EXAM:  General: No acute respiratory distress.  Eyes: conjunctiva and sclera clear  ENMT: Atraumatic, Normocephalic; Moist mucous membranes  Respiratory: Bilateral poor air entry; No rales, rhonchi, wheezing  Cardiovascular: S1S2+; no m/r/g  Gastrointestinal: Soft, Non-tender, Nondistended; Bowel sounds present; PEG  Neuro: eyes are open; hemiparesis; non-verbal  Ext:  1+pedal edema, No Cyanosis  Skin: No visible rashes    MEDICATIONS:  MEDICATIONS  (STANDING):  aspirin  chewable 324 milliGRAM(s) Oral daily  atorvastatin 20 milliGRAM(s) Oral at bedtime  carbidopa/levodopa  25/100 1 Tablet(s) Oral <User Schedule>  cefepime   IVPB 1000 milliGRAM(s) IV Intermittent every 24 hours  cefepime   IVPB      chlorhexidine 2% Cloths 1 Application(s) Topical daily  cloNIDine Patch 0.1 mG/24Hr(s) 1 patch Transdermal every 7 days  dextrose 5% + sodium chloride 0.9%. 1000 milliLiter(s) (50 mL/Hr) IV Continuous <Continuous>  metoprolol tartrate 25 milliGRAM(s) Oral two times a day  pantoprazole   Suspension 40 milliGRAM(s) Oral daily  valproate sodium  IVPB 500 milliGRAM(s) IV Intermittent every 8 hours    MEDICATIONS  (PRN):  acetaminophen   Oral Liquid .. 650 milliGRAM(s) Enteral Tube every 6 hours PRN Temp greater or equal to 38C (100.4F)  morphine  - Injectable 2 milliGRAM(s) IV Push every 6 hours PRN Severe Pain (7 - 10)          LABS:                        7.4    16.27 )-----------( 306      ( 21 Aug 2023 06:00 )             23.6     08-21    138  |  110<H>  |  45<H>  ----------------------------<  95  4.6   |  18<L>  |  1.95<H>    Ca    8.0<L>      21 Aug 2023 06:00  Phos  5.3     08-21  Mg     2.5     08-21    TPro  6.3  /  Alb  1.6<L>  /  TBili  0.3  /  DBili  x   /  AST  67<H>  /  ALT  20  /  AlkPhos  198<H>  08-21      Urinalysis Basic - ( 21 Aug 2023 06:00 )    Color: x / Appearance: x / SG: x / pH: x  Gluc: 95 mg/dL / Ketone: x  / Bili: x / Urobili: x   Blood: x / Protein: x / Nitrite: x   Leuk Esterase: x / RBC: x / WBC x   Sq Epi: x / Non Sq Epi: x / Bacteria: x      Magnesium: 2.5 mg/dL (08-21 @ 06:00)  Phosphorus: 5.3 mg/dL (08-21 @ 06:00)    Urine studies    PTH and Vit D:         NEPHROLOGY MEDICAL CARE, [facility] - Dr. Ajay Green/ Dr. Mert Madison/ Dr. Chris Calderon/ Dr. Carson Cook    Date of Service: 08-21-23 @ 17:42    Patient was seen and examined at bedside.    CC: pt is NAD     Vital Signs Last 24 Hrs  T(C): 37.3 (21 Aug 2023 13:29), Max: 37.3 (21 Aug 2023 13:29)  T(F): 99.1 (21 Aug 2023 13:29), Max: 99.1 (21 Aug 2023 13:29)  HR: 95 (21 Aug 2023 13:29) (86 - 95)  BP: 129/86 (21 Aug 2023 13:29) (117/64 - 148/75)  BP(mean): --  RR: 18 (21 Aug 2023 13:29) (17 - 18)  SpO2: 99% (21 Aug 2023 13:29) (98% - 100%)    Parameters below as of 21 Aug 2023 13:29  Patient On (Oxygen Delivery Method): room air          PHYSICAL EXAM:  General: No acute respiratory distress.  Eyes: conjunctiva and sclera clear  ENMT: Atraumatic, Normocephalic; Moist mucous membranes  Respiratory: Bilateral poor air entry; No rales, rhonchi, wheezing  Cardiovascular: S1S2+; no m/r/g  Gastrointestinal: Soft, Non-tender, Nondistended; Bowel sounds present; PEG  Neuro: eyes are open; hemiparesis; non-verbal  Ext:  1+pedal edema, No Cyanosis  Skin: No visible rashes    MEDICATIONS:  MEDICATIONS  (STANDING):  aspirin  chewable 324 milliGRAM(s) Oral daily  atorvastatin 20 milliGRAM(s) Oral at bedtime  carbidopa/levodopa  25/100 1 Tablet(s) Oral <User Schedule>  cefepime   IVPB 1000 milliGRAM(s) IV Intermittent every 24 hours  cefepime   IVPB      chlorhexidine 2% Cloths 1 Application(s) Topical daily  cloNIDine Patch 0.1 mG/24Hr(s) 1 patch Transdermal every 7 days  dextrose 5% + sodium chloride 0.9%. 1000 milliLiter(s) (50 mL/Hr) IV Continuous <Continuous>  metoprolol tartrate 25 milliGRAM(s) Oral two times a day  pantoprazole   Suspension 40 milliGRAM(s) Oral daily  valproate sodium  IVPB 500 milliGRAM(s) IV Intermittent every 8 hours    MEDICATIONS  (PRN):  acetaminophen   Oral Liquid .. 650 milliGRAM(s) Enteral Tube every 6 hours PRN Temp greater or equal to 38C (100.4F)  morphine  - Injectable 2 milliGRAM(s) IV Push every 6 hours PRN Severe Pain (7 - 10)          LABS:                        7.4    16.27 )-----------( 306      ( 21 Aug 2023 06:00 )             23.6     08-21    138  |  110<H>  |  45<H>  ----------------------------<  95  4.6   |  18<L>  |  1.95<H>    Ca    8.0<L>      21 Aug 2023 06:00  Phos  5.3     08-21  Mg     2.5     08-21    TPro  6.3  /  Alb  1.6<L>  /  TBili  0.3  /  DBili  x   /  AST  67<H>  /  ALT  20  /  AlkPhos  198<H>  08-21      Urinalysis Basic - ( 21 Aug 2023 06:00 )    Color: x / Appearance: x / SG: x / pH: x  Gluc: 95 mg/dL / Ketone: x  / Bili: x / Urobili: x   Blood: x / Protein: x / Nitrite: x   Leuk Esterase: x / RBC: x / WBC x   Sq Epi: x / Non Sq Epi: x / Bacteria: x      Magnesium: 2.5 mg/dL (08-21 @ 06:00)  Phosphorus: 5.3 mg/dL (08-21 @ 06:00)    Urine studies    PTH and Vit D:         NEPHROLOGY MEDICAL CARE, [facility] - Dr. Ajay Green/ Dr. Mert Madison/ Dr. Chris Calderon/ Dr. Carson Cook    Date of Service: 08-21-23 @ 17:42    Patient was seen and examined at bedside.    CC: pt is NAD     Vital Signs Last 24 Hrs  T(C): 37.3 (21 Aug 2023 13:29), Max: 37.3 (21 Aug 2023 13:29)  T(F): 99.1 (21 Aug 2023 13:29), Max: 99.1 (21 Aug 2023 13:29)  HR: 95 (21 Aug 2023 13:29) (86 - 95)  BP: 129/86 (21 Aug 2023 13:29) (117/64 - 148/75)  BP(mean): --  RR: 18 (21 Aug 2023 13:29) (17 - 18)  SpO2: 99% (21 Aug 2023 13:29) (98% - 100%)    Parameters below as of 21 Aug 2023 13:29  Patient On (Oxygen Delivery Method): room air          PHYSICAL EXAM:  General: No acute respiratory distress.  Eyes: conjunctiva and sclera clear  ENMT: Atraumatic, Normocephalic; Moist mucous membranes  Respiratory: Bilateral poor air entry; No rales, rhonchi, wheezing  Cardiovascular: S1S2+; no m/r/g  Gastrointestinal: Soft, Non-tender, Nondistended; Bowel sounds present; PEG  Neuro: eyes are open; hemiparesis; non-verbal  Ext:  1+pedal edema, No Cyanosis  Skin: No visible rashes    MEDICATIONS:  MEDICATIONS  (STANDING):  aspirin  chewable 324 milliGRAM(s) Oral daily  atorvastatin 20 milliGRAM(s) Oral at bedtime  carbidopa/levodopa  25/100 1 Tablet(s) Oral <User Schedule>  cefepime   IVPB 1000 milliGRAM(s) IV Intermittent every 24 hours  cefepime   IVPB      chlorhexidine 2% Cloths 1 Application(s) Topical daily  cloNIDine Patch 0.1 mG/24Hr(s) 1 patch Transdermal every 7 days  dextrose 5% + sodium chloride 0.9%. 1000 milliLiter(s) (50 mL/Hr) IV Continuous <Continuous>  metoprolol tartrate 25 milliGRAM(s) Oral two times a day  pantoprazole   Suspension 40 milliGRAM(s) Oral daily  valproate sodium  IVPB 500 milliGRAM(s) IV Intermittent every 8 hours    MEDICATIONS  (PRN):  acetaminophen   Oral Liquid .. 650 milliGRAM(s) Enteral Tube every 6 hours PRN Temp greater or equal to 38C (100.4F)  morphine  - Injectable 2 milliGRAM(s) IV Push every 6 hours PRN Severe Pain (7 - 10)          LABS:                        7.4    16.27 )-----------( 306      ( 21 Aug 2023 06:00 )             23.6     08-21    138  |  110<H>  |  45<H>  ----------------------------<  95  4.6   |  18<L>  |  1.95<H>    Ca    8.0<L>      21 Aug 2023 06:00  Phos  5.3     08-21  Mg     2.5     08-21    TPro  6.3  /  Alb  1.6<L>  /  TBili  0.3  /  DBili  x   /  AST  67<H>  /  ALT  20  /  AlkPhos  198<H>  08-21      Urinalysis Basic - ( 21 Aug 2023 06:00 )    Color: x / Appearance: x / SG: x / pH: x  Gluc: 95 mg/dL / Ketone: x  / Bili: x / Urobili: x   Blood: x / Protein: x / Nitrite: x   Leuk Esterase: x / RBC: x / WBC x   Sq Epi: x / Non Sq Epi: x / Bacteria: x      Magnesium: 2.5 mg/dL (08-21 @ 06:00)  Phosphorus: 5.3 mg/dL (08-21 @ 06:00)    Urine studies    PTH and Vit D:

## 2023-08-21 NOTE — PROGRESS NOTE ADULT - ASSESSMENT
Patient is a 77y old  Female who is from [facility] and with PMHx of HTN, HLD, CVA (w/ residual aphasia and R sided hemiparesis/plegia) who was sent to the hospital on 7/27/23, due to altered mental status. During the hospital course she has Gastrostomy tube placement by Surgery for poor oral intake and Dysphagia. On 8/17/23 she became septic and during sepsis work up found to have positive Urine analysis and started on ceftriaxone. Hence, the ID consult requested to assist with further evaluation and antibiotic management.    # Sepsis  as of 8/17/23 ( Fever + tachycardia + leukocytosis)  # Complicated UTI- s/p exchange Mcclellan catheter on 8/17/23 - s/p removal of mcclellan catheter and placed a primafit on 8/19/23  # s/p CVA with aphasia and dysphagia- s/p  Gastrostomy tube    would recommend:    1. Continue Cefepime until 8/24/23  2. Monitor WBC count, stay elevated  3. Management of PEG as per Primary team  4. Aspiration precaution  5. Monitor Temp and c/w supportive care    d/w House staff, DR. Oconnell and  Senior resident     Attending Attestation:    Spent more than 35 minutes on total encounter, more than 50 % of the visit was spent counseling and/or coordinating care by the Attending physician.       Patient is a 77y old  Female who is from [facility] and with PMHx of HTN, HLD, CVA (w/ residual aphasia and R sided hemiparesis/plegia) who was sent to the hospital on 7/27/23, due to altered mental status. During the hospital course she has Gastrostomy tube placement by Surgery for poor oral intake and Dysphagia. On 8/17/23 she became septic and during sepsis work up found to have positive Urine analysis and started on ceftriaxone. Hence, the ID consult requested to assist with further evaluation and antibiotic management.    # Sepsis  as of 8/17/23 ( Fever + tachycardia + leukocytosis)  # Complicated UTI- s/p exchange Mcclellan catheter on 8/17/23 - s/p removal of mcclellan catheter and placed a primafit on 8/19/23  # s/p CVA with aphasia and dysphagia- s/p  Gastrostomy tube    would recommend:    1. Continue Cefepime until 8/24/23  2. Monitor WBC count, stay elevated  3. Management of PEG as per Primary team  4. Aspiration precaution  5. Monitor Temp and c/w supportive care    d/w House staff, DR. Oconnell and  Senior resident     Attending Attestation:    Spent more than 35 minutes on total encounter, more than 50 % of the visit was spent counseling and/or coordinating care by the Attending physician.       Patient is a 77y old  Female who is from [facility] and with PMHx of HTN, HLD, CVA (w/ residual aphasia and R sided hemiparesis/plegia) who was sent to the hospital on 7/27/23, due to altered mental status. During the hospital course she has Gastrostomy tube placement by Surgery for poor oral intake and Dysphagia. On 8/17/23 she became septic and during sepsis work up found to have positive Urine analysis and started on ceftriaxone. Hence, the ID consult requested to assist with further evaluation and antibiotic management.    # Sepsis  as of 8/17/23 ( Fever + tachycardia + leukocytosis)  # Complicated UTI- s/p exchange Mcclellan catheter on 8/17/23 - s/p removal of mcclellan catheter and placed a primafit on 8/19/23  # s/p CVA with aphasia and dysphagia- s/p  Gastrostomy tube    would recommend:    1. Continue Cefepime until 8/24/23  2. Monitor WBC count, stay elevated  3. Management of PEG as per Primary team  4. Aspiration precaution  5. Monitor Temp and c/w supportive care    d/w House staff, DR. Oconnell and  Senior resident     Attending Attestation:    Spent more than 35 minutes on total encounter, more than 50 % of the visit was spent counseling and/or coordinating care by the Attending physician.

## 2023-08-21 NOTE — PROGRESS NOTE ADULT - SUBJECTIVE AND OBJECTIVE BOX
Date of Service 08-21-23 @ 10:07    CHIEF COMPLAINT:Patient is a 77y old  Female who presents with a chief complaint of Altered mental status. Pt with G tube leaking this am.    	  REVIEW OF SYSTEMS:    [x ] Unable to obtain    PHYSICAL EXAM:  T(C): 37.1 (08-21-23 @ 05:57), Max: 37.1 (08-20-23 @ 20:47)  HR: 91 (08-21-23 @ 05:57) (86 - 92)  BP: 117/64 (08-21-23 @ 05:57) (117/64 - 148/75)  RR: 17 (08-21-23 @ 05:57) (17 - 19)  SpO2: 98% (08-21-23 @ 05:57) (98% - 100%)  Wt(kg): --  I&O's Summary      Appearance: Normal	  HEENT:   Normal oral mucosa, PERRL, EOMI	  Lymphatic: No lymphadenopathy  Cardiovascular: Normal S1 S2, No JVD, No murmurs, No edema  Respiratory: Lungs clear to auscultation	  Gastrointestinal:  Soft, Non-tender, + BS	  Skin: No rashes, No ecchymoses, No cyanosis	  Extremities: Normal range of motion, No clubbing, cyanosis or edema  Vascular: Peripheral pulses palpable 2+ bilaterally    MEDICATIONS  (STANDING):  aspirin  chewable 324 milliGRAM(s) Oral daily  atorvastatin 20 milliGRAM(s) Oral at bedtime  carbidopa/levodopa  25/100 1 Tablet(s) Oral <User Schedule>  cefepime   IVPB 1000 milliGRAM(s) IV Intermittent every 24 hours  cefepime   IVPB      chlorhexidine 2% Cloths 1 Application(s) Topical daily  cloNIDine Patch 0.1 mG/24Hr(s) 1 patch Transdermal every 7 days  dextrose 5% + sodium chloride 0.9%. 1000 milliLiter(s) (50 mL/Hr) IV Continuous <Continuous>  metoprolol tartrate 25 milliGRAM(s) Oral two times a day  pantoprazole   Suspension 40 milliGRAM(s) Oral daily  valproate sodium  IVPB 500 milliGRAM(s) IV Intermittent every 8 hours      	  	  LABS:	 	                        7.4    16.27 )-----------( 306      ( 21 Aug 2023 06:00 )             23.6     08-21    138  |  110<H>  |  45<H>  ----------------------------<  95  4.6   |  18<L>  |  1.95<H>    Ca    8.0<L>      21 Aug 2023 06:00  Phos  5.3     08-21  Mg     2.5     08-21    TPro  6.3  /  Alb  1.6<L>  /  TBili  0.3  /  DBili  x   /  AST  67<H>  /  ALT  20  /  AlkPhos  198<H>  08-21      Lipid Profile: Cholesterol 152  HDL 51  TG 93  Ldl calc 82        TSH: Thyroid Stimulating Hormone, Serum: 3.59 uU/mL (07-28 @ 05:03)      	    < from: US Duplex Venous Lower Ext Complete, Bilateral (08.19.23 @ 15:52) >  ACC: [id number] EXAM:  US DPLX LWR EXT VEINS COMPL BI   ORDERED BY: JOLENE KRUSE     PROCEDURE DATE:  08/19/2023          INTERPRETATION:  CLINICAL INFORMATION: Bilateral lower extremity edema    COMPARISON: None available.    TECHNIQUE: Duplex sonography of the BILATERAL LOWER extremity veins with   color and spectral Doppler, with and without compression.    FINDINGS:    RIGHT:  Normal compressibility of the RIGHT common femoral, femoral and popliteal   veins.  Doppler examination shows normal spontaneous and phasic flow.  No RIGHT calf vein thrombosis is detected.    LEFT:  Normal compressibility of the LEFT common femoral, femoral and popliteal   veins.  Doppler examination shows normal spontaneous and phasic flow.  No LEFT calf vein thrombosis is detected.    IMPRESSION:  No evidence of deep venous thrombosis in either lower extremity.        < end of copied text >       Date of Service 08-21-23 @ 10:07    CHIEF COMPLAINT:Patient is a 77y old  Female who presents with a chief complaint of Altered mental status. Pt with G tube leaking this am.    	  REVIEW OF SYSTEMS:    [x ] Unable to obtain    PHYSICAL EXAM:  T(C): 37.1 (08-21-23 @ 05:57), Max: 37.1 (08-20-23 @ 20:47)  HR: 91 (08-21-23 @ 05:57) (86 - 92)  BP: 117/64 (08-21-23 @ 05:57) (117/64 - 148/75)  RR: 17 (08-21-23 @ 05:57) (17 - 19)  SpO2: 98% (08-21-23 @ 05:57) (98% - 100%)  Wt(kg): --  I&O's Summary      Appearance: Normal	  HEENT:   Normal oral mucosa, PERRL, EOMI	  Lymphatic: No lymphadenopathy  Cardiovascular: Normal S1 S2, No JVD, No murmurs, No edema  Respiratory: Lungs clear to auscultation	  Gastrointestinal:  Soft, Non-tender, + BS	  Skin: No rashes, No ecchymoses, No cyanosis	  Extremities: Normal range of motion, No clubbing, cyanosis or edema  Vascular: Peripheral pulses palpable 2+ bilaterally    MEDICATIONS  (STANDING):  aspirin  chewable 324 milliGRAM(s) Oral daily  atorvastatin 20 milliGRAM(s) Oral at bedtime  carbidopa/levodopa  25/100 1 Tablet(s) Oral <User Schedule>  cefepime   IVPB 1000 milliGRAM(s) IV Intermittent every 24 hours  cefepime   IVPB      chlorhexidine 2% Cloths 1 Application(s) Topical daily  cloNIDine Patch 0.1 mG/24Hr(s) 1 patch Transdermal every 7 days  dextrose 5% + sodium chloride 0.9%. 1000 milliLiter(s) (50 mL/Hr) IV Continuous <Continuous>  metoprolol tartrate 25 milliGRAM(s) Oral two times a day  pantoprazole   Suspension 40 milliGRAM(s) Oral daily  valproate sodium  IVPB 500 milliGRAM(s) IV Intermittent every 8 hours      	  	  LABS:	 	                        7.4    16.27 )-----------( 306      ( 21 Aug 2023 06:00 )             23.6     08-21    138  |  110<H>  |  45<H>  ----------------------------<  95  4.6   |  18<L>  |  1.95<H>    Ca    8.0<L>      21 Aug 2023 06:00  Phos  5.3     08-21  Mg     2.5     08-21    TPro  6.3  /  Alb  1.6<L>  /  TBili  0.3  /  DBili  x   /  AST  67<H>  /  ALT  20  /  AlkPhos  198<H>  08-21      Lipid Profile: Cholesterol 152  HDL 51  TG 93  Ldl calc 82        TSH: Thyroid Stimulating Hormone, Serum: 3.59 uU/mL (07-28 @ 05:03)      	    < from: US Duplex Venous Lower Ext Complete, Bilateral (08.19.23 @ 15:52) >  ACC: [id number] EXAM:  US DPLX LWR EXT VEINS COMPL BI   ORDERED BY: JOLENE KRUSE     PROCEDURE DATE:  08/19/2023          INTERPRETATION:  CLINICAL INFORMATION: Bilateral lower extremity edema    COMPARISON: None available.    TECHNIQUE: Duplex sonography of the BILATERAL LOWER extremity veins with   color and spectral Doppler, with and without compression.    FINDINGS:    RIGHT:  Normal compressibility of the RIGHT common femoral, femoral and popliteal   veins.  Doppler examination shows normal spontaneous and phasic flow.  No RIGHT calf vein thrombosis is detected.    LEFT:  Normal compressibility of the LEFT common femoral, femoral and popliteal   veins.  Doppler examination shows normal spontaneous and phasic flow.  No LEFT calf vein thrombosis is detected.    IMPRESSION:  No evidence of deep venous thrombosis in either lower extremity.        < end of copied text >       Date of Service 08-21-23 @ 10:07    CHIEF COMPLAINT:Patient is a 77y old  Female who presents with a chief complaint of Altered mental status. Pt with G tube leaking this am.    	  REVIEW OF SYSTEMS:    [x ] Unable to obtain    PHYSICAL EXAM:  T(C): 37.1 (08-21-23 @ 05:57), Max: 37.1 (08-20-23 @ 20:47)  HR: 91 (08-21-23 @ 05:57) (86 - 92)  BP: 117/64 (08-21-23 @ 05:57) (117/64 - 148/75)  RR: 17 (08-21-23 @ 05:57) (17 - 19)  SpO2: 98% (08-21-23 @ 05:57) (98% - 100%)  Wt(kg): --  I&O's Summary      Appearance: Normal	  HEENT:   Normal oral mucosa, PERRL, EOMI	  Lymphatic: No lymphadenopathy  Cardiovascular: Normal S1 S2, No JVD, No murmurs, No edema  Respiratory: Lungs clear to auscultation	  Gastrointestinal:  Soft, Non-tender, + BS	  Skin: No rashes, No ecchymoses, No cyanosis	  Extremities: Normal range of motion, No clubbing, cyanosis or edema  Vascular: Peripheral pulses palpable 2+ bilaterally    MEDICATIONS  (STANDING):  aspirin  chewable 324 milliGRAM(s) Oral daily  atorvastatin 20 milliGRAM(s) Oral at bedtime  carbidopa/levodopa  25/100 1 Tablet(s) Oral <User Schedule>  cefepime   IVPB 1000 milliGRAM(s) IV Intermittent every 24 hours  cefepime   IVPB      chlorhexidine 2% Cloths 1 Application(s) Topical daily  cloNIDine Patch 0.1 mG/24Hr(s) 1 patch Transdermal every 7 days  dextrose 5% + sodium chloride 0.9%. 1000 milliLiter(s) (50 mL/Hr) IV Continuous <Continuous>  metoprolol tartrate 25 milliGRAM(s) Oral two times a day  pantoprazole   Suspension 40 milliGRAM(s) Oral daily  valproate sodium  IVPB 500 milliGRAM(s) IV Intermittent every 8 hours      	  	  LABS:	 	                        7.4    16.27 )-----------( 306      ( 21 Aug 2023 06:00 )             23.6     08-21    138  |  110<H>  |  45<H>  ----------------------------<  95  4.6   |  18<L>  |  1.95<H>    Ca    8.0<L>      21 Aug 2023 06:00  Phos  5.3     08-21  Mg     2.5     08-21    TPro  6.3  /  Alb  1.6<L>  /  TBili  0.3  /  DBili  x   /  AST  67<H>  /  ALT  20  /  AlkPhos  198<H>  08-21      Lipid Profile: Cholesterol 152  HDL 51  TG 93  Ldl calc 82        TSH: Thyroid Stimulating Hormone, Serum: 3.59 uU/mL (07-28 @ 05:03)      	    < from: US Duplex Venous Lower Ext Complete, Bilateral (08.19.23 @ 15:52) >  ACC: [id number] EXAM:  US DPLX LWR EXT VEINS COMPL BI   ORDERED BY: JOLENE KRUSE     PROCEDURE DATE:  08/19/2023          INTERPRETATION:  CLINICAL INFORMATION: Bilateral lower extremity edema    COMPARISON: None available.    TECHNIQUE: Duplex sonography of the BILATERAL LOWER extremity veins with   color and spectral Doppler, with and without compression.    FINDINGS:    RIGHT:  Normal compressibility of the RIGHT common femoral, femoral and popliteal   veins.  Doppler examination shows normal spontaneous and phasic flow.  No RIGHT calf vein thrombosis is detected.    LEFT:  Normal compressibility of the LEFT common femoral, femoral and popliteal   veins.  Doppler examination shows normal spontaneous and phasic flow.  No LEFT calf vein thrombosis is detected.    IMPRESSION:  No evidence of deep venous thrombosis in either lower extremity.        < end of copied text >

## 2023-08-21 NOTE — PROGRESS NOTE ADULT - ASSESSMENT
Patient is a 77 female from [facility] PMHx HTN, HLD, CVA (w/ residual aphasia and R sided hemiparesis/plegia) who was sent to the hospital due to altered mental status. Patient is being admitted to medicine for further work up and rule out stroke vs encephalopathy (metabolic vs infectious).  Patient is a 77 female from [facility] PMHx HTN, HLD, CVA (w/ residual aphasia and R sided hemiparesis/plegia) who was sent to the hospital due to altered mental status. Patient is being admitted to medicine for further work up and rule out stroke vs encephalopathy (metabolic vs infectious).  Patient is a 77 female from [facility] PMHx HTN, HLD, CVA (w/ residual aphasia and R sided hemiparesis/plegia) who was sent to the hospital due to altered mental status. Patient is being admitted to medicine for further work up and rule out stroke vs encephalopathy (metabolic vs infectious).

## 2023-08-21 NOTE — PROGRESS NOTE ADULT - ASSESSMENT
g tube leak reported,  not draining well to bag placed  soft abd    ct abd this am  hold feeds  observation

## 2023-08-21 NOTE — PROGRESS NOTE ADULT - PROBLEM SELECTOR PLAN 1
Pt. is NPO  Open G tube placed on 8/14  Feeds through G tube resumed  IV tylenol for pain, morphine for breakthrough pain  IV iron sucrose started 8/15 for 3 days as per nephro  G tube placement by Surgery on 8/14  LE doppler b/l showed no signs of DVT  8/20 leakage noted around site of G tube. Feedings stopped and surgery consulted  f/u CT abdomen/pelvis

## 2023-08-22 LAB
ALBUMIN SERPL ELPH-MCNC: 1.4 G/DL — LOW (ref 3.5–5)
ALP SERPL-CCNC: 133 U/L — HIGH (ref 40–120)
ALT FLD-CCNC: 19 U/L DA — SIGNIFICANT CHANGE UP (ref 10–60)
ANION GAP SERPL CALC-SCNC: 9 MMOL/L — SIGNIFICANT CHANGE UP (ref 5–17)
AST SERPL-CCNC: 30 U/L — SIGNIFICANT CHANGE UP (ref 10–40)
BASOPHILS # BLD AUTO: 0.14 K/UL — SIGNIFICANT CHANGE UP (ref 0–0.2)
BASOPHILS NFR BLD AUTO: 0.7 % — SIGNIFICANT CHANGE UP (ref 0–2)
BILIRUB SERPL-MCNC: 0.3 MG/DL — SIGNIFICANT CHANGE UP (ref 0.2–1.2)
BUN SERPL-MCNC: 46 MG/DL — HIGH (ref 7–18)
CALCIUM SERPL-MCNC: 7.4 MG/DL — LOW (ref 8.4–10.5)
CHLORIDE SERPL-SCNC: 116 MMOL/L — HIGH (ref 96–108)
CO2 SERPL-SCNC: 18 MMOL/L — LOW (ref 22–31)
CREAT SERPL-MCNC: 1.86 MG/DL — HIGH (ref 0.5–1.3)
CULTURE RESULTS: SIGNIFICANT CHANGE UP
EGFR: 28 ML/MIN/1.73M2 — LOW
EOSINOPHIL # BLD AUTO: 0.04 K/UL — SIGNIFICANT CHANGE UP (ref 0–0.5)
EOSINOPHIL NFR BLD AUTO: 0.2 % — SIGNIFICANT CHANGE UP (ref 0–6)
GLUCOSE BLDC GLUCOMTR-MCNC: 107 MG/DL — HIGH (ref 70–99)
GLUCOSE BLDC GLUCOMTR-MCNC: 108 MG/DL — HIGH (ref 70–99)
GLUCOSE BLDC GLUCOMTR-MCNC: 112 MG/DL — HIGH (ref 70–99)
GLUCOSE BLDC GLUCOMTR-MCNC: 118 MG/DL — HIGH (ref 70–99)
GLUCOSE BLDC GLUCOMTR-MCNC: 169 MG/DL — HIGH (ref 70–99)
GLUCOSE SERPL-MCNC: 106 MG/DL — HIGH (ref 70–99)
HCT VFR BLD CALC: 27.9 % — LOW (ref 34.5–45)
HGB BLD-MCNC: 8.7 G/DL — LOW (ref 11.5–15.5)
IMM GRANULOCYTES NFR BLD AUTO: 7.7 % — HIGH (ref 0–0.9)
LYMPHOCYTES # BLD AUTO: 1.82 K/UL — SIGNIFICANT CHANGE UP (ref 1–3.3)
LYMPHOCYTES # BLD AUTO: 8.7 % — LOW (ref 13–44)
MAGNESIUM SERPL-MCNC: 2.3 MG/DL — SIGNIFICANT CHANGE UP (ref 1.6–2.6)
MCHC RBC-ENTMCNC: 28.5 PG — SIGNIFICANT CHANGE UP (ref 27–34)
MCHC RBC-ENTMCNC: 31.2 GM/DL — LOW (ref 32–36)
MCV RBC AUTO: 91.5 FL — SIGNIFICANT CHANGE UP (ref 80–100)
MONOCYTES # BLD AUTO: 1.51 K/UL — HIGH (ref 0–0.9)
MONOCYTES NFR BLD AUTO: 7.2 % — SIGNIFICANT CHANGE UP (ref 2–14)
NEUTROPHILS # BLD AUTO: 15.82 K/UL — HIGH (ref 1.8–7.4)
NEUTROPHILS NFR BLD AUTO: 75.5 % — SIGNIFICANT CHANGE UP (ref 43–77)
NRBC # BLD: 0 /100 WBCS — SIGNIFICANT CHANGE UP (ref 0–0)
PHOSPHATE SERPL-MCNC: 4.9 MG/DL — HIGH (ref 2.5–4.5)
PLATELET # BLD AUTO: 354 K/UL — SIGNIFICANT CHANGE UP (ref 150–400)
POTASSIUM SERPL-MCNC: 4 MMOL/L — SIGNIFICANT CHANGE UP (ref 3.5–5.3)
POTASSIUM SERPL-SCNC: 4 MMOL/L — SIGNIFICANT CHANGE UP (ref 3.5–5.3)
PROT SERPL-MCNC: 5.3 G/DL — LOW (ref 6–8.3)
RBC # BLD: 3.05 M/UL — LOW (ref 3.8–5.2)
RBC # FLD: 17.4 % — HIGH (ref 10.3–14.5)
SODIUM SERPL-SCNC: 143 MMOL/L — SIGNIFICANT CHANGE UP (ref 135–145)
SPECIMEN SOURCE: SIGNIFICANT CHANGE UP
WBC # BLD: 20.94 K/UL — HIGH (ref 3.8–10.5)
WBC # FLD AUTO: 20.94 K/UL — HIGH (ref 3.8–10.5)

## 2023-08-22 PROCEDURE — 74176 CT ABD & PELVIS W/O CONTRAST: CPT | Mod: 26

## 2023-08-22 RX ORDER — SODIUM CHLORIDE 9 MG/ML
1000 INJECTION, SOLUTION INTRAVENOUS
Refills: 0 | Status: DISCONTINUED | OUTPATIENT
Start: 2023-08-22 | End: 2023-08-23

## 2023-08-22 RX ADMIN — Medication 55 MILLIGRAM(S): at 15:38

## 2023-08-22 RX ADMIN — Medication 55 MILLIGRAM(S): at 21:27

## 2023-08-22 RX ADMIN — Medication 55 MILLIGRAM(S): at 05:06

## 2023-08-22 RX ADMIN — Medication 1 PATCH: at 18:29

## 2023-08-22 RX ADMIN — CEFEPIME 100 MILLIGRAM(S): 1 INJECTION, POWDER, FOR SOLUTION INTRAMUSCULAR; INTRAVENOUS at 14:59

## 2023-08-22 RX ADMIN — SODIUM CHLORIDE 100 MILLILITER(S): 9 INJECTION, SOLUTION INTRAVENOUS at 21:27

## 2023-08-22 RX ADMIN — Medication 1 PATCH: at 06:45

## 2023-08-22 NOTE — PROGRESS NOTE ADULT - ASSESSMENT
1. Anemia  2. Dysphagia  3. Constipation  4. Failure to thrive  5. No evidence of acute GI bleeding  6. S/p unsuccessful endoscopic Peg placement  7. S/p gastrostomy tube placement by surgery  8. Peg tube malfunction  9. Dislodged Peg tube    Suggestions:    1. NPO  2. IVF hydration  3. Hold Peg feeding  4. Management as per surgery  5. Surgical follow up  6. Monitor electrolytes  7. Protonix daily  8. Aspiration precaution  9. Monitor H/H  10. Transfuse PRBC as needed  11. Avoid NSAID  12. Daily stool softener as needed  13. DVT prophylaxis

## 2023-08-22 NOTE — PROGRESS NOTE ADULT - ASSESSMENT
77F s/p open G-tube placement 8/14  CT shows tube outside of stomach lumen     PLAN   - Hold tube feeds    - IV abx  - further planning pending discussion with attending   - remainder of care per primary team

## 2023-08-22 NOTE — PROGRESS NOTE ADULT - ASSESSMENT
g tube dislodged  benign abd exam,  lekocytosis improving    discussed ct findings with on call attending  rec to follow  holing feeds  ivf g tube dislodged  non distended abd  lekocytosis improving    discussed ct findings with on call attending  rec to follow  holing feeds  ivf

## 2023-08-22 NOTE — PROGRESS NOTE ADULT - SUBJECTIVE AND OBJECTIVE BOX
Pt s- complaints  ICU Vital Signs Last 24 Hrs  T(C): 37.1 (22 Aug 2023 05:03), Max: 37.3 (21 Aug 2023 13:29)  T(F): 98.7 (22 Aug 2023 05:03), Max: 99.1 (21 Aug 2023 13:29)  HR: 100 (22 Aug 2023 05:03) (90 - 100)  BP: 144/83 (22 Aug 2023 05:03) (129/86 - 144/83)  BP(mean): --  ABP: --  ABP(mean): --  RR: 18 (22 Aug 2023 05:03) (18 - 18)  SpO2: 100% (22 Aug 2023 05:03) (98% - 100%)    O2 Parameters below as of 22 Aug 2023 05:03  Patient On (Oxygen Delivery Method): room air      Abd: soft nt nd  g-tube no erythema or drainage                          7.4    16.27 )-----------( 306      ( 21 Aug 2023 06:00 )             23.6    Pt s- complaints  ICU Vital Signs Last 24 Hrs  T(C): 37.1 (22 Aug 2023 05:03), Max: 37.3 (21 Aug 2023 13:29)  T(F): 98.7 (22 Aug 2023 05:03), Max: 99.1 (21 Aug 2023 13:29)  HR: 100 (22 Aug 2023 05:03) (90 - 100)  BP: 144/83 (22 Aug 2023 05:03) (129/86 - 144/83)  BP(mean): --  ABP: --  ABP(mean): --  RR: 18 (22 Aug 2023 05:03) (18 - 18)  SpO2: 100% (22 Aug 2023 05:03) (98% - 100%)    O2 Parameters below as of 22 Aug 2023 05:03  Patient On (Oxygen Delivery Method): room air      Abd:  nd mild tender, g-tube no erythema or drainage                          7.4    16.27 )-----------( 306      ( 21 Aug 2023 06:00 )             23.6

## 2023-08-22 NOTE — PROGRESS NOTE ADULT - SUBJECTIVE AND OBJECTIVE BOX
INTERVAL HPI/OVERNIGHT EVENTS: NAEO, VSS. CT done which shows Gtube outside of stomach lumen    Vital Signs Last 24 Hrs  T(C): 37.1 (22 Aug 2023 05:03), Max: 37.3 (21 Aug 2023 13:29)  T(F): 98.7 (22 Aug 2023 05:03), Max: 99.1 (21 Aug 2023 13:29)  HR: 100 (22 Aug 2023 05:03) (90 - 100)  BP: 144/83 (22 Aug 2023 05:03) (129/86 - 144/83)  BP(mean): --  RR: 18 (22 Aug 2023 05:03) (18 - 18)  SpO2: 100% (22 Aug 2023 05:03) (98% - 100%)    Parameters below as of 22 Aug 2023 05:03  Patient On (Oxygen Delivery Method): room air      I&O's Detail    21 Aug 2023 07:01  -  22 Aug 2023 07:00  --------------------------------------------------------  IN:  Total IN: 0 mL    OUT:    Indwelling Catheter - Urethral (mL): 650 mL  Total OUT: 650 mL    Total NET: -650 mL        cefepime   IVPB 1000 milliGRAM(s) IV Intermittent every 24 hours  cefepime   IVPB      pantoprazole   Suspension 40 milliGRAM(s) Oral daily      Physical Exam:  General: NAD.   HEENT: NC/AT, trachea midline  Respiratory: Nonlabored breathing, equal chest rise b/l   Skin: No jaundice, no icterus  Abdomen: soft, nondistended. Gtube in place, sutures intact, no erythema, fluctuance, induration or drainage noted from site.       Lines/Drains/Tubes:     Drains/Tubes:     08-21-23 @ 07:01  -  08-22-23 @ 07:00  --------------------------------------------------------  IN: 0 mL / OUT: 650 mL / NET: -650 mL        Labs:                        7.4    16.27 )-----------( 306      ( 21 Aug 2023 06:00 )             23.6     08-21    138  |  110<H>  |  45<H>  ----------------------------<  95  4.6   |  18<L>  |  1.95<H>    Ca    8.0<L>      21 Aug 2023 06:00  Phos  5.3     08-21  Mg     2.5     08-21    TPro  6.3  /  Alb  1.6<L>  /  TBili  0.3  /  DBili  x   /  AST  67<H>  /  ALT  20  /  AlkPhos  198<H>  08-21        RADIOLOGY & ADDITIONAL STUDIES:  < from: CT Abdomen and Pelvis No Cont (08.21.23 @ 19:23) >  ACC: [id number] EXAM:  CT ABDOMEN AND PELVIS   ORDERED BY: JOLENE KRUSE     PROCEDURE DATE:  08/21/2023          INTERPRETATION:  CLINICAL INFORMATION: Failure to thrive, G-tube leak.    COMPARISON: CT abdomen pelvis 8/4/2023.    CONTRAST/COMPLICATIONS:  IV Contrast: NONE  Oral Contrast: NONE  Complications: None reported at time of study completion    PROCEDURE:  CT of the Abdomen and Pelvis was performed.  Sagittal and coronal reformats were performed.    FINDINGS:  Evaluation of the solid organs and vasculature is limited without   intravenous contrast.    LOWER CHEST: Trace bilateral pleural effusions.    LIVER: Within normal limits.  BILE DUCTS: Normal caliber.  GALLBLADDER: Within normal limits.  SPLEEN: Within normal limits.  PANCREAS: Within normal limits.  ADRENALS: Within normal limits.  KIDNEYS/URETER: No hydronephrosis. Small right renal cysts.    BLADDER: Tiny focus of air, which may related to recent instrumentation.  REPRODUCTIVE ORGANS: Hysterectomy.    BOWEL: No bowel obstruction. Appendix is normal. Gastrostomy tube appears   to terminate outside of the stomach lumen abutting the gastric wall. No   balloon is inflated. Wall thickening of the distal stomach.  PERITONEUM: No ascites.  VESSELS: Atherosclerotic changes.IVC filter.  RETROPERITONEUM/LYMPH NODES: No lymphadenopathy.  ABDOMINAL WALL: Postsurgical changes.  BONES: Degenerative changes.    IMPRESSION:  Gastrostomy tube appears to terminate outside of the stomach lumen. No   balloon is inflated.    Wall thickening of the distal stomach.    Findings were discussed with Dr. Blanton on 8/21/2023 8:00 PM by Dr. Simpson with read back confirmation.    --- End of Report ---          < end of copied text >     INTERVAL HPI/OVERNIGHT EVENTS: NAEO, VSS. CT done which shows Gtube outside of stomach lumen    Vital Signs Last 24 Hrs  T(C): 37.1 (22 Aug 2023 05:03), Max: 37.3 (21 Aug 2023 13:29)  T(F): 98.7 (22 Aug 2023 05:03), Max: 99.1 (21 Aug 2023 13:29)  HR: 100 (22 Aug 2023 05:03) (90 - 100)  BP: 144/83 (22 Aug 2023 05:03) (129/86 - 144/83)  BP(mean): --  RR: 18 (22 Aug 2023 05:03) (18 - 18)  SpO2: 100% (22 Aug 2023 05:03) (98% - 100%)    Parameters below as of 22 Aug 2023 05:03  Patient On (Oxygen Delivery Method): room air      I&O's Detail    21 Aug 2023 07:01  -  22 Aug 2023 07:00  --------------------------------------------------------  IN:  Total IN: 0 mL    OUT:    Indwelling Catheter - Urethral (mL): 650 mL  Total OUT: 650 mL    Total NET: -650 mL        cefepime   IVPB 1000 milliGRAM(s) IV Intermittent every 24 hours  cefepime   IVPB      pantoprazole   Suspension 40 milliGRAM(s) Oral daily      Physical Exam:  General: NAD.   HEENT: NC/AT, trachea midline  Respiratory: Nonlabored breathing, equal chest rise b/l   Skin: No jaundice, no icterus  Abdomen: soft, nondistended. Gtube in place, sutures intact, no erythema, fluctuance, induration or drainage noted from site.       Lines/Drains/Tubes:     Drains/Tubes:     08-21-23 @ 07:01  -  08-22-23 @ 07:00  --------------------------------------------------------  IN: 0 mL / OUT: 650 mL / NET: -650 mL        Labs:                        7.4    16.27 )-----------( 306      ( 21 Aug 2023 06:00 )             23.6     08-21    138  |  110<H>  |  45<H>  ----------------------------<  95  4.6   |  18<L>  |  1.95<H>    Ca    8.0<L>      21 Aug 2023 06:00  Phos  5.3     08-21  Mg     2.5     08-21    TPro  6.3  /  Alb  1.6<L>  /  TBili  0.3  /  DBili  x   /  AST  67<H>  /  ALT  20  /  AlkPhos  198<H>  08-21        RADIOLOGY & ADDITIONAL STUDIES:  < from: CT Abdomen and Pelvis No Cont (08.21.23 @ 19:23) >  ACC: [id number] EXAM:  CT ABDOMEN AND PELVIS   ORDERED BY: JOLENE KRUSE     PROCEDURE DATE:  08/21/2023          INTERPRETATION:  CLINICAL INFORMATION: Failure to thrive, G-tube leak.    COMPARISON: CT abdomen pelvis 8/4/2023.    CONTRAST/COMPLICATIONS:  IV Contrast: NONE  Oral Contrast: NONE  Complications: None reported at time of study completion    PROCEDURE:  CT of the Abdomen and Pelvis was performed.  Sagittal and coronal reformats were performed.    FINDINGS:  Evaluation of the solid organs and vasculature is limited without   intravenous contrast.    LOWER CHEST: Trace bilateral pleural effusions.    LIVER: Within normal limits.  BILE DUCTS: Normal caliber.  GALLBLADDER: Within normal limits.  SPLEEN: Within normal limits.  PANCREAS: Within normal limits.  ADRENALS: Within normal limits.  KIDNEYS/URETER: No hydronephrosis. Small right renal cysts.    BLADDER: Tiny focus of air, which may related to recent instrumentation.  REPRODUCTIVE ORGANS: Hysterectomy.    BOWEL: No bowel obstruction. Appendix is normal. Gastrostomy tube appears   to terminate outside of the stomach lumen abutting the gastric wall. No   balloon is inflated. Wall thickening of the distal stomach.  PERITONEUM: No ascites.  VESSELS: Atherosclerotic changes.IVC filter.  RETROPERITONEUM/LYMPH NODES: No lymphadenopathy.  ABDOMINAL WALL: Postsurgical changes.  BONES: Degenerative changes.    IMPRESSION:  Gastrostomy tube appears to terminate outside of the stomach lumen. No   balloon is inflated.    Wall thickening of the distal stomach.    Findings were discussed with Dr. Blanton on 8/21/2023 8:00 PM by Dr. Simpson with read back confirmation.    --- End of Report ---          < end of copied text >     INTERVAL HPI/OVERNIGHT EVENTS: NAEO, VSS. CT done which shows Gtube outside of stomach lumen    Vital Signs Last 24 Hrs  T(C): 37.1 (22 Aug 2023 05:03), Max: 37.3 (21 Aug 2023 13:29)  T(F): 98.7 (22 Aug 2023 05:03), Max: 99.1 (21 Aug 2023 13:29)  HR: 100 (22 Aug 2023 05:03) (90 - 100)  BP: 144/83 (22 Aug 2023 05:03) (129/86 - 144/83)  BP(mean): --  RR: 18 (22 Aug 2023 05:03) (18 - 18)  SpO2: 100% (22 Aug 2023 05:03) (98% - 100%)    Parameters below as of 22 Aug 2023 05:03  Patient On (Oxygen Delivery Method): room air      I&O's Detail    21 Aug 2023 07:01  -  22 Aug 2023 07:00  --------------------------------------------------------  IN:  Total IN: 0 mL    OUT:    Indwelling Catheter - Urethral (mL): 650 mL  Total OUT: 650 mL    Total NET: -650 mL        cefepime   IVPB 1000 milliGRAM(s) IV Intermittent every 24 hours  cefepime   IVPB      pantoprazole   Suspension 40 milliGRAM(s) Oral daily      Physical Exam:  General: NAD.   HEENT: NC/AT, trachea midline  Respiratory: Nonlabored breathing, equal chest rise b/l   Skin: No jaundice, no icterus  Abdomen: soft, nondistended. Gtube in place, sutures intact, no erythema, fluctuance, induration or drainage noted from site.       Lines/Drains/Tubes:     Drains/Tubes:     08-21-23 @ 07:01  -  08-22-23 @ 07:00  --------------------------------------------------------  IN: 0 mL / OUT: 650 mL / NET: -650 mL        Labs:                        7.4    16.27 )-----------( 306      ( 21 Aug 2023 06:00 )             23.6     08-21    138  |  110<H>  |  45<H>  ----------------------------<  95  4.6   |  18<L>  |  1.95<H>    Ca    8.0<L>      21 Aug 2023 06:00  Phos  5.3     08-21  Mg     2.5     08-21    TPro  6.3  /  Alb  1.6<L>  /  TBili  0.3  /  DBili  x   /  AST  67<H>  /  ALT  20  /  AlkPhos  198<H>  08-21        RADIOLOGY & ADDITIONAL STUDIES:  < from: CT Abdomen and Pelvis No Cont (08.21.23 @ 19:23) >  ACC: [id number] EXAM:  CT ABDOMEN AND PELVIS   ORDERED BY: JOLENE KRUSE     PROCEDURE DATE:  08/21/2023          INTERPRETATION:  CLINICAL INFORMATION: Failure to thrive, G-tube leak.    COMPARISON: CT abdomen pelvis 8/4/2023.    CONTRAST/COMPLICATIONS:  IV Contrast: NONE  Oral Contrast: NONE  Complications: None reported at time of study completion    PROCEDURE:  CT of the Abdomen and Pelvis was performed.  Sagittal and coronal reformats were performed.    FINDINGS:  Evaluation of the solid organs and vasculature is limited without   intravenous contrast.    LOWER CHEST: Trace bilateral pleural effusions.    LIVER: Within normal limits.  BILE DUCTS: Normal caliber.  GALLBLADDER: Within normal limits.  SPLEEN: Within normal limits.  PANCREAS: Within normal limits.  ADRENALS: Within normal limits.  KIDNEYS/URETER: No hydronephrosis. Small right renal cysts.    BLADDER: Tiny focus of air, which may related to recent instrumentation.  REPRODUCTIVE ORGANS: Hysterectomy.    BOWEL: No bowel obstruction. Appendix is normal. Gastrostomy tube appears   to terminate outside of the stomach lumen abutting the gastric wall. No   balloon is inflated. Wall thickening of the distal stomach.  PERITONEUM: No ascites.  VESSELS: Atherosclerotic changes.IVC filter.  RETROPERITONEUM/LYMPH NODES: No lymphadenopathy.  ABDOMINAL WALL: Postsurgical changes.  BONES: Degenerative changes.    IMPRESSION:  Gastrostomy tube appears to terminate outside of the stomach lumen. No   balloon is inflated.    Wall thickening of the distal stomach.    Findings were discussed with Dr. Blanton on 8/21/2023 8:00 PM by Dr. Simpson with read back confirmation.    --- End of Report ---          < end of copied text >

## 2023-08-22 NOTE — PROGRESS NOTE ADULT - SUBJECTIVE AND OBJECTIVE BOX
PGY-1 Progress Note discussed with attending    PAGER #: [[phone]] TILL 5:00 PM  PLEASE CONTACT ON CALL TEAM:  - On Call Team (Please refer to Tyler) FROM 5:00 PM - 8:30PM  - Nightfloat Team FROM 8:30 -7:30 AM    CHIEF COMPLAINT & BRIEF HOSPITAL COURSE:      INTERVAL HPI/OVERNIGHT EVENTS:       MEDICATIONS  (STANDING):  aspirin  chewable 324 milliGRAM(s) Oral daily  atorvastatin 20 milliGRAM(s) Oral at bedtime  carbidopa/levodopa  25/100 1 Tablet(s) Oral <User Schedule>  cefepime   IVPB      cefepime   IVPB 1000 milliGRAM(s) IV Intermittent every 24 hours  chlorhexidine 2% Cloths 1 Application(s) Topical daily  cloNIDine Patch 0.1 mG/24Hr(s) 1 patch Transdermal every 7 days  dextrose 5% + sodium chloride 0.9%. 1000 milliLiter(s) (50 mL/Hr) IV Continuous <Continuous>  metoprolol tartrate 25 milliGRAM(s) Oral two times a day  pantoprazole   Suspension 40 milliGRAM(s) Oral daily  valproate sodium  IVPB 500 milliGRAM(s) IV Intermittent every 8 hours    MEDICATIONS  (PRN):  acetaminophen   Oral Liquid .. 650 milliGRAM(s) Enteral Tube every 6 hours PRN Temp greater or equal to 38C (100.4F)      REVIEW OF SYSTEMS:  CONSTITUTIONAL: No fever, weight loss, or fatigue  RESPIRATORY: No shortness of breath  CARDIOVASCULAR: No chest pain  GASTROINTESTINAL: No abdominal pain.  GENITOURINARY: No dysuria  NEUROLOGICAL: No headaches  SKIN: No itching, burning, rashes    Vital Signs Last 24 Hrs  T(C): 37.1 (22 Aug 2023 05:03), Max: 37.3 (21 Aug 2023 13:29)  T(F): 98.7 (22 Aug 2023 05:03), Max: 99.1 (21 Aug 2023 13:29)  HR: 100 (22 Aug 2023 05:03) (90 - 100)  BP: 144/83 (22 Aug 2023 05:03) (129/86 - 144/83)  BP(mean): --  RR: 18 (22 Aug 2023 05:03) (18 - 18)  SpO2: 100% (22 Aug 2023 05:03) (98% - 100%)    Parameters below as of 22 Aug 2023 05:03  Patient On (Oxygen Delivery Method): room air        PHYSICAL EXAMINATION:  GENERAL: NAD, well built  HEAD:  Atraumatic, Normocephalic  EYES:  conjunctiva and sclera clear  CHEST/LUNG: Clear to auscultation. No rales, rhonchi, wheezing, or rubs  HEART: Regular rate and rhythm; No murmurs, rubs, or gallops  ABDOMEN: Soft, Nontender, Nondistended; Bowel sounds present  NERVOUS SYSTEM:  Alert & Oriented X3,    EXTREMITIES:  2+ Peripheral Pulses, No clubbing, cyanosis, or edema  SKIN: warm dry                          8.7    20.94 )-----------( 354      ( 22 Aug 2023 10:14 )             27.9     08-22    143  |  116<H>  |  46<H>  ----------------------------<  106<H>  4.0   |  18<L>  |  1.86<H>    Ca    7.4<L>      22 Aug 2023 10:14  Phos  4.9     08-22  Mg     2.3     08-22    TPro  5.3<L>  /  Alb  1.4<L>  /  TBili  0.3  /  DBili  x   /  AST  30  /  ALT  19  /  AlkPhos  133<H>  08-22    LIVER FUNCTIONS - ( 22 Aug 2023 10:14 )  Alb: 1.4 g/dL / Pro: 5.3 g/dL / ALK PHOS: 133 U/L / ALT: 19 U/L DA / AST: 30 U/L / GGT: x                   CAPILLARY BLOOD GLUCOSE      RADIOLOGY & ADDITIONAL TESTS:                   PGY-1 Progress Note discussed with attending    PAGER #: [[phone]] TILL 5:00 PM  PLEASE CONTACT ON CALL TEAM:  - On Call Team (Please refer to Tyler) FROM 5:00 PM - 8:30PM  - Nightfloat Team FROM 8:30 -7:30 AM    CHIEF COMPLAINT & BRIEF HOSPITAL COURSE:      INTERVAL HPI/OVERNIGHT EVENTS:       MEDICATIONS  (STANDING):  aspirin  chewable 324 milliGRAM(s) Oral daily  atorvastatin 20 milliGRAM(s) Oral at bedtime  carbidopa/levodopa  25/100 1 Tablet(s) Oral <User Schedule>  cefepime   IVPB      cefepime   IVPB 1000 milliGRAM(s) IV Intermittent every 24 hours  chlorhexidine 2% Cloths 1 Application(s) Topical daily  cloNIDine Patch 0.1 mG/24Hr(s) 1 patch Transdermal every 7 days  dextrose 5% + sodium chloride 0.9%. 1000 milliLiter(s) (50 mL/Hr) IV Continuous <Continuous>  metoprolol tartrate 25 milliGRAM(s) Oral two times a day  pantoprazole   Suspension 40 milliGRAM(s) Oral daily  valproate sodium  IVPB 500 milliGRAM(s) IV Intermittent every 8 hours    MEDICATIONS  (PRN):  acetaminophen   Oral Liquid .. 650 milliGRAM(s) Enteral Tube every 6 hours PRN Temp greater or equal to 38C (100.4F)      REVIEW OF SYSTEMS:  CONSTITUTIONAL: No fever, weight loss, or fatigue  RESPIRATORY: No shortness of breath  CARDIOVASCULAR: No chest pain  GASTROINTESTINAL: No abdominal pain.  GENITOURINARY: No dysuria  NEUROLOGICAL: No headaches  SKIN: No itching, burning, rashes    Vital Signs Last 24 Hrs  T(C): 37.1 (22 Aug 2023 05:03), Max: 37.3 (21 Aug 2023 13:29)  T(F): 98.7 (22 Aug 2023 05:03), Max: 99.1 (21 Aug 2023 13:29)  HR: 100 (22 Aug 2023 05:03) (90 - 100)  BP: 144/83 (22 Aug 2023 05:03) (129/86 - 144/83)  BP(mean): --  RR: 18 (22 Aug 2023 05:03) (18 - 18)  SpO2: 100% (22 Aug 2023 05:03) (98% - 100%)    Parameters below as of 22 Aug 2023 05:03  Patient On (Oxygen Delivery Method): room air        PHYSICAL EXAMINATION:  GENERAL: NAD, well built  HEAD:  Atraumatic, Normocephalic  EYES:  conjunctiva and sclera clear  CHEST/LUNG: Clear to auscultation. No rales, rhonchi, wheezing, or rubs  HEART: Regular rate and rhythm; No murmurs, rubs, or gallops  ABDOMEN: Soft, Nontender, Nondistended; Bowel sounds present  NERVOUS SYSTEM:  Alert & Oriented X3,    EXTREMITIES:  2+ Peripheral Pulses, No clubbing, cyanosis, or edema  SKIN: warm dry                          8.7    20.94 )-----------( 354      ( 22 Aug 2023 10:14 )             27.9     08-22    143  |  116<H>  |  46<H>  ----------------------------<  106<H>  4.0   |  18<L>  |  1.86<H>    Ca    7.4<L>      22 Aug 2023 10:14  Phos  4.9     08-22  Mg     2.3     08-22    TPro  5.3<L>  /  Alb  1.4<L>  /  TBili  0.3  /  DBili  x   /  AST  30  /  ALT  19  /  AlkPhos  133<H>  08-22    LIVER FUNCTIONS - ( 22 Aug 2023 10:14 )  Alb: 1.4 g/dL / Pro: 5.3 g/dL / ALK PHOS: 133 U/L / ALT: 19 U/L DA / AST: 30 U/L / GGT: x                   CAPILLARY BLOOD GLUCOSE      RADIOLOGY & ADDITIONAL TESTS:                   PGY-1 Progress Note discussed with attending    PAGER #: [[phone]] TILL 5:00 PM  PLEASE CONTACT ON CALL TEAM:  - On Call Team (Please refer to Tyler) FROM 5:00 PM - 8:30PM  - Nightfloat Team FROM 8:30 -7:30 AM    CHIEF COMPLAINT & BRIEF HOSPITAL COURSE:      INTERVAL HPI/OVERNIGHT EVENTS:       MEDICATIONS  (STANDING):  aspirin  chewable 324 milliGRAM(s) Oral daily  atorvastatin 20 milliGRAM(s) Oral at bedtime  carbidopa/levodopa  25/100 1 Tablet(s) Oral <User Schedule>  cefepime   IVPB      cefepime   IVPB 1000 milliGRAM(s) IV Intermittent every 24 hours  chlorhexidine 2% Cloths 1 Application(s) Topical daily  cloNIDine Patch 0.1 mG/24Hr(s) 1 patch Transdermal every 7 days  dextrose 5% + sodium chloride 0.9%. 1000 milliLiter(s) (50 mL/Hr) IV Continuous <Continuous>  metoprolol tartrate 25 milliGRAM(s) Oral two times a day  pantoprazole   Suspension 40 milliGRAM(s) Oral daily  valproate sodium  IVPB 500 milliGRAM(s) IV Intermittent every 8 hours    MEDICATIONS  (PRN):  acetaminophen   Oral Liquid .. 650 milliGRAM(s) Enteral Tube every 6 hours PRN Temp greater or equal to 38C (100.4F)      REVIEW OF SYSTEMS:  CONSTITUTIONAL: No fever, weight loss, or fatigue  RESPIRATORY: No shortness of breath  CARDIOVASCULAR: No chest pain  GASTROINTESTINAL: No abdominal pain.  GENITOURINARY: No dysuria  NEUROLOGICAL: No headaches  SKIN: No itching, burning, rashes    Vital Signs Last 24 Hrs  T(C): 37.1 (22 Aug 2023 05:03), Max: 37.3 (21 Aug 2023 13:29)  T(F): 98.7 (22 Aug 2023 05:03), Max: 99.1 (21 Aug 2023 13:29)  HR: 100 (22 Aug 2023 05:03) (90 - 100)  BP: 144/83 (22 Aug 2023 05:03) (129/86 - 144/83)  BP(mean): --  RR: 18 (22 Aug 2023 05:03) (18 - 18)  SpO2: 100% (22 Aug 2023 05:03) (98% - 100%)    Parameters below as of 22 Aug 2023 05:03  Patient On (Oxygen Delivery Method): room air        PHYSICAL EXAMINATION:  GENERAL: NAD, well built  HEAD:  Atraumatic, Normocephalic  EYES:  conjunctiva and sclera clear  CHEST/LUNG: Clear to auscultation. No rales, rhonchi, wheezing, or rubs  HEART: Regular rate and rhythm; No murmurs, rubs, or gallops  ABDOMEN: Soft, Nontender, Nondistended; Bowel sounds present  NERVOUS SYSTEM:  Alert & Oriented X3,    EXTREMITIES:  2+ Peripheral Pulses, No clubbing, cyanosis, or edema  SKIN: warm dry                          8.7    20.94 )-----------( 354      ( 22 Aug 2023 10:14 )             27.9     08-22    143  |  116<H>  |  46<H>  ----------------------------<  106<H>  4.0   |  18<L>  |  1.86<H>    Ca    7.4<L>      22 Aug 2023 10:14  Phos  4.9     08-22  Mg     2.3     08-22    TPro  5.3<L>  /  Alb  1.4<L>  /  TBili  0.3  /  DBili  x   /  AST  30  /  ALT  19  /  AlkPhos  133<H>  08-22    LIVER FUNCTIONS - ( 22 Aug 2023 10:14 )  Alb: 1.4 g/dL / Pro: 5.3 g/dL / ALK PHOS: 133 U/L / ALT: 19 U/L DA / AST: 30 U/L / GGT: x                   CAPILLARY BLOOD GLUCOSE      RADIOLOGY & ADDITIONAL TESTS:                   PGY-1 Progress Note discussed with attending    PAGER #: [[phone]] TILL 5:00 PM  PLEASE CONTACT ON CALL TEAM:  - On Call Team (Please refer to Tyler) FROM 5:00 PM - 8:30PM  - Nightfloat Team FROM 8:30 -7:30 AM    CHIEF COMPLAINT & BRIEF HOSPITAL COURSE:  Failure to thrive    INTERVAL HPI/OVERNIGHT EVENTS:   Pt had large loose stool with bloody mucous-like content overnight. CT abdomen showed G tube was out of stomach lumen. Surgery consulted to try to reinsert tube. Pt non-verbal, responds to verbal stimuli.    MEDICATIONS  (STANDING):  aspirin  chewable 324 milliGRAM(s) Oral daily  atorvastatin 20 milliGRAM(s) Oral at bedtime  carbidopa/levodopa  25/100 1 Tablet(s) Oral <User Schedule>  cefepime   IVPB      cefepime   IVPB 1000 milliGRAM(s) IV Intermittent every 24 hours  chlorhexidine 2% Cloths 1 Application(s) Topical daily  cloNIDine Patch 0.1 mG/24Hr(s) 1 patch Transdermal every 7 days  dextrose 5% + sodium chloride 0.9%. 1000 milliLiter(s) (50 mL/Hr) IV Continuous <Continuous>  metoprolol tartrate 25 milliGRAM(s) Oral two times a day  pantoprazole   Suspension 40 milliGRAM(s) Oral daily  valproate sodium  IVPB 500 milliGRAM(s) IV Intermittent every 8 hours    MEDICATIONS  (PRN):  acetaminophen   Oral Liquid .. 650 milliGRAM(s) Enteral Tube every 6 hours PRN Temp greater or equal to 38C (100.4F)      REVIEW OF SYSTEMS: unable to obtain    Vital Signs Last 24 Hrs  T(C): 37.1 (22 Aug 2023 05:03), Max: 37.3 (21 Aug 2023 13:29)  T(F): 98.7 (22 Aug 2023 05:03), Max: 99.1 (21 Aug 2023 13:29)  HR: 100 (22 Aug 2023 05:03) (90 - 100)  BP: 144/83 (22 Aug 2023 05:03) (129/86 - 144/83)  BP(mean): --  RR: 18 (22 Aug 2023 05:03) (18 - 18)  SpO2: 100% (22 Aug 2023 05:03) (98% - 100%)    Parameters below as of 22 Aug 2023 05:03  Patient On (Oxygen Delivery Method): room air        PHYSICAL EXAMINATION:  GENERAL: NAD, well built, non-verbal  HEAD:  Atraumatic, Normocephalic  EYES:  conjunctiva and sclera clear  CHEST/LUNG: Clear to auscultation. No rales, rhonchi, wheezing, or rubs  HEART: Regular rate and rhythm; No murmurs, rubs, or gallops  ABDOMEN: G tube still in skin but tube feeds stopped until it can be positioned correctly by surgery or IR. Soft, Nontender, Nondistended; Bowel sounds present  NERVOUS SYSTEM:  Alert & Oriented X0,    EXTREMITIES: +Edema in all 4 extremities. 2+ Peripheral Pulses, No clubbing, cyanosis  SKIN: warm dry                          8.7    20.94 )-----------( 354      ( 22 Aug 2023 10:14 )             27.9     08-22    143  |  116<H>  |  46<H>  ----------------------------<  106<H>  4.0   |  18<L>  |  1.86<H>    Ca    7.4<L>      22 Aug 2023 10:14  Phos  4.9     08-22  Mg     2.3     08-22    TPro  5.3<L>  /  Alb  1.4<L>  /  TBili  0.3  /  DBili  x   /  AST  30  /  ALT  19  /  AlkPhos  133<H>  08-22    LIVER FUNCTIONS - ( 22 Aug 2023 10:14 )  Alb: 1.4 g/dL / Pro: 5.3 g/dL / ALK PHOS: 133 U/L / ALT: 19 U/L DA / AST: 30 U/L / GGT: x                   CAPILLARY BLOOD GLUCOSE      RADIOLOGY & ADDITIONAL TESTS:                   PGY-1 Progress Note discussed with attending    PAGER #: [[phone]] TILL 5:00 PM  PLEASE CONTACT ON CALL TEAM:  - On Call Team (Please refer to Tyler) FROM 5:00 PM - 8:30PM  - Nightfloat Team FROM 8:30 -7:30 AM    CHIEF COMPLAINT & BRIEF HOSPITAL COURSE:  Failure to thrive    INTERVAL HPI/OVERNIGHT EVENTS:   Pt had large loose stool with bloody mucous-like content overnight. CT abdomen showed G tube was out of stomach lumen. Surgery consulted to try to reinsert tube. Pt non-verbal, responds to verbal stimuli.    MEDICATIONS  (STANDING):  aspirin  chewable 324 milliGRAM(s) Oral daily  atorvastatin 20 milliGRAM(s) Oral at bedtime  carbidopa/levodopa  25/100 1 Tablet(s) Oral <User Schedule>  cefepime   IVPB      cefepime   IVPB 1000 milliGRAM(s) IV Intermittent every 24 hours  chlorhexidine 2% Cloths 1 Application(s) Topical daily  cloNIDine Patch 0.1 mG/24Hr(s) 1 patch Transdermal every 7 days  dextrose 5% + sodium chloride 0.9%. 1000 milliLiter(s) (50 mL/Hr) IV Continuous <Continuous>  metoprolol tartrate 25 milliGRAM(s) Oral two times a day  pantoprazole   Suspension 40 milliGRAM(s) Oral daily  valproate sodium  IVPB 500 milliGRAM(s) IV Intermittent every 8 hours    MEDICATIONS  (PRN):  acetaminophen   Oral Liquid .. 650 milliGRAM(s) Enteral Tube every 6 hours PRN Temp greater or equal to 38C (100.4F)      REVIEW OF SYSTEMS: unable to obtain    Vital Signs Last 24 Hrs  T(C): 37.1 (22 Aug 2023 05:03), Max: 37.3 (21 Aug 2023 13:29)  T(F): 98.7 (22 Aug 2023 05:03), Max: 99.1 (21 Aug 2023 13:29)  HR: 100 (22 Aug 2023 05:03) (90 - 100)  BP: 144/83 (22 Aug 2023 05:03) (129/86 - 144/83)  BP(mean): --  RR: 18 (22 Aug 2023 05:03) (18 - 18)  SpO2: 100% (22 Aug 2023 05:03) (98% - 100%)    Parameters below as of 22 Aug 2023 05:03  Patient On (Oxygen Delivery Method): room air        PHYSICAL EXAMINATION:  GENERAL: NAD, well built, non-verbal  HEAD:  Atraumatic, Normocephalic  EYES:  conjunctiva and sclera clear  CHEST/LUNG: Clear to auscultation. No rales, rhonchi, wheezing, or rubs  HEART: Regular rate and rhythm; No murmurs, rubs, or gallops  ABDOMEN: G tube still in skin but tube feeds stopped until it can be positioned correctly by surgery or IR. Soft, Nontender, Nondistended; Bowel sounds present  NERVOUS SYSTEM:  Alert & Oriented X0,    EXTREMITIES: +Edema in all 4 extremities. 2+ Peripheral Pulses, No clubbing, cyanosis  SKIN: warm dry                          8.7    20.94 )-----------( 354      ( 22 Aug 2023 10:14 )             27.9     08-22    143  |  116<H>  |  46<H>  ----------------------------<  106<H>  4.0   |  18<L>  |  1.86<H>    Ca    7.4<L>      22 Aug 2023 10:14  Phos  4.9     08-22  Mg     2.3     08-22    TPro  5.3<L>  /  Alb  1.4<L>  /  TBili  0.3  /  DBili  x   /  AST  30  /  ALT  19  /  AlkPhos  133<H>  08-22    LIVER FUNCTIONS - ( 22 Aug 2023 10:14 )  Alb: 1.4 g/dL / Pro: 5.3 g/dL / ALK PHOS: 133 U/L / ALT: 19 U/L DA / AST: 30 U/L / GGT: x                   CAPILLARY BLOOD GLUCOSE      RADIOLOGY & ADDITIONAL TESTS:                   PGY-1 Progress Note discussed with attending    PAGER #: [[phone]] TILL 5:00 PM  PLEASE CONTACT ON CALL TEAM:  - On Call Team (Please refer to Tyler) FROM 5:00 PM - 8:30PM  - Nightfloat Team FROM 8:30 -7:30 AM    CHIEF COMPLAINT & BRIEF HOSPITAL COURSE:  Failure to thrive    INTERVAL HPI/OVERNIGHT EVENTS:   Pt had large loose stool with bloody mucous-like content overnight. CT abdomen showed G tube was out of stomach lumen. Surgery consulted to try to reinsert tube. Pt non-verbal, responds to verbal stimuli.    MEDICATIONS  (STANDING):  aspirin  chewable 324 milliGRAM(s) Oral daily  atorvastatin 20 milliGRAM(s) Oral at bedtime  carbidopa/levodopa  25/100 1 Tablet(s) Oral <User Schedule>  cefepime   IVPB      cefepime   IVPB 1000 milliGRAM(s) IV Intermittent every 24 hours  chlorhexidine 2% Cloths 1 Application(s) Topical daily  cloNIDine Patch 0.1 mG/24Hr(s) 1 patch Transdermal every 7 days  dextrose 5% + sodium chloride 0.9%. 1000 milliLiter(s) (50 mL/Hr) IV Continuous <Continuous>  metoprolol tartrate 25 milliGRAM(s) Oral two times a day  pantoprazole   Suspension 40 milliGRAM(s) Oral daily  valproate sodium  IVPB 500 milliGRAM(s) IV Intermittent every 8 hours    MEDICATIONS  (PRN):  acetaminophen   Oral Liquid .. 650 milliGRAM(s) Enteral Tube every 6 hours PRN Temp greater or equal to 38C (100.4F)      REVIEW OF SYSTEMS: unable to obtain    Vital Signs Last 24 Hrs  T(C): 37.1 (22 Aug 2023 05:03), Max: 37.3 (21 Aug 2023 13:29)  T(F): 98.7 (22 Aug 2023 05:03), Max: 99.1 (21 Aug 2023 13:29)  HR: 100 (22 Aug 2023 05:03) (90 - 100)  BP: 144/83 (22 Aug 2023 05:03) (129/86 - 144/83)  BP(mean): --  RR: 18 (22 Aug 2023 05:03) (18 - 18)  SpO2: 100% (22 Aug 2023 05:03) (98% - 100%)    Parameters below as of 22 Aug 2023 05:03  Patient On (Oxygen Delivery Method): room air        PHYSICAL EXAMINATION:  GENERAL: NAD, well built, non-verbal  HEAD:  Atraumatic, Normocephalic  EYES:  conjunctiva and sclera clear  CHEST/LUNG: Clear to auscultation. No rales, rhonchi, wheezing, or rubs  HEART: Regular rate and rhythm; No murmurs, rubs, or gallops  ABDOMEN: G tube still in skin but tube feeds stopped until it can be positioned correctly by surgery or IR. Soft, Nontender, Nondistended; Bowel sounds present  NERVOUS SYSTEM:  Alert & Oriented X0,    EXTREMITIES: +Edema in all 4 extremities. 2+ Peripheral Pulses, No clubbing, cyanosis  SKIN: warm dry                          8.7    20.94 )-----------( 354      ( 22 Aug 2023 10:14 )             27.9     08-22    143  |  116<H>  |  46<H>  ----------------------------<  106<H>  4.0   |  18<L>  |  1.86<H>    Ca    7.4<L>      22 Aug 2023 10:14  Phos  4.9     08-22  Mg     2.3     08-22    TPro  5.3<L>  /  Alb  1.4<L>  /  TBili  0.3  /  DBili  x   /  AST  30  /  ALT  19  /  AlkPhos  133<H>  08-22    LIVER FUNCTIONS - ( 22 Aug 2023 10:14 )  Alb: 1.4 g/dL / Pro: 5.3 g/dL / ALK PHOS: 133 U/L / ALT: 19 U/L DA / AST: 30 U/L / GGT: x                   CAPILLARY BLOOD GLUCOSE      RADIOLOGY & ADDITIONAL TESTS:

## 2023-08-22 NOTE — PROGRESS NOTE ADULT - SUBJECTIVE AND OBJECTIVE BOX
NEPHROLOGY MEDICAL CARE, [facility] - Dr. Ajay Green/ Dr. Mert Madison/ Dr. Chris Calderon/ Dr. Carson Cook    Date of Service: 08-22-23 @ 14:04    Patient was seen and examined at bedside.    CC: patient is okay and NAD  tube outside of the stomach    Vital Signs Last 24 Hrs  T(C): 37.1 (22 Aug 2023 05:03), Max: 37.1 (22 Aug 2023 05:03)  T(F): 98.7 (22 Aug 2023 05:03), Max: 98.7 (22 Aug 2023 05:03)  HR: 100 (22 Aug 2023 05:03) (90 - 100)  BP: 144/83 (22 Aug 2023 05:03) (132/65 - 144/83)  BP(mean): --  RR: 18 (22 Aug 2023 05:03) (18 - 18)  SpO2: 100% (22 Aug 2023 05:03) (98% - 100%)    Parameters below as of 22 Aug 2023 05:03  Patient On (Oxygen Delivery Method): room air        08-21 @ 07:01  -  08-22 @ 07:00  --------------------------------------------------------  IN: 0 mL / OUT: 650 mL / NET: -650 mL        PHYSICAL EXAM:  General: No acute respiratory distress.  Eyes: conjunctiva and sclera clear  ENMT: Atraumatic, Normocephalic; Moist mucous membranes  Respiratory: Bilateral poor air entry; No rales, rhonchi, wheezing  Cardiovascular: S1S2+; no m/r/g  Gastrointestinal: Soft, Non-tender, Nondistended; Bowel sounds present; PEG  Neuro: eyes are open; hemiparesis; non-verbal  Ext:  1+pedal edema, No Cyanosis  Skin: No visible rashes      MEDICATIONS:  MEDICATIONS  (STANDING):  aspirin  chewable 324 milliGRAM(s) Oral daily  atorvastatin 20 milliGRAM(s) Oral at bedtime  carbidopa/levodopa  25/100 1 Tablet(s) Oral <User Schedule>  cefepime   IVPB 1000 milliGRAM(s) IV Intermittent every 24 hours  cefepime   IVPB      chlorhexidine 2% Cloths 1 Application(s) Topical daily  cloNIDine Patch 0.1 mG/24Hr(s) 1 patch Transdermal every 7 days  dextrose 5% + sodium chloride 0.9%. 1000 milliLiter(s) (50 mL/Hr) IV Continuous <Continuous>  metoprolol tartrate 25 milliGRAM(s) Oral two times a day  pantoprazole   Suspension 40 milliGRAM(s) Oral daily  valproate sodium  IVPB 500 milliGRAM(s) IV Intermittent every 8 hours    MEDICATIONS  (PRN):  acetaminophen   Oral Liquid .. 650 milliGRAM(s) Enteral Tube every 6 hours PRN Temp greater or equal to 38C (100.4F)          LABS:                        8.7    20.94 )-----------( 354      ( 22 Aug 2023 10:14 )             27.9     08-22    143  |  116<H>  |  46<H>  ----------------------------<  106<H>  4.0   |  18<L>  |  1.86<H>    Ca    7.4<L>      22 Aug 2023 10:14  Phos  4.9     08-22  Mg     2.3     08-22    TPro  5.3<L>  /  Alb  1.4<L>  /  TBili  0.3  /  DBili  x   /  AST  30  /  ALT  19  /  AlkPhos  133<H>  08-22      Urinalysis Basic - ( 22 Aug 2023 10:14 )    Color: x / Appearance: x / SG: x / pH: x  Gluc: 106 mg/dL / Ketone: x  / Bili: x / Urobili: x   Blood: x / Protein: x / Nitrite: x   Leuk Esterase: x / RBC: x / WBC x   Sq Epi: x / Non Sq Epi: x / Bacteria: x      Magnesium: 2.3 mg/dL (08-22 @ 10:14)  Phosphorus: 4.9 mg/dL (08-22 @ 10:14)    Urine studies    PTH and Vit D:         NEPHROLOGY MEDICAL CARE, [facility] - Dr. Ajay Green/ Dr. Mert Madison/ Dr. Chris Calderon/ Dr. Carson Cook    Date of Service: 08-22-23 @ 14:04    Patient was seen and examined at bedside.    CC: patient is okay and NAD  tube outside of the stomach    Vital Signs Last 24 Hrs  T(C): 37.1 (22 Aug 2023 05:03), Max: 37.1 (22 Aug 2023 05:03)  T(F): 98.7 (22 Aug 2023 05:03), Max: 98.7 (22 Aug 2023 05:03)  HR: 100 (22 Aug 2023 05:03) (90 - 100)  BP: 144/83 (22 Aug 2023 05:03) (132/65 - 144/83)  BP(mean): --  RR: 18 (22 Aug 2023 05:03) (18 - 18)  SpO2: 100% (22 Aug 2023 05:03) (98% - 100%)    Parameters below as of 22 Aug 2023 05:03  Patient On (Oxygen Delivery Method): room air        08-21 @ 07:01  -  08-22 @ 07:00  --------------------------------------------------------  IN: 0 mL / OUT: 650 mL / NET: -650 mL        PHYSICAL EXAM:  General: No acute respiratory distress.  Eyes: conjunctiva and sclera clear  ENMT: Atraumatic, Normocephalic; Moist mucous membranes  Respiratory: Bilateral poor air entry; No rales, rhonchi, wheezing  Cardiovascular: S1S2+; no m/r/g  Gastrointestinal: Soft, Non-tender, Nondistended; Bowel sounds present; PEG  Neuro: eyes are open; hemiparesis; non-verbal  Ext:  1+pedal edema, No Cyanosis  Skin: No visible rashes      MEDICATIONS:  MEDICATIONS  (STANDING):  aspirin  chewable 324 milliGRAM(s) Oral daily  atorvastatin 20 milliGRAM(s) Oral at bedtime  carbidopa/levodopa  25/100 1 Tablet(s) Oral <User Schedule>  cefepime   IVPB 1000 milliGRAM(s) IV Intermittent every 24 hours  cefepime   IVPB      chlorhexidine 2% Cloths 1 Application(s) Topical daily  cloNIDine Patch 0.1 mG/24Hr(s) 1 patch Transdermal every 7 days  dextrose 5% + sodium chloride 0.9%. 1000 milliLiter(s) (50 mL/Hr) IV Continuous <Continuous>  metoprolol tartrate 25 milliGRAM(s) Oral two times a day  pantoprazole   Suspension 40 milliGRAM(s) Oral daily  valproate sodium  IVPB 500 milliGRAM(s) IV Intermittent every 8 hours    MEDICATIONS  (PRN):  acetaminophen   Oral Liquid .. 650 milliGRAM(s) Enteral Tube every 6 hours PRN Temp greater or equal to 38C (100.4F)          LABS:                        8.7    20.94 )-----------( 354      ( 22 Aug 2023 10:14 )             27.9     08-22    143  |  116<H>  |  46<H>  ----------------------------<  106<H>  4.0   |  18<L>  |  1.86<H>    Ca    7.4<L>      22 Aug 2023 10:14  Phos  4.9     08-22  Mg     2.3     08-22    TPro  5.3<L>  /  Alb  1.4<L>  /  TBili  0.3  /  DBili  x   /  AST  30  /  ALT  19  /  AlkPhos  133<H>  08-22      Urinalysis Basic - ( 22 Aug 2023 10:14 )    Color: x / Appearance: x / SG: x / pH: x  Gluc: 106 mg/dL / Ketone: x  / Bili: x / Urobili: x   Blood: x / Protein: x / Nitrite: x   Leuk Esterase: x / RBC: x / WBC x   Sq Epi: x / Non Sq Epi: x / Bacteria: x      Magnesium: 2.3 mg/dL (08-22 @ 10:14)  Phosphorus: 4.9 mg/dL (08-22 @ 10:14)    Urine studies    PTH and Vit D:         NEPHROLOGY MEDICAL CARE, [facility] - Dr. Ajay Green/ Dr. eMrt Madison/ Dr. Chris Calderon/ Dr. Carson Cook    Date of Service: 08-22-23 @ 14:04    Patient was seen and examined at bedside.    CC: patient is okay and NAD  tube outside of the stomach    Vital Signs Last 24 Hrs  T(C): 37.1 (22 Aug 2023 05:03), Max: 37.1 (22 Aug 2023 05:03)  T(F): 98.7 (22 Aug 2023 05:03), Max: 98.7 (22 Aug 2023 05:03)  HR: 100 (22 Aug 2023 05:03) (90 - 100)  BP: 144/83 (22 Aug 2023 05:03) (132/65 - 144/83)  BP(mean): --  RR: 18 (22 Aug 2023 05:03) (18 - 18)  SpO2: 100% (22 Aug 2023 05:03) (98% - 100%)    Parameters below as of 22 Aug 2023 05:03  Patient On (Oxygen Delivery Method): room air        08-21 @ 07:01  -  08-22 @ 07:00  --------------------------------------------------------  IN: 0 mL / OUT: 650 mL / NET: -650 mL        PHYSICAL EXAM:  General: No acute respiratory distress.  Eyes: conjunctiva and sclera clear  ENMT: Atraumatic, Normocephalic; Moist mucous membranes  Respiratory: Bilateral poor air entry; No rales, rhonchi, wheezing  Cardiovascular: S1S2+; no m/r/g  Gastrointestinal: Soft, Non-tender, Nondistended; Bowel sounds present; PEG  Neuro: eyes are open; hemiparesis; non-verbal  Ext:  1+pedal edema, No Cyanosis  Skin: No visible rashes      MEDICATIONS:  MEDICATIONS  (STANDING):  aspirin  chewable 324 milliGRAM(s) Oral daily  atorvastatin 20 milliGRAM(s) Oral at bedtime  carbidopa/levodopa  25/100 1 Tablet(s) Oral <User Schedule>  cefepime   IVPB 1000 milliGRAM(s) IV Intermittent every 24 hours  cefepime   IVPB      chlorhexidine 2% Cloths 1 Application(s) Topical daily  cloNIDine Patch 0.1 mG/24Hr(s) 1 patch Transdermal every 7 days  dextrose 5% + sodium chloride 0.9%. 1000 milliLiter(s) (50 mL/Hr) IV Continuous <Continuous>  metoprolol tartrate 25 milliGRAM(s) Oral two times a day  pantoprazole   Suspension 40 milliGRAM(s) Oral daily  valproate sodium  IVPB 500 milliGRAM(s) IV Intermittent every 8 hours    MEDICATIONS  (PRN):  acetaminophen   Oral Liquid .. 650 milliGRAM(s) Enteral Tube every 6 hours PRN Temp greater or equal to 38C (100.4F)          LABS:                        8.7    20.94 )-----------( 354      ( 22 Aug 2023 10:14 )             27.9     08-22    143  |  116<H>  |  46<H>  ----------------------------<  106<H>  4.0   |  18<L>  |  1.86<H>    Ca    7.4<L>      22 Aug 2023 10:14  Phos  4.9     08-22  Mg     2.3     08-22    TPro  5.3<L>  /  Alb  1.4<L>  /  TBili  0.3  /  DBili  x   /  AST  30  /  ALT  19  /  AlkPhos  133<H>  08-22      Urinalysis Basic - ( 22 Aug 2023 10:14 )    Color: x / Appearance: x / SG: x / pH: x  Gluc: 106 mg/dL / Ketone: x  / Bili: x / Urobili: x   Blood: x / Protein: x / Nitrite: x   Leuk Esterase: x / RBC: x / WBC x   Sq Epi: x / Non Sq Epi: x / Bacteria: x      Magnesium: 2.3 mg/dL (08-22 @ 10:14)  Phosphorus: 4.9 mg/dL (08-22 @ 10:14)    Urine studies    PTH and Vit D:

## 2023-08-22 NOTE — PROGRESS NOTE ADULT - ASSESSMENT
Patient is a 77 female from [facility] PMHx HTN, HLD, CVA (w/ residual aphasia and R sided hemiparesis/plegia) who was sent to the hospital due to altered mental status. Patient is being admitted to medicine for further work up and rule out stroke vs encephalopathy (metabolic vs infectious).  Patient is a 77 female from [facility] PMHx HTN, HLD, CVA (w/ residual aphasia and R sided hemiparesis/plegia) who was sent to the hospital due to altered mental status. Patient is being admitted to medicine for further work up and rule out stroke vs encephalopathy (metabolic vs infectious).  Patient is a 77 female from [facility] PMHx HTN, HLD, CVA (w/ residual aphasia and R sided hemiparesis/plegia) who was sent to the hospital due to altered mental status. Patient is being admitted to medicine for further work up and rule out stroke vs encephalopathy (metabolic vs infectious).

## 2023-08-22 NOTE — CHART NOTE - NSCHARTNOTEFT_GEN_A_CORE
77 female from [facility] PMHx HTN, HLD, CVA (w/ residual aphasia and R sided hemiparesis/plegia) who was sent to the hospital due to altered mental status. Patient is being admitted to medicine for further work up and rule out stroke vs encephalopathy (metabolic vs infectious).     Called from radiology and resident that G-tube again is dislodged per CT Abd and outside the gastric lumen . Pt seen and G-tube observed leaking .    NPO reinforced with IVF   Dressing reinforced   Plan for intervention this AM   Dr Moreno will update this AM 77 female from [facility] PMHx HTN, HLD, CVA (w/ residual aphasia and R sided hemiparesis/plegia) who was sent to the hospital due to altered mental status. Patient is being admitted to medicine for further work up and rule out stroke vs encephalopathy (metabolic vs infectious).     Called from radiology and resident that G-tube again is dislodged per CT Abd and outside the gastric lumen . Pt seen and G-tube observed leaking .    NPO reinforced with IVF   Dressing reinforced   Plan for intervention this AM   Dr Moreno will update this AM 77 female from [facility] PMHx HTN, HLD, CVA (w/ residual aphasia and R sided hemiparesis/plegia) who was sent to the hospital due to altered mental status. Patient is being admitted to medicine for further work up and rule out stroke vs encephalopathy (metabolic vs infectious).     Called from radiology and resident that G-tube again is dislodged per CT Abd and outside the gastric lumen . Pt seen and G-tube observed leaking .    NPO reinforced with IVF   Dressing reinforced   Plan for intervention this AM   Dr Moreno will update this AM

## 2023-08-22 NOTE — PROGRESS NOTE ADULT - ASSESSMENT
77 female from [facility] PMHx HTN, HLD, CVA (w/ residual aphasia and R sided hemiparesis/plegia) who was sent to the hospital due to altered mental status,UTI.  1.UTI-s/p ABX.  2.HTN-clonidine patch .1mg q wk.  3.Lipid d/o- statin.  4.Surgical f/u-s/p G tube now dislodged, ABX,  5.ABX on hold.  6.Anemia-  iron.  7.GI and DVT prophylaxis.     77 female from [facility] PMHx HTN, HLD, CVA (w/ residual aphasia and R sided hemiparesis/plegia) who was sent to the hospital due to altered mental status,UTI.  1.UTI-s/p ABX.  2.HTN-clonidine patch .1mg q wk.  3.Lipid d/o- statin.  4.Surgical f/u-s/p G tube now dislodged, ABX,  5.ABX on hold.  6.Anemia-  iron.  7.GI and DVT prophylaxis.     77 female from [facility] PMHx HTN, HLD, CVA (w/ residual aphasia and R sided hemiparesis/plegia) who was sent to the hospital due to altered mental status,UTI.  1.UTI-s/p ABX.  2.HTN-clonidine patch .1mg q wk.  3.Lipid d/o- statin.  4.Surgical f/u-s/p G tube now dislodged, ABX,  5.ABX on hold.  6.Anemia-  iron.  7.GI and DVT prophylaxis.

## 2023-08-22 NOTE — PROGRESS NOTE ADULT - ASSESSMENT
Patient is a 77y old  Female who is from [facility] and with PMHx of HTN, HLD, CVA (w/ residual aphasia and R sided hemiparesis/plegia) who was sent to the hospital on 7/27/23, due to altered mental status. During the hospital course she has Gastrostomy tube placement by Surgery for poor oral intake and Dysphagia. On 8/17/23 she became septic and during sepsis work up found to have positive Urine analysis and started on ceftriaxone. Hence, the ID consult requested to assist with further evaluation and antibiotic management.    # Sepsis  as of 8/17/23 ( Fever + tachycardia + leukocytosis)  # Complicated UTI- s/p exchange Mcclellan catheter on 8/17/23 - s/p removal of mcclellan catheter and placed a primafit on 8/19/23  # s/p CVA with aphasia and dysphagia- s/p  Gastrostomy tube    would recommend:    1. Monitor WBC count, is worsening   2. Continue Cefepime until 8/24/23  3. Management of PEG as per Primary team  4. Aspiration precaution  5. Monitor Temp and c/w supportive care    d/w House staff, DR. Oconnell     Attending Attestation:    Spent more than 35 minutes on total encounter, more than 50 % of the visit was spent counseling and/or coordinating care by the Attending physician.        Patient is a 77y old  Female who is from [facility] and with PMHx of HTN, HLD, CVA (w/ residual aphasia and R sided hemiparesis/plegia) who was sent to the hospital on 7/27/23, due to altered mental status. During the hospital course she has Gastrostomy tube placement by Surgery for poor oral intake and Dysphagia. On 8/17/23 she became septic and during sepsis work up found to have positive Urine analysis and started on ceftriaxone. Hence, the ID consult requested to assist with further evaluation and antibiotic management.    # Sepsis  as of 8/17/23 ( Fever + tachycardia + leukocytosis)  # Complicated UTI- s/p exchange Mcclellan catheter on 8/17/23 - s/p removal of mcclellan catheter and placed a primafit on 8/19/23  # s/p CVA with aphasia and dysphagia- s/p  Gastrostomy tube    would recommend:    1. Monitor WBC count, is worsening   2. Continue Cefepime until 8/24/23  3. Management of PEG as per Primary team  4. Aspiration precaution  5. Monitor Temp and c/w supportive care    d/w House staff, DR. Oconnell     Attending Attestation:    Spent more than 35 minutes on total encounter, more than 50 % of the visit was spent counseling and/or coordinating care by the Attending physician.        Patient is a 77y old  Female who is from [facility] and with PMHx of HTN, HLD, CVA (w/ residual aphasia and R sided hemiparesis/plegia) who was sent to the hospital on 7/27/23, due to altered mental status. During the hospital course she has Gastrostomy tube placement by Surgery for poor oral intake and Dysphagia. On 8/17/23 she became septic and during sepsis work up found to have positive Urine analysis and started on ceftriaxone. Hence, the ID consult requested to assist with further evaluation and antibiotic management.    # Sepsis  as of 8/17/23 ( Fever + tachycardia + leukocytosis)  # Complicated UTI- s/p exchange Mcclellan catheter on 8/17/23 - s/p removal of mcclellan catheter and placed a primafit on 8/19/23  # s/p CVA with aphasia and dysphagia- s/p  Gastrostomy tube    would recommend:    1. Monitor WBC count, is worsening   2. Continue Cefepime until 8/24/23  3. Management of PEG as per Primary team  4. Aspiration precaution  5. Monitor Temp and c/w supportive care    d/w House staff, DR. Oconnell     Attending Attestation:    Spent more than 35 minutes on total encounter, more than 50 % of the visit was spent counseling and/or coordinating care by the Attending physician.        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 During the hospital course she has Gastrostomy tube placement by Surgery for poor oral intake and Dysphagia. On 8/17/23 she became septic and during sepsis work up found to have positive Urine analysis and started on ceftriaxone. Hence, the ID consult requested to assist with further evaluation and antibiotic management.    # Sepsis  as of 8/17/23 ( Fever + tachycardia + leukocytosis)  # Complicated UTI- s/p exchange Mcclellan catheter on 8/17/23 - s/p removal of mcclellan catheter and placed a primafit on 8/19/23  # s/p CVA with aphasia and dysphagia- s/p  Gastrostomy tube    would recommend:    1. Monitor WBC count, is worsening   2. Continue Cefepime until 8/24/23  3. Management of PEG as per Surgery  4. Aspiration precaution  5. Monitor Temp and c/w supportive care    d/w House staff, DR. Oconnell     Attending Attestation:    Spent more than 35 minutes on total encounter, more than 50 % of the visit was spent counseling and/or coordinating care by the Attending physician.        Patient is a 77y old  Female who is from [facility] and with PMHx of HTN, HLD, CVA (w/ residual aphasia and R sided hemiparesis/plegia) who was sent to the hospital on 7/27/23, due to altered mental status. During the hospital course she has Gastrostomy tube placement by Surgery for poor oral intake and Dysphagia. On 8/17/23 she became septic and during sepsis work up found to have positive Urine analysis and started on ceftriaxone. Hence, the ID consult requested to assist with further evaluation and antibiotic management.    # Sepsis  as of 8/17/23 ( Fever + tachycardia + leukocytosis)  # Complicated UTI- s/p exchange Mcclellan catheter on 8/17/23 - s/p removal of mcclellan catheter and placed a primafit on 8/19/23  # s/p CVA with aphasia and dysphagia- s/p  Gastrostomy tube    would recommend:    1. Monitor WBC count, is worsening   2. Continue Cefepime until 8/24/23  3. Management of PEG as per Surgery  4. Aspiration precaution  5. Monitor Temp and c/w supportive care    d/w House staff, DR. Oconnell     Attending Attestation:    Spent more than 35 minutes on total encounter, more than 50 % of the visit was spent counseling and/or coordinating care by the Attending physician.        Patient is a 77y old  Female who is from [facility] and with PMHx of HTN, HLD, CVA (w/ residual aphasia and R sided hemiparesis/plegia) who was sent to the hospital on 7/27/23, due to altered mental status. During the hospital course she has Gastrostomy tube placement by Surgery for poor oral intake and Dysphagia. On 8/17/23 she became septic and during sepsis work up found to have positive Urine analysis and started on ceftriaxone. Hence, the ID consult requested to assist with further evaluation and antibiotic management.    # Sepsis  as of 8/17/23 ( Fever + tachycardia + leukocytosis)  # Complicated UTI- s/p exchange Mcclellan catheter on 8/17/23 - s/p removal of mcclellan catheter and placed a primafit on 8/19/23  # s/p CVA with aphasia and dysphagia- s/p  Gastrostomy tube    would recommend:    1. Monitor WBC count, is worsening   2. Continue Cefepime until 8/24/23  3. Management of PEG as per Surgery  4. Aspiration precaution  5. Monitor Temp and c/w supportive care    d/w House staff, DR. Oconnell     Attending Attestation:    Spent more than 35 minutes on total encounter, more than 50 % of the visit was spent counseling and/or coordinating care by the Attending physician.        Patient is a 77y old  Female who is from [facility] and with PMHx of HTN, HLD, CVA (w/ residual aphasia and R sided hemiparesis/plegia) who was sent to the hospital on 7/27/23, due to altered mental status. During the hospital course she has Gastrostomy tube placement by Surgery for poor oral intake and Dysphagia. On 8/17/23 she became septic and during sepsis work up found to have positive Urine analysis and started on ceftriaxone. Hence, the ID consult requested to assist with further evaluation and antibiotic management.    # Sepsis  as of 8/17/23 ( Fever + tachycardia + leukocytosis)  # Complicated UTI- s/p exchange Mcclellan catheter on 8/17/23 - s/p removal of mcclellan catheter and placed a primafit on 8/19/23  # s/p CVA with aphasia and dysphagia- s/p  Gastrostomy tube    would recommend:    1. Monitor WBC count, is worsening   2. Continue Cefepime until 8/24/23  3. Management of PEG as per Surgery  4. Aspiration precaution  5. Monitor Temp and c/w supportive care    d/w House staff, DR. Oconnell and senior resident     Attending Attestation:    Spent more than 35 minutes on total encounter, more than 50 % of the visit was spent counseling and/or coordinating care by the Attending physician.        Patient is a 77y old  Female who is from [facility] and with PMHx of HTN, HLD, CVA (w/ residual aphasia and R sided hemiparesis/plegia) who was sent to the hospital on 7/27/23, due to altered mental status. During the hospital course she has Gastrostomy tube placement by Surgery for poor oral intake and Dysphagia. On 8/17/23 she became septic and during sepsis work up found to have positive Urine analysis and started on ceftriaxone. Hence, the ID consult requested to assist with further evaluation and antibiotic management.    # Sepsis  as of 8/17/23 ( Fever + tachycardia + leukocytosis)  # Complicated UTI- s/p exchange Mcclellan catheter on 8/17/23 - s/p removal of mcclellan catheter and placed a primafit on 8/19/23  # s/p CVA with aphasia and dysphagia- s/p  Gastrostomy tube    would recommend:    1. Monitor WBC count, is worsening   2. Continue Cefepime until 8/24/23  3. Management of PEG as per Surgery  4. Aspiration precaution  5. Monitor Temp and c/w supportive care    d/w House staff, DR. Oconnell and senior resident     Attending Attestation:    Spent more than 35 minutes on total encounter, more than 50 % of the visit was spent counseling and/or coordinating care by the Attending physician.        Patient is a 77y old  Female who is from [facility] and with PMHx of HTN, HLD, CVA (w/ residual aphasia and R sided hemiparesis/plegia) who was sent to the hospital on 7/27/23, due to altered mental status. During the hospital course she has Gastrostomy tube placement by Surgery for poor oral intake and Dysphagia. On 8/17/23 she became septic and during sepsis work up found to have positive Urine analysis and started on ceftriaxone. Hence, the ID consult requested to assist with further evaluation and antibiotic management.    # Sepsis  as of 8/17/23 ( Fever + tachycardia + leukocytosis)  # Complicated UTI- s/p exchange Mcclellan catheter on 8/17/23 - s/p removal of mcclellan catheter and placed a primafit on 8/19/23  # s/p CVA with aphasia and dysphagia- s/p  Gastrostomy tube    would recommend:    1. Monitor WBC count, is worsening   2. Continue Cefepime until 8/24/23  3. Management of PEG as per Surgery  4. Aspiration precaution  5. Monitor Temp and c/w supportive care    d/w House staff, DR. Oconnell and senior resident     Attending Attestation:    Spent more than 35 minutes on total encounter, more than 50 % of the visit was spent counseling and/or coordinating care by the Attending physician.

## 2023-08-22 NOTE — PROGRESS NOTE ADULT - SUBJECTIVE AND OBJECTIVE BOX
[   ] ICU                                          [   ] CCU                                      [ X  ] Medical Floor      Patient is a 77 year old female with dysphagia. GI consulted for Peg tube placement.     Patient is a 77 female from [facility] with past medical history significant for HTN, HLD, CVA (w/ residual aphasia and R sided hemiparesis/plegia) who was sent to the emergency room with for altered mental status. Patient is not able to provide any history. Patient is lying down in bed, awake and alert. However, patient does not speak, is not able to follow commands and does not turn face or move eyes when her name is called. Patient is admitted with CVA. Now patient with dysphagia, poor po intake, failure to thrive and weight loss. No abdominal pain, nausea, vomiting, hematemesis, hematochezia, melena, fever, chills, chest pain, SOB, cough, hematuria, dysuria  or diarrhea reported.      Patient is comfortable. No new complaints reported except Peg tube malfunction (leaking the feeding from it's side), No abdominal pain, N/V, hematemesis, hematochezia, melena, fever, chills, chest pain, SOB, cough or diarrhea reported.      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 1000 milliLiter(s) (70 mL/Hr) IV Continuous <Continuous>  enoxaparin Injectable 30 milliGRAM(s) SubCutaneous every 24 hours  hydrALAZINE Injectable 10 milliGRAM(s) IV Push every 6 hours  potassium chloride  10 mEq/100 mL IVPB 10 milliEquivalent(s) IV Intermittent every 1 hour  valproate sodium  IVPB 500 milliGRAM(s) IV Intermittent every 8 hours         SOCIAL HISTORY  Advanced Directives:       [ X ] Full Code       [  ] DNR  Marital Status:         [  ] M      [ X ] S      [  ] D       [  ] W  Children:       [ X ] Yes      [  ] No  Occupation:        [  ] Employed       [ X ] Unemployed       [  ] Retired  Diet:       [ X ] Regular       [  ] PEG feeding          [  ] NG tube feeding  Drug Use:           [ X ] Patient denied          [  ] Yes  Alcohol:           [ X ] No             [  ] Yes (socially)         [  ] Yes (chronic)  Tobacco:           [  ] Yes           [X  ] No      FAMILY HISTORY  [ X ] Heart Disease            [ X ] Diabetes             [ X ] HTN             [  ] Colon Cancer             [  ] Stomach Cancer              [  ] Pancreatic Cancer        VITALS  Vital Signs Last 24 Hrs  T(C): 37.1 (22 Aug 2023 14:10), Max: 37.1 (22 Aug 2023 05:03)  T(F): 98.7 (22 Aug 2023 14:10), Max: 98.7 (22 Aug 2023 05:03)  HR: 101 (22 Aug 2023 14:10) (90 - 101)  BP: 146/82 (22 Aug 2023 14:10) (132/65 - 146/82)   RR: 18 (22 Aug 2023 14:10) (18 - 18)  SpO2: 98% (22 Aug 2023 14:10) (98% - 100%)  Parameters below as of 22 Aug 2023 14:10  Patient On (Oxygen Delivery Method): room air       MEDICATIONS  (STANDING):  aspirin  chewable 324 milliGRAM(s) Oral daily  atorvastatin 20 milliGRAM(s) Oral at bedtime  carbidopa/levodopa  25/100 1 Tablet(s) Oral <User Schedule>  cefepime   IVPB 1000 milliGRAM(s) IV Intermittent every 24 hours  cefepime   IVPB      chlorhexidine 2% Cloths 1 Application(s) Topical daily  cloNIDine Patch 0.1 mG/24Hr(s) 1 patch Transdermal every 7 days  dextrose 5% + sodium chloride 0.9%. 1000 milliLiter(s) (50 mL/Hr) IV Continuous <Continuous>  metoprolol tartrate 25 milliGRAM(s) Oral two times a day  pantoprazole   Suspension 40 milliGRAM(s) Oral daily  valproate sodium  IVPB 500 milliGRAM(s) IV Intermittent every 8 hours    MEDICATIONS  (PRN):  acetaminophen   Oral Liquid .. 650 milliGRAM(s) Enteral Tube every 6 hours PRN Temp greater or equal to 38C (100.4F)                            8.7    20.94 )-----------( 354      ( 22 Aug 2023 10:14 )             27.9       08-22    143  |  116<H>  |  46<H>  ----------------------------<  106<H>  4.0   |  18<L>  |  1.86<H>    Ca    7.4<L>      22 Aug 2023 10:14  Phos  4.9     08-22  Mg     2.3     08-22    TPro  5.3<L>  /  Alb  1.4<L>  /  TBili  0.3  /  DBili  x   /  AST  30  /  ALT  19  /  AlkPhos  133<H>  08-22        ACC: [id number] EXAM:  CT ABDOMEN AND PELVIS   ORDERED BY: JOLENE KRUSE     PROCEDURE DATE:  08/21/2023          INTERPRETATION:  CLINICAL INFORMATION: Failure to thrive, G-tube leak.    COMPARISON: CT abdomen pelvis 8/4/2023.    CONTRAST/COMPLICATIONS:  IV Contrast: NONE  Oral Contrast: NONE  Complications: None reported at time of study completion    PROCEDURE:  CT of the Abdomen and Pelvis was performed.  Sagittal and coronal reformats were performed.    FINDINGS:  Evaluation of the solid organs and vasculature is limited without   intravenous contrast.    LOWER CHEST: Trace bilateral pleural effusions.    LIVER: Within normal limits.  BILE DUCTS: Normal caliber.  GALLBLADDER: Within normal limits.  SPLEEN: Within normal limits.  PANCREAS: Within normal limits.  ADRENALS: Within normal limits.  KIDNEYS/URETER: No hydronephrosis. Small right renal cysts.    BLADDER: Tiny focus of air, which may related to recent instrumentation.  REPRODUCTIVE ORGANS: Hysterectomy.    BOWEL: No bowel obstruction. Appendix is normal. Gastrostomy tube appears   to terminate outside of the stomach lumen abutting the gastric wall. No   balloon is inflated. Wall thickening of the distal stomach.  PERITONEUM: No ascites.  VESSELS: Atherosclerotic changes.IVC filter.  RETROPERITONEUM/LYMPH NODES: No lymphadenopathy.  ABDOMINAL WALL: Postsurgical changes.  BONES: Degenerative changes.    IMPRESSION:  Gastrostomy tube appears to terminate outside of the stomach lumen. No   balloon is inflated.    Wall thickening of the distal stomach.    Findings were discussed with Dr. Blanton on 8/21/2023 8:00 PM by Dr. Simpson with read back confirmation.   [   ] ICU                                          [   ] CCU                                      [ X  ] Medical Floor      Patient is a 77 year old female with dysphagia. GI consulted for Peg tube placement.     Patient is a 77 female from [facility] with past medical history significant for HTN, HLD, CVA (w/ residual aphasia and R sided hemiparesis/plegia) who was sent to the emergency room with for altered mental status. Patient is not able to provide any history. Patient is lying down in bed, awake and alert. However, patient does not speak, is not able to follow commands and does not turn face or move eyes when her name is called. Patient is admitted with CVA. Now patient with dysphagia, poor po intake, failure to thrive and weight loss. No abdominal pain, nausea, vomiting, hematemesis, hematochezia, melena, fever, chills, chest pain, SOB, cough, hematuria, dysuria  or diarrhea reported.      Patient is comfortable. No new complaints reported except Peg tube malfunction (leaking the feeding from it's side), No abdominal pain, N/V, hematemesis, hematochezia, melena, fever, chills, chest pain, SOB, cough or diarrhea reported.      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 1000 milliLiter(s) (70 mL/Hr) IV Continuous <Continuous>  enoxaparin Injectable 30 milliGRAM(s) SubCutaneous every 24 hours  hydrALAZINE Injectable 10 milliGRAM(s) IV Push every 6 hours  potassium chloride  10 mEq/100 mL IVPB 10 milliEquivalent(s) IV Intermittent every 1 hour  valproate sodium  IVPB 500 milliGRAM(s) IV Intermittent every 8 hours         SOCIAL HISTORY  Advanced Directives:       [ X ] Full Code       [  ] DNR  Marital Status:         [  ] M      [ X ] S      [  ] D       [  ] W  Children:       [ X ] Yes      [  ] No  Occupation:        [  ] Employed       [ X ] Unemployed       [  ] Retired  Diet:       [ X ] Regular       [  ] PEG feeding          [  ] NG tube feeding  Drug Use:           [ X ] Patient denied          [  ] Yes  Alcohol:           [ X ] No             [  ] Yes (socially)         [  ] Yes (chronic)  Tobacco:           [  ] Yes           [X  ] No      FAMILY HISTORY  [ X ] Heart Disease            [ X ] Diabetes             [ X ] HTN             [  ] Colon Cancer             [  ] Stomach Cancer              [  ] Pancreatic Cancer        VITALS  Vital Signs Last 24 Hrs  T(C): 37.1 (22 Aug 2023 14:10), Max: 37.1 (22 Aug 2023 05:03)  T(F): 98.7 (22 Aug 2023 14:10), Max: 98.7 (22 Aug 2023 05:03)  HR: 101 (22 Aug 2023 14:10) (90 - 101)  BP: 146/82 (22 Aug 2023 14:10) (132/65 - 146/82)   RR: 18 (22 Aug 2023 14:10) (18 - 18)  SpO2: 98% (22 Aug 2023 14:10) (98% - 100%)  Parameters below as of 22 Aug 2023 14:10  Patient On (Oxygen Delivery Method): room air       MEDICATIONS  (STANDING):  aspirin  chewable 324 milliGRAM(s) Oral daily  atorvastatin 20 milliGRAM(s) Oral at bedtime  carbidopa/levodopa  25/100 1 Tablet(s) Oral <User Schedule>  cefepime   IVPB 1000 milliGRAM(s) IV Intermittent every 24 hours  cefepime   IVPB      chlorhexidine 2% Cloths 1 Application(s) Topical daily  cloNIDine Patch 0.1 mG/24Hr(s) 1 patch Transdermal every 7 days  dextrose 5% + sodium chloride 0.9%. 1000 milliLiter(s) (50 mL/Hr) IV Continuous <Continuous>  metoprolol tartrate 25 milliGRAM(s) Oral two times a day  pantoprazole   Suspension 40 milliGRAM(s) Oral daily  valproate sodium  IVPB 500 milliGRAM(s) IV Intermittent every 8 hours    MEDICATIONS  (PRN):  acetaminophen   Oral Liquid .. 650 milliGRAM(s) Enteral Tube every 6 hours PRN Temp greater or equal to 38C (100.4F)                            8.7    20.94 )-----------( 354      ( 22 Aug 2023 10:14 )             27.9       08-22    143  |  116<H>  |  46<H>  ----------------------------<  106<H>  4.0   |  18<L>  |  1.86<H>    Ca    7.4<L>      22 Aug 2023 10:14  Phos  4.9     08-22  Mg     2.3     08-22    TPro  5.3<L>  /  Alb  1.4<L>  /  TBili  0.3  /  DBili  x   /  AST  30  /  ALT  19  /  AlkPhos  133<H>  08-22        ACC: [id number] EXAM:  CT ABDOMEN AND PELVIS   ORDERED BY: JOLENE KRUSE     PROCEDURE DATE:  08/21/2023          INTERPRETATION:  CLINICAL INFORMATION: Failure to thrive, G-tube leak.    COMPARISON: CT abdomen pelvis 8/4/2023.    CONTRAST/COMPLICATIONS:  IV Contrast: NONE  Oral Contrast: NONE  Complications: None reported at time of study completion    PROCEDURE:  CT of the Abdomen and Pelvis was performed.  Sagittal and coronal reformats were performed.    FINDINGS:  Evaluation of the solid organs and vasculature is limited without   intravenous contrast.    LOWER CHEST: Trace bilateral pleural effusions.    LIVER: Within normal limits.  BILE DUCTS: Normal caliber.  GALLBLADDER: Within normal limits.  SPLEEN: Within normal limits.  PANCREAS: Within normal limits.  ADRENALS: Within normal limits.  KIDNEYS/URETER: No hydronephrosis. Small right renal cysts.    BLADDER: Tiny focus of air, which may related to recent instrumentation.  REPRODUCTIVE ORGANS: Hysterectomy.    BOWEL: No bowel obstruction. Appendix is normal. Gastrostomy tube appears   to terminate outside of the stomach lumen abutting the gastric wall. No   balloon is inflated. Wall thickening of the distal stomach.  PERITONEUM: No ascites.  VESSELS: Atherosclerotic changes.IVC filter.  RETROPERITONEUM/LYMPH NODES: No lymphadenopathy.  ABDOMINAL WALL: Postsurgical changes.  BONES: Degenerative changes.    IMPRESSION:  Gastrostomy tube appears to terminate outside of the stomach lumen. No   balloon is inflated.    Wall thickening of the distal stomach.    Findings were discussed with Dr. Blanton on 8/21/2023 8:00 PM by Dr. Simpson with read back confirmation.   [   ] ICU                                          [   ] CCU                                      [ X  ] Medical Floor      Patient is a 77 year old female with dysphagia. GI consulted for Peg tube placement.     Patient is a 77 female from [facility] with past medical history significant for HTN, HLD, CVA (w/ residual aphasia and R sided hemiparesis/plegia) who was sent to the emergency room with for altered mental status. Patient is not able to provide any history. Patient is lying down in bed, awake and alert. However, patient does not speak, is not able to follow commands and does not turn face or move eyes when her name is called. Patient is admitted with CVA. Now patient with dysphagia, poor po intake, failure to thrive and weight loss. No abdominal pain, nausea, vomiting, hematemesis, hematochezia, melena, fever, chills, chest pain, SOB, cough, hematuria, dysuria  or diarrhea reported.      Patient is comfortable. No new complaints reported except Peg tube malfunction (leaking the feeding from it's side), No abdominal pain, N/V, hematemesis, hematochezia, melena, fever, chills, chest pain, SOB, cough or diarrhea reported.      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 1000 milliLiter(s) (70 mL/Hr) IV Continuous <Continuous>  enoxaparin Injectable 30 milliGRAM(s) SubCutaneous every 24 hours  hydrALAZINE Injectable 10 milliGRAM(s) IV Push every 6 hours  potassium chloride  10 mEq/100 mL IVPB 10 milliEquivalent(s) IV Intermittent every 1 hour  valproate sodium  IVPB 500 milliGRAM(s) IV Intermittent every 8 hours         SOCIAL HISTORY  Advanced Directives:       [ X ] Full Code       [  ] DNR  Marital Status:         [  ] M      [ X ] S      [  ] D       [  ] W  Children:       [ X ] Yes      [  ] No  Occupation:        [  ] Employed       [ X ] Unemployed       [  ] Retired  Diet:       [ X ] Regular       [  ] PEG feeding          [  ] NG tube feeding  Drug Use:           [ X ] Patient denied          [  ] Yes  Alcohol:           [ X ] No             [  ] Yes (socially)         [  ] Yes (chronic)  Tobacco:           [  ] Yes           [X  ] No      FAMILY HISTORY  [ X ] Heart Disease            [ X ] Diabetes             [ X ] HTN             [  ] Colon Cancer             [  ] Stomach Cancer              [  ] Pancreatic Cancer        VITALS  Vital Signs Last 24 Hrs  T(C): 37.1 (22 Aug 2023 14:10), Max: 37.1 (22 Aug 2023 05:03)  T(F): 98.7 (22 Aug 2023 14:10), Max: 98.7 (22 Aug 2023 05:03)  HR: 101 (22 Aug 2023 14:10) (90 - 101)  BP: 146/82 (22 Aug 2023 14:10) (132/65 - 146/82)   RR: 18 (22 Aug 2023 14:10) (18 - 18)  SpO2: 98% (22 Aug 2023 14:10) (98% - 100%)  Parameters below as of 22 Aug 2023 14:10  Patient On (Oxygen Delivery Method): room air       MEDICATIONS  (STANDING):  aspirin  chewable 324 milliGRAM(s) Oral daily  atorvastatin 20 milliGRAM(s) Oral at bedtime  carbidopa/levodopa  25/100 1 Tablet(s) Oral <User Schedule>  cefepime   IVPB 1000 milliGRAM(s) IV Intermittent every 24 hours  cefepime   IVPB      chlorhexidine 2% Cloths 1 Application(s) Topical daily  cloNIDine Patch 0.1 mG/24Hr(s) 1 patch Transdermal every 7 days  dextrose 5% + sodium chloride 0.9%. 1000 milliLiter(s) (50 mL/Hr) IV Continuous <Continuous>  metoprolol tartrate 25 milliGRAM(s) Oral two times a day  pantoprazole   Suspension 40 milliGRAM(s) Oral daily  valproate sodium  IVPB 500 milliGRAM(s) IV Intermittent every 8 hours    MEDICATIONS  (PRN):  acetaminophen   Oral Liquid .. 650 milliGRAM(s) Enteral Tube every 6 hours PRN Temp greater or equal to 38C (100.4F)                            8.7    20.94 )-----------( 354      ( 22 Aug 2023 10:14 )             27.9       08-22    143  |  116<H>  |  46<H>  ----------------------------<  106<H>  4.0   |  18<L>  |  1.86<H>    Ca    7.4<L>      22 Aug 2023 10:14  Phos  4.9     08-22  Mg     2.3     08-22    TPro  5.3<L>  /  Alb  1.4<L>  /  TBili  0.3  /  DBili  x   /  AST  30  /  ALT  19  /  AlkPhos  133<H>  08-22        ACC: [id number] EXAM:  CT ABDOMEN AND PELVIS   ORDERED BY: JOLENE KRUSE     PROCEDURE DATE:  08/21/2023          INTERPRETATION:  CLINICAL INFORMATION: Failure to thrive, G-tube leak.    COMPARISON: CT abdomen pelvis 8/4/2023.    CONTRAST/COMPLICATIONS:  IV Contrast: NONE  Oral Contrast: NONE  Complications: None reported at time of study completion    PROCEDURE:  CT of the Abdomen and Pelvis was performed.  Sagittal and coronal reformats were performed.    FINDINGS:  Evaluation of the solid organs and vasculature is limited without   intravenous contrast.    LOWER CHEST: Trace bilateral pleural effusions.    LIVER: Within normal limits.  BILE DUCTS: Normal caliber.  GALLBLADDER: Within normal limits.  SPLEEN: Within normal limits.  PANCREAS: Within normal limits.  ADRENALS: Within normal limits.  KIDNEYS/URETER: No hydronephrosis. Small right renal cysts.    BLADDER: Tiny focus of air, which may related to recent instrumentation.  REPRODUCTIVE ORGANS: Hysterectomy.    BOWEL: No bowel obstruction. Appendix is normal. Gastrostomy tube appears   to terminate outside of the stomach lumen abutting the gastric wall. No   balloon is inflated. Wall thickening of the distal stomach.  PERITONEUM: No ascites.  VESSELS: Atherosclerotic changes.IVC filter.  RETROPERITONEUM/LYMPH NODES: No lymphadenopathy.  ABDOMINAL WALL: Postsurgical changes.  BONES: Degenerative changes.    IMPRESSION:  Gastrostomy tube appears to terminate outside of the stomach lumen. No   balloon is inflated.    Wall thickening of the distal stomach.    Findings were discussed with Dr. Blanton on 8/21/2023 8:00 PM by Dr. Simpson with read back confirmation.

## 2023-08-22 NOTE — PROGRESS NOTE ADULT - SUBJECTIVE AND OBJECTIVE BOX
Patient is seen and examined at the bed side, is afebrile for last 24 hours. The WBC count is worsening.       REVIEW OF SYSTEMS: Unable to obtain due  to mental status       ALLERGIES: &quot; NATURAL RUBBER&quot; (Other)  latex (Other)  penicillins (Unknown)      Vital Signs Last 24 Hrs  T(C): 37.1 (22 Aug 2023 05:03), Max: 37.1 (22 Aug 2023 05:03)  T(F): 98.7 (22 Aug 2023 05:03), Max: 98.7 (22 Aug 2023 05:03)  HR: 100 (22 Aug 2023 05:03) (90 - 100)  BP: 144/83 (22 Aug 2023 05:03) (132/65 - 144/83)  BP(mean): --  RR: 18 (22 Aug 2023 05:03) (18 - 18)  SpO2: 100% (22 Aug 2023 05:03) (98% - 100%)    Parameters below as of 22 Aug 2023 05:03  Patient On (Oxygen Delivery Method): room air        PHYSICAL EXAM:  GENERAL: Not in acute distress   CHEST/LUNG:  Not using accessory muscles   HEART: s1 and s2 present  ABDOMEN:  grossly obese  EXTREMITIES: edematous  CNS: Awake, somewhat alert but Non verbal       LABS:                                   8.7    20.94 )-----------( 354      ( 22 Aug 2023 10:14 )             27.9                7.4    16.27 )-----------( 306      ( 21 Aug 2023 06:00 )             23.6         08-22    143  |  116<H>  |  46<H>  ----------------------------<  106<H>  4.0   |  18<L>  |  1.86<H>    Ca    7.4<L>      22 Aug 2023 10:14  Phos  4.9     08-22  Mg     2.3     08-22    TPro  5.3<L>  /  Alb  1.4<L>  /  TBili  0.3  /  DBili  x   /  AST  30  /  ALT  19  /  AlkPhos  133<H>  08-22    08-21    138  |  110<H>  |  45<H>  ----------------------------<  95  4.6   |  18<L>  |  1.95<H>    Ca    8.0<L>      21 Aug 2023 06:00  Phos  5.3     08-21  Mg     2.5     08-21    TPro  6.3  /  Alb  1.6<L>  /  TBili  0.3  /  DBili  x   /  AST  67<H>  /  ALT  20  /  AlkPhos  198<H>  08-21        CAPILLARY BLOOD GLUCOSE  POCT Blood Glucose.: 144 mg/dL (18 Aug 2023 11:49)  POCT Blood Glucose.: 122 mg/dL (18 Aug 2023 00:03)      Urinalysis Basic - ( 18 Aug 2023 11:20 )  Color: x / Appearance: x / SG: x / pH: x  Gluc: 129 mg/dL / Ketone: x  / Bili: x / Urobili: x   Blood: x / Protein: x / Nitrite: x   Leuk Esterase: x / RBC: x / WBC x   Sq Epi: x / Non Sq Epi: x / Bacteria: x        MEDICATIONS  (STANDING):    aspirin  chewable 324 milliGRAM(s) Oral daily  atorvastatin 20 milliGRAM(s) Oral at bedtime  carbidopa/levodopa  25/100 1 Tablet(s) Oral <User Schedule>  cefepime   IVPB 1000 milliGRAM(s) IV Intermittent every 24 hours  cefepime   IVPB      chlorhexidine 2% Cloths 1 Application(s) Topical daily  cloNIDine Patch 0.1 mG/24Hr(s) 1 patch Transdermal every 7 days  dextrose 5% + sodium chloride 0.9%. 1000 milliLiter(s) (50 mL/Hr) IV Continuous <Continuous>  metoprolol tartrate 25 milliGRAM(s) Oral two times a day  pantoprazole   Suspension 40 milliGRAM(s) Oral daily  valproate sodium  IVPB 500 milliGRAM(s) IV Intermittent every 8 hours        MICROBIOLOGY DATA:    Culture - Urine (08.17.23 @ 23:15)   Specimen Source: Catheterized Catheterized  Culture Results:   <10,000 CFU/mL Normal Urogenital Anila      Culture - Blood (08.17.23 @ 14:36)   Specimen Source: .Blood Blood-Peripheral  Culture Results: No growth at 4 days    Culture - Blood (08.17.23 @ 14:35)   Specimen Source: .Blood Blood-Peripheral  Culture Results: No growth at 4 days      Urine Microscopic-Add On (NC) (08.17.23 @ 18:40)   Red Blood Cell - Urine: 150 /HPF  White Blood Cell - Urine: 30 /HPF  Bacteria: Few /HPF  Squamous Epithelial Cells: Present    Urine Microscopic-Add On (NC) (08.17.23 @ 11:35)   Squamous Epithelial Cells: None Seen  Bacteria: Negative /HPF  Comment - Urine: Moderate budding yeast and yeast-like cells  White Blood Cell - Urine: >50 /HPF  Red Blood Cell - Urine: >50 /HPF      COVID-19 PCR . (08.16.23 @ 17:40)   COVID-19 PCR: NotDetec:                       Patient is seen and examined at the bed side, is afebrile for last 24 hours. The WBC count is worsening.       REVIEW OF SYSTEMS: Unable to obtain due  to mental status       ALLERGIES: &quot; NATURAL RUBBER&quot; (Other)  latex (Other)  penicillins (Unknown)      Vital Signs Last 24 Hrs  T(C): 37.1 (22 Aug 2023 05:03), Max: 37.1 (22 Aug 2023 05:03)  T(F): 98.7 (22 Aug 2023 05:03), Max: 98.7 (22 Aug 2023 05:03)  HR: 100 (22 Aug 2023 05:03) (90 - 100)  BP: 144/83 (22 Aug 2023 05:03) (132/65 - 144/83)  BP(mean): --  RR: 18 (22 Aug 2023 05:03) (18 - 18)  SpO2: 100% (22 Aug 2023 05:03) (98% - 100%)    Parameters below as of 22 Aug 2023 05:03  Patient On (Oxygen Delivery Method): room air        PHYSICAL EXAM:  GENERAL: Not in acute distress   CHEST/LUNG:  Not using accessory muscles   HEART: s1 and s2 present  ABDOMEN:  grossly obese  EXTREMITIES: edematous  CNS: Awake, somewhat alert but Non verbal       LABS:                                   8.7    20.94 )-----------( 354      ( 22 Aug 2023 10:14 )             27.9                7.4    16.27 )-----------( 306      ( 21 Aug 2023 06:00 )             23.6         08-22    143  |  116<H>  |  46<H>  ----------------------------<  106<H>  4.0   |  18<L>  |  1.86<H>    Ca    7.4<L>      22 Aug 2023 10:14  Phos  4.9     08-22  Mg     2.3     08-22    TPro  5.3<L>  /  Alb  1.4<L>  /  TBili  0.3  /  DBili  x   /  AST  30  /  ALT  19  /  AlkPhos  133<H>  08-22    08-21    138  |  110<H>  |  45<H>  ----------------------------<  95  4.6   |  18<L>  |  1.95<H>    Ca    8.0<L>      21 Aug 2023 06:00  Phos  5.3     08-21  Mg     2.5     08-21    TPro  6.3  /  Alb  1.6<L>  /  TBili  0.3  /  DBili  x   /  AST  67<H>  /  ALT  20  /  AlkPhos  198<H>  08-21        CAPILLARY BLOOD GLUCOSE  POCT Blood Glucose.: 144 mg/dL (18 Aug 2023 11:49)  POCT Blood Glucose.: 122 mg/dL (18 Aug 2023 00:03)      Urinalysis Basic - ( 18 Aug 2023 11:20 )  Color: x / Appearance: x / SG: x / pH: x  Gluc: 129 mg/dL / Ketone: x  / Bili: x / Urobili: x   Blood: x / Protein: x / Nitrite: x   Leuk Esterase: x / RBC: x / WBC x   Sq Epi: x / Non Sq Epi: x / Bacteria: x        MEDICATIONS  (STANDING):    aspirin  chewable 324 milliGRAM(s) Oral daily  atorvastatin 20 milliGRAM(s) Oral at bedtime  carbidopa/levodopa  25/100 1 Tablet(s) Oral <User Schedule>  cefepime   IVPB 1000 milliGRAM(s) IV Intermittent every 24 hours  cefepime   IVPB      chlorhexidine 2% Cloths 1 Application(s) Topical daily  cloNIDine Patch 0.1 mG/24Hr(s) 1 patch Transdermal every 7 days  dextrose 5% + sodium chloride 0.9%. 1000 milliLiter(s) (50 mL/Hr) IV Continuous <Continuous>  metoprolol tartrate 25 milliGRAM(s) Oral two times a day  pantoprazole   Suspension 40 milliGRAM(s) Oral daily  valproate sodium  IVPB 500 milliGRAM(s) IV Intermittent every 8 hours        MICROBIOLOGY DATA:    Culture - Urine (08.17.23 @ 23:15)   Specimen Source: Catheterized Catheterized  Culture Results:   <10,000 CFU/mL Normal Urogenital Anial      Culture - Blood (08.17.23 @ 14:36)   Specimen Source: .Blood Blood-Peripheral  Culture Results: No growth at 4 days    Culture - Blood (08.17.23 @ 14:35)   Specimen Source: .Blood Blood-Peripheral  Culture Results: No growth at 4 days      Urine Microscopic-Add On (NC) (08.17.23 @ 18:40)   Red Blood Cell - Urine: 150 /HPF  White Blood Cell - Urine: 30 /HPF  Bacteria: Few /HPF  Squamous Epithelial Cells: Present    Urine Microscopic-Add On (NC) (08.17.23 @ 11:35)   Squamous Epithelial Cells: None Seen  Bacteria: Negative /HPF  Comment - Urine: Moderate budding yeast and yeast-like cells  White Blood Cell - Urine: >50 /HPF  Red Blood Cell - Urine: >50 /HPF      COVID-19 PCR . (08.16.23 @ 17:40)   COVID-19 PCR: NotDetec:                       Patient is seen and examined at the bed side, is afebrile for last 24 hours. The WBC count is worsening, most likely reactive to dislodge PEG and stomach tacked to abdominal wall.      REVIEW OF SYSTEMS: Unable to obtain due  to mental status       ALLERGIES: &quot; NATURAL RUBBER&quot; (Other)  latex (Other)  penicillins (Unknown)      Vital Signs Last 24 Hrs  T(C): 37.1 (22 Aug 2023 05:03), Max: 37.1 (22 Aug 2023 05:03)  T(F): 98.7 (22 Aug 2023 05:03), Max: 98.7 (22 Aug 2023 05:03)  HR: 100 (22 Aug 2023 05:03) (90 - 100)  BP: 144/83 (22 Aug 2023 05:03) (132/65 - 144/83)  BP(mean): --  RR: 18 (22 Aug 2023 05:03) (18 - 18)  SpO2: 100% (22 Aug 2023 05:03) (98% - 100%)    Parameters below as of 22 Aug 2023 05:03  Patient On (Oxygen Delivery Method): room air        PHYSICAL EXAM:  GENERAL: Not in acute distress   CHEST/LUNG:  Not using accessory muscles   HEART: s1 and s2 present  ABDOMEN:  grossly obese  EXTREMITIES: edematous  CNS: Awake, somewhat alert but Non verbal       LABS:                                   8.7    20.94 )-----------( 354      ( 22 Aug 2023 10:14 )             27.9                7.4    16.27 )-----------( 306      ( 21 Aug 2023 06:00 )             23.6         08-22    143  |  116<H>  |  46<H>  ----------------------------<  106<H>  4.0   |  18<L>  |  1.86<H>    Ca    7.4<L>      22 Aug 2023 10:14  Phos  4.9     08-22  Mg     2.3     08-22    TPro  5.3<L>  /  Alb  1.4<L>  /  TBili  0.3  /  DBili  x   /  AST  30  /  ALT  19  /  AlkPhos  133<H>  08-22    08-21    138  |  110<H>  |  45<H>  ----------------------------<  95  4.6   |  18<L>  |  1.95<H>    Ca    8.0<L>      21 Aug 2023 06:00  Phos  5.3     08-21  Mg     2.5     08-21    TPro  6.3  /  Alb  1.6<L>  /  TBili  0.3  /  DBili  x   /  AST  67<H>  /  ALT  20  /  AlkPhos  198<H>  08-21        CAPILLARY BLOOD GLUCOSE  POCT Blood Glucose.: 144 mg/dL (18 Aug 2023 11:49)  POCT Blood Glucose.: 122 mg/dL (18 Aug 2023 00:03)      Urinalysis Basic - ( 18 Aug 2023 11:20 )  Color: x / Appearance: x / SG: x / pH: x  Gluc: 129 mg/dL / Ketone: x  / Bili: x / Urobili: x   Blood: x / Protein: x / Nitrite: x   Leuk Esterase: x / RBC: x / WBC x   Sq Epi: x / Non Sq Epi: x / Bacteria: x        MEDICATIONS  (STANDING):    aspirin  chewable 324 milliGRAM(s) Oral daily  atorvastatin 20 milliGRAM(s) Oral at bedtime  carbidopa/levodopa  25/100 1 Tablet(s) Oral <User Schedule>  cefepime   IVPB 1000 milliGRAM(s) IV Intermittent every 24 hours  cefepime   IVPB      chlorhexidine 2% Cloths 1 Application(s) Topical daily  cloNIDine Patch 0.1 mG/24Hr(s) 1 patch Transdermal every 7 days  dextrose 5% + sodium chloride 0.9%. 1000 milliLiter(s) (50 mL/Hr) IV Continuous <Continuous>  metoprolol tartrate 25 milliGRAM(s) Oral two times a day  pantoprazole   Suspension 40 milliGRAM(s) Oral daily  valproate sodium  IVPB 500 milliGRAM(s) IV Intermittent every 8 hours        MICROBIOLOGY DATA:    Culture - Urine (08.17.23 @ 23:15)   Specimen Source: Catheterized Catheterized  Culture Results:   <10,000 CFU/mL Normal Urogenital Anila      Culture - Blood (08.17.23 @ 14:36)   Specimen Source: .Blood Blood-Peripheral  Culture Results: No growth at 4 days    Culture - Blood (08.17.23 @ 14:35)   Specimen Source: .Blood Blood-Peripheral  Culture Results: No growth at 4 days      Urine Microscopic-Add On (NC) (08.17.23 @ 18:40)   Red Blood Cell - Urine: 150 /HPF  White Blood Cell - Urine: 30 /HPF  Bacteria: Few /HPF  Squamous Epithelial Cells: Present    Urine Microscopic-Add On (NC) (08.17.23 @ 11:35)   Squamous Epithelial Cells: None Seen  Bacteria: Negative /HPF  Comment - Urine: Moderate budding yeast and yeast-like cells  White Blood Cell - Urine: >50 /HPF  Red Blood Cell - Urine: >50 /HPF      COVID-19 PCR . (08.16.23 @ 17:40)   COVID-19 PCR: NotDetec:                       Patient is seen and examined at the bed side, is afebrile for last 24 hours. The WBC count is worsening, most likely reactive to dislodge PEG and stomach tacked to abdominal wall.      REVIEW OF SYSTEMS: Unable to obtain due  to mental status       ALLERGIES: &quot; NATURAL RUBBER&quot; (Other)  latex (Other)  penicillins (Unknown)      Vital Signs Last 24 Hrs  T(C): 37.1 (22 Aug 2023 05:03), Max: 37.1 (22 Aug 2023 05:03)  T(F): 98.7 (22 Aug 2023 05:03), Max: 98.7 (22 Aug 2023 05:03)  HR: 100 (22 Aug 2023 05:03) (90 - 100)  BP: 144/83 (22 Aug 2023 05:03) (132/65 - 144/83)  BP(mean): --  RR: 18 (22 Aug 2023 05:03) (18 - 18)  SpO2: 100% (22 Aug 2023 05:03) (98% - 100%)    Parameters below as of 22 Aug 2023 05:03  Patient On (Oxygen Delivery Method): room air      PHYSICAL EXAM:  GENERAL: Not in acute distress   CHEST/LUNG:  Not using accessory muscles   HEART: s1 and s2 present  ABDOMEN:  grossly obese  EXTREMITIES: edematous  CNS: Awake, somewhat alert but Non verbal       LABS:                                   8.7    20.94 )-----------( 354      ( 22 Aug 2023 10:14 )             27.9                7.4    16.27 )-----------( 306      ( 21 Aug 2023 06:00 )             23.6         08-22    143  |  116<H>  |  46<H>  ----------------------------<  106<H>  4.0   |  18<L>  |  1.86<H>    Ca    7.4<L>      22 Aug 2023 10:14  Phos  4.9     08-22  Mg     2.3     08-22    TPro  5.3<L>  /  Alb  1.4<L>  /  TBili  0.3  /  DBili  x   /  AST  30  /  ALT  19  /  AlkPhos  133<H>  08-22    08-21    138  |  110<H>  |  45<H>  ----------------------------<  95  4.6   |  18<L>  |  1.95<H>    Ca    8.0<L>      21 Aug 2023 06:00  Phos  5.3     08-21  Mg     2.5     08-21    TPro  6.3  /  Alb  1.6<L>  /  TBili  0.3  /  DBili  x   /  AST  67<H>  /  ALT  20  /  AlkPhos  198<H>  08-21        CAPILLARY BLOOD GLUCOSE  POCT Blood Glucose.: 144 mg/dL (18 Aug 2023 11:49)  POCT Blood Glucose.: 122 mg/dL (18 Aug 2023 00:03)      Urinalysis Basic - ( 18 Aug 2023 11:20 )  Color: x / Appearance: x / SG: x / pH: x  Gluc: 129 mg/dL / Ketone: x  / Bili: x / Urobili: x   Blood: x / Protein: x / Nitrite: x   Leuk Esterase: x / RBC: x / WBC x   Sq Epi: x / Non Sq Epi: x / Bacteria: x        MEDICATIONS  (STANDING):    aspirin  chewable 324 milliGRAM(s) Oral daily  atorvastatin 20 milliGRAM(s) Oral at bedtime  carbidopa/levodopa  25/100 1 Tablet(s) Oral <User Schedule>  cefepime   IVPB 1000 milliGRAM(s) IV Intermittent every 24 hours  cefepime   IVPB      chlorhexidine 2% Cloths 1 Application(s) Topical daily  cloNIDine Patch 0.1 mG/24Hr(s) 1 patch Transdermal every 7 days  dextrose 5% + sodium chloride 0.9%. 1000 milliLiter(s) (50 mL/Hr) IV Continuous <Continuous>  metoprolol tartrate 25 milliGRAM(s) Oral two times a day  pantoprazole   Suspension 40 milliGRAM(s) Oral daily  valproate sodium  IVPB 500 milliGRAM(s) IV Intermittent every 8 hours        MICROBIOLOGY DATA:    Culture - Urine (08.17.23 @ 23:15)   Specimen Source: Catheterized Catheterized  Culture Results:   <10,000 CFU/mL Normal Urogenital Anila      Culture - Blood (08.17.23 @ 14:36)   Specimen Source: .Blood Blood-Peripheral  Culture Results: No growth at 4 days    Culture - Blood (08.17.23 @ 14:35)   Specimen Source: .Blood Blood-Peripheral  Culture Results: No growth at 4 days      Urine Microscopic-Add On (NC) (08.17.23 @ 18:40)   Red Blood Cell - Urine: 150 /HPF  White Blood Cell - Urine: 30 /HPF  Bacteria: Few /HPF  Squamous Epithelial Cells: Present    Urine Microscopic-Add On (NC) (08.17.23 @ 11:35)   Squamous Epithelial Cells: None Seen  Bacteria: Negative /HPF  Comment - Urine: Moderate budding yeast and yeast-like cells  White Blood Cell - Urine: >50 /HPF  Red Blood Cell - Urine: >50 /HPF      COVID-19 PCR . (08.16.23 @ 17:40)   COVID-19 PCR: NotDetec:

## 2023-08-22 NOTE — CHART NOTE - NSCHARTNOTEFT_GEN_A_CORE
Assessment:     Factors impacting intake: [ ] none [ ] nausea  [ ] vomiting [ ] diarrhea [ ] constipation  [ ]chewing problems [ ] swallowing issues  [ ] other:     Diet Presciption: Diet, NPO (08-21-23 @ 20:19)    Intake:     Daily   % Weight Change    Pertinent Medications: MEDICATIONS  (STANDING):  aspirin  chewable 324 milliGRAM(s) Oral daily  atorvastatin 20 milliGRAM(s) Oral at bedtime  carbidopa/levodopa  25/100 1 Tablet(s) Oral <User Schedule>  cefepime   IVPB 1000 milliGRAM(s) IV Intermittent every 24 hours  cefepime   IVPB      chlorhexidine 2% Cloths 1 Application(s) Topical daily  cloNIDine Patch 0.1 mG/24Hr(s) 1 patch Transdermal every 7 days  dextrose 5% + sodium chloride 0.9%. 1000 milliLiter(s) (50 mL/Hr) IV Continuous <Continuous>  metoprolol tartrate 25 milliGRAM(s) Oral two times a day  pantoprazole   Suspension 40 milliGRAM(s) Oral daily  valproate sodium  IVPB 500 milliGRAM(s) IV Intermittent every 8 hours    MEDICATIONS  (PRN):  acetaminophen   Oral Liquid .. 650 milliGRAM(s) Enteral Tube every 6 hours PRN Temp greater or equal to 38C (100.4F)    Pertinent Labs: 08-21 Na138 mmol/L Glu 95 mg/dL K+ 4.6 mmol/L Cr  1.95 mg/dL<H> BUN 45 mg/dL<H> 08-21 Phos 5.3 mg/dL<H> 08-21 Alb 1.6 g/dL<L> 07-28 Chol 152 mg/dL LDL --    HDL 51 mg/dL Trig 93 mg/dL     CAPILLARY BLOOD GLUCOSE      POCT Blood Glucose.: 108 mg/dL (22 Aug 2023 06:30)  POCT Blood Glucose.: 169 mg/dL (22 Aug 2023 00:05)  POCT Blood Glucose.: 103 mg/dL (21 Aug 2023 19:30)  POCT Blood Glucose.: 103 mg/dL (21 Aug 2023 11:21)      Skin:     Estimated Needs:   [ ] no change since previous assessment  [ ] recalculated:     Previous Nutrition Diagnosis:   [ ] Inadequate Energy Intake [ ]Inadequate Oral Intake [ ] Excessive Energy Intake   [ ] Underweight [ ] Increased Nutrient Needs [ ] Overweight/Obesity  [ ] Swallowing Difficult   [ ] Altered GI Function [ ] Unintended Weight Loss [ ] Food & Nutrition Related Knowledge Deficit [ ] Malnutrition   [ ] Not Ready for Diet/Life Style Changes     Nutrition Diagnosis is [ ] ongoing  [ ] Improving   [ ] resolved [ ] not applicable     New Nutrition Diagnosis: [ ] not applicable       Interventions:   Recommend  [ ] Change Diet To:  [ ] Nutrition Supplement  [ ] Nutrition Support  [ ] Other:     Monitoring and Evaluation:   [ ] PO intake [ x ] Tolerance to diet prescription [ x ] weights [ x ] labs[ x ] follow up per protocol  [ ] other: Assessment:       Nutrition reassessment for follow-up and informed by staff for Leaking "PEG". Chart reviewed, pt visited, no-verbal/confused; s/p open gastrostomy tube placement 8/14/23, TF started 8/15/23, was tolerated well, but now NPO, TF on hold due to G-tube leaking, Surgery, GI on the case; eGFR=26, K=5/4-->4.6, PO4=5.3, "Hyperkalemia due to susan on ckd.-stable. give Nephro tube feeding" per Nephrologist; Pending discharge planning to skilled nursing facility when medically ready per team     Factors impacting intake: [ x ] other: change in mental status; difficulty chewing; difficulty feeding self; difficulty swallowing; acute on chronic comorbidities including acute encephalopathy, SUSAN on CKD, h/o CVA    Diet Prescription: Diet, NPO (08-21-23 @ 20:19)    Intake: NPO at present     Daily % Weight Change    Pertinent Medications: MEDICATIONS  (STANDING):  aspirin  chewable 324 milliGRAM(s) Oral daily  atorvastatin 20 milliGRAM(s) Oral at bedtime  carbidopa/levodopa  25/100 1 Tablet(s) Oral <User Schedule>  cefepime   IVPB 1000 milliGRAM(s) IV Intermittent every 24 hours  cefepime   IVPB      chlorhexidine 2% Cloths 1 Application(s) Topical daily  cloNIDine Patch 0.1 mG/24Hr(s) 1 patch Transdermal every 7 days  dextrose 5% + sodium chloride 0.9%. 1000 milliLiter(s) (50 mL/Hr) IV Continuous <Continuous>  metoprolol tartrate 25 milliGRAM(s) Oral two times a day  pantoprazole   Suspension 40 milliGRAM(s) Oral daily  valproate sodium  IVPB 500 milliGRAM(s) IV Intermittent every 8 hours    MEDICATIONS  (PRN):  acetaminophen   Oral Liquid .. 650 milliGRAM(s) Enteral Tube every 6 hours PRN Temp greater or equal to 38C (100.4F)    Pertinent Labs: 08-21 Na138 mmol/L Glu 95 mg/dL K+ 4.6 mmol/L Cr  1.95 mg/dL<H> BUN 45 mg/dL<H> 08-21 Phos 5.3 mg/dL<H> 08-21 Alb 1.6 g/dL<L> 07-28 Chol 152 mg/dL LDL --    HDL 51 mg/dL Trig 93 mg/dL     CAPILLARY BLOOD GLUCOSE    POCT Blood Glucose.: 108 mg/dL (22 Aug 2023 06:30)  POCT Blood Glucose.: 169 mg/dL (22 Aug 2023 00:05)  POCT Blood Glucose.: 103 mg/dL (21 Aug 2023 19:30)  POCT Blood Glucose.: 103 mg/dL (21 Aug 2023 11:21)    Skin: skin tear; 3+leg edema     Estimated Needs:   [ x ] no change since previous assessment  [ ] recalculated:     Previous Nutrition Diagnosis:   [ x ] Inadequate Oral Intake   [ x ] Severe Malnutrition     Nutrition Diagnosis is [ x ] ongoing  [ ] Improving   [ ] resolved [ ] not applicable     New Nutrition Diagnosis: [ x ] not applicable     Interventions:   Recommend  [ x ] Advance diet or consider alternate nutrition support if NPO prolonged further as medically feasible   [ ] Nutrition Supplement  [ x ] Nutrition Support: When TF restarted,  may consider Nepro @ 55ml/h x 16h (880ml, 1584kcal, 71g protein, 642ml water daily at goal rate) per Nephrolgist                        If improving renal function:  Goal: Jevity 1.5 @ 60ml/h x 16h (960ml, 1440kcal, 61g protein, 729ml water daily at goal)                     MD to adjust free water as needed   [ x ] Other:  Discussed with team         Pt to be followed by dietitian at [facility] nursing facility when medically ready to be discharged     Monitoring and Evaluation:   [ ] PO intake [ x ] Tolerance to diet prescription [ x ] weights [ x ] labs[ x ] follow up per protocol  [ ] other: Assessment:       Nutrition reassessment for follow-up and informed by staff for Leaking "PEG". Chart reviewed, pt visited, no-verbal/confused; s/p open gastrostomy tube placement 8/14/23, TF started 8/15/23, was tolerated well, but now NPO, TF on hold due to G-tube leaking, Surgery, GI on the case; eGFR=26, K=5/4-->4.6, PO4=5.3, "Hyperkalemia due to susan on ckd.-stable. give Nephro tube feeding" per Nephrologist; Pending discharge planning to skilled nursing facility when medically ready per team     Factors impacting intake: [ x ] other: change in mental status; difficulty chewing; difficulty feeding self; difficulty swallowing; acute on chronic comorbidities including acute encephalopathy, SUSAN on CKD, h/o CVA    Diet Prescription: Diet, NPO (08-21-23 @ 20:19)    Intake: NPO at present     Daily % Weight Change    Pertinent Medications: MEDICATIONS  (STANDING):  aspirin  chewable 324 milliGRAM(s) Oral daily  atorvastatin 20 milliGRAM(s) Oral at bedtime  carbidopa/levodopa  25/100 1 Tablet(s) Oral <User Schedule>  cefepime   IVPB 1000 milliGRAM(s) IV Intermittent every 24 hours  cefepime   IVPB      chlorhexidine 2% Cloths 1 Application(s) Topical daily  cloNIDine Patch 0.1 mG/24Hr(s) 1 patch Transdermal every 7 days  dextrose 5% + sodium chloride 0.9%. 1000 milliLiter(s) (50 mL/Hr) IV Continuous <Continuous>  metoprolol tartrate 25 milliGRAM(s) Oral two times a day  pantoprazole   Suspension 40 milliGRAM(s) Oral daily  valproate sodium  IVPB 500 milliGRAM(s) IV Intermittent every 8 hours    MEDICATIONS  (PRN):  acetaminophen   Oral Liquid .. 650 milliGRAM(s) Enteral Tube every 6 hours PRN Temp greater or equal to 38C (100.4F)    Pertinent Labs: 08-21 Na138 mmol/L Glu 95 mg/dL K+ 4.6 mmol/L Cr  1.95 mg/dL<H> BUN 45 mg/dL<H> 08-21 Phos 5.3 mg/dL<H> 08-21 Alb 1.6 g/dL<L> 07-28 Chol 152 mg/dL LDL --    HDL 51 mg/dL Trig 93 mg/dL     CAPILLARY BLOOD GLUCOSE    POCT Blood Glucose.: 108 mg/dL (22 Aug 2023 06:30)  POCT Blood Glucose.: 169 mg/dL (22 Aug 2023 00:05)  POCT Blood Glucose.: 103 mg/dL (21 Aug 2023 19:30)  POCT Blood Glucose.: 103 mg/dL (21 Aug 2023 11:21)    Skin: skin tear; 3+leg edema     Estimated Needs:   [ x ] no change since previous assessment  [ ] recalculated:     Previous Nutrition Diagnosis:   [ x ] Inadequate Oral Intake   [ x ] Severe Malnutrition     Nutrition Diagnosis is [ x ] ongoing  [ ] Improving   [ ] resolved [ ] not applicable     New Nutrition Diagnosis: [ x ] not applicable     Interventions:   Recommend  [ x ] Advance diet or consider alternate nutrition support if NPO prolonged further as medically feasible   [ ] Nutrition Supplement  [ x ] Nutrition Support: When TF restarted,  may consider Nepro @ 55ml/h x 16h (880ml, 1584kcal, 71g protein, 642ml water daily at goal rate) per Nephrolgist                        If improving renal function:  Goal: Jevity 1.5 @ 60ml/h x 16h (960ml, 1440kcal, 61g protein, 729ml water daily at goal)                     MD to adjust free water as needed   [ x ] Other:  Discussed with team         Pt to be followed by dietitian at [facility] nursing facility when medically ready to be discharged     Monitoring and Evaluation:   [ ] PO intake [ x ] Tolerance to diet prescription [ x ] weights [ x ] labs[ x ] follow up per protocol  [ ] other: Assessment:       Nutrition reassessment for follow-up and informed by staff for Leaking "PEG". Chart reviewed, pt visited, no-verbal/confused; s/p open gastrostomy tube placement 8/14/23, TF started 8/15/23, was tolerated well, but now NPO, TF on hold due to G-tube leaking, Surgery, GI on the case; eGFR=26, K=5/4-->4.6, PO4=5.3, "Hyperkalemia due to susan on ckd.-stable. give Nephro tube feeding" per Nephrologist; Pending discharge planning to skilled nursing facility when medically ready per team     Factors impacting intake: [ x ] other: change in mental status; difficulty chewing; difficulty feeding self; difficulty swallowing; acute on chronic comorbidities including acute encephalopathy, SUSAN on CKD, h/o CVA    Diet Prescription: Diet, NPO (08-21-23 @ 20:19)    Intake: NPO at present     Daily % Weight Change    Pertinent Medications: MEDICATIONS  (STANDING):  aspirin  chewable 324 milliGRAM(s) Oral daily  atorvastatin 20 milliGRAM(s) Oral at bedtime  carbidopa/levodopa  25/100 1 Tablet(s) Oral <User Schedule>  cefepime   IVPB 1000 milliGRAM(s) IV Intermittent every 24 hours  cefepime   IVPB      chlorhexidine 2% Cloths 1 Application(s) Topical daily  cloNIDine Patch 0.1 mG/24Hr(s) 1 patch Transdermal every 7 days  dextrose 5% + sodium chloride 0.9%. 1000 milliLiter(s) (50 mL/Hr) IV Continuous <Continuous>  metoprolol tartrate 25 milliGRAM(s) Oral two times a day  pantoprazole   Suspension 40 milliGRAM(s) Oral daily  valproate sodium  IVPB 500 milliGRAM(s) IV Intermittent every 8 hours    MEDICATIONS  (PRN):  acetaminophen   Oral Liquid .. 650 milliGRAM(s) Enteral Tube every 6 hours PRN Temp greater or equal to 38C (100.4F)    Pertinent Labs: 08-21 Na138 mmol/L Glu 95 mg/dL K+ 4.6 mmol/L Cr  1.95 mg/dL<H> BUN 45 mg/dL<H> 08-21 Phos 5.3 mg/dL<H> 08-21 Alb 1.6 g/dL<L> 07-28 Chol 152 mg/dL LDL --    HDL 51 mg/dL Trig 93 mg/dL     CAPILLARY BLOOD GLUCOSE    POCT Blood Glucose.: 108 mg/dL (22 Aug 2023 06:30)  POCT Blood Glucose.: 169 mg/dL (22 Aug 2023 00:05)  POCT Blood Glucose.: 103 mg/dL (21 Aug 2023 19:30)  POCT Blood Glucose.: 103 mg/dL (21 Aug 2023 11:21)    Skin: skin tear; 3+leg edema     Estimated Needs:   [ x ] no change since previous assessment  [ ] recalculated:     Previous Nutrition Diagnosis:   [ x ] Inadequate Oral Intake   [ x ] Severe Malnutrition     Nutrition Diagnosis is [ x ] ongoing  [ ] Improving   [ ] resolved [ ] not applicable     New Nutrition Diagnosis: [ x ] not applicable     Interventions:   Recommend  [ x ] Advance diet or consider alternate nutrition support if NPO prolonged further as medically feasible   [ ] Nutrition Supplement  [ x ] Nutrition Support: When TF restarted,  may consider Nepro @ 55ml/h x 16h (880ml, 1584kcal, 71g protein, 642ml water daily at goal rate) per Nephrolgist                        If improving renal function:  Goal: Jevity 1.5 @ 60ml/h x 16h (960ml, 1440kcal, 61g protein, 729ml water daily at goal)                     MD to adjust free water as needed   [ x ] Other:  Discussed with team         Pt to be followed by dietitian at [facility] nursing facility when medically ready to be discharged     Monitoring and Evaluation:   [ ] PO intake [ x ] Tolerance to diet prescription [ x ] weights [ x ] labs[ x ] follow up per protocol  [ ] other:

## 2023-08-22 NOTE — PROGRESS NOTE ADULT - PROBLEM SELECTOR PLAN 1
Pt. is NPO  Open G tube placed on 8/14  Feeds through G tube resumed  IV tylenol for pain, morphine for breakthrough pain  IV iron sucrose started 8/15 for 3 days as per nephro  G tube placement by Surgery on 8/14  LE doppler b/l showed no signs of DVT  8/20 leakage noted around site of G tube. Feedings stopped and surgery consulted  f/u CT abdomen/pelvis Pt. is NPO  Open G tube placed on 8/14  Feeds through G tube resumed  IV tylenol for pain, morphine for breakthrough pain  IV iron sucrose started 8/15 for 3 days as per nephro  G tube placement by Surgery on 8/14  LE doppler b/l showed no signs of DVT  8/20 leakage noted around site of G tube. Feedings stopped and surgery consulted  CT abdomen/pelvis showed g tube out of stomach lumen  surgery attempted to re-position manually, will f/u CT abdo/pelvis

## 2023-08-22 NOTE — PROGRESS NOTE ADULT - NUTRITIONAL ASSESSMENT
This patient has been assessed with a concern for Malnutrition and has been determined to have a diagnosis/diagnoses of Severe protein-calorie malnutrition.    This patient is being managed with:   Diet NPO-  Entered: Aug 21 2023  8:19PM

## 2023-08-22 NOTE — PROGRESS NOTE ADULT - SUBJECTIVE AND OBJECTIVE BOX
Information from surgeon indicated that stomach was tacked to abdominal wall. Discussed covering Surgeon who recommends attempt to advance tube back into stomach lumen at bedside and inflate baloon (if functional) and repeat CT to determine if successful reinsertion.  Pt tolerated well.  Repeat CT aBd

## 2023-08-22 NOTE — PROGRESS NOTE ADULT - ASSESSMENT
1. SUSAN possible to MARISA and ATN  Renal sono shows no hdyro with b/l kidneys around 9.2cm  -Scr is stable and unchanged at 1.9mg/dl with stable electrolytes and close to baseline.  -s/p mcclellan's cath placed for urinary retention during this admission; discontinue by primary team.   -urinalysis shows blood with proteinuria; FeNa >1%, spot protein to creatinine ratio is 1.5gm  -Adjust meds to eGFR and avoid IV Gadolinium contrast,NSAIDs, and phosphate enema.  -Monitor I/O's daily.   -Monitor SMA daily.  2. Hypernatremia due to water deficit and insensible losses. Pt is clinically euvolemic.   -Na is stable and off D5W.   -Monitor I/O's. Check Serum Na Daily. Avoid high solute intake diet and sodium bicarbonate infuse. Avoid overcorrection of NA (8-10meq/day)  3. CKD stage 4 most likely due to hypertensive nephrosclerosis  -new baseline scr around 1.8mg/dL with uPCR 1.5gm.  -previous Scr around 1.2 to 1.6mg/dL in Oct 2022.  -Keep patient euvolemic and renal diet  -Avoid Nephrotoxic Meds/ Agents such as (NSAIDs, IV contrast, Aminoglycosides such as gentamicin, -Gadolinium contrast, Phosphate containing enemas, etc..)  -Adjust Medications according to eGFR  4. HTN:   -bp is acceptable. continue bp meds  -titrate bp meds to keep sbp >110 and < 130  5. Mineral Bone Disease:  -phos is okay.   -PTH intact 289, will start calcitriol once Phos has improved.   6. Encephalopathy:  -Leukocytosis unchanged.  -s/p Rocephin.  -Plan as per Neuro and primary team  -s/p PEG placed in OR on 08/14/23. Surgery following PEG reinsertion.  7. Hypokalemia:  -stable K.   -give kcl repletion to keep K >3.5meq/L  -monitor K.   8. Acidosis:  -CO2 is unchanged.    -monitor CO2.  9. Anemia:  -hb is stable.  -low iron sat and ferritin are okay. s/p iv iron x 3 days.  -monitor CBC.  -transfuse if hb <7.0  10. Elevated LFTs  -slowly improving; US abd done shows fatty liver  -monitor LFTs  11. Hyperkalemia due to susan on ckd.   -stable.   -give Nephro tube feeding. give Lokelma prn if K >5.5  -Keep pt euvolemic. Avoid ACE inh/ ARBs, NSAIDs, and Aldactone or potassium sparing diuretics. Monitor K+ daily.

## 2023-08-22 NOTE — PROGRESS NOTE ADULT - SUBJECTIVE AND OBJECTIVE BOX
Date of Service 08-22-23 @ 10:59    CHIEF COMPLAINT:Patient is a 77y old  Female who presents with a chief complaint of Altered mental status. Pt appears comfortable.    	  REVIEW OF SYSTEMS:  	  [x ] Unable to obtain    PHYSICAL EXAM:  T(C): 37.1 (08-22-23 @ 05:03), Max: 37.3 (08-21-23 @ 13:29)  HR: 100 (08-22-23 @ 05:03) (90 - 100)  BP: 144/83 (08-22-23 @ 05:03) (129/86 - 144/83)  RR: 18 (08-22-23 @ 05:03) (18 - 18)  SpO2: 100% (08-22-23 @ 05:03) (98% - 100%)  Wt(kg): --  I&O's Summary    21 Aug 2023 07:01  -  22 Aug 2023 07:00  --------------------------------------------------------  IN: 0 mL / OUT: 650 mL / NET: -650 mL        Appearance: Normal	  HEENT:   Normal oral mucosa, PERRL, EOMI	  Lymphatic: No lymphadenopathy  Cardiovascular: Normal S1 S2, No JVD, No murmurs, No edema  Respiratory: Lungs clear to auscultation	  Gastrointestinal:  Soft  Skin: No rashes, No ecchymoses, No cyanosis	  Extremities: Normal range of motion, No clubbing, cyanosis or edema  Vascular: Peripheral pulses palpable 2+ bilaterally    MEDICATIONS  (STANDING):  aspirin  chewable 324 milliGRAM(s) Oral daily  atorvastatin 20 milliGRAM(s) Oral at bedtime  carbidopa/levodopa  25/100 1 Tablet(s) Oral <User Schedule>  cefepime   IVPB 1000 milliGRAM(s) IV Intermittent every 24 hours  cefepime   IVPB      chlorhexidine 2% Cloths 1 Application(s) Topical daily  cloNIDine Patch 0.1 mG/24Hr(s) 1 patch Transdermal every 7 days  dextrose 5% + sodium chloride 0.9%. 1000 milliLiter(s) (50 mL/Hr) IV Continuous <Continuous>  metoprolol tartrate 25 milliGRAM(s) Oral two times a day  pantoprazole   Suspension 40 milliGRAM(s) Oral daily  valproate sodium  IVPB 500 milliGRAM(s) IV Intermittent every 8 hours        LABS:	                              8.7    20.94 )-----------( 354      ( 22 Aug 2023 10:14 )             27.9     08-22    x   |  x   |  x   ----------------------------<  x   x    |  x   |  1.86<H>    Ca    8.0<L>      21 Aug 2023 06:00  Phos  4.9     08-22  Mg     2.3     08-22    TPro  5.3<L>  /  Alb  x   /  TBili  0.3  /  DBili  x   /  AST  30  /  ALT  19  /  AlkPhos  133<H>  08-22    proBNP:   Lipid Profile: Cholesterol 152  LDL --  HDL 51  TG 93  Ldl calc 82  Ratio --    HgA1c:   TSH: Thyroid Stimulating Hormone, Serum: 3.59 uU/mL (07-28 @ 05:03)      	  < from: CT Abdomen and Pelvis No Cont (08.21.23 @ 19:23) >  ACC: [id number] EXAM:  CT ABDOMEN AND PELVIS   ORDERED BY: JOLENE KRUSE     PROCEDURE DATE:  08/21/2023          INTERPRETATION:  CLINICAL INFORMATION: Failure to thrive, G-tube leak.    COMPARISON: CT abdomen pelvis 8/4/2023.    CONTRAST/COMPLICATIONS:  IV Contrast: NONE  Oral Contrast: NONE  Complications: None reported at time of study completion    PROCEDURE:  CT of the Abdomen and Pelvis was performed.  Sagittal and coronal reformats were performed.    FINDINGS:  Evaluation of the solid organs and vasculature is limited without   intravenous contrast.    LOWER CHEST: Trace bilateral pleural effusions.    LIVER: Within normal limits.  BILE DUCTS: Normal caliber.  GALLBLADDER: Within normal limits.  SPLEEN: Within normal limits.  PANCREAS: Within normal limits.  ADRENALS: Within normal limits.  KIDNEYS/URETER: No hydronephrosis. Small right renal cysts.    BLADDER: Tiny focus of air, which may related to recent instrumentation.  REPRODUCTIVE ORGANS: Hysterectomy.    BOWEL: No bowel obstruction. Appendix is normal. Gastrostomy tube appears   to terminate outside of the stomach lumen abutting the gastric wall. No   balloon is inflated. Wall thickening of the distal stomach.  PERITONEUM: No ascites.  VESSELS: Atherosclerotic changes.IVC filter.  RETROPERITONEUM/LYMPH NODES: No lymphadenopathy.  ABDOMINAL WALL: Postsurgical changes.  BONES: Degenerative changes.    IMPRESSION:  Gastrostomy tube appears to terminate outside of the stomach lumen. No   balloon is inflated.    Wall thickening of the distal stomach.    Findings were discussed with Dr. Blanton on 8/21/2023 8:00 PM by Dr. Simpson with read back confirmation.    --- End of Report ---            MALDONADO SIMPSON MD; Attending Radiologist  This document has been electronically signed. Aug 21 2023  8:05PM    < end of copied text >     Date of Service 08-22-23 @ 10:59    CHIEF COMPLAINT:Patient is a 77y old  Female who presents with a chief complaint of Altered mental status. Pt appears comfortable.    	  REVIEW OF SYSTEMS:  	  [x ] Unable to obtain    PHYSICAL EXAM:  T(C): 37.1 (08-22-23 @ 05:03), Max: 37.3 (08-21-23 @ 13:29)  HR: 100 (08-22-23 @ 05:03) (90 - 100)  BP: 144/83 (08-22-23 @ 05:03) (129/86 - 144/83)  RR: 18 (08-22-23 @ 05:03) (18 - 18)  SpO2: 100% (08-22-23 @ 05:03) (98% - 100%)  Wt(kg): --  I&O's Summary    21 Aug 2023 07:01  -  22 Aug 2023 07:00  --------------------------------------------------------  IN: 0 mL / OUT: 650 mL / NET: -650 mL        Appearance: Normal	  HEENT:   Normal oral mucosa, PERRL, EOMI	  Lymphatic: No lymphadenopathy  Cardiovascular: Normal S1 S2, No JVD, No murmurs, No edema  Respiratory: Lungs clear to auscultation	  Gastrointestinal:  Soft  Skin: No rashes, No ecchymoses, No cyanosis	  Extremities: Normal range of motion, No clubbing, cyanosis or edema  Vascular: Peripheral pulses palpable 2+ bilaterally    MEDICATIONS  (STANDING):  aspirin  chewable 324 milliGRAM(s) Oral daily  atorvastatin 20 milliGRAM(s) Oral at bedtime  carbidopa/levodopa  25/100 1 Tablet(s) Oral <User Schedule>  cefepime   IVPB 1000 milliGRAM(s) IV Intermittent every 24 hours  cefepime   IVPB      chlorhexidine 2% Cloths 1 Application(s) Topical daily  cloNIDine Patch 0.1 mG/24Hr(s) 1 patch Transdermal every 7 days  dextrose 5% + sodium chloride 0.9%. 1000 milliLiter(s) (50 mL/Hr) IV Continuous <Continuous>  metoprolol tartrate 25 milliGRAM(s) Oral two times a day  pantoprazole   Suspension 40 milliGRAM(s) Oral daily  valproate sodium  IVPB 500 milliGRAM(s) IV Intermittent every 8 hours        LABS:	                              8.7    20.94 )-----------( 354      ( 22 Aug 2023 10:14 )             27.9     08-22    x   |  x   |  x   ----------------------------<  x   x    |  x   |  1.86<H>    Ca    8.0<L>      21 Aug 2023 06:00  Phos  4.9     08-22  Mg     2.3     08-22    TPro  5.3<L>  /  Alb  x   /  TBili  0.3  /  DBili  x   /  AST  30  /  ALT  19  /  AlkPhos  133<H>  08-22    proBNP:   Lipid Profile: Cholesterol 152  LDL --  HDL 51  TG 93  Ldl calc 82  Ratio --    HgA1c:   TSH: Thyroid Stimulating Hormone, Serum: 3.59 uU/mL (07-28 @ 05:03)      	  < from: CT Abdomen and Pelvis No Cont (08.21.23 @ 19:23) >  ACC: [id number] EXAM:  CT ABDOMEN AND PELVIS   ORDERED BY: JOLENE KRUSE     PROCEDURE DATE:  08/21/2023          INTERPRETATION:  CLINICAL INFORMATION: Failure to thrive, G-tube leak.    COMPARISON: CT abdomen pelvis 8/4/2023.    CONTRAST/COMPLICATIONS:  IV Contrast: NONE  Oral Contrast: NONE  Complications: None reported at time of study completion    PROCEDURE:  CT of the Abdomen and Pelvis was performed.  Sagittal and coronal reformats were performed.    FINDINGS:  Evaluation of the solid organs and vasculature is limited without   intravenous contrast.    LOWER CHEST: Trace bilateral pleural effusions.    LIVER: Within normal limits.  BILE DUCTS: Normal caliber.  GALLBLADDER: Within normal limits.  SPLEEN: Within normal limits.  PANCREAS: Within normal limits.  ADRENALS: Within normal limits.  KIDNEYS/URETER: No hydronephrosis. Small right renal cysts.    BLADDER: Tiny focus of air, which may related to recent instrumentation.  REPRODUCTIVE ORGANS: Hysterectomy.    BOWEL: No bowel obstruction. Appendix is normal. Gastrostomy tube appears   to terminate outside of the stomach lumen abutting the gastric wall. No   balloon is inflated. Wall thickening of the distal stomach.  PERITONEUM: No ascites.  VESSELS: Atherosclerotic changes.IVC filter.  RETROPERITONEUM/LYMPH NODES: No lymphadenopathy.  ABDOMINAL WALL: Postsurgical changes.  BONES: Degenerative changes.    IMPRESSION:  Gastrostomy tube appears to terminate outside of the stomach lumen. No   balloon is inflated.    Wall thickening of the distal stomach.    Findings were discussed with Dr. Blanton on 8/21/2023 8:00 PM by Dr. Simpson with read back confirmation.    --- End of Report ---            MALDONADO SIMPSON MD; Attending Radiologist  This document has been electronically signed. Aug 21 2023  8:05PM    < end of copied text >     Date of Service 08-22-23 @ 10:59    CHIEF COMPLAINT:Patient is a 77y old  Female who presents with a chief complaint of Altered mental status. Pt appears comfortable.    	  REVIEW OF SYSTEMS:  	  [x ] Unable to obtain    PHYSICAL EXAM:  T(C): 37.1 (08-22-23 @ 05:03), Max: 37.3 (08-21-23 @ 13:29)  HR: 100 (08-22-23 @ 05:03) (90 - 100)  BP: 144/83 (08-22-23 @ 05:03) (129/86 - 144/83)  RR: 18 (08-22-23 @ 05:03) (18 - 18)  SpO2: 100% (08-22-23 @ 05:03) (98% - 100%)  Wt(kg): --  I&O's Summary    21 Aug 2023 07:01  -  22 Aug 2023 07:00  --------------------------------------------------------  IN: 0 mL / OUT: 650 mL / NET: -650 mL        Appearance: Normal	  HEENT:   Normal oral mucosa, PERRL, EOMI	  Lymphatic: No lymphadenopathy  Cardiovascular: Normal S1 S2, No JVD, No murmurs, No edema  Respiratory: Lungs clear to auscultation	  Gastrointestinal:  Soft  Skin: No rashes, No ecchymoses, No cyanosis	  Extremities: Normal range of motion, No clubbing, cyanosis or edema  Vascular: Peripheral pulses palpable 2+ bilaterally    MEDICATIONS  (STANDING):  aspirin  chewable 324 milliGRAM(s) Oral daily  atorvastatin 20 milliGRAM(s) Oral at bedtime  carbidopa/levodopa  25/100 1 Tablet(s) Oral <User Schedule>  cefepime   IVPB 1000 milliGRAM(s) IV Intermittent every 24 hours  cefepime   IVPB      chlorhexidine 2% Cloths 1 Application(s) Topical daily  cloNIDine Patch 0.1 mG/24Hr(s) 1 patch Transdermal every 7 days  dextrose 5% + sodium chloride 0.9%. 1000 milliLiter(s) (50 mL/Hr) IV Continuous <Continuous>  metoprolol tartrate 25 milliGRAM(s) Oral two times a day  pantoprazole   Suspension 40 milliGRAM(s) Oral daily  valproate sodium  IVPB 500 milliGRAM(s) IV Intermittent every 8 hours        LABS:	                              8.7    20.94 )-----------( 354      ( 22 Aug 2023 10:14 )             27.9     08-22    x   |  x   |  x   ----------------------------<  x   x    |  x   |  1.86<H>    Ca    8.0<L>      21 Aug 2023 06:00  Phos  4.9     08-22  Mg     2.3     08-22    TPro  5.3<L>  /  Alb  x   /  TBili  0.3  /  DBili  x   /  AST  30  /  ALT  19  /  AlkPhos  133<H>  08-22    proBNP:   Lipid Profile: Cholesterol 152  LDL --  HDL 51  TG 93  Ldl calc 82  Ratio --    HgA1c:   TSH: Thyroid Stimulating Hormone, Serum: 3.59 uU/mL (07-28 @ 05:03)      	  < from: CT Abdomen and Pelvis No Cont (08.21.23 @ 19:23) >  ACC: [id number] EXAM:  CT ABDOMEN AND PELVIS   ORDERED BY: JOLENE KRUSE     PROCEDURE DATE:  08/21/2023          INTERPRETATION:  CLINICAL INFORMATION: Failure to thrive, G-tube leak.    COMPARISON: CT abdomen pelvis 8/4/2023.    CONTRAST/COMPLICATIONS:  IV Contrast: NONE  Oral Contrast: NONE  Complications: None reported at time of study completion    PROCEDURE:  CT of the Abdomen and Pelvis was performed.  Sagittal and coronal reformats were performed.    FINDINGS:  Evaluation of the solid organs and vasculature is limited without   intravenous contrast.    LOWER CHEST: Trace bilateral pleural effusions.    LIVER: Within normal limits.  BILE DUCTS: Normal caliber.  GALLBLADDER: Within normal limits.  SPLEEN: Within normal limits.  PANCREAS: Within normal limits.  ADRENALS: Within normal limits.  KIDNEYS/URETER: No hydronephrosis. Small right renal cysts.    BLADDER: Tiny focus of air, which may related to recent instrumentation.  REPRODUCTIVE ORGANS: Hysterectomy.    BOWEL: No bowel obstruction. Appendix is normal. Gastrostomy tube appears   to terminate outside of the stomach lumen abutting the gastric wall. No   balloon is inflated. Wall thickening of the distal stomach.  PERITONEUM: No ascites.  VESSELS: Atherosclerotic changes.IVC filter.  RETROPERITONEUM/LYMPH NODES: No lymphadenopathy.  ABDOMINAL WALL: Postsurgical changes.  BONES: Degenerative changes.    IMPRESSION:  Gastrostomy tube appears to terminate outside of the stomach lumen. No   balloon is inflated.    Wall thickening of the distal stomach.    Findings were discussed with Dr. Blanton on 8/21/2023 8:00 PM by Dr. Simpson with read back confirmation.    --- End of Report ---            MALDONADO SIMPSON MD; Attending Radiologist  This document has been electronically signed. Aug 21 2023  8:05PM    < end of copied text >

## 2023-08-23 LAB
ALBUMIN SERPL ELPH-MCNC: 1.3 G/DL — LOW (ref 3.5–5)
ALP SERPL-CCNC: 105 U/L — SIGNIFICANT CHANGE UP (ref 40–120)
ALT FLD-CCNC: 16 U/L DA — SIGNIFICANT CHANGE UP (ref 10–60)
ANION GAP SERPL CALC-SCNC: 10 MMOL/L — SIGNIFICANT CHANGE UP (ref 5–17)
ANISOCYTOSIS BLD QL: SLIGHT — SIGNIFICANT CHANGE UP
AST SERPL-CCNC: 20 U/L — SIGNIFICANT CHANGE UP (ref 10–40)
BILIRUB SERPL-MCNC: 0.2 MG/DL — SIGNIFICANT CHANGE UP (ref 0.2–1.2)
BLD GP AB SCN SERPL QL: SIGNIFICANT CHANGE UP
BUN SERPL-MCNC: 46 MG/DL — HIGH (ref 7–18)
BURR CELLS BLD QL SMEAR: PRESENT — SIGNIFICANT CHANGE UP
CALCIUM SERPL-MCNC: 7.3 MG/DL — LOW (ref 8.4–10.5)
CHLORIDE SERPL-SCNC: 119 MMOL/L — HIGH (ref 96–108)
CO2 SERPL-SCNC: 18 MMOL/L — LOW (ref 22–31)
CREAT SERPL-MCNC: 1.87 MG/DL — HIGH (ref 0.5–1.3)
EGFR: 27 ML/MIN/1.73M2 — LOW
GLUCOSE BLDC GLUCOMTR-MCNC: 117 MG/DL — HIGH (ref 70–99)
GLUCOSE BLDC GLUCOMTR-MCNC: 78 MG/DL — SIGNIFICANT CHANGE UP (ref 70–99)
GLUCOSE BLDC GLUCOMTR-MCNC: 92 MG/DL — SIGNIFICANT CHANGE UP (ref 70–99)
GLUCOSE BLDC GLUCOMTR-MCNC: 97 MG/DL — SIGNIFICANT CHANGE UP (ref 70–99)
GLUCOSE SERPL-MCNC: 91 MG/DL — SIGNIFICANT CHANGE UP (ref 70–99)
HCT VFR BLD CALC: 21.7 % — LOW (ref 34.5–45)
HCT VFR BLD CALC: 22.4 % — LOW (ref 34.5–45)
HGB BLD-MCNC: 6.9 G/DL — CRITICAL LOW (ref 11.5–15.5)
HGB BLD-MCNC: 7 G/DL — CRITICAL LOW (ref 11.5–15.5)
INR BLD: 1.05 RATIO — SIGNIFICANT CHANGE UP (ref 0.85–1.18)
MAGNESIUM SERPL-MCNC: 2.2 MG/DL — SIGNIFICANT CHANGE UP (ref 1.6–2.6)
MANUAL SMEAR VERIFICATION: SIGNIFICANT CHANGE UP
MCHC RBC-ENTMCNC: 28.6 PG — SIGNIFICANT CHANGE UP (ref 27–34)
MCHC RBC-ENTMCNC: 28.7 PG — SIGNIFICANT CHANGE UP (ref 27–34)
MCHC RBC-ENTMCNC: 31.3 GM/DL — LOW (ref 32–36)
MCHC RBC-ENTMCNC: 31.8 GM/DL — LOW (ref 32–36)
MCV RBC AUTO: 90 FL — SIGNIFICANT CHANGE UP (ref 80–100)
MCV RBC AUTO: 91.8 FL — SIGNIFICANT CHANGE UP (ref 80–100)
MICROCYTES BLD QL: SLIGHT — SIGNIFICANT CHANGE UP
NRBC # BLD: 0 /100 WBCS — SIGNIFICANT CHANGE UP (ref 0–0)
OVALOCYTES BLD QL SMEAR: SLIGHT — SIGNIFICANT CHANGE UP
PHOSPHATE SERPL-MCNC: 4.4 MG/DL — SIGNIFICANT CHANGE UP (ref 2.5–4.5)
PLAT MORPH BLD: NORMAL — SIGNIFICANT CHANGE UP
PLATELET # BLD AUTO: 249 K/UL — SIGNIFICANT CHANGE UP (ref 150–400)
PLATELET # BLD AUTO: 269 K/UL — SIGNIFICANT CHANGE UP (ref 150–400)
POIKILOCYTOSIS BLD QL AUTO: SLIGHT — SIGNIFICANT CHANGE UP
POTASSIUM SERPL-MCNC: 3.7 MMOL/L — SIGNIFICANT CHANGE UP (ref 3.5–5.3)
POTASSIUM SERPL-SCNC: 3.7 MMOL/L — SIGNIFICANT CHANGE UP (ref 3.5–5.3)
PROT SERPL-MCNC: 5 G/DL — LOW (ref 6–8.3)
PROTHROM AB SERPL-ACNC: 12 SEC — SIGNIFICANT CHANGE UP (ref 9.5–13)
RBC # BLD: 2.41 M/UL — LOW (ref 3.8–5.2)
RBC # BLD: 2.44 M/UL — LOW (ref 3.8–5.2)
RBC # FLD: 17.5 % — HIGH (ref 10.3–14.5)
RBC # FLD: 17.6 % — HIGH (ref 10.3–14.5)
RBC BLD AUTO: ABNORMAL
SCHISTOCYTES BLD QL AUTO: SLIGHT — SIGNIFICANT CHANGE UP
SMUDGE CELLS # BLD: PRESENT — SIGNIFICANT CHANGE UP
SODIUM SERPL-SCNC: 147 MMOL/L — HIGH (ref 135–145)
WBC # BLD: 17.72 K/UL — HIGH (ref 3.8–10.5)
WBC # BLD: 18.08 K/UL — HIGH (ref 3.8–10.5)
WBC # FLD AUTO: 17.72 K/UL — HIGH (ref 3.8–10.5)
WBC # FLD AUTO: 18.08 K/UL — HIGH (ref 3.8–10.5)

## 2023-08-23 RX ORDER — ACETAMINOPHEN 500 MG
1000 TABLET ORAL ONCE
Refills: 0 | Status: COMPLETED | OUTPATIENT
Start: 2023-08-23 | End: 2023-08-23

## 2023-08-23 RX ORDER — ALBUMIN HUMAN 25 %
250 VIAL (ML) INTRAVENOUS ONCE
Refills: 0 | Status: COMPLETED | OUTPATIENT
Start: 2023-08-23 | End: 2023-08-23

## 2023-08-23 RX ORDER — FUROSEMIDE 40 MG
40 TABLET ORAL ONCE
Refills: 0 | Status: COMPLETED | OUTPATIENT
Start: 2023-08-23 | End: 2023-08-23

## 2023-08-23 RX ORDER — SODIUM CHLORIDE 9 MG/ML
1000 INJECTION, SOLUTION INTRAVENOUS
Refills: 0 | Status: DISCONTINUED | OUTPATIENT
Start: 2023-08-23 | End: 2023-08-24

## 2023-08-23 RX ADMIN — Medication 1 PATCH: at 18:14

## 2023-08-23 RX ADMIN — Medication 1000 MILLIGRAM(S): at 22:05

## 2023-08-23 RX ADMIN — Medication 1 PATCH: at 05:34

## 2023-08-23 RX ADMIN — Medication 55 MILLIGRAM(S): at 06:14

## 2023-08-23 RX ADMIN — Medication 125 MILLILITER(S): at 12:13

## 2023-08-23 RX ADMIN — Medication 400 MILLIGRAM(S): at 21:50

## 2023-08-23 RX ADMIN — CHLORHEXIDINE GLUCONATE 1 APPLICATION(S): 213 SOLUTION TOPICAL at 16:59

## 2023-08-23 RX ADMIN — Medication 55 MILLIGRAM(S): at 16:10

## 2023-08-23 RX ADMIN — Medication 40 MILLIGRAM(S): at 12:13

## 2023-08-23 RX ADMIN — Medication 55 MILLIGRAM(S): at 22:52

## 2023-08-23 RX ADMIN — CEFEPIME 100 MILLIGRAM(S): 1 INJECTION, POWDER, FOR SOLUTION INTRAMUSCULAR; INTRAVENOUS at 14:32

## 2023-08-23 RX ADMIN — CHLORHEXIDINE GLUCONATE 1 APPLICATION(S): 213 SOLUTION TOPICAL at 12:46

## 2023-08-23 RX ADMIN — SODIUM CHLORIDE 100 MILLILITER(S): 9 INJECTION, SOLUTION INTRAVENOUS at 06:14

## 2023-08-23 NOTE — PROGRESS NOTE ADULT - ASSESSMENT
1. Anemia  2. Dysphagia  3. Constipation  4. Failure to thrive  5. No evidence of acute GI bleeding  6. S/p unsuccessful endoscopic Peg placement  7. S/p gastrostomy tube placement by surgery  8. Peg tube malfunction  9. Dislodged Peg tube    Suggestions:    1. NPO  2. IVF hydration  3. Hold Peg feeding  4. IR to replace the feeding tube  5. Surgical follow up  6. Monitor electrolytes  7. Protonix daily  8. Aspiration precaution  9. Monitor H/H  10. Transfuse PRBC as needed  11. Avoid NSAID  12. Daily stool softener as needed  13. DVT prophylaxis

## 2023-08-23 NOTE — PROGRESS NOTE ADULT - SUBJECTIVE AND OBJECTIVE BOX
IR attending reviewed case for placement of G-tube  IR recommended GI consult for PEG  Called on call GI who feels that IR is appropriate next step, however he reports that Dr Nunes had previouly attempted PEG.  Now that stomach is pexed to the abdominal wall, please reconsult Dr Nunes for possible reattempt for PEG placement.  If this is unavailable then please reconsult IR for placement.  Please reconsult surgery when this has been determined as removal of the present tube may be indicated depending on IR vs GI placement of tube.

## 2023-08-23 NOTE — PROGRESS NOTE ADULT - ASSESSMENT
Patient is a 77 female from [facility] PMHx HTN, HLD, CVA (w/ residual aphasia and R sided hemiparesis/plegia) who was sent to the hospital due to altered mental status. Patient is being admitted to medicine for further work up and rule out stroke vs encephalopathy (metabolic vs infectious).  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 Patient is being admitted to medicine for further work up and rule out stroke vs encephalopathy (metabolic vs infectious).  Patient is a 77 female from [facility] PMHx HTN, HLD, CVA (w/ residual aphasia and R sided hemiparesis/plegia) who was sent to the hospital due to altered mental status. Patient is being admitted to medicine for further work up and rule out stroke vs encephalopathy (metabolic vs infectious).

## 2023-08-23 NOTE — PROGRESS NOTE ADULT - ASSESSMENT
Patient is a 77y old  Female who is from [facility] and with PMHx of HTN, HLD, CVA (w/ residual aphasia and R sided hemiparesis/plegia) who was sent to the hospital on 7/27/23, due to altered mental status. During the hospital course she has Gastrostomy tube placement by Surgery for poor oral intake and Dysphagia. On 8/17/23 she became septic and during sepsis work up found to have positive Urine analysis and started on ceftriaxone. Hence, the ID consult requested to assist with further evaluation and antibiotic management.    # Sepsis  as of 8/17/23 ( Fever + tachycardia + leukocytosis)  # Complicated UTI- s/p exchange Mcclellan catheter on 8/17/23 - s/p removal of mcclellan catheter and placed a primafit on 8/19/23  # s/p CVA with aphasia and dysphagia- s/p  Gastrostomy tube    would recommend:    1. Monitor WBC count, started trending down  2. Continue Cefepime until 8/24/23  3. Management of PEG as per Surgery  4. Aspiration precaution  5. Monitor Temp and c/w supportive care    d/w House staff, DR. Oconnell and senior resident     Attending Attestation:    Spent more than 35 minutes on total encounter, more than 50 % of the visit was spent counseling and/or coordinating care by the Attending physician.          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 During the hospital course she has Gastrostomy tube placement by Surgery for poor oral intake and Dysphagia. On 8/17/23 she became septic and during sepsis work up found to have positive Urine analysis and started on ceftriaxone. Hence, the ID consult requested to assist with further evaluation and antibiotic management.    # Sepsis  as of 8/17/23 ( Fever + tachycardia + leukocytosis)  # Complicated UTI- s/p exchange Mcclellan catheter on 8/17/23 - s/p removal of mcclellan catheter and placed a primafit on 8/19/23  # s/p CVA with aphasia and dysphagia- s/p  Gastrostomy tube    would recommend:    1. Monitor WBC count, started trending down  2. Continue Cefepime until 8/24/23  3. Management of PEG as per Surgery  4. Aspiration precaution  5. Monitor Temp and c/w supportive care    d/w House staff, DR. Oconnell and senior resident     Attending Attestation:    Spent more than 35 minutes on total encounter, more than 50 % of the visit was spent counseling and/or coordinating care by the Attending physician.          Patient is a 77y old  Female who is from [facility] and with PMHx of HTN, HLD, CVA (w/ residual aphasia and R sided hemiparesis/plegia) who was sent to the hospital on 7/27/23, due to altered mental status. During the hospital course she has Gastrostomy tube placement by Surgery for poor oral intake and Dysphagia. On 8/17/23 she became septic and during sepsis work up found to have positive Urine analysis and started on ceftriaxone. Hence, the ID consult requested to assist with further evaluation and antibiotic management.    # Sepsis  as of 8/17/23 ( Fever + tachycardia + leukocytosis)  # Complicated UTI- s/p exchange Mcclellan catheter on 8/17/23 - s/p removal of mcclellan catheter and placed a primafit on 8/19/23  # s/p CVA with aphasia and dysphagia- s/p  Gastrostomy tube    would recommend:    1. Monitor WBC count, started trending down  2. Continue Cefepime until 8/24/23  3. Management of PEG as per Surgery  4. Aspiration precaution  5. Monitor Temp and c/w supportive care    d/w House staff, DR. Oconnell and senior resident     Attending Attestation:    Spent more than 35 minutes on total encounter, more than 50 % of the visit was spent counseling and/or coordinating care by the Attending physician.        Patient is a 77y old  Female who is from [facility] and with PMHx of HTN, HLD, CVA (w/ residual aphasia and R sided hemiparesis/plegia) who was sent to the hospital on 7/27/23, due to altered mental status. During the hospital course she has Gastrostomy tube placement by Surgery for poor oral intake and Dysphagia. On 8/17/23 she became septic and during sepsis work up found to have positive Urine analysis and started on ceftriaxone. Hence, the ID consult requested to assist with further evaluation and antibiotic management.    # Sepsis  as of 8/17/23 ( Fever + tachycardia + leukocytosis)  # Complicated UTI- s/p exchange Mcclellan catheter on 8/17/23 - s/p removal of mcclellan catheter and placed a primafit on 8/19/23  # s/p CVA with aphasia and dysphagia- s/p  Gastrostomy tube    would recommend:    1. Monitor H/H and transfuse as needed    2. Monitor WBC count, started trending down  3. Continue Cefepime until 8/24/23 X 1 more days  4. Management of PEG as per Surgery  5. Aspiration precaution  6. Monitor Temp and c/w supportive care    d/w House staff, DR. Oconnell and Senior resident     Attending Attestation:    Spent more than 35 minutes on total encounter, more than 50 % of the visit was spent counseling and/or coordinating care by the Attending physician.        Patient is a 77y old  Female who is from [facility] and with PMHx of HTN, HLD, CVA (w/ residual aphasia and R sided hemiparesis/plegia) who was sent to the hospital on 7/27/23, due to altered mental status. During the hospital course she has Gastrostomy tube placement by Surgery for poor oral intake and Dysphagia. On 8/17/23 she became septic and during sepsis work up found to have positive Urine analysis and started on ceftriaxone. Hence, the ID consult requested to assist with further evaluation and antibiotic management.    # Sepsis  as of 8/17/23 ( Fever + tachycardia + leukocytosis)  # Complicated UTI- s/p exchange Mcclellan catheter on 8/17/23 - s/p removal of mcclellan catheter and placed a primafit on 8/19/23  # s/p CVA with aphasia and dysphagia- s/p  Gastrostomy tube    would recommend:    1. Monitor H/H and transfuse as needed    2. Monitor WBC count, started trending down  3. Continue Cefepime until 8/24/23 X 1 more days  4. Management of PEG as per Surgery  5. Aspiration precaution  6. Monitor Temp and c/w supportive care    d/w House staff, DR. Oconnell and Senior resident     Attending Attestation:    Spent more than 35 minutes on total encounter, more than 50 % of the visit was spent counseling and/or coordinating care by the Attending physician.        Patient is a 77y old  Female who is from [facility] and with PMHx of HTN, HLD, CVA (w/ residual aphasia and R sided hemiparesis/plegia) who was sent to the hospital on 7/27/23, due to altered mental status. During the hospital course she has Gastrostomy tube placement by Surgery for poor oral intake and Dysphagia. On 8/17/23 she became septic and during sepsis work up found to have positive Urine analysis and started on ceftriaxone. Hence, the ID consult requested to assist with further evaluation and antibiotic management.    # Sepsis  as of 8/17/23 ( Fever + tachycardia + leukocytosis)  # Complicated UTI- s/p exchange Mcclellan catheter on 8/17/23 - s/p removal of mcclellan catheter and placed a primafit on 8/19/23  # s/p CVA with aphasia and dysphagia- s/p  Gastrostomy tube    would recommend:    1. Monitor H/H and transfuse as needed    2. Monitor WBC count, started trending down  3. Continue Cefepime until 8/24/23 X 1 more days  4. Management of PEG as per Surgery  5. Aspiration precaution  6. Monitor Temp and c/w supportive care    d/w House staff, DR. Oconnell and Senior resident     Attending Attestation:    Spent more than 35 minutes on total encounter, more than 50 % of the visit was spent counseling and/or coordinating care by the Attending physician.

## 2023-08-23 NOTE — PROGRESS NOTE ADULT - PROBLEM SELECTOR PLAN 3
altered mental status  HEAD CT: Chronic left MCA territory infarction.  CT PERFUSION demonstrated: Perfusion abnormality corresponding to the chronically infarcted left MCA territory. INFARCT CORE: 57 mL. TISSUE AT RISK: 236 mL.  CTA COW and  CTA NECK: neg   Pt can not move limbs or speak. She failed dysphagia screen ans SnS altered mental status  HEAD CT: Chronic left MCA territory infarction.  CT PERFUSION demonstrated: Perfusion abnormality corresponding to the chronically infarcted left MCA territory. INFARCT CORE: 57 mL. TISSUE AT RISK: 236 mL.  CTA COW and  CTA NECK: neg   Pt can not move limbs or speak. She failed dysphagia screen and SnS

## 2023-08-23 NOTE — PROGRESS NOTE ADULT - SUBJECTIVE AND OBJECTIVE BOX
Patient seen and examined at bedside    Vital Signs Last 24 Hrs  T(F): 98.8 (08-23-23 @ 04:45), Max: 99.1 (08-22-23 @ 20:33)  HR: 102 (08-23-23 @ 04:45)  BP: 131/68 (08-23-23 @ 04:45)  RR: 19 (08-23-23 @ 04:45)  SpO2: 98% (08-23-23 @ 04:45)  POCT Blood Glucose.: 117 mg/dL (23 Aug 2023 06:15)    GENERAL: Alert, NAD  CHEST/LUNG: Respirations nonlabored  ABDOMEN: soft, G-tube noted with clear fluid leaking around tube, no tube feeds running, tube to gravity.    I&O's Detail    LABS:                        8.7    20.94 )-----------( 354      ( 22 Aug 2023 10:14 )             27.9     08-22    143  |  116<H>  |  46<H>  ----------------------------<  106<H>  4.0   |  18<L>  |  1.86<H>    Ca    7.4<L>      22 Aug 2023 10:14  Phos  4.9     08-22  Mg     2.3     08-22    TPro  5.3<L>  /  Alb  1.4<L>  /  TBili  0.3  /  DBili  x   /  AST  30  /  ALT  19  /  AlkPhos  133<H>  08-22

## 2023-08-23 NOTE — PROGRESS NOTE ADULT - PROBLEM SELECTOR PLAN 1
Pt. is NPO  Open G tube placed on 8/14  Feeds through G tube resumed  IV tylenol for pain, morphine for breakthrough pain  IV iron sucrose started 8/15 for 3 days as per nephro  G tube placement by Surgery on 8/14  LE doppler b/l showed no signs of DVT  8/20 leakage noted around site of G tube. Feedings stopped and surgery consulted  CT abdomen/pelvis showed g tube out of stomach lumen  surgery attempted to re-position manually, will f/u CT abdo/pelvis Pt. is NPO  Open G tube placed on 8/14  IV tylenol for pain, morphine for breakthrough pain  IV iron sucrose started 8/15 for 3 days as per nephro  G tube placement by Surgery on 8/14  LE doppler b/l showed no signs of DVT  8/20 leakage noted around site of G tube. Feedings stopped and surgery consulted  CT abdomen/pelvis showed g tube out of stomach lumen  surgery attempted to re-position manually, repeat CT A/P showed G-tube still out of stomach lumen

## 2023-08-23 NOTE — PROGRESS NOTE ADULT - ASSESSMENT
1. SUSAN possible to MARISA and ATN  Renal sono shows no hdyro with b/l kidneys around 9.2cm  -Scr is stable and unchanged at 1.9mg/dl with stable electrolytes and close to baseline.  -s/p mcclellan's cath placed for urinary retention during this admission; discontinue by primary team.   -urinalysis shows blood with proteinuria; FeNa >1%, spot protein to creatinine ratio is 1.5gm  -Adjust meds to eGFR and avoid IV Gadolinium contrast,NSAIDs, and phosphate enema.  -Monitor I/O's daily.   -Monitor SMA daily.  2. Hypernatremia due to water deficit and insensible losses. Pt is clinically euvolemic.   -Na worsening and may need D5W since PEG malfunction and no free water.  -Monitor I/O's. Check Serum Na Daily. Avoid high solute intake diet and sodium bicarbonate infuse. Avoid overcorrection of NA (8-10meq/day)  3. CKD stage 4 most likely due to hypertensive nephrosclerosis  -new baseline scr around 1.8mg/dL with uPCR 1.5gm.  -previous Scr around 1.2 to 1.6mg/dL in Oct 2022.  -Keep patient euvolemic and renal diet  -Avoid Nephrotoxic Meds/ Agents such as (NSAIDs, IV contrast, Aminoglycosides such as gentamicin, -Gadolinium contrast, Phosphate containing enemas, etc..)  -Adjust Medications according to eGFR  4. HTN:   -bp is acceptable. continue bp meds  -titrate bp meds to keep sbp >110 and < 130  5. Mineral Bone Disease:  -phos is okay.   -PTH intact 289, will start calcitriol once Phos has improved.   6. Encephalopathy:  -Leukocytosis unchanged.  -s/p Rocephin.  -Plan as per Neuro and primary team  -s/p PEG placed in OR on 08/14/23. Surgery following PEG reinsertion.  7. Hypokalemia:  -stable K.   -give kcl repletion to keep K >3.5meq/L  -monitor K.   8. Acidosis:  -CO2 is unchanged.    -monitor CO2.  9. Anemia:  -hb is stable.  -low iron sat and ferritin are okay. s/p iv iron x 3 days.  -monitor CBC.  -transfuse if hb <7.0  10. Elevated LFTs  -slowly improving; US abd done shows fatty liver  -monitor LFTs  11. Hyperkalemia due to susan on ckd.   -stable.   -give Nephro tube feeding. give Lokelma prn if K >5.5  -Keep pt euvolemic. Avoid ACE inh/ ARBs, NSAIDs, and Aldactone or potassium sparing diuretics. Monitor K+ daily.  12. Hypoalbuminemia and Anasarca:  -will iv albumin 250cc and lasix 40mg iv daily

## 2023-08-23 NOTE — PROGRESS NOTE ADULT - SUBJECTIVE AND OBJECTIVE BOX
NEPHROLOGY MEDICAL CARE, [facility] - Dr. Ajay Green/ Dr. Mert Madison/ Dr. Chris Calderon/ Dr. Carson Cook    Date of Service: 08-23-23 @ 16:36    Patient was seen and examined at bedside.    CC: patient is NAD; Anasarca present.     Vital Signs Last 24 Hrs  T(C): 37.1 (23 Aug 2023 13:10), Max: 37.3 (22 Aug 2023 20:33)  T(F): 98.8 (23 Aug 2023 13:10), Max: 99.1 (22 Aug 2023 20:33)  HR: 99 (23 Aug 2023 13:10) (94 - 105)  BP: 150/68 (23 Aug 2023 13:10) (131/68 - 150/68)  BP(mean): --  RR: 18 (23 Aug 2023 13:10) (18 - 19)  SpO2: 98% (23 Aug 2023 13:10) (98% - 99%)    Parameters below as of 23 Aug 2023 13:10  Patient On (Oxygen Delivery Method): room air          PHYSICAL EXAM:  General: No acute respiratory distress. Anasarca   Eyes: conjunctiva and sclera clear  ENMT: Atraumatic, Normocephalic; Moist mucous membranes  Respiratory: Bilateral poor air entry; No rales, rhonchi, wheezing  Cardiovascular: S1S2+; no m/r/g  Gastrointestinal: Soft, Non-tender, Nondistended; Bowel sounds present; PEG  Neuro: eyes are open; hemiparesis; non-verbal  Ext:  1+pedal edema, No Cyanosis  Skin: No visible rashes    MEDICATIONS:  MEDICATIONS  (STANDING):  aspirin  chewable 324 milliGRAM(s) Oral daily  atorvastatin 20 milliGRAM(s) Oral at bedtime  carbidopa/levodopa  25/100 1 Tablet(s) Oral <User Schedule>  cefepime   IVPB 1000 milliGRAM(s) IV Intermittent every 24 hours  cefepime   IVPB      chlorhexidine 2% Cloths 1 Application(s) Topical daily  cloNIDine Patch 0.1 mG/24Hr(s) 1 patch Transdermal every 7 days  dextrose 5% + sodium chloride 0.9%. 1000 milliLiter(s) (75 mL/Hr) IV Continuous <Continuous>  metoprolol tartrate 25 milliGRAM(s) Oral two times a day  pantoprazole   Suspension 40 milliGRAM(s) Oral daily  valproate sodium  IVPB 500 milliGRAM(s) IV Intermittent every 8 hours    MEDICATIONS  (PRN):  acetaminophen   Oral Liquid .. 650 milliGRAM(s) Enteral Tube every 6 hours PRN Temp greater or equal to 38C (100.4F)          LABS:                        7.0    17.72 )-----------( 269      ( 23 Aug 2023 12:05 )             22.4     08-23    147<H>  |  119<H>  |  46<H>  ----------------------------<  91  3.7   |  18<L>  |  1.87<H>    Ca    7.3<L>      23 Aug 2023 09:34  Phos  4.4     08-23  Mg     2.2     08-23    TPro  5.0<L>  /  Alb  1.3<L>  /  TBili  0.2  /  DBili  x   /  AST  20  /  ALT  16  /  AlkPhos  105  08-23      Urinalysis Basic - ( 23 Aug 2023 09:34 )    Color: x / Appearance: x / SG: x / pH: x  Gluc: 91 mg/dL / Ketone: x  / Bili: x / Urobili: x   Blood: x / Protein: x / Nitrite: x   Leuk Esterase: x / RBC: x / WBC x   Sq Epi: x / Non Sq Epi: x / Bacteria: x      Magnesium: 2.2 mg/dL (08-23 @ 09:34)  Phosphorus: 4.4 mg/dL (08-23 @ 09:34)    Urine studies    PTH and Vit D:         NEPHROLOGY MEDICAL CARE, [facility] - Dr. Ajay Green/ Dr. Mert Madison/ Dr. Chris Calderon/ Dr. Carson Cook    Date of Service: 08-23-23 @ 16:36    Patient was seen and examined at bedside.    CC: patient is NAD; Anasarca present.     Vital Signs Last 24 Hrs  T(C): 37.1 (23 Aug 2023 13:10), Max: 37.3 (22 Aug 2023 20:33)  T(F): 98.8 (23 Aug 2023 13:10), Max: 99.1 (22 Aug 2023 20:33)  HR: 99 (23 Aug 2023 13:10) (94 - 105)  BP: 150/68 (23 Aug 2023 13:10) (131/68 - 150/68)  BP(mean): --  RR: 18 (23 Aug 2023 13:10) (18 - 19)  SpO2: 98% (23 Aug 2023 13:10) (98% - 99%)    Parameters below as of 23 Aug 2023 13:10  Patient On (Oxygen Delivery Method): room air          PHYSICAL EXAM:  General: No acute respiratory distress. Anasarca   Eyes: conjunctiva and sclera clear  ENMT: Atraumatic, Normocephalic; Moist mucous membranes  Respiratory: Bilateral poor air entry; No rales, rhonchi, wheezing  Cardiovascular: S1S2+; no m/r/g  Gastrointestinal: Soft, Non-tender, Nondistended; Bowel sounds present; PEG  Neuro: eyes are open; hemiparesis; non-verbal  Ext:  1+pedal edema, No Cyanosis  Skin: No visible rashes    MEDICATIONS:  MEDICATIONS  (STANDING):  aspirin  chewable 324 milliGRAM(s) Oral daily  atorvastatin 20 milliGRAM(s) Oral at bedtime  carbidopa/levodopa  25/100 1 Tablet(s) Oral <User Schedule>  cefepime   IVPB 1000 milliGRAM(s) IV Intermittent every 24 hours  cefepime   IVPB      chlorhexidine 2% Cloths 1 Application(s) Topical daily  cloNIDine Patch 0.1 mG/24Hr(s) 1 patch Transdermal every 7 days  dextrose 5% + sodium chloride 0.9%. 1000 milliLiter(s) (75 mL/Hr) IV Continuous <Continuous>  metoprolol tartrate 25 milliGRAM(s) Oral two times a day  pantoprazole   Suspension 40 milliGRAM(s) Oral daily  valproate sodium  IVPB 500 milliGRAM(s) IV Intermittent every 8 hours    MEDICATIONS  (PRN):  acetaminophen   Oral Liquid .. 650 milliGRAM(s) Enteral Tube every 6 hours PRN Temp greater or equal to 38C (100.4F)          LABS:                        7.0    17.72 )-----------( 269      ( 23 Aug 2023 12:05 )             22.4     08-23    147<H>  |  119<H>  |  46<H>  ----------------------------<  91  3.7   |  18<L>  |  1.87<H>    Ca    7.3<L>      23 Aug 2023 09:34  Phos  4.4     08-23  Mg     2.2     08-23    TPro  5.0<L>  /  Alb  1.3<L>  /  TBili  0.2  /  DBili  x   /  AST  20  /  ALT  16  /  AlkPhos  105  08-23      Urinalysis Basic - ( 23 Aug 2023 09:34 )    Color: x / Appearance: x / SG: x / pH: x  Gluc: 91 mg/dL / Ketone: x  / Bili: x / Urobili: x   Blood: x / Protein: x / Nitrite: x   Leuk Esterase: x / RBC: x / WBC x   Sq Epi: x / Non Sq Epi: x / Bacteria: x      Magnesium: 2.2 mg/dL (08-23 @ 09:34)  Phosphorus: 4.4 mg/dL (08-23 @ 09:34)    Urine studies    PTH and Vit D:         NEPHROLOGY MEDICAL CARE, [facility] - Dr. Ajay Green/ Dr. Mert Madison/ Dr. Chris Calderon/ Dr. Carson Cook    Date of Service: 08-23-23 @ 16:36    Patient was seen and examined at bedside.    CC: patient is NAD; Anasarca present.     Vital Signs Last 24 Hrs  T(C): 37.1 (23 Aug 2023 13:10), Max: 37.3 (22 Aug 2023 20:33)  T(F): 98.8 (23 Aug 2023 13:10), Max: 99.1 (22 Aug 2023 20:33)  HR: 99 (23 Aug 2023 13:10) (94 - 105)  BP: 150/68 (23 Aug 2023 13:10) (131/68 - 150/68)  BP(mean): --  RR: 18 (23 Aug 2023 13:10) (18 - 19)  SpO2: 98% (23 Aug 2023 13:10) (98% - 99%)    Parameters below as of 23 Aug 2023 13:10  Patient On (Oxygen Delivery Method): room air          PHYSICAL EXAM:  General: No acute respiratory distress. Anasarca   Eyes: conjunctiva and sclera clear  ENMT: Atraumatic, Normocephalic; Moist mucous membranes  Respiratory: Bilateral poor air entry; No rales, rhonchi, wheezing  Cardiovascular: S1S2+; no m/r/g  Gastrointestinal: Soft, Non-tender, Nondistended; Bowel sounds present; PEG  Neuro: eyes are open; hemiparesis; non-verbal  Ext:  1+pedal edema, No Cyanosis  Skin: No visible rashes    MEDICATIONS:  MEDICATIONS  (STANDING):  aspirin  chewable 324 milliGRAM(s) Oral daily  atorvastatin 20 milliGRAM(s) Oral at bedtime  carbidopa/levodopa  25/100 1 Tablet(s) Oral <User Schedule>  cefepime   IVPB 1000 milliGRAM(s) IV Intermittent every 24 hours  cefepime   IVPB      chlorhexidine 2% Cloths 1 Application(s) Topical daily  cloNIDine Patch 0.1 mG/24Hr(s) 1 patch Transdermal every 7 days  dextrose 5% + sodium chloride 0.9%. 1000 milliLiter(s) (75 mL/Hr) IV Continuous <Continuous>  metoprolol tartrate 25 milliGRAM(s) Oral two times a day  pantoprazole   Suspension 40 milliGRAM(s) Oral daily  valproate sodium  IVPB 500 milliGRAM(s) IV Intermittent every 8 hours    MEDICATIONS  (PRN):  acetaminophen   Oral Liquid .. 650 milliGRAM(s) Enteral Tube every 6 hours PRN Temp greater or equal to 38C (100.4F)          LABS:                        7.0    17.72 )-----------( 269      ( 23 Aug 2023 12:05 )             22.4     08-23    147<H>  |  119<H>  |  46<H>  ----------------------------<  91  3.7   |  18<L>  |  1.87<H>    Ca    7.3<L>      23 Aug 2023 09:34  Phos  4.4     08-23  Mg     2.2     08-23    TPro  5.0<L>  /  Alb  1.3<L>  /  TBili  0.2  /  DBili  x   /  AST  20  /  ALT  16  /  AlkPhos  105  08-23      Urinalysis Basic - ( 23 Aug 2023 09:34 )    Color: x / Appearance: x / SG: x / pH: x  Gluc: 91 mg/dL / Ketone: x  / Bili: x / Urobili: x   Blood: x / Protein: x / Nitrite: x   Leuk Esterase: x / RBC: x / WBC x   Sq Epi: x / Non Sq Epi: x / Bacteria: x      Magnesium: 2.2 mg/dL (08-23 @ 09:34)  Phosphorus: 4.4 mg/dL (08-23 @ 09:34)    Urine studies    PTH and Vit D:

## 2023-08-23 NOTE — PROGRESS NOTE ADULT - SUBJECTIVE AND OBJECTIVE BOX
[   ] ICU                                          [   ] CCU                                      [ X  ] Medical Floor      Patient is a 77 year old female with dysphagia. GI consulted for Peg tube placement.     Patient is a 77 female from [facility] with past medical history significant for HTN, HLD, CVA (w/ residual aphasia and R sided hemiparesis/plegia) who was sent to the emergency room with for altered mental status. Patient is not able to provide any history. Patient is lying down in bed, awake and alert. However, patient does not speak, is not able to follow commands and does not turn face or move eyes when her name is called. Patient is admitted with CVA. Now patient with dysphagia, poor po intake, failure to thrive and weight loss. No abdominal pain, nausea, vomiting, hematemesis, hematochezia, melena, fever, chills, chest pain, SOB, cough, hematuria, dysuria  or diarrhea reported.      Patient is comfortable. No new complaints reported except Peg tube malfunction (leaking the feeding from it's side), No abdominal pain, N/V, hematemesis, hematochezia, melena, fever, chills, chest pain, SOB, cough or diarrhea reported.      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 1000 milliLiter(s) (70 mL/Hr) IV Continuous <Continuous>  enoxaparin Injectable 30 milliGRAM(s) SubCutaneous every 24 hours  hydrALAZINE Injectable 10 milliGRAM(s) IV Push every 6 hours  potassium chloride  10 mEq/100 mL IVPB 10 milliEquivalent(s) IV Intermittent every 1 hour  valproate sodium  IVPB 500 milliGRAM(s) IV Intermittent every 8 hours         SOCIAL HISTORY  Advanced Directives:       [ X ] Full Code       [  ] DNR  Marital Status:         [  ] M      [ X ] S      [  ] D       [  ] W  Children:       [ X ] Yes      [  ] No  Occupation:        [  ] Employed       [ X ] Unemployed       [  ] Retired  Diet:       [ X ] Regular       [  ] PEG feeding          [  ] NG tube feeding  Drug Use:           [ X ] Patient denied          [  ] Yes  Alcohol:           [ X ] No             [  ] Yes (socially)         [  ] Yes (chronic)  Tobacco:           [  ] Yes           [X  ] No      FAMILY HISTORY  [ X ] Heart Disease            [ X ] Diabetes             [ X ] HTN             [  ] Colon Cancer             [  ] Stomach Cancer              [  ] Pancreatic Cancer        VITALS  Vital Signs Last 24 Hrs  T(C): 37.1 (23 Aug 2023 13:10), Max: 37.3 (22 Aug 2023 20:33)  T(F): 98.8 (23 Aug 2023 13:10), Max: 99.1 (22 Aug 2023 20:33)  HR: 99 (23 Aug 2023 13:10) (94 - 105)  BP: 150/68 (23 Aug 2023 13:10) (131/68 - 150/68)   RR: 18 (23 Aug 2023 13:10) (18 - 19)  SpO2: 98% (23 Aug 2023 13:10) (98% - 99%)  Parameters below as of 23 Aug 2023 13:10  Patient On (Oxygen Delivery Method): room air       MEDICATIONS  (STANDING):  aspirin  chewable 324 milliGRAM(s) Oral daily  atorvastatin 20 milliGRAM(s) Oral at bedtime  carbidopa/levodopa  25/100 1 Tablet(s) Oral <User Schedule>  cefepime   IVPB 1000 milliGRAM(s) IV Intermittent every 24 hours  cefepime   IVPB      chlorhexidine 2% Cloths 1 Application(s) Topical daily  cloNIDine Patch 0.1 mG/24Hr(s) 1 patch Transdermal every 7 days  dextrose 5% + sodium chloride 0.9%. 1000 milliLiter(s) (75 mL/Hr) IV Continuous <Continuous>  metoprolol tartrate 25 milliGRAM(s) Oral two times a day  pantoprazole   Suspension 40 milliGRAM(s) Oral daily  valproate sodium  IVPB 500 milliGRAM(s) IV Intermittent every 8 hours    MEDICATIONS  (PRN):  acetaminophen   Oral Liquid .. 650 milliGRAM(s) Enteral Tube every 6 hours PRN Temp greater or equal to 38C (100.4F)                            7.0    17.72 )-----------( 269      ( 23 Aug 2023 12:05 )             22.4       08-23    147<H>  |  119<H>  |  46<H>  ----------------------------<  91  3.7   |  18<L>  |  1.87<H>    Ca    7.3<L>      23 Aug 2023 09:34  Phos  4.4     08-23  Mg     2.2     08-23    TPro  5.0<L>  /  Alb  1.3<L>  /  TBili  0.2  /  DBili  x   /  AST  20  /  ALT  16  /  AlkPhos  105  08-23       [   ] ICU                                          [   ] CCU                                      [ X  ] Medical Floor      Patient is a 77 year old female with dysphagia. GI consulted for Peg tube placement.     Patient is a 77 female from [facility] with past medical history significant for HTN, HLD, CVA (w/ residual aphasia and R sided hemiparesis/plegia) who was sent to the emergency room with for altered mental status. Patient is not able to provide any history. Patient is lying down in bed, awake and alert. However, patient does not speak, is not able to follow commands and does not turn face or move eyes when her name is called. Patient is admitted with CVA. Now patient with dysphagia, poor po intake, failure to thrive and weight loss. No abdominal pain, nausea, vomiting, hematemesis, hematochezia, melena, fever, chills, chest pain, SOB, cough, hematuria, dysuria  or diarrhea reported.      Patient is comfortable. No new complaints reported except Peg tube malfunction (leaking the feeding from it's side), No abdominal pain, N/V, hematemesis, hematochezia, melena, fever, chills, chest pain, SOB, cough or diarrhea reported.      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 1000 milliLiter(s) (70 mL/Hr) IV Continuous <Continuous>  enoxaparin Injectable 30 milliGRAM(s) SubCutaneous every 24 hours  hydrALAZINE Injectable 10 milliGRAM(s) IV Push every 6 hours  potassium chloride  10 mEq/100 mL IVPB 10 milliEquivalent(s) IV Intermittent every 1 hour  valproate sodium  IVPB 500 milliGRAM(s) IV Intermittent every 8 hours         SOCIAL HISTORY  Advanced Directives:       [ X ] Full Code       [  ] DNR  Marital Status:         [  ] M      [ X ] S      [  ] D       [  ] W  Children:       [ X ] Yes      [  ] No  Occupation:        [  ] Employed       [ X ] Unemployed       [  ] Retired  Diet:       [ X ] Regular       [  ] PEG feeding          [  ] NG tube feeding  Drug Use:           [ X ] Patient denied          [  ] Yes  Alcohol:           [ X ] No             [  ] Yes (socially)         [  ] Yes (chronic)  Tobacco:           [  ] Yes           [X  ] No      FAMILY HISTORY  [ X ] Heart Disease            [ X ] Diabetes             [ X ] HTN             [  ] Colon Cancer             [  ] Stomach Cancer              [  ] Pancreatic Cancer        VITALS  Vital Signs Last 24 Hrs  T(C): 37.1 (23 Aug 2023 13:10), Max: 37.3 (22 Aug 2023 20:33)  T(F): 98.8 (23 Aug 2023 13:10), Max: 99.1 (22 Aug 2023 20:33)  HR: 99 (23 Aug 2023 13:10) (94 - 105)  BP: 150/68 (23 Aug 2023 13:10) (131/68 - 150/68)   RR: 18 (23 Aug 2023 13:10) (18 - 19)  SpO2: 98% (23 Aug 2023 13:10) (98% - 99%)  Parameters below as of 23 Aug 2023 13:10  Patient On (Oxygen Delivery Method): room air       MEDICATIONS  (STANDING):  aspirin  chewable 324 milliGRAM(s) Oral daily  atorvastatin 20 milliGRAM(s) Oral at bedtime  carbidopa/levodopa  25/100 1 Tablet(s) Oral <User Schedule>  cefepime   IVPB 1000 milliGRAM(s) IV Intermittent every 24 hours  cefepime   IVPB      chlorhexidine 2% Cloths 1 Application(s) Topical daily  cloNIDine Patch 0.1 mG/24Hr(s) 1 patch Transdermal every 7 days  dextrose 5% + sodium chloride 0.9%. 1000 milliLiter(s) (75 mL/Hr) IV Continuous <Continuous>  metoprolol tartrate 25 milliGRAM(s) Oral two times a day  pantoprazole   Suspension 40 milliGRAM(s) Oral daily  valproate sodium  IVPB 500 milliGRAM(s) IV Intermittent every 8 hours    MEDICATIONS  (PRN):  acetaminophen   Oral Liquid .. 650 milliGRAM(s) Enteral Tube every 6 hours PRN Temp greater or equal to 38C (100.4F)                            7.0    17.72 )-----------( 269      ( 23 Aug 2023 12:05 )             22.4       08-23    147<H>  |  119<H>  |  46<H>  ----------------------------<  91  3.7   |  18<L>  |  1.87<H>    Ca    7.3<L>      23 Aug 2023 09:34  Phos  4.4     08-23  Mg     2.2     08-23    TPro  5.0<L>  /  Alb  1.3<L>  /  TBili  0.2  /  DBili  x   /  AST  20  /  ALT  16  /  AlkPhos  105  08-23       [   ] ICU                                          [   ] CCU                                      [ X  ] Medical Floor      Patient is a 77 year old female with dysphagia. GI consulted for Peg tube placement.     Patient is a 77 female from [facility] with past medical history significant for HTN, HLD, CVA (w/ residual aphasia and R sided hemiparesis/plegia) who was sent to the emergency room with for altered mental status. Patient is not able to provide any history. Patient is lying down in bed, awake and alert. However, patient does not speak, is not able to follow commands and does not turn face or move eyes when her name is called. Patient is admitted with CVA. Now patient with dysphagia, poor po intake, failure to thrive and weight loss. No abdominal pain, nausea, vomiting, hematemesis, hematochezia, melena, fever, chills, chest pain, SOB, cough, hematuria, dysuria  or diarrhea reported.      Patient is comfortable. No new complaints reported except Peg tube malfunction (leaking the feeding from it's side), No abdominal pain, N/V, hematemesis, hematochezia, melena, fever, chills, chest pain, SOB, cough or diarrhea reported.      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 1000 milliLiter(s) (70 mL/Hr) IV Continuous <Continuous>  enoxaparin Injectable 30 milliGRAM(s) SubCutaneous every 24 hours  hydrALAZINE Injectable 10 milliGRAM(s) IV Push every 6 hours  potassium chloride  10 mEq/100 mL IVPB 10 milliEquivalent(s) IV Intermittent every 1 hour  valproate sodium  IVPB 500 milliGRAM(s) IV Intermittent every 8 hours         SOCIAL HISTORY  Advanced Directives:       [ X ] Full Code       [  ] DNR  Marital Status:         [  ] M      [ X ] S      [  ] D       [  ] W  Children:       [ X ] Yes      [  ] No  Occupation:        [  ] Employed       [ X ] Unemployed       [  ] Retired  Diet:       [ X ] Regular       [  ] PEG feeding          [  ] NG tube feeding  Drug Use:           [ X ] Patient denied          [  ] Yes  Alcohol:           [ X ] No             [  ] Yes (socially)         [  ] Yes (chronic)  Tobacco:           [  ] Yes           [X  ] No      FAMILY HISTORY  [ X ] Heart Disease            [ X ] Diabetes             [ X ] HTN             [  ] Colon Cancer             [  ] Stomach Cancer              [  ] Pancreatic Cancer        VITALS  Vital Signs Last 24 Hrs  T(C): 37.1 (23 Aug 2023 13:10), Max: 37.3 (22 Aug 2023 20:33)  T(F): 98.8 (23 Aug 2023 13:10), Max: 99.1 (22 Aug 2023 20:33)  HR: 99 (23 Aug 2023 13:10) (94 - 105)  BP: 150/68 (23 Aug 2023 13:10) (131/68 - 150/68)   RR: 18 (23 Aug 2023 13:10) (18 - 19)  SpO2: 98% (23 Aug 2023 13:10) (98% - 99%)  Parameters below as of 23 Aug 2023 13:10  Patient On (Oxygen Delivery Method): room air       MEDICATIONS  (STANDING):  aspirin  chewable 324 milliGRAM(s) Oral daily  atorvastatin 20 milliGRAM(s) Oral at bedtime  carbidopa/levodopa  25/100 1 Tablet(s) Oral <User Schedule>  cefepime   IVPB 1000 milliGRAM(s) IV Intermittent every 24 hours  cefepime   IVPB      chlorhexidine 2% Cloths 1 Application(s) Topical daily  cloNIDine Patch 0.1 mG/24Hr(s) 1 patch Transdermal every 7 days  dextrose 5% + sodium chloride 0.9%. 1000 milliLiter(s) (75 mL/Hr) IV Continuous <Continuous>  metoprolol tartrate 25 milliGRAM(s) Oral two times a day  pantoprazole   Suspension 40 milliGRAM(s) Oral daily  valproate sodium  IVPB 500 milliGRAM(s) IV Intermittent every 8 hours    MEDICATIONS  (PRN):  acetaminophen   Oral Liquid .. 650 milliGRAM(s) Enteral Tube every 6 hours PRN Temp greater or equal to 38C (100.4F)                            7.0    17.72 )-----------( 269      ( 23 Aug 2023 12:05 )             22.4       08-23    147<H>  |  119<H>  |  46<H>  ----------------------------<  91  3.7   |  18<L>  |  1.87<H>    Ca    7.3<L>      23 Aug 2023 09:34  Phos  4.4     08-23  Mg     2.2     08-23    TPro  5.0<L>  /  Alb  1.3<L>  /  TBili  0.2  /  DBili  x   /  AST  20  /  ALT  16  /  AlkPhos  105  08-23

## 2023-08-23 NOTE — PROGRESS NOTE ADULT - SUBJECTIVE AND OBJECTIVE BOX
Date of Service 08-23-23 @ 11:49    CHIEF COMPLAINT:Patient is a 77y old  Female who presents with a chief complaint of Altered mental status. Pt appears comfortable.    	  REVIEW OF SYSTEMS:  [x ] Unable to obtain    PHYSICAL EXAM:  T(C): 37.1 (08-23-23 @ 04:45), Max: 37.3 (08-22-23 @ 20:33)  HR: 102 (08-23-23 @ 04:45) (101 - 105)  BP: 131/68 (08-23-23 @ 04:45) (131/68 - 147/73)  RR: 19 (08-23-23 @ 04:45) (18 - 19)  SpO2: 98% (08-23-23 @ 04:45) (98% - 99%)  Wt(kg): --  I&O's Summary      Appearance: Normal	  HEENT:   Normal oral mucosa, PERRL, EOMI	  Lymphatic: No lymphadenopathy  Cardiovascular: Normal S1 S2, No JVD, No murmurs, No edema  Respiratory: Lungs clear to auscultation	  Gastrointestinal:  Soft, Non-tender, + BS	  Skin: No rashes, No ecchymoses, No cyanosis	  Extremities: Normal range of motion, No clubbing, cyanosis or edema  Vascular: Peripheral pulses palpable 2+ bilaterally    MEDICATIONS  (STANDING):  albumin human  5% IVPB 250 milliLiter(s) IV Intermittent once  aspirin  chewable 324 milliGRAM(s) Oral daily  atorvastatin 20 milliGRAM(s) Oral at bedtime  carbidopa/levodopa  25/100 1 Tablet(s) Oral <User Schedule>  cefepime   IVPB 1000 milliGRAM(s) IV Intermittent every 24 hours  cefepime   IVPB      chlorhexidine 2% Cloths 1 Application(s) Topical daily  cloNIDine Patch 0.1 mG/24Hr(s) 1 patch Transdermal every 7 days  dextrose 5% + sodium chloride 0.9%. 1000 milliLiter(s) (75 mL/Hr) IV Continuous <Continuous>  furosemide   Injectable 40 milliGRAM(s) IV Push once  metoprolol tartrate 25 milliGRAM(s) Oral two times a day  pantoprazole   Suspension 40 milliGRAM(s) Oral daily  valproate sodium  IVPB 500 milliGRAM(s) IV Intermittent every 8 hours       	  	  LABS:	 	                            6.9    18.08 )-----------( 249      ( 23 Aug 2023 09:34 )             21.7     08-23    147<H>  |  119<H>  |  46<H>  ----------------------------<  91  3.7   |  18<L>  |  1.87<H>    Ca    7.3<L>      23 Aug 2023 09:34  Phos  4.4     08-23  Mg     2.2     08-23    TPro  5.0<L>  /  Alb  1.3<L>  /  TBili  0.2  /  DBili  x   /  AST  20  /  ALT  16  /  AlkPhos  105  08-23    proBNP:   Lipid Profile: Cholesterol 152  LDL --  HDL 51  TG 93  Ldl calc 82  Ratio --    HgA1c:   TSH: Thyroid Stimulating Hormone, Serum: 3.59 uU/mL (07-28 @ 05:03)

## 2023-08-23 NOTE — PROGRESS NOTE ADULT - PROBLEM SELECTOR PLAN 5
8/17 Pt meets SIRS criteria  UA siginificant for elevated WBC's  Pt started on Cefepime as per Dr. Correa's recommendation 8/17 Pt meets SIRS criteria  UA significant for elevated WBC's  Pt started on Cefepime as per Dr. Correa's recommendation

## 2023-08-23 NOTE — PROGRESS NOTE ADULT - SUBJECTIVE AND OBJECTIVE BOX
Patient is seen and examined at the bed side, is afebrile. The Leukocytosis started trending down.       REVIEW OF SYSTEMS: Unable to obtain due  to mental status       ALLERGIES: &quot; NATURAL RUBBER&quot; (Other)  latex (Other)  penicillins (Unknown)      Vital Signs Last 24 Hrs  T(C): 37.1 (23 Aug 2023 13:10), Max: 37.3 (22 Aug 2023 20:33)  T(F): 98.8 (23 Aug 2023 13:10), Max: 99.1 (22 Aug 2023 20:33)  HR: 99 (23 Aug 2023 13:10) (94 - 105)  BP: 150/68 (23 Aug 2023 13:10) (131/68 - 150/68)  BP(mean): --  RR: 18 (23 Aug 2023 13:10) (18 - 19)  SpO2: 98% (23 Aug 2023 13:10) (98% - 99%)    Parameters below as of 23 Aug 2023 13:10  Patient On (Oxygen Delivery Method): room air        PHYSICAL EXAM:  GENERAL: Not in acute distress   CHEST/LUNG:  Not using accessory muscles   HEART: s1 and s2 present  ABDOMEN:  grossly obese  EXTREMITIES: edematous  CNS: Awake, somewhat alert but Non verbal       LABS:                        7.0    17.72 )-----------( 269      ( 23 Aug 2023 12:05 )             22.4                                    8.7    20.94 )-----------( 354      ( 22 Aug 2023 10:14 )             27.9       08-23    147<H>  |  119<H>  |  46<H>  ----------------------------<  91  3.7   |  18<L>  |  1.87<H>    Ca    7.3<L>      23 Aug 2023 09:34  Phos  4.4     08-23  Mg     2.2     08-23    TPro  5.0<L>  /  Alb  1.3<L>  /  TBili  0.2  /  DBili  x   /  AST  20  /  ALT  16  /  AlkPhos  105  08-23    08-22    143  |  116<H>  |  46<H>  ----------------------------<  106<H>  4.0   |  18<L>  |  1.86<H>    Ca    7.4<L>      22 Aug 2023 10:14  Phos  4.9     08-22  Mg     2.3     08-22    TPro  5.3<L>  /  Alb  1.4<L>  /  TBili  0.3  /  DBili  x   /  AST  30  /  ALT  19  /  AlkPhos  133<H>  08-22        CAPILLARY BLOOD GLUCOSE  POCT Blood Glucose.: 144 mg/dL (18 Aug 2023 11:49)  POCT Blood Glucose.: 122 mg/dL (18 Aug 2023 00:03)      Urinalysis Basic - ( 18 Aug 2023 11:20 )  Color: x / Appearance: x / SG: x / pH: x  Gluc: 129 mg/dL / Ketone: x  / Bili: x / Urobili: x   Blood: x / Protein: x / Nitrite: x   Leuk Esterase: x / RBC: x / WBC x   Sq Epi: x / Non Sq Epi: x / Bacteria: x        MEDICATIONS  (STANDING):      aspirin  chewable 324 milliGRAM(s) Oral daily  atorvastatin 20 milliGRAM(s) Oral at bedtime  carbidopa/levodopa  25/100 1 Tablet(s) Oral <User Schedule>  cefepime   IVPB 1000 milliGRAM(s) IV Intermittent every 24 hours  cefepime   IVPB      chlorhexidine 2% Cloths 1 Application(s) Topical daily  cloNIDine Patch 0.1 mG/24Hr(s) 1 patch Transdermal every 7 days  dextrose 5% + sodium chloride 0.9%. 1000 milliLiter(s) (75 mL/Hr) IV Continuous <Continuous>  metoprolol tartrate 25 milliGRAM(s) Oral two times a day  pantoprazole   Suspension 40 milliGRAM(s) Oral daily  valproate sodium  IVPB 500 milliGRAM(s) IV Intermittent every 8 hours        MICROBIOLOGY DATA:    Culture - Urine (08.17.23 @ 23:15)   Specimen Source: Catheterized Catheterized  Culture Results:   <10,000 CFU/mL Normal Urogenital Anila      Culture - Blood (08.17.23 @ 14:36)   Specimen Source: .Blood Blood-Peripheral  Culture Results: No growth at 4 days    Culture - Blood (08.17.23 @ 14:35)   Specimen Source: .Blood Blood-Peripheral  Culture Results: No growth at 4 days      Urine Microscopic-Add On (NC) (08.17.23 @ 18:40)   Red Blood Cell - Urine: 150 /HPF  White Blood Cell - Urine: 30 /HPF  Bacteria: Few /HPF  Squamous Epithelial Cells: Present    Urine Microscopic-Add On (NC) (08.17.23 @ 11:35)   Squamous Epithelial Cells: None Seen  Bacteria: Negative /HPF  Comment - Urine: Moderate budding yeast and yeast-like cells  White Blood Cell - Urine: >50 /HPF  Red Blood Cell - Urine: >50 /HPF      COVID-19 PCR . (08.16.23 @ 17:40)   COVID-19 PCR: NotDetec:                       Patient is seen and examined at the bed side, is afebrile. The Leukocytosis started trending down. The Hemoglobin also dropped to 7.0.      REVIEW OF SYSTEMS: Unable to obtain due  to mental status       ALLERGIES: &quot; NATURAL RUBBER&quot; (Other)  latex (Other), penicillins (Unknown)      Vital Signs Last 24 Hrs  T(C): 37.1 (23 Aug 2023 13:10), Max: 37.3 (22 Aug 2023 20:33)  T(F): 98.8 (23 Aug 2023 13:10), Max: 99.1 (22 Aug 2023 20:33)  HR: 99 (23 Aug 2023 13:10) (94 - 105)  BP: 150/68 (23 Aug 2023 13:10) (131/68 - 150/68)  BP(mean): --  RR: 18 (23 Aug 2023 13:10) (18 - 19)  SpO2: 98% (23 Aug 2023 13:10) (98% - 99%)    Parameters below as of 23 Aug 2023 13:10  Patient On (Oxygen Delivery Method): room air        PHYSICAL EXAM:  GENERAL: Not in acute distress   CHEST/LUNG:  Not using accessory muscles   HEART: s1 and s2 present  ABDOMEN:  grossly obese  EXTREMITIES: edematous  CNS: Awake, somewhat alert but Non verbal       LABS:                        7.0    17.72 )-----------( 269      ( 23 Aug 2023 12:05 )             22.4                                    8.7    20.94 )-----------( 354      ( 22 Aug 2023 10:14 )             27.9       08-23    147<H>  |  119<H>  |  46<H>  ----------------------------<  91  3.7   |  18<L>  |  1.87<H>    Ca    7.3<L>      23 Aug 2023 09:34  Phos  4.4     08-23  Mg     2.2     08-23    TPro  5.0<L>  /  Alb  1.3<L>  /  TBili  0.2  /  DBili  x   /  AST  20  /  ALT  16  /  AlkPhos  105  08-23    08-22    143  |  116<H>  |  46<H>  ----------------------------<  106<H>  4.0   |  18<L>  |  1.86<H>    Ca    7.4<L>      22 Aug 2023 10:14  Phos  4.9     08-22  Mg     2.3     08-22    TPro  5.3<L>  /  Alb  1.4<L>  /  TBili  0.3  /  DBili  x   /  AST  30  /  ALT  19  /  AlkPhos  133<H>  08-22        CAPILLARY BLOOD GLUCOSE  POCT Blood Glucose.: 144 mg/dL (18 Aug 2023 11:49)  POCT Blood Glucose.: 122 mg/dL (18 Aug 2023 00:03)      Urinalysis Basic - ( 18 Aug 2023 11:20 )  Color: x / Appearance: x / SG: x / pH: x  Gluc: 129 mg/dL / Ketone: x  / Bili: x / Urobili: x   Blood: x / Protein: x / Nitrite: x   Leuk Esterase: x / RBC: x / WBC x   Sq Epi: x / Non Sq Epi: x / Bacteria: x        MEDICATIONS  (STANDING):    aspirin  chewable 324 milliGRAM(s) Oral daily  atorvastatin 20 milliGRAM(s) Oral at bedtime  carbidopa/levodopa  25/100 1 Tablet(s) Oral <User Schedule>  cefepime   IVPB 1000 milliGRAM(s) IV Intermittent every 24 hours  cefepime   IVPB      chlorhexidine 2% Cloths 1 Application(s) Topical daily  cloNIDine Patch 0.1 mG/24Hr(s) 1 patch Transdermal every 7 days  dextrose 5% + sodium chloride 0.9%. 1000 milliLiter(s) (75 mL/Hr) IV Continuous <Continuous>  metoprolol tartrate 25 milliGRAM(s) Oral two times a day  pantoprazole   Suspension 40 milliGRAM(s) Oral daily  valproate sodium  IVPB 500 milliGRAM(s) IV Intermittent every 8 hours        MICROBIOLOGY DATA:    Culture - Urine (08.17.23 @ 23:15)   Specimen Source: Catheterized Catheterized  Culture Results:   <10,000 CFU/mL Normal Urogenital Anila      Culture - Blood (08.17.23 @ 14:36)   Specimen Source: .Blood Blood-Peripheral  Culture Results: No growth at 4 days    Culture - Blood (08.17.23 @ 14:35)   Specimen Source: .Blood Blood-Peripheral  Culture Results: No growth at 4 days      Urine Microscopic-Add On (NC) (08.17.23 @ 18:40)   Red Blood Cell - Urine: 150 /HPF  White Blood Cell - Urine: 30 /HPF  Bacteria: Few /HPF  Squamous Epithelial Cells: Present    Urine Microscopic-Add On (NC) (08.17.23 @ 11:35)   Squamous Epithelial Cells: None Seen  Bacteria: Negative /HPF  Comment - Urine: Moderate budding yeast and yeast-like cells  White Blood Cell - Urine: >50 /HPF  Red Blood Cell - Urine: >50 /HPF      COVID-19 PCR . (08.16.23 @ 17:40)   COVID-19 PCR: NotDetec:

## 2023-08-23 NOTE — PROGRESS NOTE ADULT - SUBJECTIVE AND OBJECTIVE BOX
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 Note that stomach is tacked surgically to anterior abdominal wall which likely will facilitate PEG placement.  cbc q8h x 24hrs   vs q4hrs  if ng feeding tube is inserted in interim suggest to start with trickle feeding to tolerate. vs consider tpn until PEG placed by GI or IR if GI unavailable  IF IR is determined for placement Surgery will remove the currrent tube as per IR request and a repeat CT will need ordered after removal  IF GI to EGD and attempt PEG, they may consider keeping this tube in place until PEG placement so as to use the same tract to access stomach.

## 2023-08-23 NOTE — PROGRESS NOTE ADULT - ASSESSMENT
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  1.UTI-s/p ABX.  2.HTN-clonidine patch .1mg q wk.  3.Lipid d/o- statin.  4.Surgical f/u noted, Rec GI for PEG since s/p G tube now dislodged, ABX,  5.ABX on hold.  6.Anemia-  iron.  7.GI and DVT prophylaxis.     77 female from 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  1.UTI-s/p ABX.  2.HTN-clonidine patch .1mg q wk.  3.Lipid d/o- statin.  4.Surgical f/u noted, Rec GI for PEG since s/p G tube now dislodged, ABX,  5.ABX on hold.  6.Anemia-  iron.  7.GI and DVT prophylaxis.

## 2023-08-23 NOTE — PROGRESS NOTE ADULT - SUBJECTIVE AND OBJECTIVE BOX
PGY-1 Progress Note discussed with attending    PAGER #: [[phone]] TILL 5:00 PM  PLEASE CONTACT ON CALL TEAM:  - On Call Team (Please refer to Tyler) FROM 5:00 PM - 8:30PM  - Nightfloat Team FROM 8:30 -7:30 AM    CHIEF COMPLAINT & BRIEF HOSPITAL COURSE:      INTERVAL HPI/OVERNIGHT EVENTS:       MEDICATIONS  (STANDING):  aspirin  chewable 324 milliGRAM(s) Oral daily  atorvastatin 20 milliGRAM(s) Oral at bedtime  carbidopa/levodopa  25/100 1 Tablet(s) Oral <User Schedule>  cefepime   IVPB      cefepime   IVPB 1000 milliGRAM(s) IV Intermittent every 24 hours  chlorhexidine 2% Cloths 1 Application(s) Topical daily  cloNIDine Patch 0.1 mG/24Hr(s) 1 patch Transdermal every 7 days  dextrose 5% + sodium chloride 0.45%. 1000 milliLiter(s) (100 mL/Hr) IV Continuous <Continuous>  metoprolol tartrate 25 milliGRAM(s) Oral two times a day  pantoprazole   Suspension 40 milliGRAM(s) Oral daily  valproate sodium  IVPB 500 milliGRAM(s) IV Intermittent every 8 hours    MEDICATIONS  (PRN):  acetaminophen   Oral Liquid .. 650 milliGRAM(s) Enteral Tube every 6 hours PRN Temp greater or equal to 38C (100.4F)      REVIEW OF SYSTEMS:  CONSTITUTIONAL: No fever, weight loss, or fatigue  RESPIRATORY: No shortness of breath  CARDIOVASCULAR: No chest pain  GASTROINTESTINAL: No abdominal pain.  GENITOURINARY: No dysuria  NEUROLOGICAL: No headaches  SKIN: No itching, burning, rashes    Vital Signs Last 24 Hrs  T(C): 37.1 (23 Aug 2023 04:45), Max: 37.3 (22 Aug 2023 20:33)  T(F): 98.8 (23 Aug 2023 04:45), Max: 99.1 (22 Aug 2023 20:33)  HR: 102 (23 Aug 2023 04:45) (101 - 105)  BP: 131/68 (23 Aug 2023 04:45) (131/68 - 147/73)  BP(mean): --  RR: 19 (23 Aug 2023 04:45) (18 - 19)  SpO2: 98% (23 Aug 2023 04:45) (98% - 99%)    Parameters below as of 23 Aug 2023 04:45  Patient On (Oxygen Delivery Method): room air        PHYSICAL EXAMINATION:  GENERAL: NAD, well built  HEAD:  Atraumatic, Normocephalic  EYES:  conjunctiva and sclera clear  CHEST/LUNG: Clear to auscultation. No rales, rhonchi, wheezing, or rubs  HEART: Regular rate and rhythm; No murmurs, rubs, or gallops  ABDOMEN: Soft, Nontender, Nondistended; Bowel sounds present  NERVOUS SYSTEM:  Alert & Oriented X3,    EXTREMITIES:  2+ Peripheral Pulses, No clubbing, cyanosis, or edema  SKIN: warm dry                          8.7    20.94 )-----------( 354      ( 22 Aug 2023 10:14 )             27.9     08-22    143  |  116<H>  |  46<H>  ----------------------------<  106<H>  4.0   |  18<L>  |  1.86<H>    Ca    7.4<L>      22 Aug 2023 10:14  Phos  4.9     08-22  Mg     2.3     08-22    TPro  5.3<L>  /  Alb  1.4<L>  /  TBili  0.3  /  DBili  x   /  AST  30  /  ALT  19  /  AlkPhos  133<H>  08-22    LIVER FUNCTIONS - ( 22 Aug 2023 10:14 )  Alb: 1.4 g/dL / Pro: 5.3 g/dL / ALK PHOS: 133 U/L / ALT: 19 U/L DA / AST: 30 U/L / GGT: x                   CAPILLARY BLOOD GLUCOSE      RADIOLOGY & ADDITIONAL TESTS:                   PGY-1 Progress Note discussed with attending    PAGER #: [[phone]] TILL 5:00 PM  PLEASE CONTACT ON CALL TEAM:  - On Call Team (Please refer to Tyler) FROM 5:00 PM - 8:30PM  - Nightfloat Team FROM 8:30 -7:30 AM    CHIEF COMPLAINT & BRIEF HOSPITAL COURSE:      INTERVAL HPI/OVERNIGHT EVENTS:       MEDICATIONS  (STANDING):  aspirin  chewable 324 milliGRAM(s) Oral daily  atorvastatin 20 milliGRAM(s) Oral at bedtime  carbidopa/levodopa  25/100 1 Tablet(s) Oral <User Schedule>  cefepime   IVPB      cefepime   IVPB 1000 milliGRAM(s) IV Intermittent every 24 hours  chlorhexidine 2% Cloths 1 Application(s) Topical daily  cloNIDine Patch 0.1 mG/24Hr(s) 1 patch Transdermal every 7 days  dextrose 5% + sodium chloride 0.45%. 1000 milliLiter(s) (100 mL/Hr) IV Continuous <Continuous>  metoprolol tartrate 25 milliGRAM(s) Oral two times a day  pantoprazole   Suspension 40 milliGRAM(s) Oral daily  valproate sodium  IVPB 500 milliGRAM(s) IV Intermittent every 8 hours    MEDICATIONS  (PRN):  acetaminophen   Oral Liquid .. 650 milliGRAM(s) Enteral Tube every 6 hours PRN Temp greater or equal to 38C (100.4F)      REVIEW OF SYSTEMS:  CONSTITUTIONAL: No fever, weight loss, or fatigue  RESPIRATORY: No shortness of breath  CARDIOVASCULAR: No chest pain  GASTROINTESTINAL: No abdominal pain.  GENITOURINARY: No dysuria  NEUROLOGICAL: No headaches  SKIN: No itching, burning, rashes    Vital Signs Last 24 Hrs  T(C): 37.1 (23 Aug 2023 04:45), Max: 37.3 (22 Aug 2023 20:33)  T(F): 98.8 (23 Aug 2023 04:45), Max: 99.1 (22 Aug 2023 20:33)  HR: 102 (23 Aug 2023 04:45) (101 - 105)  BP: 131/68 (23 Aug 2023 04:45) (131/68 - 147/73)  BP(mean): --  RR: 19 (23 Aug 2023 04:45) (18 - 19)  SpO2: 98% (23 Aug 2023 04:45) (98% - 99%)    Parameters below as of 23 Aug 2023 04:45  Patient On (Oxygen Delivery Method): room air        PHYSICAL EXAMINATION:  GENERAL: NAD, well built  HEAD:  Atraumatic, Normocephalic  EYES:  conjunctiva and sclera clear  CHEST/LUNG: Clear to auscultation. No rales, rhonchi, wheezing, or rubs  HEART: Regular rate and rhythm; No murmurs, rubs, or gallops  ABDOMEN: Soft, Nontender, Nondistended; Bowel sounds present  NERVOUS SYSTEM:  Alert & Oriented X3,    EXTREMITIES:  2+ Peripheral Pulses, No clubbing, cyanosis, or edema  SKIN: warm dry                          8.7    20.94 )-----------( 354      ( 22 Aug 2023 10:14 )             27.9     08-22    143  |  116<H>  |  46<H>  ----------------------------<  106<H>  4.0   |  18<L>  |  1.86<H>    Ca    7.4<L>      22 Aug 2023 10:14  Phos  4.9     08-22  Mg     2.3     08-22    TPro  5.3<L>  /  Alb  1.4<L>  /  TBili  0.3  /  DBili  x   /  AST  30  /  ALT  19  /  AlkPhos  133<H>  08-22    LIVER FUNCTIONS - ( 22 Aug 2023 10:14 )  Alb: 1.4 g/dL / Pro: 5.3 g/dL / ALK PHOS: 133 U/L / ALT: 19 U/L DA / AST: 30 U/L / GGT: x                   CAPILLARY BLOOD GLUCOSE      RADIOLOGY & ADDITIONAL TESTS:                   PGY-1 Progress Note discussed with attending    PAGER #: [[phone]] TILL 5:00 PM  PLEASE CONTACT ON CALL TEAM:  - On Call Team (Please refer to Tyler) FROM 5:00 PM - 8:30PM  - Nightfloat Team FROM 8:30 -7:30 AM    CHIEF COMPLAINT & BRIEF HOSPITAL COURSE:      INTERVAL HPI/OVERNIGHT EVENTS:       MEDICATIONS  (STANDING):  aspirin  chewable 324 milliGRAM(s) Oral daily  atorvastatin 20 milliGRAM(s) Oral at bedtime  carbidopa/levodopa  25/100 1 Tablet(s) Oral <User Schedule>  cefepime   IVPB      cefepime   IVPB 1000 milliGRAM(s) IV Intermittent every 24 hours  chlorhexidine 2% Cloths 1 Application(s) Topical daily  cloNIDine Patch 0.1 mG/24Hr(s) 1 patch Transdermal every 7 days  dextrose 5% + sodium chloride 0.45%. 1000 milliLiter(s) (100 mL/Hr) IV Continuous <Continuous>  metoprolol tartrate 25 milliGRAM(s) Oral two times a day  pantoprazole   Suspension 40 milliGRAM(s) Oral daily  valproate sodium  IVPB 500 milliGRAM(s) IV Intermittent every 8 hours    MEDICATIONS  (PRN):  acetaminophen   Oral Liquid .. 650 milliGRAM(s) Enteral Tube every 6 hours PRN Temp greater or equal to 38C (100.4F)      REVIEW OF SYSTEMS:  CONSTITUTIONAL: No fever, weight loss, or fatigue  RESPIRATORY: No shortness of breath  CARDIOVASCULAR: No chest pain  GASTROINTESTINAL: No abdominal pain.  GENITOURINARY: No dysuria  NEUROLOGICAL: No headaches  SKIN: No itching, burning, rashes    Vital Signs Last 24 Hrs  T(C): 37.1 (23 Aug 2023 04:45), Max: 37.3 (22 Aug 2023 20:33)  T(F): 98.8 (23 Aug 2023 04:45), Max: 99.1 (22 Aug 2023 20:33)  HR: 102 (23 Aug 2023 04:45) (101 - 105)  BP: 131/68 (23 Aug 2023 04:45) (131/68 - 147/73)  BP(mean): --  RR: 19 (23 Aug 2023 04:45) (18 - 19)  SpO2: 98% (23 Aug 2023 04:45) (98% - 99%)    Parameters below as of 23 Aug 2023 04:45  Patient On (Oxygen Delivery Method): room air        PHYSICAL EXAMINATION:  GENERAL: NAD, well built  HEAD:  Atraumatic, Normocephalic  EYES:  conjunctiva and sclera clear  CHEST/LUNG: Clear to auscultation. No rales, rhonchi, wheezing, or rubs  HEART: Regular rate and rhythm; No murmurs, rubs, or gallops  ABDOMEN: Soft, Nontender, Nondistended; Bowel sounds present  NERVOUS SYSTEM:  Alert & Oriented X3,    EXTREMITIES:  2+ Peripheral Pulses, No clubbing, cyanosis, or edema  SKIN: warm dry                          8.7    20.94 )-----------( 354      ( 22 Aug 2023 10:14 )             27.9     08-22    143  |  116<H>  |  46<H>  ----------------------------<  106<H>  4.0   |  18<L>  |  1.86<H>    Ca    7.4<L>      22 Aug 2023 10:14  Phos  4.9     08-22  Mg     2.3     08-22    TPro  5.3<L>  /  Alb  1.4<L>  /  TBili  0.3  /  DBili  x   /  AST  30  /  ALT  19  /  AlkPhos  133<H>  08-22    LIVER FUNCTIONS - ( 22 Aug 2023 10:14 )  Alb: 1.4 g/dL / Pro: 5.3 g/dL / ALK PHOS: 133 U/L / ALT: 19 U/L DA / AST: 30 U/L / GGT: x                   CAPILLARY BLOOD GLUCOSE      RADIOLOGY & ADDITIONAL TESTS:                   PGY-1 Progress Note discussed with attending    PAGER #: [[phone]] TILL 5:00 PM  PLEASE CONTACT ON CALL TEAM:  - On Call Team (Please refer to Tyler) FROM 5:00 PM - 8:30PM  - Nightfloat Team FROM 8:30 -7:30 AM    CHIEF COMPLAINT & BRIEF HOSPITAL COURSE:  Failure to thrive    INTERVAL HPI/OVERNIGHT EVENTS:   No acute events overnight.  Patient examined at bedside this AM.  Patient's condition continues to decline. She has had artificial nutrition through G tube held for 72 hours now due to tube being out of position. Hemoglobin found to be 6.9 on morning labs and 7.0 on repeat CBC. She is non-verbal and unable to follow commands.    MEDICATIONS  (STANDING):  aspirin  chewable 324 milliGRAM(s) Oral daily  atorvastatin 20 milliGRAM(s) Oral at bedtime  carbidopa/levodopa  25/100 1 Tablet(s) Oral <User Schedule>  cefepime   IVPB      cefepime   IVPB 1000 milliGRAM(s) IV Intermittent every 24 hours  chlorhexidine 2% Cloths 1 Application(s) Topical daily  cloNIDine Patch 0.1 mG/24Hr(s) 1 patch Transdermal every 7 days  dextrose 5% + sodium chloride 0.45%. 1000 milliLiter(s) (100 mL/Hr) IV Continuous <Continuous>  metoprolol tartrate 25 milliGRAM(s) Oral two times a day  pantoprazole   Suspension 40 milliGRAM(s) Oral daily  valproate sodium  IVPB 500 milliGRAM(s) IV Intermittent every 8 hours    MEDICATIONS  (PRN):  acetaminophen   Oral Liquid .. 650 milliGRAM(s) Enteral Tube every 6 hours PRN Temp greater or equal to 38C (100.4F)      REVIEW OF SYSTEMS: Unable to obtain as pt is non-verbal    Vital Signs Last 24 Hrs  T(C): 37.1 (23 Aug 2023 04:45), Max: 37.3 (22 Aug 2023 20:33)  T(F): 98.8 (23 Aug 2023 04:45), Max: 99.1 (22 Aug 2023 20:33)  HR: 102 (23 Aug 2023 04:45) (101 - 105)  BP: 131/68 (23 Aug 2023 04:45) (131/68 - 147/73)  BP(mean): --  RR: 19 (23 Aug 2023 04:45) (18 - 19)  SpO2: 98% (23 Aug 2023 04:45) (98% - 99%)    Parameters below as of 23 Aug 2023 04:45  Patient On (Oxygen Delivery Method): room air        PHYSICAL EXAMINATION:  GENERAL: NAD, well built, non-verbal, not following commands  HEAD:  Atraumatic, Normocephalic  EYES:  conjunctiva and sclera clear  CHEST/LUNG: Clear to auscultation. No rales, rhonchi, wheezing, or rubs  HEART: Regular rate and rhythm; No murmurs, rubs, or gallops  ABDOMEN: +G-tube out of position with tube feeds held. Soft, Nontender, Nondistended; Bowel sounds present  NERVOUS SYSTEM:  Alert & Oriented X0,    EXTREMITIES:  +Significant edema in all 4 extremities. 2+ Peripheral Pulses, No clubbing, cyanosis  SKIN: warm dry                          8.7    20.94 )-----------( 354      ( 22 Aug 2023 10:14 )             27.9     08-22    143  |  116<H>  |  46<H>  ----------------------------<  106<H>  4.0   |  18<L>  |  1.86<H>    Ca    7.4<L>      22 Aug 2023 10:14  Phos  4.9     08-22  Mg     2.3     08-22    TPro  5.3<L>  /  Alb  1.4<L>  /  TBili  0.3  /  DBili  x   /  AST  30  /  ALT  19  /  AlkPhos  133<H>  08-22    LIVER FUNCTIONS - ( 22 Aug 2023 10:14 )  Alb: 1.4 g/dL / Pro: 5.3 g/dL / ALK PHOS: 133 U/L / ALT: 19 U/L DA / AST: 30 U/L / GGT: x                   CAPILLARY BLOOD GLUCOSE      RADIOLOGY & ADDITIONAL TESTS:                   PGY-1 Progress Note discussed with attending    PAGER #: [[phone]] TILL 5:00 PM  PLEASE CONTACT ON CALL TEAM:  - On Call Team (Please refer to Tyler) FROM 5:00 PM - 8:30PM  - Nightfloat Team FROM 8:30 -7:30 AM    CHIEF COMPLAINT & BRIEF HOSPITAL COURSE:  Failure to thrive    INTERVAL HPI/OVERNIGHT EVENTS:   No acute events overnight.  Patient examined at bedside this AM.  Patient's condition continues to decline. She has had artificial nutrition through G tube held for 72 hours now due to tube being out of position. Hemoglobin found to be 6.9 on morning labs and 7.0 on repeat CBC. She is non-verbal and unable to follow commands.    MEDICATIONS  (STANDING):  aspirin  chewable 324 milliGRAM(s) Oral daily  atorvastatin 20 milliGRAM(s) Oral at bedtime  carbidopa/levodopa  25/100 1 Tablet(s) Oral <User Schedule>  cefepime   IVPB      cefepime   IVPB 1000 milliGRAM(s) IV Intermittent every 24 hours  chlorhexidine 2% Cloths 1 Application(s) Topical daily  cloNIDine Patch 0.1 mG/24Hr(s) 1 patch Transdermal every 7 days  dextrose 5% + sodium chloride 0.45%. 1000 milliLiter(s) (100 mL/Hr) IV Continuous <Continuous>  metoprolol tartrate 25 milliGRAM(s) Oral two times a day  pantoprazole   Suspension 40 milliGRAM(s) Oral daily  valproate sodium  IVPB 500 milliGRAM(s) IV Intermittent every 8 hours    MEDICATIONS  (PRN):  acetaminophen   Oral Liquid .. 650 milliGRAM(s) Enteral Tube every 6 hours PRN Temp greater or equal to 38C (100.4F)      REVIEW OF SYSTEMS: Unable to obtain as pt is non-verbal    Vital Signs Last 24 Hrs  T(C): 37.1 (23 Aug 2023 04:45), Max: 37.3 (22 Aug 2023 20:33)  T(F): 98.8 (23 Aug 2023 04:45), Max: 99.1 (22 Aug 2023 20:33)  HR: 102 (23 Aug 2023 04:45) (101 - 105)  BP: 131/68 (23 Aug 2023 04:45) (131/68 - 147/73)  BP(mean): --  RR: 19 (23 Aug 2023 04:45) (18 - 19)  SpO2: 98% (23 Aug 2023 04:45) (98% - 99%)    Parameters below as of 23 Aug 2023 04:45  Patient On (Oxygen Delivery Method): room air        PHYSICAL EXAMINATION:  GENERAL: NAD, well built, non-verbal, not following commands  HEAD:  Atraumatic, Normocephalic  EYES:  conjunctiva and sclera clear  CHEST/LUNG: Clear to auscultation. No rales, rhonchi, wheezing, or rubs  HEART: Regular rate and rhythm; No murmurs, rubs, or gallops  ABDOMEN: +G-tube out of position with tube feeds held. Soft, Nontender, Nondistended; Bowel sounds present  NERVOUS SYSTEM:  Alert & Oriented X0,    EXTREMITIES:  +Significant edema in all 4 extremities. 2+ Peripheral Pulses, No clubbing, cyanosis  SKIN: warm dry                          8.7    20.94 )-----------( 354      ( 22 Aug 2023 10:14 )             27.9     08-22    143  |  116<H>  |  46<H>  ----------------------------<  106<H>  4.0   |  18<L>  |  1.86<H>    Ca    7.4<L>      22 Aug 2023 10:14  Phos  4.9     08-22  Mg     2.3     08-22    TPro  5.3<L>  /  Alb  1.4<L>  /  TBili  0.3  /  DBili  x   /  AST  30  /  ALT  19  /  AlkPhos  133<H>  08-22    LIVER FUNCTIONS - ( 22 Aug 2023 10:14 )  Alb: 1.4 g/dL / Pro: 5.3 g/dL / ALK PHOS: 133 U/L / ALT: 19 U/L DA / AST: 30 U/L / GGT: x                   CAPILLARY BLOOD GLUCOSE      RADIOLOGY & ADDITIONAL TESTS:                   PGY-1 Progress Note discussed with attending    PAGER #: [[phone]] TILL 5:00 PM  PLEASE CONTACT ON CALL TEAM:  - On Call Team (Please refer to Tyler) FROM 5:00 PM - 8:30PM  - Nightfloat Team FROM 8:30 -7:30 AM    CHIEF COMPLAINT & BRIEF HOSPITAL COURSE:  Failure to thrive    INTERVAL HPI/OVERNIGHT EVENTS:   No acute events overnight.  Patient examined at bedside this AM.  Patient's condition continues to decline. She has had artificial nutrition through G tube held for 72 hours now due to tube being out of position. Hemoglobin found to be 6.9 on morning labs and 7.0 on repeat CBC. She is non-verbal and unable to follow commands.    MEDICATIONS  (STANDING):  aspirin  chewable 324 milliGRAM(s) Oral daily  atorvastatin 20 milliGRAM(s) Oral at bedtime  carbidopa/levodopa  25/100 1 Tablet(s) Oral <User Schedule>  cefepime   IVPB      cefepime   IVPB 1000 milliGRAM(s) IV Intermittent every 24 hours  chlorhexidine 2% Cloths 1 Application(s) Topical daily  cloNIDine Patch 0.1 mG/24Hr(s) 1 patch Transdermal every 7 days  dextrose 5% + sodium chloride 0.45%. 1000 milliLiter(s) (100 mL/Hr) IV Continuous <Continuous>  metoprolol tartrate 25 milliGRAM(s) Oral two times a day  pantoprazole   Suspension 40 milliGRAM(s) Oral daily  valproate sodium  IVPB 500 milliGRAM(s) IV Intermittent every 8 hours    MEDICATIONS  (PRN):  acetaminophen   Oral Liquid .. 650 milliGRAM(s) Enteral Tube every 6 hours PRN Temp greater or equal to 38C (100.4F)      REVIEW OF SYSTEMS: Unable to obtain as pt is non-verbal    Vital Signs Last 24 Hrs  T(C): 37.1 (23 Aug 2023 04:45), Max: 37.3 (22 Aug 2023 20:33)  T(F): 98.8 (23 Aug 2023 04:45), Max: 99.1 (22 Aug 2023 20:33)  HR: 102 (23 Aug 2023 04:45) (101 - 105)  BP: 131/68 (23 Aug 2023 04:45) (131/68 - 147/73)  BP(mean): --  RR: 19 (23 Aug 2023 04:45) (18 - 19)  SpO2: 98% (23 Aug 2023 04:45) (98% - 99%)    Parameters below as of 23 Aug 2023 04:45  Patient On (Oxygen Delivery Method): room air        PHYSICAL EXAMINATION:  GENERAL: NAD, well built, non-verbal, not following commands  HEAD:  Atraumatic, Normocephalic  EYES:  conjunctiva and sclera clear  CHEST/LUNG: Clear to auscultation. No rales, rhonchi, wheezing, or rubs  HEART: Regular rate and rhythm; No murmurs, rubs, or gallops  ABDOMEN: +G-tube out of position with tube feeds held. Soft, Nontender, Nondistended; Bowel sounds present  NERVOUS SYSTEM:  Alert & Oriented X0,    EXTREMITIES:  +Significant edema in all 4 extremities. 2+ Peripheral Pulses, No clubbing, cyanosis  SKIN: warm dry                          8.7    20.94 )-----------( 354      ( 22 Aug 2023 10:14 )             27.9     08-22    143  |  116<H>  |  46<H>  ----------------------------<  106<H>  4.0   |  18<L>  |  1.86<H>    Ca    7.4<L>      22 Aug 2023 10:14  Phos  4.9     08-22  Mg     2.3     08-22    TPro  5.3<L>  /  Alb  1.4<L>  /  TBili  0.3  /  DBili  x   /  AST  30  /  ALT  19  /  AlkPhos  133<H>  08-22    LIVER FUNCTIONS - ( 22 Aug 2023 10:14 )  Alb: 1.4 g/dL / Pro: 5.3 g/dL / ALK PHOS: 133 U/L / ALT: 19 U/L DA / AST: 30 U/L / GGT: x                   CAPILLARY BLOOD GLUCOSE      RADIOLOGY & ADDITIONAL TESTS:

## 2023-08-24 LAB
ALBUMIN SERPL ELPH-MCNC: 1.6 G/DL — LOW (ref 3.5–5)
ALP SERPL-CCNC: 98 U/L — SIGNIFICANT CHANGE UP (ref 40–120)
ALT FLD-CCNC: 16 U/L DA — SIGNIFICANT CHANGE UP (ref 10–60)
ANION GAP SERPL CALC-SCNC: 10 MMOL/L — SIGNIFICANT CHANGE UP (ref 5–17)
AST SERPL-CCNC: 21 U/L — SIGNIFICANT CHANGE UP (ref 10–40)
BILIRUB SERPL-MCNC: 0.3 MG/DL — SIGNIFICANT CHANGE UP (ref 0.2–1.2)
BUN SERPL-MCNC: 43 MG/DL — HIGH (ref 7–18)
CALCIUM SERPL-MCNC: 7.2 MG/DL — LOW (ref 8.4–10.5)
CHLORIDE SERPL-SCNC: 120 MMOL/L — HIGH (ref 96–108)
CO2 SERPL-SCNC: 17 MMOL/L — LOW (ref 22–31)
CREAT SERPL-MCNC: 1.72 MG/DL — HIGH (ref 0.5–1.3)
EGFR: 30 ML/MIN/1.73M2 — LOW
GLUCOSE BLDC GLUCOMTR-MCNC: 115 MG/DL — HIGH (ref 70–99)
GLUCOSE BLDC GLUCOMTR-MCNC: 116 MG/DL — HIGH (ref 70–99)
GLUCOSE BLDC GLUCOMTR-MCNC: 88 MG/DL — SIGNIFICANT CHANGE UP (ref 70–99)
GLUCOSE BLDC GLUCOMTR-MCNC: 89 MG/DL — SIGNIFICANT CHANGE UP (ref 70–99)
GLUCOSE SERPL-MCNC: 80 MG/DL — SIGNIFICANT CHANGE UP (ref 70–99)
HCT VFR BLD CALC: 23.8 % — LOW (ref 34.5–45)
HCT VFR BLD CALC: 29 % — LOW (ref 34.5–45)
HGB BLD-MCNC: 7.6 G/DL — LOW (ref 11.5–15.5)
HGB BLD-MCNC: 9.5 G/DL — LOW (ref 11.5–15.5)
MAGNESIUM SERPL-MCNC: 2.3 MG/DL — SIGNIFICANT CHANGE UP (ref 1.6–2.6)
MCHC RBC-ENTMCNC: 29.1 PG — SIGNIFICANT CHANGE UP (ref 27–34)
MCHC RBC-ENTMCNC: 29.5 PG — SIGNIFICANT CHANGE UP (ref 27–34)
MCHC RBC-ENTMCNC: 31.9 GM/DL — LOW (ref 32–36)
MCHC RBC-ENTMCNC: 32.8 GM/DL — SIGNIFICANT CHANGE UP (ref 32–36)
MCV RBC AUTO: 90.1 FL — SIGNIFICANT CHANGE UP (ref 80–100)
MCV RBC AUTO: 91.2 FL — SIGNIFICANT CHANGE UP (ref 80–100)
NRBC # BLD: 0 /100 WBCS — SIGNIFICANT CHANGE UP (ref 0–0)
PHOSPHATE SERPL-MCNC: 4.6 MG/DL — HIGH (ref 2.5–4.5)
PLATELET # BLD AUTO: 235 K/UL — SIGNIFICANT CHANGE UP (ref 150–400)
PLATELET # BLD AUTO: 281 K/UL — SIGNIFICANT CHANGE UP (ref 150–400)
POTASSIUM SERPL-MCNC: 3.8 MMOL/L — SIGNIFICANT CHANGE UP (ref 3.5–5.3)
POTASSIUM SERPL-SCNC: 3.8 MMOL/L — SIGNIFICANT CHANGE UP (ref 3.5–5.3)
PROT SERPL-MCNC: 5.3 G/DL — LOW (ref 6–8.3)
RBC # BLD: 2.61 M/UL — LOW (ref 3.8–5.2)
RBC # BLD: 3.22 M/UL — LOW (ref 3.8–5.2)
RBC # FLD: 16.7 % — HIGH (ref 10.3–14.5)
RBC # FLD: 17.5 % — HIGH (ref 10.3–14.5)
SODIUM SERPL-SCNC: 147 MMOL/L — HIGH (ref 135–145)
WBC # BLD: 17.46 K/UL — HIGH (ref 3.8–10.5)
WBC # BLD: 22.16 K/UL — HIGH (ref 3.8–10.5)
WBC # FLD AUTO: 17.46 K/UL — HIGH (ref 3.8–10.5)
WBC # FLD AUTO: 22.16 K/UL — HIGH (ref 3.8–10.5)

## 2023-08-24 PROCEDURE — 99232 SBSQ HOSP IP/OBS MODERATE 35: CPT

## 2023-08-24 PROCEDURE — 99497 ADVNCD CARE PLAN 30 MIN: CPT | Mod: 25

## 2023-08-24 RX ORDER — SODIUM CHLORIDE 9 MG/ML
1000 INJECTION, SOLUTION INTRAVENOUS
Refills: 0 | Status: DISCONTINUED | OUTPATIENT
Start: 2023-08-24 | End: 2023-08-24

## 2023-08-24 RX ORDER — SODIUM CHLORIDE 9 MG/ML
1000 INJECTION, SOLUTION INTRAVENOUS
Refills: 0 | Status: DISCONTINUED | OUTPATIENT
Start: 2023-08-24 | End: 2023-08-25

## 2023-08-24 RX ADMIN — SODIUM CHLORIDE 75 MILLILITER(S): 9 INJECTION, SOLUTION INTRAVENOUS at 05:41

## 2023-08-24 RX ADMIN — CEFEPIME 100 MILLIGRAM(S): 1 INJECTION, POWDER, FOR SOLUTION INTRAMUSCULAR; INTRAVENOUS at 14:10

## 2023-08-24 RX ADMIN — CHLORHEXIDINE GLUCONATE 1 APPLICATION(S): 213 SOLUTION TOPICAL at 14:14

## 2023-08-24 RX ADMIN — Medication 1 PATCH: at 20:30

## 2023-08-24 RX ADMIN — Medication 55 MILLIGRAM(S): at 14:47

## 2023-08-24 RX ADMIN — Medication 55 MILLIGRAM(S): at 21:13

## 2023-08-24 RX ADMIN — Medication 55 MILLIGRAM(S): at 05:43

## 2023-08-24 RX ADMIN — SODIUM CHLORIDE 60 MILLILITER(S): 9 INJECTION, SOLUTION INTRAVENOUS at 19:50

## 2023-08-24 RX ADMIN — Medication 1 PATCH: at 07:51

## 2023-08-24 NOTE — PROGRESS NOTE ADULT - PROBLEM SELECTOR PLAN 1
Pt. is NPO  Open G tube placed on 8/14  IV tylenol for pain, morphine for breakthrough pain  IV iron sucrose started 8/15 for 3 days as per nephro  G tube placement by Surgery on 8/14  LE doppler b/l showed no signs of DVT  8/20 leakage noted around site of G tube. Feedings stopped and surgery consulted  CT abdomen/pelvis showed g tube out of stomach lumen  surgery attempted to re-position manually, repeat CT A/P showed G-tube still out of stomach lumen Pt. is NPO  Open G tube placed on 8/14  IV tylenol for pain, morphine for breakthrough pain  IV iron sucrose started 8/15 for 3 days as per nephro  G tube placement by Surgery on 8/14  LE doppler b/l showed no signs of DVT  8/20 leakage noted around site of G tube. Feedings stopped and surgery consulted  CT abdomen/pelvis showed g tube out of stomach lumen  surgery attempted to re-position manually, repeat CT A/P showed G-tube still out of stomach lumen  8/24 Discussion with palliative care, sister-Marika, and relative-Abbe  8/24 Pt family seems more inclined to go hospice route. Will follow up in the morning. Labs will not be drawn. IV fluids will continue for now Pt. is NPO  Open G tube placed on 8/14  IV tylenol for pain, morphine for breakthrough pain  IV iron sucrose started 8/15 for 3 days as per nephro  G tube placement by Surgery on 8/14  LE doppler b/l showed no signs of DVT  8/20 leakage noted around site of G tube. Feedings stopped and surgery consulted  CT abdomen/pelvis showed g tube out of stomach lumen  surgery attempted to re-position manually, repeat CT A/P showed G-tube still out of stomach lumen  8/24 Discussion with palliative care, sister-Marika, and relative-Abbe  8/24 Pt family seems more inclined to go hospice route. Will follow up in the morning. Labs will not be drawn. IV fluids will continue for now  f/u comfort pack for hospice

## 2023-08-24 NOTE — PROGRESS NOTE ADULT - ASSESSMENT
1. SUSAN possible to MARISA and ATN  Renal sono shows no hdyro with b/l kidneys around 9.2cm  -Scr is stable and unchanged at 1.8mg/dl with stable electrolytes and close to baseline.  -urinalysis shows blood with proteinuria; FeNa >1%, spot protein to creatinine ratio is 1.5gm  -Adjust meds to eGFR and avoid IV Gadolinium contrast,NSAIDs, and phosphate enema.  -Monitor I/O's daily.   -Monitor SMA daily.  2. Hypernatremia due to water deficit and insensible losses. Pt is clinically euvolemic.   -Na unchanged; no plans for reinsertion of PEG since high risk; family decided on comfort care and hospice.   -Monitor I/O's. Check Serum Na Daily. Avoid high solute intake diet and sodium bicarbonate infuse. Avoid overcorrection of NA (8-10meq/day)  3. CKD stage 4 most likely due to hypertensive nephrosclerosis  -new baseline scr around 1.8mg/dL with uPCR 1.5gm.  -previous Scr around 1.2 to 1.6mg/dL in Oct 2022.  -Keep patient euvolemic and renal diet  -Avoid Nephrotoxic Meds/ Agents such as (NSAIDs, IV contrast, Aminoglycosides such as gentamicin, -Gadolinium contrast, Phosphate containing enemas, etc..)  -Adjust Medications according to eGFR  4. HTN:   -bp is acceptable. continue bp meds  -titrate bp meds to keep sbp >110 and < 130  5. Mineral Bone Disease:  -phos is okay.   -PTH intact 289, will start calcitriol once Phos has improved.   6. Encephalopathy:  -s/p Rocephin.  -Plan as per Neuro and primary team  -s/p PEG placed in OR on 08/14/23.   -no plans for reinsertion of PEG since high risk; family decided on comfort care and hospice.   7. Hypokalemia:  -stable K.   -give kcl repletion to keep K >3.5meq/L  -monitor K.   8. Acidosis:  -CO2 is unchanged.    -monitor CO2.  9. Anemia:  -hb is stable.  -low iron sat and ferritin are okay. s/p iv iron x 3 days.  -monitor CBC.  -transfuse if hb <7.0  10. Elevated LFTs  -slowly improving; US abd done shows fatty liver  -monitor LFTs  11. Hyperkalemia due to susan on ckd.   -stable.   -give Nephro tube feeding. give Lokelma prn if K >5.5  -Keep pt euvolemic. Avoid ACE inh/ ARBs, NSAIDs, and Aldactone or potassium sparing diuretics. Monitor K+ daily.  12. Hypoalbuminemia and Anasarca:  - iv albumin 250cc and lasix 40mg iv once prn    Discussed with family at bedside in detail regarding the renal plan and care  Discussed the assessment and plan with Primary Team/Nurse

## 2023-08-24 NOTE — PROGRESS NOTE ADULT - SUBJECTIVE AND OBJECTIVE BOX
follow up on:  complex medical decision making related to goals of care    [facility] Geriatric and Palliative Consult Service:  Naye Salgado DO: cell [phone])  Mark Crowe MD: cell [phone])  Nadir Masters NP: cell ([phone])   Alexx Hi LMSW: cell ([phone])   Beena Chandler NP: via [employer] Teams    Can contact any Palliative Team member via [employer] Teams for consults and questions      OVERNIGHT EVENTS: Palliative care re-consulted to clarify [place] [facility] [place] on [place]/14, subsequently malfunctioning 8/20 with leakage, feedings stopped, and concern for dislodgement dislodged, surgery unable to manually reposition. Pt previously failed PEG/NGT placement.  Seen at the bedside, A/Ox0, eyes open, no verbal response, not following commands.         Present Symptoms: Mild, Moderate, Severe  Pain:             Location -                               Aggravating factors -             Quality -             Radiation -             Timing-             Severity (0-10 scale):             Minimal acceptable level (0-10 scale):  Fatigue:  Nausea:  Lack of Appetite:   SOB:  Depression:  Anxiety:  Review of Systems:  Unable to obtain due to poor mentation    CPOT:    https://www.[email or web address]/getattachment/[email or web address]/Critical-Care-Pain-Observation-Tool-(CPOT)  PAIN AD Score:   http://[email or web address].[email or web address]/painad.[email or web address] (press ctrl +  left click to view)MEDICATIONS  (STANDING):  aspirin  chewable 324 milliGRAM(s) Oral daily  atorvastatin 20 milliGRAM(s) Oral at bedtime  carbidopa/levodopa  25/100 1 Tablet(s) Oral <User Schedule>  cefepime   IVPB      cefepime   IVPB 1000 milliGRAM(s) IV Intermittent every 24 hours  chlorhexidine 2% Cloths 1 Application(s) Topical daily  cloNIDine Patch 0.1 mG/24Hr(s) 1 patch Transdermal every 7 days  dextrose 5% + sodium chloride 0.9%. 1000 milliLiter(s) (75 mL/Hr) IV Continuous <Continuous>  dextrose 5% + sodium chloride 0.9%. 1000 milliLiter(s) (75 mL/Hr) IV Continuous <Continuous>  metoprolol tartrate 25 milliGRAM(s) Oral two times a day  pantoprazole   Suspension 40 milliGRAM(s) Oral daily  valproate sodium  IVPB 500 milliGRAM(s) IV Intermittent every 8 hours    MEDICATIONS  (PRN):  acetaminophen   Oral Liquid .. 650 milliGRAM(s) Enteral Tube every 6 hours PRN Temp greater or equal to 38C (100.4F)      PHYSICAL EXAM:  Vital Signs Last 24 Hrs  T(C): 36.9 (24 Aug 2023 04:47), Max: 37.1 (23 Aug 2023 13:10)  T(F): 98.4 (24 Aug 2023 04:47), Max: 98.8 (23 Aug 2023 13:10)  HR: 87 (24 Aug 2023 04:47) (87 - 99)  BP: 165/67 (24 Aug 2023 04:47) (150/68 - 169/74)  BP(mean): --  RR: 18 (24 Aug 2023 04:47) (18 - 18)  SpO2: 98% (24 Aug 2023 04:47) (98% - 100%)    Parameters below as of 24 Aug 2023 04:47  Patient On (Oxygen Delivery Method): room air          General: alert  oriented x 0  lethargic obese  eyes open nonverbal    Palliative Performance Scale/Karnofsky Score: 10%  http://npcrc.org/files/news/palliative_performance_scale_ppsv2.pdf    HEENT: no abnormal lesion, mmm  Lungs: nonlabor in RA  CV: RRR, S1S2  GI: soft non distended non tender  incontinent midline abdominal incision +staples c/d/i, G-Tube LUQ +sutures intact to bag with scant serosanguinous drainage  : incontinent  Musculoskeletal: weakness x4 edema x4 (anasarca)   fully bedbound  Skin: no abnormal skin lesions, poor skin turgor  Neuro: severe cognitive impairment dysphagia   Oral intake ability: unable/only mouth care      LABS:                          7.6    17.46 )-----------( 281      ( 24 Aug 2023 00:01 )             23.8     08-24    147<H>  |  120<H>  |  43<H>  ----------------------------<  80  3.8   |  17<L>  |  1.72<H>    Ca    7.2<L>      24 Aug 2023 06:02  Phos  4.6     08-24  Mg     2.3     08-24    TPro  5.3<L>  /  Alb  1.6<L>  /  TBili  0.3  /  DBili  x   /  AST  21  /  ALT  16  /  AlkPhos  98  08-24    Urinalysis Basic - ( 24 Aug 2023 06:02 )    Color: x / Appearance: x / SG: x / pH: x  Gluc: 80 mg/dL / Ketone: x  / Bili: x / Urobili: x   Blood: x / Protein: x / Nitrite: x   Leuk Esterase: x / RBC: x / WBC x   Sq Epi: x / Non Sq Epi: x / Bacteria: x        RADIOLOGY & ADDITIONAL STUDIES: follow up on:  complex medical decision making related to goals of care    [facility] Geriatric and Palliative Consult Service:  Naye Salgado DO: cell [phone])  Mark Crowe MD: cell [phone])  Nadir Masters NP: cell ([phone])   Alexx Hi LMSW: cell ([phone])   Beena Chandler NP: via [employer] Teams    Can contact any Palliative Team member via [employer] Teams for consults and questions      OVERNIGHT EVENTS: Palliative care re-consulted to clarify the [facility] s/p open gastrostomy on 8/14, subsequently malfunctioning 8/20 with leakage, feedings stopped, and concern for dislodgement dislodged, surgery unable to manually reposition. Pt previously failed PEG/NGT placement.  Seen at the bedside, A/Ox0, eyes open, no verbal response, not following commands.         Present Symptoms: Mild, Moderate, Severe  Pain:             Location -                               Aggravating factors -             Quality -             Radiation -             Timing-             Severity (0-10 scale):             Minimal acceptable level (0-10 scale):  Fatigue:  Nausea:  Lack of Appetite:   SOB:  Depression:  Anxiety:  Review of Systems:  Unable to obtain due to poor mentation    CPOT:    https://www.[email or web address]/getattachment/[email or web address]/Critical-Care-Pain-Observation-Tool-(CPOT)  PAIN AD Score:   http://geriatrictoolkit.[email or web address].[email or web address]/cog/[email or web address] (press ctrl +  left click to view)MEDICATIONS  (STANDING):  aspirin  chewable 324 milliGRAM(s) Oral daily  atorvastatin 20 milliGRAM(s) Oral at bedtime  carbidopa/levodopa  25/100 1 Tablet(s) Oral <User Schedule>  cefepime   IVPB      cefepime   IVPB 1000 milliGRAM(s) IV Intermittent every 24 hours  chlorhexidine 2% Cloths 1 Application(s) Topical daily  cloNIDine Patch 0.1 mG/24Hr(s) 1 patch Transdermal every 7 days  dextrose 5% + sodium chloride 0.9%. 1000 milliLiter(s) (75 mL/Hr) IV Continuous <Continuous>  dextrose 5% + sodium chloride 0.9%. 1000 milliLiter(s) (75 mL/Hr) IV Continuous <Continuous>  metoprolol tartrate 25 milliGRAM(s) Oral two times a day  pantoprazole   Suspension 40 milliGRAM(s) Oral daily  valproate sodium  IVPB 500 milliGRAM(s) IV Intermittent every 8 hours    MEDICATIONS  (PRN):  acetaminophen   Oral Liquid .. 650 milliGRAM(s) Enteral Tube every 6 hours PRN Temp greater or equal to 38C (100.4F)      PHYSICAL EXAM:  Vital Signs Last 24 Hrs  T(C): 36.9 (24 Aug 2023 04:47), Max: 37.1 (23 Aug 2023 13:10)  T(F): 98.4 (24 Aug 2023 04:47), Max: 98.8 (23 Aug 2023 13:10)  HR: 87 (24 Aug 2023 04:47) (87 - 99)  BP: 165/67 (24 Aug 2023 04:47) (150/68 - 169/74)  BP(mean): --  RR: 18 (24 Aug 2023 04:47) (18 - 18)  SpO2: 98% (24 Aug 2023 04:47) (98% - 100%)    Parameters below as of 24 Aug 2023 04:47  Patient On (Oxygen Delivery Method): room air          General: alert  oriented x 0  lethargic obese  eyes open nonverbal    Palliative Performance Scale/Karnofsky Score: 10%  http://npcrc.org/files/news/palliative_performance_scale_ppsv2.pdf    HEENT: no abnormal lesion, mmm  Lungs: nonlabor in RA  CV: RRR, S1S2  GI: soft non distended non tender  incontinent midline abdominal incision +staples c/d/i, G-Tube LUQ +sutures intact to bag with scant serosanguinous drainage  : incontinent  Musculoskeletal: weakness x4 edema x4 (anasarca)   fully bedbound  Skin: no abnormal skin lesions, poor skin turgor  Neuro: severe cognitive impairment dysphagia   Oral intake ability: unable/only mouth care      LABS:                          7.6    17.46 )-----------( 281      ( 24 Aug 2023 00:01 )             23.8     08-24    147<H>  |  120<H>  |  43<H>  ----------------------------<  80  3.8   |  17<L>  |  1.72<H>    Ca    7.2<L>      24 Aug 2023 06:02  Phos  4.6     08-24  Mg     2.3     08-24    TPro  5.3<L>  /  Alb  1.6<L>  /  TBili  0.3  /  DBili  x   /  AST  21  /  ALT  16  /  AlkPhos  98  08-24    Urinalysis Basic - ( 24 Aug 2023 06:02 )    Color: x / Appearance: x / SG: x / pH: x  Gluc: 80 mg/dL / Ketone: x  / Bili: x / Urobili: x   Blood: x / Protein: x / Nitrite: x   Leuk Esterase: x / RBC: x / WBC x   Sq Epi: x / Non Sq Epi: x / Bacteria: x        RADIOLOGY & ADDITIONAL STUDIES: follow up on:  complex medical decision making related to goals of care    [facility] Geriatric and Palliative Consult Service:  Naye Salgado DO: cell ([phone])  Mark Crowe MD: cell ([phone])  Nadir Masters NP: cell ([phone])   Alexx Hi LMSW: cell ([phone])   Beena Chandler NP: via [employer] Teams    Can contact any Palliative Team member via [employer] Teams for consults and questions      OVERNIGHT EVENTS: Palliative care re-consulted to clarify the [facility] s/p open gastrostomy on 8/14, subsequently malfunctioning 8/20 with leakage, feedings stopped, and concern for dislodgement dislodged, surgery unable to manually reposition. Pt previously failed PEG/NGT placement.  Seen at the bedside, A/Ox0, eyes open, no verbal response, not following commands.         Present Symptoms: Mild, Moderate, Severe  Pain:             Location -                               Aggravating factors -             Quality -             Radiation -             Timing-             Severity (0-10 scale):             Minimal acceptable level (0-10 scale):  Fatigue:  Nausea:  Lack of Appetite:   SOB:  Depression:  Anxiety:  Review of Systems:  Unable to obtain due to poor mentation    CPOT:    https://[email or web address].org/getattachment/[email or web address]/Critical-Care-Pain-Observation-Tool-(CPOT)  PAIN AD Score:   http://geriatrictoolkit.[email or web address].[email or web address]/cog/[email or web address] (press ctrl +  left click to view)MEDICATIONS  (STANDING):  aspirin  chewable 324 milliGRAM(s) Oral daily  atorvastatin 20 milliGRAM(s) Oral at bedtime  carbidopa/levodopa  25/100 1 Tablet(s) Oral <User Schedule>  cefepime   IVPB      cefepime   IVPB 1000 milliGRAM(s) IV Intermittent every 24 hours  chlorhexidine 2% Cloths 1 Application(s) Topical daily  cloNIDine Patch 0.1 mG/24Hr(s) 1 patch Transdermal every 7 days  dextrose 5% + sodium chloride 0.9%. 1000 milliLiter(s) (75 mL/Hr) IV Continuous <Continuous>  dextrose 5% + sodium chloride 0.9%. 1000 milliLiter(s) (75 mL/Hr) IV Continuous <Continuous>  metoprolol tartrate 25 milliGRAM(s) Oral two times a day  pantoprazole   Suspension 40 milliGRAM(s) Oral daily  valproate sodium  IVPB 500 milliGRAM(s) IV Intermittent every 8 hours    MEDICATIONS  (PRN):  acetaminophen   Oral Liquid .. 650 milliGRAM(s) Enteral Tube every 6 hours PRN Temp greater or equal to 38C (100.4F)      PHYSICAL EXAM:  Vital Signs Last 24 Hrs  T(C): 36.9 (24 Aug 2023 04:47), Max: 37.1 (23 Aug 2023 13:10)  T(F): 98.4 (24 Aug 2023 04:47), Max: 98.8 (23 Aug 2023 13:10)  HR: 87 (24 Aug 2023 04:47) (87 - 99)  BP: 165/67 (24 Aug 2023 04:47) (150/68 - 169/74)  BP(mean): --  RR: 18 (24 Aug 2023 04:47) (18 - 18)  SpO2: 98% (24 Aug 2023 04:47) (98% - 100%)    Parameters below as of 24 Aug 2023 04:47  Patient On (Oxygen Delivery Method): room air          General: alert  oriented x 0  lethargic obese  eyes open nonverbal    Palliative Performance Scale/Karnofsky Score: 10%  http://npcrc.org/files/news/palliative_performance_scale_ppsv2.pdf    HEENT: no abnormal lesion, mmm  Lungs: nonlabor in RA  CV: RRR, S1S2  GI: soft non distended non tender  incontinent midline abdominal incision +staples c/d/i, G-Tube LUQ +sutures intact to bag with scant serosanguinous drainage  : incontinent  Musculoskeletal: weakness x4 edema x4 (anasarca)   fully bedbound  Skin: no abnormal skin lesions, poor skin turgor  Neuro: severe cognitive impairment dysphagia   Oral intake ability: unable/only mouth care      LABS:                          7.6    17.46 )-----------( 281      ( 24 Aug 2023 00:01 )             23.8     08-24    147<H>  |  120<H>  |  43<H>  ----------------------------<  80  3.8   |  17<L>  |  1.72<H>    Ca    7.2<L>      24 Aug 2023 06:02  Phos  4.6     08-24  Mg     2.3     08-24    TPro  5.3<L>  /  Alb  1.6<L>  /  TBili  0.3  /  DBili  x   /  AST  21  /  ALT  16  /  AlkPhos  98  08-24    Urinalysis Basic - ( 24 Aug 2023 06:02 )    Color: x / Appearance: x / SG: x / pH: x  Gluc: 80 mg/dL / Ketone: x  / Bili: x / Urobili: x   Blood: x / Protein: x / Nitrite: x   Leuk Esterase: x / RBC: x / WBC x   Sq Epi: x / Non Sq Epi: x / Bacteria: x        RADIOLOGY & ADDITIONAL STUDIES:

## 2023-08-24 NOTE — PROGRESS NOTE ADULT - PROBLEM SELECTOR PLAN 4
In context of     Acute Illness/Injury (>7days)       Energy/Food intake <50% of estimated energy requirement >5 days alb 2.3 8/10  Weight loss: Moderate - severe (lbs lost recently)  Body Fat loss: Severe   (temporal wasting, contracted, muscle atrophy)  Muscle mass loss: moderate   Fluid Accumulation: Severe (Fluid overload, anasarca, pleural effusions)   Strength: weakened severe (bedbound)    Recommend:   No oral intake since 7/27 only IVF, unable to place NGT after multiple attempts, GI unable to place PEG, s/p open gastrostomy 8/14 and G-Tube dislodged 8/20 and no TF since that time, only IVF. Pt is NOT a candidate for TPN due to active infection.    Family now agreeable to no further attempts at artificial nutrition or feeding tube (NGT or gastrostomy replacement) and hospice referral. Not ready to draft new MOSLT at this time, requests follow-up tomorrow.

## 2023-08-24 NOTE — PROGRESS NOTE ADULT - ASSESSMENT
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 During the hospital course she has Gastrostomy tube placement by Surgery for poor oral intake and Dysphagia. On 8/17/23 she became septic and during sepsis work up found to have positive Urine analysis and started on ceftriaxone. Hence, the ID consult requested to assist with further evaluation and antibiotic management.    # Sepsis  as of 8/17/23 ( Fever + tachycardia + leukocytosis)  # Complicated UTI- s/p exchange Mcclellan catheter on 8/17/23 - s/p removal of mcclellan catheter and placed a primafit on 8/19/23  # s/p CVA with aphasia and dysphagia- s/p  Gastrostomy tube    would recommend:    1. Monitor WBC count, stay elevated  2. Continue Cefepime until 8/24/23   3. Management of PEG as per Surgery  4. Aspiration precaution  5. Monitor Temp and c/w supportive care    Attending Attestation:    Spent more than 35 minutes on total encounter, more than 50 % of the visit was spent counseling and/or coordinating care by the Attending physician.        Patient is a 77y old  Female who is from [facility] and with PMHx of HTN, HLD, CVA (w/ residual aphasia and R sided hemiparesis/plegia) who was sent to the hospital on 7/27/23, due to altered mental status. During the hospital course she has Gastrostomy tube placement by Surgery for poor oral intake and Dysphagia. On 8/17/23 she became septic and during sepsis work up found to have positive Urine analysis and started on ceftriaxone. Hence, the ID consult requested to assist with further evaluation and antibiotic management.    # Sepsis  as of 8/17/23 ( Fever + tachycardia + leukocytosis)  # Complicated UTI- s/p exchange Mcclellan catheter on 8/17/23 - s/p removal of mcclellan catheter and placed a primafit on 8/19/23  # s/p CVA with aphasia and dysphagia- s/p  Gastrostomy tube    would recommend:    1. Monitor WBC count, stay elevated  2. Continue Cefepime until 8/24/23   3. Management of PEG as per Surgery  4. Aspiration precaution  5. Monitor Temp and c/w supportive care    Attending Attestation:    Spent more than 35 minutes on total encounter, more than 50 % of the visit was spent counseling and/or coordinating care by the Attending physician.        Patient is a 77y old  Female who is from [facility] and with PMHx of HTN, HLD, CVA (w/ residual aphasia and R sided hemiparesis/plegia) who was sent to the hospital on 7/27/23, due to altered mental status. During the hospital course she has Gastrostomy tube placement by Surgery for poor oral intake and Dysphagia. On 8/17/23 she became septic and during sepsis work up found to have positive Urine analysis and started on ceftriaxone. Hence, the ID consult requested to assist with further evaluation and antibiotic management.    # Sepsis  as of 8/17/23 ( Fever + tachycardia + leukocytosis)  # Complicated UTI- s/p exchange Mcclellan catheter on 8/17/23 - s/p removal of mcclellan catheter and placed a primafit on 8/19/23  # s/p CVA with aphasia and dysphagia- s/p  Gastrostomy tube    would recommend:    1. Monitor WBC count, stay elevated  2. Continue Cefepime until 8/24/23   3. Management of PEG as per Surgery  4. Aspiration precaution  5. Monitor Temp and c/w supportive care    Attending Attestation:    Spent more than 35 minutes on total encounter, more than 50 % of the visit was spent counseling and/or coordinating care by the Attending physician.

## 2023-08-24 NOTE — PROGRESS NOTE ADULT - SUBJECTIVE AND OBJECTIVE BOX
Patient is seen and examined at the bed side, is afebrile. The hemoglobin came up to 9.5 after transfusion.        REVIEW OF SYSTEMS: Unable to obtain due  to mental status       ALLERGIES: &quot; NATURAL RUBBER&quot; (Other)  latex (Other), penicillins (Unknown)      Vital Signs Last 24 Hrs  T(C): 36.2 (24 Aug 2023 13:31), Max: 36.9 (24 Aug 2023 04:47)  T(F): 97.2 (24 Aug 2023 13:31), Max: 98.4 (24 Aug 2023 04:47)  HR: 90 (24 Aug 2023 13:31) (87 - 99)  BP: 191/83 (24 Aug 2023 13:31) (159/78 - 191/83)  BP(mean): --  RR: 18 (24 Aug 2023 13:31) (18 - 18)  SpO2: 99% (24 Aug 2023 13:31) (98% - 100%)    Parameters below as of 24 Aug 2023 13:31  Patient On (Oxygen Delivery Method): room air        PHYSICAL EXAM:  GENERAL: Not in acute distress   CHEST/LUNG:  Not using accessory muscles   HEART: s1 and s2 present  ABDOMEN:  grossly obese  EXTREMITIES: edematous  CNS: Awake, somewhat alert but Non verbal       LABS:                        9.5    22.16 )-----------( 235      ( 24 Aug 2023 12:15 )             29.0                           7.0    17.72 )-----------( 269      ( 23 Aug 2023 12:05 )             22.4                08-24    147<H>  |  120<H>  |  43<H>  ----------------------------<  80  3.8   |  17<L>  |  1.72<H>    Ca    7.2<L>      24 Aug 2023 06:02  Phos  4.6     08-24  Mg     2.3     08-24    TPro  5.3<L>  /  Alb  1.6<L>  /  TBili  0.3  /  DBili  x   /  AST  21  /  ALT  16  /  AlkPhos  98  08-24    08-23    147<H>  |  119<H>  |  46<H>  ----------------------------<  91  3.7   |  18<L>  |  1.87<H>    Ca    7.3<L>      23 Aug 2023 09:34  Phos  4.4     08-23  Mg     2.2     08-23    TPro  5.0<L>  /  Alb  1.3<L>  /  TBili  0.2  /  DBili  x   /  AST  20  /  ALT  16  /  AlkPhos  105  08-23        CAPILLARY BLOOD GLUCOSE  POCT Blood Glucose.: 144 mg/dL (18 Aug 2023 11:49)  POCT Blood Glucose.: 122 mg/dL (18 Aug 2023 00:03)      Urinalysis Basic - ( 18 Aug 2023 11:20 )  Color: x / Appearance: x / SG: x / pH: x  Gluc: 129 mg/dL / Ketone: x  / Bili: x / Urobili: x   Blood: x / Protein: x / Nitrite: x   Leuk Esterase: x / RBC: x / WBC x   Sq Epi: x / Non Sq Epi: x / Bacteria: x        MEDICATIONS  (STANDING):    cefepime   IVPB 1000 milliGRAM(s) IV Intermittent every 24 hours  cefepime   IVPB      cloNIDine Patch 0.1 mG/24Hr(s) 1 patch Transdermal every 7 days  dextrose 5% + sodium chloride 0.9%. 1000 milliLiter(s) (75 mL/Hr) IV Continuous <Continuous>  valproate sodium  IVPB 500 milliGRAM(s) IV Intermittent every 8 hours        MICROBIOLOGY DATA:    Culture - Urine (08.17.23 @ 23:15)   Specimen Source: Catheterized Catheterized  Culture Results:   <10,000 CFU/mL Normal Urogenital Anila      Culture - Blood (08.17.23 @ 14:36)   Specimen Source: .Blood Blood-Peripheral  Culture Results: No growth at 4 days    Culture - Blood (08.17.23 @ 14:35)   Specimen Source: .Blood Blood-Peripheral  Culture Results: No growth at 4 days      Urine Microscopic-Add On (NC) (08.17.23 @ 18:40)   Red Blood Cell - Urine: 150 /HPF  White Blood Cell - Urine: 30 /HPF  Bacteria: Few /HPF  Squamous Epithelial Cells: Present    Urine Microscopic-Add On (NC) (08.17.23 @ 11:35)   Squamous Epithelial Cells: None Seen  Bacteria: Negative /HPF  Comment - Urine: Moderate budding yeast and yeast-like cells  White Blood Cell - Urine: >50 /HPF  Red Blood Cell - Urine: >50 /HPF      COVID-19 PCR . (08.16.23 @ 17:40)   COVID-19 PCR: NotDetec:

## 2023-08-24 NOTE — PROGRESS NOTE ADULT - PROBLEM SELECTOR PLAN 1
-2/2 L MCA CVA, Parkinson's Disease, and dementia  -s/p unsuccessful PEG placement with GI, surgery consulted and s/p open gastrostomy 8/14 with subsequent dislodgement 8/20  -GI/IR unable to replace feeding tube, would need subsequent open gastrostomy once healed from first - pt is a poor candidate for further surgical intervention and remains high risk for morbidity and mortality this admission and repeated G-tube placement is burdensome with limited benefit  -hospice appropriate, family previously declined  -LMOM with pt's sister to revisit the [facility] -2/2 L MCA CVA, Parkinson's Disease, and dementia  -s/p unsuccessful PEG placement with GI, surgery consulted and s/p open gastrostomy 8/14 with subsequent dislodgement 8/20  -GI/IR unable to replace feeding tube, would need subsequent open gastrostomy once healed from first - pt is a poor candidate for further surgical intervention and remains high risk for morbidity and mortality this admission and repeated G-tube placement is burdensome with limited benefit  -hospice appropriate, family previously declined  -LMOM with pt's sister to revisit the [facility] -2/2 L MCA CVA, Parkinson's Disease, and dementia  -s/p unsuccessful PEG placement with GI, surgery consulted and s/p open gastrostomy 8/14 with subsequent dislodgement 8/20  -GI/IR unable to replace feeding tube, would need subsequent open gastrostomy once healed from first - pt is a poor candidate for further surgical intervention and remains high risk for morbidity and mortality this admission and repeated G-tube placement is burdensome with limited benefit  -hospice appropriate, family previously declined  -LMOM with pt's sister to revisit the [facility] -2/2 L MCA CVA, Parkinson's Disease, and dementia  -s/p unsuccessful PEG placement with GI, surgery consulted and s/p open gastrostomy 8/14 with subsequent dislodgement 8/20  -GI/IR unable to replace feeding tube, would need subsequent open gastrostomy once healed from first - pt is a poor candidate for further surgical intervention and remains high risk for morbidity and mortality this admission and repeated G-tube placement is burdensome with limited benefit  -family agreeable to hospice at [facility]  -c/w IVF while inpatient, aware this will stop on transfer to LTC with hospice -2/2 L MCA CVA, Parkinson's Disease, and dementia  -s/p unsuccessful PEG placement with GI, surgery consulted and s/p open gastrostomy 8/14 with subsequent dislodgement 8/20  -GI/IR unable to replace feeding tube, would need subsequent open gastrostomy once healed from first - pt is a poor candidate for further surgical intervention and remains high risk for morbidity and mortality this admission and repeated G-tube placement is burdensome with limited benefit  -family agreeable to hospice at [facility]  -c/w IVF while inpatient, aware this will stop on transfer to LTC with hospice -2/2 L MCA CVA, Parkinson's Disease, and dementia  -s/p unsuccessful PEG placement with GI, surgery consulted and s/p open gastrostomy 8/14 with subsequent dislodgement 8/20  -GI/IR unable to replace feeding tube, would need subsequent open gastrostomy once healed from first - pt is a poor candidate for further surgical intervention and remains high risk for morbidity and mortality this admission and repeated G-tube placement is burdensome with limited benefit  -family agreeable to hospice at [facility]  -c/w IVF while inpatient, aware this will stop on transfer to LTC with hospice

## 2023-08-24 NOTE — PROGRESS NOTE ADULT - SUBJECTIVE AND OBJECTIVE BOX
[   ] ICU                                          [   ] CCU                                      [ X] Medical Floor      Patient is a 77 year old female with dysphagia. GI consulted for Peg tube placement.     Patient is a 77 female from [facility] with past medical history significant for HTN, HLD, CVA (w/ residual aphasia and R sided hemiparesis/plegia) who was sent to the emergency room with for altered mental status. Patient is not able to provide any history. Patient is lying down in bed, awake and alert. However, patient does not speak, is not able to follow commands and does not turn face or move eyes when her name is called. Patient is admitted with CVA. Now patient with dysphagia, poor po intake, failure to thrive and weight loss. No abdominal pain, nausea, vomiting, hematemesis, hematochezia, melena, fever, chills, chest pain, SOB, cough, hematuria, dysuria  or diarrhea reported.      Patient is comfortable. No new complaints reported except Peg tube malfunction (leaking the feeding from it's side), No abdominal pain, N/V, hematemesis, hematochezia, melena, fever, chills, chest pain, SOB, cough or diarrhea reported.      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 1000 milliLiter(s) (70 mL/Hr) IV Continuous <Continuous>  enoxaparin Injectable 30 milliGRAM(s) SubCutaneous every 24 hours  hydrALAZINE Injectable 10 milliGRAM(s) IV Push every 6 hours  potassium chloride  10 mEq/100 mL IVPB 10 milliEquivalent(s) IV Intermittent every 1 hour  valproate sodium  IVPB 500 milliGRAM(s) IV Intermittent every 8 hours         SOCIAL HISTORY  Advanced Directives:       [ X ] Full Code       [  ] DNR  Marital Status:         [  ] M      [ X ] S      [  ] D       [  ] W  Children:       [ X ] Yes      [  ] No  Occupation:        [  ] Employed       [ X ] Unemployed       [  ] Retired  Diet:       [ X ] Regular       [  ] PEG feeding          [  ] NG tube feeding  Drug Use:           [ X ] Patient denied          [  ] Yes  Alcohol:           [ X ] No             [  ] Yes (socially)         [  ] Yes (chronic)  Tobacco:           [  ] Yes           [X  ] No      FAMILY HISTORY  [ X ] Heart Disease            [ X ] Diabetes             [ X ] HTN             [  ] Colon Cancer             [  ] Stomach Cancer              [  ] Pancreatic Cancer        VITALS  Vital Signs Last 24 Hrs  T(C): 36.9 (24 Aug 2023 04:47), Max: 37.1 (23 Aug 2023 13:10)  T(F): 98.4 (24 Aug 2023 04:47), Max: 98.8 (23 Aug 2023 13:10)  HR: 87 (24 Aug 2023 04:47) (87 - 99)  BP: 165/67 (24 Aug 2023 04:47) (150/68 - 169/74)     RR: 18 (24 Aug 2023 04:47) (18 - 18)  SpO2: 98% (24 Aug 2023 04:47) (98% - 100%)    Parameters below as of 24 Aug 2023 04:47  Patient On (Oxygen Delivery Method): room air       MEDICATIONS  (STANDING):  aspirin  chewable 324 milliGRAM(s) Oral daily  atorvastatin 20 milliGRAM(s) Oral at bedtime  carbidopa/levodopa  25/100 1 Tablet(s) Oral <User Schedule>  cefepime   IVPB      cefepime   IVPB 1000 milliGRAM(s) IV Intermittent every 24 hours  chlorhexidine 2% Cloths 1 Application(s) Topical daily  cloNIDine Patch 0.1 mG/24Hr(s) 1 patch Transdermal every 7 days  dextrose 5% + sodium chloride 0.9%. 1000 milliLiter(s) (75 mL/Hr) IV Continuous <Continuous>  dextrose 5% + sodium chloride 0.9%. 1000 milliLiter(s) (75 mL/Hr) IV Continuous <Continuous>  metoprolol tartrate 25 milliGRAM(s) Oral two times a day  pantoprazole   Suspension 40 milliGRAM(s) Oral daily  valproate sodium  IVPB 500 milliGRAM(s) IV Intermittent every 8 hours    MEDICATIONS  (PRN):  acetaminophen   Oral Liquid .. 650 milliGRAM(s) Enteral Tube every 6 hours PRN Temp greater or equal to 38C (100.4F)                            7.6    17.46 )-----------( 281      ( 24 Aug 2023 00:01 )             23.8       08-24    147<H>  |  120<H>  |  43<H>  ----------------------------<  80  3.8   |  17<L>  |  1.72<H>    Ca    7.2<L>      24 Aug 2023 06:02  Phos  4.6     08-24  Mg     2.3     08-24    TPro  5.3<L>  /  Alb  1.6<L>  /  TBili  0.3  /  DBili  x   /  AST  21  /  ALT  16  /  AlkPhos  98  08-24      PT/INR - ( 23 Aug 2023 18:20 )   PT: 12.0 sec;   INR: 1.05 ratio          [   ] ICU                                          [   ] CCU                                      [ X] Medical Floor      Patient is a 77 year old female with dysphagia. GI consulted for Peg tube placement.     Patient is a 77 female from [facility] with past medical history significant for HTN, HLD, CVA (w/ residual aphasia and R sided hemiparesis/plegia) who was sent to the emergency room with for altered mental status. Patient is not able to provide any history. Patient is lying down in bed, awake and alert. However, patient does not speak, is not able to follow commands and does not turn face or move eyes when her name is called. Patient is admitted with CVA. Now patient with dysphagia, poor po intake, failure to thrive and weight loss. No abdominal pain, nausea, vomiting, hematemesis, hematochezia, melena, fever, chills, chest pain, SOB, cough, hematuria, dysuria  or diarrhea reported.      Patient is comfortable. No new complaints reported except Peg tube malfunction (leaking the feeding from it's side), No abdominal pain, N/V, hematemesis, hematochezia, melena, fever, chills, chest pain, SOB, cough or diarrhea reported.      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 1000 milliLiter(s) (70 mL/Hr) IV Continuous <Continuous>  enoxaparin Injectable 30 milliGRAM(s) SubCutaneous every 24 hours  hydrALAZINE Injectable 10 milliGRAM(s) IV Push every 6 hours  potassium chloride  10 mEq/100 mL IVPB 10 milliEquivalent(s) IV Intermittent every 1 hour  valproate sodium  IVPB 500 milliGRAM(s) IV Intermittent every 8 hours         SOCIAL HISTORY  Advanced Directives:       [ X ] Full Code       [  ] DNR  Marital Status:         [  ] M      [ X ] S      [  ] D       [  ] W  Children:       [ X ] Yes      [  ] No  Occupation:        [  ] Employed       [ X ] Unemployed       [  ] Retired  Diet:       [ X ] Regular       [  ] PEG feeding          [  ] NG tube feeding  Drug Use:           [ X ] Patient denied          [  ] Yes  Alcohol:           [ X ] No             [  ] Yes (socially)         [  ] Yes (chronic)  Tobacco:           [  ] Yes           [X  ] No      FAMILY HISTORY  [ X ] Heart Disease            [ X ] Diabetes             [ X ] HTN             [  ] Colon Cancer             [  ] Stomach Cancer              [  ] Pancreatic Cancer        VITALS  Vital Signs Last 24 Hrs  T(C): 36.9 (24 Aug 2023 04:47), Max: 37.1 (23 Aug 2023 13:10)  T(F): 98.4 (24 Aug 2023 04:47), Max: 98.8 (23 Aug 2023 13:10)  HR: 87 (24 Aug 2023 04:47) (87 - 99)  BP: 165/67 (24 Aug 2023 04:47) (150/68 - 169/74)     RR: 18 (24 Aug 2023 04:47) (18 - 18)  SpO2: 98% (24 Aug 2023 04:47) (98% - 100%)    Parameters below as of 24 Aug 2023 04:47  Patient On (Oxygen Delivery Method): room air       MEDICATIONS  (STANDING):  aspirin  chewable 324 milliGRAM(s) Oral daily  atorvastatin 20 milliGRAM(s) Oral at bedtime  carbidopa/levodopa  25/100 1 Tablet(s) Oral <User Schedule>  cefepime   IVPB      cefepime   IVPB 1000 milliGRAM(s) IV Intermittent every 24 hours  chlorhexidine 2% Cloths 1 Application(s) Topical daily  cloNIDine Patch 0.1 mG/24Hr(s) 1 patch Transdermal every 7 days  dextrose 5% + sodium chloride 0.9%. 1000 milliLiter(s) (75 mL/Hr) IV Continuous <Continuous>  dextrose 5% + sodium chloride 0.9%. 1000 milliLiter(s) (75 mL/Hr) IV Continuous <Continuous>  metoprolol tartrate 25 milliGRAM(s) Oral two times a day  pantoprazole   Suspension 40 milliGRAM(s) Oral daily  valproate sodium  IVPB 500 milliGRAM(s) IV Intermittent every 8 hours    MEDICATIONS  (PRN):  acetaminophen   Oral Liquid .. 650 milliGRAM(s) Enteral Tube every 6 hours PRN Temp greater or equal to 38C (100.4F)                            7.6    17.46 )-----------( 281      ( 24 Aug 2023 00:01 )             23.8       08-24    147<H>  |  120<H>  |  43<H>  ----------------------------<  80  3.8   |  17<L>  |  1.72<H>    Ca    7.2<L>      24 Aug 2023 06:02  Phos  4.6     08-24  Mg     2.3     08-24    TPro  5.3<L>  /  Alb  1.6<L>  /  TBili  0.3  /  DBili  x   /  AST  21  /  ALT  16  /  AlkPhos  98  08-24      PT/INR - ( 23 Aug 2023 18:20 )   PT: 12.0 sec;   INR: 1.05 ratio          [   ] ICU                                          [   ] CCU                                      [ X] Medical Floor      Patient is a 77 year old female with dysphagia. GI consulted for Peg tube placement.     Patient is a 77 female from [facility] with past medical history significant for HTN, HLD, CVA (w/ residual aphasia and R sided hemiparesis/plegia) who was sent to the emergency room with for altered mental status. Patient is not able to provide any history. Patient is lying down in bed, awake and alert. However, patient does not speak, is not able to follow commands and does not turn face or move eyes when her name is called. Patient is admitted with CVA. Now patient with dysphagia, poor po intake, failure to thrive and weight loss. No abdominal pain, nausea, vomiting, hematemesis, hematochezia, melena, fever, chills, chest pain, SOB, cough, hematuria, dysuria  or diarrhea reported.      Patient is comfortable. No new complaints reported except Peg tube malfunction (leaking the feeding from it's side), No abdominal pain, N/V, hematemesis, hematochezia, melena, fever, chills, chest pain, SOB, cough or diarrhea reported.      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 1000 milliLiter(s) (70 mL/Hr) IV Continuous <Continuous>  enoxaparin Injectable 30 milliGRAM(s) SubCutaneous every 24 hours  hydrALAZINE Injectable 10 milliGRAM(s) IV Push every 6 hours  potassium chloride  10 mEq/100 mL IVPB 10 milliEquivalent(s) IV Intermittent every 1 hour  valproate sodium  IVPB 500 milliGRAM(s) IV Intermittent every 8 hours         SOCIAL HISTORY  Advanced Directives:       [ X ] Full Code       [  ] DNR  Marital Status:         [  ] M      [ X ] S      [  ] D       [  ] W  Children:       [ X ] Yes      [  ] No  Occupation:        [  ] Employed       [ X ] Unemployed       [  ] Retired  Diet:       [ X ] Regular       [  ] PEG feeding          [  ] NG tube feeding  Drug Use:           [ X ] Patient denied          [  ] Yes  Alcohol:           [ X ] No             [  ] Yes (socially)         [  ] Yes (chronic)  Tobacco:           [  ] Yes           [X  ] No      FAMILY HISTORY  [ X ] Heart Disease            [ X ] Diabetes             [ X ] HTN             [  ] Colon Cancer             [  ] Stomach Cancer              [  ] Pancreatic Cancer        VITALS  Vital Signs Last 24 Hrs  T(C): 36.9 (24 Aug 2023 04:47), Max: 37.1 (23 Aug 2023 13:10)  T(F): 98.4 (24 Aug 2023 04:47), Max: 98.8 (23 Aug 2023 13:10)  HR: 87 (24 Aug 2023 04:47) (87 - 99)  BP: 165/67 (24 Aug 2023 04:47) (150/68 - 169/74)     RR: 18 (24 Aug 2023 04:47) (18 - 18)  SpO2: 98% (24 Aug 2023 04:47) (98% - 100%)    Parameters below as of 24 Aug 2023 04:47  Patient On (Oxygen Delivery Method): room air       MEDICATIONS  (STANDING):  aspirin  chewable 324 milliGRAM(s) Oral daily  atorvastatin 20 milliGRAM(s) Oral at bedtime  carbidopa/levodopa  25/100 1 Tablet(s) Oral <User Schedule>  cefepime   IVPB      cefepime   IVPB 1000 milliGRAM(s) IV Intermittent every 24 hours  chlorhexidine 2% Cloths 1 Application(s) Topical daily  cloNIDine Patch 0.1 mG/24Hr(s) 1 patch Transdermal every 7 days  dextrose 5% + sodium chloride 0.9%. 1000 milliLiter(s) (75 mL/Hr) IV Continuous <Continuous>  dextrose 5% + sodium chloride 0.9%. 1000 milliLiter(s) (75 mL/Hr) IV Continuous <Continuous>  metoprolol tartrate 25 milliGRAM(s) Oral two times a day  pantoprazole   Suspension 40 milliGRAM(s) Oral daily  valproate sodium  IVPB 500 milliGRAM(s) IV Intermittent every 8 hours    MEDICATIONS  (PRN):  acetaminophen   Oral Liquid .. 650 milliGRAM(s) Enteral Tube every 6 hours PRN Temp greater or equal to 38C (100.4F)                            7.6    17.46 )-----------( 281      ( 24 Aug 2023 00:01 )             23.8       08-24    147<H>  |  120<H>  |  43<H>  ----------------------------<  80  3.8   |  17<L>  |  1.72<H>    Ca    7.2<L>      24 Aug 2023 06:02  Phos  4.6     08-24  Mg     2.3     08-24    TPro  5.3<L>  /  Alb  1.6<L>  /  TBili  0.3  /  DBili  x   /  AST  21  /  ALT  16  /  AlkPhos  98  08-24      PT/INR - ( 23 Aug 2023 18:20 )   PT: 12.0 sec;   INR: 1.05 ratio

## 2023-08-24 NOTE — PROGRESS NOTE ADULT - PROBLEM SELECTOR PLAN 5
8/17 Pt meets SIRS criteria  UA significant for elevated WBC's  Pt started on Cefepime as per Dr. Correa's recommendation

## 2023-08-24 NOTE — PROGRESS NOTE ADULT - CONVERSATION DETAILS
Call placed to pt's sister Marika San [phone] to arrange time to discuss GOC regarding plan s/p G-tube dislodgement. LMOM. Call placed to pt's sister Marika San [phone] to arrange time to discuss GOC regarding plan s/p G-tube dislodgement. LMOM. Call placed to pt's sister Marika San [phone] to arrange time to discuss GOC regarding plan s/p G-tube dislodgement. LMOM. Call placed to pt's sister Marika San [phone] to arrange time to discuss GOC regarding plan s/p G-tube dislodgement. LMOM. Call placed again, agrees to meet at the bedside at 3pm.    Later met with Marika and Dr. Oconnell from primary team to review the present clinical status, hospital course, and end of life issues. Reviewed that multiple attempts have been made for the pt to receive longterm artificial nutrition but unfortunately have been unsuccessful in obtaining longterm feeding tube. Reviewed again she was not a candidate for PEG tube and had several unsuccessful attempts at NGT earlier in her admission. Reviewed option for repeat open gastrostomy, but the significant burdens and risks associated with surgery and that she has still not fully healed from her first surgery. Also, she has significant GIB and dropping H/H s/p transfusions and ongoing sepsis with worsening WBC. She is a poor candidate for surgery and is not stable for surgery at this time, and it is unclear if she will stabilize.     Reviewed that despite all the attempts made at LST including artificial nutrition she remains in an EOL situation. Educated at length on EOL issues and dying process and the complications of continued artificial nutrition and hydration in the EOL setting including increased symptom burden/pulmonary congestion and prolonged dying process/suffering. She verbalized understanding and difficulty with the decision-making and the clinical situation. Much emotional support provided. Reviewed hospice services at length and hospice philosophy and she stated she wishes for the pt to return to [facility] with hospice services. Educated that continued IV hydration, IV antibiotics, re-hospitalization, transfusions and lab draws are not within the hospice plan of care. She verbalized understanding and agreement with stopping transfusions/lab draws now and to make her comfort measures only. Educated regarding MOLST form and she defers to complete this tomorrow, wishes to talk to family. Agreeable to SW consult for hospice at this time. Much emotional support provided. Call placed to pt's sister Marika San [phone] to arrange time to discuss GOC regarding plan s/p G-tube dislodgement. LMOM. Call placed again, agrees to meet at the bedside at 3pm.    Later met with Marika and Dr. Oconnell from primary team to review the present clinical status, hospital course, and end of life issues. Reviewed that multiple attempts have been made for the pt to receive longterm artificial nutrition but unfortunately have been unsuccessful in obtaining longterm feeding tube. Reviewed again she was not a candidate for PEG tube and had several unsuccessful attempts at NGT earlier in her admission. Reviewed option for repeat open gastrostomy, but the significant burdens and risks associated with surgery and that she has still not fully healed from her first surgery. Also, she has significant GIB and dropping H/H s/p transfusions and ongoing sepsis with worsening WBC. She is a poor candidate for surgery and is not stable for surgery at this time, and it is unclear if she will stabilize.     Reviewed that despite all the attempts made at LST including artificial nutrition she remains in an EOL situation. Educated at length on EOL issues and dying process and the complications of continued artificial nutrition and hydration in the EOL setting including increased symptom burden/pulmonary congestion and prolonged dying process/suffering. She verbalized understanding and difficulty with the decision-making and the clinical situation. Much emotional support provided. Reviewed hospice services at length and hospice philosophy and she stated she wishes for the pt to return to [facility] with hospice services. Educated that continued IV hydration, IV antibiotics, re-hospitalization, transfusions and lab draws are not within the hospice plan of care. She verbalized understanding and agreement with stopping transfusions/lab draws now and to make her comfort measures only. Educated regarding MOLST form and she defers to complete this tomorrow, wishes to talk to family. Agreeable to SW consult for hospice at this time. Much emotional support provided. Call placed to pt's sister Marika San [phone] to arrange time to discuss GOC regarding plan s/p G-tube dislodgement. LMOM. Call placed again, agrees to meet at the bedside at 3pm.    Later met with Marika and Dr. Oconnell from primary team to review the present clinical status, hospital course, and end of life issues. Reviewed that multiple attempts have been made for the pt to receive longterm artificial nutrition but unfortunately have been unsuccessful in obtaining longterm feeding tube. Reviewed again she was not a candidate for PEG tube and had several unsuccessful attempts at NGT earlier in her admission. Reviewed option for repeat open gastrostomy, but the significant burdens and risks associated with surgery and that she has still not fully healed from her first surgery. Also, she has significant GIB and dropping H/H s/p transfusions and ongoing sepsis with worsening WBC. She is a poor candidate for surgery and is not stable for surgery at this time, and it is unclear if she will stabilize.     Reviewed that despite all the attempts made at LST including artificial nutrition she remains in an EOL situation. Educated at length on EOL issues and dying process and the complications of continued artificial nutrition and hydration in the EOL setting including increased symptom burden/pulmonary congestion and prolonged dying process/suffering. She verbalized understanding and difficulty with the decision-making and the clinical situation. Much emotional support provided. Reviewed hospice services at length and hospice philosophy and she stated she wishes for the pt to return to [facility] with hospice services. Educated that continued IV hydration, IV antibiotics, re-hospitalization, transfusions and lab draws are not within the hospice plan of care. She verbalized understanding and agreement with stopping transfusions/lab draws now and to make her comfort measures only. Educated regarding MOLST form and she defers to complete this tomorrow, wishes to talk to family. Agreeable to SW consult for hospice at this time. Much emotional support provided.

## 2023-08-24 NOTE — ADVANCED PRACTICE NURSE CONSULT - RECOMMEDATIONS
-Clean all affected areas with warm water, mild soap, pat dry, and apply skin prep to the surrounding skin  -Apply TRIAD Moisture Barrier Cream to the Bilateral Gluteus, Bilateral Post Thigh, and Perianal areas b.i.d. PRN  -Please consider application of a fecal incontinence management system to assist with moisture management and wound healing  -Elevate/float the patients heels using heel protectors and reposition the patient Q 2hrs using wedges or pillows

## 2023-08-24 NOTE — PROGRESS NOTE ADULT - PROBLEM SELECTOR PLAN 3
In context of     Acute Illness/Injury (>7days)       Energy/Food intake <50% of estimated energy requirement >5 days alb 2.3 8/10  Weight loss: Moderate - severe (lbs lost recently)  Body Fat loss: Severe   (temporal wasting, contracted, muscle atrophy)  Muscle mass loss: moderate   Fluid Accumulation: Severe (Fluid overload, anasarca, pleural effusions)   Strength: weakened severe (bedbound)    Recommend:   No oral intake since 7/27 only IVF, unable to place NGT after multiple attempts, GI unable to place PEG, s/p open gastrostomy 8/14 and G-Tube dislodged 8/20 and no TF since that time, only IVF. Pt is NOT a candidate for TPN due to active infection. -2/2 ?GIB, H/H again downtrending s/p transfusion  -family defers further work-up and intervention of GIB/anemia  -no further lab draws or transfusions per family  -plan is for hospice in LTC

## 2023-08-24 NOTE — PROGRESS NOTE ADULT - FAMILY/RELATIVE
sister Marika Jaswinder [phone] sister Marika Jaswinder [phone] sister Marika Jaswinder [phone]

## 2023-08-24 NOTE — PROGRESS NOTE ADULT - SUBJECTIVE AND OBJECTIVE BOX
Date of Service 08-24-23 @ 10:50    CHIEF COMPLAINT:Patient is a 77y old  Female who presents with a chief complaint of Altered mental status. Pt appears comfortable.    	  REVIEW OF SYSTEMS:  unable to obtain    PHYSICAL EXAM:  T(C): 36.9 (08-24-23 @ 04:47), Max: 37.1 (08-23-23 @ 13:10)  HR: 87 (08-24-23 @ 04:47) (87 - 99)  BP: 165/67 (08-24-23 @ 04:47) (144/60 - 169/74)  RR: 18 (08-24-23 @ 04:47) (18 - 18)  SpO2: 98% (08-24-23 @ 04:47) (98% - 100%)  Wt(kg): --  I&O's Summary    23 Aug 2023 07:01  -  24 Aug 2023 07:00  --------------------------------------------------------  IN: 0 mL / OUT: 400 mL / NET: -400 mL        Appearance: Normal	  HEENT:   Normal oral mucosa, PERRL, EOMI	  Lymphatic: No lymphadenopathy  Cardiovascular: Normal S1 S2, No JVD, No murmurs, No edema  Respiratory: Lungs clear to auscultation	  Gastrointestinal:  Soft, Non-tender, + BS	  Skin: No rashes, No ecchymoses, No cyanosis	  Extremities: Normal range of motion, No clubbing, cyanosis or edema  Vascular: Peripheral pulses palpable 2+ bilaterally    MEDICATIONS  (STANDING):  aspirin  chewable 324 milliGRAM(s) Oral daily  atorvastatin 20 milliGRAM(s) Oral at bedtime  carbidopa/levodopa  25/100 1 Tablet(s) Oral <User Schedule>  cefepime   IVPB 1000 milliGRAM(s) IV Intermittent every 24 hours  cefepime   IVPB      chlorhexidine 2% Cloths 1 Application(s) Topical daily  cloNIDine Patch 0.1 mG/24Hr(s) 1 patch Transdermal every 7 days  dextrose 5% + sodium chloride 0.9%. 1000 milliLiter(s) (75 mL/Hr) IV Continuous <Continuous>  dextrose 5% + sodium chloride 0.9%. 1000 milliLiter(s) (75 mL/Hr) IV Continuous <Continuous>  metoprolol tartrate 25 milliGRAM(s) Oral two times a day  pantoprazole   Suspension 40 milliGRAM(s) Oral daily  valproate sodium  IVPB 500 milliGRAM(s) IV Intermittent every 8 hours      	  	  LABS:	 	                        7.6    17.46 )-----------( 281      ( 24 Aug 2023 00:01 )             23.8     08-24    147<H>  |  120<H>  |  43<H>  ----------------------------<  80  3.8   |  17<L>  |  1.72<H>    Ca    7.2<L>      24 Aug 2023 06:02  Phos  4.6     08-24  Mg     2.3     08-24    TPro  5.3<L>  /  Alb  1.6<L>  /  TBili  0.3  /  DBili  x   /  AST  21  /  ALT  16  /  AlkPhos  98  08-24      Lipid Profile: Cholesterol 152  HDL 51  TG 93  Ldl calc 82    TSH: Thyroid Stimulating Hormone, Serum: 3.59 uU/mL (07-28 @ 05:03)

## 2023-08-24 NOTE — PROGRESS NOTE ADULT - ASSESSMENT
Patient is a 77 female from [facility] PMHx HTN, HLD, CVA (w/ residual aphasia and R sided hemiparesis/plegia) who was sent to the hospital due to altered mental status. Patient is being admitted to medicine for further work up and rule out stroke vs encephalopathy (metabolic vs infectious).  Patient is a 77 female from [facility] PMHx HTN, HLD, CVA (w/ residual aphasia and R sided hemiparesis/plegia) who was sent to the hospital due to altered mental status. Patient is being admitted to medicine for further work up and rule out stroke vs encephalopathy (metabolic vs infectious).  Patient is a 77 female from [facility] PMHx HTN, HLD, CVA (w/ residual aphasia and R sided hemiparesis/plegia) who was sent to the hospital due to altered mental status. Patient is being admitted to medicine for further work up and rule out stroke vs encephalopathy (metabolic vs infectious).

## 2023-08-24 NOTE — PROGRESS NOTE ADULT - PROBLEM SELECTOR PLAN 5
-Family in agreement with comfort care and no further attempts to place feeding tube (NGT or G-tube)  -DNR/I in place  -MEWS exempt  -no further lab draws/transfusions   -c/w IVF and IV antibiotics until DC, family aware this is not within the hospice plan of care  -plan for LTC with hospice ([facility])    Discussed with primary team. -Family in agreement with comfort care and no further attempts to place feeding tube (NGT or G-tube)  -DNR/I in place  -MEWS exempt  -no further lab draws/transfusions   -c/w IVF and IV antibiotics until DC, family aware this is not within the hospice plan of care  -plan for LTC with hospice ([facility])    Discussed with primary team. -Family in agreement with comfort care and no further attempts to place feeding tube (NGT or G-tube)  -DNR/I in place  -MEWS exempt  -no further lab draws/transfusions   -c/w IVF and IV antibiotics until DC, family aware this is not within the hospice plan of care  -plan for LTC with hospice ([facility])    Discussed with primary team.

## 2023-08-24 NOTE — PROGRESS NOTE ADULT - PROBLEM SELECTOR PLAN 2
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, nonverbal, bedbound, hospice appropriate  -family wishes for longterm feeding tube, however she has not tolerated repeated attempts to place longterm feeding tube (unable to place peg and now failed open gastrostomy)  -significant burdens associated with continued attempts at artificial nutrition remains high risk for morbidity and mortality despite continued attempts to place longterm feeding tube 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, nonverbal, bedbound, hospice appropriate  -family wishes for longterm feeding tube, however she has not tolerated repeated attempts to place longterm feeding tube (unable to place peg and now failed open gastrostomy)  -significant burdens associated with continued attempts at artificial nutrition remains high risk for morbidity and mortality despite continued attempts to place longterm feeding tube  -family now agreeable to hospice  -comfort measures only

## 2023-08-24 NOTE — PROGRESS NOTE ADULT - PROBLEM SELECTOR PLAN 8
-Sublingual carbidopa-levodopa stopped for aspiration precautions  -Cont crushed carbidopa-levodopa through feeding tube -Sublingual carbidopa-levodopa stopped for aspiration precautions  -Crushed carbidopa-levodopa d/c while G-tube out of position

## 2023-08-24 NOTE — CHART NOTE - NSCHARTNOTEFT_GEN_A_CORE
Assessment:     Factors impacting intake: [ ] none [ ] nausea  [ ] vomiting [ ] diarrhea [ ] constipation  [ ]chewing problems [ ] swallowing issues  [ ] other:     Diet Presciption: Diet, NPO (08-21-23 @ 20:19)    Intake:     Daily   % Weight Change    Pertinent Medications: MEDICATIONS  (STANDING):  aspirin  chewable 324 milliGRAM(s) Oral daily  atorvastatin 20 milliGRAM(s) Oral at bedtime  carbidopa/levodopa  25/100 1 Tablet(s) Oral <User Schedule>  cefepime   IVPB      cefepime   IVPB 1000 milliGRAM(s) IV Intermittent every 24 hours  chlorhexidine 2% Cloths 1 Application(s) Topical daily  cloNIDine Patch 0.1 mG/24Hr(s) 1 patch Transdermal every 7 days  dextrose 5% + sodium chloride 0.9%. 1000 milliLiter(s) (75 mL/Hr) IV Continuous <Continuous>  dextrose 5% + sodium chloride 0.9%. 1000 milliLiter(s) (75 mL/Hr) IV Continuous <Continuous>  metoprolol tartrate 25 milliGRAM(s) Oral two times a day  pantoprazole   Suspension 40 milliGRAM(s) Oral daily  valproate sodium  IVPB 500 milliGRAM(s) IV Intermittent every 8 hours    MEDICATIONS  (PRN):  acetaminophen   Oral Liquid .. 650 milliGRAM(s) Enteral Tube every 6 hours PRN Temp greater or equal to 38C (100.4F)    Pertinent Labs: 08-24 Na147 mmol/L<H> Glu 80 mg/dL K+ 3.8 mmol/L Cr  1.72 mg/dL<H> BUN 43 mg/dL<H> 08-24 Phos 4.6 mg/dL<H> 08-24 Alb 1.6 g/dL<L> 07-28 Chol 152 mg/dL LDL --    HDL 51 mg/dL Trig 93 mg/dL     CAPILLARY BLOOD GLUCOSE      POCT Blood Glucose.: 88 mg/dL (24 Aug 2023 05:41)  POCT Blood Glucose.: 78 mg/dL (23 Aug 2023 23:41)  POCT Blood Glucose.: 97 mg/dL (23 Aug 2023 18:30)  POCT Blood Glucose.: 92 mg/dL (23 Aug 2023 11:32)      Skin:     Estimated Needs:   [ ] no change since previous assessment  [ ] recalculated:     Previous Nutrition Diagnosis:   [ ] Inadequate Energy Intake [ ]Inadequate Oral Intake [ ] Excessive Energy Intake   [ ] Underweight [ ] Increased Nutrient Needs [ ] Overweight/Obesity  [ ] Swallowing Difficult   [ ] Altered GI Function [ ] Unintended Weight Loss [ ] Food & Nutrition Related Knowledge Deficit [ ] Malnutrition   [ ] Not Ready for Diet/Life Style Changes     Nutrition Diagnosis is [ ] ongoing  [ ] Improving   [ ] resolved [ ] not applicable     New Nutrition Diagnosis: [ ] not applicable       Interventions:   Recommend  [ ] Change Diet To:  [ ] Nutrition Supplement  [ ] Nutrition Support  [ ] Other:     Monitoring and Evaluation:   [ ] PO intake [ x ] Tolerance to diet prescription [ x ] weights [ x ] labs[ x ] follow up per protocol  [ ] other: Assessment:       Nutrition reassessment for follow-up NPO with leaking "PEG". Chart reviewed, pt visited, no-verbal/confused; s/p open gastrostomy tube placement 8/14/23, TF started 8/15/23, was tolerated well, but changed to NPO due to G-tube leaking, Surgery, GI, IR on the case, pending re-establish of enteral nutrition access when medically feasible; eGFR=30, K=5.4-->3.8, PO4=5.3-->4.6, "give Nephro tube feeding" per Nephrologist; Pending discharge planning to skilled nursing facility when medically ready per team     Factors impacting intake: [ x ] other: change in mental status; difficulty chewing; difficulty feeding self; difficulty swallowing; acute on chronic comorbidities including acute encephalopathy, SUSAN on CKD, h/o CVA    Diet Prescription: Diet, NPO (08-21-23 @ 20:19)    Intake: NPO x 4 day due to Malfunction G-tube      Daily % Weight Change    Pertinent Medications: MEDICATIONS  (STANDING):  aspirin  chewable 324 milliGRAM(s) Oral daily  atorvastatin 20 milliGRAM(s) Oral at bedtime  carbidopa/levodopa  25/100 1 Tablet(s) Oral <User Schedule>  cefepime   IVPB      cefepime   IVPB 1000 milliGRAM(s) IV Intermittent every 24 hours  chlorhexidine 2% Cloths 1 Application(s) Topical daily  cloNIDine Patch 0.1 mG/24Hr(s) 1 patch Transdermal every 7 days  dextrose 5% + sodium chloride 0.9%. 1000 milliLiter(s) (75 mL/Hr) IV Continuous <Continuous>  dextrose 5% + sodium chloride 0.9%. 1000 milliLiter(s) (75 mL/Hr) IV Continuous <Continuous>  metoprolol tartrate 25 milliGRAM(s) Oral two times a day  pantoprazole   Suspension 40 milliGRAM(s) Oral daily  valproate sodium  IVPB 500 milliGRAM(s) IV Intermittent every 8 hours    MEDICATIONS  (PRN):  acetaminophen   Oral Liquid .. 650 milliGRAM(s) Enteral Tube every 6 hours PRN Temp greater or equal to 38C (100.4F)    Pertinent Labs: 08-24 Na147 mmol/L<H> Glu 80 mg/dL K+ 3.8 mmol/L Cr  1.72 mg/dL<H> BUN 43 mg/dL<H> 08-24 Phos 4.6 mg/dL<H> 08-24 Alb 1.6 g/dL<L> 07-28 Chol 152 mg/dL LDL --    HDL 51 mg/dL Trig 93 mg/dL     CAPILLARY BLOOD GLUCOSE    POCT Blood Glucose.: 88 mg/dL (24 Aug 2023 05:41)  POCT Blood Glucose.: 78 mg/dL (23 Aug 2023 23:41)  POCT Blood Glucose.: 97 mg/dL (23 Aug 2023 18:30)  POCT Blood Glucose.: 92 mg/dL (23 Aug 2023 11:32)    Skin: skin tear; 3+ upper extremity edema     Estimated Needs:   [ x ] no change since previous assessment  [ ] recalculated:     Previous Nutrition Diagnosis:   [ x ] Inadequate Oral Intake   [ x ] Severe Malnutrition     Nutrition Diagnosis is [ x ] ongoing  [ ] Improving   [ ] resolved [ ] not applicable     New Nutrition Diagnosis: [ x ] not applicable     Interventions/Recommend:  [ x ] Advance diet or consider alternate nutrition support if NPO prolonged further as medically feasible (see Surgery Note 8/23/23 14:06 )  [ ] Nutrition Supplement  [ x ] Nutrition Support: When TF restarted,  may consider Goal: Nepro @ 55ml/h x 16h (880ml, 1584kcal, 71g protein, 642ml water daily at goal rate) per Nephrolgist                        If improving renal function:  Goal: Jevity 1.5 @ 60ml/h x 16h (960ml, 1440kcal, 61g protein, 729ml water daily at goal)                     MD to adjust free water as needed   [ x ] Other:  Discussed with team         Pt to be followed by dietitian at [facility] when medically ready to be discharged     Monitoring and Evaluation:   [ ] PO intake [ x ] Tolerance to diet prescription [ x ] weights [ x ] labs[ x ] follow up per protocol  [ ] other: Assessment:       Nutrition reassessment for follow-up NPO with leaking "PEG". Chart reviewed, pt visited, no-verbal/confused; s/p open gastrostomy tube placement 8/14/23, TF started 8/15/23, was tolerated well, but changed to NPO due to G-tube leaking, Surgery, GI, IR on the case, pending re-establish of enteral nutrition access when medically feasible; eGFR=30, K=5.4-->3.8, PO4=5.3-->4.6, "give Nephro tube feeding" per Nephrologist; Pending discharge planning to skilled nursing facility when medically ready per team     Factors impacting intake: [ x ] other: change in mental status; difficulty chewing; difficulty feeding self; difficulty swallowing; acute on chronic comorbidities including acute encephalopathy, SUSAN on CKD, h/o CVA    Diet Prescription: Diet, NPO (08-21-23 @ 20:19)    Intake: NPO x 4 day due to Malfunction G-tube      Daily % Weight Change    Pertinent Medications: MEDICATIONS  (STANDING):  aspirin  chewable 324 milliGRAM(s) Oral daily  atorvastatin 20 milliGRAM(s) Oral at bedtime  carbidopa/levodopa  25/100 1 Tablet(s) Oral <User Schedule>  cefepime   IVPB      cefepime   IVPB 1000 milliGRAM(s) IV Intermittent every 24 hours  chlorhexidine 2% Cloths 1 Application(s) Topical daily  cloNIDine Patch 0.1 mG/24Hr(s) 1 patch Transdermal every 7 days  dextrose 5% + sodium chloride 0.9%. 1000 milliLiter(s) (75 mL/Hr) IV Continuous <Continuous>  dextrose 5% + sodium chloride 0.9%. 1000 milliLiter(s) (75 mL/Hr) IV Continuous <Continuous>  metoprolol tartrate 25 milliGRAM(s) Oral two times a day  pantoprazole   Suspension 40 milliGRAM(s) Oral daily  valproate sodium  IVPB 500 milliGRAM(s) IV Intermittent every 8 hours    MEDICATIONS  (PRN):  acetaminophen   Oral Liquid .. 650 milliGRAM(s) Enteral Tube every 6 hours PRN Temp greater or equal to 38C (100.4F)    Pertinent Labs: 08-24 Na147 mmol/L<H> Glu 80 mg/dL K+ 3.8 mmol/L Cr  1.72 mg/dL<H> BUN 43 mg/dL<H> 08-24 Phos 4.6 mg/dL<H> 08-24 Alb 1.6 g/dL<L> 07-28 Chol 152 mg/dL LDL --    HDL 51 mg/dL Trig 93 mg/dL     CAPILLARY BLOOD GLUCOSE    POCT Blood Glucose.: 88 mg/dL (24 Aug 2023 05:41)  POCT Blood Glucose.: 78 mg/dL (23 Aug 2023 23:41)  POCT Blood Glucose.: 97 mg/dL (23 Aug 2023 18:30)  POCT Blood Glucose.: 92 mg/dL (23 Aug 2023 11:32)    Skin: skin tear; 3+ upper extremity edema     Estimated Needs:   [ x ] no change since previous assessment  [ ] recalculated:     Previous Nutrition Diagnosis:   [ x ] Inadequate Oral Intake   [ x ] Severe Malnutrition     Nutrition Diagnosis is [ x ] ongoing  [ ] Improving   [ ] resolved [ ] not applicable     New Nutrition Diagnosis: [ x ] not applicable     Interventions/Recommend:  [ x ] Advance diet or consider alternate nutrition support if NPO prolonged further as medically feasible (see Surgery Note 8/23/23 14:06 )  [ ] Nutrition Supplement  [ x ] Nutrition Support: When TF restarted,  may consider Goal: Nepro @ 55ml/h x 16h (880ml, 1584kcal, 71g protein, 642ml water daily at goal rate) per Nephrolgist                        If improving renal function:  Goal: Jevity 1.5 @ 60ml/h x 16h (960ml, 1440kcal, 61g protein, 729ml water daily at goal)                     MD to adjust free water as needed   [ x ] Other:  Discussed with team         Pt to be followed by dietitian at [facility] when medically ready to be discharged     Monitoring and Evaluation:   [ ] PO intake [ x ] Tolerance to diet prescription [ x ] weights [ x ] labs[ x ] follow up per protocol  [ ] other: Assessment:       Nutrition reassessment for follow-up NPO with leaking "PEG". Chart reviewed, pt visited, no-verbal/confused; s/p open gastrostomy tube placement 8/14/23, TF started 8/15/23, was tolerated well, but changed to NPO due to G-tube leaking, Surgery, GI, IR on the case, pending re-establish of enteral nutrition access when medically feasible; eGFR=30, K=5.4-->3.8, PO4=5.3-->4.6, "give Nephro tube feeding" per Nephrologist; Pending discharge planning to skilled nursing facility when medically ready per team     Factors impacting intake: [ x ] other: change in mental status; difficulty chewing; difficulty feeding self; difficulty swallowing; acute on chronic comorbidities including acute encephalopathy, SUSAN on CKD, h/o CVA    Diet Prescription: Diet, NPO (08-21-23 @ 20:19)    Intake: NPO x 4 day due to Malfunction G-tube      Daily % Weight Change    Pertinent Medications: MEDICATIONS  (STANDING):  aspirin  chewable 324 milliGRAM(s) Oral daily  atorvastatin 20 milliGRAM(s) Oral at bedtime  carbidopa/levodopa  25/100 1 Tablet(s) Oral <User Schedule>  cefepime   IVPB      cefepime   IVPB 1000 milliGRAM(s) IV Intermittent every 24 hours  chlorhexidine 2% Cloths 1 Application(s) Topical daily  cloNIDine Patch 0.1 mG/24Hr(s) 1 patch Transdermal every 7 days  dextrose 5% + sodium chloride 0.9%. 1000 milliLiter(s) (75 mL/Hr) IV Continuous <Continuous>  dextrose 5% + sodium chloride 0.9%. 1000 milliLiter(s) (75 mL/Hr) IV Continuous <Continuous>  metoprolol tartrate 25 milliGRAM(s) Oral two times a day  pantoprazole   Suspension 40 milliGRAM(s) Oral daily  valproate sodium  IVPB 500 milliGRAM(s) IV Intermittent every 8 hours    MEDICATIONS  (PRN):  acetaminophen   Oral Liquid .. 650 milliGRAM(s) Enteral Tube every 6 hours PRN Temp greater or equal to 38C (100.4F)    Pertinent Labs: 08-24 Na147 mmol/L<H> Glu 80 mg/dL K+ 3.8 mmol/L Cr  1.72 mg/dL<H> BUN 43 mg/dL<H> 08-24 Phos 4.6 mg/dL<H> 08-24 Alb 1.6 g/dL<L> 07-28 Chol 152 mg/dL LDL --    HDL 51 mg/dL Trig 93 mg/dL     CAPILLARY BLOOD GLUCOSE    POCT Blood Glucose.: 88 mg/dL (24 Aug 2023 05:41)  POCT Blood Glucose.: 78 mg/dL (23 Aug 2023 23:41)  POCT Blood Glucose.: 97 mg/dL (23 Aug 2023 18:30)  POCT Blood Glucose.: 92 mg/dL (23 Aug 2023 11:32)    Skin: skin tear; 3+ upper extremity edema     Estimated Needs:   [ x ] no change since previous assessment  [ ] recalculated:     Previous Nutrition Diagnosis:   [ x ] Inadequate Oral Intake   [ x ] Severe Malnutrition     Nutrition Diagnosis is [ x ] ongoing  [ ] Improving   [ ] resolved [ ] not applicable     New Nutrition Diagnosis: [ x ] not applicable     Interventions/Recommend:  [ x ] Advance diet or consider alternate nutrition support if NPO prolonged further as medically feasible (see Surgery Note 8/23/23 14:06 )  [ ] Nutrition Supplement  [ x ] Nutrition Support: When TF restarted,  may consider Goal: Nepro @ 55ml/h x 16h (880ml, 1584kcal, 71g protein, 642ml water daily at goal rate) per Nephrolgist                        If improving renal function:  Goal: Jevity 1.5 @ 60ml/h x 16h (960ml, 1440kcal, 61g protein, 729ml water daily at goal)                     MD to adjust free water as needed   [ x ] Other:  Discussed with team         Pt to be followed by dietitian at [facility] when medically ready to be discharged     Monitoring and Evaluation:   [ ] PO intake [ x ] Tolerance to diet prescription [ x ] weights [ x ] labs[ x ] follow up per protocol  [ ] other: Assessment:       Nutrition reassessment for follow-up NPO with leaking "PEG". Chart reviewed, pt visited, no-verbal/confused; s/p open gastrostomy tube placement 8/14/23, TF started 8/15/23, was tolerated well, but changed to NPO due to G-tube leaking, Surgery, GI, IR on the case, pending re-establish of enteral nutrition access when medically feasible; eGFR=30, K=5.4-->3.8, PO4=5.3-->4.6, "give Nephro tube feeding" per Nephrologist; Pending discharge planning to skilled nursing facility when medically ready per team     Factors impacting intake: [ x ] other: change in mental status; difficulty chewing; difficulty feeding self; difficulty swallowing; acute on chronic comorbidities including acute encephalopathy, SUSAN on CKD, h/o CVA    Diet Prescription: Diet, NPO (08-21-23 @ 20:19)    Intake: NPO x 4 day due to Malfunction G-tube      Daily % Weight Change    Pertinent Medications: MEDICATIONS  (STANDING):  aspirin  chewable 324 milliGRAM(s) Oral daily  atorvastatin 20 milliGRAM(s) Oral at bedtime  carbidopa/levodopa  25/100 1 Tablet(s) Oral <User Schedule>  cefepime   IVPB      cefepime   IVPB 1000 milliGRAM(s) IV Intermittent every 24 hours  chlorhexidine 2% Cloths 1 Application(s) Topical daily  cloNIDine Patch 0.1 mG/24Hr(s) 1 patch Transdermal every 7 days  dextrose 5% + sodium chloride 0.9%. 1000 milliLiter(s) (75 mL/Hr) IV Continuous <Continuous>  dextrose 5% + sodium chloride 0.9%. 1000 milliLiter(s) (75 mL/Hr) IV Continuous <Continuous>  metoprolol tartrate 25 milliGRAM(s) Oral two times a day  pantoprazole   Suspension 40 milliGRAM(s) Oral daily  valproate sodium  IVPB 500 milliGRAM(s) IV Intermittent every 8 hours    MEDICATIONS  (PRN):  acetaminophen   Oral Liquid .. 650 milliGRAM(s) Enteral Tube every 6 hours PRN Temp greater or equal to 38C (100.4F)    Pertinent Labs: 08-24 Na147 mmol/L<H> Glu 80 mg/dL K+ 3.8 mmol/L Cr  1.72 mg/dL<H> BUN 43 mg/dL<H> 08-24 Phos 4.6 mg/dL<H> 08-24 Alb 1.6 g/dL<L> 07-28 Chol 152 mg/dL LDL --    HDL 51 mg/dL Trig 93 mg/dL     CAPILLARY BLOOD GLUCOSE    POCT Blood Glucose.: 88 mg/dL (24 Aug 2023 05:41)  POCT Blood Glucose.: 78 mg/dL (23 Aug 2023 23:41)  POCT Blood Glucose.: 97 mg/dL (23 Aug 2023 18:30)  POCT Blood Glucose.: 92 mg/dL (23 Aug 2023 11:32)    Skin: Pressure Injury: stage I; 3+ upper extremity edema     Estimated Needs:   [ x ] no change since previous assessment  [ ] recalculated:     Previous Nutrition Diagnosis:   [ x ] Inadequate Oral Intake   [ x ] Severe Malnutrition     Nutrition Diagnosis is [ x ] ongoing  [ ] Improving   [ ] resolved [ ] not applicable     New Nutrition Diagnosis: [ x ] not applicable     Interventions/Recommend:  [ x ] Advance diet or consider alternate nutrition support if NPO prolonged further as medically feasible (see Surgery Note 8/23/23 14:06 )  [ ] Nutrition Supplement  [ x ] Nutrition Support: When TF restarted,  may consider Goal: Nepro @ 55ml/h x 16h (880ml, 1584kcal, 71g protein, 642ml water daily at goal rate) per Nephrolgist                        If improving renal function:  Goal: Jevity 1.5 @ 60ml/h x 16h (960ml, 1440kcal, 61g protein, 729ml water daily at goal)                     MD to adjust free water as needed   [ x ] Other:  Discussed with team         Pt to be followed by dietitian at [facility] when medically ready to be discharged     Monitoring and Evaluation:   [ ] PO intake [ x ] Tolerance to diet prescription [ x ] weights [ x ] labs[ x ] follow up per protocol  [ ] other: Assessment:       Nutrition reassessment for follow-up NPO with leaking "PEG". Chart reviewed, pt visited, no-verbal/confused; s/p open gastrostomy tube placement 8/14/23, TF started 8/15/23, was tolerated well, but changed to NPO due to G-tube leaking, Surgery, GI, IR on the case, pending re-establish of enteral nutrition access when medically feasible; eGFR=30, K=5.4-->3.8, PO4=5.3-->4.6, "give Nephro tube feeding" per Nephrologist; Pending discharge planning to skilled nursing facility when medically ready per team     Factors impacting intake: [ x ] other: change in mental status; difficulty chewing; difficulty feeding self; difficulty swallowing; acute on chronic comorbidities including acute encephalopathy, SUSAN on CKD, h/o CVA    Diet Prescription: Diet, NPO (08-21-23 @ 20:19)    Intake: NPO x 4 day due to Malfunction G-tube      Daily % Weight Change    Pertinent Medications: MEDICATIONS  (STANDING):  aspirin  chewable 324 milliGRAM(s) Oral daily  atorvastatin 20 milliGRAM(s) Oral at bedtime  carbidopa/levodopa  25/100 1 Tablet(s) Oral <User Schedule>  cefepime   IVPB      cefepime   IVPB 1000 milliGRAM(s) IV Intermittent every 24 hours  chlorhexidine 2% Cloths 1 Application(s) Topical daily  cloNIDine Patch 0.1 mG/24Hr(s) 1 patch Transdermal every 7 days  dextrose 5% + sodium chloride 0.9%. 1000 milliLiter(s) (75 mL/Hr) IV Continuous <Continuous>  dextrose 5% + sodium chloride 0.9%. 1000 milliLiter(s) (75 mL/Hr) IV Continuous <Continuous>  metoprolol tartrate 25 milliGRAM(s) Oral two times a day  pantoprazole   Suspension 40 milliGRAM(s) Oral daily  valproate sodium  IVPB 500 milliGRAM(s) IV Intermittent every 8 hours    MEDICATIONS  (PRN):  acetaminophen   Oral Liquid .. 650 milliGRAM(s) Enteral Tube every 6 hours PRN Temp greater or equal to 38C (100.4F)    Pertinent Labs: 08-24 Na147 mmol/L<H> Glu 80 mg/dL K+ 3.8 mmol/L Cr  1.72 mg/dL<H> BUN 43 mg/dL<H> 08-24 Phos 4.6 mg/dL<H> 08-24 Alb 1.6 g/dL<L> 07-28 Chol 152 mg/dL LDL --    HDL 51 mg/dL Trig 93 mg/dL     CAPILLARY BLOOD GLUCOSE    POCT Blood Glucose.: 88 mg/dL (24 Aug 2023 05:41)  POCT Blood Glucose.: 78 mg/dL (23 Aug 2023 23:41)  POCT Blood Glucose.: 97 mg/dL (23 Aug 2023 18:30)  POCT Blood Glucose.: 92 mg/dL (23 Aug 2023 11:32)    Skin: Pressure Injury: stage I; 3+ upper extremity edema     Estimated Needs:   [ x ] no change since previous assessment  [ ] recalculated:     Previous Nutrition Diagnosis:   [ x ] Inadequate Oral Intake   [ x ] Severe Malnutrition     Nutrition Diagnosis is [ x ] ongoing  [ ] Improving   [ ] resolved [ ] not applicable     New Nutrition Diagnosis: [ x ] not applicable     Interventions/Recommend:  [ x ] Advance diet or consider alternate nutrition support if NPO prolonged further as medically feasible (see Surgery Note 8/23/23 14:06 )  [ ] Nutrition Supplement  [ x ] Nutrition Support: When TF restarted,  may consider Goal: Nepro @ 55ml/h x 16h (880ml, 1584kcal, 71g protein, 642ml water daily at goal rate) per Nephrolgist                        If improving renal function:  Goal: Jevity 1.5 @ 60ml/h x 16h (960ml, 1440kcal, 61g protein, 729ml water daily at goal)                     MD to adjust free water as needed   [ x ] Other:  Discussed with team         Pt to be followed by dietitian at [facility] when medically ready to be discharged     Monitoring and Evaluation:   [ ] PO intake [ x ] Tolerance to diet prescription [ x ] weights [ x ] labs[ x ] follow up per protocol  [ ] other: Assessment:       Nutrition reassessment for follow-up NPO with leaking "PEG". Chart reviewed, pt visited, no-verbal/confused; s/p open gastrostomy tube placement 8/14/23, TF started 8/15/23, was tolerated well, but changed to NPO due to G-tube leaking, Surgery, GI, IR on the case, pending re-establish of enteral nutrition access when medically feasible; eGFR=30, K=5.4-->3.8, PO4=5.3-->4.6, "give Nephro tube feeding" per Nephrologist; Pending discharge planning to skilled nursing facility when medically ready per team     Factors impacting intake: [ x ] other: change in mental status; difficulty chewing; difficulty feeding self; difficulty swallowing; acute on chronic comorbidities including acute encephalopathy, SUSAN on CKD, h/o CVA    Diet Prescription: Diet, NPO (08-21-23 @ 20:19)    Intake: NPO x 4 day due to Malfunction G-tube      Daily % Weight Change    Pertinent Medications: MEDICATIONS  (STANDING):  aspirin  chewable 324 milliGRAM(s) Oral daily  atorvastatin 20 milliGRAM(s) Oral at bedtime  carbidopa/levodopa  25/100 1 Tablet(s) Oral <User Schedule>  cefepime   IVPB      cefepime   IVPB 1000 milliGRAM(s) IV Intermittent every 24 hours  chlorhexidine 2% Cloths 1 Application(s) Topical daily  cloNIDine Patch 0.1 mG/24Hr(s) 1 patch Transdermal every 7 days  dextrose 5% + sodium chloride 0.9%. 1000 milliLiter(s) (75 mL/Hr) IV Continuous <Continuous>  dextrose 5% + sodium chloride 0.9%. 1000 milliLiter(s) (75 mL/Hr) IV Continuous <Continuous>  metoprolol tartrate 25 milliGRAM(s) Oral two times a day  pantoprazole   Suspension 40 milliGRAM(s) Oral daily  valproate sodium  IVPB 500 milliGRAM(s) IV Intermittent every 8 hours    MEDICATIONS  (PRN):  acetaminophen   Oral Liquid .. 650 milliGRAM(s) Enteral Tube every 6 hours PRN Temp greater or equal to 38C (100.4F)    Pertinent Labs: 08-24 Na147 mmol/L<H> Glu 80 mg/dL K+ 3.8 mmol/L Cr  1.72 mg/dL<H> BUN 43 mg/dL<H> 08-24 Phos 4.6 mg/dL<H> 08-24 Alb 1.6 g/dL<L> 07-28 Chol 152 mg/dL LDL --    HDL 51 mg/dL Trig 93 mg/dL     CAPILLARY BLOOD GLUCOSE    POCT Blood Glucose.: 88 mg/dL (24 Aug 2023 05:41)  POCT Blood Glucose.: 78 mg/dL (23 Aug 2023 23:41)  POCT Blood Glucose.: 97 mg/dL (23 Aug 2023 18:30)  POCT Blood Glucose.: 92 mg/dL (23 Aug 2023 11:32)    Skin: Pressure Injury: stage I; 3+ upper extremity edema     Estimated Needs:   [ x ] no change since previous assessment  [ ] recalculated:     Previous Nutrition Diagnosis:   [ x ] Inadequate Oral Intake   [ x ] Severe Malnutrition     Nutrition Diagnosis is [ x ] ongoing  [ ] Improving   [ ] resolved [ ] not applicable     New Nutrition Diagnosis: [ x ] not applicable     Interventions/Recommend:  [ x ] Advance diet or consider alternate nutrition support if NPO prolonged further as medically feasible (see Surgery Note 8/23/23 14:06 )  [ ] Nutrition Supplement  [ x ] Nutrition Support: When TF restarted,  may consider Goal: Nepro @ 55ml/h x 16h (880ml, 1584kcal, 71g protein, 642ml water daily at goal rate) per Nephrolgist                        If improving renal function:  Goal: Jevity 1.5 @ 60ml/h x 16h (960ml, 1440kcal, 61g protein, 729ml water daily at goal)                     MD to adjust free water as needed   [ x ] Other:  Discussed with team         Pt to be followed by dietitian at [facility] when medically ready to be discharged     Monitoring and Evaluation:   [ ] PO intake [ x ] Tolerance to diet prescription [ x ] weights [ x ] labs[ x ] follow up per protocol  [ ] other:

## 2023-08-24 NOTE — PROGRESS NOTE ADULT - PROBLEM SELECTOR PLAN 3
altered mental status  HEAD CT: Chronic left MCA territory infarction.  CT PERFUSION demonstrated: Perfusion abnormality corresponding to the chronically infarcted left MCA territory. INFARCT CORE: 57 mL. TISSUE AT RISK: 236 mL.  CTA COW and  CTA NECK: neg   Pt can not move limbs or speak. She failed dysphagia screen and SnS

## 2023-08-24 NOTE — PROGRESS NOTE ADULT - SUBJECTIVE AND OBJECTIVE BOX
PGY-1 Progress Note discussed with attending    PAGER #: [[phone]] TILL 5:00 PM  PLEASE CONTACT ON CALL TEAM:  - On Call Team (Please refer to Tyler) FROM 5:00 PM - 8:30PM  - Nightfloat Team FROM 8:30 -7:30 AM    CHIEF COMPLAINT & BRIEF HOSPITAL COURSE:      INTERVAL HPI/OVERNIGHT EVENTS:       MEDICATIONS  (STANDING):  aspirin  chewable 324 milliGRAM(s) Oral daily  atorvastatin 20 milliGRAM(s) Oral at bedtime  carbidopa/levodopa  25/100 1 Tablet(s) Oral <User Schedule>  cefepime   IVPB 1000 milliGRAM(s) IV Intermittent every 24 hours  cefepime   IVPB      chlorhexidine 2% Cloths 1 Application(s) Topical daily  cloNIDine Patch 0.1 mG/24Hr(s) 1 patch Transdermal every 7 days  dextrose 5% + sodium chloride 0.9%. 1000 milliLiter(s) (75 mL/Hr) IV Continuous <Continuous>  dextrose 5% + sodium chloride 0.9%. 1000 milliLiter(s) (75 mL/Hr) IV Continuous <Continuous>  metoprolol tartrate 25 milliGRAM(s) Oral two times a day  pantoprazole   Suspension 40 milliGRAM(s) Oral daily  valproate sodium  IVPB 500 milliGRAM(s) IV Intermittent every 8 hours    MEDICATIONS  (PRN):  acetaminophen   Oral Liquid .. 650 milliGRAM(s) Enteral Tube every 6 hours PRN Temp greater or equal to 38C (100.4F)      REVIEW OF SYSTEMS:  CONSTITUTIONAL: No fever, weight loss, or fatigue  RESPIRATORY: No shortness of breath  CARDIOVASCULAR: No chest pain  GASTROINTESTINAL: No abdominal pain.  GENITOURINARY: No dysuria  NEUROLOGICAL: No headaches  SKIN: No itching, burning, rashes    Vital Signs Last 24 Hrs  T(C): 36.9 (24 Aug 2023 04:47), Max: 37.1 (23 Aug 2023 13:10)  T(F): 98.4 (24 Aug 2023 04:47), Max: 98.8 (23 Aug 2023 13:10)  HR: 87 (24 Aug 2023 04:47) (87 - 99)  BP: 165/67 (24 Aug 2023 04:47) (144/60 - 169/74)  BP(mean): --  RR: 18 (24 Aug 2023 04:47) (18 - 18)  SpO2: 98% (24 Aug 2023 04:47) (98% - 100%)    Parameters below as of 24 Aug 2023 04:47  Patient On (Oxygen Delivery Method): room air        PHYSICAL EXAMINATION:  GENERAL: NAD, well built  HEAD:  Atraumatic, Normocephalic  EYES:  conjunctiva and sclera clear  CHEST/LUNG: Clear to auscultation. No rales, rhonchi, wheezing, or rubs  HEART: Regular rate and rhythm; No murmurs, rubs, or gallops  ABDOMEN: Soft, Nontender, Nondistended; Bowel sounds present  NERVOUS SYSTEM:  Alert & Oriented X3,    EXTREMITIES:  2+ Peripheral Pulses, No clubbing, cyanosis, or edema  SKIN: warm dry                          7.6    17.46 )-----------( 281      ( 24 Aug 2023 00:01 )             23.8     08-24    147<H>  |  120<H>  |  43<H>  ----------------------------<  80  3.8   |  17<L>  |  1.72<H>    Ca    7.2<L>      24 Aug 2023 06:02  Phos  4.6     08-24  Mg     2.3     08-24    TPro  5.3<L>  /  Alb  1.6<L>  /  TBili  0.3  /  DBili  x   /  AST  21  /  ALT  16  /  AlkPhos  98  08-24    LIVER FUNCTIONS - ( 24 Aug 2023 06:02 )  Alb: 1.6 g/dL / Pro: 5.3 g/dL / ALK PHOS: 98 U/L / ALT: 16 U/L DA / AST: 21 U/L / GGT: x               PT/INR - ( 23 Aug 2023 18:20 )   PT: 12.0 sec;   INR: 1.05 ratio             CAPILLARY BLOOD GLUCOSE      RADIOLOGY & ADDITIONAL TESTS:                   PGY-1 Progress Note discussed with attending    PAGER #: [[phone]] TILL 5:00 PM  PLEASE CONTACT ON CALL TEAM:  - On Call Team (Please refer to Tyler) FROM 5:00 PM - 8:30PM  - Nightfloat Team FROM 8:30 -7:30 AM    CHIEF COMPLAINT & BRIEF HOSPITAL COURSE:      INTERVAL HPI/OVERNIGHT EVENTS:       MEDICATIONS  (STANDING):  aspirin  chewable 324 milliGRAM(s) Oral daily  atorvastatin 20 milliGRAM(s) Oral at bedtime  carbidopa/levodopa  25/100 1 Tablet(s) Oral <User Schedule>  cefepime   IVPB 1000 milliGRAM(s) IV Intermittent every 24 hours  cefepime   IVPB      chlorhexidine 2% Cloths 1 Application(s) Topical daily  cloNIDine Patch 0.1 mG/24Hr(s) 1 patch Transdermal every 7 days  dextrose 5% + sodium chloride 0.9%. 1000 milliLiter(s) (75 mL/Hr) IV Continuous <Continuous>  dextrose 5% + sodium chloride 0.9%. 1000 milliLiter(s) (75 mL/Hr) IV Continuous <Continuous>  metoprolol tartrate 25 milliGRAM(s) Oral two times a day  pantoprazole   Suspension 40 milliGRAM(s) Oral daily  valproate sodium  IVPB 500 milliGRAM(s) IV Intermittent every 8 hours    MEDICATIONS  (PRN):  acetaminophen   Oral Liquid .. 650 milliGRAM(s) Enteral Tube every 6 hours PRN Temp greater or equal to 38C (100.4F)      REVIEW OF SYSTEMS:  CONSTITUTIONAL: No fever, weight loss, or fatigue  RESPIRATORY: No shortness of breath  CARDIOVASCULAR: No chest pain  GASTROINTESTINAL: No abdominal pain.  GENITOURINARY: No dysuria  NEUROLOGICAL: No headaches  SKIN: No itching, burning, rashes    Vital Signs Last 24 Hrs  T(C): 36.9 (24 Aug 2023 04:47), Max: 37.1 (23 Aug 2023 13:10)  T(F): 98.4 (24 Aug 2023 04:47), Max: 98.8 (23 Aug 2023 13:10)  HR: 87 (24 Aug 2023 04:47) (87 - 99)  BP: 165/67 (24 Aug 2023 04:47) (144/60 - 169/74)  BP(mean): --  RR: 18 (24 Aug 2023 04:47) (18 - 18)  SpO2: 98% (24 Aug 2023 04:47) (98% - 100%)    Parameters below as of 24 Aug 2023 04:47  Patient On (Oxygen Delivery Method): room air        PHYSICAL EXAMINATION:  GENERAL: NAD, well built  HEAD:  Atraumatic, Normocephalic  EYES:  conjunctiva and sclera clear  CHEST/LUNG: Clear to auscultation. No rales, rhonchi, wheezing, or rubs  HEART: Regular rate and rhythm; No murmurs, rubs, or gallops  ABDOMEN: Soft, Nontender, Nondistended; Bowel sounds present  NERVOUS SYSTEM:  Alert & Oriented X3,    EXTREMITIES:  2+ Peripheral Pulses, No clubbing, cyanosis, or edema  SKIN: warm dry                          7.6    17.46 )-----------( 281      ( 24 Aug 2023 00:01 )             23.8     08-24    147<H>  |  120<H>  |  43<H>  ----------------------------<  80  3.8   |  17<L>  |  1.72<H>    Ca    7.2<L>      24 Aug 2023 06:02  Phos  4.6     08-24  Mg     2.3     08-24    TPro  5.3<L>  /  Alb  1.6<L>  /  TBili  0.3  /  DBili  x   /  AST  21  /  ALT  16  /  AlkPhos  98  08-24    LIVER FUNCTIONS - ( 24 Aug 2023 06:02 )  Alb: 1.6 g/dL / Pro: 5.3 g/dL / ALK PHOS: 98 U/L / ALT: 16 U/L DA / AST: 21 U/L / GGT: x               PT/INR - ( 23 Aug 2023 18:20 )   PT: 12.0 sec;   INR: 1.05 ratio             CAPILLARY BLOOD GLUCOSE      RADIOLOGY & ADDITIONAL TESTS:                   PGY-1 Progress Note discussed with attending    PAGER #: [[phone]] TILL 5:00 PM  PLEASE CONTACT ON CALL TEAM:  - On Call Team (Please refer to Tyler) FROM 5:00 PM - 8:30PM  - Nightfloat Team FROM 8:30 -7:30 AM    CHIEF COMPLAINT & BRIEF HOSPITAL COURSE:      INTERVAL HPI/OVERNIGHT EVENTS:       MEDICATIONS  (STANDING):  aspirin  chewable 324 milliGRAM(s) Oral daily  atorvastatin 20 milliGRAM(s) Oral at bedtime  carbidopa/levodopa  25/100 1 Tablet(s) Oral <User Schedule>  cefepime   IVPB 1000 milliGRAM(s) IV Intermittent every 24 hours  cefepime   IVPB      chlorhexidine 2% Cloths 1 Application(s) Topical daily  cloNIDine Patch 0.1 mG/24Hr(s) 1 patch Transdermal every 7 days  dextrose 5% + sodium chloride 0.9%. 1000 milliLiter(s) (75 mL/Hr) IV Continuous <Continuous>  dextrose 5% + sodium chloride 0.9%. 1000 milliLiter(s) (75 mL/Hr) IV Continuous <Continuous>  metoprolol tartrate 25 milliGRAM(s) Oral two times a day  pantoprazole   Suspension 40 milliGRAM(s) Oral daily  valproate sodium  IVPB 500 milliGRAM(s) IV Intermittent every 8 hours    MEDICATIONS  (PRN):  acetaminophen   Oral Liquid .. 650 milliGRAM(s) Enteral Tube every 6 hours PRN Temp greater or equal to 38C (100.4F)      REVIEW OF SYSTEMS:  CONSTITUTIONAL: No fever, weight loss, or fatigue  RESPIRATORY: No shortness of breath  CARDIOVASCULAR: No chest pain  GASTROINTESTINAL: No abdominal pain.  GENITOURINARY: No dysuria  NEUROLOGICAL: No headaches  SKIN: No itching, burning, rashes    Vital Signs Last 24 Hrs  T(C): 36.9 (24 Aug 2023 04:47), Max: 37.1 (23 Aug 2023 13:10)  T(F): 98.4 (24 Aug 2023 04:47), Max: 98.8 (23 Aug 2023 13:10)  HR: 87 (24 Aug 2023 04:47) (87 - 99)  BP: 165/67 (24 Aug 2023 04:47) (144/60 - 169/74)  BP(mean): --  RR: 18 (24 Aug 2023 04:47) (18 - 18)  SpO2: 98% (24 Aug 2023 04:47) (98% - 100%)    Parameters below as of 24 Aug 2023 04:47  Patient On (Oxygen Delivery Method): room air        PHYSICAL EXAMINATION:  GENERAL: NAD, well built  HEAD:  Atraumatic, Normocephalic  EYES:  conjunctiva and sclera clear  CHEST/LUNG: Clear to auscultation. No rales, rhonchi, wheezing, or rubs  HEART: Regular rate and rhythm; No murmurs, rubs, or gallops  ABDOMEN: Soft, Nontender, Nondistended; Bowel sounds present  NERVOUS SYSTEM:  Alert & Oriented X3,    EXTREMITIES:  2+ Peripheral Pulses, No clubbing, cyanosis, or edema  SKIN: warm dry                          7.6    17.46 )-----------( 281      ( 24 Aug 2023 00:01 )             23.8     08-24    147<H>  |  120<H>  |  43<H>  ----------------------------<  80  3.8   |  17<L>  |  1.72<H>    Ca    7.2<L>      24 Aug 2023 06:02  Phos  4.6     08-24  Mg     2.3     08-24    TPro  5.3<L>  /  Alb  1.6<L>  /  TBili  0.3  /  DBili  x   /  AST  21  /  ALT  16  /  AlkPhos  98  08-24    LIVER FUNCTIONS - ( 24 Aug 2023 06:02 )  Alb: 1.6 g/dL / Pro: 5.3 g/dL / ALK PHOS: 98 U/L / ALT: 16 U/L DA / AST: 21 U/L / GGT: x               PT/INR - ( 23 Aug 2023 18:20 )   PT: 12.0 sec;   INR: 1.05 ratio             CAPILLARY BLOOD GLUCOSE      RADIOLOGY & ADDITIONAL TESTS:                   PGY-1 Progress Note discussed with attending    PAGER #: [[phone]] TILL 5:00 PM  PLEASE CONTACT ON CALL TEAM:  - On Call Team (Please refer to Tyler) FROM 5:00 PM - 8:30PM  - Nightfloat Team FROM 8:30 -7:30 AM    CHIEF COMPLAINT & BRIEF HOSPITAL COURSE:  Failure to thrive    INTERVAL HPI/OVERNIGHT EVENTS:   Pt seen at bedside this morning. Pt received 1 unit of pRBC's last night due to low hemoglobin levels. Received report from nurse in afternoon that pt had another loose bloody bowel movement. Spoke with sister, Marika, and Palliative Care NP Beena about GOC. Sister and other relative named Abbe seemed amenable to the idea of comfort care from now on. Will discuss more with them tomorrow about plan for the pt.      MEDICATIONS  (STANDING):  aspirin  chewable 324 milliGRAM(s) Oral daily  atorvastatin 20 milliGRAM(s) Oral at bedtime  carbidopa/levodopa  25/100 1 Tablet(s) Oral <User Schedule>  cefepime   IVPB 1000 milliGRAM(s) IV Intermittent every 24 hours  cefepime   IVPB      chlorhexidine 2% Cloths 1 Application(s) Topical daily  cloNIDine Patch 0.1 mG/24Hr(s) 1 patch Transdermal every 7 days  dextrose 5% + sodium chloride 0.9%. 1000 milliLiter(s) (75 mL/Hr) IV Continuous <Continuous>  dextrose 5% + sodium chloride 0.9%. 1000 milliLiter(s) (75 mL/Hr) IV Continuous <Continuous>  metoprolol tartrate 25 milliGRAM(s) Oral two times a day  pantoprazole   Suspension 40 milliGRAM(s) Oral daily  valproate sodium  IVPB 500 milliGRAM(s) IV Intermittent every 8 hours    MEDICATIONS  (PRN):  acetaminophen   Oral Liquid .. 650 milliGRAM(s) Enteral Tube every 6 hours PRN Temp greater or equal to 38C (100.4F)      REVIEW OF SYSTEMS: Unable to obtain as pt is non-verbal      Vital Signs Last 24 Hrs  T(C): 36.9 (24 Aug 2023 04:47), Max: 37.1 (23 Aug 2023 13:10)  T(F): 98.4 (24 Aug 2023 04:47), Max: 98.8 (23 Aug 2023 13:10)  HR: 87 (24 Aug 2023 04:47) (87 - 99)  BP: 165/67 (24 Aug 2023 04:47) (144/60 - 169/74)  BP(mean): --  RR: 18 (24 Aug 2023 04:47) (18 - 18)  SpO2: 98% (24 Aug 2023 04:47) (98% - 100%)    Parameters below as of 24 Aug 2023 04:47  Patient On (Oxygen Delivery Method): room air        PHYSICAL EXAMINATION:  GENERAL: NAD, well built  HEAD:  Atraumatic, Normocephalic  EYES:  conjunctiva and sclera clear  CHEST/LUNG: Clear to auscultation. No rales, rhonchi, wheezing, or rubs  HEART: Regular rate and rhythm; No murmurs, rubs, or gallops  ABDOMEN: Soft, Nontender, Nondistended; Bowel sounds present  NERVOUS SYSTEM:  Alert & Oriented X0,    EXTREMITIES:  +Significant edema in all 4 extremities. 2+ Peripheral Pulses, No clubbing cyanosis  SKIN: +dermatitis on sacrum                          7.6    17.46 )-----------( 281      ( 24 Aug 2023 00:01 )             23.8     08-24    147<H>  |  120<H>  |  43<H>  ----------------------------<  80  3.8   |  17<L>  |  1.72<H>    Ca    7.2<L>      24 Aug 2023 06:02  Phos  4.6     08-24  Mg     2.3     08-24    TPro  5.3<L>  /  Alb  1.6<L>  /  TBili  0.3  /  DBili  x   /  AST  21  /  ALT  16  /  AlkPhos  98  08-24    LIVER FUNCTIONS - ( 24 Aug 2023 06:02 )  Alb: 1.6 g/dL / Pro: 5.3 g/dL / ALK PHOS: 98 U/L / ALT: 16 U/L DA / AST: 21 U/L / GGT: x               PT/INR - ( 23 Aug 2023 18:20 )   PT: 12.0 sec;   INR: 1.05 ratio             CAPILLARY BLOOD GLUCOSE      RADIOLOGY & ADDITIONAL TESTS:                   PGY-1 Progress Note discussed with attending    PAGER #: [[phone]] TILL 5:00 PM  PLEASE CONTACT ON CALL TEAM:  - On Call Team (Please refer to Tyler) FROM 5:00 PM - 8:30PM  - Nightfloat Team FROM 8:30 -7:30 AM    CHIEF COMPLAINT & BRIEF HOSPITAL COURSE:  Failure to thrive    INTERVAL HPI/OVERNIGHT EVENTS:   Pt seen at bedside this morning. Pt received 1 unit of pRBC's last night due to low hemoglobin levels. Received report from nurse in afternoon that pt had another loose bloody bowel movement. Spoke with sister, Marika, and Palliative Care NP Beena about GOC. Sister and other relative named Abbe seemed amenable to the idea of comfort care from now on. Will discuss more with them tomorrow about plan for the pt.      MEDICATIONS  (STANDING):  aspirin  chewable 324 milliGRAM(s) Oral daily  atorvastatin 20 milliGRAM(s) Oral at bedtime  carbidopa/levodopa  25/100 1 Tablet(s) Oral <User Schedule>  cefepime   IVPB 1000 milliGRAM(s) IV Intermittent every 24 hours  cefepime   IVPB      chlorhexidine 2% Cloths 1 Application(s) Topical daily  cloNIDine Patch 0.1 mG/24Hr(s) 1 patch Transdermal every 7 days  dextrose 5% + sodium chloride 0.9%. 1000 milliLiter(s) (75 mL/Hr) IV Continuous <Continuous>  dextrose 5% + sodium chloride 0.9%. 1000 milliLiter(s) (75 mL/Hr) IV Continuous <Continuous>  metoprolol tartrate 25 milliGRAM(s) Oral two times a day  pantoprazole   Suspension 40 milliGRAM(s) Oral daily  valproate sodium  IVPB 500 milliGRAM(s) IV Intermittent every 8 hours    MEDICATIONS  (PRN):  acetaminophen   Oral Liquid .. 650 milliGRAM(s) Enteral Tube every 6 hours PRN Temp greater or equal to 38C (100.4F)      REVIEW OF SYSTEMS: Unable to obtain as pt is non-verbal      Vital Signs Last 24 Hrs  T(C): 36.9 (24 Aug 2023 04:47), Max: 37.1 (23 Aug 2023 13:10)  T(F): 98.4 (24 Aug 2023 04:47), Max: 98.8 (23 Aug 2023 13:10)  HR: 87 (24 Aug 2023 04:47) (87 - 99)  BP: 165/67 (24 Aug 2023 04:47) (144/60 - 169/74)  BP(mean): --  RR: 18 (24 Aug 2023 04:47) (18 - 18)  SpO2: 98% (24 Aug 2023 04:47) (98% - 100%)    Parameters below as of 24 Aug 2023 04:47  Patient On (Oxygen Delivery Method): room air        PHYSICAL EXAMINATION:  GENERAL: NAD, well built  HEAD:  Atraumatic, Normocephalic  EYES:  conjunctiva and sclera clear  CHEST/LUNG: Clear to auscultation. No rales, rhonchi, wheezing, or rubs  HEART: Regular rate and rhythm; No murmurs, rubs, or gallops  ABDOMEN: Soft, Nontender, Nondistended; Bowel sounds present  NERVOUS SYSTEM:  Alert & Oriented X0,    EXTREMITIES:  +Significant edema in all 4 extremities. 2+ Peripheral Pulses, No clubbing cyanosis  SKIN: +dermatitis on sacrum                          7.6    17.46 )-----------( 281      ( 24 Aug 2023 00:01 )             23.8     08-24    147<H>  |  120<H>  |  43<H>  ----------------------------<  80  3.8   |  17<L>  |  1.72<H>    Ca    7.2<L>      24 Aug 2023 06:02  Phos  4.6     08-24  Mg     2.3     08-24    TPro  5.3<L>  /  Alb  1.6<L>  /  TBili  0.3  /  DBili  x   /  AST  21  /  ALT  16  /  AlkPhos  98  08-24    LIVER FUNCTIONS - ( 24 Aug 2023 06:02 )  Alb: 1.6 g/dL / Pro: 5.3 g/dL / ALK PHOS: 98 U/L / ALT: 16 U/L DA / AST: 21 U/L / GGT: x               PT/INR - ( 23 Aug 2023 18:20 )   PT: 12.0 sec;   INR: 1.05 ratio             CAPILLARY BLOOD GLUCOSE      RADIOLOGY & ADDITIONAL TESTS:                   PGY-1 Progress Note discussed with attending    PAGER #: [[phone]] TILL 5:00 PM  PLEASE CONTACT ON CALL TEAM:  - On Call Team (Please refer to Tyler) FROM 5:00 PM - 8:30PM  - Nightfloat Team FROM 8:30 -7:30 AM    CHIEF COMPLAINT & BRIEF HOSPITAL COURSE:  Failure to thrive    INTERVAL HPI/OVERNIGHT EVENTS:   Pt seen at bedside this morning. Pt received 1 unit of pRBC's last night due to low hemoglobin levels. Received report from nurse in afternoon that pt had another loose bloody bowel movement. Spoke with sister, Marika, and Palliative Care NP Beena about GOC. Sister and other relative named Abbe seemed amenable to the idea of comfort care from now on. Will discuss more with them tomorrow about plan for the pt.      MEDICATIONS  (STANDING):  aspirin  chewable 324 milliGRAM(s) Oral daily  atorvastatin 20 milliGRAM(s) Oral at bedtime  carbidopa/levodopa  25/100 1 Tablet(s) Oral <User Schedule>  cefepime   IVPB 1000 milliGRAM(s) IV Intermittent every 24 hours  cefepime   IVPB      chlorhexidine 2% Cloths 1 Application(s) Topical daily  cloNIDine Patch 0.1 mG/24Hr(s) 1 patch Transdermal every 7 days  dextrose 5% + sodium chloride 0.9%. 1000 milliLiter(s) (75 mL/Hr) IV Continuous <Continuous>  dextrose 5% + sodium chloride 0.9%. 1000 milliLiter(s) (75 mL/Hr) IV Continuous <Continuous>  metoprolol tartrate 25 milliGRAM(s) Oral two times a day  pantoprazole   Suspension 40 milliGRAM(s) Oral daily  valproate sodium  IVPB 500 milliGRAM(s) IV Intermittent every 8 hours    MEDICATIONS  (PRN):  acetaminophen   Oral Liquid .. 650 milliGRAM(s) Enteral Tube every 6 hours PRN Temp greater or equal to 38C (100.4F)      REVIEW OF SYSTEMS: Unable to obtain as pt is non-verbal      Vital Signs Last 24 Hrs  T(C): 36.9 (24 Aug 2023 04:47), Max: 37.1 (23 Aug 2023 13:10)  T(F): 98.4 (24 Aug 2023 04:47), Max: 98.8 (23 Aug 2023 13:10)  HR: 87 (24 Aug 2023 04:47) (87 - 99)  BP: 165/67 (24 Aug 2023 04:47) (144/60 - 169/74)  BP(mean): --  RR: 18 (24 Aug 2023 04:47) (18 - 18)  SpO2: 98% (24 Aug 2023 04:47) (98% - 100%)    Parameters below as of 24 Aug 2023 04:47  Patient On (Oxygen Delivery Method): room air        PHYSICAL EXAMINATION:  GENERAL: NAD, well built  HEAD:  Atraumatic, Normocephalic  EYES:  conjunctiva and sclera clear  CHEST/LUNG: Clear to auscultation. No rales, rhonchi, wheezing, or rubs  HEART: Regular rate and rhythm; No murmurs, rubs, or gallops  ABDOMEN: Soft, Nontender, Nondistended; Bowel sounds present  NERVOUS SYSTEM:  Alert & Oriented X0,    EXTREMITIES:  +Significant edema in all 4 extremities. 2+ Peripheral Pulses, No clubbing cyanosis  SKIN: +dermatitis on sacrum                          7.6    17.46 )-----------( 281      ( 24 Aug 2023 00:01 )             23.8     08-24    147<H>  |  120<H>  |  43<H>  ----------------------------<  80  3.8   |  17<L>  |  1.72<H>    Ca    7.2<L>      24 Aug 2023 06:02  Phos  4.6     08-24  Mg     2.3     08-24    TPro  5.3<L>  /  Alb  1.6<L>  /  TBili  0.3  /  DBili  x   /  AST  21  /  ALT  16  /  AlkPhos  98  08-24    LIVER FUNCTIONS - ( 24 Aug 2023 06:02 )  Alb: 1.6 g/dL / Pro: 5.3 g/dL / ALK PHOS: 98 U/L / ALT: 16 U/L DA / AST: 21 U/L / GGT: x               PT/INR - ( 23 Aug 2023 18:20 )   PT: 12.0 sec;   INR: 1.05 ratio             CAPILLARY BLOOD GLUCOSE      RADIOLOGY & ADDITIONAL TESTS:

## 2023-08-24 NOTE — PROGRESS NOTE ADULT - ASSESSMENT
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  1.UTI-s/p ABX.  2.HTN-clonidine patch .1mg q wk.  3.Lipid d/o- statin.  4.Surgical f/u noted, Rec GI or IR for PEG since s/p G tube now dislodged, ABX,  5.ABX on hold.  6.Anemia-  iron.  7.GI and DVT prophylaxis.     77 female from [facility] PMHx HTN, HLD, CVA (w/ residual aphasia and R sided hemiparesis/plegia) who was sent to the hospital due to altered mental status,UTI.  1.UTI-s/p ABX.  2.HTN-clonidine patch .1mg q wk.  3.Lipid d/o- statin.  4.Surgical f/u noted, Rec GI or IR for PEG since s/p G tube now dislodged, ABX,  5.ABX on hold.  6.Anemia-  iron.  7.GI and DVT prophylaxis.     77 female from [facility] PMHx HTN, HLD, CVA (w/ residual aphasia and R sided hemiparesis/plegia) who was sent to the hospital due to altered mental status,UTI.  1.UTI-s/p ABX.  2.HTN-clonidine patch .1mg q wk.  3.Lipid d/o- statin.  4.Surgical f/u noted, Rec GI or IR for PEG since s/p G tube now dislodged, ABX,  5.ABX on hold.  6.Anemia-  iron.  7.GI and DVT prophylaxis.

## 2023-08-24 NOTE — PROGRESS NOTE ADULT - SUBJECTIVE AND OBJECTIVE BOX
NEPHROLOGY MEDICAL CARE, [facility] - Dr. Ajay Green/ Dr. Mert Madison/ Dr. Chris Calderon/ Dr. Carson Cook    Date of Service: 08-24-23 @ 17:02    Patient was seen and examined at bedside.    CC: patient is okay and NAD    Vital Signs Last 24 Hrs  T(C): 36.2 (24 Aug 2023 13:31), Max: 36.9 (24 Aug 2023 04:47)  T(F): 97.2 (24 Aug 2023 13:31), Max: 98.4 (24 Aug 2023 04:47)  HR: 90 (24 Aug 2023 13:31) (87 - 99)  BP: 191/83 (24 Aug 2023 13:31) (159/78 - 191/83)  BP(mean): --  RR: 18 (24 Aug 2023 13:31) (18 - 18)  SpO2: 99% (24 Aug 2023 13:31) (98% - 100%)    Parameters below as of 24 Aug 2023 13:31  Patient On (Oxygen Delivery Method): room air        08-23 @ 07:01  -  08-24 @ 07:00  --------------------------------------------------------  IN: 0 mL / OUT: 400 mL / NET: -400 mL        PHYSICAL EXAM:  General: No acute respiratory distress. Anasarca   Eyes: conjunctiva and sclera clear  ENMT: Atraumatic, Normocephalic; Moist mucous membranes  Respiratory: Bilateral poor air entry; No rales, rhonchi, wheezing  Cardiovascular: S1S2+; no m/r/g  Gastrointestinal: Soft, Non-tender, Nondistended; Bowel sounds present; PEG  Neuro: eyes are open; hemiparesis; non-verbal  Ext:  1+pedal edema, No Cyanosis  Skin: No visible rashes    MEDICATIONS:  MEDICATIONS  (STANDING):  cefepime   IVPB 1000 milliGRAM(s) IV Intermittent every 24 hours  cefepime   IVPB      cloNIDine Patch 0.1 mG/24Hr(s) 1 patch Transdermal every 7 days  dextrose 5% + sodium chloride 0.9%. 1000 milliLiter(s) (75 mL/Hr) IV Continuous <Continuous>  valproate sodium  IVPB 500 milliGRAM(s) IV Intermittent every 8 hours    MEDICATIONS  (PRN):          LABS:                        9.5    22.16 )-----------( 235      ( 24 Aug 2023 12:15 )             29.0     08-24    147<H>  |  120<H>  |  43<H>  ----------------------------<  80  3.8   |  17<L>  |  1.72<H>    Ca    7.2<L>      24 Aug 2023 06:02  Phos  4.6     08-24  Mg     2.3     08-24    TPro  5.3<L>  /  Alb  1.6<L>  /  TBili  0.3  /  DBili  x   /  AST  21  /  ALT  16  /  AlkPhos  98  08-24    PT/INR - ( 23 Aug 2023 18:20 )   PT: 12.0 sec;   INR: 1.05 ratio           Urinalysis Basic - ( 24 Aug 2023 06:02 )    Color: x / Appearance: x / SG: x / pH: x  Gluc: 80 mg/dL / Ketone: x  / Bili: x / Urobili: x   Blood: x / Protein: x / Nitrite: x   Leuk Esterase: x / RBC: x / WBC x   Sq Epi: x / Non Sq Epi: x / Bacteria: x      Magnesium: 2.3 mg/dL (08-24 @ 06:02)  Phosphorus: 4.6 mg/dL (08-24 @ 06:02)    Urine studies    PTH and Vit D:         NEPHROLOGY MEDICAL CARE, [facility] - Dr. Ajay Green/ Dr. Mert Madison/ Dr. Chris Calderon/ Dr. Carson Cook    Date of Service: 08-24-23 @ 17:02    Patient was seen and examined at bedside.    CC: patient is okay and NAD    Vital Signs Last 24 Hrs  T(C): 36.2 (24 Aug 2023 13:31), Max: 36.9 (24 Aug 2023 04:47)  T(F): 97.2 (24 Aug 2023 13:31), Max: 98.4 (24 Aug 2023 04:47)  HR: 90 (24 Aug 2023 13:31) (87 - 99)  BP: 191/83 (24 Aug 2023 13:31) (159/78 - 191/83)  BP(mean): --  RR: 18 (24 Aug 2023 13:31) (18 - 18)  SpO2: 99% (24 Aug 2023 13:31) (98% - 100%)    Parameters below as of 24 Aug 2023 13:31  Patient On (Oxygen Delivery Method): room air        08-23 @ 07:01  -  08-24 @ 07:00  --------------------------------------------------------  IN: 0 mL / OUT: 400 mL / NET: -400 mL        PHYSICAL EXAM:  General: No acute respiratory distress. Anasarca   Eyes: conjunctiva and sclera clear  ENMT: Atraumatic, Normocephalic; Moist mucous membranes  Respiratory: Bilateral poor air entry; No rales, rhonchi, wheezing  Cardiovascular: S1S2+; no m/r/g  Gastrointestinal: Soft, Non-tender, Nondistended; Bowel sounds present; PEG  Neuro: eyes are open; hemiparesis; non-verbal  Ext:  1+pedal edema, No Cyanosis  Skin: No visible rashes    MEDICATIONS:  MEDICATIONS  (STANDING):  cefepime   IVPB 1000 milliGRAM(s) IV Intermittent every 24 hours  cefepime   IVPB      cloNIDine Patch 0.1 mG/24Hr(s) 1 patch Transdermal every 7 days  dextrose 5% + sodium chloride 0.9%. 1000 milliLiter(s) (75 mL/Hr) IV Continuous <Continuous>  valproate sodium  IVPB 500 milliGRAM(s) IV Intermittent every 8 hours    MEDICATIONS  (PRN):          LABS:                        9.5    22.16 )-----------( 235      ( 24 Aug 2023 12:15 )             29.0     08-24    147<H>  |  120<H>  |  43<H>  ----------------------------<  80  3.8   |  17<L>  |  1.72<H>    Ca    7.2<L>      24 Aug 2023 06:02  Phos  4.6     08-24  Mg     2.3     08-24    TPro  5.3<L>  /  Alb  1.6<L>  /  TBili  0.3  /  DBili  x   /  AST  21  /  ALT  16  /  AlkPhos  98  08-24    PT/INR - ( 23 Aug 2023 18:20 )   PT: 12.0 sec;   INR: 1.05 ratio           Urinalysis Basic - ( 24 Aug 2023 06:02 )    Color: x / Appearance: x / SG: x / pH: x  Gluc: 80 mg/dL / Ketone: x  / Bili: x / Urobili: x   Blood: x / Protein: x / Nitrite: x   Leuk Esterase: x / RBC: x / WBC x   Sq Epi: x / Non Sq Epi: x / Bacteria: x      Magnesium: 2.3 mg/dL (08-24 @ 06:02)  Phosphorus: 4.6 mg/dL (08-24 @ 06:02)    Urine studies    PTH and Vit D:         NEPHROLOGY MEDICAL CARE, [facility] - Dr. Ajay Green/ Dr. Mert Madison/ Dr. Chris Calderon/ Dr. Carson Cook    Date of Service: 08-24-23 @ 17:02    Patient was seen and examined at bedside.    CC: patient is okay and NAD    Vital Signs Last 24 Hrs  T(C): 36.2 (24 Aug 2023 13:31), Max: 36.9 (24 Aug 2023 04:47)  T(F): 97.2 (24 Aug 2023 13:31), Max: 98.4 (24 Aug 2023 04:47)  HR: 90 (24 Aug 2023 13:31) (87 - 99)  BP: 191/83 (24 Aug 2023 13:31) (159/78 - 191/83)  BP(mean): --  RR: 18 (24 Aug 2023 13:31) (18 - 18)  SpO2: 99% (24 Aug 2023 13:31) (98% - 100%)    Parameters below as of 24 Aug 2023 13:31  Patient On (Oxygen Delivery Method): room air        08-23 @ 07:01  -  08-24 @ 07:00  --------------------------------------------------------  IN: 0 mL / OUT: 400 mL / NET: -400 mL        PHYSICAL EXAM:  General: No acute respiratory distress. Anasarca   Eyes: conjunctiva and sclera clear  ENMT: Atraumatic, Normocephalic; Moist mucous membranes  Respiratory: Bilateral poor air entry; No rales, rhonchi, wheezing  Cardiovascular: S1S2+; no m/r/g  Gastrointestinal: Soft, Non-tender, Nondistended; Bowel sounds present; PEG  Neuro: eyes are open; hemiparesis; non-verbal  Ext:  1+pedal edema, No Cyanosis  Skin: No visible rashes    MEDICATIONS:  MEDICATIONS  (STANDING):  cefepime   IVPB 1000 milliGRAM(s) IV Intermittent every 24 hours  cefepime   IVPB      cloNIDine Patch 0.1 mG/24Hr(s) 1 patch Transdermal every 7 days  dextrose 5% + sodium chloride 0.9%. 1000 milliLiter(s) (75 mL/Hr) IV Continuous <Continuous>  valproate sodium  IVPB 500 milliGRAM(s) IV Intermittent every 8 hours    MEDICATIONS  (PRN):          LABS:                        9.5    22.16 )-----------( 235      ( 24 Aug 2023 12:15 )             29.0     08-24    147<H>  |  120<H>  |  43<H>  ----------------------------<  80  3.8   |  17<L>  |  1.72<H>    Ca    7.2<L>      24 Aug 2023 06:02  Phos  4.6     08-24  Mg     2.3     08-24    TPro  5.3<L>  /  Alb  1.6<L>  /  TBili  0.3  /  DBili  x   /  AST  21  /  ALT  16  /  AlkPhos  98  08-24    PT/INR - ( 23 Aug 2023 18:20 )   PT: 12.0 sec;   INR: 1.05 ratio           Urinalysis Basic - ( 24 Aug 2023 06:02 )    Color: x / Appearance: x / SG: x / pH: x  Gluc: 80 mg/dL / Ketone: x  / Bili: x / Urobili: x   Blood: x / Protein: x / Nitrite: x   Leuk Esterase: x / RBC: x / WBC x   Sq Epi: x / Non Sq Epi: x / Bacteria: x      Magnesium: 2.3 mg/dL (08-24 @ 06:02)  Phosphorus: 4.6 mg/dL (08-24 @ 06:02)    Urine studies    PTH and Vit D:

## 2023-08-24 NOTE — ADVANCED PRACTICE NURSE CONSULT - ASSESSMENT
This is a 77yr old female patient admitted for Altered Mental Status, presenting with evidence of Incontinence Associated Dermatitis to the Bilateral Gluteus, Bilateral Post Thigh, and Perianal areas

## 2023-08-25 LAB
GLUCOSE BLDC GLUCOMTR-MCNC: 131 MG/DL — HIGH (ref 70–99)
GLUCOSE BLDC GLUCOMTR-MCNC: 82 MG/DL — SIGNIFICANT CHANGE UP (ref 70–99)

## 2023-08-25 PROCEDURE — 99498 ADVNCD CARE PLAN ADDL 30 MIN: CPT | Mod: 25

## 2023-08-25 PROCEDURE — 99232 SBSQ HOSP IP/OBS MODERATE 35: CPT

## 2023-08-25 PROCEDURE — 99497 ADVNCD CARE PLAN 30 MIN: CPT | Mod: 25

## 2023-08-25 RX ORDER — SODIUM CHLORIDE 9 MG/ML
1000 INJECTION, SOLUTION INTRAVENOUS
Refills: 0 | Status: DISCONTINUED | OUTPATIENT
Start: 2023-08-25 | End: 2023-08-29

## 2023-08-25 RX ADMIN — Medication 1 PATCH: at 17:42

## 2023-08-25 RX ADMIN — Medication 1 PATCH: at 05:25

## 2023-08-25 RX ADMIN — Medication 55 MILLIGRAM(S): at 14:31

## 2023-08-25 RX ADMIN — SODIUM CHLORIDE 40 MILLILITER(S): 9 INJECTION, SOLUTION INTRAVENOUS at 22:18

## 2023-08-25 RX ADMIN — Medication 1 PATCH: at 12:58

## 2023-08-25 RX ADMIN — Medication 55 MILLIGRAM(S): at 05:29

## 2023-08-25 RX ADMIN — Medication 1 PATCH: at 15:25

## 2023-08-25 RX ADMIN — Medication 55 MILLIGRAM(S): at 22:18

## 2023-08-25 NOTE — PROGRESS NOTE ADULT - SUBJECTIVE AND OBJECTIVE BOX
Date of Service 08-25-23 @ 12:30    CHIEF COMPLAINT:Patient is a 77y old  Female who presents with a chief complaint of Altered mental status. Pt appears comfortable.    	  REVIEW OF SYSTEMS:  [x ] Unable to obtain    PHYSICAL EXAM:  T(C): 36.8 (08-25-23 @ 05:07), Max: 36.8 (08-25-23 @ 05:07)  HR: 89 (08-25-23 @ 05:07) (89 - 90)  BP: 145/79 (08-25-23 @ 05:07) (145/79 - 191/83)  RR: 18 (08-25-23 @ 05:07) (18 - 18)  SpO2: 98% (08-25-23 @ 05:07) (98% - 100%)  Wt(kg): --  I&O's Summary      Appearance: Normal	  HEENT:   Normal oral mucosa, PERRL, EOMI	  Lymphatic: No lymphadenopathy  Cardiovascular: Normal S1 S2, No JVD, No murmurs, No edema  Respiratory: Lungs clear to auscultation	  Gastrointestinal:  Soft, Non-tender, + BS	  Skin: No rashes, No ecchymoses, No cyanosis	  Extremities: Normal range of motion, No clubbing, cyanosis or edema  Vascular: Peripheral pulses palpable 2+ bilaterally    MEDICATIONS  (STANDING):  cefepime   IVPB      cefepime   IVPB 1000 milliGRAM(s) IV Intermittent every 24 hours  cloNIDine Patch 0.1 mG/24Hr(s) 1 patch Transdermal every 7 days  dextrose 5% + sodium chloride 0.9%. 1000 milliLiter(s) (60 mL/Hr) IV Continuous <Continuous>  valproate sodium  IVPB 500 milliGRAM(s) IV Intermittent every 8 hours      	  LABS:	 	                          9.5    22.16 )-----------( 235      ( 24 Aug 2023 12:15 )             29.0     08-24    147<H>  |  120<H>  |  43<H>  ----------------------------<  80  3.8   |  17<L>  |  1.72<H>    Ca    7.2<L>      24 Aug 2023 06:02  Phos  4.6     08-24  Mg     2.3     08-24    TPro  5.3<L>  /  Alb  1.6<L>  /  TBili  0.3  /  DBili  x   /  AST  21  /  ALT  16  /  AlkPhos  98  08-24      Lipid Profile: Cholesterol 152  LDL --  HDL 51  TG 93  Ldl calc 82     TSH: Thyroid Stimulating Hormone, Serum: 3.59 uU/mL (07-28 @ 05:03)

## 2023-08-25 NOTE — PROGRESS NOTE ADULT - SUBJECTIVE AND OBJECTIVE BOX
[   ] ICU                                          [   ] CCU                                      [ X  ] Medical Floor      Patient is a 77 year old female with dysphagia. GI consulted for Peg tube placement.     Patient is a 77 female from [facility] with past medical history significant for HTN, HLD, CVA (w/ residual aphasia and R sided hemiparesis/plegia) who was sent to the emergency room with for altered mental status. Patient is not able to provide any history. Patient is lying down in bed, awake and alert. However, patient does not speak, is not able to follow commands and does not turn face or move eyes when her name is called. Patient is admitted with CVA. Now patient with dysphagia, poor po intake, failure to thrive and weight loss. No abdominal pain, nausea, vomiting, hematemesis, hematochezia, melena, fever, chills, chest pain, SOB, cough, hematuria, dysuria  or diarrhea reported.      Patient is comfortable. No new complaints reported except Peg tube malfunction (leaking the feeding from it's side), No abdominal pain, N/V, hematemesis, hematochezia, melena, fever, chills, chest pain, SOB, cough or diarrhea reported.      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 1000 milliLiter(s) (70 mL/Hr) IV Continuous <Continuous>  enoxaparin Injectable 30 milliGRAM(s) SubCutaneous every 24 hours  hydrALAZINE Injectable 10 milliGRAM(s) IV Push every 6 hours  potassium chloride  10 mEq/100 mL IVPB 10 milliEquivalent(s) IV Intermittent every 1 hour  valproate sodium  IVPB 500 milliGRAM(s) IV Intermittent every 8 hours         SOCIAL HISTORY  Advanced Directives:       [ X ] Full Code       [  ] DNR  Marital Status:         [  ] M      [ X ] S      [  ] D       [  ] W  Children:       [ X ] Yes      [  ] No  Occupation:        [  ] Employed       [ X ] Unemployed       [  ] Retired  Diet:       [ X ] Regular       [  ] PEG feeding          [  ] NG tube feeding  Drug Use:           [ X ] Patient denied          [  ] Yes  Alcohol:           [ X ] No             [  ] Yes (socially)         [  ] Yes (chronic)  Tobacco:           [  ] Yes           [X  ] No      FAMILY HISTORY  [ X ] Heart Disease            [ X ] Diabetes             [ X ] HTN             [  ] Colon Cancer             [  ] Stomach Cancer              [  ] Pancreatic Cancer      VITALS  Vital Signs Last 24 Hrs  T(C): 37 (25 Aug 2023 13:07), Max: 37 (25 Aug 2023 13:07)  T(F): 98.6 (25 Aug 2023 13:07), Max: 98.6 (25 Aug 2023 13:07)  HR: 76 (25 Aug 2023 13:07) (76 - 89)  BP: 119/100 (25 Aug 2023 13:07) (119/100 - 158/75)   RR: 17 (25 Aug 2023 13:07) (17 - 18)  SpO2: 99% (25 Aug 2023 13:07) (98% - 100%)  Parameters below as of 25 Aug 2023 13:07  Patient On (Oxygen Delivery Method): room air       MEDICATIONS  (STANDING):  cloNIDine Patch 0.1 mG/24Hr(s) 1 patch Transdermal every 7 days  dextrose 5%. 1000 milliLiter(s) (40 mL/Hr) IV Continuous <Continuous>  valproate sodium  IVPB 500 milliGRAM(s) IV Intermittent every 8 hours                               9.5    22.16 )-----------( 235      ( 24 Aug 2023 12:15 )             29.0       08-24    147<H>  |  120<H>  |  43<H>  ----------------------------<  80  3.8   |  17<L>  |  1.72<H>    Ca    7.2<L>      24 Aug 2023 06:02  Phos  4.6     08-24  Mg     2.3     08-24    TPro  5.3<L>  /  Alb  1.6<L>  /  TBili  0.3  /  DBili  x   /  AST  21  /  ALT  16  /  AlkPhos  98  08-24      PT/INR - ( 23 Aug 2023 18:20 )   PT: 12.0 sec;   INR: 1.05 ratio          [   ] ICU                                          [   ] CCU                                      [ X  ] Medical Floor      Patient is a 77 year old female with dysphagia. GI consulted for Peg tube placement.     Patient is a 77 female from [facility] with past medical history significant for HTN, HLD, CVA (w/ residual aphasia and R sided hemiparesis/plegia) who was sent to the emergency room with for altered mental status. Patient is not able to provide any history. Patient is lying down in bed, awake and alert. However, patient does not speak, is not able to follow commands and does not turn face or move eyes when her name is called. Patient is admitted with CVA. Now patient with dysphagia, poor po intake, failure to thrive and weight loss. No abdominal pain, nausea, vomiting, hematemesis, hematochezia, melena, fever, chills, chest pain, SOB, cough, hematuria, dysuria  or diarrhea reported.      Patient is comfortable. No new complaints reported except Peg tube malfunction (leaking the feeding from it's side), No abdominal pain, N/V, hematemesis, hematochezia, melena, fever, chills, chest pain, SOB, cough or diarrhea reported.      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 1000 milliLiter(s) (70 mL/Hr) IV Continuous <Continuous>  enoxaparin Injectable 30 milliGRAM(s) SubCutaneous every 24 hours  hydrALAZINE Injectable 10 milliGRAM(s) IV Push every 6 hours  potassium chloride  10 mEq/100 mL IVPB 10 milliEquivalent(s) IV Intermittent every 1 hour  valproate sodium  IVPB 500 milliGRAM(s) IV Intermittent every 8 hours         SOCIAL HISTORY  Advanced Directives:       [ X ] Full Code       [  ] DNR  Marital Status:         [  ] M      [ X ] S      [  ] D       [  ] W  Children:       [ X ] Yes      [  ] No  Occupation:        [  ] Employed       [ X ] Unemployed       [  ] Retired  Diet:       [ X ] Regular       [  ] PEG feeding          [  ] NG tube feeding  Drug Use:           [ X ] Patient denied          [  ] Yes  Alcohol:           [ X ] No             [  ] Yes (socially)         [  ] Yes (chronic)  Tobacco:           [  ] Yes           [X  ] No      FAMILY HISTORY  [ X ] Heart Disease            [ X ] Diabetes             [ X ] HTN             [  ] Colon Cancer             [  ] Stomach Cancer              [  ] Pancreatic Cancer      VITALS  Vital Signs Last 24 Hrs  T(C): 37 (25 Aug 2023 13:07), Max: 37 (25 Aug 2023 13:07)  T(F): 98.6 (25 Aug 2023 13:07), Max: 98.6 (25 Aug 2023 13:07)  HR: 76 (25 Aug 2023 13:07) (76 - 89)  BP: 119/100 (25 Aug 2023 13:07) (119/100 - 158/75)   RR: 17 (25 Aug 2023 13:07) (17 - 18)  SpO2: 99% (25 Aug 2023 13:07) (98% - 100%)  Parameters below as of 25 Aug 2023 13:07  Patient On (Oxygen Delivery Method): room air       MEDICATIONS  (STANDING):  cloNIDine Patch 0.1 mG/24Hr(s) 1 patch Transdermal every 7 days  dextrose 5%. 1000 milliLiter(s) (40 mL/Hr) IV Continuous <Continuous>  valproate sodium  IVPB 500 milliGRAM(s) IV Intermittent every 8 hours                               9.5    22.16 )-----------( 235      ( 24 Aug 2023 12:15 )             29.0       08-24    147<H>  |  120<H>  |  43<H>  ----------------------------<  80  3.8   |  17<L>  |  1.72<H>    Ca    7.2<L>      24 Aug 2023 06:02  Phos  4.6     08-24  Mg     2.3     08-24    TPro  5.3<L>  /  Alb  1.6<L>  /  TBili  0.3  /  DBili  x   /  AST  21  /  ALT  16  /  AlkPhos  98  08-24      PT/INR - ( 23 Aug 2023 18:20 )   PT: 12.0 sec;   INR: 1.05 ratio          [   ] ICU                                          [   ] CCU                                      [ X  ] Medical Floor      Patient is a 77 year old female with dysphagia. GI consulted for Peg tube placement.     Patient is a 77 female from [facility] with past medical history significant for HTN, HLD, CVA (w/ residual aphasia and R sided hemiparesis/plegia) who was sent to the emergency room with for altered mental status. Patient is not able to provide any history. Patient is lying down in bed, awake and alert. However, patient does not speak, is not able to follow commands and does not turn face or move eyes when her name is called. Patient is admitted with CVA. Now patient with dysphagia, poor po intake, failure to thrive and weight loss. No abdominal pain, nausea, vomiting, hematemesis, hematochezia, melena, fever, chills, chest pain, SOB, cough, hematuria, dysuria  or diarrhea reported.      Patient is comfortable. No new complaints reported except Peg tube malfunction (leaking the feeding from it's side), No abdominal pain, N/V, hematemesis, hematochezia, melena, fever, chills, chest pain, SOB, cough or diarrhea reported.      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 1000 milliLiter(s) (70 mL/Hr) IV Continuous <Continuous>  enoxaparin Injectable 30 milliGRAM(s) SubCutaneous every 24 hours  hydrALAZINE Injectable 10 milliGRAM(s) IV Push every 6 hours  potassium chloride  10 mEq/100 mL IVPB 10 milliEquivalent(s) IV Intermittent every 1 hour  valproate sodium  IVPB 500 milliGRAM(s) IV Intermittent every 8 hours         SOCIAL HISTORY  Advanced Directives:       [ X ] Full Code       [  ] DNR  Marital Status:         [  ] M      [ X ] S      [  ] D       [  ] W  Children:       [ X ] Yes      [  ] No  Occupation:        [  ] Employed       [ X ] Unemployed       [  ] Retired  Diet:       [ X ] Regular       [  ] PEG feeding          [  ] NG tube feeding  Drug Use:           [ X ] Patient denied          [  ] Yes  Alcohol:           [ X ] No             [  ] Yes (socially)         [  ] Yes (chronic)  Tobacco:           [  ] Yes           [X  ] No      FAMILY HISTORY  [ X ] Heart Disease            [ X ] Diabetes             [ X ] HTN             [  ] Colon Cancer             [  ] Stomach Cancer              [  ] Pancreatic Cancer      VITALS  Vital Signs Last 24 Hrs  T(C): 37 (25 Aug 2023 13:07), Max: 37 (25 Aug 2023 13:07)  T(F): 98.6 (25 Aug 2023 13:07), Max: 98.6 (25 Aug 2023 13:07)  HR: 76 (25 Aug 2023 13:07) (76 - 89)  BP: 119/100 (25 Aug 2023 13:07) (119/100 - 158/75)   RR: 17 (25 Aug 2023 13:07) (17 - 18)  SpO2: 99% (25 Aug 2023 13:07) (98% - 100%)  Parameters below as of 25 Aug 2023 13:07  Patient On (Oxygen Delivery Method): room air       MEDICATIONS  (STANDING):  cloNIDine Patch 0.1 mG/24Hr(s) 1 patch Transdermal every 7 days  dextrose 5%. 1000 milliLiter(s) (40 mL/Hr) IV Continuous <Continuous>  valproate sodium  IVPB 500 milliGRAM(s) IV Intermittent every 8 hours                               9.5    22.16 )-----------( 235      ( 24 Aug 2023 12:15 )             29.0       08-24    147<H>  |  120<H>  |  43<H>  ----------------------------<  80  3.8   |  17<L>  |  1.72<H>    Ca    7.2<L>      24 Aug 2023 06:02  Phos  4.6     08-24  Mg     2.3     08-24    TPro  5.3<L>  /  Alb  1.6<L>  /  TBili  0.3  /  DBili  x   /  AST  21  /  ALT  16  /  AlkPhos  98  08-24      PT/INR - ( 23 Aug 2023 18:20 )   PT: 12.0 sec;   INR: 1.05 ratio

## 2023-08-25 NOTE — PROGRESS NOTE ADULT - SUBJECTIVE AND OBJECTIVE BOX
PGY-1 Progress Note discussed with attending    PAGER #: [[phone]] TILL 5:00 PM  PLEASE CONTACT ON CALL TEAM:  - On Call Team (Please refer to Tyler) FROM 5:00 PM - 8:30PM  - Nightfloat Team FROM 8:30 -7:30 AM    CHIEF COMPLAINT & BRIEF HOSPITAL COURSE:      INTERVAL HPI/OVERNIGHT EVENTS:       MEDICATIONS  (STANDING):  cefepime   IVPB 1000 milliGRAM(s) IV Intermittent every 24 hours  cefepime   IVPB      cloNIDine Patch 0.1 mG/24Hr(s) 1 patch Transdermal every 7 days  dextrose 5% + sodium chloride 0.9%. 1000 milliLiter(s) (60 mL/Hr) IV Continuous <Continuous>  valproate sodium  IVPB 500 milliGRAM(s) IV Intermittent every 8 hours    MEDICATIONS  (PRN):      REVIEW OF SYSTEMS:  CONSTITUTIONAL: No fever, weight loss, or fatigue  RESPIRATORY: No shortness of breath  CARDIOVASCULAR: No chest pain  GASTROINTESTINAL: No abdominal pain.  GENITOURINARY: No dysuria  NEUROLOGICAL: No headaches  SKIN: No itching, burning, rashes    Vital Signs Last 24 Hrs  T(C): 36.8 (25 Aug 2023 05:07), Max: 36.8 (25 Aug 2023 05:07)  T(F): 98.2 (25 Aug 2023 05:07), Max: 98.2 (25 Aug 2023 05:07)  HR: 89 (25 Aug 2023 05:07) (89 - 90)  BP: 145/79 (25 Aug 2023 05:07) (145/79 - 191/83)  BP(mean): --  RR: 18 (25 Aug 2023 05:07) (18 - 18)  SpO2: 98% (25 Aug 2023 05:07) (98% - 100%)    Parameters below as of 25 Aug 2023 05:07  Patient On (Oxygen Delivery Method): room air        PHYSICAL EXAMINATION:  GENERAL: NAD, well built  HEAD:  Atraumatic, Normocephalic  EYES:  conjunctiva and sclera clear  CHEST/LUNG: Clear to auscultation. No rales, rhonchi, wheezing, or rubs  HEART: Regular rate and rhythm; No murmurs, rubs, or gallops  ABDOMEN: Soft, Nontender, Nondistended; Bowel sounds present  NERVOUS SYSTEM:  Alert & Oriented X3,    EXTREMITIES:  2+ Peripheral Pulses, No clubbing, cyanosis, or edema  SKIN: warm dry                          9.5    22.16 )-----------( 235      ( 24 Aug 2023 12:15 )             29.0     08-24    147<H>  |  120<H>  |  43<H>  ----------------------------<  80  3.8   |  17<L>  |  1.72<H>    Ca    7.2<L>      24 Aug 2023 06:02  Phos  4.6     08-24  Mg     2.3     08-24    TPro  5.3<L>  /  Alb  1.6<L>  /  TBili  0.3  /  DBili  x   /  AST  21  /  ALT  16  /  AlkPhos  98  08-24    LIVER FUNCTIONS - ( 24 Aug 2023 06:02 )  Alb: 1.6 g/dL / Pro: 5.3 g/dL / ALK PHOS: 98 U/L / ALT: 16 U/L DA / AST: 21 U/L / GGT: x               PT/INR - ( 23 Aug 2023 18:20 )   PT: 12.0 sec;   INR: 1.05 ratio             CAPILLARY BLOOD GLUCOSE      RADIOLOGY & ADDITIONAL TESTS:                   PGY-1 Progress Note discussed with attending    PAGER #: [[phone]] TILL 5:00 PM  PLEASE CONTACT ON CALL TEAM:  - On Call Team (Please refer to Tyler) FROM 5:00 PM - 8:30PM  - Nightfloat Team FROM 8:30 -7:30 AM    CHIEF COMPLAINT & BRIEF HOSPITAL COURSE:      INTERVAL HPI/OVERNIGHT EVENTS:       MEDICATIONS  (STANDING):  cefepime   IVPB 1000 milliGRAM(s) IV Intermittent every 24 hours  cefepime   IVPB      cloNIDine Patch 0.1 mG/24Hr(s) 1 patch Transdermal every 7 days  dextrose 5% + sodium chloride 0.9%. 1000 milliLiter(s) (60 mL/Hr) IV Continuous <Continuous>  valproate sodium  IVPB 500 milliGRAM(s) IV Intermittent every 8 hours    MEDICATIONS  (PRN):      REVIEW OF SYSTEMS:  CONSTITUTIONAL: No fever, weight loss, or fatigue  RESPIRATORY: No shortness of breath  CARDIOVASCULAR: No chest pain  GASTROINTESTINAL: No abdominal pain.  GENITOURINARY: No dysuria  NEUROLOGICAL: No headaches  SKIN: No itching, burning, rashes    Vital Signs Last 24 Hrs  T(C): 36.8 (25 Aug 2023 05:07), Max: 36.8 (25 Aug 2023 05:07)  T(F): 98.2 (25 Aug 2023 05:07), Max: 98.2 (25 Aug 2023 05:07)  HR: 89 (25 Aug 2023 05:07) (89 - 90)  BP: 145/79 (25 Aug 2023 05:07) (145/79 - 191/83)  BP(mean): --  RR: 18 (25 Aug 2023 05:07) (18 - 18)  SpO2: 98% (25 Aug 2023 05:07) (98% - 100%)    Parameters below as of 25 Aug 2023 05:07  Patient On (Oxygen Delivery Method): room air        PHYSICAL EXAMINATION:  GENERAL: NAD, well built  HEAD:  Atraumatic, Normocephalic  EYES:  conjunctiva and sclera clear  CHEST/LUNG: Clear to auscultation. No rales, rhonchi, wheezing, or rubs  HEART: Regular rate and rhythm; No murmurs, rubs, or gallops  ABDOMEN: Soft, Nontender, Nondistended; Bowel sounds present  NERVOUS SYSTEM:  Alert & Oriented X3,    EXTREMITIES:  2+ Peripheral Pulses, No clubbing, cyanosis, or edema  SKIN: warm dry                          9.5    22.16 )-----------( 235      ( 24 Aug 2023 12:15 )             29.0     08-24    147<H>  |  120<H>  |  43<H>  ----------------------------<  80  3.8   |  17<L>  |  1.72<H>    Ca    7.2<L>      24 Aug 2023 06:02  Phos  4.6     08-24  Mg     2.3     08-24    TPro  5.3<L>  /  Alb  1.6<L>  /  TBili  0.3  /  DBili  x   /  AST  21  /  ALT  16  /  AlkPhos  98  08-24    LIVER FUNCTIONS - ( 24 Aug 2023 06:02 )  Alb: 1.6 g/dL / Pro: 5.3 g/dL / ALK PHOS: 98 U/L / ALT: 16 U/L DA / AST: 21 U/L / GGT: x               PT/INR - ( 23 Aug 2023 18:20 )   PT: 12.0 sec;   INR: 1.05 ratio             CAPILLARY BLOOD GLUCOSE      RADIOLOGY & ADDITIONAL TESTS:                   PGY-1 Progress Note discussed with attending    PAGER #: [[phone]] TILL 5:00 PM  PLEASE CONTACT ON CALL TEAM:  - On Call Team (Please refer to Tyler) FROM 5:00 PM - 8:30PM  - Nightfloat Team FROM 8:30 -7:30 AM    CHIEF COMPLAINT & BRIEF HOSPITAL COURSE:      INTERVAL HPI/OVERNIGHT EVENTS:       MEDICATIONS  (STANDING):  cefepime   IVPB 1000 milliGRAM(s) IV Intermittent every 24 hours  cefepime   IVPB      cloNIDine Patch 0.1 mG/24Hr(s) 1 patch Transdermal every 7 days  dextrose 5% + sodium chloride 0.9%. 1000 milliLiter(s) (60 mL/Hr) IV Continuous <Continuous>  valproate sodium  IVPB 500 milliGRAM(s) IV Intermittent every 8 hours    MEDICATIONS  (PRN):      REVIEW OF SYSTEMS:  CONSTITUTIONAL: No fever, weight loss, or fatigue  RESPIRATORY: No shortness of breath  CARDIOVASCULAR: No chest pain  GASTROINTESTINAL: No abdominal pain.  GENITOURINARY: No dysuria  NEUROLOGICAL: No headaches  SKIN: No itching, burning, rashes    Vital Signs Last 24 Hrs  T(C): 36.8 (25 Aug 2023 05:07), Max: 36.8 (25 Aug 2023 05:07)  T(F): 98.2 (25 Aug 2023 05:07), Max: 98.2 (25 Aug 2023 05:07)  HR: 89 (25 Aug 2023 05:07) (89 - 90)  BP: 145/79 (25 Aug 2023 05:07) (145/79 - 191/83)  BP(mean): --  RR: 18 (25 Aug 2023 05:07) (18 - 18)  SpO2: 98% (25 Aug 2023 05:07) (98% - 100%)    Parameters below as of 25 Aug 2023 05:07  Patient On (Oxygen Delivery Method): room air        PHYSICAL EXAMINATION:  GENERAL: NAD, well built  HEAD:  Atraumatic, Normocephalic  EYES:  conjunctiva and sclera clear  CHEST/LUNG: Clear to auscultation. No rales, rhonchi, wheezing, or rubs  HEART: Regular rate and rhythm; No murmurs, rubs, or gallops  ABDOMEN: Soft, Nontender, Nondistended; Bowel sounds present  NERVOUS SYSTEM:  Alert & Oriented X3,    EXTREMITIES:  2+ Peripheral Pulses, No clubbing, cyanosis, or edema  SKIN: warm dry                          9.5    22.16 )-----------( 235      ( 24 Aug 2023 12:15 )             29.0     08-24    147<H>  |  120<H>  |  43<H>  ----------------------------<  80  3.8   |  17<L>  |  1.72<H>    Ca    7.2<L>      24 Aug 2023 06:02  Phos  4.6     08-24  Mg     2.3     08-24    TPro  5.3<L>  /  Alb  1.6<L>  /  TBili  0.3  /  DBili  x   /  AST  21  /  ALT  16  /  AlkPhos  98  08-24    LIVER FUNCTIONS - ( 24 Aug 2023 06:02 )  Alb: 1.6 g/dL / Pro: 5.3 g/dL / ALK PHOS: 98 U/L / ALT: 16 U/L DA / AST: 21 U/L / GGT: x               PT/INR - ( 23 Aug 2023 18:20 )   PT: 12.0 sec;   INR: 1.05 ratio             CAPILLARY BLOOD GLUCOSE      RADIOLOGY & ADDITIONAL TESTS:                   PGY-1 Progress Note discussed with attending    PAGER #: [[phone]] TILL 5:00 PM  PLEASE CONTACT ON CALL TEAM:  - On Call Team (Please refer to Tyler) FROM 5:00 PM - 8:30PM  - Nightfloat Team FROM 8:30 -7:30 AM    CHIEF COMPLAINT & BRIEF HOSPITAL COURSE:  Failure to thrive    INTERVAL HPI/OVERNIGHT EVENTS:   No acute events overnight.  Patient examined at bedside this AM.  GOC discussion yesterday with sister indicated the family is ready to proceed with comfort measures only and is willing to transfer pt to hospice. Will meet again with family today and trial comfort feeds.    MEDICATIONS  (STANDING):  cefepime   IVPB 1000 milliGRAM(s) IV Intermittent every 24 hours  cefepime   IVPB      cloNIDine Patch 0.1 mG/24Hr(s) 1 patch Transdermal every 7 days  dextrose 5% + sodium chloride 0.9%. 1000 milliLiter(s) (60 mL/Hr) IV Continuous <Continuous>  valproate sodium  IVPB 500 milliGRAM(s) IV Intermittent every 8 hours    MEDICATIONS  (PRN):      REVIEW OF SYSTEMS: Unable to obtain as pt is non-verbal    Vital Signs Last 24 Hrs  T(C): 36.8 (25 Aug 2023 05:07), Max: 36.8 (25 Aug 2023 05:07)  T(F): 98.2 (25 Aug 2023 05:07), Max: 98.2 (25 Aug 2023 05:07)  HR: 89 (25 Aug 2023 05:07) (89 - 90)  BP: 145/79 (25 Aug 2023 05:07) (145/79 - 191/83)  BP(mean): --  RR: 18 (25 Aug 2023 05:07) (18 - 18)  SpO2: 98% (25 Aug 2023 05:07) (98% - 100%)    Parameters below as of 25 Aug 2023 05:07  Patient On (Oxygen Delivery Method): room air        PHYSICAL EXAMINATION:  GENERAL: NAD, well built  HEAD:  Atraumatic, Normocephalic  EYES:  conjunctiva and sclera clear  CHEST/LUNG: Clear to auscultation. No rales, rhonchi, wheezing, or rubs  HEART: Regular rate and rhythm; No murmurs, rubs, or gallops  ABDOMEN: +G-tube out of stomach lumen. Soft, Nontender, Nondistended; Bowel sounds present  NERVOUS SYSTEM:  Alert & Oriented X0,    EXTREMITIES:  +Significant edema in all 4 extremities. 2+ Peripheral Pulses, No clubbing or cyanosis  SKIN: warm dry                          9.5    22.16 )-----------( 235      ( 24 Aug 2023 12:15 )             29.0     08-24    147<H>  |  120<H>  |  43<H>  ----------------------------<  80  3.8   |  17<L>  |  1.72<H>    Ca    7.2<L>      24 Aug 2023 06:02  Phos  4.6     08-24  Mg     2.3     08-24    TPro  5.3<L>  /  Alb  1.6<L>  /  TBili  0.3  /  DBili  x   /  AST  21  /  ALT  16  /  AlkPhos  98  08-24    LIVER FUNCTIONS - ( 24 Aug 2023 06:02 )  Alb: 1.6 g/dL / Pro: 5.3 g/dL / ALK PHOS: 98 U/L / ALT: 16 U/L DA / AST: 21 U/L / GGT: x               PT/INR - ( 23 Aug 2023 18:20 )   PT: 12.0 sec;   INR: 1.05 ratio             CAPILLARY BLOOD GLUCOSE      RADIOLOGY & ADDITIONAL TESTS:                   PGY-1 Progress Note discussed with attending    PAGER #: [[phone]] TILL 5:00 PM  PLEASE CONTACT ON CALL TEAM:  - On Call Team (Please refer to Tyler) FROM 5:00 PM - 8:30PM  - Nightfloat Team FROM 8:30 -7:30 AM    CHIEF COMPLAINT & BRIEF HOSPITAL COURSE:  Failure to thrive    INTERVAL HPI/OVERNIGHT EVENTS:   No acute events overnight.  Patient examined at bedside this AM.  GOC discussion yesterday with sister indicated the family is ready to proceed with comfort measures only and is willing to transfer pt to hospice. Will meet again with family today and trial comfort feeds.    MEDICATIONS  (STANDING):  cefepime   IVPB 1000 milliGRAM(s) IV Intermittent every 24 hours  cefepime   IVPB      cloNIDine Patch 0.1 mG/24Hr(s) 1 patch Transdermal every 7 days  dextrose 5% + sodium chloride 0.9%. 1000 milliLiter(s) (60 mL/Hr) IV Continuous <Continuous>  valproate sodium  IVPB 500 milliGRAM(s) IV Intermittent every 8 hours    MEDICATIONS  (PRN):      REVIEW OF SYSTEMS: Unable to obtain as pt is non-verbal    Vital Signs Last 24 Hrs  T(C): 36.8 (25 Aug 2023 05:07), Max: 36.8 (25 Aug 2023 05:07)  T(F): 98.2 (25 Aug 2023 05:07), Max: 98.2 (25 Aug 2023 05:07)  HR: 89 (25 Aug 2023 05:07) (89 - 90)  BP: 145/79 (25 Aug 2023 05:07) (145/79 - 191/83)  BP(mean): --  RR: 18 (25 Aug 2023 05:07) (18 - 18)  SpO2: 98% (25 Aug 2023 05:07) (98% - 100%)    Parameters below as of 25 Aug 2023 05:07  Patient On (Oxygen Delivery Method): room air        PHYSICAL EXAMINATION:  GENERAL: NAD, well built  HEAD:  Atraumatic, Normocephalic  EYES:  conjunctiva and sclera clear  CHEST/LUNG: Clear to auscultation. No rales, rhonchi, wheezing, or rubs  HEART: Regular rate and rhythm; No murmurs, rubs, or gallops  ABDOMEN: +G-tube out of stomach lumen. Soft, Nontender, Nondistended; Bowel sounds present  NERVOUS SYSTEM:  Alert & Oriented X0,    EXTREMITIES:  +Significant edema in all 4 extremities. 2+ Peripheral Pulses, No clubbing or cyanosis  SKIN: warm dry                          9.5    22.16 )-----------( 235      ( 24 Aug 2023 12:15 )             29.0     08-24    147<H>  |  120<H>  |  43<H>  ----------------------------<  80  3.8   |  17<L>  |  1.72<H>    Ca    7.2<L>      24 Aug 2023 06:02  Phos  4.6     08-24  Mg     2.3     08-24    TPro  5.3<L>  /  Alb  1.6<L>  /  TBili  0.3  /  DBili  x   /  AST  21  /  ALT  16  /  AlkPhos  98  08-24    LIVER FUNCTIONS - ( 24 Aug 2023 06:02 )  Alb: 1.6 g/dL / Pro: 5.3 g/dL / ALK PHOS: 98 U/L / ALT: 16 U/L DA / AST: 21 U/L / GGT: x               PT/INR - ( 23 Aug 2023 18:20 )   PT: 12.0 sec;   INR: 1.05 ratio             CAPILLARY BLOOD GLUCOSE      RADIOLOGY & ADDITIONAL TESTS:                   PGY-1 Progress Note discussed with attending    PAGER #: [[phone]] TILL 5:00 PM  PLEASE CONTACT ON CALL TEAM:  - On Call Team (Please refer to Tyler) FROM 5:00 PM - 8:30PM  - Nightfloat Team FROM 8:30 -7:30 AM    CHIEF COMPLAINT & BRIEF HOSPITAL COURSE:  Failure to thrive    INTERVAL HPI/OVERNIGHT EVENTS:   No acute events overnight.  Patient examined at bedside this AM.  GOC discussion yesterday with sister indicated the family is ready to proceed with comfort measures only and is willing to transfer pt to hospice. Will meet again with family today and trial comfort feeds.    MEDICATIONS  (STANDING):  cefepime   IVPB 1000 milliGRAM(s) IV Intermittent every 24 hours  cefepime   IVPB      cloNIDine Patch 0.1 mG/24Hr(s) 1 patch Transdermal every 7 days  dextrose 5% + sodium chloride 0.9%. 1000 milliLiter(s) (60 mL/Hr) IV Continuous <Continuous>  valproate sodium  IVPB 500 milliGRAM(s) IV Intermittent every 8 hours    MEDICATIONS  (PRN):      REVIEW OF SYSTEMS: Unable to obtain as pt is non-verbal    Vital Signs Last 24 Hrs  T(C): 36.8 (25 Aug 2023 05:07), Max: 36.8 (25 Aug 2023 05:07)  T(F): 98.2 (25 Aug 2023 05:07), Max: 98.2 (25 Aug 2023 05:07)  HR: 89 (25 Aug 2023 05:07) (89 - 90)  BP: 145/79 (25 Aug 2023 05:07) (145/79 - 191/83)  BP(mean): --  RR: 18 (25 Aug 2023 05:07) (18 - 18)  SpO2: 98% (25 Aug 2023 05:07) (98% - 100%)    Parameters below as of 25 Aug 2023 05:07  Patient On (Oxygen Delivery Method): room air        PHYSICAL EXAMINATION:  GENERAL: NAD, well built  HEAD:  Atraumatic, Normocephalic  EYES:  conjunctiva and sclera clear  CHEST/LUNG: Clear to auscultation. No rales, rhonchi, wheezing, or rubs  HEART: Regular rate and rhythm; No murmurs, rubs, or gallops  ABDOMEN: +G-tube out of stomach lumen. Soft, Nontender, Nondistended; Bowel sounds present  NERVOUS SYSTEM:  Alert & Oriented X0,    EXTREMITIES:  +Significant edema in all 4 extremities. 2+ Peripheral Pulses, No clubbing or cyanosis  SKIN: warm dry                          9.5    22.16 )-----------( 235      ( 24 Aug 2023 12:15 )             29.0     08-24    147<H>  |  120<H>  |  43<H>  ----------------------------<  80  3.8   |  17<L>  |  1.72<H>    Ca    7.2<L>      24 Aug 2023 06:02  Phos  4.6     08-24  Mg     2.3     08-24    TPro  5.3<L>  /  Alb  1.6<L>  /  TBili  0.3  /  DBili  x   /  AST  21  /  ALT  16  /  AlkPhos  98  08-24    LIVER FUNCTIONS - ( 24 Aug 2023 06:02 )  Alb: 1.6 g/dL / Pro: 5.3 g/dL / ALK PHOS: 98 U/L / ALT: 16 U/L DA / AST: 21 U/L / GGT: x               PT/INR - ( 23 Aug 2023 18:20 )   PT: 12.0 sec;   INR: 1.05 ratio             CAPILLARY BLOOD GLUCOSE      RADIOLOGY & ADDITIONAL TESTS:

## 2023-08-25 NOTE — PROGRESS NOTE ADULT - SUBJECTIVE AND OBJECTIVE BOX
NEPHROLOGY MEDICAL CARE, [facility] - Dr. Ajay Green/ Dr. Mert Madison/ Dr. Chris Calderon/ Dr. Carson Cook    Date of Service: 08-25-23 @ 13:13    Patient was seen and examined at bedside.    CC: patient is NAD    Vital Signs Last 24 Hrs  T(C): 37 (25 Aug 2023 13:07), Max: 37 (25 Aug 2023 13:07)  T(F): 98.6 (25 Aug 2023 13:07), Max: 98.6 (25 Aug 2023 13:07)  HR: 76 (25 Aug 2023 13:07) (76 - 90)  BP: 119/100 (25 Aug 2023 13:07) (119/100 - 191/83)  BP(mean): --  RR: 17 (25 Aug 2023 13:07) (17 - 18)  SpO2: 99% (25 Aug 2023 13:07) (98% - 100%)    Parameters below as of 25 Aug 2023 13:07  Patient On (Oxygen Delivery Method): room air          PHYSICAL EXAM:  General: No acute respiratory distress. Anasarca   Eyes: conjunctiva and sclera clear  ENMT: Atraumatic, Normocephalic; Moist mucous membranes  Respiratory: Bilateral poor air entry; No rales, rhonchi, wheezing  Cardiovascular: S1S2+; no m/r/g  Gastrointestinal: Soft, Non-tender, Nondistended; Bowel sounds present; PEG  Neuro: eyes are open; hemiparesis; non-verbal  Ext:  1+pedal edema, No Cyanosis  Skin: No visible rashes      MEDICATIONS:  MEDICATIONS  (STANDING):  cefepime   IVPB      cefepime   IVPB 1000 milliGRAM(s) IV Intermittent every 24 hours  cloNIDine Patch 0.1 mG/24Hr(s) 1 patch Transdermal every 7 days  dextrose 5% + sodium chloride 0.9%. 1000 milliLiter(s) (60 mL/Hr) IV Continuous <Continuous>  valproate sodium  IVPB 500 milliGRAM(s) IV Intermittent every 8 hours    MEDICATIONS  (PRN):          LABS:                        9.5    22.16 )-----------( 235      ( 24 Aug 2023 12:15 )             29.0     08-24    147<H>  |  120<H>  |  43<H>  ----------------------------<  80  3.8   |  17<L>  |  1.72<H>    Ca    7.2<L>      24 Aug 2023 06:02  Phos  4.6     08-24  Mg     2.3     08-24    TPro  5.3<L>  /  Alb  1.6<L>  /  TBili  0.3  /  DBili  x   /  AST  21  /  ALT  16  /  AlkPhos  98  08-24    PT/INR - ( 23 Aug 2023 18:20 )   PT: 12.0 sec;   INR: 1.05 ratio           Urinalysis Basic - ( 24 Aug 2023 06:02 )    Color: x / Appearance: x / SG: x / pH: x  Gluc: 80 mg/dL / Ketone: x  / Bili: x / Urobili: x   Blood: x / Protein: x / Nitrite: x   Leuk Esterase: x / RBC: x / WBC x   Sq Epi: x / Non Sq Epi: x / Bacteria: x        Urine studies    PTH and Vit D:         NEPHROLOGY MEDICAL CARE, [facility] - Dr. Ajay Green/ Dr. Mert Madison/ Dr. Chris Calderon/ Dr. Carson Cook    Date of Service: 08-25-23 @ 13:13    Patient was seen and examined at bedside.    CC: patient is NAD    Vital Signs Last 24 Hrs  T(C): 37 (25 Aug 2023 13:07), Max: 37 (25 Aug 2023 13:07)  T(F): 98.6 (25 Aug 2023 13:07), Max: 98.6 (25 Aug 2023 13:07)  HR: 76 (25 Aug 2023 13:07) (76 - 90)  BP: 119/100 (25 Aug 2023 13:07) (119/100 - 191/83)  BP(mean): --  RR: 17 (25 Aug 2023 13:07) (17 - 18)  SpO2: 99% (25 Aug 2023 13:07) (98% - 100%)    Parameters below as of 25 Aug 2023 13:07  Patient On (Oxygen Delivery Method): room air          PHYSICAL EXAM:  General: No acute respiratory distress. Anasarca   Eyes: conjunctiva and sclera clear  ENMT: Atraumatic, Normocephalic; Moist mucous membranes  Respiratory: Bilateral poor air entry; No rales, rhonchi, wheezing  Cardiovascular: S1S2+; no m/r/g  Gastrointestinal: Soft, Non-tender, Nondistended; Bowel sounds present; PEG  Neuro: eyes are open; hemiparesis; non-verbal  Ext:  1+pedal edema, No Cyanosis  Skin: No visible rashes      MEDICATIONS:  MEDICATIONS  (STANDING):  cefepime   IVPB      cefepime   IVPB 1000 milliGRAM(s) IV Intermittent every 24 hours  cloNIDine Patch 0.1 mG/24Hr(s) 1 patch Transdermal every 7 days  dextrose 5% + sodium chloride 0.9%. 1000 milliLiter(s) (60 mL/Hr) IV Continuous <Continuous>  valproate sodium  IVPB 500 milliGRAM(s) IV Intermittent every 8 hours    MEDICATIONS  (PRN):          LABS:                        9.5    22.16 )-----------( 235      ( 24 Aug 2023 12:15 )             29.0     08-24    147<H>  |  120<H>  |  43<H>  ----------------------------<  80  3.8   |  17<L>  |  1.72<H>    Ca    7.2<L>      24 Aug 2023 06:02  Phos  4.6     08-24  Mg     2.3     08-24    TPro  5.3<L>  /  Alb  1.6<L>  /  TBili  0.3  /  DBili  x   /  AST  21  /  ALT  16  /  AlkPhos  98  08-24    PT/INR - ( 23 Aug 2023 18:20 )   PT: 12.0 sec;   INR: 1.05 ratio           Urinalysis Basic - ( 24 Aug 2023 06:02 )    Color: x / Appearance: x / SG: x / pH: x  Gluc: 80 mg/dL / Ketone: x  / Bili: x / Urobili: x   Blood: x / Protein: x / Nitrite: x   Leuk Esterase: x / RBC: x / WBC x   Sq Epi: x / Non Sq Epi: x / Bacteria: x        Urine studies    PTH and Vit D:         NEPHROLOGY MEDICAL CARE, [facility] - Dr. Ajay Green/ Dr. Mert Madison/ Dr. Chris Calderon/ Dr. Carson Cook    Date of Service: 08-25-23 @ 13:13    Patient was seen and examined at bedside.    CC: patient is NAD    Vital Signs Last 24 Hrs  T(C): 37 (25 Aug 2023 13:07), Max: 37 (25 Aug 2023 13:07)  T(F): 98.6 (25 Aug 2023 13:07), Max: 98.6 (25 Aug 2023 13:07)  HR: 76 (25 Aug 2023 13:07) (76 - 90)  BP: 119/100 (25 Aug 2023 13:07) (119/100 - 191/83)  BP(mean): --  RR: 17 (25 Aug 2023 13:07) (17 - 18)  SpO2: 99% (25 Aug 2023 13:07) (98% - 100%)    Parameters below as of 25 Aug 2023 13:07  Patient On (Oxygen Delivery Method): room air          PHYSICAL EXAM:  General: No acute respiratory distress. Anasarca   Eyes: conjunctiva and sclera clear  ENMT: Atraumatic, Normocephalic; Moist mucous membranes  Respiratory: Bilateral poor air entry; No rales, rhonchi, wheezing  Cardiovascular: S1S2+; no m/r/g  Gastrointestinal: Soft, Non-tender, Nondistended; Bowel sounds present; PEG  Neuro: eyes are open; hemiparesis; non-verbal  Ext:  1+pedal edema, No Cyanosis  Skin: No visible rashes      MEDICATIONS:  MEDICATIONS  (STANDING):  cefepime   IVPB      cefepime   IVPB 1000 milliGRAM(s) IV Intermittent every 24 hours  cloNIDine Patch 0.1 mG/24Hr(s) 1 patch Transdermal every 7 days  dextrose 5% + sodium chloride 0.9%. 1000 milliLiter(s) (60 mL/Hr) IV Continuous <Continuous>  valproate sodium  IVPB 500 milliGRAM(s) IV Intermittent every 8 hours    MEDICATIONS  (PRN):          LABS:                        9.5    22.16 )-----------( 235      ( 24 Aug 2023 12:15 )             29.0     08-24    147<H>  |  120<H>  |  43<H>  ----------------------------<  80  3.8   |  17<L>  |  1.72<H>    Ca    7.2<L>      24 Aug 2023 06:02  Phos  4.6     08-24  Mg     2.3     08-24    TPro  5.3<L>  /  Alb  1.6<L>  /  TBili  0.3  /  DBili  x   /  AST  21  /  ALT  16  /  AlkPhos  98  08-24    PT/INR - ( 23 Aug 2023 18:20 )   PT: 12.0 sec;   INR: 1.05 ratio           Urinalysis Basic - ( 24 Aug 2023 06:02 )    Color: x / Appearance: x / SG: x / pH: x  Gluc: 80 mg/dL / Ketone: x  / Bili: x / Urobili: x   Blood: x / Protein: x / Nitrite: x   Leuk Esterase: x / RBC: x / WBC x   Sq Epi: x / Non Sq Epi: x / Bacteria: x        Urine studies    PTH and Vit D:

## 2023-08-25 NOTE — PROGRESS NOTE ADULT - PROBLEM SELECTOR PLAN 3
-2/2 ?GIB, H/H again downtrending s/p transfusion  - further work-up and intervention of GIB/anemia not within the GOC  -NO FURTHER LAB DRAWS OR TRANSFUSIONS per family  -plan is for hospice in [facility].

## 2023-08-25 NOTE — PROGRESS NOTE ADULT - PROBLEM SELECTOR PLAN 1
Pt. is NPO  Open G tube placed on 8/14  IV tylenol for pain, morphine for breakthrough pain  IV iron sucrose started 8/15 for 3 days as per nephro  G tube placement by Surgery on 8/14  LE doppler b/l showed no signs of DVT  8/20 leakage noted around site of G tube. Feedings stopped and surgery consulted  CT abdomen/pelvis showed g tube out of stomach lumen  surgery attempted to re-position manually, repeat CT A/P showed G-tube still out of stomach lumen  8/24 Discussion with palliative care, sister-Mraika, and relative-Abbe  8/24 Pt family seems more inclined to go hospice route. Will follow up in the morning. Labs will not be drawn. IV fluids will continue for now  f/u comfort pack for hospice Pt. is NPO  Open G tube placed on 8/14  IV tylenol for pain, morphine for breakthrough pain  IV iron sucrose started 8/15 for 3 days as per nephro  G tube placement by Surgery on 8/14  LE doppler b/l showed no signs of DVT  8/20 leakage noted around site of G tube. Feedings stopped and surgery consulted  CT abdomen/pelvis showed g tube out of stomach lumen  surgery attempted to re-position manually, repeat CT A/P showed G-tube still out of stomach lumen  8/24 Discussion with palliative care, sister-Marika, and relative-Abbe  8/24 Pt family seems more inclined to go hospice route. Will follow up in the morning. Labs will not be drawn. IV fluids will continue for now  f/u comfort pack for hospice Pt. is NPO  Open G tube placed on 8/14  IV tylenol for pain, morphine for breakthrough pain  IV iron sucrose started 8/15 for 3 days as per nephro  G tube placement by Surgery on 8/14  LE doppler b/l showed no signs of DVT  8/20 leakage noted around site of G tube. Feedings stopped and surgery consulted  CT abdomen/pelvis showed g tube out of stomach lumen  surgery attempted to re-position manually, repeat CT A/P showed G-tube still out of stomach lumen  8/24 Discussion with palliative care, sister-Marika, and relative-Abbe  8/24 Pt family seems more inclined to go hospice route. Will follow up in the morning. Labs will not be drawn. IV fluids will continue for now  8/25 family agreed hospice is preferred route. Beginning search for accepting facilities. Comfort feeds started w/ suction PRN

## 2023-08-25 NOTE — PROGRESS NOTE ADULT - ASSESSMENT
1. SUSAN possible to MARISA and ATN  Renal sono shows no hdyro with b/l kidneys around 9.2cm  -No new labs. Scr is stable and unchanged at 1.8mg/dl for last few days with stable electrolytes and close to baseline.  -urinalysis shows blood with proteinuria; FeNa >1%, spot protein to creatinine ratio is 1.5gm  -Adjust meds to eGFR and avoid IV Gadolinium contrast,NSAIDs, and phosphate enema.  -Monitor I/O's daily.   -Monitor SMA daily.  2. Hypernatremia due to water deficit and insensible losses. Pt is clinically euvolemic.   -Na unchanged; IR to replace PEG; change fluids to D5W at 40cc/hr since NPO.  -Monitor I/O's. Check Serum Na Daily. Avoid high solute intake diet and sodium bicarbonate infuse. Avoid overcorrection of NA (8-10meq/day)  3. CKD stage 4 most likely due to hypertensive nephrosclerosis  -new baseline scr around 1.8mg/dL with uPCR 1.5gm.  -previous Scr around 1.2 to 1.6mg/dL in Oct 2022.  -Keep patient euvolemic and renal diet  -Avoid Nephrotoxic Meds/ Agents such as (NSAIDs, IV contrast, Aminoglycosides such as gentamicin, -Gadolinium contrast, Phosphate containing enemas, etc..)  -Adjust Medications according to eGFR  4. HTN:   -bp is acceptable. continue bp meds  -titrate bp meds to keep sbp >110 and < 130  5. Mineral Bone Disease:  -phos is okay.   -PTH intact 289, will start calcitriol once Phos has improved.   6. Encephalopathy:  -s/p Rocephin.  -Plan as per Neuro and primary team  -s/p PEG placed in OR on 08/14/23.   -no plans for reinsertion of PEG since high risk; family decided on comfort care and hospice.   7. Hypokalemia:  -stable K.   -give kcl repletion to keep K >3.5meq/L  -monitor K.   8. Acidosis:  -CO2 is unchanged.    -monitor CO2.  9. Anemia:  -hb is stable.  -low iron sat and ferritin are okay. s/p iv iron x 3 days.  -monitor CBC.  -transfuse if hb <7.0  10. Elevated LFTs  -slowly improving; US abd done shows fatty liver  -monitor LFTs  11. Hyperkalemia due to susan on ckd.   -stable.   -give Lokelma prn if K >5.5  -Keep pt euvolemic. Avoid ACE inh/ ARBs, NSAIDs, and Aldactone or potassium sparing diuretics. Monitor K+ daily.  12. Hypoalbuminemia and Anasarca:  - iv albumin 250cc and lasix 40mg iv once prn    Discussed with family at bedside in detail regarding the renal plan and care  Discussed the assessment and plan with Primary Team/Nurse

## 2023-08-25 NOTE — PROGRESS NOTE ADULT - PROBLEM SELECTOR PLAN 2
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

## 2023-08-25 NOTE — PROGRESS NOTE ADULT - PROBLEM SELECTOR PLAN 4
In context of     Acute Illness/Injury (>7days)       Energy/Food intake <50% of estimated energy requirement >5 days alb 2.3 8/10  Weight loss: Moderate - severe (lbs lost recently)  Body Fat loss: Severe   (temporal wasting, contracted, muscle atrophy)  Muscle mass loss: moderate   Fluid Accumulation: Severe (Fluid overload, anasarca, pleural effusions)   Strength: weakened severe (bedbound)    Recommend:   Now tolerating comfort feeds of puree and mod thickened liquids via teaspoon only. As above, family aware of aspiration risk and wishes to proceed with EOL care and comfort feeds despite these risks. Diet ordered.     No oral intake since 7/27 only IVF, unable to place NGT after multiple attempts, GI unable to place PEG, s/p open gastrostomy 8/14 and G-Tube dislodged 8/20 and no TF since that time, only IVF. Pt is NOT a candidate for TPN. Artificial nutrition/hydration NOT WITHIN THE GOC. New MOLST drafted reflecting these wishes.

## 2023-08-25 NOTE — PROGRESS NOTE ADULT - CONVERSATION/DISCUSSION
Diagnosis/Prognosis/MOLST Discussed
Diagnosis/Prognosis/MOLST Discussed/Hospice Referral
Diagnosis/Prognosis

## 2023-08-25 NOTE — PROGRESS NOTE ADULT - PROBLEM SELECTOR PLAN 1
-2/2 L MCA CVA, Parkinson's Disease, and dementia  -s/p unsuccessful PEG placement with GI, surgery consulted and s/p open gastrostomy 8/14 with subsequent dislodgement 8/20  -GI/IR unable to replace feeding tube, would need subsequent open gastrostomy once healed from first - pt is a poor candidate for further surgical intervention and remains high risk for morbidity and mortality this admission and repeated G-tube placement is burdensome with limited benefit  -family agreeable to hospice at [facility]  -c/w IVF while inpatient, aware this will stop on transfer to LTC with hospice    More alert today, trailed on puree and thickened liquids and tolerating and comfort feeds diet ordered. Discussed the risks and benefits of comfort feeds at length with the pt's sister Marika and wishes for the pt to have comfort feeds despite the risk of aspiration and very appreciative for the pt to have a comfort feed diet. Educated on aspiration precautions at length. Wishes for comfort measures only and aware of the EOL situation and wishes for transition to LTC with hospice. -2/2 L MCA CVA, Parkinson's Disease, and dementia  -s/p unsuccessful PEG placement with GI, surgery consulted and s/p open gastrostomy 8/14 with subsequent dislodgement 8/20  -GI/IR unable to replace feeding tube, would need subsequent open gastrostomy once healed from first - pt is a poor candidate for further surgical intervention and remains high risk for morbidity and mortality this admission and repeated G-tube placement is burdensome with limited benefit  -family agreeable to hospice at [facility]  -c/w IVF while inpatient, aware this will stop on transfer to LTC with hospice    More alert today, trailed on puree and thickened liquids and tolerating and comfort feeds diet ordered. Discussed the risks and benefits of comfort feeds at length with the pt's sister Marika and wishes for the pt to have comfort feeds despite the risk of aspiration and very appreciative for the pt to have a comfort feed diet. Educated on aspiration precautions at length. Wishes for comfort measures only and aware of the EOL situation and wishes for transition to LTC with hospice. -2/2 L MCA CVA, Parkinson's Disease, and dementia  -s/p unsuccessful PEG placement with GI, surgery consulted and s/p open gastrostomy 8/14 with subsequent dislodgement 8/20  -GI/IR unable to replace feeding tube, would need subsequent open gastrostomy once healed from first - pt is a poor candidate for further surgical intervention and remains high risk for morbidity and mortality this admission and repeated G-tube placement is burdensome with limited benefit  -family agreeable to hospice at [facility]  -c/w IVF while inpatient, aware this will stop on transfer to LTC with hospice    More alert today, trailed on puree and thickened liquids and tolerating and comfort feeds diet ordered. Discussed the risks and benefits of comfort feeds at length with the pt's sister Marika and wishes for the pt to have comfort feeds despite the risk of aspiration and very appreciative for the pt to have a comfort feed diet. Educated on aspiration precautions at length. Wishes for comfort measures only and aware of the EOL situation and wishes for transition to LTC with hospice.

## 2023-08-25 NOTE — PROGRESS NOTE ADULT - ASSESSMENT
77 female from [facility] PMHx HTN, HLD, CVA (w/ residual aphasia and R sided hemiparesis/plegia) who was sent to the hospital due to altered mental status,UTI.  1.UTI-s/p ABX.  2.HTN-clonidine patch .1mg q wk.  3.Lipid d/o- statin.  4.Surgical f/u noted, Rec GI or IR for PEG since s/p G tube now dislodged, ABX,  5.TF on hold.  6.Anemia-  iron.  7.GI and DVT prophylaxis.     77 female from [facility] PMHx HTN, HLD, CVA (w/ residual aphasia and R sided hemiparesis/plegia) who was sent to the hospital due to altered mental status,UTI.  1.UTI-s/p ABX.  2.HTN-clonidine patch .1mg q wk.  3.Lipid d/o- statin.  4.Surgical f/u noted, Rec GI or IR for PEG since s/p G tube now dislodged, ABX,  5.TF on hold.  6.Anemia-  iron.  7.GI and DVT prophylaxis.     77 female from [facility] PMHx HTN, HLD, CVA (w/ residual aphasia and R sided hemiparesis/plegia) who was sent to the hospital due to altered mental status,UTI.  1.UTI-s/p ABX.  2.HTN-clonidine patch .1mg q wk.  3.Lipid d/o- statin.  4.Surgical f/u noted, Rec GI or IR for PEG since s/p G tube now dislodged, ABX,  5.TF on hold.  6.Anemia-  iron.  7.GI and DVT prophylaxis.

## 2023-08-25 NOTE — PROGRESS NOTE ADULT - NUTRITIONAL ASSESSMENT
This patient has been assessed with a concern for Malnutrition and has been determined to have a diagnosis/diagnoses of Severe protein-calorie malnutrition.    This patient is being managed with:   Diet Pureed-  Moderately Thick Liquids (MODTHICKLIQS)  Liquid via Teaspoon Only  Entered: Aug 25 2023  1:55PM

## 2023-08-25 NOTE — PROGRESS NOTE ADULT - PROBLEM SELECTOR PROBLEM 2
Impression: Dry eye syndrome of bilateral lacrimal glands: H04.123. Plan: Mild dry eye both eyes - Recommend use of high quality artificial tears 3-4x per day. Ear bleeding, right

## 2023-08-25 NOTE — PROGRESS NOTE ADULT - ASSESSMENT
Patient is a 77y old  Female who is from [facility] and with PMHx of HTN, HLD, CVA (w/ residual aphasia and R sided hemiparesis/plegia) who was sent to the hospital on 7/27/23, due to altered mental status. During the hospital course she has Gastrostomy tube placement by Surgery for poor oral intake and Dysphagia. On 8/17/23 she became septic and during sepsis work up found to have positive Urine analysis and started on ceftriaxone. Hence, the ID consult requested to assist with further evaluation and antibiotic management.    # Sepsis  as of 8/17/23 ( Fever + tachycardia + leukocytosis)  # Complicated UTI- s/p exchange Mcclellan catheter on 8/17/23 - s/p removal of mcclellan catheter and placed a primafit on 8/19/23  # s/p CVA with aphasia and dysphagia- s/p  Gastrostomy tube    would recommend:    1. Monitor off BAx as she completed the course  2. Aspiration precaution  3. Comfort care measurement as per protocol    Attending Attestation:    Spent more than 35 minutes on total encounter, more than 50 % of the visit was spent counseling and/or coordinating care by the Attending physician.        Patient is a 77y old  Female who is from [facility] and with PMHx of HTN, HLD, CVA (w/ residual aphasia and R sided hemiparesis/plegia) who was sent to the hospital on 7/27/23, due to altered mental status. During the hospital course she has Gastrostomy tube placement by Surgery for poor oral intake and Dysphagia. On 8/17/23 she became septic and during sepsis work up found to have positive Urine analysis and started on ceftriaxone. Hence, the ID consult requested to assist with further evaluation and antibiotic management.    # Sepsis  as of 8/17/23 ( Fever + tachycardia + leukocytosis)  # Complicated UTI- s/p exchange Mcclellan catheter on 8/17/23 - s/p removal of mcclellan catheter and placed a primafit on 8/19/23  # s/p CVA with aphasia and dysphagia- s/p  Gastrostomy tube    would recommend:    1. Monitor off BAx as she completed the course  2. Aspiration precaution  3. Comfort care measurement as per protocol    Attending Attestation:    Spent more than 35 minutes on total encounter, more than 50 % of the visit was spent counseling and/or coordinating care by the Attending physician.        Patient is a 77y old  Female who is from [facility] and with PMHx of HTN, HLD, CVA (w/ residual aphasia and R sided hemiparesis/plegia) who was sent to the hospital on 7/27/23, due to altered mental status. During the hospital course she has Gastrostomy tube placement by Surgery for poor oral intake and Dysphagia. On 8/17/23 she became septic and during sepsis work up found to have positive Urine analysis and started on ceftriaxone. Hence, the ID consult requested to assist with further evaluation and antibiotic management.    # Sepsis  as of 8/17/23 ( Fever + tachycardia + leukocytosis)  # Complicated UTI- s/p exchange Mcclellan catheter on 8/17/23 - s/p removal of mcclellan catheter and placed a primafit on 8/19/23  # s/p CVA with aphasia and dysphagia- s/p  Gastrostomy tube    would recommend:    1. Monitor off BAx as she completed the course  2. Aspiration precaution  3. Comfort care measurement as per protocol    Attending Attestation:    Spent more than 35 minutes on total encounter, more than 50 % of the visit was spent counseling and/or coordinating care by the Attending physician.

## 2023-08-25 NOTE — PROGRESS NOTE ADULT - TREATMENT GUIDELINES
comfort feeds, no lab draws, no transfusions/DNR Order/Comfort measures only/Do not re-hospitalize/No blood draws/No artificial nutrition/No antibiotics/No IV fluids

## 2023-08-25 NOTE — PROGRESS NOTE ADULT - PROBLEM SELECTOR PLAN 5
Family in agreement with comfort care and no further attempts to place feeding tube (NGT or G-tube), now tolerating comfort feeds and diet ordered (puree and mod thickened liquids via teaspoon).  -plan for hospice at [facility]  -MEWS exempt  -no further lab draws/transfusions   -c/w IVF and IV antibiotics until DC, family aware this is not within the hospice plan of care  -plan for LTC with hospice ([facility])  -new MOLST drafted and placed on chart - DNR/I, DNH, comfort measures only, no feeding tube, no IVF, no antibiotics, no HD    Discussed with primary team. Family in agreement with comfort care and no further attempts to place feeding tube (NGT or G-tube), now tolerating comfort feeds and diet ordered (puree and mod thickened liquids via teaspoon).  -plan for hospice at [facility]  -MEWS exempt  -no further lab draws/transfusions   -c/w IVF and IV antibiotics until DC, family aware this is not within the hospice plan of care  -plan for LTC with hospice ([facility])  -new MOLST drafted and placed on chart - DNR/I, DNH, comfort measures only, no feeding tube, no IVF, no antibiotics, no HD    Discussed with primary team. Family in agreement with comfort care and no further attempts to place feeding tube (NGT or G-tube), now tolerating comfort feeds and diet ordered (puree and mod thickened liquids via teaspoon).  -plan for hospice at [facility]  -MEWS exempt  -no further lab draws/transfusions   -c/w IVF and IV antibiotics until DC, family aware this is not within the hospice plan of care  -plan for LTC with hospice ([facility])  -new MOLST drafted and placed on chart - DNR/I, DNH, comfort measures only, no feeding tube, no IVF, no antibiotics, no HD    Discussed with primary team.

## 2023-08-25 NOTE — PROGRESS NOTE ADULT - SUBJECTIVE AND OBJECTIVE BOX
Patient is seen and examined at the bed side, is afebrile. The events noted regarding patient placed in Comfort Care.      REVIEW OF SYSTEMS: Unable to obtain due  to mental status       ALLERGIES: &quot; NATURAL RUBBER&quot; (Other)  latex (Other), penicillins (Unknown)      Vital Signs Last 24 Hrs  T(C): 37 (25 Aug 2023 13:07), Max: 37 (25 Aug 2023 13:07)  T(F): 98.6 (25 Aug 2023 13:07), Max: 98.6 (25 Aug 2023 13:07)  HR: 76 (25 Aug 2023 13:07) (76 - 89)  BP: 119/100 (25 Aug 2023 13:07) (119/100 - 158/75)  BP(mean): --  RR: 17 (25 Aug 2023 13:07) (17 - 18)  SpO2: 99% (25 Aug 2023 13:07) (98% - 100%)    Parameters below as of 25 Aug 2023 13:07  Patient On (Oxygen Delivery Method): room air        PHYSICAL EXAM:  GENERAL: Not in acute distress   CHEST/LUNG:  Not using accessory muscles   HEART: s1 and s2 present  ABDOMEN:  grossly obese  EXTREMITIES: edematous  CNS: Awake, somewhat alert but Non verbal       LABS: No new Labs                        9.5    22.16 )-----------( 235      ( 24 Aug 2023 12:15 )             29.0                           7.0    17.72 )-----------( 269      ( 23 Aug 2023 12:05 )             22.4                08-24    147<H>  |  120<H>  |  43<H>  ----------------------------<  80  3.8   |  17<L>  |  1.72<H>    Ca    7.2<L>      24 Aug 2023 06:02  Phos  4.6     08-24  Mg     2.3     08-24    TPro  5.3<L>  /  Alb  1.6<L>  /  TBili  0.3  /  DBili  x   /  AST  21  /  ALT  16  /  AlkPhos  98  08-24    08-23    147<H>  |  119<H>  |  46<H>  ----------------------------<  91  3.7   |  18<L>  |  1.87<H>    Ca    7.3<L>      23 Aug 2023 09:34  Phos  4.4     08-23  Mg     2.2     08-23    TPro  5.0<L>  /  Alb  1.3<L>  /  TBili  0.2  /  DBili  x   /  AST  20  /  ALT  16  /  AlkPhos  105  08-23        CAPILLARY BLOOD GLUCOSE  POCT Blood Glucose.: 144 mg/dL (18 Aug 2023 11:49)  POCT Blood Glucose.: 122 mg/dL (18 Aug 2023 00:03)      Urinalysis Basic - ( 18 Aug 2023 11:20 )  Color: x / Appearance: x / SG: x / pH: x  Gluc: 129 mg/dL / Ketone: x  / Bili: x / Urobili: x   Blood: x / Protein: x / Nitrite: x   Leuk Esterase: x / RBC: x / WBC x   Sq Epi: x / Non Sq Epi: x / Bacteria: x        MEDICATIONS  (STANDING):    cloNIDine Patch 0.1 mG/24Hr(s) 1 patch Transdermal every 7 days  dextrose 5%. 1000 milliLiter(s) (40 mL/Hr) IV Continuous <Continuous>  valproate sodium  IVPB 500 milliGRAM(s) IV Intermittent every 8 hours        MICROBIOLOGY DATA:    Culture - Urine (08.17.23 @ 23:15)   Specimen Source: Catheterized Catheterized  Culture Results:   <10,000 CFU/mL Normal Urogenital Anila      Culture - Blood (08.17.23 @ 14:36)   Specimen Source: .Blood Blood-Peripheral  Culture Results: No growth at 4 days    Culture - Blood (08.17.23 @ 14:35)   Specimen Source: .Blood Blood-Peripheral  Culture Results: No growth at 4 days      Urine Microscopic-Add On (NC) (08.17.23 @ 18:40)   Red Blood Cell - Urine: 150 /HPF  White Blood Cell - Urine: 30 /HPF  Bacteria: Few /HPF  Squamous Epithelial Cells: Present    Urine Microscopic-Add On (NC) (08.17.23 @ 11:35)   Squamous Epithelial Cells: None Seen  Bacteria: Negative /HPF  Comment - Urine: Moderate budding yeast and yeast-like cells  White Blood Cell - Urine: >50 /HPF  Red Blood Cell - Urine: >50 /HPF      COVID-19 PCR . (08.16.23 @ 17:40)   COVID-19 PCR: NotDetec:

## 2023-08-25 NOTE — PROGRESS NOTE ADULT - ASSESSMENT
1. Anemia  2. Dysphagia  3. Constipation  4. Failure to thrive  5. No evidence of acute GI bleeding  6. S/p unsuccessful endoscopic Peg placement  7. S/p gastrostomy tube placement by surgery  8. Peg tube malfunction  9. Dislodged Peg tube    Suggestions:    1. NPO  2. IVF hydration  3. Hold Peg feeding  4. Family decided comfort measure only  5. Daily stool softener as needed  6. Monitor electrolytes  7. Protonix daily  8. Aspiration precaution  9. Monitor H/H  10. Transfuse PRBC as needed  11. Avoid NSAID  12. DVT prophylaxis

## 2023-08-25 NOTE — PROGRESS NOTE ADULT - ASSESSMENT
Patient is a 77 female from [facility] PMHx HTN, HLD, CVA (w/ residual aphasia and R sided hemiparesis/plegia) who was sent to the hospital due to altered mental status. Patient is being admitted to medicine for further work up and rule out stroke vs encephalopathy (metabolic vs infectious).  Patient is a 77 female from [facility] PMHx HTN, HLD, CVA (w/ residual aphasia and R sided hemiparesis/plegia) who was sent to the hospital due to altered mental status. Patient is being admitted to medicine for further work up and rule out stroke vs encephalopathy (metabolic vs infectious).  Patient is a 77 female from [facility] PMHx HTN, HLD, CVA (w/ residual aphasia and R sided hemiparesis/plegia) who was sent to the hospital due to altered mental status. Patient is being admitted to medicine for further work up and rule out stroke vs encephalopathy (metabolic vs infectious).

## 2023-08-25 NOTE — PROGRESS NOTE ADULT - SUBJECTIVE AND OBJECTIVE BOX
follow up on:  complex medical decision making related to goals of care    [facility] Consult [facility]:  Naye Salgado DO: cell ([phone])  Mark Crowe MD: cell ([phone])  Nadir Masters NP: cell ([phone])   Alexx Hi LMSW: cell ([phone])   Beena Chandler NP: via [employer]    Can contact any Palliative Team member via [employer] for consults and questions      OVERNIGHT EVENTS: Seen at the bedside, awake/alert following simple commands, family present at the bedside. Trialed with puree via teaspoon and tolerating no signs of cough/breathing pattern changes.     Present Symptoms: Mild, Moderate, Severe  Pain:             Location -                               Aggravating factors -             Quality -             Radiation -             Timing-             Severity (0-10 scale):             Minimal acceptable level (0-10 scale):  Fatigue:  Nausea:  Lack of Appetite:   SOB:  Depression:  Anxiety:  Review of Systems: [All others negative or Unable to obtain due to poor mentation]    CPOT:    https://www.sccm.org/getattachment/[email or web address]/Critical-Care-Pain-Observation-Tool-(CPOT)  PAIN AD Score:   [email or web address] (press ctrl +  left click to view)MEDICATIONS  (STANDING):  cefepime   IVPB 1000 milliGRAM(s) IV Intermittent every 24 hours  cefepime   IVPB      cloNIDine Patch 0.1 mG/24Hr(s) 1 patch Transdermal every 7 days  dextrose 5%. 1000 milliLiter(s) (40 mL/Hr) IV Continuous <Continuous>  valproate sodium  IVPB 500 milliGRAM(s) IV Intermittent every 8 hours    MEDICATIONS  (PRN):      PHYSICAL EXAM:  Vital Signs Last 24 Hrs  T(C): 37 (25 Aug 2023 13:07), Max: 37 (25 Aug 2023 13:07)  T(F): 98.6 (25 Aug 2023 13:07), Max: 98.6 (25 Aug 2023 13:07)  HR: 76 (25 Aug 2023 13:07) (76 - 89)  BP: 119/100 (25 Aug 2023 13:07) (119/100 - 158/75)  BP(mean): --  RR: 17 (25 Aug 2023 13:07) (17 - 18)  SpO2: 99% (25 Aug 2023 13:07) (98% - 100%)    Parameters below as of 25 Aug 2023 13:07  Patient On (Oxygen Delivery Method): room air        General: alert  oriented x ____    lethargic distressed cachexia  verbal nonverbal  unarousable     Palliative Performance Scale/Karnofsky Score:  http://npcrc.org/files/news/palliative_performance_scale_ppsv2.pdf    HEENT: no abnormal lesion, dry mouth  ET tube/trach oral lesions:  Lungs: tachypnea/labored breathing, audible excessive secretions  CV: RRR, S1S2, tachycardia  GI: soft non distended non tender  incontinent               PEG/NG/OG tube  constipation  last BM:   : incontinent  oliguria/anuria  mcclellan  Musculoskeletal: weakness x4 edema x4    ambulatory with assistance   mostly/fully bedbound/wheelchair bound  Skin: no abnormal skin lesions, poor skin turgor, pressure ulcers stage:   Neuro: no deficits, mild cognitive impairment dsyphagia/dysarthria paresis  Oral intake ability: unable/only mouth care, minimal moderate full capability    LABS:                          9.5    22.16 )-----------( 235      ( 24 Aug 2023 12:15 )             29.0     08-24    147<H>  |  120<H>  |  43<H>  ----------------------------<  80  3.8   |  17<L>  |  1.72<H>    Ca    7.2<L>      24 Aug 2023 06:02  Phos  4.6     08-24  Mg     2.3     08-24    TPro  5.3<L>  /  Alb  1.6<L>  /  TBili  0.3  /  DBili  x   /  AST  21  /  ALT  16  /  AlkPhos  98  08-24    Urinalysis Basic - ( 24 Aug 2023 06:02 )    Color: x / Appearance: x / SG: x / pH: x  Gluc: 80 mg/dL / Ketone: x  / Bili: x / Urobili: x   Blood: x / Protein: x / Nitrite: x   Leuk Esterase: x / RBC: x / WBC x   Sq Epi: x / Non Sq Epi: x / Bacteria: x        RADIOLOGY & ADDITIONAL STUDIES: follow up on:  complex medical decision making related to goals of care    [facility] Palliative Consult Service:  Naye Salgado DO: cell ([phone])  Mark Crowe MD: cell ([phone])  Nadir Masters NP: cell ([phone])   Alexx Hi LMSW: cell ([phone])   Beena Chandler NP: via [employer] Teams    Can contact any Palliative Team member via [employer] Teams for consults and questions      OVERNIGHT EVENTS: Seen at the bedside, awake/alert following simple commands, family present at the bedside. Trialed with puree via teaspoon and tolerating no signs of cough/breathing pattern changes.     Present Symptoms: Mild, Moderate, Severe  Pain:             Location -                               Aggravating factors -             Quality -             Radiation -             Timing-             Severity (0-10 scale):             Minimal acceptable level (0-10 scale):  Fatigue:  Nausea:  Lack of Appetite:   SOB:  Depression:  Anxiety:  Review of Systems: [All others negative or Unable to obtain due to poor mentation]    CPOT:    https://www.sccm.org/getattachment/[email or web address]/Critical-Care-Pain-Observation-Tool-(CPOT)  PAIN AD Score:   [email or web address] (press ctrl +  left click to view)MEDICATIONS  (STANDING):  cefepime   IVPB 1000 milliGRAM(s) IV Intermittent every 24 hours  cefepime   IVPB      cloNIDine Patch 0.1 mG/24Hr(s) 1 patch Transdermal every 7 days  dextrose 5%. 1000 milliLiter(s) (40 mL/Hr) IV Continuous <Continuous>  valproate sodium  IVPB 500 milliGRAM(s) IV Intermittent every 8 hours    MEDICATIONS  (PRN):      PHYSICAL EXAM:  Vital Signs Last 24 Hrs  T(C): 37 (25 Aug 2023 13:07), Max: 37 (25 Aug 2023 13:07)  T(F): 98.6 (25 Aug 2023 13:07), Max: 98.6 (25 Aug 2023 13:07)  HR: 76 (25 Aug 2023 13:07) (76 - 89)  BP: 119/100 (25 Aug 2023 13:07) (119/100 - 158/75)  BP(mean): --  RR: 17 (25 Aug 2023 13:07) (17 - 18)  SpO2: 99% (25 Aug 2023 13:07) (98% - 100%)    Parameters below as of 25 Aug 2023 13:07  Patient On (Oxygen Delivery Method): room air        General: alert  oriented x ____    lethargic distressed cachexia  verbal nonverbal  unarousable     Palliative Performance Scale/Karnofsky Score:  http://npcrc.org/files/news/palliative_performance_scale_ppsv2.pdf    HEENT: no abnormal lesion, dry mouth  ET tube/trach oral lesions:  Lungs: tachypnea/labored breathing, audible excessive secretions  CV: RRR, S1S2, tachycardia  GI: soft non distended non tender  incontinent               PEG/NG/OG tube  constipation  last BM:   : incontinent  oliguria/anuria  mcclellan  Musculoskeletal: weakness x4 edema x4    ambulatory with assistance   mostly/fully bedbound/wheelchair bound  Skin: no abnormal skin lesions, poor skin turgor, pressure ulcers stage:   Neuro: no deficits, mild cognitive impairment dsyphagia/dysarthria paresis  Oral intake ability: unable/only mouth care, minimal moderate full capability    LABS:                          9.5    22.16 )-----------( 235      ( 24 Aug 2023 12:15 )             29.0     08-24    147<H>  |  120<H>  |  43<H>  ----------------------------<  80  3.8   |  17<L>  |  1.72<H>    Ca    7.2<L>      24 Aug 2023 06:02  Phos  4.6     08-24  Mg     2.3     08-24    TPro  5.3<L>  /  Alb  1.6<L>  /  TBili  0.3  /  DBili  x   /  AST  21  /  ALT  16  /  AlkPhos  98  08-24    Urinalysis Basic - ( 24 Aug 2023 06:02 )    Color: x / Appearance: x / SG: x / pH: x  Gluc: 80 mg/dL / Ketone: x  / Bili: x / Urobili: x   Blood: x / Protein: x / Nitrite: x   Leuk Esterase: x / RBC: x / WBC x   Sq Epi: x / Non Sq Epi: x / Bacteria: x        RADIOLOGY & ADDITIONAL STUDIES: follow up on:  complex medical decision making related to goals of care    [facility] and Palliative Consult Service:  Naye Salgado DO: cell ([phone])  Mark Crowe MD: cell ([phone])  Nadir Masters NP: cell ([phone])   Alexx Hi LMSW: cell ([phone])   Beena Chandler NP: via [employer] Teams    Can contact any Palliative Team member via [employer] Teams for consults and questions      OVERNIGHT EVENTS: Seen at the bedside, awake/alert following simple commands, family present at the bedside. Trialed with puree via teaspoon and tolerating no signs of cough/breathing pattern changes.     Present Symptoms: Mild, Moderate, Severe  Pain:             Location -                               Aggravating factors -             Quality -             Radiation -             Timing-             Severity (0-10 scale):             Minimal acceptable level (0-10 scale):  Fatigue:  Nausea:  Lack of Appetite:   SOB:  Depression:  Anxiety:  Review of Systems: [All others negative or Unable to obtain due to poor mentation]    CPOT:    https://www.sccm.org/getattachment/[email or web address]-[email or web address]-0769m8964c5j/Critical-Care-Pain-Observation-Tool-(CPOT)  PAIN AD Score:   [email or web address] (press ctrl +  left click to view)MEDICATIONS  (STANDING):  cefepime   IVPB 1000 milliGRAM(s) IV Intermittent every 24 hours  cefepime   IVPB      cloNIDine Patch 0.1 mG/24Hr(s) 1 patch Transdermal every 7 days  dextrose 5%. 1000 milliLiter(s) (40 mL/Hr) IV Continuous <Continuous>  valproate sodium  IVPB 500 milliGRAM(s) IV Intermittent every 8 hours    MEDICATIONS  (PRN):      PHYSICAL EXAM:  Vital Signs Last 24 Hrs  T(C): 37 (25 Aug 2023 13:07), Max: 37 (25 Aug 2023 13:07)  T(F): 98.6 (25 Aug 2023 13:07), Max: 98.6 (25 Aug 2023 13:07)  HR: 76 (25 Aug 2023 13:07) (76 - 89)  BP: 119/100 (25 Aug 2023 13:07) (119/100 - 158/75)  BP(mean): --  RR: 17 (25 Aug 2023 13:07) (17 - 18)  SpO2: 99% (25 Aug 2023 13:07) (98% - 100%)    Parameters below as of 25 Aug 2023 13:07  Patient On (Oxygen Delivery Method): room air        General: alert  oriented x ____    lethargic distressed cachexia  verbal nonverbal  unarousable     Palliative Performance Scale/Karnofsky Score:  http://npcrc.org/files/news/palliative_performance_scale_ppsv2.pdf    HEENT: no abnormal lesion, dry mouth  ET tube/trach oral lesions:  Lungs: tachypnea/labored breathing, audible excessive secretions  CV: RRR, S1S2, tachycardia  GI: soft non distended non tender  incontinent               PEG/NG/OG tube  constipation  last BM:   : incontinent  oliguria/anuria  mcclellan  Musculoskeletal: weakness x4 edema x4    ambulatory with assistance   mostly/fully bedbound/wheelchair bound  Skin: no abnormal skin lesions, poor skin turgor, pressure ulcers stage:   Neuro: no deficits, mild cognitive impairment dsyphagia/dysarthria paresis  Oral intake ability: unable/only mouth care, minimal moderate full capability    LABS:                          9.5    22.16 )-----------( 235      ( 24 Aug 2023 12:15 )             29.0     08-24    147<H>  |  120<H>  |  43<H>  ----------------------------<  80  3.8   |  17<L>  |  1.72<H>    Ca    7.2<L>      24 Aug 2023 06:02  Phos  4.6     08-24  Mg     2.3     08-24    TPro  5.3<L>  /  Alb  1.6<L>  /  TBili  0.3  /  DBili  x   /  AST  21  /  ALT  16  /  AlkPhos  98  08-24    Urinalysis Basic - ( 24 Aug 2023 06:02 )    Color: x / Appearance: x / SG: x / pH: x  Gluc: 80 mg/dL / Ketone: x  / Bili: x / Urobili: x   Blood: x / Protein: x / Nitrite: x   Leuk Esterase: x / RBC: x / WBC x   Sq Epi: x / Non Sq Epi: x / Bacteria: x        RADIOLOGY & ADDITIONAL STUDIES: follow up on:  complex medical decision making related to goals of care    [facility] Palliative Consult Service:  Naye Salgado DO: cell ([phone])  Mark Crowe MD: cell ([phone])  Nadir Masters NP: cell ([phone])   Alexx Hi LMSW: cell ([phone])   Beena Chandler NP: via [employer] Teams    Can contact any Palliative Team member via [employer] Teams for consults and questions      OVERNIGHT EVENTS: Seen at the bedside, awake/alert following simple commands, family present at the bedside. Trailed with puree via teaspoon and tolerating no signs of cough/breathing pattern changes.     Present Symptoms: Mild, Moderate, Severe  Pain:             Location -                               Aggravating factors -             Quality -             Radiation -             Timing-             Severity (0-10 scale):             Minimal acceptable level (0-10 scale):  Fatigue:  Nausea:  Lack of Appetite:   SOB:  Depression:  Anxiety:  Review of Systems: [All others negative or Unable to obtain due to poor mentation]    CPOT:    https://www.sccm.org/getattachment/[email or web address]-7y4i-7192z3913v9c/Critical-Care-Pain-Observation-Tool-(CPOT)  PAIN AD Score:   [email or web address].[email or web address]/[email or web address]/[email or web address] (press ctrl +  left click to view)MEDICATIONS  (STANDING):  cefepime   IVPB 1000 milliGRAM(s) IV Intermittent every 24 hours  cefepime   IVPB      cloNIDine Patch 0.1 mG/24Hr(s) 1 patch Transdermal every 7 days  dextrose 5%. 1000 milliLiter(s) (40 mL/Hr) IV Continuous <Continuous>  valproate sodium  IVPB 500 milliGRAM(s) IV Intermittent every 8 hours    MEDICATIONS  (PRN):      PHYSICAL EXAM:  Vital Signs Last 24 Hrs  T(C): 37 (25 Aug 2023 13:07), Max: 37 (25 Aug 2023 13:07)  T(F): 98.6 (25 Aug 2023 13:07), Max: 98.6 (25 Aug 2023 13:07)  HR: 76 (25 Aug 2023 13:07) (76 - 89)  BP: 119/100 (25 Aug 2023 13:07) (119/100 - 158/75)  BP(mean): --  RR: 17 (25 Aug 2023 13:07) (17 - 18)  SpO2: 99% (25 Aug 2023 13:07) (98% - 100%)    Parameters below as of 25 Aug 2023 13:07  Patient On (Oxygen Delivery Method): room air        General: alert  oriented x 1   awake tracking     Palliative Performance Scale/Karnofsky Score: 20%  http://npcrc.org/files/news/palliative_performance_scale_ppsv2.pdf    HEENT: no abnormal lesion, mmm  Lungs: nonlabor in RA  CV: RRR, S1S2  GI: soft non distended non tender  incontinent midline abdominal incision +staples c/d/i, G-Tube LUQ +sutures intact to bag with scant serosanguinous drainage  : incontinent  Musculoskeletal: weakness x4 edema x4 (anasarca)   fully bedbound  Skin: no abnormal skin lesions, poor skin turgor  Neuro: severe cognitive impairment, alert, nonverbal, functional quadriplegia, opens mouth on command  Oral intake ability: minimal capability, tolerating puree and moderately thickened liquids via teaspoon, no cough, no breathing changes, no residue    LABS:                          9.5    22.16 )-----------( 235      ( 24 Aug 2023 12:15 )             29.0     08-24    147<H>  |  120<H>  |  43<H>  ----------------------------<  80  3.8   |  17<L>  |  1.72<H>    Ca    7.2<L>      24 Aug 2023 06:02  Phos  4.6     08-24  Mg     2.3     08-24    TPro  5.3<L>  /  Alb  1.6<L>  /  TBili  0.3  /  DBili  x   /  AST  21  /  ALT  16  /  AlkPhos  98  08-24    Urinalysis Basic - ( 24 Aug 2023 06:02 )    Color: x / Appearance: x / SG: x / pH: x  Gluc: 80 mg/dL / Ketone: x  / Bili: x / Urobili: x   Blood: x / Protein: x / Nitrite: x   Leuk Esterase: x / RBC: x / WBC x   Sq Epi: x / Non Sq Epi: x / Bacteria: x        RADIOLOGY & ADDITIONAL STUDIES: follow up on:  complex medical decision making related to goals of care    [facility] Ger[facility] and Palliative Consult Service:  Naye Salgado DO: cell ([phone])  Mark Crowe MD: cell ([phone])  Nadir Masters NP: cell ([phone])   Alexx Hi LMSW: cell ([phone])   Beena Chandler NP: via [employer] Teams    Can contact any Palliative Team member via [employer] Teams for consults and questions      OVERNIGHT EVENTS: Seen at the bedside, awake/alert following simple commands, family present at the bedside. Trailed with puree via teaspoon and tolerating no signs of cough/breathing pattern changes.     Present Symptoms: Mild, Moderate, Severe  Pain:             Location -                               Aggravating factors -             Quality -             Radiation -             Timing-             Severity (0-10 scale):             Minimal acceptable level (0-10 scale):  Fatigue:  Nausea:  Lack of Appetite:   SOB:  Depression:  Anxiety:  Review of Systems: [All others negative or Unable to obtain due to poor mentation]    CPOT:    https://www.sccm.org/getattachment/[email or web address]-[email or web address]-2584h2762r9i/Critical-Care-Pain-Observation-Tool-(CPOT)  PAIN AD Score:   [email or web address]://[email or web address]/[email or web address] (press ctrl +  left click to view)MEDICATIONS  (STANDING):  cefepime   IVPB 1000 milliGRAM(s) IV Intermittent every 24 hours  cefepime   IVPB      cloNIDine Patch 0.1 mG/24Hr(s) 1 patch Transdermal every 7 days  dextrose 5%. 1000 milliLiter(s) (40 mL/Hr) IV Continuous <Continuous>  valproate sodium  IVPB 500 milliGRAM(s) IV Intermittent every 8 hours    MEDICATIONS  (PRN):      PHYSICAL EXAM:  Vital Signs Last 24 Hrs  T(C): 37 (25 Aug 2023 13:07), Max: 37 (25 Aug 2023 13:07)  T(F): 98.6 (25 Aug 2023 13:07), Max: 98.6 (25 Aug 2023 13:07)  HR: 76 (25 Aug 2023 13:07) (76 - 89)  BP: 119/100 (25 Aug 2023 13:07) (119/100 - 158/75)  BP(mean): --  RR: 17 (25 Aug 2023 13:07) (17 - 18)  SpO2: 99% (25 Aug 2023 13:07) (98% - 100%)    Parameters below as of 25 Aug 2023 13:07  Patient On (Oxygen Delivery Method): room air        General: alert  oriented x 1   awake tracking     Palliative Performance Scale/Karnofsky Score: 20%  http://npcrc.org/files/news/palliative_performance_scale_ppsv2.pdf    HEENT: no abnormal lesion, mmm  Lungs: nonlabor in RA  CV: RRR, S1S2  GI: soft non distended non tender  incontinent midline abdominal incision +staples c/d/i, G-Tube LUQ +sutures intact to bag with scant serosanguinous drainage  : incontinent  Musculoskeletal: weakness x4 edema x4 (anasarca)   fully bedbound  Skin: no abnormal skin lesions, poor skin turgor  Neuro: severe cognitive impairment, alert, nonverbal, functional quadriplegia, opens mouth on command  Oral intake ability: minimal capability, tolerating puree and moderately thickened liquids via teaspoon, no cough, no breathing changes, no residue    LABS:                          9.5    22.16 )-----------( 235      ( 24 Aug 2023 12:15 )             29.0     08-24    147<H>  |  120<H>  |  43<H>  ----------------------------<  80  3.8   |  17<L>  |  1.72<H>    Ca    7.2<L>      24 Aug 2023 06:02  Phos  4.6     08-24  Mg     2.3     08-24    TPro  5.3<L>  /  Alb  1.6<L>  /  TBili  0.3  /  DBili  x   /  AST  21  /  ALT  16  /  AlkPhos  98  08-24    Urinalysis Basic - ( 24 Aug 2023 06:02 )    Color: x / Appearance: x / SG: x / pH: x  Gluc: 80 mg/dL / Ketone: x  / Bili: x / Urobili: x   Blood: x / Protein: x / Nitrite: x   Leuk Esterase: x / RBC: x / WBC x   Sq Epi: x / Non Sq Epi: x / Bacteria: x        RADIOLOGY & ADDITIONAL STUDIES: follow up on:  complex medical decision making related to goals of care    [facility] Geriatric and Palliative Consult Service:  Naye Salgado DO: cell ([phone]  Mark Crowe MD: cell ([phone])  Nadir Masters NP: cell ([phone])   Alexx Hi LMSW: cell ([phone])   Beena Chandler NP: via [employer] Teams    Can contact any Palliative Team member via [employer] Teams for consults and questions      OVERNIGHT EVENTS: Seen at the bedside, awake/alert following simple commands, family present at the bedside. Trailed with puree via teaspoon and tolerating no signs of cough/breathing pattern changes.     Present Symptoms: Mild, Moderate, Severe  Pain:             Location -                               Aggravating factors -             Quality -             Radiation -             Timing-             Severity (0-10 scale):             Minimal acceptable level (0-10 scale):  Fatigue:  Nausea:  Lack of Appetite:   SOB:  Depression:  Anxiety:  Review of Systems: [All others negative or Unable to obtain due to poor mentation]    CPOT:    https://www.sccm.org/getattachment/[email or web address]-[email or web address]/Critical-Care-Pain-Observation-Tool-(CPOT)  PAIN AD Score:   [email or web address] (press ctrl +  left click to view)MEDICATIONS  (STANDING):  cefepime   IVPB 1000 milliGRAM(s) IV Intermittent every 24 hours  cefepime   IVPB      cloNIDine Patch 0.1 mG/24Hr(s) 1 patch Transdermal every 7 days  dextrose 5%. 1000 milliLiter(s) (40 mL/Hr) IV Continuous <Continuous>  valproate sodium  IVPB 500 milliGRAM(s) IV Intermittent every 8 hours    MEDICATIONS  (PRN):      PHYSICAL EXAM:  Vital Signs Last 24 Hrs  T(C): 37 (25 Aug 2023 13:07), Max: 37 (25 Aug 2023 13:07)  T(F): 98.6 (25 Aug 2023 13:07), Max: 98.6 (25 Aug 2023 13:07)  HR: 76 (25 Aug 2023 13:07) (76 - 89)  BP: 119/100 (25 Aug 2023 13:07) (119/100 - 158/75)  BP(mean): --  RR: 17 (25 Aug 2023 13:07) (17 - 18)  SpO2: 99% (25 Aug 2023 13:07) (98% - 100%)    Parameters below as of 25 Aug 2023 13:07  Patient On (Oxygen Delivery Method): room air        General: alert  oriented x 1   awake tracking     Palliative Performance Scale/Karnofsky Score: 20%  http://npcrc.org/files/news/palliative_performance_scale_ppsv2.pdf    HEENT: no abnormal lesion, mmm  Lungs: nonlabor in RA  CV: RRR, S1S2  GI: soft non distended non tender  incontinent midline abdominal incision +staples c/d/i, G-Tube LUQ +sutures intact to bag with scant serosanguinous drainage  : incontinent  Musculoskeletal: weakness x4 edema x4 (anasarca)   fully bedbound  Skin: no abnormal skin lesions, poor skin turgor  Neuro: severe cognitive impairment, alert, nonverbal, functional quadriplegia, opens mouth on command  Oral intake ability: minimal capability, tolerating puree and moderately thickened liquids via teaspoon, no cough, no breathing changes, no residue    LABS:                          9.5    22.16 )-----------( 235      ( 24 Aug 2023 12:15 )             29.0     08-24    147<H>  |  120<H>  |  43<H>  ----------------------------<  80  3.8   |  17<L>  |  1.72<H>    Ca    7.2<L>      24 Aug 2023 06:02  Phos  4.6     08-24  Mg     2.3     08-24    TPro  5.3<L>  /  Alb  1.6<L>  /  TBili  0.3  /  DBili  x   /  AST  21  /  ALT  16  /  AlkPhos  98  08-24    Urinalysis Basic - ( 24 Aug 2023 06:02 )    Color: x / Appearance: x / SG: x / pH: x  Gluc: 80 mg/dL / Ketone: x  / Bili: x / Urobili: x   Blood: x / Protein: x / Nitrite: x   Leuk Esterase: x / RBC: x / WBC x   Sq Epi: x / Non Sq Epi: x / Bacteria: x        RADIOLOGY & ADDITIONAL STUDIES:

## 2023-08-25 NOTE — PROGRESS NOTE ADULT - CONVERSATION DETAILS
Met with the pt's sister at the bedside, we discussed trial of puree and she was in agreement. Pt tolerated purees and moderately thickened liquids and comfort feeds diet ordered. Pt's sister aware of risk of aspiraiton and that the purpose of comfort feeds is not to meet nutritional needs and there is a risk of aspiration, but the purpose is to enhance the quality of life and to offer puree as tolerated. She verbalized understanding and appreciation.    Educated on MOLST form and plan for DC back to [facility] with hospice services. She is in agreement with DNR/I, DNH, comfort measures only, no feeding tube, no IVF, no antibiotics, no HD. Does not want any further attempts at placing feeding tube (NGT or longterm via gastrostomy). She is aware that artificial nutrition and hydration are not within the hospice plan of care. She wishes for pt to be discharged back to [facility]. She verbalized much appreciation for assistance with the goals of care. Much support provided. Met with the pt's sister at the bedside, we discussed trial of puree and she was in agreement. Pt tolerated purees and moderately thickened liquids and comfort feeds diet ordered. Pt's sister aware of risk of aspiraiton and that the purpose of comfort feeds is not to meet nutritional needs and there is a risk of aspiration, but the purpose is to enhance the quality of life and to offer puree as tolerated. She verbalized understanding and appreciation.    Educated on MOLST form and plan for DC back to [facility] with hospice services. She is in agreement with DNR/I, DNH, comfort measures only, no feeding tube, no IVF, no antibiotics, no HD. Does not want any further attempts at placing feeding tube (NGT or longterm via gastrostomy). She is aware that artificial nutrition and hydration are not within the hospice plan of care. She wishes for pt to be discharged back to [facility]. She verbalized much appreciation for assistance with the goals of care. Much support provided. Met with the pt's sister at the bedside, we discussed trial of puree and she was in agreement. Pt tolerated purees and moderately thickened liquids and comfort feeds diet ordered. Pt's sister aware of risk of aspiraiton and that the purpose of comfort feeds is not to meet nutritional needs and there is a risk of aspiration, but the purpose is to enhance the quality of life and to offer puree as tolerated. She verbalized understanding and appreciation.    Educated on MOLST form and plan for DC back to [facility] with hospice services. She is in agreement with DNR/I, DNH, comfort measures only, no feeding tube, no IVF, no antibiotics, no HD. Does not want any further attempts at placing feeding tube (NGT or longterm via gastrostomy). She is aware that artificial nutrition and hydration are not within the hospice plan of care. She wishes for pt to be discharged back to [facility]. She verbalized much appreciation for assistance with the goals of care. Much support provided.

## 2023-08-25 NOTE — PROGRESS NOTE ADULT - PROBLEM SELECTOR PLAN 8
-Sublingual carbidopa-levodopa stopped for aspiration precautions  -Crushed carbidopa-levodopa d/c while G-tube out of position

## 2023-08-26 LAB
GLUCOSE BLDC GLUCOMTR-MCNC: 145 MG/DL — HIGH (ref 70–99)
GLUCOSE BLDC GLUCOMTR-MCNC: 83 MG/DL — SIGNIFICANT CHANGE UP (ref 70–99)
GLUCOSE BLDC GLUCOMTR-MCNC: 86 MG/DL — SIGNIFICANT CHANGE UP (ref 70–99)
GLUCOSE BLDC GLUCOMTR-MCNC: 90 MG/DL — SIGNIFICANT CHANGE UP (ref 70–99)
GLUCOSE BLDC GLUCOMTR-MCNC: 92 MG/DL — SIGNIFICANT CHANGE UP (ref 70–99)

## 2023-08-26 PROCEDURE — 99231 SBSQ HOSP IP/OBS SF/LOW 25: CPT

## 2023-08-26 RX ADMIN — Medication 55 MILLIGRAM(S): at 22:01

## 2023-08-26 RX ADMIN — Medication 1 PATCH: at 19:00

## 2023-08-26 RX ADMIN — Medication 1 PATCH: at 15:13

## 2023-08-26 RX ADMIN — Medication 55 MILLIGRAM(S): at 05:59

## 2023-08-26 RX ADMIN — SODIUM CHLORIDE 40 MILLILITER(S): 9 INJECTION, SOLUTION INTRAVENOUS at 22:01

## 2023-08-26 RX ADMIN — Medication 55 MILLIGRAM(S): at 15:16

## 2023-08-26 NOTE — PROGRESS NOTE ADULT - SUBJECTIVE AND OBJECTIVE BOX
follow up on:  complex medical decision making related to goals of care    [facility] Geriatric and Palliative Consult Service:  Naye Salgado DO: cell ([phone])  Mark Crowe MD: cell [phone])  Nadir Masters NP: cell ([phone])   Alexx Hi LMSW: cell [phone])   Beena Chandler NP: via [employer] Teams    Can contact any Palliative Team member via [employer] Teams for consults and questions      OVERNIGHT EVENTS: Seen at the bedside, awake/alert following simple commands. Tolerating comfort feeds. NARD, Spo2 100% in RA.     Present Symptoms: Mild, Moderate, Severe  Pain:             Location -                               Aggravating factors -             Quality -             Radiation -             Timing-             Severity (0-10 scale):             Minimal acceptable level (0-10 scale):  Fatigue:  Nausea:  Lack of Appetite:   SOB:  Depression:  Anxiety:  Review of Systems: Unable to obtain due to poor mentation    CPOT:    https://www.[email or web address].org/getattachment/[email or web address]/Critical-Care-Pain-Observation-Tool-(CPOT)  PAIN AD Score: 0  http://[email or web address]/cog/[email or web address] (press ctrl +  left click to view)MEDICATIONS  (STANDING):  cloNIDine Patch 0.1 mG/24Hr(s) 1 patch Transdermal every 7 days  dextrose 5%. 1000 milliLiter(s) (40 mL/Hr) IV Continuous <Continuous>  valproate sodium  IVPB 500 milliGRAM(s) IV Intermittent every 8 hours    MEDICATIONS  (PRN):      PHYSICAL EXAM:  Vital Signs Last 24 Hrs  T(C): 36.3 (26 Aug 2023 13:29), Max: 37.1 (26 Aug 2023 05:10)  T(F): 97.3 (26 Aug 2023 13:29), Max: 98.7 (26 Aug 2023 05:10)  HR: 82 (26 Aug 2023 13:29) (82 - 89)  BP: 152/72 (26 Aug 2023 13:29) (143/81 - 156/79)  BP(mean): --  RR: 18 (26 Aug 2023 13:29) (16 - 18)  SpO2: 100% (26 Aug 2023 13:29) (100% - 100%)    Parameters below as of 26 Aug 2023 13:29  Patient On (Oxygen Delivery Method): room air        eneral: alert  oriented x 1   awake tracking     Palliative Performance Scale/Karnofsky Score: 20%  [email or web address]/[email or web address]_performance_scale_[email or web address].pdf    HEENT: no abnormal lesion, mmm  Lungs: CTA B/L no rales, wheze, ronchi  CV: RRR, S1S2  GI: soft non distended non tender  incontinent midline abdominal incision +staples c/d/i  : incontinent  Musculoskeletal: weakness x4 edema x4 (anasarca)   fully bedbound  Skin: no abnormal skin lesions, poor skin turgor  Neuro: severe cognitive impairment, alert, nonverbal, functional quadriplegia, opens mouth on command  Oral intake ability: minimal capability, tolerating comfort feeds    LABS:                RADIOLOGY & ADDITIONAL STUDIES: follow up on:  complex medical decision making related to goals of care    [facility] Geriatric and Palliative Consult Service:  Naye Salgado DO: cell ([phone])  Mark Crowe MD: cell ([phone])  Nadir Masters NP: cell ([phone])   Alexx Hi LMSW: cell ([phone])   Beena Chandler NP: via [employer] Teams    Can contact any Palliative Team member via [employer] Teams for consults and questions      OVERNIGHT EVENTS: Seen at the bedside, awake/alert following simple commands. Tolerating comfort feeds. NARD, Spo2 100% in RA.     Present Symptoms: Mild, Moderate, Severe  Pain:             Location -                               Aggravating factors -             Quality -             Radiation -             Timing-             Severity (0-10 scale):             Minimal acceptable level (0-10 scale):  Fatigue:  Nausea:  Lack of Appetite:   SOB:  Depression:  Anxiety:  Review of Systems: Unable to obtain due to poor mentation    CPOT:    https://www.[facility].org/getattachment/[email or web address]/Critical-Care-Pain-Observation-Tool-(CPOT)  PAIN AD Score: 0  http://[email or web address].[email or web address]/cog/[email or web address] (press ctrl +  left click to view)MEDICATIONS  (STANDING):  cloNIDine Patch 0.1 mG/24Hr(s) 1 patch Transdermal every 7 days  dextrose 5%. 1000 milliLiter(s) (40 mL/Hr) IV Continuous <Continuous>  valproate sodium  IVPB 500 milliGRAM(s) IV Intermittent every 8 hours    MEDICATIONS  (PRN):      PHYSICAL EXAM:  Vital Signs Last 24 Hrs  T(C): 36.3 (26 Aug 2023 13:29), Max: 37.1 (26 Aug 2023 05:10)  T(F): 97.3 (26 Aug 2023 13:29), Max: 98.7 (26 Aug 2023 05:10)  HR: 82 (26 Aug 2023 13:29) (82 - 89)  BP: 152/72 (26 Aug 2023 13:29) (143/81 - 156/79)  BP(mean): --  RR: 18 (26 Aug 2023 13:29) (16 - 18)  SpO2: 100% (26 Aug 2023 13:29) (100% - 100%)    Parameters below as of 26 Aug 2023 13:29  Patient On (Oxygen Delivery Method): room air        eneral: alert  oriented x 1   awake tracking     Palliative Performance Scale/Karnofsky Score: 20%  http://[email or web address].org/files/news/palliative_performance_scale_[email or web address].pdf    HEENT: no abnormal lesion, mmm  Lungs: CTA B/L no rales, wheze, ronchi  CV: RRR, S1S2  GI: soft non distended non tender  incontinent midline abdominal incision +staples c/d/i  : incontinent  Musculoskeletal: weakness x4 edema x4 (anasarca)   fully bedbound  Skin: no abnormal skin lesions, poor skin turgor  Neuro: severe cognitive impairment, alert, nonverbal, functional quadriplegia, opens mouth on command  Oral intake ability: minimal capability, tolerating comfort feeds    LABS:                RADIOLOGY & ADDITIONAL STUDIES: follow up on:  complex medical decision making related to goals of care    [facility] Geriatric and Palliative Consult Service:  Naye Salgado DO: cell ([phone])  Mark Crowe MD: cell ([phone])  Nadir Masters NP: cell ([phone])   Alexx Hi LMSW: cell ([phone])   Beena Chandler NP: via [employer] Teams    Can contact any Palliative Team member via [employer] Teams for consults and questions      OVERNIGHT EVENTS: Seen at the bedside, awake/alert following simple commands. Tolerating comfort feeds. NARD, Spo2 100% in RA.     Present Symptoms: Mild, Moderate, Severe  Pain:             Location -                               Aggravating factors -             Quality -             Radiation -             Timing-             Severity (0-10 scale):             Minimal acceptable level (0-10 scale):  Fatigue:  Nausea:  Lack of Appetite:   SOB:  Depression:  Anxiety:  Review of Systems: Unable to obtain due to poor mentation    CPOT:    https://www.[facility].org/getattachment/[email or web address]/Critical-Care-Pain-Observation-Tool-(CPOT)  PAIN AD Score: 0  http://geriatrictoolkit.[email or web address].[email or web address]/cog/[email or web address].pdf (press ctrl +  left click to view)MEDICATIONS  (STANDING):  cloNIDine Patch 0.1 mG/24Hr(s) 1 patch Transdermal every 7 days  dextrose 5%. 1000 milliLiter(s) (40 mL/Hr) IV Continuous <Continuous>  valproate sodium  IVPB 500 milliGRAM(s) IV Intermittent every 8 hours    MEDICATIONS  (PRN):      PHYSICAL EXAM:  Vital Signs Last 24 Hrs  T(C): 36.3 (26 Aug 2023 13:29), Max: 37.1 (26 Aug 2023 05:10)  T(F): 97.3 (26 Aug 2023 13:29), Max: 98.7 (26 Aug 2023 05:10)  HR: 82 (26 Aug 2023 13:29) (82 - 89)  BP: 152/72 (26 Aug 2023 13:29) (143/81 - 156/79)  BP(mean): --  RR: 18 (26 Aug 2023 13:29) (16 - 18)  SpO2: 100% (26 Aug 2023 13:29) (100% - 100%)    Parameters below as of 26 Aug 2023 13:29  Patient On (Oxygen Delivery Method): room air        eneral: alert  oriented x 1   awake tracking     Palliative Performance Scale/Karnofsky Score: 20%  [email or web address]://[email or web address]/news/palliative_performance_scale_[email or web address].pdf    HEENT: no abnormal lesion, mmm  Lungs: CTA B/L no rales, wheze, ronchi  CV: RRR, S1S2  GI: soft non distended non tender  incontinent midline abdominal incision +staples c/d/i  : incontinent  Musculoskeletal: weakness x4 edema x4 (anasarca)   fully bedbound  Skin: no abnormal skin lesions, poor skin turgor  Neuro: severe cognitive impairment, alert, nonverbal, functional quadriplegia, opens mouth on command  Oral intake ability: minimal capability, tolerating comfort feeds    LABS:                RADIOLOGY & ADDITIONAL STUDIES:

## 2023-08-26 NOTE — PROGRESS NOTE ADULT - ASSESSMENT
77 female from [facility] PMHx HTN, HLD, CVA (w/ residual aphasia and R sided hemiparesis/plegia) who was sent to the hospital due to altered mental status,UTI.  1.Palliative f/u noted, plan for hospice.  2.HTN-clonidine patch.  3.IVF.  4.GI and DVT prophylaxis.     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  1.Palliative f/u noted, plan for hospice.  2.HTN-clonidine patch.  3.IVF.  4.GI and DVT prophylaxis.     77 female from [facility] PMHx HTN, HLD, CVA (w/ residual aphasia and R sided hemiparesis/plegia) who was sent to the hospital due to altered mental status,UTI.  1.Palliative f/u noted, plan for hospice.  2.HTN-clonidine patch.  3.IVF.  4.GI and DVT prophylaxis.

## 2023-08-26 NOTE — PROGRESS NOTE ADULT - SUBJECTIVE AND OBJECTIVE BOX
Date of Service 08-26-23 @ 13:05    CHIEF COMPLAINT:Patient is a 77y old  Female who presents with a chief complaint of Altered mental status. Pt appears comfortable.    	  REVIEW OF SYSTEMS:    [x ] Unable to obtain    PHYSICAL EXAM:  T(C): 36.3 (08-26-23 @ 11:40), Max: 37.1 (08-26-23 @ 05:10)  HR: 83 (08-26-23 @ 11:40) (76 - 89)  BP: 154/86 (08-26-23 @ 11:40) (119/100 - 156/79)  RR: 18 (08-26-23 @ 11:40) (16 - 18)  SpO2: 100% (08-26-23 @ 11:40) (99% - 100%)  Wt(kg): --  I&O's Summary    26 Aug 2023 07:01  -  26 Aug 2023 13:05  --------------------------------------------------------  IN: 0 mL / OUT: 1 mL / NET: -1 mL        Appearance: Normal	  HEENT:   Normal oral mucosa, PERRL, EOMI	  Lymphatic: No lymphadenopathy  Cardiovascular: Normal S1 S2, No JVD, No murmurs, No edema  Respiratory: Lungs clear to auscultation	  Gastrointestinal:  Soft  Skin: No rashes, No ecchymoses, No cyanosis	  Extremities: Normal range of motion, No clubbing, cyanosis or edema  Vascular: Peripheral pulses palpable 2+ bilaterally    MEDICATIONS  (STANDING):  cloNIDine Patch 0.1 mG/24Hr(s) 1 patch Transdermal every 7 days  dextrose 5%. 1000 milliLiter(s) (40 mL/Hr) IV Continuous <Continuous>  valproate sodium  IVPB 500 milliGRAM(s) IV Intermittent every 8 hours        LABS:	 	        Lipid Profile: Cholesterol 152  LDL --  HDL 51  TG 93  Ldl calc 82  Ratio --    HgA1c:   TSH: Thyroid Stimulating Hormone, Serum: 3.59 uU/mL (07-28 @ 05:03)

## 2023-08-26 NOTE — PROGRESS NOTE ADULT - PROBLEM SELECTOR PLAN 4
amily in agreement with comfort care and no further attempts to place feeding tube (NGT or G-tube), now tolerating comfort feeds and diet ordered (puree and mod thickened liquids via teaspoon).  -plan for hospice at [facility]  -MEWS exempt  -no further lab draws/transfusions   -c/w IVF and IV antibiotics until DC, family aware this is not within the hospice plan of care  -plan for LTC with hospice ([facility])  -new MOLST drafted and placed on chart - DNR/I, DNH, comfort measures only, no feeding tube, no IVF, no antibiotics, no HD    Discussed with primary team. amily in agreement with comfort care and no further attempts to place feeding tube (NGT or G-tube), now tolerating comfort feeds and diet ordered (puree and mod thickened liquids via teaspoon).  -plan for hospice at [facility]  -MEWS exempt  -no further lab draws/transfusions   -c/w IVF and IV antibiotics until DC, family aware this is not within the hospice plan of care  -plan for LTC with hospice ([facility])  -new MOLST drafted and placed on chart - DNR/I, DNH, comfort measures only, no feeding tube, no IVF, no antibiotics, no HD    Discussed with primary team. amily in agreement with comfort care and no further attempts to place feeding tube (NGT or G-tube), now tolerating comfort feeds and diet ordered (puree and mod thickened liquids via teaspoon).  -plan for hospice at [facility]  -MEWS exempt  -no further lab draws/transfusions   -c/w IVF and IV antibiotics until DC, family aware this is not within the hospice plan of care  -plan for LTC with hospice ([facility])  -new MOLST drafted and placed on chart - DNR/I, DNH, comfort measures only, no feeding tube, no IVF, no antibiotics, no HD    Discussed with primary team.

## 2023-08-26 NOTE — PROGRESS NOTE ADULT - PROBLEM SELECTOR PLAN 2
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, nonverbal, bedbound, hospice appropriate  -family wishes for longterm feeding tube, however she has not tolerated repeated attempts to place longterm feeding tube (unable to place peg and now failed open gastrostomy)  -significant burdens associated with continued attempts at artificial nutrition remains high risk for morbidity and mortality despite continued attempts to place longterm feeding tube  -family now agreeable to hospice  -comfort measures only  -functional quadriplegia    Mental status waxes and wanes, at times she is awake/alert and tracking following simple commands.

## 2023-08-26 NOTE — PROGRESS NOTE ADULT - PROBLEM SELECTOR PLAN 1
-2/2 L MCA CVA, Parkinson's Disease, and dementia  -s/p unsuccessful PEG placement with GI, surgery consulted and s/p open gastrostomy 8/14 with subsequent dislodgement 8/20  -GI/IR unable to replace feeding tube, would need subsequent open gastrostomy once healed from first - pt is a poor candidate for further surgical intervention and remains high risk for morbidity and mortality this admission and repeated G-tube placement is burdensome with limited benefit  -family agreeable to hospice at [facility]  -c/w IVF while inpatient, aware this will stop on transfer to LTC with hospice    Tolerating comfort feeds -  puree and thickened liquids. Continue with aspiration precautions, pending acceptance to [facility] for LTC with hospice care. -2/2 L MCA CVA, Parkinson's Disease, and dementia  -s/p unsuccessful PEG placement with GI, surgery consulted and s/p open gastrostomy 8/14 with subsequent dislodgement 8/20  -GI/IR unable to replace feeding tube, would need subsequent open gastrostomy once healed from first - pt is a poor candidate for further surgical intervention and remains high risk for morbidity and mortality this admission and repeated G-tube placement is burdensome with limited benefit  -family agreeable to hospice at [facility]  -c/w IVF while inpatient, aware this will stop on transfer to LTC with hospice    Tolerating comfort feeds -  puree and thickened liquids. Continue with aspiration precautions, pending acceptance to [facility] for LTC with hospice care. -2/2 L MCA CVA, Parkinson's Disease, and dementia  -s/p unsuccessful PEG placement with GI, surgery consulted and s/p open gastrostomy 8/14 with subsequent dislodgement 8/20  -GI/IR unable to replace feeding tube, would need subsequent open gastrostomy once healed from first - pt is a poor candidate for further surgical intervention and remains high risk for morbidity and mortality this admission and repeated G-tube placement is burdensome with limited benefit  -family agreeable to hospice at [facility]  -c/w IVF while inpatient, aware this will stop on transfer to LTC with hospice    Tolerating comfort feeds -  puree and thickened liquids. Continue with aspiration precautions, pending acceptance to [facility] for LTC with hospice care.

## 2023-08-26 NOTE — PROGRESS NOTE ADULT - PROBLEM SELECTOR PLAN 3
n context of     Acute Illness/Injury (>7days)       Energy/Food intake <50% of estimated energy requirement >5 days alb 2.3 8/10  Weight loss: Moderate - severe (lbs lost recently)  Body Fat loss: Severe   (temporal wasting, contracted, muscle atrophy)  Muscle mass loss: moderate   Fluid Accumulation: Severe (Fluid overload, anasarca, pleural effusions)   Strength: weakened severe (bedbound)    Recommend:   pleasure feeds as tolerated - aspiration precautions, keep head of bed at least 45 degrees during feeding, careful hand-feeding, teaching to caregivers, only feed if awake/alert.     Artificial nutrition/hydration NOT WITHIN THE GOC. New MOLST drafted reflecting these wishes, DNR/I, no feeding tube, comfort measures only. Plan for hospice in LTC.

## 2023-08-26 NOTE — PROGRESS NOTE ADULT - SUBJECTIVE AND OBJECTIVE BOX
[   ] ICU                                          [   ] CCU                                      [ X  ] Medical Floor    Patient is a 77 year old female with dysphagia. GI consulted for Peg tube placement.     Patient is a 77 female from [facility] with past medical history significant for HTN, HLD, CVA (w/ residual aphasia and R sided hemiparesis/plegia) who was sent to the emergency room with for altered mental status. Patient is not able to provide any history. Patient is lying down in bed, awake and alert. However, patient does not speak, is not able to follow commands and does not turn face or move eyes when her name is called. Patient is admitted with CVA. Now patient with dysphagia, poor po intake, failure to thrive and weight loss. No abdominal pain, nausea, vomiting, hematemesis, hematochezia, melena, fever, chills, chest pain, SOB, cough, hematuria, dysuria  or diarrhea reported.      Patient is comfortable. No new complaints reported except Peg tube malfunction (leaking the feeding from it's side), No abdominal pain, N/V, hematemesis, hematochezia, melena, fever, chills, chest pain, SOB, cough or diarrhea reported.      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 1000 milliLiter(s) (70 mL/Hr) IV Continuous <Continuous>  enoxaparin Injectable 30 milliGRAM(s) SubCutaneous every 24 hours  hydrALAZINE Injectable 10 milliGRAM(s) IV Push every 6 hours  potassium chloride  10 mEq/100 mL IVPB 10 milliEquivalent(s) IV Intermittent every 1 hour  valproate sodium  IVPB 500 milliGRAM(s) IV Intermittent every 8 hours         SOCIAL HISTORY  Advanced Directives:       [ X ] Full Code       [  ] DNR  Marital Status:         [  ] M      [ X ] S      [  ] D       [  ] W  Children:       [ X ] Yes      [  ] No  Occupation:        [  ] Employed       [ X ] Unemployed       [  ] Retired  Diet:       [ X ] Regular       [  ] PEG feeding          [  ] NG tube feeding  Drug Use:           [ X ] Patient denied          [  ] Yes  Alcohol:           [ X ] No             [  ] Yes (socially)         [  ] Yes (chronic)  Tobacco:           [  ] Yes           [X  ] No      FAMILY HISTORY  [ X ] Heart Disease            [ X ] Diabetes             [ X ] HTN             [  ] Colon Cancer             [  ] Stomach Cancer              [  ] Pancreatic Cancer        VITALS  Vital Signs Last 24 Hrs  T(C): 36.8 (27 Aug 2023 05:17), Max: 36.8 (27 Aug 2023 05:17)  T(F): 98.2 (27 Aug 2023 05:17), Max: 98.2 (27 Aug 2023 05:17)  HR: 86 (27 Aug 2023 05:17) (82 - 90)  BP: 145/85 (27 Aug 2023 05:17) (143/71 - 152/72)   RR: 20 (27 Aug 2023 05:17) (18 - 20)  SpO2: 100% (27 Aug 2023 05:17) (96% - 100%)    Parameters below as of 27 Aug 2023 05:17  Patient On (Oxygen Delivery Method): room air       MEDICATIONS  (STANDING):  cloNIDine Patch 0.1 mG/24Hr(s) 1 patch Transdermal every 7 days  dextrose 5%. 1000 milliLiter(s) (40 mL/Hr) IV Continuous <Continuous>  valproate sodium  IVPB 500 milliGRAM(s) IV Intermittent every 8 hours                      [   ] ICU                                          [   ] CCU                                      [ X  ] Medical Floor    Patient is a 77 year old female with dysphagia. GI consulted for Peg tube placement.     Patient is a 77 female from [facility] with past medical history significant for HTN, HLD, CVA (w/ residual aphasia and R sided hemiparesis/plegia) who was sent to the emergency room with for altered mental status. Patient is not able to provide any history. Patient is lying down in bed, awake and alert. However, patient does not speak, is not able to follow commands and does not turn face or move eyes when her name is called. Patient is admitted with CVA. Now patient with dysphagia, poor po intake, failure to thrive and weight loss. No abdominal pain, nausea, vomiting, hematemesis, hematochezia, melena, fever, chills, chest pain, SOB, cough, hematuria, dysuria  or diarrhea reported.      Patient is comfortable. No new complaints reported except Peg tube malfunction (leaking the feeding from it's side), No abdominal pain, N/V, hematemesis, hematochezia, melena, fever, chills, chest pain, SOB, cough or diarrhea reported.      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 1000 milliLiter(s) (70 mL/Hr) IV Continuous <Continuous>  enoxaparin Injectable 30 milliGRAM(s) SubCutaneous every 24 hours  hydrALAZINE Injectable 10 milliGRAM(s) IV Push every 6 hours  potassium chloride  10 mEq/100 mL IVPB 10 milliEquivalent(s) IV Intermittent every 1 hour  valproate sodium  IVPB 500 milliGRAM(s) IV Intermittent every 8 hours         SOCIAL HISTORY  Advanced Directives:       [ X ] Full Code       [  ] DNR  Marital Status:         [  ] M      [ X ] S      [  ] D       [  ] W  Children:       [ X ] Yes      [  ] No  Occupation:        [  ] Employed       [ X ] Unemployed       [  ] Retired  Diet:       [ X ] Regular       [  ] PEG feeding          [  ] NG tube feeding  Drug Use:           [ X ] Patient denied          [  ] Yes  Alcohol:           [ X ] No             [  ] Yes (socially)         [  ] Yes (chronic)  Tobacco:           [  ] Yes           [X  ] No      FAMILY HISTORY  [ X ] Heart Disease            [ X ] Diabetes             [ X ] HTN             [  ] Colon Cancer             [  ] Stomach Cancer              [  ] Pancreatic Cancer        VITALS  Vital Signs Last 24 Hrs  T(C): 36.8 (27 Aug 2023 05:17), Max: 36.8 (27 Aug 2023 05:17)  T(F): 98.2 (27 Aug 2023 05:17), Max: 98.2 (27 Aug 2023 05:17)  HR: 86 (27 Aug 2023 05:17) (82 - 90)  BP: 145/85 (27 Aug 2023 05:17) (143/71 - 152/72)   RR: 20 (27 Aug 2023 05:17) (18 - 20)  SpO2: 100% (27 Aug 2023 05:17) (96% - 100%)    Parameters below as of 27 Aug 2023 05:17  Patient On (Oxygen Delivery Method): room air       MEDICATIONS  (STANDING):  cloNIDine Patch 0.1 mG/24Hr(s) 1 patch Transdermal every 7 days  dextrose 5%. 1000 milliLiter(s) (40 mL/Hr) IV Continuous <Continuous>  valproate sodium  IVPB 500 milliGRAM(s) IV Intermittent every 8 hours

## 2023-08-27 LAB
GLUCOSE BLDC GLUCOMTR-MCNC: 96 MG/DL — SIGNIFICANT CHANGE UP (ref 70–99)
GLUCOSE BLDC GLUCOMTR-MCNC: 99 MG/DL — SIGNIFICANT CHANGE UP (ref 70–99)

## 2023-08-27 RX ADMIN — Medication 55 MILLIGRAM(S): at 06:05

## 2023-08-27 RX ADMIN — Medication 55 MILLIGRAM(S): at 21:50

## 2023-08-27 RX ADMIN — SODIUM CHLORIDE 40 MILLILITER(S): 9 INJECTION, SOLUTION INTRAVENOUS at 21:49

## 2023-08-27 RX ADMIN — Medication 1 PATCH: at 07:58

## 2023-08-27 RX ADMIN — Medication 55 MILLIGRAM(S): at 13:54

## 2023-08-27 RX ADMIN — Medication 1 PATCH: at 19:51

## 2023-08-27 NOTE — PROGRESS NOTE ADULT - SUBJECTIVE AND OBJECTIVE BOX
PGY-1 Progress Note discussed with attending    PAGER #: [[phone]] TILL 5:00 PM  PLEASE CONTACT ON CALL TEAM:  - On Call Team (Please refer to Tyler) FROM 5:00 PM - 8:30PM  - Nightfloat Team FROM 8:30 -7:30 AM    CHIEF COMPLAINT & BRIEF HOSPITAL COURSE:      INTERVAL HPI/OVERNIGHT EVENTS:       MEDICATIONS  (STANDING):  cloNIDine Patch 0.1 mG/24Hr(s) 1 patch Transdermal every 7 days  dextrose 5%. 1000 milliLiter(s) (40 mL/Hr) IV Continuous <Continuous>  valproate sodium  IVPB 500 milliGRAM(s) IV Intermittent every 8 hours    MEDICATIONS  (PRN):      REVIEW OF SYSTEMS:  CONSTITUTIONAL: No fever, weight loss, or fatigue  RESPIRATORY: No shortness of breath  CARDIOVASCULAR: No chest pain  GASTROINTESTINAL: No abdominal pain.  GENITOURINARY: No dysuria  NEUROLOGICAL: No headaches  SKIN: No itching, burning, rashes    Vital Signs Last 24 Hrs  T(C): 36.8 (27 Aug 2023 05:17), Max: 36.8 (27 Aug 2023 05:17)  T(F): 98.2 (27 Aug 2023 05:17), Max: 98.2 (27 Aug 2023 05:17)  HR: 86 (27 Aug 2023 05:17) (82 - 90)  BP: 145/85 (27 Aug 2023 05:17) (143/71 - 154/86)  BP(mean): --  RR: 20 (27 Aug 2023 05:17) (18 - 20)  SpO2: 100% (27 Aug 2023 05:17) (96% - 100%)    Parameters below as of 27 Aug 2023 05:17  Patient On (Oxygen Delivery Method): room air        PHYSICAL EXAMINATION:  GENERAL: NAD, well built  HEAD:  Atraumatic, Normocephalic  EYES:  conjunctiva and sclera clear  CHEST/LUNG: Clear to auscultation. No rales, rhonchi, wheezing, or rubs  HEART: Regular rate and rhythm; No murmurs, rubs, or gallops  ABDOMEN: Soft, Nontender, Nondistended; Bowel sounds present  NERVOUS SYSTEM:  Alert & Oriented X3,    EXTREMITIES:  2+ Peripheral Pulses, No clubbing, cyanosis, or edema  SKIN: warm dry                      CAPILLARY BLOOD GLUCOSE      RADIOLOGY & ADDITIONAL TESTS:                   PGY-1 Progress Note discussed with attending    PAGER #: [[phone]] TILL 5:00 PM  PLEASE CONTACT ON CALL TEAM:  - On Call Team (Please refer to Tyler) FROM 5:00 PM - 8:30PM  - Nightfloat Team FROM 8:30 -7:30 AM    CHIEF COMPLAINT & BRIEF HOSPITAL COURSE:      INTERVAL HPI/OVERNIGHT EVENTS:       MEDICATIONS  (STANDING):  cloNIDine Patch 0.1 mG/24Hr(s) 1 patch Transdermal every 7 days  dextrose 5%. 1000 milliLiter(s) (40 mL/Hr) IV Continuous <Continuous>  valproate sodium  IVPB 500 milliGRAM(s) IV Intermittent every 8 hours    MEDICATIONS  (PRN):      REVIEW OF SYSTEMS:  CONSTITUTIONAL: No fever, weight loss, or fatigue  RESPIRATORY: No shortness of breath  CARDIOVASCULAR: No chest pain  GASTROINTESTINAL: No abdominal pain.  GENITOURINARY: No dysuria  NEUROLOGICAL: No headaches  SKIN: No itching, burning, rashes    Vital Signs Last 24 Hrs  T(C): 36.8 (27 Aug 2023 05:17), Max: 36.8 (27 Aug 2023 05:17)  T(F): 98.2 (27 Aug 2023 05:17), Max: 98.2 (27 Aug 2023 05:17)  HR: 86 (27 Aug 2023 05:17) (82 - 90)  BP: 145/85 (27 Aug 2023 05:17) (143/71 - 154/86)  BP(mean): --  RR: 20 (27 Aug 2023 05:17) (18 - 20)  SpO2: 100% (27 Aug 2023 05:17) (96% - 100%)    Parameters below as of 27 Aug 2023 05:17  Patient On (Oxygen Delivery Method): room air        PHYSICAL EXAMINATION:  GENERAL: NAD, well built  HEAD:  Atraumatic, Normocephalic  EYES:  conjunctiva and sclera clear  CHEST/LUNG: Clear to auscultation. No rales, rhonchi, wheezing, or rubs  HEART: Regular rate and rhythm; No murmurs, rubs, or gallops  ABDOMEN: Soft, Nontender, Nondistended; Bowel sounds present  NERVOUS SYSTEM:  Alert & Oriented X3,    EXTREMITIES:  2+ Peripheral Pulses, No clubbing, cyanosis, or edema  SKIN: warm dry                      CAPILLARY BLOOD GLUCOSE      RADIOLOGY & ADDITIONAL TESTS:                   PGY-1 Progress Note discussed with attending    PAGER #: [[phone]] TILL 5:00 PM  PLEASE CONTACT ON CALL TEAM:  - On Call Team (Please refer to Tyler) FROM 5:00 PM - 8:30PM  - Nightfloat Team FROM 8:30 -7:30 AM    CHIEF COMPLAINT & BRIEF HOSPITAL COURSE:      INTERVAL HPI/OVERNIGHT EVENTS:       MEDICATIONS  (STANDING):  cloNIDine Patch 0.1 mG/24Hr(s) 1 patch Transdermal every 7 days  dextrose 5%. 1000 milliLiter(s) (40 mL/Hr) IV Continuous <Continuous>  valproate sodium  IVPB 500 milliGRAM(s) IV Intermittent every 8 hours    MEDICATIONS  (PRN):      REVIEW OF SYSTEMS:  CONSTITUTIONAL: No fever, weight loss, or fatigue  RESPIRATORY: No shortness of breath  CARDIOVASCULAR: No chest pain  GASTROINTESTINAL: No abdominal pain.  GENITOURINARY: No dysuria  NEUROLOGICAL: No headaches  SKIN: No itching, burning, rashes    Vital Signs Last 24 Hrs  T(C): 36.8 (27 Aug 2023 05:17), Max: 36.8 (27 Aug 2023 05:17)  T(F): 98.2 (27 Aug 2023 05:17), Max: 98.2 (27 Aug 2023 05:17)  HR: 86 (27 Aug 2023 05:17) (82 - 90)  BP: 145/85 (27 Aug 2023 05:17) (143/71 - 154/86)  BP(mean): --  RR: 20 (27 Aug 2023 05:17) (18 - 20)  SpO2: 100% (27 Aug 2023 05:17) (96% - 100%)    Parameters below as of 27 Aug 2023 05:17  Patient On (Oxygen Delivery Method): room air        PHYSICAL EXAMINATION:  GENERAL: NAD, well built  HEAD:  Atraumatic, Normocephalic  EYES:  conjunctiva and sclera clear  CHEST/LUNG: Clear to auscultation. No rales, rhonchi, wheezing, or rubs  HEART: Regular rate and rhythm; No murmurs, rubs, or gallops  ABDOMEN: Soft, Nontender, Nondistended; Bowel sounds present  NERVOUS SYSTEM:  Alert & Oriented X3,    EXTREMITIES:  2+ Peripheral Pulses, No clubbing, cyanosis, or edema  SKIN: warm dry                      CAPILLARY BLOOD GLUCOSE      RADIOLOGY & ADDITIONAL TESTS:                   PGY-1 Progress Note discussed with attending    PAGER #: [[phone]] TILL 5:00 PM  PLEASE CONTACT ON CALL TEAM:  - On Call Team (Please refer to Tyler) FROM 5:00 PM - 8:30PM  - Nightfloat Team FROM 8:30 -7:30 AM    CHIEF COMPLAINT & BRIEF HOSPITAL COURSE:  Failure to thrive    INTERVAL HPI/OVERNIGHT EVENTS:   No acute events overnight.  Patient examined at bedside this AM. Awaiting hospice placement for pt. Currently on comfort measures    MEDICATIONS  (STANDING):  cloNIDine Patch 0.1 mG/24Hr(s) 1 patch Transdermal every 7 days  dextrose 5%. 1000 milliLiter(s) (40 mL/Hr) IV Continuous <Continuous>  valproate sodium  IVPB 500 milliGRAM(s) IV Intermittent every 8 hours    MEDICATIONS  (PRN):      REVIEW OF SYSTEMS: Unable to obtain as pt is nonverbal    Vital Signs Last 24 Hrs  T(C): 36.8 (27 Aug 2023 05:17), Max: 36.8 (27 Aug 2023 05:17)  T(F): 98.2 (27 Aug 2023 05:17), Max: 98.2 (27 Aug 2023 05:17)  HR: 86 (27 Aug 2023 05:17) (82 - 90)  BP: 145/85 (27 Aug 2023 05:17) (143/71 - 154/86)  BP(mean): --  RR: 20 (27 Aug 2023 05:17) (18 - 20)  SpO2: 100% (27 Aug 2023 05:17) (96% - 100%)    Parameters below as of 27 Aug 2023 05:17  Patient On (Oxygen Delivery Method): room air        PHYSICAL EXAMINATION:  GENERAL: NAD, well built  HEAD:  Atraumatic, Normocephalic  EYES:  conjunctiva and sclera clear  CHEST/LUNG: Clear to auscultation. No rales, rhonchi, wheezing, or rubs  HEART: Regular rate and rhythm; No murmurs, rubs, or gallops  ABDOMEN: +G tube out of position with tube feeds held. Soft, Nontender, Nondistended; Bowel sounds present  NERVOUS SYSTEM:  Alert & Oriented X0,    EXTREMITIES:  +Edema in 4 extremities. 2+ Peripheral Pulses, No clubbing cyanosis  SKIN: Sacral dermatitis                      CAPILLARY BLOOD GLUCOSE      RADIOLOGY & ADDITIONAL TESTS:                   PGY-1 Progress Note discussed with attending    PAGER #: [[phone]] TILL 5:00 PM  PLEASE CONTACT ON CALL TEAM:  - On Call Team (Please refer to Tyler) FROM 5:00 PM - 8:30PM  - Nightfloat Team FROM 8:30 -7:30 AM    CHIEF COMPLAINT & BRIEF HOSPITAL COURSE:  Failure to thrive    INTERVAL HPI/OVERNIGHT EVENTS:   No acute events overnight.  Patient examined at bedside this AM. Awaiting hospice placement for pt. Currently on comfort measures    MEDICATIONS  (STANDING):  cloNIDine Patch 0.1 mG/24Hr(s) 1 patch Transdermal every 7 days  dextrose 5%. 1000 milliLiter(s) (40 mL/Hr) IV Continuous <Continuous>  valproate sodium  IVPB 500 milliGRAM(s) IV Intermittent every 8 hours    MEDICATIONS  (PRN):      REVIEW OF SYSTEMS: Unable to obtain as pt is nonverbal    Vital Signs Last 24 Hrs  T(C): 36.8 (27 Aug 2023 05:17), Max: 36.8 (27 Aug 2023 05:17)  T(F): 98.2 (27 Aug 2023 05:17), Max: 98.2 (27 Aug 2023 05:17)  HR: 86 (27 Aug 2023 05:17) (82 - 90)  BP: 145/85 (27 Aug 2023 05:17) (143/71 - 154/86)  BP(mean): --  RR: 20 (27 Aug 2023 05:17) (18 - 20)  SpO2: 100% (27 Aug 2023 05:17) (96% - 100%)    Parameters below as of 27 Aug 2023 05:17  Patient On (Oxygen Delivery Method): room air        PHYSICAL EXAMINATION:  GENERAL: NAD, well built  HEAD:  Atraumatic, Normocephalic  EYES:  conjunctiva and sclera clear  CHEST/LUNG: Clear to auscultation. No rales, rhonchi, wheezing, or rubs  HEART: Regular rate and rhythm; No murmurs, rubs, or gallops  ABDOMEN: +G tube out of position with tube feeds held. Soft, Nontender, Nondistended; Bowel sounds present  NERVOUS SYSTEM:  Alert & Oriented X0,    EXTREMITIES:  +Edema in 4 extremities. 2+ Peripheral Pulses, No clubbing cyanosis  SKIN: Sacral dermatitis                      CAPILLARY BLOOD GLUCOSE      RADIOLOGY & ADDITIONAL TESTS:                   PGY-1 Progress Note discussed with attending    PAGER #: [[phone]] TILL 5:00 PM  PLEASE CONTACT ON CALL TEAM:  - On Call Team (Please refer to Tyler) FROM 5:00 PM - 8:30PM  - Nightfloat Team FROM 8:30 -7:30 AM    CHIEF COMPLAINT & BRIEF HOSPITAL COURSE:  Failure to thrive    INTERVAL HPI/OVERNIGHT EVENTS:   No acute events overnight.  Patient examined at bedside this AM. Awaiting hospice placement for pt. Currently on comfort measures    MEDICATIONS  (STANDING):  cloNIDine Patch 0.1 mG/24Hr(s) 1 patch Transdermal every 7 days  dextrose 5%. 1000 milliLiter(s) (40 mL/Hr) IV Continuous <Continuous>  valproate sodium  IVPB 500 milliGRAM(s) IV Intermittent every 8 hours    MEDICATIONS  (PRN):      REVIEW OF SYSTEMS: Unable to obtain as pt is nonverbal    Vital Signs Last 24 Hrs  T(C): 36.8 (27 Aug 2023 05:17), Max: 36.8 (27 Aug 2023 05:17)  T(F): 98.2 (27 Aug 2023 05:17), Max: 98.2 (27 Aug 2023 05:17)  HR: 86 (27 Aug 2023 05:17) (82 - 90)  BP: 145/85 (27 Aug 2023 05:17) (143/71 - 154/86)  BP(mean): --  RR: 20 (27 Aug 2023 05:17) (18 - 20)  SpO2: 100% (27 Aug 2023 05:17) (96% - 100%)    Parameters below as of 27 Aug 2023 05:17  Patient On (Oxygen Delivery Method): room air        PHYSICAL EXAMINATION:  GENERAL: NAD, well built  HEAD:  Atraumatic, Normocephalic  EYES:  conjunctiva and sclera clear  CHEST/LUNG: Clear to auscultation. No rales, rhonchi, wheezing, or rubs  HEART: Regular rate and rhythm; No murmurs, rubs, or gallops  ABDOMEN: +G tube out of position with tube feeds held. Soft, Nontender, Nondistended; Bowel sounds present  NERVOUS SYSTEM:  Alert & Oriented X0,    EXTREMITIES:  +Edema in 4 extremities. 2+ Peripheral Pulses, No clubbing cyanosis  SKIN: Sacral dermatitis                      CAPILLARY BLOOD GLUCOSE      RADIOLOGY & ADDITIONAL TESTS:

## 2023-08-27 NOTE — PROGRESS NOTE ADULT - SUBJECTIVE AND OBJECTIVE BOX
[   ] ICU                                          [   ] CCU                                      [ X  ] Medical Floor      Patient is a 77 year old female with dysphagia. GI consulted for Peg tube placement.     Patient is a 77 female from [facility] with past medical history significant for HTN, HLD, CVA (w/ residual aphasia and R sided hemiparesis/plegia) who was sent to the emergency room with for altered mental status. Patient is not able to provide any history. Patient is lying down in bed, awake and alert. However, patient does not speak, is not able to follow commands and does not turn face or move eyes when her name is called. Patient is admitted with CVA. Now patient with dysphagia, poor po intake, failure to thrive and weight loss. No abdominal pain, nausea, vomiting, hematemesis, hematochezia, melena, fever, chills, chest pain, SOB, cough, hematuria, dysuria  or diarrhea reported.      Patient is comfortable. No new complaints reported except Peg tube malfunction (leaking the feeding from it's side), No abdominal pain, N/V, hematemesis, hematochezia, melena, fever, chills, chest pain, SOB, cough or diarrhea reported.      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 1000 milliLiter(s) (70 mL/Hr) IV Continuous <Continuous>  enoxaparin Injectable 30 milliGRAM(s) SubCutaneous every 24 hours  hydrALAZINE Injectable 10 milliGRAM(s) IV Push every 6 hours  potassium chloride  10 mEq/100 mL IVPB 10 milliEquivalent(s) IV Intermittent every 1 hour  valproate sodium  IVPB 500 milliGRAM(s) IV Intermittent every 8 hours         SOCIAL HISTORY  Advanced Directives:       [ X ] Full Code       [  ] DNR  Marital Status:         [  ] M      [ X ] S      [  ] D       [  ] W  Children:       [ X ] Yes      [  ] No  Occupation:        [  ] Employed       [ X ] Unemployed       [  ] Retired  Diet:       [ X ] Regular       [  ] PEG feeding          [  ] NG tube feeding  Drug Use:           [ X ] Patient denied          [  ] Yes  Alcohol:           [ X ] No             [  ] Yes (socially)         [  ] Yes (chronic)  Tobacco:           [  ] Yes           [X  ] No      FAMILY HISTORY  [ X ] Heart Disease            [ X ] Diabetes             [ X ] HTN             [  ] Colon Cancer             [  ] Stomach Cancer              [  ] Pancreatic Cancer       MEDICATIONS  (STANDING):  cloNIDine Patch 0.1 mG/24Hr(s) 1 patch Transdermal every 7 days  dextrose 5%. 1000 milliLiter(s) (40 mL/Hr) IV Continuous <Continuous>  valproate sodium  IVPB 500 milliGRAM(s) IV Intermittent every 8 hours                        [   ] ICU                                          [   ] CCU                                      [ X  ] Medical Floor      Patient is a 77 year old female with dysphagia. GI consulted for Peg tube placement.     Patient is a 77 female from [facility] with past medical history significant for HTN, HLD, CVA (w/ residual aphasia and R sided hemiparesis/plegia) who was sent to the emergency room with for altered mental status. Patient is not able to provide any history. Patient is lying down in bed, awake and alert. However, patient does not speak, is not able to follow commands and does not turn face or move eyes when her name is called. Patient is admitted with CVA. Now patient with dysphagia, poor po intake, failure to thrive and weight loss. No abdominal pain, nausea, vomiting, hematemesis, hematochezia, melena, fever, chills, chest pain, SOB, cough, hematuria, dysuria  or diarrhea reported.      Patient is comfortable. No new complaints reported except Peg tube malfunction (leaking the feeding from it's side), No abdominal pain, N/V, hematemesis, hematochezia, melena, fever, chills, chest pain, SOB, cough or diarrhea reported.      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 1000 milliLiter(s) (70 mL/Hr) IV Continuous <Continuous>  enoxaparin Injectable 30 milliGRAM(s) SubCutaneous every 24 hours  hydrALAZINE Injectable 10 milliGRAM(s) IV Push every 6 hours  potassium chloride  10 mEq/100 mL IVPB 10 milliEquivalent(s) IV Intermittent every 1 hour  valproate sodium  IVPB 500 milliGRAM(s) IV Intermittent every 8 hours         SOCIAL HISTORY  Advanced Directives:       [ X ] Full Code       [  ] DNR  Marital Status:         [  ] M      [ X ] S      [  ] D       [  ] W  Children:       [ X ] Yes      [  ] No  Occupation:        [  ] Employed       [ X ] Unemployed       [  ] Retired  Diet:       [ X ] Regular       [  ] PEG feeding          [  ] NG tube feeding  Drug Use:           [ X ] Patient denied          [  ] Yes  Alcohol:           [ X ] No             [  ] Yes (socially)         [  ] Yes (chronic)  Tobacco:           [  ] Yes           [X  ] No      FAMILY HISTORY  [ X ] Heart Disease            [ X ] Diabetes             [ X ] HTN             [  ] Colon Cancer             [  ] Stomach Cancer              [  ] Pancreatic Cancer       MEDICATIONS  (STANDING):  cloNIDine Patch 0.1 mG/24Hr(s) 1 patch Transdermal every 7 days  dextrose 5%. 1000 milliLiter(s) (40 mL/Hr) IV Continuous <Continuous>  valproate sodium  IVPB 500 milliGRAM(s) IV Intermittent every 8 hours                        [   ] ICU                                          [   ] CCU                                      [ X  ] Medical Floor      Patient is a 77 year old female with dysphagia. GI consulted for Peg tube placement.     Patient is a 77 female from [facility] with past medical history significant for HTN, HLD, CVA (w/ residual aphasia and R sided hemiparesis/plegia) who was sent to the emergency room with for altered mental status. Patient is not able to provide any history. Patient is lying down in bed, awake and alert. However, patient does not speak, is not able to follow commands and does not turn face or move eyes when her name is called. Patient is admitted with CVA. Now patient with dysphagia, poor po intake, failure to thrive and weight loss. No abdominal pain, nausea, vomiting, hematemesis, hematochezia, melena, fever, chills, chest pain, SOB, cough, hematuria, dysuria  or diarrhea reported.      Patient is comfortable. No new complaints reported except Peg tube malfunction (leaking the feeding from it's side), No abdominal pain, N/V, hematemesis, hematochezia, melena, fever, chills, chest pain, SOB, cough or diarrhea reported.      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 1000 milliLiter(s) (70 mL/Hr) IV Continuous <Continuous>  enoxaparin Injectable 30 milliGRAM(s) SubCutaneous every 24 hours  hydrALAZINE Injectable 10 milliGRAM(s) IV Push every 6 hours  potassium chloride  10 mEq/100 mL IVPB 10 milliEquivalent(s) IV Intermittent every 1 hour  valproate sodium  IVPB 500 milliGRAM(s) IV Intermittent every 8 hours         SOCIAL HISTORY  Advanced Directives:       [ X ] Full Code       [  ] DNR  Marital Status:         [  ] M      [ X ] S      [  ] D       [  ] W  Children:       [ X ] Yes      [  ] No  Occupation:        [  ] Employed       [ X ] Unemployed       [  ] Retired  Diet:       [ X ] Regular       [  ] PEG feeding          [  ] NG tube feeding  Drug Use:           [ X ] Patient denied          [  ] Yes  Alcohol:           [ X ] No             [  ] Yes (socially)         [  ] Yes (chronic)  Tobacco:           [  ] Yes           [X  ] No      FAMILY HISTORY  [ X ] Heart Disease            [ X ] Diabetes             [ X ] HTN             [  ] Colon Cancer             [  ] Stomach Cancer              [  ] Pancreatic Cancer       MEDICATIONS  (STANDING):  cloNIDine Patch 0.1 mG/24Hr(s) 1 patch Transdermal every 7 days  dextrose 5%. 1000 milliLiter(s) (40 mL/Hr) IV Continuous <Continuous>  valproate sodium  IVPB 500 milliGRAM(s) IV Intermittent every 8 hours

## 2023-08-27 NOTE — PROGRESS NOTE ADULT - ASSESSMENT
Patient is a 77 female from [facility] PMHx HTN, HLD, CVA (w/ residual aphasia and R sided hemiparesis/plegia) who was sent to the hospital due to altered mental status. Patient is being admitted to medicine for further work up and rule out stroke vs encephalopathy (metabolic vs infectious).  Patient is a 77 female from [facility] PMHx HTN, HLD, CVA (w/ residual aphasia and R sided hemiparesis/plegia) who was sent to the hospital due to altered mental status. Patient is being admitted to medicine for further work up and rule out stroke vs encephalopathy (metabolic vs infectious).  Patient is a 77 female from [facility] PMHx HTN, HLD, CVA (w/ residual aphasia and R sided hemiparesis/plegia) who was sent to the hospital due to altered mental status. Patient is being admitted to medicine for further work up and rule out stroke vs encephalopathy (metabolic vs infectious).

## 2023-08-27 NOTE — PROGRESS NOTE ADULT - PROBLEM SELECTOR PLAN 1
Open G tube placed on 8/14  IV tylenol for pain, morphine for breakthrough pain  IV iron sucrose started 8/15 for 3 days as per nephro  G tube placement by Surgery on 8/14  LE doppler b/l showed no signs of DVT  8/20 leakage noted around site of G tube. Feedings stopped and surgery consulted  CT abdomen/pelvis showed g tube out of stomach lumen  surgery attempted to re-position manually, repeat CT A/P showed G-tube still out of stomach lumen  8/24 Discussion with palliative care, sister-Marika, and relative-Abbe  8/24 Pt family seems more inclined to go hospice route. Will follow up in the morning. Labs will not be drawn. IV fluids will continue for now  8/25 family agreed hospice is preferred route. Beginning search for accepting facilities. Comfort feeds started w/ suction PRN  Pt is on comfort feeds

## 2023-08-27 NOTE — PROGRESS NOTE ADULT - ASSESSMENT
77 female from 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  1.Palliative f/u noted, plan for hospice.  2.HTN-clonidine patch.  3.GI and DVT prophylaxis.     77 female from [facility] PMHx HTN, HLD, CVA (w/ residual aphasia and R sided hemiparesis/plegia) who was sent to the hospital due to altered mental status,UTI.  1.Palliative f/u noted, plan for hospice.  2.HTN-clonidine patch.  3.GI and DVT prophylaxis.     77 female from [facility] PMHx HTN, HLD, CVA (w/ residual aphasia and R sided hemiparesis/plegia) who was sent to the hospital due to altered mental status,UTI.  1.Palliative f/u noted, plan for hospice.  2.HTN-clonidine patch.  3.GI and DVT prophylaxis.

## 2023-08-27 NOTE — PROGRESS NOTE ADULT - SUBJECTIVE AND OBJECTIVE BOX
Date of Service 08-27-23 @ 10:30    CHIEF COMPLAINT:Patient is a 77y old  Female who presents with a chief complaint of Altered mental status.Pt appears comfortable.    	  REVIEW OF SYSTEMS:    [x ] Unable to obtain    PHYSICAL EXAM:  T(C): 36.8 (08-27-23 @ 05:17), Max: 36.8 (08-27-23 @ 05:17)  HR: 86 (08-27-23 @ 05:17) (82 - 90)  BP: 145/85 (08-27-23 @ 05:17) (143/71 - 154/86)  RR: 20 (08-27-23 @ 05:17) (18 - 20)  SpO2: 100% (08-27-23 @ 05:17) (96% - 100%)  Wt(kg): --  I&O's Summary    26 Aug 2023 07:01  -  27 Aug 2023 07:00  --------------------------------------------------------  IN: 0 mL / OUT: 1 mL / NET: -1 mL        Appearance: Normal	  HEENT:   Normal oral mucosa, PERRL, EOMI	  Lymphatic: No lymphadenopathy  Cardiovascular: Normal S1 S2, No JVD, No murmurs, No edema  Respiratory: Lungs clear to auscultation	  Gastrointestinal:  Soft, Non-tender, + BS	  Skin: No rashes, No ecchymoses, No cyanosis	  Extremities: Normal range of motion, No clubbing, cyanosis or edema  Vascular: Peripheral pulses palpable 2+ bilaterally    MEDICATIONS  (STANDING):  cloNIDine Patch 0.1 mG/24Hr(s) 1 patch Transdermal every 7 days  dextrose 5%. 1000 milliLiter(s) (40 mL/Hr) IV Continuous <Continuous>  valproate sodium  IVPB 500 milliGRAM(s) IV Intermittent every 8 hours        LABS:

## 2023-08-28 LAB
GLUCOSE BLDC GLUCOMTR-MCNC: 80 MG/DL — SIGNIFICANT CHANGE UP (ref 70–99)
GLUCOSE BLDC GLUCOMTR-MCNC: 99 MG/DL — SIGNIFICANT CHANGE UP (ref 70–99)

## 2023-08-28 RX ADMIN — Medication 1 PATCH: at 06:49

## 2023-08-28 RX ADMIN — Medication 1 PATCH: at 19:47

## 2023-08-28 RX ADMIN — Medication 55 MILLIGRAM(S): at 15:02

## 2023-08-28 RX ADMIN — Medication 55 MILLIGRAM(S): at 05:58

## 2023-08-28 RX ADMIN — Medication 55 MILLIGRAM(S): at 21:30

## 2023-08-28 NOTE — PROGRESS NOTE ADULT - PROBLEM SELECTOR PLAN 8
-Sublingual carbidopa-levodopa stopped for aspiration precautions  -Crushed carbidopa-levodopa d/c while G-tube out of position creatine rising and urine output low  raising fluids  cr downtrending

## 2023-08-28 NOTE — PROGRESS NOTE ADULT - PROBLEM SELECTOR PLAN 12
H/O seizures.   C/w Depacon.  Neurology consulted - Dr. Whitney-  > ordered EEG-potential epileptogenic factors in the left posterior temporal lobe and focal cerebral dysfunction in L  hemisphere
Lovenox. (renally dosed)
H/O seizures.   C/w Depacon.  Neurology consulted - Dr. Whitney-  > ordered EEG-potential epileptogenic factors in the left posterior temporal lobe and focal cerebral dysfunction in L  hemisphere

## 2023-08-28 NOTE — PROGRESS NOTE ADULT - PROBLEM SELECTOR PLAN 2
Pt. had bleeding from right ear  Healed altered mental status  HEAD CT: Chronic left MCA territory infarction.  CT PERFUSION demonstrated: Perfusion abnormality corresponding to the chronically infarcted left MCA territory. INFARCT CORE: 57 mL. TISSUE AT RISK: 236 mL.  CTA COW and  CTA NECK: neg   Pt can not move limbs or speak. She failed dysphagia screen and SnS

## 2023-08-28 NOTE — PROGRESS NOTE ADULT - PROBLEM SELECTOR PLAN 11
F/U Lipid profile.   Start atorvastatin when passes SnS. H/O seizures.   C/w Depacon.  Neurology consulted - Dr. Whitney-  > ordered EEG-potential epileptogenic factors in the left posterior temporal lobe and focal cerebral dysfunction in L  hemisphere

## 2023-08-28 NOTE — PROGRESS NOTE ADULT - PROBLEM SELECTOR PLAN 9
creatine rising and urine output low  raising fluids Strict I and O  -8/18 trial of void attempted  -8/20 pt now on primacath

## 2023-08-28 NOTE — PROGRESS NOTE ADULT - PROBLEM SELECTOR PLAN 5
8/17 Pt meets SIRS criteria  UA significant for elevated WBC's  Cefepime course finished P/w altered mental status   pt. is nonverbal

## 2023-08-28 NOTE — PROGRESS NOTE ADULT - PROBLEM SELECTOR PLAN 7
Cardiology consulted Dr. Dunn->  clonidine patch .2 mg weekly. -Sublingual carbidopa-levodopa stopped for aspiration precautions  -Crushed carbidopa-levodopa d/c while G-tube out of position

## 2023-08-28 NOTE — PROGRESS NOTE ADULT - ASSESSMENT
Patient is a 77 female from [facility] PMHx HTN, HLD, CVA (w/ residual aphasia and R sided hemiparesis/plegia) who was sent to the hospital due to altered mental status. Patient is being admitted to medicine for further work up and rule out stroke vs encephalopathy (metabolic vs infectious).  Patient is a 77 female from [facility] PMHx HTN, HLD, CVA (w/ residual aphasia and R sided hemiparesis/plegia) who was sent to the hospital due to altered mental status. Patient is being admitted to medicine for further work up and rule out stroke vs encephalopathy (metabolic vs infectious).  Patient is a 77 female from [facility] PMHx HTN, HLD, CVA (w/ residual aphasia and R sided hemiparesis/plegia) who was sent to the hospital due to altered mental status. Patient is being admitted to medicine for further work up and rule out stroke vs encephalopathy (metabolic vs infectious).

## 2023-08-28 NOTE — PROGRESS NOTE ADULT - PROBLEM SELECTOR PLAN 4
-f/u hepatitis panel  -f/u acetaminophen  -f/u salicyclic acid  -f/u valproate level 8/17 Pt met SIRS criteria  UA significant for elevated WBC's  Cefepime course finished

## 2023-08-28 NOTE — PROGRESS NOTE ADULT - PROBLEM SELECTOR PLAN 6
P/w altered mental status   pt. is nonverbal Cardiology consulted Dr. Dunn->  clonidine patch .2 mg weekly.

## 2023-08-28 NOTE — PROGRESS NOTE ADULT - PROBLEM SELECTOR PLAN 3
altered mental status  HEAD CT: Chronic left MCA territory infarction.  CT PERFUSION demonstrated: Perfusion abnormality corresponding to the chronically infarcted left MCA territory. INFARCT CORE: 57 mL. TISSUE AT RISK: 236 mL.  CTA COW and  CTA NECK: neg   Pt can not move limbs or speak. She failed dysphagia screen and SnS valproate level w/in normal

## 2023-08-28 NOTE — PROGRESS NOTE ADULT - ASSESSMENT
77 female from [facility] PMHx HTN, HLD, CVA (w/ residual aphasia and R sided hemiparesis/plegia) who was sent to the hospital due to altered mental status,UTI.  1.Palliative f/u noted, plan for hospice.  2.HTN-clonidine patch.  3.GI and DVT prophylaxis.     77 female from [facility] PMHx HTN, HLD, CVA (w/ residual aphasia and R sided hemiparesis/plegia) who was sent to the hospital due to altered mental status,UTI.  1.Palliative f/u noted, plan for hospice.  2.HTN-clonidine patch.  3.GI and DVT prophylaxis.     77 female from 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  1.Palliative f/u noted, plan for hospice.  2.HTN-clonidine patch.  3.GI and DVT prophylaxis.

## 2023-08-28 NOTE — PROGRESS NOTE ADULT - SUBJECTIVE AND OBJECTIVE BOX
PGY-1 Progress Note discussed with attending    PAGER #: [] TILL 5:00 PM  PLEASE CONTACT ON CALL TEAM:  - On Call Team (Please refer to Tyler) FROM 5:00 PM - 8:30PM  - Nightfloat Team FROM 8:30 -7:30 AM    CHIEF COMPLAINT & BRIEF HOSPITAL COURSE:    INTERVAL HPI/OVERNIGHT EVENTS:       REVIEW OF SYSTEMS:  CONSTITUTIONAL: No fever, weight loss, or fatigue  RESPIRATORY: No cough, wheezing, chills or hemoptysis; No shortness of breath  CARDIOVASCULAR: No chest pain, palpitations, dizziness, or leg swelling  GASTROINTESTINAL: No abdominal pain. No nausea, vomiting, or hematemesis; No diarrhea or constipation. No melena or hematochezia.  GENITOURINARY: No dysuria or hematuria, urinary frequency  NEUROLOGICAL: No headaches, memory loss, loss of strength, numbness, or tremors  SKIN: No itching, burning, rashes, or lesions     Vital Signs Last 24 Hrs  T(C): 36.4 (28 Aug 2023 13:21), Max: 36.9 (28 Aug 2023 05:28)  T(F): 97.5 (28 Aug 2023 13:21), Max: 98.4 (28 Aug 2023 05:28)  HR: 92 (28 Aug 2023 13:21) (88 - 94)  BP: 138/93 (28 Aug 2023 13:21) (138/93 - 140/72)  BP(mean): --  RR: 18 (28 Aug 2023 13:21) (18 - 18)  SpO2: 100% (28 Aug 2023 13:21) (100% - 100%)    Parameters below as of 28 Aug 2023 13:21  Patient On (Oxygen Delivery Method): room air        PHYSICAL EXAMINATION:  GENERAL: NAD  HEAD:  Atraumatic, Normocephalic  EYES:  conjunctiva and sclera clear  NECK: Supple, No JVD, Normal thyroid  CHEST/LUNG: Clear to auscultation. Clear to percussion bilaterally; No rales, rhonchi, wheezing, or rubs  HEART: Regular rate and rhythm; No murmurs, rubs, or gallops  ABDOMEN: Soft, Nontender, Nondistended; Bowel sounds present  NERVOUS SYSTEM:  Alert & Oriented X3,    EXTREMITIES:  2+ Peripheral Pulses, No clubbing, cyanosis, or edema  SKIN: warm dry                      CAPILLARY BLOOD GLUCOSE      RADIOLOGY & ADDITIONAL TESTS:                   PGY-1 Progress Note discussed with attending    PAGER #: [] TILL 5:00 PM  PLEASE CONTACT ON CALL TEAM:  - On Call Team (Please refer to Tyler) FROM 5:00 PM - 8:30PM  - Nightfloat Team FROM 8:30 -7:30 AM    CHIEF COMPLAINT & BRIEF HOSPITAL COURSE:    INTERVAL HPI/OVERNIGHT EVENTS: No acute overnight events. On bedside exam patient was sitting comfortably in bed, patient is nonverbal.      REVIEW OF SYSTEMS: unable to assess, patient is non verbal     Vital Signs Last 24 Hrs  T(C): 36.4 (28 Aug 2023 13:21), Max: 36.9 (28 Aug 2023 05:28)  T(F): 97.5 (28 Aug 2023 13:21), Max: 98.4 (28 Aug 2023 05:28)  HR: 92 (28 Aug 2023 13:21) (88 - 94)  BP: 138/93 (28 Aug 2023 13:21) (138/93 - 140/72)  RR: 18 (28 Aug 2023 13:21) (18 - 18)  SpO2: 100% (28 Aug 2023 13:21) (100% - 100%)    Parameters below as of 28 Aug 2023 13:21  Patient On (Oxygen Delivery Method): room air        PHYSICAL EXAMINATION:  GENERAL: NAD  HEAD:  Atraumatic, Normocephalic  CHEST/LUNG: Clear to auscultation.  No rales, rhonchi, wheezing, or rubs  HEART: Regular rate and rhythm; No murmurs, rubs, or gallops  ABDOMEN: Soft, Nontender, Nondistended; Bowel sounds present. G tube present. sutures present in epigastric region.  NERVOUS SYSTEM:  Alert & Oriented status not able to assess. Patient follows commands.  EXTREMITIES:  2+ Peripheral Pulses, No clubbing, cyanosis, or edema  SKIN: warm dry                      CAPILLARY BLOOD GLUCOSE      RADIOLOGY & ADDITIONAL TESTS:

## 2023-08-28 NOTE — PROGRESS NOTE ADULT - PROBLEM SELECTOR PROBLEM 12
Prophylactic measure
Seizure
Yes
Seizure

## 2023-08-28 NOTE — PROGRESS NOTE ADULT - PROBLEM SELECTOR PLAN 1
Open G tube placed on 8/14  IV tylenol for pain, morphine for breakthrough pain  IV iron sucrose started 8/15 for 3 days as per nephro  G tube placement by Surgery on 8/14  LE doppler b/l showed no signs of DVT  8/20 leakage noted around site of G tube. Feedings stopped and surgery consulted  CT abdomen/pelvis showed g tube out of stomach lumen  surgery attempted to re-position manually, repeat CT A/P showed G-tube still out of stomach lumen  8/24 Discussion with palliative care, sister-Marika, and relative-Abbe  8/24 Pt family seems more inclined to go hospice route. Will follow up in the morning. Labs will not be drawn. IV fluids will continue for now  8/25 family agreed hospice is preferred route. Beginning search for accepting facilities. Comfort feeds started w/ suction PRN  Pt is on comfort feeds Open G tube placed on 8/14  IV tylenol for pain, morphine for breakthrough pain  IV iron sucrose started 8/15 for 3 days as per nephro  G tube placement by Surgery on 8/14  LE doppler b/l showed no signs of DVT  8/20 leakage noted around site of G tube. Feedings stopped and surgery consulted  CT abdomen/pelvis showed g tube out of stomach lumen  surgery attempted to re-position manually, repeat CT A/P showed G-tube still out of stomach lumen  8/24 Discussion with palliative care, sister-Marika, and relative-Abbe  8/24 Pt family seems more inclined towards hospice.  8/25 family agreed hospice is preferred route.  Comfort feeds started w/ suction PRN  f/u surgery for removing G tube

## 2023-08-28 NOTE — PROGRESS NOTE ADULT - SUBJECTIVE AND OBJECTIVE BOX
Date of Service 08-28-23 @ 11:03    CHIEF COMPLAINT:Patient is a 77y old  Female who presents with a chief complaint of Altered mental status. Pt appears comfortable.    	  REVIEW OF SYSTEMS:    [ x] Unable to obtain    PHYSICAL EXAM:  T(C): 36.9 (08-28-23 @ 05:28), Max: 36.9 (08-28-23 @ 05:28)  HR: 94 (08-28-23 @ 05:28) (88 - 102)  BP: 140/72 (08-28-23 @ 05:28) (139/59 - 141/87)  RR: 18 (08-28-23 @ 05:28) (18 - 18)  SpO2: 100% (08-28-23 @ 05:28) (98% - 100%)  Wt(kg): --  I&O's Summary      Appearance: Normal	  HEENT:   Normal oral mucosa, PERRL, EOMI	  Lymphatic: No lymphadenopathy  Cardiovascular: Normal S1 S2, No JVD, No murmurs, No edema  Respiratory: Lungs clear to auscultation	  Gastrointestinal:  Soft, Non-tender, + BS	  Skin: No rashes, No ecchymoses, No cyanosis	  Extremities: Normal range of motion, No clubbing, cyanosis or edema  Vascular: Peripheral pulses palpable 2+ bilaterally    MEDICATIONS  (STANDING):  cloNIDine Patch 0.1 mG/24Hr(s) 1 patch Transdermal every 7 days  dextrose 5%. 1000 milliLiter(s) (40 mL/Hr) IV Continuous <Continuous>  valproate sodium  IVPB 500 milliGRAM(s) IV Intermittent every 8 hours      	  	  LABS:	 	                      none new

## 2023-08-28 NOTE — PROGRESS NOTE ADULT - PROBLEM SELECTOR PLAN 10
Strict I and O  -8/18 trial of void attempted  -8/20 pt now on primacath F/U Lipid profile.   d/c atorvastatin, comfort measures only

## 2023-08-28 NOTE — PROGRESS NOTE ADULT - SUBJECTIVE AND OBJECTIVE BOX
[   ] ICU                                          [   ] CCU                                      [  X ] Medical Floor    Patient is a 77 year old female with dysphagia. GI consulted for Peg tube placement.     Patient is a 77 female from [facility] with past medical history significant for HTN, HLD, CVA (w/ residual aphasia and R sided hemiparesis/plegia) who was sent to the emergency room with for altered mental status. Patient is not able to provide any history. Patient is lying down in bed, awake and alert. However, patient does not speak, is not able to follow commands and does not turn face or move eyes when her name is called. Patient is admitted with CVA. Now patient with dysphagia, poor po intake, failure to thrive and weight loss. No abdominal pain, nausea, vomiting, hematemesis, hematochezia, melena, fever, chills, chest pain, SOB, cough, hematuria, dysuria  or diarrhea reported.      Patient is comfortable but lethargic. No new complaints reported. No abdominal pain, N/V, hematemesis, hematochezia, melena, fever, chills, chest pain, SOB, cough or diarrhea reported.      PAIN MANAGEMENT:  Pain Scale:                 0/10  Pain Location:         PAST MEDICAL HISTORY    HTN (hypertension)    HLD (hyperlipidemia)    Cerebrovascular accident (CVA)    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    Seizure disorder    Chronic constipation        PAST SURGICAL HISTORY    No significant past surgical history        Allergies    &quot; NATURAL RUBBER&quot; (Other)  latex (Other)  penicillins (Unknown)    Intolerances  None       SOCIAL HISTORY  Advanced Directives:       [ X ] Full Code       [  ] DNR  Marital Status:         [  ] M      [ X ] S      [  ] D       [  ] W  Children:       [ X ] Yes      [  ] No  Occupation:        [  ] Employed       [ X ] Unemployed       [  ] Retired  Diet:       [ X ] Regular       [  ] PEG feeding          [  ] NG tube feeding  Drug Use:           [ X ] Patient denied          [  ] Yes  Alcohol:           [ X ] No             [  ] Yes (socially)         [  ] Yes (chronic)  Tobacco:           [  ] Yes           [X  ] No      FAMILY HISTORY  [ X ] Heart Disease            [ X ] Diabetes             [ X ] HTN             [  ] Colon Cancer             [  ] Stomach Cancer              [  ] Pancreatic Cancer        VITALS  Vital Signs Last 24 Hrs  T(C): 36.9 (28 Aug 2023 05:28), Max: 36.9 (28 Aug 2023 05:28)  T(F): 98.4 (28 Aug 2023 05:28), Max: 98.4 (28 Aug 2023 05:28)  HR: 94 (28 Aug 2023 05:28) (88 - 102)  BP: 140/72 (28 Aug 2023 05:28) (139/59 - 141/87)     RR: 18 (28 Aug 2023 05:28) (18 - 18)  SpO2: 100% (28 Aug 2023 05:28) (98% - 100%)    Parameters below as of 28 Aug 2023 05:28  Patient On (Oxygen Delivery Method): room air       MEDICATIONS  (STANDING):  cloNIDine Patch 0.1 mG/24Hr(s) 1 patch Transdermal every 7 days  dextrose 5%. 1000 milliLiter(s) (40 mL/Hr) IV Continuous <Continuous>  valproate sodium  IVPB 500 milliGRAM(s) IV Intermittent every 8 hours    MEDICATIONS  (PRN):                   [   ] ICU                                          [   ] CCU                                      [  X ] Medical Floor    Patient is a 77 year old female with dysphagia. GI consulted for Peg tube placement.     Patient is a 77 female from [facility] with past medical history significant for HTN, HLD, CVA (w/ residual aphasia and R sided hemiparesis/plegia) who was sent to the emergency room with for altered mental status. Patient is not able to provide any history. Patient is lying down in bed, awake and alert. However, patient does not speak, is not able to follow commands and does not turn face or move eyes when her name is called. Patient is admitted with CVA. Now patient with dysphagia, poor po intake, failure to thrive and weight loss. No abdominal pain, nausea, vomiting, hematemesis, hematochezia, melena, fever, chills, chest pain, SOB, cough, hematuria, dysuria  or diarrhea reported.      Patient is comfortable but lethargic. No new complaints reported. No abdominal pain, N/V, hematemesis, hematochezia, melena, fever, chills, chest pain, SOB, cough or diarrhea reported.      PAIN MANAGEMENT:  Pain Scale:                 0/10  Pain Location:         PAST MEDICAL HISTORY    HTN (hypertension)    HLD (hyperlipidemia)    Cerebrovascular accident (CVA)    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    Seizure disorder    Chronic constipation        PAST SURGICAL HISTORY    No significant past surgical history        Allergies    &quot; NATURAL RUBBER&quot; (Other)  latex (Other)  penicillins (Unknown)    Intolerances  None       SOCIAL HISTORY  Advanced Directives:       [ X ] Full Code       [  ] DNR  Marital Status:         [  ] M      [ X ] S      [  ] D       [  ] W  Children:       [ X ] Yes      [  ] No  Occupation:        [  ] Employed       [ X ] Unemployed       [  ] Retired  Diet:       [ X ] Regular       [  ] PEG feeding          [  ] NG tube feeding  Drug Use:           [ X ] Patient denied          [  ] Yes  Alcohol:           [ X ] No             [  ] Yes (socially)         [  ] Yes (chronic)  Tobacco:           [  ] Yes           [X  ] No      FAMILY HISTORY  [ X ] Heart Disease            [ X ] Diabetes             [ X ] HTN             [  ] Colon Cancer             [  ] Stomach Cancer              [  ] Pancreatic Cancer        VITALS  Vital Signs Last 24 Hrs  T(C): 36.9 (28 Aug 2023 05:28), Max: 36.9 (28 Aug 2023 05:28)  T(F): 98.4 (28 Aug 2023 05:28), Max: 98.4 (28 Aug 2023 05:28)  HR: 94 (28 Aug 2023 05:28) (88 - 102)  BP: 140/72 (28 Aug 2023 05:28) (139/59 - 141/87)     RR: 18 (28 Aug 2023 05:28) (18 - 18)  SpO2: 100% (28 Aug 2023 05:28) (98% - 100%)    Parameters below as of 28 Aug 2023 05:28  Patient On (Oxygen Delivery Method): room air       MEDICATIONS  (STANDING):  cloNIDine Patch 0.1 mG/24Hr(s) 1 patch Transdermal every 7 days  dextrose 5%. 1000 milliLiter(s) (40 mL/Hr) IV Continuous <Continuous>  valproate sodium  IVPB 500 milliGRAM(s) IV Intermittent every 8 hours    MEDICATIONS  (PRN):                   [   ] ICU                                          [   ] CCU                                      [  X ] Medical Floor    Patient is a 77 year old female with dysphagia. GI consulted for Peg tube placement.     Patient is a 77 female from [facility] with past medical history significant for HTN, HLD, CVA (w/ residual aphasia and R sided hemiparesis/plegia) who was sent to the emergency room with for altered mental status. Patient is not able to provide any history. Patient is lying down in bed, awake and alert. However, patient does not speak, is not able to follow commands and does not turn face or move eyes when her name is called. Patient is admitted with CVA. Now patient with dysphagia, poor po intake, failure to thrive and weight loss. No abdominal pain, nausea, vomiting, hematemesis, hematochezia, melena, fever, chills, chest pain, SOB, cough, hematuria, dysuria  or diarrhea reported.      Patient is comfortable but lethargic. No new complaints reported. No abdominal pain, N/V, hematemesis, hematochezia, melena, fever, chills, chest pain, SOB, cough or diarrhea reported.      PAIN MANAGEMENT:  Pain Scale:                 0/10  Pain Location:         PAST MEDICAL HISTORY    HTN (hypertension)    HLD (hyperlipidemia)    Cerebrovascular accident (CVA)    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    Seizure disorder    Chronic constipation        PAST SURGICAL HISTORY    No significant past surgical history        Allergies    &quot; NATURAL RUBBER&quot; (Other)  latex (Other)  penicillins (Unknown)    Intolerances  None       SOCIAL HISTORY  Advanced Directives:       [ X ] Full Code       [  ] DNR  Marital Status:         [  ] M      [ X ] S      [  ] D       [  ] W  Children:       [ X ] Yes      [  ] No  Occupation:        [  ] Employed       [ X ] Unemployed       [  ] Retired  Diet:       [ X ] Regular       [  ] PEG feeding          [  ] NG tube feeding  Drug Use:           [ X ] Patient denied          [  ] Yes  Alcohol:           [ X ] No             [  ] Yes (socially)         [  ] Yes (chronic)  Tobacco:           [  ] Yes           [X  ] No      FAMILY HISTORY  [ X ] Heart Disease            [ X ] Diabetes             [ X ] HTN             [  ] Colon Cancer             [  ] Stomach Cancer              [  ] Pancreatic Cancer        VITALS  Vital Signs Last 24 Hrs  T(C): 36.9 (28 Aug 2023 05:28), Max: 36.9 (28 Aug 2023 05:28)  T(F): 98.4 (28 Aug 2023 05:28), Max: 98.4 (28 Aug 2023 05:28)  HR: 94 (28 Aug 2023 05:28) (88 - 102)  BP: 140/72 (28 Aug 2023 05:28) (139/59 - 141/87)     RR: 18 (28 Aug 2023 05:28) (18 - 18)  SpO2: 100% (28 Aug 2023 05:28) (98% - 100%)    Parameters below as of 28 Aug 2023 05:28  Patient On (Oxygen Delivery Method): room air       MEDICATIONS  (STANDING):  cloNIDine Patch 0.1 mG/24Hr(s) 1 patch Transdermal every 7 days  dextrose 5%. 1000 milliLiter(s) (40 mL/Hr) IV Continuous <Continuous>  valproate sodium  IVPB 500 milliGRAM(s) IV Intermittent every 8 hours    MEDICATIONS  (PRN):

## 2023-08-28 NOTE — CHART NOTE - NSCHARTNOTEFT_GEN_A_CORE
Discussed the GOC with the pt's sister and surrogate Marika San [phone]. Again reviewed the plan for DC to [facility] with hospice care, comfort measures only, and comfort feeds. Verbalized full understanding and agreement with this plan. Discussed the GOC with the pt's sister and surrogate Marika San [phone]. Again reviewed the plan for DC to [facility] with hospice care, comfort measures only, and comfort feeds. Verbalized full understanding and agreement with this plan. Discussed the GOC with the pt's sister and surrogate Marika San [phone]. Again reviewed the plan for DC to [facility] with hospice care, comfort measures only, and comfort feeds. Verbalized full understanding and agreement with this plan.

## 2023-08-29 VITALS
HEART RATE: 103 BPM | RESPIRATION RATE: 18 BRPM | DIASTOLIC BLOOD PRESSURE: 70 MMHG | OXYGEN SATURATION: 99 % | SYSTOLIC BLOOD PRESSURE: 120 MMHG | TEMPERATURE: 97 F

## 2023-08-29 LAB
GLUCOSE BLDC GLUCOMTR-MCNC: 84 MG/DL — SIGNIFICANT CHANGE UP (ref 70–99)
GLUCOSE BLDC GLUCOMTR-MCNC: 86 MG/DL — SIGNIFICANT CHANGE UP (ref 70–99)
SARS-COV-2 RNA SPEC QL NAA+PROBE: SIGNIFICANT CHANGE UP

## 2023-08-29 PROCEDURE — 0042T: CPT | Mod: MA

## 2023-08-29 PROCEDURE — 84295 ASSAY OF SERUM SODIUM: CPT

## 2023-08-29 PROCEDURE — 82803 BLOOD GASES ANY COMBINATION: CPT

## 2023-08-29 PROCEDURE — 86900 BLOOD TYPING SEROLOGIC ABO: CPT

## 2023-08-29 PROCEDURE — 76770 US EXAM ABDO BACK WALL COMP: CPT

## 2023-08-29 PROCEDURE — 70450 CT HEAD/BRAIN W/O DYE: CPT | Mod: MA

## 2023-08-29 PROCEDURE — 82962 GLUCOSE BLOOD TEST: CPT

## 2023-08-29 PROCEDURE — 83036 HEMOGLOBIN GLYCOSYLATED A1C: CPT

## 2023-08-29 PROCEDURE — 87186 SC STD MICRODIL/AGAR DIL: CPT

## 2023-08-29 PROCEDURE — 71045 X-RAY EXAM CHEST 1 VIEW: CPT

## 2023-08-29 PROCEDURE — 84156 ASSAY OF PROTEIN URINE: CPT

## 2023-08-29 PROCEDURE — 83970 ASSAY OF PARATHORMONE: CPT

## 2023-08-29 PROCEDURE — 87635 SARS-COV-2 COVID-19 AMP PRB: CPT

## 2023-08-29 PROCEDURE — 86850 RBC ANTIBODY SCREEN: CPT

## 2023-08-29 PROCEDURE — 86901 BLOOD TYPING SEROLOGIC RH(D): CPT

## 2023-08-29 PROCEDURE — 82728 ASSAY OF FERRITIN: CPT

## 2023-08-29 PROCEDURE — 70481 CT ORBIT/EAR/FOSSA W/DYE: CPT

## 2023-08-29 PROCEDURE — 87077 CULTURE AEROBIC IDENTIFY: CPT

## 2023-08-29 PROCEDURE — 84133 ASSAY OF URINE POTASSIUM: CPT

## 2023-08-29 PROCEDURE — 84484 ASSAY OF TROPONIN QUANT: CPT

## 2023-08-29 PROCEDURE — 80061 LIPID PANEL: CPT

## 2023-08-29 PROCEDURE — 80048 BASIC METABOLIC PNL TOTAL CA: CPT

## 2023-08-29 PROCEDURE — 85027 COMPLETE CBC AUTOMATED: CPT

## 2023-08-29 PROCEDURE — 87040 BLOOD CULTURE FOR BACTERIA: CPT

## 2023-08-29 PROCEDURE — 93306 TTE W/DOPPLER COMPLETE: CPT

## 2023-08-29 PROCEDURE — 83735 ASSAY OF MAGNESIUM: CPT

## 2023-08-29 PROCEDURE — 85610 PROTHROMBIN TIME: CPT

## 2023-08-29 PROCEDURE — 36415 COLL VENOUS BLD VENIPUNCTURE: CPT

## 2023-08-29 PROCEDURE — 93005 ELECTROCARDIOGRAM TRACING: CPT

## 2023-08-29 PROCEDURE — 84443 ASSAY THYROID STIM HORMONE: CPT

## 2023-08-29 PROCEDURE — 70460 CT HEAD/BRAIN W/DYE: CPT

## 2023-08-29 PROCEDURE — 76705 ECHO EXAM OF ABDOMEN: CPT

## 2023-08-29 PROCEDURE — 0225U NFCT DS DNA&RNA 21 SARSCOV2: CPT

## 2023-08-29 PROCEDURE — 95816 EEG AWAKE AND DROWSY: CPT

## 2023-08-29 PROCEDURE — 70486 CT MAXILLOFACIAL W/O DYE: CPT

## 2023-08-29 PROCEDURE — 95957 EEG DIGITAL ANALYSIS: CPT

## 2023-08-29 PROCEDURE — 81001 URINALYSIS AUTO W/SCOPE: CPT

## 2023-08-29 PROCEDURE — 86923 COMPATIBILITY TEST ELECTRIC: CPT

## 2023-08-29 PROCEDURE — P9045: CPT

## 2023-08-29 PROCEDURE — L8699: CPT

## 2023-08-29 PROCEDURE — 85025 COMPLETE CBC W/AUTO DIFF WBC: CPT

## 2023-08-29 PROCEDURE — 70551 MRI BRAIN STEM W/O DYE: CPT

## 2023-08-29 PROCEDURE — 82330 ASSAY OF CALCIUM: CPT

## 2023-08-29 PROCEDURE — 85730 THROMBOPLASTIN TIME PARTIAL: CPT

## 2023-08-29 PROCEDURE — 70498 CT ANGIOGRAPHY NECK: CPT | Mod: MA

## 2023-08-29 PROCEDURE — 84540 ASSAY OF URINE/UREA-N: CPT

## 2023-08-29 PROCEDURE — 36430 TRANSFUSION BLD/BLD COMPNT: CPT

## 2023-08-29 PROCEDURE — 83605 ASSAY OF LACTIC ACID: CPT

## 2023-08-29 PROCEDURE — 83540 ASSAY OF IRON: CPT

## 2023-08-29 PROCEDURE — 99285 EMERGENCY DEPT VISIT HI MDM: CPT | Mod: 25

## 2023-08-29 PROCEDURE — 84100 ASSAY OF PHOSPHORUS: CPT

## 2023-08-29 PROCEDURE — 82570 ASSAY OF URINE CREATININE: CPT

## 2023-08-29 PROCEDURE — 70496 CT ANGIOGRAPHY HEAD: CPT | Mod: MA

## 2023-08-29 PROCEDURE — 82140 ASSAY OF AMMONIA: CPT

## 2023-08-29 PROCEDURE — 93970 EXTREMITY STUDY: CPT

## 2023-08-29 PROCEDURE — 84132 ASSAY OF SERUM POTASSIUM: CPT

## 2023-08-29 PROCEDURE — 83935 ASSAY OF URINE OSMOLALITY: CPT

## 2023-08-29 PROCEDURE — 84300 ASSAY OF URINE SODIUM: CPT

## 2023-08-29 PROCEDURE — 83550 IRON BINDING TEST: CPT

## 2023-08-29 PROCEDURE — 82310 ASSAY OF CALCIUM: CPT

## 2023-08-29 PROCEDURE — P9040: CPT

## 2023-08-29 PROCEDURE — 80164 ASSAY DIPROPYLACETIC ACD TOT: CPT

## 2023-08-29 PROCEDURE — 92610 EVALUATE SWALLOWING FUNCTION: CPT

## 2023-08-29 PROCEDURE — 74176 CT ABD & PELVIS W/O CONTRAST: CPT

## 2023-08-29 PROCEDURE — 87086 URINE CULTURE/COLONY COUNT: CPT

## 2023-08-29 PROCEDURE — 80053 COMPREHEN METABOLIC PANEL: CPT

## 2023-08-29 RX ORDER — POLYETHYLENE GLYCOL 3350 17 G/17G
17 POWDER, FOR SOLUTION ORAL
Qty: 1 | Refills: 0
Start: 2023-08-29 | End: 2023-09-27

## 2023-08-29 RX ORDER — ACETAMINOPHEN 500 MG
20.3 TABLET ORAL
Qty: 1827 | Refills: 0
Start: 2023-08-29 | End: 2023-09-27

## 2023-08-29 RX ADMIN — SODIUM CHLORIDE 40 MILLILITER(S): 9 INJECTION, SOLUTION INTRAVENOUS at 02:52

## 2023-08-29 RX ADMIN — Medication 55 MILLIGRAM(S): at 05:37

## 2023-08-29 RX ADMIN — Medication 55 MILLIGRAM(S): at 13:20

## 2023-08-29 RX ADMIN — Medication 1 PATCH: at 05:13

## 2023-08-29 NOTE — PROGRESS NOTE ADULT - ENMT
nose/mouth/pharynx/neck

## 2023-08-29 NOTE — PROGRESS NOTE ADULT - PROBLEM SELECTOR PLAN 1
Open G tube placed on 8/14  IV tylenol for pain, morphine for breakthrough pain  IV iron sucrose started 8/15 for 3 days as per nephro  G tube placement by Surgery on 8/14  LE doppler b/l showed no signs of DVT  8/20 leakage noted around site of G tube. Feedings stopped and surgery consulted  CT abdomen/pelvis showed g tube out of stomach lumen  surgery attempted to re-position manually, repeat CT A/P showed G-tube still out of stomach lumen  8/24 Discussion with palliative care, sister-Marika, and relative-Abbe  8/24 Pt family seems more inclined towards hospice.  8/25 family agreed hospice is preferred route.  Comfort feeds started w/ suction PRN  f/u surgery for removing G tube Open G tube placed on 8/14  IV tylenol for pain, morphine for breakthrough pain  IV iron sucrose started 8/15 for 3 days as per nephro  G tube placement by Surgery on 8/14  LE doppler b/l showed no signs of DVT  8/20 leakage noted around site of G tube. Feedings stopped and surgery consulted  CT abdomen/pelvis showed g tube out of stomach lumen  surgery attempted to re-position manually, repeat CT A/P showed G-tube still out of stomach lumen  8/24 Discussion with palliative care, sister-Marika, and relative-Abbe  8/24 Pt family seems more inclined towards hospice.  8/25 family agreed hospice is preferred route.  Comfort feeds started w/ suction PRN  G tube removed

## 2023-08-29 NOTE — PROGRESS NOTE ADULT - COMMENTS
Patient is unable to provide history

## 2023-08-29 NOTE — CHART NOTE - NSCHARTNOTESELECT_GEN_ALL_CORE
BP meds given/Event Note
Event Note
Gastroenterology/Off Service Note
Nutrition Services
Palliative NP/Event Note
Palliative care/Event Note
Bleeding RIGHT Ear/Event Note
positive UA/Event Note
Nutrition Services
CT Abd/Event Note
Event Note
Nutrition Services
Nutrition Services

## 2023-08-29 NOTE — CHART NOTE - NSCHARTNOTEFT_GEN_A_CORE
Medicine requesting removal of G tube as it is no longer necessary in care. Pt is hospice, comfort measures only, with PO comfort feeds. Confirmed with Dr. Vasquez.   Discussed with Dr. Mcadams and agrees    G tube removed at bedside. Pt tolerated well. Gauze and tegaderm used to cover site.

## 2023-08-29 NOTE — PROGRESS NOTE ADULT - CONSTITUTIONAL
no distress

## 2023-08-29 NOTE — PROGRESS NOTE ADULT - NECK
supple/symmetric/no tracheal deviation
supple/no tracheal deviation
supple/symmetric/no tracheal deviation
supple
supple/symmetric/no tracheal deviation

## 2023-08-29 NOTE — PROGRESS NOTE ADULT - SUBJECTIVE AND OBJECTIVE BOX
PGY-1 Progress Note discussed with attending    PAGER #: [] TILL 5:00 PM  PLEASE CONTACT ON CALL TEAM:  - On Call Team (Please refer to Tyler) FROM 5:00 PM - 8:30PM  - Nightfloat Team FROM 8:30 -7:30 AM    CHIEF COMPLAINT & BRIEF HOSPITAL COURSE:    INTERVAL HPI/OVERNIGHT EVENTS:       REVIEW OF SYSTEMS:  CONSTITUTIONAL: No fever, weight loss, or fatigue  RESPIRATORY: No cough, wheezing, chills or hemoptysis; No shortness of breath  CARDIOVASCULAR: No chest pain, palpitations, dizziness, or leg swelling  GASTROINTESTINAL: No abdominal pain. No nausea, vomiting, or hematemesis; No diarrhea or constipation. No melena or hematochezia.  GENITOURINARY: No dysuria or hematuria, urinary frequency  NEUROLOGICAL: No headaches, memory loss, loss of strength, numbness, or tremors  SKIN: No itching, burning, rashes, or lesions     Vital Signs Last 24 Hrs  T(C): 36.4 (29 Aug 2023 05:17), Max: 36.6 (28 Aug 2023 20:43)  T(F): 97.5 (29 Aug 2023 05:17), Max: 97.8 (28 Aug 2023 20:43)  HR: 105 (29 Aug 2023 05:17) (94 - 105)  BP: 122/53 (29 Aug 2023 05:17) (122/53 - 156/76)  BP(mean): --  RR: 18 (29 Aug 2023 05:17) (18 - 18)  SpO2: 100% (29 Aug 2023 05:17) (100% - 100%)    Parameters below as of 29 Aug 2023 05:17  Patient On (Oxygen Delivery Method): room air        PHYSICAL EXAMINATION:  GENERAL: NAD  HEAD:  Atraumatic, Normocephalic  EYES:  conjunctiva and sclera clear  NECK: Supple, No JVD, Normal thyroid  CHEST/LUNG: Clear to auscultation. Clear to percussion bilaterally; No rales, rhonchi, wheezing, or rubs  HEART: Regular rate and rhythm; No murmurs, rubs, or gallops  ABDOMEN: Soft, Nontender, Nondistended; Bowel sounds present  NERVOUS SYSTEM:  Alert & Oriented X3,    EXTREMITIES:  2+ Peripheral Pulses, No clubbing, cyanosis, or edema  SKIN: warm dry                      CAPILLARY BLOOD GLUCOSE      RADIOLOGY & ADDITIONAL TESTS:                   PGY-1 Progress Note discussed with attending    PAGER #: [] TILL 5:00 PM  PLEASE CONTACT ON CALL TEAM:  - On Call Team (Please refer to Tyler) FROM 5:00 PM - 8:30PM  - Nightfloat Team FROM 8:30 -7:30 AM    CHIEF COMPLAINT & BRIEF HOSPITAL COURSE:    INTERVAL HPI/OVERNIGHT EVENTS:   No acute overnight events. On bedside exam patient was lying comfortably in bed.      REVIEW OF SYSTEMS:  Patient non verbal.    Vital Signs Last 24 Hrs  T(C): 36.4 (29 Aug 2023 05:17), Max: 36.6 (28 Aug 2023 20:43)  T(F): 97.5 (29 Aug 2023 05:17), Max: 97.8 (28 Aug 2023 20:43)  HR: 105 (29 Aug 2023 05:17) (94 - 105)  BP: 122/53 (29 Aug 2023 05:17) (122/53 - 156/76)  BP(mean): --  RR: 18 (29 Aug 2023 05:17) (18 - 18)  SpO2: 100% (29 Aug 2023 05:17) (100% - 100%)    Parameters below as of 29 Aug 2023 05:17  Patient On (Oxygen Delivery Method): room air        PHYSICAL EXAMINATION:  GENERAL: NAD  HEAD:  Atraumatic, Normocephalic  EYES:  conjunctiva and sclera clear  NECK: Supple, No JVD, Normal thyroid  CHEST/LUNG: Clear to auscultation. No rales, rhonchi, wheezing, or rubs  HEART: Regular rate and rhythm; No murmurs, rubs, or gallops  ABDOMEN: Soft, Nontender, Nondistended; Bowel sounds present  NERVOUS SYSTEM:  Alert & Oriented X3,    EXTREMITIES:  2+ Peripheral Pulses  SKIN: warm dry                      CAPILLARY BLOOD GLUCOSE      RADIOLOGY & ADDITIONAL TESTS:

## 2023-08-29 NOTE — PROGRESS NOTE ADULT - MOUTH
normal mouth and gums/moist
normal mouth and gums/dry
normal mouth and gums/moist
normal mouth and gums/dry
normal mouth and gums/moist
normal mouth and gums/dry
normal mouth and gums/moist

## 2023-08-29 NOTE — PROGRESS NOTE ADULT - ASSESSMENT
77 female from [facility] PMHx HTN, HLD, CVA (w/ residual aphasia and R sided hemiparesis/plegia) who was sent to the hospital due to altered mental status,UTI.  1.Palliative f/u noted, plan for hospice.  2.HTN-clonidine patch.  3.GI and DVT prophylaxis.     77 female from [facility] PMHx HTN, HLD, CVA (w/ residual aphasia and R sided hemiparesis/plegia) who was sent to the hospital due to altered mental status,UTI.  1.Palliative f/u noted, plan for hospice.  2.HTN-clonidine patch.  3.GI and DVT prophylaxis.     77 female from [facility] PMHx HTN, HLD, CVA (w/ residual aphasia and R sided hemiparesis/plegia) who was sent to the hospital due to altered mental status,UTI.  1.Palliative f/u noted, plan for hospice.  2.HTN-clonidine patch.  3.GI and DVT prophylaxis.

## 2023-08-29 NOTE — PROGRESS NOTE ADULT - TONSILS
no redness/no swelling
no redness/no swelling/no discharge
no redness
no redness/no swelling
no redness/no swelling/no discharge
no redness/no swelling/no discharge
no redness/no swelling
no redness/no swelling
no redness
no redness/no swelling
no redness/no swelling/no discharge
no redness/no swelling

## 2023-08-29 NOTE — PROGRESS NOTE ADULT - REASON FOR ADMISSION
Altered mental status.

## 2023-08-29 NOTE — PROGRESS NOTE ADULT - PSYCHIATRIC
normal affect/normal behavior

## 2023-08-29 NOTE — PROGRESS NOTE ADULT - SUBJECTIVE AND OBJECTIVE BOX
[   ] ICU                                          [   ] CCU                                      [ X  ] Medical Floor    Patient is a 77 year old female with dysphagia. GI consulted for Peg tube placement.     Patient is a 77 female from [facility] with past medical history significant for HTN, HLD, CVA (w/ residual aphasia and R sided hemiparesis/plegia) who was sent to the emergency room with for altered mental status. Patient is not able to provide any history. Patient is lying down in bed, awake and alert. However, patient does not speak, is not able to follow commands and does not turn face or move eyes when her name is called. Patient is admitted with CVA. Now patient with dysphagia, poor po intake, failure to thrive and weight loss. No abdominal pain, nausea, vomiting, hematemesis, hematochezia, melena, fever, chills, chest pain, SOB, cough, hematuria, dysuria  or diarrhea reported.      Patient is comfortable but lethargic. No new complaints reported. No abdominal pain, N/V, hematemesis, hematochezia, melena, fever, chills, chest pain, SOB, cough or diarrhea reported.      PAIN MANAGEMENT:  Pain Scale:                 0/10  Pain Location:         PAST MEDICAL HISTORY    HTN (hypertension)    HLD (hyperlipidemia)    Cerebrovascular accident (CVA)    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    Seizure disorder    Chronic constipation        PAST SURGICAL HISTORY    No significant past surgical history        Allergies    &quot; NATURAL RUBBER&quot; (Other)  latex (Other)  penicillins (Unknown)    Intolerances  None       SOCIAL HISTORY  Advanced Directives:       [ X ] Full Code       [  ] DNR  Marital Status:         [  ] M      [ X ] S      [  ] D       [  ] W  Children:       [ X ] Yes      [  ] No  Occupation:        [  ] Employed       [ X ] Unemployed       [  ] Retired  Diet:       [ X ] Regular       [  ] PEG feeding          [  ] NG tube feeding  Drug Use:           [ X ] Patient denied          [  ] Yes  Alcohol:           [ X ] No             [  ] Yes (socially)         [  ] Yes (chronic)  Tobacco:           [  ] Yes           [X  ] No      FAMILY HISTORY  [ X ] Heart Disease            [ X ] Diabetes             [ X ] HTN             [  ] Colon Cancer             [  ] Stomach Cancer              [  ] Pancreatic Cancer        VITALS  Vital Signs Last 24 Hrs  T(C): 36.4 (29 Aug 2023 05:17), Max: 36.6 (28 Aug 2023 20:43)  T(F): 97.5 (29 Aug 2023 05:17), Max: 97.8 (28 Aug 2023 20:43)  HR: 105 (29 Aug 2023 05:17) (94 - 105)  BP: 122/53 (29 Aug 2023 05:17) (122/53 - 156/76)   RR: 18 (29 Aug 2023 05:17) (18 - 18)  SpO2: 100% (29 Aug 2023 05:17) (100% - 100%)  Parameters below as of 29 Aug 2023 05:17  Patient On (Oxygen Delivery Method): room air       MEDICATIONS  (STANDING):  cloNIDine Patch 0.1 mG/24Hr(s) 1 patch Transdermal every 7 days  dextrose 5%. 1000 milliLiter(s) (40 mL/Hr) IV Continuous <Continuous>  valproate sodium  IVPB 500 milliGRAM(s) IV Intermittent every 8 hours    MEDICATIONS  (PRN):                   [   ] ICU                                          [   ] CCU                                      [ X  ] Medical Floor    Patient is a 77 year old female with dysphagia. GI consulted for Peg tube placement.     Patient is a 77 female from [facility] with past medical history significant for HTN, HLD, CVA (w/ residual aphasia and R sided hemiparesis/plegia) who was sent to the emergency room with for altered mental status. Patient is not able to provide any history. Patient is lying down in bed, awake and alert. However, patient does not speak, is not able to follow commands and does not turn face or move eyes when her name is called. Patient is admitted with CVA. Now patient with dysphagia, poor po intake, failure to thrive and weight loss. No abdominal pain, nausea, vomiting, hematemesis, hematochezia, melena, fever, chills, chest pain, SOB, cough, hematuria, dysuria  or diarrhea reported.      Patient is comfortable but lethargic. No new complaints reported. No abdominal pain, N/V, hematemesis, hematochezia, melena, fever, chills, chest pain, SOB, cough or diarrhea reported.      PAIN MANAGEMENT:  Pain Scale:                 0/10  Pain Location:         PAST MEDICAL HISTORY    HTN (hypertension)    HLD (hyperlipidemia)    Cerebrovascular accident (CVA)    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    Seizure disorder    Chronic constipation        PAST SURGICAL HISTORY    No significant past surgical history        Allergies    &quot; NATURAL RUBBER&quot; (Other)  latex (Other)  penicillins (Unknown)    Intolerances  None       SOCIAL HISTORY  Advanced Directives:       [ X ] Full Code       [  ] DNR  Marital Status:         [  ] M      [ X ] S      [  ] D       [  ] W  Children:       [ X ] Yes      [  ] No  Occupation:        [  ] Employed       [ X ] Unemployed       [  ] Retired  Diet:       [ X ] Regular       [  ] PEG feeding          [  ] NG tube feeding  Drug Use:           [ X ] Patient denied          [  ] Yes  Alcohol:           [ X ] No             [  ] Yes (socially)         [  ] Yes (chronic)  Tobacco:           [  ] Yes           [X  ] No      FAMILY HISTORY  [ X ] Heart Disease            [ X ] Diabetes             [ X ] HTN             [  ] Colon Cancer             [  ] Stomach Cancer              [  ] Pancreatic Cancer        VITALS  Vital Signs Last 24 Hrs  T(C): 36.4 (29 Aug 2023 05:17), Max: 36.6 (28 Aug 2023 20:43)  T(F): 97.5 (29 Aug 2023 05:17), Max: 97.8 (28 Aug 2023 20:43)  HR: 105 (29 Aug 2023 05:17) (94 - 105)  BP: 122/53 (29 Aug 2023 05:17) (122/53 - 156/76)   RR: 18 (29 Aug 2023 05:17) (18 - 18)  SpO2: 100% (29 Aug 2023 05:17) (100% - 100%)  Parameters below as of 29 Aug 2023 05:17  Patient On (Oxygen Delivery Method): room air       MEDICATIONS  (STANDING):  cloNIDine Patch 0.1 mG/24Hr(s) 1 patch Transdermal every 7 days  dextrose 5%. 1000 milliLiter(s) (40 mL/Hr) IV Continuous <Continuous>  valproate sodium  IVPB 500 milliGRAM(s) IV Intermittent every 8 hours    MEDICATIONS  (PRN):                   [   ] ICU                                          [   ] CCU                                      [ X  ] Medical Floor    Patient is a 77 year old female with dysphagia. GI consulted for Peg tube placement.     Patient is a 77 female from [facility] with past medical history significant for HTN, HLD, CVA (w/ residual aphasia and R sided hemiparesis/plegia) who was sent to the emergency room with for altered mental status. Patient is not able to provide any history. Patient is lying down in bed, awake and alert. However, patient does not speak, is not able to follow commands and does not turn face or move eyes when her name is called. Patient is admitted with CVA. Now patient with dysphagia, poor po intake, failure to thrive and weight loss. No abdominal pain, nausea, vomiting, hematemesis, hematochezia, melena, fever, chills, chest pain, SOB, cough, hematuria, dysuria  or diarrhea reported.      Patient is comfortable but lethargic. No new complaints reported. No abdominal pain, N/V, hematemesis, hematochezia, melena, fever, chills, chest pain, SOB, cough or diarrhea reported.      PAIN MANAGEMENT:  Pain Scale:                 0/10  Pain Location:         PAST MEDICAL HISTORY    HTN (hypertension)    HLD (hyperlipidemia)    Cerebrovascular accident (CVA)    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    Seizure disorder    Chronic constipation        PAST SURGICAL HISTORY    No significant past surgical history        Allergies    &quot; NATURAL RUBBER&quot; (Other)  latex (Other)  penicillins (Unknown)    Intolerances  None       SOCIAL HISTORY  Advanced Directives:       [ X ] Full Code       [  ] DNR  Marital Status:         [  ] M      [ X ] S      [  ] D       [  ] W  Children:       [ X ] Yes      [  ] No  Occupation:        [  ] Employed       [ X ] Unemployed       [  ] Retired  Diet:       [ X ] Regular       [  ] PEG feeding          [  ] NG tube feeding  Drug Use:           [ X ] Patient denied          [  ] Yes  Alcohol:           [ X ] No             [  ] Yes (socially)         [  ] Yes (chronic)  Tobacco:           [  ] Yes           [X  ] No      FAMILY HISTORY  [ X ] Heart Disease            [ X ] Diabetes             [ X ] HTN             [  ] Colon Cancer             [  ] Stomach Cancer              [  ] Pancreatic Cancer        VITALS  Vital Signs Last 24 Hrs  T(C): 36.4 (29 Aug 2023 05:17), Max: 36.6 (28 Aug 2023 20:43)  T(F): 97.5 (29 Aug 2023 05:17), Max: 97.8 (28 Aug 2023 20:43)  HR: 105 (29 Aug 2023 05:17) (94 - 105)  BP: 122/53 (29 Aug 2023 05:17) (122/53 - 156/76)   RR: 18 (29 Aug 2023 05:17) (18 - 18)  SpO2: 100% (29 Aug 2023 05:17) (100% - 100%)  Parameters below as of 29 Aug 2023 05:17  Patient On (Oxygen Delivery Method): room air       MEDICATIONS  (STANDING):  cloNIDine Patch 0.1 mG/24Hr(s) 1 patch Transdermal every 7 days  dextrose 5%. 1000 milliLiter(s) (40 mL/Hr) IV Continuous <Continuous>  valproate sodium  IVPB 500 milliGRAM(s) IV Intermittent every 8 hours    MEDICATIONS  (PRN):

## 2023-08-29 NOTE — CHART NOTE - NSCHARTNOTEFT_GEN_A_CORE
Assessment:     Factors impacting intake: [ ] none [ ] nausea  [ ] vomiting [ ] diarrhea [ ] constipation  [ ]chewing problems [ ] swallowing issues  [ ] other:     Diet Presciption: Diet, Pureed:   Moderately Thick Liquids (MODTHICKLIQS)  Liquid via Teaspoon Only (08-25-23 @ 13:55)    Intake:     Daily   % Weight Change    Pertinent Medications: MEDICATIONS  (STANDING):  cloNIDine Patch 0.1 mG/24Hr(s) 1 patch Transdermal every 7 days  dextrose 5%. 1000 milliLiter(s) (40 mL/Hr) IV Continuous <Continuous>  valproate sodium  IVPB 500 milliGRAM(s) IV Intermittent every 8 hours    MEDICATIONS  (PRN):    Pertinent Labs:  08-24 Phos 4.6 mg/dL<H> 08-24 Alb 1.6 g/dL<L>     CAPILLARY BLOOD GLUCOSE      POCT Blood Glucose.: 84 mg/dL (29 Aug 2023 06:25)  POCT Blood Glucose.: 86 mg/dL (29 Aug 2023 00:36)      Skin:     Estimated Needs:   [ ] no change since previous assessment  [ ] recalculated:     Previous Nutrition Diagnosis:   [ ] Inadequate Energy Intake [ ]Inadequate Oral Intake [ ] Excessive Energy Intake   [ ] Underweight [ ] Increased Nutrient Needs [ ] Overweight/Obesity  [ ] Swallowing Difficult   [ ] Altered GI Function [ ] Unintended Weight Loss [ ] Food & Nutrition Related Knowledge Deficit [ ] Malnutrition   [ ] Not Ready for Diet/Life Style Changes     Nutrition Diagnosis is [ ] ongoing  [ ] Improving   [ ] resolved [ ] not applicable     New Nutrition Diagnosis: [ ] not applicable       Interventions:   Recommend  [ ] Change Diet To:  [ ] Nutrition Supplement  [ ] Nutrition Support  [ ] Other:     Monitoring and Evaluation:   [ ] PO intake [ x ] Tolerance to diet prescription [ x ] weights [ x ] labs[ x ] follow up per protocol  [ ] other: Assessment:         Nutrition consult requested for assessment and education with verbal consult. Chart reviewed, pt visited, awake, non-verbal/confused; s/p open gastrostomy tube placement 8/14/23, TF started 8/15/23, was tolerated well, but changed to NPO due to G-tube leaking, Surgery, GI, IR on the case with Palliative consult, "poor candidate for further surgical intervention and remains high risk for morbidity and mortality this admission and repeated G-tube placement is burdensome with limited benefit -family agreeable to hospice" per MD; s/p G-Tube removed, on Comfort feeding with comfort measures only per MD, poor oral intake, only a few spoonful of food at times, breakfast tray untouched, pending discharge planning to skilled nursing facility/Hospice per team     Factors impacting intake: [ x ] other: change in mental status; difficulty chewing; difficulty feeding self; difficulty swallowing; acute on chronic comorbidities including acute encephalopathy, SUSAN on CKD, h/o CVA; Failure to Thrive     Diet Prescription:   Diet, Pureed:   Moderately Thick Liquids (MODTHICKLIQS)  Liquid via Teaspoon Only (08-25-23 @ 13:55)    Intake: see above    Daily % Weight Change    Pertinent Medications: MEDICATIONS  (STANDING):  cloNIDine Patch 0.1 mG/24Hr(s) 1 patch Transdermal every 7 days  dextrose 5%. 1000 milliLiter(s) (40 mL/Hr) IV Continuous <Continuous>  valproate sodium  IVPB 500 milliGRAM(s) IV Intermittent every 8 hours    MEDICATIONS  (PRN):    Pertinent Labs:  08-24 Phos 4.6 mg/dL<H> 08-24 Alb 1.6 g/dL<L>     CAPILLARY BLOOD GLUCOSE    POCT Blood Glucose.: 84 mg/dL (29 Aug 2023 06:25)  POCT Blood Glucose.: 86 mg/dL (29 Aug 2023 00:36)    Skin: tear    Estimated Needs:   [ x ] no change since previous assessment  [ ] recalculated:     Previous Nutrition Diagnosis:   [ x ] Inadequate Oral Intake   [ x ] Severe Malnutrition     Nutrition Diagnosis is [ x ] ongoing  [ ] Improving   [ ] resolved [ ] not applicable     New Nutrition Diagnosis: [ x ] not applicable     Interventions/Recommend:  [ x ] Continue diet Rx as ordered, on Comfort Feeding per MD   [ ] Nutrition Supplement  [ x ] Nutrition Support: s/p G-Tube removed per MD   [ x ] Other:  Discussed with team, for LTC/Hospice                     Nursing to continue feeding assistance and encouragement as tolerated, aspiration precaution           Monitoring and Evaluation:   [ x ] PO intake [ x ] Tolerance to diet prescription [ x ] weights [ x ] labs[ x ] follow up per protocol  [ ] other: Assessment:         Nutrition consult requested for assessment and education with verbal consult. Chart reviewed, pt visited, awake, non-verbal/confused; s/p open gastrostomy tube placement 8/14/23, TF started 8/15/23, was tolerated well, but changed to NPO due to G-tube leaking, Surgery, GI, IR on the case with Palliative consult, "poor candidate for further surgical intervention and remains high risk for morbidity and mortality this admission and repeated G-tube placement is burdensome with limited benefit -family agreeable to hospice" per MD; G-Tube removed today, on Comfort feeding with comfort measures only per MD, poor oral intake, only a few spoonful of food at times, breakfast tray untouched, pending discharge planning to skilled nursing facility/Hospice per team     Factors impacting intake: [ x ] other: change in mental status; difficulty chewing; difficulty feeding self; difficulty swallowing; acute on chronic comorbidities including acute encephalopathy, SUSAN on CKD, h/o CVA; Failure to Thrive     Diet Prescription:   Diet, Pureed:   Moderately Thick Liquids (MODTHICKLIQS)  Liquid via Teaspoon Only (08-25-23 @ 13:55)    Intake: see above    Daily % Weight Change    Pertinent Medications: MEDICATIONS  (STANDING):  cloNIDine Patch 0.1 mG/24Hr(s) 1 patch Transdermal every 7 days  dextrose 5%. 1000 milliLiter(s) (40 mL/Hr) IV Continuous <Continuous>  valproate sodium  IVPB 500 milliGRAM(s) IV Intermittent every 8 hours    MEDICATIONS  (PRN):    Pertinent Labs:  08-24 Phos 4.6 mg/dL<H> 08-24 Alb 1.6 g/dL<L>     CAPILLARY BLOOD GLUCOSE    POCT Blood Glucose.: 84 mg/dL (29 Aug 2023 06:25)  POCT Blood Glucose.: 86 mg/dL (29 Aug 2023 00:36)    Skin: tear    Estimated Needs:   [ x ] no change since previous assessment  [ ] recalculated:     Previous Nutrition Diagnosis:   [ x ] Inadequate Oral Intake   [ x ] Severe Malnutrition     Nutrition Diagnosis is [ x ] ongoing  [ ] Improving   [ ] resolved [ ] not applicable     New Nutrition Diagnosis: [ x ] not applicable     Interventions/Recommend:  [ x ] Continue diet Rx as ordered, on Comfort Feeding per MD   [ ] Nutrition Supplement  [ x ] Nutrition Support: s/p G-Tube removed per MD   [ x ] Other:  Discussed with team, for LTC/Hospice                     Nursing to continue feeding assistance and encouragement as tolerated, aspiration precaution           Monitoring and Evaluation:   [ x ] PO intake [ x ] Tolerance to diet prescription [ x ] weights [ x ] labs[ x ] follow up per protocol  [ ] other: Assessment:         Nutrition consult requested for assessment and education with verbal consult. Chart reviewed, pt visited, awake, non-verbal/confused; s/p open gastrostomy tube placement 8/14/23, TF started 8/15/23, was tolerated well, but changed to NPO due to G-tube leaking, Surgery, GI, IR on the case with Palliative consult, "poor candidate for further surgical intervention and remains high risk for morbidity and mortality this admission and repeated G-tube placement is burdensome with limited benefit -family agreeable to hospice" per MD; G-Tube removed today, on Comfort feeding with comfort measures only per MD, poor oral intake, only a few spoonful of food at times, breakfast tray untouched, pending discharge planning to skilled nursing facility/Hospice per team     Factors impacting intake: [ x ] other: change in mental status; difficulty chewing; difficulty feeding self; difficulty swallowing; acute on chronic comorbidities including acute encephalopathy, SUSAN on CKD, h/o CVA; Failure to Thrive     Diet Prescription:   Diet, Pureed:   Moderately Thick Liquids (MODTHICKLIQS)  Liquid via Teaspoon Only (08-25-23 @ 13:55)    Intake: see above    Daily % Weight Change    Pertinent Medications: MEDICATIONS  (STANDING):  cloNIDine Patch 0.1 mG/24Hr(s) 1 patch Transdermal every 7 days  dextrose 5%. 1000 milliLiter(s) (40 mL/Hr) IV Continuous <Continuous>  valproate sodium  IVPB 500 milliGRAM(s) IV Intermittent every 8 hours    MEDICATIONS  (PRN):    Pertinent Labs:  08-24 Phos 4.6 mg/dL<H> 08-24 Alb 1.6 g/dL<L>     CAPILLARY BLOOD GLUCOSE    POCT Blood Glucose.: 84 mg/dL (29 Aug 2023 06:25)  POCT Blood Glucose.: 86 mg/dL (29 Aug 2023 00:36)    Skin: tear    Estimated Needs:   [ x ] no change since previous assessment  [ ] recalculated:     Previous Nutrition Diagnosis:   [ x ] Inadequate Oral Intake   [ x ] Severe Malnutrition     Nutrition Diagnosis is [ x ] ongoing  [ ] Improving   [ ] resolved [ ] not applicable     New Nutrition Diagnosis: [ x ] not applicable     Interventions/Recommend:  [ x ] Continue diet Rx as ordered, on Comfort Feeding per MD   [ x ] Nutrition Supplement: Add Ensure Pudding 120 ml x tid ( 510 kcal 12 g protein ) as tolerated   [ x ] Nutrition Support: s/p G-Tube removed per MD   [ x ] Other:  Discussed with team, for LTC/Hospice                     Nursing to continue feeding assistance and encouragement as tolerated, aspiration precaution           Monitoring and Evaluation:   [ x ] PO intake [ x ] Tolerance to diet prescription [ x ] weights [ x ] labs[ x ] follow up per protocol  [ ] other:

## 2023-08-29 NOTE — PROGRESS NOTE ADULT - PROVIDER SPECIALTY LIST ADULT
Cardiology
Gastroenterology
Infectious Disease
Internal Medicine
Nephrology
Neurology
Surgery
Cardiology
Gastroenterology
Infectious Disease
Infectious Disease
Internal Medicine
Nephrology
Nephrology
Surgery
Cardiology
Cardiology
Internal Medicine
Internal Medicine
Palliative Care
Cardiology
Gastroenterology
Gastroenterology
Infectious Disease
Infectious Disease
Nephrology
Surgery
Cardiology
Cardiology
Gastroenterology
Internal Medicine
Internal Medicine
Nephrology
Surgery
Cardiology
Nephrology
Neurology
Palliative Care
Surgery
Cardiology
Internal Medicine
Internal Medicine
Neurology
Cardiology
Gastroenterology
Nephrology
Cardiology
Gastroenterology
Internal Medicine
Nephrology
Internal Medicine
Nephrology
Surgery
Surgery
Gastroenterology
Internal Medicine
Internal Medicine
Gastroenterology
Internal Medicine
Internal Medicine
Gastroenterology
Gastroenterology
Palliative Care
Gastroenterology
Internal Medicine
Gastroenterology
Internal Medicine
Internal Medicine
Palliative Care
Gastroenterology
Internal Medicine
Gastroenterology
Gastroenterology
Internal Medicine

## 2023-08-29 NOTE — PROGRESS NOTE ADULT - SKIN
warm and dry/no rashes/no ulcers

## 2023-08-29 NOTE — PROGRESS NOTE ADULT - SUBJECTIVE AND OBJECTIVE BOX
Date of Service 08-29-23 @ 10:28    CHIEF COMPLAINT:Patient is a 77y old  Female who presents with a chief complaint of Altered mental status. Pt appears comfortable.    	  REVIEW OF SYSTEMS:    [x ] Unable to obtain    PHYSICAL EXAM:  T(C): 36.4 (08-29-23 @ 05:17), Max: 36.6 (08-28-23 @ 20:43)  HR: 105 (08-29-23 @ 05:17) (92 - 105)  BP: 122/53 (08-29-23 @ 05:17) (122/53 - 156/76)  RR: 18 (08-29-23 @ 05:17) (18 - 18)  SpO2: 100% (08-29-23 @ 05:17) (100% - 100%)  Wt(kg): --  I&O's Summary      Appearance: Normal	  HEENT:   Normal oral mucosa, PERRL, EOMI	  Lymphatic: No lymphadenopathy  Cardiovascular: Normal S1 S2, No JVD, No murmurs, No edema  Respiratory: Lungs clear to auscultation	  Gastrointestinal:  Soft, Non-tender, + BS	  Skin: No rashes, No ecchymoses, No cyanosis	  Neurologic: Non-focal  Extremities: Normal range of motion, No clubbing, cyanosis or edema  Vascular: Peripheral pulses palpable 2+ bilaterally    MEDICATIONS  (STANDING):  cloNIDine Patch 0.1 mG/24Hr(s) 1 patch Transdermal every 7 days  dextrose 5%. 1000 milliLiter(s) (40 mL/Hr) IV Continuous <Continuous>  valproate sodium  IVPB 500 milliGRAM(s) IV Intermittent every 8 hours      LABS:	 	    none new

## 2023-08-29 NOTE — PROGRESS NOTE ADULT - NEUROLOGICAL
responds to pain

## 2023-08-29 NOTE — PROGRESS NOTE ADULT - ASSESSMENT
Patient is a 77 female from [facility] HTN, HLD, CVA (w/ residual aphasia and R sided hemiparesis/plegia) who was sent to the hospital due to altered mental status. Patient is being admitted to medicine for further work up and rule out stroke vs encephalopathy (metabolic vs infectious).  Patient is a 77 female from [facility] PMHx HTN, HLD, CVA (w/ residual aphasia and R sided hemiparesis/plegia) who was sent to the hospital due to altered mental status. Patient is being admitted to medicine for further work up and rule out stroke vs encephalopathy (metabolic vs infectious).  Patient is a 77 female from [facility] PMHx HTN, HLD, CVA (w/ residual aphasia and R sided hemiparesis/plegia) who was sent to the hospital due to altered mental status. Patient is being admitted to medicine for further work up and rule out stroke vs encephalopathy (metabolic vs infectious).

## 2023-08-29 NOTE — PROGRESS NOTE ADULT - NOSE
no discharge/no deviation
no discharge
no discharge/no deviation

## 2023-08-29 NOTE — PROGRESS NOTE ADULT - LYMPHATIC
No lymphadedenopathy

## 2023-08-29 NOTE — PROGRESS NOTE ADULT - RESPIRATORY
clear to auscultation bilaterally/no wheezes/no rales/no rhonchi/no respiratory distress/no use of accessory muscles/no subcutaneous emphysema/airway patent/breath sounds equal

## 2023-08-29 NOTE — PROGRESS NOTE ADULT - PROBLEM SELECTOR PROBLEM 1
Adult failure to thrive
Adult failure to thrive
CVA (cerebrovascular accident)
Adult failure to thrive
Dysphagia
Adult failure to thrive
Adult failure to thrive
CVA (cerebrovascular accident)
Adult failure to thrive
Adult failure to thrive
Dysphagia
Adult failure to thrive
CVA (cerebrovascular accident)
Adult failure to thrive
Adult failure to thrive
Dysphagia
Adult failure to thrive
CVA (cerebrovascular accident)
Dysphagia
CVA (cerebrovascular accident)
Adult failure to thrive

## 2023-08-29 NOTE — PROGRESS NOTE ADULT - EYES
PERRL/EOMI/conjunctiva clear/non icteric sclera
PERRL/EOMI/conjunctiva clear/non icteric sclera
PERRL/EOMI
PERRL/EOMI/conjunctiva clear/non icteric sclera
PERRL/EOMI/conjunctiva clear/conjunctiva inflamed/non icteric sclera
PERRL/EOMI/conjunctiva clear/non icteric sclera
[id number]
PERRL/EOMI/conjunctiva clear/non icteric sclera
PERRL/EOMI/conjunctiva clear
PERRL/EOMI/conjunctiva clear/non icteric sclera
PERRL/EOMI/conjunctiva clear
PERRL/EOMI/conjunctiva clear/non icteric sclera

## 2023-08-29 NOTE — PROGRESS NOTE ADULT - MUSCULOSKELETAL
no joint swelling/no joint erythema/no joint warmth/no calf tenderness

## 2023-08-29 NOTE — PROGRESS NOTE ADULT - NUTRITIONAL ASSESSMENT
This patient has been assessed with a concern for Malnutrition and has been determined to have a diagnosis/diagnoses of Severe protein-calorie malnutrition.    This patient is being managed with:   Diet Pureed-  Moderately Thick Liquids (MODTHICKLIQS)  Liquid via Teaspoon Only  Supplement Feeding Modality:  Oral  Ensure Pudding Cans or Servings Per Day:  1       Frequency:  Three Times a day  Entered: Aug 29 2023 11:14AM    Diet Pureed-  Moderately Thick Liquids (MODTHICKLIQS)  Liquid via Teaspoon Only  Entered: Aug 25 2023  1:55PM    The following pending diet order is being considered for treatment of Severe protein-calorie malnutrition:null

## 2023-08-29 NOTE — PROGRESS NOTE ADULT - PHARYNX
no redness/no discharge
no redness
no redness/no discharge

## 2023-09-22 NOTE — ED ADULT NURSE NOTE - DOES PATIENT HAVE ADVANCE DIRECTIVE
No
Include Z78.9 (Other Specified Conditions Influencing Health Status) As An Associated Diagnosis?: No
Detail Level: Detailed
Anesthesia Type: 2% lidocaine with epinephrine
Anesthesia Volume In Cc: 0.5
Medical Necessity Information: It is in your best interest to select a reason for this procedure from the list below. All of these items fulfill various CMS LCD requirements except the new and changing color options.
Consent: The patient's consent was obtained including but not limited to risks of crusting, scabbing, blistering, scarring, darker or lighter pigmentary change, recurrence, incomplete removal and infection.
Treatment Number (Will Not Render If 0): 0
Post-Care Instructions: I reviewed with the patient in detail post-care instructions. Patient is to wear sunprotection, and avoid picking at any of the treated lesions. Pt may apply Vaseline to crusted or scabbing areas.
Medical Necessity Clause: This procedure was medically necessary because the lesions that were treated were:

## 2023-10-08 NOTE — ED ADULT TRIAGE NOTE - MODE OF ARRIVAL
Ambulance No history of diabetes or prediabetes per chart review, HbA1c: 6%--within prediabetes range.  EMS

## 2023-10-26 NOTE — PROGRESS NOTE ADULT - ASSESSMENT
77 year old female with g-tube displacement    - NO tube feeds at this time  - recommend IR consult for possible repositioning of G tube  - IVF while NPO/tube feeds on hold  - will follow  Consent: Written consent was obtained and risks were reviewed including but not limited to scarring, infection, bleeding, scabbing, incomplete removal, nerve damage and allergy to anesthesia.

## 2023-11-02 NOTE — CONSULT NOTE ADULT - TIME-BASED BILLING (NON-CRITICAL CARE)
Holter monitor to assess for arrhythmia    X-rays to look at ribs: Suspect an element of some costochondritis  Try taking ibuprofen or Aleve for the pain    We will check an echocardiogram to see how your heart is pumping      Discussed deep breathing, vagal maneuvers      Any severe or recurring significant symptoms go to the ER   Time-based billing (NON-critical care)

## 2023-12-04 PROBLEM — I82.409 ACUTE EMBOLISM AND THROMBOSIS OF UNSPECIFIED DEEP VEINS OF UNSPECIFIED LOWER EXTREMITY: Chronic | Status: ACTIVE | Noted: 2020-10-23

## 2023-12-04 RX ORDER — LOSARTAN POTASSIUM 100 MG/1
1 TABLET, FILM COATED ORAL
Qty: 0 | Refills: 0 | DISCHARGE

## 2023-12-04 RX ORDER — FAMOTIDINE 10 MG/ML
1 INJECTION INTRAVENOUS
Qty: 0 | Refills: 0 | DISCHARGE

## 2023-12-04 RX ORDER — NICARDIPINE HYDROCHLORIDE 30 MG/1
1 CAPSULE, EXTENDED RELEASE ORAL
Refills: 0 | DISCHARGE

## 2023-12-04 RX ORDER — SERTRALINE 25 MG/1
1 TABLET, FILM COATED ORAL
Refills: 0 | DISCHARGE

## 2023-12-04 RX ORDER — NICARDIPINE HYDROCHLORIDE 30 MG/1
1 CAPSULE, EXTENDED RELEASE ORAL
Qty: 0 | Refills: 0 | DISCHARGE

## 2023-12-04 RX ORDER — SENNA PLUS 8.6 MG/1
2 TABLET ORAL
Qty: 0 | Refills: 0 | DISCHARGE

## 2023-12-04 RX ORDER — ASCORBIC ACID 60 MG
1 TABLET,CHEWABLE ORAL
Refills: 0 | DISCHARGE

## 2023-12-04 RX ORDER — NICARDIPINE HYDROCHLORIDE 30 MG/1
2 CAPSULE, EXTENDED RELEASE ORAL
Qty: 0 | Refills: 0 | DISCHARGE

## 2023-12-04 RX ORDER — FUROSEMIDE 40 MG
1 TABLET ORAL
Qty: 0 | Refills: 0 | DISCHARGE

## 2023-12-04 RX ORDER — CARBIDOPA AND LEVODOPA 25; 100 MG/1; MG/1
1 TABLET ORAL
Refills: 0 | DISCHARGE

## 2023-12-04 RX ORDER — SIMVASTATIN 20 MG/1
1 TABLET, FILM COATED ORAL
Qty: 0 | Refills: 0 | DISCHARGE

## 2023-12-04 RX ORDER — ASCORBIC ACID 60 MG
1 TABLET,CHEWABLE ORAL
Qty: 0 | Refills: 0 | DISCHARGE

## 2023-12-04 RX ORDER — ZINC OXIDE 200 MG/G
1 OINTMENT TOPICAL
Refills: 0 | DISCHARGE

## 2023-12-04 RX ORDER — SERTRALINE 25 MG/1
1 TABLET, FILM COATED ORAL
Qty: 0 | Refills: 0 | DISCHARGE

## 2023-12-04 RX ORDER — BACLOFEN 100 %
1 POWDER (GRAM) MISCELLANEOUS
Refills: 0 | DISCHARGE

## 2023-12-04 RX ORDER — ASPIRIN/CALCIUM CARB/MAGNESIUM 324 MG
1 TABLET ORAL
Qty: 0 | Refills: 0 | DISCHARGE

## 2023-12-04 RX ORDER — ZINC OXIDE 200 MG/G
0 OINTMENT TOPICAL
Refills: 0 | DISCHARGE

## 2023-12-04 RX ORDER — ASPIRIN/CALCIUM CARB/MAGNESIUM 324 MG
1 TABLET ORAL
Refills: 0 | DISCHARGE

## 2023-12-04 RX ORDER — VALPROIC ACID (AS SODIUM SALT) 250 MG/5ML
10 SOLUTION, ORAL ORAL
Refills: 0 | DISCHARGE

## 2023-12-04 RX ORDER — POLYETHYLENE GLYCOL 3350 17 G/17G
1 POWDER, FOR SOLUTION ORAL
Qty: 0 | Refills: 0 | DISCHARGE

## 2023-12-04 RX ORDER — DIVALPROEX SODIUM 500 MG/1
1 TABLET, DELAYED RELEASE ORAL
Qty: 0 | Refills: 0 | DISCHARGE

## 2023-12-04 RX ORDER — FERROUS SULFATE 325(65) MG
1 TABLET ORAL
Refills: 0 | DISCHARGE

## 2024-07-22 NOTE — PHYSICAL THERAPY INITIAL EVALUATION ADULT - LEVEL OF INDEPENDENCE: SIT/STAND, REHAB EVAL
BV(+), Chlamidia (+).  E Rx: Metrogel 0.75% vaginal, 1 aplicatorful at bedtime x 5 days, #1 tube  E Rx: Azithromycin 1 gr PO single dose  Please inform patient. secondary to weakness, will attempt when feasible/unable to perform

## 2024-11-03 NOTE — ED PROVIDER NOTE - NS ED MD DISPO ISOLATION TYPES
Return to the emergency department symptoms worsen or persist.  Follow-up with your family doctor on Monday for reevaluation.  Continue to stay well-hydrated drinking plenty of fluids and advance your diet very slowly.   None

## 2024-11-26 NOTE — ED ADULT TRIAGE NOTE - PAIN RATING/NUMBER SCALE (0-10): REST
Detail Level: Detailed Patient Specific Counseling (Will Not Stick From Patient To Patient): -\\nadvise that there isn’t a lot of evidence supporting biotin or collagen. Hair loss likely related to seb derm Tetracycline Pregnancy And Lactation Text: This medication is Pregnancy Category D and not consider safe during pregnancy. It is also excreted in breast milk. Spironolactone Pregnancy And Lactation Text: This medication can cause feminization of the male fetus and should be avoided during pregnancy. The active metabolite is also found in breast milk. Include Pregnancy/Lactation Warning?: No Topical Retinoid counseling:  Patient advised to apply a pea-sized amount only at bedtime and wait 30 minutes after washing their face before applying.  If too drying, patient may add a non-comedogenic moisturizer. The patient verbalized understanding of the proper use and possible adverse effects of retinoids.  All of the patient's questions and concerns were addressed. High Dose Vitamin A Counseling: Side effects reviewed, pt to contact office should one occur. Winlevi Pregnancy And Lactation Text: This medication is considered safe during pregnancy and breastfeeding. Birth Control Pills Pregnancy And Lactation Text: This medication should be avoided if pregnant and for the first 30 days post-partum. 3 Benzoyl Peroxide Counseling: Patient counseled that medicine may cause skin irritation and bleach clothing.  In the event of skin irritation, the patient was advised to reduce the amount of the drug applied or use it less frequently.   The patient verbalized understanding of the proper use and possible adverse effects of benzoyl peroxide.  All of the patient's questions and concerns were addressed. Topical Sulfur Applications Pregnancy And Lactation Text: This medication is Pregnancy Category C and has an unknown safety profile during pregnancy. It is unknown if this topical medication is excreted in breast milk. Dapsone Pregnancy And Lactation Text: This medication is Pregnancy Category C and is not considered safe during pregnancy or breast feeding. Topical Clindamycin Pregnancy And Lactation Text: This medication is Pregnancy Category B and is considered safe during pregnancy. It is unknown if it is excreted in breast milk. Doxycycline Pregnancy And Lactation Text: This medication is Pregnancy Category D and not consider safe during pregnancy. It is also excreted in breast milk but is considered safe for shorter treatment courses. Azithromycin Counseling:  I discussed with the patient the risks of azithromycin including but not limited to GI upset, allergic reaction, drug rash, diarrhea, and yeast infections. Azelaic Acid Counseling: Patient counseled that medicine may cause skin irritation and to avoid applying near the eyes.  In the event of skin irritation, the patient was advised to reduce the amount of the drug applied or use it less frequently.   The patient verbalized understanding of the proper use and possible adverse effects of azelaic acid.  All of the patient's questions and concerns were addressed. Erythromycin Pregnancy And Lactation Text: This medication is Pregnancy Category B and is considered safe during pregnancy. It is also excreted in breast milk. High Dose Vitamin A Pregnancy And Lactation Text: High dose vitamin A therapy is contraindicated during pregnancy and breast feeding. Aklief counseling:  Patient advised to apply a pea-sized amount only at bedtime and wait 30 minutes after washing their face before applying.  If too drying, patient may add a non-comedogenic moisturizer.  The most commonly reported side effects including irritation, redness, scaling, dryness, stinging, burning, itching, and increased risk of sunburn.  The patient verbalized understanding of the proper use and possible adverse effects of retinoids.  All of the patient's questions and concerns were addressed. Tazorac Pregnancy And Lactation Text: This medication is not safe during pregnancy. It is unknown if this medication is excreted in breast milk. Bactrim Counseling:  I discussed with the patient the risks of sulfa antibiotics including but not limited to GI upset, allergic reaction, drug rash, diarrhea, dizziness, photosensitivity, and yeast infections.  Rarely, more serious reactions can occur including but not limited to aplastic anemia, agranulocytosis, methemoglobinemia, blood dyscrasias, liver or kidney failure, lung infiltrates or desquamative/blistering drug rashes. Tetracycline Counseling: Patient counseled regarding possible photosensitivity and increased risk for sunburn.  Patient instructed to avoid sunlight, if possible.  When exposed to sunlight, patients should wear protective clothing, sunglasses, and sunscreen.  The patient was instructed to call the office immediately if the following severe adverse effects occur:  hearing changes, easy bruising/bleeding, severe headache, or vision changes.  The patient verbalized understanding of the proper use and possible adverse effects of tetracycline.  All of the patient's questions and concerns were addressed. Patient understands to avoid pregnancy while on therapy due to potential birth defects. Topical Retinoid Pregnancy And Lactation Text: This medication is Pregnancy Category C. It is unknown if this medication is excreted in breast milk. Isotretinoin Pregnancy And Lactation Text: This medication is Pregnancy Category X and is considered extremely dangerous during pregnancy. It is unknown if it is excreted in breast milk. Birth Control Pills Counseling: Birth Control Pill Counseling: I discussed with the patient the potential side effects of OCPs including but not limited to increased risk of stroke, heart attack, thrombophlebitis, deep venous thrombosis, hepatic adenomas, breast changes, GI upset, headaches, and depression.  The patient verbalized understanding of the proper use and possible adverse effects of OCPs. All of the patient's questions and concerns were addressed. Spironolactone Counseling: Patient advised regarding risks of diarrhea, abdominal pain, hyperkalemia, birth defects (for female patients), liver toxicity and renal toxicity. The patient may need blood work to monitor liver and kidney function and potassium levels while on therapy. The patient verbalized understanding of the proper use and possible adverse effects of spironolactone.  All of the patient's questions and concerns were addressed. Benzoyl Peroxide Pregnancy And Lactation Text: This medication is Pregnancy Category C. It is unknown if benzoyl peroxide is excreted in breast milk. Winlevi Counseling:  I discussed with the patient the risks of topical clascoterone including but not limited to erythema, scaling, itching, and stinging. Patient voiced their understanding. Dapsone Counseling: I discussed with the patient the risks of dapsone including but not limited to hemolytic anemia, agranulocytosis, rashes, methemoglobinemia, kidney failure, peripheral neuropathy, headaches, GI upset, and liver toxicity.  Patients who start dapsone require monitoring including baseline LFTs and weekly CBCs for the first month, then every month thereafter.  The patient verbalized understanding of the proper use and possible adverse effects of dapsone.  All of the patient's questions and concerns were addressed. Doxycycline Counseling:  Patient counseled regarding possible photosensitivity and increased risk for sunburn.  Patient instructed to avoid sunlight, if possible.  When exposed to sunlight, patients should wear protective clothing, sunglasses, and sunscreen.  The patient was instructed to call the office immediately if the following severe adverse effects occur:  hearing changes, easy bruising/bleeding, severe headache, or vision changes.  The patient verbalized understanding of the proper use and possible adverse effects of doxycycline.  All of the patient's questions and concerns were addressed. Sarecycline Counseling: Patient advised regarding possible photosensitivity and discoloration of the teeth, skin, lips, tongue and gums.  Patient instructed to avoid sunlight, if possible.  When exposed to sunlight, patients should wear protective clothing, sunglasses, and sunscreen.  The patient was instructed to call the office immediately if the following severe adverse effects occur:  hearing changes, easy bruising/bleeding, severe headache, or vision changes.  The patient verbalized understanding of the proper use and possible adverse effects of sarecycline.  All of the patient's questions and concerns were addressed. Azelaic Acid Pregnancy And Lactation Text: This medication is considered safe during pregnancy and breast feeding. Topical Sulfur Applications Counseling: Topical Sulfur Counseling: Patient counseled that this medication may cause skin irritation or allergic reactions.  In the event of skin irritation, the patient was advised to reduce the amount of the drug applied or use it less frequently.   The patient verbalized understanding of the proper use and possible adverse effects of topical sulfur application.  All of the patient's questions and concerns were addressed. Minocycline Counseling: Patient advised regarding possible photosensitivity and discoloration of the teeth, skin, lips, tongue and gums.  Patient instructed to avoid sunlight, if possible.  When exposed to sunlight, patients should wear protective clothing, sunglasses, and sunscreen.  The patient was instructed to call the office immediately if the following severe adverse effects occur:  hearing changes, easy bruising/bleeding, severe headache, or vision changes.  The patient verbalized understanding of the proper use and possible adverse effects of minocycline.  All of the patient's questions and concerns were addressed. Aklief Pregnancy And Lactation Text: It is unknown if this medication is safe to use during pregnancy.  It is unknown if this medication is excreted in breast milk.  Breastfeeding women should use the topical cream on the smallest area of the skin for the shortest time needed while breastfeeding.  Do not apply to nipple and areola. Topical Clindamycin Counseling: Patient counseled that this medication may cause skin irritation or allergic reactions.  In the event of skin irritation, the patient was advised to reduce the amount of the drug applied or use it less frequently.   The patient verbalized understanding of the proper use and possible adverse effects of clindamycin.  All of the patient's questions and concerns were addressed. Erythromycin Counseling:  I discussed with the patient the risks of erythromycin including but not limited to GI upset, allergic reaction, drug rash, diarrhea, increase in liver enzymes, and yeast infections. Azithromycin Pregnancy And Lactation Text: This medication is considered safe during pregnancy and is also secreted in breast milk. Tazorac Counseling:  Patient advised that medication is irritating and drying.  Patient may need to apply sparingly and wash off after an hour before eventually leaving it on overnight.  The patient verbalized understanding of the proper use and possible adverse effects of tazorac.  All of the patient's questions and concerns were addressed. Isotretinoin Counseling: Patient should get monthly blood tests, not donate blood, not drive at night if vision affected, not share medication, and not undergo elective surgery for 6 months after tx completed. Side effects reviewed, pt to contact office should one occur. Bactrim Pregnancy And Lactation Text: This medication is Pregnancy Category D and is known to cause fetal risk.  It is also excreted in breast milk.

## 2025-02-05 NOTE — ED PROVIDER NOTE - CADM POA CENTRAL LINE
Stable.  Feels she is not getting rid of the fluid adequately with the peritoneal dialysis that she does every day.        No

## 2025-02-15 NOTE — H&P ADULT. - NEUROLOGICAL SYMPTOMS
Shanell Brizuela (:  1987) is a 38 y.o. female,Established patient, here for evaluation of the following chief complaint(s):  Urinary Tract Infection (No current symptoms )      ASSESSMENT/PLAN:    ICD-10-CM    1. STD exposure  Z20.2 POCT Urinalysis no Micro     VAGINITIS PANEL PCR     C.trachomatis N.gonorrhoeae DNA, Urine     cefTRIAXone (ROCEPHIN) injection 500 mg      2. Acute vaginitis  N76.0 VAGINITIS PANEL PCR     C.trachomatis N.gonorrhoeae DNA, Urine     cefTRIAXone (ROCEPHIN) injection 500 mg          Patient presents for STD exposure.  No acute vaginitis.  POCT urinalysis not consistent with UTI  GC vaginitis panel pending.  We will call patient with STD results and adjust treatment plan based on results.  Patient was given Rocephin shot here.    SUBJECTIVE/OBJECTIVE:    History provided by:  Patient   used: No      HPI:   38 y.o. female presents with symptoms of wanting STI testing and testing for UTI.  Patient states that she is not currently having any symptoms. Patient states that her partner tried STD testing today.  She does not know the results however she does state that partner did receive a shot today.    Vitals:    02/15/25 1820   BP: 111/75   Site: Left Upper Arm   Position: Sitting   Cuff Size: Small Adult   Pulse: 75   Temp: 98.2 °F (36.8 °C)   TempSrc: Oral   SpO2: 97%   Weight: 62.6 kg (138 lb)       Review of Systems   Constitutional:  Negative for fatigue and fever.   Respiratory:  Negative for cough, chest tightness and shortness of breath.    Cardiovascular:  Negative for chest pain.   Gastrointestinal:  Negative for abdominal pain, constipation, diarrhea, nausea and vomiting.   Musculoskeletal:  Negative for neck pain and neck stiffness.   Skin:  Negative for rash.       Physical Exam  Vitals and nursing note reviewed.   Constitutional:       Appearance: Normal appearance.   HENT:      Head: Normocephalic and atraumatic.   Eyes:      Extraocular Movements: 
difficulty walking/headache/hemiparesis/weakness

## 2025-02-19 NOTE — ED ADULT TRIAGE NOTE - CCCP TRG CHIEF CMPLNT
Your 1 week follow-up Diabetes Education visit is scheduled on 2/26/25 at 1 pm    1. Check blood sugar 4 times a day, before breakfast and 1 hour after the start of each meal.     2. Check urine ketones when you wake up every morning for 7 days. If negative everyday, reduce testing to once a week.    3. Follow the recommended meal plan: eat something every 2-3 hours, include protein/fat and carbohydrate at every meal and snack, have 30 grams carbs at breakfast, 45-60 grams carbs at lunch, 45-60 grams carbs at supper, 15-30 grams carbs at 3 snacks per day.     4. Add activity to every day, try walking or being active after each meal to help control blood sugar levels.    5.Call or send a [employer] message to your educator if 3 or more blood sugars are above goal in 1 week, you have an elevated ketone result (trace or higher), or with questions or concerns.      Monika Cornejo RD, [facility], 9:57 AM, 2/19/2025      [place] [facility] for the [facility]:  For Your Diabetes Education or Nutrition Appointments Call:  [phone]   For Diabetes Education and Nutrition Related Questions:   Phone: [phone]  Send [employer] Message   If you need a medication refill please contact your pharmacy. Please allow 3 business days for your refills to be completed.      right leg and head pain

## 2025-07-09 NOTE — DIETITIAN INITIAL EVALUATION ADULT. - NUTRITION CONSULT
no
PRINCIPAL DISCHARGE DIAGNOSIS  Diagnosis: Upper GI bleed  Assessment and Plan of Treatment: Follow the treatment plan your healthcare provider prescribes.  Call 911 if you have shortness of breath or trouble breathing, you faint or lose consciousness, or you have chest pain.  Return to the emergency department if you feel dizzy or are too weak to stand.  Contact your healthcare provider if you have bowel movements that are tarry or black.

## (undated) DEVICE — TUBING ENDO EXT OLYMPUS 160 24HR USE GI

## (undated) DEVICE — DRAPE MAYO STAND 30"

## (undated) DEVICE — STAPLER SKIN VISI-STAT 35 WIDE

## (undated) DEVICE — PACK IV START WITH CHG

## (undated) DEVICE — PREP CHLORAPREP HI-LITE ORANGE 26ML

## (undated) DEVICE — MARKING PEN W RULER

## (undated) DEVICE — SOL IRR POUR NS 0.9% 500ML

## (undated) DEVICE — SUT SOFSILK 3-0 18" V-20 (POP-OFF)

## (undated) DEVICE — PACK BASIN SPECIAL PROCEDURE

## (undated) DEVICE — SOL INJ NS 0.9% 500ML 2 PORT

## (undated) DEVICE — TUBING CANNULA SALTER LABS NASAL ADULT 7FT

## (undated) DEVICE — SUCTION YANKAUER NO CONTROL VENT

## (undated) DEVICE — DRSG OPSITE 13.75 X 4"

## (undated) DEVICE — SOL IRR POUR H2O 250ML

## (undated) DEVICE — CATH IV SAFE BC 22G X 1" (BLUE)

## (undated) DEVICE — CATH IV SAFE BC 20G X 1.16" (PINK)

## (undated) DEVICE — DRAPE 3/4 SHEET 52X76"

## (undated) DEVICE — MEDICATION LABELS W MARKER

## (undated) DEVICE — DRAPE INSTRUMENT POUCH 6.75" X 11"

## (undated) DEVICE — POSITIONER FOAM EGG CRATE ULNAR 2PCS (PINK)

## (undated) DEVICE — GLV 8.5 PROTEXIS (WHITE)

## (undated) DEVICE — DRSG TEGADERM 6"X8"

## (undated) DEVICE — BLADE SCALPEL SAFETYLOCK #15

## (undated) DEVICE — BLADE SCALPEL SAFETYLOCK #10

## (undated) DEVICE — Device

## (undated) DEVICE — SOLIDIFIER 1200CC

## (undated) DEVICE — DRAPE 1/2 SHEET 40X57"

## (undated) DEVICE — GLV 8 PROTEXIS (WHITE)

## (undated) DEVICE — TUBING SUCTION CONN 6FT STERILE

## (undated) DEVICE — PACK MAJOR ABDOMINAL SUPINE

## (undated) DEVICE — FOLEY HOLDER STATLOCK 2 WAY ADULT

## (undated) DEVICE — SUT SILK 2-0 30" SH

## (undated) DEVICE — DRSG CURITY GAUZE SPONGE 4 X 4" 12-PLY

## (undated) DEVICE — FOR-ESU VALLEYLAB T7E14999DX: Type: DURABLE MEDICAL EQUIPMENT

## (undated) DEVICE — TUBING IV SET GRAVITY 3Y 100" MACRO

## (undated) DEVICE — DRSG TRACH DRAINAGE 4X4

## (undated) DEVICE — BITE BLOCK ADULT 20 X 27MM (GREEN)

## (undated) DEVICE — GLV 7.5 PROTEXIS (WHITE)

## (undated) DEVICE — GLV 7 PROTEXIS (WHITE)

## (undated) DEVICE — FOLEY TRAY 16FR 5CC LTX UMETER CLOSED

## (undated) DEVICE — VENODYNE/SCD SLEEVE CALF MEDIUM

## (undated) DEVICE — BALLOON US ENDO

## (undated) DEVICE — SUT MAXON 1 36" GS-24

## (undated) DEVICE — SPECIMEN CONTAINER 100ML

## (undated) DEVICE — SOL INJ NS 0.9% 500ML 1-PORT

## (undated) DEVICE — WARMING BLANKET UPPER ADULT

## (undated) DEVICE — TUBING SUCTION 20FT

## (undated) DEVICE — DRAPE TOWEL BLUE 17" X 24"

## (undated) DEVICE — GLV 6.5 PROTEXIS (WHITE)

## (undated) DEVICE — SUT BIOSYN 4-0 18" P-12

## (undated) DEVICE — SUT PDS II PLUS 1 96" TP-1

## (undated) DEVICE — SYR ALLIANCE II INFLATION 60ML